# Patient Record
Sex: FEMALE | Race: WHITE | NOT HISPANIC OR LATINO | Employment: OTHER | URBAN - METROPOLITAN AREA
[De-identification: names, ages, dates, MRNs, and addresses within clinical notes are randomized per-mention and may not be internally consistent; named-entity substitution may affect disease eponyms.]

---

## 2018-12-12 PROBLEM — K92.2 ACUTE GASTROINTESTINAL HEMORRHAGE: Status: ACTIVE | Noted: 2018-12-12

## 2019-09-19 ENCOUNTER — HOSPITAL ENCOUNTER (INPATIENT)
Facility: HOSPITAL | Age: 64
LOS: 13 days | Discharge: ANOTHER HOSPITAL | DRG: 871 | End: 2019-10-02
Attending: INTERNAL MEDICINE | Admitting: INTERNAL MEDICINE
Payer: MEDICARE

## 2019-09-19 DIAGNOSIS — A41.01 STAPHYLOCOCCUS AUREUS BACTEREMIA WITH SEPSIS: ICD-10-CM

## 2019-09-19 DIAGNOSIS — I49.9 CARDIAC RHYTHM DISORDER OR DISTURBANCE OR CHANGE: ICD-10-CM

## 2019-09-19 DIAGNOSIS — J90 PLEURAL EFFUSION, BILATERAL: ICD-10-CM

## 2019-09-19 DIAGNOSIS — J18.9 COMMUNITY ACQUIRED PNEUMONIA, UNSPECIFIED LATERALITY: Primary | ICD-10-CM

## 2019-09-19 DIAGNOSIS — R65.21 SEPTIC SHOCK: ICD-10-CM

## 2019-09-19 DIAGNOSIS — I33.0 SBE (SUBACUTE BACTERIAL ENDOCARDITIS): ICD-10-CM

## 2019-09-19 DIAGNOSIS — I34.0 MITRAL REGURGITATION: ICD-10-CM

## 2019-09-19 DIAGNOSIS — I33.0 ACUTE BACTERIAL ENDOCARDITIS: ICD-10-CM

## 2019-09-19 DIAGNOSIS — I50.9 CHF (CONGESTIVE HEART FAILURE): ICD-10-CM

## 2019-09-19 DIAGNOSIS — A41.9 SEPTIC SHOCK: ICD-10-CM

## 2019-09-19 DIAGNOSIS — A41.9 SEPSIS: ICD-10-CM

## 2019-09-19 PROBLEM — E87.20 METABOLIC ACIDOSIS: Status: ACTIVE | Noted: 2019-09-19

## 2019-09-19 PROBLEM — E87.1 HYPONATREMIA: Status: ACTIVE | Noted: 2019-09-19

## 2019-09-19 PROBLEM — E86.0 DEHYDRATION: Status: ACTIVE | Noted: 2019-09-19

## 2019-09-19 PROBLEM — K51.00 PANCOLITIS: Status: ACTIVE | Noted: 2019-09-19

## 2019-09-19 PROBLEM — K92.2 ACUTE GASTROINTESTINAL HEMORRHAGE: Status: RESOLVED | Noted: 2018-12-12 | Resolved: 2019-09-19

## 2019-09-19 PROBLEM — N17.9 AKI (ACUTE KIDNEY INJURY): Status: ACTIVE | Noted: 2019-09-19

## 2019-09-19 PROBLEM — E86.1 INTRAVASCULAR VOLUME DEPLETION: Status: ACTIVE | Noted: 2019-09-19

## 2019-09-19 PROBLEM — G93.41 ENCEPHALOPATHY, METABOLIC: Status: ACTIVE | Noted: 2019-09-19

## 2019-09-19 PROBLEM — E87.6 HYPOKALEMIA: Status: ACTIVE | Noted: 2019-09-19

## 2019-09-19 LAB
ALBUMIN SERPL BCP-MCNC: 1.5 G/DL (ref 3.5–5.2)
ALLENS TEST: ABNORMAL
ANION GAP SERPL CALC-SCNC: 16 MMOL/L (ref 8–16)
BASOPHILS NFR BLD: 0 % (ref 0–1.9)
BUN SERPL-MCNC: 85 MG/DL (ref 8–23)
CALCIUM SERPL-MCNC: 7.3 MG/DL (ref 8.7–10.5)
CHLORIDE SERPL-SCNC: 88 MMOL/L (ref 95–110)
CO2 SERPL-SCNC: 15 MMOL/L (ref 23–29)
CREAT SERPL-MCNC: 2.2 MG/DL (ref 0.5–1.4)
DELSYS: ABNORMAL
DIFFERENTIAL METHOD: ABNORMAL
EOSINOPHIL NFR BLD: 0 % (ref 0–8)
ERYTHROCYTE [DISTWIDTH] IN BLOOD BY AUTOMATED COUNT: 13.9 % (ref 11.5–14.5)
ERYTHROCYTE [SEDIMENTATION RATE] IN BLOOD BY WESTERGREN METHOD: 30 MM/H
EST. GFR  (AFRICAN AMERICAN): 26 ML/MIN/1.73 M^2
EST. GFR  (NON AFRICAN AMERICAN): 23 ML/MIN/1.73 M^2
FLOW: 12
GLUCOSE SERPL-MCNC: 165 MG/DL (ref 70–110)
HCO3 UR-SCNC: 15 MMOL/L (ref 24–28)
HCT VFR BLD AUTO: 30.1 % (ref 37–48.5)
HGB BLD-MCNC: 11 G/DL (ref 12–16)
IMM GRANULOCYTES # BLD AUTO: ABNORMAL K/UL
LACTATE SERPL-SCNC: 1.7 MMOL/L (ref 0.5–2.2)
LYMPHOCYTES NFR BLD: 7 % (ref 18–48)
MAGNESIUM SERPL-MCNC: 2.1 MG/DL (ref 1.6–2.6)
MCH RBC QN AUTO: 31.3 PG (ref 27–31)
MCHC RBC AUTO-ENTMCNC: 36.5 G/DL (ref 32–36)
MCV RBC AUTO: 86 FL (ref 82–98)
MODE: ABNORMAL
MONOCYTES NFR BLD: 7 % (ref 4–15)
NEUTROPHILS NFR BLD: 86 % (ref 38–73)
NRBC BLD-RTO: 0 /100 WBC
PCO2 BLDA: 28.5 MMHG (ref 35–45)
PH SMN: 7.33 [PH] (ref 7.35–7.45)
PHOSPHATE SERPL-MCNC: 3.9 MG/DL (ref 2.7–4.5)
PLATELET # BLD AUTO: 127 K/UL (ref 150–350)
PLATELET BLD QL SMEAR: ABNORMAL
PMV BLD AUTO: 12.7 FL (ref 9.2–12.9)
PO2 BLDA: 89 MMHG (ref 80–100)
POC BE: -11 MMOL/L
POC SATURATED O2: 96 % (ref 95–100)
POC TCO2: 16 MMOL/L (ref 23–27)
POTASSIUM SERPL-SCNC: 2.8 MMOL/L (ref 3.5–5.1)
RBC # BLD AUTO: 3.52 M/UL (ref 4–5.4)
SAMPLE: ABNORMAL
SITE: ABNORMAL
SODIUM SERPL-SCNC: 119 MMOL/L (ref 136–145)
SP02: 99
TROPONIN I SERPL DL<=0.01 NG/ML-MCNC: 0.03 NG/ML (ref 0–0.03)
WBC # BLD AUTO: 19.83 K/UL (ref 3.9–12.7)

## 2019-09-19 PROCEDURE — 85007 BL SMEAR W/DIFF WBC COUNT: CPT | Mod: 91

## 2019-09-19 PROCEDURE — 80069 RENAL FUNCTION PANEL: CPT

## 2019-09-19 PROCEDURE — 20000000 HC ICU ROOM

## 2019-09-19 PROCEDURE — S0030 INJECTION, METRONIDAZOLE: HCPCS | Performed by: NURSE PRACTITIONER

## 2019-09-19 PROCEDURE — 83605 ASSAY OF LACTIC ACID: CPT | Mod: 91

## 2019-09-19 PROCEDURE — 36600 WITHDRAWAL OF ARTERIAL BLOOD: CPT

## 2019-09-19 PROCEDURE — 27000221 HC OXYGEN, UP TO 24 HOURS

## 2019-09-19 PROCEDURE — 83735 ASSAY OF MAGNESIUM: CPT

## 2019-09-19 PROCEDURE — 63600175 PHARM REV CODE 636 W HCPCS: Performed by: NURSE PRACTITIONER

## 2019-09-19 PROCEDURE — 94760 N-INVAS EAR/PLS OXIMETRY 1: CPT

## 2019-09-19 PROCEDURE — 25000003 PHARM REV CODE 250: Performed by: NURSE PRACTITIONER

## 2019-09-19 PROCEDURE — 84484 ASSAY OF TROPONIN QUANT: CPT | Mod: 91

## 2019-09-19 PROCEDURE — 36415 COLL VENOUS BLD VENIPUNCTURE: CPT

## 2019-09-19 PROCEDURE — 94640 AIRWAY INHALATION TREATMENT: CPT

## 2019-09-19 PROCEDURE — 85027 COMPLETE CBC AUTOMATED: CPT | Mod: 91

## 2019-09-19 PROCEDURE — 82803 BLOOD GASES ANY COMBINATION: CPT

## 2019-09-19 PROCEDURE — 99900035 HC TECH TIME PER 15 MIN (STAT)

## 2019-09-19 PROCEDURE — 25000003 PHARM REV CODE 250

## 2019-09-19 RX ORDER — POTASSIUM CHLORIDE 29.8 MG/ML
40 INJECTION INTRAVENOUS ONCE
Status: COMPLETED | OUTPATIENT
Start: 2019-09-20 | End: 2019-09-19

## 2019-09-19 RX ORDER — NOREPINEPHRINE BITARTRATE 0.03 MG/ML
INJECTION, SOLUTION INTRAVENOUS
Status: COMPLETED
Start: 2019-09-19 | End: 2019-09-19

## 2019-09-19 RX ORDER — PANTOPRAZOLE SODIUM 40 MG/10ML
40 INJECTION, POWDER, LYOPHILIZED, FOR SOLUTION INTRAVENOUS DAILY
Status: DISCONTINUED | OUTPATIENT
Start: 2019-09-20 | End: 2019-10-02 | Stop reason: HOSPADM

## 2019-09-19 RX ORDER — ONDANSETRON 2 MG/ML
4 INJECTION INTRAMUSCULAR; INTRAVENOUS EVERY 4 HOURS PRN
Status: DISCONTINUED | OUTPATIENT
Start: 2019-09-19 | End: 2019-10-02 | Stop reason: HOSPADM

## 2019-09-19 RX ORDER — IPRATROPIUM BROMIDE AND ALBUTEROL SULFATE 2.5; .5 MG/3ML; MG/3ML
3 SOLUTION RESPIRATORY (INHALATION) EVERY 6 HOURS
Status: DISCONTINUED | OUTPATIENT
Start: 2019-09-20 | End: 2019-09-19

## 2019-09-19 RX ORDER — IPRATROPIUM BROMIDE AND ALBUTEROL SULFATE 2.5; .5 MG/3ML; MG/3ML
3 SOLUTION RESPIRATORY (INHALATION) EVERY 6 HOURS
Status: DISCONTINUED | OUTPATIENT
Start: 2019-09-20 | End: 2019-09-27

## 2019-09-19 RX ORDER — SODIUM CHLORIDE 0.9 % (FLUSH) 0.9 %
10 SYRINGE (ML) INJECTION
Status: DISCONTINUED | OUTPATIENT
Start: 2019-09-19 | End: 2019-10-02 | Stop reason: HOSPADM

## 2019-09-19 RX ORDER — NOREPINEPHRINE BITARTRATE 0.03 MG/ML
0.02 INJECTION, SOLUTION INTRAVENOUS CONTINUOUS
Status: DISCONTINUED | OUTPATIENT
Start: 2019-09-19 | End: 2019-09-27

## 2019-09-19 RX ORDER — LEVOFLOXACIN 5 MG/ML
500 INJECTION, SOLUTION INTRAVENOUS
Status: DISCONTINUED | OUTPATIENT
Start: 2019-09-19 | End: 2019-09-21

## 2019-09-19 RX ORDER — SODIUM CHLORIDE 9 MG/ML
INJECTION, SOLUTION INTRAVENOUS CONTINUOUS
Status: DISCONTINUED | OUTPATIENT
Start: 2019-09-19 | End: 2019-09-20

## 2019-09-19 RX ORDER — METRONIDAZOLE 500 MG/100ML
500 INJECTION, SOLUTION INTRAVENOUS
Status: DISCONTINUED | OUTPATIENT
Start: 2019-09-19 | End: 2019-09-26

## 2019-09-19 RX ADMIN — Medication 8 MG: at 10:09

## 2019-09-19 RX ADMIN — METRONIDAZOLE 500 MG: 500 INJECTION, SOLUTION INTRAVENOUS at 11:09

## 2019-09-19 RX ADMIN — POTASSIUM CHLORIDE 40 MEQ: 29.8 INJECTION, SOLUTION INTRAVENOUS at 11:09

## 2019-09-19 RX ADMIN — LEVOFLOXACIN 500 MG: 500 INJECTION, SOLUTION INTRAVENOUS at 10:09

## 2019-09-19 RX ADMIN — IPRATROPIUM BROMIDE AND ALBUTEROL SULFATE 3 ML: .5; 3 SOLUTION RESPIRATORY (INHALATION) at 11:09

## 2019-09-19 RX ADMIN — Medication 125 MG: at 11:09

## 2019-09-19 RX ADMIN — SODIUM CHLORIDE: 0.9 INJECTION, SOLUTION INTRAVENOUS at 10:09

## 2019-09-19 NOTE — PLAN OF CARE
(Physician in Lead of Transfers)   Outside Transfer Acceptance Note / Regional Referral Center    Transferring Physician: Pratik Michele MD (ED)    Accepting Physician: Eddy Mayer MD    Date of Acceptance: 09/18/2019    Transferring Facility: [facility]    Destination Facility and Admitting Physician: [facility] with Dr. Covarrubias    Reason for Transfer: HLOC, ICU needs    Report from Transferring Physician/Hospital course:     Ms. Mckenzie is a 63-year-old woman with WESLEY, MDD, and prior GIB who presented to [facility] brought by her family for a several day history of confusion, and slowed/slurred speech. Her family described a history of weakness, diarrhea, fevers, chills, nausea, and vomiting non-bloody emesis. Upon arrival to the ED she was hypotensive with BP 80/30s. Workup significant for CXR with bilateral lower airspace disease, CT A/P with pancolitis, severe hyponatremia, hypochloremia, and ABIMBOLA on lab work (see below for details), along with numerous other abnormalities. She was resuscitated with 30 cc/kg, and an extra 1L of NS, then given vanc + ceftriaxone + levaquin. L. IJ CVL was placed. She was started on epi with improvement in BP, and they are currently weaning down the dose. Her SpO2 is in the high 80s and low 90s on 2L of O2 via NC.     Dr. Michele believes her condition is multifactorial with contribution from septic shock from acute pancolitis and pneumonia causing metabolic encephalopathy, complicated by a toxidrome (likely opiates and benzodiazepines) in the setting of acute kidney injury and severe volume depletion.    Her airspace disease is predominately lower, so perhaps the etiology is aspiration from the vomiting with the above encephalopathy.    I requested that the ED broad antibiotics further by the addition of anaerobic coverage (either by adding flagyl or consolidating to zosyn), trend BMP very frequently to assess for Na  "overcorrection, and after talking to Dr. Covarrubias we will request a head CT. [facility] is currently without ICU beds/staffing to take care of the patient, so are requesting transfer.    VS:     Temp: Afebrile  HR: 115  RR: 30  SpO2:89 on 2L  BP: 90/45    Labs:     WBC 13.1k  Hemoglobin 10.7 g/dL  Platelets 97k    Na 114  K 3.1  Cl 76  Bicarb 15 with AG 23    sCr 2.9  Calcium 7.2, albumin 2.2  Total bili 1.5  AST 84  ALT 24  INR 2.7          Trop 0.3  Lactate #1 1.0    UDS positive for benzodiazepines and opiates  Tylenol 22.2    Radiographs:     CXR  "Patchy airspace disease bilaterally, likely pneumonia."    CT A/P  "1. Acute pancolitis.  2. Airspace disease multiple areas in the lower lungs are seen and a right pleural effusion approximately 2 cm deep at the base."    To Do List: Admit to , ICU bed    Upon patient arrival to floor, please contact Hospital Medicine on call.     Eddy Mayer M.D.  Department of Hospital Medicine  [facility] jonathan  [phone] (pager)  "

## 2019-09-20 ENCOUNTER — OUTSIDE PLACE OF SERVICE (OUTPATIENT)
Dept: INFECTIOUS DISEASES | Facility: CLINIC | Age: 64
End: 2019-09-20
Payer: MEDICARE

## 2019-09-20 PROBLEM — J84.89 BOOP (BRONCHIOLITIS OBLITERANS WITH ORGANIZING PNEUMONIA): Status: ACTIVE | Noted: 2019-09-20

## 2019-09-20 PROBLEM — J84.9 ILD (INTERSTITIAL LUNG DISEASE): Status: ACTIVE | Noted: 2019-09-20

## 2019-09-20 PROBLEM — D69.6 THROMBOCYTOPENIA: Status: ACTIVE | Noted: 2019-09-20

## 2019-09-20 PROBLEM — J90 PLEURAL EFFUSION: Status: ACTIVE | Noted: 2019-09-20

## 2019-09-20 PROBLEM — D68.9 COAGULOPATHY: Status: ACTIVE | Noted: 2019-09-20

## 2019-09-20 PROBLEM — J44.0 COPD WITH ACUTE LOWER RESPIRATORY INFECTION: Status: ACTIVE | Noted: 2019-09-20

## 2019-09-20 PROBLEM — I70.0 CALCIFICATION OF AORTA: Status: ACTIVE | Noted: 2019-09-20

## 2019-09-20 PROBLEM — J96.01 ACUTE HYPOXEMIC RESPIRATORY FAILURE: Status: ACTIVE | Noted: 2019-09-20

## 2019-09-20 LAB
ALLENS TEST: ABNORMAL
ANION GAP SERPL CALC-SCNC: 10 MMOL/L (ref 8–16)
ANION GAP SERPL CALC-SCNC: 10 MMOL/L (ref 8–16)
ANION GAP SERPL CALC-SCNC: 12 MMOL/L (ref 8–16)
ANION GAP SERPL CALC-SCNC: 12 MMOL/L (ref 8–16)
ANION GAP SERPL CALC-SCNC: 13 MMOL/L (ref 8–16)
ANION GAP SERPL CALC-SCNC: 14 MMOL/L (ref 8–16)
BASOPHILS NFR BLD: 0 % (ref 0–1.9)
BUN SERPL-MCNC: 55 MG/DL (ref 8–23)
BUN SERPL-MCNC: 55 MG/DL (ref 8–23)
BUN SERPL-MCNC: 61 MG/DL (ref 8–23)
BUN SERPL-MCNC: 61 MG/DL (ref 8–23)
BUN SERPL-MCNC: 69 MG/DL (ref 8–23)
BUN SERPL-MCNC: 69 MG/DL (ref 8–23)
BUN SERPL-MCNC: 72 MG/DL (ref 8–23)
BUN SERPL-MCNC: 77 MG/DL (ref 8–23)
CALCIUM SERPL-MCNC: 7.4 MG/DL (ref 8.7–10.5)
CALCIUM SERPL-MCNC: 7.6 MG/DL (ref 8.7–10.5)
CALCIUM SERPL-MCNC: 7.6 MG/DL (ref 8.7–10.5)
CALCIUM SERPL-MCNC: 7.7 MG/DL (ref 8.7–10.5)
CALCIUM SERPL-MCNC: 7.8 MG/DL (ref 8.7–10.5)
CALCIUM SERPL-MCNC: 7.8 MG/DL (ref 8.7–10.5)
CHLORIDE SERPL-SCNC: 102 MMOL/L (ref 95–110)
CHLORIDE SERPL-SCNC: 102 MMOL/L (ref 95–110)
CHLORIDE SERPL-SCNC: 103 MMOL/L (ref 95–110)
CHLORIDE SERPL-SCNC: 103 MMOL/L (ref 95–110)
CHLORIDE SERPL-SCNC: 93 MMOL/L (ref 95–110)
CHLORIDE SERPL-SCNC: 96 MMOL/L (ref 95–110)
CHLORIDE SERPL-SCNC: 97 MMOL/L (ref 95–110)
CHLORIDE SERPL-SCNC: 97 MMOL/L (ref 95–110)
CO2 SERPL-SCNC: 14 MMOL/L (ref 23–29)
CO2 SERPL-SCNC: 14 MMOL/L (ref 23–29)
CO2 SERPL-SCNC: 15 MMOL/L (ref 23–29)
CO2 SERPL-SCNC: 15 MMOL/L (ref 23–29)
CO2 SERPL-SCNC: 16 MMOL/L (ref 23–29)
CREAT SERPL-MCNC: 1.3 MG/DL (ref 0.5–1.4)
CREAT SERPL-MCNC: 1.3 MG/DL (ref 0.5–1.4)
CREAT SERPL-MCNC: 1.4 MG/DL (ref 0.5–1.4)
CREAT SERPL-MCNC: 1.4 MG/DL (ref 0.5–1.4)
CREAT SERPL-MCNC: 1.6 MG/DL (ref 0.5–1.4)
CREAT SERPL-MCNC: 1.6 MG/DL (ref 0.5–1.4)
CREAT SERPL-MCNC: 1.7 MG/DL (ref 0.5–1.4)
CREAT SERPL-MCNC: 1.9 MG/DL (ref 0.5–1.4)
CRP SERPL-MCNC: 182.8 MG/L (ref 0–8.2)
D DIMER PPP IA.FEU-MCNC: 4.23 MG/L FEU
DELSYS: ABNORMAL
DIFFERENTIAL METHOD: ABNORMAL
EOSINOPHIL NFR BLD: 0 % (ref 0–8)
ERYTHROCYTE [DISTWIDTH] IN BLOOD BY AUTOMATED COUNT: 14.2 % (ref 11.5–14.5)
ERYTHROCYTE [SEDIMENTATION RATE] IN BLOOD BY WESTERGREN METHOD: 57 MM/HR (ref 0–20)
EST. GFR  (AFRICAN AMERICAN): 31 ML/MIN/1.73 M^2
EST. GFR  (AFRICAN AMERICAN): 36 ML/MIN/1.73 M^2
EST. GFR  (AFRICAN AMERICAN): 39 ML/MIN/1.73 M^2
EST. GFR  (AFRICAN AMERICAN): 39 ML/MIN/1.73 M^2
EST. GFR  (AFRICAN AMERICAN): 45 ML/MIN/1.73 M^2
EST. GFR  (AFRICAN AMERICAN): 45 ML/MIN/1.73 M^2
EST. GFR  (AFRICAN AMERICAN): 50 ML/MIN/1.73 M^2
EST. GFR  (AFRICAN AMERICAN): 50 ML/MIN/1.73 M^2
EST. GFR  (NON AFRICAN AMERICAN): 27 ML/MIN/1.73 M^2
EST. GFR  (NON AFRICAN AMERICAN): 31 ML/MIN/1.73 M^2
EST. GFR  (NON AFRICAN AMERICAN): 34 ML/MIN/1.73 M^2
EST. GFR  (NON AFRICAN AMERICAN): 34 ML/MIN/1.73 M^2
EST. GFR  (NON AFRICAN AMERICAN): 39 ML/MIN/1.73 M^2
EST. GFR  (NON AFRICAN AMERICAN): 39 ML/MIN/1.73 M^2
EST. GFR  (NON AFRICAN AMERICAN): 43 ML/MIN/1.73 M^2
EST. GFR  (NON AFRICAN AMERICAN): 43 ML/MIN/1.73 M^2
FIBRINOGEN PPP-MCNC: 549 MG/DL (ref 182–366)
FIO2: 94
FLOW: 2
GLUCOSE SERPL-MCNC: 160 MG/DL (ref 70–110)
GLUCOSE SERPL-MCNC: 160 MG/DL (ref 70–110)
GLUCOSE SERPL-MCNC: 207 MG/DL (ref 70–110)
GLUCOSE SERPL-MCNC: 207 MG/DL (ref 70–110)
GLUCOSE SERPL-MCNC: 84 MG/DL (ref 70–110)
GLUCOSE SERPL-MCNC: 87 MG/DL (ref 70–110)
GLUCOSE SERPL-MCNC: 97 MG/DL (ref 70–110)
GLUCOSE SERPL-MCNC: 97 MG/DL (ref 70–110)
HCO3 UR-SCNC: 14.4 MMOL/L (ref 24–28)
HCT VFR BLD AUTO: 27.4 % (ref 37–48.5)
HGB BLD-MCNC: 10.1 G/DL (ref 12–16)
IMM GRANULOCYTES # BLD AUTO: ABNORMAL K/UL (ref 0–0.04)
INR PPP: 3.3 (ref 0.8–1.2)
LACTATE SERPL-SCNC: 1.5 MMOL/L (ref 0.5–2.2)
LYMPHOCYTES NFR BLD: 3 % (ref 18–48)
MCH RBC QN AUTO: 31.4 PG (ref 27–31)
MCHC RBC AUTO-ENTMCNC: 36.9 G/DL (ref 32–36)
MCV RBC AUTO: 85 FL (ref 82–98)
MODE: ABNORMAL
MONOCYTES NFR BLD: 5 % (ref 4–15)
NEUTROPHILS NFR BLD: 79 % (ref 38–73)
NEUTS BAND NFR BLD MANUAL: 13 %
NRBC BLD-RTO: 0 /100 WBC
PCO2 BLDA: 23 MMHG (ref 35–45)
PH SMN: 7.41 [PH] (ref 7.35–7.45)
PLATELET # BLD AUTO: 94 K/UL (ref 150–350)
PLATELET BLD QL SMEAR: ABNORMAL
PMV BLD AUTO: 12.2 FL (ref 9.2–12.9)
PO2 BLDA: 60 MMHG (ref 80–100)
POC BE: -10 MMOL/L
POC SATURATED O2: 92 % (ref 95–100)
POC TCO2: 15 MMOL/L (ref 23–27)
POTASSIUM SERPL-SCNC: 3.2 MMOL/L (ref 3.5–5.1)
POTASSIUM SERPL-SCNC: 3.2 MMOL/L (ref 3.5–5.1)
POTASSIUM SERPL-SCNC: 3.3 MMOL/L (ref 3.5–5.1)
POTASSIUM SERPL-SCNC: 3.4 MMOL/L (ref 3.5–5.1)
POTASSIUM SERPL-SCNC: 3.8 MMOL/L (ref 3.5–5.1)
POTASSIUM SERPL-SCNC: 3.8 MMOL/L (ref 3.5–5.1)
POTASSIUM SERPL-SCNC: 3.9 MMOL/L (ref 3.5–5.1)
POTASSIUM SERPL-SCNC: 3.9 MMOL/L (ref 3.5–5.1)
PROTHROMBIN TIME: 33.7 SEC (ref 9–12.5)
RBC # BLD AUTO: 3.22 M/UL (ref 4–5.4)
SAMPLE: ABNORMAL
SITE: ABNORMAL
SODIUM SERPL-SCNC: 122 MMOL/L (ref 136–145)
SODIUM SERPL-SCNC: 124 MMOL/L (ref 136–145)
SODIUM SERPL-SCNC: 126 MMOL/L (ref 136–145)
SODIUM SERPL-SCNC: 126 MMOL/L (ref 136–145)
SODIUM SERPL-SCNC: 128 MMOL/L (ref 136–145)
SODIUM SERPL-SCNC: 128 MMOL/L (ref 136–145)
SODIUM SERPL-SCNC: 129 MMOL/L (ref 136–145)
SODIUM SERPL-SCNC: 129 MMOL/L (ref 136–145)
TROPONIN I SERPL DL<=0.01 NG/ML-MCNC: 0.04 NG/ML (ref 0–0.03)
VANCOMYCIN SERPL-MCNC: 4.7 UG/ML
WBC # BLD AUTO: 10.29 K/UL (ref 3.9–12.7)

## 2019-09-20 PROCEDURE — 83735 ASSAY OF MAGNESIUM: CPT

## 2019-09-20 PROCEDURE — 80048 BASIC METABOLIC PNL TOTAL CA: CPT | Mod: 91

## 2019-09-20 PROCEDURE — 63600175 PHARM REV CODE 636 W HCPCS: Performed by: INTERNAL MEDICINE

## 2019-09-20 PROCEDURE — 25000003 PHARM REV CODE 250: Performed by: INTERNAL MEDICINE

## 2019-09-20 PROCEDURE — 82803 BLOOD GASES ANY COMBINATION: CPT

## 2019-09-20 PROCEDURE — 36415 COLL VENOUS BLD VENIPUNCTURE: CPT

## 2019-09-20 PROCEDURE — 25000003 PHARM REV CODE 250: Performed by: NURSE PRACTITIONER

## 2019-09-20 PROCEDURE — 20000000 HC ICU ROOM

## 2019-09-20 PROCEDURE — 63600175 PHARM REV CODE 636 W HCPCS: Performed by: NURSE PRACTITIONER

## 2019-09-20 PROCEDURE — 87040 BLOOD CULTURE FOR BACTERIA: CPT

## 2019-09-20 PROCEDURE — 87340 HEPATITIS B SURFACE AG IA: CPT

## 2019-09-20 PROCEDURE — 99223 1ST HOSP IP/OBS HIGH 75: CPT | Mod: S$GLB,,, | Performed by: INTERNAL MEDICINE

## 2019-09-20 PROCEDURE — 85027 COMPLETE CBC AUTOMATED: CPT

## 2019-09-20 PROCEDURE — 86706 HEP B SURFACE ANTIBODY: CPT

## 2019-09-20 PROCEDURE — 99223 PR INITIAL HOSPITAL CARE,LEVL III: ICD-10-PCS | Mod: ,,, | Performed by: INTERNAL MEDICINE

## 2019-09-20 PROCEDURE — 85651 RBC SED RATE NONAUTOMATED: CPT

## 2019-09-20 PROCEDURE — 83605 ASSAY OF LACTIC ACID: CPT

## 2019-09-20 PROCEDURE — 27000221 HC OXYGEN, UP TO 24 HOURS

## 2019-09-20 PROCEDURE — 93005 ELECTROCARDIOGRAM TRACING: CPT

## 2019-09-20 PROCEDURE — C9113 INJ PANTOPRAZOLE SODIUM, VIA: HCPCS | Performed by: NURSE PRACTITIONER

## 2019-09-20 PROCEDURE — 99291 CRITICAL CARE FIRST HOUR: CPT | Mod: ,,, | Performed by: INTERNAL MEDICINE

## 2019-09-20 PROCEDURE — 85007 BL SMEAR W/DIFF WBC COUNT: CPT

## 2019-09-20 PROCEDURE — 63600175 PHARM REV CODE 636 W HCPCS

## 2019-09-20 PROCEDURE — 99291 PR CRITICAL CARE, E/M 30-74 MINUTES: ICD-10-PCS | Mod: ,,, | Performed by: INTERNAL MEDICINE

## 2019-09-20 PROCEDURE — 85610 PROTHROMBIN TIME: CPT

## 2019-09-20 PROCEDURE — S0030 INJECTION, METRONIDAZOLE: HCPCS | Performed by: NURSE PRACTITIONER

## 2019-09-20 PROCEDURE — 99223 1ST HOSP IP/OBS HIGH 75: CPT | Mod: ,,, | Performed by: INTERNAL MEDICINE

## 2019-09-20 PROCEDURE — 80202 ASSAY OF VANCOMYCIN: CPT

## 2019-09-20 PROCEDURE — 99900035 HC TECH TIME PER 15 MIN (STAT)

## 2019-09-20 PROCEDURE — 86803 HEPATITIS C AB TEST: CPT

## 2019-09-20 PROCEDURE — 86140 C-REACTIVE PROTEIN: CPT

## 2019-09-20 PROCEDURE — 85384 FIBRINOGEN ACTIVITY: CPT

## 2019-09-20 PROCEDURE — 84484 ASSAY OF TROPONIN QUANT: CPT

## 2019-09-20 PROCEDURE — 94761 N-INVAS EAR/PLS OXIMETRY MLT: CPT

## 2019-09-20 PROCEDURE — 25000242 PHARM REV CODE 250 ALT 637 W/ HCPCS: Performed by: INTERNAL MEDICINE

## 2019-09-20 PROCEDURE — 94640 AIRWAY INHALATION TREATMENT: CPT

## 2019-09-20 PROCEDURE — 85379 FIBRIN DEGRADATION QUANT: CPT

## 2019-09-20 PROCEDURE — 36600 WITHDRAWAL OF ARTERIAL BLOOD: CPT

## 2019-09-20 PROCEDURE — 80048 BASIC METABOLIC PNL TOTAL CA: CPT

## 2019-09-20 PROCEDURE — 99223 PR INITIAL HOSPITAL CARE,LEVL III: ICD-10-PCS | Mod: S$GLB,,, | Performed by: INTERNAL MEDICINE

## 2019-09-20 RX ORDER — MORPHINE SULFATE 4 MG/ML
4 INJECTION, SOLUTION INTRAMUSCULAR; INTRAVENOUS
Status: DISCONTINUED | OUTPATIENT
Start: 2019-09-20 | End: 2019-09-24

## 2019-09-20 RX ORDER — DESMOPRESSIN ACETATE 4 UG/ML
2 INJECTION, SOLUTION INTRAVENOUS; SUBCUTANEOUS ONCE
Status: COMPLETED | OUTPATIENT
Start: 2019-09-20 | End: 2019-09-20

## 2019-09-20 RX ORDER — POTASSIUM CHLORIDE 29.8 MG/ML
40 INJECTION INTRAVENOUS ONCE
Status: COMPLETED | OUTPATIENT
Start: 2019-09-20 | End: 2019-09-20

## 2019-09-20 RX ORDER — MORPHINE SULFATE 4 MG/ML
INJECTION, SOLUTION INTRAMUSCULAR; INTRAVENOUS
Status: COMPLETED
Start: 2019-09-20 | End: 2019-09-20

## 2019-09-20 RX ORDER — POTASSIUM CHLORIDE 29.8 MG/ML
INJECTION INTRAVENOUS
Status: DISPENSED
Start: 2019-09-20 | End: 2019-09-21

## 2019-09-20 RX ORDER — MORPHINE SULFATE 4 MG/ML
4 INJECTION, SOLUTION INTRAMUSCULAR; INTRAVENOUS EVERY 4 HOURS PRN
Status: DISCONTINUED | OUTPATIENT
Start: 2019-09-20 | End: 2019-09-20

## 2019-09-20 RX ORDER — DEXTROSE MONOHYDRATE, SODIUM CHLORIDE, AND POTASSIUM CHLORIDE 50; 1.49; 9 G/1000ML; G/1000ML; G/1000ML
INJECTION, SOLUTION INTRAVENOUS CONTINUOUS
Status: DISCONTINUED | OUTPATIENT
Start: 2019-09-20 | End: 2019-09-20

## 2019-09-20 RX ORDER — DEXTROSE MONOHYDRATE, SODIUM CHLORIDE, AND POTASSIUM CHLORIDE 50; 2.98; 4.5 G/1000ML; G/1000ML; G/1000ML
INJECTION, SOLUTION INTRAVENOUS CONTINUOUS
Status: DISCONTINUED | OUTPATIENT
Start: 2019-09-20 | End: 2019-09-21

## 2019-09-20 RX ADMIN — PANTOPRAZOLE SODIUM 40 MG: 40 INJECTION, POWDER, FOR SOLUTION INTRAVENOUS at 09:09

## 2019-09-20 RX ADMIN — Medication 125 MG: at 12:09

## 2019-09-20 RX ADMIN — MORPHINE SULFATE 4 MG: 4 INJECTION, SOLUTION INTRAMUSCULAR; INTRAVENOUS at 11:09

## 2019-09-20 RX ADMIN — Medication 0.12 MCG/KG/MIN: at 03:09

## 2019-09-20 RX ADMIN — IPRATROPIUM BROMIDE AND ALBUTEROL SULFATE 3 ML: .5; 3 SOLUTION RESPIRATORY (INHALATION) at 07:09

## 2019-09-20 RX ADMIN — MORPHINE SULFATE 4 MG: 4 INJECTION, SOLUTION INTRAMUSCULAR; INTRAVENOUS at 09:09

## 2019-09-20 RX ADMIN — ONDANSETRON 4 MG: 2 INJECTION INTRAMUSCULAR; INTRAVENOUS at 04:09

## 2019-09-20 RX ADMIN — METRONIDAZOLE 500 MG: 500 INJECTION, SOLUTION INTRAVENOUS at 03:09

## 2019-09-20 RX ADMIN — LEVOFLOXACIN 500 MG: 500 INJECTION, SOLUTION INTRAVENOUS at 10:09

## 2019-09-20 RX ADMIN — METRONIDAZOLE 500 MG: 500 INJECTION, SOLUTION INTRAVENOUS at 10:09

## 2019-09-20 RX ADMIN — Medication 125 MG: at 09:09

## 2019-09-20 RX ADMIN — MORPHINE SULFATE 4 MG: 4 INJECTION, SOLUTION INTRAMUSCULAR; INTRAVENOUS at 06:09

## 2019-09-20 RX ADMIN — POTASSIUM CHLORIDE 40 MEQ: 29.8 INJECTION, SOLUTION INTRAVENOUS at 02:09

## 2019-09-20 RX ADMIN — POTASSIUM CHLORIDE, DEXTROSE MONOHYDRATE AND SODIUM CHLORIDE: 150; 5; 900 INJECTION, SOLUTION INTRAVENOUS at 01:09

## 2019-09-20 RX ADMIN — SODIUM CHLORIDE, SODIUM LACTATE, POTASSIUM CHLORIDE, AND CALCIUM CHLORIDE 2000 ML: .6; .31; .03; .02 INJECTION, SOLUTION INTRAVENOUS at 12:09

## 2019-09-20 RX ADMIN — IPRATROPIUM BROMIDE AND ALBUTEROL SULFATE 3 ML: .5; 3 SOLUTION RESPIRATORY (INHALATION) at 01:09

## 2019-09-20 RX ADMIN — MORPHINE SULFATE 4 MG: 4 INJECTION, SOLUTION INTRAMUSCULAR; INTRAVENOUS at 08:09

## 2019-09-20 RX ADMIN — Medication 125 MG: at 05:09

## 2019-09-20 RX ADMIN — DEXTROSE MONOHYDRATE, SODIUM CHLORIDE, AND POTASSIUM CHLORIDE: 50; 4.5; 2.98 INJECTION, SOLUTION INTRAVENOUS at 04:09

## 2019-09-20 RX ADMIN — MORPHINE SULFATE 4 MG: 4 INJECTION, SOLUTION INTRAMUSCULAR; INTRAVENOUS at 01:09

## 2019-09-20 RX ADMIN — MORPHINE SULFATE 2 MG: 4 INJECTION, SOLUTION INTRAMUSCULAR; INTRAVENOUS at 12:09

## 2019-09-20 RX ADMIN — METRONIDAZOLE 500 MG: 500 INJECTION, SOLUTION INTRAVENOUS at 05:09

## 2019-09-20 RX ADMIN — VANCOMYCIN HYDROCHLORIDE 750 MG: 750 INJECTION, POWDER, LYOPHILIZED, FOR SOLUTION INTRAVENOUS at 05:09

## 2019-09-20 RX ADMIN — Medication 0.06 MCG/KG/MIN: at 07:09

## 2019-09-20 RX ADMIN — DESMOPRESSIN ACETATE 2 MCG: 4 SOLUTION INTRAVENOUS at 04:09

## 2019-09-20 RX ADMIN — DEXTROSE MONOHYDRATE, SODIUM CHLORIDE, AND POTASSIUM CHLORIDE: 50; 4.5; 2.98 INJECTION, SOLUTION INTRAVENOUS at 11:09

## 2019-09-20 NOTE — ASSESSMENT & PLAN NOTE
Acute, 2° pancolitis   Associated with renal failure tachycardia, tachypnea, hypotension, coagulopathy and metabolic acidosis  Monitor closely in the ICU  Cardiac monitoring   Blood cultures pending  Possibly septic emboli in head. CT in AM

## 2019-09-20 NOTE — CONSULTS
"  09/20/2019      Admit Date: 9/19/2019  Mesha Mckenzie  New Patient Consult    No chief complaint on file.    Cc boop,resp failure      History of Present Illness:  Pt smoker, disabled lpn post Marisa, lives alone/- daughter gives lot of history.  Pt functions, no resp limits, was to have colonoscopy for positive/abn stool screen- no chr diarrhea.  Pt had been on chr narctotics til July by PMR reports, also on chr valium/Adderall.    Pt 10 days ago developed nausea with diarrhea (1-2/d), poor intake, weakness, cough- daughter discussed by phone 6 days ago and pt sounded near nl but related symptoms.  Pt subsequently had poor recall- had vague fall with no specific injury.  Had vague emesis.  Cough but cannot relate mucous.  Fever is vague.  EMS brought in to [facility] with low bp- patchy infiltrates seen on ct chest as was emphysema.          Per NP Dolese HPI earlier this am:HPI: Pt is a 64 yo female who was transferred from [facility] for septic shock, pancolitis and bilateral pna. Pt reports that she fell out of bed on Sunday morning and has been feeling progressively worse throughout the week. C/o vomiting since Sunday, ~3 episodes per day. Also reports diarrhea, ~4 stools per day, watery in nature. Pt denies any hematemesis, melena or hematochezia. Pt did take zofran which helped "some:" c/o severe abdominal pain since Sunday which is constant. Pain is a 10 on a 0-10 pain scale.  Has not been able to eat due to the nausea and pain. Questioned pt in regards to use of pain medication, states "I didn't have any." pt was scheduled for colonoscopy tomorrow. Hx of GI bleed in December 2018. Pt states she had passed some blood in her stool. Reviewed colonoscopy report, no bleeding in the colon was found however the prep was poor. No recent travel, no sick contacts at home. Questioned pt in regards to cough: pt states she has felt SOB due to pain but reports only occasional coughing. Denies any sputum production.  " "Social: smokes 10 cigarettes per day, 20 pack year hx. No ETOH.     PFSH:  Past Medical History:   Diagnosis Date    Anxiety     Carotid bruit     Chronic pain     Cystitis     Cystocele, unspecified (CODE)     Depression     GI bleed     History of uterine fibroid     Shortness of breath on exertion     Spinal stenosis     Urinary retention      Past Surgical History:   Procedure Laterality Date    ANTERIOR VAGINAL REPAIR  10-    CARDIAC CATHETERIZATION  age 12    CHOLECYSTECTOMY  2015    COLONOSCOPY  12/12/2018    aborted due to poor colon prep    COLONOSCOPY N/A 12/12/2018    Performed by Renard Gonsalves MD at [facility]    HYST[facility]Y  1989    [facility]    tubiligation       Social History     Tobacco Use    Smoking status: Current Every Day Smoker     Packs/day: 0.50     Years: 40.00     Pack years: 20.00     Types: Cigarettes    Smokeless tobacco: Never Used   Substance Use Topics    Alcohol use: No    Drug use: No     Family History   Problem Relation Age of Onset    Alzheimer's disease Mother     Thyroid disease Mother     Osteoarthritis Mother     Hypertension Brother     Hypertension Sister     Breast cancer Neg Hx     Colon cancer Neg Hx     Ovarian cancer Neg Hx      Review of patient's allergies indicates:   Allergen Reactions    Pcn [penicillins]        Performance Status:Performance Status:The patient's activity level is housebound activities.    Review of Systems:  a review of eleven systems covering constitutional, Psych, Eye, HEENT, Respiratory, Cardiac, GI, , Musculoskeletal, Endocrine, Dermatologicwas negative except the above mentioned abnormalities and for any pertinent findings as listed below:- pt hard to interview but  pertinent positive as above, rest is good         Exam:Comprehensive exam done. BP (!) 99/58   Pulse 102   Temp 97.8 °F (36.6 °C) (Axillary)   Resp (!) 32   Ht 5' 2" (1.575 m)   Wt 45.1 kg (99 lb 6.8 oz)   SpO2 (!) 90% Comment: pt. had " 02 off  Breastfeeding? No   BMI 18.19 kg/m²   Exam included Vitals as listed, and patient's appearance and affect and alertness and mood, oral exam for yeast and hygiene and pharynx lesions and Mallapatti (M) score, neck with inspection for jvd and masses and thyroid abnormalities and lymph nodes (supraclavicular and infraclavicular nodes also examined and noted if abn), chest exam included symmetry and effort and fremitus and percussion and auscultation, cardiac exam included rhythm and gallops and murmur and rubs and jvd and edema, abdominal exam for mass and hepatosplenomegaly and tenderness and hernias and bowel sounds, Musculoskeletal exam with muscle tone and posture and mobility/gait and  strenght, and skin for rashes and cyanosis and pallor and turgor, extremity for clubbing.  Findings were normal except as listed below:  Very dry mm, M2, moans/toxic looking, weak, well nourished, sl rales, chest is symmetric, no distress, normal percussion, normal fremitus , distent cor, tender abd not distented , no edema, no clubbing,       Radiographs reviewed: view by direct vision  Ct with viry aorta ild (suggested on cxr 12/2018), bilat dense patchy infiltrates, emphysema seen      No results found for this or any previous visit.]    Labs     Recent Labs   Lab 09/20/19  1037   WBC 10.29   HGB 10.1*   HCT 27.4*   PLT 94*   BAND 13.0     Recent Labs   Lab 09/19/19  1306  09/19/19  2215 09/20/19  0249  09/20/19  1037   NA  --    < > 119* 122*   < > 126*  126*   K  --    < > 2.8* 3.4*   < > 3.2*  3.2*   CL  --    < > 88* 93*   < > 97  97   CO2  --    < > 15* 15*   < > 16*  16*   BUN  --    < > 85* 77*   < > 69*  69*   CREATININE  --    < > 2.2* 1.9*   < > 1.6*  1.6*   GLU  --    < > 165* 84   < > 97  97   CALCIUM  --    < > 7.3* 7.4*   < > 7.8*  7.8*   MG  --    < > 2.1  --   --   --    PHOS  --   --  3.9  --   --   --    ALBUMIN  --   --  1.5*  --   --   --    CRP  --   --   --  182.8*  --   --    SEDRATE   --   --   --  57*  --   --    INR  --    < >  --  3.3*  --   --    LACTATE  --   --  1.7  --   --   --    TROPONINI  --    < > 0.031* 0.044*  --   --    *  --   --   --   --   --    *  --   --   --   --   --     < > = values in this interval not displayed.     Recent Labs   Lab 09/19/19  2333   PH 7.328*   PCO2 28.5*   PO2 89   HCO3 15.0*     Microbiology Results (last 7 days)     Procedure Component Value Units Date/Time    Clostridium difficile EIA [[id number]]     Order Status:  No result Specimen:  Stool     Stool culture [[id number]]     Order Status:  No result Specimen:  Stool           Impression:  Active Hospital Problems    Diagnosis  POA    *Septic shock [A41.9, R65.21]  Yes    Coagulopathy [D68.9]  Yes    ILD (interstitial lung disease) [J84.9]  Yes    COPD with acute lower respiratory infection [J44.0]  Yes    Calcification of aorta [I70.0]  Unknown     Defer to primary control of cholesterol and bp.          Thrombocytopenia [D69.6]  Yes    Pleural effusion [J90]  Yes    Acute hypoxemic respiratory failure [J96.01]  Yes    ABIMBOLA (acute kidney injury) [N17.9]  Yes    Intravascular volume depletion [E86.1]  Yes    Pancolitis [K51.00]  Yes    Hyponatremia [E87.1]  Yes    Hypokalemia [E87.6]  Yes    Metabolic acidosis [E87.2]  Yes    CAP (community acquired pneumonia) [J18.9]  Yes      Resolved Hospital Problems   No resolved problems to display.               Plan:   Pneumonia can present with boop appearance and would be expected dx here.  Would not use high dose steroids at this time, copd with lower resp infection clear.     levaquin and vanc/flagyl ordered wbc 19.8 to 10.3 in a day- pcn allergy.      Will monitor response.  Further eval needed. Give fluids/pressors.     The following were evaluated and adjusted as needed: vasopressors, intravenous fluids and nutritional status, sedation and neurologic status, antibiotics, hemodynamics, support tubes and access lines and  invasive monitoring, acid base balance and oxygenation needs and input and output and renal status       Critical Care  - THE PATIENT HAS A HIGH PROBABILITY OF IMMINENT OR LIFE THREATENING DETERIORATION.  Over 50%time of encounter was in direct care at bedside.  Time was 30 to 74 minutes required for patient care.  Time needed for all of the above totaled 51 minutes.

## 2019-09-20 NOTE — EICU
EICU    Pt is a 64 y/o f with pmhx sig for MDD, WESLEY presented to [facility] due to confusion/slurred speech in addition to wekaness/f/c/watery diarrhea/n/v non bloody emesis  PT bp 80/30s  In the ER and she was tsed with 30 cc/kg crystalloid- with an extra liter NS- vanco/ceftriaxone/levaquin and had LIJ cvl placed and was started on epi gtt with improvement in bp.  He sao2 was in the 80s and improved to 90s on 2l.  PT says she fell from bed at home 4 days ago and is currently complaining of abd pain/lower chest discomfort with incr pain with deep breathing.  Per pt she takes valium/adderal/asa at home. No recent travel no ill contacts, no illicitis, no etoh    PE 92/56 afeb 92% fm 120s narrow complex regular on monitor  Pt in visible discomfort, alert and oriented CX 3 knows Anurag is president, tachypneic  No rash   Dry mm  Warm thighs/patella, no mottling    Wbc 19 P 86 L 7 M 7  hgb 11 mcv 86  plt 127  Na 119 K 2.8 HCO3 15 bun 85 creat 2.2 gluc 165 Ca 7.3 PO4 3.9 Mg 2.1 alb 1.5  ast 84 alt 24 alk phos 86 bili 1.5 tp 5.7 alb 2.2  Trop 0.031 ck 908 bnp 151  PT 28 INR 2.7  Lac 1.7  Acetaminophen 22    Abd CT  1. Acute pancolitis.  2. Airspace disease multiple areas in the lower lungs are seen and a right pleural effusion approximately 2 cm deep at the base  S/p cholycystectomy    Head CT  No appreciable changes compared to CT head 07/24/2015 including moderate to severe presumed microvascular ischemic changes cerebral white matter and old lacunar infarct adjacent to right head of caudate.    ekg  sr pvcs      A/P  Pancolitis/ septic emboli vs Pna/Hyponatremia  Unclear precipitant of all pt issues, but reports onset of voluminous watery diarrhea 4d prior w/o travel or ill contacts, fell from bed reaching for water as well.  Has had nausea and nonbloody emesis as well.  No recent abx/hospitalizations.  Pancolitis seems more likely infectious than vascular compromise/embolic  - needs more volume  based on dry mm, Na; LR and frequent Na to control rate of rise of osms  - hd borderline bps, likely combination vol depletion +/- sepsis; sepsis sources include ?septic emboli in chest (vs multifocal pna vs less likely malignancy) and gut translocation from inflamed colon  - check cdiff, tx oral vanco, flagyl/levaquin to cover abd; start IV vanco, lesions in chest concerning for septic emboli, rounded/peripheral and two at least with cavitation; needs TTE, may need evaluation of IJ vessels as well; no IVDA  - no clear hiv risks, need to consider checking; stool O and P, fecal leucocytes, cryptsporidia/microsporidia  - follow Na, K closely, replete K aggressively with wide QRS  - ms intact completely oriented  - creat up, better, likely due to low vol/bp +/- ATN  - chest CT when pain better and bp better  - dvt proph  - IS

## 2019-09-20 NOTE — PLAN OF CARE
Pt has not slept all night, pt consistently moaning and restless. Complains of abdominal and back pain, states it is constant. Henriquez draining clear yellow urine. Levophed weaned down to .06mcg/kg and  2 liter bolus of lactated ringers given. Antibiotics given and potassium replaced. Will monitor labs. CT of abdomen repeated. Contact isolation for C-diff initiated, no bowel movement to collect stool sample at this time. Safety maintained, lateral rotation in place. Positive blood cultures: gram positive cocci clusters resembling staph.

## 2019-09-20 NOTE — CONSULTS
Pharmacokinetic Initial Assessment: IV Vancomycin    Assessment/Plan:    Initiated intravenous vancomycin with dose of 500 mg once with subsequent doses when random concentrations are less than 20 mcg/mL  Desired empiric serum trough concentration is 15 to 20 mcg/mL  Draw vancomycin random level on 9/20 at 1530.  Pharmacy will continue to follow and monitor vancomycin.      Please contact pharmacy at extension [phone] with any questions regarding this assessment.     Thank you for the consult,   Liset Medina       Patient brief summary:  Mesha Mckenzie is a 63 y.o. female initiated on antimicrobial therapy with IV Vancomycin for treatment of suspected bacteremia    Drug Allergies:   Review of patient's allergies indicates:   Allergen Reactions    Pcn [penicillins]        Actual Body Weight:   45.1 kg    Renal Function:   Estimated Creatinine Clearance: 21 mL/min (A) (based on SCr of 1.9 mg/dL (H)).,         CBC (last 72 hours):  Recent Labs   Lab Result Units 09/19/19  1214 09/19/19  2215   WBC K/uL 13.10* 19.83*   Hemoglobin g/dL 10.7* 11.0*   Hematocrit % 31.8* 30.1*   Platelets K/uL 97* 127*   Gran% % 80.0* 86.0*   Lymph% % 11.0* 7.0*   Mono% % 5.0 7.0   Eosinophil% % 1.0 0.0   Basophil% % 0.0 0.0   Differential Method  Manual Manual       Metabolic Panel (last 72 hours):  Recent Labs   Lab Result Units 09/19/19  1214 09/19/19  1245 09/19/19  1807 09/19/19  1902 09/19/19  2215 09/20/19  0249   Sodium mmol/L 114*  --  117*  --  119* 122*   Potassium mmol/L 3.1*  --  2.3*  --  2.8* 3.4*   Chloride mmol/L 76*  --  85*  --  88* 93*   CO2 mmol/L 15*  --  12*  --  15* 15*   Glucose mg/dL 78  --  118  --  165* 84   Glucose, UA   --  Negative  --   --   --   --    BUN, Bld mg/dL 101*  --  91*  --  85* 77*   Creatinine mg/dL 2.9*  --  2.5*  --  2.2* 1.9*   Albumin g/dL 2.2*  --   --   --  1.5*  --    Total Bilirubin mg/dL 1.5*  --   --   --   --   --    Alkaline Phosphatase U/L 86  --   --   --   --   --    AST U/L  84*  --   --   --   --   --    ALT U/L 24  --   --   --   --   --    Magnesium mg/dL  --   --   --  1.7 2.1  --    Phosphorus mg/dL  --   --   --   --  3.9  --        Drug levels (last 3 results):  No results for input(s): VANCOMYCINRA, VANCOMYCINPE, VANCOMYCINTR in the last 72 hours.    Microbiologic Results:  Microbiology Results (last 7 days)       Procedure Component Value Units Date/Time    Clostridium difficile EIA [[id number]]     Order Status:  No result Specimen:  Stool     Stool culture [[id number]]     Order Status:  No result Specimen:  Stool

## 2019-09-20 NOTE — PLAN OF CARE
CM met with pt bedside to attempt to complete discharge assessment. Pt unable to participate due to confusion. CM called pt's spouse Ari. Per Ari, information on facesheet is correct. Denies POA/LW. PCP is Dr. Shea. Pharm is [facility] on [address] Denies any hh/hd/dme. Reports that pt was independent with ADLS and able to drive herself to appts prior to admission. For now DC plan is home. CM following to assist with any DC needs.        09/20/19 1250   Discharge Assessment   Assessment Type Discharge Planning Assessment   Confirmed/corrected address and phone number on facesheet? Yes   Assessment information obtained from? Other  (pt's spouse, Ari)   Communicated expected length of stay with patient/caregiver yes   Prior to hospitilization cognitive status: Alert/Oriented   Prior to hospitalization functional status: Independent   Current cognitive status: Unable to Assess;Inappropriate Behavior   Current Functional Status: Needs Assistance   Lives With spouse   Able to Return to Prior Arrangements   (TBD)   Is patient able to care for self after discharge? Unable to determine at this time (comments)   Who are your caregiver(s) and their phone number(s)? Ari Neena (spouse) [phone]   Patient's perception of discharge disposition home or selfcare   Readmission Within the Last 30 Days unable to assess   Patient currently being followed by outpatient case management? No   Patient currently receives any other outside agency services? No   Equipment Currently Used at Home none   Do you have any problems affording any of your prescribed medications? No   Is the patient taking medications as prescribed? yes   Does the patient have transportation home? Yes   Transportation Anticipated family or friend will provide   Does the patient receive services at the [facility]? No   Discharge Plan A Home   DME Needed Upon Discharge  none   Patient/Family in Agreement with Plan yes

## 2019-09-20 NOTE — SUBJECTIVE & OBJECTIVE
Past Medical History:   Diagnosis Date    Anxiety     Carotid bruit     Chronic pain     Cystitis     Cystocele, unspecified (CODE)     Depression     GI bleed     History of uterine fibroid     Shortness of breath on exertion     Spinal stenosis     Urinary retention        Past Surgical History:   Procedure Laterality Date    ANTERIOR VAGINAL REPAIR  8-    CARDIAC CATHETERIZATION  age 12    CHOLECYSTECTOMY  2015    COLONOSCOPY  12/12/2018    aborted due to poor colon prep    COLONOSCOPY N/A 12/12/2018    Performed by Renard Gonsalves MD at [facility] ENDO    [facility]OMY  1989    [facility]    tubiligation         Review of patient's allergies indicates:   Allergen Reactions    Pcn [penicillins]        Current Facility-Administered Medications on File Prior to Encounter   Medication    [COMPLETED] cefTRIAXone (ROCEPHIN) 1 g in dextrose 5 % 50 mL IVPB    [COMPLETED] dextrose 5 % and 0.9 % NaCl with KCl 20 mEq infusion    lactated ringers infusion    [COMPLETED] lidocaine (PF) 10 mg/ml (1%) injection    [COMPLETED] magnesium sulfate in dextrose IVPB (premix) 1 g    [COMPLETED] naloxone 0.4 mg/mL injection 0.2 mg    [COMPLETED] sodium chloride 0.9% bolus 1,000 mL    [COMPLETED] sodium chloride 0.9% bolus 1,701 mL    sodium chloride 0.9% flush 3 mL    [COMPLETED] vancomycin 500 mg in dextrose 5 % 100 mL IVPB (ready to mix system)    [DISCONTINUED] EPINEPHrine (ADRENALIN) 5 mg in sodium chloride 0.9% 250 mL infusion    [DISCONTINUED] levoFLOXacin 500 mg/100 mL IVPB 500 mg    [DISCONTINUED] metronidazole IVPB 500 mg    [DISCONTINUED] vancomycin (VANCOCIN) 567 mg in sodium chloride 0.45% IV syringe (Conc: 5 mg/ml)     Current Outpatient Medications on File Prior to Encounter   Medication Sig    diazepam (VALIUM) 5 MG tablet Take 10 mg by mouth 2 (two) times daily.      Family History     Problem Relation (Age of Onset)    Alzheimer's disease Mother    Hypertension Brother, Sister     Osteoarthritis Mother    Thyroid disease Mother        Tobacco Use    Smoking status: Current Every Day Smoker     Packs/day: 0.50     Years: 40.00     Pack years: 20.00     Types: Cigarettes    Smokeless tobacco: Never Used   Substance and Sexual Activity    Alcohol use: No    Drug use: No    Sexual activity: Yes     Partners: Male     Review of Systems   Constitutional: Positive for chills, fatigue and fever. Negative for activity change, appetite change and diaphoresis.   HENT: Negative for congestion and facial swelling.    Eyes: Negative for redness and itching.   Respiratory: Negative for cough, chest tightness, shortness of breath and wheezing.    Cardiovascular: Negative for chest pain, palpitations and leg swelling.   Gastrointestinal: Positive for abdominal pain, diarrhea, nausea and vomiting. Negative for constipation.   Endocrine: Negative for cold intolerance and heat intolerance.   Genitourinary: Negative for difficulty urinating, dysuria, flank pain, frequency, hematuria and urgency.   Musculoskeletal: Negative for arthralgias, back pain, joint swelling, myalgias and neck pain.   Neurological: Positive for weakness. Negative for dizziness, syncope and headaches.   Psychiatric/Behavioral: Negative for agitation and confusion. The patient is not nervous/anxious.      Objective:     Vital Signs (Most Recent):  Temp: 98.3 °F (36.8 °C) (09/20/19 0000)  Pulse: (!) 123 (09/20/19 0100)  Resp: (!) 33 (09/20/19 0100)  BP: (!) 94/50 (09/20/19 0100)  SpO2: (!) 92 % (09/20/19 0100) Vital Signs (24h Range):  Temp:  [94.1 °F (34.5 °C)-98.7 °F (37.1 °C)] 98.3 °F (36.8 °C)  Pulse:  [] 123  Resp:  [16-51] 33  SpO2:  [83 %-100 %] 92 %  BP: ()/(31-86) 94/50     Weight: 45.1 kg (99 lb 6.8 oz)  Body mass index is 18.19 kg/m².    Physical Exam   Constitutional: She is oriented to person, place, and time. She appears well-developed and well-nourished. No distress.   HENT:   Head: Normocephalic and  atraumatic.   Eyes: Conjunctivae are normal. Right eye exhibits no discharge. Left eye exhibits no discharge. No scleral icterus.   Neck: Normal range of motion. Neck supple. No JVD present. No thyromegaly present.   Cardiovascular: Normal rate, regular rhythm, normal heart sounds and intact distal pulses. Exam reveals no gallop and no friction rub.   No murmur heard.  Pulmonary/Chest: Effort normal and breath sounds normal. No respiratory distress. She has no wheezes. She has no rales. She exhibits no tenderness.   Abdominal: Soft. Bowel sounds are normal. She exhibits no distension and no mass. There is tenderness (generalized ). There is no rebound and no guarding.   Musculoskeletal: Normal range of motion. She exhibits no edema or tenderness.   Lymphadenopathy:     She has no cervical adenopathy.   Neurological: She is alert and oriented to person, place, and time. No cranial nerve deficit.   Skin: Skin is warm and dry. Capillary refill takes less than 2 seconds. She is not diaphoretic.   Psychiatric: She has a normal mood and affect. Her behavior is normal. Judgment and thought content normal.   Nursing note and vitals reviewed.          Significant Labs:   CBC:   Recent Labs   Lab 09/19/19  1214 09/19/19  1406 09/19/19  2215   WBC 13.10*  --  19.83*   HGB 10.7*  --  11.0*   HCT 31.8* 32* 30.1*   PLT 97*  --  127*     CMP:   Recent Labs   Lab 09/19/19  1214 09/19/19  1807 09/19/19  2215   * 117* 119*   K 3.1* 2.3* 2.8*   CL 76* 85* 88*   CO2 15* 12* 15*   GLU 78 118 165*   * 91* 85*   CREATININE 2.9* 2.5* 2.2*   CALCIUM 7.2* 6.9* 7.3*   PROT 5.7*  --   --    ALBUMIN 2.2*  --  1.5*   BILITOT 1.5*  --   --    ALKPHOS 86  --   --    AST 84*  --   --    ALT 24  --   --    ANIONGAP 23* 20* 16   EGFRNONAA 16.4* 19.6* 23*     Lactic Acid:   Recent Labs   Lab 09/19/19  1214 09/19/19  2215   LACTATE 1.0 1.7     Magnesium:   Recent Labs   Lab 09/19/19  1902 09/19/19  2215   MG 1.7 2.1     Troponin:   Recent  Labs   Lab 09/19/19  1214 09/19/19  2215   TROPONINI 0.30* 0.031*       Significant Imaging: I have reviewed and interpreted all pertinent imaging results/findings within the past 24 hours.

## 2019-09-20 NOTE — CONSULTS
Nephrology Consult Note        Patient Name: Mesha Mckenzie  MRN: [id number]    Patient Class: IP- Inpatient   Admission Date: 9/19/2019  Length of Stay: 1 days  Date of Service: 9/20/2019    Attending Physician: Lizzette Covarrubias MD  Primary Care Provider: Varun Shea MD PhD    Reason for Consult: abimbola/hyponatremia/hypokalemia/acidosis    SUBJECTIVE:     HPI: 63F, disabled, with h/o PMR, depression, anxiety, chronic pain was transferred from [facility] for septic shock, pancolitis (diarrhea, nausea, vomiting) and bilateral PNA. Renal consulted for ABIMBOLA, sever hyponatremia and hypokalemia. Story limited from patient due to distress. Daughter helping with info.    Past Medical History:   Diagnosis Date    Anxiety     Carotid bruit     Chronic pain     Cystitis     Cystocele, unspecified (CODE)     Depression     GI bleed     History of uterine fibroid     Shortness of breath on exertion     Spinal stenosis     Urinary retention      Past Surgical History:   Procedure Laterality Date    ANTERIOR VAGINAL REPAIR  10-    CARDIAC CATHETERIZATION  age 12    CHOLECYSTECTOMY  2015    COLONOSCOPY  12/12/2018    aborted due to poor colon prep    COLONOSCOPY N/A 12/12/2018    Performed by Renard Gonsalves MD at [facility] ENDO    [facility]Y  1989    [facility]    tubiligation       Family History   Problem Relation Age of Onset    Alzheimer's disease Mother     Thyroid disease Mother     Osteoarthritis Mother     Hypertension Brother     Hypertension Sister     Breast cancer Neg Hx     Colon cancer Neg Hx     Ovarian cancer Neg Hx      Social History     Tobacco Use    Smoking status: Current Every Day Smoker     Packs/day: 0.50     Years: 40.00     Pack years: 20.00     Types: Cigarettes    Smokeless tobacco: Never Used   Substance Use Topics    Alcohol use: No    Drug use: No       Review of patient's allergies indicates:   Allergen Reactions    Pcn [penicillins]        Outpatient meds:  Current  Facility-Administered Medications on File Prior to Encounter   Medication Dose Route Frequency Provider Last Rate Last Dose    [COMPLETED] dextrose 5 % and 0.9 % NaCl with KCl 20 mEq infusion  1,000 mL Intravenous ED 1 Time Pratik Michele  mL/hr at 09/19/19 1855 1,000 mL at 09/19/19 1855    lactated ringers infusion   Intravenous Continuous Renard Gonsalves MD 75 mL/hr at 12/12/18 0826      [COMPLETED] magnesium sulfate in dextrose IVPB (premix) 1 g  1 g Intravenous ED 1 Time Pratik Michele MD   Stopped at 09/19/19 2005    sodium chloride 0.9% flush 3 mL  3 mL Intravenous PRN Renard Gonsalves MD        [DISCONTINUED] EPINEPHrine (ADRENALIN) 5 mg in sodium chloride 0.9% 250 mL infusion  0.02 mcg/kg/min Intravenous Continuous Pratik Michele MD 17 mL/hr at 09/19/19 1809 0.1 mcg/kg/min at 09/19/19 1809    [DISCONTINUED] levoFLOXacin 500 mg/100 mL IVPB 500 mg  500 mg Intravenous Q24H Pratik Michele MD   Stopped at 09/19/19 1647    [DISCONTINUED] metronidazole IVPB 500 mg  500 mg Intravenous Q8H Pratik Michele MD   Stopped at 09/19/19 1859     Current Outpatient Medications on File Prior to Encounter   Medication Sig Dispense Refill    diazepam (VALIUM) 5 MG tablet Take 10 mg by mouth 2 (two) times daily.          Scheduled meds:   albuterol-ipratropium  3 mL Nebulization Q6H    levoFLOXacin  500 mg Intravenous Q24H    metronidazole  500 mg Intravenous Q8H    pantoprazole  40 mg Intravenous Daily    potassium chloride in water        vancomycin  125 mg Oral Q6H       Infusions:   dextrose 5 % and 0.9 % NaCl with KCl 20 mEq 125 mL/hr at 09/20/19 1351    norepinephrine bitartrate-D5W 0.06 mcg/kg/min (09/20/19 0712)       PRN meds:  influenza, morphine, ondansetron, pneumoc 13-susanna conj-dip cr(PF), sodium chloride 0.9%    Review of Systems:  Review of Systems   Unable to perform ROS: Medical condition       OBJECTIVE:     Vital Signs and IO (Last 24H):  Temp:  [94.1 °F  (34.5 °C)-98.7 °F (37.1 °C)]   Pulse:  []   Resp:  [19-51]   BP: ()/(34-86)   SpO2:  [83 %-100 %]   I/O last 3 completed shifts:  In: -   Out: 1025 [Urine:1025]    Wt Readings from Last 5 Encounters:   09/19/19 45.1 kg (99 lb 6.8 oz)   09/19/19 56.7 kg (125 lb)   12/10/18 51.3 kg (113 lb)   09/10/18 61.2 kg (135 lb)   07/09/13 61.2 kg (135 lb)         Physical Exam:  Physical Exam    Body mass index is 18.19 kg/m².    Laboratory:  Recent Labs   Lab 09/20/19 0249 09/20/19 0642 09/20/19  1037   * 124* 126*  126*   K 3.4* 3.3* 3.2*  3.2*   CL 93* 96 97  97   CO2 15* 15* 16*  16*   BUN 77* 72* 69*  69*   CREATININE 1.9* 1.7* 1.6*  1.6*   ESTGFRAFRICA 31* 36* 39*  39*   EGFRNONAA 27* 31* 34*  34*   GLU 84 87 97  97       Recent Labs   Lab 09/19/19  1214 09/19/19  1902 09/19/19 2215 09/20/19 0249 09/20/19 0642 09/20/19  1037   CALCIUM 7.2*   < >  --  7.3* 7.4* 7.7* 7.8*  7.8*   ALBUMIN 2.2*  --   --  1.5*  --   --   --    PHOS  --   --   --  3.9  --   --   --    MG  --   --  1.7 2.1  --   --   --     < > = values in this interval not displayed.             No results for input(s): POCTGLUCOSE in the last 168 hours.    Recent Labs   Lab 04/23/19  0848   Hemoglobin A1C 5.7 H       Recent Labs   Lab 09/19/19  1214 09/19/19  1406 09/19/19 2215 09/20/19  1037   WBC 13.10*  --  19.83* 10.29   HGB 10.7*  --  11.0* 10.1*   HCT 31.8* 32* 30.1* 27.4*   PLT 97*  --  127* 94*   MCV 94  --  86 85   MCHC 33.7  --  36.5* 36.9*   MONO 5.0  CANCELED  --  7.0 5.0       Recent Labs   Lab 09/19/19  1214 09/19/19  2215   BILITOT 1.5*  --    PROT 5.7*  --    ALBUMIN 2.2* 1.5*   ALKPHOS 86  --    ALT 24  --    AST 84*  --        Recent Labs   Lab 04/23/19  0848 09/19/19  1245   Color, UA Yellow Yellow   Appearance, UA Clear Clear   pH, UA 7.0 5.0   Specific Gravity, UA <=1.005 1.020   Protein, UA Trace A Trace A   Glucose, UA Negative Negative   Ketones, UA Negative Negative   Urobilinogen, UA Negative  Negative   Bilirubin (UA) Negative Negative   Occult Blood UA 1+ A 1+ A   Nitrite, UA Positive A Negative   RBC, UA 12 H 4   WBC, UA 68 H 1   Bacteria Many A Few A       Recent Labs   Lab 09/19/19  1406 09/19/19  2333   POC PH 7.293 L 7.328 L   POC PCO2 23.5 LL 28.5 LL   POC HCO3 11.4 L 15.0 L   POC PO2 82 89   POC SATURATED O2 95 96   POC BE -15 -11   Sample ARTERIAL ARTERIAL           ASSESSMENT/PLAN:     Active Hospital Problems    Diagnosis  POA    *Septic shock [A41.9, R65.21]  Yes    Coagulopathy [D68.9]  Yes    ILD (interstitial lung disease) [J84.9]  Yes    COPD with acute lower respiratory infection [J44.0]  Yes    Calcification of aorta [I70.0]  Yes     Defer to primary control of cholesterol and bp.          Thrombocytopenia [D69.6]  Yes    Pleural effusion [J90]  Yes    Acute hypoxemic respiratory failure [J96.01]  Yes    BOOP (bronchiolitis obliterans with organizing pneumonia) [J84.89]  Yes    ABIMBOLA (acute kidney injury) [N17.9]  Yes    Intravascular volume depletion [E86.1]  Yes    Pancolitis [K51.00]  Yes    Hyponatremia [E87.1]  Yes    Hypokalemia [E87.6]  Yes    Metabolic acidosis [E87.2]  Yes    CAP (community acquired pneumonia) [J18.9]  Yes      Resolved Hospital Problems   No resolved problems to display.     ABIMBOLA, non-oliguric 2/2 sepsis, volume depletion  Hyponatremia, suspect hypovolemia and SIADH from meds, pain, nausea, PNA.  Hypokalemia, due to colitis.  Acidosis, metabolic, 2/2 sepsis, colitis, ABIMBOLA.  CKD stage 2, sCr normal at baseline.  No NSAIDs, ACEI/ARB, IV contrast or other nephrotoxins.  Keep MAP > 60, SBP > 100.  Dose meds for GFR < 30 ml/min.  Agree with IVF, K repletion, labs q4h.  Will change IVF to KCL with D5 and give DDAVP to slow down sNa correction as it is correcting too fast.  No thiazides or SSRI.  Keep astorga.  Keep in ICU.  Treat infection and colitis, appreciate GI and Pulm input.    Anemia, non-renal  Hgb and HCT are acceptable. Monitor.  Will provide  CLEOPATRA and/or IV iron PRN.    Thank you for allowing us to participate in the care of your patient!   We will follow the patient and provide recommendations as needed.    Rigo Rich MD    [facility]  [address] [place] [address]  [place] [address]    [phone] - tel  [phone] - fax    9/20/2019 2:54 PM

## 2019-09-20 NOTE — ASSESSMENT & PLAN NOTE
Acute, 2° pancolitis   Requiring vasopressors  Associated with renal failure   Associated with tachycardia, tachypnea, hypotension, coagulopathy and metabolic acidosis  Monitor closely in the ICU  Cardiac monitoring

## 2019-09-20 NOTE — ASSESSMENT & PLAN NOTE
Acute, affecting the entire colon except the sigmoid   Empiric coverage with Levaquin and flagyl IV as well as po Vancomycin  Stool for C. Diff  Stool culture   Check CRP and ESR

## 2019-09-20 NOTE — ASSESSMENT & PLAN NOTE
Acute, with high AG,  2° diarrhea  Improving   ABG done on admit, PH now 7.32 with HCO# of 15.   Will continue to trend

## 2019-09-20 NOTE — HPI
"Pt is a 62 yo female who was transferred from [facility] for septic shock, pancolitis and bilateral pna. Pt reports that she fell out of bed on Sunday morning and has been feeling progressively worse throughout the week. C/o vomiting since Sunday, ~3 episodes per day. Also reports diarrhea, ~4 stools per day, watery in nature. Pt denies any hematemesis, melena or hematochezia. Pt did take zofran which helped "some:" c/o severe abdominal pain since Sunday which is constant. Pain is a 10 on a 0-10 pain scale.  Has not been able to eat due to the nausea and pain. Questioned pt in regards to use of pain medication, states "I didn't have any." pt was scheduled for colonoscopy tomorrow. Hx of GI bleed in December 2018. Pt states she had passed some blood in her stool. Reviewed colonoscopy report, no bleeding in the colon was found however the prep was poor. No recent travel, no sick contacts at home. Questioned pt in regards to cough: pt states she has felt SOB due to pain but reports only occasional coughing. Denies any sputum production.  Social: smokes 10 cigarettes per day, 20 pack year hx. No ETOH.   "

## 2019-09-20 NOTE — CONSULTS
Consult Note  Infectious Disease    Reason for Consult:  sepsis    HPI: Mesha Mckenzie is a critically ill 63 y.o. female , tranferred from [facility] where she presented with 4 days of nausea and vomiting, found to have ABIMBOLA, leukocytosis, hypotension, multiple pulmonary infiltrates, pancolitis on CT, metabolic acidosis and altered mental status. She was transferred for multispecialty care. Since admission, she has received vanc, levaquin and flagyl, still requires pressors, has 3/4 blood cultures with GPC in clusters, Ct chest with multiple foci of infiltrates including some that look like septic pulmonary emboli, elevated coags without bleeding and no further diarrheal stools. Her chemistries are responding to fluid and supplementation but her mental status remains very abnormal. She is in pain from her abdomen. It is unclear from notes and my conversation with daughter whether her respiratory symptoms came first or the GI symptoms came first. She has no history of STaph infection, no recent antibiotics, but does smoke and take pain meds, valium and adderall chronically.     Review of patient's allergies indicates:   Allergen Reactions    Pcn [penicillins]      Past Medical History:   Diagnosis Date    Anxiety     Carotid bruit     Chronic pain     Cystitis     Cystocele, unspecified (CODE)     Depression     GI bleed     History of uterine fibroid     Shortness of breath on exertion     Spinal stenosis     Urinary retention      Past Surgical History:   Procedure Laterality Date    ANTERIOR VAGINAL REPAIR  10-    CARDIAC CATHETERIZATION  age 12    CHOLECYSTECTOMY  2015    COLONOSCOPY  12/12/2018    aborted due to poor colon prep    COLONOSCOPY N/A 12/12/2018    Performed by Renard Gonsalves MD at [facility] ENDO    [facility]    tubiligation       Social History     Socioeconomic History    Marital status:      Spouse name: Not on file    Number of children: Not on file     Years of education: Not on file    Highest education level: Not on file   Occupational History    Not on file   Social Needs    Financial resource strain: Not on file    Food insecurity:     Worry: Not on file     Inability: Not on file    Transportation needs:     Medical: Not on file     Non-medical: Not on file   Tobacco Use    Smoking status: Current Every Day Smoker     Packs/day: 0.50     Years: 40.00     Pack years: 20.00     Types: Cigarettes    Smokeless tobacco: Never Used   Substance and Sexual Activity    Alcohol use: No    Drug use: No    Sexual activity: Yes     Partners: Male   Lifestyle    Physical activity:     Days per week: Not on file     Minutes per session: Not on file    Stress: Not on file   Relationships    Social connections:     Talks on phone: Not on file     Gets together: Not on file     Attends [employer] service: Not on file     Active member of club or organization: Not on file     Attends meetings of clubs or organizations: Not on file     Relationship status: Not on file   Other Topics Concern    Not on file   Social History Narrative    Not on file     Family History   Problem Relation Age of Onset    Alzheimer's disease Mother     Thyroid disease Mother     Osteoarthritis Mother     Hypertension Brother     Hypertension Sister     Breast cancer Neg Hx     Colon cancer Neg Hx     Ovarian cancer Neg Hx        Pertinent medications noted:     Review of Systems:  unobtainable    EXAM & DIAGNOSTICS REVIEWED:   Vitals:     Temp:  [94.1 °F (34.5 °C)-98.7 °F (37.1 °C)]   Temp: 98.7 °F (37.1 °C) (09/20/19 1321)  Pulse: (!) 117 (09/20/19 1500)  Resp: (!) 32 (09/20/19 1500)  BP: (!) 92/52 (09/20/19 1500)  SpO2: 95 % (09/20/19 1500)    Intake/Output Summary (Last 24 hours) at 9/20/2019 1711  Last data filed at 9/20/2019 0600  Gross per 24 hour   Intake --   Output 1025 ml   Net -1025 ml       General:  Miserable from abdominal discomfort, moaning constantly, briefly  attentive, delirious  Eyes:  Anicteric, PERRL, EOMI, no sleral or conjunctival petechiae  ENT:  No ulcers, exudates, thrush, nares patent, dentition is poor and MM are dry  Neck:  supple, no masses or adenopathy appreciated  Lungs: Patchy crackles, no wheezing, sat 93% on RA, wearing NC oxygen at times  Heart:  RRR, no gallop/murmur/rub noted but her moaning obscures exam  Abd:  Soft, tender, guards, moans louder, mild distention, severely hypoactive BS, no masses or organomegaly appreciated.  :   Henriquez, urine clear, no flank tenderness  Musc:  Joints without effusion, swelling, erythema, synovitis,    Skin:  No rashes. No palmar or plantar lesions. No subungual petechiae. No skin lesions of DIC  Wound:   Neuro:  Delirious, briefly attentive, face symmetric, moves all extremities, no focal weakness.    Psych:  Anxious, agitated  Lymphatic:     No cervical, supraclavicular, axillary, or inguinal nodes  Extrem: No edema, erythema, phlebitis, cellulitis, peripheral pulses are 0-1, clubbing present, no mottling  VAD:  Left IJ TLC 9/19  Isolation:  Contact/enteric    Lines/Tubes/Drains:    General Labs reviewed:  Recent Labs   Lab 09/19/19  1214 09/19/19  1406 09/19/19  2215 09/20/19  1037   WBC 13.10*  --  19.83* 10.29   HGB 10.7*  --  11.0* 10.1*   HCT 31.8* 32* 30.1* 27.4*   PLT 97*  --  127* 94*       Recent Labs   Lab 09/19/19  1214  09/20/19  0249 09/20/19  0642 09/20/19  1037   *   < > 122* 124* 126*  126*   K 3.1*   < > 3.4* 3.3* 3.2*  3.2*   CL 76*   < > 93* 96 97  97   CO2 15*   < > 15* 15* 16*  16*   *   < > 77* 72* 69*  69*   CREATININE 2.9*   < > 1.9* 1.7* 1.6*  1.6*   CALCIUM 7.2*   < > 7.4* 7.7* 7.8*  7.8*   PROT 5.7*  --   --   --   --    BILITOT 1.5*  --   --   --   --    ALKPHOS 86  --   --   --   --    ALT 24  --   --   --   --    AST 84*  --   --   --   --     < > = values in this interval not displayed.           Micro:  Microbiology Results (last 7 days)     Procedure Component  Value Units Date/Time    Clostridium difficile EIA [[id number]]     Order Status:  No result Specimen:  Stool     Stool culture [[id number]]     Order Status:  No result Specimen:  Stool         Imaging Reviewed:   CXRs   CT head, chest , abd/pelvis  Cardiology:    IMPRESSION & PLAN   1.  Multiple positive blood cultures with GPC, ID pending. If this is Staph aureus, pneumonia and possibly septic pulmonary emboli would explain her chest CT findings  2. Pancolitis. Unclear if her GI symptoms were the root cause of her illness or if she has ischemic colitis from sepsis and volume depletion   3. ABIMBOLA, volume depletion, metabolic acidosis  4. DIC? with elevated INR, D-dimer, low platelets, but with elevated fibrinogen  5. Opiate dependence, severe lumbar spine abnormalities      Recommendations:  Repeat blood cultures, continue vanc, check echocardiogram  Stool studies have been ordered but stool not forthcoming since admission  Hepatitis testing(already ordered)  Follow coags and consider FFP if any sign of bleeding, vs heme consult  Continue hydration and electrolyte management per nephrology  Agree that we should withhold steroids

## 2019-09-20 NOTE — PLAN OF CARE
09/19/19 8906   Patient Assessment/Suction   Level of Consciousness (AVPU) alert   Respiratory Effort Labored  (moaning)   CANDACE Breath Sounds clear;diminished   PRE-TX-O2   O2 Device (Oxygen Therapy) Non Rebreather Mask   $ Is the patient on Low Flow Oxygen? Yes   Flow (L/min) 15   SpO2 97 %   Pulse Oximetry Type Continuous   $ Pulse Oximetry - Single Charge Pulse Oximetry - Single   Pulse (!) 128   Resp (!) 29   Aerosol Therapy   $ Aerosol Therapy Charges Aerosol Treatment   Respiratory Treatment Status (SVN) given   Treatment Route (SVN) mask   Patient Position (SVN) HOB elevated   Post Treatment Assessment (SVN) breath sounds unchanged   Signs of Intolerance (SVN) none   Breath Sounds Post-Respiratory Treatment   Throughout All Fields Post-Treatment All Fields   Throughout All Fields Post-Treatment no change   Post-treatment Heart Rate (beats/min) 126   Post-treatment Resp Rate (breaths/min) 31

## 2019-09-20 NOTE — PROGRESS NOTES
Pharmacokinetic Assessment Follow Up: IV Vancomycin    Vancomycin serum concentration assessment(s):    The random level was not drawn correctly but still may be used to guide therapy at this time. The measurement is below the desired definitive target range of 15 to 20 mcg/mL.    Vancomycin Regimen Plan:    The random level was drawn one hour late and was subtherapeutic at 4.7 mcg/mL.  Patient will be given a dose of 750 mg X 1 and the next random will be drawn with AM labs on 9/21 (approx 12 hours later).  Re-dose if the random level is less than 20 mcg/mL.  Continue target goal of 15-20 mcg/mL.    Drug levels (last 3 results):  Recent Labs   Lab Result Units 09/20/19  1432   Vancomycin, Random ug/mL 4.7       Pharmacy will continue to follow and monitor vancomycin.    Please contact pharmacy at extension [phone] for questions regarding this assessment.    Thank you for the consult,   Apryl Buchanan, PharmD       Patient brief summary:  Mesha Mckenzie is a 63 y.o. female initiated on antimicrobial therapy with IV Vancomycin for treatment of lower respiratory infection.    The patient's current regimen is pulse dosing.    Drug Allergies:   Review of patient's allergies indicates:   Allergen Reactions    Pcn [penicillins]        Actual Body Weight:   45.1 kg    Renal Function:   Estimated Creatinine Clearance: 25 mL/min (A) (based on SCr of 1.6 mg/dL (H)).,     Dialysis Method (if applicable):  N/A    CBC (last 72 hours):  Recent Labs   Lab Result Units 09/19/19  1214 09/19/19  2215 09/20/19  1037   WBC K/uL 13.10* 19.83* 10.29   Hemoglobin g/dL 10.7* 11.0* 10.1*   Hematocrit % 31.8* 30.1* 27.4*   Platelets K/uL 97* 127* 94*   Gran% % 80.0* 86.0* 79.0*   Lymph% % 11.0* 7.0* 3.0*   Mono% % 5.0 7.0 5.0   Eosinophil% % 1.0 0.0 0.0   Basophil% % 0.0 0.0 0.0   Differential Method  Manual Manual Manual       Metabolic Panel (last 72 hours):  Recent Labs   Lab Result Units 09/19/19  1214 09/19/19  1245 09/19/19  1807  09/19/19  1902 09/19/19  2215 09/20/19  0249 09/20/19  0642 09/20/19  1037   Sodium mmol/L 114*  --  117*  --  119* 122* 124* 126*  126*   Potassium mmol/L 3.1*  --  2.3*  --  2.8* 3.4* 3.3* 3.2*  3.2*   Chloride mmol/L 76*  --  85*  --  88* 93* 96 97  97   CO2 mmol/L 15*  --  12*  --  15* 15* 15* 16*  16*   Glucose mg/dL 78  --  118  --  165* 84 87 97  97   Glucose, UA   --  Negative  --   --   --   --   --   --    BUN, Bld mg/dL 101*  --  91*  --  85* 77* 72* 69*  69*   Creatinine mg/dL 2.9*  --  2.5*  --  2.2* 1.9* 1.7* 1.6*  1.6*   Albumin g/dL 2.2*  --   --   --  1.5*  --   --   --    Total Bilirubin mg/dL 1.5*  --   --   --   --   --   --   --    Alkaline Phosphatase U/L 86  --   --   --   --   --   --   --    AST U/L 84*  --   --   --   --   --   --   --    ALT U/L 24  --   --   --   --   --   --   --    Magnesium mg/dL  --   --   --  1.7 2.1  --   --   --    Phosphorus mg/dL  --   --   --   --  3.9  --   --   --        Vancomycin Administrations:  vancomycin given in the last 96 hours                     vancomycin 250mg / 10ml oral suspension 125 mg (mg) 125 mg Given 09/20/19 1235     125 mg Given  0555    vancomycin 250mg / 10ml oral suspension 125 mg (mg) 125 mg Given 09/19/19 9789    vancomycin 500 mg in dextrose 5 % 100 mL IVPB (ready to mix system) (mg) 500 mg New Bag 09/19/19 1402                      Microbiologic Results:  Microbiology Results (last 7 days)       Procedure Component Value Units Date/Time    Clostridium difficile EIA [[id number]]     Order Status:  No result Specimen:  Stool     Stool culture [[id number]]     Order Status:  No result Specimen:  Stool

## 2019-09-20 NOTE — CONSULTS
[facility]     CC: Abdominal pain    HPI 63 y.o. female with history of chronic back pain on narcotics and urinary retention with intermittent self catheterizations presents with 3-4 days of acute, moderate to severe, constant, diffuse abdominal pain associated with non-bloody diarrhea and vomiting. Patient is lethargic thus most of exam obtained from patient's daughter who was present. Patient had become weak and altered and fell out of bed several days ago but refused to seek medical attention. She has a history of GI bleed in 2018 and underwent colonoscopy performed by Dr. Gonsalves in December, however prep was poor. Patient's daughter is unaware of sick contacts.     Past Medical History:   Diagnosis Date    Anxiety     Carotid bruit     Chronic pain     Cystitis     Cystocele, unspecified (CODE)     Depression     GI bleed     History of uterine fibroid     Shortness of breath on exertion     Spinal stenosis     Urinary retention        Past Surgical History:   Procedure Laterality Date    ANTERIOR VAGINAL REPAIR  10-    CARDIAC CATHETERIZATION  age 12    CHOLECYSTECTOMY  2015    COLONOSCOPY  12/12/2018    aborted due to poor colon prep    COLONOSCOPY N/A 12/12/2018    Performed by Renard Gonsalves MD at [facility]    HY[facility]Y  1989    [facility]    tubiligation         Social History     Tobacco Use    Smoking status: Current Every Day Smoker     Packs/day: 0.50     Years: 40.00     Pack years: 20.00     Types: Cigarettes    Smokeless tobacco: Never Used   Substance Use Topics    Alcohol use: No    Drug use: No       Family History   Problem Relation Age of Onset    Alzheimer's disease Mother     Thyroid disease Mother     Osteoarthritis Mother     Hypertension Brother     Hypertension Sister     Breast cancer Neg Hx     Colon cancer Neg Hx     Ovarian cancer Neg Hx        Review of Systems  Unable to obtain complete ROS    Physical Examination  BP (!) 92/52   Pulse  "(!) 117   Temp 98.7 °F (37.1 °C) (Axillary)   Resp (!) 32   Ht 5' 2" (1.575 m)   Wt 45.1 kg (99 lb 6.8 oz)   SpO2 95%   Breastfeeding? No   BMI 18.19 kg/m²   General appearance: lethargic, ill appearing  HENT: Normocephalic, atraumatic, neck symmetrical, no nasal discharge , dry mucous membranes  Eyes: conjunctivae/corneas clear,  EOM's intact, sclera anicteric  Lungs: coarse breath sounds bilaterally, no dullness to percussion bilaterally, symmetric expansion, breathing unlabored  Heart: regular rate and rhythm without rub; no displacement of the PMI   Abdomen:soft, diffuse ttp without guarding, BS active , no masses appreciated   Extremities: extremities symmetric; no clubbing, cyanosis, or edema  Integument: Skin color, texture, turgor normal; no rashes; hair distrubution normal, no jaundice, multiple ecchymosis   Neurologic: awake, oriented to placed, lethargic but answers questions appropriately, diffuse weakness   Psychiatric: lethargic, answers questions appropriately though somewhat delayed, no hallucination    Labs:  Lab Results   Component Value Date    WBC 10.29 09/20/2019    HGB 10.1 (L) 09/20/2019    HCT 27.4 (L) 09/20/2019    MCV 85 09/20/2019    PLT 94 (L) 09/20/2019     CMP  Sodium   Date Value Ref Range Status   09/20/2019 126 (L) 136 - 145 mmol/L Final   09/20/2019 126 (L) 136 - 145 mmol/L Final     Potassium   Date Value Ref Range Status   09/20/2019 3.2 (L) 3.5 - 5.1 mmol/L Final   09/20/2019 3.2 (L) 3.5 - 5.1 mmol/L Final     Chloride   Date Value Ref Range Status   09/20/2019 97 95 - 110 mmol/L Final   09/20/2019 97 95 - 110 mmol/L Final     CO2   Date Value Ref Range Status   09/20/2019 16 (L) 23 - 29 mmol/L Final   09/20/2019 16 (L) 23 - 29 mmol/L Final     Glucose   Date Value Ref Range Status   09/20/2019 97 70 - 110 mg/dL Final   09/20/2019 97 70 - 110 mg/dL Final     BUN, Bld   Date Value Ref Range Status   09/20/2019 69 (H) 8 - 23 mg/dL Final   09/20/2019 69 (H) 8 - 23 mg/dL Final "     Creatinine   Date Value Ref Range Status   09/20/2019 1.6 (H) 0.5 - 1.4 mg/dL Final   09/20/2019 1.6 (H) 0.5 - 1.4 mg/dL Final     Calcium   Date Value Ref Range Status   09/20/2019 7.8 (L) 8.7 - 10.5 mg/dL Final   09/20/2019 7.8 (L) 8.7 - 10.5 mg/dL Final     Total Protein   Date Value Ref Range Status   09/19/2019 5.7 (L) 6.0 - 8.4 g/dL Final     Albumin   Date Value Ref Range Status   09/19/2019 1.5 (L) 3.5 - 5.2 g/dL Final     Total Bilirubin   Date Value Ref Range Status   09/19/2019 1.5 (H) 0.3 - 1.2 mg/dL Final     Comment:     For infants and newborns, interpretation of results should be based  on gestational age, weight and in agreement with clinical  observations.  Premature Infant recommended reference ranges:  Up to 24 hours.............<8.0 mg/dL  Up to 48 hours............<12.0 mg/dL  3-5 days..................<15.0 mg/dL  6-29 days.................<15.0 mg/dL       Alkaline Phosphatase   Date Value Ref Range Status   09/19/2019 86 38 - 126 U/L Final     AST   Date Value Ref Range Status   09/19/2019 84 (H) 15 - 41 U/L Final     ALT   Date Value Ref Range Status   09/19/2019 24 14 - 54 U/L Final     Anion Gap   Date Value Ref Range Status   09/20/2019 13 8 - 16 mmol/L Final   09/20/2019 13 8 - 16 mmol/L Final     eGFR if    Date Value Ref Range Status   09/20/2019 39 (A) >60 mL/min/1.73 m^2 Final   09/20/2019 39 (A) >60 mL/min/1.73 m^2 Final     eGFR if non    Date Value Ref Range Status   09/20/2019 34 (A) >60 mL/min/1.73 m^2 Final     Comment:     Calculation used to obtain the estimated glomerular filtration  rate (eGFR) is the CKD-EPI equation.      09/20/2019 34 (A) >60 mL/min/1.73 m^2 Final     Comment:     Calculation used to obtain the estimated glomerular filtration  rate (eGFR) is the CKD-EPI equation.        -INR- 3.3  -Blood cultures- G+ cocci resembling staph  Imaging:  CT abdomen was independently visualized and reviewed by me and showed 1. Acute  pancolitis.  2. Airspace disease multiple areas in the lower lungs are seen and a right pleural effusion approximately 2 cm deep at the base.    CT chest- Multiple primarily peripheral consolidated infiltrates bilaterally    Assessment:   63 y.o. female who presents with several days of nausea, vomiting and diarrhea, now in septic shock with bacteremia (G+ cocci resembling staph), bilateral pneumonia, multiple electrolyte abnormalities, ABIMBOLA and pancolitis. She is on Levophed as well as broad spectrum antibiotics. She has not had a BM since admission     Plan:  1.Pancolitis, likely infectious  -Agree with stool studies for infection  -Agree with treatment for severe C.diff with oral Vancomycin as well as IV Flagyl, as well as Levaquin for potential gram negative infection (though blood growing G+ species resembling staph)  -No plan for endoscopy at this time  -Will repeat lactate     2.Transaminitis  -? Due to sepsis vs early ischemic signs  -Will check viral hepatitis serologies as well as other labs  -Repeat CMP in AM    3.Coagulopathy, low platelets  -Concern for DIC in setting of septic shock   -No signs of active bleeding and no prior indication of underlying liver disease  -Continue antibiotics  -May consider Vitamin K or hematology consult  -Repeat PT/INR in AM    4.Bacteremia, bilateral pneumonia  -On broad antibiotics  -Pulmonology and ID have been consulted    5.Hyponatremia, ABIMBOLA  -Renal following    Unfortunately patient is critically ill, had lengthy discussion with patient and daughter.     Edna Bowens MD  [facility]  [address], [address]  [place] [address]  Office: [phone]  Fax: [phone]

## 2019-09-20 NOTE — PLAN OF CARE
Received pt from EMS to room 509. Pt on .1mcg Epinephrine and 100ml/hr D5 NS with 20meq potassium. Pt has Left IJ, Right EJ and Two peripheral IVs. Henriquez intact and draining latrice urine 450cc. SCDs applied.

## 2019-09-20 NOTE — ASSESSMENT & PLAN NOTE
Presents with an INR of 2.7.   No evidence of hemorrhage  Will check d dimer and fibrinogen   Most likely 2° shock  Trend INR daily   Hold ppx heparin for now

## 2019-09-20 NOTE — ASSESSMENT & PLAN NOTE
Creatinine on presentation was 2.9, was 0.7 ~5 months ago  2° severe dehydration and shock, BUN/ creat ratio greater than 40/1  Received 3 L of NS at transferring facility  Will bolus 2 L of LR now  Avoid nephrotoxins  Renal dose medications where appropriate  Trend creatinine

## 2019-09-20 NOTE — PLAN OF CARE
Problem: Adult Inpatient Plan of Care  Goal: Plan of Care Review  Outcome: Ongoing (interventions implemented as appropriate)  Patient receiving aerosol tx via duoneb now and q6hr.  Hr 102 and 02 saturation 90% on ROOM AIR.  02 at 4lpm patient that patient had removed from nose.

## 2019-09-20 NOTE — ASSESSMENT & PLAN NOTE
Dif dx: aspiration pneumonia  septic emboli   Discussed case with Dr. Palacio, EICU physician who is not convinced via CXR that pt does in fact have pneumonia, suggested a CT chest when feasible for pt to make the trip  Empiric coverage with Vancomycin, Levaquin and flagyl.   [email or web address]

## 2019-09-20 NOTE — H&P
"[facility] Hospital Medicine  History & Physical    Patient Name: Mesha Mckenzie  MRN: [id number]  Admission Date: 9/19/2019  Attending Physician: Lizzette Covarrubias MD   Primary Care Provider: Varun Shea MD PhD         Patient information was obtained from patient, past medical records and ER records.     Subjective:     Principal Problem:Septic shock    Chief Complaint: No chief complaint on file.       HPI: Pt is a 64 yo female who was transferred from [facility] for septic shock, pancolitis and bilateral pna. Pt reports that she fell out of bed on Sunday morning and has been feeling progressively worse throughout the week. C/o vomiting since Sunday, ~3 episodes per day. Also reports diarrhea, ~4 stools per day, watery in nature. Pt denies any hematemesis, melena or hematochezia. Pt did take zofran which helped "some:" c/o severe abdominal pain since Sunday which is constant. Pain is a 10 on a 0-10 pain scale.  Has not been able to eat due to the nausea and pain. Questioned pt in regards to use of pain medication, states "I didn't have any." pt was scheduled for colonoscopy tomorrow. Hx of GI bleed in December 2018. Pt states she had passed some blood in her stool. Reviewed colonoscopy report, no bleeding in the colon was found however the prep was poor. No recent travel, no sick contacts at home. Questioned pt in regards to cough: pt states she has felt SOB due to pain but reports only occasional coughing. Denies any sputum production.  Social: smokes 10 cigarettes per day, 20 pack year hx. No ETOH.     Past Medical History:   Diagnosis Date    Anxiety     Carotid bruit     Chronic pain     Cystitis     Cystocele, unspecified (CODE)     Depression     GI bleed     History of uterine fibroid     Shortness of breath on exertion     Spinal stenosis     Urinary retention        Past Surgical History:   Procedure Laterality Date    ANTERIOR VAGINAL REPAIR  10-    CARDIAC " CATHETERIZATION  age 12    CHOLECYSTECTOMY  2015    COLONOSCOPY  12/12/2018    aborted due to poor colon prep    COLONOSCOPY N/A 12/12/2018    Performed by Renard Gonsalves MD at [facility]    HYSTERECTOMY  1989    [facility]    tubiligation         Review of patient's allergies indicates:   Allergen Reactions    Pcn [penicillins]        Current Facility-Administered Medications on File Prior to Encounter   Medication    [COMPLETED] cefTRIAXone (ROCEPHIN) 1 g in dextrose 5 % 50 mL IVPB    [COMPLETED] dextrose 5 % and 0.9 % NaCl with KCl 20 mEq infusion    lactated ringers infusion    [COMPLETED] lidocaine (PF) 10 mg/ml (1%) injection    [COMPLETED] magnesium sulfate in dextrose IVPB (premix) 1 g    [COMPLETED] naloxone 0.4 mg/mL injection 0.2 mg    [COMPLETED] sodium chloride 0.9% bolus 1,000 mL    [COMPLETED] sodium chloride 0.9% bolus 1,701 mL    sodium chloride 0.9% flush 3 mL    [COMPLETED] vancomycin 500 mg in dextrose 5 % 100 mL IVPB (ready to mix system)    [DISCONTINUED] EPINEPHrine (ADRENALIN) 5 mg in sodium chloride 0.9% 250 mL infusion    [DISCONTINUED] levoFLOXacin 500 mg/100 mL IVPB 500 mg    [DISCONTINUED] metronidazole IVPB 500 mg    [DISCONTINUED] vancomycin (VANCOCIN) 567 mg in sodium chloride 0.45% IV syringe (Conc: 5 mg/ml)     Current Outpatient Medications on File Prior to Encounter   Medication Sig    diazepam (VALIUM) 5 MG tablet Take 10 mg by mouth 2 (two) times daily.      Family History     Problem Relation (Age of Onset)    Alzheimer's disease Mother    Hypertension Brother, Sister    Osteoarthritis Mother    Thyroid disease Mother        Tobacco Use    Smoking status: Current Every Day Smoker     Packs/day: 0.50     Years: 40.00     Pack years: 20.00     Types: Cigarettes    Smokeless tobacco: Never Used   Substance and Sexual Activity    Alcohol use: No    Drug use: No    Sexual activity: Yes     Partners: Male     Review of Systems   Constitutional: Positive for chills,  fatigue and fever. Negative for activity change, appetite change and diaphoresis.   HENT: Negative for congestion and facial swelling.    Eyes: Negative for redness and itching.   Respiratory: Negative for cough, chest tightness, shortness of breath and wheezing.    Cardiovascular: Negative for chest pain, palpitations and leg swelling.   Gastrointestinal: Positive for abdominal pain, diarrhea, nausea and vomiting. Negative for constipation.   Endocrine: Negative for cold intolerance and heat intolerance.   Genitourinary: Negative for difficulty urinating, dysuria, flank pain, frequency, hematuria and urgency.   Musculoskeletal: Negative for arthralgias, back pain, joint swelling, myalgias and neck pain.   Neurological: Positive for weakness. Negative for dizziness, syncope and headaches.   Psychiatric/Behavioral: Negative for agitation and confusion. The patient is not nervous/anxious.      Objective:     Vital Signs (Most Recent):  Temp: 98.3 °F (36.8 °C) (09/20/19 0000)  Pulse: (!) 123 (09/20/19 0100)  Resp: (!) 33 (09/20/19 0100)  BP: (!) 94/50 (09/20/19 0100)  SpO2: (!) 92 % (09/20/19 0100) Vital Signs (24h Range):  Temp:  [94.1 °F (34.5 °C)-98.7 °F (37.1 °C)] 98.3 °F (36.8 °C)  Pulse:  [] 123  Resp:  [16-51] 33  SpO2:  [83 %-100 %] 92 %  BP: ()/(31-86) 94/50     Weight: 45.1 kg (99 lb 6.8 oz)  Body mass index is 18.19 kg/m².    Physical Exam   Constitutional: She is oriented to person, place, and time. She appears well-developed and well-nourished. No distress.   HENT:   Head: Normocephalic and atraumatic.   Eyes: Conjunctivae are normal. Right eye exhibits no discharge. Left eye exhibits no discharge. No scleral icterus.   Neck: Normal range of motion. Neck supple. No JVD present. No thyromegaly present.   Cardiovascular: Normal rate, regular rhythm, normal heart sounds and intact distal pulses. Exam reveals no gallop and no friction rub.   No murmur heard.  Pulmonary/Chest: Effort normal and  breath sounds normal. No respiratory distress. She has no wheezes. She has no rales. She exhibits no tenderness.   Abdominal: Soft. Bowel sounds are normal. She exhibits no distension and no mass. There is tenderness (generalized ). There is no rebound and no guarding.   Musculoskeletal: Normal range of motion. She exhibits no edema or tenderness.   Lymphadenopathy:     She has no cervical adenopathy.   Neurological: She is alert and oriented to person, place, and time. No cranial nerve deficit.   Skin: Skin is warm and dry. Capillary refill takes less than 2 seconds. She is not diaphoretic.   Psychiatric: She has a normal mood and affect. Her behavior is normal. Judgment and thought content normal.   Nursing note and vitals reviewed.          Significant Labs:   CBC:   Recent Labs   Lab 09/19/19  1214 09/19/19  1406 09/19/19 2215   WBC 13.10*  --  19.83*   HGB 10.7*  --  11.0*   HCT 31.8* 32* 30.1*   PLT 97*  --  127*     CMP:   Recent Labs   Lab 09/19/19  1214 09/19/19  1807 09/19/19  2215   * 117* 119*   K 3.1* 2.3* 2.8*   CL 76* 85* 88*   CO2 15* 12* 15*   GLU 78 118 165*   * 91* 85*   CREATININE 2.9* 2.5* 2.2*   CALCIUM 7.2* 6.9* 7.3*   PROT 5.7*  --   --    ALBUMIN 2.2*  --  1.5*   BILITOT 1.5*  --   --    ALKPHOS 86  --   --    AST 84*  --   --    ALT 24  --   --    ANIONGAP 23* 20* 16   EGFRNONAA 16.4* 19.6* 23*     Lactic Acid:   Recent Labs   Lab 09/19/19  1214 09/19/19  2215   LACTATE 1.0 1.7     Magnesium:   Recent Labs   Lab 09/19/19  1902 09/19/19  2215   MG 1.7 2.1     Troponin:   Recent Labs   Lab 09/19/19  1214 09/19/19  2215   TROPONINI 0.30* 0.031*       Significant Imaging: I have reviewed and interpreted all pertinent imaging results/findings within the past 24 hours.    Assessment/Plan:     * Septic shock  Acute, 2° pancolitis   Requiring vasopressors  Associated with renal failure   Associated with tachycardia, tachypnea, hypotension, coagulopathy and metabolic acidosis  Monitor  closely in the ICU  Cardiac monitoring     ABIMBOLA (acute kidney injury)  Creatinine on presentation was 2.9, was 0.7 ~5 months ago  2° severe dehydration and shock, BUN/ creat ratio greater than 40/1  Received 3 L of NS at transferring facility  Will bolus 2 L of LR now  Avoid nephrotoxins  Renal dose medications where appropriate  Trend creatinine       Coagulopathy  Presents with an INR of 2.7.   No evidence of hemorrhage  Will check d dimer and fibrinogen   Most likely 2° shock  Trend INR daily   Hold ppx heparin for now       Metabolic acidosis  Acute, with high AG,  2° diarrhea  Improving   ABG done on admit, PH now 7.32 with HCO# of 15.   Will continue to trend         Hypokalemia  Severe, 2° diarrhea  Replete with IV K riders  Check Q 4 hours       Hyponatremia  Acute, 2° severe dehydration  IV NS at 100 ml/hr  Avoid rapid correction  BMP every 4 hours         Pancolitis  Acute, affecting the entire colon except the sigmoid   Empiric coverage with Levaquin and flagyl IV as well as po Vancomycin  Stool for C. Diff  Stool culture   Check CRP and ESR        CAP (community acquired pneumonia)  Dif dx: aspiration pneumonia  septic emboli   Discussed case with Dr. Palacio, EICU physician who is not convinced via CXR that pt does in fact have pneumonia, suggested a CT chest when feasible for pt to make the trip  Empiric coverage with Vancomycin, Levaquin and flagyl.   duonebs        Intravascular volume depletion  Acute, severe, secondary to vomiting and diarrhea   Aggressive fluid resuscitation       VTE Risk Mitigation (From admission, onward)        Ordered     Place sequential compression device  Until discontinued      09/19/19 2234     IP VTE HIGH RISK PATIENT  Once      09/19/19 2217        Critical care time spent on the evaluation and treatment of severe organ dysfunction, review of pertinent labs and imaging studies, discussions with consulting providers and discussions with patient/family: 40 minutes.      Kori Baxter NP  Department of Hospital Medicine   [facility]    Addendum:  Patient seen and examined. I agree with the findings, assessment and plan as outlined in the note by NP.  Part of the history obtained from patient's daughter.  Patient is a 63-year-old  female with past medical history significant for anxiety/depression, prior history of GI bleeding and spinal stenosis with possible opiate dependence is admitted to Hospital Medicine in intensive care unit after getting transferred from [facility] Room where patient presented with history of few days of nausea vomiting and diarrhea.  Patient noted to be in septic shock requiring intravenous pressor agent.  CT scan of the abdomen and pelvis is consistent with pan colitis.  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  Patient has evidence of acute kidney injury and possible ischemic hepatitis as well.  Will place consultations for Infectious Disease, Pulmonary Medicine, Gastroenterology and Nephrology teams.  Continue IV fluid hydration, maintain mean arterial pressure above 65 mm of mercury.  Continue intravenous antibiotics including vancomycin, Levaquin.  Decision to initiate steroid therapy will depend upon Infectious Disease/Pulmonary Medicine.  Follow 2D echocardiogram results.  Replete potassium chloride and for O serum electrolytes closely.  Lactic acid has been within normal limits.    Patient's critical condition discussed with patient's daughter who is aware of multiorgan system dysfunction related to septic shock. THE PATIENT HAS A HIGH PROBABILITY OF IMMINENT OR LIFE THREATENING DETERIORATION.  Over 50%time of encounter was in direct care at bedside.  Time was 30 to 74 minutes required for patient care.  Time needed for all of the above totaled 48 minutes.

## 2019-09-21 LAB
ALBUMIN SERPL BCP-MCNC: 1.5 G/DL (ref 3.5–5.2)
ALLENS TEST: ABNORMAL
ALP SERPL-CCNC: 59 U/L (ref 55–135)
ALT SERPL W/O P-5'-P-CCNC: 14 U/L (ref 10–44)
ANION GAP SERPL CALC-SCNC: 10 MMOL/L (ref 8–16)
ANION GAP SERPL CALC-SCNC: 10 MMOL/L (ref 8–16)
ANION GAP SERPL CALC-SCNC: 7 MMOL/L (ref 8–16)
ANION GAP SERPL CALC-SCNC: 9 MMOL/L (ref 8–16)
AORTIC ROOT ANNULUS: 2.88 CM
AORTIC VALVE CUSP SEPERATION: 2.15 CM
AST SERPL-CCNC: 24 U/L (ref 10–40)
AV INDEX (PROSTH): 0.89
AV MEAN GRADIENT: 4 MMHG
AV PEAK GRADIENT: 6 MMHG
AV VALVE AREA: 3.04 CM2
AV VELOCITY RATIO: 0.85
BASOPHILS # BLD AUTO: 0.01 K/UL (ref 0–0.2)
BASOPHILS NFR BLD: 0.1 % (ref 0–1.9)
BILIRUB DIRECT SERPL-MCNC: 0.3 MG/DL (ref 0.1–0.3)
BILIRUB SERPL-MCNC: 0.4 MG/DL (ref 0.1–1)
BSA FOR ECHO PROCEDURE: 1.44 M2
BUN SERPL-MCNC: 38 MG/DL (ref 8–23)
BUN SERPL-MCNC: 38 MG/DL (ref 8–23)
BUN SERPL-MCNC: 42 MG/DL (ref 8–23)
BUN SERPL-MCNC: 42 MG/DL (ref 8–23)
BUN SERPL-MCNC: 44 MG/DL (ref 8–23)
BUN SERPL-MCNC: 44 MG/DL (ref 8–23)
BUN SERPL-MCNC: 48 MG/DL (ref 8–23)
BUN SERPL-MCNC: 48 MG/DL (ref 8–23)
BUN SERPL-MCNC: 50 MG/DL (ref 8–23)
BUN SERPL-MCNC: 50 MG/DL (ref 8–23)
CALCIUM SERPL-MCNC: 7.8 MG/DL (ref 8.7–10.5)
CALCIUM SERPL-MCNC: 7.8 MG/DL (ref 8.7–10.5)
CALCIUM SERPL-MCNC: 7.9 MG/DL (ref 8.7–10.5)
CALCIUM SERPL-MCNC: 8 MG/DL (ref 8.7–10.5)
CHLORIDE SERPL-SCNC: 105 MMOL/L (ref 95–110)
CHLORIDE SERPL-SCNC: 105 MMOL/L (ref 95–110)
CHLORIDE SERPL-SCNC: 106 MMOL/L (ref 95–110)
CHLORIDE SERPL-SCNC: 107 MMOL/L (ref 95–110)
CHLORIDE SERPL-SCNC: 107 MMOL/L (ref 95–110)
CO2 SERPL-SCNC: 14 MMOL/L (ref 23–29)
CO2 SERPL-SCNC: 14 MMOL/L (ref 23–29)
CO2 SERPL-SCNC: 16 MMOL/L (ref 23–29)
CO2 SERPL-SCNC: 16 MMOL/L (ref 23–29)
CO2 SERPL-SCNC: 17 MMOL/L (ref 23–29)
CO2 SERPL-SCNC: 18 MMOL/L (ref 23–29)
CO2 SERPL-SCNC: 18 MMOL/L (ref 23–29)
CREAT SERPL-MCNC: 0.9 MG/DL (ref 0.5–1.4)
CREAT SERPL-MCNC: 1 MG/DL (ref 0.5–1.4)
CREAT SERPL-MCNC: 1 MG/DL (ref 0.5–1.4)
CREAT SERPL-MCNC: 1.1 MG/DL (ref 0.5–1.4)
CREAT SERPL-MCNC: 1.1 MG/DL (ref 0.5–1.4)
CREAT SERPL-MCNC: 1.2 MG/DL (ref 0.5–1.4)
CREAT SERPL-MCNC: 1.2 MG/DL (ref 0.5–1.4)
CV ECHO LV RWT: 0.48 CM
DELSYS: ABNORMAL
DIFFERENTIAL METHOD: ABNORMAL
DOP CALC AO PEAK VEL: 1.25 M/S
DOP CALC AO VTI: 19.15 CM
DOP CALC LVOT AREA: 3.4 CM2
DOP CALC LVOT DIAMETER: 2.09 CM
DOP CALC LVOT PEAK VEL: 1.06 M/S
DOP CALC LVOT STROKE VOLUME: 58.19 CM3
DOP CALCLVOT PEAK VEL VTI: 16.97 CM
E WAVE DECELERATION TIME: 205.85 MSEC
E/A RATIO: 0.73
E/E' RATIO: 15.23 M/S
ECHO LV POSTERIOR WALL: 1.21 CM (ref 0.6–1.1)
EOSINOPHIL # BLD AUTO: 0 K/UL (ref 0–0.5)
EOSINOPHIL NFR BLD: 0.1 % (ref 0–8)
EP: 5
ERYTHROCYTE [DISTWIDTH] IN BLOOD BY AUTOMATED COUNT: 14.6 % (ref 11.5–14.5)
ERYTHROCYTE [SEDIMENTATION RATE] IN BLOOD BY WESTERGREN METHOD: 14 MM/H
EST. GFR  (AFRICAN AMERICAN): 55 ML/MIN/1.73 M^2
EST. GFR  (AFRICAN AMERICAN): 55 ML/MIN/1.73 M^2
EST. GFR  (AFRICAN AMERICAN): >60 ML/MIN/1.73 M^2
EST. GFR  (NON AFRICAN AMERICAN): 48 ML/MIN/1.73 M^2
EST. GFR  (NON AFRICAN AMERICAN): 48 ML/MIN/1.73 M^2
EST. GFR  (NON AFRICAN AMERICAN): 53 ML/MIN/1.73 M^2
EST. GFR  (NON AFRICAN AMERICAN): 53 ML/MIN/1.73 M^2
EST. GFR  (NON AFRICAN AMERICAN): 59 ML/MIN/1.73 M^2
EST. GFR  (NON AFRICAN AMERICAN): 59 ML/MIN/1.73 M^2
EST. GFR  (NON AFRICAN AMERICAN): >60 ML/MIN/1.73 M^2
FIO2: 40
FRACTIONAL SHORTENING: 29 % (ref 28–44)
GLUCOSE SERPL-MCNC: 109 MG/DL (ref 70–110)
GLUCOSE SERPL-MCNC: 109 MG/DL (ref 70–110)
GLUCOSE SERPL-MCNC: 127 MG/DL (ref 70–110)
GLUCOSE SERPL-MCNC: 127 MG/DL (ref 70–110)
GLUCOSE SERPL-MCNC: 173 MG/DL (ref 70–110)
GLUCOSE SERPL-MCNC: 173 MG/DL (ref 70–110)
GLUCOSE SERPL-MCNC: 200 MG/DL (ref 70–110)
GLUCOSE SERPL-MCNC: 200 MG/DL (ref 70–110)
GLUCOSE SERPL-MCNC: 212 MG/DL (ref 70–110)
GLUCOSE SERPL-MCNC: 212 MG/DL (ref 70–110)
HCO3 UR-SCNC: 15.9 MMOL/L (ref 24–28)
HCT VFR BLD AUTO: 24.1 % (ref 37–48.5)
HGB BLD-MCNC: 9 G/DL (ref 12–16)
IMM GRANULOCYTES # BLD AUTO: 0.19 K/UL (ref 0–0.04)
INR PPP: 2.6 (ref 0.8–1.2)
INTERVENTRICULAR SEPTUM: 1.04 CM (ref 0.6–1.1)
IP: 15
IVRT: 0.11 MSEC
LACTATE SERPL-SCNC: 2.5 MMOL/L (ref 0.5–2.2)
LEFT ATRIUM SIZE: 3.54 CM
LEFT INTERNAL DIMENSION IN SYSTOLE: 3.57 CM (ref 2.1–4)
LEFT VENTRICLE DIASTOLIC VOLUME INDEX: 82.92 ML/M2
LEFT VENTRICLE DIASTOLIC VOLUME: 120.04 ML
LEFT VENTRICLE MASS INDEX: 149 G/M2
LEFT VENTRICLE SYSTOLIC VOLUME INDEX: 37 ML/M2
LEFT VENTRICLE SYSTOLIC VOLUME: 53.52 ML
LEFT VENTRICULAR INTERNAL DIMENSION IN DIASTOLE: 5.03 CM (ref 3.5–6)
LEFT VENTRICULAR MASS: 215.73 G
LV LATERAL E/E' RATIO: 14.14 M/S
LV SEPTAL E/E' RATIO: 16.5 M/S
LYMPHOCYTES # BLD AUTO: 0.4 K/UL (ref 1–4.8)
LYMPHOCYTES NFR BLD: 4.5 % (ref 18–48)
MAGNESIUM SERPL-MCNC: 1.7 MG/DL (ref 1.6–2.6)
MCH RBC QN AUTO: 30.7 PG (ref 27–31)
MCHC RBC AUTO-ENTMCNC: 37.3 G/DL (ref 32–36)
MCV RBC AUTO: 82 FL (ref 82–98)
MIN VOL: 13.6
MODE: ABNORMAL
MONOCYTES # BLD AUTO: 0.5 K/UL (ref 0.3–1)
MONOCYTES NFR BLD: 5 % (ref 4–15)
MV A" WAVE DURATION": 106 MSEC
MV PEAK A VEL: 1.35 M/S
MV PEAK E VEL: 0.99 M/S
NEUTROPHILS # BLD AUTO: 8.1 K/UL (ref 1.8–7.7)
NEUTROPHILS NFR BLD: 88.2 % (ref 38–73)
NRBC BLD-RTO: 0 /100 WBC
PCO2 BLDA: 24.5 MMHG (ref 35–45)
PH SMN: 7.42 [PH] (ref 7.35–7.45)
PISA TR MAX VEL: 3.37 M/S
PLATELET # BLD AUTO: 95 K/UL (ref 150–350)
PMV BLD AUTO: 11.8 FL (ref 9.2–12.9)
PO2 BLDA: 80 MMHG (ref 80–100)
POC BE: -9 MMOL/L
POC SATURATED O2: 96 % (ref 95–100)
POC TCO2: 17 MMOL/L (ref 23–27)
POTASSIUM SERPL-SCNC: 4 MMOL/L (ref 3.5–5.1)
POTASSIUM SERPL-SCNC: 4.1 MMOL/L (ref 3.5–5.1)
POTASSIUM SERPL-SCNC: 4.1 MMOL/L (ref 3.5–5.1)
POTASSIUM SERPL-SCNC: 4.3 MMOL/L (ref 3.5–5.1)
POTASSIUM SERPL-SCNC: 4.3 MMOL/L (ref 3.5–5.1)
POTASSIUM SERPL-SCNC: 4.4 MMOL/L (ref 3.5–5.1)
POTASSIUM SERPL-SCNC: 4.4 MMOL/L (ref 3.5–5.1)
PROT SERPL-MCNC: 4.6 G/DL (ref 6–8.4)
PROTHROMBIN TIME: 26.8 SEC (ref 9–12.5)
PULM VEIN A" WAVE DURATION": 101 MSEC
PV PEAK VELOCITY: 1.2 CM/S
RA PRESSURE: 3 MMHG
RBC # BLD AUTO: 2.93 M/UL (ref 4–5.4)
RIGHT VENTRICULAR END-DIASTOLIC DIMENSION: 3.03 CM
SAMPLE: ABNORMAL
SITE: ABNORMAL
SODIUM SERPL-SCNC: 130 MMOL/L (ref 136–145)
SODIUM SERPL-SCNC: 131 MMOL/L (ref 136–145)
SODIUM SERPL-SCNC: 131 MMOL/L (ref 136–145)
SODIUM SERPL-SCNC: 133 MMOL/L (ref 136–145)
SODIUM SERPL-SCNC: 133 MMOL/L (ref 136–145)
SP02: 96
SPONT RATE: 20
TDI LATERAL: 0.07 M/S
TDI SEPTAL: 0.06 M/S
TDI: 0.07 M/S
TR MAX PG: 45 MMHG
TRICUSPID ANNULAR PLANE SYSTOLIC EXCURSION: 2.44 CM
TV REST PULMONARY ARTERY PRESSURE: 48 MMHG
VANCOMYCIN SERPL-MCNC: 12.1 UG/ML
VANCOMYCIN SERPL-MCNC: 18.1 UG/ML
WBC # BLD AUTO: 9.16 K/UL (ref 3.9–12.7)

## 2019-09-21 PROCEDURE — 27000190 HC CPAP FULL FACE MASK W/VALVE

## 2019-09-21 PROCEDURE — 63600175 PHARM REV CODE 636 W HCPCS: Performed by: INTERNAL MEDICINE

## 2019-09-21 PROCEDURE — 80048 BASIC METABOLIC PNL TOTAL CA: CPT | Mod: 91

## 2019-09-21 PROCEDURE — 80076 HEPATIC FUNCTION PANEL: CPT

## 2019-09-21 PROCEDURE — 36600 WITHDRAWAL OF ARTERIAL BLOOD: CPT

## 2019-09-21 PROCEDURE — 99232 SBSQ HOSP IP/OBS MODERATE 35: CPT | Mod: ,,, | Performed by: INTERNAL MEDICINE

## 2019-09-21 PROCEDURE — 99233 PR SUBSEQUENT HOSPITAL CARE,LEVL III: ICD-10-PCS | Mod: S$GLB,,, | Performed by: INTERNAL MEDICINE

## 2019-09-21 PROCEDURE — 99232 PR SUBSEQUENT HOSPITAL CARE,LEVL II: ICD-10-PCS | Mod: ,,, | Performed by: INTERNAL MEDICINE

## 2019-09-21 PROCEDURE — 94761 N-INVAS EAR/PLS OXIMETRY MLT: CPT

## 2019-09-21 PROCEDURE — 36415 COLL VENOUS BLD VENIPUNCTURE: CPT

## 2019-09-21 PROCEDURE — 27000221 HC OXYGEN, UP TO 24 HOURS

## 2019-09-21 PROCEDURE — 20000000 HC ICU ROOM

## 2019-09-21 PROCEDURE — 82803 BLOOD GASES ANY COMBINATION: CPT

## 2019-09-21 PROCEDURE — 80202 ASSAY OF VANCOMYCIN: CPT | Mod: 91

## 2019-09-21 PROCEDURE — 25000003 PHARM REV CODE 250: Performed by: NURSE PRACTITIONER

## 2019-09-21 PROCEDURE — 85025 COMPLETE CBC W/AUTO DIFF WBC: CPT

## 2019-09-21 PROCEDURE — 99233 SBSQ HOSP IP/OBS HIGH 50: CPT | Mod: S$GLB,,, | Performed by: INTERNAL MEDICINE

## 2019-09-21 PROCEDURE — 63600175 PHARM REV CODE 636 W HCPCS: Performed by: FAMILY MEDICINE

## 2019-09-21 PROCEDURE — 87040 BLOOD CULTURE FOR BACTERIA: CPT

## 2019-09-21 PROCEDURE — 94660 CPAP INITIATION&MGMT: CPT

## 2019-09-21 PROCEDURE — S0030 INJECTION, METRONIDAZOLE: HCPCS | Performed by: NURSE PRACTITIONER

## 2019-09-21 PROCEDURE — 25000003 PHARM REV CODE 250: Performed by: INTERNAL MEDICINE

## 2019-09-21 PROCEDURE — 85610 PROTHROMBIN TIME: CPT

## 2019-09-21 PROCEDURE — 63600175 PHARM REV CODE 636 W HCPCS: Performed by: NURSE PRACTITIONER

## 2019-09-21 PROCEDURE — 99900035 HC TECH TIME PER 15 MIN (STAT)

## 2019-09-21 PROCEDURE — 25000242 PHARM REV CODE 250 ALT 637 W/ HCPCS: Performed by: INTERNAL MEDICINE

## 2019-09-21 PROCEDURE — 94640 AIRWAY INHALATION TREATMENT: CPT

## 2019-09-21 PROCEDURE — C9113 INJ PANTOPRAZOLE SODIUM, VIA: HCPCS | Performed by: NURSE PRACTITIONER

## 2019-09-21 PROCEDURE — 83605 ASSAY OF LACTIC ACID: CPT

## 2019-09-21 RX ORDER — LEVOFLOXACIN 5 MG/ML
750 INJECTION, SOLUTION INTRAVENOUS
Status: DISCONTINUED | OUTPATIENT
Start: 2019-09-22 | End: 2019-09-22

## 2019-09-21 RX ORDER — LORAZEPAM 2 MG/ML
1 INJECTION INTRAMUSCULAR EVERY 4 HOURS PRN
Status: DISCONTINUED | OUTPATIENT
Start: 2019-09-21 | End: 2019-10-02 | Stop reason: HOSPADM

## 2019-09-21 RX ORDER — VANCOMYCIN HCL IN 5 % DEXTROSE 1G/250ML
1000 PLASTIC BAG, INJECTION (ML) INTRAVENOUS ONCE
Status: COMPLETED | OUTPATIENT
Start: 2019-09-21 | End: 2019-09-21

## 2019-09-21 RX ORDER — ACETAMINOPHEN 10 MG/ML
600 INJECTION, SOLUTION INTRAVENOUS ONCE
Status: COMPLETED | OUTPATIENT
Start: 2019-09-21 | End: 2019-09-21

## 2019-09-21 RX ADMIN — MORPHINE SULFATE 4 MG: 4 INJECTION, SOLUTION INTRAMUSCULAR; INTRAVENOUS at 05:09

## 2019-09-21 RX ADMIN — MORPHINE SULFATE 4 MG: 4 INJECTION, SOLUTION INTRAMUSCULAR; INTRAVENOUS at 08:09

## 2019-09-21 RX ADMIN — MORPHINE SULFATE 4 MG: 4 INJECTION, SOLUTION INTRAMUSCULAR; INTRAVENOUS at 10:09

## 2019-09-21 RX ADMIN — METRONIDAZOLE 500 MG: 500 INJECTION, SOLUTION INTRAVENOUS at 08:09

## 2019-09-21 RX ADMIN — MORPHINE SULFATE 4 MG: 4 INJECTION, SOLUTION INTRAMUSCULAR; INTRAVENOUS at 04:09

## 2019-09-21 RX ADMIN — IPRATROPIUM BROMIDE AND ALBUTEROL SULFATE 3 ML: .5; 3 SOLUTION RESPIRATORY (INHALATION) at 12:09

## 2019-09-21 RX ADMIN — SODIUM BICARBONATE: 84 INJECTION, SOLUTION INTRAVENOUS at 01:09

## 2019-09-21 RX ADMIN — Medication 125 MG: at 01:09

## 2019-09-21 RX ADMIN — PANTOPRAZOLE SODIUM 40 MG: 40 INJECTION, POWDER, FOR SOLUTION INTRAVENOUS at 08:09

## 2019-09-21 RX ADMIN — IPRATROPIUM BROMIDE AND ALBUTEROL SULFATE 3 ML: .5; 3 SOLUTION RESPIRATORY (INHALATION) at 07:09

## 2019-09-21 RX ADMIN — VANCOMYCIN HYDROCHLORIDE 750 MG: 750 INJECTION, POWDER, LYOPHILIZED, FOR SOLUTION INTRAVENOUS at 09:09

## 2019-09-21 RX ADMIN — METRONIDAZOLE 500 MG: 500 INJECTION, SOLUTION INTRAVENOUS at 01:09

## 2019-09-21 RX ADMIN — SODIUM BICARBONATE: 84 INJECTION, SOLUTION INTRAVENOUS at 09:09

## 2019-09-21 RX ADMIN — VANCOMYCIN HYDROCHLORIDE 1000 MG: 1 INJECTION, POWDER, LYOPHILIZED, FOR SOLUTION INTRAVENOUS at 08:09

## 2019-09-21 RX ADMIN — LORAZEPAM 1 MG: 2 INJECTION INTRAMUSCULAR; INTRAVENOUS at 11:09

## 2019-09-21 RX ADMIN — LORAZEPAM 1 MG: 2 INJECTION INTRAMUSCULAR; INTRAVENOUS at 09:09

## 2019-09-21 RX ADMIN — ACETAMINOPHEN 600 MG: 10 INJECTION, SOLUTION INTRAVENOUS at 11:09

## 2019-09-21 RX ADMIN — LORAZEPAM 1 MG: 2 INJECTION INTRAMUSCULAR; INTRAVENOUS at 06:09

## 2019-09-21 RX ADMIN — MORPHINE SULFATE 4 MG: 4 INJECTION, SOLUTION INTRAMUSCULAR; INTRAVENOUS at 02:09

## 2019-09-21 RX ADMIN — DEXTROSE MONOHYDRATE, SODIUM CHLORIDE, AND POTASSIUM CHLORIDE: 50; 4.5; 2.98 INJECTION, SOLUTION INTRAVENOUS at 08:09

## 2019-09-21 RX ADMIN — METRONIDAZOLE 500 MG: 500 INJECTION, SOLUTION INTRAVENOUS at 11:09

## 2019-09-21 NOTE — CARE UPDATE
09/21/19 0702   Patient Assessment/Suction   Level of Consciousness (AVPU) alert   Respiratory Effort Unlabored;Normal   Expansion/Accessory Muscles/Retractions diaphragmatic   All Lung Fields Breath Sounds diminished  (patient moaning)   Rhythm/Pattern, Respiratory tachypneic   PRE-TX-O2   O2 Device (Oxygen Therapy) nasal cannula   $ Is the patient on Low Flow Oxygen? Yes   Flow (L/min) 3   SpO2 (!) 94 %   Pulse Oximetry Type Continuous   $ Pulse Oximetry - Multiple Charge Pulse Oximetry - Multiple   Pulse (!) 113   Resp (!) 26   Aerosol Therapy   $ Aerosol Therapy Charges Aerosol Treatment   Respiratory Treatment Status (SVN) given   Treatment Route (SVN) mask   Patient Position (SVN) HOB elevated   Signs of Intolerance (SVN) none   Breath Sounds Post-Respiratory Treatment   Throughout All Fields Post-Treatment All Fields   Throughout All Fields Post-Treatment no change   Post-treatment Heart Rate (beats/min) 111   Post-treatment Resp Rate (breaths/min) 34

## 2019-09-21 NOTE — PROGRESS NOTES
[facility] Dr[facility] and notified of tachycardia and high resp rate.  EKG repeated and shown to MD.

## 2019-09-21 NOTE — PLAN OF CARE
09/20/19 1910   Patient Assessment/Suction   Level of Consciousness (AVPU) alert  (moaning hurting)   All Lung Fields Breath Sounds diminished   PRE-TX-O2   O2 Device (Oxygen Therapy) nasal cannula   $ Is the patient on Low Flow Oxygen? Yes   Flow (L/min) 4   SpO2 (!) 94 %   Pulse Oximetry Type Continuous   $ Pulse Oximetry - Multiple Charge Pulse Oximetry - Multiple   Pulse (!) 168   Resp (!) 31   Aerosol Therapy   $ Aerosol Therapy Charges Aerosol Treatment   Respiratory Treatment Status (SVN) given   Treatment Route (SVN) mask   Patient Position (SVN) HOB elevated   Post Treatment Assessment (SVN) breath sounds unchanged   Signs of Intolerance (SVN) none   Breath Sounds Post-Respiratory Treatment   Throughout All Fields Post-Treatment no change   Post-treatment Heart Rate (beats/min) 146   Post-treatment Resp Rate (breaths/min) 31

## 2019-09-21 NOTE — SUBJECTIVE & OBJECTIVE
Interval History: Patient had increased work of breathing. Tried to wake up but patient had just gotten ativan and not responsive. Pulm started bipap    Review of Systems   Unable to perform ROS: Other     Objective:     Vital Signs (Most Recent):  Temp: 99.3 °F (37.4 °C) (09/21/19 1600)  Pulse: 100 (09/21/19 1739)  Resp: (!) 26 (09/21/19 1739)  BP: 138/74 (09/21/19 1700)  SpO2: 98 % (09/21/19 1739) Vital Signs (24h Range):  Temp:  [98.2 °F (36.8 °C)-100.9 °F (38.3 °C)] 99.3 °F (37.4 °C)  Pulse:  [] 100  Resp:  [23-53] 26  SpO2:  [90 %-99 %] 98 %  BP: ()/(44-74) 138/74     Weight: 47.2 kg (104 lb)  Body mass index is 19.02 kg/m².    Intake/Output Summary (Last 24 hours) at 9/21/2019 1852  Last data filed at 9/21/2019 1545  Gross per 24 hour   Intake 3746.45 ml   Output 2175 ml   Net 1571.45 ml      Physical Exam   Constitutional: She appears well-developed and well-nourished.   HENT:   Head: Normocephalic and atraumatic.   Cardiovascular: Normal rate, regular rhythm and normal heart sounds.   Pulmonary/Chest:   Increased work of breathing   Abdominal: Soft. Bowel sounds are normal.   Neurological: She is alert.   Skin: Skin is warm.       Significant Labs:   Recent Lab Results       09/21/19  1601   09/21/19  1325   09/21/19  1146   09/21/19  0803   09/21/19  0356        Albumin         1.5     Alkaline Phosphatase         59     Allens Test               ALT         14     Anion Gap 7   7 10 9      7   7 10 9     Ao root annulus   2.88           Ao peak jeffery   1.25           Ao VTI   19.15           AST         24     AV valve area   3.04           AORTIC VALVE CUSP SEPERATION   2.15           AV mean gradient   4           AV index (prosthetic)   0.89           AV peak gradient   6           AV Velocity Ratio   0.85           Baso #         0.01     Basophil%         0.1     Bilirubin, Direct         0.3     BILIRUBIN TOTAL         0.4  Comment:  For infants and newborns, interpretation of results  should be based  on gestational age, weight and in agreement with clinical  observations.  Premature Infant recommended reference ranges:  Up to 24 hours.............<8.0 mg/dL  Up to 48 hours............<12.0 mg/dL  3-5 days..................<15.0 mg/dL  6-29 days.................<15.0 mg/dL       Site               BSA   1.44           BUN, Bld 42   44 48 50      42   44 48 50     Calcium 8.0   7.8 8.0 7.9      8.0   7.8 8.0 7.9     Chloride 106   106 106 105      106   106 106 105     CO2 18   17 14 16      18   17 14 16     Creatinine 0.9   1.0 1.1 1.2      0.9   1.0 1.1 1.2     Left Ventricle Relative Wall Thickness   0.48           DelSys               Differential Method         Automated     E/A ratio   0.73           E/E' ratio   15.23           eGFR if  >60   >60 >60 55      >60   >60 >60 55     eGFR if non  >60  Comment:  Calculation used to obtain the estimated glomerular filtration  rate (eGFR) is the CKD-EPI equation.      59  Comment:  Calculation used to obtain the estimated glomerular filtration  rate (eGFR) is the CKD-EPI equation.    53  Comment:  Calculation used to obtain the estimated glomerular filtration  rate (eGFR) is the CKD-EPI equation.    48  Comment:  Calculation used to obtain the estimated glomerular filtration  rate (eGFR) is the CKD-EPI equation.         >60  Comment:  Calculation used to obtain the estimated glomerular filtration  rate (eGFR) is the CKD-EPI equation.      59  Comment:  Calculation used to obtain the estimated glomerular filtration  rate (eGFR) is the CKD-EPI equation.    53  Comment:  Calculation used to obtain the estimated glomerular filtration  rate (eGFR) is the CKD-EPI equation.    48  Comment:  Calculation used to obtain the estimated glomerular filtration  rate (eGFR) is the CKD-EPI equation.        Eos #         0.0     Eosinophil%         0.1     E wave decelartion time   205.85           FiO2               Flow             "   FS   29           Glucose 127   212 173 200      127   212 173 200     Gran # (ANC)         8.1     Gran%         88.2     Hematocrit         24.1     Hemoglobin         9.0     Immature Grans (Abs)         0.19  Comment:  Mild elevation in immature granulocytes is non specific and   can be seen in a variety of conditions including stress response,   acute inflammation, trauma and pregnancy. Correlation with other   laboratory and clinical findings is essential.       Coumadin Monitoring INR         2.6  Comment:  Coumadin Therapy:  2.0 - 3.0 for INR for all indicators except mechanical heart valves  and antiphospholipid syndromes which should use 2.5 - 3.5.       IVRT   0.11           IVS   1.04           Lactate, Baldev         2.5  Comment:  Falsely low lactic acid results can be found in samples   containing >=13.0 mg/dL total bilirubin and/or >=3.5 mg/dL   direct bilirubin.       LA size   3.54           LVOT area   3.4           LV LATERAL E/E' RATIO   14.14           LV SEPTAL E/E' RATIO   16.50           LV Diastolic Volume   120.04           LV Diastolic Volume Index   82.92           LVIDD   5.03           LVIDS   3.57           LV mass   215.73           LV Mass Index   149           LVOT diameter   2.09           LVOT peak jose a   1.06           LVOT stroke volume   58.19           LVOT peak VTI   16.97           LV Systolic Volume   53.52           LV Systolic Volume Index   37.0           Lymph #         0.4     Lymph%         4.5     Magnesium               MCH         30.7     MCHC         37.3     MCV         82     Mean e'   0.07           Mode               Mono #         0.5     Mono%         5.0     MPV         11.8     MV "A" wave duration   106.00           MV Peak A Jose A   1.35           MV Peak E Jose A   0.99           nRBC         0     Platelets         95     POC BE               POC HCO3               POC PCO2               POC PH               POC PO2               POC SATURATED O2           " "    POC TCO2               Potassium 4.1   4.3 4.4 4.0      4.1   4.3 4.4 4.0     PROTEIN TOTAL         4.6     Protime         26.8     Pulm vein "A" wave   101.00           PV PEAK VELOCITY   1.20           PW   1.21           Right Atrial Pressure (from IVC)   3           RBC         2.93     RDW         14.6     RVDD   3.03           Sample               Sodium 131   130 130 130      131   130 130 130     TAPSE   2.44           TDI SEPTAL   0.06           TDI LATERAL   0.07           Triscuspid Valve Regurgitation Peak Gradient   45           TR Max Jose A   3.37           TV rest pulmonary artery pressure   48           Vancomycin, Random         12.1     WBC         9.16                      09/20/19  2318   09/20/19  2127        Albumin         Alkaline Phosphatase         Allens Test   Pass     ALT         Anion Gap 10        10       Ao root annulus         Ao peak jose a         Ao VTI         AST         AV valve area         AORTIC VALVE CUSP SEPERATION         AV mean gradient         AV index (prosthetic)         AV peak gradient         AV Velocity Ratio         Baso #         Basophil%         Bilirubin, Direct         BILIRUBIN TOTAL         Site   RR     BSA         BUN, Bld 55        55       Calcium 7.7        7.7       Chloride 103        103       CO2 16        16       Creatinine 1.3        1.3       Left Ventricle Relative Wall Thickness         DelSys   Nasal Can     Differential Method         E/A ratio         E/E' ratio         eGFR if  50        50       eGFR if non  43  Comment:  Calculation used to obtain the estimated glomerular filtration  rate (eGFR) is the CKD-EPI equation.           43  Comment:  Calculation used to obtain the estimated glomerular filtration  rate (eGFR) is the CKD-EPI equation.          Eos #         Eosinophil%         E wave decelartion time         FiO2   94     Flow   2     FS         Glucose 207        207       Gran # (ANC)         " "Gran%         Hematocrit         Hemoglobin         Immature Grans (Abs)         Coumadin Monitoring INR         IVRT         IVS         Lactate, Baldev         LA size         LVOT area         LV LATERAL E/E' RATIO         LV SEPTAL E/E' RATIO         LV Diastolic Volume         LV Diastolic Volume Index         LVIDD         LVIDS         LV mass         LV Mass Index         LVOT diameter         LVOT peak jose a         LVOT stroke volume         LVOT peak VTI         LV Systolic Volume         LV Systolic Volume Index         Lymph #         Lymph%         Magnesium 1.7       MCH         MCHC         MCV         Mean e'         Mode   SPONT     Mono #         Mono%         MPV         MV "A" wave duration         MV Peak A Jose A         MV Peak E Jose A         nRBC         Platelets         POC BE   -10     POC HCO3   14.4     POC PCO2   23.0     POC PH   7.405     POC PO2   60     POC SATURATED O2   92     POC TCO2   15     Potassium 3.8        3.8       PROTEIN TOTAL         Protime         Pulm vein "A" wave         PV PEAK VELOCITY         PW         Right Atrial Pressure (from IVC)         RBC         RDW         RVDD         Sample   ARTERIAL     Sodium 129        129       TAPSE         TDI SEPTAL         TDI LATERAL         Triscuspid Valve Regurgitation Peak Gradient         TR Max Jose A         TV rest pulmonary artery pressure         Vancomycin, Random         WBC               Significant Imaging: I have reviewed all pertinent imaging results/findings within the past 24 hours.  "

## 2019-09-21 NOTE — PROGRESS NOTES
Progress Note  PULMONARY    Admit Date: 9/19/2019 09/21/2019      SUBJECTIVE:     Sept 21, diffuse pains, off levo, abg better.        PFSH and Allergies reviewed.    OBJECTIVE:     Vitals (Most recent):  Vitals:    09/21/19 0702   BP:    Pulse: (!) 113   Resp: (!) 26   Temp:        Vitals (24 hour range):  Temp:  [97.9 °F (36.6 °C)-98.8 °F (37.1 °C)]   Pulse:  [106-168]   Resp:  [25-53]   BP: ()/(48-67)   SpO2:  [90 %-96 %]       Intake/Output Summary (Last 24 hours) at 9/21/2019 0726  Last data filed at 9/21/2019 0421  Gross per 24 hour   Intake 2787.16 ml   Output 825 ml   Net 1962.16 ml          Physical Exam:  The patient's neuro status (alertness,orientation,cognitive function,motor skills,), pharyngeal exam (oral lesions, hygiene, abn dentition,), Neck (jvd,mass,thyroid,nodes in neck and above/below clavicle),RESPIRATORY(symmetry,effort,fremitus,percussion,auscultation),  Cor(rhythm,heart tones including gallops,perfusion,edema)ABD(distention,hepatic&splenomegaly,tenderness,masses), Skin(rash,cyanosis),Psyc(affect,judgement,).  Exam negative except for these pertinent findings:    Looks good save c/o diffuse pain, chest is symmetric, no distress, normal percussion, normal fremitus and good normal breath sounds, no edeam     Radiographs reviewed: view by direct vision - no new on 21st, diffuse infiltrates,      No results found for this or any previous visit.]    Labs     Recent Labs   Lab 09/20/19  1037 09/21/19  0356   WBC 10.29 9.16   HGB 10.1* 9.0*   HCT 27.4* 24.1*   PLT 94* 95*   BAND 13.0  --      Recent Labs   Lab 09/20/19  2318 09/21/19  0356   *  129* 130*  130*   K 3.8  3.8 4.0  4.0     103 105  105   CO2 16*  16* 16*  16*   BUN 55*  55* 50*  50*   CREATININE 1.3  1.3 1.2  1.2   *  207* 200*  200*   CALCIUM 7.7*  7.7* 7.9*  7.9*   MG 1.7  --    AST  --  24   ALT  --  14   ALKPHOS  --  59   BILITOT  --  0.4   BILIDIR  --  0.3   PROT  --  4.6*   ALBUMIN  --   1.5*   INR  --  2.6*   LACTATE  --  2.5*     Recent Labs   Lab 09/20/19 2127   PH 7.405   PCO2 23.0*   PO2 60*   HCO3 14.4*     Microbiology Results (last 7 days)     Procedure Component Value Units Date/Time    Blood culture [[id number]] Collected:  09/21/19 0534    Order Status:  Sent Specimen:  Blood Updated:  09/21/19 0534    Blood culture [[id number]] Collected:  09/20/19 4142    Order Status:  Completed Specimen:  Blood from Line, Central Updated:  09/21/19 0515     Blood Culture, Routine No Growth to date    Clostridium difficile EIA [[id number]]     Order Status:  Canceled Specimen:  Stool     Stool culture [[id number]]     Order Status:  No result Specimen:  Stool           Impression:  Active Hospital Problems    Diagnosis  POA    *Septic shock [A41.9, R65.21]  Yes    Coagulopathy [D68.9]  Yes    ILD (interstitial lung disease) [J84.9]  Yes    COPD with acute lower respiratory infection [J44.0]  Yes    Calcification of aorta [I70.0]  Yes     Defer to primary control of cholesterol and bp.          Thrombocytopenia [D69.6]  Yes    Pleural effusion [J90]  Yes    Acute hypoxemic respiratory failure [J96.01]  Yes    BOOP (bronchiolitis obliterans with organizing pneumonia) [J84.89]  Yes    ABIMBOLA (acute kidney injury) [N17.9]  Yes    Intravascular volume depletion [E86.1]  Yes    Pancolitis [K51.00]  Yes    Hyponatremia [E87.1]  Yes    Hypokalemia [E87.6]  Yes    Metabolic acidosis [E87.2]  Yes    CAP (community acquired pneumonia) [J18.9]  Yes      Resolved Hospital Problems   No resolved problems to display.               Plan:   Sept 21, wbc now 9k, creat down to 1.2, pt uses narcotics chr - be - 10 last pm from -15 on 19th,    F/u cxr- dose ativan.  Hard to evaluate- seems much better save pain c/o. F/u cxr am                                  .

## 2019-09-21 NOTE — PROGRESS NOTES
EKG done as pt's hr 150-160 and appears irregular,.  Pt is in pain, and is hyperventilating.  Afebrile.  EKG is showing sinus tach with PVC's.  Notified NP, who came to see pt directly.  Recommends to speak with EICU doctor for now.

## 2019-09-21 NOTE — PROGRESS NOTES
Pharmacokinetic Assessment Follow Up: IV Vancomycin    Vancomycin serum concentration assessment(s):    The random level was drawn ~1 hour early and can be used to guide therapy at this time. The measurement is below the desired definitive target range of 15 to 20 mcg/mL.    Vancomycin Regimen Plan:    Pt's renal function is not stable.  Pharmacy will dose by level. Target goal is 15-20 mg/dL.   Vanc level 9/21/2019 at 0356 was 12.1 mg/dL.  Pharmacy will dose vancomycin 1000 mg x1.  A vancomycin level will be ordered on 9/21 at 2000.       Drug levels (last 3 results):  Recent Labs   Lab Result Units 09/20/19  1432 09/21/19  0356   Vancomycin, Random ug/mL 4.7 12.1       Pharmacy will continue to follow and monitor vancomycin.    Please contact pharmacy at extension [phone] for questions regarding this assessment.    Thank you for the consult,   Martin Daniels       Patient brief summary:  Mesha Mckenzie is a 63 y.o. female initiated on antimicrobial therapy with IV Vancomycin for treatment of bacteremia      Drug Allergies:   Review of patient's allergies indicates:   Allergen Reactions    Pcn [penicillins]        Actual Body Weight:   47.4kg    Renal Function:   Estimated Creatinine Clearance: 35 mL/min (based on SCr of 1.2 mg/dL).,       CBC (last 72 hours):  Recent Labs   Lab Result Units 09/19/19  1214 09/19/19  2215 09/20/19  1037 09/21/19  0356   WBC K/uL 13.10* 19.83* 10.29 9.16   Hemoglobin g/dL 10.7* 11.0* 10.1* 9.0*   Hematocrit % 31.8* 30.1* 27.4* 24.1*   Platelets K/uL 97* 127* 94* 95*   Gran% % 80.0* 86.0* 79.0* 88.2*   Lymph% % 11.0* 7.0* 3.0* 4.5*   Mono% % 5.0 7.0 5.0 5.0   Eosinophil% % 1.0 0.0 0.0 0.1   Basophil% % 0.0 0.0 0.0 0.1   Differential Method  Manual Manual Manual Automated       Metabolic Panel (last 72 hours):  Recent Labs   Lab Result Units 09/19/19  1214 09/19/19  1245 09/19/19  1807 09/19/19  1902 09/19/19  2215 09/20/19  0249 09/20/19  0642 09/20/19  1037 09/20/19  1739  09/20/19  2318 09/21/19  0356   Sodium mmol/L 114*  --  117*  --  119* 122* 124* 126*  126* 128*  128* 129*  129* 130*  130*   Potassium mmol/L 3.1*  --  2.3*  --  2.8* 3.4* 3.3* 3.2*  3.2* 3.9  3.9 3.8  3.8 4.0  4.0   Chloride mmol/L 76*  --  85*  --  88* 93* 96 97  97 102  102 103  103 105  105   CO2 mmol/L 15*  --  12*  --  15* 15* 15* 16*  16* 14*  14* 16*  16* 16*  16*   Glucose mg/dL 78  --  118  --  165* 84 87 97  97 160*  160* 207*  207* 200*  200*   Glucose, UA   --  Negative  --   --   --   --   --   --   --   --   --    BUN, Bld mg/dL 101*  --  91*  --  85* 77* 72* 69*  69* 61*  61* 55*  55* 50*  50*   Creatinine mg/dL 2.9*  --  2.5*  --  2.2* 1.9* 1.7* 1.6*  1.6* 1.4  1.4 1.3  1.3 1.2  1.2   Albumin g/dL 2.2*  --   --   --  1.5*  --   --   --   --   --  1.5*   Total Bilirubin mg/dL 1.5*  --   --   --   --   --   --   --   --   --  0.4   Alkaline Phosphatase U/L 86  --   --   --   --   --   --   --   --   --  59   AST U/L 84*  --   --   --   --   --   --   --   --   --  24   ALT U/L 24  --   --   --   --   --   --   --   --   --  14   Magnesium mg/dL  --   --   --  1.7 2.1  --   --   --   --  1.7  --    Phosphorus mg/dL  --   --   --   --  3.9  --   --   --   --   --   --        Vancomycin Administrations:  vancomycin given in the last 96 hours                     vancomycin 250mg / 10ml oral suspension 125 mg (mg) 125 mg Given 09/21/19 0130     125 mg Given 09/20/19 2111     125 mg Given  1235     125 mg Given  0555    vancomycin 750 mg in dextrose 5 % 250 mL IVPB (mg) 750 mg New Bag 09/20/19 1737    vancomycin 250mg / 10ml oral suspension 125 mg (mg) 125 mg Given 09/19/19 2339    vancomycin 500 mg in dextrose 5 % 100 mL IVPB (ready to mix system) (mg) 500 mg New Bag 09/19/19 1402                      Microbiologic Results:  Microbiology Results (last 7 days)       Procedure Component Value Units Date/Time    Blood culture [[id number]] Collected:  09/21/19 0534    Order Status:   Sent Specimen:  Blood Updated:  09/21/19 0534    Blood culture [[id number]] Collected:  09/20/19 0746    Order Status:  Completed Specimen:  Blood from Line, Central Updated:  09/21/19 0515     Blood Culture, Routine No Growth to date    Clostridium difficile EIA [[id number]]     Order Status:  Canceled Specimen:  Stool     Stool culture [[id number]]     Order Status:  No result Specimen:  Stool

## 2019-09-21 NOTE — PROGRESS NOTES
"INPATIENT NEPHROLOGY PROGRESS NOTE  [facility] NEPHROLOGY    Mesha Mckenzie  09/21/2019    Reason for consultation:        Alireza, hyponatremia    Chief Complaint: No chief complaint on file.       History of Present Illness:      Pt is a 64 yo female who was transferred from [facility] for septic shock, pancolitis and bilateral pna. Pt reports that she fell out of bed on Sunday morning and has been feeling progressively worse throughout the week. C/o vomiting since Sunday, ~3 episodes per day. Also reports diarrhea, ~4 stools per day, watery in nature. Pt denies any hematemesis, melena or hematochezia. Pt did take zofran which helped "some:" c/o severe abdominal pain since Sunday which is constant. Pain is a 10 on a 0-10 pain scale.  Has not been able to eat due to the nausea and pain. Questioned pt in regards to use of pain medication, states "I didn't have any." pt was scheduled for colonoscopy tomorrow. Hx of GI bleed in December 2018. Pt states she had passed some blood in her stool. Reviewed colonoscopy report, no bleeding in the colon was found however the prep was poor. No recent travel, no sick contacts at home. Questioned pt in regards to cough: pt states she has felt SOB due to pain but reports only occasional coughing. Denies any sputum production.  Social: smokes 10 cigarettes per day, 20 pack year hx. No ETOH.     9/21  Sedate after ativan.        Plan of Care:     Assessment:     Symptomatic hyponatremia--improved  --iv to 1/2 ns  --q 6 hour bmp  --no thiazide diuretics or ssri antidepressants  --check bnp      alireza--nonoliguic  --keep map above 55  --no nonessential nephrotoxic agent    Lactic acidosis  --continue volume resuscitation  --add an amp of bicarb to 1/2 ns due to significant acidosis and tachypnea    Sepsis--off vasopressors  --avoid vancomycin toxicity  --abx per primary              Thank you for allowing us to participate in this patient's care. We will continue to " follow.    Medications:  Current Facility-Administered Medications on File Prior to Encounter   Medication Dose Route Frequency Provider Last Rate Last Dose    lactated ringers infusion   Intravenous Continuous Renard Gonsalves MD 75 mL/hr at 12/12/18 0826      sodium chloride 0.9% flush 3 mL  3 mL Intravenous PRN Renard Gonsalves MD         Current Outpatient Medications on File Prior to Encounter   Medication Sig Dispense Refill    diazepam (VALIUM) 5 MG tablet Take 10 mg by mouth 2 (two) times daily.        Scheduled Meds:   acetaminophen  600 mg Intravenous Once    albuterol-ipratropium  3 mL Nebulization Q6H    [START ON 9/22/2019] levoFLOXacin  750 mg Intravenous Q48H    metronidazole  500 mg Intravenous Q8H    pantoprazole  40 mg Intravenous Daily     Continuous Infusions:   norepinephrine bitartrate-D5W Stopped (09/20/19 3246)    custom IV infusion builder       PRN Meds:.influenza, lorazepam, morphine, ondansetron, pneumoc 13-susanna conj-dip cr(PF), sodium chloride 0.9%    Allergies:  Pcn [penicillins]    Vital Signs:  Temp Readings from Last 3 Encounters:   09/21/19 (!) 100.9 °F (38.3 °C) (Axillary)   09/19/19 98.7 °F (37.1 °C) (Oral)   12/12/18 98 °F (36.7 °C) (Oral)       Pulse Readings from Last 3 Encounters:   09/21/19 109   09/19/19 108   12/12/18 76       BP Readings from Last 3 Encounters:   09/21/19 (!) 102/54   09/19/19 (!) 113/44   12/12/18 127/68       Weight:  Wt Readings from Last 3 Encounters:   09/21/19 47.4 kg (104 lb 8 oz)   09/19/19 56.7 kg (125 lb)   12/10/18 51.3 kg (113 lb)       Review of Systems:  Review of Systems - All 14 systems reviewed and negative, except as noted in HPI    Physical Exam:    Constitutional: ill appearing  Neuro: No asterixis, sedated  Psych: Calm  EENT: NCAT, anicteric sclera   Neck:  supple  Cards: No rub  Lungs: clear to ausculation  GI: diminished bs  MSK:  WNWD  Skin: no purpura, rashes       Results:  Lab Results   Component Value Date     (L)  09/21/2019     (L) 09/21/2019    K 4.4 09/21/2019    K 4.4 09/21/2019     09/21/2019     09/21/2019    CO2 14 (L) 09/21/2019    CO2 14 (L) 09/21/2019    BUN 48 (H) 09/21/2019    BUN 48 (H) 09/21/2019    CREATININE 1.1 09/21/2019    CREATININE 1.1 09/21/2019    CALCIUM 8.0 (L) 09/21/2019    CALCIUM 8.0 (L) 09/21/2019    ANIONGAP 10 09/21/2019    ANIONGAP 10 09/21/2019    ESTGFRAFRICA >60 09/21/2019    ESTGFRAFRICA >60 09/21/2019    EGFRNONAA 53 (A) 09/21/2019    EGFRNONAA 53 (A) 09/21/2019       Lab Results   Component Value Date    CALCIUM 8.0 (L) 09/21/2019    CALCIUM 8.0 (L) 09/21/2019    PHOS 3.9 09/19/2019       Recent Labs   Lab 09/21/19  0356   WBC 9.16   RBC 2.93*   HGB 9.0*   HCT 24.1*   PLT 95*   MCV 82   MCH 30.7   MCHC 37.3*       I have personally reviewed pertinent radiological imaging and reports.

## 2019-09-21 NOTE — PROGRESS NOTES
Pt remains tachycardic in 120's.  RR 36-40.  Continually moaning, even after giving morphine.  Called EICU and spoke with Dr. Rehman. No new orders at this time.  Will continue to monitor.

## 2019-09-21 NOTE — PROGRESS NOTES
Consult Note  Infectious Disease    Reason for Consult:  sepsis    HPI: Mesha Mckenzie is a critically ill 63 y.o. female , tranferred from [facility] where she presented with 4 days of nausea and vomiting, found to have ABIMBOLA, leukocytosis, hypotension, multiple pulmonary infiltrates, pancolitis on CT, metabolic acidosis and altered mental status. She was transferred for multispecialty care. Since admission, she has received vanc, levaquin and flagyl, still requires pressors, has 3/4 blood cultures with GPC in clusters, Ct chest with multiple foci of infiltrates including some that look like septic pulmonary emboli, elevated coags without bleeding and no further diarrheal stools. Her chemistries are responding to fluid and supplementation but her mental status remains very abnormal. She is in pain from her abdomen. It is unclear from notes and my conversation with daughter whether her respiratory symptoms came first or the GI symptoms came first. She has no history of STaph infection, no recent antibiotics, but does smoke and take pain meds, valium and adderall chronically.      9/21: blood cultures with STaph aureus, sensitivities pending. Blood cultures from yesterday are negative so far. No change in oxygen needs. No longer on pressors. Less responsive. TTE unofficially abnormal. Staph in urine is likely from the bacteremia. No family at bedside.   Review of Systems:  unobtainable    EXAM & DIAGNOSTICS REVIEWED:   Vitals:     Temp:  [98.2 °F (36.8 °C)-100.9 °F (38.3 °C)]   Temp: 99.4 °F (37.4 °C) (09/21/19 1200)  Pulse: 92 (09/21/19 1238)  Resp: (!) 23 (09/21/19 1238)  BP: (!) 89/52 (09/21/19 1238)  SpO2: 98 % (09/21/19 1238)    Intake/Output Summary (Last 24 hours) at 9/21/2019 1426  Last data filed at 9/21/2019 1200  Gross per 24 hour   Intake 4093.41 ml   Output 1775 ml   Net 2318.41 ml       General:  Sedated and less responsive  Eyes:  Anicteric, PERRL,  no sleral or conjunctival petechiae  ENT:  No ulcers,  exudates, thrush, nares patent, dentition is poor and MM are less dry  Neck:  supple, no masses or adenopathy appreciated  Lungs: Patchy crackles, no wheezing,   Heart:  RRR, no gallop/murmur/rub noted but her moaning obscures exam  Abd:  Soft, tender, guards, moans louder, mild distention,   hypoactive BS, no masses or organomegaly appreciated.  :   Henriquez, urine clear, no flank tenderness  Musc:  Joints without effusion, swelling, erythema, synovitis,    Skin:  No rashes. No palmar or plantar lesions. No subungual petechiae. No skin lesions of DIC  Wound:   Neuro:  Delirious, not attentive, face symmetric, moves all extremities, no focal weakness.    Psych:  sedated  Lymphatic:        Extrem: No edema, erythema, phlebitis, cellulitis, peripheral pulses are 0-1, clubbing present, no mottling  VAD:  Left IJ TLC 9/19  Isolation:  Contact     Lines/Tubes/Drains:    General Labs reviewed:  Recent Labs   Lab 09/19/19  2215 09/20/19  1037 09/21/19  0356   WBC 19.83* 10.29 9.16   HGB 11.0* 10.1* 9.0*   HCT 30.1* 27.4* 24.1*   * 94* 95*       Recent Labs   Lab 09/19/19  1214  09/21/19  0356 09/21/19  0803 09/21/19  1146   *   < > 130*  130* 130*  130* 130*  130*   K 3.1*   < > 4.0  4.0 4.4  4.4 4.3  4.3   CL 76*   < > 105  105 106  106 106  106   CO2 15*   < > 16*  16* 14*  14* 17*  17*   *   < > 50*  50* 48*  48* 44*  44*   CREATININE 2.9*   < > 1.2  1.2 1.1  1.1 1.0  1.0   CALCIUM 7.2*   < > 7.9*  7.9* 8.0*  8.0* 7.8*  7.8*   PROT 5.7*  --  4.6*  --   --    BILITOT 1.5*  --  0.4  --   --    ALKPHOS 86  --  59  --   --    ALT 24  --  14  --   --    AST 84*  --  24  --   --     < > = values in this interval not displayed.           Micro:  Microbiology Results (last 7 days)     Procedure Component Value Units Date/Time    Blood culture [[id number]] Collected:  09/21/19 0534    Order Status:  Sent Specimen:  Blood Updated:  09/21/19 0534    Blood culture [[id number]] Collected:   09/20/19 1739    Order Status:  Completed Specimen:  Blood from Line, Central Updated:  09/21/19 0515     Blood Culture, Routine No Growth to date    Clostridium difficile EIA [[id number]]     Order Status:  Canceled Specimen:  Stool     Stool culture [[id number]]     Order Status:  No result Specimen:  Stool         Imaging Reviewed:   CXRs   CT head, chest , abd/pelvis  Cardiology:    IMPRESSION & PLAN   1.  Multiple positive blood cultures with STaph aureus.   pneumonia and possibly septic pulmonary emboli would explain her chest CT findings. Staph in urine is from the bacteremia and not the cause of it.  2. Pancolitis. Unclear if her GI symptoms were the root cause of her illness or if she has ischemic colitis from sepsis and volume depletion . Cdiff neg  3. ABIMBOLA, volume depletion, metabolic acidosis, improved  4. DIC? with elevated INR, D-dimer, low platelets, but with elevated fibrinogen, dropping hgb  5. Opiate dependence, severe lumbar spine abnormalities      Recommendations:  Repeat blood cultures, continue vanc, await  Echocardiogram report  Would probably repeat CT head in am to look for sequelae of septic emboli   Will leave flagyl and levaquin on board today but may change tomorrow  Stool studies have been ordered but stool not forthcoming since admission.   Hepatitis testing(already ordered)  Follow coags and consider FFP if any sign of bleeding, vs heme consult  Continue hydration and electrolyte management per nephrology  Agree that we should withhold steroids

## 2019-09-21 NOTE — PLAN OF CARE
Problem: Adult Inpatient Plan of Care  Goal: Plan of Care Review  Outcome: Ongoing (interventions implemented as appropriate)  Pt with constant moaning and inability to fully express needs.  She is somewhat confused.  Afebrile  Heart rate 150-160 and irregular at start of shift.  EKG showing sinus tach with multiple PVC's  RR upper 30's-40's  Spoke with Dr. Sherie GREGORY.  Pain meds increased to MSO4 4 mg every 2 hours, which has not seemed to improve her discomfort.  ABG's done, which shows improvement from last one.  Levo weaned off.  MAP maintained above 65.  Continue to monitor electrolytes.  No BM since admission.  Contact isolation orders Dc'd per order.  Pt coughing with po intake.  Swallow study ordered.  Echo still pending.

## 2019-09-21 NOTE — PROGRESS NOTES
[facility]     CC: Abdominal pain    HPI 63 y.o. female with history of chronic back pain on narcotics and urinary retention with intermittent self catheterizations presents with 3-4 days of acute, moderate to severe, constant, diffuse abdominal pain associated with non-bloody diarrhea and vomiting. Patient is lethargic thus most of exam obtained from patient's daughter who was present. Patient had become weak and altered and fell out of bed several days ago but refused to seek medical attention. She has a history of GI bleed in 2018 and underwent colonoscopy performed by Dr. Gonsalves in December, however prep was poor. Patient's daughter is unaware of sick contacts.     Interval history:  Patient's blood and urine cultures growing staph aureus with pending sensitivities. She is now off Levophed however is more altered. No BM since admission    Past Medical History:   Diagnosis Date    Anxiety     Carotid bruit     Chronic pain     Cystitis     Cystocele, unspecified (CODE)     Depression     GI bleed     History of uterine fibroid     Shortness of breath on exertion     Spinal stenosis     Urinary retention        Past Surgical History:   Procedure Laterality Date    ANTERIOR VAGINAL REPAIR  10-    CARDIAC CATHETERIZATION  age 12    CHOLECYSTECTOMY  2015    COLONOSCOPY  12/12/2018    aborted due to poor colon prep    COLONOSCOPY N/A 12/12/2018    Performed by [facility]ard [facility]alves [facility] at [facility] ENDO    [facility]Y  1989    [facility]    tubiligation         Social History     Tobacco Use    Smoking status: Current Every Day Smoker     Packs/day: 0.50     Years: 40.00     Pack years: 20.00     Types: Cigarettes    Smokeless tobacco: Never Used   Substance Use Topics    Alcohol use: No    Drug use: No       Family History   Problem Relation Age of Onset    Alzheimer's disease Mother     Thyroid disease Mother     Osteoarthritis Mother     Hypertension Brother     Hypertension Sister      "Breast cancer Neg Hx     Colon cancer Neg Hx     Ovarian cancer Neg Hx        Review of Systems  Unable to obtain complete ROS    Physical Examination  BP (!) 102/54 (BP Location: Right arm, Patient Position: Lying)   Pulse 109   Temp (!) 100.9 °F (38.3 °C) (Axillary)   Resp (!) 29   Ht 5' 2" (1.575 m)   Wt 47.4 kg (104 lb 8 oz)   SpO2 96%   Breastfeeding? No   BMI 19.11 kg/m²   General appearance: altered , ill appearing  HENT: Normocephalic, atraumatic, neck symmetrical, no nasal discharge , dry mucous membranes  Lungs: coarse breath sounds bilaterally, no dullness to percussion bilaterally, symmetric expansion, breathing unlabored  Heart: regular rate and rhythm without rub; no displacement of the PMI   Abdomen:soft, diffuse ttp without guarding, BS active , no masses appreciated   Extremities: extremities symmetric; no clubbing, cyanosis, or edema  Neurologic: altered, responds to some questions, no tremor, diffuse weakness    Labs:  Lab Results   Component Value Date    WBC 9.16 09/21/2019    HGB 9.0 (L) 09/21/2019    HCT 24.1 (L) 09/21/2019    MCV 82 09/21/2019    PLT 95 (L) 09/21/2019     CMP  Sodium   Date Value Ref Range Status   09/21/2019 130 (L) 136 - 145 mmol/L Final   09/21/2019 130 (L) 136 - 145 mmol/L Final     Potassium   Date Value Ref Range Status   09/21/2019 4.4 3.5 - 5.1 mmol/L Final   09/21/2019 4.4 3.5 - 5.1 mmol/L Final     Chloride   Date Value Ref Range Status   09/21/2019 106 95 - 110 mmol/L Final   09/21/2019 106 95 - 110 mmol/L Final     CO2   Date Value Ref Range Status   09/21/2019 14 (L) 23 - 29 mmol/L Final   09/21/2019 14 (L) 23 - 29 mmol/L Final     Glucose   Date Value Ref Range Status   09/21/2019 173 (H) 70 - 110 mg/dL Final   09/21/2019 173 (H) 70 - 110 mg/dL Final     BUN, Bld   Date Value Ref Range Status   09/21/2019 48 (H) 8 - 23 mg/dL Final   09/21/2019 48 (H) 8 - 23 mg/dL Final     Creatinine   Date Value Ref Range Status   09/21/2019 1.1 0.5 - 1.4 mg/dL Final "   09/21/2019 1.1 0.5 - 1.4 mg/dL Final     Calcium   Date Value Ref Range Status   09/21/2019 8.0 (L) 8.7 - 10.5 mg/dL Final   09/21/2019 8.0 (L) 8.7 - 10.5 mg/dL Final     Total Protein   Date Value Ref Range Status   09/21/2019 4.6 (L) 6.0 - 8.4 g/dL Final     Albumin   Date Value Ref Range Status   09/21/2019 1.5 (L) 3.5 - 5.2 g/dL Final     Total Bilirubin   Date Value Ref Range Status   09/21/2019 0.4 0.1 - 1.0 mg/dL Final     Comment:     For infants and newborns, interpretation of results should be based  on gestational age, weight and in agreement with clinical  observations.  Premature Infant recommended reference ranges:  Up to 24 hours.............<8.0 mg/dL  Up to 48 hours............<12.0 mg/dL  3-5 days..................<15.0 mg/dL  6-29 days.................<15.0 mg/dL       Alkaline Phosphatase   Date Value Ref Range Status   09/21/2019 59 55 - 135 U/L Final     AST   Date Value Ref Range Status   09/21/2019 24 10 - 40 U/L Final     ALT   Date Value Ref Range Status   09/21/2019 14 10 - 44 U/L Final     Anion Gap   Date Value Ref Range Status   09/21/2019 10 8 - 16 mmol/L Final   09/21/2019 10 8 - 16 mmol/L Final     eGFR if    Date Value Ref Range Status   09/21/2019 >60 >60 mL/min/1.73 m^2 Final   09/21/2019 >60 >60 mL/min/1.73 m^2 Final     eGFR if non    Date Value Ref Range Status   09/21/2019 53 (A) >60 mL/min/1.73 m^2 Final     Comment:     Calculation used to obtain the estimated glomerular filtration  rate (eGFR) is the CKD-EPI equation.      09/21/2019 53 (A) >60 mL/min/1.73 m^2 Final     Comment:     Calculation used to obtain the estimated glomerular filtration  rate (eGFR) is the CKD-EPI equation.        -INR- 3.3  -Blood cultures-Staph aureus  -Urine culture- staph aureus  Imaging:  CT abdomen was independently visualized and reviewed by me and showed 1. Acute pancolitis.  2. Airspace disease multiple areas in the lower lungs are seen and a right pleural  effusion approximately 2 cm deep at the base.    CT chest- Multiple primarily peripheral consolidated infiltrates bilaterally    Assessment:   63 y.o. female who presents with several days of nausea, vomiting and diarrhea, now in septic shock with bacteremia (G+ cocci resembling staph), bilateral pneumonia, multiple electrolyte abnormalities, ABIMBOLA and pancolitis. Blood and urine cultures growing staph aureus (sensitivity pending), concern for septic pulmonary emboli, awaiting ECHO. She is off Levophed though remains altered. No BM since admission    Plan:  -Appreciate ID recommendations, continue antibiotics  -No plans for endoscopy    Edna Bowens MD  [facility]  [address], [address]  [place] [address]  Office: [phone]  Fax: [phone]

## 2019-09-21 NOTE — PLAN OF CARE
Problem: Adult Inpatient Plan of Care  Goal: Plan of Care Review  Outcome: Ongoing (interventions implemented as appropriate)  See previous notes for plan of care overview.

## 2019-09-21 NOTE — EICU
Ms. Mckenzie has staph bacteremia, evidence of septic emboli on chest CT and pancolitis on abdominal imaging. She is on broad spectrum abx for staph aureus and pancolitis.  She is a low dose pressor requirement at this time, and is tachypnic and c/o abdominal pain.  Will monitor for lactic acidosis, treat pain and observe.    Ok Rehman MD

## 2019-09-21 NOTE — CARE UPDATE
09/21/19 1555   Preset CPAP/BiPAP Settings   Mode Of Delivery BiPAP   $ CPAP/BiPAP Daily Charge BiPAP/CPAP Daily   $ Initial CPAP/BiPAP Setup? Yes   $ Is patient using? Yes   Size of Mask Small/Medium   Sized Appropriately? Yes   Equipment Type V60   Airway Device Type small full face mask   Humidifier not applicable   Ipap 15   EPAP (cm H2O) 5   Pressure Support (cm H2O) 10   ITime (sec) 1   Rise Time (sec) 0.2   bipap set up per Dr. saavedra

## 2019-09-21 NOTE — NURSING
Patient having difficulty breathing after being turned,  at bedside. Patient to be placed on BiPap for the night. Will continue to monitor patient closely. Daughter at patient bedside updated on plan of care.

## 2019-09-22 PROBLEM — A41.01 STAPHYLOCOCCUS AUREUS BACTEREMIA WITH SEPSIS: Status: ACTIVE | Noted: 2019-09-22

## 2019-09-22 LAB
ALBUMIN SERPL BCP-MCNC: 1.5 G/DL (ref 3.5–5.2)
ALLENS TEST: ABNORMAL
ALP SERPL-CCNC: 56 U/L (ref 55–135)
ALT SERPL W/O P-5'-P-CCNC: 12 U/L (ref 10–44)
ANION GAP SERPL CALC-SCNC: 10 MMOL/L (ref 8–16)
ANION GAP SERPL CALC-SCNC: 9 MMOL/L (ref 8–16)
AST SERPL-CCNC: 17 U/L (ref 10–40)
BASOPHILS # BLD AUTO: 0 K/UL (ref 0–0.2)
BASOPHILS NFR BLD: 0 % (ref 0–1.9)
BILIRUB DIRECT SERPL-MCNC: 0.4 MG/DL (ref 0.1–0.3)
BILIRUB SERPL-MCNC: 0.7 MG/DL (ref 0.1–1)
BNP SERPL-MCNC: 396 PG/ML (ref 0–99)
BUN SERPL-MCNC: 28 MG/DL (ref 8–23)
BUN SERPL-MCNC: 29 MG/DL (ref 8–23)
BUN SERPL-MCNC: 29 MG/DL (ref 8–23)
BUN SERPL-MCNC: 30 MG/DL (ref 8–23)
BUN SERPL-MCNC: 30 MG/DL (ref 8–23)
BUN SERPL-MCNC: 34 MG/DL (ref 8–23)
BUN SERPL-MCNC: 34 MG/DL (ref 8–23)
BUN SERPL-MCNC: 35 MG/DL (ref 8–23)
BUN SERPL-MCNC: 35 MG/DL (ref 8–23)
CALCIUM SERPL-MCNC: 7.6 MG/DL (ref 8.7–10.5)
CALCIUM SERPL-MCNC: 7.7 MG/DL (ref 8.7–10.5)
CALCIUM SERPL-MCNC: 7.7 MG/DL (ref 8.7–10.5)
CALCIUM SERPL-MCNC: 7.8 MG/DL (ref 8.7–10.5)
CALCIUM SERPL-MCNC: 7.8 MG/DL (ref 8.7–10.5)
CALCIUM SERPL-MCNC: 8 MG/DL (ref 8.7–10.5)
CHLORIDE SERPL-SCNC: 106 MMOL/L (ref 95–110)
CHLORIDE SERPL-SCNC: 107 MMOL/L (ref 95–110)
CO2 SERPL-SCNC: 18 MMOL/L (ref 23–29)
CO2 SERPL-SCNC: 18 MMOL/L (ref 23–29)
CO2 SERPL-SCNC: 19 MMOL/L (ref 23–29)
CO2 SERPL-SCNC: 19 MMOL/L (ref 23–29)
CO2 SERPL-SCNC: 20 MMOL/L (ref 23–29)
CO2 SERPL-SCNC: 21 MMOL/L (ref 23–29)
CREAT SERPL-MCNC: 0.7 MG/DL (ref 0.5–1.4)
CREAT SERPL-MCNC: 0.8 MG/DL (ref 0.5–1.4)
DELSYS: ABNORMAL
DIFFERENTIAL METHOD: ABNORMAL
EOSINOPHIL # BLD AUTO: 0 K/UL (ref 0–0.5)
EOSINOPHIL NFR BLD: 0.3 % (ref 0–8)
EP: 5
ERYTHROCYTE [DISTWIDTH] IN BLOOD BY AUTOMATED COUNT: 14.6 % (ref 11.5–14.5)
EST. GFR  (AFRICAN AMERICAN): >60 ML/MIN/1.73 M^2
EST. GFR  (NON AFRICAN AMERICAN): >60 ML/MIN/1.73 M^2
FIO2: 40
GLUCOSE SERPL-MCNC: 106 MG/DL (ref 70–110)
GLUCOSE SERPL-MCNC: 106 MG/DL (ref 70–110)
GLUCOSE SERPL-MCNC: 108 MG/DL (ref 70–110)
GLUCOSE SERPL-MCNC: 108 MG/DL (ref 70–110)
GLUCOSE SERPL-MCNC: 115 MG/DL (ref 70–110)
GLUCOSE SERPL-MCNC: 115 MG/DL (ref 70–110)
GLUCOSE SERPL-MCNC: 117 MG/DL (ref 70–110)
GLUCOSE SERPL-MCNC: 117 MG/DL (ref 70–110)
GLUCOSE SERPL-MCNC: 94 MG/DL (ref 70–110)
GLUCOSE SERPL-MCNC: 94 MG/DL (ref 70–110)
GLUCOSE SERPL-MCNC: 96 MG/DL (ref 70–110)
GLUCOSE SERPL-MCNC: 96 MG/DL (ref 70–110)
HCO3 UR-SCNC: 20.5 MMOL/L (ref 24–28)
HCT VFR BLD AUTO: 24.1 % (ref 37–48.5)
HGB BLD-MCNC: 8.8 G/DL (ref 12–16)
IMM GRANULOCYTES # BLD AUTO: 0.21 K/UL (ref 0–0.04)
INR PPP: 2.4 (ref 0.8–1.2)
IP: 15
LACTATE SERPL-SCNC: 1.5 MMOL/L (ref 0.5–2.2)
LYMPHOCYTES # BLD AUTO: 0.7 K/UL (ref 1–4.8)
LYMPHOCYTES NFR BLD: 6.5 % (ref 18–48)
MCH RBC QN AUTO: 30.8 PG (ref 27–31)
MCHC RBC AUTO-ENTMCNC: 36.5 G/DL (ref 32–36)
MCV RBC AUTO: 84 FL (ref 82–98)
MIN VOL: 28
MODE: ABNORMAL
MONOCYTES # BLD AUTO: 0.3 K/UL (ref 0.3–1)
MONOCYTES NFR BLD: 2.7 % (ref 4–15)
NEUTROPHILS # BLD AUTO: 9.4 K/UL (ref 1.8–7.7)
NEUTROPHILS NFR BLD: 88.5 % (ref 38–73)
NRBC BLD-RTO: 0 /100 WBC
PCO2 BLDA: 27 MMHG (ref 35–45)
PH SMN: 7.49 [PH] (ref 7.35–7.45)
PLATELET # BLD AUTO: 90 K/UL (ref 150–350)
PMV BLD AUTO: 11.1 FL (ref 9.2–12.9)
PO2 BLDA: 69 MMHG (ref 80–100)
POC BE: -3 MMOL/L
POC SATURATED O2: 95 % (ref 95–100)
POC TCO2: 21 MMOL/L (ref 23–27)
POTASSIUM SERPL-SCNC: 3.5 MMOL/L (ref 3.5–5.1)
POTASSIUM SERPL-SCNC: 3.5 MMOL/L (ref 3.5–5.1)
POTASSIUM SERPL-SCNC: 3.8 MMOL/L (ref 3.5–5.1)
POTASSIUM SERPL-SCNC: 3.8 MMOL/L (ref 3.5–5.1)
POTASSIUM SERPL-SCNC: 3.9 MMOL/L (ref 3.5–5.1)
POTASSIUM SERPL-SCNC: 4 MMOL/L (ref 3.5–5.1)
PROT SERPL-MCNC: 4.8 G/DL (ref 6–8.4)
PROTHROMBIN TIME: 24.1 SEC (ref 9–12.5)
RBC # BLD AUTO: 2.86 M/UL (ref 4–5.4)
SAMPLE: ABNORMAL
SITE: ABNORMAL
SODIUM SERPL-SCNC: 133 MMOL/L (ref 136–145)
SODIUM SERPL-SCNC: 133 MMOL/L (ref 136–145)
SODIUM SERPL-SCNC: 135 MMOL/L (ref 136–145)
SODIUM SERPL-SCNC: 135 MMOL/L (ref 136–145)
SODIUM SERPL-SCNC: 136 MMOL/L (ref 136–145)
SODIUM SERPL-SCNC: 137 MMOL/L (ref 136–145)
SODIUM SERPL-SCNC: 137 MMOL/L (ref 136–145)
SODIUM SERPL-SCNC: 138 MMOL/L (ref 136–145)
SODIUM SERPL-SCNC: 138 MMOL/L (ref 136–145)
SP02: 97
SPONT RATE: 45
VANCOMYCIN TROUGH SERPL-MCNC: 18.4 UG/ML (ref 10–22)
WBC # BLD AUTO: 10.57 K/UL (ref 3.9–12.7)

## 2019-09-22 PROCEDURE — C9113 INJ PANTOPRAZOLE SODIUM, VIA: HCPCS | Performed by: NURSE PRACTITIONER

## 2019-09-22 PROCEDURE — 36600 WITHDRAWAL OF ARTERIAL BLOOD: CPT

## 2019-09-22 PROCEDURE — 87186 SC STD MICRODIL/AGAR DIL: CPT

## 2019-09-22 PROCEDURE — 85025 COMPLETE CBC W/AUTO DIFF WBC: CPT

## 2019-09-22 PROCEDURE — 94660 CPAP INITIATION&MGMT: CPT

## 2019-09-22 PROCEDURE — 99233 PR SUBSEQUENT HOSPITAL CARE,LEVL III: ICD-10-PCS | Mod: S$GLB,,, | Performed by: INTERNAL MEDICINE

## 2019-09-22 PROCEDURE — 80048 BASIC METABOLIC PNL TOTAL CA: CPT | Mod: 91

## 2019-09-22 PROCEDURE — 99233 SBSQ HOSP IP/OBS HIGH 50: CPT | Mod: ,,, | Performed by: INTERNAL MEDICINE

## 2019-09-22 PROCEDURE — 80076 HEPATIC FUNCTION PANEL: CPT

## 2019-09-22 PROCEDURE — 27000221 HC OXYGEN, UP TO 24 HOURS

## 2019-09-22 PROCEDURE — 63600175 PHARM REV CODE 636 W HCPCS: Performed by: INTERNAL MEDICINE

## 2019-09-22 PROCEDURE — 99233 SBSQ HOSP IP/OBS HIGH 50: CPT | Mod: S$GLB,,, | Performed by: INTERNAL MEDICINE

## 2019-09-22 PROCEDURE — 99232 SBSQ HOSP IP/OBS MODERATE 35: CPT | Mod: ,,, | Performed by: INTERNAL MEDICINE

## 2019-09-22 PROCEDURE — 87077 CULTURE AEROBIC IDENTIFY: CPT

## 2019-09-22 PROCEDURE — 25000003 PHARM REV CODE 250: Performed by: INTERNAL MEDICINE

## 2019-09-22 PROCEDURE — 82803 BLOOD GASES ANY COMBINATION: CPT

## 2019-09-22 PROCEDURE — 31720 CLEARANCE OF AIRWAYS: CPT

## 2019-09-22 PROCEDURE — 25000003 PHARM REV CODE 250: Performed by: NURSE PRACTITIONER

## 2019-09-22 PROCEDURE — 87040 BLOOD CULTURE FOR BACTERIA: CPT

## 2019-09-22 PROCEDURE — 99232 PR SUBSEQUENT HOSPITAL CARE,LEVL II: ICD-10-PCS | Mod: ,,, | Performed by: INTERNAL MEDICINE

## 2019-09-22 PROCEDURE — 63600175 PHARM REV CODE 636 W HCPCS: Performed by: NURSE PRACTITIONER

## 2019-09-22 PROCEDURE — 25000242 PHARM REV CODE 250 ALT 637 W/ HCPCS: Performed by: INTERNAL MEDICINE

## 2019-09-22 PROCEDURE — 99900035 HC TECH TIME PER 15 MIN (STAT)

## 2019-09-22 PROCEDURE — 20000000 HC ICU ROOM

## 2019-09-22 PROCEDURE — 94761 N-INVAS EAR/PLS OXIMETRY MLT: CPT

## 2019-09-22 PROCEDURE — 63600175 PHARM REV CODE 636 W HCPCS: Performed by: FAMILY MEDICINE

## 2019-09-22 PROCEDURE — 85610 PROTHROMBIN TIME: CPT

## 2019-09-22 PROCEDURE — 36415 COLL VENOUS BLD VENIPUNCTURE: CPT

## 2019-09-22 PROCEDURE — 94640 AIRWAY INHALATION TREATMENT: CPT

## 2019-09-22 PROCEDURE — S0030 INJECTION, METRONIDAZOLE: HCPCS | Performed by: NURSE PRACTITIONER

## 2019-09-22 PROCEDURE — 83605 ASSAY OF LACTIC ACID: CPT

## 2019-09-22 PROCEDURE — 99233 PR SUBSEQUENT HOSPITAL CARE,LEVL III: ICD-10-PCS | Mod: ,,, | Performed by: INTERNAL MEDICINE

## 2019-09-22 PROCEDURE — 83880 ASSAY OF NATRIURETIC PEPTIDE: CPT

## 2019-09-22 PROCEDURE — 80202 ASSAY OF VANCOMYCIN: CPT

## 2019-09-22 RX ORDER — HYDROMORPHONE HYDROCHLORIDE 2 MG/ML
1 INJECTION, SOLUTION INTRAMUSCULAR; INTRAVENOUS; SUBCUTANEOUS ONCE
Status: COMPLETED | OUTPATIENT
Start: 2019-09-22 | End: 2019-09-22

## 2019-09-22 RX ORDER — CEFAZOLIN SODIUM 2 G/50ML
2 SOLUTION INTRAVENOUS
Status: DISCONTINUED | OUTPATIENT
Start: 2019-09-22 | End: 2019-10-02 | Stop reason: HOSPADM

## 2019-09-22 RX ORDER — POTASSIUM CHLORIDE 29.8 MG/ML
40 INJECTION INTRAVENOUS ONCE
Status: COMPLETED | OUTPATIENT
Start: 2019-09-22 | End: 2019-09-22

## 2019-09-22 RX ADMIN — IPRATROPIUM BROMIDE AND ALBUTEROL SULFATE 3 ML: .5; 3 SOLUTION RESPIRATORY (INHALATION) at 06:09

## 2019-09-22 RX ADMIN — METRONIDAZOLE 500 MG: 500 INJECTION, SOLUTION INTRAVENOUS at 10:09

## 2019-09-22 RX ADMIN — MORPHINE SULFATE 4 MG: 4 INJECTION, SOLUTION INTRAMUSCULAR; INTRAVENOUS at 10:09

## 2019-09-22 RX ADMIN — HYDROMORPHONE HYDROCHLORIDE 1 MG: 2 INJECTION, SOLUTION INTRAMUSCULAR; INTRAVENOUS; SUBCUTANEOUS at 04:09

## 2019-09-22 RX ADMIN — MORPHINE SULFATE 4 MG: 4 INJECTION, SOLUTION INTRAMUSCULAR; INTRAVENOUS at 04:09

## 2019-09-22 RX ADMIN — LORAZEPAM 1 MG: 2 INJECTION INTRAMUSCULAR; INTRAVENOUS at 03:09

## 2019-09-22 RX ADMIN — VANCOMYCIN HYDROCHLORIDE 750 MG: 750 INJECTION, POWDER, LYOPHILIZED, FOR SOLUTION INTRAVENOUS at 11:09

## 2019-09-22 RX ADMIN — LORAZEPAM 1 MG: 2 INJECTION INTRAMUSCULAR; INTRAVENOUS at 04:09

## 2019-09-22 RX ADMIN — IPRATROPIUM BROMIDE AND ALBUTEROL SULFATE 3 ML: .5; 3 SOLUTION RESPIRATORY (INHALATION) at 12:09

## 2019-09-22 RX ADMIN — PANTOPRAZOLE SODIUM 40 MG: 40 INJECTION, POWDER, FOR SOLUTION INTRAVENOUS at 09:09

## 2019-09-22 RX ADMIN — LORAZEPAM 1 MG: 2 INJECTION INTRAMUSCULAR; INTRAVENOUS at 11:09

## 2019-09-22 RX ADMIN — HYDROMORPHONE HYDROCHLORIDE 1 MG: 2 INJECTION, SOLUTION INTRAMUSCULAR; INTRAVENOUS; SUBCUTANEOUS at 11:09

## 2019-09-22 RX ADMIN — POTASSIUM CHLORIDE 40 MEQ: 29.8 INJECTION, SOLUTION INTRAVENOUS at 03:09

## 2019-09-22 RX ADMIN — METRONIDAZOLE 500 MG: 500 INJECTION, SOLUTION INTRAVENOUS at 06:09

## 2019-09-22 RX ADMIN — MORPHINE SULFATE 4 MG: 4 INJECTION, SOLUTION INTRAMUSCULAR; INTRAVENOUS at 02:09

## 2019-09-22 RX ADMIN — CEFAZOLIN SODIUM 2 G: 2 SOLUTION INTRAVENOUS at 01:09

## 2019-09-22 RX ADMIN — MORPHINE SULFATE 4 MG: 4 INJECTION, SOLUTION INTRAMUSCULAR; INTRAVENOUS at 05:09

## 2019-09-22 RX ADMIN — MORPHINE SULFATE 4 MG: 4 INJECTION, SOLUTION INTRAMUSCULAR; INTRAVENOUS at 11:09

## 2019-09-22 RX ADMIN — METRONIDAZOLE 500 MG: 500 INJECTION, SOLUTION INTRAVENOUS at 01:09

## 2019-09-22 RX ADMIN — SODIUM BICARBONATE: 84 INJECTION, SOLUTION INTRAVENOUS at 10:09

## 2019-09-22 RX ADMIN — CEFAZOLIN SODIUM 2 G: 2 SOLUTION INTRAVENOUS at 08:09

## 2019-09-22 RX ADMIN — SODIUM BICARBONATE: 84 INJECTION, SOLUTION INTRAVENOUS at 01:09

## 2019-09-22 RX ADMIN — MORPHINE SULFATE 4 MG: 4 INJECTION, SOLUTION INTRAMUSCULAR; INTRAVENOUS at 01:09

## 2019-09-22 RX ADMIN — SODIUM BICARBONATE: 84 INJECTION, SOLUTION INTRAVENOUS at 05:09

## 2019-09-22 NOTE — PROGRESS NOTES
Consult Note  Infectious Disease    Reason for Consult:  sepsis    HPI: Mesha Mckenzie is a critically ill 63 y.o. female , tranferred from [facility] where she presented with 4 days of nausea and vomiting, found to have ABIMBOLA, leukocytosis, hypotension, multiple pulmonary infiltrates, pancolitis on CT, metabolic acidosis and altered mental status. She was transferred for multispecialty care. Since admission, she has received vanc, levaquin and flagyl, still requires pressors, has 3/4 blood cultures with GPC in clusters, Ct chest with multiple foci of infiltrates including some that look like septic pulmonary emboli, elevated coags without bleeding and no further diarrheal stools. Her chemistries are responding to fluid and supplementation but her mental status remains very abnormal. She is in pain from her abdomen. It is unclear from notes and my conversation with daughter whether her respiratory symptoms came first or the GI symptoms came first. She has no history of STaph infection, no recent antibiotics, but does smoke and take pain meds, valium and adderall chronically.      9/21: blood cultures with STaph aureus, sensitivities pending. Blood cultures from yesterday are negative so far. No change in oxygen needs. No longer on pressors. Less responsive. TTE unofficially abnormal. Staph in urine is likely from the bacteremia. No family at bedside.   9/22: CT head with no significant changes. Mental status no better. Still no BM since admit. CXR slightly better. Blood cultures from yesterday negative and original is MSSA. coags still elevated.   Cr now normal.   Review of Systems:  unobtainable    EXAM & DIAGNOSTICS REVIEWED:   Vitals:     Temp:  [97.6 °F (36.4 °C)-99.3 °F (37.4 °C)]   Temp: 98.3 °F (36.8 °C) (09/22/19 1228)  Pulse: (!) 118 (09/22/19 1228)  Resp: (!) 38 (09/22/19 1228)  BP: (!) 169/77 (09/22/19 1115)  SpO2: 96 % (09/22/19 1228)    Intake/Output Summary (Last 24 hours) at 9/22/2019 1258  Last data  filed at 9/22/2019 0625  Gross per 24 hour   Intake 2644.16 ml   Output 1405 ml   Net 1239.16 ml       General:  Sedated but still constant grunting, moaning   Eyes:  Anicteric, PERRL,  no sleral or conjunctival petechiae  ENT:  No ulcers, exudates, thrush, nares patent, dentition is poor and MM are  dry  Neck:  Supple,    Lungs: Patchy crackles, no wheezing,   Heart:  RRR, no gallop/murmur/rub noted but her moaning obscures exam  Abd:  Soft, tender, guards, moans louder, mild distention,   hypoactive BS, no masses or organomegaly appreciated.  :   Henriquez, urine clear, no flank tenderness  Musc:  Joints without effusion, swelling, erythema, synovitis,    Skin:  No rashes. No palmar or plantar lesions. No subungual petechiae. No skin lesions of DIC  Wound: Right hip abrasion, not infected  Neuro:  Delirious, not attentive, face symmetric, moves all extremities, no focal weakness.    Psych:  Sedated, agitated  Lymphatic:        Extrem: Mild edema, no erythema, phlebitis, cellulitis, peripheral pulses are 0-1, clubbing present, no mottling  VAD:  Left IJ TLC 9/19  Isolation:  Contact     Lines/Tubes/Drains:    General Labs reviewed:  Recent Labs   Lab 09/20/19  1037 09/21/19  0356 09/22/19  0332   WBC 10.29 9.16 10.57   HGB 10.1* 9.0* 8.8*   HCT 27.4* 24.1* 24.1*   PLT 94* 95* 90*       Recent Labs   Lab 09/19/19  1214  09/21/19  0356  09/21/19  2348 09/22/19  0332 09/22/19  0906   *   < > 130*  130*   < > 133*  133* 135*  135* 136  136   K 3.1*   < > 4.0  4.0   < > 3.9  3.9 3.9  3.9 3.8  3.8   CL 76*   < > 105  105   < > 106  106 107  107 107  107   CO2 15*   < > 16*  16*   < > 18*  18* 19*  19* 20*  20*   *   < > 50*  50*   < > 35*  35* 34*  34* 30*  30*   CREATININE 2.9*   < > 1.2  1.2   < > 0.8  0.8 0.8  0.8 0.7  0.7   CALCIUM 7.2*   < > 7.9*  7.9*   < > 8.0*  8.0* 8.0*  8.0* 7.6*  7.6*   PROT 5.7*  --  4.6*  --   --  4.8*  --    BILITOT 1.5*  --  0.4  --   --  0.7  --     ALKPHOS 86  --  59  --   --  56  --    ALT 24  --  14  --   --  12  --    AST 84*  --  24  --   --  17  --     < > = values in this interval not displayed.           Micro:  Microbiology Results (last 7 days)     Procedure Component Value Units Date/Time    Blood culture [[id number]]  (Abnormal) Collected:  09/20/19 1739    Order Status:  Completed Specimen:  Blood from Line, Central Updated:  09/22/19 1116     Blood Culture, Routine Gram stain aer bottle: Gram positive cocci in clusters resembling Staph       Results called to and read back by:Rhea Lai RN 09/21/2019  16:02      STAPHYLOCOCCUS AUREUS  ID consult required at [facility] and [facility].  For susceptibility see order #[id number]      Blood culture [[id number]] Collected:  09/22/19 0338    Order Status:  Sent Specimen:  Blood from Antecubital, Left  Arm Updated:  09/22/19 1053    Blood culture [[id number]] Collected:  09/21/19 0534    Order Status:  Completed Specimen:  Blood Updated:  09/22/19 0215     Blood Culture, Routine No Growth to date    Clostridium difficile EIA [[id number]]     Order Status:  Canceled Specimen:  Stool     Stool culture [[id number]]     Order Status:  No result Specimen:  Stool         Imaging Reviewed:   CXRs   CT heads, chest , abd/pelvis  Cardiology:  TTE 9/20  Left Ventricle Low normal ejection fraction at 50%. Mild concentric observed. Grade I (mild) left ventricular diastolic dysfunction consistent with impaired relaxation. Elevated left atrial pressure.   Right Ventricle Normal cavity size, wall thickness and systolic function. Wall motion normal.   Left Atrium The left atrium is normal.   Right Atrium There is normal right atrial size.   Aortic Valve The valve is trileaflet. There is normal leaflet mobility.   Mitral Valve Normal valve structure. Mild regurgitation. Normal leaflet mobility.   Tricuspid Valve The tricuspid valve is normal. Trace regurgitation. Pulmonary hypertension present.  The estimated PA systolic pressure is 48 mmHg.   Pulmonic Valve Normal valve structure. Trace regurgitation.   IVC/SVC Normal central venous pressure (3 mm Hg).   Ascending Aorta The aortic root and ascending aorta are normal in size.   Pericardium No pericardial effusion. Respiratory variation of mitral valve inflow is less than 30%.         IMPRESSION & PLAN   1.  Multiple positive blood cultures with MSSA with  pneumonia and possibly septic pulmonary emboli   Staph in urine is from the bacteremia and not the cause of it.  2. Pancolitis. Unclear if her GI symptoms were the root cause of her illness or if she has ischemic colitis from sepsis and volume depletion . No diarrhea since admit  3. ABIMBOLA, volume depletion, metabolic acidosis, resolved  4. DIC? with elevated INR, D-dimer, low platelets, but with elevated fibrinogen, dropping hgb  5. Opiate dependence, severe lumbar spine abnormalities  6. Altered mental status. From sepsis? +/- from unrecognized stroke or embolus      Recommendations:   change vanc and levaquin to ancef. Continuing flagyl for now   she should really have an MRI brain, but her ability to tolerate lying flat or still is still impaired  She will need DHARMESH before long    Stool studies have been ordered but stool not forthcoming since admission.   Hepatitis testing(already ordered)  Follow coags and consider FFP if any sign of bleeding, vs heme consult  Continue hydration and electrolyte management       D/w nursing and daughter

## 2019-09-22 NOTE — PROGRESS NOTES
[facility]     CC: Abdominal pain    HPI 63 y.o. female with history of chronic back pain on narcotics and urinary retention with intermittent self catheterizations presents with 3-4 days of acute, moderate to severe, constant, diffuse abdominal pain associated with non-bloody diarrhea and vomiting. Patient is lethargic thus most of exam obtained from patient's daughter who was present. Patient had become weak and altered and fell out of bed several days ago but refused to seek medical attention. She has a history of GI bleed in 2018 and underwent colonoscopy performed by Dr. Gonsalves in December, however prep was poor. Patient's daughter is unaware of sick contacts.     Interval history:  Patient remains altered, still no BM. Her blood pressure is much improved however she is now on BiPAP for increased respiratory rate. She went for CT of head today to evaluate for septic emboli    Past Medical History:   Diagnosis Date    Anxiety     Carotid bruit     Chronic pain     Cystitis     Cystocele, unspecified (CODE)     Depression     GI bleed     History of uterine fibroid     Shortness of breath on exertion     Spinal stenosis     Urinary retention        Past Surgical History:   Procedure Laterality Date    ANTERIOR VAGINAL REPAIR  10-    CARDIAC CATHETERIZATION  age 12    CHOLECYSTECTOMY  2015    COLONOSCOPY  12/12/2018    aborted due to poor colon prep    COLONOSCOPY N/A 12/12/2018    [facility]    [facility]  1989    [facility]    tubiligation         Social History     Tobacco Use    Smoking status: Current Every Day Smoker     Packs/day: 0.50     Years: 40.00     Pack years: 20.00     Types: Cigarettes    Smokeless tobacco: Never Used   Substance Use Topics    Alcohol use: No    Drug use: No       Family History   Problem Relation Age of Onset    Alzheimer's disease Mother     Thyroid disease Mother     Osteoarthritis Mother      "Hypertension Brother     Hypertension Sister     Breast cancer Neg Hx     Colon cancer Neg Hx     Ovarian cancer Neg Hx        Review of Systems  Unable to obtain complete ROS    Physical Examination  BP (!) 169/77   Pulse (!) 121   Temp 98.3 °F (36.8 °C) (Axillary)   Resp (!) 46   Ht 5' 2" (1.575 m)   Wt 47.4 kg (104 lb 8 oz)   SpO2 99%   Breastfeeding? No   BMI 19.11 kg/m²   General appearance: altered , ill appearing  HENT: Normocephalic, atraumatic, neck symmetrical, no nasal discharge , dry mucous membranes  Lungs: coarse breath sounds bilaterally, no dullness to percussion bilaterally, symmetric expansion, increased respiratory rate on BiPAp   Heart: regular rate and rhythm without rub; no displacement of the PMI   Abdomen:soft, diffuse ttp without guarding, BS active , no masses appreciated   Extremities: extremities symmetric; no clubbing, cyanosis, or edema  Neurologic: altered, responds to some questions, no tremor, diffuse weakness    Labs:  Lab Results   Component Value Date    WBC 10.57 09/22/2019    HGB 8.8 (L) 09/22/2019    HCT 24.1 (L) 09/22/2019    MCV 84 09/22/2019    PLT 90 (L) 09/22/2019     CMP  Sodium   Date Value Ref Range Status   09/22/2019 136 136 - 145 mmol/L Final   09/22/2019 136 136 - 145 mmol/L Final     Potassium   Date Value Ref Range Status   09/22/2019 3.8 3.5 - 5.1 mmol/L Final   09/22/2019 3.8 3.5 - 5.1 mmol/L Final     Chloride   Date Value Ref Range Status   09/22/2019 107 95 - 110 mmol/L Final   09/22/2019 107 95 - 110 mmol/L Final     CO2   Date Value Ref Range Status   09/22/2019 20 (L) 23 - 29 mmol/L Final   09/22/2019 20 (L) 23 - 29 mmol/L Final     Glucose   Date Value Ref Range Status   09/22/2019 106 70 - 110 mg/dL Final   09/22/2019 106 70 - 110 mg/dL Final     BUN, Bld   Date Value Ref Range Status   09/22/2019 30 (H) 8 - 23 mg/dL Final   09/22/2019 30 (H) 8 - 23 mg/dL Final     Creatinine   Date Value Ref Range Status   09/22/2019 0.7 0.5 - 1.4 mg/dL " Final   09/22/2019 0.7 0.5 - 1.4 mg/dL Final     Calcium   Date Value Ref Range Status   09/22/2019 7.6 (L) 8.7 - 10.5 mg/dL Final   09/22/2019 7.6 (L) 8.7 - 10.5 mg/dL Final     Total Protein   Date Value Ref Range Status   09/22/2019 4.8 (L) 6.0 - 8.4 g/dL Final     Albumin   Date Value Ref Range Status   09/22/2019 1.5 (L) 3.5 - 5.2 g/dL Final     Total Bilirubin   Date Value Ref Range Status   09/22/2019 0.7 0.1 - 1.0 mg/dL Final     Comment:     For infants and newborns, interpretation of results should be based  on gestational age, weight and in agreement with clinical  observations.  Premature Infant recommended reference ranges:  Up to 24 hours.............<8.0 mg/dL  Up to 48 hours............<12.0 mg/dL  3-5 days..................<15.0 mg/dL  6-29 days.................<15.0 mg/dL       Alkaline Phosphatase   Date Value Ref Range Status   09/22/2019 56 55 - 135 U/L Final     AST   Date Value Ref Range Status   09/22/2019 17 10 - 40 U/L Final     ALT   Date Value Ref Range Status   09/22/2019 12 10 - 44 U/L Final     Anion Gap   Date Value Ref Range Status   09/22/2019 9 8 - 16 mmol/L Final   09/22/2019 9 8 - 16 mmol/L Final     eGFR if    Date Value Ref Range Status   09/22/2019 >60 >60 mL/min/1.73 m^2 Final   09/22/2019 >60 >60 mL/min/1.73 m^2 Final     eGFR if non    Date Value Ref Range Status   09/22/2019 >60 >60 mL/min/1.73 m^2 Final     Comment:     Calculation used to obtain the estimated glomerular filtration  rate (eGFR) is the CKD-EPI equation.      09/22/2019 >60 >60 mL/min/1.73 m^2 Final     Comment:     Calculation used to obtain the estimated glomerular filtration  rate (eGFR) is the CKD-EPI equation.        -INR- 2.4  -Blood cultures-Staph aureus  -Urine culture- staph aureus  Imaging:  CT abdomen was independently visualized and reviewed by me and showed 1. Acute pancolitis.  2. Airspace disease multiple areas in the lower lungs are seen and a right pleural  effusion approximately 2 cm deep at the base.    CT chest- Multiple primarily peripheral consolidated infiltrates bilaterally      Assessment:   63 y.o. female who presents with several days of nausea, vomiting and diarrhea, now in septic shock with bacteremia and UTI (Staph aureus sensitivities pending), bilateral pneumonia, multiple electrolyte abnormalities, ABIMBOLA and pancolitis. She is off Levophed and labs are improving however she remains altered and is now requiring BiPap. Unlikely pancolitis is primary source of infection.     Plan:  -Appreciate ID recommendations, continue antibiotics  -No plans for endoscopy    Edna Bowens MD  [facility]  [address], [address]  [place] [address]  Office: [phone]  Fax: [phone]

## 2019-09-22 NOTE — PROGRESS NOTES
Mesha Mckenzie [id number] is a 63 y.o. female who has been consulted for vancomycin dosing.    Pharmacy consult for vancomycin dosing in no longer required.  Vancomycin was discontinued.    Thank you for allowing us to participate in this patient's care.     Martin Daniels, PharmD

## 2019-09-22 NOTE — CARE UPDATE
09/22/19 0653   Patient Assessment/Suction   Level of Consciousness (AVPU) responds to voice   Respiratory Effort Moderate   Expansion/Accessory Muscles/Retractions abdominal muscle use   All Lung Fields Breath Sounds coarse   Rhythm/Pattern, Respiratory grunting;tachypneic   PRE-TX-O2   O2 Device (Oxygen Therapy) BiPAP   $ Is the patient on Low Flow Oxygen? Yes   SpO2 100 %   Pulse Oximetry Type Continuous   $ Pulse Oximetry - Multiple Charge Pulse Oximetry - Multiple   Pulse 106   Resp (!) 30   Aerosol Therapy   $ Aerosol Therapy Charges Aerosol Treatment   Respiratory Treatment Status (SVN) given   Treatment Route (SVN) in-line   Patient Position (SVN) HOB elevated   Signs of Intolerance (SVN) none   Breath Sounds Post-Respiratory Treatment   Throughout All Fields Post-Treatment All Fields   Throughout All Fields Post-Treatment no change   Post-treatment Heart Rate (beats/min) 109   Post-treatment Resp Rate (breaths/min) 33   Preset CPAP/BiPAP Settings   Mode Of Delivery BiPAP   $ CPAP/BiPAP Daily Charge BiPAP/CPAP Daily   $ Initial CPAP/BiPAP Setup? No   $ Is patient using? Yes   Size of Mask Small   Sized Appropriately? Yes   Equipment Type V60   Airway Device Type small full face mask   Humidifier other (see comments)   Ipap 15   EPAP (cm H2O) 5   Pressure Support (cm H2O) 10   ITime (sec) 1   Rise Time (sec) 0.2   Patient CPAP/BiPAP Settings   Timed Inspiration (Sec) 1   IPAP Rise Time (sec) 0.2   RR Total (Breaths/Min) 33   Tidal Volume (mL) 484   VE Minute Ventilation (L/min) 16.5 L/min   Peak Inspiratory Pressure (cm H2O) 16   TiTOT (%) 37   Total Leak (L/Min) 11   Patient Trigger - ST Mode Only (%) 98   CPAP/BiPAP Backup Settings   Backup Rate 14 breaths per minute (bpm)   CPAP/BiPAP Alarms   High Pressure (cm H2O) 25   Low Pressure (cm H2O) 10   Low Pressure Delay (Sec) 20   Minute Ventilation (L/Min) 3   High RR (breaths/min) 50   Low RR (breaths/min) 8

## 2019-09-22 NOTE — PROGRESS NOTES
"[facility] Medicine  Progress Note    Patient Name: Mesha Mckenzie  MRN: [id number]  Patient Class: IP- Inpatient   Admission Date: 9/19/2019  Length of Stay: 3 days  Attending Physician: Tabitha Stratton MD  Primary Care Provider: Varun Shea MD PhD        Subjective:     Principal Problem:Staphylococcus aureus bacteremia with sepsis        HPI:  Pt is a 64 yo female who was transferred from [facility] for septic shock, pancolitis and bilateral pna. Pt reports that she fell out of bed on Sunday morning and has been feeling progressively worse throughout the week. C/o vomiting since Sunday, ~3 episodes per day. Also reports diarrhea, ~4 stools per day, watery in nature. Pt denies any hematemesis, melena or hematochezia. Pt did take zofran which helped "some:" c/o severe abdominal pain since Sunday which is constant. Pain is a 10 on a 0-10 pain scale.  Has not been able to eat due to the nausea and pain. Questioned pt in regards to use of pain medication, states "I didn't have any." pt was scheduled for colonoscopy tomorrow. Hx of GI bleed in December 2018. Pt states she had passed some blood in her stool. Reviewed colonoscopy report, no bleeding in the colon was found however the prep was poor. No recent travel, no sick contacts at home. Questioned pt in regards to cough: pt states she has felt SOB due to pain but reports only occasional coughing. Denies any sputum production.  Social: smokes 10 cigarettes per day, 20 pack year hx. No ETOH.     Overview/Hospital Course:  No notes on file    Interval History: Patient currently on bipap looks uncomfortable.    Review of Systems   Unable to perform ROS: Patient nonverbal     Objective:     Vital Signs (Most Recent):  Temp: 98.6 °F (37 °C) (09/22/19 1515)  Pulse: (!) 134 (09/22/19 1715)  Resp: (!) 35 (09/22/19 1715)  BP: 130/60 (09/22/19 1715)  SpO2: 99 % (09/22/19 1715) Vital Signs (24h Range):  Temp:  [97.6 °F (36.4 °C)-98.6 °F (37 °C)] 98.6 " °F (37 °C)  Pulse:  [] 134  Resp:  [22-57] 35  SpO2:  [86 %-100 %] 99 %  BP: (103-169)/(51-77) 130/60     Weight: 47.4 kg (104 lb 8 oz)  Body mass index is 19.11 kg/m².    Intake/Output Summary (Last 24 hours) at 9/22/2019 1756  Last data filed at 9/22/2019 1703  Gross per 24 hour   Intake 4112.49 ml   Output 2040 ml   Net 2072.49 ml      Physical Exam   Constitutional: She appears well-developed and well-nourished.   HENT:   Head: Normocephalic and atraumatic.   Eyes: Conjunctivae are normal.   Cardiovascular: Normal heart sounds. Tachycardia present.   Pulmonary/Chest: Breath sounds normal.   Increased work of breathing   Abdominal: Soft. Bowel sounds are normal.   Neurological: She is alert.   Skin: Skin is warm.   Psychiatric: She has a normal mood and affect. Her behavior is normal.       Significant Labs:   Recent Lab Results       09/22/19  1619   09/22/19  1323   09/22/19  0906   09/22/19  0338   09/22/19  0332        Albumin         1.5     Alkaline Phosphatase         56     Allens Test Pass             ALT         12     Anion Gap   10 9   9        10 9   9     AST         17     Baso #         0.00     Basophil%         0.0     Bilirubin, Direct         0.4     BILIRUBIN TOTAL         0.7  Comment:  For infants and newborns, interpretation of results should be based  on gestational age, weight and in agreement with clinical  observations.  Premature Infant recommended reference ranges:  Up to 24 hours.............<8.0 mg/dL  Up to 48 hours............<12.0 mg/dL  3-5 days..................<15.0 mg/dL  6-29 days.................<15.0 mg/dL       Blood Culture, Routine       No Growth to date[P]       BNP         396  Comment:  Values of less than 100 pg/ml are consistent with non-CHF populations.     Site RR             BUN, Bld   29 30   34        29 30   34     Calcium   7.8 7.6   8.0        7.8 7.6   8.0     Chloride   106 107   107        106 107   107     CO2   20 20   19        20 20   19      Creatinine   0.7 0.7   0.8        0.7 0.7   0.8     DelSys CPAP/BiPAP             Differential Method         Automated     eGFR if    >60 >60   >60        >60 >60   >60     eGFR if non    >60  Comment:  Calculation used to obtain the estimated glomerular filtration  rate (eGFR) is the CKD-EPI equation.    >60  Comment:  Calculation used to obtain the estimated glomerular filtration  rate (eGFR) is the CKD-EPI equation.      >60  Comment:  Calculation used to obtain the estimated glomerular filtration  rate (eGFR) is the CKD-EPI equation.           >60  Comment:  Calculation used to obtain the estimated glomerular filtration  rate (eGFR) is the CKD-EPI equation.    >60  Comment:  Calculation used to obtain the estimated glomerular filtration  rate (eGFR) is the CKD-EPI equation.      >60  Comment:  Calculation used to obtain the estimated glomerular filtration  rate (eGFR) is the CKD-EPI equation.        Eos #         0.0     Eosinophil%         0.3     EP 5             FiO2 40             Glucose   117 106   108        117 106   108     Gran # (ANC)         9.4     Gran%         88.5     Hematocrit         24.1     Hemoglobin         8.8     Immature Grans (Abs)         0.21  Comment:  Mild elevation in immature granulocytes is non specific and   can be seen in a variety of conditions including stress response,   acute inflammation, trauma and pregnancy. Correlation with other   laboratory and clinical findings is essential.       Coumadin Monitoring INR         2.4  Comment:  Coumadin Therapy:  2.0 - 3.0 for INR for all indicators except mechanical heart valves  and antiphospholipid syndromes which should use 2.5 - 3.5.       IP 15             Lymph #         0.7     Lymph%         6.5     MCH         30.8     MCHC         36.5     MCV         84     Min Vol 28.0             Mode BiPAP             Mono #         0.3     Mono%         2.7     MPV         11.1     nRBC         0      Platelets         90     POC BE -3             POC HCO3 20.5             POC PCO2 27.0             POC PH 7.488             POC PO2 69             POC SATURATED O2 95             POC TCO2 21             Potassium   3.5 3.8   3.9        3.5 3.8   3.9     PROTEIN TOTAL         4.8     Protime         24.1     Rate               RBC         2.86     RDW         14.6     Sample ARTERIAL             Sodium   136 136   135        136 136   135     Sp02 97             Spont Rate 45             Vancomycin, Random               Vancomycin-Trough     18.4         WBC         10.57                      09/21/19  2348   09/21/19  1934   09/21/19  1933   09/21/19  1914        Albumin             Alkaline Phosphatase             Allens Test       Pass     ALT             Anion Gap 9 9          9 9         AST             Baso #             Basophil%             Bilirubin, Direct             BILIRUBIN TOTAL             Blood Culture, Routine             BNP             Site       RR     BUN, Bld 35 38          35 38         Calcium 8.0 7.9          8.0 7.9         Chloride 106 107          106 107         CO2 18 17          18 17         Creatinine 0.8 0.9          0.8 0.9         DelSys       CPAP/BiPAP     Differential Method             eGFR if  >60 >60          >60 >60         eGFR if non  >60  Comment:  Calculation used to obtain the estimated glomerular filtration  rate (eGFR) is the CKD-EPI equation.    >60  Comment:  Calculation used to obtain the estimated glomerular filtration  rate (eGFR) is the CKD-EPI equation.             >60  Comment:  Calculation used to obtain the estimated glomerular filtration  rate (eGFR) is the CKD-EPI equation.    >60  Comment:  Calculation used to obtain the estimated glomerular filtration  rate (eGFR) is the CKD-EPI equation.            Eos #             Eosinophil%             EP       5     FiO2       40     Glucose 115 109          115 109         Gran #  "(ANC)             Gran%             Hematocrit             Hemoglobin             Immature Grans (Abs)             Coumadin Monitoring INR             IP       15     Lymph #             Lymph%             MCH             MCHC             MCV             Min Vol       13.6     Mode       BiPAP     Mono #             Mono%             MPV             nRBC             Platelets             POC BE       -9     POC HCO3       15.9     POC PCO2       24.5     POC PH       7.419     POC PO2       80     POC SATURATED O2       96     POC TCO2       17     Potassium 3.9 4.0          3.9 4.0         PROTEIN TOTAL             Protime             Rate       14     RBC             RDW             Sample       ARTERIAL     Sodium 133 133          133 133         Sp02       96     Spont Rate       20     Vancomycin, Random     18.1       Vancomycin-Trough             WBC                   Significant Imaging    CT Head:   1. Extensive chronic microvascular ischemic changes within the periventricular white matter of the supratentorial brain which are chance for the patient's chronologic age.  Demyelinating disease could produce a similar appearance.  There are more focal areas of hypoattenuation present within the periventricular white matter posterior right centrum semiovale and right frontal cortex, possibly focal areas of microvascular ischemic change or age-indeterminate infarction which are unchanged when compared with the cranial CT examination of 09/19/2019.  Additional clarification could be achieved with MRI of the brain as deemed clinically appropriate.  Given the provided history of "exclude septic emboli", consideration should be given to performing the examination with intravenous contrast.  2. Foci of remote lacunar infarction within the right caudate head and right corona radiata adjacent to the right frontal horn.      Assessment/Plan:      * Staphylococcus aureus bacteremia with sepsis  Monitor closely in the " ICU  Cardiac monitoring   Blood cultures pending      BOOP (bronchiolitis obliterans with organizing pneumonia)        Acute hypoxemic respiratory failure  On Bipap  Monitor abg    Pleural effusion        Thrombocytopenia  H/H steadily decreasing  Heme consulted. Is this DIC?    Calcification of aorta        COPD with acute lower respiratory infection        ILD (interstitial lung disease)        Coagulopathy  Trend INR daily   Hold ppx heparin for now   Monitor liver enzymes       Metabolic acidosis  Improved. Continue to monitor        Hypokalemia  Severe, 2° diarrhea  Improving  Replete with IV K riders as needed      Hyponatremia  Acute, 2° severe dehydration          Pancolitis  Acute, affecting the entire colon except the sigmoid   GI following.   Flagyl and ancef          CAP (community acquired pneumonia)  Dif dx: aspiration pneumonia  Concern for septic emboli   ID following.   On flagyl ancef per ID  Consider DHARMESH and Head MRI as per ID.        Intravascular volume depletion  Acute, severe, secondary to vomiting and diarrhea   Aggressive fluid resuscitation       ABIMBOLA (acute kidney injury)  Continue IVF 1/2 NS with bicarb  Trend creatinine   Avoid thiazide diuretics and SSRI as per nephrology      VTE Risk Mitigation (From admission, onward)         Ordered     Place sequential compression device  Until discontinued      09/19/19 2234     IP VTE HIGH RISK PATIENT  Once      09/19/19 2217                Critical care time spent on the evaluation and treatment of severe organ dysfunction, review of pertinent labs and imaging studies, discussions with consulting providers and discussions with patient/family: 30 minutes.      Tabitha Stratton MD  Department of Hospital Medicine   [facility]

## 2019-09-22 NOTE — SIGNIFICANT EVENT
09/22/19 0439   Patient Assessment/Suction   Level of Consciousness (AVPU) responds to voice   Respiratory Effort Severe;Labored   Expansion/Accessory Muscles/Retractions abdominal muscle use   All Lung Fields Breath Sounds coarse   CANDACE Breath Sounds wheezes, expiratory   PRE-TX-O2   O2 Device (Oxygen Therapy) BiPAP   $ Is the patient on Low Flow Oxygen? Yes   SpO2 100 %   Pulse 110   Resp (!) 35   Preset CPAP/BiPAP Settings   Mode Of Delivery BiPAP   $ Initial CPAP/BiPAP Setup? No   $ Is patient using? Yes   Size of Mask Small   Sized Appropriately? Yes   Equipment Type V60   Ipap 15   EPAP (cm H2O) 5   Pressure Support (cm H2O) 10   Set Rate (Breaths/Min) 14   ITime (sec) 1   Rise Time (sec) 0.2   Patient CPAP/BiPAP Settings   Timed Inspiration (Sec) 1   IPAP Rise Time (sec) 0.2   RR Total (Breaths/Min) 39   Tidal Volume (mL) 519   VE Minute Ventilation (L/min) 22.1 L/min   Peak Inspiratory Pressure (cm H2O) 19   TiTOT (%) 33   Total Leak (L/Min) 0   Patient Trigger - ST Mode Only (%) 100     Pt has remained on Bipap throughout the night except for bathing. Pt has continued to have increased WOB while on Bipap. RR >35 all night minute ventilation has been >20 throughout the night. Pt has moaned/groaned for the 3rd night in a row. Pt does answer when spoken to, ABG was done at start of shift which appeared ok.

## 2019-09-22 NOTE — PLAN OF CARE
Continuous moaning and grunting while on BiPAP,  as well as while on NC, increased respiratory rate 30-50's, pox 93-99 %,ABG's repeated and EKG obtained ST with PVC's 130's-140's, Dr Stratton aware, orders received and noted,CT of head complete, continues with IVPB antibiotics. Henriquez with good urine output, bicarb gtt continues,family at bedside discussed POC with daughter,no BM this shift,safety maintained

## 2019-09-22 NOTE — ASSESSMENT & PLAN NOTE
Acute, 2° pancolitis   Associated with renal failure tachycardia, tachypnea, hypotension, coagulopathy and metabolic acidosis

## 2019-09-22 NOTE — ASSESSMENT & PLAN NOTE
Dif dx: aspiration pneumonia  Concern for septic emboli   ID following.   On flagyl ancef per ID  Consider DHARMESH and Head MRI as per ID.

## 2019-09-22 NOTE — CARE UPDATE
09/22/19 1546   Preset CPAP/BiPAP Settings   Mode Of Delivery AVAPS   EPAP (cm H2O) 8   AVAPS Min P (cm H2O) 9   AVAPS Max P (cm H2O) 22   Set Tidal Volume (mL) 550 mL   Set Rate (Breaths/Min) 14   ITime (sec) 1.2   NT SX with large amount thick sec. Placed on V60 with AVAPS 550, Max P 22 and Min P 9 cmH2O, patient tolerating better than Bipap 15/5 RR 40 and

## 2019-09-22 NOTE — PLAN OF CARE
09/21/19 1913   Patient Assessment/Suction   Level of Consciousness (AVPU) alert   Respiratory Effort Labored   Expansion/Accessory Muscles/Retractions accessory muscle use;abdominal muscle use   CANDACE Breath Sounds wheezes, expiratory;diminished   LLL Breath Sounds diminished   RUL Breath Sounds wheezes, expiratory   PRE-TX-O2   O2 Device (Oxygen Therapy) BiPAP   $ Is the patient on Low Flow Oxygen? Yes   Oxygen Concentration (%) 40   SpO2 98 %   Pulse Oximetry Type Continuous   $ Pulse Oximetry - Multiple Charge Pulse Oximetry - Multiple   Pulse 102   Resp (!) 32   BP (!) 115/58   Aerosol Therapy   $ Aerosol Therapy Charges Aerosol Treatment   Respiratory Treatment Status (SVN) given   Treatment Route (SVN) in-line   Patient Position (SVN) HOB elevated   Post Treatment Assessment (SVN) breath sounds unchanged   Signs of Intolerance (SVN) none   Breath Sounds Post-Respiratory Treatment   Throughout All Fields Post-Treatment no change   Post-treatment Heart Rate (beats/min) 101   Post-treatment Resp Rate (breaths/min) 36   Ready to Wean/Extubation Screen   FIO2<=50 (chart decimal) 0.4   Preset CPAP/BiPAP Settings   Mode Of Delivery BiPAP   $ Initial CPAP/BiPAP Setup? No   $ Is patient using? Yes   Size of Mask Small   Sized Appropriately? Yes   Equipment Type V60   Ipap 15   EPAP (cm H2O) 5   Pressure Support (cm H2O) 10   Set Rate (Breaths/Min) 14   ITime (sec) 1   Rise Time (sec) 0.2   Patient CPAP/BiPAP Settings   Timed Inspiration (Sec) 1   IPAP Rise Time (sec) 0.2   RR Total (Breaths/Min) 33   Tidal Volume (mL) 530   VE Minute Ventilation (L/min) 17.1 L/min   Peak Inspiratory Pressure (cm H2O) 16   TiTOT (%) 23   Total Leak (L/Min) 13   Patient Trigger - ST Mode Only (%) 99   CPAP/BiPAP Alarms   High Pressure (cm H2O) 25   Low Pressure (cm H2O) 10   Minute Ventilation (L/Min) 3   High RR (breaths/min) 50   Low RR (breaths/min) 8   Labs   $ Was an ABG obtained? Arterial Puncture;ISTAT - Blood gas   $ Labs Tech  Time 15 min   Critical Value Communication   Name of Notified Physician/Designee Lauren ACRROLL    Date Result Received 09/21/19   Time Result Received 1915   Resulting Department of Critical Value resp   Who communicated critical value from resulting department? linda mendez rrt   Critical Test #1 pco2    Critical Test #1 Result 24.5   Date Notified 09/21/19   Time Notified 1915   Read Back Verification Yes

## 2019-09-22 NOTE — PROGRESS NOTES
Hyponatremia resolved    Keep euvolemic  Limit hypotonic iv piggy backs  Avoid thiazide diuretics and SSRI antidepressants  Maximize nutrition    Call if further assistance needed    Thank you for the referral

## 2019-09-22 NOTE — PROGRESS NOTES
"[facility] Medicine  Progress Note    Patient Name: Mesha Mckenzie  MRN: [id number]  Patient Class: IP- Inpatient   Admission Date: 9/19/2019  Length of Stay: 2 days  Attending Physician: Tabitha Stratton MD  Primary Care Provider: Varun Shea MD PhD        Subjective:     Principal Problem:Septic shock        HPI:  Pt is a 64 yo female who was transferred from [facility] for septic shock, pancolitis and bilateral pna. Pt reports that she fell out of bed on Sunday morning and has been feeling progressively worse throughout the week. C/o vomiting since Sunday, ~3 episodes per day. Also reports diarrhea, ~4 stools per day, watery in nature. Pt denies any hematemesis, melena or hematochezia. Pt did take zofran which helped "some:" c/o severe abdominal pain since Sunday which is constant. Pain is a 10 on a 0-10 pain scale.  Has not been able to eat due to the nausea and pain. Questioned pt in regards to use of pain medication, states "I didn't have any." pt was scheduled for colonoscopy tomorrow. Hx of GI bleed in December 2018. Pt states she had passed some blood in her stool. Reviewed colonoscopy report, no bleeding in the colon was found however the prep was poor. No recent travel, no sick contacts at home. Questioned pt in regards to cough: pt states she has felt SOB due to pain but reports only occasional coughing. Denies any sputum production.  Social: smokes 10 cigarettes per day, 20 pack year hx. No ETOH.     Overview/Hospital Course:  No notes on file    Interval History: Patient had increased work of breathing. Tried to wake up but patient had just gotten ativan and not responsive. Pulm started bipap    Review of Systems   Unable to perform ROS: Other     Objective:     Vital Signs (Most Recent):  Temp: 99.3 °F (37.4 °C) (09/21/19 1600)  Pulse: 100 (09/21/19 1739)  Resp: (!) 26 (09/21/19 1739)  BP: 138/74 (09/21/19 1700)  SpO2: 98 % (09/21/19 1739) Vital Signs (24h Range):  Temp:  " [98.2 °F (36.8 °C)-100.9 °F (38.3 °C)] 99.3 °F (37.4 °C)  Pulse:  [] 100  Resp:  [23-53] 26  SpO2:  [90 %-99 %] 98 %  BP: ()/(44-74) 138/74     Weight: 47.2 kg (104 lb)  Body mass index is 19.02 kg/m².    Intake/Output Summary (Last 24 hours) at 9/21/2019 1852  Last data filed at 9/21/2019 1545  Gross per 24 hour   Intake 3746.45 ml   Output 2175 ml   Net 1571.45 ml      Physical Exam   Constitutional: She appears well-developed and well-nourished.   HENT:   Head: Normocephalic and atraumatic.   Cardiovascular: Normal rate, regular rhythm and normal heart sounds.   Pulmonary/Chest:   Increased work of breathing   Abdominal: Soft. Bowel sounds are normal.   Neurological: She is alert.   Skin: Skin is warm.       Significant Labs:   Recent Lab Results       09/21/19  1601   09/21/19  1325   09/21/19  1146   09/21/19  0803   09/21/19  0356        Albumin         1.5     Alkaline Phosphatase         59     Allens Test               ALT         14     Anion Gap 7   7 10 9      7   7 10 9     Ao root annulus   2.88           Ao peak jeffery   1.25           Ao VTI   19.15           AST         24     AV valve area   3.04           AORTIC VALVE CUSP SEPERATION   2.15           AV mean gradient   4           AV index (prosthetic)   0.89           AV peak gradient   6           AV Velocity Ratio   0.85           Baso #         0.01     Basophil%         0.1     Bilirubin, Direct         0.3     BILIRUBIN TOTAL         0.4  Comment:  For infants and newborns, interpretation of results should be based  on gestational age, weight and in agreement with clinical  observations.  Premature Infant recommended reference ranges:  Up to 24 hours.............<8.0 mg/dL  Up to 48 hours............<12.0 mg/dL  3-5 days..................<15.0 mg/dL  6-29 days.................<15.0 mg/dL       Site               BSA   1.44           BUN, Bld 42   44 48 50      42   44 48 50     Calcium 8.0   7.8 8.0 7.9      8.0   7.8 8.0 7.9      Chloride 106   106 106 105      106   106 106 105     CO2 18   17 14 16      18   17 14 16     Creatinine 0.9   1.0 1.1 1.2      0.9   1.0 1.1 1.2     Left Ventricle Relative Wall Thickness   0.48           DelSys               Differential Method         Automated     E/A ratio   0.73           E/E' ratio   15.23           eGFR if  >60   >60 >60 55      >60   >60 >60 55     eGFR if non  >60  Comment:  Calculation used to obtain the estimated glomerular filtration  rate (eGFR) is the CKD-EPI equation.      59  Comment:  Calculation used to obtain the estimated glomerular filtration  rate (eGFR) is the CKD-EPI equation.    53  Comment:  Calculation used to obtain the estimated glomerular filtration  rate (eGFR) is the CKD-EPI equation.    48  Comment:  Calculation used to obtain the estimated glomerular filtration  rate (eGFR) is the CKD-EPI equation.         >60  Comment:  Calculation used to obtain the estimated glomerular filtration  rate (eGFR) is the CKD-EPI equation.      59  Comment:  Calculation used to obtain the estimated glomerular filtration  rate (eGFR) is the CKD-EPI equation.    53  Comment:  Calculation used to obtain the estimated glomerular filtration  rate (eGFR) is the CKD-EPI equation.    48  Comment:  Calculation used to obtain the estimated glomerular filtration  rate (eGFR) is the CKD-EPI equation.        Eos #         0.0     Eosinophil%         0.1     E wave decelartion time   205.85           FiO2               Flow               FS   29           Glucose 127   212 173 200      127   212 173 200     Gran # (ANC)         8.1     Gran%         88.2     Hematocrit         24.1     Hemoglobin         9.0     Immature Grans (Abs)         0.19  Comment:  Mild elevation in immature granulocytes is non specific and   can be seen in a variety of conditions including stress response,   acute inflammation, trauma and pregnancy. Correlation with other   laboratory and  "clinical findings is essential.       Coumadin Monitoring INR         2.6  Comment:  Coumadin Therapy:  2.0 - 3.0 for INR for all indicators except mechanical heart valves  and antiphospholipid syndromes which should use 2.5 - 3.5.       IVRT   0.11           IVS   1.04           Lactate, Baldev         2.5  Comment:  Falsely low lactic acid results can be found in samples   containing >=13.0 mg/dL total bilirubin and/or >=3.5 mg/dL   direct bilirubin.       LA size   3.54           LVOT area   3.4           LV LATERAL E/E' RATIO   14.14           LV SEPTAL E/E' RATIO   16.50           LV Diastolic Volume   120.04           LV Diastolic Volume Index   82.92           LVIDD   5.03           LVIDS   3.57           LV mass   215.73           LV Mass Index   149           LVOT diameter   2.09           LVOT peak jose a   1.06           LVOT stroke volume   58.19           LVOT peak VTI   16.97           LV Systolic Volume   53.52           LV Systolic Volume Index   37.0           Lymph #         0.4     Lymph%         4.5     Magnesium               MCH         30.7     MCHC         37.3     MCV         82     Mean e'   0.07           Mode               Mono #         0.5     Mono%         5.0     MPV         11.8     MV "A" wave duration   106.00           MV Peak A Jose A   1.35           MV Peak E Jose A   0.99           nRBC         0     Platelets         95     POC BE               POC HCO3               POC PCO2               POC PH               POC PO2               POC SATURATED O2               POC TCO2               Potassium 4.1   4.3 4.4 4.0      4.1   4.3 4.4 4.0     PROTEIN TOTAL         4.6     Protime         26.8     Pulm vein "A" wave   101.00           PV PEAK VELOCITY   1.20           PW   1.21           Right Atrial Pressure (from IVC)   3           RBC         2.93     RDW         14.6     RVDD   3.03           Sample               Sodium 131   130 130 130      131   130 130 130     TAPSE   2.44           " TDI SEPTAL   0.06           TDI LATERAL   0.07           Triscuspid Valve Regurgitation Peak Gradient   45           TR Max Jose A   3.37           TV rest pulmonary artery pressure   48           Vancomycin, Random         12.1     WBC         9.16                      09/20/19  2318   09/20/19  2127        Albumin         Alkaline Phosphatase         Allens Test   Pass     ALT         Anion Gap 10        10       Ao root annulus         Ao peak jose a         Ao VTI         AST         AV valve area         AORTIC VALVE CUSP SEPERATION         AV mean gradient         AV index (prosthetic)         AV peak gradient         AV Velocity Ratio         Baso #         Basophil%         Bilirubin, Direct         BILIRUBIN TOTAL         Site   RR     BSA         BUN, Bld 55        55       Calcium 7.7        7.7       Chloride 103        103       CO2 16        16       Creatinine 1.3        1.3       Left Ventricle Relative Wall Thickness         DelSys   Nasal Can     Differential Method         E/A ratio         E/E' ratio         eGFR if  50        50       eGFR if non  43  Comment:  Calculation used to obtain the estimated glomerular filtration  rate (eGFR) is the CKD-EPI equation.           43  Comment:  Calculation used to obtain the estimated glomerular filtration  rate (eGFR) is the CKD-EPI equation.          Eos #         Eosinophil%         E wave decelartion time         FiO2   94     Flow   2     FS         Glucose 207        207       Gran # (ANC)         Gran%         Hematocrit         Hemoglobin         Immature Grans (Abs)         Coumadin Monitoring INR         IVRT         IVS         Lactate, Baldev         LA size         LVOT area         LV LATERAL E/E' RATIO         LV SEPTAL E/E' RATIO         LV Diastolic Volume         LV Diastolic Volume Index         LVIDD         LVIDS         LV mass         LV Mass Index         LVOT diameter         LVOT peak jose a         LVOT stroke  "volume         LVOT peak VTI         LV Systolic Volume         LV Systolic Volume Index         Lymph #         Lymph%         Magnesium 1.7       MCH         MCHC         MCV         Mean e'         Mode   SPONT     Mono #         Mono%         MPV         MV "A" wave duration         MV Peak A Jose A         MV Peak E Jose A         nRBC         Platelets         POC BE   -10     POC HCO3   14.4     POC PCO2   23.0     POC PH   7.405     POC PO2   60     POC SATURATED O2   92     POC TCO2   15     Potassium 3.8        3.8       PROTEIN TOTAL         Protime         Pulm vein "A" wave         PV PEAK VELOCITY         PW         Right Atrial Pressure (from IVC)         RBC         RDW         RVDD         Sample   ARTERIAL     Sodium 129        129       TAPSE         TDI SEPTAL         TDI LATERAL         Triscuspid Valve Regurgitation Peak Gradient         TR Max Jose A         TV rest pulmonary artery pressure         Vancomycin, Random         WBC               Significant Imaging: I have reviewed all pertinent imaging results/findings within the past 24 hours.      Assessment/Plan:      * Septic shock  Acute, 2° pancolitis   Associated with renal failure tachycardia, tachypnea, hypotension, coagulopathy and metabolic acidosis  Monitor closely in the ICU  Cardiac monitoring   Blood cultures pending  Possibly septic emboli in head. CT in AM    BOOP (bronchiolitis obliterans with organizing pneumonia)        Acute hypoxemic respiratory failure  On Bipap  Monitor abg    Pleural effusion        Thrombocytopenia        Calcification of aorta        COPD with acute lower respiratory infection        ILD (interstitial lung disease)        Coagulopathy  Trend INR daily   Hold ppx heparin for now       Metabolic acidosis  Acute, with high AG,  2° diarrhea  Improving   Will continue to trend         Hypokalemia  Severe, 2° diarrhea  Improving  Replete with IV K riders as needed      Hyponatremia  Acute, 2° severe " dehydration          Pancolitis  Acute, affecting the entire colon except the sigmoid   Empiric coverage with Levaquin and flagyl IV           CAP (community acquired pneumonia)  Dif dx: aspiration pneumonia  septic emboli   ID following.   On levaquin and flagyl        Intravascular volume depletion  Acute, severe, secondary to vomiting and diarrhea   Aggressive fluid resuscitation       ABIMBOLA (acute kidney injury)  Continue IVF 1/2 NS  Trend creatinine         VTE Risk Mitigation (From admission, onward)        Ordered     Place sequential compression device  Until discontinued      09/19/19 2234     IP VTE HIGH RISK PATIENT  Once      09/19/19 2217          Critical care time spent on the evaluation and treatment of severe organ dysfunction, review of pertinent labs and imaging studies, discussions with consulting providers and discussions with patient/family: 30 minutes.      Tabitha Stratton MD  Department of Hospital Medicine   [facility]

## 2019-09-22 NOTE — PLAN OF CARE
Pt with almost continuous grunting or moaning with exhalation.  Continuous work of breathing. ABG's repeated, which are a little better than previous.  Pt maintaining o2 sats at % on bipap.  Continued tachypnea with RR in 30's to 40 tonight  Bipap in use all night. removed only for bath, and o2 sats were maintained in 90's with 2l NC.  Tachycardia with HR in low 100's.  Much less frequent PVC's than previous night.  Electrolytes have been WNL.  Will continue to monitor every four hours as ordered.  Pt has been afebrile.  Concerning that fingertips appear a little dusky; however, Capillary refills <3 sec and radial pulses are 2+.  Morphine and ativan given prn to help decrease anxiety and work of breathing.  Urine output good.  Continues on bicarb gtt.

## 2019-09-22 NOTE — ASSESSMENT & PLAN NOTE
Continue IVF 1/2 NS with bicarb  Trend creatinine   Avoid thiazide diuretics and SSRI as per nephrology

## 2019-09-22 NOTE — PROGRESS NOTES
Pharmacokinetic Assessment Follow Up: IV Vancomycin    Vancomycin serum concentration assessment(s):    The random level was drawn correctly and can be used to guide therapy at this time. The measurement is within the desired definitive target range of 15 to 20 mcg/mL.    Vancomycin Regimen Plan: pulse dosing (ABIMBOLA)    Give 750mg x 1 and  Re-dose when the random level is less than 20 mcg/mL, next level to be drawn at 0900  on 9/22     Drug levels (last 3 results):  Recent Labs   Lab Result Units 09/20/19  1432 09/21/19  0356 09/21/19  1933   Vancomycin, Random ug/mL 4.7 12.1 18.1       Pharmacy will continue to follow and monitor vancomycin.    Please contact pharmacy at extension [phone] for questions regarding this assessment.    Thank you for the consult,   Liset Medina       Patient brief summary:  Mesha Mckenzie is a 63 y.o. female initiated on antimicrobial therapy with IV Vancomycin for treatment of bacteremia    The patient's current regimen is pulse dosing    Drug Allergies:   Review of patient's allergies indicates:   Allergen Reactions    Pcn [penicillins]        Actual Body Weight:   47.2 kg    Renal Function:   Estimated Creatinine Clearance: 46.4 mL/min (based on SCr of 0.9 mg/dL).,         CBC (last 72 hours):  Recent Labs   Lab Result Units 09/19/19  1214 09/19/19  2215 09/20/19  1037 09/21/19  0356   WBC K/uL 13.10* 19.83* 10.29 9.16   Hemoglobin g/dL 10.7* 11.0* 10.1* 9.0*   Hematocrit % 31.8* 30.1* 27.4* 24.1*   Platelets K/uL 97* 127* 94* 95*   Gran% % 80.0* 86.0* 79.0* 88.2*   Lymph% % 11.0* 7.0* 3.0* 4.5*   Mono% % 5.0 7.0 5.0 5.0   Eosinophil% % 1.0 0.0 0.0 0.1   Basophil% % 0.0 0.0 0.0 0.1   Differential Method  Manual Manual Manual Automated       Metabolic Panel (last 72 hours):  Recent Labs   Lab Result Units 09/19/19  1214 09/19/19  1245 09/19/19  1807 09/19/19  1902 09/19/19  2215 09/20/19  0249 09/20/19  0642 09/20/19  1037 09/20/19  1739 09/20/19  2318 09/21/19  0356 09/21/19  0803  09/21/19  1146 09/21/19  1601 09/21/19  1934   Sodium mmol/L 114*  --  117*  --  119* 122* 124* 126*  126* 128*  128* 129*  129* 130*  130* 130*  130* 130*  130* 131*  131* 133*  133*   Potassium mmol/L 3.1*  --  2.3*  --  2.8* 3.4* 3.3* 3.2*  3.2* 3.9  3.9 3.8  3.8 4.0  4.0 4.4  4.4 4.3  4.3 4.1  4.1 4.0  4.0   Chloride mmol/L 76*  --  85*  --  88* 93* 96 97  97 102  102 103  103 105  105 106  106 106  106 106  106 107  107   CO2 mmol/L 15*  --  12*  --  15* 15* 15* 16*  16* 14*  14* 16*  16* 16*  16* 14*  14* 17*  17* 18*  18* 17*  17*   Glucose mg/dL 78  --  118  --  165* 84 87 97  97 160*  160* 207*  207* 200*  200* 173*  173* 212*  212* 127*  127* 109  109   Glucose, UA   --  Negative  --   --   --   --   --   --   --   --   --   --   --   --   --    BUN, Bld mg/dL 101*  --  91*  --  85* 77* 72* 69*  69* 61*  61* 55*  55* 50*  50* 48*  48* 44*  44* 42*  42* 38*  38*   Creatinine mg/dL 2.9*  --  2.5*  --  2.2* 1.9* 1.7* 1.6*  1.6* 1.4  1.4 1.3  1.3 1.2  1.2 1.1  1.1 1.0  1.0 0.9  0.9 0.9  0.9   Albumin g/dL 2.2*  --   --   --  1.5*  --   --   --   --   --  1.5*  --   --   --   --    Total Bilirubin mg/dL 1.5*  --   --   --   --   --   --   --   --   --  0.4  --   --   --   --    Alkaline Phosphatase U/L 86  --   --   --   --   --   --   --   --   --  59  --   --   --   --    AST U/L 84*  --   --   --   --   --   --   --   --   --  24  --   --   --   --    ALT U/L 24  --   --   --   --   --   --   --   --   --  14  --   --   --   --    Magnesium mg/dL  --   --   --  1.7 2.1  --   --   --   --  1.7  --   --   --   --   --    Phosphorus mg/dL  --   --   --   --  3.9  --   --   --   --   --   --   --   --   --   --        Vancomycin Administrations:  vancomycin given in the last 96 hours                     vancomycin (VANCOCIN) 750 mg in dextrose 5 % 250 mL IVPB (mg) 750 mg New Bag 09/21/19 2122    vancomycin in dextrose 5 % 1 gram/250 mL IVPB 1,000 mg (mg)  1,000 mg New Bag 09/21/19 0826    vancomycin 250mg / 10ml oral suspension 125 mg (mg) 125 mg Given 09/21/19 0130     125 mg Given 09/20/19 2111     125 mg Given  1235     125 mg Given  0555    vancomycin 750 mg in dextrose 5 % 250 mL IVPB (mg) 750 mg New Bag 09/20/19 1737    vancomycin 250mg / 10ml oral suspension 125 mg (mg) 125 mg Given 09/19/19 4459                      Microbiologic Results:  Microbiology Results (last 7 days)       Procedure Component Value Units Date/Time    Blood culture [[id number]]     Order Status:  Sent Specimen:  Blood     Blood culture [[id number]] Collected:  09/21/19 0534    Order Status:  Sent Specimen:  Blood Updated:  09/21/19 1628    Blood culture [[id number]] Collected:  09/20/19 1739    Order Status:  Completed Specimen:  Blood from Line, Central Updated:  09/21/19 1602     Blood Culture, Routine Gram stain aer bottle: Gram positive cocci in clusters resembling Staph       Results called to and read back by:Rhea Lai RN 09/21/2019  16:02    Clostridium difficile EIA [[id number]]     Order Status:  Canceled Specimen:  Stool     Stool culture [[id number]]     Order Status:  No result Specimen:  Stool

## 2019-09-22 NOTE — PROGRESS NOTES
Progress Note  PULMONARY    Admit Date: 9/19/2019 09/22/2019      SUBJECTIVE:     Sept 21, diffuse pains, off levo, abg better.    Sept 22, on niv, arouses sl, no c/o    PFSH and Allergies reviewed.    OBJECTIVE:     Vitals (Most recent):  Vitals:    09/22/19 0930   BP: (!) 145/68   Pulse: (!) 119   Resp: (!) 40   Temp:        Vitals (24 hour range):  Temp:  [97.6 °F (36.4 °C)-100.9 °F (38.3 °C)]   Pulse:  []   Resp:  [22-57]   BP: ()/(44-74)   SpO2:  [86 %-100 %]       Intake/Output Summary (Last 24 hours) at 9/22/2019 1009  Last data filed at 9/22/2019 0625  Gross per 24 hour   Intake 3950.41 ml   Output 1905 ml   Net 2045.41 ml          Physical Exam:  The patient's neuro status (alertness,orientation,cognitive function,motor skills,), pharyngeal exam (oral lesions, hygiene, abn dentition,), Neck (jvd,mass,thyroid,nodes in neck and above/below clavicle),RESPIRATORY(symmetry,effort,fremitus,percussion,auscultation),  Cor(rhythm,heart tones including gallops,perfusion,edema)ABD(distention,hepatic&splenomegaly,tenderness,masses), Skin(rash,cyanosis),Psyc(affect,judgement,).  Exam negative except for these pertinent findings:    Sl encephalopathy, arouses, chest is symmetric, no distress, normal percussion, normal fremitus and good normal breath sounds, no edema     Radiographs reviewed: view by direct vision - no new on 21st, diffuse infiltrates,  22nd with right effusion suggested, left sl clearer      No results found for this or any previous visit.]    Labs     Recent Labs   Lab 09/22/19  0332   WBC 10.57   HGB 8.8*   HCT 24.1*   PLT 90*     Recent Labs   Lab 09/22/19  0332 09/22/19  0906   *  135* 136  136   K 3.9  3.9 3.8  3.8     107 107  107   CO2 19*  19* 20*  20*   BUN 34*  34* 30*  30*   CREATININE 0.8  0.8 0.7  0.7     108 106  106   CALCIUM 8.0*  8.0* 7.6*  7.6*   AST 17  --    ALT 12  --    ALKPHOS 56  --    BILITOT 0.7  --    BILIDIR 0.4*  --    PROT  4.8*  --    ALBUMIN 1.5*  --    INR 2.4*  --    *  --      Recent Labs   Lab 09/21/19  1914   PH 7.419   PCO2 24.5*   PO2 80   HCO3 15.9*     Microbiology Results (last 7 days)     Procedure Component Value Units Date/Time    Blood culture [[id number]] Collected:  09/22/19 0338    Order Status:  Sent Specimen:  Blood Updated:  09/22/19 0339    Blood culture [[id number]] Collected:  09/21/19 0534    Order Status:  Completed Specimen:  Blood Updated:  09/22/19 0215     Blood Culture, Routine No Growth to date    Blood culture [[id number]] Collected:  09/20/19 1739    Order Status:  Completed Specimen:  Blood from Line, Central Updated:  09/21/19 1602     Blood Culture, Routine Gram stain aer bottle: Gram positive cocci in clusters resembling Staph       Results called to and read back by:Rhea Lai RN 09/21/2019  16:02    Clostridium difficile EIA [[id number]]     Order Status:  Canceled Specimen:  Stool     Stool culture [[id number]]     Order Status:  No result Specimen:  Stool           Impression:  Active Hospital Problems    Diagnosis  POA    *Septic shock [A41.9, R65.21]  Yes    Staphylococcus aureus bacteremia with sepsis [A41.01]  Yes    Coagulopathy [D68.9]  Yes    ILD (interstitial lung disease) [J84.9]  Yes    COPD with acute lower respiratory infection [J44.0]  Yes    Calcification of aorta [I70.0]  Yes     Defer to primary control of cholesterol and bp.          Thrombocytopenia [D69.6]  Yes    Pleural effusion [J90]  Yes    Acute hypoxemic respiratory failure [J96.01]  Yes    BOOP (bronchiolitis obliterans with organizing pneumonia) [J84.89]  Yes    ABIMBOLA (acute kidney injury) [N17.9]  Yes    Intravascular volume depletion [E86.1]  Yes    Pancolitis [K51.00]  Yes    Hyponatremia [E87.1]  Yes    Hypokalemia [E87.6]  Yes    Metabolic acidosis [E87.2]  Yes    CAP (community acquired pneumonia) [J18.9]  Yes      Resolved Hospital Problems   No resolved problems to display.                Plan:   Sept 21, wbc now 9k, creat down to 1.2, pt uses narcotics chr - be - 10 last pm from -15 on 19th,  F/u cxr- dose ativan.  Hard to evaluate- seems much better save pain c/o. F/u cxr am  Sept 22, seems better but hard to eval- certainly improved from admit.  Monitor, support/niv, abx, re eval.  No change.                               .

## 2019-09-22 NOTE — ASSESSMENT & PLAN NOTE
Acute, affecting the entire colon except the sigmoid   Empiric coverage with Levaquin and flagyl IV

## 2019-09-22 NOTE — PROGRESS NOTES
Pharmacokinetic Assessment Follow Up: IV Vancomycin    Vancomycin serum concentration assessment(s):    The trough level was drawn correctly and can be used to guide therapy at this time. The measurement is within the desired definitive target range of 15 to 20 mcg/mL.    Vancomycin Regimen Plan:    Change regimen to Vancomycin 750 mg IV every 12 hours with next serum trough concentration measured at 1000 prior to 3rd dose on 9/23.      Drug levels (last 3 results):  Recent Labs   Lab Result Units 09/20/19  1432 09/21/19  0356 09/21/19  1933 09/22/19  0906   Vancomycin, Random ug/mL 4.7 12.1 18.1  --    Vancomycin-Trough ug/mL  --   --   --  18.4       Pharmacy will continue to follow and monitor vancomycin.    Please contact pharmacy at extension [phone] for questions regarding this assessment.    Thank you for the consult,   Martin Daniels       Patient brief summary:  Mesha Mckenzie is a 63 y.o. female initiated on antimicrobial therapy with IV Vancomycin for treatment of bacteremia      Drug Allergies:   Review of patient's allergies indicates:   Allergen Reactions    Pcn [penicillins]        Actual Body Weight:   47.4kg    Renal Function:   Estimated Creatinine Clearance: 60 mL/min (based on SCr of 0.7 mg/dL).,     CBC (last 72 hours):  Recent Labs   Lab Result Units 09/19/19  1214 09/19/19  2215 09/20/19  1037 09/21/19  0356 09/22/19  0332   WBC K/uL 13.10* 19.83* 10.29 9.16 10.57   Hemoglobin g/dL 10.7* 11.0* 10.1* 9.0* 8.8*   Hematocrit % 31.8* 30.1* 27.4* 24.1* 24.1*   Platelets K/uL 97* 127* 94* 95* 90*   Gran% % 80.0* 86.0* 79.0* 88.2* 88.5*   Lymph% % 11.0* 7.0* 3.0* 4.5* 6.5*   Mono% % 5.0 7.0 5.0 5.0 2.7*   Eosinophil% % 1.0 0.0 0.0 0.1 0.3   Basophil% % 0.0 0.0 0.0 0.1 0.0   Differential Method  Manual Manual Manual Automated Automated       Metabolic Panel (last 72 hours):  Recent Labs   Lab Result Units 09/19/19  1214 09/19/19  1245 09/19/19  1807 09/19/19  1902 09/19/19  2212 09/20/19  3884  09/20/19  0642 09/20/19  1037 09/20/19  1739 09/20/19  2318 09/21/19  0356 09/21/19  0803 09/21/19  1146 09/21/19  1601 09/21/19  1934 09/21/19  2348 09/22/19  0332 09/22/19  0906   Sodium mmol/L 114*  --  117*  --  119* 122* 124* 126*  126* 128*  128* 129*  129* 130*  130* 130*  130* 130*  130* 131*  131* 133*  133* 133*  133* 135*  135* 136  136   Potassium mmol/L 3.1*  --  2.3*  --  2.8* 3.4* 3.3* 3.2*  3.2* 3.9  3.9 3.8  3.8 4.0  4.0 4.4  4.4 4.3  4.3 4.1  4.1 4.0  4.0 3.9  3.9 3.9  3.9 3.8  3.8   Chloride mmol/L 76*  --  85*  --  88* 93* 96 97  97 102  102 103  103 105  105 106  106 106  106 106  106 107  107 106  106 107  107 107  107   CO2 mmol/L 15*  --  12*  --  15* 15* 15* 16*  16* 14*  14* 16*  16* 16*  16* 14*  14* 17*  17* 18*  18* 17*  17* 18*  18* 19*  19* 20*  20*   Glucose mg/dL 78  --  118  --  165* 84 87 97  97 160*  160* 207*  207* 200*  200* 173*  173* 212*  212* 127*  127* 109  109 115*  115* 108  108 106  106   Glucose, UA   --  Negative  --   --   --   --   --   --   --   --   --   --   --   --   --   --   --   --    BUN, Bld mg/dL 101*  --  91*  --  85* 77* 72* 69*  69* 61*  61* 55*  55* 50*  50* 48*  48* 44*  44* 42*  42* 38*  38* 35*  35* 34*  34* 30*  30*   Creatinine mg/dL 2.9*  --  2.5*  --  2.2* 1.9* 1.7* 1.6*  1.6* 1.4  1.4 1.3  1.3 1.2  1.2 1.1  1.1 1.0  1.0 0.9  0.9 0.9  0.9 0.8  0.8 0.8  0.8 0.7  0.7   Albumin g/dL 2.2*  --   --   --  1.5*  --   --   --   --   --  1.5*  --   --   --   --   --  1.5*  --    Total Bilirubin mg/dL 1.5*  --   --   --   --   --   --   --   --   --  0.4  --   --   --   --   --  0.7  --    Alkaline Phosphatase U/L 86  --   --   --   --   --   --   --   --   --  59  --   --   --   --   --  56  --    AST U/L 84*  --   --   --   --   --   --   --   --   --  24  --   --   --   --   --  17  --    ALT U/L 24  --   --   --   --   --   --   --   --   --  14  --   --   --   --   --  12  --     Magnesium mg/dL  --   --   --  1.7 2.1  --   --   --   --  1.7  --   --   --   --   --   --   --   --    Phosphorus mg/dL  --   --   --   --  3.9  --   --   --   --   --   --   --   --   --   --   --   --   --        Vancomycin Administrations:  vancomycin given in the last 96 hours                     vancomycin (VANCOCIN) 750 mg in dextrose 5 % 250 mL IVPB (mg) 750 mg New Bag 09/21/19 2122    vancomycin in dextrose 5 % 1 gram/250 mL IVPB 1,000 mg (mg) 1,000 mg New Bag 09/21/19 0826    vancomycin 250mg / 10ml oral suspension 125 mg (mg) 125 mg Given 09/21/19 0130     125 mg Given 09/20/19 2111     125 mg Given  1235     125 mg Given  0555    vancomycin 750 mg in dextrose 5 % 250 mL IVPB (mg) 750 mg New Bag 09/20/19 1737                      Microbiologic Results:  Microbiology Results (last 7 days)       Procedure Component Value Units Date/Time    Blood culture [[id number]] Collected:  09/22/19 0338    Order Status:  Sent Specimen:  Blood Updated:  09/22/19 0339    Blood culture [[id number]] Collected:  09/21/19 0534    Order Status:  Completed Specimen:  Blood Updated:  09/22/19 0215     Blood Culture, Routine No Growth to date    Blood culture [[id number]] Collected:  09/20/19 1739    Order Status:  Completed Specimen:  Blood from Line, Central Updated:  09/21/19 1602     Blood Culture, Routine Gram stain aer bottle: Gram positive cocci in clusters resembling Staph       Results called to and read back by:Rhea Lai RN 09/21/2019  16:02    Clostridium difficile EIA [[id number]]     Order Status:  Canceled Specimen:  Stool     Stool culture [[id number]]     Order Status:  No result Specimen:  Stool

## 2019-09-22 NOTE — SUBJECTIVE & OBJECTIVE
Interval History: Patient currently on bipap looks uncomfortable.    Review of Systems   Unable to perform ROS: Patient nonverbal     Objective:     Vital Signs (Most Recent):  Temp: 98.6 °F (37 °C) (09/22/19 1515)  Pulse: (!) 134 (09/22/19 1715)  Resp: (!) 35 (09/22/19 1715)  BP: 130/60 (09/22/19 1715)  SpO2: 99 % (09/22/19 1715) Vital Signs (24h Range):  Temp:  [97.6 °F (36.4 °C)-98.6 °F (37 °C)] 98.6 °F (37 °C)  Pulse:  [] 134  Resp:  [22-57] 35  SpO2:  [86 %-100 %] 99 %  BP: (103-169)/(51-77) 130/60     Weight: 47.4 kg (104 lb 8 oz)  Body mass index is 19.11 kg/m².    Intake/Output Summary (Last 24 hours) at 9/22/2019 1756  Last data filed at 9/22/2019 1703  Gross per 24 hour   Intake 4112.49 ml   Output 2040 ml   Net 2072.49 ml      Physical Exam   Constitutional: She appears well-developed and well-nourished.   HENT:   Head: Normocephalic and atraumatic.   Eyes: Conjunctivae are normal.   Cardiovascular: Normal heart sounds. Tachycardia present.   Pulmonary/Chest: Breath sounds normal.   Increased work of breathing   Abdominal: Soft. Bowel sounds are normal.   Neurological: She is alert.   Skin: Skin is warm.   Psychiatric: She has a normal mood and affect. Her behavior is normal.       Significant Labs:   Recent Lab Results       09/22/19  1619   09/22/19  1323   09/22/19  0906   09/22/19  0338   09/22/19  0332        Albumin         1.5     Alkaline Phosphatase         56     Allens Test Pass             ALT         12     Anion Gap   10 9   9        10 9   9     AST         17     Baso #         0.00     Basophil%         0.0     Bilirubin, Direct         0.4     BILIRUBIN TOTAL         0.7  Comment:  For infants and newborns, interpretation of results should be based  on gestational age, weight and in agreement with clinical  observations.  Premature Infant recommended reference ranges:  Up to 24 hours.............<8.0 mg/dL  Up to 48 hours............<12.0 mg/dL  3-5 days..................<15.0  mg/dL  6-29 days.................<15.0 mg/dL       Blood Culture, Routine       No Growth to date[P]       BNP         396  Comment:  Values of less than 100 pg/ml are consistent with non-CHF populations.     Site RR             BUN, Bld   29 30   34        29 30   34     Calcium   7.8 7.6   8.0        7.8 7.6   8.0     Chloride   106 107   107        106 107   107     CO2   20 20   19        20 20   19     Creatinine   0.7 0.7   0.8        0.7 0.7   0.8     DelSys CPAP/BiPAP             Differential Method         Automated     eGFR if    >60 >60   >60        >60 >60   >60     eGFR if non    >60  Comment:  Calculation used to obtain the estimated glomerular filtration  rate (eGFR) is the CKD-EPI equation.    >60  Comment:  Calculation used to obtain the estimated glomerular filtration  rate (eGFR) is the CKD-EPI equation.      >60  Comment:  Calculation used to obtain the estimated glomerular filtration  rate (eGFR) is the CKD-EPI equation.           >60  Comment:  Calculation used to obtain the estimated glomerular filtration  rate (eGFR) is the CKD-EPI equation.    >60  Comment:  Calculation used to obtain the estimated glomerular filtration  rate (eGFR) is the CKD-EPI equation.      >60  Comment:  Calculation used to obtain the estimated glomerular filtration  rate (eGFR) is the CKD-EPI equation.        Eos #         0.0     Eosinophil%         0.3     EP 5             FiO2 40             Glucose   117 106   108        117 106   108     Gran # (ANC)         9.4     Gran%         88.5     Hematocrit         24.1     Hemoglobin         8.8     Immature Grans (Abs)         0.21  Comment:  Mild elevation in immature granulocytes is non specific and   can be seen in a variety of conditions including stress response,   acute inflammation, trauma and pregnancy. Correlation with other   laboratory and clinical findings is essential.       Coumadin Monitoring INR          2.4  Comment:  Coumadin Therapy:  2.0 - 3.0 for INR for all indicators except mechanical heart valves  and antiphospholipid syndromes which should use 2.5 - 3.5.       IP 15             Lymph #         0.7     Lymph%         6.5     MCH         30.8     MCHC         36.5     MCV         84     Min Vol 28.0             Mode BiPAP             Mono #         0.3     Mono%         2.7     MPV         11.1     nRBC         0     Platelets         90     POC BE -3             POC HCO3 20.5             POC PCO2 27.0             POC PH 7.488             POC PO2 69             POC SATURATED O2 95             POC TCO2 21             Potassium   3.5 3.8   3.9        3.5 3.8   3.9     PROTEIN TOTAL         4.8     Protime         24.1     Rate               RBC         2.86     RDW         14.6     Sample ARTERIAL             Sodium   136 136   135        136 136   135     Sp02 97             Spont Rate 45             Vancomycin, Random               Vancomycin-Trough     18.4         WBC         10.57                      09/21/19  2348   09/21/19  1934   09/21/19  1933   09/21/19  1914        Albumin             Alkaline Phosphatase             Allens Test       Pass     ALT             Anion Gap 9 9          9 9         AST             Baso #             Basophil%             Bilirubin, Direct             BILIRUBIN TOTAL             Blood Culture, Routine             BNP             Site       RR     BUN, Bld 35 38          35 38         Calcium 8.0 7.9          8.0 7.9         Chloride 106 107          106 107         CO2 18 17          18 17         Creatinine 0.8 0.9          0.8 0.9         DelSys       CPAP/BiPAP     Differential Method             eGFR if  >60 >60          >60 >60         eGFR if non  >60  Comment:  Calculation used to obtain the estimated glomerular filtration  rate (eGFR) is the CKD-EPI equation.    >60  Comment:  Calculation used to obtain the estimated glomerular  filtration  rate (eGFR) is the CKD-EPI equation.             >60  Comment:  Calculation used to obtain the estimated glomerular filtration  rate (eGFR) is the CKD-EPI equation.    >60  Comment:  Calculation used to obtain the estimated glomerular filtration  rate (eGFR) is the CKD-EPI equation.            Eos #             Eosinophil%             EP       5     FiO2       40     Glucose 115 109          115 109         Gran # (ANC)             Gran%             Hematocrit             Hemoglobin             Immature Grans (Abs)             Coumadin Monitoring INR             IP       15     Lymph #             Lymph%             MCH             MCHC             MCV             Min Vol       13.6     Mode       BiPAP     Mono #             Mono%             MPV             nRBC             Platelets             POC BE       -9     POC HCO3       15.9     POC PCO2       24.5     POC PH       7.419     POC PO2       80     POC SATURATED O2       96     POC TCO2       17     Potassium 3.9 4.0          3.9 4.0         PROTEIN TOTAL             Protime             Rate       14     RBC             RDW             Sample       ARTERIAL     Sodium 133 133          133 133         Sp02       96     Spont Rate       20     Vancomycin, Random     18.1       Vancomycin-Trough             WBC                   Significant Imaging    CT Head:   1. Extensive chronic microvascular ischemic changes within the periventricular white matter of the supratentorial brain which are chance for the patient's chronologic age.  Demyelinating disease could produce a similar appearance.  There are more focal areas of hypoattenuation present within the periventricular white matter posterior right centrum semiovale and right frontal cortex, possibly focal areas of microvascular ischemic change or age-indeterminate infarction which are unchanged when compared with the cranial CT examination of 09/19/2019.  Additional clarification could be achieved  "with MRI of the brain as deemed clinically appropriate.  Given the provided history of "exclude septic emboli", consideration should be given to performing the examination with intravenous contrast.  2. Foci of remote lacunar infarction within the right caudate head and right corona radiata adjacent to the right frontal horn.  "

## 2019-09-22 NOTE — PROGRESS NOTES
Spoke with Dr Stratton in reference to increased work of breathing increased tachycardia, pain and anxiety medication given, orders for ABG, notified RT

## 2019-09-23 PROBLEM — J44.0 COPD WITH ACUTE LOWER RESPIRATORY INFECTION: Status: RESOLVED | Noted: 2019-09-20 | Resolved: 2019-09-23

## 2019-09-23 PROBLEM — D65 DIC (DISSEMINATED INTRAVASCULAR COAGULATION): Status: ACTIVE | Noted: 2019-09-23

## 2019-09-23 PROBLEM — E43 SEVERE MALNUTRITION: Status: ACTIVE | Noted: 2019-09-23

## 2019-09-23 PROBLEM — D65 DIC (DISSEMINATED INTRAVASCULAR COAGULATION): Status: RESOLVED | Noted: 2019-09-23 | Resolved: 2019-09-23

## 2019-09-23 PROBLEM — J84.89 BOOP (BRONCHIOLITIS OBLITERANS WITH ORGANIZING PNEUMONIA): Status: RESOLVED | Noted: 2019-09-20 | Resolved: 2019-09-23

## 2019-09-23 PROBLEM — D64.9 NORMOCYTIC ANEMIA: Status: ACTIVE | Noted: 2019-09-23

## 2019-09-23 PROBLEM — J18.9 CAP (COMMUNITY ACQUIRED PNEUMONIA): Status: RESOLVED | Noted: 2019-09-19 | Resolved: 2019-09-23

## 2019-09-23 PROBLEM — E86.1 INTRAVASCULAR VOLUME DEPLETION: Status: RESOLVED | Noted: 2019-09-19 | Resolved: 2019-09-23

## 2019-09-23 PROBLEM — E87.6 HYPOKALEMIA: Status: RESOLVED | Noted: 2019-09-19 | Resolved: 2019-09-23

## 2019-09-23 PROBLEM — J84.9 ILD (INTERSTITIAL LUNG DISEASE): Status: RESOLVED | Noted: 2019-09-20 | Resolved: 2019-09-23

## 2019-09-23 PROBLEM — G93.41 ENCEPHALOPATHY, METABOLIC: Status: ACTIVE | Noted: 2019-09-23

## 2019-09-23 PROBLEM — I27.20 PULMONARY HYPERTENSION: Status: RESOLVED | Noted: 2019-09-23 | Resolved: 2019-09-23

## 2019-09-23 PROBLEM — I50.33 ACUTE ON CHRONIC DIASTOLIC CONGESTIVE HEART FAILURE: Status: ACTIVE | Noted: 2019-09-23

## 2019-09-23 PROBLEM — I70.0 CALCIFICATION OF AORTA: Status: RESOLVED | Noted: 2019-09-20 | Resolved: 2019-09-23

## 2019-09-23 PROBLEM — I27.20 PULMONARY HYPERTENSION: Status: ACTIVE | Noted: 2019-09-23

## 2019-09-23 PROBLEM — E87.1 HYPONATREMIA: Status: RESOLVED | Noted: 2019-09-19 | Resolved: 2019-09-23

## 2019-09-23 PROBLEM — I50.33 ACUTE ON CHRONIC DIASTOLIC CONGESTIVE HEART FAILURE: Status: RESOLVED | Noted: 2019-09-23 | Resolved: 2019-09-23

## 2019-09-23 LAB
ALBUMIN SERPL BCP-MCNC: 1.4 G/DL (ref 3.5–5.2)
ALLENS TEST: ABNORMAL
ALP SERPL-CCNC: 52 U/L (ref 55–135)
ALT SERPL W/O P-5'-P-CCNC: 9 U/L (ref 10–44)
ANION GAP SERPL CALC-SCNC: 10 MMOL/L (ref 8–16)
ANION GAP SERPL CALC-SCNC: 11 MMOL/L (ref 8–16)
ANION GAP SERPL CALC-SCNC: 11 MMOL/L (ref 8–16)
ANISOCYTOSIS BLD QL SMEAR: SLIGHT
APTT BLDCRRT: 43.7 SEC (ref 21–32)
AST SERPL-CCNC: 15 U/L (ref 10–40)
BACTERIA BLD CULT: ABNORMAL
BASOPHILS NFR BLD: 0 % (ref 0–1.9)
BILIRUB DIRECT SERPL-MCNC: 0.3 MG/DL (ref 0.1–0.3)
BILIRUB SERPL-MCNC: 0.5 MG/DL (ref 0.1–1)
BUN SERPL-MCNC: 26 MG/DL (ref 8–23)
BUN SERPL-MCNC: 27 MG/DL (ref 8–23)
CALCIUM SERPL-MCNC: 7.2 MG/DL (ref 8.7–10.5)
CALCIUM SERPL-MCNC: 7.2 MG/DL (ref 8.7–10.5)
CALCIUM SERPL-MCNC: 7.3 MG/DL (ref 8.7–10.5)
CALCIUM SERPL-MCNC: 7.3 MG/DL (ref 8.7–10.5)
CALCIUM SERPL-MCNC: 7.4 MG/DL (ref 8.7–10.5)
CHLORIDE SERPL-SCNC: 106 MMOL/L (ref 95–110)
CHLORIDE SERPL-SCNC: 107 MMOL/L (ref 95–110)
CHLORIDE SERPL-SCNC: 107 MMOL/L (ref 95–110)
CO2 SERPL-SCNC: 21 MMOL/L (ref 23–29)
CO2 SERPL-SCNC: 21 MMOL/L (ref 23–29)
CO2 SERPL-SCNC: 22 MMOL/L (ref 23–29)
CREAT SERPL-MCNC: 0.8 MG/DL (ref 0.5–1.4)
DELSYS: ABNORMAL
DIFFERENTIAL METHOD: ABNORMAL
EOSINOPHIL NFR BLD: 1 % (ref 0–8)
ERYTHROCYTE [DISTWIDTH] IN BLOOD BY AUTOMATED COUNT: 15 % (ref 11.5–14.5)
EST. GFR  (AFRICAN AMERICAN): >60 ML/MIN/1.73 M^2
EST. GFR  (NON AFRICAN AMERICAN): >60 ML/MIN/1.73 M^2
FIBRINOGEN PPP-MCNC: 506 MG/DL (ref 182–366)
GLUCOSE SERPL-MCNC: 103 MG/DL (ref 70–110)
GLUCOSE SERPL-MCNC: 103 MG/DL (ref 70–110)
GLUCOSE SERPL-MCNC: 105 MG/DL (ref 70–110)
GLUCOSE SERPL-MCNC: 109 MG/DL (ref 70–110)
GLUCOSE SERPL-MCNC: 109 MG/DL (ref 70–110)
HAPTOGLOB SERPL-MCNC: 129 MG/DL (ref 30–250)
HBV SURFACE AB SER-ACNC: NEGATIVE M[IU]/ML
HBV SURFACE AG SERPL QL IA: NEGATIVE
HCO3 UR-SCNC: 23 MMOL/L (ref 24–28)
HCT VFR BLD AUTO: 21.4 % (ref 37–48.5)
HCV AB SERPL QL IA: NEGATIVE
HGB BLD-MCNC: 7.6 G/DL (ref 12–16)
HYPOCHROMIA BLD QL SMEAR: ABNORMAL
IMM GRANULOCYTES # BLD AUTO: ABNORMAL K/UL
INR PPP: 2.8 (ref 0.8–1.2)
LDH SERPL L TO P-CCNC: 230 U/L (ref 110–260)
LYMPHOCYTES NFR BLD: 12 % (ref 18–48)
MAGNESIUM SERPL-MCNC: 1.1 MG/DL (ref 1.6–2.6)
MCH RBC QN AUTO: 30.6 PG (ref 27–31)
MCHC RBC AUTO-ENTMCNC: 35.5 G/DL (ref 32–36)
MCV RBC AUTO: 86 FL (ref 82–98)
MODE: ABNORMAL
MONOCYTES NFR BLD: 2 % (ref 4–15)
NEUTROPHILS NFR BLD: 85 % (ref 38–73)
NRBC BLD-RTO: 0 /100 WBC
PATH REV BLD -IMP: NORMAL
PATH REV BLD -IMP: NORMAL
PCO2 BLDA: 26.9 MMHG (ref 35–45)
PH SMN: 7.54 [PH] (ref 7.35–7.45)
PLATELET # BLD AUTO: 63 K/UL (ref 150–350)
PLATELET BLD QL SMEAR: ABNORMAL
PMV BLD AUTO: 11.5 FL (ref 9.2–12.9)
PO2 BLDA: 75 MMHG (ref 80–100)
POC BE: 0 MMOL/L
POC SATURATED O2: 97 % (ref 95–100)
POC TCO2: 24 MMOL/L (ref 23–27)
POTASSIUM SERPL-SCNC: 3 MMOL/L (ref 3.5–5.1)
POTASSIUM SERPL-SCNC: 3 MMOL/L (ref 3.5–5.1)
POTASSIUM SERPL-SCNC: 3.5 MMOL/L (ref 3.5–5.1)
POTASSIUM SERPL-SCNC: 3.6 MMOL/L (ref 3.5–5.1)
POTASSIUM SERPL-SCNC: 3.6 MMOL/L (ref 3.5–5.1)
PROT SERPL-MCNC: 4.1 G/DL (ref 6–8.4)
PROTHROMBIN TIME: 28.5 SEC (ref 9–12.5)
RBC # BLD AUTO: 2.48 M/UL (ref 4–5.4)
SAMPLE: ABNORMAL
SITE: ABNORMAL
SODIUM SERPL-SCNC: 138 MMOL/L (ref 136–145)
SODIUM SERPL-SCNC: 139 MMOL/L (ref 136–145)
SODIUM SERPL-SCNC: 139 MMOL/L (ref 136–145)
TARGETS BLD QL SMEAR: ABNORMAL
WBC # BLD AUTO: 7.72 K/UL (ref 3.9–12.7)

## 2019-09-23 PROCEDURE — 82803 BLOOD GASES ANY COMBINATION: CPT

## 2019-09-23 PROCEDURE — 36600 WITHDRAWAL OF ARTERIAL BLOOD: CPT

## 2019-09-23 PROCEDURE — 99223 1ST HOSP IP/OBS HIGH 75: CPT | Mod: ,,, | Performed by: INTERNAL MEDICINE

## 2019-09-23 PROCEDURE — 25000003 PHARM REV CODE 250: Performed by: NURSE PRACTITIONER

## 2019-09-23 PROCEDURE — 99233 PR SUBSEQUENT HOSPITAL CARE,LEVL III: ICD-10-PCS | Mod: S$GLB,,, | Performed by: INTERNAL MEDICINE

## 2019-09-23 PROCEDURE — 27000221 HC OXYGEN, UP TO 24 HOURS

## 2019-09-23 PROCEDURE — S0030 INJECTION, METRONIDAZOLE: HCPCS | Performed by: NURSE PRACTITIONER

## 2019-09-23 PROCEDURE — 63600175 PHARM REV CODE 636 W HCPCS: Performed by: NURSE PRACTITIONER

## 2019-09-23 PROCEDURE — 99233 SBSQ HOSP IP/OBS HIGH 50: CPT | Mod: S$GLB,,, | Performed by: INTERNAL MEDICINE

## 2019-09-23 PROCEDURE — 85060 PATHOLOGIST REVIEW: ICD-10-PCS | Mod: ,,, | Performed by: PATHOLOGY

## 2019-09-23 PROCEDURE — 25000003 PHARM REV CODE 250: Performed by: INTERNAL MEDICINE

## 2019-09-23 PROCEDURE — 63600175 PHARM REV CODE 636 W HCPCS: Performed by: INTERNAL MEDICINE

## 2019-09-23 PROCEDURE — 85610 PROTHROMBIN TIME: CPT

## 2019-09-23 PROCEDURE — 85060 BLOOD SMEAR INTERPRETATION: CPT | Mod: ,,, | Performed by: PATHOLOGY

## 2019-09-23 PROCEDURE — 83615 LACTATE (LD) (LDH) ENZYME: CPT

## 2019-09-23 PROCEDURE — C9113 INJ PANTOPRAZOLE SODIUM, VIA: HCPCS | Performed by: NURSE PRACTITIONER

## 2019-09-23 PROCEDURE — 94640 AIRWAY INHALATION TREATMENT: CPT

## 2019-09-23 PROCEDURE — 99223 PR INITIAL HOSPITAL CARE,LEVL III: ICD-10-PCS | Mod: ,,, | Performed by: INTERNAL MEDICINE

## 2019-09-23 PROCEDURE — 85730 THROMBOPLASTIN TIME PARTIAL: CPT

## 2019-09-23 PROCEDURE — 85384 FIBRINOGEN ACTIVITY: CPT

## 2019-09-23 PROCEDURE — 83735 ASSAY OF MAGNESIUM: CPT

## 2019-09-23 PROCEDURE — 20000000 HC ICU ROOM

## 2019-09-23 PROCEDURE — 85007 BL SMEAR W/DIFF WBC COUNT: CPT

## 2019-09-23 PROCEDURE — 80048 BASIC METABOLIC PNL TOTAL CA: CPT | Mod: 91

## 2019-09-23 PROCEDURE — 85027 COMPLETE CBC AUTOMATED: CPT

## 2019-09-23 PROCEDURE — 99232 SBSQ HOSP IP/OBS MODERATE 35: CPT | Mod: ,,, | Performed by: INTERNAL MEDICINE

## 2019-09-23 PROCEDURE — 99900035 HC TECH TIME PER 15 MIN (STAT)

## 2019-09-23 PROCEDURE — 36415 COLL VENOUS BLD VENIPUNCTURE: CPT

## 2019-09-23 PROCEDURE — 80076 HEPATIC FUNCTION PANEL: CPT

## 2019-09-23 PROCEDURE — 94761 N-INVAS EAR/PLS OXIMETRY MLT: CPT

## 2019-09-23 PROCEDURE — 25000242 PHARM REV CODE 250 ALT 637 W/ HCPCS: Performed by: INTERNAL MEDICINE

## 2019-09-23 PROCEDURE — 83010 ASSAY OF HAPTOGLOBIN QUANT: CPT

## 2019-09-23 PROCEDURE — 94660 CPAP INITIATION&MGMT: CPT

## 2019-09-23 PROCEDURE — 80048 BASIC METABOLIC PNL TOTAL CA: CPT

## 2019-09-23 PROCEDURE — 99232 PR SUBSEQUENT HOSPITAL CARE,LEVL II: ICD-10-PCS | Mod: ,,, | Performed by: INTERNAL MEDICINE

## 2019-09-23 PROCEDURE — 97802 MEDICAL NUTRITION INDIV IN: CPT

## 2019-09-23 RX ORDER — POTASSIUM CHLORIDE 14.9 MG/ML
20 INJECTION INTRAVENOUS EVERY 6 HOURS PRN
Status: DISCONTINUED | OUTPATIENT
Start: 2019-09-23 | End: 2019-09-26

## 2019-09-23 RX ORDER — HYDROMORPHONE HYDROCHLORIDE 2 MG/ML
1 INJECTION, SOLUTION INTRAMUSCULAR; INTRAVENOUS; SUBCUTANEOUS ONCE
Status: COMPLETED | OUTPATIENT
Start: 2019-09-23 | End: 2019-09-23

## 2019-09-23 RX ORDER — VANCOMYCIN HCL IN 5 % DEXTROSE 1G/250ML
1000 PLASTIC BAG, INJECTION (ML) INTRAVENOUS
Status: DISCONTINUED | OUTPATIENT
Start: 2019-09-23 | End: 2019-09-24

## 2019-09-23 RX ORDER — BISACODYL 10 MG
10 SUPPOSITORY, RECTAL RECTAL DAILY PRN
Status: DISCONTINUED | OUTPATIENT
Start: 2019-09-23 | End: 2019-10-02 | Stop reason: HOSPADM

## 2019-09-23 RX ADMIN — IPRATROPIUM BROMIDE AND ALBUTEROL SULFATE 3 ML: .5; 3 SOLUTION RESPIRATORY (INHALATION) at 12:09

## 2019-09-23 RX ADMIN — CEFAZOLIN SODIUM 2 G: 2 SOLUTION INTRAVENOUS at 07:09

## 2019-09-23 RX ADMIN — PANTOPRAZOLE SODIUM 40 MG: 40 INJECTION, POWDER, FOR SOLUTION INTRAVENOUS at 08:09

## 2019-09-23 RX ADMIN — BISACODYL 10 MG: 10 SUPPOSITORY RECTAL at 10:09

## 2019-09-23 RX ADMIN — POTASSIUM CHLORIDE 20 MEQ: 14.9 INJECTION, SOLUTION INTRAVENOUS at 10:09

## 2019-09-23 RX ADMIN — METRONIDAZOLE 500 MG: 500 INJECTION, SOLUTION INTRAVENOUS at 02:09

## 2019-09-23 RX ADMIN — IPRATROPIUM BROMIDE AND ALBUTEROL SULFATE 3 ML: .5; 3 SOLUTION RESPIRATORY (INHALATION) at 07:09

## 2019-09-23 RX ADMIN — HYDROMORPHONE HYDROCHLORIDE 1 MG: 2 INJECTION, SOLUTION INTRAMUSCULAR; INTRAVENOUS; SUBCUTANEOUS at 12:09

## 2019-09-23 RX ADMIN — VANCOMYCIN HYDROCHLORIDE 1000 MG: 1 INJECTION, POWDER, LYOPHILIZED, FOR SOLUTION INTRAVENOUS at 08:09

## 2019-09-23 RX ADMIN — MORPHINE SULFATE 4 MG: 4 INJECTION, SOLUTION INTRAMUSCULAR; INTRAVENOUS at 05:09

## 2019-09-23 RX ADMIN — LORAZEPAM 1 MG: 2 INJECTION INTRAMUSCULAR; INTRAVENOUS at 10:09

## 2019-09-23 RX ADMIN — CEFAZOLIN SODIUM 2 G: 2 SOLUTION INTRAVENOUS at 12:09

## 2019-09-23 RX ADMIN — LORAZEPAM 1 MG: 2 INJECTION INTRAMUSCULAR; INTRAVENOUS at 08:09

## 2019-09-23 RX ADMIN — CEFAZOLIN SODIUM 2 G: 2 SOLUTION INTRAVENOUS at 02:09

## 2019-09-23 RX ADMIN — METRONIDAZOLE 500 MG: 500 INJECTION, SOLUTION INTRAVENOUS at 10:09

## 2019-09-23 RX ADMIN — SODIUM BICARBONATE: 84 INJECTION, SOLUTION INTRAVENOUS at 02:09

## 2019-09-23 RX ADMIN — IPRATROPIUM BROMIDE AND ALBUTEROL SULFATE 3 ML: .5; 3 SOLUTION RESPIRATORY (INHALATION) at 01:09

## 2019-09-23 RX ADMIN — METRONIDAZOLE 500 MG: 500 INJECTION, SOLUTION INTRAVENOUS at 05:09

## 2019-09-23 RX ADMIN — HYDROMORPHONE HYDROCHLORIDE 1 MG: 2 INJECTION, SOLUTION INTRAMUSCULAR; INTRAVENOUS; SUBCUTANEOUS at 09:09

## 2019-09-23 RX ADMIN — MORPHINE SULFATE 4 MG: 4 INJECTION, SOLUTION INTRAMUSCULAR; INTRAVENOUS at 10:09

## 2019-09-23 RX ADMIN — HYDROMORPHONE HYDROCHLORIDE 1 MG: 2 INJECTION INTRAMUSCULAR; INTRAVENOUS; SUBCUTANEOUS at 03:09

## 2019-09-23 RX ADMIN — SODIUM BICARBONATE: 84 INJECTION, SOLUTION INTRAVENOUS at 10:09

## 2019-09-23 RX ADMIN — SODIUM BICARBONATE: 84 INJECTION, SOLUTION INTRAVENOUS at 05:09

## 2019-09-23 RX ADMIN — IPRATROPIUM BROMIDE AND ALBUTEROL SULFATE 3 ML: .5; 3 SOLUTION RESPIRATORY (INHALATION) at 06:09

## 2019-09-23 NOTE — ASSESSMENT & PLAN NOTE
Parapneumonic effusion?  Patient will not undergo thoracentesis at this point secondary to elevated PT, PTT and decreased platelet count.

## 2019-09-23 NOTE — PROGRESS NOTES
Called Dr. Anna about pt's breathing.  Informed her that she was placed back on BiPAP and despite PRN ativan and morphine, she is tachypneic (RR40) and tachy (120s).  She is labored as well.  TORB for 1mg dilaudid IV push once now and ABG

## 2019-09-23 NOTE — CONSULTS
[facility]  Hematology/Oncology  Consult Note    Patient Name: Mesha Mckenzie  MRN: [id number]  Admission Date: 9/19/2019  Hospital Length of Stay: 4 days  Code Status: Full Code   Attending Provider: Eli Anna MD  Consulting Provider: Amanda Cruz MD  Primary Care Physician: Varun Shea MD PhD  Principal Problem:Staphylococcus aureus bacteremia with sepsis    Consults for anemia and low plts  Subjective:     HPI:admitted with staph aureus sepsis in blood and urine.  63 y.o. female with history of chronic back pain on narcotics and urinary retention with intermittent self catheterizations presents with 3-4 days of acute, moderate to severe, constant, diffuse abdominal pain associated with non-bloody diarrhea and vomiting. . She was transferred from outside facility in [place], leucocytosis, multiple electrolyte abnormality, altered mental status, pulmonary infilterates. Patient had become weak and altered and fell out of bed several days prior to admission but refused to seek medical attention. She has a history of GI bleed in 2018 and underwent colonoscopy  In the hospital she has been on vanc/ flagyl and levaquin although her electrolytes and renal fn improved . Her mental status has not. She was on pressors but this has improved and she no longer needs them. Head CT neg. Pt had TTE done as well   ID folowing      Medications:  Continuous Infusions:   norepinephrine bitartrate-D5W Stopped (09/20/19 2448)    custom IV infusion builder 125 mL/hr at 09/23/19 0554     Scheduled Meds:   albuterol-ipratropium  3 mL Nebulization Q6H    ceFAZolin (ANCEF) IVPB  2 g Intravenous Q6H    metronidazole  500 mg Intravenous Q8H    pantoprazole  40 mg Intravenous Daily     PRN Meds:influenza, lorazepam, morphine, ondansetron, pneumoc 13-susanna conj-dip cr(PF), potassium chloride in water, sodium chloride 0.9%     Review of patient's allergies indicates:   Allergen Reactions    Pcn [penicillins]          Past Medical History:   Diagnosis Date    Anxiety     Carotid bruit     Chronic pain     Cystitis     Cystocele, unspecified (CODE)     Depression     GI bleed     History of uterine fibroid     Shortness of breath on exertion     Spinal stenosis     Urinary retention      Past Surgical History:   Procedure Laterality Date    ANTERIOR VAGINAL REPAIR  10-    CARDIAC CATHETERIZATION  age 12    CHOLECYSTECTOMY  2015    COLONOSCOPY  12/12/2018    aborted due to poor colon prep    COLONOSCOPY N/A 12/12/2018    Performed by Renard Gonsalves MD at [facility]    HYST[facility]CTOMY  1989    [facility]    tubiligation       Family History     Problem Relation (Age of Onset)    Alzheimer's disease Mother    Hypertension Brother, Sister    Osteoarthritis Mother    Thyroid disease Mother        Tobacco Use    Smoking status: Current Every Day Smoker     Packs/day: 0.50     Years: 40.00     Pack years: 20.00     Types: Cigarettes    Smokeless tobacco: Never Used   Substance and Sexual Activity    Alcohol use: No    Drug use: No    Sexual activity: Yes     Partners: Male       Objective:     Vital Signs (Most Recent):  Temp: 97.8 °F (36.6 °C) (09/23/19 0745)  Pulse: (!) 127 (09/23/19 1015)  Resp: (!) 37 (09/23/19 1015)  BP: 125/61 (09/23/19 1015)  SpO2: (!) 94 % (09/23/19 1015) Vital Signs (24h Range):  Temp:  [97.8 °F (36.6 °C)-99.9 °F (37.7 °C)] 97.8 °F (36.6 °C)  Pulse:  [104-138] 127  Resp:  [24-47] 37  SpO2:  [93 %-100 %] 94 %  BP: (107-169)/(53-77) 125/61     Weight: 47.4 kg (104 lb 8 oz)  Body mass index is 19.11 kg/m².  Body surface area is 1.44 meters squared.      Intake/Output Summary (Last 24 hours) at 9/23/2019 1108  Last data filed at 9/23/2019 0945  Gross per 24 hour   Intake 3797.91 ml   Output 2265 ml   Net 1532.91 ml       Physical Exam  HEENT: no exudates/ ulcers   neck supple   pt is moaning, seems agitated, still encephalopathic   RS : bilat CTA  occ. Wheezes+ no rales   CVS : s1s2  wnl    no murmurs no gallops   Abd: soft nontender   no masses   neuro: as above no focal deficits identified  Significant Labs:   .    Diagnostic Results:    7:48     Sodium 136 - 145 mmol/L 139    Potassium 3.5 - 5.1 mmol/L 3.0Low     Chloride 95 - 110 mmol/L 106    CO2 23 - 29 mmol/L 22Low     Glucose 70 - 110 mg/dL 109    BUN, Bld 8 - 23 mg/dL 27High     Creatinine 0.5 - 1.4 mg/dL 0.8    Calcium 8.7 - 10.5 mg/dL 7.2Low     Anion Gap 8 - 16 mmol/L 11      :16  (9/23/19) 1d ago  (9/22/19) 2d ago  (9/21/19) 3d ago  (9/20/19) 4d ago  (9/19/19) 4d ago  (9/19/19)     WBC 3.90 - 12.70 K/uL 7.72  10.57  9.16  10.29  19.83High   13.10High     RBC 4.00 - 5.40 M/uL 2.48Low   2.86Low   2.93Low   3.22Low   3.52Low   3.39Low     Hemoglobin 12.0 - 16.0 g/dL 7.6Low   8.8Low   9.0Low   10.1Low   11.0Low   10.7Low     Hematocrit 37.0 - 48.5 % 21.4Low   24.1Low   24.1Low   27.4Low   30.1Low   31.8Low     Mean Corpuscular Volume 82 - 98 fL 86  84  82  85  86  94    Mean Corpuscular Hemoglobin 27.0 - 31.0 pg 30.6  30.8  30.7  31.4High   31.3High   31.5High     Mean Corpuscular Hemoglobin Conc 32.0 - 36.0 g/dL 35.5  36.5High   37.3High   36.9High   36.5High   33.7    RDW 11.5 - 14.5 % 15.0High   14.6High   14.6High   14.2  13.9  15.5High     Platelets 150 - 350 K/uL 63Low   90Low   95Low   94Low   127Low   97Low     MPV 9.2 - 12.9 fL 11.5  11.1  11.8  12.2  12.7  11.4    Immature Grans (Abs) K/uL CANCELED  0.21High  R, CM 0.19High  R, CM CANCELED R, CM CANCELED CM    Comment: Mild elevation in immature granulocytes is non specific and        Fibrinogen 506,    PT/PTT 28.5/ 43.7  Assessment/Plan:     Active Diagnoses:    Diagnosis Date Noted POA    PRINCIPAL PROBLEM:  Staphylococcus aureus bacteremia with sepsis [A41.01] 09/22/2019 Yes    Pulmonary hypertension [I27.20] 09/23/2019 Yes    Acute on chronic diastolic congestive heart failure [I50.33] 09/23/2019 Yes    DIC (disseminated intravascular coagulation) [D65] 09/23/2019 Yes     Coagulopathy [D68.9] 09/20/2019 Yes    ILD (interstitial lung disease) [J84.9] 09/20/2019 Yes    COPD with acute lower respiratory infection [J44.0] 09/20/2019 Yes    Calcification of aorta [I70.0] 09/20/2019 Yes    Thrombocytopenia [D69.6] 09/20/2019 Yes    Pleural effusion [J90] 09/20/2019 Yes    Acute hypoxemic respiratory failure [J96.01] 09/20/2019 Yes    BOOP (bronchiolitis obliterans with organizing pneumonia) [J84.89] 09/20/2019 Yes    ABIMBOLA (acute kidney injury) [N17.9] 09/19/2019 Yes    Intravascular volume depletion [E86.1] 09/19/2019 Yes    Pancolitis [K51.00] 09/19/2019 Yes    Hyponatremia [E87.1] 09/19/2019 Yes    Hypokalemia [E87.6] 09/19/2019 Yes    Metabolic acidosis [E87.2] 09/19/2019 Yes    CAP (community acquired pneumonia) [J18.9] 09/19/2019 Yes      Problems Resolved During this Admission:     Pt with MSSA  And UTI encephalopathic, with anemia nd thrombocytopenia more than likely a stress response and not from true pathology. Will get path review and iron studies doen . She had these studies about 3 months ago   her plts are stable and mildly low. We will plan further testing as needed if there is an acute change   cont mx with abx per ID,   pulmonary also following pt    Amanda Cruz MD  Hematology/Oncology  [facility]

## 2019-09-23 NOTE — ASSESSMENT & PLAN NOTE
Contributing Nutrition Diagnosis  Severe acute illness related malnutrition    Related to (etiology):   N/V/D, decreased appetite and increased energy needs d/t illness    Signs and Symptoms (as evidenced by):   1) PO intakes < 50% EEN x 8 days  2) Mild-moderate muscle/fat wasting as charted below    Interventions/Recommendations (treatment strategy):  Above    Nutrition Diagnosis Status:   New

## 2019-09-23 NOTE — ASSESSMENT & PLAN NOTE
As per findings on CT scan.  The patient is on IV Flagyl for this.  She has not had diarrhea since admission.

## 2019-09-23 NOTE — ASSESSMENT & PLAN NOTE
The patient's mental status is improved today according to the family and the nursing staff.  She is still far off her baseline

## 2019-09-23 NOTE — PT/OT/SLP PROGRESS
Speech Language Pathology      Mesha Mckenzie  MRN: [id number]    Orders received for clinical swallow evaluation. Patient not seen today secondary to (On BiPAP. Not appropriate this date 2' respiratory status. ). Will follow-up 9/24/19.    Alondra Mckay, TIANA-SLP

## 2019-09-23 NOTE — PROGRESS NOTES
Consult Note  Infectious Disease    Reason for Consult:  sepsis    HPI: Mesha Mckenzie is a critically ill 63 y.o. female , tranferred from [facility] where she presented with 4 days of nausea and vomiting, found to have ABIMBOLA, leukocytosis, hypotension, multiple pulmonary infiltrates, pancolitis on CT, metabolic acidosis and altered mental status. She was transferred for multispecialty care. Since admission, she has received vanc, levaquin and flagyl, still requires pressors, has 3/4 blood cultures with GPC in clusters, Ct chest with multiple foci of infiltrates including some that look like septic pulmonary emboli, elevated coags without bleeding and no further diarrheal stools. Her chemistries are responding to fluid and supplementation but her mental status remains very abnormal. She is in pain from her abdomen. It is unclear from notes and my conversation with daughter whether her respiratory symptoms came first or the GI symptoms came first. She has no history of STaph infection, no recent antibiotics, but does smoke and take pain meds, valium and adderall chronically.      9/21: blood cultures with STaph aureus, sensitivities pending. Blood cultures from yesterday are negative so far. No change in oxygen needs. No longer on pressors. Less responsive. TTE unofficially abnormal. Staph in urine is likely from the bacteremia. No family at bedside.   9/22: CT head with no significant changes. Mental status no better. Still no BM since admit. CXR slightly better. Blood cultures from yesterday negative and original is MSSA. coags still elevated.   Cr now normal.   9/23: afebrile. Blood cultures are persistently positive. Mental status is much improved and she can answer questions. She continues to moan and groan. She indicated to me that her anus is uncomfortable and needs to have a BM. She denies abd pain but grimaces with palpation. She denies new pain in her spine. She does indicate pain in the right foot. When I  asked what the lumps on her shoulders are from she indicates they are from falls. I told her that she was being treated for a Staph infection and I believe she understood.        EXAM & DIAGNOSTICS REVIEWED:   Vitals:     Temp:  [97.6 °F (36.4 °C)-99.9 °F (37.7 °C)]   Temp: 97.6 °F (36.4 °C) (09/23/19 1545)  Pulse: (!) 121 (09/23/19 1730)  Resp: (!) 37 (09/23/19 1730)  BP: (!) 146/73 (09/23/19 1730)  SpO2: (!) 94 % (09/23/19 1730)    Intake/Output Summary (Last 24 hours) at 9/23/2019 1820  Last data filed at 9/23/2019 1746  Gross per 24 hour   Intake 3689.58 ml   Output 2665 ml   Net 1024.58 ml       General:  Awake, attends, cooperates, answers questions   Eyes:  Anicteric, PERRL,  no sleral or conjunctival petechiae  ENT:  No ulcers, exudates, thrush, nares patent, dentition is poor and MM are  dry  Neck:  Supple,    Lungs: Patchy crackles, no wheezing,   Heart:  RRR, no gallop/murmur/rub noted but her moaning obscures exam  Abd:  Soft, tender, guards less, mild distention,   hypoactive BS, no masses or organomegaly appreciated.  :   Henriquez, urine clear, no flank tenderness  Musc:  Joints without effusion, swelling, erythema, synovitis,including right ankle/foot    Skin:  No rashes. No palmar or plantar lesions. No subungual petechiae. No skin lesions of DIC  Wound: Right hip abrasion, not infected  Neuro:  lethargic but arousable, attentive, face symmetric, speech is only limited by dry MM, moves all extremities, no focal weakness.    Psych:  Attends, uncomfortable, mildly agitated  Lymphatic:        Extrem: Mild edema, no erythema, phlebitis, cellulitis, peripheral pulses are 0-1, clubbing present, no mottling  VAD:  Left IJ TLC 9/19  Isolation:  none    Lines/Tubes/Drains:    General Labs reviewed:  Recent Labs   Lab 09/21/19  0356 09/22/19  0332 09/23/19  0316   WBC 9.16 10.57 7.72   HGB 9.0* 8.8* 7.6*   HCT 24.1* 24.1* 21.4*   PLT 95* 90* 63*       Recent Labs   Lab 09/21/19  0356  09/22/19  0332   09/23/19  0006 09/23/19  0316 09/23/19  0748   *  130*   < > 135*  135*   < > 138  138 138  138  138 139  139   K 4.0  4.0   < > 3.9  3.9   < > 3.6  3.6 3.5  3.5  3.5 3.0*  3.0*     105   < > 107  107   < > 107  107 106  106  106 106  106   CO2 16*  16*   < > 19*  19*   < > 21*  21* 22*  22*  22* 22*  22*   BUN 50*  50*   < > 34*  34*   < > 27*  27* 26*  26*  26* 27*  27*   CREATININE 1.2  1.2   < > 0.8  0.8   < > 0.8  0.8 0.8  0.8  0.8 0.8  0.8   CALCIUM 7.9*  7.9*   < > 8.0*  8.0*   < > 7.3*  7.3* 7.4*  7.4*  7.4* 7.2*  7.2*   PROT 4.6*  --  4.8*  --   --  4.1*  --    BILITOT 0.4  --  0.7  --   --  0.5  --    ALKPHOS 59  --  56  --   --  52*  --    ALT 14  --  12  --   --  9*  --    AST 24  --  17  --   --  15  --     < > = values in this interval not displayed.           Micro:  Microbiology Results (last 7 days)     Procedure Component Value Units Date/Time    Blood culture [[id number]]  (Abnormal) Collected:  09/20/19 1739    Order Status:  Completed Specimen:  Blood from Line, Central Updated:  09/23/19 0939     Blood Culture, Routine Gram stain aer bottle: Gram positive cocci in clusters resembling Staph       Results called to and read back by:Rhea Lai RN 09/21/2019  16:02      STAPHYLOCOCCUS AUREUS  ID consult required at [facility],[place],[facility] and [facility].  For susceptibility see order #[id number]      Blood culture [[id number]]  (Abnormal) Collected:  09/21/19 0534    Order Status:  Completed Specimen:  Blood Updated:  09/23/19 0850     Blood Culture, Routine Gram stain peds bottle: Gram positive cocci in clusters resembling Staph       Results called to and read back by:Javi Sunshine RN 09/22/2019  14:22      STAPHYLOCOCCUS AUREUS  ID consult required at [facility] [place] and [facility].  For susceptibility see order #[id number]      Blood culture [[id number]] Collected:  09/22/19 0338    Order Status:  Completed  Specimen:  Blood from Antecubital, Left  Arm Updated:  09/23/19 0446     Blood Culture, Routine Gram stain peds bottle: Gram positive cocci in clusters resembling Staph       Results called to and read back by:Yolis Wan RN 09/23/2019  04:46    Clostridium difficile EIA [[id number]]     Order Status:  Canceled Specimen:  Stool     Stool culture [[id number]]     Order Status:  No result Specimen:  Stool         Imaging Reviewed:   CXRs   CT heads, chest , abd/pelvis  Cardiology:  TTE 9/20  Left Ventricle Low normal ejection fraction at 50%. Mild concentric observed. Grade I (mild) left ventricular diastolic dysfunction consistent with impaired relaxation. Elevated left atrial pressure.   Right Ventricle Normal cavity size, wall thickness and systolic function. Wall motion normal.   Left Atrium The left atrium is normal.   Right Atrium There is normal right atrial size.   Aortic Valve The valve is trileaflet. There is normal leaflet mobility.   Mitral Valve Normal valve structure. Mild regurgitation. Normal leaflet mobility.   Tricuspid Valve The tricuspid valve is normal. Trace regurgitation. Pulmonary hypertension present. The estimated PA systolic pressure is 48 mmHg.   Pulmonic Valve Normal valve structure. Trace regurgitation.   IVC/SVC Normal central venous pressure (3 mm Hg).   Ascending Aorta The aortic root and ascending aorta are normal in size.   Pericardium No pericardial effusion. Respiratory variation of mitral valve inflow is less than 30%.         IMPRESSION & PLAN   1.  Multiple positive blood cultures with MSSA with  pneumonia and possibly septic pulmonary emboli   Staph in urine is from the bacteremia and not the cause of it.  2. Pancolitis. Unclear if her GI symptoms were the root cause of her illness or if she has ischemic colitis from sepsis and volume depletion . No diarrhea since admit  3. ABIMBOLA, volume depletion, metabolic acidosis, resolved  4. DIC? with elevated INR, D-dimer, low platelets,  but with elevated fibrinogen, dropping hgb  5. Opiate dependence, severe lumbar spine abnormalities  6. Altered mental status. From sepsis? +/- from unrecognized stroke or embolus, improved      Recommendations:  Resume vanc, continue ancef. Continuing flagyl for now  Serial blood cultures  rec MRI brain,   She will need DHARMESH this week   Dulcolax suppos     Follow coags and consider FFP if any sign of bleeding   Continue hydration and electrolyte management       D/w nursing

## 2019-09-23 NOTE — PLAN OF CARE
VSS. On 3L NC.  Pt is non able to verbalize a little; still hard to understand at times.  Does not speak in full sentences.  She is able to move her upper extremities and able to wiggle toes.  Given pain medication as needed; expressed to MD that dilaudid has worked significantly better for breathing and pain control.  Speech therapy was not able to assess given that pt had to go back on BiPAP for a bit this afternoon.  This evening, gave pt ice and tolerated well.  She also ate a couple of bites of jello without issue.  Dietary put in recommendations for nutrition; awaiting order for pt.  Restraints still in place.  POC discussed with pt and family.  Safety maintained entire shift.

## 2019-09-23 NOTE — PLAN OF CARE
Patient remains on Bipap with Ipap 15, Epap 8 and Fi02 .4.  Hr 115 and 02 saturation 100%.  Patient receiving aerosol tx with duoneb now and q6hr.  Patient placed on nc at 3lpm after tx.

## 2019-09-23 NOTE — PHYSICIAN QUERY
"PT Name: Mesha Mckenzie  MR #: [id number]  Physician Query Form - Renal Condition Clarification     CDS: Felicita Guerrier RN, CCDS         Contact information :[phone]  [email or web address]       This form is a permanent document in the medical record.     QueryDate: September 23, 2019    By submitting this query, we are merely seeking further clarification of documentation. Please utilize your independent clinical judgment when addressing the question(s) below.    The Medical record contains the following:   Indicator Supporting Clinical Findings Location in Medical Record    Kidney (Renal) Insufficiency     x Kidney (Renal) Failure / Injury "ABIMBOLA (acute kidney injury)"  "2° severe dehydration and shock," H&P 9/20/19    Nephrotoxic Agents     x BUN/Creatinine GFR , creatinine 2.9, GFR 16.4  BUN  77,  creatinine 1.9, GFR 27  BUN  50,  creatinine 1.2, GFR 48  BUN  29,  creatinine 0.7, GFR >60 Lab 9/19/19  Lab 9/20/19  Lab 9/21/19  Lab 9/22/19      Urine: Casts         Eosinophils      Dehydration      Nausea/Vomiting      Dialysis/CRRT     x Treatment: "Received 3 L of NS at transferring facility  Will bolus 2 L of LR now  Avoid nephrotoxins  Renal dose medications where appropriate  Trend creatinine" H&P 9/20/19   x Other:  "Septic shock  "creat up, better, likely due to low vol/bp +/- ATN" H&P 9/20/19  eICU Note 9/19/19   Acute Kidney Injury / Acute Renal Failure has different defining criteria. A generally accepted guideline  is:   A greater than 100% (2X) rise in serum creatinine from baseline* occurring during the course of a single hospital stay.   *Baseline as determined by the providers judgment and consideration of previous lab values and other documentation, if available.  A diagnosis of Acute Kidney Injury/ Acute Renal Failure should incorporate abnormal labs and clinical findings that are clinically significant    References: 1. Skinny et al. Acute renal failure-definition, outcome " measures, animal models, fluid therapy and information technology needs: the Second International Consensus Conference of the Acute Dialysis Quality Initiative (ADQI) Group. Crit Care 2004; 8:B204; 2. Monica et al. Acute Kidney Injury Network: report of an initiative to improve outcomes in acute kidney injury. Crit Care 2007; 11:R31; 3. Kidney Disease: Improving Global Outcomes (KDIGO). Acute Kidney Injury Work Group. KDIGO clinical practice guidelines for acute kidney injury. Kidney Int Suppl 2012; 2:1.    The clinical guidelines noted below is only a system guideline, it does not replace the providers clinical judgment.    Provider, please specify the diagnosis or diagnoses associated with above clinical findings.    [   ] Acute Kidney Failure/Injury with Tubular Necrosis  Damage to the tubule cells of the kidney. Common triggers: shock, hypotension, IV contrast, rhabdomyolysis, medications   [   ] Other Acute Kidney Failure/Injury (please specify): ____________     [  x ] Unspecified Acute Kidney Failure/Injury      [   ] Other (please specify): _________________________________   [   ]  Clinically Undetermined       Please document in your progress notes daily for the duration of treatment until resolved and include in your discharge summary.

## 2019-09-23 NOTE — ASSESSMENT & PLAN NOTE
The patient is on Ancef for MSSA.  Blood cultures from the 22nd are not showing any growth today.  Transthoracic echocardiogram did not show evidence of vegetations.  She will have a transesophageal echocardiogram done when she is able to cooperate.

## 2019-09-23 NOTE — PROGRESS NOTES
"[facility]     CC: Abdominal pain    HPI 63 y.o. female with history of chronic back pain on narcotics and urinary retention with intermittent self catheterizations presents with 3-4 days of acute, moderate to severe, constant, diffuse abdominal pain associated with non-bloody diarrhea and vomiting. Patient is admitted with sepsis from MSSA bacteremia. CT with incidental findings of wall thickening and pericolonic fat infiltration of entire colon except distal sigmoid. No signs of GI blood loss or diarrhea during hospitalization.    Interval history:  Patient remains altered and minimally responsive. Nursing states patient has not had a bowel movement since 9/19.     Past Medical History:   Diagnosis Date    Anxiety     Carotid bruit     Chronic pain     Cystitis     Cystocele, unspecified (CODE)     Depression     GI bleed     History of uterine fibroid     Shortness of breath on exertion     Spinal stenosis     Urinary retention      Review of Systems  Unable to obtain complete ROS    Physical Examination  /60   Pulse (!) 121   Temp 97.8 °F (36.6 °C) (Oral)   Resp (!) 38   Ht 5' 2" (1.575 m)   Wt 47.4 kg (104 lb 8 oz)   SpO2 (!) 93%   Breastfeeding? No   BMI 19.11 kg/m²   General appearance: altered , ill appearing  HENT: Normocephalic, atraumatic, neck symmetrical, no nasal discharge , dry mucous membranes  Lungs: coarse breath sounds bilaterally, no dullness to percussion bilaterally, symmetric expansion, increased respiratory rate   Heart: regular rate and rhythm without rub; no displacement of the PMI   Abdomen:soft, diffuse ttp without guarding, mild distention noted, BS faint, no masses appreciated   Extremities: extremities symmetric; no clubbing, cyanosis, or edema  Neurologic: altered,  no tremor, diffuse weakness, not responding to questions    Labs:  H/H 7.6/21.4  Plt 63    -Blood cultures-Staph aureus  -Urine culture- staph aureus    Imaging:  CT abdomen   -Acute " pancolitis.  -Airspace disease multiple areas in the lower lungs are seen and a right pleural effusion approximately 2 cm deep at the base    CT chest- Multiple primarily peripheral consolidated infiltrates bilaterally    Independently reviewed    Assessment:   63 y.o. female who presents with several days of nausea, vomiting and diarrhea, now in septic shock with bacteremia and UTI (Staph aureus sensitivities pending), bilateral pneumonia, multiple electrolyte abnormalities, ABIMBOLA and pancolitis. She is off Levophed and labs are improving however she remains altered and is now requiring BiPap. Unlikely pancolitis is primary source of infection.     Plan:  -No plans for endoscopy as inpatient, would plan as outpatient  -Bowel regimen while on narcotics with senna, colace, dulcolax suppository as needed    Tip Flannery MD  [facility]

## 2019-09-23 NOTE — CARE UPDATE
09/23/19 1844   Patient Assessment/Suction   Level of Consciousness (AVPU) alert   Respiratory Effort Mild;Labored   Expansion/Accessory Muscles/Retractions abdominal muscle use   All Lung Fields Breath Sounds diminished   PRE-TX-O2   O2 Device (Oxygen Therapy) nasal cannula w/ humidification   $ Is the patient on Low Flow Oxygen? Yes   Flow (L/min) 4   SpO2 95 %   Pulse Oximetry Type Continuous   $ Pulse Oximetry - Multiple Charge Pulse Oximetry - Multiple   Pulse (!) 120   Resp (!) 33   Aerosol Therapy   $ Aerosol Therapy Charges Aerosol Treatment   Respiratory Treatment Status (SVN) given   Treatment Route (SVN) mask   Patient Position (SVN) HOB elevated   Post Treatment Assessment (SVN) breath sounds unchanged   Signs of Intolerance (SVN) none   Breath Sounds Post-Respiratory Treatment   Throughout All Fields Post-Treatment no change   Post-treatment Heart Rate (beats/min) 121   Post-treatment Resp Rate (breaths/min) 33   Preset CPAP/BiPAP Settings   Mode Of Delivery Standby   $ Initial CPAP/BiPAP Setup? No   $ Is patient using? Other (see comments)  (pt on NC abraham well at this time.)

## 2019-09-23 NOTE — PLAN OF CARE
09/22/19 4294   Patient Assessment/Suction   Level of Consciousness (AVPU) responds to voice   Respiratory Effort Moderate   Expansion/Accessory Muscles/Retractions accessory muscle use   All Lung Fields Breath Sounds diminished;wheezes, expiratory   Rhythm/Pattern, Respiratory grunting;tachypneic  (moaning )   PRE-TX-O2   O2 Device (Oxygen Therapy) BiPAP   Oxygen Concentration (%) 40   SpO2 100 %   Pulse Oximetry Type Continuous   $ Pulse Oximetry - Multiple Charge Pulse Oximetry - Multiple   Pulse (!) 132   Resp (!) 34   Aerosol Therapy   $ Aerosol Therapy Charges Aerosol Treatment   Respiratory Treatment Status (SVN) given   Treatment Route (SVN) in-line  (BIPAP)   Patient Position (SVN) HOB elevated   Post Treatment Assessment (SVN) breath sounds unchanged   Signs of Intolerance (SVN) none   Breath Sounds Post-Respiratory Treatment   Throughout All Fields Post-Treatment All Fields   Throughout All Fields Post-Treatment no change   Post-treatment Heart Rate (beats/min) 120   Post-treatment Resp Rate (breaths/min) 33   Wound Care   $ Wound Care Tech Time 15 min   Area of Concern Cheek;Bridge of nose;Chin   Skin Color/Characteristics pale   Skin Temperature warm   Ready to Wean/Extubation Screen   FIO2<=50 (chart decimal) 0.4   Preset CPAP/BiPAP Settings   Mode Of Delivery BiPAP   $ CPAP/BiPAP Daily Charge BiPAP/CPAP Daily   $ Initial CPAP/BiPAP Setup? No   $ Is patient using? Yes   Size of Mask Small   Sized Appropriately? Yes   Equipment Type V60   Airway Device Type small full face mask   Humidifier not applicable   Ipap 15   EPAP (cm H2O) 5   Pressure Support (cm H2O) 10   ITime (sec) 1   Rise Time (sec) 2   Patient CPAP/BiPAP Settings   Timed Inspiration (Sec) 1   IPAP Rise Time (sec) 2   RR Total (Breaths/Min) 34   Tidal Volume (mL) 734   VE Minute Ventilation (L/min) 25.3 L/min   Peak Inspiratory Pressure (cm H2O) 17   TiTOT (%) 34   Total Leak (L/Min) 5   Patient Trigger - ST Mode Only (%) 100    CPAP/BiPAP Alarms   High Pressure (cm H2O) 27   Low Pressure (cm H2O) 6   Low Pressure Delay (Sec) 20   Minute Ventilation (L/Min) 3   High RR (breaths/min) 55   Low RR (breaths/min) 8

## 2019-09-23 NOTE — PLAN OF CARE
Plan of care reviewed.  Pt on continuous bipap.  Bicarb infusing @ 125cc/hr. Prn morphine adm x2, ativan x1, and called for dilaudid x 2, relief noted with dilaudid, but only temporary.Positive blood cultures from 9/22, pediatric tube resulted with gram positive cocci in clusters, resembling staph.  Updated NP with lab results. Henriquez draining dark yellow urine, 1060cc this shift.  Safety maintained with frequent checks, no falls or injuries this shift.  Will continue to monitor.

## 2019-09-23 NOTE — CONSULTS
"  [facility]  Adult Nutrition  Consult Note    SUMMARY    Intervention: to be determined     Recommendation:  1) Advance PO diet as medically able to goal of regular in < 48 hours     2) If unable to advance PO diet in < 48 hours consider NGT placement for TF:   Nutren 1.5 @ 20 ml/hr advancing slowly ( Pt at risk for refeeding syndrome) to goal of 35 ml/hr + 150-175 ml flush q 4 hr   ( provides 1260 kcal ( 99% EEN), 57 g protein ( > 100% EPN), and 638 ml free water)      -OR PPN D 5 AA 2.75 @ 65 ml/hr + standard lipids until able to use gut/ advance PO     3) Weigh pt weekly   4) Follow lytes closely and replete if needed, replete folate/iroin if needed     Goals: 1) po diet advanced or nutrition support initiated in < 48 hours  Nutrition Goal Status: new  Communication of RD Recs: (POC, sticky note, reviewed with RN/MD)    Reason for Assessment    Reason For Assessment: consult  Diagnosis: (staph arueus bacteremia/sepsis)  Relevant Medical History: None applicable  Interdisciplinary Rounds: attended    General Information Comments: 64 y/o female with N/V/D x 5-6 days PTA here with aspiration pneumonia, ABIMBOLA, dehydration, metabolic encephalopathy. On bipap since admission NPO x 3 days. SLP unable to evaluate while pt on bipap. NFPE done 9/23/19, mild-mopderate wasting seen. Slow insignificant wt loss per chart review.     Nutrition Discharge Planning: To be determined- regular diet    Nutrition Risk Screen    Nutrition Risk Screen: no indicators present    Nutrition/Diet History    Spiritual, Cultural Beliefs, Alevism Practices, Values that Affect Care: no  Food Allergies: NKFA  Factors Affecting Nutritional Intake: other (see comments), NPO(bipap)    Anthropometrics    Temp: 97.9 °F (36.6 °C)  Height Method: Measured  Height: 5' 2"  Height (inches): 62 in  Weight Method: Bed Scale  Weight: 47.4 kg (104 lb 8 oz)  Weight (lb): 104.5 lb  Ideal Body Weight (IBW), Female: 110 lb  % Ideal Body " Weight, Female (lb): 95 lb  BMI (Calculated): 19.2  BMI Grade: 18.5-24.9 - normal  Usual Body Weight (UBW), k.2 kg(13)  Weight Change Amount: (51.3 kg 18)  % Usual Body Weight: 77.61  % Weight Change From Usual Weight: -22.55 %       Lab/Procedures/Meds    Pertinent Labs Reviewed: reviewed  BMP  Lab Results   Component Value Date     2019     2019    K 3.0 (L) 2019    K 3.0 (L) 2019     2019     2019    CO2 22 (L) 2019    CO2 22 (L) 2019    BUN 27 (H) 2019    BUN 27 (H) 2019    CREATININE 0.8 2019    CREATININE 0.8 2019    CALCIUM 7.2 (L) 2019    CALCIUM 7.2 (L) 2019    ANIONGAP 11 2019    ANIONGAP 11 2019    ESTGFRAFRICA >60 2019    ESTGFRAFRICA >60 2019    EGFRNONAA >60 2019    EGFRNONAA >60 2019     Lab Results   Component Value Date    ALBUMIN 1.4 (L) 2019     Lab Results   Component Value Date    IRON 48 2019    TIBC 460 (H) 2019    FERRITIN 89 2019     Lab Results   Component Value Date    FOLATE 5.35 (L) 2019     Mg 1.5 mg/dl 19    Pertinent Medications Reviewed: reviewed  Pertinent Medications Comments: KCl, zofran    Estimated/Assessed Needs    Weight Used For Calorie Calculations: 47.4 kg (104 lb 8 oz)  Energy Calorie Requirements (kcal): [employer] ( x 1.3-1.4 wt maintenance) = 5890-2645 kcal  Energy Need Method: [employer]  Protein Requirements: 0.8-0.9 g protein/kg ( ABIMBOLA vs. mild-moderate muscel loss) = 38-42 g protein)  Weight Used For Protein Calculations: 47.4 kg (104 lb 8 oz)  Fluid Requirements (mL): Urine output + 500 ml  Estimated Fluid Requirement Method: RDA Method  CHO Requirement: N/A      Nutrition Prescription Ordered    Current Diet Order: NPO  Nutrition Order Comments: x 3 days ( + poor PO x 5-6 days PTA)    Evaluation of Received Nutrient/Fluid Intake    Energy Calories Required: not  meeting needs  Protein Required: not meeting needs  Fluid Required: not meeting needs  Tolerance: other (see comments)(NPO)  % Intake of Estimated Energy Needs: 0%  % Meal Intake: 0%    Nutrition Risk    Level of Risk/Frequency of Follow-up: moderate - high 2 x weekly    Assessment and Plan    Severe malnutrition  Contributing Nutrition Diagnosis  Severe acute illness related malnutrition    Related to (etiology):   N/V/D, decreased appetite and increased energy needs d/t illness    Signs and Symptoms (as evidenced by):   1) PO intakes < 50% EEN x 8 days  2) Mild-moderate muscle/fat wasting as charted below    Interventions/Recommendations (treatment strategy):  Above    Nutrition Diagnosis Status:   New           Monitor and Evaluation    Food and Nutrient Intake: energy intake  Food and Nutrient Adminstration: diet order  Anthropometric Measurements: weight  Biochemical Data, Medical Tests and Procedures: electrolyte and renal panel, gastrointestinal profile  Nutrition-Focused Physical Findings: overall appearance     Malnutrition Assessment  Malnutrition Type: acute illness or injury  Energy Intake: severe energy intake  Skin (Micronutrient): (Alcides = 15, No PI noted)  Teeth (Micronutrient): (missing some)   Micronutrient Evaluation: suspected deficiency  Micronutrient Evaluation Comments: check iron. replete Mg   Weight Loss (Malnutrition): (22% x 6 years)  Energy Intake (Malnutrition): less than or equal to 50% for greater than or equal to 5 days   Orbital Region (Subcutaneous Fat Loss): mild depletion  Upper Arm Region (Subcutaneous Fat Loss): mild depletion  Thoracic and Lumbar Region: mild depletion   [place] Region (Muscle Loss): moderate depletion  Clavicle Bone Region (Muscle Loss): moderate depletion  Clavicle and Acromion Bone Region (Muscle Loss): moderate depletion  Scapular Bone Region (Muscle Loss): mild depletion  Dorsal Hand (Muscle Loss): mild depletion  Patellar Region (Muscle Loss): mild  depletion  Anterior Thigh Region (Muscle Loss): mild depletion  Posterior Calf Region (Muscle Loss): mild depletion   Edema (Fluid Accumulation): 0-->no edema present             Nutrition Follow-Up    RD Follow-up?: Yes

## 2019-09-23 NOTE — PLAN OF CARE
Intervention: to be determined     Recommendation:  1) Advance PO diet as medically able to goal of regular in < 48 hours     2) If unable to advance PO diet in < 48 hours consider NGT placement for TF:   Nutren 1.5 @ 20 ml/hr advancing slowly ( Pt at risk for refeeding syndrome) to goal of 35 ml/hr + 150-175 ml flush q 4 hr   ( provides 1260 kcal ( 99% EEN), 57 g protein ( > 100% EPN), and 638 ml free water)      -OR PPN D 5 AA 2.75 @ 65 ml/hr + standard lipids until able to use gut/ advance PO     3) Weigh pt weekly   4) Follow lytes closely and replete if needed, replete folate/iroin if needed     Goals: 1) po diet advanced or nutrition support initiated in < 48 hours  Nutrition Goal Status: new  Communication of CM Recs: (POC, sticky note, reviewed with RN/MD)

## 2019-09-23 NOTE — ASSESSMENT & PLAN NOTE
This is secondary to pneumonia which is probably secondary to septic emboli.  She is currently on nasal cannula.

## 2019-09-23 NOTE — PROGRESS NOTES
Progress Note  PULMONARY    Admit Date: 9/19/2019 09/23/2019      SUBJECTIVE:     Sept 21, diffuse pains, off levo, abg better.    Sept 22, on niv, arouses sl, no c/o  Sept 23, moans and groans, on and off bipap, agitated, restless  PFSH and Allergies reviewed.    OBJECTIVE:     Vitals (Most recent):  Vitals:    09/23/19 0707   BP:    Pulse: (!) 115   Resp: (!) 31   Temp:        Vitals (24 hour range):  Temp:  [98.3 °F (36.8 °C)-99.9 °F (37.7 °C)]   Pulse:  [104-138]   Resp:  [24-47]   BP: (107-169)/(53-77)   SpO2:  [93 %-100 %]       Intake/Output Summary (Last 24 hours) at 9/23/2019 0723  Last data filed at 9/23/2019 0600  Gross per 24 hour   Intake 3747.91 ml   Output 1995 ml   Net 1752.91 ml          Physical Exam:  The patient's neuro status (alertness,orientation,cognitive function,motor skills,), pharyngeal exam (oral lesions, hygiene, abn dentition,), Neck (jvd,mass,thyroid,nodes in neck and above/below clavicle),RESPIRATORY(symmetry,effort,fremitus,percussion,auscultation),  Cor(rhythm,heart tones including gallops,perfusion,edema)ABD(distention,hepatic&splenomegaly,tenderness,masses), Skin(rash,cyanosis),Psyc(affect,judgement,).  Exam negative except for these pertinent findings:    Sl encephalopathy, arouses- agitated, chest is symmetric, no distress, normal percussion, normal fremitus and good normal breath sounds, no edema     Radiographs reviewed: view by direct vision 23rd cxr  Stable effuion on right, patchy infiltrates similar to ct on 20th, effusion seen on ct 20th        Labs     Recent Labs   Lab 09/23/19  0316   WBC 7.72   HGB 7.6*   HCT 21.4*   PLT 63*     Recent Labs   Lab 09/22/19  1730  09/23/19  0316     137   < > 138  138  138   K 4.0  4.0   < > 3.5  3.5  3.5     106   < > 106  106  106   CO2 21*  21*   < > 22*  22*  22*   BUN 28*  28*   < > 26*  26*  26*   CREATININE 0.8  0.8   < > 0.8  0.8  0.8   GLU 96  96   < > 105  105  105   CALCIUM 7.7*  7.7*   <  > 7.4*  7.4*  7.4*   AST  --   --  15   ALT  --   --  9*   ALKPHOS  --   --  52*   BILITOT  --   --  0.5   BILIDIR  --   --  0.3   PROT  --   --  4.1*   ALBUMIN  --   --  1.4*   INR  --   --  2.8*   LACTATE 1.5  --   --     < > = values in this interval not displayed.     Recent Labs   Lab 09/22/19  1619   PH 7.488*   PCO2 27.0*   PO2 69*   HCO3 20.5*     Microbiology Results (last 7 days)     Procedure Component Value Units Date/Time    Blood culture [[id number]] Collected:  09/22/19 0338    Order Status:  Completed Specimen:  Blood from Antecubital, Left  Arm Updated:  09/23/19 0446     Blood Culture, Routine Gram stain peds bottle: Gram positive cocci in clusters resembling Staph       Results called to and read back by:Yolis Wan RN 09/23/2019  04:46    Blood culture [[id number]] Collected:  09/21/19 0534    Order Status:  Completed Specimen:  Blood Updated:  09/22/19 1422     Blood Culture, Routine Gram stain peds bottle: Gram positive cocci in clusters resembling Staph       Results called to and read back by:Javi Sunshine RN 09/22/2019  14:22    Blood culture [[id number]]  (Abnormal) Collected:  09/20/19 1739    Order Status:  Completed Specimen:  Blood from Line, Central Updated:  09/22/19 1116     Blood Culture, Routine Gram stain aer bottle: Gram positive cocci in clusters resembling Staph       Results called to and read back by:Rhea Lai RN 09/21/2019  16:02      STAPHYLOCOCCUS AUREUS  ID consult required at [facility] and [facility].  For susceptibility see order #[id number]      Clostridium difficile EIA [[id number]]     Order Status:  Canceled Specimen:  Stool     Stool culture [[id number]]     Order Status:  No result Specimen:  Stool       Results for ROM YOUNG (MRN [id number]) as of 9/23/2019 07:24   Ref. Range 9/22/2019 16:19   POC PH Latest Ref Range: 7.35 - 7.45  7.488 (H)   POC PCO2 Latest Ref Range: 35 - 45 mmHg 27.0 (LL)   POC PO2 Latest Ref Range: 80 - 100  mmHg 69 (L)   POC BE Latest Ref Range: -2 to 2 mmol/L -3   POC HCO3 Latest Ref Range: 24 - 28 mmol/L 20.5 (L)   POC SATURATED O2 Latest Ref Range: 95 - 100 % 95       Impression:  Active Hospital Problems    Diagnosis  POA    *Staphylococcus aureus bacteremia with sepsis [A41.01]  Yes    Coagulopathy [D68.9]  Yes    ILD (interstitial lung disease) [J84.9]  Yes    COPD with acute lower respiratory infection [J44.0]  Yes    Calcification of aorta [I70.0]  Yes     Defer to primary control of cholesterol and bp.          Thrombocytopenia [D69.6]  Yes    Pleural effusion [J90]  Yes    Acute hypoxemic respiratory failure [J96.01]  Yes    BOOP (bronchiolitis obliterans with organizing pneumonia) [J84.89]  Yes    ABIMBOLA (acute kidney injury) [N17.9]  Yes    Intravascular volume depletion [E86.1]  Yes    Pancolitis [K51.00]  Yes    Hyponatremia [E87.1]  Yes    Hypokalemia [E87.6]  Yes    Metabolic acidosis [E87.2]  Yes    CAP (community acquired pneumonia) [J18.9]  Yes      Resolved Hospital Problems   No resolved problems to display.               Plan:   Sept 21, wbc now 9k, creat down to 1.2, pt uses narcotics chr - be - 10 last pm from -15 on 19th,  F/u cxr- dose ativan.  Hard to evaluate- seems much better save pain c/o. F/u cxr am  Sept 22, seems better but hard to eval- certainly improved from admit.  Monitor, support/niv, abx, re eval.  No change.   Sept 23 - MSSA, right effusion stable with inr over 2 and plt 60's- no plan to tap at this time. Infiltrates better, encephalopathy still impressive= niv/bipap for part resp and part agitation.    Pt doesn't have cryptogenic organizing pneumonia/idiopathetic BOOP.  Pt had diastolic chf acute and chr.  Also pulm hypertension.  Has dic also- looked good on ct abd.                        .

## 2019-09-23 NOTE — PHYSICIAN QUERY
"PT Name: Mesha Mckenzie  MR #: [id number]    Physician Query Form - Consultant Diagnosis Clarification     CDS: Felicita Guerrier RN, CCDS         Contact information :[phone]  [email or web address]     This form is a permanent document in the medical record.     Query Date: September 23, 2019      By submitting this query, we are merely seeking further clarification of documentation.  Please utilize your independent clinical judgment when addressing the question(s) below.      The Medical record contains the following:   Diagnosis Supporting Clinical Information Location in Medical Record     "Severe malnutrition"         "BMI (Calculated): 19.2  "1) PO intakes < 50% EEN x 8 days  2) Mild-moderate muscle/fat wasting as charted below"  "Weight Loss (Malnutrition): (22% x 6 years)  Energy Intake (Malnutrition): less than or equal to 50% for greater than or equal to 5 days "  "Current Diet Order: NPO  Nutrition Order Comments: x 3 days ( + poor PO x 5-6 days PTA)"   RD consult 9/23/19      RD consult 9/23/19         Do you agree with the Consultants diagnosis of __Severe malnutrition__?    [  x ] Yes   [   ] No   [   ] Other/Clarification of findings:   [  ] Clinically undetermined                      "

## 2019-09-23 NOTE — SUBJECTIVE & OBJECTIVE
Interval History:  The patient will open her eyes and follows simple commands.  She appears very uncomfortable but cannot tell me where she is having pain    Review of Systems   Unable to perform ROS: Mental status change     Objective:     Vital Signs (Most Recent):  Temp: 97.9 °F (36.6 °C) (09/23/19 1145)  Pulse: (!) 123 (09/23/19 1231)  Resp: (!) 36 (09/23/19 1231)  BP: (!) 143/68 (09/23/19 1145)  SpO2: 100 % (09/23/19 1231) Vital Signs (24h Range):  Temp:  [97.8 °F (36.6 °C)-99.9 °F (37.7 °C)] 97.9 °F (36.6 °C)  Pulse:  [104-138] 123  Resp:  [24-47] 36  SpO2:  [91 %-100 %] 100 %  BP: (107-163)/(53-89) 143/68     Weight: 47.4 kg (104 lb 8 oz)  Body mass index is 19.11 kg/m².    Intake/Output Summary (Last 24 hours) at 9/23/2019 1312  Last data filed at 9/23/2019 1145  Gross per 24 hour   Intake 3647.91 ml   Output 2570 ml   Net 1077.91 ml      Physical Exam   Cardiovascular: Regular rhythm, normal heart sounds and intact distal pulses.   Tachycardic   Pulmonary/Chest: No respiratory distress. She has no wheezes. She has rales.   Tachypneic   Abdominal: Soft. Bowel sounds are normal. She exhibits no distension. There is no tenderness.   Musculoskeletal: She exhibits tenderness. She exhibits no edema.   Both knees and ankles are tender to palpation   Neurological: She is alert. No cranial nerve deficit. Coordination normal.   The patient is awake and alert but extremely anxious and in pain   Skin: Skin is warm and dry. Capillary refill takes less than 2 seconds. No rash noted.   Nursing note and vitals reviewed.      Significant Labs:   CBC:   Recent Labs   Lab 09/22/19  0332 09/23/19  0316   WBC 10.57 7.72   HGB 8.8* 7.6*   HCT 24.1* 21.4*   PLT 90* 63*     CMP:   Recent Labs   Lab 09/22/19  0332  09/23/19  0006 09/23/19  0316 09/23/19  0748   *  135*   < > 138  138 138  138  138 139  139   K 3.9  3.9   < > 3.6  3.6 3.5  3.5  3.5 3.0*  3.0*     107   < > 107  107 106  106  106 106  106    CO2 19*  19*   < > 21*  21* 22*  22*  22* 22*  22*     108   < > 103  103 105  105  105 109  109   BUN 34*  34*   < > 27*  27* 26*  26*  26* 27*  27*   CREATININE 0.8  0.8   < > 0.8  0.8 0.8  0.8  0.8 0.8  0.8   CALCIUM 8.0*  8.0*   < > 7.3*  7.3* 7.4*  7.4*  7.4* 7.2*  7.2*   PROT 4.8*  --   --  4.1*  --    ALBUMIN 1.5*  --   --  1.4*  --    BILITOT 0.7  --   --  0.5  --    ALKPHOS 56  --   --  52*  --    AST 17  --   --  15  --    ALT 12  --   --  9*  --    ANIONGAP 9  9   < > 10  10 10  10  10 11  11   EGFRNONAA >60  >60   < > >60  >60 >60  >60  >60 >60  >60    < > = values in this interval not displayed.       Significant Imaging: I have reviewed all pertinent imaging results/findings within the past 24 hours.

## 2019-09-24 LAB
ABO + RH BLD: NORMAL
ALBUMIN SERPL BCP-MCNC: 1.4 G/DL (ref 3.5–5.2)
ALP SERPL-CCNC: 57 U/L (ref 55–135)
ALT SERPL W/O P-5'-P-CCNC: <5 U/L (ref 10–44)
ANION GAP SERPL CALC-SCNC: 11 MMOL/L (ref 8–16)
AST SERPL-CCNC: 15 U/L (ref 10–40)
BACTERIA BLD CULT: ABNORMAL
BASOPHILS # BLD AUTO: 0 K/UL (ref 0–0.2)
BASOPHILS NFR BLD: 0 % (ref 0–1.9)
BILIRUB DIRECT SERPL-MCNC: 0.3 MG/DL (ref 0.1–0.3)
BILIRUB SERPL-MCNC: 0.5 MG/DL (ref 0.1–1)
BLD GP AB SCN CELLS X3 SERPL QL: NORMAL
BUN SERPL-MCNC: 24 MG/DL (ref 8–23)
CALCIUM SERPL-MCNC: 6.8 MG/DL (ref 8.7–10.5)
CHLORIDE SERPL-SCNC: 103 MMOL/L (ref 95–110)
CO2 SERPL-SCNC: 27 MMOL/L (ref 23–29)
CREAT SERPL-MCNC: 0.8 MG/DL (ref 0.5–1.4)
DIFFERENTIAL METHOD: ABNORMAL
EOSINOPHIL # BLD AUTO: 0 K/UL (ref 0–0.5)
EOSINOPHIL NFR BLD: 0.4 % (ref 0–8)
ERYTHROCYTE [DISTWIDTH] IN BLOOD BY AUTOMATED COUNT: 15 % (ref 11.5–14.5)
EST. GFR  (AFRICAN AMERICAN): >60 ML/MIN/1.73 M^2
EST. GFR  (NON AFRICAN AMERICAN): >60 ML/MIN/1.73 M^2
FOLATE SERPL-MCNC: 2.5 NG/ML (ref 4–24)
GLUCOSE SERPL-MCNC: 119 MG/DL (ref 70–110)
HCT VFR BLD AUTO: 20 % (ref 37–48.5)
HGB BLD-MCNC: 7.4 G/DL (ref 12–16)
IMM GRANULOCYTES # BLD AUTO: 0.21 K/UL (ref 0–0.04)
INR PPP: 5.3 (ref 0.8–1.2)
LYMPHOCYTES # BLD AUTO: 0.9 K/UL (ref 1–4.8)
LYMPHOCYTES NFR BLD: 11.2 % (ref 18–48)
MAGNESIUM SERPL-MCNC: 1.1 MG/DL (ref 1.6–2.6)
MCH RBC QN AUTO: 30.8 PG (ref 27–31)
MCHC RBC AUTO-ENTMCNC: 37 G/DL (ref 32–36)
MCV RBC AUTO: 83 FL (ref 82–98)
MONOCYTES # BLD AUTO: 0.4 K/UL (ref 0.3–1)
MONOCYTES NFR BLD: 5.1 % (ref 4–15)
NEUTROPHILS # BLD AUTO: 6.7 K/UL (ref 1.8–7.7)
NEUTROPHILS NFR BLD: 80.8 % (ref 38–73)
NRBC BLD-RTO: 0 /100 WBC
PLATELET # BLD AUTO: 54 K/UL (ref 150–350)
PMV BLD AUTO: 11.1 FL (ref 9.2–12.9)
POTASSIUM SERPL-SCNC: 2.6 MMOL/L (ref 3.5–5.1)
PROT SERPL-MCNC: 4.8 G/DL (ref 6–8.4)
PROTHROMBIN TIME: 53.9 SEC (ref 9–12.5)
RBC # BLD AUTO: 2.4 M/UL (ref 4–5.4)
SODIUM SERPL-SCNC: 141 MMOL/L (ref 136–145)
VANCOMYCIN TROUGH SERPL-MCNC: 18 UG/ML (ref 10–22)
WBC # BLD AUTO: 8.29 K/UL (ref 3.9–12.7)

## 2019-09-24 PROCEDURE — 63600175 PHARM REV CODE 636 W HCPCS: Performed by: NURSE PRACTITIONER

## 2019-09-24 PROCEDURE — 63600175 PHARM REV CODE 636 W HCPCS: Performed by: INTERNAL MEDICINE

## 2019-09-24 PROCEDURE — 86901 BLOOD TYPING SEROLOGIC RH(D): CPT

## 2019-09-24 PROCEDURE — 94660 CPAP INITIATION&MGMT: CPT

## 2019-09-24 PROCEDURE — 94761 N-INVAS EAR/PLS OXIMETRY MLT: CPT

## 2019-09-24 PROCEDURE — 99233 SBSQ HOSP IP/OBS HIGH 50: CPT | Mod: ,,, | Performed by: INTERNAL MEDICINE

## 2019-09-24 PROCEDURE — 99233 SBSQ HOSP IP/OBS HIGH 50: CPT | Mod: S$GLB,,, | Performed by: INTERNAL MEDICINE

## 2019-09-24 PROCEDURE — 99233 PR SUBSEQUENT HOSPITAL CARE,LEVL III: ICD-10-PCS | Mod: S$GLB,,, | Performed by: INTERNAL MEDICINE

## 2019-09-24 PROCEDURE — S0030 INJECTION, METRONIDAZOLE: HCPCS | Performed by: NURSE PRACTITIONER

## 2019-09-24 PROCEDURE — 83921 ORGANIC ACID SINGLE QUANT: CPT

## 2019-09-24 PROCEDURE — 87077 CULTURE AEROBIC IDENTIFY: CPT

## 2019-09-24 PROCEDURE — 25000003 PHARM REV CODE 250: Performed by: NURSE PRACTITIONER

## 2019-09-24 PROCEDURE — 85025 COMPLETE CBC W/AUTO DIFF WBC: CPT

## 2019-09-24 PROCEDURE — 82746 ASSAY OF FOLIC ACID SERUM: CPT

## 2019-09-24 PROCEDURE — 83735 ASSAY OF MAGNESIUM: CPT

## 2019-09-24 PROCEDURE — 99233 PR SUBSEQUENT HOSPITAL CARE,LEVL III: ICD-10-PCS | Mod: ,,, | Performed by: INTERNAL MEDICINE

## 2019-09-24 PROCEDURE — B4185 PARENTERAL SOL 10 GM LIPIDS: HCPCS | Performed by: INTERNAL MEDICINE

## 2019-09-24 PROCEDURE — 84425 ASSAY OF VITAMIN B-1: CPT

## 2019-09-24 PROCEDURE — 87040 BLOOD CULTURE FOR BACTERIA: CPT

## 2019-09-24 PROCEDURE — 92610 EVALUATE SWALLOWING FUNCTION: CPT

## 2019-09-24 PROCEDURE — 99232 SBSQ HOSP IP/OBS MODERATE 35: CPT | Mod: ,,, | Performed by: INTERNAL MEDICINE

## 2019-09-24 PROCEDURE — 94640 AIRWAY INHALATION TREATMENT: CPT

## 2019-09-24 PROCEDURE — 99232 PR SUBSEQUENT HOSPITAL CARE,LEVL II: ICD-10-PCS | Mod: ,,, | Performed by: INTERNAL MEDICINE

## 2019-09-24 PROCEDURE — 86920 COMPATIBILITY TEST SPIN: CPT

## 2019-09-24 PROCEDURE — 87186 SC STD MICRODIL/AGAR DIL: CPT

## 2019-09-24 PROCEDURE — 27000221 HC OXYGEN, UP TO 24 HOURS

## 2019-09-24 PROCEDURE — 25000003 PHARM REV CODE 250: Performed by: INTERNAL MEDICINE

## 2019-09-24 PROCEDURE — 99900035 HC TECH TIME PER 15 MIN (STAT)

## 2019-09-24 PROCEDURE — 80202 ASSAY OF VANCOMYCIN: CPT

## 2019-09-24 PROCEDURE — 25000242 PHARM REV CODE 250 ALT 637 W/ HCPCS: Performed by: INTERNAL MEDICINE

## 2019-09-24 PROCEDURE — 80076 HEPATIC FUNCTION PANEL: CPT

## 2019-09-24 PROCEDURE — 84207 ASSAY OF VITAMIN B-6: CPT

## 2019-09-24 PROCEDURE — 80048 BASIC METABOLIC PNL TOTAL CA: CPT

## 2019-09-24 PROCEDURE — C9113 INJ PANTOPRAZOLE SODIUM, VIA: HCPCS | Performed by: NURSE PRACTITIONER

## 2019-09-24 PROCEDURE — 82787 IGG 1 2 3 OR 4 EACH: CPT

## 2019-09-24 PROCEDURE — 36415 COLL VENOUS BLD VENIPUNCTURE: CPT

## 2019-09-24 PROCEDURE — 85610 PROTHROMBIN TIME: CPT

## 2019-09-24 PROCEDURE — 20000000 HC ICU ROOM

## 2019-09-24 RX ORDER — FUROSEMIDE 10 MG/ML
20 INJECTION INTRAMUSCULAR; INTRAVENOUS ONCE
Status: COMPLETED | OUTPATIENT
Start: 2019-09-24 | End: 2019-09-24

## 2019-09-24 RX ORDER — HYDROMORPHONE HYDROCHLORIDE 2 MG/1
2 TABLET ORAL
Status: DISCONTINUED | OUTPATIENT
Start: 2019-09-24 | End: 2019-09-24

## 2019-09-24 RX ORDER — SODIUM CHLORIDE 9 MG/ML
INJECTION, SOLUTION INTRAVENOUS CONTINUOUS
Status: DISCONTINUED | OUTPATIENT
Start: 2019-09-24 | End: 2019-09-24

## 2019-09-24 RX ORDER — HYDROMORPHONE HYDROCHLORIDE 2 MG/ML
2 INJECTION, SOLUTION INTRAMUSCULAR; INTRAVENOUS; SUBCUTANEOUS ONCE
Status: DISCONTINUED | OUTPATIENT
Start: 2019-09-24 | End: 2019-09-24 | Stop reason: CLARIF

## 2019-09-24 RX ORDER — MAGNESIUM SULFATE HEPTAHYDRATE 40 MG/ML
2 INJECTION, SOLUTION INTRAVENOUS ONCE
Status: COMPLETED | OUTPATIENT
Start: 2019-09-24 | End: 2019-09-24

## 2019-09-24 RX ORDER — HYDROMORPHONE HYDROCHLORIDE 2 MG/ML
2 INJECTION, SOLUTION INTRAMUSCULAR; INTRAVENOUS; SUBCUTANEOUS
Status: DISCONTINUED | OUTPATIENT
Start: 2019-09-24 | End: 2019-09-25

## 2019-09-24 RX ORDER — POTASSIUM CHLORIDE 29.8 MG/ML
40 INJECTION INTRAVENOUS EVERY 4 HOURS
Status: COMPLETED | OUTPATIENT
Start: 2019-09-24 | End: 2019-09-24

## 2019-09-24 RX ADMIN — VANCOMYCIN HYDROCHLORIDE 750 MG: 750 INJECTION, POWDER, LYOPHILIZED, FOR SOLUTION INTRAVENOUS at 09:09

## 2019-09-24 RX ADMIN — HYDROMORPHONE HYDROCHLORIDE 2 MG: 2 INJECTION, SOLUTION INTRAMUSCULAR; INTRAVENOUS; SUBCUTANEOUS at 04:09

## 2019-09-24 RX ADMIN — CEFAZOLIN SODIUM 2 G: 2 SOLUTION INTRAVENOUS at 07:09

## 2019-09-24 RX ADMIN — HYDROMORPHONE HYDROCHLORIDE 2 MG: 2 INJECTION, SOLUTION INTRAMUSCULAR; INTRAVENOUS; SUBCUTANEOUS at 10:09

## 2019-09-24 RX ADMIN — CEFAZOLIN SODIUM 2 G: 2 SOLUTION INTRAVENOUS at 12:09

## 2019-09-24 RX ADMIN — METRONIDAZOLE 500 MG: 500 INJECTION, SOLUTION INTRAVENOUS at 02:09

## 2019-09-24 RX ADMIN — METRONIDAZOLE 500 MG: 500 INJECTION, SOLUTION INTRAVENOUS at 10:09

## 2019-09-24 RX ADMIN — MORPHINE SULFATE 4 MG: 4 INJECTION, SOLUTION INTRAMUSCULAR; INTRAVENOUS at 12:09

## 2019-09-24 RX ADMIN — FUROSEMIDE 20 MG: 10 INJECTION, SOLUTION INTRAVENOUS at 01:09

## 2019-09-24 RX ADMIN — SODIUM CHLORIDE: 0.9 INJECTION, SOLUTION INTRAVENOUS at 05:09

## 2019-09-24 RX ADMIN — ASCORBIC ACID, VITAMIN A PALMITATE, CHOLECALCIFEROL, THIAMINE HYDROCHLORIDE, RIBOFLAVIN-5 PHOSPHATE SODIUM, PYRIDOXINE HYDROCHLORIDE, NIACINAMIDE, DEXPANTHENOL, ALPHA-TOCOPHEROL ACETATE, VITAMIN K1, FOLIC ACID, BIOTIN, CYANOCOBALAMIN: 200; 3300; 200; 6; 3.6; 6; 40; 15; 10; 150; 600; 60; 5 INJECTION, SOLUTION INTRAVENOUS at 02:09

## 2019-09-24 RX ADMIN — HYDROMORPHONE HYDROCHLORIDE 2 MG: 2 INJECTION, SOLUTION INTRAMUSCULAR; INTRAVENOUS; SUBCUTANEOUS at 08:09

## 2019-09-24 RX ADMIN — POTASSIUM CHLORIDE 40 MEQ: 29.8 INJECTION, SOLUTION INTRAVENOUS at 09:09

## 2019-09-24 RX ADMIN — MORPHINE SULFATE 4 MG: 4 INJECTION, SOLUTION INTRAMUSCULAR; INTRAVENOUS at 05:09

## 2019-09-24 RX ADMIN — IPRATROPIUM BROMIDE AND ALBUTEROL SULFATE 3 ML: .5; 3 SOLUTION RESPIRATORY (INHALATION) at 07:09

## 2019-09-24 RX ADMIN — IPRATROPIUM BROMIDE AND ALBUTEROL SULFATE 3 ML: .5; 3 SOLUTION RESPIRATORY (INHALATION) at 12:09

## 2019-09-24 RX ADMIN — SOYBEAN OIL 250 ML: 20 INJECTION, SOLUTION INTRAVENOUS at 10:09

## 2019-09-24 RX ADMIN — POTASSIUM CHLORIDE 40 MEQ: 29.8 INJECTION, SOLUTION INTRAVENOUS at 05:09

## 2019-09-24 RX ADMIN — CEFAZOLIN SODIUM 2 G: 2 SOLUTION INTRAVENOUS at 01:09

## 2019-09-24 RX ADMIN — IRON SUCROSE 200 MG: 20 INJECTION, SOLUTION INTRAVENOUS at 08:09

## 2019-09-24 RX ADMIN — IPRATROPIUM BROMIDE AND ALBUTEROL SULFATE 3 ML: .5; 3 SOLUTION RESPIRATORY (INHALATION) at 06:09

## 2019-09-24 RX ADMIN — PANTOPRAZOLE SODIUM 40 MG: 40 INJECTION, POWDER, FOR SOLUTION INTRAVENOUS at 08:09

## 2019-09-24 RX ADMIN — HYDROMORPHONE HYDROCHLORIDE 2 MG: 2 INJECTION, SOLUTION INTRAMUSCULAR; INTRAVENOUS; SUBCUTANEOUS at 01:09

## 2019-09-24 RX ADMIN — MAGNESIUM SULFATE 2 G: 2 INJECTION INTRAVENOUS at 05:09

## 2019-09-24 RX ADMIN — METRONIDAZOLE 500 MG: 500 INJECTION, SOLUTION INTRAVENOUS at 05:09

## 2019-09-24 RX ADMIN — LORAZEPAM 1 MG: 2 INJECTION INTRAMUSCULAR; INTRAVENOUS at 02:09

## 2019-09-24 RX ADMIN — VANCOMYCIN HYDROCHLORIDE 750 MG: 750 INJECTION, POWDER, LYOPHILIZED, FOR SOLUTION INTRAVENOUS at 08:09

## 2019-09-24 RX ADMIN — IPRATROPIUM BROMIDE AND ALBUTEROL SULFATE 3 ML: .5; 3 SOLUTION RESPIRATORY (INHALATION) at 01:09

## 2019-09-24 NOTE — ASSESSMENT & PLAN NOTE
The patient is on Ancef and vancomycin for MSSA.  Blood cultures from the 22nd are not showing any growth.  Transthoracic echocardiogram did not show evidence of vegetations.  She will have a transesophageal echocardiogram done when she is able to cooperate.

## 2019-09-24 NOTE — ASSESSMENT & PLAN NOTE
Secondary to DIC?  Hematology evaluated the patient.  They do not recommend transfusions at this time since there is no active bleeding

## 2019-09-24 NOTE — PLAN OF CARE
VSS.  On 4LNC.  Pt's breathing much improved with dilaudid; changed PRN morphine to dilaudid today.  She has not gone back on BiPAP.  Good UO.  Swallow study conducted; mentation not great enough to begin eating meals.  Will try again tomorrow.  Pt able to have a couple of bites of ice, applesauce, jello without issue.  TPN started today.  Electrolytes replaced.  Pt able to verbalize better; oriented to self and time.  She is moving extremities; able to hold a spoon.  POC discussed with pt, spouse, and daughter.  Safety maintained entire shift.

## 2019-09-24 NOTE — PROGRESS NOTES
Progress Note  PULMONARY    Admit Date: 9/19/2019 09/24/2019      SUBJECTIVE:     Sept 21, diffuse pains, off levo, abg better.    Sept 22, on niv, arouses sl, no c/o  Sept 23, moans and groans, on and off bipap, agitated, restless  Sept 24, moans, agitation varies-patient says she is better today, seems brighter      PFSH and Allergies reviewed.    OBJECTIVE:     Vitals (Most recent):  Vitals:    09/24/19 0645   BP: 135/65   Pulse: (!) 111   Resp: (!) 32   Temp:        Vitals (24 hour range):  Temp:  [97.6 °F (36.4 °C)-99.3 °F (37.4 °C)]   Pulse:  [104-131]   Resp:  [17-69]   BP: (120-170)/(55-89)   SpO2:  [90 %-100 %]       Intake/Output Summary (Last 24 hours) at 9/24/2019 0741  Last data filed at 9/24/2019 0640  Gross per 24 hour   Intake 3730 ml   Output 3155 ml   Net 575 ml          Physical Exam:  The patient's neuro status (alertness,orientation,cognitive function,motor skills,), pharyngeal exam (oral lesions, hygiene, abn dentition,), Neck (jvd,mass,thyroid,nodes in neck and above/below clavicle),RESPIRATORY(symmetry,effort,fremitus,percussion,auscultation),  Cor(rhythm,heart tones including gallops,perfusion,edema)ABD(distention,hepatic&splenomegaly,tenderness,masses), Skin(rash,cyanosis),Psyc(affect,judgement,).  Exam negative except for these pertinent findings:    Less encephalopathy, minimal agitated, chest is symmetric, no distress, normal percussion, normal fremitus and good normal breath sounds, no edema     Radiographs reviewed: view by direct vision 24 infiltrates better, right effusion still.       Labs     Recent Labs   Lab 09/24/19  0315   WBC 8.29   HGB 7.4*   HCT 20.0*   PLT 54*     Recent Labs   Lab 09/24/19  0315      K 2.6*      CO2 27   BUN 24*   CREATININE 0.8   *   CALCIUM 6.8*   MG 1.1*   AST 15   ALT <5*   ALKPHOS 57   BILITOT 0.5   BILIDIR 0.3   PROT 4.8*   ALBUMIN 1.4*   INR 5.3*     Recent Labs   Lab 09/23/19  1256   PH 7.540*   PCO2 26.9*   PO2 75*   HCO3  23.0*     Microbiology Results (last 7 days)     Procedure Component Value Units Date/Time    Blood culture [[id number]] Collected:  09/24/19 0517    Order Status:  Sent Specimen:  Blood Updated:  09/24/19 0517    Blood culture [[id number]]  (Abnormal) Collected:  09/20/19 1739    Order Status:  Completed Specimen:  Blood from Line, Central Updated:  09/23/19 0939     Blood Culture, Routine Gram stain aer bottle: Gram positive cocci in clusters resembling Staph       Results called to and read back by:Rhea Lai RN 09/21/2019  16:02      STAPHYLOCOCCUS AUREUS  ID consult required at [facility].  For susceptibility see order #[id number]      Blood culture [[id number]]  (Abnormal) Collected:  09/21/19 0534    Order Status:  Completed Specimen:  Blood Updated:  09/23/19 0850     Blood Culture, Routine Gram stain peds bottle: Gram positive cocci in clusters resembling Staph       Results called to and read back by:Javi Sunshine RN 09/22/2019  14:22      STAPHYLOCOCCUS AUREUS  ID consult required at [facility].  For susceptibility see order #[id number]      Blood culture [[id number]] Collected:  09/22/19 0338    Order Status:  Completed Specimen:  Blood from Antecubital, Left  Arm Updated:  09/23/19 0446     Blood Culture, Routine Gram stain peds bottle: Gram positive cocci in clusters resembling Staph       Results called to and read back by:Yolis Wan RN 09/23/2019  04:46    Clostridium difficile EIA [[id number]]     Order Status:  Canceled Specimen:  Stool     Stool culture [[id number]]     Order Status:  No result Specimen:  Stool       Results for HA YOUNGA S (MRN [id number]) as of 9/23/2019 07:24   Ref. Range 9/22/2019 16:19   POC PH Latest Ref Range: 7.35 - 7.45  7.488 (H)   POC PCO2 Latest Ref Range: 35 - 45 mmHg 27.0 (LL)   POC PO2 Latest Ref Range: 80 - 100 mmHg 69 (L)   POC BE Latest Ref Range: -2 to 2 mmol/L -3   POC HCO3  Latest Ref Range: 24 - 28 mmol/L 20.5 (L)   POC SATURATED O2 Latest Ref Range: 95 - 100 % 95       Impression:  Active Hospital Problems    Diagnosis  POA    *Staphylococcus aureus bacteremia with sepsis [A41.01]  Yes    Encephalopathy, metabolic [G93.41]  No    Normocytic anemia [D64.9]  Yes    Severe malnutrition [E43]  Unknown    Coagulopathy [D68.9]  Yes    Thrombocytopenia [D69.6]  Yes    Pleural effusion [J90]  Yes    Acute hypoxemic respiratory failure [J96.01]  Yes    ABIMBOLA (acute kidney injury) [N17.9]  Yes    Pancolitis [K51.00]  Yes    Metabolic acidosis [E87.2]  Yes      Resolved Hospital Problems    Diagnosis Date Resolved POA    Pulmonary hypertension [I27.20] 09/23/2019 Yes    Acute on chronic diastolic congestive heart failure [I50.33] 09/23/2019 Yes    DIC (disseminated intravascular coagulation) [D65] 09/23/2019 Yes    ILD (interstitial lung disease) [J84.9] 09/23/2019 Yes    COPD with acute lower respiratory infection [J44.0] 09/23/2019 Yes    Calcification of aorta [I70.0] 09/23/2019 Yes     Defer to primary control of cholesterol and bp.          BOOP (bronchiolitis obliterans with organizing pneumonia) [J84.89] 09/23/2019 Yes    Intravascular volume depletion [E86.1] 09/23/2019 Yes    Hyponatremia [E87.1] 09/23/2019 Yes    Hypokalemia [E87.6] 09/23/2019 Yes    CAP (community acquired pneumonia) [J18.9] 09/23/2019 Yes               Plan:   Sept 21, wbc now 9k, creat down to 1.2, pt uses narcotics chr - be - 10 last pm from -15 on 19th,  F/u cxr- dose ativan.  Hard to evaluate- seems much better save pain c/o. F/u cxr am  Sept 22, seems better but hard to eval- certainly improved from admit.  Monitor, support/niv, abx, re eval.  No change.   Sept 23 - MSSA, right effusion stable with inr over 2 and plt 60's- no plan to tap at this time. Infiltrates better, encephalopathy still impressive= niv/bipap for part resp and part agitation.    Pt doesn't have cryptogenic organizing  pneumonia/idiopathetic BOOP.  Pt had diastolic chf acute and chr.  Also pulm hypertension.  Has dic also- looked good on ct abd.    Sept 24- inr now over 5 with plt 50's,cxr better.    At the bedside patient is much better.  Coagulopathies impressive.  Suspect liver failure (?).  Patient apparently does better with Dilaudid over morphine.  Will change  To Dilaudid                    .

## 2019-09-24 NOTE — PLAN OF CARE
09/24/19 0030   Patient Assessment/Suction   Level of Consciousness (AVPU) alert   Respiratory Effort Labored   Expansion/Accessory Muscles/Retractions abdominal muscle use;accessory muscle use   All Lung Fields Breath Sounds diminished   CANDACE Breath Sounds wheezes, expiratory   PRE-TX-O2   O2 Device (Oxygen Therapy) BiPAP   $ Is the patient on Low Flow Oxygen? Yes   Oxygen Concentration (%) 40   SpO2 96 %   Pulse Oximetry Type Continuous   $ Pulse Oximetry - Multiple Charge Pulse Oximetry - Multiple   Pulse (!) 118   Resp (!) 33   Aerosol Therapy   $ Aerosol Therapy Charges Aerosol Treatment   Respiratory Treatment Status (SVN) given   Treatment Route (SVN) in-line   Patient Position (SVN) HOB elevated   Post Treatment Assessment (SVN) breath sounds unchanged   Signs of Intolerance (SVN) none   Breath Sounds Post-Respiratory Treatment   Throughout All Fields Post-Treatment no change   Post-treatment Heart Rate (beats/min) 118   Post-treatment Resp Rate (breaths/min) 33   Wound Care   $ Wound Care Tech Time 15 min   Area of Concern Cheek;Bridge of nose;Forehead   Skin Color/Characteristics shiny  (red)   Skin Temperature warm   Was wound care notified? No   Barrier Changed? Yes  (inital wound care with barriere and cream to nose placed)   Ready to Wean/Extubation Screen   FIO2<=50 (chart decimal) 0.4   Preset CPAP/BiPAP Settings   Mode Of Delivery BiPAP   $ CPAP/BiPAP Daily Charge BiPAP/CPAP Daily   $ Initial CPAP/BiPAP Setup? No   $ Is patient using? Yes   Size of Mask Small   Sized Appropriately? Yes   Equipment Type V60   Ipap 15   EPAP (cm H2O) 5   Pressure Support (cm H2O) 10   Patient CPAP/BiPAP Settings   Timed Inspiration (Sec) 1   IPAP Rise Time (sec) 0.2   RR Total (Breaths/Min) 34   Tidal Volume (mL) 568   VE Minute Ventilation (L/min) 20.3 L/min   Peak Inspiratory Pressure (cm H2O) 18   TiTOT (%) 28   Total Leak (L/Min) 0   Patient Trigger - ST Mode Only (%) 100   CPAP/BiPAP Alarms   High Pressure (cm  H2O) 20   Low Pressure (cm H2O) 10   Minute Ventilation (L/Min) 3   High RR (breaths/min) 50   Low RR (breaths/min) 8   mepilex and barrier cream placed on nose at this time. With setting up bipap

## 2019-09-24 NOTE — PROGRESS NOTES
TPN recommendations sent to MD as pt with central line and SLP continues to recommend NPO, small trials of solids only.    TPN D 15 AA 5 @ 50 ml/hr + standard lipids  ( Provides 1355 kcal ( 100% EEN), and 60 g protein ( > 100% EPN))

## 2019-09-24 NOTE — PROGRESS NOTES
Consult Note  Infectious Disease    Reason for Consult:  sepsis    HPI: Mesha Mckenzie is a critically ill 63 y.o. female , tranferred from [facility] where she presented with 4 days of nausea and vomiting, found to have ABIMBOLA, leukocytosis, hypotension, multiple pulmonary infiltrates, pancolitis on CT, metabolic acidosis and altered mental status. She was transferred for multispecialty care. Since admission, she has received vanc, levaquin and flagyl, still requires pressors, has 3/4 blood cultures with GPC in clusters, Ct chest with multiple foci of infiltrates including some that look like septic pulmonary emboli, elevated coags without bleeding and no further diarrheal stools. Her chemistries are responding to fluid and supplementation but her mental status remains very abnormal. She is in pain from her abdomen. It is unclear from notes and my conversation with daughter whether her respiratory symptoms came first or the GI symptoms came first. She has no history of STaph infection, no recent antibiotics, but does smoke and take pain meds, valium and adderall chronically.      9/21: blood cultures with STaph aureus, sensitivities pending. Blood cultures from yesterday are negative so far. No change in oxygen needs. No longer on pressors. Less responsive. TTE unofficially abnormal. Staph in urine is likely from the bacteremia. No family at bedside.   9/22: CT head with no significant changes. Mental status no better. Still no BM since admit. CXR slightly better. Blood cultures from yesterday negative and original is MSSA. coags still elevated.   Cr now normal.   9/23: afebrile. Blood cultures are persistently positive. Mental status is much improved and she can answer questions. She continues to moan and groan. She indicated to me that her anus is uncomfortable and needs to have a BM. She denies abd pain but grimaces with palpation. She denies new pain in her spine. She does indicate pain in the right foot. When I  asked what the lumps on her shoulders are from she indicates they are from falls. I told her that she was being treated for a Staph infection and I believe she understood.   9/24: afebrile. Blood cultures not drawn from 9/22, 23.  Much more alert and interactive. ADMITTED TO ME THAT SHE HAS BEEN INJECTING HEROIN, LAST DONE 2 WEEKS AGO. SHE DOES NOT WANT HER DAUGHTER TO KNOW AND TELLS ME THAT HER  KNOWS.  This certainly explains the Staph bacteremia and septic pulmonary emboli. CXR improving. Pain in back is generalized. She reports that she did have a BM but it is not recorded. She did take a few bites of pudding.        EXAM & DIAGNOSTICS REVIEWED:   Vitals:     Temp:  [97.7 °F (36.5 °C)-99.3 °F (37.4 °C)]   Temp: 99.2 °F (37.3 °C) (09/24/19 1545)  Pulse: 104 (09/24/19 1745)  Resp: (!) 21 (09/24/19 1745)  BP: 121/63 (09/24/19 1745)  SpO2: 96 % (09/24/19 1745)    Intake/Output Summary (Last 24 hours) at 9/24/2019 1818  Last data filed at 9/24/2019 1751  Gross per 24 hour   Intake 2770.84 ml   Output 3595 ml   Net -824.16 ml       General:  Awake, attends, cooperates, answers questions and speech is clearer as are her thought processes  Eyes:  Anicteric, PERRL,  no sleral or conjunctival petechiae  ENT:  No ulcers, exudates, thrush, nares patent, dentition is poor and MM are  dry  Neck:  Supple,    Lungs: Patchy crackles, mild exp wheezing,   Heart:  RRR, but today I hear a decrescendo murmur, systolic.   Abd:  Soft, tender, guards less, mild distention,   hypoactive BS, no masses or organomegaly appreciated.  :   Henriquez, urine clear, no flank tenderness  Musc:  Joints without effusion, swelling, erythema, synovitis,including right ankle/foot    Skin:  No rashes. No palmar or plantar lesions. No subungual petechiae. No skin lesions of DIC  Wound: Right hip abrasion, not infected  Neuro: More alert, attentive, face symmetric, speech is only limited by dry MM, moves all extremities, no focal weakness but  generally weak  Psych:  Attends,and confided in me that she has been injecting heroin  Lymphatic:        Extrem: Moderate generalized edema, no erythema, phlebitis, cellulitis, peripheral pulses are 0-1, clubbing present, no mottling  VAD:  Left IJ TLC 9/19  Isolation:  none    Lines/Tubes/Drains:    General Labs reviewed:  Recent Labs   Lab 09/22/19 0332 09/23/19 0316 09/24/19 0315   WBC 10.57 7.72 8.29   HGB 8.8* 7.6* 7.4*   HCT 24.1* 21.4* 20.0*   PLT 90* 63* 54*       Recent Labs   Lab 09/22/19  0332 09/23/19 0316 09/23/19  0748 09/24/19 0315   *  135*   < > 138  138  138 139  139 141   K 3.9  3.9   < > 3.5  3.5  3.5 3.0*  3.0* 2.6*     107   < > 106  106  106 106  106 103   CO2 19*  19*   < > 22*  22*  22* 22*  22* 27   BUN 34*  34*   < > 26*  26*  26* 27*  27* 24*   CREATININE 0.8  0.8   < > 0.8  0.8  0.8 0.8  0.8 0.8   CALCIUM 8.0*  8.0*   < > 7.4*  7.4*  7.4* 7.2*  7.2* 6.8*   PROT 4.8*  --  4.1*  --  4.8*   BILITOT 0.7  --  0.5  --  0.5   ALKPHOS 56  --  52*  --  57   ALT 12  --  9*  --  <5*   AST 17  --  15  --  15    < > = values in this interval not displayed.           Micro:  Microbiology Results (last 7 days)     Procedure Component Value Units Date/Time    Blood culture [[id number]] Collected:  09/24/19 0517    Order Status:  Completed Specimen:  Blood Updated:  09/24/19 1715     Blood Culture, Routine No Growth to date    Blood culture [[id number]]  (Abnormal) Collected:  09/21/19 0534    Order Status:  Completed Specimen:  Blood Updated:  09/24/19 0954     Blood Culture, Routine Gram stain peds bottle: Gram positive cocci in clusters resembling Staph       Results called to and read back by:Javi Sunshine RN 09/22/2019  14:22      STAPHYLOCOCCUS AUREUS  ID consult required at [facility]   [place].  For susceptibility see order #[id number]      Blood culture [[id number]]  (Abnormal) Collected:  09/22/19 0338    Order Status:   Completed Specimen:  Blood from Antecubital, Left  Arm Updated:  09/24/19 0843     Blood Culture, Routine Gram stain peds bottle: Gram positive cocci in clusters resembling Staph       Results called to and read back by:Yolis Wan RN 09/23/2019  04:46      STAPHYLOCOCCUS AUREUS  Susceptibility pending  ID consult required at [facility].      Blood culture [[id number]]  (Abnormal) Collected:  09/20/19 1319    Order Status:  Completed Specimen:  Blood from Line, Central Updated:  09/23/19 0939     Blood Culture, Routine Gram stain aer bottle: Gram positive cocci in clusters resembling Staph       Results called to and read back by:Rhea Lai RN 09/21/2019  16:02      STAPHYLOCOCCUS AUREUS  ID consult required at [facility].  For susceptibility see order #[id number]      Clostridium difficile EIA [[id number]]     Order Status:  Canceled Specimen:  Stool     Stool culture [[id number]]     Order Status:  No result Specimen:  Stool         Imaging Reviewed:   CXRs   CT heads, chest , abd/pelvis  Cardiology:  TTE 9/20  Left Ventricle Low normal ejection fraction at 50%. Mild concentric observed. Grade I (mild) left ventricular diastolic dysfunction consistent with impaired relaxation. Elevated left atrial pressure.   Right Ventricle Normal cavity size, wall thickness and systolic function. Wall motion normal.   Left Atrium The left atrium is normal.   Right Atrium There is normal right atrial size.   Aortic Valve The valve is trileaflet. There is normal leaflet mobility.   Mitral Valve Normal valve structure. Mild regurgitation. Normal leaflet mobility.   Tricuspid Valve The tricuspid valve is normal. Trace regurgitation. Pulmonary hypertension present. The estimated PA systolic pressure is 48 mmHg.   Pulmonic Valve Normal valve structure. Trace regurgitation.   IVC/SVC Normal central venous pressure (3 mm Hg).   Ascending Aorta The  aortic root and ascending aorta are normal in size.   Pericardium No pericardial effusion. Respiratory variation of mitral valve inflow is less than 30%.         IMPRESSION & PLAN   1.    MSSA sepsis with  pneumonia and   septic pulmonary emboli   Staph in urine is from the bacteremia and not the cause of it. IVDA/heroin  2. Pancolitis.   ? if she has ischemic colitis from sepsis and volume depletion . No diarrhea since admit  3. ABIMBOLA, volume depletion, metabolic acidosis, resolved  4. DIC? with elevated INR, D-dimer, low platelets, but with elevated fibrinogen, dropping hgb  5. Opiate dependence, severe lumbar spine abnormalities  6. Altered mental status. From sepsis? +/- from unrecognized stroke or embolus, improved      Recommendations:  Resume vanc, continue ancef. Continuing flagyl for now  Serial blood cultures  rec MRI brain,   She will need DHARMESH , tomorrow?  Dulcolax suppos     Follow coags and consider FFP if any sign of bleeding   Continue hydration and electrolyte management       D/w nursing

## 2019-09-24 NOTE — PROGRESS NOTES
"[facility] Medicine  Progress Note    Patient Name: Mesha Mckenzie  MRN: [id number]  Patient Class: IP- Inpatient   Admission Date: 9/19/2019  Length of Stay: 5 days  Attending Physician: Eli Anna MD  Primary Care Provider: Varun Shea MD PhD        Subjective:     Principal Problem:Staphylococcus aureus bacteremia with sepsis        HPI:  Pt is a 64 yo female who was transferred from [facility] for septic shock, pancolitis and bilateral pna. Pt reports that she fell out of bed on Sunday morning and has been feeling progressively worse throughout the week. C/o vomiting since Sunday, ~3 episodes per day. Also reports diarrhea, ~4 stools per day, watery in nature. Pt denies any hematemesis, melena or hematochezia. Pt did take zofran which helped "some:" c/o severe abdominal pain since Sunday which is constant. Pain is a 10 on a 0-10 pain scale.  Has not been able to eat due to the nausea and pain. Questioned pt in regards to use of pain medication, states "I didn't have any." pt was scheduled for colonoscopy tomorrow. Hx of GI bleed in December 2018. Pt states she had passed some blood in her stool. Reviewed colonoscopy report, no bleeding in the colon was found however the prep was poor. No recent travel, no sick contacts at home. Questioned pt in regards to cough: pt states she has felt SOB due to pain but reports only occasional coughing. Denies any sputum production.  Social: smokes 10 cigarettes per day, 20 pack year hx. No ETOH.     Overview/Hospital Course:  No notes on file    Interval History:  The patient complains of abdominal discomfort.  She is very thirsty.    Review of Systems   Respiratory: Negative for cough, chest tightness, shortness of breath and wheezing.    Cardiovascular: Negative for chest pain, palpitations and leg swelling.   Gastrointestinal: Positive for abdominal pain. Negative for abdominal distention, constipation, diarrhea, nausea and vomiting. "   Genitourinary: Negative for difficulty urinating, dysuria and flank pain.   Musculoskeletal: Negative for gait problem, joint swelling and neck pain.   Skin: Negative for rash and wound.   Neurological: Positive for weakness. Negative for dizziness, syncope, light-headedness and headaches.   Psychiatric/Behavioral: Positive for confusion. Negative for agitation and behavioral problems. The patient is nervous/anxious.      Objective:     Vital Signs (Most Recent):  Temp: 98.4 °F (36.9 °C) (09/24/19 1145)  Pulse: (!) 114 (09/24/19 1200)  Resp: (!) 34 (09/24/19 1200)  BP: 131/61 (09/24/19 1145)  SpO2: 95 % (09/24/19 1200) Vital Signs (24h Range):  Temp:  [97.6 °F (36.4 °C)-99.3 °F (37.4 °C)] 98.4 °F (36.9 °C)  Pulse:  [104-127] 114  Resp:  [17-69] 34  SpO2:  [90 %-100 %] 95 %  BP: (121-167)/(55-88) 131/61     Weight: 54.2 kg (119 lb 7.8 oz)  Body mass index is 21.85 kg/m².    Intake/Output Summary (Last 24 hours) at 9/24/2019 1257  Last data filed at 9/24/2019 1145  Gross per 24 hour   Intake 4030 ml   Output 2840 ml   Net 1190 ml      Physical Exam   Constitutional: She appears well-developed and well-nourished.   Cardiovascular: Regular rhythm, normal heart sounds and intact distal pulses.   Tachycardic   Pulmonary/Chest: No respiratory distress. She has no wheezes. She has no rales.   Tachypneic   Abdominal: Soft. Bowel sounds are normal. She exhibits no distension. There is no tenderness.   Musculoskeletal: She exhibits no edema or tenderness.   Both knees and ankles are tender to palpation   Neurological: She is alert. No cranial nerve deficit. Coordination normal.   The patient is awake and alert.  She does not appear to be confused this morning and her speech is intelligible   Skin: Skin is warm and dry. Capillary refill takes less than 2 seconds. No rash noted.   Nursing note and vitals reviewed.      Significant Labs:   CBC:   Recent Labs   Lab 09/23/19  0316 09/24/19  0315   WBC 7.72 8.29   HGB 7.6* 7.4*    HCT 21.4* 20.0*   PLT 63* 54*     CMP:   Recent Labs   Lab 09/23/19  0316 09/23/19  0748 09/24/19  0315     138  138 139  139 141   K 3.5  3.5  3.5 3.0*  3.0* 2.6*     106  106 106  106 103   CO2 22*  22*  22* 22*  22* 27     105  105 109  109 119*   BUN 26*  26*  26* 27*  27* 24*   CREATININE 0.8  0.8  0.8 0.8  0.8 0.8   CALCIUM 7.4*  7.4*  7.4* 7.2*  7.2* 6.8*   PROT 4.1*  --  4.8*   ALBUMIN 1.4*  --  1.4*   BILITOT 0.5  --  0.5   ALKPHOS 52*  --  57   AST 15  --  15   ALT 9*  --  <5*   ANIONGAP 10  10  10 11  11 11   EGFRNONAA >60  >60  >60 >60  >60 >60       Significant Imaging: I have reviewed all pertinent imaging results/findings within the past 24 hours.      Assessment/Plan:      * Staphylococcus aureus bacteremia with sepsis  The patient is on Ancef and vancomycin for MSSA.  Blood cultures from the 22nd are not showing any growth.  Transthoracic echocardiogram did not show evidence of vegetations.  She will have a transesophageal echocardiogram done when she is able to cooperate.      Acute hypoxemic respiratory failure  This is secondary to pneumonia which is probably secondary to septic emboli.  She is currently on nasal cannula.    Encephalopathy, metabolic  The patient's mental status continues to improve.  We will try to obtain an MRI of brain.      Pancolitis  As per findings on CT scan.  The patient is on IV Flagyl for this.  She has not had diarrhea since admission.        Pleural effusion  Parapneumonic effusion?  Patient will not undergo thoracentesis at this point secondary to elevated PT, PTT and decreased platelet count.      ABIMBOLA (acute kidney injury)  Renal function is stable.  Will replace her potassium IV today.      Metabolic acidosis  Improved. Continue to monitor        Coagulopathy  Secondary to DIC?  Hematology evaluated the patient.  They do not recommend transfusions at this time since there is no active  bleeding    Thrombocytopenia  Platelet count is stable    Severe malnutrition  The patient was evaluated by speech therapy and they kept her NPO.  Start TPN.      Normocytic anemia  Secondary to this acute illness?  There is no evidence of blood loss or hemolysis.        VTE Risk Mitigation (From admission, onward)         Ordered     Place sequential compression device  Until discontinued      09/19/19 2234     IP VTE HIGH RISK PATIENT  Once      09/19/19 2217                Critical care time spent on the evaluation and treatment of severe organ dysfunction, review of pertinent labs and imaging studies, discussions with consulting providers and discussions with patient/family: 40 minutes.      Eli Anna MD  Department of Hospital Medicine   [facility]

## 2019-09-24 NOTE — SUBJECTIVE & OBJECTIVE
Interval History:  The patient complains of abdominal discomfort.  She is very thirsty.    Review of Systems   Respiratory: Negative for cough, chest tightness, shortness of breath and wheezing.    Cardiovascular: Negative for chest pain, palpitations and leg swelling.   Gastrointestinal: Positive for abdominal pain. Negative for abdominal distention, constipation, diarrhea, nausea and vomiting.   Genitourinary: Negative for difficulty urinating, dysuria and flank pain.   Musculoskeletal: Negative for gait problem, joint swelling and neck pain.   Skin: Negative for rash and wound.   Neurological: Positive for weakness. Negative for dizziness, syncope, light-headedness and headaches.   Psychiatric/Behavioral: Positive for confusion. Negative for agitation and behavioral problems. The patient is nervous/anxious.      Objective:     Vital Signs (Most Recent):  Temp: 98.4 °F (36.9 °C) (09/24/19 1145)  Pulse: (!) 114 (09/24/19 1200)  Resp: (!) 34 (09/24/19 1200)  BP: 131/61 (09/24/19 1145)  SpO2: 95 % (09/24/19 1200) Vital Signs (24h Range):  Temp:  [97.6 °F (36.4 °C)-99.3 °F (37.4 °C)] 98.4 °F (36.9 °C)  Pulse:  [104-127] 114  Resp:  [17-69] 34  SpO2:  [90 %-100 %] 95 %  BP: (121-167)/(55-88) 131/61     Weight: 54.2 kg (119 lb 7.8 oz)  Body mass index is 21.85 kg/m².    Intake/Output Summary (Last 24 hours) at 9/24/2019 1257  Last data filed at 9/24/2019 1145  Gross per 24 hour   Intake 4030 ml   Output 2840 ml   Net 1190 ml      Physical Exam   Constitutional: She appears well-developed and well-nourished.   Cardiovascular: Regular rhythm, normal heart sounds and intact distal pulses.   Tachycardic   Pulmonary/Chest: No respiratory distress. She has no wheezes. She has no rales.   Tachypneic   Abdominal: Soft. Bowel sounds are normal. She exhibits no distension. There is no tenderness.   Musculoskeletal: She exhibits no edema or tenderness.   Both knees and ankles are tender to palpation   Neurological: She is alert.  No cranial nerve deficit. Coordination normal.   The patient is awake and alert.  She does not appear to be confused this morning and her speech is intelligible   Skin: Skin is warm and dry. Capillary refill takes less than 2 seconds. No rash noted.   Nursing note and vitals reviewed.      Significant Labs:   CBC:   Recent Labs   Lab 09/23/19 0316 09/24/19 0315   WBC 7.72 8.29   HGB 7.6* 7.4*   HCT 21.4* 20.0*   PLT 63* 54*     CMP:   Recent Labs   Lab 09/23/19 0316 09/23/19 0748 09/24/19 0315     138  138 139  139 141   K 3.5  3.5  3.5 3.0*  3.0* 2.6*     106  106 106  106 103   CO2 22*  22*  22* 22*  22* 27     105  105 109  109 119*   BUN 26*  26*  26* 27*  27* 24*   CREATININE 0.8  0.8  0.8 0.8  0.8 0.8   CALCIUM 7.4*  7.4*  7.4* 7.2*  7.2* 6.8*   PROT 4.1*  --  4.8*   ALBUMIN 1.4*  --  1.4*   BILITOT 0.5  --  0.5   ALKPHOS 52*  --  57   AST 15  --  15   ALT 9*  --  <5*   ANIONGAP 10  10  10 11  11 11   EGFRNONAA >60  >60  >60 >60  >60 >60       Significant Imaging: I have reviewed all pertinent imaging results/findings within the past 24 hours.

## 2019-09-24 NOTE — CONSULTS
Vancomycin consult has been resumed one day after discontinue.    Pharmacokinetic Initial Assessment: IV Vancomycin    Assessment/Plan:    Initiate intravenous vancomycin with dose of 1000 mg once followed by a maintenance dose of vancomycin 750 mg IV every 12 hours, as was last regimen 9/22.  Desired empiric serum trough concentration is 15 to 20 mcg/mL  Draw vancomycin trough level 30 min prior to third dose on 9/24 at approximately 2000   Pharmacy will continue to follow and monitor vancomycin.      Please contact pharmacy at extension [phone] with any questions regarding this assessment.     Thank you for the consult,   Liset Medina       Patient brief summary:  Mesha Mckenzie is a 63 y.o. female initiated on antimicrobial therapy with IV Vancomycin for treatment of suspected sepsis    Drug Allergies:   Review of patient's allergies indicates:   Allergen Reactions    Pcn [penicillins]        Actual Body Weight:   47.4    Renal Function:   Estimated Creatinine Clearance: 52.5 mL/min (based on SCr of 0.8 mg/dL).,       CBC (last 72 hours):  Recent Labs   Lab Result Units 09/21/19  0356 09/22/19  0332 09/23/19  0316   WBC K/uL 9.16 10.57 7.72   Hemoglobin g/dL 9.0* 8.8* 7.6*   Hematocrit % 24.1* 24.1* 21.4*   Platelets K/uL 95* 90* 63*   Gran% % 88.2* 88.5* 85.0*   Lymph% % 4.5* 6.5* 12.0*   Mono% % 5.0 2.7* 2.0*   Eosinophil% % 0.1 0.3 1.0   Basophil% % 0.1 0.0 0.0   Differential Method  Automated Automated Manual       Metabolic Panel (last 72 hours):  Recent Labs   Lab Result Units 09/21/19  0356 09/21/19  0803 09/21/19  1146 09/21/19  1601 09/21/19  1934 09/21/19  2348 09/22/19  0332 09/22/19  0906 09/22/19  1323 09/22/19  1730 09/22/19 2001 09/23/19  0006 09/23/19  0316 09/23/19  0748   Sodium mmol/L 130*  130* 130*  130* 130*  130* 131*  131* 133*  133* 133*  133* 135*  135* 136  136 136  136 137  137 138  138 138  138 138  138  138 139  139   Potassium mmol/L 4.0  4.0 4.4  4.4 4.3  4.3  4.1  4.1 4.0  4.0 3.9  3.9 3.9  3.9 3.8  3.8 3.5  3.5 4.0  4.0 4.0  4.0 3.6  3.6 3.5  3.5  3.5 3.0*  3.0*   Chloride mmol/L 105  105 106  106 106  106 106  106 107  107 106  106 107  107 107  107 106  106 106  106 107  107 107  107 106  106  106 106  106   CO2 mmol/L 16*  16* 14*  14* 17*  17* 18*  18* 17*  17* 18*  18* 19*  19* 20*  20* 20*  20* 21*  21* 21*  21* 21*  21* 22*  22*  22* 22*  22*   Glucose mg/dL 200*  200* 173*  173* 212*  212* 127*  127* 109  109 115*  115* 108  108 106  106 117*  117* 96  96 94  94 103  103 105  105  105 109  109   BUN, Bld mg/dL 50*  50* 48*  48* 44*  44* 42*  42* 38*  38* 35*  35* 34*  34* 30*  30* 29*  29* 28*  28* 28*  28* 27*  27* 26*  26*  26* 27*  27*   Creatinine mg/dL 1.2  1.2 1.1  1.1 1.0  1.0 0.9  0.9 0.9  0.9 0.8  0.8 0.8  0.8 0.7  0.7 0.7  0.7 0.8  0.8 0.8  0.8 0.8  0.8 0.8  0.8  0.8 0.8  0.8   Albumin g/dL 1.5*  --   --   --   --   --  1.5*  --   --   --   --   --  1.4*  --    Total Bilirubin mg/dL 0.4  --   --   --   --   --  0.7  --   --   --   --   --  0.5  --    Alkaline Phosphatase U/L 59  --   --   --   --   --  56  --   --   --   --   --  52*  --    AST U/L 24  --   --   --   --   --  17  --   --   --   --   --  15  --    ALT U/L 14  --   --   --   --   --  12  --   --   --   --   --  9*  --    Magnesium mg/dL  --   --   --   --   --   --   --   --   --   --   --   --   --  1.1*       Drug levels (last 3 results):  Recent Labs   Lab Result Units 09/21/19  0356 09/21/19  1933 09/22/19  0906   Vancomycin, Random ug/mL 12.1 18.1  --    Vancomycin-Trough ug/mL  --   --  18.4       Microbiologic Results:  Microbiology Results (last 7 days)       Procedure Component Value Units Date/Time    Blood culture [[id number]]  (Abnormal) Collected:  09/20/19 1739    Order Status:  Completed Specimen:  Blood from Line, Central Updated:  09/23/19 0939     Blood Culture, Routine Gram stain aer  bottle: Gram positive cocci in clusters resembling Staph       Results called to and read back by:Rhea Lai RN 09/21/2019  16:02      STAPHYLOCOCCUS AUREUS  ID consult required at [facility].  For susceptibility see order #[id number]      Blood culture [[id number]]  (Abnormal) Collected:  09/21/19 0534    Order Status:  Completed Specimen:  Blood Updated:  09/23/19 0850     Blood Culture, Routine Gram stain peds bottle: Gram positive cocci in clusters resembling Staph       Results called to and read back by:Javi Sunshine RN 09/22/2019  14:22      STAPHYLOCOCCUS AUREUS  ID consult required at [facility].  For susceptibility see order #[id number]      Blood culture [[id number]] Collected:  09/22/19 0338    Order Status:  Completed Specimen:  Blood from Antecubital, Left  Arm Updated:  09/23/19 0446     Blood Culture, Routine Gram stain peds bottle: Gram positive cocci in clusters resembling Staph       Results called to and read back by:Yolis Wan RN 09/23/2019  04:46    Clostridium difficile EIA [[id number]]     Order Status:  Canceled Specimen:  Stool     Stool culture [[id number]]     Order Status:  No result Specimen:  Stool

## 2019-09-24 NOTE — PT/OT/SLP EVAL
Speech Language Pathology Evaluation  Bedside Swallow    Patient Name:  Mesha Mckenzie   MRN:  [id number]  Admitting Diagnosis: Staphylococcus aureus bacteremia with sepsis    Recommendations:                 General Recommendations:  Ongoing swallow assessment  Diet recommendations:  NPO, NPO(x ice chips)   Aspiration Precautions: Allow ice chips and small sips of water following oral care and Frequent oral care   General Precautions: Standard, aspiration, fall, respiratory  Communication strategies:  provide increased time to answer    History:     Past Medical History:   Diagnosis Date    Anxiety     Carotid bruit     Chronic pain     Cystitis     Cystocele, unspecified (CODE)     Depression     GI bleed     History of uterine fibroid     Shortness of breath on exertion     Spinal stenosis     Urinary retention        Past Surgical History:   Procedure Laterality Date    ANTERIOR VAGINAL REPAIR  10-    CARDIAC CATHETERIZATION  age 12    CHOLECYSTECTOMY  2015    COLONOSCOPY  12/12/2018    aborted due to poor colon prep    COLONOSCOPY N/A 12/12/2018    Procedure: COLONOSCOPY;  Surgeon: Renard Gonsalves MD;  Location: [facility];  Service: Endoscopy;  Laterality: N/A;    [facility]Y  1989    [facility]    tubiligation         Social History: Patient lives with .    Prior Intubation HX:  None this admit    Modified Barium Swallow: none    Imaging:  X-Ray Chest 1 View   Final Result      Slight decrease of the bilateral infiltrates compared to the prior exam         Electronically signed by: Vicky Saxena MD   Date:    09/24/2019   Time:    08:14      X-Ray Chest 1 View   Final Result      Stable examination demonstrating multifocal infiltrates and nodular opacities and small right pleural effusion.         Electronically signed by: Erick Sparks MD   Date:    09/23/2019   Time:    08:11      CT Head Without Contrast   Final Result      1. Extensive chronic microvascular ischemic changes  "within the periventricular white matter of the supratentorial brain which are chance for the patient's chronologic age.  Demyelinating disease could produce a similar appearance.  There are more focal areas of hypoattenuation present within the periventricular white matter posterior right centrum semiovale and right frontal cortex, possibly focal areas of microvascular ischemic change or age-indeterminate infarction which are unchanged when compared with the cranial CT examination of 09/19/2019.  Additional clarification could be achieved with MRI of the brain as deemed clinically appropriate.  Given the provided history of "exclude septic emboli", consideration should be given to performing the examination with intravenous contrast.   2. Foci of remote lacunar infarction within the right caudate head and right corona radiata adjacent to the right frontal horn.         Electronically signed by: Juancho Ballard MD   Date:    09/22/2019   Time:    11:50      X-Ray Chest 1 View   Final Result      As above         Electronically signed by: Juancho Ballard MD   Date:    09/22/2019   Time:    10:42      CT Chest Without Contrast   Final Result      Multiple primarily peripheral consolidated infiltrates bilaterally consistent with BOOP, bronchiolitis obliterans organizing pneumonia.  Follow-up to assure clearance is recommended.  Moderate atelectasis of the right lower lobe and moderate size right pleural effusion.  Trace left pleural effusion.  Mild mediastinal adenopathy.  Atherosclerotic calcification including coronary artery calcification.         Electronically signed by: Renard Palmer MD   Date:    09/20/2019   Time:    10:31      RADIOLOGY REPORT   Final Result           Prior diet: Regular.    Occupation/hobbies/homemaking: PLOF: independent with ADLs, medication management, finances, +driving.    Subjective     "I need to sh*t."    Objective:   Pt seen in ICU for clinical swallow evaluation. Daughter at bedside. " She is oriented to self. Able to state current President. Tells me she has three children and states their names. She appears agitated, restless and uncomfortable. She required frequent verbal cues to slow down breathing and maintain alertness. Keeps eyes closed for large portion of evaluation. Respiratory rate 30-40    Oral Musculature Evaluation  · Oral Musculature: general weakness, unable to assess due to poor participation/comprehension  · Dentition: scattered dentition, present and adequate  · Secretion Management: adequate  · Mucosal Quality: dry, sticky  · Mandibular Strength and Mobility: WFL  · Oral Labial Strength and Mobility: impaired pursing, impaired retraction  · Voice Prior to PO Intake: clear in quality    Bedside Swallow Eval:   Consistencies Assessed:  · Thin liquids --ice chips, water via tsp and straw  · Puree --applesauce     Oral Phase:   · Biting on straw 2' AMS, required verbal cues for closure around utensil. Mild scattered oral residue following bites of applesauce    Pharyngeal Phase:   · no overt clinical signs/symptoms of aspiration    Treatment: Pt and daughter educated re: results/recs of evaluation, SLP role and POC with goal for PO diet. Daughter receptive to information provided and agrees with plan.    Assessment:     Mesha Mckenzie is a 63 y.o. female with an SLP diagnosis of  risk for aspriation 2' poor level of alertness, respiratory status and AMS.. Today, she consumed applesauce and water trials with no overt s/s penetration/aspiration, however, she required frequent verbal cues to maintain alertness. REC she remain NPO for now except ice chips and small sips of water. Will follow daily with goal for PO diet.     Goals:   Multidisciplinary Problems     SLP Goals        Problem: SLP Goal    Goal Priority Disciplines Outcome   SLP Goal     SLP Ongoing, Progressing   Description:    1) Pt will consume regular textures and thin liquids with no overt s/s swallow dysfunction                       Plan:     · Patient to be seen:  6 x/week   · Plan of Care expires:  10/15/19  · Plan of Care reviewed with:      · SLP Follow-Up:  Yes      Time Tracking:     SLP Treatment Date:   09/24/19  Speech Start Time:  0848  Speech Stop Time:  0910     Speech Total Time (min):  22 min    Billable Minutes: Eval Swallow and Oral Function 22 and Total Time 22    Alondra Mckay CCC-SLP  09/24/2019

## 2019-09-24 NOTE — PROGRESS NOTES
[facility]  Hematology/Oncology  Progress Note    Patient Name: Mesha Mckenzie  Admission Date: 9/19/2019  Hospital Length of Stay: 5 days  Code Status: Full Code   September 24   Subjective:     Interval History:   Sepsis with septic emboli. Tolerating antimicrobials, no complications ON       Medications:  Continuous Infusions:   sodium chloride 0.9% 100 mL/hr at 09/24/19 0512    norepinephrine bitartrate-D5W Stopped (09/20/19 2686)     Scheduled Meds:   albuterol-ipratropium  3 mL Nebulization Q6H    ceFAZolin (ANCEF) IVPB  2 g Intravenous Q6H    magnesium sulfate IVPB  2 g Intravenous Once    metronidazole  500 mg Intravenous Q8H    pantoprazole  40 mg Intravenous Daily    potassium chloride in water  40 mEq Intravenous Q4H    vancomycin (VANCOCIN) IVPB  15 mg/kg Intravenous Q12H     PRN Meds:bisacodyl, influenza, lorazepam, morphine, ondansetron, pneumoc 13-susanna conj-dip cr(PF), potassium chloride in water, sodium chloride 0.9%     Review of Systems   Constitutional: Positive for activity change. Negative for fatigue, fever and unexpected weight change.   HENT: Negative for rhinorrhea and sore throat.    Eyes: Negative for photophobia.   Respiratory: Positive for shortness of breath. Negative for cough.    Cardiovascular: Positive for leg swelling. Negative for chest pain.   Gastrointestinal: Positive for abdominal distention and abdominal pain. Negative for constipation, nausea and vomiting.   Endocrine: Negative for cold intolerance and heat intolerance.   Genitourinary: Negative for dysuria, frequency, hematuria and urgency.   Musculoskeletal: Negative for arthralgias, back pain and neck pain.   Skin: Negative for pallor and rash.   Neurological: Positive for weakness. Negative for numbness and headaches.   Hematological: Negative for adenopathy. Does not bruise/bleed easily.   Psychiatric/Behavioral: Negative for agitation.   All other systems reviewed and are  negative.    Objective:     Vital Signs (Most Recent):  Temp: 99.3 °F (37.4 °C) (09/24/19 0318)  Pulse: (!) 111 (09/24/19 0645)  Resp: (!) 32 (09/24/19 0645)  BP: 135/65 (09/24/19 0645)  SpO2: 98 % (09/24/19 0645) Vital Signs (24h Range):  Temp:  [97.6 °F (36.4 °C)-99.3 °F (37.4 °C)] 99.3 °F (37.4 °C)  Pulse:  [104-131] 111  Resp:  [17-69] 32  SpO2:  [90 %-100 %] 98 %  BP: (120-170)/(55-89) 135/65     Weight: 54.2 kg (119 lb 7.8 oz)  Body mass index is 21.85 kg/m².  Body surface area is 1.54 meters squared.      Intake/Output Summary (Last 24 hours) at 9/24/2019 0732  Last data filed at 9/24/2019 0640  Gross per 24 hour   Intake 3730 ml   Output 3155 ml   Net 575 ml       Physical Exam  Height / weight / VS reviewed  GENERAL.: Well-developed, well-nourished   PSYCH: pleasant affect, no anxiety, no depression  HEENT: Normocephalic, lids intact, conjunctiva pink,  ,OP with dry mucosa  NECK: Supple,trachea midline, no palpable abnormalities  LYMPHATICS: No cervical or axillary adenopathy  RESPIRATORY: no wheezes, positive crackles rubs or rhonchi  Good respiratory effort without any retractions or diaphragmatic movement  CARDIOVASCULAR positive distension, normal bowel sounds, no palpable HSM or mass  EXTREMITIES: No cyanosis, no clubbing,+  edema, no joint effusion  NEUROLOGICAL:lethargic cranial nerves grossly intact  SKIN: Warm, dry, no rash or lesions noted, no tenting, no petechiae or ecchymosis  Significant Labs:     Lab Results   Component Value Date    WBC 8.29 09/24/2019    RBC 2.40 (L) 09/24/2019    HGB 7.4 (L) 09/24/2019    HCT 20.0 (L) 09/24/2019    MCV 83 09/24/2019    MCH 30.8 09/24/2019    MCHC 37.0 (H) 09/24/2019    RDW 15.0 (H) 09/24/2019    PLT 54 (L) 09/24/2019    MPV 11.1 09/24/2019    GRAN 6.7 09/24/2019    GRAN 80.8 (H) 09/24/2019    LYMPH 0.9 (L) 09/24/2019    LYMPH 11.2 (L) 09/24/2019    MONO 0.4 09/24/2019    MONO 5.1 09/24/2019    EOS 0.0 09/24/2019    BASO 0.00 09/24/2019    EOSINOPHIL 0.4  09/24/2019    BASOPHIL 0.0 09/24/2019           Assessment/Plan:     Active Diagnoses:    Diagnosis Date Noted POA    PRINCIPAL PROBLEM:  Staphylococcus aureus bacteremia with sepsis [A41.01] 09/22/2019 Yes    Encephalopathy, metabolic [G93.41] 09/23/2019 No    Normocytic anemia [D64.9] 09/23/2019 Yes    Severe malnutrition [E43] 09/23/2019 Unknown    Coagulopathy [D68.9] 09/20/2019 Yes    Thrombocytopenia [D69.6] 09/20/2019 Yes    Pleural effusion [J90] 09/20/2019 Yes    Acute hypoxemic respiratory failure [J96.01] 09/20/2019 Yes    ABIMBOLA (acute kidney injury) [N17.9] 09/19/2019 Yes    Pancolitis [K51.00] 09/19/2019 Yes    Metabolic acidosis [E87.2] 09/19/2019 Yes      Problems Resolved During this Admission:    Diagnosis Date Noted Date Resolved POA    Pulmonary hypertension [I27.20] 09/23/2019 09/23/2019 Yes    Acute on chronic diastolic congestive heart failure [I50.33] 09/23/2019 09/23/2019 Yes    DIC (disseminated intravascular coagulation) [D65] 09/23/2019 09/23/2019 Yes    ILD (interstitial lung disease) [J84.9] 09/20/2019 09/23/2019 Yes    COPD with acute lower respiratory infection [J44.0] 09/20/2019 09/23/2019 Yes    Calcification of aorta [I70.0] 09/20/2019 09/23/2019 Yes    BOOP (bronchiolitis obliterans with organizing pneumonia) [J84.89] 09/20/2019 09/23/2019 Yes    Intravascular volume depletion [E86.1] 09/19/2019 09/23/2019 Yes    Hyponatremia [E87.1] 09/19/2019 09/23/2019 Yes    Hypokalemia [E87.6] 09/19/2019 09/23/2019 Yes    CAP (community acquired pneumonia) [J18.9] 09/19/2019 09/23/2019 Yes   sepsis with Positive blood cultures  Pancolitis with likely malabsorption of nutrients leading to cytopenias  Severe hypoalbuminemia :poor prognosis  Will check nutritional factors and immunoglobulin levels  Multifactorial anemia and thrombocytopenia  No need for transfusions at this time   No evidence of bleeding      Daneila Sanchez MD  Hematology/Oncology  [facility]

## 2019-09-24 NOTE — NURSING
Communicated with CONI Sahu in regards to patient pain management. Noted patient complaints of pain in her abdomen and back, increased respiratory rate of >30, increased HR currently 122 with a BP WNL. Noted patient receiving morphine 4mg Q2 hr PRN but note that patient medication to be ineffective noted by continuous moaning and facial grimace with inability to console along with increased respiration rate. Note patient received dilaudid 1mg X1 dose twice on 9/22 and two more times on 9/23. Reported from previous shift nurse that dose was effective for patient pain management. Orders received for one time dose of Dilaudid 1mg IV.

## 2019-09-24 NOTE — NURSING
Informed LAURA Novoa NP of critical INR. Informed to contact hematology/oncology in am (after 6 am) as they are following case. Type and screen ordered per NP. Order received to replace potassium and magnesium following critical potassium level. Fluids changed to  ml/hr per LAURA Novoa NP

## 2019-09-24 NOTE — PLAN OF CARE
Problem: SLP Goal  Goal: SLP Goal  Description    1) Pt will consume regular textures and thin liquids with no overt s/s swallow dysfunction     Outcome: Ongoing, Progressing    Clinical swallow evaluation completed. REC pt remain NPO x ice chips until mental status improves. Will follow daily with goal for PO diet.

## 2019-09-25 ENCOUNTER — ANESTHESIA EVENT (OUTPATIENT)
Dept: CARDIOLOGY | Facility: HOSPITAL | Age: 64
DRG: 871 | End: 2019-09-25
Payer: MEDICARE

## 2019-09-25 ENCOUNTER — ANESTHESIA (OUTPATIENT)
Dept: CARDIOLOGY | Facility: HOSPITAL | Age: 64
DRG: 871 | End: 2019-09-25
Payer: MEDICARE

## 2019-09-25 PROBLEM — F19.90 IV DRUG USER: Status: ACTIVE | Noted: 2019-09-25

## 2019-09-25 LAB
ALBUMIN SERPL BCP-MCNC: 1.5 G/DL (ref 3.5–5.2)
ALP SERPL-CCNC: 47 U/L (ref 55–135)
ALT SERPL W/O P-5'-P-CCNC: <5 U/L (ref 10–44)
ANION GAP SERPL CALC-SCNC: 10 MMOL/L (ref 8–16)
ANION GAP SERPL CALC-SCNC: 10 MMOL/L (ref 8–16)
AST SERPL-CCNC: 11 U/L (ref 10–40)
BACTERIA BLD CULT: ABNORMAL
BASOPHILS # BLD AUTO: 0.01 K/UL (ref 0–0.2)
BASOPHILS NFR BLD: 0.1 % (ref 0–1.9)
BILIRUB DIRECT SERPL-MCNC: 0.2 MG/DL (ref 0.1–0.3)
BILIRUB SERPL-MCNC: 0.4 MG/DL (ref 0.1–1)
BLD PROD TYP BPU: NORMAL
BLD PROD TYP BPU: NORMAL
BLOOD UNIT EXPIRATION DATE: NORMAL
BLOOD UNIT EXPIRATION DATE: NORMAL
BLOOD UNIT TYPE CODE: 6200
BLOOD UNIT TYPE CODE: 6200
BLOOD UNIT TYPE: NORMAL
BLOOD UNIT TYPE: NORMAL
BNP SERPL-MCNC: 1456 PG/ML (ref 0–99)
BSA FOR ECHO PROCEDURE: 1.48 M2
BUN SERPL-MCNC: 25 MG/DL (ref 8–23)
BUN SERPL-MCNC: 26 MG/DL (ref 8–23)
CALCIUM SERPL-MCNC: 7.3 MG/DL (ref 8.7–10.5)
CALCIUM SERPL-MCNC: 7.3 MG/DL (ref 8.7–10.5)
CHLORIDE SERPL-SCNC: 103 MMOL/L (ref 95–110)
CHLORIDE SERPL-SCNC: 103 MMOL/L (ref 95–110)
CO2 SERPL-SCNC: 29 MMOL/L (ref 23–29)
CO2 SERPL-SCNC: 29 MMOL/L (ref 23–29)
CODING SYSTEM: NORMAL
CODING SYSTEM: NORMAL
CREAT SERPL-MCNC: 0.7 MG/DL (ref 0.5–1.4)
CREAT SERPL-MCNC: 0.8 MG/DL (ref 0.5–1.4)
DIFFERENTIAL METHOD: ABNORMAL
DISPENSE STATUS: NORMAL
DISPENSE STATUS: NORMAL
EOSINOPHIL # BLD AUTO: 0.1 K/UL (ref 0–0.5)
EOSINOPHIL NFR BLD: 0.8 % (ref 0–8)
ERYTHROCYTE [DISTWIDTH] IN BLOOD BY AUTOMATED COUNT: 15.2 % (ref 11.5–14.5)
EST. GFR  (AFRICAN AMERICAN): >60 ML/MIN/1.73 M^2
EST. GFR  (AFRICAN AMERICAN): >60 ML/MIN/1.73 M^2
EST. GFR  (NON AFRICAN AMERICAN): >60 ML/MIN/1.73 M^2
EST. GFR  (NON AFRICAN AMERICAN): >60 ML/MIN/1.73 M^2
GLUCOSE SERPL-MCNC: 144 MG/DL (ref 70–110)
GLUCOSE SERPL-MCNC: 195 MG/DL (ref 70–110)
HCT VFR BLD AUTO: 19.5 % (ref 37–48.5)
HGB BLD-MCNC: 7 G/DL (ref 12–16)
IMM GRANULOCYTES # BLD AUTO: 0.15 K/UL (ref 0–0.04)
INR PPP: 1.3 (ref 0.8–1.2)
LYMPHOCYTES # BLD AUTO: 1.4 K/UL (ref 1–4.8)
LYMPHOCYTES NFR BLD: 15.8 % (ref 18–48)
MAGNESIUM SERPL-MCNC: 1.3 MG/DL (ref 1.6–2.6)
MAGNESIUM SERPL-MCNC: 1.4 MG/DL (ref 1.6–2.6)
MCH RBC QN AUTO: 31.3 PG (ref 27–31)
MCHC RBC AUTO-ENTMCNC: 35.9 G/DL (ref 32–36)
MCV RBC AUTO: 87 FL (ref 82–98)
MONOCYTES # BLD AUTO: 0.3 K/UL (ref 0.3–1)
MONOCYTES NFR BLD: 3.9 % (ref 4–15)
NEUTROPHILS # BLD AUTO: 6.9 K/UL (ref 1.8–7.7)
NEUTROPHILS NFR BLD: 77.7 % (ref 38–73)
NRBC BLD-RTO: 0 /100 WBC
NUM UNITS TRANS PACKED RBC: NORMAL
NUM UNITS TRANS PACKED RBC: NORMAL
PLATELET # BLD AUTO: 53 K/UL (ref 150–350)
PMV BLD AUTO: 12.1 FL (ref 9.2–12.9)
POTASSIUM SERPL-SCNC: 2.6 MMOL/L (ref 3.5–5.1)
POTASSIUM SERPL-SCNC: 3.7 MMOL/L (ref 3.5–5.1)
PROT SERPL-MCNC: 5.3 G/DL (ref 6–8.4)
PROTHROMBIN TIME: 13.2 SEC (ref 9–12.5)
RBC # BLD AUTO: 2.24 M/UL (ref 4–5.4)
SODIUM SERPL-SCNC: 142 MMOL/L (ref 136–145)
SODIUM SERPL-SCNC: 142 MMOL/L (ref 136–145)
VANCOMYCIN TROUGH SERPL-MCNC: 19.4 UG/ML (ref 10–22)
WBC # BLD AUTO: 8.81 K/UL (ref 3.9–12.7)

## 2019-09-25 PROCEDURE — 37000009 HC ANESTHESIA EA ADD 15 MINS

## 2019-09-25 PROCEDURE — 63600175 PHARM REV CODE 636 W HCPCS: Performed by: NURSE PRACTITIONER

## 2019-09-25 PROCEDURE — 25000242 PHARM REV CODE 250 ALT 637 W/ HCPCS: Performed by: INTERNAL MEDICINE

## 2019-09-25 PROCEDURE — 25000003 PHARM REV CODE 250: Performed by: INTERNAL MEDICINE

## 2019-09-25 PROCEDURE — 85025 COMPLETE CBC W/AUTO DIFF WBC: CPT

## 2019-09-25 PROCEDURE — 27000221 HC OXYGEN, UP TO 24 HOURS

## 2019-09-25 PROCEDURE — 63600175 PHARM REV CODE 636 W HCPCS: Performed by: INTERNAL MEDICINE

## 2019-09-25 PROCEDURE — 25000003 PHARM REV CODE 250: Performed by: NURSE ANESTHETIST, CERTIFIED REGISTERED

## 2019-09-25 PROCEDURE — 83735 ASSAY OF MAGNESIUM: CPT

## 2019-09-25 PROCEDURE — C9113 INJ PANTOPRAZOLE SODIUM, VIA: HCPCS | Performed by: NURSE PRACTITIONER

## 2019-09-25 PROCEDURE — 80076 HEPATIC FUNCTION PANEL: CPT

## 2019-09-25 PROCEDURE — S0030 INJECTION, METRONIDAZOLE: HCPCS | Performed by: NURSE PRACTITIONER

## 2019-09-25 PROCEDURE — D9220A PRA ANESTHESIA: Mod: CRNA,,, | Performed by: NURSE ANESTHETIST, CERTIFIED REGISTERED

## 2019-09-25 PROCEDURE — 99233 SBSQ HOSP IP/OBS HIGH 50: CPT | Mod: ,,, | Performed by: INTERNAL MEDICINE

## 2019-09-25 PROCEDURE — 99233 SBSQ HOSP IP/OBS HIGH 50: CPT | Mod: S$GLB,,, | Performed by: INTERNAL MEDICINE

## 2019-09-25 PROCEDURE — 83735 ASSAY OF MAGNESIUM: CPT | Mod: 91

## 2019-09-25 PROCEDURE — 99232 PR SUBSEQUENT HOSPITAL CARE,LEVL II: ICD-10-PCS | Mod: ,,, | Performed by: INTERNAL MEDICINE

## 2019-09-25 PROCEDURE — D9220A PRA ANESTHESIA: Mod: ANES,,, | Performed by: ANESTHESIOLOGY

## 2019-09-25 PROCEDURE — 99900035 HC TECH TIME PER 15 MIN (STAT)

## 2019-09-25 PROCEDURE — 87040 BLOOD CULTURE FOR BACTERIA: CPT

## 2019-09-25 PROCEDURE — 63600175 PHARM REV CODE 636 W HCPCS: Performed by: NURSE ANESTHETIST, CERTIFIED REGISTERED

## 2019-09-25 PROCEDURE — 20000000 HC ICU ROOM

## 2019-09-25 PROCEDURE — 97803 MED NUTRITION INDIV SUBSEQ: CPT

## 2019-09-25 PROCEDURE — 94640 AIRWAY INHALATION TREATMENT: CPT

## 2019-09-25 PROCEDURE — 99232 SBSQ HOSP IP/OBS MODERATE 35: CPT | Mod: ,,, | Performed by: INTERNAL MEDICINE

## 2019-09-25 PROCEDURE — D9220A PRA ANESTHESIA: ICD-10-PCS | Mod: CRNA,,, | Performed by: NURSE ANESTHETIST, CERTIFIED REGISTERED

## 2019-09-25 PROCEDURE — 99233 PR SUBSEQUENT HOSPITAL CARE,LEVL III: ICD-10-PCS | Mod: ,,, | Performed by: INTERNAL MEDICINE

## 2019-09-25 PROCEDURE — 94761 N-INVAS EAR/PLS OXIMETRY MLT: CPT

## 2019-09-25 PROCEDURE — 85610 PROTHROMBIN TIME: CPT

## 2019-09-25 PROCEDURE — 94660 CPAP INITIATION&MGMT: CPT

## 2019-09-25 PROCEDURE — 80048 BASIC METABOLIC PNL TOTAL CA: CPT

## 2019-09-25 PROCEDURE — P9016 RBC LEUKOCYTES REDUCED: HCPCS

## 2019-09-25 PROCEDURE — 36415 COLL VENOUS BLD VENIPUNCTURE: CPT

## 2019-09-25 PROCEDURE — 25000003 PHARM REV CODE 250: Performed by: NURSE PRACTITIONER

## 2019-09-25 PROCEDURE — 80202 ASSAY OF VANCOMYCIN: CPT

## 2019-09-25 PROCEDURE — 80048 BASIC METABOLIC PNL TOTAL CA: CPT | Mod: 91

## 2019-09-25 PROCEDURE — 99233 PR SUBSEQUENT HOSPITAL CARE,LEVL III: ICD-10-PCS | Mod: S$GLB,,, | Performed by: INTERNAL MEDICINE

## 2019-09-25 PROCEDURE — D9220A PRA ANESTHESIA: ICD-10-PCS | Mod: ANES,,, | Performed by: ANESTHESIOLOGY

## 2019-09-25 PROCEDURE — 83880 ASSAY OF NATRIURETIC PEPTIDE: CPT

## 2019-09-25 PROCEDURE — 37000008 HC ANESTHESIA 1ST 15 MINUTES

## 2019-09-25 RX ORDER — LIDOCAINE HCL/PF 100 MG/5ML
SYRINGE (ML) INTRAVENOUS
Status: DISCONTINUED | OUTPATIENT
Start: 2019-09-25 | End: 2019-09-25

## 2019-09-25 RX ORDER — HYDROCODONE BITARTRATE AND ACETAMINOPHEN 500; 5 MG/1; MG/1
TABLET ORAL
Status: DISCONTINUED | OUTPATIENT
Start: 2019-09-25 | End: 2019-10-02 | Stop reason: HOSPADM

## 2019-09-25 RX ORDER — PHENYLEPHRINE HYDROCHLORIDE 10 MG/ML
INJECTION INTRAVENOUS
Status: DISCONTINUED | OUTPATIENT
Start: 2019-09-25 | End: 2019-09-25

## 2019-09-25 RX ORDER — ACETAMINOPHEN 10 MG/ML
650 INJECTION, SOLUTION INTRAVENOUS ONCE
Status: COMPLETED | OUTPATIENT
Start: 2019-09-25 | End: 2019-09-25

## 2019-09-25 RX ORDER — FUROSEMIDE 10 MG/ML
20 INJECTION INTRAMUSCULAR; INTRAVENOUS ONCE
Status: COMPLETED | OUTPATIENT
Start: 2019-09-25 | End: 2019-09-25

## 2019-09-25 RX ORDER — GLYCOPYRROLATE 0.2 MG/ML
INJECTION INTRAMUSCULAR; INTRAVENOUS
Status: DISCONTINUED | OUTPATIENT
Start: 2019-09-25 | End: 2019-09-25

## 2019-09-25 RX ORDER — POTASSIUM CHLORIDE 29.8 MG/ML
40 INJECTION INTRAVENOUS EVERY 4 HOURS
Status: COMPLETED | OUTPATIENT
Start: 2019-09-25 | End: 2019-09-25

## 2019-09-25 RX ORDER — HYDROMORPHONE HYDROCHLORIDE 2 MG/ML
1 INJECTION, SOLUTION INTRAMUSCULAR; INTRAVENOUS; SUBCUTANEOUS
Status: DISCONTINUED | OUTPATIENT
Start: 2019-09-25 | End: 2019-09-26

## 2019-09-25 RX ORDER — PROPOFOL 10 MG/ML
VIAL (ML) INTRAVENOUS
Status: DISCONTINUED | OUTPATIENT
Start: 2019-09-25 | End: 2019-09-25

## 2019-09-25 RX ORDER — POTASSIUM CHLORIDE 14.9 MG/ML
40 INJECTION INTRAVENOUS EVERY 6 HOURS
Status: DISCONTINUED | OUTPATIENT
Start: 2019-09-25 | End: 2019-09-25

## 2019-09-25 RX ORDER — DIPHENHYDRAMINE HYDROCHLORIDE 50 MG/ML
25 INJECTION INTRAMUSCULAR; INTRAVENOUS ONCE
Status: COMPLETED | OUTPATIENT
Start: 2019-09-25 | End: 2019-09-25

## 2019-09-25 RX ORDER — KETOROLAC TROMETHAMINE 30 MG/ML
15 INJECTION, SOLUTION INTRAMUSCULAR; INTRAVENOUS ONCE
Status: COMPLETED | OUTPATIENT
Start: 2019-09-25 | End: 2019-09-25

## 2019-09-25 RX ORDER — POTASSIUM CHLORIDE 14.9 MG/ML
40 INJECTION INTRAVENOUS EVERY 6 HOURS
Status: COMPLETED | OUTPATIENT
Start: 2019-09-25 | End: 2019-09-26

## 2019-09-25 RX ADMIN — LIDOCAINE HYDROCHLORIDE 100 MG: 20 INJECTION, SOLUTION INTRAVENOUS at 10:09

## 2019-09-25 RX ADMIN — PROPOFOL 50 MG: 10 INJECTION, EMULSION INTRAVENOUS at 10:09

## 2019-09-25 RX ADMIN — POTASSIUM CHLORIDE 40 MEQ: 29.8 INJECTION, SOLUTION INTRAVENOUS at 05:09

## 2019-09-25 RX ADMIN — METRONIDAZOLE 500 MG: 500 INJECTION, SOLUTION INTRAVENOUS at 05:09

## 2019-09-25 RX ADMIN — IPRATROPIUM BROMIDE AND ALBUTEROL SULFATE 3 ML: .5; 3 SOLUTION RESPIRATORY (INHALATION) at 06:09

## 2019-09-25 RX ADMIN — CEFAZOLIN SODIUM 2 G: 2 SOLUTION INTRAVENOUS at 12:09

## 2019-09-25 RX ADMIN — POTASSIUM CHLORIDE 40 MEQ: 14.9 INJECTION, SOLUTION INTRAVENOUS at 08:09

## 2019-09-25 RX ADMIN — FUROSEMIDE 20 MG: 10 INJECTION, SOLUTION INTRAVENOUS at 09:09

## 2019-09-25 RX ADMIN — LORAZEPAM 1 MG: 2 INJECTION INTRAMUSCULAR; INTRAVENOUS at 04:09

## 2019-09-25 RX ADMIN — FUROSEMIDE 20 MG: 10 INJECTION, SOLUTION INTRAVENOUS at 10:09

## 2019-09-25 RX ADMIN — IRON SUCROSE 200 MG: 20 INJECTION, SOLUTION INTRAVENOUS at 10:09

## 2019-09-25 RX ADMIN — HYDROMORPHONE HYDROCHLORIDE 2 MG: 2 INJECTION, SOLUTION INTRAMUSCULAR; INTRAVENOUS; SUBCUTANEOUS at 12:09

## 2019-09-25 RX ADMIN — CEFAZOLIN SODIUM 2 G: 2 SOLUTION INTRAVENOUS at 01:09

## 2019-09-25 RX ADMIN — IPRATROPIUM BROMIDE AND ALBUTEROL SULFATE 3 ML: .5; 3 SOLUTION RESPIRATORY (INHALATION) at 01:09

## 2019-09-25 RX ADMIN — PANTOPRAZOLE SODIUM 40 MG: 40 INJECTION, POWDER, FOR SOLUTION INTRAVENOUS at 09:09

## 2019-09-25 RX ADMIN — METRONIDAZOLE 500 MG: 500 INJECTION, SOLUTION INTRAVENOUS at 02:09

## 2019-09-25 RX ADMIN — ASCORBIC ACID, VITAMIN A PALMITATE, CHOLECALCIFEROL, THIAMINE HYDROCHLORIDE, RIBOFLAVIN-5 PHOSPHATE SODIUM, PYRIDOXINE HYDROCHLORIDE, NIACINAMIDE, DEXPANTHENOL, ALPHA-TOCOPHEROL ACETATE, VITAMIN K1, FOLIC ACID, BIOTIN, CYANOCOBALAMIN: 200; 3300; 200; 6; 3.6; 6; 40; 15; 10; 150; 600; 60; 5 INJECTION, SOLUTION INTRAVENOUS at 03:09

## 2019-09-25 RX ADMIN — POTASSIUM CHLORIDE 40 MEQ: 14.9 INJECTION, SOLUTION INTRAVENOUS at 04:09

## 2019-09-25 RX ADMIN — HYDROMORPHONE HYDROCHLORIDE 2 MG: 2 INJECTION, SOLUTION INTRAMUSCULAR; INTRAVENOUS; SUBCUTANEOUS at 03:09

## 2019-09-25 RX ADMIN — PHENYLEPHRINE HYDROCHLORIDE 100 MCG: 10 INJECTION INTRAVENOUS at 10:09

## 2019-09-25 RX ADMIN — DIPHENHYDRAMINE HYDROCHLORIDE 25 MG: 50 INJECTION INTRAMUSCULAR; INTRAVENOUS at 08:09

## 2019-09-25 RX ADMIN — METRONIDAZOLE 500 MG: 500 INJECTION, SOLUTION INTRAVENOUS at 10:09

## 2019-09-25 RX ADMIN — POTASSIUM CHLORIDE 40 MEQ: 29.8 INJECTION, SOLUTION INTRAVENOUS at 10:09

## 2019-09-25 RX ADMIN — VANCOMYCIN HYDROCHLORIDE 750 MG: 750 INJECTION, POWDER, LYOPHILIZED, FOR SOLUTION INTRAVENOUS at 09:09

## 2019-09-25 RX ADMIN — IPRATROPIUM BROMIDE AND ALBUTEROL SULFATE 3 ML: .5; 3 SOLUTION RESPIRATORY (INHALATION) at 07:09

## 2019-09-25 RX ADMIN — GLYCOPYRROLATE 0.2 MG: 0.2 INJECTION, SOLUTION INTRAMUSCULAR; INTRAVENOUS at 10:09

## 2019-09-25 RX ADMIN — HYDROMORPHONE HYDROCHLORIDE: 2 INJECTION INTRAMUSCULAR; INTRAVENOUS; SUBCUTANEOUS at 04:09

## 2019-09-25 RX ADMIN — CEFAZOLIN SODIUM 2 G: 2 SOLUTION INTRAVENOUS at 07:09

## 2019-09-25 RX ADMIN — BISACODYL 10 MG: 10 SUPPOSITORY RECTAL at 12:09

## 2019-09-25 RX ADMIN — CEFAZOLIN SODIUM 2 G: 2 SOLUTION INTRAVENOUS at 08:09

## 2019-09-25 RX ADMIN — ACETAMINOPHEN 650 MG: 10 INJECTION, SOLUTION INTRAVENOUS at 08:09

## 2019-09-25 RX ADMIN — HYDROMORPHONE HYDROCHLORIDE 1 MG: 2 INJECTION INTRAMUSCULAR; INTRAVENOUS; SUBCUTANEOUS at 01:09

## 2019-09-25 RX ADMIN — KETOROLAC TROMETHAMINE 15 MG: 30 INJECTION, SOLUTION INTRAMUSCULAR at 09:09

## 2019-09-25 RX ADMIN — PROPOFOL 20 MG: 10 INJECTION, EMULSION INTRAVENOUS at 10:09

## 2019-09-25 NOTE — PLAN OF CARE
Put patient on BIPAP due to mouth breathing with NC, sats 85-88% after BIPAP sats went up to %      09/24/19 2001   Patient Assessment/Suction   Level of Consciousness (AVPU) alert   Respiratory Effort Mild   Rhythm/Pattern, Respiratory tachypneic;shallow   PRE-TX-O2   O2 Device (Oxygen Therapy) BiPAP   SpO2 (!) 85 %   Pulse Oximetry Type Continuous   $ Pulse Oximetry - Multiple Charge Pulse Oximetry - Multiple   Pulse (!) 118   Resp (!) 45   Preset CPAP/BiPAP Settings   Mode Of Delivery BiPAP   $ CPAP/BiPAP Daily Charge BiPAP/CPAP Daily   $ Initial CPAP/BiPAP Setup? No   $ Is patient using? Yes   Size of Mask Small   Sized Appropriately? Yes   Equipment Type V60   Airway Device Type small full face mask   Humidifier not applicable   Ipap 15   EPAP (cm H2O) 5   Pressure Support (cm H2O) 10   ITime (sec) 1   Rise Time (sec) 2   Patient CPAP/BiPAP Settings   Timed Inspiration (Sec) 1   IPAP Rise Time (sec) 2   RR Total (Breaths/Min) 21   Tidal Volume (mL) 632   VE Minute Ventilation (L/min) 11.7 L/min   Peak Inspiratory Pressure (cm H2O) 17   TiTOT (%) 22   Total Leak (L/Min) 11   Patient Trigger - ST Mode Only (%) 95   CPAP/BiPAP Backup Settings   Backup Rate 14 breaths per minute (bpm)   CPAP/BiPAP Alarms   High Pressure (cm H2O) 20   Low Pressure (cm H2O) 10   Low Pressure Delay (Sec) 20   Minute Ventilation (L/Min) 3   High RR (breaths/min) 50   Low RR (breaths/min) 8   Apnea (Sec) 20

## 2019-09-25 NOTE — PROGRESS NOTES
"[facility] Medicine  Progress Note    Patient Name: Mesha Mckenzie  MRN: [id number]  Patient Class: IP- Inpatient   Admission Date: 9/19/2019  Length of Stay: 6 days  Attending Physician: Eli Anna MD  Primary Care Provider: Varun Shea MD PhD        Subjective:     Principal Problem:Staphylococcus aureus bacteremia with sepsis        HPI:  Pt is a 64 yo female who was transferred from [facility] for septic shock, pancolitis and bilateral pna. Pt reports that she fell out of bed on Sunday morning and has been feeling progressively worse throughout the week. C/o vomiting since Sunday, ~3 episodes per day. Also reports diarrhea, ~4 stools per day, watery in nature. Pt denies any hematemesis, melena or hematochezia. Pt did take zofran which helped "some:" c/o severe abdominal pain since Sunday which is constant. Pain is a 10 on a 0-10 pain scale.  Has not been able to eat due to the nausea and pain. Questioned pt in regards to use of pain medication, states "I didn't have any." pt was scheduled for colonoscopy tomorrow. Hx of GI bleed in December 2018. Pt states she had passed some blood in her stool. Reviewed colonoscopy report, no bleeding in the colon was found however the prep was poor. No recent travel, no sick contacts at home. Questioned pt in regards to cough: pt states she has felt SOB due to pain but reports only occasional coughing. Denies any sputum production.  Social: smokes 10 cigarettes per day, 20 pack year hx. No ETOH.     Overview/Hospital Course:  No notes on file    Interval History:  The patient is doing okay.  States that pain is better.  No other complaints    Review of Systems   Unable to perform ROS: Mental status change     Objective:     Vital Signs (Most Recent):  Temp: 97.9 °F (36.6 °C) (09/25/19 1139)  Pulse: (!) 114 (09/25/19 1139)  Resp: 19 (09/25/19 1139)  BP: (!) 141/66 (09/25/19 1139)  SpO2: 96 % (09/25/19 1139) Vital Signs (24h Range):  Temp:  " [97.9 °F (36.6 °C)-99.2 °F (37.3 °C)] 97.9 °F (36.6 °C)  Pulse:  [] 114  Resp:  [14-59] 19  SpO2:  [85 %-100 %] 96 %  BP: ()/(51-98) 141/66     Weight: 50.3 kg (111 lb)  Body mass index is 20.3 kg/m².    Intake/Output Summary (Last 24 hours) at 9/25/2019 1214  Last data filed at 9/25/2019 1130  Gross per 24 hour   Intake 1345.82 ml   Output 3695 ml   Net -2349.18 ml      Physical Exam   Constitutional: She appears well-developed and well-nourished.   Cardiovascular: Regular rhythm, normal heart sounds and intact distal pulses.   Tachycardic   Pulmonary/Chest: Effort normal and breath sounds normal. No respiratory distress. She has no wheezes. She has no rales.   Tachypneic   Abdominal: Soft. Bowel sounds are normal. She exhibits no distension. There is no tenderness.   Musculoskeletal: She exhibits no edema or tenderness.   Both knees and ankles are tender to palpation   Neurological: She is alert. No cranial nerve deficit. Coordination normal.   The patient is a little bit more lethargic today but still opens her eyes and answers questions appropriately   Skin: Skin is warm and dry. Capillary refill takes less than 2 seconds. No rash noted.   Nursing note and vitals reviewed.      Significant Labs:   BMP:   Recent Labs   Lab 09/25/19  0350   *      K 2.6*      CO2 29   BUN 26*   CREATININE 0.8   CALCIUM 7.3*   MG 1.4*     CBC:   Recent Labs   Lab 09/24/19  0315 09/25/19  0350   WBC 8.29 8.81   HGB 7.4* 7.0*   HCT 20.0* 19.5*   PLT 54* 53*       Significant Imaging: I have reviewed all pertinent imaging results/findings within the past 24 hours.      Assessment/Plan:      * Staphylococcus aureus bacteremia with sepsis  The patient is on Ancef and vancomycin for MSSA.  Blood cultures from the 22nd are not showing any growth.  Transthoracic echocardiogram did not show evidence of vegetations.  DHARMESH shows  mitral valve endocarditis.      Acute hypoxemic respiratory failure  This is  secondary to pneumonia which is probably secondary to septic emboli.  She is currently on nasal cannula.    Encephalopathy, metabolic  The patient's mental status continues to improve.  We will try to obtain an MRI of brain when she is able to cooperate.      Pancolitis  As per findings on CT scan.  The patient is on IV Flagyl for this.  She has not had diarrhea since admission.        Pleural effusion  Parapneumonic effusion?  Patient will not undergo thoracentesis at this point secondary to elevated PT, PTT and decreased platelet count.      ABIMBOLA (acute kidney injury)  Renal function is stable.  Will replace her magnesium and potassium IV today.      Metabolic acidosis  Resolved        Coagulopathy  PT is almost normal today    Thrombocytopenia  Platelet count is stable    IV drug user  The patient finally admitted that she was using IV drugs to Dr. Bolden last night.      Severe malnutrition  The patient was evaluated by speech therapy and they kept her NPO.  Started TPN September 24th..      Normocytic anemia  Secondary to this acute illness?  There is no evidence of blood loss or hemolysis.  The patient will receive 2 units of packed red blood cells today      VTE Risk Mitigation (From admission, onward)         Ordered     Place sequential compression device  Until discontinued      09/19/19 2234     IP VTE HIGH RISK PATIENT  Once      09/19/19 2217                Critical care time spent on the evaluation and treatment of severe organ dysfunction, review of pertinent labs and imaging studies, discussions with consulting providers and discussions with patient/family: 35 minutes.      Eli Anna MD  Department of Hospital Medicine   [facility]

## 2019-09-25 NOTE — SUBJECTIVE & OBJECTIVE
Interval History:  The patient is doing okay.  States that pain is better.  No other complaints    Review of Systems   Unable to perform ROS: Mental status change     Objective:     Vital Signs (Most Recent):  Temp: 97.9 °F (36.6 °C) (09/25/19 1139)  Pulse: (!) 114 (09/25/19 1139)  Resp: 19 (09/25/19 1139)  BP: (!) 141/66 (09/25/19 1139)  SpO2: 96 % (09/25/19 1139) Vital Signs (24h Range):  Temp:  [97.9 °F (36.6 °C)-99.2 °F (37.3 °C)] 97.9 °F (36.6 °C)  Pulse:  [] 114  Resp:  [14-59] 19  SpO2:  [85 %-100 %] 96 %  BP: ()/(51-98) 141/66     Weight: 50.3 kg (111 lb)  Body mass index is 20.3 kg/m².    Intake/Output Summary (Last 24 hours) at 9/25/2019 1214  Last data filed at 9/25/2019 1130  Gross per 24 hour   Intake 1345.82 ml   Output 3695 ml   Net -2349.18 ml      Physical Exam   Constitutional: She appears well-developed and well-nourished.   Cardiovascular: Regular rhythm, normal heart sounds and intact distal pulses.   Tachycardic   Pulmonary/Chest: Effort normal and breath sounds normal. No respiratory distress. She has no wheezes. She has no rales.   Tachypneic   Abdominal: Soft. Bowel sounds are normal. She exhibits no distension. There is no tenderness.   Musculoskeletal: She exhibits no edema or tenderness.   Both knees and ankles are tender to palpation   Neurological: She is alert. No cranial nerve deficit. Coordination normal.   The patient is a little bit more lethargic today but still opens her eyes and answers questions appropriately   Skin: Skin is warm and dry. Capillary refill takes less than 2 seconds. No rash noted.   Nursing note and vitals reviewed.      Significant Labs:   BMP:   Recent Labs   Lab 09/25/19  0350   *      K 2.6*      CO2 29   BUN 26*   CREATININE 0.8   CALCIUM 7.3*   MG 1.4*     CBC:   Recent Labs   Lab 09/24/19  0315 09/25/19  0350   WBC 8.29 8.81   HGB 7.4* 7.0*   HCT 20.0* 19.5*   PLT 54* 53*       Significant Imaging: I have reviewed all  pertinent imaging results/findings within the past 24 hours.

## 2019-09-25 NOTE — ANESTHESIA POSTPROCEDURE EVALUATION
Anesthesia Post Evaluation    Patient: Mesha Mckenzie    Procedure(s) Performed: Procedure(s) (LRB):  ECHOCARDIOGRAM,TRANSESOPHAGEAL (N/A)    Final Anesthesia Type: general  Patient location during evaluation: PACU  Patient participation: Yes- Able to Participate  Level of consciousness: awake and alert  Post-procedure vital signs: reviewed and stable  Pain management: adequate  Airway patency: patent  PONV status at discharge: No PONV  Anesthetic complications: no      Cardiovascular status: blood pressure returned to baseline and hemodynamically stable  Respiratory status: unassisted  Hydration status: euvolemic  Follow-up not needed.          Vitals Value Taken Time   BP 77/48 9/25/2019 10:31 AM   Temp 36.8 °C (98.2 °F) 9/25/2019  8:57 AM   Pulse 110 9/25/2019 10:31 AM   Resp 10 9/25/2019 10:31 AM   SpO2 100 % 9/25/2019 10:31 AM   Vitals shown include unvalidated device data.      No case tracking events are documented in the log.      Pain/Ephraim Score: Pain Rating Prior to Med Admin: 0 (9/25/2019  8:22 AM)  Pain Rating Post Med Admin: 2 (9/24/2019  9:20 PM)

## 2019-09-25 NOTE — TRANSFER OF CARE
"Anesthesia Transfer of Care Note    Patient: Mesha Mckenzie    Procedure(s) Performed: Procedure(s) (LRB):  ECHOCARDIOGRAM,TRANSESOPHAGEAL (N/A)    Patient location: ICU    Anesthesia Type: general    Transport from OR: Upon arrival to PACU/ICU, patient attached to 100% O2 by T-piece with adequate spontaneous ventilation    Post pain: adequate analgesia    Post assessment: no apparent anesthetic complications and tolerated procedure well    Post vital signs: stable    Level of consciousness: sedated    Nausea/Vomiting: no nausea/vomiting    Complications: none    Transfer of care protocol was followed      Last vitals:   Visit Vitals  BP (!) 147/72   Pulse 109   Temp 36.8 °C (98.2 °F) (Oral)   Resp (!) 28   Ht 5' 2" (1.575 m)   Wt 50.5 kg (111 lb 5.3 oz)   SpO2 97%   Breastfeeding? No   BMI 20.36 kg/m²     "

## 2019-09-25 NOTE — PROGRESS NOTES
Consult Note  Infectious Disease    Reason for Consult:  sepsis    HPI: Mesha Mckenzie is a critically ill 63 y.o. female , tranferred from [facility] where she presented with 4 days of nausea and vomiting, found to have ABIMBOLA, leukocytosis, hypotension, multiple pulmonary infiltrates, pancolitis on CT, metabolic acidosis and altered mental status. She was transferred for multispecialty care. Since admission, she has received vanc, levaquin and flagyl, still requires pressors, has 3/4 blood cultures with GPC in clusters, Ct chest with multiple foci of infiltrates including some that look like septic pulmonary emboli, elevated coags without bleeding and no further diarrheal stools. Her chemistries are responding to fluid and supplementation but her mental status remains very abnormal. She is in pain from her abdomen. It is unclear from notes and my conversation with daughter whether her respiratory symptoms came first or the GI symptoms came first. She has no history of STaph infection, no recent antibiotics, but does smoke and take pain meds, valium and adderall chronically.      9/21: blood cultures with STaph aureus, sensitivities pending. Blood cultures from yesterday are negative so far. No change in oxygen needs. No longer on pressors. Less responsive. TTE unofficially abnormal. Staph in urine is likely from the bacteremia. No family at bedside.   9/22: CT head with no significant changes. Mental status no better. Still no BM since admit. CXR slightly better. Blood cultures from yesterday negative and original is MSSA. coags still elevated.   Cr now normal.   9/23: afebrile. Blood cultures are persistently positive. Mental status is much improved and she can answer questions. She continues to moan and groan. She indicated to me that her anus is uncomfortable and needs to have a BM. She denies abd pain but grimaces with palpation. She denies new pain in her spine. She does indicate pain in the right foot. When I  asked what the lumps on her shoulders are from she indicates they are from falls. I told her that she was being treated for a Staph infection and I believe she understood.   9/24: afebrile. Blood cultures not drawn from 9/22, 23.  Much more alert and interactive. ADMITTED TO ME THAT SHE HAS BEEN INJECTING HEROIN, LAST DONE 2 WEEKS AGO. SHE DOES NOT WANT HER DAUGHTER TO KNOW AND TELLS ME THAT HER  KNOWS.  This certainly explains the Staph bacteremia and septic pulmonary emboli. CXR improving. Pain in back is generalized. She reports that she did have a BM but it is not recorded. She did take a few bites of pudding.   9/25:  Status post DHRAMESH today with findings of bicuspid valve and mitral valve vegetations the.  Discussed with Cardiology.  Per nurse and he was less alert today and perhaps more confused.  Pain medication was decreased early urine she is complaining abdominal pain and back pain.  She is requiring BiPAP this evening he did receive 2 units of blood with 20 mg of Lasix between the units and did have good urine output but her potassium was low this morning requiring aggressive supplementation.  Blood cultures are now negative       EXAM & DIAGNOSTICS REVIEWED:   Vitals:     Temp:  [97.9 °F (36.6 °C)-99.2 °F (37.3 °C)]   Temp: 98 °F (36.7 °C) (09/25/19 1330)  Pulse: (!) 117 (09/25/19 1715)  Resp: (!) 29 (09/25/19 1715)  BP: (!) 143/68 (09/25/19 1715)  SpO2: 100 % (09/25/19 1715)    Intake/Output Summary (Last 24 hours) at 9/25/2019 1807  Last data filed at 9/25/2019 1608  Gross per 24 hour   Intake 2246.65 ml   Output 3965 ml   Net -1718.35 ml       General:  Arousable attends, cooperates, answers questions and he seems  more sedated.  She is restless and uncomfortable  Eyes:  Anicteric, PERRL,  no sleral or conjunctival petechiae  ENT:  No ulcers, exudates, thrush, nares patent, dentition is poor and MM are  dry  Neck:  Supple,    Lungs: Patchy crackles,    Heart:  RRR,    Abd:  Soft, tender, guards  , mild distention,   hypoactive BS, no masses or organomegaly appreciated.  :   Henriquez, urine clear, no flank tenderness  Musc:  Joints without effusion, swelling, erythema, synovitis    Skin:  No rashes. No palmar or plantar lesions. No subungual petechiae. No skin lesions of DIC  Wound: Right hip abrasion, not infected  Neuro: More sedated attentive, face symmetric, tongue protrudes in the midline, speech is limited by dry MM, moves all extremities, no focal weakness but generally weak  Psych:  Attends, but restless and irritable  And Lymphatic:        Extrem: Mild generalized edema, no erythema, phlebitis, cellulitis, peripheral pulses are 0-1, clubbing present, no mottling  VAD:  Left IJ TLC 9/19  Isolation:  none    Lines/Tubes/Drains:    General Labs reviewed:  Recent Labs   Lab 09/23/19 0316 09/24/19 0315 09/25/19  0350   WBC 7.72 8.29 8.81   HGB 7.6* 7.4* 7.0*   HCT 21.4* 20.0* 19.5*   PLT 63* 54* 53*       Recent Labs   Lab 09/23/19 0316 09/23/19  0748 09/24/19 0315 09/25/19  0350     138  138 139  139 141 142   K 3.5  3.5  3.5 3.0*  3.0* 2.6* 2.6*     106  106 106  106 103 103   CO2 22*  22*  22* 22*  22* 27 29   BUN 26*  26*  26* 27*  27* 24* 26*   CREATININE 0.8  0.8  0.8 0.8  0.8 0.8 0.8   CALCIUM 7.4*  7.4*  7.4* 7.2*  7.2* 6.8* 7.3*   PROT 4.1*  --  4.8* 5.3*   BILITOT 0.5  --  0.5 0.4   ALKPHOS 52*  --  57 47*   ALT 9*  --  <5* <5*   AST 15  --  15 11           Micro:  Microbiology Results (last 7 days)     Procedure Component Value Units Date/Time    Blood culture [[id number]] Collected:  09/25/19 7690    Order Status:  Completed Specimen:  Blood Updated:  09/25/19 1715     Blood Culture, Routine No Growth to date    Blood culture [[id number]]  (Abnormal)  (Susceptibility) Collected:  09/22/19 0338    Order Status:  Completed Specimen:  Blood from Antecubital, Left  Arm Updated:  09/25/19 1028     Blood Culture, Routine Gram stain peds bottle: Gram positive cocci  in clusters resembling Staph       Results called to and read back by:Yolis Wan RN 09/23/2019  04:46      STAPHYLOCOCCUS AUREUS  ID consult required at [facility].      Blood culture [[id number]] Collected:  09/24/19 0517    Order Status:  Completed Specimen:  Blood Updated:  09/25/19 1012     Blood Culture, Routine No Growth to date      No Growth to date    Blood culture [[id number]]  (Abnormal) Collected:  09/21/19 0534    Order Status:  Completed Specimen:  Blood Updated:  09/24/19 0954     Blood Culture, Routine Gram stain peds bottle: Gram positive cocci in clusters resembling Staph       Results called to and read back by:Javi Sunshine RN 09/22/2019  14:22      STAPHYLOCOCCUS AUREUS  ID consult required at [facility].  For susceptibility see order #[id number]      Blood culture [[id number]]  (Abnormal) Collected:  09/20/19 1739    Order Status:  Completed Specimen:  Blood from Line, Central Updated:  09/23/19 0939     Blood Culture, Routine Gram stain aer bottle: Gram positive cocci in clusters resembling Staph       Results called to and read back by:Rhea Lai RN 09/21/2019  16:02      STAPHYLOCOCCUS AUREUS  ID consult required at [facility].  For susceptibility see order #[id number]      Clostridium difficile EIA [[id number]]     Order Status:  Canceled Specimen:  Stool     Stool culture [[id number]]     Order Status:  No result Specimen:  Stool         Imaging Reviewed:   CXRs   CT heads, chest , abd/pelvis  Cardiology:  TTE 9/20  Left Ventricle Low normal ejection fraction at 50%. Mild concentric observed. Grade I (mild) left ventricular diastolic dysfunction consistent with impaired relaxation. Elevated left atrial pressure.   Right Ventricle Normal cavity size, wall thickness and systolic function. Wall motion normal.   Left Atrium The left atrium is normal.   Right Atrium  There is normal right atrial size.   Aortic Valve The valve is trileaflet. There is normal leaflet mobility.   Mitral Valve Normal valve structure. Mild regurgitation. Normal leaflet mobility.   Tricuspid Valve The tricuspid valve is normal. Trace regurgitation. Pulmonary hypertension present. The estimated PA systolic pressure is 48 mmHg.   Pulmonic Valve Normal valve structure. Trace regurgitation.   IVC/SVC Normal central venous pressure (3 mm Hg).   Ascending Aorta The aortic root and ascending aorta are normal in size.   Pericardium No pericardial effusion. Respiratory variation of mitral valve inflow is less than 30%.     DHARMESH 9/25  · Mild left ventricular enlargement.  · Low normal left ventricular systolic function. The estimated ejection fraction is 50%  · Normal LV diastolic function.  · Mild left atrial enlargement.  · No interatrial septal defect present.  · Normal appearing left atrial appendage. No thrombus is present in the appendage. Normal appendage velocities.  · Moderate-to-severe mitral regurgitation.  · Moderate tricuspid regurgitation.  · 1x.5 cmmobile vegetation on tricuspid valve septal leaflet atrial side  · 5mmx6 mm mobile vegetation on posterior leaflet P3 scallop on atrial side  · Aortic valve wnl      IMPRESSION & PLAN   1.    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    IVDA/heroin   2. Pancolitis.   ? if she has ischemic colitis from sepsis and volume depletion . No diarrhea since admit. ? Increased abdominal pain or just increased agitation  3. Shortness of breath, right pleural effusion x-ray  4. DIC? with elevated INR, improved the low platelets, but with elevated fibrinogen, dropping hgb  5. Opiate dependence, severe lumbar spine abnormalities  6. Altered mental status. From sepsis? +/- from unrecognized stroke or embolus, improved 9/24, less so 9/25 but  non focal      Recommendations:   vanc, continue ancef. Continuing flagyl for now  Serial blood cultures  rec MRI brain with contrast, 9/26, if she can tolerate laying flat  Ct abd/pelvis in am. If no worse or better, can stop flagyl     BMP BNP MG   this evening with another dose of Lasix the her potassium is okay     D/w nursing

## 2019-09-25 NOTE — PROGRESS NOTES
[facility]  Adult Nutrition  Progress Note    SUMMARY    Intervention: Parenteral Nutrition Therapy     Current Intake: TPN D 15 AA 5 @ 50 ml/hr + standard lipids  ( Provides 1355 kcal ( 100% EEN), and 60 g protein ( > 100% EPN))    Recommendation:  1) Advance PO diet as medically able to goal of regular      2) Continue to TPN as ordered. Transition to TF via PEG is unable to advance PO diet in the next 3-4 days.  -Nutren 1.5 @ 20 ml/hr advancing slowly ( Pt at risk for refeeding syndrome) to goal of 35 ml/hr + 150-175 ml flush q 4 hr   ( provides 1260 kcal ( 99% EEN), 57 g protein ( > 100% EPN), and 638 ml free water)      3) Weigh pt weekly   4) Follow lytes closely and replete if needed, replete folate/iroin if needed      Goals: 1) po diet advanced or nutrition support initiated in < 48 hours 2) Nutrition support meeting > 75% EEN or PO diet advanced by f/u  Nutrition Goal Status: Met/ new  Communication of RD Recs: (POC, sticky note, reviewed with RN/MD)     Reason for Assessment     Reason For Assessment: Follow-Up  Diagnosis: (staph arueus bacteremia/sepsis)  Relevant Medical History: None applicable  Interdisciplinary Rounds: attended     General Information Comments: 62 y/o female with N/V/D x 5-6 days PTA here with aspiration pneumonia, ABIMBOLA, dehydration, metabolic encephalopathy. On bipap since admission NPO x 3 days. SLP unable to evaluate while pt on bipap. NFPE done 9/23/19, mild-mopderate wasting seen. Slow insignificant wt loss per chart review.   9/25/19 SLP rec. Continued NPO with trials of solids daily. Will reassess s/p DHARMESH. Per RN pt at high risk of bleeding if NGT placed so TPN requested by team as pt also with PICC line. Tolerating at goal, replacing K.      Nutrition Discharge Planning: To be determined- regular diet ? Need for longterm nutrition support     Nutrition Risk Screen     Nutrition Risk Screen: no indicators present     Nutrition/Diet History     Spiritual,  "Cultural Beliefs, Jew Practices, Values that Affect Care: no  Food Allergies: NKFA  Factors Affecting Nutritional Intake: NPO, trouble swallowing     Anthropometrics    Height Method: Measured  Height: 5' 2"  Height (inches): 62 in  Weight Method: Bed Scale  Weight: 50.3 kg 19  Weight (lb): 104.5 lb  Ideal Body Weight (IBW), Female: 110 lb  % Ideal Body Weight, Female (lb): 95 lb  BMI (Calculated): 19.2 Admission  BMI Grade: 18.5-24.9 - normal  Usual Body Weight (UBW), k.2 kg(13)  Weight Change Amount: (51.3 kg 18), 47.4 kg (19)     Lab/Procedures/Meds     Pertinent Labs Reviewed: reviewed  BMP  Lab Results   Component Value Date     2019    K 2.6 (LL) 2019     2019    CO2 29 2019    BUN 26 (H) 2019    CREATININE 0.8 2019    CALCIUM 7.3 (L) 2019    ANIONGAP 10 2019    ESTGFRAFRICA >60 2019    EGFRNONAA >60 2019     Lab Results   Component Value Date    ALBUMIN 1.5 (L) 2019     No results for input(s): POCTGLUCOSE in the last 24 hours.  Lab Results   Component Value Date    CALCIUM 7.3 (L) 2019    PHOS 3.9 2019      Pertinent Medications Reviewed: reviewed  Pertinent Medications Comments: KCl, lasix, iron, TPN + lipids     Estimated/Assessed Needs   Admission  Weight Used For Calorie Calculations: 47.4 kg (104 lb 8 oz)  Energy Calorie Requirements (kcal): [place] Jeor ( x 1.3-1.4 wt maintenance) = 9390-2863 kcal  Energy Need Method: [place]-St Jeor  Protein Requirements: 0.8-0.9 g protein/kg ( ABIMBOLA vs. mild-moderate muscel loss) = 38-42 g protein)  Weight Used For Protein Calculations: 47.4 kg (104 lb 8 oz)  Fluid Requirements (mL): Urine output + 500 ml  Estimated Fluid Requirement Method: RDA Method  CHO Requirement: N/A        Nutrition Prescription Ordered     Current Diet Order: NPO + TPN  Nutrition Order Comments: x 5 days ( + poor PO x 5-6 days PTA)     Evaluation of Received " Nutrient/Fluid Intake     Energy Calories Required: meeting needs  Protein Required: meeting needs  Fluid Required: meeting needs  Tolerance: other (see comments)(NPO)  % Intake of Estimated Energy Needs: 100%  % Meal Intake: 0% + TPN     Nutrition Risk     Level of Risk/Frequency of Follow-up: moderate - 2 x weekly     Assessment and Plan   Severe malnutrition  Contributing Nutrition Diagnosis  Severe acute illness related malnutrition    Related to (etiology):   N/V/D, decreased appetite and increased energy needs d/t illness    Signs and Symptoms (as evidenced by):   1) PO intakes < 50% EEN x 8 days  2) Mild-moderate muscle/fat wasting as charted below    Interventions/Recommendations (treatment strategy):  Above    Nutrition Diagnosis Status:   Improving   Monitor and Evaluation     Food and Nutrient Intake: energy intake  Food and Nutrient Adminstration: diet order  Anthropometric Measurements: weight  Biochemical Data, Medical Tests and Procedures: electrolyte and renal panel, gastrointestinal profile  Nutrition-Focused Physical Findings: overall appearance      Malnutrition Assessment  Malnutrition Type: acute illness or injury  Energy Intake: severe energy intake  Skin (Micronutrient): (Alcides = 15, No PI noted, nose wound)  Teeth (Micronutrient): (missing some)   Micronutrient Evaluation: suspected deficiency  Micronutrient Evaluation Comments: check iron. replete Mg   Weight Loss (Malnutrition): (22% x 6 years)  Energy Intake (Malnutrition): less than or equal to 50% for greater than or equal to 5 days   Orbital Region (Subcutaneous Fat Loss): mild depletion  Upper Arm Region (Subcutaneous Fat Loss): mild depletion  Thoracic and Lumbar Region: mild depletion   Liguori Region (Muscle Loss): moderate depletion  Clavicle Bone Region (Muscle Loss): moderate depletion  Clavicle and Acromion Bone Region (Muscle Loss): moderate depletion  Scapular Bone Region (Muscle Loss): mild depletion  Dorsal Hand (Muscle  Loss): mild depletion  Patellar Region (Muscle Loss): mild depletion  Anterior Thigh Region (Muscle Loss): mild depletion  Posterior Calf Region (Muscle Loss): mild depletion   Edema (Fluid Accumulation): 0-->no edema present           Nutrition Follow-Up     RD Follow-up?: Yes

## 2019-09-25 NOTE — PROCEDURES
"Mesha Mckenzie is a 63 y.o. female patient.    Temp: 99 °F (37.2 °C) (09/25/19 1041)  Pulse: (!) 111 (09/25/19 1041)  Resp: 15 (09/25/19 1041)  BP: (!) 86/53 (09/25/19 1041)  SpO2: 100 % (09/25/19 1041)  Weight: 50.3 kg (111 lb) (09/25/19 1030)  Height: 5' 2" (157.5 cm) (09/25/19 1030)       Procedures  M'allampati score 2 Benzocaine) and then patient received conscious sedation with  propofol  DHARMESH probe then passed  via  transesophageal route and  imaging performed in the  standard 3 views ie  Upper esophageal, ,mid esophageal, and transgastric views . Saline bubbles were injected IV for PFO imaging of   Intraatrial septum.   At end of procedure, DHARMESH probe removed. The patient tolerated procedure well .        Benigno Dobson  9/25/2019  "

## 2019-09-25 NOTE — PLAN OF CARE
Patient starts shift on 4L NC. BIPAP placed on patient related to oxygen saturation 85-88% on 4L NC. Note patient breathing through mouth, instructions for deep breaths through patient nose unsuccessful. ST noted to nose. Barrier cream applied and site covered with foam boarder. Tolerated well. Patient orient to self and able to state current president only. Moves extremities X4. Last BM noted to be 9/18 before patient admit. PRN medication provided per order. Medication unsuccessful at this time. Henriquez draining dark yellow/clear urine. Restraints noted to BUE. Patient repositioned throughout shift. No signs or symptoms of bleeding noted at this time. Patient SR/ST throughout shift. NPO. Mouth care provided. Patient medicated for pain as needed per PRN order. No return call from Dr. Corrigan for consult at this time. Will attempt re-contact this AM. Safety intact.

## 2019-09-25 NOTE — PLAN OF CARE
Intervention: Parenteral Nutrition Therapy      Current Intake: TPN D 15 AA 5 @ 50 ml/hr + standard lipids  ( Provides 1355 kcal ( 100% EEN), and 60 g protein ( > 100% EPN))     Recommendation:  1) Advance PO diet as medically able to goal of regular      2) Continue to TPN as ordered. Transition to TF via PEG is unable to advance PO diet in the next 3-4 days.  -Nutren 1.5 @ 20 ml/hr advancing slowly ( Pt at risk for refeeding syndrome) to goal of 35 ml/hr + 150-175 ml flush q 4 hr   ( provides 1260 kcal ( 99% EEN), 57 g protein ( > 100% EPN), and 638 ml free water)       3) Weigh pt weekly   4) Follow lytes closely and replete if needed, replete folate/iroin if needed      Goals: 1) po diet advanced or nutrition support initiated in < 48 hours 2) Nutrition support meeting > 75% EEN or PO diet advanced by f/u  Nutrition Goal Status: Met/ new  Communication of RD Recs: (POC, sticky note, reviewed with RN/MD)

## 2019-09-25 NOTE — ANESTHESIA PREPROCEDURE EVALUATION
09/25/2019  Mesha Mckenzie is a 63 y.o., female.    Anesthesia Evaluation    I have reviewed the Patient Summary Reports.    I have reviewed the Nursing Notes.      Review of Systems  Anesthesia Hx:  No problems with previous Anesthesia    Pulmonary:   Shortness of breath Respiratory distress - in ICU on and off bipap   Hepatic/GI:   PUD,        Physical Exam  General:  Well nourished    Airway/Jaw/Neck:  Airway Findings: Mallampati: II                Anesthesia Plan  Type of Anesthesia, risks & benefits discussed:  Anesthesia Type:  MAC  Patient's Preference:   Intra-op Monitoring Plan:   Intra-op Monitoring Plan Comments:   Post Op Pain Control Plan:   Post Op Pain Control Plan Comments:   Induction:   IV  Beta Blocker:  Patient is not currently on a Beta-Blocker (No further documentation required).       Informed Consent: Patient representative understands risks and agrees with Anesthesia plan.  Questions answered. Anesthesia consent signed with patient representative.  ASA Score: 3     Day of Surgery Review of History & Physical:    H&P update referred to the surgeon.         Ready For Surgery From Anesthesia Perspective.

## 2019-09-25 NOTE — PLAN OF CARE
09/24/19 1909   Patient Assessment/Suction   Level of Consciousness (AVPU) alert   Respiratory Effort Mild   Expansion/Accessory Muscles/Retractions abdominal muscle use   All Lung Fields Breath Sounds coarse;diminished   Rhythm/Pattern, Respiratory shallow   PRE-TX-O2   O2 Device (Oxygen Therapy) nasal cannula   $ Is the patient on Low Flow Oxygen? Yes   Flow (L/min) 4   Oxygen Concentration (%) 36   SpO2 (!) 91 %   Pulse Oximetry Type Continuous   $ Pulse Oximetry - Multiple Charge Pulse Oximetry - Multiple   Pulse (!) 113   Resp 20   Aerosol Therapy   $ Aerosol Therapy Charges Aerosol Treatment   Respiratory Treatment Status (SVN) given   Treatment Route (SVN) mask;oxygen   Patient Position (SVN) HOB elevated   Post Treatment Assessment (SVN) breath sounds unchanged   Signs of Intolerance (SVN) none   Breath Sounds Post-Respiratory Treatment   Throughout All Fields Post-Treatment All Fields   Throughout All Fields Post-Treatment no change   Post-treatment Heart Rate (beats/min) 113   Post-treatment Resp Rate (breaths/min) 25   Ready to Wean/Extubation Screen   FIO2<=50 (chart decimal) 0.36

## 2019-09-25 NOTE — ASSESSMENT & PLAN NOTE
The patient is on Ancef and vancomycin for MSSA.  Blood cultures from the 22nd are not showing any growth.  Transthoracic echocardiogram did not show evidence of vegetations.  DHARMESH shows  mitral valve endocarditis.

## 2019-09-25 NOTE — NURSING
Spoke with Dr. Arce via phone. Updated on patient current plan of care and informed of current patient status. Notified of patient current critical Hct of 19.5 per CARLY Sahu NP instruction. Hgb of 7.0 and Cr. 0.8. Instructed to infuse 2 units PRBC. Premedicate with IV tylenol 650 mg and 25 mg of benadryl with lasix 20 mg IV in between infusions. MD aware of current critical potassium level of 2.6. Orders read back for clarification

## 2019-09-25 NOTE — PT/OT/SLP PROGRESS
Speech Language Pathology      Mesha Mckenzie  MRN: [id number]    Attempted to see patient x2 for ongoing swallow assessment. 8:49 Pt NPO for DHARMESH. 14:30 Pt s/p DHARMESH, on BiPAP. Will follow up in AM.    Alondra Mckay, TIANA-SLP

## 2019-09-25 NOTE — ASSESSMENT & PLAN NOTE
Contributing Nutrition Diagnosis  Severe acute illness related malnutrition    Related to (etiology):   N/V/D, decreased appetite and increased energy needs d/t illness    Signs and Symptoms (as evidenced by):   1) PO intakes < 50% EEN x 8 days  2) Mild-moderate muscle/fat wasting as charted below    Interventions/Recommendations (treatment strategy):  Above    Nutrition Diagnosis Status:   Improving

## 2019-09-25 NOTE — PLAN OF CARE
Patient placed back on Bipap with previous settings.but with a small nasal mask.  Patient receiving aerosol tx via duoneb now and q6hr.  Hr 123 and 02 saturation 97%.

## 2019-09-25 NOTE — PROGRESS NOTES
[facility]  Hematology/Oncology  Progress Note    Patient Name: Mesha Mckenzie  Admission Date: 9/19/2019  Hospital Length of Stay: 6 days  Code Status: Full Code     Subjective:     Interval History: pt is lethargic although records indicate that her  MS is improving   daughter and  at bedside   PRBC being transfused   went for DHARMESH  today  Oncology Treatment Plan:   [No treatment plan]    Medications:  Continuous Infusions:   Amino acid 5% - dextrose 15% (CLINIMIX-E) solution with additives (1L  provides 510 kcal/L dextrose, with 50 gm AA, 150 gm CHO, Na 35, K 30, Mg 5, Ca 4.5, Acetate 80, Cl 39, Phos 15) 50 mL/hr at 09/24/19 1751    Amino acid 5% - dextrose 15% (CLINIMIX-E) solution with additives (1L  provides 510 kcal/L dextrose, with 50 gm AA, 150 gm CHO, Na 35, K 30, Mg 5, Ca 4.5, Acetate 80, Cl 39, Phos 15)      norepinephrine bitartrate-D5W Stopped (09/20/19 7996)     Scheduled Meds:   albuterol-ipratropium  3 mL Nebulization Q6H    ceFAZolin (ANCEF) IVPB  2 g Intravenous Q6H    metronidazole  500 mg Intravenous Q8H    pantoprazole  40 mg Intravenous Daily    potassium chloride  40 mEq Intravenous Q6H    vancomycin (VANCOCIN) IVPB  15 mg/kg Intravenous Q12H     PRN Meds:sodium chloride, bisacodyl, HYDROmorphone, influenza, lorazepam, ondansetron, pneumoc 13-susanna conj-dip cr(PF), potassium chloride in water, sodium chloride 0.9%     Review of Systems    has been wearing nasal cannula o2, pt seems to have been using IVdrugs recreationally   seems still sob, and somnalent, daughter reports fredrick harris had a conversation since the procedure as she has been sleeping   no wintessed seizures, some coughing, some sob, no fever    Objective:     Vital Signs (Most Recent):  Temp: 97.9 °F (36.6 °C) (09/25/19 1139)  Pulse: 109 (09/25/19 1313)  Resp: (!) 36 (09/25/19 1313)  BP: (!) 169/82 (09/25/19 1300)  SpO2: (!) 94 % (09/25/19 1313) Vital Signs (24h Range):  Temp:  [97.9 °F (36.6  °C)-99.2 °F (37.3 °C)] 97.9 °F (36.6 °C)  Pulse:  [] 109  Resp:  [14-59] 36  SpO2:  [85 %-100 %] 94 %  BP: ()/(51-98) 169/82     Weight: 50.3 kg (111 lb)  Body mass index is 20.3 kg/m².  Body surface area is 1.48 meters squared.      Intake/Output Summary (Last 24 hours) at 9/25/2019 1321  Last data filed at 9/25/2019 1300  Gross per 24 hour   Intake 1643.32 ml   Output 4520 ml   Net -2876.68 ml       Physical Exam  PHYSICAL EXAM:     Vitals:    09/25/19 1313   BP:    Pulse: 109   Resp: (!) 36   Temp:        GENERAL: Comfortable looking patient. Patient is in no distress.  Awake, alert and oriented to time, person and place.  No anxiety, or agitation.      HEENT: Normal conjunctivae and eyelids. WNL.  PERRLA 3 to 4 mm. No icterus, no pallor, no congestion, and no discharge noted.     NECK:  Supple. Trachea is central.  No crepitus.  No JVD or masses.    RESPIRATORY:  No intercostal retractions.  No dullness to percussion.  Chest is clear to auscultation.  No rales, rhonchi or wheezes.  No crepitus.  Good air entry bilaterally.    CARDIOVASCULAR:  S1 and S2 are normally heard without murmurs or gallops.  All peripheral pulses are present.    ABDOMEN:  Normal abdomen.  No hepatosplenomegaly.  No free fluid.  Bowel sounds are present.  No hernia noted. No masses.  No rebound or tenderness.  No guarding or rigidity.  Umbilicus is midline.    LYMPHATICS:  No axillary, cervical, supraclavicular, submental, or inguinal lymphadenopathy.    SKIN/MUSCULOSKELETAL:  There is no evidence of excoriation marks or ecchmosis.  No rashes.  No cyanosis.  No clubbing.  No joint or skeletal deformities noted.  Normal range of motion.    NEUROLOGIC:  Per chart more alert, she is sleeping now but arousable, attempted to eat a few bites   no focal deficits    GENITAL/RECTAL:  Exams are deferred.  Significant Labs:   Lab Results   Component Value Date    WBC 8.81 09/25/2019    HGB 7.0 (L) 09/25/2019    HCT 19.5 (LL) 09/25/2019     MCV 87 09/25/2019    PLT 53 (L) 09/25/2019     BMP  Lab Results   Component Value Date     09/25/2019    K 2.6 (LL) 09/25/2019     09/25/2019    CO2 29 09/25/2019    BUN 26 (H) 09/25/2019    CREATININE 0.8 09/25/2019    CALCIUM 7.3 (L) 09/25/2019    ANIONGAP 10 09/25/2019    ESTGFRAFRICA >60 09/25/2019    EGFRNONAA >60 09/25/2019   cxr 9/24  Impression       Slight decrease of the bilateral infiltrates compared to the prior exam           Assessment/Plan:     Active Diagnoses:    Diagnosis Date Noted POA    PRINCIPAL PROBLEM:  Staphylococcus aureus bacteremia with sepsis [A41.01] 09/22/2019 Yes    IV drug user [F19.90] 09/25/2019 Yes    Encephalopathy, metabolic [G93.41] 09/23/2019 No    Normocytic anemia [D64.9] 09/23/2019 Yes    Severe malnutrition [E43] 09/23/2019 Yes    Coagulopathy [D68.9] 09/20/2019 Yes    Thrombocytopenia [D69.6] 09/20/2019 Yes    Pleural effusion [J90] 09/20/2019 Yes    Acute hypoxemic respiratory failure [J96.01] 09/20/2019 Yes    ABIMBOLA (acute kidney injury) [N17.9] 09/19/2019 Yes    Pancolitis [K51.00] 09/19/2019 Yes    Metabolic acidosis [E87.2] 09/19/2019 Yes      Problems Resolved During this Admission:    Diagnosis Date Noted Date Resolved POA    Pulmonary hypertension [I27.20] 09/23/2019 09/23/2019 Yes    Acute on chronic diastolic congestive heart failure [I50.33] 09/23/2019 09/23/2019 Yes    DIC (disseminated intravascular coagulation) [D65] 09/23/2019 09/23/2019 Yes    ILD (interstitial lung disease) [J84.9] 09/20/2019 09/23/2019 Yes    COPD with acute lower respiratory infection [J44.0] 09/20/2019 09/23/2019 Yes    Calcification of aorta [I70.0] 09/20/2019 09/23/2019 Yes    BOOP (bronchiolitis obliterans with organizing pneumonia) [J84.89] 09/20/2019 09/23/2019 Yes    Intravascular volume depletion [E86.1] 09/19/2019 09/23/2019 Yes    Hyponatremia [E87.1] 09/19/2019 09/23/2019 Yes    Hypokalemia [E87.6] 09/19/2019 09/23/2019 Yes    CAP  (community acquired pneumonia) [J18.9] 09/19/2019 09/23/2019 Yes          Impression       Slight decrease of the bilateral infiltrates compared to the prior exam     Thrombocytopenia and anemia related to multiple issues of sepsis  ( staph bacteremia explained by IVDA), antibiotics, ( vanc/ ancef and flagyl)   contto supportively transfuse as needed   she has no oozing/ bleeding noted   cont pulmonary support of her pneumonia and septic pulmonary emboli   bilateral pneumonia with sepsis and pulm recs/ ID following   nutritional support . She is not eating much   doubt true DIC as fibrinogen is still normal   cont to trend PT/PTT    Amanda Cruz MD  Hematology/Oncology  [facility]

## 2019-09-25 NOTE — ASSESSMENT & PLAN NOTE
Secondary to this acute illness?  There is no evidence of blood loss or hemolysis.  The patient will receive 2 units of packed red blood cells today

## 2019-09-25 NOTE — PROGRESS NOTES
Pharmacokinetic Assessment Follow Up: IV Vancomycin    Vancomycin serum concentration assessment(s):    The trough level was drawn correctly and can be used to guide therapy at this time. The measurement is within the desired definitive target range of 15 to 20 mcg/mL.    Vancomycin Regimen Plan:    Continue regimen to Vancomycin 750 mg IV every 12 hours with next serum trough concentration measured at 2030 prior to 3rd dose on 9/25.  Monitor closely for renal function and accumulation.     Drug levels (last 3 results):  Recent Labs   Lab Result Units 09/22/19  0906 09/24/19 2012   Vancomycin-Trough ug/mL 18.4 18.0       Pharmacy will continue to follow and monitor vancomycin.    Please contact pharmacy at extension [phone] for questions regarding this assessment.    Thank you for the consult,   Liset Medina       Patient brief summary:  Mesha Mckenzie is a 63 y.o. female initiated on antimicrobial therapy with IV Vancomycin for treatment of sepsis    The patient's current regimen is 750 mg q 12 hours.    Drug Allergies:   Review of patient's allergies indicates:   Allergen Reactions    Pcn [penicillins]        Actual Body Weight:   54.2 kg    Renal Function:   Estimated Creatinine Clearance: 55.4 mL/min (based on SCr of 0.8 mg/dL).,         CBC (last 72 hours):  Recent Labs   Lab Result Units 09/22/19  0332 09/23/19  0316 09/24/19  0315   WBC K/uL 10.57 7.72 8.29   Hemoglobin g/dL 8.8* 7.6* 7.4*   Hematocrit % 24.1* 21.4* 20.0*   Platelets K/uL 90* 63* 54*   Gran% % 88.5* 85.0* 80.8*   Lymph% % 6.5* 12.0* 11.2*   Mono% % 2.7* 2.0* 5.1   Eosinophil% % 0.3 1.0 0.4   Basophil% % 0.0 0.0 0.0   Differential Method  Automated Manual Automated       Metabolic Panel (last 72 hours):  Recent Labs   Lab Result Units 09/21/19  2348 09/22/19  0332 09/22/19  0906 09/22/19  1323 09/22/19  1730 09/22/19 2001 09/23/19  0006 09/23/19  0316 09/23/19  0748 09/24/19  0315   Sodium mmol/L 133*  133* 135*  135* 136  136 136   136 137  137 138  138 138  138 138  138  138 139  139 141   Potassium mmol/L 3.9  3.9 3.9  3.9 3.8  3.8 3.5  3.5 4.0  4.0 4.0  4.0 3.6  3.6 3.5  3.5  3.5 3.0*  3.0* 2.6*   Chloride mmol/L 106  106 107  107 107  107 106  106 106  106 107  107 107  107 106  106  106 106  106 103   CO2 mmol/L 18*  18* 19*  19* 20*  20* 20*  20* 21*  21* 21*  21* 21*  21* 22*  22*  22* 22*  22* 27   Glucose mg/dL 115*  115* 108  108 106  106 117*  117* 96  96 94  94 103  103 105  105  105 109  109 119*   BUN, Bld mg/dL 35*  35* 34*  34* 30*  30* 29*  29* 28*  28* 28*  28* 27*  27* 26*  26*  26* 27*  27* 24*   Creatinine mg/dL 0.8  0.8 0.8  0.8 0.7  0.7 0.7  0.7 0.8  0.8 0.8  0.8 0.8  0.8 0.8  0.8  0.8 0.8  0.8 0.8   Albumin g/dL  --  1.5*  --   --   --   --   --  1.4*  --  1.4*   Total Bilirubin mg/dL  --  0.7  --   --   --   --   --  0.5  --  0.5   Alkaline Phosphatase U/L  --  56  --   --   --   --   --  52*  --  57   AST U/L  --  17  --   --   --   --   --  15  --  15   ALT U/L  --  12  --   --   --   --   --  9*  --  <5*   Magnesium mg/dL  --   --   --   --   --   --   --   --  1.1* 1.1*       Vancomycin Administrations:  vancomycin given in the last 96 hours                     vancomycin 750 mg in dextrose 5 % 250 mL IVPB (ready to mix system) (mg) 750 mg New Bag 09/24/19 2106     750 mg New Bag  0814    vancomycin in dextrose 5 % 1 gram/250 mL IVPB 1,000 mg (mg) 1,000 mg New Bag 09/23/19 2033                      Microbiologic Results:  Microbiology Results (last 7 days)       Procedure Component Value Units Date/Time    Blood culture [[id number]] Collected:  09/24/19 0517    Order Status:  Completed Specimen:  Blood Updated:  09/24/19 1715     Blood Culture, Routine No Growth to date    Blood culture [[id number]]  (Abnormal) Collected:  09/21/19 0534    Order Status:  Completed Specimen:  Blood Updated:  09/24/19 0954     Blood Culture, Routine Gram stain peds  bottle: Gram positive cocci in clusters resembling Staph       Results called to and read back by:Javi Sunshine RN 09/22/2019  14:22      STAPHYLOCOCCUS AUREUS  ID consult required at [facility].  For susceptibility see order #[id number]      Blood culture [[id number]]  (Abnormal) Collected:  09/22/19 0338    Order Status:  Completed Specimen:  Blood from Antecubital, Left  Arm Updated:  09/24/19 0843     Blood Culture, Routine Gram stain peds bottle: Gram positive cocci in clusters resembling Staph       Results called to and read back by:Yolis Wan RN 09/23/2019  04:46      STAPHYLOCOCCUS AUREUS  Susceptibility pending  ID consult required at [facility].      Blood culture [[id number]]  (Abnormal) Collected:  09/20/19 1739    Order Status:  Completed Specimen:  Blood from Line, Central Updated:  09/23/19 0939     Blood Culture, Routine Gram stain aer bottle: Gram positive cocci in clusters resembling Staph       Results called to and read back by:Rhea Lai RN 09/21/2019  16:02      STAPHYLOCOCCUS AUREUS  ID consult required at [facility].  For susceptibility see order #[id number]      Clostridium difficile EIA [[id number]]     Order Status:  Canceled Specimen:  Stool     Stool culture [[id number]]     Order Status:  No result Specimen:  Stool

## 2019-09-25 NOTE — PROGRESS NOTES
Progress Note  PULMONARY    Admit Date: 9/19/2019 09/25/2019      SUBJECTIVE:     Sept 21, diffuse pains, off levo, abg better.    Sept 22, on niv, arouses sl, no c/o  Sept 23, moans and groans, on and off bipap, agitated, restless  Sept 24, moans, agitation varies-patient says she is better today, seems brighter  Sept 25, calm/sedate on niv, looks much more comfortable, arouses      PFSH and Allergies reviewed.    OBJECTIVE:     Vitals (Most recent):  Vitals:    09/25/19 0730   BP:    Pulse: (!) 114   Resp: (!) 39   Temp: 99 °F (37.2 °C)       Vitals (24 hour range):  Temp:  [98.2 °F (36.8 °C)-99.2 °F (37.3 °C)]   Pulse:  []   Resp:  [17-60]   BP: (108-160)/(53-82)   SpO2:  [85 %-100 %]       Intake/Output Summary (Last 24 hours) at 9/25/2019 0815  Last data filed at 9/25/2019 0549  Gross per 24 hour   Intake 1545.82 ml   Output 3230 ml   Net -1684.18 ml          Physical Exam:  The patient's neuro status (alertness,orientation,cognitive function,motor skills,), pharyngeal exam (oral lesions, hygiene, abn dentition,), Neck (jvd,mass,thyroid,nodes in neck and above/below clavicle),RESPIRATORY(symmetry,effort,fremitus,percussion,auscultation),  Cor(rhythm,heart tones including gallops,perfusion,edema)ABD(distention,hepatic&splenomegaly,tenderness,masses), Skin(rash,cyanosis),Psyc(affect,judgement,).  Exam negative except for these pertinent findings:    Calm and sedate but arouses on niv, chest is symmetric, no distress, normal percussion, normal fremitus and good normal breath sounds, no edema     Radiographs reviewed: view by direct vision 24 infiltrates better, right effusion still.       Labs     Recent Labs   Lab 09/25/19  0350   WBC 8.81   HGB 7.0*   HCT 19.5*   PLT 53*     Recent Labs   Lab 09/25/19  0350      K 2.6*      CO2 29   BUN 26*   CREATININE 0.8   *   CALCIUM 7.3*   AST 11   ALT <5*   ALKPHOS 47*   BILITOT 0.4   BILIDIR 0.2   PROT 5.3*   ALBUMIN 1.5*   INR 1.3*     No  results for input(s): PH, PCO2, PO2, HCO3 in the last 24 hours.  Microbiology Results (last 7 days)     Procedure Component Value Units Date/Time    Blood culture [[id number]] Collected:  09/25/19 0413    Order Status:  Sent Specimen:  Blood Updated:  09/25/19 0413    Blood culture [[id number]] Collected:  09/24/19 0517    Order Status:  Completed Specimen:  Blood Updated:  09/24/19 1715     Blood Culture, Routine No Growth to date    Blood culture [[id number]]  (Abnormal) Collected:  09/21/19 0534    Order Status:  Completed Specimen:  Blood Updated:  09/24/19 0954     Blood Culture, Routine Gram stain peds bottle: Gram positive cocci in clusters resembling Staph       Results called to and read back by:Javi Sunshine RN 09/22/2019  14:22      STAPHYLOCOCCUS AUREUS  ID consult required at [facility].  For susceptibility see order #[id number]      Blood culture [[id number]]  (Abnormal) Collected:  09/22/19 0338    Order Status:  Completed Specimen:  Blood from Antecubital, Left  Arm Updated:  09/24/19 0843     Blood Culture, Routine Gram stain peds bottle: Gram positive cocci in clusters resembling Staph       Results called to and read back by:Yolis Wan RN 09/23/2019  04:46      STAPHYLOCOCCUS AUREUS  Susceptibility pending  ID consult required at [facility].      Blood culture [[id number]]  (Abnormal) Collected:  09/20/19 1739    Order Status:  Completed Specimen:  Blood from Line, Central Updated:  09/23/19 0939     Blood Culture, Routine Gram stain aer bottle: Gram positive cocci in clusters resembling Staph       Results called to and read back by:Rhea Lai RN 09/21/2019  16:02      STAPHYLOCOCCUS AUREUS  ID consult required at [facility].  For susceptibility see order #[id number]      Clostridium difficile EIA [[id number]]     Order Status:  Canceled Specimen:  Stool     Stool culture  [[id number]]     Order Status:  No result Specimen:  Stool       Results for ROM YOUNG (MRN [id number]) as of 9/23/2019 07:24   Ref. Range 9/22/2019 16:19   POC PH Latest Ref Range: 7.35 - 7.45  7.488 (H)   POC PCO2 Latest Ref Range: 35 - 45 mmHg 27.0 (LL)   POC PO2 Latest Ref Range: 80 - 100 mmHg 69 (L)   POC BE Latest Ref Range: -2 to 2 mmol/L -3   POC HCO3 Latest Ref Range: 24 - 28 mmol/L 20.5 (L)   POC SATURATED O2 Latest Ref Range: 95 - 100 % 95       Impression:  Active Hospital Problems    Diagnosis  POA    *Staphylococcus aureus bacteremia with sepsis [A41.01]  Yes    Encephalopathy, metabolic [G93.41]  No    Normocytic anemia [D64.9]  Yes    Severe malnutrition [E43]  Yes    Coagulopathy [D68.9]  Yes    Thrombocytopenia [D69.6]  Yes    Pleural effusion [J90]  Yes    Acute hypoxemic respiratory failure [J96.01]  Yes    ABIMBOLA (acute kidney injury) [N17.9]  Yes    Pancolitis [K51.00]  Yes    Metabolic acidosis [E87.2]  Yes      Resolved Hospital Problems    Diagnosis Date Resolved POA    Pulmonary hypertension [I27.20] 09/23/2019 Yes    Acute on chronic diastolic congestive heart failure [I50.33] 09/23/2019 Yes    DIC (disseminated intravascular coagulation) [D65] 09/23/2019 Yes    ILD (interstitial lung disease) [J84.9] 09/23/2019 Yes    COPD with acute lower respiratory infection [J44.0] 09/23/2019 Yes    Calcification of aorta [I70.0] 09/23/2019 Yes     Defer to primary control of cholesterol and bp.          BOOP (bronchiolitis obliterans with organizing pneumonia) [J84.89] 09/23/2019 Yes    Intravascular volume depletion [E86.1] 09/23/2019 Yes    Hyponatremia [E87.1] 09/23/2019 Yes    Hypokalemia [E87.6] 09/23/2019 Yes    CAP (community acquired pneumonia) [J18.9] 09/23/2019 Yes               Plan:   Sept 21, wbc now 9k, creat down to 1.2, pt uses narcotics chr - be - 10 last pm from -15 on 19th,  F/u cxr- dose ativan.  Hard to evaluate- seems much better save pain c/o. F/u cxr  am  Sept 22, seems better but hard to eval- certainly improved from admit.  Monitor, support/niv, abx, re eval.  No change.   Sept 23 - MSSA, right effusion stable with inr over 2 and plt 60's- no plan to tap at this time. Infiltrates better, encephalopathy still impressive= niv/bipap for part resp and part agitation.    Pt doesn't have cryptogenic organizing pneumonia/idiopathetic BOOP.  Pt had diastolic chf acute and chr.  Also pulm hypertension.  Has dic also- looked good on ct abd.    Sept 24- inr now over 5 with plt 50's,cxr better.    At the bedside patient is much better.  Coagulopathies impressive.  Suspect liver failure (?).  Patient apparently does better with Dilaudid over morphine.  Will change  To Dilaudid    Sept 25- ID notes noted, inr good today, check cxr am, seems to be improved.  Would decrease dilaudid from 2 to 1 q3prn.                .

## 2019-09-25 NOTE — ASSESSMENT & PLAN NOTE
The patient's mental status continues to improve.  We will try to obtain an MRI of brain when she is able to cooperate.

## 2019-09-26 LAB
ALBUMIN SERPL BCP-MCNC: 1.5 G/DL (ref 3.5–5.2)
ALLENS TEST: ABNORMAL
ALP SERPL-CCNC: 48 U/L (ref 55–135)
ALT SERPL W/O P-5'-P-CCNC: <5 U/L (ref 10–44)
ANION GAP SERPL CALC-SCNC: 10 MMOL/L (ref 8–16)
AST SERPL-CCNC: 11 U/L (ref 10–40)
BASOPHILS # BLD AUTO: 0.01 K/UL (ref 0–0.2)
BASOPHILS NFR BLD: 0.1 % (ref 0–1.9)
BILIRUB DIRECT SERPL-MCNC: 0.4 MG/DL (ref 0.1–0.3)
BILIRUB SERPL-MCNC: 0.7 MG/DL (ref 0.1–1)
BUN SERPL-MCNC: 27 MG/DL (ref 8–23)
CALCIUM SERPL-MCNC: 7.2 MG/DL (ref 8.7–10.5)
CHLORIDE SERPL-SCNC: 102 MMOL/L (ref 95–110)
CO2 SERPL-SCNC: 29 MMOL/L (ref 23–29)
CREAT SERPL-MCNC: 0.7 MG/DL (ref 0.5–1.4)
DELSYS: ABNORMAL
DIFFERENTIAL METHOD: ABNORMAL
EOSINOPHIL # BLD AUTO: 0 K/UL (ref 0–0.5)
EOSINOPHIL NFR BLD: 0.3 % (ref 0–8)
ERYTHROCYTE [DISTWIDTH] IN BLOOD BY AUTOMATED COUNT: 15.8 % (ref 11.5–14.5)
EST. GFR  (AFRICAN AMERICAN): >60 ML/MIN/1.73 M^2
EST. GFR  (NON AFRICAN AMERICAN): >60 ML/MIN/1.73 M^2
FIO2: 40
GLUCOSE SERPL-MCNC: 162 MG/DL (ref 70–110)
HCO3 UR-SCNC: 26.5 MMOL/L (ref 24–28)
HCT VFR BLD AUTO: 26.4 % (ref 37–48.5)
HGB BLD-MCNC: 9.2 G/DL (ref 12–16)
IGG1 SER-MCNC: 763 MG/DL (ref 382–929)
IGG2 SER-MCNC: 329 MG/DL (ref 242–700)
IGG3 SER-MCNC: 64 MG/DL (ref 22–176)
IGG4 SER-MCNC: 210 MG/DL (ref 4–86)
IMM GRANULOCYTES # BLD AUTO: 0.21 K/UL (ref 0–0.04)
INR PPP: 1.1 (ref 0.8–1.2)
LYMPHOCYTES # BLD AUTO: 1.1 K/UL (ref 1–4.8)
LYMPHOCYTES NFR BLD: 12.3 % (ref 18–48)
MAGNESIUM SERPL-MCNC: 1.1 MG/DL (ref 1.6–2.6)
MCH RBC QN AUTO: 31 PG (ref 27–31)
MCHC RBC AUTO-ENTMCNC: 34.8 G/DL (ref 32–36)
MCV RBC AUTO: 89 FL (ref 82–98)
MODE: ABNORMAL
MONOCYTES # BLD AUTO: 0.2 K/UL (ref 0.3–1)
MONOCYTES NFR BLD: 2.3 % (ref 4–15)
NEUTROPHILS # BLD AUTO: 7.6 K/UL (ref 1.8–7.7)
NEUTROPHILS NFR BLD: 82.7 % (ref 38–73)
NRBC BLD-RTO: 0 /100 WBC
PCO2 BLDA: 28 MMHG (ref 35–45)
PH SMN: 7.58 [PH] (ref 7.35–7.45)
PLATELET # BLD AUTO: 44 K/UL (ref 150–350)
PMV BLD AUTO: 11.6 FL (ref 9.2–12.9)
PO2 BLDA: 71 MMHG (ref 80–100)
POC BE: 5 MMOL/L
POC SATURATED O2: 97 % (ref 95–100)
POC TCO2: 27 MMOL/L (ref 23–27)
POTASSIUM SERPL-SCNC: 3.2 MMOL/L (ref 3.5–5.1)
POTASSIUM SERPL-SCNC: 4 MMOL/L (ref 3.5–5.1)
PROT SERPL-MCNC: 5.1 G/DL (ref 6–8.4)
PROTHROMBIN TIME: 11.4 SEC (ref 9–12.5)
RBC # BLD AUTO: 2.97 M/UL (ref 4–5.4)
SAMPLE: ABNORMAL
SITE: ABNORMAL
SODIUM SERPL-SCNC: 141 MMOL/L (ref 136–145)
SP02: 97
WBC # BLD AUTO: 9.14 K/UL (ref 3.9–12.7)
WBC #/AREA STL HPF: NORMAL /[HPF]

## 2019-09-26 PROCEDURE — 36600 WITHDRAWAL OF ARTERIAL BLOOD: CPT

## 2019-09-26 PROCEDURE — 99900035 HC TECH TIME PER 15 MIN (STAT)

## 2019-09-26 PROCEDURE — 87046 STOOL CULTR AEROBIC BACT EA: CPT

## 2019-09-26 PROCEDURE — 25000003 PHARM REV CODE 250: Performed by: NURSE PRACTITIONER

## 2019-09-26 PROCEDURE — 85025 COMPLETE CBC W/AUTO DIFF WBC: CPT

## 2019-09-26 PROCEDURE — 99233 PR SUBSEQUENT HOSPITAL CARE,LEVL III: ICD-10-PCS | Mod: S$GLB,,, | Performed by: INTERNAL MEDICINE

## 2019-09-26 PROCEDURE — 80076 HEPATIC FUNCTION PANEL: CPT

## 2019-09-26 PROCEDURE — 83735 ASSAY OF MAGNESIUM: CPT

## 2019-09-26 PROCEDURE — 25000003 PHARM REV CODE 250: Performed by: SPECIALIST

## 2019-09-26 PROCEDURE — 84132 ASSAY OF SERUM POTASSIUM: CPT

## 2019-09-26 PROCEDURE — S0171 BUMETANIDE 0.5 MG: HCPCS | Performed by: SPECIALIST

## 2019-09-26 PROCEDURE — 94761 N-INVAS EAR/PLS OXIMETRY MLT: CPT

## 2019-09-26 PROCEDURE — 63600175 PHARM REV CODE 636 W HCPCS: Performed by: NURSE PRACTITIONER

## 2019-09-26 PROCEDURE — 63600175 PHARM REV CODE 636 W HCPCS: Performed by: INTERNAL MEDICINE

## 2019-09-26 PROCEDURE — 94640 AIRWAY INHALATION TREATMENT: CPT

## 2019-09-26 PROCEDURE — 25500020 PHARM REV CODE 255: Performed by: INTERNAL MEDICINE

## 2019-09-26 PROCEDURE — 80048 BASIC METABOLIC PNL TOTAL CA: CPT

## 2019-09-26 PROCEDURE — 27000221 HC OXYGEN, UP TO 24 HOURS

## 2019-09-26 PROCEDURE — 63600175 PHARM REV CODE 636 W HCPCS: Performed by: SPECIALIST

## 2019-09-26 PROCEDURE — 99233 SBSQ HOSP IP/OBS HIGH 50: CPT | Mod: S$GLB,,, | Performed by: INTERNAL MEDICINE

## 2019-09-26 PROCEDURE — 87427 SHIGA-LIKE TOXIN AG IA: CPT | Mod: 59

## 2019-09-26 PROCEDURE — 85610 PROTHROMBIN TIME: CPT

## 2019-09-26 PROCEDURE — 99232 SBSQ HOSP IP/OBS MODERATE 35: CPT | Mod: ,,, | Performed by: INTERNAL MEDICINE

## 2019-09-26 PROCEDURE — 36415 COLL VENOUS BLD VENIPUNCTURE: CPT

## 2019-09-26 PROCEDURE — 87045 FECES CULTURE AEROBIC BACT: CPT

## 2019-09-26 PROCEDURE — 87040 BLOOD CULTURE FOR BACTERIA: CPT

## 2019-09-26 PROCEDURE — 94660 CPAP INITIATION&MGMT: CPT

## 2019-09-26 PROCEDURE — 20000000 HC ICU ROOM

## 2019-09-26 PROCEDURE — 25000003 PHARM REV CODE 250: Performed by: INTERNAL MEDICINE

## 2019-09-26 PROCEDURE — S0030 INJECTION, METRONIDAZOLE: HCPCS | Performed by: NURSE PRACTITIONER

## 2019-09-26 PROCEDURE — 25000242 PHARM REV CODE 250 ALT 637 W/ HCPCS: Performed by: INTERNAL MEDICINE

## 2019-09-26 PROCEDURE — C9113 INJ PANTOPRAZOLE SODIUM, VIA: HCPCS | Performed by: NURSE PRACTITIONER

## 2019-09-26 PROCEDURE — 82803 BLOOD GASES ANY COMBINATION: CPT

## 2019-09-26 PROCEDURE — 89055 LEUKOCYTE ASSESSMENT FECAL: CPT

## 2019-09-26 PROCEDURE — 99232 PR SUBSEQUENT HOSPITAL CARE,LEVL II: ICD-10-PCS | Mod: ,,, | Performed by: INTERNAL MEDICINE

## 2019-09-26 RX ORDER — HYDROMORPHONE HYDROCHLORIDE 2 MG/ML
2 INJECTION, SOLUTION INTRAMUSCULAR; INTRAVENOUS; SUBCUTANEOUS
Status: DISCONTINUED | OUTPATIENT
Start: 2019-09-26 | End: 2019-09-28

## 2019-09-26 RX ORDER — POTASSIUM CHLORIDE 14.9 MG/ML
40 INJECTION INTRAVENOUS EVERY 6 HOURS PRN
Status: DISCONTINUED | OUTPATIENT
Start: 2019-09-26 | End: 2019-09-27

## 2019-09-26 RX ORDER — POTASSIUM CHLORIDE 14.9 MG/ML
30 INJECTION INTRAVENOUS EVERY 6 HOURS PRN
Status: DISCONTINUED | OUTPATIENT
Start: 2019-09-26 | End: 2019-09-26

## 2019-09-26 RX ORDER — MAGNESIUM SULFATE HEPTAHYDRATE 40 MG/ML
2 INJECTION, SOLUTION INTRAVENOUS
Status: DISCONTINUED | OUTPATIENT
Start: 2019-09-26 | End: 2019-10-02 | Stop reason: HOSPADM

## 2019-09-26 RX ORDER — MILRINONE LACTATE 0.2 MG/ML
0.25 INJECTION, SOLUTION INTRAVENOUS CONTINUOUS
Status: DISCONTINUED | OUTPATIENT
Start: 2019-09-26 | End: 2019-10-02

## 2019-09-26 RX ORDER — MAGNESIUM SULFATE HEPTAHYDRATE 40 MG/ML
4 INJECTION, SOLUTION INTRAVENOUS
Status: DISCONTINUED | OUTPATIENT
Start: 2019-09-26 | End: 2019-10-02 | Stop reason: HOSPADM

## 2019-09-26 RX ORDER — POTASSIUM CHLORIDE 14.9 MG/ML
20 INJECTION INTRAVENOUS ONCE
Status: DISCONTINUED | OUTPATIENT
Start: 2019-09-26 | End: 2019-09-26

## 2019-09-26 RX ORDER — MILRINONE LACTATE 1 MG/ML
50 INJECTION INTRAVENOUS ONCE
Status: COMPLETED | OUTPATIENT
Start: 2019-09-26 | End: 2019-09-26

## 2019-09-26 RX ADMIN — HYDROMORPHONE HYDROCHLORIDE 2 MG: 2 INJECTION INTRAMUSCULAR; INTRAVENOUS; SUBCUTANEOUS at 04:09

## 2019-09-26 RX ADMIN — VANCOMYCIN HYDROCHLORIDE 750 MG: 750 INJECTION, POWDER, LYOPHILIZED, FOR SOLUTION INTRAVENOUS at 09:09

## 2019-09-26 RX ADMIN — LORAZEPAM 1 MG: 2 INJECTION INTRAMUSCULAR; INTRAVENOUS at 01:09

## 2019-09-26 RX ADMIN — CEFAZOLIN SODIUM 2 G: 2 SOLUTION INTRAVENOUS at 01:09

## 2019-09-26 RX ADMIN — IPRATROPIUM BROMIDE AND ALBUTEROL SULFATE 3 ML: .5; 3 SOLUTION RESPIRATORY (INHALATION) at 12:09

## 2019-09-26 RX ADMIN — CEFAZOLIN SODIUM 2 G: 2 SOLUTION INTRAVENOUS at 08:09

## 2019-09-26 RX ADMIN — CEFAZOLIN SODIUM 2 G: 2 SOLUTION INTRAVENOUS at 07:09

## 2019-09-26 RX ADMIN — LORAZEPAM 1 MG: 2 INJECTION INTRAMUSCULAR; INTRAVENOUS at 06:09

## 2019-09-26 RX ADMIN — PANTOPRAZOLE SODIUM 40 MG: 40 INJECTION, POWDER, FOR SOLUTION INTRAVENOUS at 08:09

## 2019-09-26 RX ADMIN — HYDROMORPHONE HYDROCHLORIDE 1 MG: 2 INJECTION INTRAMUSCULAR; INTRAVENOUS; SUBCUTANEOUS at 04:09

## 2019-09-26 RX ADMIN — VANCOMYCIN HYDROCHLORIDE 750 MG: 750 INJECTION, POWDER, LYOPHILIZED, FOR SOLUTION INTRAVENOUS at 08:09

## 2019-09-26 RX ADMIN — IPRATROPIUM BROMIDE AND ALBUTEROL SULFATE 3 ML: .5; 3 SOLUTION RESPIRATORY (INHALATION) at 06:09

## 2019-09-26 RX ADMIN — POTASSIUM CHLORIDE 40 MEQ: 14.9 INJECTION, SOLUTION INTRAVENOUS at 08:09

## 2019-09-26 RX ADMIN — IOHEXOL 75 ML: 350 INJECTION, SOLUTION INTRAVENOUS at 02:09

## 2019-09-26 RX ADMIN — MAGNESIUM SULFATE 4 G: 2 INJECTION INTRAVENOUS at 06:09

## 2019-09-26 RX ADMIN — LORAZEPAM 1 MG: 2 INJECTION INTRAMUSCULAR; INTRAVENOUS at 09:09

## 2019-09-26 RX ADMIN — BUMETANIDE 0.5 MG/HR: 0.25 INJECTION INTRAMUSCULAR; INTRAVENOUS at 11:09

## 2019-09-26 RX ADMIN — METRONIDAZOLE 500 MG: 500 INJECTION, SOLUTION INTRAVENOUS at 05:09

## 2019-09-26 RX ADMIN — POTASSIUM CHLORIDE 40 MEQ: 14.9 INJECTION, SOLUTION INTRAVENOUS at 03:09

## 2019-09-26 RX ADMIN — BUMETANIDE 0.5 MG/HR: 0.25 INJECTION, SOLUTION INTRAMUSCULAR; INTRAVENOUS at 09:09

## 2019-09-26 RX ADMIN — ASCORBIC ACID, VITAMIN A PALMITATE, CHOLECALCIFEROL, THIAMINE HYDROCHLORIDE, RIBOFLAVIN-5 PHOSPHATE SODIUM, PYRIDOXINE HYDROCHLORIDE, NIACINAMIDE, DEXPANTHENOL, ALPHA-TOCOPHEROL ACETATE, VITAMIN K1, FOLIC ACID, BIOTIN, CYANOCOBALAMIN: 200; 3300; 200; 6; 3.6; 6; 40; 15; 10; 150; 600; 60; 5 INJECTION, SOLUTION INTRAVENOUS at 03:09

## 2019-09-26 RX ADMIN — HYDROMORPHONE HYDROCHLORIDE 1 MG: 2 INJECTION INTRAMUSCULAR; INTRAVENOUS; SUBCUTANEOUS at 10:09

## 2019-09-26 RX ADMIN — MILRINONE LACTATE 0.25 MCG/KG/MIN: 200 INJECTION, SOLUTION INTRAVENOUS at 11:09

## 2019-09-26 RX ADMIN — MILRINONE LACTATE 2500 MCG: 1 INJECTION, SOLUTION INTRAVENOUS at 11:09

## 2019-09-26 NOTE — PT/OT/SLP PROGRESS
Speech Language Pathology      Mesha Mckenzie  MRN: [id number]    Attempted to see pt x2 for swallow evaluation. (8:50)Patient not seen today secondary to Nursing hold (Comment)(Back on BiPAP). Nsg and respiratory therapist at bedside. (13:35) Pt remains on BiPAP. Will follow-up in AM.    Alondra Mckay, CCC-SLP

## 2019-09-26 NOTE — PROGRESS NOTES
"[facility] Medicine  Progress Note    Patient Name: Mesha Mckenzie  MRN: [id number]  Patient Class: IP- Inpatient   Admission Date: 9/19/2019  Length of Stay: 7 days  Attending Physician: Eli Anna MD  Primary Care Provider: Varun Shea MD PhD        Subjective:     Principal Problem:Staphylococcus aureus bacteremia with sepsis        HPI:  Pt is a 62 yo female who was transferred from [facility] for septic shock, pancolitis and bilateral pna. Pt reports that she fell out of bed on Sunday morning and has been feeling progressively worse throughout the week. C/o vomiting since Sunday, ~3 episodes per day. Also reports diarrhea, ~4 stools per day, watery in nature. Pt denies any hematemesis, melena or hematochezia. Pt did take zofran which helped "some:" c/o severe abdominal pain since Sunday which is constant. Pain is a 10 on a 0-10 pain scale.  Has not been able to eat due to the nausea and pain. Questioned pt in regards to use of pain medication, states "I didn't have any." pt was scheduled for colonoscopy tomorrow. Hx of GI bleed in December 2018. Pt states she had passed some blood in her stool. Reviewed colonoscopy report, no bleeding in the colon was found however the prep was poor. No recent travel, no sick contacts at home. Questioned pt in regards to cough: pt states she has felt SOB due to pain but reports only occasional coughing. Denies any sputum production.  Social: smokes 10 cigarettes per day, 20 pack year hx. No ETOH.     Overview/Hospital Course:  The patient is a 63-year-old woman admitted 09/19 with sepsis secondary to pneumonia and colitis.  She was admitted to the intensive care unit and was treated with broad-spectrum antibiotics.  Her blood cultures grew methicillin sensitive Staph aureus and she was evaluated by ID.  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    The patient has had severe metabolic " encephalopathy but is starting to improve.    She has been receiving TPN since she is NPO due to mental status changes and an ileus.    She has bilateral pulmonary infiltrates which we believe are secondary to septic emboli.    The patient has had severe anemia, thrombocytopenia and coagulopathy.  She received a couple of blood transfusions but has not require platelets or FFP.  Hematology has been following.    Interval History:  The patient is doing okay this morning.  She is more alert and complaining of lower abdominal discomfort.  No diarrhea.  She is very thirsty and asking for ice chips.    Review of Systems   Respiratory: Negative for cough, chest tightness, shortness of breath and wheezing.    Cardiovascular: Negative for chest pain, palpitations and leg swelling.   Gastrointestinal: Positive for abdominal pain. Negative for abdominal distention, constipation, diarrhea, nausea and vomiting.   Genitourinary: Negative for difficulty urinating, dysuria and flank pain.   Musculoskeletal: Negative for gait problem, joint swelling and neck pain.   Skin: Negative for rash and wound.   Neurological: Positive for weakness. Negative for dizziness, syncope, light-headedness and headaches.   Psychiatric/Behavioral: Negative for agitation, behavioral problems and confusion.     Objective:     Vital Signs (Most Recent):  Temp: 100 °F (37.8 °C) (09/26/19 0800)  Pulse: (!) 117 (09/26/19 1000)  Resp: (!) 41 (09/26/19 1000)  BP: (!) 153/95 (09/26/19 1000)  SpO2: (!) 93 % (09/26/19 1000) Vital Signs (24h Range):  Temp:  [97.7 °F (36.5 °C)-100 °F (37.8 °C)] 100 °F (37.8 °C)  Pulse:  [] 117  Resp:  [22-65] 41  SpO2:  [80 %-100 %] 93 %  BP: (109-191)/() 153/95     Weight: 49.7 kg (109 lb 9.1 oz)  Body mass index is 20.04 kg/m².    Intake/Output Summary (Last 24 hours) at 9/26/2019 1156  Last data filed at 9/26/2019 1003  Gross per 24 hour   Intake 2347.5 ml   Output 4205 ml   Net -1857.5 ml      Physical Exam    Constitutional: She is oriented to person, place, and time. She appears well-developed and well-nourished.   HENT:   Head: Normocephalic and atraumatic.   Eyes: Pupils are equal, round, and reactive to light. EOM are normal.   Cardiovascular: Regular rhythm, normal heart sounds and intact distal pulses.   Tachycardic   Pulmonary/Chest: Effort normal and breath sounds normal. No respiratory distress. She has no wheezes. She has no rales.   Tachypneic   Abdominal: Soft. Bowel sounds are normal. She exhibits no distension. There is no tenderness.   Musculoskeletal: She exhibits no edema or tenderness.   Neurological: She is alert and oriented to person, place, and time. No cranial nerve deficit. Coordination normal.   Skin: Skin is warm and dry. Capillary refill takes less than 2 seconds. No rash noted.   Nursing note and vitals reviewed.      Significant Labs:   CBC:   Recent Labs   Lab 09/25/19 0350 09/26/19  0342   WBC 8.81 9.14   HGB 7.0* 9.2*   HCT 19.5* 26.4*   PLT 53* 44*     CMP:   Recent Labs   Lab 09/25/19  0350 09/25/19  1944 09/26/19  0342    142 141   K 2.6* 3.7 3.2*    103 102   CO2 29 29 29   * 144* 162*   BUN 26* 25* 27*   CREATININE 0.8 0.7 0.7   CALCIUM 7.3* 7.3* 7.2*   PROT 5.3*  --  5.1*   ALBUMIN 1.5*  --  1.5*   BILITOT 0.4  --  0.7   ALKPHOS 47*  --  48*   AST 11  --  11   ALT <5*  --  <5*   ANIONGAP 10 10 10   EGFRNONAA >60 >60 >60     Coagulation:   Recent Labs   Lab 09/26/19  0342   INR 1.1       Significant Imaging: I have reviewed all pertinent imaging results/findings within the past 24 hours.      Assessment/Plan:      * Staphylococcus aureus bacteremia with sepsis  The patient is on Ancef and vancomycin for MSSA.  Blood cultures from the 22nd are not showing any growth.  DHARMESH showed tricuspid valve vegetation.      Acute hypoxemic respiratory failure  This is secondary to pneumonia which is probably secondary to septic emboli.  She is using BiPAP  intermittently.    Encephalopathy, metabolic  The patient's mental status continues to improve.  We will try to obtain an MRI of brain when she is able to cooperate.      Pancolitis  Repeat CT scan obtained yesterday afternoon shows improvement in the colitis.  The patient does have an ileus.     IV Flagyl will be discontinued.        Pleural effusion  Secondary to infection?  CHF?  We have not done a thoracentesis because of her coagulopathy and thrombocytopenia      ABIMBOLA (acute kidney injury)  Renal function is stable.  Will replace her magnesium and potassium IV today.      Metabolic acidosis  Resolved        Coagulopathy  Resolved    Thrombocytopenia  Platelet count is trending down    IV drug user  The patient finally admitted that she was using IV drugs to Dr. Bolden last night.      Severe malnutrition  The patient was evaluated by speech therapy and they kept her NPO.  Started TPN September 24th.      Normocytic anemia  Secondary to this acute illness?  There is no evidence of blood loss or hemolysis.  The patient received 2 units of packed red blood cells 9-24      VTE Risk Mitigation (From admission, onward)         Ordered     Place sequential compression device  Until discontinued      09/19/19 2234     IP VTE HIGH RISK PATIENT  Once      09/19/19 2217                Critical care time spent on the evaluation and treatment of severe organ dysfunction, review of pertinent labs and imaging studies, discussions with consulting providers and discussions with patient/family: 35 minutes.      Eli Anna MD  Department of Hospital Medicine   [facility]

## 2019-09-26 NOTE — ASSESSMENT & PLAN NOTE
Secondary to this acute illness?  There is no evidence of blood loss or hemolysis.  The patient received 2 units of packed red blood cells 9-24

## 2019-09-26 NOTE — NURSING
1300:  Pt appears to be resting better than this morning.  Milrinone and Butmex started as ordered.  Still requiring the BiPAP.  Ativan and dilaudid admin earlier with adequate relief.      1443:  Report to Rhea Lai.  Care relinquished.

## 2019-09-26 NOTE — NURSING
0900:  Pt sats still in low 90s and tachypneic/tachycardic.  Spoke with respiratory. 40% venti mask placed.

## 2019-09-26 NOTE — ASSESSMENT & PLAN NOTE
The patient is on Ancef and vancomycin for MSSA.  Blood cultures from the 22nd are not showing any growth.  DHARMESH showed tricuspid valve vegetation.

## 2019-09-26 NOTE — ASSESSMENT & PLAN NOTE
Secondary to infection?  CHF?  We have not done a thoracentesis because of her coagulopathy and thrombocytopenia

## 2019-09-26 NOTE — ASSESSMENT & PLAN NOTE
This is secondary to pneumonia which is probably secondary to septic emboli.  She is using BiPAP intermittently.

## 2019-09-26 NOTE — CARE UPDATE
09/26/19 0652   Patient Assessment/Suction   Level of Consciousness (AVPU) responds to voice   All Lung Fields Breath Sounds coarse   PRE-TX-O2   O2 Device (Oxygen Therapy) BiPAP   $ Is the patient on Low Flow Oxygen? Yes   Oxygen Concentration (%) 40   SpO2 99 %   Pulse Oximetry Type Continuous   $ Pulse Oximetry - Multiple Charge Pulse Oximetry - Multiple   Pulse (!) 117   Resp (!) 24   Aerosol Therapy   $ Aerosol Therapy Charges Aerosol Treatment   Respiratory Treatment Status (SVN) given   Treatment Route (SVN) in-line   Patient Position (SVN) HOB elevated   Post Treatment Assessment (SVN) breath sounds unchanged   Signs of Intolerance (SVN) none   Breath Sounds Post-Respiratory Treatment   Throughout All Fields Post-Treatment All Fields   Throughout All Fields Post-Treatment no change   Post-treatment Heart Rate (beats/min) 119   Post-treatment Resp Rate (breaths/min) 21   Preset CPAP/BiPAP Settings   Mode Of Delivery BiPAP S/T   $ CPAP/BiPAP Daily Charge BiPAP/CPAP Daily   $ Initial CPAP/BiPAP Setup? No   $ Is patient using? Yes   Size of Mask Small   Sized Appropriately? Yes   Equipment Type V60   Airway Device Type small nasal mask   Humidifier not applicable   Ipap 15   EPAP (cm H2O) 5   Pressure Support (cm H2O) 10   ITime (sec) 1   Rise Time (sec) 2   Patient CPAP/BiPAP Settings   Timed Inspiration (Sec) 1   IPAP Rise Time (sec) 2   RR Total (Breaths/Min) 24   Tidal Volume (mL) 525   VE Minute Ventilation (L/min) 14.6 L/min   Peak Inspiratory Pressure (cm H2O) 16   TiTOT (%) 31   Total Leak (L/Min) 7   Patient Trigger - ST Mode Only (%) 99   CPAP/BiPAP Alarms   High Pressure (cm H2O) 20   Low Pressure (cm H2O) 10   Low Pressure Delay (Sec) 20   Minute Ventilation (L/Min) 3   High RR (breaths/min) 50   Low RR (breaths/min) 8   Apnea (Sec) 20   Duo Q6, Bipap, tolerated tx well, vitals as charted.

## 2019-09-26 NOTE — CARE UPDATE
09/26/19 1003   Patient Assessment/Suction   Expansion/Accessory Muscles/Retractions abdominal muscle use   Rhythm/Pattern, Respiratory tachypneic;shallow;shortness of breath   PRE-TX-O2   O2 Device (Oxygen Therapy) BiPAP   Labs   $ Was an ABG obtained? Arterial Puncture;ISTAT - Blood gas   $ Labs Tech Time 15 min   Critical Value Communication   Name of Notified Physician/Designee TOM Mohr   Date Result Received 09/26/19   Time Result Received 1003   Resulting Department of Critical Value respiratory   Who communicated critical value from resulting department? EKO Chong   Critical Test #1 pH   Critical Test #1 Result 7.584   Critical Test #2 CO2   Critical Test #2 Result 28   Date Notified 09/26/19   Time Notified 1010   Read Back Verification Yes

## 2019-09-26 NOTE — PHYSICIAN QUERY
"PT Name: Mesha Mckenzie  MR #: [id number]    Physician Query Form - Heart  Condition Clarification     CDS: Felicita Guerrier RN, CCDS         Contact information :ext [phone])  [email or web address]     This form is a permanent document in the medical record.     Query Date: September 26, 2019    By submitting this query, we are merely seeking further clarification of documentation. Please utilize your independent clinical judgment when addressing the question(s) below.    The medical record contains the following   Indicators     Supporting Clinical Findings Location in Medical Record   x BNP  Lab 9/19/19    EF "Low normal left ventricular systolic function. The estimated ejection fraction is 50%      x Radiology findings "There are focal areas of masslike/nodular consolidation present within the left upper lobe, right upper lobe, right middle lobe, and left lower lobe, similar to the prior exam.  There is a possible small volume of right basilar pleural fluid resulting in hazy opacity at the right lung base." CXR 9/20/19   x Echo Results "Grade I (mild) left ventricular diastolic dysfunction consistent with impaired relaxation" Echo 9/21/19    "Ascites" documented     x "SOB" or "AVILA" documented "pt states she has felt SOB due to pain but reports only occasional coughing"  "sl rales" H&P 9/20/19      Pulmonology consult 9/20/19    "Hypoxia" documented     x Heart Failure documented "Acute on chronic diastolic congestive heart failure"  "Pt had diastolic chf acute and chr"   Hem/Onc consult 9/23    Pulmonology PN 9/23/19    "Edema" documented     x Diuretics/Meds furosemide injection 20 mg    MAR 9/25/19   x Treatment: Intravascular volume depletion  Acute, severe, secondary to vomiting and diarrhea   Aggressive fluid resuscitation" H&P 9/20/19    Other:      Heart failure (HF) can be acute, chronic or both. It is generally further specificed as systolic, diastolic, or combined. Lastly, it is " important to identify an underlying etiology if known or suspected.   Common clues to acute exacerbation:  Rapidly progressive symptoms (w/in 2 weeks of presentation), using IV diuretics to treat, using supplemental O2, pulmonary edema on Xray, MI w/in 4 weeks, and/or BNP >500  Systolic Heart Failure: is defined as chart documentation of a left ventricular ejection fraction (LVEF) less than 40%   Diastolic Heart Failure: is defined as a left ventricular ejection fraction (LVEF) greater than 40%   +      Evidence of diastolic dysfunction on echocardiography OR    Right heart catheterization wedge pressure above 12 mm Hg OR    Left heart catheterization left ventricular end diastolic pressure 18 mm Hg or above.  References: *American Heart Association    The clinical guidelines noted below are only system guidelines, and do not replace the providers clinical judgment.       Provider, please specify the diagnosis associated with above clinical findings  Please clarify CHF diagnosis.  [   ] Acute on Chronic Diastolic Heart Failure -    Pre-existing diastoic HF diagnosis.  EF > 40%  and acute HF symptoms documented       [   ] Chronic Diastolic Heart Failure - Pre-existing diastolic HF diagnosis.  EF > 40%  without  acute HF symptoms documented   [  x ] Other Type of Heart Failure (please specify type): secondary to MR   [   ] Heart Failure Ruled Out   [   ] Other (please specify):   [  ] Clinically Undetermined                           Please document in your progress notes daily for the duration of treatment until resolved and include in your discharge summary.

## 2019-09-26 NOTE — PROGRESS NOTES
Report received from Kayla. Patient in bed appears to be sleeping with bipap in place. VSS. NAD noted. Spouse at bedside. Bed alarm on. bilateral soft wrist restraints in place appropriately, no injury noted. Will continue to monitor.

## 2019-09-26 NOTE — PROGRESS NOTES
Consult Note  Infectious Disease    Reason for Consult:  sepsis    HPI: Mesha Mckenzie is a critically ill 63 y.o. female , tranferred from [facility] where she presented with 4 days of nausea and vomiting, found to have ABIMBOLA, leukocytosis, hypotension, multiple pulmonary infiltrates, pancolitis on CT, metabolic acidosis and altered mental status. She was transferred for multispecialty care. Since admission, she has received vanc, levaquin and flagyl, still requires pressors, has 3/4 blood cultures with GPC in clusters, Ct chest with multiple foci of infiltrates including some that look like septic pulmonary emboli, elevated coags without bleeding and no further diarrheal stools. Her chemistries are responding to fluid and supplementation but her mental status remains very abnormal. She is in pain from her abdomen. It is unclear from notes and my conversation with daughter whether her respiratory symptoms came first or the GI symptoms came first. She has no history of STaph infection, no recent antibiotics, but does smoke and take pain meds, valium and adderall chronically.      9/21: blood cultures with STaph aureus, sensitivities pending. Blood cultures from yesterday are negative so far. No change in oxygen needs. No longer on pressors. Less responsive. TTE unofficially abnormal. Staph in urine is likely from the bacteremia. No family at bedside.   9/22: CT head with no significant changes. Mental status no better. Still no BM since admit. CXR slightly better. Blood cultures from yesterday negative and original is MSSA. coags still elevated.   Cr now normal.   9/23: afebrile. Blood cultures are persistently positive. Mental status is much improved and she can answer questions. She continues to moan and groan. She indicated to me that her anus is uncomfortable and needs to have a BM. She denies abd pain but grimaces with palpation. She denies new pain in her spine. She does indicate pain in the right foot. When I  asked what the lumps on her shoulders are from she indicates they are from falls. I told her that she was being treated for a Staph infection and I believe she understood.   9/24: afebrile. Blood cultures not drawn from 9/22, 23.  Much more alert and interactive. ADMITTED TO ME THAT SHE HAS BEEN INJECTING HEROIN, LAST DONE 2 WEEKS AGO. SHE DOES NOT WANT HER DAUGHTER TO KNOW AND TELLS ME THAT HER  KNOWS.  This certainly explains the Staph bacteremia and septic pulmonary emboli. CXR improving. Pain in back is generalized. She reports that she did have a BM but it is not recorded. She did take a few bites of pudding.   9/25:  Status post DHARMESH today with findings of bicuspid valve and mitral valve vegetations the.  Discussed with Cardiology.  Per nurse and he was less alert today and perhaps more confused.  Pain medication was decreased early urine she is complaining abdominal pain and back pain.  She is requiring BiPAP this evening he did receive 2 units of blood with 20 mg of Lasix between the units and did have good urine output but her potassium was low this morning requiring aggressive supplementation.  Blood cultures are now negative  9/26: BNP was 1400 last night. Received more lasix. Today increased shortness of breath and bradycardia. Seen by cardiology. Milrinone and bumex drips started to help her mobilize fluid. Extreme agitation prompted resumption of higher dose of opiates, possible withdrawal?. No new positive blood cultures. Creatinine stable and platelets 44k.        EXAM & DIAGNOSTICS REVIEWED:   Vitals:     Temp:  [97.7 °F (36.5 °C)-100.6 °F (38.1 °C)]   Temp: 99.8 °F (37.7 °C) (09/26/19 1530)  Pulse: (!) 115 (09/26/19 1745)  Resp: 18 (09/26/19 1745)  BP: (!) 116/58 (09/26/19 1745)  SpO2: 99 % (09/26/19 1745)    Intake/Output Summary (Last 24 hours) at 9/26/2019 1818  Last data filed at 9/26/2019 1735  Gross per 24 hour   Intake 2686.37 ml   Output 4140 ml   Net -1453.63 ml       General:   Arousable, attends, cooperates,      Eyes:  Anicteric, PERRL,  no sleral or conjunctival petechiae  ENT:  No ulcers, exudates, thrush, nares patent, dentition is poor and MM are  Dry, obscured by bipap  Neck:  Supple,    Lungs: Patchy crackles,  With decreased BS RLL  Heart:  RRR,  Tachy 123  Abd:  Soft, tender, guards less , minimal distention,   hypoactive BS, no masses or organomegaly appreciated.  :   Henriquez, urine clear, no flank tenderness  Musc:  Joints without effusion, swelling, erythema, synovitis    Skin:  No rashes. No palmar or plantar lesions. No subungual petechiae. No skin lesions of DIC  Wound: Right hip abrasion, not infected  Neuro: arousable, face symmetric, tongue protrudes in the midline,  moves all extremities, no focal weakness but generally weak  Psych:  Attends, less restless  Lymphatic:        Extrem: Mild generalized edema, no erythema, phlebitis, cellulitis, peripheral pulses are 0-1, clubbing present, no mottling  VAD:  Left IJ TLC 9/19  Isolation:  none    Lines/Tubes/Drains:    General Labs reviewed:  Recent Labs   Lab 09/24/19  0315 09/25/19  0350 09/26/19  0342   WBC 8.29 8.81 9.14   HGB 7.4* 7.0* 9.2*   HCT 20.0* 19.5* 26.4*   PLT 54* 53* 44*       Recent Labs   Lab 09/24/19  0315 09/25/19  0350 09/25/19  1944 09/26/19  0342 09/26/19  1311    142 142 141  --    K 2.6* 2.6* 3.7 3.2* 4.0    103 103 102  --    CO2 27 29 29 29  --    BUN 24* 26* 25* 27*  --    CREATININE 0.8 0.8 0.7 0.7  --    CALCIUM 6.8* 7.3* 7.3* 7.2*  --    PROT 4.8* 5.3*  --  5.1*  --    BILITOT 0.5 0.4  --  0.7  --    ALKPHOS 57 47*  --  48*  --    ALT <5* <5*  --  <5*  --    AST 15 11  --  11  --            Micro:  Microbiology Results (last 7 days)     Procedure Component Value Units Date/Time    Blood culture [[id number]] Collected:  09/26/19 8223    Order Status:  Completed Specimen:  Blood Updated:  09/26/19 1715     Blood Culture, Routine No Growth to date    E. coli 0157 antigen [[id number]]  Collected:  09/26/19 1329    Order Status:  No result Specimen:  Stool Updated:  09/26/19 1330    Stool culture [[id number]] Collected:  09/26/19 1329    Order Status:  Sent Specimen:  Stool Updated:  09/26/19 1329    Blood culture [[id number]] Collected:  09/25/19 0413    Order Status:  Completed Specimen:  Blood Updated:  09/26/19 1212     Blood Culture, Routine No Growth to date      No Growth to date    Blood culture [[id number]] Collected:  09/24/19 0517    Order Status:  Completed Specimen:  Blood Updated:  09/26/19 1012     Blood Culture, Routine No Growth to date      No Growth to date      No Growth to date    Blood culture [[id number]]  (Abnormal)  (Susceptibility) Collected:  09/22/19 0338    Order Status:  Completed Specimen:  Blood from Antecubital, Left  Arm Updated:  09/25/19 1028     Blood Culture, Routine Gram stain peds bottle: Gram positive cocci in clusters resembling Staph       Results called to and read back by:Yolis Wan RN 09/23/2019  04:46      STAPHYLOCOCCUS AUREUS  ID consult required at [facility] and [facility].      Blood culture [[id number]]  (Abnormal) Collected:  09/21/19 0534    Order Status:  Completed Specimen:  Blood Updated:  09/24/19 0954     Blood Culture, Routine Gram stain peds bottle: Gram positive cocci in clusters resembling Staph       Results called to and read back by:Javi Sunshine RN 09/22/2019  14:22      STAPHYLOCOCCUS AUREUS  ID consult required at [facility].  For susceptibility see order #[id number]      Blood culture [[id number]]  (Abnormal) Collected:  09/20/19 1739    Order Status:  Completed Specimen:  Blood from Line, Central Updated:  09/23/19 0939     Blood Culture, Routine Gram stain aer bottle: Gram positive cocci in clusters resembling Staph       Results called to and read back by:Rhea Lai RN 09/21/2019  16:02      STAPHYLOCOCCUS AUREUS  ID consult required at [facility]  [place],[place],[facility] and [facility]   locations.  For susceptibility see order #[id number]      Clostridium difficile EIA [[id number]]     Order Status:  Canceled Specimen:  Stool         Imaging Reviewed:   CXRs   CT heads, chest , abd/pelvis  Cardiology:  TTE 9/20  Left Ventricle Low normal ejection fraction at 50%. Mild concentric observed. Grade I (mild) left ventricular diastolic dysfunction consistent with impaired relaxation. Elevated left atrial pressure.   Right Ventricle Normal cavity size, wall thickness and systolic function. Wall motion normal.   Left Atrium The left atrium is normal.   Right Atrium There is normal right atrial size.   Aortic Valve The valve is trileaflet. There is normal leaflet mobility.   Mitral Valve Normal valve structure. Mild regurgitation. Normal leaflet mobility.   Tricuspid Valve The tricuspid valve is normal. Trace regurgitation. Pulmonary hypertension present. The estimated PA systolic pressure is 48 mmHg.   Pulmonic Valve Normal valve structure. Trace regurgitation.   IVC/SVC Normal central venous pressure (3 mm Hg).   Ascending Aorta The aortic root and ascending aorta are normal in size.   Pericardium No pericardial effusion. Respiratory variation of mitral valve inflow is less than 30%.     DHARMESH 9/25  · Mild left ventricular enlargement.  · Low normal left ventricular systolic function. The estimated ejection fraction is 50%  · Normal LV diastolic function.  · Mild left atrial enlargement.  · No interatrial septal defect present.  · Normal appearing left atrial appendage. No thrombus is present in the appendage. Normal appendage velocities.  · Moderate-to-severe mitral regurgitation.  · Moderate tricuspid regurgitation.  · 1x.5 cmmobile vegetation on tricuspid valve septal leaflet atrial side  · 5mmx6 mm mobile vegetation on posterior leaflet P3 scallop on atrial side  · Aortic valve wnl      IMPRESSION & PLAN   1.    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    IVDA/heroin   2. Pancolitis.   ? if she has ischemic colitis from sepsis and volume depletion . No diarrhea since admit. ? Increased abdominal pain or just increased agitation. CT abd improved 9/26  3. Shortness of breath, right pleural effusion x-ray  4. DIC? with elevated INR, improved the low platelets, but with elevated fibrinogen, dropping hgb  5. Opiate dependence, severe lumbar spine abnormalities  6. Altered mental status. From sepsis? +/- from unrecognized stroke or embolus, improved 9/24, less so 9/25 but non focal      Recommendations:   vanc, continue ancef. Continuing flagyl for now  Serial blood cultures  rec MRI  brain with contrast,when she can tolerate laying flat    can stop flagyl    D/w nursing

## 2019-09-26 NOTE — PROGRESS NOTES
Progress Note  PULMONARY    Admit Date: 9/19/2019 09/26/2019      SUBJECTIVE:     Sept 21, diffuse pains, off levo, abg better.    Sept 22, on niv, arouses sl, no c/o  Sept 23, moans and groans, on and off bipap, agitated, restless  Sept 24, moans, agitation varies-patient says she is better today, seems brighter  Sept 25, calm/sedate on niv, looks much more comfortable, arouses  Sept 26, still uses bipap periodic- had spell distress respiratory, then guadalupe to 40's.  ,     PFSH and Allergies reviewed.    OBJECTIVE:     Vitals (Most recent):  Vitals:    09/26/19 0652   BP:    Pulse: (!) 117   Resp: (!) 24   Temp:        Vitals (24 hour range):  Temp:  [97.7 °F (36.5 °C)-99.2 °F (37.3 °C)]   Pulse:  []   Resp:  [14-65]   BP: ()/(51-98)   SpO2:  [80 %-100 %]       Intake/Output Summary (Last 24 hours) at 9/26/2019 0740  Last data filed at 9/26/2019 0548  Gross per 24 hour   Intake 3095 ml   Output 4645 ml   Net -1550 ml          Physical Exam:  The patient's neuro status (alertness,orientation,cognitive function,motor skills,), pharyngeal exam (oral lesions, hygiene, abn dentition,), Neck (jvd,mass,thyroid,nodes in neck and above/below clavicle),RESPIRATORY(symmetry,effort,fremitus,percussion,auscultation),  Cor(rhythm,heart tones including gallops,perfusion,edema)ABD(distention,hepatic&splenomegaly,tenderness,masses), Skin(rash,cyanosis),Psyc(affect,judgement,).  Exam negative except for these pertinent findings:    Calm and sedate now but arouses on niv, chest is symmetric, no distress, normal percussion, normal fremitus and good normal breath sounds, no edema     Radiographs reviewed: view by direct vision 26 infiltrates, right effusion still.     Ct chest on 26th with variable infiltrates - some worse but only mildly, right effusion is larger, somewhat loculated,  Labs     Recent Labs   Lab 09/26/19  0342   WBC 9.14   HGB 9.2*   HCT 26.4*   PLT 44*     Recent Labs   Lab 09/25/19 1944 09/26/19  034     141   K 3.7 3.2*    102   CO2 29 29   BUN 25* 27*   CREATININE 0.7 0.7   * 162*   CALCIUM 7.3* 7.2*   MG 1.3* 1.1*   AST  --  11   ALT  --  <5*   ALKPHOS  --  48*   BILITOT  --  0.7   BILIDIR  --  0.4*   PROT  --  5.1*   ALBUMIN  --  1.5*   INR  --  1.1   BNP 1,456*  --      No results for input(s): PH, PCO2, PO2, HCO3 in the last 24 hours.  Microbiology Results (last 7 days)     Procedure Component Value Units Date/Time    Blood culture [[id number]] Collected:  09/26/19 0443    Order Status:  Sent Specimen:  Blood Updated:  09/26/19 0443    Blood culture [[id number]] Collected:  09/25/19 0413    Order Status:  Completed Specimen:  Blood Updated:  09/25/19 1715     Blood Culture, Routine No Growth to date    Blood culture [[id number]]  (Abnormal)  (Susceptibility) Collected:  09/22/19 0338    Order Status:  Completed Specimen:  Blood from Antecubital, Left  Arm Updated:  09/25/19 1028     Blood Culture, Routine Gram stain peds bottle: Gram positive cocci in clusters resembling Staph       Results called to and read back by:Yolis Wan RN 09/23/2019  04:46      STAPHYLOCOCCUS AUREUS  ID consult required at [facility] and [facility]   locations.      Blood culture [[id number]] Collected:  09/24/19 0517    Order Status:  Completed Specimen:  Blood Updated:  09/25/19 1012     Blood Culture, Routine No Growth to date      No Growth to date    Blood culture [[id number]]  (Abnormal) Collected:  09/21/19 0534    Order Status:  Completed Specimen:  Blood Updated:  09/24/19 0954     Blood Culture, Routine Gram stain peds bottle: Gram positive cocci in clusters resembling Staph       Results called to and read back by:Javi Sunshine RN 09/22/2019  14:22      STAPHYLOCOCCUS AUREUS  ID consult required at [facility].  For susceptibility see order #[id number]      Blood culture [[id number]]  (Abnormal) Collected:  09/20/19 1739    Order Status:  Completed  Specimen:  Blood from Line, Central Updated:  09/23/19 0939     Blood Culture, Routine Gram stain aer bottle: Gram positive cocci in clusters resembling Staph       Results called to and read back by:Rhea Lai RN 09/21/2019  16:02      STAPHYLOCOCCUS AUREUS  ID consult required at [facility],[place],[facility] and [facility]   locations.  For susceptibility see order #[id number]      Clostridium difficile EIA [[id number]]     Order Status:  Canceled Specimen:  Stool     Stool culture [[id number]]     Order Status:  No result Specimen:  Stool       Results for ROM YOUNG (MRN [id number]) as of 9/23/2019 07:24   Ref. Range 9/22/2019 16:19   POC PH Latest Ref Range: 7.35 - 7.45  7.488 (H)   POC PCO2 Latest Ref Range: 35 - 45 mmHg 27.0 (LL)   POC PO2 Latest Ref Range: 80 - 100 mmHg 69 (L)   POC BE Latest Ref Range: -2 to 2 mmol/L -3   POC HCO3 Latest Ref Range: 24 - 28 mmol/L 20.5 (L)   POC SATURATED O2 Latest Ref Range: 95 - 100 % 95       Impression:  Active Hospital Problems    Diagnosis  POA    *Staphylococcus aureus bacteremia with sepsis [A41.01]  Yes    IV drug user [F19.90]  Yes    Encephalopathy, metabolic [G93.41]  No    Normocytic anemia [D64.9]  Yes    Severe malnutrition [E43]  Yes    Coagulopathy [D68.9]  Yes    Thrombocytopenia [D69.6]  Yes    Pleural effusion [J90]  Yes    Acute hypoxemic respiratory failure [J96.01]  Yes    ABIMBOLA (acute kidney injury) [N17.9]  Yes    Pancolitis [K51.00]  Yes    Metabolic acidosis [E87.2]  Yes      Resolved Hospital Problems    Diagnosis Date Resolved POA    Pulmonary hypertension [I27.20] 09/23/2019 Yes    Acute on chronic diastolic congestive heart failure [I50.33] 09/23/2019 Yes    DIC (disseminated intravascular coagulation) [D65] 09/23/2019 Yes    ILD (interstitial lung disease) [J84.9] 09/23/2019 Yes    COPD with acute lower respiratory infection [J44.0] 09/23/2019 Yes    Calcification of aorta [I70.0] 09/23/2019 Yes     Defer to primary control  of cholesterol and bp.          BOOP (bronchiolitis obliterans with organizing pneumonia) [J84.89] 09/23/2019 Yes    Intravascular volume depletion [E86.1] 09/23/2019 Yes    Hyponatremia [E87.1] 09/23/2019 Yes    Hypokalemia [E87.6] 09/23/2019 Yes    CAP (community acquired pneumonia) [J18.9] 09/23/2019 Yes               Plan:   Sept 21, wbc now 9k, creat down to 1.2, pt uses narcotics chr - be - 10 last pm from -15 on 19th,  F/u cxr- dose ativan.  Hard to evaluate- seems much better save pain c/o. F/u cxr am  Sept 22, seems better but hard to eval- certainly improved from admit.  Monitor, support/niv, abx, re eval.  No change.   Sept 23 - MSSA, right effusion stable with inr over 2 and plt 60's- no plan to tap at this time. Infiltrates better, encephalopathy still impressive= niv/bipap for part resp and part agitation.    Pt doesn't have cryptogenic organizing pneumonia/idiopathetic BOOP.  Pt had diastolic chf acute and chr.  Also pulm hypertension.  Has dic also- looked good on ct abd.    Sept 24- inr now over 5 with plt 50's,cxr better.    At the bedside patient is much better.  Coagulopathies impressive.  Suspect liver failure (?).  Patient apparently does better with Dilaudid over morphine.  Will change  To Dilaudid    Sept 25- ID notes noted, inr good today, check cxr am, seems to be improved.  Would decrease dilaudid from 2 to 1 q3prn.    Sept 26,   messey picture. Infection seems controlled.  Would be good to tap effusion but plt out- will try to have pt sit upright- pt developed extremis needing iv dilaudid.      Discussed with Dr Anna- seems to need high dose narcotics to keep comfortable- very compensated looking with higher dose.  Abstinence part of problem?    Hold off tap til more stable to sit up,do not think chest tube will help.        .

## 2019-09-26 NOTE — PLAN OF CARE
09/25/19 1931   Patient Assessment/Suction   Level of Consciousness (AVPU) responds to voice   Respiratory Effort Moderate   Expansion/Accessory Muscles/Retractions abdominal muscle use   All Lung Fields Breath Sounds crackles, coarse;diminished   Rhythm/Pattern, Respiratory tachypneic   Cough Frequency no cough   PRE-TX-O2   O2 Device (Oxygen Therapy) BiPAP   $ Is the patient on Low Flow Oxygen? Yes   Oxygen Concentration (%) 40   SpO2 95 %   Pulse Oximetry Type Continuous   $ Pulse Oximetry - Multiple Charge Pulse Oximetry - Multiple   Pulse (!) 120   Resp (!) 30   Aerosol Therapy   $ Aerosol Therapy Charges Aerosol Treatment   Respiratory Treatment Status (SVN) given   Treatment Route (SVN) in-line  (BIPAP)   Patient Position (SVN) semi-Barrera's   Post Treatment Assessment (SVN) breath sounds unchanged   Signs of Intolerance (SVN) none   Breath Sounds Post-Respiratory Treatment   Throughout All Fields Post-Treatment All Fields   Throughout All Fields Post-Treatment no change   Post-treatment Heart Rate (beats/min) 124   Post-treatment Resp Rate (breaths/min) 30   Wound Care   $ Wound Care Tech Time 15 min   Area of Concern Cheek;Bridge of nose;Chin;Forehead   Skin Color/Characteristics pale   Skin Temperature warm   Barrier used? Mepilex   Ready to Wean/Extubation Screen   FIO2<=50 (chart decimal) 0.4   Preset CPAP/BiPAP Settings   Mode Of Delivery BiPAP   $ CPAP/BiPAP Daily Charge BiPAP/CPAP Daily   $ Initial CPAP/BiPAP Setup? No   $ Is patient using? Yes   Size of Mask Small   Sized Appropriately? Yes   Equipment Type V60   Airway Device Type small nasal mask   Humidifier not applicable   Ipap 15   EPAP (cm H2O) 5   Pressure Support (cm H2O) 10   ITime (sec) 1   Rise Time (sec) 2   Patient CPAP/BiPAP Settings   Timed Inspiration (Sec) 1   IPAP Rise Time (sec) 2   RR Total (Breaths/Min) 30   Tidal Volume (mL) 736   VE Minute Ventilation (L/min) 16.4 L/min   Peak Inspiratory Pressure (cm H2O) 17   TiTOT (%) 33    Total Leak (L/Min) 17   Patient Trigger - ST Mode Only (%) 99   CPAP/BiPAP Alarms   High Pressure (cm H2O) 20   Low Pressure (cm H2O) 10   Low Pressure Delay (Sec) 20   Minute Ventilation (L/Min) 3   High RR (breaths/min) 50   Low RR (breaths/min) 8   Apnea (Sec) 20

## 2019-09-26 NOTE — PLAN OF CARE
Problem: Adult Inpatient Plan of Care  Goal: Plan of Care Review  Description  Drowsy tonight.  Will open eye to speech.  Speaks short words briefly; difficulty to understand with bipap.  bipap has been continually in use.  o2 sats % on bipap.  Sinus tacycardia  Restraints continued as pt wakes randomly and pulls at lines and bipap tubing.   Afebrile.  CT scan done.  TPN in progress  Monitor labs.  Brief periods where pt will hyperventilate, waking in a panic. Her sats will drop into the 70's and HR will drop into 30's.  Once she is calmed and breathing nomalized, these values return to normal.   9/26/2019 0456 by Lauren Palmer RN  Outcome: Ongoing, Progressing  Flowsheets (Taken 9/26/2019 0454)  Plan of Care Reviewed With: patient

## 2019-09-26 NOTE — NURSING
Pt wakes when asked.  Daughter at bedside.  Dr Wilfrido roman.  Wants to keep off BiPAP as much as possible if can tolerate.  Removed BiPAP and placed Nasal cannula 3L.  Tolerated at first.  Attempted to do mouth care, tongue extremely dry.  Pt then got agitated and would not allow stating she needed to breath.  Anxious.  HR increased 120s-130s resp increased.  sats dropping to 85-88 briefly.  Calmed pt and was able to stay on nasal cannula.  HR in 117-120.  resp down to 28-31.  O2 sat 93-94 and pt calmer.    Will continue to monitor.

## 2019-09-26 NOTE — SUBJECTIVE & OBJECTIVE
Interval History:  The patient is doing okay this morning.  She is more alert and complaining of lower abdominal discomfort.  No diarrhea.  She is very thirsty and asking for ice chips.    Review of Systems   Respiratory: Negative for cough, chest tightness, shortness of breath and wheezing.    Cardiovascular: Negative for chest pain, palpitations and leg swelling.   Gastrointestinal: Positive for abdominal pain. Negative for abdominal distention, constipation, diarrhea, nausea and vomiting.   Genitourinary: Negative for difficulty urinating, dysuria and flank pain.   Musculoskeletal: Negative for gait problem, joint swelling and neck pain.   Skin: Negative for rash and wound.   Neurological: Positive for weakness. Negative for dizziness, syncope, light-headedness and headaches.   Psychiatric/Behavioral: Negative for agitation, behavioral problems and confusion.     Objective:     Vital Signs (Most Recent):  Temp: 100 °F (37.8 °C) (09/26/19 0800)  Pulse: (!) 117 (09/26/19 1000)  Resp: (!) 41 (09/26/19 1000)  BP: (!) 153/95 (09/26/19 1000)  SpO2: (!) 93 % (09/26/19 1000) Vital Signs (24h Range):  Temp:  [97.7 °F (36.5 °C)-100 °F (37.8 °C)] 100 °F (37.8 °C)  Pulse:  [] 117  Resp:  [22-65] 41  SpO2:  [80 %-100 %] 93 %  BP: (109-191)/() 153/95     Weight: 49.7 kg (109 lb 9.1 oz)  Body mass index is 20.04 kg/m².    Intake/Output Summary (Last 24 hours) at 9/26/2019 1156  Last data filed at 9/26/2019 1003  Gross per 24 hour   Intake 2347.5 ml   Output 4205 ml   Net -1857.5 ml      Physical Exam   Constitutional: She is oriented to person, place, and time. She appears well-developed and well-nourished.   HENT:   Head: Normocephalic and atraumatic.   Eyes: Pupils are equal, round, and reactive to light. EOM are normal.   Cardiovascular: Regular rhythm, normal heart sounds and intact distal pulses.   Tachycardic   Pulmonary/Chest: Effort normal and breath sounds normal. No respiratory distress. She has no  wheezes. She has no rales.   Tachypneic   Abdominal: Soft. Bowel sounds are normal. She exhibits no distension. There is no tenderness.   Musculoskeletal: She exhibits no edema or tenderness.   Neurological: She is alert and oriented to person, place, and time. No cranial nerve deficit. Coordination normal.   Skin: Skin is warm and dry. Capillary refill takes less than 2 seconds. No rash noted.   Nursing note and vitals reviewed.      Significant Labs:   CBC:   Recent Labs   Lab 09/25/19 0350 09/26/19 0342   WBC 8.81 9.14   HGB 7.0* 9.2*   HCT 19.5* 26.4*   PLT 53* 44*     CMP:   Recent Labs   Lab 09/25/19 0350 09/25/19  1944 09/26/19 0342    142 141   K 2.6* 3.7 3.2*    103 102   CO2 29 29 29   * 144* 162*   BUN 26* 25* 27*   CREATININE 0.8 0.7 0.7   CALCIUM 7.3* 7.3* 7.2*   PROT 5.3*  --  5.1*   ALBUMIN 1.5*  --  1.5*   BILITOT 0.4  --  0.7   ALKPHOS 47*  --  48*   AST 11  --  11   ALT <5*  --  <5*   ANIONGAP 10 10 10   EGFRNONAA >60 >60 >60     Coagulation:   Recent Labs   Lab 09/26/19 0342   INR 1.1       Significant Imaging: I have reviewed all pertinent imaging results/findings within the past 24 hours.

## 2019-09-26 NOTE — HOSPITAL COURSE
The patient is a 63-year-old woman admitted 09/19 with sepsis secondary to pneumonia and colitis.  She was admitted to the intensive care unit and was treated with broad-spectrum antibiotics.  Her blood cultures grew methicillin sensitive Staph aureus and she was evaluated by ID.  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  She is currently on Ancef and vancomycin.  Her blood cultures are still positive therefore CV surgery has been consulted for possible cardiac surgery.  She has also been followed closely by Dr. Dboson for her heart failure.    The patient  had severe metabolic encephalopathy which has resolved for the most part.    She has been receiving TPN since she has had an ileus.    She has bilateral pulmonary infiltrates which we believe are secondary to septic emboli.    The patient has had severe anemia, thrombocytopenia and coagulopathy.  She received a couple of blood transfusions but has not require platelets or FFP.  Hematology has been following.    The patient was very uncomfortable initially and was treated with IV Dilaudid which I think also helped her encephalopathy from drug withdrawal.  We have been decreasing the Dilaudid dose the last 24 hrs.

## 2019-09-26 NOTE — CONSULTS
"[facility]  Cardiology  Consult Note    Patient Name: Mesha Mckenzie  MRN: [id number]  Admission Date: 9/19/2019  Hospital Length of Stay: 7 days  Code Status: Full Code   Attending Provider: Eli Anna MD   Consulting Provider: Benigno Dobson MD  Primary Care Physician: Varun Shea MD PhD  Principal Problem:Staphylococcus aureus bacteremia with sepsis    Patient information was obtained from spouse/SO and ER records.     Consults  Subjective:     Chief Complaint:   Weakness shock Acute bacterial endocarditis     HPI:    I talked to - no prior hx of Heart diease except murmur as child, no dm no hi bp + drug use  Pt denies chest pain       Per NP Dolese HPI earlier this am:HPI: Pt is a 62 yo female who was transferred from [facility] for septic shock, pancolitis and bilateral pna. Pt reports that she fell out of bed on Sunday morning and has been feeling progressively worse throughout the week. C/o vomiting since Sunday, ~3 episodes per day. Also reports diarrhea, ~4 stools per day, watery in nature. Pt denies any hematemesis, melena or hematochezia. Pt did take zofran which helped "some:" c/o severe abdominal pain since Sunday which is constant. Pain is a 10 on a 0-10 pain scale.  Has not been able to eat due to the nausea and pain. Questioned pt in regards to use of pain medication, states "I didn't have any." pt was scheduled for colonoscopy tomorrow. Hx of GI bleed in December 2018. Pt states she had passed some blood in her stool. Reviewed colonoscopy report, no bleeding in the colon was found however the prep was poor. No recent travel, no sick contacts at home. Questioned pt in regards to cough: pt states she has felt SOB due to pain but reports only occasional coughing. Denies any sputum production.  Social: smokes 10 cigarettes per day, 20 pack year hx. No ETOH.        Past Medical History:   Diagnosis Date    Anxiety     Carotid bruit     Chronic pain     Cystitis     " Cystocele, unspecified (CODE)     Depression     GI bleed     History of uterine fibroid     Shortness of breath on exertion     Spinal stenosis     Urinary retention        Past Surgical History:   Procedure Laterality Date    ANTERIOR VAGINAL REPAIR  10-    CARDIAC CATHETERIZATION  age 12    CHOLECYSTECTOMY  2015    COLONOSCOPY  12/12/2018    aborted due to poor colon prep    COLONOSCOPY N/A 12/12/2018    Procedure: COLONOSCOPY;  Surgeon: Renard Gonsalves MD;  Location[facility];  Service: Endoscopy;  Laterality: N/A;    HYSTERECTOMY  1989    AMANDA    tubiligation         Review of patient's allergies indicates:   Allergen Reactions    Pcn [penicillins]        Current Facility-Administered Medications on File Prior to Encounter   Medication    lactated ringers infusion    sodium chloride 0.9% flush 3 mL     Current Outpatient Medications on File Prior to Encounter   Medication Sig    diazepam (VALIUM) 5 MG tablet Take 10 mg by mouth 2 (two) times daily.      Family History     Problem Relation (Age of Onset)    Alzheimer's disease Mother    Hypertension Brother, Sister    Osteoarthritis Mother    Thyroid disease Mother        Tobacco Use    Smoking status: Current Every Day Smoker     Packs/day: 0.50     Years: 40.00     Pack years: 20.00     Types: Cigarettes    Smokeless tobacco: Never Used   Substance and Sexual Activity    Alcohol use: No    Drug use: No    Sexual activity: Yes     Partners: Male     ROS  Objective:     Vital Signs (Most Recent):  Temp: 100 °F (37.8 °C) (09/26/19 0800)  Pulse: (!) 117 (09/26/19 1000)  Resp: (!) 41 (09/26/19 1000)  BP: (!) 153/95 (09/26/19 1000)  SpO2: (!) 93 % (09/26/19 1000) Vital Signs (24h Range):  Temp:  [97.7 °F (36.5 °C)-100 °F (37.8 °C)] 100 °F (37.8 °C)  Pulse:  [] 117  Resp:  [19-65] 41  SpO2:  [80 %-100 %] 93 %  BP: (109-191)/() 153/95     Weight: 49.7 kg (109 lb 9.1 oz)  Body mass index is 20.04 kg/m².    SpO2: (!) 93 %  O2  Device (Oxygen Therapy): BiPAP      Intake/Output Summary (Last 24 hours) at 9/26/2019 1116  Last data filed at 9/26/2019 1003  Gross per 24 hour   Intake 2347.5 ml   Output 4755 ml   Net -2407.5 ml       Lines/Drains/Airways     Central Venous Catheter Line                 Percutaneous Central Line Insertion/Assessment - triple lumen  09/19/19 1344 left internal jugular 6 days          Drain                 Urethral Catheter 09/19/19 1240 Double-lumen 16 Fr. 6 days          Peripheral Intravenous Line                 Peripheral IV - Single Lumen 09/19/19 1225 18 G Right Other 6 days                Physical Exam hr 117  145/85   Lungs bilat rales  2/6 sys m apex abd  Distended     legs ok      Significant Labs:   CMP   Recent Labs   Lab 09/25/19  0350 09/25/19  1944 09/26/19  0342    142 141   K 2.6* 3.7 3.2*    103 102   CO2 29 29 29   * 144* 162*   BUN 26* 25* 27*   CREATININE 0.8 0.7 0.7   CALCIUM 7.3* 7.3* 7.2*   PROT 5.3*  --  5.1*   ALBUMIN 1.5*  --  1.5*   BILITOT 0.4  --  0.7   ALKPHOS 47*  --  48*   AST 11  --  11   ALT <5*  --  <5*   ANIONGAP 10 10 10   ESTGFRAFRICA >60 >60 >60   EGFRNONAA >60 >60 >60   , CBC   Recent Labs   Lab 09/25/19  0350 09/26/19  0342   WBC 8.81 9.14   HGB 7.0* 9.2*   HCT 19.5* 26.4*   PLT 53* 44*    and Troponin No results for input(s): TROPONINI in the last 48 hours.    · Significant Imaging:Mild left ventricular enlargement.  · Low normal left ventricular systolic function. The estimated ejection fraction is 50%  · Normal LV diastolic function.  · Mild left atrial enlargement.  · No interatrial septal defect present.  · Normal appearing left atrial appendage. No thrombus is present in the appendage. Normal appendage velocities.  · Moderate-to-severe mitral regurgitation.  · Moderate tricuspid regurgitation.  · 1x.5 cmmobile vegetation on tricuspid valve septal leaflet atrial side  · 5mmx6 mm mobile vegetation on posterior leaflet P3 scallop on atrial  side  Aortic valve wnl   Assessment and Plan:   1) acute bacterial endocarditis , depressed lv  Function , moderate severe lvh   2)  Pt not cardiac surg pt  As vegetations not big enough  And chf should resolve with rx   Repeat echo today shows further decreased Ef  And continued Mr and pulmonary hi bp   Will start iv bumex + milrinone  rx + k , mg replacement   Active Diagnoses:    Diagnosis Date Noted POA    PRINCIPAL PROBLEM:  Staphylococcus aureus bacteremia with sepsis [A41.01] 09/22/2019 Yes    IV drug user [F19.90] 09/25/2019 Yes    Encephalopathy, metabolic [G93.41] 09/23/2019 No    Normocytic anemia [D64.9] 09/23/2019 Yes    Severe malnutrition [E43] 09/23/2019 Yes    Coagulopathy [D68.9] 09/20/2019 Yes    Thrombocytopenia [D69.6] 09/20/2019 Yes    Pleural effusion [J90] 09/20/2019 Yes    Acute hypoxemic respiratory failure [J96.01] 09/20/2019 Yes    ABIMBOLA (acute kidney injury) [N17.9] 09/19/2019 Yes    Pancolitis [K51.00] 09/19/2019 Yes    Metabolic acidosis [E87.2] 09/19/2019 Yes      Problems Resolved During this Admission:    Diagnosis Date Noted Date Resolved POA    Pulmonary hypertension [I27.20] 09/23/2019 09/23/2019 Yes    Acute on chronic diastolic congestive heart failure [I50.33] 09/23/2019 09/23/2019 Yes    DIC (disseminated intravascular coagulation) [D65] 09/23/2019 09/23/2019 Yes    ILD (interstitial lung disease) [J84.9] 09/20/2019 09/23/2019 Yes    COPD with acute lower respiratory infection [J44.0] 09/20/2019 09/23/2019 Yes    Calcification of aorta [I70.0] 09/20/2019 09/23/2019 Yes    BOOP (bronchiolitis obliterans with organizing pneumonia) [J84.89] 09/20/2019 09/23/2019 Yes    Intravascular volume depletion [E86.1] 09/19/2019 09/23/2019 Yes    Hyponatremia [E87.1] 09/19/2019 09/23/2019 Yes    Hypokalemia [E87.6] 09/19/2019 09/23/2019 Yes    CAP (community acquired pneumonia) [J18.9] 09/19/2019 09/23/2019 Yes       VTE Risk Mitigation (From admission, onward)          Ordered     Place sequential compression device  Until discontinued      09/19/19 2234     IP VTE HIGH RISK PATIENT  Once      09/19/19 2217              I personally reviewed chart and imaging studies ,examined patient, and discussed with the patient her illness and treatment including diet and follow-up care. This consult was prolonged and required approximately 50% time for review and 50% time examining patient and writing notes and orders     Thank you for your consult.     Benigno Dobson MD  Cardiology   [facility]

## 2019-09-26 NOTE — NURSING
Pt with small BM.  attmepted to clean, rolled pt to left side.  Pt unable to tolerate, unable to breath and HR decreased to 40s.  Repositioned and placed back on BiPAP.  HR increased back to 120s-140s and o2 sats slowly increasing.    Still breathing in 40s at this time.    Dr Mohr aware and orders received.  PRN ABGs and admin dose of dilaudid

## 2019-09-26 NOTE — CONSULTS
Pharmacokinetic Assessment Follow Up: IV Vancomycin    Vancomycin serum concentration assessment(s):    The trough level was drawn correctly and can be used to guide therapy at this time. The measurement is within the desired definitive target range of 15 to 20 mcg/mL.    Vancomycin Regimen Plan:    Continue regimen to Vancomycin 750 mg IV every 12 hours with next serum trough concentration measured at 0830 prior to 4th dose on 09/27     Drug levels (last 3 results):  Recent Labs   Lab Result Units 09/24/19 2012 09/25/19 1944   Vancomycin-Trough ug/mL 18.0 19.4       Pharmacy will continue to follow and monitor vancomycin.    Please contact pharmacy at extension [phone] for questions regarding this assessment.    Thank you for the consult,   Sierra Duncan       Patient brief summary:  Mesha Mckenzie is a 63 y.o. female initiated on antimicrobial therapy with IV Vancomycin for treatment of bacteremia    The patient's current regimen is 750 mg Q12h    Drug Allergies:   Review of patient's allergies indicates:   Allergen Reactions    Pcn [penicillins]        Actual Body Weight:   50.5    Renal Function:   Estimated Creatinine Clearance: 62.9 mL/min (based on SCr of 0.7 mg/dL).,       CBC (last 72 hours):  Recent Labs   Lab Result Units 09/24/19 0315 09/25/19 0350 09/26/19  0342   WBC K/uL 8.29 8.81 9.14   Hemoglobin g/dL 7.4* 7.0* 9.2*   Hematocrit % 20.0* 19.5* 26.4*   Platelets K/uL 54* 53* 44*   Gran% % 80.8* 77.7* 82.7*   Lymph% % 11.2* 15.8* 12.3*   Mono% % 5.1 3.9* 2.3*   Eosinophil% % 0.4 0.8 0.3   Basophil% % 0.0 0.1 0.1   Differential Method  Automated Automated Automated       Metabolic Panel (last 72 hours):  Recent Labs   Lab Result Units 09/23/19  0748 09/24/19 0315 09/25/19 0350 09/25/19 1944   Sodium mmol/L 139  139 141 142 142   Potassium mmol/L 3.0*  3.0* 2.6* 2.6* 3.7   Chloride mmol/L 106  106 103 103 103   CO2 mmol/L 22*  22* 27 29 29   Glucose mg/dL 109  109 119* 195* 144*   BUN, Bld  mg/dL 27*  27* 24* 26* 25*   Creatinine mg/dL 0.8  0.8 0.8 0.8 0.7   Albumin g/dL  --  1.4* 1.5*  --    Total Bilirubin mg/dL  --  0.5 0.4  --    Alkaline Phosphatase U/L  --  57 47*  --    AST U/L  --  15 11  --    ALT U/L  --  <5* <5*  --    Magnesium mg/dL 1.1* 1.1* 1.4* 1.3*       Vancomycin Administrations:  vancomycin given in the last 96 hours                     vancomycin 750 mg in dextrose 5 % 250 mL IVPB (ready to mix system) (mg) 750 mg New Bag 09/25/19 2116     750 mg New Bag  0905     750 mg New Bag 09/24/19 2106     750 mg New Bag  0814                      Microbiologic Results:  Microbiology Results (last 7 days)       Procedure Component Value Units Date/Time    Blood culture [[id number]]     Order Status:  Sent Specimen:  Blood     Blood culture [[id number]] Collected:  09/25/19 0413    Order Status:  Completed Specimen:  Blood Updated:  09/25/19 1715     Blood Culture, Routine No Growth to date    Blood culture [[id number]]  (Abnormal)  (Susceptibility) Collected:  09/22/19 0338    Order Status:  Completed Specimen:  Blood from Antecubital, Left  Arm Updated:  09/25/19 1028     Blood Culture, Routine Gram stain peds bottle: Gram positive cocci in clusters resembling Staph       Results called to and read back by:Yolis Wan RN 09/23/2019  04:46      STAPHYLOCOCCUS AUREUS  ID consult required at [facility] and [facility].      Blood culture [[id number]] Collected:  09/24/19 0517    Order Status:  Completed Specimen:  Blood Updated:  09/25/19 1012     Blood Culture, Routine No Growth to date      No Growth to date    Blood culture [[id number]]  (Abnormal) Collected:  09/21/19 0534    Order Status:  Completed Specimen:  Blood Updated:  09/24/19 0954     Blood Culture, Routine Gram stain peds bottle: Gram positive cocci in clusters resembling Staph       Results called to and read back by:Javi Sunshine RN 09/22/2019  14:22      STAPHYLOCOCCUS AUREUS  ID consult required at [facility].  For susceptibility see order #[id number]      Blood culture [[id number]]  (Abnormal) Collected:  09/20/19 1739    Order Status:  Completed Specimen:  Blood from Line, Central Updated:  09/23/19 0939     Blood Culture, Routine Gram stain aer bottle: Gram positive cocci in clusters resembling Staph       Results called to and read back by:Rhea Lai RN 09/21/2019  16:02      STAPHYLOCOCCUS AUREUS  [facility].  For susceptibility see order #[id number]      Clostridium difficile EIA [[id number]]     Order Status:  Canceled Specimen:  Stool     Stool culture [[id number]]     Order Status:  No result Specimen:  Stool

## 2019-09-26 NOTE — ASSESSMENT & PLAN NOTE
Repeat CT scan obtained yesterday afternoon shows improvement in the colitis.  The patient does have an ileus.     IV Flagyl will be discontinued.

## 2019-09-26 NOTE — NURSING
[id number] Remains tachypneic and tachycardic. Bipap in use. Follows commands but unable to understand speech at present. Moaning. Medicated with Ativan 1 mg ivp  Continues to moan and tachypneic. Dilaudid 1 mg given . Continue to monitor.

## 2019-09-27 ENCOUNTER — OUTSIDE PLACE OF SERVICE (OUTPATIENT)
Dept: PULMONOLOGY | Facility: CLINIC | Age: 64
End: 2019-09-27
Payer: MEDICARE

## 2019-09-27 ENCOUNTER — OUTSIDE PLACE OF SERVICE (OUTPATIENT)
Dept: INFECTIOUS DISEASES | Facility: CLINIC | Age: 64
End: 2019-09-27
Payer: MEDICARE

## 2019-09-27 PROBLEM — I33.0 ACUTE BACTERIAL ENDOCARDITIS: Status: ACTIVE | Noted: 2019-09-27

## 2019-09-27 PROBLEM — I33.0 TRICUSPID VALVE VEGETATION: Status: ACTIVE | Noted: 2019-09-27

## 2019-09-27 PROBLEM — R73.9 HYPERGLYCEMIA: Status: ACTIVE | Noted: 2019-09-27

## 2019-09-27 PROBLEM — E83.42 HYPOMAGNESEMIA: Status: ACTIVE | Noted: 2019-09-27

## 2019-09-27 PROBLEM — I26.90 ACUTE SEPTIC PULMONARY EMBOLISM: Status: ACTIVE | Noted: 2019-09-27

## 2019-09-27 PROBLEM — E87.20 METABOLIC ACIDOSIS: Status: RESOLVED | Noted: 2019-09-19 | Resolved: 2019-09-27

## 2019-09-27 PROBLEM — D68.9 COAGULOPATHY: Status: RESOLVED | Noted: 2019-09-20 | Resolved: 2019-09-27

## 2019-09-27 PROBLEM — D69.6 THROMBOCYTOPENIA, ACQUIRED: Status: ACTIVE | Noted: 2019-09-27

## 2019-09-27 PROBLEM — D50.0 BLOOD LOSS ANEMIA: Status: ACTIVE | Noted: 2019-09-23

## 2019-09-27 PROBLEM — I33.0 MITRAL VALVE VEGETATION: Status: ACTIVE | Noted: 2019-09-27

## 2019-09-27 LAB
ALBUMIN SERPL BCP-MCNC: 1.9 G/DL (ref 3.5–5.2)
ALLENS TEST: ABNORMAL
ALP SERPL-CCNC: 66 U/L (ref 55–135)
ALT SERPL W/O P-5'-P-CCNC: <5 U/L (ref 10–44)
ANION GAP SERPL CALC-SCNC: 12 MMOL/L (ref 8–16)
AORTIC ROOT ANNULUS: 2.15 CM
AORTIC VALVE CUSP SEPERATION: 2.01 CM
AST SERPL-CCNC: 15 U/L (ref 10–40)
AV INDEX (PROSTH): 0.8
AV MEAN GRADIENT: 5 MMHG
AV PEAK GRADIENT: 8 MMHG
AV VALVE AREA: 2.55 CM2
AV VELOCITY RATIO: 0.65
BASOPHILS # BLD AUTO: 0.01 K/UL (ref 0–0.2)
BASOPHILS NFR BLD: 0.1 % (ref 0–1.9)
BILIRUB DIRECT SERPL-MCNC: 0.4 MG/DL (ref 0.1–0.3)
BILIRUB SERPL-MCNC: 0.8 MG/DL (ref 0.1–1)
BSA FOR ECHO PROCEDURE: 1.47 M2
BUN SERPL-MCNC: 36 MG/DL (ref 8–23)
C DIFF GDH STL QL: NEGATIVE
C DIFF TOX A+B STL QL IA: NEGATIVE
CALCIUM SERPL-MCNC: 7.8 MG/DL (ref 8.7–10.5)
CHLORIDE SERPL-SCNC: 90 MMOL/L (ref 95–110)
CO2 SERPL-SCNC: 39 MMOL/L (ref 23–29)
CREAT SERPL-MCNC: 0.8 MG/DL (ref 0.5–1.4)
CV ECHO LV RWT: 0.49 CM
DELSYS: ABNORMAL
DIFFERENTIAL METHOD: ABNORMAL
DOP CALC AO PEAK VEL: 1.42 M/S
DOP CALC AO VTI: 21.44 CM
DOP CALC LVOT AREA: 3.2 CM2
DOP CALC LVOT DIAMETER: 2.01 CM
DOP CALC LVOT PEAK VEL: 0.92 M/S
DOP CALC LVOT STROKE VOLUME: 54.68 CM3
DOP CALCLVOT PEAK VEL VTI: 17.24 CM
E WAVE DECELERATION TIME: 107.34 MSEC
ECHO LV POSTERIOR WALL: 1.12 CM (ref 0.6–1.1)
EOSINOPHIL # BLD AUTO: 0.1 K/UL (ref 0–0.5)
EOSINOPHIL NFR BLD: 0.6 % (ref 0–8)
EP: 5
ERYTHROCYTE [DISTWIDTH] IN BLOOD BY AUTOMATED COUNT: 15.8 % (ref 11.5–14.5)
ERYTHROCYTE [SEDIMENTATION RATE] IN BLOOD BY WESTERGREN METHOD: 14 MM/H
EST. GFR  (AFRICAN AMERICAN): >60 ML/MIN/1.73 M^2
EST. GFR  (NON AFRICAN AMERICAN): >60 ML/MIN/1.73 M^2
FIO2: 40
FRACTIONAL SHORTENING: 15 % (ref 28–44)
GLUCOSE SERPL-MCNC: 179 MG/DL (ref 70–110)
HCO3 UR-SCNC: 40.5 MMOL/L (ref 24–28)
HCT VFR BLD AUTO: 29.8 % (ref 37–48.5)
HGB BLD-MCNC: 10.3 G/DL (ref 12–16)
IMM GRANULOCYTES # BLD AUTO: 0.18 K/UL (ref 0–0.04)
INR PPP: 1.1 (ref 0.8–1.2)
INTERVENTRICULAR SEPTUM: 0.99 CM (ref 0.6–1.1)
IP: 10
IVRT: 0.13 MSEC
LEFT ATRIUM SIZE: 3.33 CM
LEFT INTERNAL DIMENSION IN SYSTOLE: 3.84 CM (ref 2.1–4)
LEFT VENTRICLE DIASTOLIC VOLUME INDEX: 63.93 ML/M2
LEFT VENTRICLE DIASTOLIC VOLUME: 94.42 ML
LEFT VENTRICLE MASS INDEX: 113 G/M2
LEFT VENTRICLE SYSTOLIC VOLUME INDEX: 42.9 ML/M2
LEFT VENTRICLE SYSTOLIC VOLUME: 63.35 ML
LEFT VENTRICULAR INTERNAL DIMENSION IN DIASTOLE: 4.54 CM (ref 3.5–6)
LEFT VENTRICULAR MASS: 167.42 G
LYMPHOCYTES # BLD AUTO: 1.5 K/UL (ref 1–4.8)
LYMPHOCYTES NFR BLD: 11.2 % (ref 18–48)
MAGNESIUM SERPL-MCNC: 1.3 MG/DL (ref 1.6–2.6)
MAGNESIUM SERPL-MCNC: 2.5 MG/DL (ref 1.6–2.6)
MCH RBC QN AUTO: 31.1 PG (ref 27–31)
MCHC RBC AUTO-ENTMCNC: 34.6 G/DL (ref 32–36)
MCV RBC AUTO: 90 FL (ref 82–98)
METHYLMALONATE SERPL-SCNC: 0.36 UMOL/L
MODE: ABNORMAL
MONOCYTES # BLD AUTO: 0.5 K/UL (ref 0.3–1)
MONOCYTES NFR BLD: 3.4 % (ref 4–15)
NEUTROPHILS # BLD AUTO: 11.2 K/UL (ref 1.8–7.7)
NEUTROPHILS NFR BLD: 83.4 % (ref 38–73)
NRBC BLD-RTO: 0 /100 WBC
OB PNL STL: POSITIVE
PCO2 BLDA: 40.9 MMHG (ref 35–45)
PH SMN: 7.6 [PH] (ref 7.35–7.45)
PISA TR MAX VEL: 3.42 M/S
PLATELET # BLD AUTO: 88 K/UL (ref 150–350)
PLATELET BLD QL SMEAR: ABNORMAL
PMV BLD AUTO: 12.4 FL (ref 9.2–12.9)
PO2 BLDA: 74 MMHG (ref 80–100)
POC BE: 19 MMOL/L
POC SATURATED O2: 97 % (ref 95–100)
POC TCO2: 42 MMOL/L (ref 23–27)
POTASSIUM SERPL-SCNC: 2.4 MMOL/L (ref 3.5–5.1)
POTASSIUM SERPL-SCNC: 3.2 MMOL/L (ref 3.5–5.1)
PROT SERPL-MCNC: 6.7 G/DL (ref 6–8.4)
PROTHROMBIN TIME: 11.6 SEC (ref 9–12.5)
PV PEAK VELOCITY: 1.55 CM/S
RA PRESSURE: 3 MMHG
RBC # BLD AUTO: 3.31 M/UL (ref 4–5.4)
RIGHT VENTRICULAR END-DIASTOLIC DIMENSION: 2.65 CM
SAMPLE: ABNORMAL
SITE: ABNORMAL
SODIUM SERPL-SCNC: 141 MMOL/L (ref 136–145)
SPONT RATE: 23
TDI LATERAL: 0.06 M/S
TDI SEPTAL: 0.03 M/S
TDI: 0.05 M/S
TR MAX PG: 47 MMHG
TRICUSPID ANNULAR PLANE SYSTOLIC EXCURSION: 2.97 CM
TV REST PULMONARY ARTERY PRESSURE: 50 MMHG
VANCOMYCIN SERPL-MCNC: 17.4 UG/ML
VANCOMYCIN TROUGH SERPL-MCNC: 26.4 UG/ML (ref 10–22)
VIT B1 BLD-MCNC: 17 UG/L (ref 38–122)
WBC # BLD AUTO: 13.43 K/UL (ref 3.9–12.7)

## 2019-09-27 PROCEDURE — 63600175 PHARM REV CODE 636 W HCPCS: Performed by: NURSE PRACTITIONER

## 2019-09-27 PROCEDURE — 20000000 HC ICU ROOM

## 2019-09-27 PROCEDURE — 99900035 HC TECH TIME PER 15 MIN (STAT)

## 2019-09-27 PROCEDURE — C9113 INJ PANTOPRAZOLE SODIUM, VIA: HCPCS | Performed by: NURSE PRACTITIONER

## 2019-09-27 PROCEDURE — 99231 SBSQ HOSP IP/OBS SF/LOW 25: CPT | Mod: S$GLB,,, | Performed by: INTERNAL MEDICINE

## 2019-09-27 PROCEDURE — 63600175 PHARM REV CODE 636 W HCPCS: Performed by: INTERNAL MEDICINE

## 2019-09-27 PROCEDURE — 83735 ASSAY OF MAGNESIUM: CPT | Mod: 91

## 2019-09-27 PROCEDURE — 80076 HEPATIC FUNCTION PANEL: CPT

## 2019-09-27 PROCEDURE — 25000242 PHARM REV CODE 250 ALT 637 W/ HCPCS: Performed by: SPECIALIST

## 2019-09-27 PROCEDURE — 82272 OCCULT BLD FECES 1-3 TESTS: CPT

## 2019-09-27 PROCEDURE — 80202 ASSAY OF VANCOMYCIN: CPT

## 2019-09-27 PROCEDURE — 94660 CPAP INITIATION&MGMT: CPT

## 2019-09-27 PROCEDURE — 92526 ORAL FUNCTION THERAPY: CPT

## 2019-09-27 PROCEDURE — 63600175 PHARM REV CODE 636 W HCPCS: Performed by: SPECIALIST

## 2019-09-27 PROCEDURE — 99291 CRITICAL CARE FIRST HOUR: CPT | Mod: S$GLB,,, | Performed by: INTERNAL MEDICINE

## 2019-09-27 PROCEDURE — 97803 MED NUTRITION INDIV SUBSEQ: CPT

## 2019-09-27 PROCEDURE — 36600 WITHDRAWAL OF ARTERIAL BLOOD: CPT

## 2019-09-27 PROCEDURE — 82803 BLOOD GASES ANY COMBINATION: CPT

## 2019-09-27 PROCEDURE — B4185 PARENTERAL SOL 10 GM LIPIDS: HCPCS | Performed by: INTERNAL MEDICINE

## 2019-09-27 PROCEDURE — 84132 ASSAY OF SERUM POTASSIUM: CPT

## 2019-09-27 PROCEDURE — 36415 COLL VENOUS BLD VENIPUNCTURE: CPT

## 2019-09-27 PROCEDURE — 85610 PROTHROMBIN TIME: CPT

## 2019-09-27 PROCEDURE — 25000003 PHARM REV CODE 250: Performed by: INTERNAL MEDICINE

## 2019-09-27 PROCEDURE — 83735 ASSAY OF MAGNESIUM: CPT

## 2019-09-27 PROCEDURE — 80048 BASIC METABOLIC PNL TOTAL CA: CPT

## 2019-09-27 PROCEDURE — 87449 NOS EACH ORGANISM AG IA: CPT

## 2019-09-27 PROCEDURE — 80202 ASSAY OF VANCOMYCIN: CPT | Mod: 91

## 2019-09-27 PROCEDURE — 99231 PR SUBSEQUENT HOSPITAL CARE,LEVL I: ICD-10-PCS | Mod: S$GLB,,, | Performed by: INTERNAL MEDICINE

## 2019-09-27 PROCEDURE — 25000242 PHARM REV CODE 250 ALT 637 W/ HCPCS: Performed by: INTERNAL MEDICINE

## 2019-09-27 PROCEDURE — 94640 AIRWAY INHALATION TREATMENT: CPT

## 2019-09-27 PROCEDURE — 85025 COMPLETE CBC W/AUTO DIFF WBC: CPT

## 2019-09-27 PROCEDURE — 99291 PR CRITICAL CARE, E/M 30-74 MINUTES: ICD-10-PCS | Mod: S$GLB,,, | Performed by: INTERNAL MEDICINE

## 2019-09-27 RX ORDER — DIGOXIN 0.25 MG/ML
375 INJECTION INTRAMUSCULAR; INTRAVENOUS ONCE
Status: COMPLETED | OUTPATIENT
Start: 2019-09-27 | End: 2019-09-27

## 2019-09-27 RX ORDER — LEVALBUTEROL INHALATION SOLUTION 0.63 MG/3ML
0.63 SOLUTION RESPIRATORY (INHALATION) EVERY 8 HOURS
Status: DISCONTINUED | OUTPATIENT
Start: 2019-09-27 | End: 2019-09-27

## 2019-09-27 RX ORDER — POTASSIUM CHLORIDE 14.9 MG/ML
40 INJECTION INTRAVENOUS EVERY 4 HOURS PRN
Status: DISCONTINUED | OUTPATIENT
Start: 2019-09-27 | End: 2019-09-30 | Stop reason: SDUPTHER

## 2019-09-27 RX ORDER — POTASSIUM CHLORIDE 14.9 MG/ML
40 INJECTION INTRAVENOUS EVERY 6 HOURS PRN
Status: DISCONTINUED | OUTPATIENT
Start: 2019-09-27 | End: 2019-09-27

## 2019-09-27 RX ADMIN — POTASSIUM CHLORIDE 40 MEQ: 14.9 INJECTION, SOLUTION INTRAVENOUS at 10:09

## 2019-09-27 RX ADMIN — MAGNESIUM SULFATE 4 G: 2 INJECTION INTRAVENOUS at 12:09

## 2019-09-27 RX ADMIN — DIGOXIN 375 MCG: 0.25 INJECTION INTRAMUSCULAR; INTRAVENOUS at 11:09

## 2019-09-27 RX ADMIN — IPRATROPIUM BROMIDE AND ALBUTEROL SULFATE 3 ML: .5; 3 SOLUTION RESPIRATORY (INHALATION) at 12:09

## 2019-09-27 RX ADMIN — SOYBEAN OIL 250 ML: 20 INJECTION, SOLUTION INTRAVENOUS at 09:09

## 2019-09-27 RX ADMIN — CEFAZOLIN SODIUM 2 G: 2 SOLUTION INTRAVENOUS at 08:09

## 2019-09-27 RX ADMIN — LORAZEPAM 1 MG: 2 INJECTION INTRAMUSCULAR; INTRAVENOUS at 02:09

## 2019-09-27 RX ADMIN — IPRATROPIUM BROMIDE AND ALBUTEROL SULFATE 3 ML: .5; 3 SOLUTION RESPIRATORY (INHALATION) at 07:09

## 2019-09-27 RX ADMIN — HYDROMORPHONE HYDROCHLORIDE 2 MG: 2 INJECTION INTRAMUSCULAR; INTRAVENOUS; SUBCUTANEOUS at 01:09

## 2019-09-27 RX ADMIN — RETINOL, ERGOCALCIFEROL, .ALPHA.-TOCOPHEROL ACETATE, DL-, PHYTONADIONE, ASCORBIC ACID, NIACINAMIDE, RIBOFLAVIN 5-PHOSPHATE SODIUM, THIAMINE HYDROCHLORIDE, PYRIDOXINE HYDROCHLORIDE, DEXPANTHENOL, BIOTIN, FOLIC ACID, AND CYANOCOBALAMIN: KIT at 07:09

## 2019-09-27 RX ADMIN — LORAZEPAM 1 MG: 2 INJECTION INTRAMUSCULAR; INTRAVENOUS at 09:09

## 2019-09-27 RX ADMIN — MILRINONE LACTATE 0.25 MCG/KG/MIN: 200 INJECTION, SOLUTION INTRAVENOUS at 10:09

## 2019-09-27 RX ADMIN — CEFAZOLIN SODIUM 2 G: 2 SOLUTION INTRAVENOUS at 02:09

## 2019-09-27 RX ADMIN — PANTOPRAZOLE SODIUM 40 MG: 40 INJECTION, POWDER, FOR SOLUTION INTRAVENOUS at 10:09

## 2019-09-27 RX ADMIN — CEFAZOLIN SODIUM 2 G: 2 SOLUTION INTRAVENOUS at 12:09

## 2019-09-27 RX ADMIN — POTASSIUM CHLORIDE 40 MEQ: 14.9 INJECTION, SOLUTION INTRAVENOUS at 12:09

## 2019-09-27 RX ADMIN — LEVALBUTEROL HYDROCHLORIDE 0.63 MG: 0.63 SOLUTION RESPIRATORY (INHALATION) at 04:09

## 2019-09-27 RX ADMIN — CEFAZOLIN SODIUM 2 G: 2 SOLUTION INTRAVENOUS at 07:09

## 2019-09-27 NOTE — ASSESSMENT & PLAN NOTE
The patient will receive 40 mEq of IV potassium every 6 hr and we will recheck the level after the 4th treatment.  Her magnesium will also be replaced.

## 2019-09-27 NOTE — PROGRESS NOTES
"[facility] Medicine  Progress Note    Patient Name: Mesha Mckenzie  MRN: [id number]  Patient Class: IP- Inpatient   Admission Date: 9/19/2019  Length of Stay: 8 days  Attending Physician: Eli Anna MD  Primary Care Provider: Varun Shea MD PhD        Subjective:     Principal Problem:Staphylococcus aureus bacteremia with sepsis        HPI:  Pt is a 64 yo female who was transferred from [facility] for septic shock, pancolitis and bilateral pna. Pt reports that she fell out of bed on Sunday morning and has been feeling progressively worse throughout the week. C/o vomiting since Sunday, ~3 episodes per day. Also reports diarrhea, ~4 stools per day, watery in nature. Pt denies any hematemesis, melena or hematochezia. Pt did take zofran which helped "some:" c/o severe abdominal pain since Sunday which is constant. Pain is a 10 on a 0-10 pain scale.  Has not been able to eat due to the nausea and pain. Questioned pt in regards to use of pain medication, states "I didn't have any." pt was scheduled for colonoscopy tomorrow. Hx of GI bleed in December 2018. Pt states she had passed some blood in her stool. Reviewed colonoscopy report, no bleeding in the colon was found however the prep was poor. No recent travel, no sick contacts at home. Questioned pt in regards to cough: pt states she has felt SOB due to pain but reports only occasional coughing. Denies any sputum production.  Social: smokes 10 cigarettes per day, 20 pack year hx. No ETOH.     Overview/Hospital Course:  The patient is a 63-year-old woman admitted 09/19 with sepsis secondary to pneumonia and colitis.  She was admitted to the intensive care unit and was treated with broad-spectrum antibiotics.  Her blood cultures grew methicillin sensitive Staph aureus and she was evaluated by ID.  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    The patient has had severe metabolic " encephalopathy but is starting to improve.    She has been receiving TPN since she is NPO due to mental status changes and an ileus.    She has bilateral pulmonary infiltrates which we believe are secondary to septic emboli.    The patient has had severe anemia, thrombocytopenia and coagulopathy.  She received a couple of blood transfusions but has not require platelets or FFP.  Hematology has been following.    Interval History:  The patient is doing okay.  She had several bowel movements yesterday but is still complaining of abdominal discomfort.  She also still has shortness of breath and is requiring BiPAP intermittently    Review of Systems   Respiratory: Negative for cough, chest tightness, shortness of breath and wheezing.    Cardiovascular: Negative for chest pain, palpitations and leg swelling.   Gastrointestinal: Positive for abdominal pain. Negative for abdominal distention, constipation, diarrhea, nausea and vomiting.   Genitourinary: Negative for difficulty urinating, dysuria and flank pain.   Musculoskeletal: Negative for gait problem, joint swelling and neck pain.   Skin: Negative for rash and wound.   Neurological: Positive for weakness. Negative for dizziness, syncope, light-headedness and headaches.   Psychiatric/Behavioral: Negative for agitation, behavioral problems and confusion.     Objective:     Vital Signs (Most Recent):  Temp: 99.3 °F (37.4 °C) (09/27/19 0800)  Pulse: (!) 122 (09/27/19 1045)  Resp: (!) 30 (09/27/19 1045)  BP: (!) 114/59 (09/27/19 1045)  SpO2: 97 % (09/27/19 1045) Vital Signs (24h Range):  Temp:  [98.7 °F (37.1 °C)-99.8 °F (37.7 °C)] 99.3 °F (37.4 °C)  Pulse:  [] 122  Resp:  [16-66] 30  SpO2:  [86 %-100 %] 97 %  BP: ()/(55-90) 114/59     Weight: 45.5 kg (100 lb 5 oz)  Body mass index is 18.35 kg/m².    Intake/Output Summary (Last 24 hours) at 9/27/2019 1223  Last data filed at 9/27/2019 1046  Gross per 24 hour   Intake 1488.64 ml   Output 7725 ml   Net -6236.36  ml      Physical Exam   Constitutional: She is oriented to person, place, and time. She appears well-developed and well-nourished.   HENT:   Head: Normocephalic and atraumatic.   Eyes: Pupils are equal, round, and reactive to light. EOM are normal.   Cardiovascular: Regular rhythm, normal heart sounds and intact distal pulses.   Tachycardic   Pulmonary/Chest: Effort normal and breath sounds normal. No respiratory distress. She has no wheezes. She has no rales.   Tachypneic   Abdominal: Soft. Bowel sounds are normal. She exhibits no distension. There is no tenderness.   Musculoskeletal: She exhibits no edema or tenderness.   Neurological: She is alert and oriented to person, place, and time. No cranial nerve deficit. Coordination normal.   Skin: Skin is warm and dry. Capillary refill takes less than 2 seconds. No rash noted.   Psychiatric: Thought content normal.   Nursing note and vitals reviewed.      Significant Labs:   BMP:   Recent Labs   Lab 09/27/19  0609   *      K 2.4*   CL 90*   CO2 39*   BUN 36*   CREATININE 0.8   CALCIUM 7.8*   MG 1.3*     CBC:   Recent Labs   Lab 09/26/19  0342 09/27/19  0609   WBC 9.14 13.43*   HGB 9.2* 10.3*   HCT 26.4* 29.8*   PLT 44* 88*       Significant Imaging: I have reviewed all pertinent imaging results/findings within the past 24 hours.      Assessment/Plan:      * Staphylococcus aureus bacteremia with sepsis  The patient is on Ancef  for MSSA.  Blood cultures from the 22nd are not showing any growth.  DHARMESH showed tricuspid and mitral valve vegetations.    Congestive heart failure  -secondary to mitral valve regurgitation.  The patient was placed on Milrinone and IV diuretics by Dr. Dobson    Acute hypoxemic respiratory failure  This is secondary to pneumonia which is probably secondary to septic emboli.  She is using BiPAP intermittently.  Chest x-ray continues to improve    Encephalopathy, metabolic  The patient's mental status continues to improve.  We will try to  obtain an MRI of brain when she is able to cooperate.      Pancolitis  Repeat CT scan shows improvement in the colitis.          Pleural effusion  Secondary to infection?  CHF?  We have not done a thoracentesis because of her coagulopathy and thrombocytopenia      ABIMBOLA (acute kidney injury)  Renal function is stable.     Thrombocytopenia  Platelet count is improving    Hyperglycemia  Secondary to TPN?.  The patient is on an insulin sliding scale      IV drug user  The patient finally admitted that she was using IV drugs to Dr. Bolden last night.      Severe malnutrition  The patient was evaluated by speech therapy and they kept her NPO.  They will re-evaluate today.  Started TPN September 24th.      Normocytic anemia  Secondary to this acute illness?  There is no evidence of blood loss or hemolysis.  The patient received 2 units of packed red blood cells 9-24      Hypokalemia  The patient will receive 40 mEq of IV potassium every 6 hr and we will recheck the level after the 4th treatment.  Her magnesium will also be replaced.        VTE Risk Mitigation (From admission, onward)         Ordered     Place sequential compression device  Until discontinued      09/19/19 2234     IP VTE HIGH RISK PATIENT  Once      09/19/19 2217                Critical care time spent on the evaluation and treatment of severe organ dysfunction, review of pertinent labs and imaging studies, discussions with consulting providers and discussions with patient/family: 30 minutes.      Eli Anna MD  Department of Hospital Medicine   [facility]

## 2019-09-27 NOTE — PROGRESS NOTES
[facility]  Adult Nutrition  Progress Note    SUMMARY    Intervention: Parenteral Nutrition Therapy      Current Intake: TPN D 15 AA 5 @ 50 ml/hr   ( Provides 855 kcal ( 67% EEN), and 60 g protein ( > 100% EPN))     Recommendation:  1) Advance PO diet as medically able to goal of regular      2) Continue to TPN with lipids.  TPN D 15 AA 5 @ 50 ml/hr + standard lipids  ( Provides 1355 kcal ( 100% EEN), and 60 g protein ( > 100% EPN))    **Transition to TF via PEG is unable to advance PO diet in the next 48 hours.  -Nutren 1.5 @ 20 ml/hr advancing slowly ( Pt at risk for refeeding syndrome) to goal of 35 ml/hr + 150-175 ml flush q 4 hr   ( provides 1260 kcal ( 99% EEN), 57 g protein ( > 100% EPN), and 638 ml free water)       3) Weigh pt weekly   4) Follow lytes closely and replete if needed, replete folate/iroin if needed      Goals: 1) po diet advanced or nutrition support initiated in < 48 hours 2) Nutrition support meeting > 75% EEN or PO diet advanced by f/u  Nutrition Goal Status: Met/ Was meeting yesterday-continues  Communication of RD Recs: (POC, sticky note, second sign)     Reason for Assessment     Reason For Assessment: Follow-Up  Diagnosis: (staph arueus bacteremia/sepsis)  Relevant Medical History: None applicable  Interdisciplinary Rounds: attended     General Information Comments: 64 y/o female with N/V/D x 5-6 days PTA here with aspiration pneumonia, ABIMBOLA, dehydration, metabolic encephalopathy. On bipap since admission NPO x 3 days. SLP unable to evaluate while pt on bipap. NFPE done 9/23/19, mild-moderate wasting seen. Slow insignificant wt loss per chart review.   9/25/19 SLP rec. Continued NPO with trials of solids daily. Will reassess s/p DHARMESH. Per RN pt at high risk of bleeding if NGT placed so TPN requested by team as pt also with PICC line. Tolerating at goal, replacing K.   9/27/19 SLP to re-evaluate today. Continues NPO + TPN, no lipids today but ordered yesterday. ? Ileus  "per chart. On bipap.     Nutrition Discharge Planning: To be determined- regular diet ? Need for longterm nutrition support     Nutrition Risk Screen     Nutrition Risk Screen: no indicators present     Nutrition/Diet History     Spiritual, Cultural Beliefs, Yazdanism Practices, Values that Affect Care: no  Food Allergies: NKFA  Factors Affecting Nutritional Intake: NPO, trouble swallowing, ileus?     Anthropometrics     Height Method: Measured  Height: 5' 2"  Height (inches): 62 in  Weight Method: Bed Scale  Weight: 45.5 kg 19 ( of note pt had been receiving lasix)  Weight (lb): 100 lb  Ideal Body Weight (IBW), Female: 110 lb  % Ideal Body Weight, Female (lb): 95 lb  BMI (Calculated): 19.2 Admission  BMI Grade: 18.5-24.9 - normal  Usual Body Weight (UBW), k.2 kg(13)  Weight Change Amount: (51.3 kg 18), 47.4 kg (19), 50.3 kg 19     Lab/Procedures/Meds     Pertinent Labs Reviewed: reviewed  BMP  Lab Results   Component Value Date     2019    K 2.4 (LL) 2019    CL 90 (L) 2019    CO2 39 (H) 2019    BUN 36 (H) 2019    CREATININE 0.8 2019    CALCIUM 7.8 (L) 2019    ANIONGAP 12 2019    ESTGFRAFRICA >60 2019    EGFRNONAA >60 2019     Lab Results   Component Value Date    CALCIUM 7.8 (L) 2019    PHOS 3.9 2019     Lab Results   Component Value Date    ALBUMIN 1.9 (L) 2019       Pertinent Medications Reviewed: reviewed  Pertinent Medications Comments: KCl, Mg sulfate, TPN, zofran     Estimated/Assessed Needs   Admission  Weight Used For Calorie Calculations: 47.4 kg (104 lb 8 oz)  Energy Calorie Requirements (kcal): [place] St Jeor ( x 1.3-1.4 wt maintenance) = 2218-6482 kcal  Energy Need Method: [place]-St Jeor  Protein Requirements: 0.8-0.9 g protein/kg ( ABIMBOLA vs. mild-moderate muscel loss) = 38-42 g protein)  Weight Used For Protein Calculations: 47.4 kg (104 lb 8 oz)  Fluid Requirements (mL): Urine output + 500 " ml  Estimated Fluid Requirement Method: RDA Method  CHO Requirement: N/A        Nutrition Prescription Ordered     Current Diet Order: NPO + TPN  Nutrition Order Comments: x 7 days ( + poor PO x 5-6 days PTA)     Evaluation of Received Nutrient/Fluid Intake     Energy Calories Required: meeting needs  Protein Required: meeting needs  Fluid Required: meeting needs  Tolerance: other (see comments)(NPO)  % Intake of Estimated Energy Needs: 67%  % Meal Intake: 0% + TPN     Nutrition Risk     Level of Risk/Frequency of Follow-up: moderate - 2 x weekly     Assessment and Plan   Severe malnutrition  Contributing Nutrition Diagnosis  Severe acute illness related malnutrition    Related to (etiology):   N/V/D, decreased appetite and increased energy needs d/t illness    Signs and Symptoms (as evidenced by):   1) PO intakes < 50% EEN x 8 days  2) Mild-moderate muscle/fat wasting as charted below    Interventions/Recommendations (treatment strategy):  Above    Nutrition Diagnosis Status:   Improving    Monitor and Evaluation     Food and Nutrient Intake: energy intake  Food and Nutrient Adminstration: diet order  Anthropometric Measurements: weight  Biochemical Data, Medical Tests and Procedures: electrolyte and renal panel, gastrointestinal profile  Nutrition-Focused Physical Findings: overall appearance      Malnutrition Assessment  Malnutrition Type: acute illness or injury  Energy Intake: severe energy intake  Skin (Micronutrient): (Alcides = 14, No PI noted, nose wound)  Teeth (Micronutrient): (missing some)   Micronutrient Evaluation: suspected deficiency  Micronutrient Evaluation Comments: check iron. replete Mg   Weight Loss (Malnutrition): (22% x 6 years)  Energy Intake (Malnutrition): less than or equal to 50% for greater than or equal to 5 days   Orbital Region (Subcutaneous Fat Loss): mild depletion  Upper Arm Region (Subcutaneous Fat Loss): mild depletion  Thoracic and Lumbar Region: mild depletion   Freeman Region  (Muscle Loss): moderate depletion  Clavicle Bone Region (Muscle Loss): moderate depletion  Clavicle and Acromion Bone Region (Muscle Loss): moderate depletion  Scapular Bone Region (Muscle Loss): mild depletion  Dorsal Hand (Muscle Loss): mild depletion  Patellar Region (Muscle Loss): mild depletion  Anterior Thigh Region (Muscle Loss): mild depletion  Posterior Calf Region (Muscle Loss): mild depletion   Edema (Fluid Accumulation): 0-->no edema present         Nutrition Follow-Up     RD Follow-up?: Yes

## 2019-09-27 NOTE — PROGRESS NOTES
[facility]  Hematology/Oncology  Progress Note    Patient Name: Mesha Mckenzie  Admission Date: 9/19/2019  Hospital Length of Stay: 8 days  Code Status: Full Code     Subjective:     Interval History: still lethargic and gets agitated , requiring bipap, having some fever   has cardiomyopathy with EF 33% diuresisng well  Oncology Treatment Plan:   [No treatment plan]    Medications:  Continuous Infusions:   Amino acid 5% - dextrose 15% (CLINIMIX-E) solution with additives (1L  provides 510 kcal/L dextrose, with 50 gm AA, 150 gm CHO, Na 35, K 30, Mg 5, Ca 4.5, Acetate 80, Cl 39, Phos 15) 50 mL/hr at 09/26/19 1515    bumetanide (BUMEX)0.25 mg/mL infusion 0.5 mg/hr (09/26/19 2100)    milrinone 20mg/100ml D5W (200mcg/ml) 0.25 mcg/kg/min (09/27/19 1037)    norepinephrine bitartrate-D5W Stopped (09/20/19 2356)     Scheduled Meds:   ceFAZolin (ANCEF) IVPB  2 g Intravenous Q6H    digoxin  375 mcg Intravenous Once    levalbuterol  0.63 mg Nebulization Q8H    pantoprazole  40 mg Intravenous Daily     PRN Meds:sodium chloride, bisacodyl, HYDROmorphone, influenza, lorazepam, magnesium sulfate IVPB, magnesium sulfate IVPB, ondansetron, pneumoc 13-susanna conj-dip cr(PF), potassium chloride in water, sodium chloride 0.9%       Objective:     Vital Signs (Most Recent):  Temp: 99.3 °F (37.4 °C) (09/27/19 0800)  Pulse: (!) 122 (09/27/19 1045)  Resp: (!) 30 (09/27/19 1045)  BP: (!) 114/59 (09/27/19 1045)  SpO2: 97 % (09/27/19 1045) Vital Signs (24h Range):  Temp:  [98.7 °F (37.1 °C)-100.6 °F (38.1 °C)] 99.3 °F (37.4 °C)  Pulse:  [] 122  Resp:  [16-66] 30  SpO2:  [86 %-100 %] 97 %  BP: ()/(55-90) 114/59     Weight: 45.5 kg (100 lb 5 oz)  Body mass index is 18.35 kg/m².  Body surface area is 1.41 meters squared.      Intake/Output Summary (Last 24 hours) at 9/27/2019 1105  Last data filed at 9/27/2019 1046  Gross per 24 hour   Intake 1838.64 ml   Output 7940 ml   Net -6101.36 ml       Physical  Exam  Pt is arousable, OLIVER+ ENT shows no exudates   [id number] + tachycardic   rs bilat clear with good air entry, wearint bipap at HS   generalized edema   no bleeding from IV sites   or rectal/ oral  Significant Labs:   Lab Results   Component Value Date    WBC 13.43 (H) 09/27/2019    HGB 10.3 (L) 09/27/2019    HCT 29.8 (L) 09/27/2019    MCV 90 09/27/2019    PLT 88 (L) 09/27/2019     BMP  Lab Results   Component Value Date     09/27/2019    K 2.4 (LL) 09/27/2019    CL 90 (L) 09/27/2019    CO2 39 (H) 09/27/2019    BUN 36 (H) 09/27/2019    CREATININE 0.8 09/27/2019    CALCIUM 7.8 (L) 09/27/2019    ANIONGAP 12 09/27/2019    ESTGFRAFRICA >60 09/27/2019    EGFRNONAA >60 09/27/2019         Assessment/Plan:     Active Diagnoses:    Diagnosis Date Noted POA    PRINCIPAL PROBLEM:  Staphylococcus aureus bacteremia with sepsis [A41.01] 09/22/2019 Yes    IV drug user [F19.90] 09/25/2019 Yes    Encephalopathy, metabolic [G93.41] 09/23/2019 No    Normocytic anemia [D64.9] 09/23/2019 Yes    Severe malnutrition [E43] 09/23/2019 Yes    Coagulopathy [D68.9] 09/20/2019 Yes    Thrombocytopenia [D69.6] 09/20/2019 Yes    Pleural effusion [J90] 09/20/2019 Yes    Acute hypoxemic respiratory failure [J96.01] 09/20/2019 Yes    ABIMBOLA (acute kidney injury) [N17.9] 09/19/2019 Yes    Pancolitis [K51.00] 09/19/2019 Yes    Metabolic acidosis [E87.2] 09/19/2019 Yes      Problems Resolved During this Admission:    Diagnosis Date Noted Date Resolved POA    Pulmonary hypertension [I27.20] 09/23/2019 09/23/2019 Yes    Acute on chronic diastolic congestive heart failure [I50.33] 09/23/2019 09/23/2019 Yes    DIC (disseminated intravascular coagulation) [D65] 09/23/2019 09/23/2019 Yes    ILD (interstitial lung disease) [J84.9] 09/20/2019 09/23/2019 Yes    COPD with acute lower respiratory infection [J44.0] 09/20/2019 09/23/2019 Yes    Calcification of aorta [I70.0] 09/20/2019 09/23/2019 Yes    BOOP (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   doubt there is true DIC , could be a function of liver congestion  Amanda Cruz MD  Hematology/Oncology  [facility]

## 2019-09-27 NOTE — PLAN OF CARE
Intervention: Parenteral Nutrition Therapy      Current Intake: TPN D 15 AA 5 @ 50 ml/hr   ( Provides 855 kcal ( 67% EEN), and 60 g protein ( > 100% EPN))     Recommendation:  1) Advance PO diet as medically able to goal of regular      2) Continue to TPN with lipids.  TPN D 15 AA 5 @ 50 ml/hr + standard lipids  ( Provides 1355 kcal ( 100% EEN), and 60 g protein ( > 100% EPN))     **Transition to TF via PEG is unable to advance PO diet in the next 48 hours.  -Nutren 1.5 @ 20 ml/hr advancing slowly ( Pt at risk for refeeding syndrome) to goal of 35 ml/hr + 150-175 ml flush q 4 hr   ( provides 1260 kcal ( 99% EEN), 57 g protein ( > 100% EPN), and 638 ml free water)       3) Weigh pt weekly   4) Follow lytes closely and replete if needed, replete folate/iroin if needed      Goals: 1) po diet advanced or nutrition support initiated in < 48 hours 2) Nutrition support meeting > 75% EEN or PO diet advanced by f/u  Nutrition Goal Status: Met/ Was meeting yesterday-continues  Communication of RD Recs: (POC, sticky note, second sign)

## 2019-09-27 NOTE — PLAN OF CARE
09/26/19 6349   Patient Assessment/Suction   Level of Consciousness (AVPU) alert   Respiratory Effort Normal;Unlabored   Expansion/Accessory Muscles/Retractions expansion symmetric   LLL Breath Sounds diminished   RLL Breath Sounds diminished   Rhythm/Pattern, Respiratory pattern regular   Cough Frequency no cough   PRE-TX-O2   O2 Device (Oxygen Therapy) BiPAP   Oxygen Concentration (%) 40   SpO2 99 %   Pulse Oximetry Type Continuous   Pulse (!) 125   Resp (!) 27   Aerosol Therapy   $ Aerosol Therapy Charges Aerosol Treatment   Respiratory Treatment Status (SVN) given   Treatment Route (SVN) in-line   Patient Position (SVN) HOB elevated   Post Treatment Assessment (SVN) breath sounds unchanged   Signs of Intolerance (SVN) none   Breath Sounds Post-Respiratory Treatment   Post-treatment Heart Rate (beats/min) 129   Post-treatment Resp Rate (breaths/min) 22   Ready to Wean/Extubation Screen   FIO2<=50 (chart decimal) 0.4   Preset CPAP/BiPAP Settings   Mode Of Delivery BiPAP S/T   $ Initial CPAP/BiPAP Setup? No   $ Is patient using? Yes   Size of Mask Small   Sized Appropriately? Yes   Equipment Type V60   Airway Device Type small nasal mask   Humidifier not applicable   Ipap 15   EPAP (cm H2O) 5   Pressure Support (cm H2O) 10   Set Rate (Breaths/Min) 14   ITime (sec) 1   Rise Time (sec) 2   Patient CPAP/BiPAP Settings   Timed Inspiration (Sec) 1   IPAP Rise Time (sec) 2   RR Total (Breaths/Min) 30   Tidal Volume (mL) 415   VE Minute Ventilation (L/min) 9 L/min   Peak Inspiratory Pressure (cm H2O) 16   TiTOT (%) 28   Total Leak (L/Min) 19   Patient Trigger - ST Mode Only (%) 95   CPAP/BiPAP Alarms   High Pressure (cm H2O) 20   Low Pressure (cm H2O) 10   Low Pressure Delay (Sec) 20   Minute Ventilation (L/Min) 3   High RR (breaths/min) 50   Low RR (breaths/min) 8

## 2019-09-27 NOTE — CARE UPDATE
09/27/19 1600   Patient Assessment/Suction   Level of Consciousness (AVPU) alert   All Lung Fields Breath Sounds coarse   PRE-TX-O2   O2 Device (Oxygen Therapy) BiPAP   Oxygen Concentration (%) 40   SpO2 96 %   Pulse Oximetry Type Continuous   Pulse (!) 120   Resp (!) 22   /73   Aerosol Therapy   $ Aerosol Therapy Charges Aerosol Treatment   Respiratory Treatment Status (SVN) given   Treatment Route (SVN) in-line   Patient Position (SVN) semi-Barrera's   Post Treatment Assessment (SVN) breath sounds unchanged   Signs of Intolerance (SVN) none   Breath Sounds Post-Respiratory Treatment   Throughout All Fields Post-Treatment All Fields   Throughout All Fields Post-Treatment no change   Post-treatment Heart Rate (beats/min) 114   Post-treatment Resp Rate (breaths/min) 21   Preset CPAP/BiPAP Settings   Mode Of Delivery BiPAP S/T   $ Initial CPAP/BiPAP Setup? No   $ Is patient using? Yes   Size of Mask Small   Sized Appropriately? Yes   Equipment Type V60   Airway Device Type small nasal mask   Ipap 15   EPAP (cm H2O) 10   AVAPS Min P (cm H2O) 5   Pressure Support (cm H2O) 5   Patient CPAP/BiPAP Settings   Timed Inspiration (Sec) 1   IPAP Rise Time (sec) 2   RR Total (Breaths/Min) 18   Tidal Volume (mL) 403   VE Minute Ventilation (L/min) 8.5 L/min   Peak Inspiratory Pressure (cm H2O) 10   TiTOT (%) 30   Total Leak (L/Min) 26   Patient Trigger - ST Mode Only (%) 97

## 2019-09-27 NOTE — PT/OT/SLP PROGRESS
Speech Language Pathology Treatment    Patient Name:  Mesha Mckenzie   MRN:  [id number]  Admitting Diagnosis: Staphylococcus aureus bacteremia with sepsis    Recommendations:                 General Recommendations:  Ongoing swallow evaluation  Diet recommendations:  NPO, Liquid Diet Level: NPO   Aspiration Precautions: Frequent oral care and Ice chips sparingly   General Precautions: Standard, aspiration, fall, respiratory  Communication strategies:  provide increased time to answer    Subjective     States she needs to have a BM    Objective:   Pt seen in ICU for ongoing swallow evaluation. Attempted this AM, pt on BiPAP. Nsg stated she would be removing BiPAP around 11:00. Returned at 11:11, pt on Ventimask with eyes closed and restless. Mouth breathing.  at bedside. Pt opens eyes briefly to repeated verbal/tactile situation. Oriented to self, place, year and month. Reduced speech intelligibility (reduced vocal intensity, dysphonia, imprecise articulation likely due to lethargy and poor level of alertness). With repeated stimulation she appears to become somewhat agitated. She consumed ice chips x2 with anterior loss x1. Reactive cough noted on 1 of 2 tsp trials of water, delayed cough on 1 of 2 applesauce trials, immediate cough following nectar thick liquids via straw, throat clearing and we vocal quality following nectar thick via tsp across 1 of 2 trials. Pt required verbal cues for closure around utensil and for increased mouth opening during presentations of spoon.     Educated  re: results/recs of evaluation, SLP role and POC. Receptive to information provided.     Has the patient been evaluated by SLP for swallowing?   Yes  Keep patient NPO? Yes(x ice chips for oral comfort and hydration follwoing oral care)   Current Respiratory Status: Venti mask      Assessment:   Pt continues with AMS and poor level of alertness. Agitation with repeated stimulation. S/S possible penetration/aspiration  across all consistencies. Remains at high risk for aspiration 2' decreased level of alertness and AMS. Will continue to follow daily with goal for PO when deemed appropriate.     Goals:   Multidisciplinary Problems     SLP Goals        Problem: SLP Goal    Goal Priority Disciplines Outcome   SLP Goal     SLP Ongoing, Progressing   Description:    1) Pt will consume regular textures and thin liquids with no overt s/s swallow dysfunction                      Plan:     · Patient to be seen:  6 x/week   · Plan of Care expires:  10/15/19  · Plan of Care reviewed with:  patient, spouse   · SLP Follow-Up:  Yes      Time Tracking:     SLP Treatment Date:   09/27/19  Speech Start Time:  1111  Speech Stop Time:  1135     Speech Total Time (min):  24 min    Billable Minutes: Treatment Swallowing Dysfunction 24 and Total Time 24    Alondra Mckay CCC-SLP  09/27/2019

## 2019-09-27 NOTE — CONSULTS
[facility]  Cardiology  Progress Note    Patient Name: Mesha Mckenzie  MRN: [id number]  Admission Date: 9/19/2019  Hospital Length of Stay: 8 days  Code Status: Full Code   Attending Physician: Eli Anna MD   Primary Care Physician: Varun Shea MD PhD  Expected Discharge Date:   Principal Problem:Staphylococcus aureus bacteremia with sepsis    Subjective:     Hospital Course:   diuresing well  But still tachycardia     Interval History: lo grade fevver  + tachycardia   Echo 9/26 shows severe mitral regurgitation, EF  33%  Pulmonary hypertension   K low today     ROS  Objective:     Vital Signs (Most Recent):  Temp: 99.2 °F (37.3 °C) (09/27/19 0300)  Pulse: (!) 123 (09/27/19 0749)  Resp: (!) 28 (09/27/19 0749)  BP: 109/60 (09/27/19 0630)  SpO2: 99 % (09/27/19 0749) Vital Signs (24h Range):  Temp:  [98.7 °F (37.1 °C)-100.6 °F (38.1 °C)] 99.2 °F (37.3 °C)  Pulse:  [] 123  Resp:  [16-64] 28  SpO2:  [86 %-100 %] 99 %  BP: ()/(55-96) 109/60     Weight: 45.5 kg (100 lb 5 oz)  Body mass index is 18.35 kg/m².    SpO2: 99 %  O2 Device (Oxygen Therapy): BiPAP      Intake/Output Summary (Last 24 hours) at 9/27/2019 1031  Last data filed at 9/27/2019 0600  Gross per 24 hour   Intake 1838.64 ml   Output 6740 ml   Net -4901.36 ml       Lines/Drains/Airways     Central Venous Catheter Line                 Percutaneous Central Line Insertion/Assessment - triple lumen  09/19/19 1344 left internal jugular 7 days          Drain                 Urethral Catheter 09/19/19 1240 Double-lumen 16 Fr. 7 days          Peripheral Intravenous Line                 Peripheral IV - Single Lumen 09/19/19 1225 18 G Right Other 7 days                Physical Exam hr 117  110/80   Pt confortable off of bipap and on mask   Lungs breatgh sounds heard posterior chest with crackles   Cor s4 1/6 says m apex   abd ok     Significant Labs:   CMP   Recent Labs   Lab 09/25/19  1944 09/26/19  0342 09/26/19  1315  09/27/19  0609    141  --  141   K 3.7 3.2* 4.0 2.4*    102  --  90*   CO2 29 29  --  39*   * 162*  --  179*   BUN 25* 27*  --  36*   CREATININE 0.7 0.7  --  0.8   CALCIUM 7.3* 7.2*  --  7.8*   PROT  --  5.1*  --  6.7   ALBUMIN  --  1.5*  --  1.9*   BILITOT  --  0.7  --  0.8   ALKPHOS  --  48*  --  66   AST  --  11  --  15   ALT  --  <5*  --  <5*   ANIONGAP 10 10  --  12   ESTGFRAFRICA >60 >60  --  >60   EGFRNONAA >60 >60  --  >60    and CBC   Recent Labs   Lab 09/26/19  0342 09/27/19  0609   WBC 9.14 13.43*   HGB 9.2* 10.3*   HCT 26.4* 29.8*   PLT 44* 88*       Significant Imaging:  echo     9/26 pm   1) further decreased EF of lv ( 33%) and sinus tachycardia   2severe mitral regurgitation   3) pulmonary hypertension   4 ) TV + MV vegetations as described      Assessment and Plan:   1)  Acute bacterial endocarditis , staph with multiple emboli   2) ? New vegetation in r ight atrium  Vs eustachian valve   3)tachycardia, decreased EF, moderately severe to  Severe mitral reguritation although valve anatomically intact ( no rupture) and  Post leaflet vegetation hasn't grown   4) pulmonary hypertension   5) on milrinone + iv bumex - stop bumex today   6) lo k- replace   7) add lanoxin for rate control and switch to xopenox   Brief HPI:    Active Diagnoses:    Diagnosis Date Noted POA    PRINCIPAL PROBLEM:  Staphylococcus aureus bacteremia with sepsis [A41.01] 09/22/2019 Yes    IV drug user [F19.90] 09/25/2019 Yes    Encephalopathy, metabolic [G93.41] 09/23/2019 No    Normocytic anemia [D64.9] 09/23/2019 Yes    Severe malnutrition [E43] 09/23/2019 Yes    Coagulopathy [D68.9] 09/20/2019 Yes    Thrombocytopenia [D69.6] 09/20/2019 Yes    Pleural effusion [J90] 09/20/2019 Yes    Acute hypoxemic respiratory failure [J96.01] 09/20/2019 Yes    ABIMBOLA (acute kidney injury) [N17.9] 09/19/2019 Yes    Pancolitis [K51.00] 09/19/2019 Yes    Metabolic acidosis [E87.2] 09/19/2019 Yes      Problems Resolved  During this Admission:    Diagnosis Date Noted Date Resolved POA    Pulmonary hypertension [I27.20] 09/23/2019 09/23/2019 Yes    Acute on chronic diastolic congestive heart failure [I50.33] 09/23/2019 09/23/2019 Yes    DIC (disseminated intravascular coagulation) [D65] 09/23/2019 09/23/2019 Yes    ILD (interstitial lung disease) [J84.9] 09/20/2019 09/23/2019 Yes    COPD with acute lower respiratory infection [J44.0] 09/20/2019 09/23/2019 Yes    Calcification of aorta [I70.0] 09/20/2019 09/23/2019 Yes    BOOP (bronchiolitis obliterans with organizing pneumonia) [J84.89] 09/20/2019 09/23/2019 Yes    Intravascular volume depletion [E86.1] 09/19/2019 09/23/2019 Yes    Hyponatremia [E87.1] 09/19/2019 09/23/2019 Yes    Hypokalemia [E87.6] 09/19/2019 09/23/2019 Yes    CAP (community acquired pneumonia) [J18.9] 09/19/2019 09/23/2019 Yes       VTE Risk Mitigation (From admission, onward)         Ordered     Place sequential compression device  Until discontinued      09/19/19 2234     IP VTE HIGH RISK PATIENT  Once      09/19/19 2217                Benigno Dobson MD  Cardiology  [facility]

## 2019-09-27 NOTE — PLAN OF CARE
Problem: SLP Goal  Goal: SLP Goal  Description    1) Pt will consume regular textures and thin liquids with no overt s/s swallow dysfunction     Outcome: Ongoing, Progressing    Pt continues with AMS and poor level of alertness. Agitation with repeated stimulation. S/S possible penetration/aspiration across all consistencies. Remains at high risk for aspiration 2' decreased level of alertness and AMS. Will continue to follow daily with goal for PO when deemed appropriate.

## 2019-09-27 NOTE — NURSING
Large liquid tarry dark brown BM.  Collected sample for occult blood. Cleaned and changed linens. Patient tolerated well. Remains off Bipap.

## 2019-09-27 NOTE — SUBJECTIVE & OBJECTIVE
Interval History:  The patient is doing okay.  She had several bowel movements yesterday but is still complaining of abdominal discomfort.  She also still has shortness of breath and is requiring BiPAP intermittently    Review of Systems   Respiratory: Negative for cough, chest tightness, shortness of breath and wheezing.    Cardiovascular: Negative for chest pain, palpitations and leg swelling.   Gastrointestinal: Positive for abdominal pain. Negative for abdominal distention, constipation, diarrhea, nausea and vomiting.   Genitourinary: Negative for difficulty urinating, dysuria and flank pain.   Musculoskeletal: Negative for gait problem, joint swelling and neck pain.   Skin: Negative for rash and wound.   Neurological: Positive for weakness. Negative for dizziness, syncope, light-headedness and headaches.   Psychiatric/Behavioral: Negative for agitation, behavioral problems and confusion.     Objective:     Vital Signs (Most Recent):  Temp: 99.3 °F (37.4 °C) (09/27/19 0800)  Pulse: (!) 122 (09/27/19 1045)  Resp: (!) 30 (09/27/19 1045)  BP: (!) 114/59 (09/27/19 1045)  SpO2: 97 % (09/27/19 1045) Vital Signs (24h Range):  Temp:  [98.7 °F (37.1 °C)-99.8 °F (37.7 °C)] 99.3 °F (37.4 °C)  Pulse:  [] 122  Resp:  [16-66] 30  SpO2:  [86 %-100 %] 97 %  BP: ()/(55-90) 114/59     Weight: 45.5 kg (100 lb 5 oz)  Body mass index is 18.35 kg/m².    Intake/Output Summary (Last 24 hours) at 9/27/2019 1223  Last data filed at 9/27/2019 1046  Gross per 24 hour   Intake 1488.64 ml   Output 7725 ml   Net -6236.36 ml      Physical Exam   Constitutional: She is oriented to person, place, and time. She appears well-developed and well-nourished.   HENT:   Head: Normocephalic and atraumatic.   Eyes: Pupils are equal, round, and reactive to light. EOM are normal.   Cardiovascular: Regular rhythm, normal heart sounds and intact distal pulses.   Tachycardic   Pulmonary/Chest: Effort normal and breath sounds normal. No respiratory  distress. She has no wheezes. She has no rales.   Tachypneic   Abdominal: Soft. Bowel sounds are normal. She exhibits no distension. There is no tenderness.   Musculoskeletal: She exhibits no edema or tenderness.   Neurological: She is alert and oriented to person, place, and time. No cranial nerve deficit. Coordination normal.   Skin: Skin is warm and dry. Capillary refill takes less than 2 seconds. No rash noted.   Psychiatric: Thought content normal.   Nursing note and vitals reviewed.      Significant Labs:   BMP:   Recent Labs   Lab 09/27/19  0609   *      K 2.4*   CL 90*   CO2 39*   BUN 36*   CREATININE 0.8   CALCIUM 7.8*   MG 1.3*     CBC:   Recent Labs   Lab 09/26/19  0342 09/27/19  0609   WBC 9.14 13.43*   HGB 9.2* 10.3*   HCT 26.4* 29.8*   PLT 44* 88*       Significant Imaging: I have reviewed all pertinent imaging results/findings within the past 24 hours.

## 2019-09-27 NOTE — NURSING
Patient agitated/ restless, after swallow eval.  Patient still on bipap, O2 sats % on 40% FIO2.  Noticed EKG changes, bigeminy, then back to ST with frequent PAC's, and back to bigeminy.  EKG done. Placed patient back on bipap.  Patient restless and complains of pain to lower abdomen, dilaudid 2mg given IVP.     1300-  notifed. No new orders.

## 2019-09-27 NOTE — NURSING
at bedside, removed bipap and placed on VM at 40% FIO2, tolerating well. Heart rate remains elevated. New orders noted.

## 2019-09-27 NOTE — ASSESSMENT & PLAN NOTE
The patient is on Ancef  for MSSA.  Blood cultures from the 22nd are not showing any growth.  DHARMESH showed tricuspid valve vegetation.

## 2019-09-27 NOTE — CARE UPDATE
09/27/19 0749   PRE-TX-O2   SpO2 99 %   Pulse (!) 123   Resp (!) 28   Aerosol Therapy   $ Aerosol Therapy Charges Aerosol Treatment   Respiratory Treatment Status (SVN) given   Treatment Route (SVN) in-line   Patient Position (SVN) semi-Barrera's   Post Treatment Assessment (SVN) breath sounds unchanged   Signs of Intolerance (SVN) none   Breath Sounds Post-Respiratory Treatment   Throughout All Fields Post-Treatment All Fields   Throughout All Fields Post-Treatment no change   Post-treatment Heart Rate (beats/min) 119   Post-treatment Resp Rate (breaths/min) 24   Wound Care   $ Wound Care Tech Time 15 min   Area of Concern Left;Right;Cheek;Bridge of nose   Skin Color/Characteristics pale  (slightly darker on nose area)   Skin Temperature warm   Barrier used? Tegaderm  (in place)   Preset CPAP/BiPAP Settings   Mode Of Delivery BiPAP S/T   $ CPAP/BiPAP Daily Charge BiPAP/CPAP Daily   $ Initial CPAP/BiPAP Setup? No   $ Is patient using? Yes   Size of Mask Small   Sized Appropriately? Yes   Equipment Type V60   Airway Device Type small nasal mask   Humidifier not applicable   Ipap 15   EPAP (cm H2O) 5   Pressure Support (cm H2O) 10   Set Rate (Breaths/Min) 14   ITime (sec) 1   Rise Time (sec) 2   Patient CPAP/BiPAP Settings   Timed Inspiration (Sec) 1   IPAP Rise Time (sec) 2   RR Total (Breaths/Min) 27   Tidal Volume (mL) 341   VE Minute Ventilation (L/min) 9.2 L/min   Peak Inspiratory Pressure (cm H2O) 15   TiTOT (%) 36   Total Leak (L/Min) 36   Patient Trigger - ST Mode Only (%) 100   Duo Q6, Bipap, wearing continuous, tolerated tx well, vitals as charted.

## 2019-09-27 NOTE — PROGRESS NOTES
Consult Note  Infectious Disease    Reason for Consult:  sepsis    HPI: Mesha Mckenzie is a critically ill 63 y.o. female , tranferred from [facility] where she presented with 4 days of nausea and vomiting, found to have ABIMBOLA, leukocytosis, hypotension, multiple pulmonary infiltrates, pancolitis on CT, metabolic acidosis and altered mental status. She was transferred for multispecialty care. Since admission, she has received vanc, levaquin and flagyl, still requires pressors, has 3/4 blood cultures with GPC in clusters, Ct chest with multiple foci of infiltrates including some that look like septic pulmonary emboli, elevated coags without bleeding and no further diarrheal stools. Her chemistries are responding to fluid and supplementation but her mental status remains very abnormal. She is in pain from her abdomen. It is unclear from notes and my conversation with daughter whether her respiratory symptoms came first or the GI symptoms came first. She has no history of STaph infection, no recent antibiotics, but does smoke and take pain meds, valium and adderall chronically.      9/21: blood cultures with STaph aureus, sensitivities pending. Blood cultures from yesterday are negative so far. No change in oxygen needs. No longer on pressors. Less responsive. TTE unofficially abnormal. Staph in urine is likely from the bacteremia. No family at bedside.   9/22: CT head with no significant changes. Mental status no better. Still no BM since admit. CXR slightly better. Blood cultures from yesterday negative and original is MSSA. coags still elevated.   Cr now normal.   9/23: afebrile. Blood cultures are persistently positive. Mental status is much improved and she can answer questions. She continues to moan and groan. She indicated to me that her anus is uncomfortable and needs to have a BM. She denies abd pain but grimaces with palpation. She denies new pain in her spine. She does indicate pain in the right foot. When I  asked what the lumps on her shoulders are from she indicates they are from falls. I told her that she was being treated for a Staph infection and I believe she understood.   9/24: afebrile. Blood cultures not drawn from 9/22, 23.  Much more alert and interactive. ADMITTED TO ME THAT SHE HAS BEEN INJECTING HEROIN, LAST DONE 2 WEEKS AGO. SHE DOES NOT WANT HER DAUGHTER TO KNOW AND TELLS ME THAT HER  KNOWS.  This certainly explains the Staph bacteremia and septic pulmonary emboli. CXR improving. Pain in back is generalized. She reports that she did have a BM but it is not recorded. She did take a few bites of pudding.   9/25:  Status post DHARMESH today with findings of bicuspid valve and mitral valve vegetations the.  Discussed with Cardiology.  Per nurse and he was less alert today and perhaps more confused.  Pain medication was decreased early urine she is complaining abdominal pain and back pain.  She is requiring BiPAP this evening he did receive 2 units of blood with 20 mg of Lasix between the units and did have good urine output but her potassium was low this morning requiring aggressive supplementation.  Blood cultures are now negative  9/26: BNP was 1400 last night. Received more lasix. Today increased shortness of breath and bradycardia. Seen by cardiology. Milrinone and bumex drips started to help her mobilize fluid. Extreme agitation prompted resumption of higher dose of opiates, possible withdrawal?. No new positive blood cultures. Creatinine stable and platelets 44k.   09/27 Same complaint of ll abd pain afeb, tmax 99.4 still tachycardic;  cardiologist tried dig. Diuresed well with bumex.  Nurse is working hard on replacing electrolytes. Patient had loose stools and needed flexiseal today       Antibiotics (From admission, onward)    Start     Stop Route Frequency Ordered    09/22/19 1345  cefazolin (ANCEF) 2 gram in dextrose 5% 50 mL IVPB (premix)      -- IV Every 6 hours (non-standard times) 09/22/19  1244      VANC as per pharmacy       Antifungals (From admission, onward)    None        Antivirals (From admission, onward)    None          EXAM & DIAGNOSTICS REVIEWED:   Vitals:     Temp:  [97.6 °F (36.4 °C)-99.4 °F (37.4 °C)]   Temp: 98.9 °F (37.2 °C) (09/27/19 1600)  Pulse: (!) 123 (09/27/19 1757)  Resp: (!) 26 (09/27/19 1757)  BP: (!) 115/55 (09/27/19 1745)  SpO2: 99 % (09/27/19 1757)    Intake/Output Summary (Last 24 hours) at 9/27/2019 1811  Last data filed at 9/27/2019 1749  Gross per 24 hour   Intake 2094.79 ml   Output 7250 ml   Net -5155.21 ml       General:  Arousable, attends, tries to cooperate     Eyes:  Anicteric, PERRL,  no sleral or conjunctival petechiae  ENT:  No ulcers, exudates, thrush, nares patent, dentition is poor and MM are  Dry, obscured by bipap  Neck:  Supple,    Lungs: Patchy crackles,  With decreased BS RLL  Heart:  RRR,  Tachy 120  Abd:  Soft, tender, guards less, minimal distention,   + BS, no masses or organomegaly appreciated.  :   Henriquez, urine clear, no flank tenderness  Musc:  Joints without effusion, swelling, erythema, synovitis    Skin:  No rashes. No palmar or plantar lesions. No subungual petechiae. No skin lesions of DIC  Wound: Right hip abrasion, not infected  Neuro: arousable, face symmetric, tongue protrudes in the midline,  moves all extremities, no focal weakness but generally weak  Psych:  Attends, less restless  Lymphatic:        Extrem: generalized edema seems resolved? , no erythema, phlebitis, cellulitis, peripheral pulses are 0-1, clubbing present, no mottling  VAD:  Left IJ TLC 9/19  Isolation:  none    Lines/Tubes/Drains:    General Labs reviewed:  Recent Labs   Lab 09/25/19  0350 09/26/19  0342 09/27/19  0609   WBC 8.81 9.14 13.43*   HGB 7.0* 9.2* 10.3*   HCT 19.5* 26.4* 29.8*   PLT 53* 44* 88*       Recent Labs   Lab 09/25/19  0350 09/25/19  1944 09/26/19  0342 09/26/19  1311 09/27/19  0609    142 141  --  141   K 2.6* 3.7 3.2* 4.0 2.4*     103 102  --  90*   CO2 29 29 29  --  39*   BUN 26* 25* 27*  --  36*   CREATININE 0.8 0.7 0.7  --  0.8   CALCIUM 7.3* 7.3* 7.2*  --  7.8*   PROT 5.3*  --  5.1*  --  6.7   BILITOT 0.4  --  0.7  --  0.8   ALKPHOS 47*  --  48*  --  66   ALT <5*  --  <5*  --  <5*   AST 11  --  11  --  15           Micro:  Microbiology Results (last 7 days)     Procedure Component Value Units Date/Time    Blood culture [[id number]] Collected:  09/25/19 0413    Order Status:  Completed Specimen:  Blood Updated:  09/27/19 1212     Blood Culture, Routine No Growth to date      No Growth to date      No Growth to date    Blood culture [[id number]] Collected:  09/26/19 0443    Order Status:  Completed Specimen:  Blood Updated:  09/27/19 1012     Blood Culture, Routine No Growth to date      No Growth to date    Blood culture [[id number]] Collected:  09/24/19 0517    Order Status:  Completed Specimen:  Blood Updated:  09/27/19 1012     Blood Culture, Routine No Growth to date      No Growth to date      No Growth to date      No Growth to date    Stool culture [[id number]] Collected:  09/26/19 1329    Order Status:  Sent Specimen:  Stool Updated:  09/26/19 2301    E. coli 0157 antigen [[id number]] Collected:  09/26/19 1329    Order Status:  No result Specimen:  Stool Updated:  09/26/19 2301    Blood culture [[id number]]  (Abnormal)  (Susceptibility) Collected:  09/22/19 0338    Order Status:  Completed Specimen:  Blood from Antecubital, Left  Arm Updated:  09/25/19 1028     Blood Culture, Routine Gram stain peds bottle: Gram positive cocci in clusters resembling Staph       Results called to and read back by:Yolis Wan RN 09/23/2019  04:46      STAPHYLOCOCCUS AUREUS  ID consult required at [facility],[place],[facility] and [facility]   locations.      Blood culture [[id number]]  (Abnormal) Collected:  09/21/19 0534    Order Status:  Completed Specimen:  Blood Updated:  09/24/19 0954     Blood Culture, Routine Gram stain peds bottle: Gram positive  cocci in clusters resembling Staph       Results called to and read back by:Javi Sunshine RN 09/22/2019  14:22      STAPHYLOCOCCUS AUREUS  ID consult required at [facility].  For susceptibility see order #[id number]      Blood culture [[id number]]  (Abnormal) Collected:  09/20/19 1739    Order Status:  Completed Specimen:  Blood from Line, Central Updated:  09/23/19 0939     Blood Culture, Routine Gram stain aer bottle: Gram positive cocci in clusters resembling Staph       Results called to and read back by:Rhea Lai RN 09/21/2019  16:02      STAPHYLOCOCCUS AUREUS  ID consult required at [facility].  For susceptibility see order #[id number]          Culture, Routine Abnormal    STAPHYLOCOCCUS AUREUS   ID consult required at [facility] and [facility].     Resulting Agency [facility]   Susceptibility      Staphylococcus aureus     CULTURE, BLOOD     Clindamycin <=0.5 mcg/mL Sensitive     Erythromycin <=0.5 mcg/mL Sensitive     Oxacillin <=0.25 mcg/mL Sensitive     Penicillin <=0.03 mcg/mL Sensitive     Tetracycline <=4 mcg/mL Sensitive     Trimeth/Sulfa <=0.5/9.5 m... Sensitive           Imaging Reviewed:   CXR  Improvement in the appearance of the chest compared to the prior exam with decrease of the bilateral nodular opacifications.     CT ABD and Pelvis :   Chest; persistent shattered bilateral consolidative processes which have undergone cavitation/cystic spaces within them  Small left and moderate size right pleural effusion increased in size  Pericardial effusion appearing small but mildly increased in size  Abdomen and pelvis; mild distention of colon with prominent amount of fluid suggesting ileus.  Very mild colitis cannot be excluded but with no appreciable bowel wall thickening or pericolonic inflammatory change.  Additional findings as detailed above      Cardiology:  TTE 9/20  Left Ventricle Low  normal ejection fraction at 50%. Mild concentric observed. Grade I (mild) left ventricular diastolic dysfunction consistent with impaired relaxation. Elevated left atrial pressure.   Right Ventricle Normal cavity size, wall thickness and systolic function. Wall motion normal.   Left Atrium The left atrium is normal.   Right Atrium There is normal right atrial size.   Aortic Valve The valve is trileaflet. There is normal leaflet mobility.   Mitral Valve Normal valve structure. Mild regurgitation. Normal leaflet mobility.   Tricuspid Valve The tricuspid valve is normal. Trace regurgitation. Pulmonary hypertension present. The estimated PA systolic pressure is 48 mmHg.   Pulmonic Valve Normal valve structure. Trace regurgitation.   IVC/SVC Normal central venous pressure (3 mm Hg).   Ascending Aorta The aortic root and ascending aorta are normal in size.   Pericardium No pericardial effusion. Respiratory variation of mitral valve inflow is less than 30%.     DHARMESH 9/25  · Mild left ventricular enlargement.  · Low normal left ventricular systolic function. The estimated ejection fraction is 50%  · Normal LV diastolic function.  · Mild left atrial enlargement.  · No interatrial septal defect present.  · Normal appearing left atrial appendage. No thrombus is present in the appendage. Normal appendage velocities.  · Moderate-to-severe mitral regurgitation.  · Moderate tricuspid regurgitation.  · 1x.5 cmmobile vegetation on tricuspid valve septal leaflet atrial side  · 5mmx6 mm mobile vegetation on posterior leaflet P3 scallop on atrial side  · Aortic valve wnl    ECHO 09/26   · Mild concentric left ventricular hypertrophy.  · Mild left ventricular enlargement.  · Moderately decreased left ventricular systolic function. The estimated ejection fraction is 33%  · Grade III (severe) left ventricular diastolic dysfunction consistent with restrictive physiology.  · Mild global hypokinetic wall motion.  · Normal right ventricular  systolic function.  · Mild left atrial enlargement.  · Mild right atrial enlargement.  · There is a right atrial mass present. Echo lucent mobile small vegetation ssen at R atrial - TV septal leaflet junction In hi R atrium At septal surface there is more echo dense structure ( ~ 1.5x .8 cm) mobile which could be vegetation vs normal prominent Eustachian valve  · Moderate-to-severe mitral regurgitation.  · Normal central venous pressure (3 mm Hg).  · The estimated PA systolic pressure is 50 mm Hg  · Moderate to severe pulmonary hypertension present.    IMPRESSION & PLAN     1.  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    IVDA/heroin   2. Pancolitis. ? if she has ischemic colitis from sepsis and volume depletion. diarrhea today on 27 . CT abd improved 9/26  3. Shortness of breath, right pleural effusion x-ray, improved on 27th  4. DIC? with elevated INR, improved the low platelets, but with elevated fibrinogen, dropping hgb  5. Opiate dependence, severe lumbar spine abnormalities  6. Altered mental status. From sepsis? +/- from unrecognized stroke or embolus, improved 9/24, less so 9/25 but non focal   High risk for deterioration    Recommendations:  Vanc, continue ancef  Serial blood cultures are neg  23, 24, 25th   Send stools for cdiff   Procal in am  rec MRI  brain with contrast,   when she can tolerate laying flat

## 2019-09-27 NOTE — PROGRESS NOTES
Pharmacokinetic Assessment Follow Up: IV Vancomycin    Vancomycin serum concentration assessment(s):    The trough level was drawn correctly at 0852 on 9/27 and can be used to guide therapy at this time. The measurement is 26.4 mcg/mL and is above the desired definitive target range of 15 to 20 mcg/mL.    Vancomycin Regimen Plan:    Discontinue the scheduled vancomycin regimen. The last dose received was 750 mg IV on 9/26 at 2132.  Next level to be drawn at 2100 on 9/27, approximately 24 hours after last dose.  Will resume vancomycin regimen at appropriate dose when level is < 20 mcg/mL.    Drug levels (last 3 results):  Recent Labs   Lab Result Units 09/24/19 2012 09/25/19  1944 09/27/19  0852   Vancomycin-Trough ug/mL 18.0 19.4 26.4*       Pharmacy will continue to follow and monitor vancomycin.    Please contact pharmacy at extension [phone] for questions regarding this assessment.    Thank you for the consult,   Apryl Buchanan, PharmD       Patient brief summary:  Mesha Mckenzie is a 63 y.o. female initiated on antimicrobial therapy with IV Vancomycin for treatment of bacteremia.    The patient's current regimen is vancomycin 750 mg IV every 12 hours.    Drug Allergies:   Review of patient's allergies indicates:   Allergen Reactions    Pcn [penicillins]        Actual Body Weight:   45.5 kg    Renal Function:   Estimated Creatinine Clearance: 50.4 mL/min (based on SCr of 0.8 mg/dL).,     Dialysis Method (if applicable):  N/A    CBC (last 72 hours):  Recent Labs   Lab Result Units 09/25/19  0350 09/26/19  0342 09/27/19  0609   WBC K/uL 8.81 9.14 13.43*   Hemoglobin g/dL 7.0* 9.2* 10.3*   Hematocrit % 19.5* 26.4* 29.8*   Platelets K/uL 53* 44* 88*   Gran% % 77.7* 82.7* 83.4*   Lymph% % 15.8* 12.3* 11.2*   Mono% % 3.9* 2.3* 3.4*   Eosinophil% % 0.8 0.3 0.6   Basophil% % 0.1 0.1 0.1   Differential Method  Automated Automated Automated       Metabolic Panel (last 72 hours):  Recent Labs   Lab Result Units  09/25/19  0350 09/25/19  1944 09/26/19  0342 09/26/19  1311 09/27/19  0609   Sodium mmol/L 142 142 141  --  141   Potassium mmol/L 2.6* 3.7 3.2* 4.0 2.4*   Chloride mmol/L 103 103 102  --  90*   CO2 mmol/L 29 29 29  --  39*   Glucose mg/dL 195* 144* 162*  --  179*   BUN, Bld mg/dL 26* 25* 27*  --  36*   Creatinine mg/dL 0.8 0.7 0.7  --  0.8   Albumin g/dL 1.5*  --  1.5*  --  1.9*   Total Bilirubin mg/dL 0.4  --  0.7  --  0.8   Alkaline Phosphatase U/L 47*  --  48*  --  66   AST U/L 11  --  11  --  15   ALT U/L <5*  --  <5*  --  <5*   Magnesium mg/dL 1.4* 1.3* 1.1*  --  1.3*       Vancomycin Administrations:  vancomycin given in the last 96 hours                     vancomycin 750 mg in dextrose 5 % 250 mL IVPB (ready to mix system) (mg) 750 mg New Bag 09/26/19 2132     750 mg New Bag  0816     750 mg New Bag 09/25/19 2116     750 mg New Bag  0905     750 mg New Bag 09/24/19 2106     750 mg New Bag  0814                      Microbiologic Results:  Microbiology Results (last 7 days)       Procedure Component Value Units Date/Time    Stool culture [[id number]] Collected:  09/26/19 1329    Order Status:  Sent Specimen:  Stool Updated:  09/26/19 2301    E. coli 0157 antigen [[id number]] Collected:  09/26/19 1329    Order Status:  No result Specimen:  Stool Updated:  09/26/19 2301    Blood culture [[id number]] Collected:  09/26/19 0443    Order Status:  Completed Specimen:  Blood Updated:  09/26/19 1715     Blood Culture, Routine No Growth to date    Blood culture [[id number]] Collected:  09/25/19 3993    Order Status:  Completed Specimen:  Blood Updated:  09/26/19 1212     Blood Culture, Routine No Growth to date      No Growth to date    Blood culture [[id number]] Collected:  09/24/19 0517    Order Status:  Completed Specimen:  Blood Updated:  09/26/19 1012     Blood Culture, Routine No Growth to date      No Growth to date      No Growth to date    Blood culture [[id number]]  (Abnormal)  (Susceptibility) Collected:   09/22/19 0338    Order Status:  Completed Specimen:  Blood from Antecubital, Left  Arm Updated:  09/25/19 1028     Blood Culture, Routine Gram stain peds bottle: Gram positive cocci in clusters resembling Staph       Results called to and read back by:Yolis Wan RN 09/23/2019  04:46      STAPHYLOCOCCUS AUREUS  ID consult required at [facility].      Blood culture [[id number]]  (Abnormal) Collected:  09/21/19 0534    Order Status:  Completed Specimen:  Blood Updated:  09/24/19 0954     Blood Culture, Routine Gram stain peds bottle: Gram positive cocci in clusters resembling Staph       Results called to and read back by:Javi Sunshine RN 09/22/2019  14:22      STAPHYLOCOCCUS AUREUS  ID consult required at [facility] and [facility].  For susceptibility see order #[id number]      Blood culture [[id number]]  (Abnormal) Collected:  09/20/19 1739    Order Status:  Completed Specimen:  Blood from Line, Central Updated:  09/23/19 0939     Blood Culture, Routine Gram stain aer bottle: Gram positive cocci in clusters resembling Staph       Results called to and read back by:Rhea Lai RN 09/21/2019  16:02      STAPHYLOCOCCUS AUREUS  ID consult required at [facility].  For susceptibility see order #[id number]

## 2019-09-28 PROBLEM — I50.33 ACUTE ON CHRONIC DIASTOLIC CONGESTIVE HEART FAILURE: Status: RESOLVED | Noted: 2019-09-23 | Resolved: 2019-09-28

## 2019-09-28 LAB
ALBUMIN SERPL BCP-MCNC: 1.8 G/DL (ref 3.5–5.2)
ALP SERPL-CCNC: 74 U/L (ref 55–135)
ALT SERPL W/O P-5'-P-CCNC: <5 U/L (ref 10–44)
ANION GAP SERPL CALC-SCNC: 12 MMOL/L (ref 8–16)
AST SERPL-CCNC: 15 U/L (ref 10–40)
BASOPHILS # BLD AUTO: 0.03 K/UL (ref 0–0.2)
BASOPHILS NFR BLD: 0.2 % (ref 0–1.9)
BILIRUB DIRECT SERPL-MCNC: 0.3 MG/DL (ref 0.1–0.3)
BILIRUB SERPL-MCNC: 0.5 MG/DL (ref 0.1–1)
BUN SERPL-MCNC: 49 MG/DL (ref 8–23)
CALCIUM SERPL-MCNC: 7.8 MG/DL (ref 8.7–10.5)
CHLORIDE SERPL-SCNC: 93 MMOL/L (ref 95–110)
CO2 SERPL-SCNC: 37 MMOL/L (ref 23–29)
CREAT SERPL-MCNC: 1 MG/DL (ref 0.5–1.4)
CRP SERPL-MCNC: 90.8 MG/L (ref 0–8.2)
DIFFERENTIAL METHOD: ABNORMAL
EOSINOPHIL # BLD AUTO: 0.1 K/UL (ref 0–0.5)
EOSINOPHIL NFR BLD: 0.7 % (ref 0–8)
ERYTHROCYTE [DISTWIDTH] IN BLOOD BY AUTOMATED COUNT: 16.3 % (ref 11.5–14.5)
ERYTHROCYTE [SEDIMENTATION RATE] IN BLOOD BY WESTERGREN METHOD: 93 MM/HR (ref 0–20)
EST. GFR  (AFRICAN AMERICAN): >60 ML/MIN/1.73 M^2
EST. GFR  (NON AFRICAN AMERICAN): 59 ML/MIN/1.73 M^2
GLUCOSE SERPL-MCNC: 161 MG/DL (ref 70–110)
HCT VFR BLD AUTO: 29.8 % (ref 37–48.5)
HGB BLD-MCNC: 9.8 G/DL (ref 12–16)
IMM GRANULOCYTES # BLD AUTO: 0.16 K/UL (ref 0–0.04)
LYMPHOCYTES # BLD AUTO: 2 K/UL (ref 1–4.8)
LYMPHOCYTES NFR BLD: 14.4 % (ref 18–48)
MCH RBC QN AUTO: 31.3 PG (ref 27–31)
MCHC RBC AUTO-ENTMCNC: 32.9 G/DL (ref 32–36)
MCV RBC AUTO: 95 FL (ref 82–98)
MONOCYTES # BLD AUTO: 0.4 K/UL (ref 0.3–1)
MONOCYTES NFR BLD: 3 % (ref 4–15)
NEUTROPHILS # BLD AUTO: 11.4 K/UL (ref 1.8–7.7)
NEUTROPHILS NFR BLD: 80.6 % (ref 38–73)
NRBC BLD-RTO: 0 /100 WBC
PLATELET # BLD AUTO: 105 K/UL (ref 150–350)
PMV BLD AUTO: 12.9 FL (ref 9.2–12.9)
POTASSIUM SERPL-SCNC: 3.3 MMOL/L (ref 3.5–5.1)
POTASSIUM SERPL-SCNC: 3.4 MMOL/L (ref 3.5–5.1)
PROCALCITONIN SERPL IA-MCNC: 3 NG/ML
PROT SERPL-MCNC: 6.4 G/DL (ref 6–8.4)
RBC # BLD AUTO: 3.13 M/UL (ref 4–5.4)
SODIUM SERPL-SCNC: 142 MMOL/L (ref 136–145)
WBC # BLD AUTO: 14.18 K/UL (ref 3.9–12.7)

## 2019-09-28 PROCEDURE — 94660 CPAP INITIATION&MGMT: CPT

## 2019-09-28 PROCEDURE — 84145 PROCALCITONIN (PCT): CPT

## 2019-09-28 PROCEDURE — 80076 HEPATIC FUNCTION PANEL: CPT

## 2019-09-28 PROCEDURE — 86140 C-REACTIVE PROTEIN: CPT

## 2019-09-28 PROCEDURE — 85025 COMPLETE CBC W/AUTO DIFF WBC: CPT

## 2019-09-28 PROCEDURE — 99291 PR CRITICAL CARE, E/M 30-74 MINUTES: ICD-10-PCS | Mod: S$GLB,,, | Performed by: INTERNAL MEDICINE

## 2019-09-28 PROCEDURE — 63600175 PHARM REV CODE 636 W HCPCS: Performed by: INTERNAL MEDICINE

## 2019-09-28 PROCEDURE — 63600175 PHARM REV CODE 636 W HCPCS: Performed by: NURSE PRACTITIONER

## 2019-09-28 PROCEDURE — 63600175 PHARM REV CODE 636 W HCPCS: Performed by: SPECIALIST

## 2019-09-28 PROCEDURE — 20000000 HC ICU ROOM

## 2019-09-28 PROCEDURE — 99233 SBSQ HOSP IP/OBS HIGH 50: CPT | Mod: S$GLB,,, | Performed by: INTERNAL MEDICINE

## 2019-09-28 PROCEDURE — 99291 CRITICAL CARE FIRST HOUR: CPT | Mod: S$GLB,,, | Performed by: INTERNAL MEDICINE

## 2019-09-28 PROCEDURE — 94761 N-INVAS EAR/PLS OXIMETRY MLT: CPT

## 2019-09-28 PROCEDURE — 92526 ORAL FUNCTION THERAPY: CPT

## 2019-09-28 PROCEDURE — 84132 ASSAY OF SERUM POTASSIUM: CPT

## 2019-09-28 PROCEDURE — 25000003 PHARM REV CODE 250: Performed by: INTERNAL MEDICINE

## 2019-09-28 PROCEDURE — 36415 COLL VENOUS BLD VENIPUNCTURE: CPT

## 2019-09-28 PROCEDURE — 25000003 PHARM REV CODE 250: Performed by: SPECIALIST

## 2019-09-28 PROCEDURE — 92523 SPEECH SOUND LANG COMPREHEN: CPT

## 2019-09-28 PROCEDURE — 85651 RBC SED RATE NONAUTOMATED: CPT

## 2019-09-28 PROCEDURE — C9113 INJ PANTOPRAZOLE SODIUM, VIA: HCPCS | Performed by: NURSE PRACTITIONER

## 2019-09-28 PROCEDURE — 99233 PR SUBSEQUENT HOSPITAL CARE,LEVL III: ICD-10-PCS | Mod: S$GLB,,, | Performed by: INTERNAL MEDICINE

## 2019-09-28 PROCEDURE — B4185 PARENTERAL SOL 10 GM LIPIDS: HCPCS | Performed by: INTERNAL MEDICINE

## 2019-09-28 PROCEDURE — 27000221 HC OXYGEN, UP TO 24 HOURS

## 2019-09-28 PROCEDURE — 80048 BASIC METABOLIC PNL TOTAL CA: CPT

## 2019-09-28 PROCEDURE — 99900035 HC TECH TIME PER 15 MIN (STAT)

## 2019-09-28 RX ORDER — METOPROLOL TARTRATE 1 MG/ML
5 INJECTION, SOLUTION INTRAVENOUS ONCE
Status: COMPLETED | OUTPATIENT
Start: 2019-09-28 | End: 2019-09-28

## 2019-09-28 RX ORDER — ENALAPRILAT 1.25 MG/ML
1.25 INJECTION INTRAVENOUS 2 TIMES DAILY
Status: DISCONTINUED | OUTPATIENT
Start: 2019-09-28 | End: 2019-09-30

## 2019-09-28 RX ORDER — METOPROLOL TARTRATE 1 MG/ML
7.5 INJECTION, SOLUTION INTRAVENOUS EVERY 6 HOURS
Status: DISCONTINUED | OUTPATIENT
Start: 2019-09-28 | End: 2019-09-30

## 2019-09-28 RX ORDER — FUROSEMIDE 10 MG/ML
20 INJECTION INTRAMUSCULAR; INTRAVENOUS DAILY
Status: DISCONTINUED | OUTPATIENT
Start: 2019-09-28 | End: 2019-10-02 | Stop reason: HOSPADM

## 2019-09-28 RX ORDER — METOPROLOL TARTRATE 1 MG/ML
INJECTION, SOLUTION INTRAVENOUS
Status: DISPENSED
Start: 2019-09-28 | End: 2019-09-28

## 2019-09-28 RX ORDER — HYDROMORPHONE HYDROCHLORIDE 2 MG/ML
1 INJECTION, SOLUTION INTRAMUSCULAR; INTRAVENOUS; SUBCUTANEOUS
Status: DISCONTINUED | OUTPATIENT
Start: 2019-09-28 | End: 2019-10-02

## 2019-09-28 RX ADMIN — FUROSEMIDE 20 MG: 10 INJECTION, SOLUTION INTRAVENOUS at 06:09

## 2019-09-28 RX ADMIN — ASCORBIC ACID, VITAMIN A PALMITATE, CHOLECALCIFEROL, THIAMINE HYDROCHLORIDE, RIBOFLAVIN-5 PHOSPHATE SODIUM, PYRIDOXINE HYDROCHLORIDE, NIACINAMIDE, DEXPANTHENOL, ALPHA-TOCOPHEROL ACETATE, VITAMIN K1, FOLIC ACID, BIOTIN, CYANOCOBALAMIN: 200; 3300; 200; 6; 3.6; 6; 40; 15; 10; 150; 600; 60; 5 INJECTION, SOLUTION INTRAVENOUS at 07:09

## 2019-09-28 RX ADMIN — HYDROMORPHONE HYDROCHLORIDE 1 MG: 2 INJECTION, SOLUTION INTRAMUSCULAR; INTRAVENOUS; SUBCUTANEOUS at 03:09

## 2019-09-28 RX ADMIN — CEFAZOLIN SODIUM 2 G: 2 SOLUTION INTRAVENOUS at 01:09

## 2019-09-28 RX ADMIN — LORAZEPAM 1 MG: 2 INJECTION INTRAMUSCULAR; INTRAVENOUS at 06:09

## 2019-09-28 RX ADMIN — HYDROMORPHONE HYDROCHLORIDE 2 MG: 2 INJECTION INTRAMUSCULAR; INTRAVENOUS; SUBCUTANEOUS at 08:09

## 2019-09-28 RX ADMIN — CEFAZOLIN SODIUM 2 G: 2 SOLUTION INTRAVENOUS at 06:09

## 2019-09-28 RX ADMIN — MILRINONE LACTATE 0.25 MCG/KG/MIN: 200 INJECTION, SOLUTION INTRAVENOUS at 05:09

## 2019-09-28 RX ADMIN — METOPROLOL TARTRATE 7.5 MG: 5 INJECTION, SOLUTION INTRAVENOUS at 06:09

## 2019-09-28 RX ADMIN — POTASSIUM CHLORIDE 40 MEQ: 14.9 INJECTION, SOLUTION INTRAVENOUS at 08:09

## 2019-09-28 RX ADMIN — VANCOMYCIN HYDROCHLORIDE 750 MG: 750 INJECTION, POWDER, LYOPHILIZED, FOR SOLUTION INTRAVENOUS at 01:09

## 2019-09-28 RX ADMIN — POTASSIUM CHLORIDE 40 MEQ: 14.9 INJECTION, SOLUTION INTRAVENOUS at 09:09

## 2019-09-28 RX ADMIN — METOPROLOL TARTRATE 5 MG: 1 INJECTION, SOLUTION INTRAVENOUS at 11:09

## 2019-09-28 RX ADMIN — CEFAZOLIN SODIUM 2 G: 2 SOLUTION INTRAVENOUS at 08:09

## 2019-09-28 RX ADMIN — HYDROMORPHONE HYDROCHLORIDE 1 MG: 2 INJECTION, SOLUTION INTRAMUSCULAR; INTRAVENOUS; SUBCUTANEOUS at 11:09

## 2019-09-28 RX ADMIN — CEFAZOLIN SODIUM 2 G: 2 SOLUTION INTRAVENOUS at 02:09

## 2019-09-28 RX ADMIN — SOYBEAN OIL 250 ML: 20 INJECTION, SOLUTION INTRAVENOUS at 09:09

## 2019-09-28 RX ADMIN — ENALAPRILAT 1.25 MG: 2.5 INJECTION INTRAVENOUS at 07:09

## 2019-09-28 RX ADMIN — PANTOPRAZOLE SODIUM 40 MG: 40 INJECTION, POWDER, FOR SOLUTION INTRAVENOUS at 08:09

## 2019-09-28 RX ADMIN — HYDROMORPHONE HYDROCHLORIDE 2 MG: 2 INJECTION INTRAMUSCULAR; INTRAVENOUS; SUBCUTANEOUS at 01:09

## 2019-09-28 RX ADMIN — HYDROMORPHONE HYDROCHLORIDE 1 MG: 2 INJECTION, SOLUTION INTRAMUSCULAR; INTRAVENOUS; SUBCUTANEOUS at 07:09

## 2019-09-28 NOTE — SUBJECTIVE & OBJECTIVE
[facility]  Pulmonary / Critical Care Medicine  Progress Note    S: No major issues overnight.  Has had some dark stool over the past 24 hr, which was Hemoccult positive.  Hemodynamically stable and not requiring vasopressors.  Remains on noninvasive ventilation, and reportedly becomes tachycardic and tachypneic when BiPAP mask is been removed.  The patient is subjectively unchanged over the past 24 hr, but has no complaints this afternoon.      Review of Systems   Constitutional: Negative for fever.   Respiratory: Positive for shortness of breath. Negative for cough, hemoptysis, sputum production, wheezing and pleurisy.    Cardiovascular: Positive for palpitations. Negative for chest pain and leg swelling.   Genitourinary: Negative for hematuria.   Skin: Negative for rash.   Gastrointestinal: Positive for abdominal pain. Negative for nausea, vomiting and abdominal distention.   Psychiatric/Behavioral: Positive for confusion.      Reviewed:  PMH, ROS, Family History, Social History, Surgical History, Meds, Allergies, Vitals and Relevant Results.  Nursing/provider documentation from the past 24 hours.       O: VS: Temp:  [97.6 °F (36.4 °C)-99.3 °F (37.4 °C)]   Pulse:  [111-164]   Resp:  [19-66]   BP: ()/(50-97)   SpO2:  [87 %-100 %]   Ideal body weight: 50.1 kg (110 lb 7.2 oz)    I/O:   Gross per 24 hour   Intake 2094.79 ml   Output 7250 ml   Net -5155.21 ml         Vent: SpO2 98% on 40% FiO2  BiPAP:  IPAP 10 / EPAP 5      PE Physical Exam   Constitutional: She appears cachectic. She is cooperative. She appears toxic. She appears ill. No distress. She is restrained. Face mask in place.   HENT:   Head: Normocephalic.   Right Ear: External ear normal.   Left Ear: External ear normal.   Mouth/Throat: Mallampati Score: unable to assess.   Bandage on the bridge of her nose   Neck: Normal range of motion. Neck supple. No JVD present. No tracheal deviation present. No thyromegaly present.    Cardiovascular: Intact distal pulses and normal pulses.  Occasional extrasystoles are present. Tachycardia present.   Murmur heard.  Pulmonary/Chest: Normal expansion, symmetric chest wall expansion and effort normal. No stridor. No respiratory distress. She has decreased breath sounds (at both bases). She has no wheezes. She has no rhonchi. She has no rales. She exhibits no tenderness.   Abdominal: Soft. Bowel sounds are normal. She exhibits no distension. There is tenderness. There is no rebound.   Musculoskeletal: Normal range of motion. She exhibits no edema, tenderness or deformity.   Neurological: She is alert. She has normal strength. She is disoriented. She displays atrophy. She displays no tremor. No cranial nerve deficit or sensory deficit. She exhibits normal muscle tone. GCS eye subscore is 4. GCS verbal subscore is 5. GCS motor subscore is 6.   Skin: Skin is warm and dry. No rash noted. No cyanosis. Nails show no clubbing.   Psychiatric: She has a normal mood and affect.   Nursing note and vitals reviewed.         Lines Henriquez, TLC, rectal tube    Labs All pertinent labs within the past 24 hours have been reviewed.  Recent Labs   Lab 09/27/19  0609 09/27/19  1643   WBC 13.43*  --    RBC 3.31*  --    HGB 10.3*  --    HCT 29.8*  --    PLT 88*  --    MCV 90  --    MCH 31.1*  --    MCHC 34.6  --      --    K 2.4*  --    CL 90*  --    CO2 39*  --    BUN 36*  --    CREATININE 0.8  --    MG 1.3*  --    ALT <5*  --    AST 15  --    ALKPHOS 66  --    BILITOT 0.8  --    PROT 6.7  --    ALBUMIN 1.9*  --    PH  --  7.603*   PCO2  --  40.9   PO2  --  74*   HCO3  --  40.5*   POCSATURATED  --  97   BE  --  19   INR 1.1  --        Imaging I have reviewed and interpreted all pertinent imaging results/findings within the past 24 hours.  CT Chest, abdomen, pelvis (9/26/2019):    · Chest; persistent shattered bilateral consolidative processes which have undergone cavitation/cystic spaces within them.  · Small left  and moderate size right pleural effusion increased in size  · Pericardial effusion appearing small but mildly increased in size  · Abdomen and pelvis; mild distention of colon with prominent amount of fluid suggesting ileus.  Very mild colitis cannot be excluded but with no appreciable bowel wall thickening or pericolonic inflammatory change.  CXR  (9/27/2019) Improvement in the appearance of the chest compared to the prior exam with decrease of the bilateral nodular opacifications  I independently viewed the above imaging/lab studies in addition to reviewing the report       Micro Blood Culture   Lab Results   Component Value Date    LABBLOO No Growth to date 09/26/2019    LABBLOO No Growth to date 09/26/2019   , Sputum Culture No results found for: GSRESP, RESPIRATORYC, Urine Culture    Lab Results   Component Value Date    LABURIN STAPHYLOCOCCUS AUREUS  >100,000cfu/ml   (A) 09/19/2019        Medications Scheduled    ceFAZolin (ANCEF) IVPB  2 g Intravenous Q6H    fat emulsion 20%  250 mL Intravenous Daily    pantoprazole  40 mg Intravenous Daily      Continuous Infusions:    Amino acid 5% - dextrose 15% (CLINIMIX-E) solution with additives (1L  provides 510 kcal/L dextrose, with 50 gm AA, 150 gm CHO, Na 35, K 30, Mg 5, Ca 4.5, Acetate 80, Cl 39, Phos 15)      bumetanide (BUMEX)0.25 mg/mL infusion 0.25 mg/hr (09/27/19 1029)    milrinone 20mg/100ml D5W (200mcg/ml) 0.25 mcg/kg/min (09/27/19 1037)      PRN   sodium chloride, bisacodyl, HYDROmorphone, influenza, lorazepam, magnesium sulfate IVPB, magnesium sulfate IVPB, ondansetron, pneumoc 13-susanna conj-dip cr(PF), potassium chloride in water, sodium chloride 0.9%

## 2019-09-28 NOTE — ASSESSMENT & PLAN NOTE
This is secondary to pneumonia which is probably secondary to septic emboli.  She is using BiPAP intermittently.  Chest x-ray is improving.

## 2019-09-28 NOTE — ASSESSMENT & PLAN NOTE
· No indication for blood products at the moment.  · Continue to monitor H/H and anemia.  · Hematology following for thrombocytopenia.

## 2019-09-28 NOTE — ASSESSMENT & PLAN NOTE
Patient's FSGs are controlled on current hypoglycemics.   Last A1c reviewed-   Lab Results   Component Value Date    HGBA1C 5.7 (H) 04/23/2019     Most recent fingerstick glucose reviewed- No results for input(s): POCTGLUCOSE in the last 24 hours.  Current correctional scale  Low  Maintain anti-hyperglycemic dose as follows-   Antihyperglycemics (From admission, onward)    None

## 2019-09-28 NOTE — PLAN OF CARE
Pt remains in ICU on milrinone gtt. Tolerating 5L O2 nasal cannula. Evaluated by speech today, remains NPO, with ice chips sparingly administered by nurse. TPN running as ordered. Henriquez and flexiseal in place. Multiple doses of IV lopressor today for HR - see MAR. Restraints removed early this morning, and pt has been cooperative. C/o pain to buttocks, relieved by prn dilaudid. Pt turned off of backside for relief. Daughter at bedside most of day. All in understanding of plan of care. Safety maintained.

## 2019-09-28 NOTE — PROGRESS NOTES
Pulmonary / Critical Care Medicine  Progress Note    S: No major issues overnight.  Has had some dark stool over the past 24 hr, which was Hemoccult positive.  Hemodynamically stable and not requiring vasopressors.  Remains on noninvasive ventilation, and reportedly becomes tachycardic and tachypneic when BiPAP mask is been removed.  The patient is subjectively unchanged over the past 24 hr, but has no complaints this afternoon.      Review of Systems   Constitutional: Negative for fever.   Respiratory: Positive for shortness of breath. Negative for cough, hemoptysis, sputum production, wheezing and pleurisy.    Cardiovascular: Positive for palpitations. Negative for chest pain and leg swelling.   Genitourinary: Negative for hematuria.   Skin: Negative for rash.   Gastrointestinal: Positive for abdominal pain. Negative for nausea, vomiting and abdominal distention.   Psychiatric/Behavioral: Positive for confusion.      Reviewed:  PMH, ROS, Family History, Social History, Surgical History, Meds, Allergies, Vitals and Relevant Results.  Nursing/provider documentation from the past 24 hours.       O: VS: Temp:  [97.6 °F (36.4 °C)-99.3 °F (37.4 °C)]   Pulse:  [111-164]   Resp:  [19-66]   BP: ()/(50-97)   SpO2:  [87 %-100 %]   Ideal body weight: 50.1 kg (110 lb 7.2 oz)    I/O:   Gross per 24 hour   Intake 2094.79 ml   Output 7250 ml   Net -5155.21 ml         Vent: SpO2 98% on 40% FiO2  BiPAP:  IPAP 10 / EPAP 5      PE Physical Exam   Constitutional: She appears cachectic. She is cooperative. She appears toxic. She appears ill. No distress. She is restrained. Face mask in place.   HENT:   Head: Normocephalic.   Right Ear: External ear normal.   Left Ear: External ear normal.   Mouth/Throat: Mallampati Score: unable to assess.   Bandage on the bridge of her nose   Neck: Normal range of motion. Neck supple. No JVD present. No tracheal deviation present. No thyromegaly present.   Cardiovascular: Intact distal pulses  and normal pulses.  Occasional extrasystoles are present. Tachycardia present.   Murmur heard.  Pulmonary/Chest: Normal expansion, symmetric chest wall expansion and effort normal. No stridor. No respiratory distress. She has decreased breath sounds (at both bases). She has no wheezes. She has no rhonchi. She has no rales. She exhibits no tenderness.   Abdominal: Soft. Bowel sounds are normal. She exhibits no distension. There is tenderness. There is no rebound.   Musculoskeletal: Normal range of motion. She exhibits no edema, tenderness or deformity.   Neurological: She is alert. She has normal strength. She is disoriented. She displays atrophy. She displays no tremor. No cranial nerve deficit or sensory deficit. She exhibits normal muscle tone. GCS eye subscore is 4. GCS verbal subscore is 5. GCS motor subscore is 6.   Skin: Skin is warm and dry. No rash noted. No cyanosis. Nails show no clubbing.   Psychiatric: She has a normal mood and affect.   Nursing note and vitals reviewed.         Lines Henriquez, TLC, rectal tube    Labs All pertinent labs within the past 24 hours have been reviewed.     WBC 13.43*  --    RBC 3.31*  --    HGB 10.3*  --    HCT 29.8*  --    PLT 88*  --    MCV 90  --    MCH 31.1*  --    MCHC 34.6  --      --    K 2.4*  --    CL 90*  --    CO2 39*  --    BUN 36*  --    CREATININE 0.8  --    MG 1.3*  --    ALT <5*  --    AST 15  --    ALKPHOS 66  --    BILITOT 0.8  --    PROT 6.7  --    ALBUMIN 1.9*  --    PH  --  7.603*   PCO2  --  40.9   PO2  --  74*   HCO3  --  40.5*   POCSATURATED  --  97   BE  --  19   INR 1.1  --        Imaging I have reviewed and interpreted all pertinent imaging results/findings within the past 24 hours.  CT Chest, abdomen, pelvis (9/26/2019):    · Chest; persistent shattered bilateral consolidative processes which have undergone cavitation/cystic spaces within them.  · Small left and moderate size right pleural effusion increased in size  · Pericardial effusion  appearing small but mildly increased in size  · Abdomen and pelvis; mild distention of colon with prominent amount of fluid suggesting ileus.  Very mild colitis cannot be excluded but with no appreciable bowel wall thickening or pericolonic inflammatory change.  CXR  (9/27/2019) Improvement in the appearance of the chest compared to the prior exam with decrease of the bilateral nodular opacifications  I independently viewed the above imaging/lab studies in addition to reviewing the report       Micro Blood Culture   Lab Results   Component Value Date    LABBLOO No Growth to date 09/26/2019    LABBLOO No Growth to date 09/26/2019   , Sputum Culture No results found for: GSRESP, RESPIRATORYC, Urine Culture    Lab Results   Component Value Date    LABURIN STAPHYLOCOCCUS AUREUS  >100,000cfu/ml   (A) 09/19/2019        Medications Scheduled    ceFAZolin (ANCEF) IVPB  2 g Intravenous Q6H    fat emulsion 20%  250 mL Intravenous Daily    pantoprazole  40 mg Intravenous Daily      Continuous Infusions:    Amino acid 5% - dextrose 15% (CLINIMIX-E) solution with additives (1L  provides 510 kcal/L dextrose, with 50 gm AA, 150 gm CHO, Na 35, K 30, Mg 5, Ca 4.5, Acetate 80, Cl 39, Phos 15)      bumetanide (BUMEX)0.25 mg/mL infusion 0.25 mg/hr (09/27/19 1029)    milrinone 20mg/100ml D5W (200mcg/ml) 0.25 mcg/kg/min (09/27/19 1037)      PRN   sodium chloride, bisacodyl, HYDROmorphone, influenza, lorazepam, magnesium sulfate IVPB, magnesium sulfate IVPB, ondansetron, pneumoc 13-susanna conj-dip cr(PF), potassium chloride in water, sodium chloride 0.9%        Assessment/Plan:     1. Acute Bacterial Endocarditis   2. Acute Septic Pulmonary Embolism   3. Staphylococcus Aureus Bacteremia With Sepsis   4. Acute Hypoxemic Respiratory Failure   5. Mitral Valve Vegetation   6. Acute On Chronic Diastolic Congestive Heart Failure   7. Pleural Effusion, Bilateral   8. Tricuspid Valve Vegetation   9. Alireza (Acute Kidney Injury)  "  10. Encephalopathy, Metabolic   11. Hypomagnesemia   12. IV Drug User   13. Hypokalemia   14. Thrombocytopenia, Acquired   15. Blood Loss Anemia   16. Severe Malnutrition   17. Hyperglycemia   18. Dic (Disseminated Intravascular Coagulation) (Resolved)     Neuro  · Avoidance of deleriogenic medications.  · Low threshold for repeating CT head to evaluate for embolic disease.  · Continue to address underlying acute condition.  · Supportive care.    Pulmonary  · Continue NIV for now.  · Observation with continued diuresis.  · Ideally, pleural disease would be addressed, but clinical/radiographic improvement with diuresis and absent evidence worsening infection suggest against a pleural space infection.  · Additionally, the technical difficulty and increased risk of bleeding in this situation make the risks outweigh the benefits of drainage of pleural effusions at the moment.    Cardiac/Vascular  · Hemodynamically stable.  · Continued diuresis.  · Cardiology following.  · Continue antibiotics at the direction of ID.  Blood Cx sterile for several days.    Renal/  · Adequate urine output and relatively stable renal function.  · Monitor and replace electrolytes with continued robust diuresis.    ID  · Continue antibiotics.  · ID managing antibiotic selection.    Hematology  · No indication for blood products at the moment.  · Continue to monitor H/H and anemia.  · Hematology following for thrombocytopenia.    GI  · Abdominal tenderness, "colitis", and occult blood raise concern for mesenteric emboli and bowl infarction.  · If present, would be a relative indication for operative management of her valvular disease.  · Observation for now    Critical Care Time: >35 minutes  Critical secondary to Patient has a condition that poses threat to life and bodily function: MSSA endocarditis, septic pulmonary emboli, acute on chronic heart failure, acute hypoxemic respiratory failure     Critical care was time spent personally by " me on the following activities: development of treatment plan with patient or surrogate and bedside caregivers, discussions with consultants, evaluation of patient's response to treatment, examination of patient, ordering and performing treatments and interventions, ordering and review of laboratory studies, ordering and review of radiographic studies, pulse oximetry, re-evaluation of patient's condition. This critical care time did not overlap with that of any other provider or involve time for any procedures.     Dyllan Rivera MD  Pulmonary / Critical Care Medicine  [facility]  09/27/2019  7:46 PM

## 2019-09-28 NOTE — ASSESSMENT & PLAN NOTE
The patient finally admitted that she was using IV drugs to Dr. Bolden.  She does not want her family to know

## 2019-09-28 NOTE — PT/OT/SLP EVAL
Speech Language Pathology Evaluation  Cognitive Communication    Patient Name:  Mesha Mckenzie   MRN:  [id number]  Admitting Diagnosis: Acute bacterial endocarditis    Recommendations:     Recommendations:                General Recommendations:  Speech/language therapy and Cognitive-linguistic therapy  Diet recommendations:  NPO, NPO(allow ice chips per RN after mouth care)   Aspiration Precautions: Strict aspiration precautions   General Precautions: Standard, aspiration, fall, respiratory  Communication strategies:  provide increased time to answer and go to room if call light pushed    History:     Past Medical History:   Diagnosis Date    Anxiety     Carotid bruit     Chronic pain     Cystitis     Cystocele, unspecified (CODE)     Depression     GI bleed     History of uterine fibroid     Shortness of breath on exertion     Spinal stenosis     Urinary retention        Past Surgical History:   Procedure Laterality Date    ANTERIOR VAGINAL REPAIR  10-    CARDIAC CATHETERIZATION  age 12    CHOLECYSTECTOMY  2015    COLONOSCOPY  12/12/2018    aborted due to poor colon prep    COLONOSCOPY N/A 12/12/2018    Procedure: COLONOSCOPY;  Surgeon: Renard Gonsalves MD;  Location: [facility];  Service: Endoscopy;  Laterality: N/A;    [facility]Y  1989    [facility]    tubiligation         Social History: Patient lives in the community.    Prior Intubation HX:  None this admission    Modified Barium Swallow: none this admission    Chest X-Rays: 9/28 - Stable appearance of the chest demonstrating multifocal infiltrates and nodular opacities and small pleural effusions    Prior diet: regular textures & liquids.    Subjective     Oh, yeah.  Ice.  Patient goals: more ice       Objective:   Cognitive Status:  Pt marginally alert, requiring frequent cues to keep eyes open (post demerol).  Attended to single tasks, mostly completed.  Oriented to self.  Attempted functional verbal several tasks & correctly completed  some.       Receptive Language/Comprehension: intact for simple statements, followed 2 step commands, answered single step Y/N ??s, pointed to 6/6 pictures    Pragmatics:  Decreased initiation,     Expressive Language:   Intact automatics, simple sentence repetition, fluent      Motor Speech: intermittent rapid rate, irregular DDKs, but overall mild dysarthria    Voice: Muffled, low volume, hypernasal?    Visual-Spatial:  no apparent field cuts or negect    Reading: not tested     Written Expression: Not tested    Treatment: Education to pt & daughter re impressions & recommendations.  Pt given ice chips with noted struggle & throat clearing after first trial, but good oral prep & transit & no s/s pharyngeal dysphagia after the first one.  Trial of 1/4 tsp applesauce with oral delay.    Assessment:   Mesha Mckenzie is a 63 y.o. female with an SLP diagnosis of Dysphagia and Cognitive-Linguistic Impairment.  She will benefit from tx.    Goals:   Multidisciplinary Problems     SLP Goals        Problem: SLP Goal    Goal Priority Disciplines Outcome   SLP Goal     SLP Ongoing, Progressing   Description:    1) Pt will consume regular textures and thin liquids with no overt s/s swallow dysfunction  2) Follow 3 step commands, 100% acc indep.  3) Verbally solve functional problems (math, time, daily activities) 90% acc indep  4) Ongoing eval of speech-language cognition.                       Plan:   · Patient to be seen:  6 x/week   · Plan of Care expires:  10/15/19  · Plan of Care reviewed with:  patient, daughter   · SLP Follow-Up:  Yes       Discharge recommendations:      Barriers to Discharge:  None    Time Tracking:   SLP Treatment Date:   09/28/19  Speech Start Time:  1151  Speech Stop Time:  1221     Speech Total Time (min):  30 min    Billable Minutes: Eval 21  and Treatment Swallowing Dysfunction 9    Latisha Vidales CCC-SLP/A  09/28/2019

## 2019-09-28 NOTE — PROGRESS NOTES
Progress  Note  Infectious Disease    Reason for Consult:  sepsis    HPI: Mesha Mckenzie is a critically ill 63 y.o. female , tranferred from [facility] where she presented with 4 days of nausea and vomiting, found to have ABIMBOLA, leukocytosis, hypotension, multiple pulmonary infiltrates, pancolitis on CT, metabolic acidosis and altered mental status. She was transferred for multispecialty care. Since admission, she has received vanc, levaquin and flagyl, still requires pressors, has 3/4 blood cultures with GPC in clusters, Ct chest with multiple foci of infiltrates including some that look like septic pulmonary emboli, elevated coags without bleeding and no further diarrheal stools. Her chemistries are responding to fluid and supplementation but her mental status remains very abnormal. She is in pain from her abdomen. It is unclear from notes and my conversation with daughter whether her respiratory symptoms came first or the GI symptoms came first. She has no history of STaph infection, no recent antibiotics, but does smoke and take pain meds, valium and adderall chronically.      9/21: blood cultures with STaph aureus, sensitivities pending. Blood cultures from yesterday are negative so far. No change in oxygen needs. No longer on pressors. Less responsive. TTE unofficially abnormal. Staph in urine is likely from the bacteremia. No family at bedside.   9/22: CT head with no significant changes. Mental status no better. Still no BM since admit. CXR slightly better. Blood cultures from yesterday negative and original is MSSA. coags still elevated.   Cr now normal.   9/23: afebrile. Blood cultures are persistently positive. Mental status is much improved and she can answer questions. She continues to moan and groan. She indicated to me that her anus is uncomfortable and needs to have a BM. She denies abd pain but grimaces with palpation. She denies new pain in her spine. She does indicate pain in the right foot. When I  asked what the lumps on her shoulders are from she indicates they are from falls. I told her that she was being treated for a Staph infection and I believe she understood.   9/24: afebrile. Blood cultures not drawn from 9/22, 23.  Much more alert and interactive. ADMITTED TO ME THAT SHE HAS BEEN INJECTING HEROIN, LAST DONE 2 WEEKS AGO. SHE DOES NOT WANT HER DAUGHTER TO KNOW AND TELLS ME THAT HER  KNOWS.  This certainly explains the Staph bacteremia and septic pulmonary emboli. CXR improving. Pain in back is generalized. She reports that she did have a BM but it is not recorded. She did take a few bites of pudding.   9/25:  Status post DHARMESH today with findings of bicuspid valve and mitral valve vegetations the.  Discussed with Cardiology.  Per nurse and he was less alert today and perhaps more confused.  Pain medication was decreased early urine she is complaining abdominal pain and back pain.  She is requiring BiPAP this evening he did receive 2 units of blood with 20 mg of Lasix between the units and did have good urine output but her potassium was low this morning requiring aggressive supplementation.  Blood cultures are now negative  9/26: BNP was 1400 last night. Received more lasix. Today increased shortness of breath and bradycardia. Seen by cardiology. Milrinone and bumex drips started to help her mobilize fluid. Extreme agitation prompted resumption of higher dose of opiates, possible withdrawal?. No new positive blood cultures. Creatinine stable and platelets 44k.   09/27 Same complaint of ll abd pain afeb, tmax 99.4 still tachycardic;  cardiologist tried dig. Diuresed well with bumex.  Nurse is working hard on replacing electrolytes. Patient had loose stools and needed flexiseal today   09/28  Afebrile, tmax 99.3, receiving TPN. Tachy improved.  Diarrhea yesterday, not as bad today. FOBT was +, Cdiff NEG.   blood cultures from 25th == BECAME positive   CXR improved with diuresis, off NIV today. On 5  L      Antibiotics (From admission, onward)    Start     Stop Route Frequency Ordered    09/28/19 0130  vancomycin 750 mg in dextrose 5 % 250 mL IVPB (ready to mix system)      -- IV Every 24 hours (non-standard times) 09/28/19 0032    09/22/19 1345  cefazolin (ANCEF) 2 gram in dextrose 5% 50 mL IVPB (premix)      -- IV Every 6 hours (non-standard times) 09/22/19 1244      VANC as per pharmacy       Antifungals (From admission, onward)    None        Antivirals (From admission, onward)    None          EXAM & DIAGNOSTICS REVIEWED:   Vitals:     Temp:  [97.6 °F (36.4 °C)-99.3 °F (37.4 °C)]   Temp: 98.7 °F (37.1 °C) (09/28/19 0330)  Pulse: (!) 118 (09/28/19 0645)  Resp: (!) 29 (09/28/19 0645)  BP: 108/62 (09/28/19 0645)  SpO2: 100 % (09/28/19 0645)    Intake/Output Summary (Last 24 hours) at 9/28/2019 0724  Last data filed at 9/28/2019 0600  Gross per 24 hour   Intake 2308.34 ml   Output 3850 ml   Net -1541.66 ml       General:  Arousable, attends, tries to cooperate     Eyes:  Anicteric, PERRL,  no sleral or conjunctival petechiae  ENT:  No ulcers, exudates, thrush, nares patent, dentition is poor and MM are  Dry, obscured by bipap  Neck:  Supple,    Lungs: Patchy crackles,  With decreased BS RLL  Heart:  RRR,  Tachy 120  Abd:  Soft, tender, guards less, minimal distention,   + BS, no masses or organomegaly appreciated.flexiseal  :   Henriquez, urine clear, no flank tenderness  Musc:  Joints without effusion, swelling, erythema, synovitis    Skin:  No rashes. No palmar or plantar lesions. No subungual petechiae. No skin lesions of DIC  Wound: Right hip abrasion, not infected  Neuro: arousable, face symmetric, tongue protrudes in the midline,  moves all extremities, no focal weakness but generally weak  Psych:  Attends, less restless  Lymphatic:        Extrem: generalized edema seems resolved? , no erythema, phlebitis, cellulitis, peripheral pulses are 0-1, clubbing present, no mottling  VAD:  Left IJ TLC  9/19  Isolation:  none    Lines/Tubes/Drains:    General Labs reviewed:  Recent Labs   Lab 09/26/19  0342 09/27/19  0609 09/28/19  0403   WBC 9.14 13.43* 14.18*   HGB 9.2* 10.3* 9.8*   HCT 26.4* 29.8* 29.8*   PLT 44* 88* 105*       Recent Labs   Lab 09/26/19  0342  09/27/19  0609 09/27/19  1932 09/28/19  0403     --  141  --  142   K 3.2*   < > 2.4* 3.2* 3.4*     --  90*  --  93*   CO2 29  --  39*  --  37*   BUN 27*  --  36*  --  49*   CREATININE 0.7  --  0.8  --  1.0   CALCIUM 7.2*  --  7.8*  --  7.8*   PROT 5.1*  --  6.7  --  6.4   BILITOT 0.7  --  0.8  --  0.5   ALKPHOS 48*  --  66  --  74   ALT <5*  --  <5*  --  <5*   AST 11  --  15  --  15    < > = values in this interval not displayed.           Micro:  Microbiology Results (last 7 days)     Procedure Component Value Units Date/Time    Blood culture [[id number]] Collected:  09/25/19 0413    Order Status:  Completed Specimen:  Blood Updated:  09/28/19 0030     Blood Culture, Routine Gram stain peds bottle: Gram positive cocci in clusters resembling Staph       Results called to and read back by:Dunia Keating RN 09/28/2019  00:29    Clostridium difficile EIA [[id number]] Collected:  09/27/19 2013    Order Status:  Completed Specimen:  Stool Updated:  09/27/19 2228     C. diff Antigen Negative     C difficile Toxins A+B, EIA Negative     Comment: Testing not recommended for children <24 months old.       Blood culture [[id number]] Collected:  09/26/19 0443    Order Status:  Completed Specimen:  Blood Updated:  09/27/19 1012     Blood Culture, Routine No Growth to date      No Growth to date    Blood culture [[id number]] Collected:  09/24/19 0517    Order Status:  Completed Specimen:  Blood Updated:  09/27/19 1012     Blood Culture, Routine No Growth to date      No Growth to date      No Growth to date      No Growth to date    Stool culture [[id number]] Collected:  09/26/19 1329    Order Status:  Sent Specimen:  Stool Updated:  09/26/19 2304    E. coli  0157 antigen [[id number]] Collected:  09/26/19 1329    Order Status:  No result Specimen:  Stool Updated:  09/26/19 2301    Blood culture [[id number]]  (Abnormal)  (Susceptibility) Collected:  09/22/19 0338    Order Status:  Completed Specimen:  Blood from Antecubital, Left  Arm Updated:  09/25/19 1028     Blood Culture, Routine Gram stain peds bottle: Gram positive cocci in clusters resembling Staph       Results called to and read back by:Yolis Wan RN 09/23/2019  04:46      STAPHYLOCOCCUS AUREUS  ID consult required at [facility].      Blood culture [[id number]]  (Abnormal) Collected:  09/21/19 0534    Order Status:  Completed Specimen:  Blood Updated:  09/24/19 0954     Blood Culture, Routine Gram stain peds bottle: Gram positive cocci in clusters resembling Staph       Results called to and read back by:Javi Sunshine RN 09/22/2019  14:22      STAPHYLOCOCCUS AUREUS  ID consult required at [facility].  For susceptibility see order #[id number]      Blood culture [[id number]]  (Abnormal) Collected:  09/20/19 1739    Order Status:  Completed Specimen:  Blood from Line, Central Updated:  09/23/19 0939     Blood Culture, Routine Gram stain aer bottle: Gram positive cocci in clusters resembling Staph       Results called to and read back by:Rhea Lai RN 09/21/2019  16:02      STAPHYLOCOCCUS AUREUS  ID consult required at [facility].  For susceptibility see order #[id number]          Culture, Routine Abnormal    STAPHYLOCOCCUS AUREUS   ID consult required at [facility].     Resulting Agency [facility]   Susceptibility      Staphylococcus aureus     CULTURE, BLOOD     Clindamycin <=0.5 mcg/mL Sensitive     Erythromycin <=0.5 mcg/mL Sensitive     Oxacillin <=0.25 mcg/mL Sensitive     Penicillin <=0.03 mcg/mL Sensitive     Tetracycline <=4 mcg/mL Sensitive      Trimeth/Sulfa <=0.5/9.5 m... Sensitive           Imaging Reviewed:   CXR  Improvement in the appearance of the chest compared to the prior exam with decrease of the bilateral nodular opacifications.     CT ABD and Pelvis :   Chest; persistent shattered bilateral consolidative processes which have undergone cavitation/cystic spaces within them  Small left and moderate size right pleural effusion increased in size  Pericardial effusion appearing small but mildly increased in size  Abdomen and pelvis; mild distention of colon with prominent amount of fluid suggesting ileus.  Very mild colitis cannot be excluded but with no appreciable bowel wall thickening or pericolonic inflammatory change.  Additional findings as detailed above      Cardiology:  TTE 9/20  Left Ventricle Low normal ejection fraction at 50%. Mild concentric observed. Grade I (mild) left ventricular diastolic dysfunction consistent with impaired relaxation. Elevated left atrial pressure.   Right Ventricle Normal cavity size, wall thickness and systolic function. Wall motion normal.   Left Atrium The left atrium is normal.   Right Atrium There is normal right atrial size.   Aortic Valve The valve is trileaflet. There is normal leaflet mobility.   Mitral Valve Normal valve structure. Mild regurgitation. Normal leaflet mobility.   Tricuspid Valve The tricuspid valve is normal. Trace regurgitation. Pulmonary hypertension present. The estimated PA systolic pressure is 48 mmHg.   Pulmonic Valve Normal valve structure. Trace regurgitation.   IVC/SVC Normal central venous pressure (3 mm Hg).   Ascending Aorta The aortic root and ascending aorta are normal in size.   Pericardium No pericardial effusion. Respiratory variation of mitral valve inflow is less than 30%.     DHARMESH 9/25  · Mild left ventricular enlargement.  · Low normal left ventricular systolic function. The estimated ejection fraction is 50%  · Normal LV diastolic function.  · Mild left atrial  enlargement.  · No interatrial septal defect present.  · Normal appearing left atrial appendage. No thrombus is present in the appendage. Normal appendage velocities.  · Moderate-to-severe mitral regurgitation.  · Moderate tricuspid regurgitation.  · 1x.5 cmmobile vegetation on tricuspid valve septal leaflet atrial side  · 5mmx6 mm mobile vegetation on posterior leaflet P3 scallop on atrial side  · Aortic valve wnl    ECHO 09/26   · Mild concentric left ventricular hypertrophy.  · Mild left ventricular enlargement.  · Moderately decreased left ventricular systolic function. The estimated ejection fraction is 33%  · Grade III (severe) left ventricular diastolic dysfunction consistent with restrictive physiology.  · Mild global hypokinetic wall motion.  · Normal right ventricular systolic function.  · Mild left atrial enlargement.  · Mild right atrial enlargement.  · There is a right atrial mass present. Echo lucent mobile small vegetation ssen at R atrial - TV septal leaflet junction In hi R atrium At septal surface there is more echo dense structure ( ~ 1.5x .8 cm) mobile which could be vegetation vs normal prominent Eustachian valve  · Moderate-to-severe mitral regurgitation.  · Normal central venous pressure (3 mm Hg).  · The estimated PA systolic pressure is 50 mm Hg  · Moderate to severe pulmonary hypertension present.    IMPRESSION & PLAN     1.  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   Leukocytosis   Procal 3     IVDA/heroin   2. Pancolitis. ? if she has ischemic colitis from sepsis and volume depletion. diarrhea today on 27 . CT abd improved 9/26  3. Shortness of breath, right pleural effusion x-ray, improved on 27th, NIV then NC5-6 L  4. opiate dependence, severe lumbar spine abnormalities  6. Altered mental status. From sepsis? +/- from  unrecognized stroke or embolus, non focal   High risk for deterioration      Recommendations:  -  Cardiologist,  Dr Carter has spoken with vascular surgeon re poss surgery. I will officially consult [facility]. Iindication for surgery:  CHF, persistent bacteremia as it is day 6 today, despite antibiotic treatment;   size more than 1 cm  -  Cont Vanc + cefazolin  -  WBC slightly  worse---follow in am   -   Procal in am  -   MRI  brain with contrast,   when she can tolerate laying flat

## 2019-09-28 NOTE — ASSESSMENT & PLAN NOTE
· Adequate urine output and relatively stable renal function.  · Monitor and replace electrolytes with continued robust diuresis.

## 2019-09-28 NOTE — NURSING
Patient placed in isolation pending lab results for C-Diff. Dr Mahajan at bedside. Assessed patient and new orders received.

## 2019-09-28 NOTE — ASSESSMENT & PLAN NOTE
· Avoidance of deleriogenic medications.  · Low threshold for repeating CT head to evaluate for embolic disease.  · Continue to address underlying acute condition.  · Supportive care.

## 2019-09-28 NOTE — ASSESSMENT & PLAN NOTE
Repeat CT scan shows improvement in the colitis.  It showed an ileus and this also seems to be improving as the patient is having bowel movements.

## 2019-09-28 NOTE — ASSESSMENT & PLAN NOTE
· Continue NIV for now.  · Observation with continued diuresis.  · Ideally, pleural disease would be addressed, but clinical/radiographic improvement with diuresis and absent evidence worsening infection suggest against a pleural space infection.  · Additionally, the technical difficulty and increased risk of bleeding in this situation make the risks outweigh the benefits of drainage of pleural effusions at the moment.

## 2019-09-28 NOTE — ASSESSMENT & PLAN NOTE
Renal function has declined slightly most likely because of the diuresis.  Lasix has been stopped.

## 2019-09-28 NOTE — ASSESSMENT & PLAN NOTE
The patient's mental status has been improving.  She was almost back to baseline yesterday according to her daughter.  We will try to obtain an MRI of brain when she is able to cooperate.

## 2019-09-28 NOTE — SUBJECTIVE & OBJECTIVE
Interval History:  The patient is awake and answers questions.  She is still having abdominal discomfort.    Review of Systems   Constitutional: Positive for activity change and appetite change.   Respiratory: Negative for cough, chest tightness, shortness of breath and wheezing.    Cardiovascular: Negative for chest pain, palpitations and leg swelling.   Gastrointestinal: Positive for abdominal pain. Negative for abdominal distention, constipation, diarrhea, nausea and vomiting.   Genitourinary: Negative for difficulty urinating, dysuria and flank pain.   Musculoskeletal: Negative for gait problem, joint swelling and neck pain.   Skin: Negative for rash and wound.   Neurological: Positive for weakness. Negative for dizziness, syncope, light-headedness and headaches.   Psychiatric/Behavioral: Positive for confusion. Negative for agitation and behavioral problems.     Objective:     Vital Signs (Most Recent):  Temp: 99.1 °F (37.3 °C) (09/28/19 0815)  Pulse: 110 (09/28/19 0915)  Resp: (!) 22 (09/28/19 0915)  BP: (!) 88/53 (09/28/19 0915)  SpO2: 100 % (09/28/19 0915) Vital Signs (24h Range):  Temp:  [97.6 °F (36.4 °C)-99.1 °F (37.3 °C)] 99.1 °F (37.3 °C)  Pulse:  [] 110  Resp:  [19-40] 22  SpO2:  [87 %-100 %] 100 %  BP: ()/(44-97) 88/53     Weight: 43.1 kg (95 lb 0.3 oz)  Body mass index is 17.38 kg/m².    Intake/Output Summary (Last 24 hours) at 9/28/2019 1131  Last data filed at 9/28/2019 1013  Gross per 24 hour   Intake 2308.34 ml   Output 3020 ml   Net -711.66 ml      Physical Exam   Constitutional: She is oriented to person, place, and time. She appears well-developed and well-nourished.   HENT:   Head: Normocephalic and atraumatic.   Eyes: Pupils are equal, round, and reactive to light. EOM are normal.   Cardiovascular: Regular rhythm, normal heart sounds and intact distal pulses.   Tachycardic   Pulmonary/Chest: Effort normal and breath sounds normal. No respiratory distress. She has no wheezes. She  has no rales.   Tachypneic   Abdominal: Soft. Bowel sounds are normal. She exhibits no distension. There is tenderness.   Musculoskeletal: She exhibits no edema or tenderness.   Neurological: She is alert and oriented to person, place, and time. No cranial nerve deficit. Coordination normal.   Has generalized weakness   Skin: Skin is warm and dry. Capillary refill takes less than 2 seconds. No rash noted.   Psychiatric: Thought content normal.   Nursing note and vitals reviewed.      Significant Labs:   BMP:   Recent Labs   Lab 09/27/19  1932 09/28/19  0403   GLU  --  161*   NA  --  142   K 3.2* 3.4*   CL  --  93*   CO2  --  37*   BUN  --  49*   CREATININE  --  1.0   CALCIUM  --  7.8*   MG 2.5  --      CBC:   Recent Labs   Lab 09/27/19  0609 09/28/19  0403   WBC 13.43* 14.18*   HGB 10.3* 9.8*   HCT 29.8* 29.8*   PLT 88* 105*       Significant Imaging: I have reviewed all pertinent imaging results/findings within the past 24 hours.

## 2019-09-28 NOTE — PROGRESS NOTES
"[facility] Medicine  Progress Note    Patient Name: Mesha Mckenzie  MRN: [id number]  Patient Class: IP- Inpatient   Admission Date: 9/19/2019  Length of Stay: 9 days  Attending Physician: Eli Anna MD  Primary Care Provider: Varun Shea MD PhD        Subjective:     Principal Problem:Acute bacterial endocarditis        HPI:  Pt is a 62 yo female who was transferred from [facility] for septic shock, pancolitis and bilateral pna. Pt reports that she fell out of bed on Sunday morning and has been feeling progressively worse throughout the week. C/o vomiting since Sunday, ~3 episodes per day. Also reports diarrhea, ~4 stools per day, watery in nature. Pt denies any hematemesis, melena or hematochezia. Pt did take zofran which helped "some:" c/o severe abdominal pain since Sunday which is constant. Pain is a 10 on a 0-10 pain scale.  Has not been able to eat due to the nausea and pain. Questioned pt in regards to use of pain medication, states "I didn't have any." pt was scheduled for colonoscopy tomorrow. Hx of GI bleed in December 2018. Pt states she had passed some blood in her stool. Reviewed colonoscopy report, no bleeding in the colon was found however the prep was poor. No recent travel, no sick contacts at home. Questioned pt in regards to cough: pt states she has felt SOB due to pain but reports only occasional coughing. Denies any sputum production.  Social: smokes 10 cigarettes per day, 20 pack year hx. No ETOH.     Overview/Hospital Course:  The patient is a 63-year-old woman admitted 09/19 with sepsis secondary to pneumonia and colitis.  She was admitted to the intensive care unit and was treated with broad-spectrum antibiotics.  Her blood cultures grew methicillin sensitive Staph aureus and she was evaluated by ID.  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    The patient has had severe metabolic encephalopathy but is " starting to improve.    She has been receiving TPN since she is NPO due to mental status changes and an ileus.    She has bilateral pulmonary infiltrates which we believe are secondary to septic emboli.    The patient has had severe anemia, thrombocytopenia and coagulopathy.  She received a couple of blood transfusions but has not require platelets or FFP.  Hematology has been following.    Interval History:  The patient is awake and answers questions.  She is still having abdominal discomfort.    Review of Systems   Constitutional: Positive for activity change and appetite change.   Respiratory: Negative for cough, chest tightness, shortness of breath and wheezing.    Cardiovascular: Negative for chest pain, palpitations and leg swelling.   Gastrointestinal: Positive for abdominal pain. Negative for abdominal distention, constipation, diarrhea, nausea and vomiting.   Genitourinary: Negative for difficulty urinating, dysuria and flank pain.   Musculoskeletal: Negative for gait problem, joint swelling and neck pain.   Skin: Negative for rash and wound.   Neurological: Positive for weakness. Negative for dizziness, syncope, light-headedness and headaches.   Psychiatric/Behavioral: Positive for confusion. Negative for agitation and behavioral problems.     Objective:     Vital Signs (Most Recent):  Temp: 99.1 °F (37.3 °C) (09/28/19 0815)  Pulse: 110 (09/28/19 0915)  Resp: (!) 22 (09/28/19 0915)  BP: (!) 88/53 (09/28/19 0915)  SpO2: 100 % (09/28/19 0915) Vital Signs (24h Range):  Temp:  [97.6 °F (36.4 °C)-99.1 °F (37.3 °C)] 99.1 °F (37.3 °C)  Pulse:  [] 110  Resp:  [19-40] 22  SpO2:  [87 %-100 %] 100 %  BP: ()/(44-97) 88/53     Weight: 43.1 kg (95 lb 0.3 oz)  Body mass index is 17.38 kg/m².    Intake/Output Summary (Last 24 hours) at 9/28/2019 1131  Last data filed at 9/28/2019 1013  Gross per 24 hour   Intake 2308.34 ml   Output 3020 ml   Net -711.66 ml      Physical Exam   Constitutional: She is oriented  to person, place, and time. She appears well-developed and well-nourished.   HENT:   Head: Normocephalic and atraumatic.   Eyes: Pupils are equal, round, and reactive to light. EOM are normal.   Cardiovascular: Regular rhythm, normal heart sounds and intact distal pulses.   Tachycardic   Pulmonary/Chest: Effort normal and breath sounds normal. No respiratory distress. She has no wheezes. She has no rales.   Tachypneic   Abdominal: Soft. Bowel sounds are normal. She exhibits no distension. There is tenderness.   Musculoskeletal: She exhibits no edema or tenderness.   Neurological: She is alert and oriented to person, place, and time. No cranial nerve deficit. Coordination normal.   Has generalized weakness   Skin: Skin is warm and dry. Capillary refill takes less than 2 seconds. No rash noted.   Psychiatric: Thought content normal.   Nursing note and vitals reviewed.      Significant Labs:   BMP:   Recent Labs   Lab 09/27/19  1932 09/28/19  0403   GLU  --  161*   NA  --  142   K 3.2* 3.4*   CL  --  93*   CO2  --  37*   BUN  --  49*   CREATININE  --  1.0   CALCIUM  --  7.8*   MG 2.5  --      CBC:   Recent Labs   Lab 09/27/19  0609 09/28/19  0403   WBC 13.43* 14.18*   HGB 10.3* 9.8*   HCT 29.8* 29.8*   PLT 88* 105*       Significant Imaging: I have reviewed all pertinent imaging results/findings within the past 24 hours.      Assessment/Plan:      Staphylococcus aureus bacteremia with sepsis  The patient is on Ancef and vancomycin for MSSA.  Blood cultures from the 22nd are not showing any growth.  DHARMESH showed tricuspid and mitral valve vegetations.      Acute hypoxemic respiratory failure  This is secondary to pneumonia which is probably secondary to septic emboli.  She is using BiPAP intermittently.  Chest x-ray is improving.    Encephalopathy, metabolic  The patient's mental status has been improving.  She was almost back to baseline yesterday according to her daughter.  We will try to obtain an MRI of brain when she  is able to cooperate.      Pancolitis  Repeat CT scan shows improvement in the colitis.  It showed an ileus and this also seems to be improving as the patient is having bowel movements.            Pleural effusion, bilateral  This improved with diuresis      ABIMBOLA (acute kidney injury)  Renal function has declined slightly most likely because of the diuresis.  Lasix has been stopped.    Hyperglycemia  Patient's FSGs are controlled on current hypoglycemics.   Last A1c reviewed-   Lab Results   Component Value Date    HGBA1C 5.7 (H) 04/23/2019     Most recent fingerstick glucose reviewed- No results for input(s): POCTGLUCOSE in the last 24 hours.  Current correctional scale  Low  Maintain anti-hyperglycemic dose as follows-   Antihyperglycemics (From admission, onward)    None              IV drug user  The patient finally admitted that she was using IV drugs to Dr. Bolden.  She does not want her family to know      Severe malnutrition  The patient was evaluated by speech therapy and they kept her NPO.  Started TPN September 24th.      Normocytic anemia  Secondary to this acute illness?  There is no evidence of blood loss or hemolysis.  The patient received 2 units of packed red blood cells 9-24      Hypokalemia  The patient will receive 40 mEq of IV potassium every 6 hr and we will recheck the level after the 4th treatment.  Her magnesium will also be replaced.        VTE Risk Mitigation (From admission, onward)         Ordered     Place sequential compression device  Until discontinued      09/19/19 2234     IP VTE HIGH RISK PATIENT  Once      09/19/19 2217                Critical care time spent on the evaluation and treatment of severe organ dysfunction, review of pertinent labs and imaging studies, discussions with consulting providers and discussions with patient/family: 35 minutes.      Eli Anna MD  Department of Hospital Medicine   [facility]

## 2019-09-28 NOTE — PLAN OF CARE
Patient free of falls and injuries this shift. Opens eyes to verbal stimuli. Appears more drowsy tonight. Follows some simple commands. Remains on milrinone gtt infusing along with TPN.  Prn electrolyte sliding scale coverage for 3.2K+. Henriquez patent to gravity with good output noted. Flexiseal patent with minimal output. Negative C-diff noted. C/O pain to lower abdomen. Relieved with prn pain medication. Sinus tach on monitor. On bipap most of tonight with sats %. Off bipap to 40%matthieu mask for bipap breaks x2 each for about one hour. Tolerated well.

## 2019-09-28 NOTE — CONSULTS
[facility]  Cardiology  Progress Note    Patient Name: Mesha Mckenzie  MRN: [id number]  Admission Date: 9/19/2019  Hospital Length of Stay: 9 days  Code Status: Full Code   Attending Physician: Eli Anna MD   Primary Care Physician: Varun Shea MD PhD  Expected Discharge Date:   Principal Problem:Acute bacterial endocarditis    Subjective:     Hospital Course: little more alert    prerenal worsening   k low   Heart rate still high     Interval History:deteriorating  Ef  ( 33) yesterday)     ROS  Objective:     Vital Signs (Most Recent):  Temp: 99.1 °F (37.3 °C) (09/28/19 0815)  Pulse: 110 (09/28/19 0915)  Resp: (!) 22 (09/28/19 0915)  BP: (!) 88/53 (09/28/19 0915)  SpO2: 100 % (09/28/19 0915) Vital Signs (24h Range):  Temp:  [98.2 °F (36.8 °C)-99.1 °F (37.3 °C)] 99.1 °F (37.3 °C)  Pulse:  [] 110  Resp:  [19-40] 22  SpO2:  [87 %-100 %] 100 %  BP: ()/(44-97) 88/53     Weight: 43.1 kg (95 lb 0.3 oz)  Body mass index is 17.38 kg/m².    SpO2: 100 %  O2 Device (Oxygen Therapy): nasal cannula      Intake/Output Summary (Last 24 hours) at 9/28/2019 1328  Last data filed at 9/28/2019 1249  Gross per 24 hour   Intake 2308.34 ml   Output 3195 ml   Net -886.66 ml       Lines/Drains/Airways     Central Venous Catheter Line                 Percutaneous Central Line Insertion/Assessment - triple lumen  09/19/19 1344 left internal jugular 8 days          Drain                 Urethral Catheter 09/19/19 1240 Double-lumen 16 Fr. 9 days         Rectal Tube 09/27/19 1000 rectal tube w/ balloon (indicate number of mLs) 1 day                Physical Exam   110/80 hr 115   Lungs crackles    cor 1/6 sys m lsb   abd mild tender     Significant Labs:   CMP   Recent Labs   Lab 09/27/19  0609 09/27/19  1932 09/28/19  0403     --  142   K 2.4* 3.2* 3.4*   CL 90*  --  93*   CO2 39*  --  37*   *  --  161*   BUN 36*  --  49*   CREATININE 0.8  --  1.0   CALCIUM 7.8*  --  7.8*   PROT 6.7  --   6.4   ALBUMIN 1.9*  --  1.8*   BILITOT 0.8  --  0.5   ALKPHOS 66  --  74   AST 15  --  15   ALT <5*  --  <5*   ANIONGAP 12  --  12   ESTGFRAFRICA >60  --  >60   EGFRNONAA >60  --  59*    and CBC   Recent Labs   Lab 09/27/19  0609 09/28/19  0403   WBC 13.43* 14.18*   HGB 10.3* 9.8*   HCT 29.8* 29.8*   PLT 88* 105*     Blood cultures -  09/25 blood cultures +       09/26 cultures- no growth to date   Significant Imaging:   Assessment and Plan:   1) chf Acute bacterial endocarditis   2) I talked to Dr Batres at [facility] ( CV surgery) - if chf and EF dont improve, may need  New mitral valve next week   3) start iv BB + ace     Brief HPI:     Active Diagnoses:    Diagnosis Date Noted POA    PRINCIPAL PROBLEM:  Acute bacterial endocarditis [I33.0] 09/27/2019 Yes    Hyperglycemia [R73.9] 09/27/2019 No    Acute septic pulmonary embolism [I26.90] 09/27/2019 Yes    Tricuspid valve vegetation [I33.0] 09/27/2019 Yes    Mitral valve vegetation [I33.0] 09/27/2019 Yes    Hypomagnesemia [E83.42] 09/27/2019 No    Thrombocytopenia, acquired [D69.6] 09/27/2019 Yes    IV drug user [F19.90] 09/25/2019 Yes    Encephalopathy, metabolic [G93.41] 09/23/2019 No    Normocytic anemia [D64.9] 09/23/2019 Yes    Severe malnutrition [E43] 09/23/2019 Yes    Staphylococcus aureus bacteremia with sepsis [A41.01] 09/22/2019 Yes    Pleural effusion, bilateral [J90] 09/20/2019 Yes    Acute hypoxemic respiratory failure [J96.01] 09/20/2019 Yes    ABIMBOLA (acute kidney injury) [N17.9] 09/19/2019 Yes    Pancolitis [K51.00] 09/19/2019 Yes    Hypokalemia [E87.6] 09/19/2019 No      Problems Resolved During this Admission:    Diagnosis Date Noted Date Resolved POA    Pulmonary hypertension [I27.20] 09/23/2019 09/23/2019 Yes    Acute on chronic diastolic congestive heart failure [I50.33] 09/23/2019 09/28/2019 Yes    DIC (disseminated intravascular coagulation) [D65] 09/23/2019 09/23/2019 Yes    Coagulopathy [D68.9] 09/20/2019 09/27/2019 Yes    ILD  (interstitial lung disease) [J84.9] 09/20/2019 09/23/2019 Yes    COPD with acute lower respiratory infection [J44.0] 09/20/2019 09/23/2019 Yes    Calcification of aorta [I70.0] 09/20/2019 09/23/2019 Yes    BOOP (bronchiolitis obliterans with organizing pneumonia) [J84.89] 09/20/2019 09/23/2019 Yes    Intravascular volume depletion [E86.1] 09/19/2019 09/23/2019 Yes    Hyponatremia [E87.1] 09/19/2019 09/23/2019 Yes    Metabolic acidosis [E87.2] 09/19/2019 09/27/2019 Yes    CAP (community acquired pneumonia) [J18.9] 09/19/2019 09/23/2019 Yes       VTE Risk Mitigation (From admission, onward)         Ordered     Place sequential compression device  Until discontinued      09/19/19 2234     IP VTE HIGH RISK PATIENT  Once      09/19/19 2217                Benigno Dobson MD  Cardiology  [facility]

## 2019-09-28 NOTE — ASSESSMENT & PLAN NOTE
· Hemodynamically stable.  · Continued diuresis.  · Cardiology following.  · Continue antibiotics at the direction of ID.  Blood Cx sterile for several days.

## 2019-09-28 NOTE — CONSULTS
Pharmacokinetic Assessment Follow Up: IV Vancomycin    Vancomycin serum concentration assessment(s):    The random level was drawn correctly and can be used to guide therapy at this time. The measurement is within the desired definitive target range of 15 to 20 mcg/mL.    Vancomycin Regimen Plan:    Change regimen to Vancomycin 750 mg IV every 24 hours with next serum trough concentration measured at 0100 prior to 3rd dose on 09/30     Drug levels (last 3 results):  Recent Labs   Lab Result Units 09/25/19 1944 09/27/19  0852 09/27/19  2223   Vancomycin, Random ug/mL  --   --  17.4   Vancomycin-Trough ug/mL 19.4 26.4*  --        Pharmacy will continue to follow and monitor vancomycin.    Please contact pharmacy at extension [phone] for questions regarding this assessment.    Thank you for the consult,   Sierra Duncan       Patient brief summary:  Mesha Mckenzie is a 63 y.o. female initiated on antimicrobial therapy with IV Vancomycin for treatment of sepsis    Drug Allergies:   Review of patient's allergies indicates:   Allergen Reactions    Pcn [penicillins]        Actual Body Weight:   45.5 kg    Renal Function:   Estimated Creatinine Clearance: 50.4 mL/min (based on SCr of 0.8 mg/dL).,       CBC (last 72 hours):  Recent Labs   Lab Result Units 09/26/19 0342 09/27/19  0609   WBC K/uL 9.14 13.43*   Hemoglobin g/dL 9.2* 10.3*   Hematocrit % 26.4* 29.8*   Platelets K/uL 44* 88*   Gran% % 82.7* 83.4*   Lymph% % 12.3* 11.2*   Mono% % 2.3* 3.4*   Eosinophil% % 0.3 0.6   Basophil% % 0.1 0.1   Differential Method  Automated Automated       Metabolic Panel (last 72 hours):  Recent Labs   Lab Result Units 09/25/19  1944 09/26/19  0342 09/26/19  1311 09/27/19  0609 09/27/19  1932   Sodium mmol/L 142 141  --  141  --    Potassium mmol/L 3.7 3.2* 4.0 2.4* 3.2*   Chloride mmol/L 103 102  --  90*  --    CO2 mmol/L 29 29  --  39*  --    Glucose mg/dL 144* 162*  --  179*  --    BUN, Bld mg/dL 25* 27*  --  36*  --    Creatinine  mg/dL 0.7 0.7  --  0.8  --    Albumin g/dL  --  1.5*  --  1.9*  --    Total Bilirubin mg/dL  --  0.7  --  0.8  --    Alkaline Phosphatase U/L  --  48*  --  66  --    AST U/L  --  11  --  15  --    ALT U/L  --  <5*  --  <5*  --    Magnesium mg/dL 1.3* 1.1*  --  1.3* 2.5       Vancomycin Administrations:  vancomycin given in the last 96 hours                     vancomycin 750 mg in dextrose 5 % 250 mL IVPB (ready to mix system) (mg) 750 mg New Bag 09/28/19 0147    vancomycin 750 mg in dextrose 5 % 250 mL IVPB (ready to mix system) (mg) 750 mg New Bag 09/26/19 2132     750 mg New Bag  0816     750 mg New Bag 09/25/19 2116     750 mg New Bag  0905     750 mg New Bag 09/24/19 2106     750 mg New Bag  0814                      Microbiologic Results:  Microbiology Results (last 7 days)       Procedure Component Value Units Date/Time    Blood culture [[id number]] Collected:  09/25/19 0413    Order Status:  Completed Specimen:  Blood Updated:  09/28/19 0030     Blood Culture, Routine Gram stain peds bottle: Gram positive cocci in clusters resembling Staph       Results called to and read back by:Dunia Keating RN 09/28/2019  00:29    Clostridium difficile EIA [[id number]] Collected:  09/27/19 2013    Order Status:  Completed Specimen:  Stool Updated:  09/27/19 2228     C. diff Antigen Negative     C difficile Toxins A+B, EIA Negative     Comment: Testing not recommended for children <24 months old.       Blood culture [[id number]] Collected:  09/26/19 0443    Order Status:  Completed Specimen:  Blood Updated:  09/27/19 1012     Blood Culture, Routine No Growth to date      No Growth to date    Blood culture [[id number]] Collected:  09/24/19 0517    Order Status:  Completed Specimen:  Blood Updated:  09/27/19 1012     Blood Culture, Routine No Growth to date      No Growth to date      No Growth to date      No Growth to date    Stool culture [[id number]] Collected:  09/26/19 1329    Order Status:  Sent Specimen:  Stool Updated:   09/26/19 2301    E. coli 0157 antigen [[id number]] Collected:  09/26/19 1329    Order Status:  No result Specimen:  Stool Updated:  09/26/19 2301    Blood culture [[id number]]  (Abnormal)  (Susceptibility) Collected:  09/22/19 0338    Order Status:  Completed Specimen:  Blood from Antecubital, Left  Arm Updated:  09/25/19 1028     Blood Culture, Routine Gram stain peds bottle: Gram positive cocci in clusters resembling Staph       Results called to and read back by:Yolis Wan RN 09/23/2019  04:46      STAPHYLOCOCCUS AUREUS  ID consult required at [facility].      Blood culture [[id number]]  (Abnormal) Collected:  09/21/19 0534    Order Status:  Completed Specimen:  Blood Updated:  09/24/19 0954     Blood Culture, Routine Gram stain peds bottle: Gram positive cocci in clusters resembling Staph       Results called to and read back by:Javi Sunshine RN 09/22/2019  14:22      STAPHYLOCOCCUS AUREUS  ID consult required at [facility] and [facility].  For susceptibility see order #[id number]      Blood culture [[id number]]  (Abnormal) Collected:  09/20/19 1739    Order Status:  Completed Specimen:  Blood from Line, Central Updated:  09/23/19 0939     Blood Culture, Routine Gram stain aer bottle: Gram positive cocci in clusters resembling Staph       Results called to and read back by:Rhea Lai RN 09/21/2019  16:02      STAPHYLOCOCCUS AUREUS  ID consult required at [facility].  For susceptibility see order #[id number]

## 2019-09-28 NOTE — PLAN OF CARE
09/27/19 1945   Patient Assessment/Suction   Level of Consciousness (AVPU) alert   Respiratory Effort Normal;Unlabored   Expansion/Accessory Muscles/Retractions expansion symmetric   All Lung Fields Breath Sounds coarse   Rhythm/Pattern, Respiratory pattern regular   Cough Frequency no cough   PRE-TX-O2   O2 Device (Oxygen Therapy) BiPAP   Oxygen Concentration (%) 40   SpO2 100 %   Pulse Oximetry Type Continuous   Pulse (!) 118   Resp (!) 27   BP (!) 119/58   Wound Care   Area of Concern Bridge of nose   Skin Color/Characteristics redness blanchable   Skin Temperature warm   Barrier used? Other (see comments)  (duoderm)   Ready to Wean/Extubation Screen   FIO2<=50 (chart decimal) 0.4   Preset CPAP/BiPAP Settings   Mode Of Delivery BiPAP   $ Initial CPAP/BiPAP Setup? No   $ Is patient using? Yes   Sized Appropriately? Yes   Equipment Type V60   Airway Device Type total face mask   Humidifier not applicable   Ipap 10   EPAP (cm H2O) 5   Pressure Support (cm H2O) 5   Set Rate (Breaths/Min) 14   ITime (sec) 1   Rise Time (sec) 2   Patient CPAP/BiPAP Settings   Timed Inspiration (Sec) 1   IPAP Rise Time (sec) 2   RR Total (Breaths/Min) 26   Tidal Volume (mL) 427   VE Minute Ventilation (L/min) 11.2 L/min   Peak Inspiratory Pressure (cm H2O) 11   TiTOT (%) 30   Total Leak (L/Min) 19   Patient Trigger - ST Mode Only (%) 98

## 2019-09-28 NOTE — ASSESSMENT & PLAN NOTE
The patient is on Ancef and vancomycin for MSSA.  Blood cultures from the 22nd are not showing any growth.  DHARMESH showed tricuspid and mitral valve vegetations.

## 2019-09-29 PROBLEM — J96.01 ACUTE HYPOXEMIC RESPIRATORY FAILURE: Status: RESOLVED | Noted: 2019-09-20 | Resolved: 2019-09-29

## 2019-09-29 LAB
ANION GAP SERPL CALC-SCNC: 9 MMOL/L (ref 8–16)
BACTERIA #/AREA URNS HPF: ABNORMAL /HPF
BILIRUB UR QL STRIP: NEGATIVE
BUN SERPL-MCNC: 55 MG/DL (ref 8–23)
CALCIUM SERPL-MCNC: 7.9 MG/DL (ref 8.7–10.5)
CHLORIDE SERPL-SCNC: 98 MMOL/L (ref 95–110)
CLARITY UR: CLEAR
CO2 SERPL-SCNC: 36 MMOL/L (ref 23–29)
COLOR UR: YELLOW
CREAT SERPL-MCNC: 1 MG/DL (ref 0.5–1.4)
ERYTHROCYTE [DISTWIDTH] IN BLOOD BY AUTOMATED COUNT: 16.6 % (ref 11.5–14.5)
EST. GFR  (AFRICAN AMERICAN): >60 ML/MIN/1.73 M^2
EST. GFR  (NON AFRICAN AMERICAN): 59 ML/MIN/1.73 M^2
GLUCOSE SERPL-MCNC: 147 MG/DL (ref 70–110)
GLUCOSE UR QL STRIP: NEGATIVE
GRAN CASTS #/AREA URNS LPF: 3 /LPF
HCT VFR BLD AUTO: 25.6 % (ref 37–48.5)
HGB BLD-MCNC: 8.1 G/DL (ref 12–16)
HGB UR QL STRIP: ABNORMAL
HYALINE CASTS #/AREA URNS LPF: 0 /LPF
KETONES UR QL STRIP: NEGATIVE
LEUKOCYTE ESTERASE UR QL STRIP: ABNORMAL
MCH RBC QN AUTO: 30.8 PG (ref 27–31)
MCHC RBC AUTO-ENTMCNC: 31.6 G/DL (ref 32–36)
MCV RBC AUTO: 97 FL (ref 82–98)
MICROSCOPIC COMMENT: ABNORMAL
NITRITE UR QL STRIP: NEGATIVE
NON-SQ EPI CELLS #/AREA URNS HPF: 1 /HPF
PH UR STRIP: 7 [PH] (ref 5–8)
PLATELET # BLD AUTO: 113 K/UL (ref 150–350)
PMV BLD AUTO: 12.1 FL (ref 9.2–12.9)
POTASSIUM SERPL-SCNC: 3.5 MMOL/L (ref 3.5–5.1)
PROT UR QL STRIP: ABNORMAL
RBC # BLD AUTO: 2.63 M/UL (ref 4–5.4)
RBC #/AREA URNS HPF: 1 /HPF (ref 0–4)
SODIUM SERPL-SCNC: 143 MMOL/L (ref 136–145)
SP GR UR STRIP: 1.01 (ref 1–1.03)
SQUAMOUS #/AREA URNS HPF: 0 /HPF
URN SPEC COLLECT METH UR: ABNORMAL
UROBILINOGEN UR STRIP-ACNC: NEGATIVE EU/DL
WBC # BLD AUTO: 10.81 K/UL (ref 3.9–12.7)
WBC #/AREA URNS HPF: 3 /HPF (ref 0–5)
YEAST URNS QL MICRO: ABNORMAL

## 2019-09-29 PROCEDURE — 94761 N-INVAS EAR/PLS OXIMETRY MLT: CPT

## 2019-09-29 PROCEDURE — 85027 COMPLETE CBC AUTOMATED: CPT

## 2019-09-29 PROCEDURE — 63600175 PHARM REV CODE 636 W HCPCS: Performed by: INTERNAL MEDICINE

## 2019-09-29 PROCEDURE — 25000003 PHARM REV CODE 250: Performed by: SPECIALIST

## 2019-09-29 PROCEDURE — 63600175 PHARM REV CODE 636 W HCPCS: Performed by: SPECIALIST

## 2019-09-29 PROCEDURE — 36415 COLL VENOUS BLD VENIPUNCTURE: CPT

## 2019-09-29 PROCEDURE — 99233 PR SUBSEQUENT HOSPITAL CARE,LEVL III: ICD-10-PCS | Mod: S$GLB,,, | Performed by: INTERNAL MEDICINE

## 2019-09-29 PROCEDURE — 99291 CRITICAL CARE FIRST HOUR: CPT | Mod: S$GLB,,, | Performed by: INTERNAL MEDICINE

## 2019-09-29 PROCEDURE — 63600175 PHARM REV CODE 636 W HCPCS: Performed by: NURSE PRACTITIONER

## 2019-09-29 PROCEDURE — 99233 SBSQ HOSP IP/OBS HIGH 50: CPT | Mod: S$GLB,,, | Performed by: INTERNAL MEDICINE

## 2019-09-29 PROCEDURE — 81000 URINALYSIS NONAUTO W/SCOPE: CPT

## 2019-09-29 PROCEDURE — 25000003 PHARM REV CODE 250: Performed by: INTERNAL MEDICINE

## 2019-09-29 PROCEDURE — 87040 BLOOD CULTURE FOR BACTERIA: CPT

## 2019-09-29 PROCEDURE — C9113 INJ PANTOPRAZOLE SODIUM, VIA: HCPCS | Performed by: NURSE PRACTITIONER

## 2019-09-29 PROCEDURE — 80048 BASIC METABOLIC PNL TOTAL CA: CPT

## 2019-09-29 PROCEDURE — 99291 PR CRITICAL CARE, E/M 30-74 MINUTES: ICD-10-PCS | Mod: S$GLB,,, | Performed by: INTERNAL MEDICINE

## 2019-09-29 PROCEDURE — 94660 CPAP INITIATION&MGMT: CPT

## 2019-09-29 PROCEDURE — 20000000 HC ICU ROOM

## 2019-09-29 PROCEDURE — 99900035 HC TECH TIME PER 15 MIN (STAT)

## 2019-09-29 PROCEDURE — 27000221 HC OXYGEN, UP TO 24 HOURS

## 2019-09-29 PROCEDURE — C1751 CATH, INF, PER/CENT/MIDLINE: HCPCS

## 2019-09-29 RX ORDER — ACETAMINOPHEN 10 MG/ML
1000 INJECTION, SOLUTION INTRAVENOUS ONCE
Status: COMPLETED | OUTPATIENT
Start: 2019-09-29 | End: 2019-09-29

## 2019-09-29 RX ADMIN — ASCORBIC ACID, VITAMIN A PALMITATE, CHOLECALCIFEROL, THIAMINE HYDROCHLORIDE, RIBOFLAVIN-5 PHOSPHATE SODIUM, PYRIDOXINE HYDROCHLORIDE, NIACINAMIDE, DEXPANTHENOL, ALPHA-TOCOPHEROL ACETATE, VITAMIN K1, FOLIC ACID, BIOTIN, CYANOCOBALAMIN: 200; 3300; 200; 6; 3.6; 6; 40; 15; 10; 150; 600; 60; 5 INJECTION, SOLUTION INTRAVENOUS at 07:09

## 2019-09-29 RX ADMIN — VANCOMYCIN HYDROCHLORIDE 750 MG: 750 INJECTION, POWDER, LYOPHILIZED, FOR SOLUTION INTRAVENOUS at 01:09

## 2019-09-29 RX ADMIN — HYDROMORPHONE HYDROCHLORIDE 1 MG: 2 INJECTION, SOLUTION INTRAMUSCULAR; INTRAVENOUS; SUBCUTANEOUS at 05:09

## 2019-09-29 RX ADMIN — PANTOPRAZOLE SODIUM 40 MG: 40 INJECTION, POWDER, FOR SOLUTION INTRAVENOUS at 08:09

## 2019-09-29 RX ADMIN — HYDROMORPHONE HYDROCHLORIDE 1 MG: 2 INJECTION, SOLUTION INTRAMUSCULAR; INTRAVENOUS; SUBCUTANEOUS at 11:09

## 2019-09-29 RX ADMIN — ENALAPRILAT 1.25 MG: 2.5 INJECTION INTRAVENOUS at 05:09

## 2019-09-29 RX ADMIN — ENALAPRILAT 1.25 MG: 2.5 INJECTION INTRAVENOUS at 07:09

## 2019-09-29 RX ADMIN — METOPROLOL TARTRATE 7.5 MG: 5 INJECTION, SOLUTION INTRAVENOUS at 11:09

## 2019-09-29 RX ADMIN — ACETAMINOPHEN 1000 MG: 10 INJECTION, SOLUTION INTRAVENOUS at 04:09

## 2019-09-29 RX ADMIN — HYDROMORPHONE HYDROCHLORIDE 1 MG: 2 INJECTION, SOLUTION INTRAMUSCULAR; INTRAVENOUS; SUBCUTANEOUS at 01:09

## 2019-09-29 RX ADMIN — LORAZEPAM 1 MG: 2 INJECTION INTRAMUSCULAR; INTRAVENOUS at 12:09

## 2019-09-29 RX ADMIN — CEFAZOLIN SODIUM 2 G: 2 SOLUTION INTRAVENOUS at 01:09

## 2019-09-29 RX ADMIN — FUROSEMIDE 20 MG: 10 INJECTION, SOLUTION INTRAVENOUS at 08:09

## 2019-09-29 RX ADMIN — CEFAZOLIN SODIUM 2 G: 2 SOLUTION INTRAVENOUS at 07:09

## 2019-09-29 RX ADMIN — METOPROLOL TARTRATE 7.5 MG: 5 INJECTION, SOLUTION INTRAVENOUS at 12:09

## 2019-09-29 RX ADMIN — DAPTOMYCIN 340 MG: 350 INJECTION, POWDER, LYOPHILIZED, FOR SOLUTION INTRAVENOUS at 07:09

## 2019-09-29 RX ADMIN — METOPROLOL TARTRATE 7.5 MG: 5 INJECTION, SOLUTION INTRAVENOUS at 05:09

## 2019-09-29 RX ADMIN — LORAZEPAM 1 MG: 2 INJECTION INTRAMUSCULAR; INTRAVENOUS at 01:09

## 2019-09-29 NOTE — SUBJECTIVE & OBJECTIVE
[facility]  Pulmonary / Critical Care Medicine  Progress Note    S: No major issues overnight.  Subjectively slightly improved over the past 24 hr.  Dyspnea still present but improved.  No new complaints.      Review of Systems   Constitutional: Negative for fever.   Respiratory: Positive for shortness of breath. Negative for cough, hemoptysis, sputum production, wheezing and pleurisy.    Cardiovascular: Negative for chest pain, palpitations and leg swelling.   Genitourinary: Negative for hematuria.   Skin: Negative for rash.   Gastrointestinal: Positive for abdominal pain. Negative for nausea, vomiting and abdominal distention.   Psychiatric/Behavioral: Negative for confusion.      Reviewed:  PMH, ROS, Family History, Social History, Surgical History, Meds, Allergies, Vitals and Relevant Results.  Nursing/provider documentation from the past 24 hours.       O: VS: Temp:  [98.6 °F (37 °C)-99.1 °F (37.3 °C)]   Pulse:  []   Resp:  [21-44]   BP: ()/(44-94)   SpO2:  [89 %-100 %]   Female patients must weigh at least 45.5 kg to calculate ideal body weight    I/O:   Gross per 24 hour   Intake 1268.32 ml   Output 2645 ml   Net -1376.68 ml         Vent: SpO2 97% on 4L NC  Oxygen Concentration (%):  [40] 40     PE Physical Exam   Constitutional: She is oriented to person, place, and time. She appears cachectic. She is cooperative.  Non-toxic appearance. She appears ill. No distress. She is restrained. Nasal cannula in place.   HENT:   Head: Normocephalic.   Right Ear: External ear normal.   Left Ear: External ear normal.   Mouth/Throat: Mallampati Score: II.   Neck: Normal range of motion. Neck supple. No JVD present. No tracheal deviation present. No thyromegaly present.   Cardiovascular: Normal rate, intact distal pulses and normal pulses.   Murmur heard.  Pulmonary/Chest: Normal expansion, symmetric chest wall expansion and effort normal. No stridor. No respiratory distress. She has decreased  breath sounds (at both bases). She has no wheezes. She has no rhonchi. She has no rales. She exhibits no tenderness.   Abdominal: Soft. Bowel sounds are normal. She exhibits no distension. There is tenderness. There is no rebound.   Musculoskeletal: Normal range of motion. She exhibits no edema, tenderness or deformity.   Neurological: She is alert and oriented to person, place, and time. She has normal strength. She is not disoriented. She displays atrophy. She displays no tremor. No cranial nerve deficit or sensory deficit. She exhibits normal muscle tone. GCS eye subscore is 4. GCS verbal subscore is 5. GCS motor subscore is 6.   Skin: Skin is warm and dry. No rash noted. No cyanosis. Nails show no clubbing.   Psychiatric: She has a normal mood and affect.   Nursing note and vitals reviewed.       Lines Henriquez, TLC    Labs All pertinent labs within the past 24 hours have been reviewed.  Recent Labs   Lab 09/28/19  0403   WBC 14.18*   RBC 3.13*   HGB 9.8*   HCT 29.8*   *   MCV 95   MCH 31.3*   MCHC 32.9      K 3.4*   CL 93*   CO2 37*   BUN 49*   CREATININE 1.0   ALT <5*   AST 15   ALKPHOS 74   BILITOT 0.5   PROT 6.4   ALBUMIN 1.8*       Imaging I have reviewed and interpreted all pertinent imaging results/findings within the past 24 hours.  CXR  (09/29/2019) Stable appearance of the chest demonstrating multifocal infiltrates and nodular opacities and small pleural effusions.  I independently viewed the above imaging/lab studies in addition to reviewing the report       Micro Blood:  (September 26, 2019) no growth today  Blood:  (September 25, 2019) Gram-positive cocci in clusters resembling staph  Blood:  (September 24, 2019) Gram-positive cocci in clusters resembling staph    Medications Scheduled    ceFAZolin (ANCEF) IVPB  2 g Intravenous Q6H    enalaprilat  1.25 mg Intravenous BID    fat emulsion 20%  250 mL Intravenous Daily    furosemide  20 mg Intravenous Daily    metoprolol         metoprolol  7.5 mg Intravenous Q6H    pantoprazole  40 mg Intravenous Daily    vancomycin (VANCOCIN) IVPB  750 mg Intravenous Q24H      Continuous Infusions:    Amino acid 5% - dextrose 15% (CLINIMIX-E) solution with additives (1L  provides 510 kcal/L dextrose, with 50 gm AA, 150 gm CHO, Na 35, K 30, Mg 5, Ca 4.5, Acetate 80, Cl 39, Phos 15) 50 mL/hr at 09/1953    milrinone 20mg/100ml D5W (200mcg/ml) 0.25 mcg/kg/min (09/28/19 1727)      PRN   sodium chloride, bisacodyl, HYDROmorphone, influenza, lorazepam, magnesium sulfate IVPB, magnesium sulfate IVPB, ondansetron, pneumoc 13-susanna conj-dip cr(PF), potassium chloride in water, sodium chloride 0.9%

## 2019-09-29 NOTE — PLAN OF CARE
Patient oriented to person only. Speech remains dysarthric. Moving all extremities but weak. Vital signs stable, remains tachycardic. Pain and anxiety medications given as requested. Patient turned every 2 hours. Urine output adequate. Temperature max 100.2 axillary. Acetaminophen given. Plan encourage increased activity. Increased coughing and deep breathing.

## 2019-09-29 NOTE — SUBJECTIVE & OBJECTIVE
Interval History:  The patient is doing okay.  She is still having abdominal discomfort but it seems to be improving.    She told me that she is being using IV heroin for just a few weeks.    Review of Systems   Respiratory: Negative for cough, chest tightness, shortness of breath and wheezing.    Cardiovascular: Negative for chest pain, palpitations and leg swelling.   Gastrointestinal: Positive for abdominal pain. Negative for abdominal distention, constipation, diarrhea, nausea and vomiting.   Genitourinary: Negative for difficulty urinating, dysuria and flank pain.   Musculoskeletal: Negative for gait problem, joint swelling and neck pain.   Skin: Negative for rash and wound.   Neurological: Negative for dizziness, syncope, light-headedness and headaches.   Psychiatric/Behavioral: Negative for agitation, behavioral problems and confusion.     Objective:     Vital Signs (Most Recent):  Temp: 98.3 °F (36.8 °C) (09/29/19 0717)  Pulse: (!) 117 (09/29/19 1100)  Resp: (!) 46 (09/29/19 1100)  BP: (!) 180/73 (09/29/19 1100)  SpO2: (!) 91 % (09/29/19 1100) Vital Signs (24h Range):  Temp:  [98.3 °F (36.8 °C)-100.2 °F (37.9 °C)] 98.3 °F (36.8 °C)  Pulse:  [] 117  Resp:  [19-60] 46  SpO2:  [91 %-100 %] 91 %  BP: ()/() 180/73     Weight: 42.6 kg (93 lb 14.7 oz)  Body mass index is 17.18 kg/m².    Intake/Output Summary (Last 24 hours) at 9/29/2019 1127  Last data filed at 9/29/2019 0800  Gross per 24 hour   Intake 1864.75 ml   Output 2460 ml   Net -595.25 ml      Physical Exam   Constitutional: She is oriented to person, place, and time. She appears well-developed and well-nourished.   HENT:   Head: Normocephalic and atraumatic.   Eyes: Pupils are equal, round, and reactive to light. EOM are normal.   Cardiovascular: Regular rhythm, normal heart sounds and intact distal pulses.   Tachycardic   Pulmonary/Chest: Effort normal and breath sounds normal. No respiratory distress. She has no wheezes. She has no  rales.   Abdominal: Soft. Bowel sounds are normal. She exhibits no distension. There is tenderness.   Musculoskeletal: She exhibits no edema or tenderness.   Neurological: She is alert and oriented to person, place, and time. No cranial nerve deficit. Coordination normal.   Has generalized weakness   Skin: Skin is warm and dry. Capillary refill takes less than 2 seconds. No rash noted.   Psychiatric: She has a normal mood and affect. Her behavior is normal. Thought content normal.   Nursing note and vitals reviewed.      Significant Labs:   CBC:   Recent Labs   Lab 09/28/19 0403 09/29/19  0543   WBC 14.18* 10.81   HGB 9.8* 8.1*   HCT 29.8* 25.6*   * 113*     CMP:   Recent Labs   Lab 09/28/19 0403 09/28/19 1943 09/29/19  0543     --  143   K 3.4* 3.3* 3.5   CL 93*  --  98   CO2 37*  --  36*   *  --  147*   BUN 49*  --  55*   CREATININE 1.0  --  1.0   CALCIUM 7.8*  --  7.9*   PROT 6.4  --   --    ALBUMIN 1.8*  --   --    BILITOT 0.5  --   --    ALKPHOS 74  --   --    AST 15  --   --    ALT <5*  --   --    ANIONGAP 12  --  9   EGFRNONAA 59*  --  59*       Significant Imaging: I have reviewed all pertinent imaging results/findings within the past 24 hours.

## 2019-09-29 NOTE — ASSESSMENT & PLAN NOTE
The patient was evaluated by speech therapy and they've kept her NPO.  Started TPN September 24th.

## 2019-09-29 NOTE — PROGRESS NOTES
Notified Dr Anna of increased temp 101 AX, new orders received and noted, will continue to monitor

## 2019-09-29 NOTE — ASSESSMENT & PLAN NOTE
"· Abdominal tenderness, "colitis", and occult blood raise concern for mesenteric emboli and bowl infarction.  · If present, would be a relative indication for operative management of her valvular disease.  · Observation for now  "

## 2019-09-29 NOTE — ASSESSMENT & PLAN NOTE
· Remains on TPN for malnutrition.  · Ideally would be on enteral feeds, but ongoing colitis is precluding tube feeds at the moment.

## 2019-09-29 NOTE — PROGRESS NOTES
Progress  Note  Infectious Disease    Reason for Consult:  sepsis    HPI: Mesha Mkcenzie is a critically ill 63 y.o. female , tranferred from [facility] where she presented with 4 days of nausea and vomiting, found to have ABIMBOLA, leukocytosis, hypotension, multiple pulmonary infiltrates, pancolitis on CT, metabolic acidosis and altered mental status. She was transferred for multispecialty care. Since admission, she has received vanc, levaquin and flagyl, still requires pressors, has 3/4 blood cultures with GPC in clusters, Ct chest with multiple foci of infiltrates including some that look like septic pulmonary emboli, elevated coags without bleeding and no further diarrheal stools. Her chemistries are responding to fluid and supplementation but her mental status remains very abnormal. She is in pain from her abdomen. It is unclear from notes and my conversation with daughter whether her respiratory symptoms came first or the GI symptoms came first. She has no history of STaph infection, no recent antibiotics, but does smoke and take pain meds, valium and adderall chronically.      9/21: blood cultures with STaph aureus, sensitivities pending. Blood cultures from yesterday are negative so far. No change in oxygen needs. No longer on pressors. Less responsive. TTE unofficially abnormal. Staph in urine is likely from the bacteremia. No family at bedside.   9/22: CT head with no significant changes. Mental status no better. Still no BM since admit. CXR slightly better. Blood cultures from yesterday negative and original is MSSA. coags still elevated.   Cr now normal.   9/23: afebrile. Blood cultures are persistently positive. Mental status is much improved and she can answer questions. She continues to moan and groan. She indicated to me that her anus is uncomfortable and needs to have a BM. She denies abd pain but grimaces with palpation. She denies new pain in her spine. She does indicate pain in the right foot. When I  asked what the lumps on her shoulders are from she indicates they are from falls. I told her that she was being treated for a Staph infection and I believe she understood.   9/24: afebrile. Blood cultures not drawn from 9/22, 23.  Much more alert and interactive. ADMITTED TO ME THAT SHE HAS BEEN INJECTING HEROIN, LAST DONE 2 WEEKS AGO. SHE DOES NOT WANT HER DAUGHTER TO KNOW AND TELLS ME THAT HER  KNOWS.  This certainly explains the Staph bacteremia and septic pulmonary emboli. CXR improving. Pain in back is generalized. She reports that she did have a BM but it is not recorded. She did take a few bites of pudding.   9/25:  Status post DHARMESH today with findings of bicuspid valve and mitral valve vegetations the.  Discussed with Cardiology.  Per nurse and he was less alert today and perhaps more confused.  Pain medication was decreased early urine she is complaining abdominal pain and back pain.  She is requiring BiPAP this evening he did receive 2 units of blood with 20 mg of Lasix between the units and did have good urine output but her potassium was low this morning requiring aggressive supplementation.  Blood cultures are now negative  9/26: BNP was 1400 last night. Received more lasix. Today increased shortness of breath and bradycardia. Seen by cardiology. Milrinone and bumex drips started to help her mobilize fluid. Extreme agitation prompted resumption of higher dose of opiates, possible withdrawal?. No new positive blood cultures. Creatinine stable and platelets 44k.   09/27 Same complaint of ll abd pain afeb, tmax 99.4 still tachycardic;  cardiologist tried dig. Diuresed well with bumex.  Nurse is working hard on replacing electrolytes. Patient had loose stools and needed flexiseal today   09/28  Afebrile, tmax 99.3, receiving TPN. Tachy improved.  Diarrhea yesterday, not as bad today. FOBT was +, Cdiff NEG.   blood cultures from 25th == BECAME positive   CXR improved with diuresis, off NIV today. On 5  L  9/29: tmax 101+. She is breathing more comfortably. Blood culture from 9/25 turned pos on 9/28. She is still sedated/encephalopathic. No focal deficits but unsure if her vision is intact. With talk of surgery, it is important that we establish whether she has had any CNS emboli. D/w her daughter and nurse at bedside.       Antibiotics (From admission, onward)    Start     Stop Route Frequency Ordered    09/28/19 0130  vancomycin 750 mg in dextrose 5 % 250 mL IVPB (ready to mix system)      -- IV Every 24 hours (non-standard times) 09/28/19 0032    09/22/19 1345  cefazolin (ANCEF) 2 gram in dextrose 5% 50 mL IVPB (premix)      -- IV Every 6 hours (non-standard times) 09/22/19 1244      VANC as per pharmacy       Antifungals (From admission, onward)    None        Antivirals (From admission, onward)    None          EXAM & DIAGNOSTICS REVIEWED:   Vitals:     Temp:  [98.3 °F (36.8 °C)-101 °F (38.3 °C)]   Temp: (!) 101 °F (38.3 °C) (09/29/19 1605)  Pulse: (!) 119 (09/29/19 1530)  Resp: (!) 37 (09/29/19 1530)  BP: (!) 130/58 (09/29/19 1530)  SpO2: 95 % (09/29/19 1530)    Intake/Output Summary (Last 24 hours) at 9/29/2019 1613  Last data filed at 9/29/2019 1400  Gross per 24 hour   Intake 1894.75 ml   Output 2910 ml   Net -1015.25 ml       General:  Arousable, attends, tries to cooperate     Eyes:  Anicteric, PERRL,  no sleral or conjunctival petechiae  ENT:  No ulcers, exudates, thrush, nares patent, dentition is poor and MM are  Dry,   Neck:  Supple,    Lungs: Patchy crackles,  With decreased BS RLL. Chest wall is tender(not new)  Heart:  RRR,  Tachy 120  Abd:  Soft, tender, guards less, minimal distention,   + BS, no masses or organomegaly appreciated.flexiseal  :   Henriquez, urine clear, no flank tenderness  Musc:  Joints without effusion, swelling, erythema, synovitis    Skin:  No rashes. No palmar or plantar lesions. No subungual petechiae. No skin lesions of DIC  Wound: Right hip abrasion, not infected,  healed  Neuro: arousable, face symmetric, tongue protrudes in the midline,  moves all extremities, no focal weakness but generally weak. Unsure if her vision is intact  Psych:  Attends, less restless, can't focus  Lymphatic:        Extrem: generalized edema seems resolved? , no erythema, phlebitis, cellulitis, peripheral pulses are 0-1, clubbing present, no mottling  VAD:  Left IJ TLC 9/19  Isolation:  none    Lines/Tubes/Drains:    General Labs reviewed:  Recent Labs   Lab 09/27/19  0609 09/28/19  0403 09/29/19  0543   WBC 13.43* 14.18* 10.81   HGB 10.3* 9.8* 8.1*   HCT 29.8* 29.8* 25.6*   PLT 88* 105* 113*       Recent Labs   Lab 09/26/19  0342  09/27/19 0609 09/28/19 0403 09/28/19 1943 09/29/19  0543     --  141  --  142  --  143   K 3.2*   < > 2.4*   < > 3.4* 3.3* 3.5     --  90*  --  93*  --  98   CO2 29  --  39*  --  37*  --  36*   BUN 27*  --  36*  --  49*  --  55*   CREATININE 0.7  --  0.8  --  1.0  --  1.0   CALCIUM 7.2*  --  7.8*  --  7.8*  --  7.9*   PROT 5.1*  --  6.7  --  6.4  --   --    BILITOT 0.7  --  0.8  --  0.5  --   --    ALKPHOS 48*  --  66  --  74  --   --    ALT <5*  --  <5*  --  <5*  --   --    AST 11  --  15  --  15  --   --     < > = values in this interval not displayed.           Micro:  Microbiology Results (last 7 days)     Procedure Component Value Units Date/Time    Blood culture [[id number]]     Order Status:  Sent Specimen:  Blood     Blood culture [[id number]]  (Abnormal) Collected:  09/25/19 0413    Order Status:  Completed Specimen:  Blood Updated:  09/29/19 0614     Blood Culture, Routine Gram stain peds bottle: Gram positive cocci in clusters resembling Staph       Results called to and read back by:Dunia Keating RN 09/28/2019  00:29      STAPHYLOCOCCUS AUREUS  ID consult required at [facility] and [facility].  For susceptibility see order #[id number]      Blood culture [[id number]]  (Abnormal) Collected:  09/24/19 0517    Order  Status:  Completed Specimen:  Blood Updated:  09/29/19 0613     Blood Culture, Routine Gram stain peds bottle: Gram positive cocci in clusters resembling Staph       Results called to and read back by: Liset Arroyo RN  09/28/2019  12:30      STAPHYLOCOCCUS AUREUS  Susceptibility pending  ID consult required at [facility].      Stool culture [[id number]] Collected:  09/26/19 1329    Order Status:  Completed Specimen:  Stool Updated:  09/28/19 1349     Stool Culture Nothing significant to date    Blood culture [[id number]] Collected:  09/26/19 0443    Order Status:  Completed Specimen:  Blood Updated:  09/28/19 1012     Blood Culture, Routine No Growth to date      No Growth to date      No Growth to date    Clostridium difficile EIA [[id number]] Collected:  09/27/19 2013    Order Status:  Completed Specimen:  Stool Updated:  09/27/19 2228     C. diff Antigen Negative     C difficile Toxins A+B, EIA Negative     Comment: Testing not recommended for children <24 months old.       E. coli 0157 antigen [[id number]] Collected:  09/26/19 1329    Order Status:  No result Specimen:  Stool Updated:  09/26/19 2301    Blood culture [[id number]]  (Abnormal)  (Susceptibility) Collected:  09/22/19 0338    Order Status:  Completed Specimen:  Blood from Antecubital, Left  Arm Updated:  09/25/19 1028     Blood Culture, Routine Gram stain peds bottle: Gram positive cocci in clusters resembling Staph       Results called to and read back by:Yolis Wan RN 09/23/2019  04:46      STAPHYLOCOCCUS AUREUS  ID consult required at [facility] and [facility].      Blood culture [[id number]]  (Abnormal) Collected:  09/21/19 0534    Order Status:  Completed Specimen:  Blood Updated:  09/24/19 0954     Blood Culture, Routine Gram stain peds bottle: Gram positive cocci in clusters resembling Staph       Results called to and read back by:Javi Sunshine RN 09/22/2019  14:22       STAPHYLOCOCCUS AUREUS  ID consult required at [facility].  For susceptibility see order #[id number]      Blood culture [[id number]]  (Abnormal) Collected:  09/20/19 1739    Order Status:  Completed Specimen:  Blood from Line, Central Updated:  09/23/19 0939     Blood Culture, Routine Gram stain aer bottle: Gram positive cocci in clusters resembling Staph       Results called to and read back by:Rhea Lai RN 09/21/2019  16:02      STAPHYLOCOCCUS AUREUS  ID consult required at [facility].  For susceptibility see order #[id number]          Culture, Routine Abnormal    STAPHYLOCOCCUS AUREUS   ID consult required at [facility].     Resulting Agency [facility]   Susceptibility      Staphylococcus aureus     CULTURE, BLOOD     Clindamycin <=0.5 mcg/mL Sensitive     Erythromycin <=0.5 mcg/mL Sensitive     Oxacillin <=0.25 mcg/mL Sensitive     Penicillin <=0.03 mcg/mL Sensitive     Tetracycline <=4 mcg/mL Sensitive     Trimeth/Sulfa <=0.5/9.5 m... Sensitive           Imaging Reviewed:   CXR  Improvement in the appearance of the chest compared to the prior exam with decrease of the bilateral nodular opacifications.     CT ABD and Pelvis :   Chest; persistent shattered bilateral consolidative processes which have undergone cavitation/cystic spaces within them  Small left and moderate size right pleural effusion increased in size  Pericardial effusion appearing small but mildly increased in size  Abdomen and pelvis; mild distention of colon with prominent amount of fluid suggesting ileus.  Very mild colitis cannot be excluded but with no appreciable bowel wall thickening or pericolonic inflammatory change.  Additional findings as detailed above      Cardiology:  TTE 9/20  Left Ventricle Low normal ejection fraction at 50%. Mild concentric observed. Grade I (mild) left ventricular diastolic dysfunction  consistent with impaired relaxation. Elevated left atrial pressure.   Right Ventricle Normal cavity size, wall thickness and systolic function. Wall motion normal.   Left Atrium The left atrium is normal.   Right Atrium There is normal right atrial size.   Aortic Valve The valve is trileaflet. There is normal leaflet mobility.   Mitral Valve Normal valve structure. Mild regurgitation. Normal leaflet mobility.   Tricuspid Valve The tricuspid valve is normal. Trace regurgitation. Pulmonary hypertension present. The estimated PA systolic pressure is 48 mmHg.   Pulmonic Valve Normal valve structure. Trace regurgitation.   IVC/SVC Normal central venous pressure (3 mm Hg).   Ascending Aorta The aortic root and ascending aorta are normal in size.   Pericardium No pericardial effusion. Respiratory variation of mitral valve inflow is less than 30%.     DHARMESH 9/25  · Mild left ventricular enlargement.  · Low normal left ventricular systolic function. The estimated ejection fraction is 50%  · Normal LV diastolic function.  · Mild left atrial enlargement.  · No interatrial septal defect present.  · Normal appearing left atrial appendage. No thrombus is present in the appendage. Normal appendage velocities.  · Moderate-to-severe mitral regurgitation.  · Moderate tricuspid regurgitation.  · 1x.5 cmmobile vegetation on tricuspid valve septal leaflet atrial side  · 5mmx6 mm mobile vegetation on posterior leaflet P3 scallop on atrial side  · Aortic valve wnl    ECHO 09/26   · Mild concentric left ventricular hypertrophy.  · Mild left ventricular enlargement.  · Moderately decreased left ventricular systolic function. The estimated ejection fraction is 33%  · Grade III (severe) left ventricular diastolic dysfunction consistent with restrictive physiology.  · Mild global hypokinetic wall motion.  · Normal right ventricular systolic function.  · Mild left atrial enlargement.  · Mild right atrial enlargement.  · There is a right atrial  mass present. Echo lucent mobile small vegetation ssen at R atrial - TV septal leaflet junction In hi R atrium At septal surface there is more echo dense structure ( ~ 1.5x .8 cm) mobile which could be vegetation vs normal prominent Eustachian valve  · Moderate-to-severe mitral regurgitation.  · Normal central venous pressure (3 mm Hg).  · The estimated PA systolic pressure is 50 mm Hg  · Moderate to severe pulmonary hypertension present.    IMPRESSION & PLAN     1.  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   Leukocytosis   Procal 3     IVDA/heroin    Pleural effusions with decreased EF, on milrinone    2. Pancolitis. ? if she has ischemic colitis from sepsis and volume depletion. diarrhea today on 27 . CT abd improved 9/26  3. Shortness of breath, right pleural effusion x-ray, improved on 27th, NIV then NC5-6 L  4. opiate dependence, severe lumbar spine abnormalities  6. Altered mental status. From sepsis? +/- from unrecognized stroke or embolus, non focal   High risk for deterioration      Recommendations:  -  Cardiologist,  Dr Dobson has spoken with vascular surgeon re poss surgery.   -  Cont Vanc + cefazolin and will add daptomycin  -check blood cultures, urine culture and get TLC changed to PICC  -serial blood cultures   -   MRI  brain with contrast,  tomorrow

## 2019-09-29 NOTE — PROGRESS NOTES
[facility]  Cardiology  Progress Note    Patient Name: Mesha Mckenzie  MRN: [id number]  Admission Date: 9/19/2019  Hospital Length of Stay: 10 days  Code Status: Full Code   Attending Physician: Eli Anna MD   Primary Care Physician: Varun Shea MD PhD  Expected Discharge Date:   Principal Problem:Acute bacterial endocarditis    Subjective:     Hospital Course: 62 yo female who was transferred from [facility] for septic shock, pancolitis and bilateral pna. Found TV/MV IE-MSSA (likely due to IV drug abuse) with septic emboli to b/l lung, metabolic encephalopathy, severe anemia, thrombocytopenia and coagulopathy.    Interval History: No event since yesterday. Pt looks lethargic, complains of back pain-which is chronic. Bharat sob or cp. Low grade fever 100.2 in the early morning.  Blood culture negative since 9/26.    Review of Systems  Objective:     Vital Signs (Most Recent):  Temp: 98.3 °F (36.8 °C) (09/29/19 0717)  Pulse: 90 (09/29/19 0717)  Resp: (!) 23 (09/29/19 0717)  BP: (!) 114/56 (09/29/19 0717)  SpO2: 97 % (09/29/19 0717) Vital Signs (24h Range):  Temp:  [98.3 °F (36.8 °C)-100.2 °F (37.9 °C)] 98.3 °F (36.8 °C)  Pulse:  [] 90  Resp:  [19-44] 23  SpO2:  [90 %-100 %] 97 %  BP: ()/() 114/56   Weight: 42.6 kg (93 lb 14.7 oz)  Body mass index is 17.18 kg/m².  SpO2: 97 %  O2 Device (Oxygen Therapy): nasal cannula w/ humidification    Intake/Output Summary (Last 24 hours) at 9/29/2019 0946  Last data filed at 9/29/2019 0800  Gross per 24 hour   Intake 1864.75 ml   Output 2575 ml   Net -710.25 ml     Lines/Drains/Airways     Central Venous Catheter Line                 Percutaneous Central Line Insertion/Assessment - triple lumen  09/19/19 1344 left internal jugular 9 days          Drain                 Urethral Catheter 09/19/19 1240 Double-lumen 16 Fr. 9 days         Rectal Tube 09/27/19 1000 rectal tube w/ balloon (indicate number of mLs) 1 day               Physical  Exam  Gen: Lethargic, lying on bed  Skin: normal temperature/tugor, no erythema  Lymph: no lymphadenopathy detected  HEENT: NC/AT  Neck: supple, no JVD/bruit  Chest: no deformity, equal movement b/l  Lung: scattered rales b/l  Heart: RRR, S1/S2 +, 1/6 SM on apex, no G/R  Abd: BS+, S, NT/ND  Ext: no deformity, no pretibial edema  Pulse: b/l radial 2+  Neuro: Awake    Cardiographics  ECG 9/20/19: Sinus tachycardia with frequent Premature ventricular complexes.  Echocardiogram 9/26/19:  · Mild concentric left ventricular hypertrophy.  · Mild left ventricular enlargement.  · Moderately decreased left ventricular systolic function. The estimated ejection fraction is 33%  · Grade III (severe) left ventricular diastolic dysfunction consistent with restrictive physiology.  · Mild global hypokinetic wall motion.  · Normal right ventricular systolic function.  · Mild left atrial enlargement.  · Mild right atrial enlargement.  · There is a right atrial mass present. Echo lucent mobile small vegetation ssen at R atrial - TV septal leaflet junction In hi R atrium At septal surface there is more echo dense structure ( ~ 1.5x .8 cm) mobile which could be vegetation vs normal prominent Eustachian valve  · Moderate-to-severe mitral regurgitation.  · Normal central venous pressure (3 mm Hg).  · The estimated PA systolic pressure is 50 mm Hg  · Moderate to severe pulmonary hypertension present.     1) further decreased EF of lv ( 33%) and sinus tachycardia   2severe mitral regurgitation   3) pulmonary hypertension   4 ) TV + MV vegetations as described     Imaging  Chest x-ray 9/28/19: Stable appearance of the chest demonstrating multifocal infiltrates and nodular opacities and small pleural effusions.  Lab Review   Lab Results   Component Value Date    WBC 10.81 09/29/2019    HGB 8.1 (L) 09/29/2019    HCT 25.6 (L) 09/29/2019    MCV 97 09/29/2019     (L) 09/29/2019     BMP  Lab Results   Component Value Date      09/29/2019    K 3.5 09/29/2019    CL 98 09/29/2019    CO2 36 (H) 09/29/2019    BUN 55 (H) 09/29/2019    CREATININE 1.0 09/29/2019    CALCIUM 7.9 (L) 09/29/2019    ANIONGAP 9 09/29/2019    ESTGFRAFRICA >60 09/29/2019    EGFRNONAA 59 (A) 09/29/2019     No results for input(s): TROPONINI in the last 168 hours.  BNP  Recent Labs   Lab 09/25/19 1944   BNP 1,456*         Assessment:   IE-MSSA, TV/MV   HFrEF  Pneumonia with septic emboli  Encephalopathy  Pancolitis  ABIMBOLA  Anemia  Thrombocytopenia  IV drug abuse     Plan:   Continue antibiotics.  Continue Lasix 20mg iv daily.  Continue Milrione iv drip at 0.25mcg/kg/min.  Critical care.  Dr Dobson will see pt tomorrow and decide surgery vs medical management.    Hillary Corrigan MD  Cardiology  [facility]

## 2019-09-29 NOTE — ASSESSMENT & PLAN NOTE
· Hemodynamically stable.  · Continued diuresis.  · Cardiology following.  Remains on Milrinone.  · Continue antibiotics at the direction of ID.  Blood Cx sterile for several days.

## 2019-09-29 NOTE — ASSESSMENT & PLAN NOTE
This is secondary  to septic emboli.  She is using BiPAP intermittently.  Chest x-ray is improving.

## 2019-09-29 NOTE — PROGRESS NOTES
"[facility]  Progress Note    Patient Name: Mesha Mckenzie  MRN: [id number]  Patient Class: IP- Inpatient   Admission Date: 9/19/2019  Length of Stay: 10 days  Attending Physician: Eli Anna MD  Primary Care Provider: Varun Shea MD PhD        Subjective:     Principal Problem:Acute bacterial endocarditis        HPI:  Pt is a 62 yo female who was transferred from [facility] for septic shock, pancolitis and bilateral pna. Pt reports that she fell out of bed on Sunday morning and has been feeling progressively worse throughout the week. C/o vomiting since Sunday, ~3 episodes per day. Also reports diarrhea, ~4 stools per day, watery in nature. Pt denies any hematemesis, melena or hematochezia. Pt did take zofran which helped "some:" c/o severe abdominal pain since Sunday which is constant. Pain is a 10 on a 0-10 pain scale.  Has not been able to eat due to the nausea and pain. Questioned pt in regards to use of pain medication, states "I didn't have any." pt was scheduled for colonoscopy tomorrow. Hx of GI bleed in December 2018. Pt states she had passed some blood in her stool. Reviewed colonoscopy report, no bleeding in the colon was found however the prep was poor. No recent travel, no sick contacts at home. Questioned pt in regards to cough: pt states she has felt SOB due to pain but reports only occasional coughing. Denies any sputum production.  Social: smokes 10 cigarettes per day, 20 pack year hx. No ETOH.     Overview/Hospital Course:  The patient is a 63-year-old woman admitted 09/19 with sepsis secondary to pneumonia and colitis.  She was admitted to the intensive care unit and was treated with broad-spectrum antibiotics.  Her blood cultures grew methicillin sensitive Staph aureus and she was evaluated by ID.  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  She is currently on Ancef and vancomycin.  Her blood " cultures are still positive therefore CV surgery has been consulted for possible cardiac surgery.  She has also been followed closely by Dr. Dobson for her heart failure.    The patient  had severe metabolic encephalopathy which has resolved for the most part.    She has been receiving TPN since she has had an ileus.    She has bilateral pulmonary infiltrates which we believe are secondary to septic emboli.    The patient has had severe anemia, thrombocytopenia and coagulopathy.  She received a couple of blood transfusions but has not require platelets or FFP.  Hematology has been following.    Interval History:  The patient is doing okay.  She is still having abdominal discomfort but it seems to be improving.    She told me that she is being using IV heroin for just a few weeks.    Review of Systems   Respiratory: Negative for cough, chest tightness, shortness of breath and wheezing.    Cardiovascular: Negative for chest pain, palpitations and leg swelling.   Gastrointestinal: Positive for abdominal pain. Negative for abdominal distention, constipation, diarrhea, nausea and vomiting.   Genitourinary: Negative for difficulty urinating, dysuria and flank pain.   Musculoskeletal: Negative for gait problem, joint swelling and neck pain.   Skin: Negative for rash and wound.   Neurological: Negative for dizziness, syncope, light-headedness and headaches.   Psychiatric/Behavioral: Negative for agitation, behavioral problems and confusion.     Objective:     Vital Signs (Most Recent):  Temp: 98.3 °F (36.8 °C) (09/29/19 0717)  Pulse: (!) 117 (09/29/19 1100)  Resp: (!) 46 (09/29/19 1100)  BP: (!) 180/73 (09/29/19 1100)  SpO2: (!) 91 % (09/29/19 1100) Vital Signs (24h Range):  Temp:  [98.3 °F (36.8 °C)-100.2 °F (37.9 °C)] 98.3 °F (36.8 °C)  Pulse:  [] 117  Resp:  [19-60] 46  SpO2:  [91 %-100 %] 91 %  BP: ()/() 180/73     Weight: 42.6 kg (93 lb 14.7 oz)  Body mass index is 17.18 kg/m².    Intake/Output Summary  (Last 24 hours) at 9/29/2019 1127  Last data filed at 9/29/2019 0800  Gross per 24 hour   Intake 1864.75 ml   Output 2460 ml   Net -595.25 ml      Physical Exam   Constitutional: She is oriented to person, place, and time. She appears well-developed and well-nourished.   HENT:   Head: Normocephalic and atraumatic.   Eyes: Pupils are equal, round, and reactive to light. EOM are normal.   Cardiovascular: Regular rhythm, normal heart sounds and intact distal pulses.   Tachycardic   Pulmonary/Chest: Effort normal and breath sounds normal. No respiratory distress. She has no wheezes. She has no rales.   Abdominal: Soft. Bowel sounds are normal. She exhibits no distension. There is tenderness.   Musculoskeletal: She exhibits no edema or tenderness.   Neurological: She is alert and oriented to person, place, and time. No cranial nerve deficit. Coordination normal.   Has generalized weakness   Skin: Skin is warm and dry. Capillary refill takes less than 2 seconds. No rash noted.   Psychiatric: She has a normal mood and affect. Her behavior is normal. Thought content normal.   Nursing note and vitals reviewed.      Significant Labs:   CBC:   Recent Labs   Lab 09/28/19  0403 09/29/19  0543   WBC 14.18* 10.81   HGB 9.8* 8.1*   HCT 29.8* 25.6*   * 113*     CMP:   Recent Labs   Lab 09/28/19  0403 09/28/19  1943 09/29/19  0543     --  143   K 3.4* 3.3* 3.5   CL 93*  --  98   CO2 37*  --  36*   *  --  147*   BUN 49*  --  55*   CREATININE 1.0  --  1.0   CALCIUM 7.8*  --  7.9*   PROT 6.4  --   --    ALBUMIN 1.8*  --   --    BILITOT 0.5  --   --    ALKPHOS 74  --   --    AST 15  --   --    ALT <5*  --   --    ANIONGAP 12  --  9   EGFRNONAA 59*  --  59*       Significant Imaging: I have reviewed all pertinent imaging results/findings within the past 24 hours.      Assessment/Plan:      Staphylococcus aureus bacteremia with sepsis  The patient is on Ancef and vancomycin for MSSA. DHARMESH showed tricuspid and mitral  valve vegetations.    Blood cultures from the 25th are now positive.  ID consulted CV surgery because she is still bacteremic despite IV antibiotics.      Acute hypoxemic respiratory failure  This is secondary  to septic emboli.  She is using BiPAP intermittently.  Chest x-ray is improving.    Encephalopathy, metabolic  The patient appears to be back to baseline.      Pancolitis  Repeat CT scan shows improvement in the colitis.  It showed an ileus and this also seems to be improving as the patient is having bowel movements.            Pleural effusion, bilateral  This improved with diuresis      ABIMBOLA (acute kidney injury)  Renal function has declined slightly most likely because of the diuresis.     Hyperglycemia  Patient's FSGs are controlled on current hypoglycemics.   Last A1c reviewed-   Lab Results   Component Value Date    HGBA1C 5.7 (H) 04/23/2019     Most recent fingerstick glucose reviewed- No results for input(s): POCTGLUCOSE in the last 24 hours.  Current correctional scale  Low  Maintain anti-hyperglycemic dose as follows-   Antihyperglycemics (From admission, onward)    None              IV drug user  The patient finally admitted that she was using IV drugs to Dr. Bolden.  She does not want her family to know.  She states that she has only been using IV heroin for a few weeks      Severe malnutrition  The patient was evaluated by speech therapy and they've kept her NPO.  Started TPN September 24th.      Normocytic anemia  Secondary to this acute illness?  There is no evidence of blood loss or hemolysis.  The patient received 2 units of packed red blood cells 9-24      Hypokalemia  This is being replaced with IV potassium      VTE Risk Mitigation (From admission, onward)         Ordered     Place sequential compression device  Until discontinued      09/19/19 2234     IP VTE HIGH RISK PATIENT  Once      09/19/19 2217                Critical care time spent on the evaluation and treatment of severe organ  dysfunction, review of pertinent labs and imaging studies, discussions with consulting providers and discussions with patient/family: 30 minutes.      Eli Anna MD  Department of Hospital Medicine   [facility]

## 2019-09-29 NOTE — PROGRESS NOTES
[facility]  Pulmonary / Critical Care Medicine  Progress Note    S: No major issues overnight.  Subjectively slightly improved over the past 24 hr.  Dyspnea still present but improved.  No new complaints.      Review of Systems   Constitutional: Negative for fever.   Respiratory: Positive for shortness of breath. Negative for cough, hemoptysis, sputum production, wheezing and pleurisy.    Cardiovascular: Negative for chest pain, palpitations and leg swelling.   Genitourinary: Negative for hematuria.   Skin: Negative for rash.   Gastrointestinal: Positive for abdominal pain. Negative for nausea, vomiting and abdominal distention.   Psychiatric/Behavioral: Negative for confusion.      Reviewed:  PMH, ROS, Family History, Social History, Surgical History, Meds, Allergies, Vitals and Relevant Results.  Nursing/provider documentation from the past 24 hours.       O: VS: Temp:  [98.6 °F (37 °C)-99.1 °F (37.3 °C)]   Pulse:  []   Resp:  [21-44]   BP: ()/(44-94)   SpO2:  [89 %-100 %]   Female patients must weigh at least 45.5 kg to calculate ideal body weight    I/O:   Gross per 24 hour   Intake 1268.32 ml   Output 2645 ml   Net -1376.68 ml         Vent: SpO2 97% on 4L NC  Oxygen Concentration (%):  [40] 40     PE Physical Exam   Constitutional: She is oriented to person, place, and time. She appears cachectic. She is cooperative.  Non-toxic appearance. She appears ill. No distress. She is restrained. Nasal cannula in place.   HENT:   Head: Normocephalic.   Right Ear: External ear normal.   Left Ear: External ear normal.   Mouth/Throat: Mallampati Score: II.   Neck: Normal range of motion. Neck supple. No JVD present. No tracheal deviation present. No thyromegaly present.   Cardiovascular: Normal rate, intact distal pulses and normal pulses.   Murmur heard.  Pulmonary/Chest: Normal expansion, symmetric chest wall expansion and effort normal. No stridor. No respiratory distress. She has decreased  breath sounds (at both bases). She has no wheezes. She has no rhonchi. She has no rales. She exhibits no tenderness.   Abdominal: Soft. Bowel sounds are normal. She exhibits no distension. There is tenderness. There is no rebound.   Musculoskeletal: Normal range of motion. She exhibits no edema, tenderness or deformity.   Neurological: She is alert and oriented to person, place, and time. She has normal strength. She is not disoriented. She displays atrophy. She displays no tremor. No cranial nerve deficit or sensory deficit. She exhibits normal muscle tone. GCS eye subscore is 4. GCS verbal subscore is 5. GCS motor subscore is 6.   Skin: Skin is warm and dry. No rash noted. No cyanosis. Nails show no clubbing.   Psychiatric: She has a normal mood and affect.   Nursing note and vitals reviewed.       Lines Henriquez, TLC    Labs All pertinent labs within the past 24 hours have been reviewed.  Recent Labs   Lab 09/28/19  0403   WBC 14.18*   RBC 3.13*   HGB 9.8*   HCT 29.8*   *   MCV 95   MCH 31.3*   MCHC 32.9      K 3.4*   CL 93*   CO2 37*   BUN 49*   CREATININE 1.0   ALT <5*   AST 15   ALKPHOS 74   BILITOT 0.5   PROT 6.4   ALBUMIN 1.8*       Imaging I have reviewed and interpreted all pertinent imaging results/findings within the past 24 hours.  CXR  (09/29/2019) Stable appearance of the chest demonstrating multifocal infiltrates and nodular opacities and small pleural effusions.  I independently viewed the above imaging/lab studies in addition to reviewing the report       Micro Blood:  (September 26, 2019) no growth today  Blood:  (September 25, 2019) Gram-positive cocci in clusters resembling staph  Blood:  (September 24, 2019) Gram-positive cocci in clusters resembling staph    Medications Scheduled    ceFAZolin (ANCEF) IVPB  2 g Intravenous Q6H    enalaprilat  1.25 mg Intravenous BID    fat emulsion 20%  250 mL Intravenous Daily    furosemide  20 mg Intravenous Daily    metoprolol         metoprolol  7.5 mg Intravenous Q6H    pantoprazole  40 mg Intravenous Daily    vancomycin (VANCOCIN) IVPB  750 mg Intravenous Q24H      Continuous Infusions:    Amino acid 5% - dextrose 15% (CLINIMIX-E) solution with additives (1L  provides 510 kcal/L dextrose, with 50 gm AA, 150 gm CHO, Na 35, K 30, Mg 5, Ca 4.5, Acetate 80, Cl 39, Phos 15) 50 mL/hr at 09/1953    milrinone 20mg/100ml D5W (200mcg/ml) 0.25 mcg/kg/min (09/28/19 1727)      PRN   sodium chloride, bisacodyl, HYDROmorphone, influenza, lorazepam, magnesium sulfate IVPB, magnesium sulfate IVPB, ondansetron, pneumoc 13-susanna conj-dip cr(PF), potassium chloride in water, sodium chloride 0.9%        Assessment/Plan:     Problem   1. Acute Bacterial Endocarditis   2. Acute Septic Pulmonary Embolism   3. Staphylococcus Aureus Bacteremia With Sepsis   4. Acute Hypoxemic Respiratory Failure   5. Mitral Valve Vegetation   6. Pleural Effusion, Bilateral   7. Tricuspid Valve Vegetation   8. Encephalopathy, Metabolic   9. Hypomagnesemia   10. IV Drug User   11. Hypokalemia   12. Thrombocytopenia, Acquired   13. Normocytic Anemia   14. Severe Malnutrition     Steadily making slow gains, but remains frail and ultimate outcome far from certain.  Continue antibiotics for MSSA endocarditis, and best supportive care.    Neuro  · Avoidance of deleriogenic medications.  · Low threshold for repeating CT head to evaluate for embolic disease.  · Continue to address underlying acute condition.  · Supportive care.    Pulmonary  · Transition to nasal cannula this morning and doing relatively well  · Wean supplemental oxygen as tolerated.  Titrate nasal cannula to maintain SpO2 greater than 92%.  · Observation with continued diuresis.  · Ideally, pleural disease would be addressed, but clinical/radiographic improvement with diuresis and absent evidence worsening infection suggest against a pleural space infection.  · Additionally, the technical difficulty and increased risk  "of bleeding in this situation make the risks outweigh the benefits of drainage of pleural effusions at the moment.    Cardiac/Vascular  · Hemodynamically stable.  · Continued diuresis.  · Cardiology following.  Remains on Milrinone.  · Continue antibiotics at the direction of ID.  Blood Cx sterile for several days.    Renal/  · Adequate urine output and relatively stable renal function.  · Monitor and replace electrolytes with continued robust diuresis.    ID  · Continue antibiotics.  · ID managing antibiotic selection.    Hematology  · No indication for blood products at the moment.  · Continue to monitor H/H and anemia.  · Hematology following for thrombocytopenia.    Endocrine  · Remains on TPN for malnutrition.  · Ideally would be on enteral feeds, but ongoing colitis is precluding tube feeds at the moment.    GI  · Abdominal tenderness, "colitis", and occult blood raise concern for mesenteric emboli and bowl infarction.  · If present, would be a relative indication for operative management of her valvular disease.  · Observation for now    Critical Care Time: >35 minutes  Critical secondary to Patient has a condition that poses threat to life and bodily function:  Bacterial endocarditis, septic pulmonary emboli, acute hypoxemic respiratory failure.     Critical care was time spent personally by me on the following activities: development of treatment plan with patient or surrogate and bedside caregivers, discussions with consultants, evaluation of patient's response to treatment, examination of patient, ordering and performing treatments and interventions, ordering and review of laboratory studies, ordering and review of radiographic studies, pulse oximetry, re-evaluation of patient's condition. This critical care time did not overlap with that of any other provider or involve time for any procedures.     Dyllan Rivera MD  Pulmonary / Critical Care Medicine  [facility]  09/28/2019  8:25 " PM

## 2019-09-29 NOTE — ASSESSMENT & PLAN NOTE
The patient finally admitted that she was using IV drugs to Dr. Bolden.  She does not want her family to know.  She states that she has only been using IV heroin for a few weeks

## 2019-09-29 NOTE — ASSESSMENT & PLAN NOTE
The patient is on Ancef and vancomycin for MSSA. DHARMESH showed tricuspid and mitral valve vegetations.    Blood cultures from the 25th are now positive.  ID consulted CV surgery because she is still bacteremic despite IV antibiotics.

## 2019-09-30 ENCOUNTER — OUTSIDE PLACE OF SERVICE (OUTPATIENT)
Dept: INFECTIOUS DISEASES | Facility: CLINIC | Age: 64
End: 2019-09-30
Payer: MEDICARE

## 2019-09-30 LAB
ANION GAP SERPL CALC-SCNC: 11 MMOL/L (ref 8–16)
BACTERIA BLD CULT: ABNORMAL
BACTERIA STL CULT: NORMAL
BSA FOR ECHO PROCEDURE: 1.37 M2
BUN SERPL-MCNC: 49 MG/DL (ref 8–23)
CALCIUM SERPL-MCNC: 8 MG/DL (ref 8.7–10.5)
CHLORIDE SERPL-SCNC: 100 MMOL/L (ref 95–110)
CO2 SERPL-SCNC: 34 MMOL/L (ref 23–29)
CREAT SERPL-MCNC: 1 MG/DL (ref 0.5–1.4)
CV ECHO LV RWT: 0.5 CM
E WAVE DECELERATION TIME: 205.49 MSEC
E/A RATIO: 0.62
ECHO LV POSTERIOR WALL: 0.97 CM (ref 0.6–1.1)
ERYTHROCYTE [DISTWIDTH] IN BLOOD BY AUTOMATED COUNT: 16.7 % (ref 11.5–14.5)
EST. GFR  (AFRICAN AMERICAN): >60 ML/MIN/1.73 M^2
EST. GFR  (NON AFRICAN AMERICAN): 59 ML/MIN/1.73 M^2
FRACTIONAL SHORTENING: 16 % (ref 28–44)
GLUCOSE SERPL-MCNC: 150 MG/DL (ref 70–110)
HCT VFR BLD AUTO: 26.6 % (ref 37–48.5)
HGB BLD-MCNC: 8.5 G/DL (ref 12–16)
INTERVENTRICULAR SEPTUM: 1.01 CM (ref 0.6–1.1)
IRON SERPL-MCNC: 26 UG/DL (ref 30–160)
LEFT INTERNAL DIMENSION IN SYSTOLE: 3.27 CM (ref 2.1–4)
LEFT VENTRICLE DIASTOLIC VOLUME INDEX: 47.44 ML/M2
LEFT VENTRICLE DIASTOLIC VOLUME: 65.77 ML
LEFT VENTRICLE MASS INDEX: 87 G/M2
LEFT VENTRICLE SYSTOLIC VOLUME INDEX: 31.1 ML/M2
LEFT VENTRICLE SYSTOLIC VOLUME: 43.07 ML
LEFT VENTRICULAR INTERNAL DIMENSION IN DIASTOLE: 3.9 CM (ref 3.5–6)
LEFT VENTRICULAR MASS: 120.39 G
MCH RBC QN AUTO: 31 PG (ref 27–31)
MCHC RBC AUTO-ENTMCNC: 32 G/DL (ref 32–36)
MCV RBC AUTO: 97 FL (ref 82–98)
MV PEAK A VEL: 0.99 M/S
MV PEAK E VEL: 0.61 M/S
PISA TR MAX VEL: 2.44 M/S
PLATELET # BLD AUTO: 148 K/UL (ref 150–350)
PMV BLD AUTO: 12 FL (ref 9.2–12.9)
POTASSIUM SERPL-SCNC: 2.9 MMOL/L (ref 3.5–5.1)
POTASSIUM SERPL-SCNC: 3 MMOL/L (ref 3.5–5.1)
POTASSIUM SERPL-SCNC: 3 MMOL/L (ref 3.5–5.1)
PULM VEIN S/D RATIO: 1.37
PV PEAK D VEL: 0.49 M/S
PV PEAK S VEL: 0.67 M/S
PYRIDOXAL SERPL-MCNC: <2 UG/L (ref 5–50)
RA PRESSURE: 3 MMHG
RBC # BLD AUTO: 2.74 M/UL (ref 4–5.4)
SATURATED IRON: 16 % (ref 20–50)
SODIUM SERPL-SCNC: 145 MMOL/L (ref 136–145)
TOTAL IRON BINDING CAPACITY: 160 UG/DL (ref 250–450)
TR MAX PG: 24 MMHG
TRANSFERRIN SERPL-MCNC: 108 MG/DL (ref 200–375)
TV REST PULMONARY ARTERY PRESSURE: 27 MMHG
VANCOMYCIN TROUGH SERPL-MCNC: 13.2 UG/ML (ref 10–22)
VIT B12 SERPL-MCNC: 1489 PG/ML (ref 210–950)
WBC # BLD AUTO: 10.49 K/UL (ref 3.9–12.7)

## 2019-09-30 PROCEDURE — 99233 SBSQ HOSP IP/OBS HIGH 50: CPT | Mod: S$GLB,,, | Performed by: INTERNAL MEDICINE

## 2019-09-30 PROCEDURE — 63600175 PHARM REV CODE 636 W HCPCS: Performed by: SPECIALIST

## 2019-09-30 PROCEDURE — A9585 GADOBUTROL INJECTION: HCPCS | Performed by: INTERNAL MEDICINE

## 2019-09-30 PROCEDURE — 87070 CULTURE OTHR SPECIMN AEROBIC: CPT

## 2019-09-30 PROCEDURE — 36415 COLL VENOUS BLD VENIPUNCTURE: CPT

## 2019-09-30 PROCEDURE — A4216 STERILE WATER/SALINE, 10 ML: HCPCS | Performed by: INTERNAL MEDICINE

## 2019-09-30 PROCEDURE — 80048 BASIC METABOLIC PNL TOTAL CA: CPT

## 2019-09-30 PROCEDURE — 87040 BLOOD CULTURE FOR BACTERIA: CPT

## 2019-09-30 PROCEDURE — 63600175 PHARM REV CODE 636 W HCPCS: Performed by: INTERNAL MEDICINE

## 2019-09-30 PROCEDURE — 76937 US GUIDE VASCULAR ACCESS: CPT

## 2019-09-30 PROCEDURE — 27000221 HC OXYGEN, UP TO 24 HOURS

## 2019-09-30 PROCEDURE — 94761 N-INVAS EAR/PLS OXIMETRY MLT: CPT

## 2019-09-30 PROCEDURE — 99233 PR SUBSEQUENT HOSPITAL CARE,LEVL III: ICD-10-PCS | Mod: S$GLB,,, | Performed by: INTERNAL MEDICINE

## 2019-09-30 PROCEDURE — B4185 PARENTERAL SOL 10 GM LIPIDS: HCPCS | Performed by: INTERNAL MEDICINE

## 2019-09-30 PROCEDURE — 84132 ASSAY OF SERUM POTASSIUM: CPT

## 2019-09-30 PROCEDURE — 83540 ASSAY OF IRON: CPT

## 2019-09-30 PROCEDURE — 36573 INSJ PICC RS&I 5 YR+: CPT

## 2019-09-30 PROCEDURE — 63600175 PHARM REV CODE 636 W HCPCS: Performed by: NURSE PRACTITIONER

## 2019-09-30 PROCEDURE — 20000000 HC ICU ROOM

## 2019-09-30 PROCEDURE — 25000003 PHARM REV CODE 250: Performed by: INTERNAL MEDICINE

## 2019-09-30 PROCEDURE — 25500020 PHARM REV CODE 255: Performed by: INTERNAL MEDICINE

## 2019-09-30 PROCEDURE — 92526 ORAL FUNCTION THERAPY: CPT

## 2019-09-30 PROCEDURE — 85027 COMPLETE CBC AUTOMATED: CPT

## 2019-09-30 PROCEDURE — 99232 SBSQ HOSP IP/OBS MODERATE 35: CPT | Mod: ,,, | Performed by: INTERNAL MEDICINE

## 2019-09-30 PROCEDURE — 80202 ASSAY OF VANCOMYCIN: CPT

## 2019-09-30 PROCEDURE — C9113 INJ PANTOPRAZOLE SODIUM, VIA: HCPCS | Performed by: NURSE PRACTITIONER

## 2019-09-30 PROCEDURE — 25000003 PHARM REV CODE 250: Performed by: SPECIALIST

## 2019-09-30 PROCEDURE — 99900035 HC TECH TIME PER 15 MIN (STAT)

## 2019-09-30 PROCEDURE — 84132 ASSAY OF SERUM POTASSIUM: CPT | Mod: 91

## 2019-09-30 PROCEDURE — 99232 PR SUBSEQUENT HOSPITAL CARE,LEVL II: ICD-10-PCS | Mod: ,,, | Performed by: INTERNAL MEDICINE

## 2019-09-30 PROCEDURE — 82607 VITAMIN B-12: CPT

## 2019-09-30 RX ORDER — FOLIC ACID 1 MG/1
2 TABLET ORAL DAILY
Status: DISCONTINUED | OUTPATIENT
Start: 2019-09-30 | End: 2019-10-02 | Stop reason: HOSPADM

## 2019-09-30 RX ORDER — POTASSIUM CHLORIDE 14.9 MG/ML
60 INJECTION INTRAVENOUS
Status: DISCONTINUED | OUTPATIENT
Start: 2019-09-30 | End: 2019-10-02 | Stop reason: HOSPADM

## 2019-09-30 RX ORDER — VANCOMYCIN HCL IN 5 % DEXTROSE 1G/250ML
1000 PLASTIC BAG, INJECTION (ML) INTRAVENOUS
Status: DISCONTINUED | OUTPATIENT
Start: 2019-09-30 | End: 2019-10-02

## 2019-09-30 RX ORDER — METOPROLOL TARTRATE 1 MG/ML
10 INJECTION, SOLUTION INTRAVENOUS EVERY 6 HOURS
Status: DISCONTINUED | OUTPATIENT
Start: 2019-10-01 | End: 2019-10-02 | Stop reason: HOSPADM

## 2019-09-30 RX ORDER — POTASSIUM CHLORIDE 29.8 MG/ML
80 INJECTION INTRAVENOUS
Status: DISCONTINUED | OUTPATIENT
Start: 2019-09-30 | End: 2019-10-02 | Stop reason: HOSPADM

## 2019-09-30 RX ORDER — ENALAPRILAT 1.25 MG/ML
2.5 INJECTION INTRAVENOUS 2 TIMES DAILY
Status: DISCONTINUED | OUTPATIENT
Start: 2019-10-01 | End: 2019-10-02 | Stop reason: HOSPADM

## 2019-09-30 RX ORDER — SODIUM CHLORIDE 0.9 % (FLUSH) 0.9 %
10 SYRINGE (ML) INJECTION EVERY 6 HOURS
Status: DISCONTINUED | OUTPATIENT
Start: 2019-09-30 | End: 2019-10-02 | Stop reason: HOSPADM

## 2019-09-30 RX ORDER — GADOBUTROL 604.72 MG/ML
4 INJECTION INTRAVENOUS
Status: COMPLETED | OUTPATIENT
Start: 2019-09-30 | End: 2019-09-30

## 2019-09-30 RX ORDER — POTASSIUM CHLORIDE 29.8 MG/ML
40 INJECTION INTRAVENOUS
Status: DISCONTINUED | OUTPATIENT
Start: 2019-09-30 | End: 2019-10-02 | Stop reason: HOSPADM

## 2019-09-30 RX ORDER — SODIUM CHLORIDE 0.9 % (FLUSH) 0.9 %
10 SYRINGE (ML) INJECTION
Status: DISCONTINUED | OUTPATIENT
Start: 2019-09-30 | End: 2019-10-02 | Stop reason: HOSPADM

## 2019-09-30 RX ADMIN — LORAZEPAM 1 MG: 2 INJECTION INTRAMUSCULAR; INTRAVENOUS at 05:09

## 2019-09-30 RX ADMIN — PANTOPRAZOLE SODIUM 40 MG: 40 INJECTION, POWDER, FOR SOLUTION INTRAVENOUS at 08:09

## 2019-09-30 RX ADMIN — MILRINONE LACTATE 0.25 MCG/KG/MIN: 200 INJECTION, SOLUTION INTRAVENOUS at 12:09

## 2019-09-30 RX ADMIN — FUROSEMIDE 20 MG: 10 INJECTION, SOLUTION INTRAVENOUS at 08:09

## 2019-09-30 RX ADMIN — HYDROMORPHONE HYDROCHLORIDE 1 MG: 2 INJECTION, SOLUTION INTRAMUSCULAR; INTRAVENOUS; SUBCUTANEOUS at 02:09

## 2019-09-30 RX ADMIN — METOPROLOL TARTRATE 7.5 MG: 5 INJECTION, SOLUTION INTRAVENOUS at 12:09

## 2019-09-30 RX ADMIN — SOYBEAN OIL 250 ML: 20 INJECTION, SOLUTION INTRAVENOUS at 09:09

## 2019-09-30 RX ADMIN — GADOBUTROL 4 ML: 604.72 INJECTION INTRAVENOUS at 02:09

## 2019-09-30 RX ADMIN — ENALAPRILAT 1.25 MG: 2.5 INJECTION INTRAVENOUS at 05:09

## 2019-09-30 RX ADMIN — POTASSIUM CHLORIDE 40 MEQ: 14.9 INJECTION, SOLUTION INTRAVENOUS at 04:09

## 2019-09-30 RX ADMIN — CEFAZOLIN SODIUM 2 G: 2 SOLUTION INTRAVENOUS at 07:09

## 2019-09-30 RX ADMIN — CEFAZOLIN SODIUM 2 G: 2 SOLUTION INTRAVENOUS at 02:09

## 2019-09-30 RX ADMIN — RETINOL, ERGOCALCIFEROL, .ALPHA.-TOCOPHEROL ACETATE, DL-, PHYTONADIONE, ASCORBIC ACID, NIACINAMIDE, RIBOFLAVIN 5-PHOSPHATE SODIUM, THIAMINE HYDROCHLORIDE, PYRIDOXINE HYDROCHLORIDE, DEXPANTHENOL, BIOTIN, FOLIC ACID, AND CYANOCOBALAMIN: KIT at 07:09

## 2019-09-30 RX ADMIN — VANCOMYCIN HYDROCHLORIDE 1000 MG: 1 INJECTION, POWDER, LYOPHILIZED, FOR SOLUTION INTRAVENOUS at 04:09

## 2019-09-30 RX ADMIN — ENALAPRILAT 1.25 MG: 2.5 INJECTION INTRAVENOUS at 07:09

## 2019-09-30 RX ADMIN — METOPROLOL TARTRATE 7.5 MG: 5 INJECTION, SOLUTION INTRAVENOUS at 05:09

## 2019-09-30 RX ADMIN — HYDROMORPHONE HYDROCHLORIDE 1 MG: 2 INJECTION, SOLUTION INTRAMUSCULAR; INTRAVENOUS; SUBCUTANEOUS at 08:09

## 2019-09-30 RX ADMIN — POTASSIUM CHLORIDE 80 MEQ: 29.8 INJECTION, SOLUTION INTRAVENOUS at 09:09

## 2019-09-30 RX ADMIN — CEFAZOLIN SODIUM 2 G: 2 SOLUTION INTRAVENOUS at 01:09

## 2019-09-30 RX ADMIN — Medication 10 ML: at 05:09

## 2019-09-30 RX ADMIN — DAPTOMYCIN 340 MG: 350 INJECTION, POWDER, LYOPHILIZED, FOR SOLUTION INTRAVENOUS at 05:09

## 2019-09-30 NOTE — ASSESSMENT & PLAN NOTE
Continue antibiotics as per Infectious Disease recommendations including Cubicin/vancomycin/Ancef.  Pharmacist is dozing vancomycin.

## 2019-09-30 NOTE — PROGRESS NOTES
Progress  Note  Infectious Disease    Reason for Consult:  sepsis    HPI: Mesha Mckenzie is a critically ill 63 y.o. female , [facility] with 4 days of nausea and vomiting, found to have ABIMBOLA, leukocytosis, hypotension, multiple pulmonary infiltrates, pancolitis on CT, metabolic acidosis and altered mental status. She was transferred for multispecialty care. Since admission, she has received vanc, levaquin and flagyl, still requires pressors, has 3/4 blood cultures with GPC in clusters, Ct chest with multiple foci of infiltrates including some that look like septic pulmonary emboli, elevated coags without bleeding and no further diarrheal stools. Her chemistries are responding to fluid and supplementation but her mental status remains very abnormal. She is in pain from her abdomen. It is unclear from notes and my conversation with daughter whether her respiratory symptoms came first or the GI symptoms came first. She has no history of STaph infection, no recent antibiotics, but does smoke and take pain meds, valium and adderall chronically.      9/21: blood cultures with STaph aureus, sensitivities pending. Blood cultures from yesterday are negative so far. No change in oxygen needs. No longer on pressors. Less responsive. TTE unofficially abnormal. Staph in urine is likely from the bacteremia. No family at bedside.   9/22: CT head with no significant changes. Mental status no better. Still no BM since admit. CXR slightly better. Blood cultures from yesterday negative and original is MSSA. coags still elevated.   Cr now normal.   9/23: afebrile. Blood cultures are persistently positive. Mental status is much improved and she can answer questions. She continues to moan and groan. She indicated to me that her anus is uncomfortable and needs to have a BM. She denies abd pain but grimaces with palpation. She denies new pain in her spine. She does indicate pain in the right foot. When I  asked what the lumps on her shoulders are from she indicates they are from falls. I told her that she was being treated for a Staph infection and I believe she understood.   9/24: afebrile. Blood cultures not drawn from 9/22, 23.  Much more alert and interactive. ADMITTED TO ME THAT SHE HAS BEEN INJECTING HEROIN, LAST DONE 2 WEEKS AGO. SHE DOES NOT WANT HER DAUGHTER TO KNOW AND TELLS ME THAT HER  KNOWS.  This certainly explains the Staph bacteremia and septic pulmonary emboli. CXR improving. Pain in back is generalized. She reports that she did have a BM but it is not recorded. She did take a few bites of pudding.   9/25:  Status post DHARMESH today with findings of bicuspid valve and mitral valve vegetations the.  Discussed with Cardiology.  Per nurse and he was less alert today and perhaps more confused.  Pain medication was decreased early urine she is complaining abdominal pain and back pain.  She is requiring BiPAP this evening he did receive 2 units of blood with 20 mg of Lasix between the units and did have good urine output but her potassium was low this morning requiring aggressive supplementation.  Blood cultures are now negative  9/26: BNP was 1400 last night. Received more lasix. Today increased shortness of breath and bradycardia. Seen by cardiology. Milrinone and bumex drips started to help her mobilize fluid. Extreme agitation prompted resumption of higher dose of opiates, possible withdrawal?. No new positive blood cultures. Creatinine stable and platelets 44k.   09/27 Same complaint of ll abd pain afeb, tmax 99.4 still tachycardic;  cardiologist tried dig. Diuresed well with bumex.  Nurse is working hard on replacing electrolytes. Patient had loose stools and needed flexiseal today   09/28  Afebrile, tmax 99.3, receiving TPN. Tachy improved.  Diarrhea yesterday, not as bad today. FOBT was +, Cdiff NEG.   blood cultures from 25th == BECAME positive   CXR improved with diuresis, off NIV today. On 5  L  9/29: tmax 101+. She is breathing more comfortably. Blood culture from 9/25 turned pos on 9/28. She is still sedated/encephalopathic. No focal deficits but unsure if her vision is intact. With talk of surgery, it is important that we establish whether she has had any CNS emboli. D/w her daughter and nurse at bedside.   9/30: fortunately MRA/MRI brain is negative for emboli or strokes. D/w Dr. Dobson and surgery on hold if no worsening of valvular function and clearing blood cultures.       Antibiotics (From admission, onward)    Start     Stop Route Frequency Ordered    09/30/19 0300  vancomycin in dextrose 5 % 1 gram/250 mL IVPB 1,000 mg      -- IV Every 24 hours (non-standard times) 09/30/19 0245    09/29/19 1730  DAPTOmycin (CUBICIN) 340 mg in sodium chloride 0.9% IVPB      -- IV Every 24 hours (non-standard times) 09/29/19 1628    09/22/19 1345  cefazolin (ANCEF) 2 gram in dextrose 5% 50 mL IVPB (premix)      -- IV Every 6 hours (non-standard times) 09/22/19 1244      VANC as per pharmacy       Antifungals (From admission, onward)    None        Antivirals (From admission, onward)    None          EXAM & DIAGNOSTICS REVIEWED:   Vitals:     Temp:  [97.6 °F (36.4 °C)-100.7 °F (38.2 °C)]   Temp: 99.6 °F (37.6 °C) (09/30/19 1541)  Pulse: 108 (09/30/19 1545)  Resp: (!) 54 (09/30/19 1545)  BP: (!) 148/67 (09/30/19 1530)  SpO2: (!) 94 % (09/30/19 1545)    Intake/Output Summary (Last 24 hours) at 9/30/2019 1623  Last data filed at 9/30/2019 1541  Gross per 24 hour   Intake 2363.07 ml   Output 2065 ml   Net 298.07 ml       General:  Arousable, attends, tries to cooperate     Eyes:  Anicteric, PERRL,  no sleral or conjunctival petechiae  ENT:  No ulcers, exudates, thrush, nares patent, dentition is poor and MM are  Dry,   Neck:  Supple,    Lungs: Patchy crackles,  With decreased BS RLL. Chest wall is tender(not new, ?bruised ribs from fall))  Heart:  RRR,  Tachy 120  Abd:  Soft, tender, guards less, minimal distention,    + BS, no masses or organomegaly appreciated.flexiseal  :   Henriquez, urine clear, no flank tenderness  Musc:  Joints without effusion, swelling, erythema, synovitis    Skin:  No rashes. No palmar or plantar lesions. No subungual petechiae. No skin lesions of DIC  Wound: Right hip abrasion, not infected, healed  Neuro: arousable, face symmetric, tongue protrudes in the midline,  moves all extremities, no focal weakness but generally weak.  vision is intact. sedated  Psych:  Still encephalopathic, can answer some questions. sedated  Lymphatic:        Extrem: generalized edema seems resolved , no erythema, phlebitis, cellulitis, peripheral pulses are 0-1, clubbing present,    VAD:  Left IJ TLC 9/19-9/30, picc line 9/30 RUE  Isolation:  none    Lines/Tubes/Drains:    General Labs reviewed:  Recent Labs   Lab 09/28/19  0403 09/29/19  0543 09/30/19  0121   WBC 14.18* 10.81 10.49   HGB 9.8* 8.1* 8.5*   HCT 29.8* 25.6* 26.6*   * 113* 148*       Recent Labs   Lab 09/26/19  0342  09/27/19  0609  09/28/19  0403 09/28/19  1943 09/29/19  0543 09/30/19  0121     --  141  --  142  --  143 145   K 3.2*   < > 2.4*   < > 3.4* 3.3* 3.5 3.0*     --  90*  --  93*  --  98 100   CO2 29  --  39*  --  37*  --  36* 34*   BUN 27*  --  36*  --  49*  --  55* 49*   CREATININE 0.7  --  0.8  --  1.0  --  1.0 1.0   CALCIUM 7.2*  --  7.8*  --  7.8*  --  7.9* 8.0*   PROT 5.1*  --  6.7  --  6.4  --   --   --    BILITOT 0.7  --  0.8  --  0.5  --   --   --    ALKPHOS 48*  --  66  --  74  --   --   --    ALT <5*  --  <5*  --  <5*  --   --   --    AST 11  --  15  --  15  --   --   --     < > = values in this interval not displayed.           Micro:  Microbiology Results (last 7 days)     Procedure Component Value Units Date/Time    IV catheter culture [[id number]] Collected:  09/30/19 1554    Order Status:  Sent Specimen:  Catheter Tip, Intrajugular Updated:  09/30/19 1556    Stool culture [[id number]] Collected:  09/26/19 1329    Order  Status:  Completed Specimen:  Stool Updated:  09/30/19 1355     Stool Culture No Salmonella,Shigella,Vibrio,Campylobacter,Yersinia isolated.    Blood culture [[id number]] Collected:  09/29/19 1800    Order Status:  Completed Specimen:  Blood from Line, Central Updated:  09/30/19 1115     Blood Culture, Routine No Growth to date    Narrative:       From TLC    Blood culture [[id number]] Collected:  09/30/19 0128    Order Status:  Sent Specimen:  Blood Updated:  09/30/19 1012    Blood culture [[id number]]  (Abnormal) Collected:  09/25/19 0413    Order Status:  Completed Specimen:  Blood Updated:  09/30/19 1007     Blood Culture, Routine Gram stain peds bottle: Gram positive cocci in clusters resembling Staph       Results called to and read back by:Dunia Keating RN 09/28/2019  00:29      STAPHYLOCOCCUS AUREUS  ID consult required at [facility] and [facility].  For susceptibility see order #[id number]      Blood culture [[id number]]  (Abnormal)  (Susceptibility) Collected:  09/24/19 0517    Order Status:  Completed Specimen:  Blood Updated:  09/30/19 1003     Blood Culture, Routine Gram stain peds bottle: Gram positive cocci in clusters resembling Staph       Results called to and read back by: Liset Arroyo RN  09/28/2019  12:30      STAPHYLOCOCCUS AUREUS  ID consult required at [facility] and [facility].      Blood culture [[id number]] Collected:  09/26/19 0443    Order Status:  Completed Specimen:  Blood Updated:  09/30/19 0304     Blood Culture, Routine Gram stain peds bottle: Gram positive cocci in clusters resembling Staph       Results called to and read back by:Patria Daugherty RN 09/30/2019  03:04    Blood culture [[id number]]     Order Status:  Canceled Specimen:  Blood     Clostridium difficile EIA [[id number]] Collected:  09/27/19 2013    Order Status:  Completed Specimen:  Stool Updated:  09/27/19 2228     C. diff Antigen Negative     C difficile Toxins A+B, EIA  Negative     Comment: Testing not recommended for children <24 months old.       E. coli 0157 antigen [[id number]] Collected:  09/26/19 1329    Order Status:  Canceled Specimen:  Stool     Blood culture [[id number]]  (Abnormal)  (Susceptibility) Collected:  09/22/19 0338    Order Status:  Completed Specimen:  Blood from Antecubital, Left  Arm Updated:  09/25/19 1028     Blood Culture, Routine Gram stain peds bottle: Gram positive cocci in clusters resembling Staph       Results called to and read back by:Yolis Wan RN 09/23/2019  04:46      STAPHYLOCOCCUS AUREUS  ID consult required at [facility].      Blood culture [[id number]]  (Abnormal) Collected:  09/21/19 0534    Order Status:  Completed Specimen:  Blood Updated:  09/24/19 0954     Blood Culture, Routine Gram stain peds bottle: Gram positive cocci in clusters resembling Staph       Results called to and read back by:Javi Sunshine RN 09/22/2019  14:22      STAPHYLOCOCCUS AUREUS  ID consult required at [facility].  For susceptibility see order #[id number]          Culture, Routine Abnormal    STAPHYLOCOCCUS AUREUS   ID consult required at [facility].     Resulting Agency [facility]   Susceptibility      Staphylococcus aureus     CULTURE, BLOOD     Clindamycin <=0.5 mcg/mL Sensitive     Erythromycin <=0.5 mcg/mL Sensitive     Oxacillin <=0.25 mcg/mL Sensitive     Penicillin <=0.03 mcg/mL Sensitive     Tetracycline <=4 mcg/mL Sensitive     Trimeth/Sulfa <=0.5/9.5 m... Sensitive           Imaging Reviewed:   CXR  Improvement in the appearance of the chest compared to the prior exam with decrease of the bilateral nodular opacifications.     CT ABD and Pelvis :   Chest; persistent shattered bilateral consolidative processes which have undergone cavitation/cystic spaces within them  Small left and moderate size right pleural effusion increased in  size  Pericardial effusion appearing small but mildly increased in size  Abdomen and pelvis; mild distention of colon with prominent amount of fluid suggesting ileus.  Very mild colitis cannot be excluded but with no appreciable bowel wall thickening or pericolonic inflammatory change.  Additional findings as detailed above      Cardiology:  TTE 9/20  Left Ventricle Low normal ejection fraction at 50%. Mild concentric observed. Grade I (mild) left ventricular diastolic dysfunction consistent with impaired relaxation. Elevated left atrial pressure.   Right Ventricle Normal cavity size, wall thickness and systolic function. Wall motion normal.   Left Atrium The left atrium is normal.   Right Atrium There is normal right atrial size.   Aortic Valve The valve is trileaflet. There is normal leaflet mobility.   Mitral Valve Normal valve structure. Mild regurgitation. Normal leaflet mobility.   Tricuspid Valve The tricuspid valve is normal. Trace regurgitation. Pulmonary hypertension present. The estimated PA systolic pressure is 48 mmHg.   Pulmonic Valve Normal valve structure. Trace regurgitation.   IVC/SVC Normal central venous pressure (3 mm Hg).   Ascending Aorta The aortic root and ascending aorta are normal in size.   Pericardium No pericardial effusion. Respiratory variation of mitral valve inflow is less than 30%.     DHARMESH 9/25  · Mild left ventricular enlargement.  · Low normal left ventricular systolic function. The estimated ejection fraction is 50%  · Normal LV diastolic function.  · Mild left atrial enlargement.  · No interatrial septal defect present.  · Normal appearing left atrial appendage. No thrombus is present in the appendage. Normal appendage velocities.  · Moderate-to-severe mitral regurgitation.  · Moderate tricuspid regurgitation.  · 1x.5 cmmobile vegetation on tricuspid valve septal leaflet atrial side  · 5mmx6 mm mobile vegetation on posterior leaflet P3 scallop on atrial side  · Aortic valve  wnl    ECHO 09/26   · Mild concentric left ventricular hypertrophy.  · Mild left ventricular enlargement.  · Moderately decreased left ventricular systolic function. The estimated ejection fraction is 33%  · Grade III (severe) left ventricular diastolic dysfunction consistent with restrictive physiology.  · Mild global hypokinetic wall motion.  · Normal right ventricular systolic function.  · Mild left atrial enlargement.  · Mild right atrial enlargement.  · There is a right atrial mass present. Echo lucent mobile small vegetation ssen at R atrial - TV septal leaflet junction In hi R atrium At septal surface there is more echo dense structure ( ~ 1.5x .8 cm) mobile which could be vegetation vs normal prominent Eustachian valve  · Moderate-to-severe mitral regurgitation.  · Normal central venous pressure (3 mm Hg).  · The estimated PA systolic pressure is 50 mm Hg  · Moderate to severe pulmonary hypertension present.    IMPRESSION & PLAN     1.  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   Leukocytosis, resolved   Procal 3     IVDA/heroin    Pleural effusions with decreased EF, on milrinone, improved     2. Pancolitis. ? if she has ischemic colitis from sepsis and volume depletion. diarrhea today on 27 . CT abd improved 9/26  3. Shortness of breath, right pleural effusion x-ray, improved on 27th, NIV then NC5-6 L  4. opiate dependence, severe lumbar spine abnormalities  6. Altered mental status. From sepsis? +/- from unrecognized stroke or embolus, non focal and MRI brain negative 9/30   High risk for deterioration      Recommendations:  -  Cardiologist,  Dr Dobson has spoken with vascular surgeon re poss surgery and for now this is on hold.   -  Cont Vanc + cefazolin and daptomycin  -check blood cultures,  urine culture and get TLC changed to PICC  -serial blood cultures \  -we need to reduce pain meds so that she can wake up and mobilize  I would like to image lumbar spine as well, now that I know she can lay flat  If fever persists despite getting the TLC out, would obtain MRI L spine and repeat Ct abd to look at bowels

## 2019-09-30 NOTE — ASSESSMENT & PLAN NOTE
· Transition to nasal cannula this morning and doing relatively well  · Wean supplemental oxygen as tolerated.  Titrate nasal cannula to maintain SpO2 greater than 92%.  · Observation with continued diuresis.  · Ideally, pleural disease would be addressed, but clinical/radiographic improvement with diuresis and absent evidence worsening infection suggest against a pleural space infection.  · Additionally, the technical difficulty and increased risk of bleeding in this situation make the risks outweigh the benefits of drainage of pleural effusions at the moment.

## 2019-09-30 NOTE — PLAN OF CARE
Problem: SLP Goal  Goal: SLP Goal  Description    1) Pt will consume regular textures and thin liquids with no overt s/s swallow dysfunction  2) Follow 3 step commands, 100% acc indep.  3) Verbally solve functional problems (math, time, daily activities) 90% acc indep  4) Ongoing eval of speech-language cognition.      Outcome: Ongoing, Progressing    Pt continues with AMS and poor level of alertness. Opens eyes briefly to repeated stimulation. Improved tolerance of ice chips, water via tsp and applesauce but with limited acceptance. Remains at risk for aspiration 2' decreased level of alertness and AMS. Will continue to follow daily with goal for PO when deemed appropriate.

## 2019-09-30 NOTE — CARE UPDATE
09/30/19 0744   Patient Assessment/Suction   Level of Consciousness (AVPU) responds to voice   PRE-TX-O2   O2 Device (Oxygen Therapy) nasal cannula   $ Is the patient on Low Flow Oxygen? Yes   Flow (L/min) 4   SpO2 99 %   Pulse Oximetry Type Continuous   $ Pulse Oximetry - Multiple Charge Pulse Oximetry - Multiple   Pulse (!) 111   Resp (!) 37   Wound Care   $ Wound Care Tech Time 15 min   Area of Concern Cheek;Bridge of nose;Chin   Skin Color/Characteristics without discoloration   Skin Temperature warm   Preset CPAP/BiPAP Settings   Mode Of Delivery Standby

## 2019-09-30 NOTE — PROGRESS NOTES
Progress Note  PULMONARY    Admit Date: 9/19/2019 09/30/2019      SUBJECTIVE:     Sept 21, diffuse pains, off levo, abg better.    Sept 22, on niv, arouses sl, no c/o  Sept 23, moans and groans, on and off bipap, agitated, restless  Sept 24, moans, agitation varies-patient says she is better today, seems brighter  Sept 25, calm/sedate on niv, looks much more comfortable, arouses  Sept 26, still uses bipap periodic- had spell distress respiratory, then guadalupe to 40's.  ,   Sept 30 , no new c/o, off niv.        PFSH and Allergies reviewed.    OBJECTIVE:     Vitals (Most recent):  Vitals:    09/30/19 1200   BP: (!) 149/67   Pulse: (!) 116   Resp: (!) 32   Temp: (!) 100.7 °F (38.2 °C)       Vitals (24 hour range):  Temp:  [97.6 °F (36.4 °C)-101 °F (38.3 °C)]   Pulse:  []   Resp:  [23-58]   BP: (124-187)/(55-94)   SpO2:  [91 %-100 %]       Intake/Output Summary (Last 24 hours) at 9/30/2019 1312  Last data filed at 9/30/2019 1200  Gross per 24 hour   Intake 2315.82 ml   Output 2210 ml   Net 105.82 ml          Physical Exam:  The patient's neuro status (alertness,orientation,cognitive function,motor skills,), pharyngeal exam (oral lesions, hygiene, abn dentition,), Neck (jvd,mass,thyroid,nodes in neck and above/below clavicle),RESPIRATORY(symmetry,effort,fremitus,percussion,auscultation),  Cor(rhythm,heart tones including gallops,perfusion,edema)ABD(distention,hepatic&splenomegaly,tenderness,masses), Skin(rash,cyanosis),Psyc(affect,judgement,).  Exam negative except for these pertinent findings:    Calm and sedate now but arouses on niv, chest is symmetric, no distress, normal percussion, normal fremitus and good normal breath sounds, no edema     Radiographs reviewed: view by direct vision 26 infiltrates, right effusion still.     Ct chest on 26th with variable infiltrates - some worse but only mildly, right effusion is larger, somewhat loculated,  Labs     Recent Labs   Lab 09/30/19  0121   WBC 10.49   HGB 8.5*    HCT 26.6*   *     Recent Labs   Lab 09/30/19  0121      K 3.0*      CO2 34*   BUN 49*   CREATININE 1.0   *   CALCIUM 8.0*     No results for input(s): PH, PCO2, PO2, HCO3 in the last 24 hours.  Microbiology Results (last 7 days)     Procedure Component Value Units Date/Time    Stool culture [[id number]] Collected:  09/26/19 1329    Order Status:  Completed Specimen:  Stool Updated:  09/30/19 1248     Stool Culture Nothing significant to date    Blood culture [[id number]] Collected:  09/29/19 1800    Order Status:  Completed Specimen:  Blood from Line, Central Updated:  09/30/19 1115     Blood Culture, Routine No Growth to date    Narrative:       From TLC    Blood culture [[id number]] Collected:  09/30/19 0128    Order Status:  Sent Specimen:  Blood Updated:  09/30/19 1012    Blood culture [[id number]]  (Abnormal) Collected:  09/25/19 0413    Order Status:  Completed Specimen:  Blood Updated:  09/30/19 1007     Blood Culture, Routine Gram stain peds bottle: Gram positive cocci in clusters resembling Staph       Results called to and read back by:Dunia Keating RN 09/28/2019  00:29      STAPHYLOCOCCUS AUREUS  ID consult required at [facility].  For susceptibility see order #[id number]      Blood culture [[id number]]  (Abnormal)  (Susceptibility) Collected:  09/24/19 0517    Order Status:  Completed Specimen:  Blood Updated:  09/30/19 1003     Blood Culture, Routine Gram stain peds bottle: Gram positive cocci in clusters resembling Staph       Results called to and read back by: Liset Arroyo RN  09/28/2019  12:30      STAPHYLOCOCCUS AUREUS  ID consult required at [facility].      Blood culture [[id number]] Collected:  09/26/19 0443    Order Status:  Completed Specimen:  Blood Updated:  09/30/19 0304     Blood Culture, Routine Gram stain peds bottle: Gram positive cocci in clusters resembling Staph       Results  called to and read back by:Patria Daugherty RN 09/30/2019  03:04    IV catheter culture [[id number]]     Order Status:  No result Specimen:  Catheter Tip, Intrajugular     Blood culture [[id number]]     Order Status:  Canceled Specimen:  Blood     Clostridium difficile EIA [[id number]] Collected:  09/27/19 2013    Order Status:  Completed Specimen:  Stool Updated:  09/27/19 2228     C. diff Antigen Negative     C difficile Toxins A+B, EIA Negative     Comment: Testing not recommended for children <24 months old.       E. coli 0157 antigen [[id number]] Collected:  09/26/19 1329    Order Status:  Canceled Specimen:  Stool     Blood culture [[id number]]  (Abnormal)  (Susceptibility) Collected:  09/22/19 0338    Order Status:  Completed Specimen:  Blood from Antecubital, Left  Arm Updated:  09/25/19 1028     Blood Culture, Routine Gram stain peds bottle: Gram positive cocci in clusters resembling Staph       Results called to and read back by:Yolis Wan RN 09/23/2019  04:46      STAPHYLOCOCCUS AUREUS  ID consult required at [facility].      Blood culture [[id number]]  (Abnormal) Collected:  09/21/19 0534    Order Status:  Completed Specimen:  Blood Updated:  09/24/19 0954     Blood Culture, Routine Gram stain peds bottle: Gram positive cocci in clusters resembling Staph       Results called to and read back by:Javi Sunshine RN 09/22/2019  14:22      STAPHYLOCOCCUS AUREUS  ID consult required at [facility].  For susceptibility see order #[id number]        Results for ROM YOUNG (MRN [id number]) as of 9/23/2019 07:24   Ref. Range 9/22/2019 16:19   POC PH Latest Ref Range: 7.35 - 7.45  7.488 (H)   POC PCO2 Latest Ref Range: 35 - 45 mmHg 27.0 (LL)   POC PO2 Latest Ref Range: 80 - 100 mmHg 69 (L)   POC BE Latest Ref Range: -2 to 2 mmol/L -3   POC HCO3 Latest Ref Range: 24 - 28 mmol/L 20.5 (L)   POC SATURATED O2 Latest Ref Range: 95 - 100 % 95        Impression:  Active Hospital Problems    Diagnosis  POA    *Acute bacterial endocarditis [I33.0]  Yes    Hyperglycemia [R73.9]  No    Acute septic pulmonary embolism [I26.90]  Yes    Tricuspid valve vegetation [I33.0]  Yes    Mitral valve vegetation [I33.0]  Yes    Hypomagnesemia [E83.42]  No    Thrombocytopenia, acquired [D69.6]  Yes    IV drug user [F19.90]  Yes    Acute on chronic diastolic congestive heart failure [I50.33]  Yes    Encephalopathy, metabolic [G93.41]  No    Normocytic anemia [D64.9]  Yes    Severe malnutrition [E43]  Yes    Staphylococcus aureus bacteremia with sepsis [A41.01]  Yes    Pleural effusion, bilateral [J90]  Yes    Acute hypoxemic respiratory failure [J96.01]  Yes    ABIMBOLA (acute kidney injury) [N17.9]  Yes    Pancolitis [K51.00]  Yes    Hypokalemia [E87.6]  No      Resolved Hospital Problems    Diagnosis Date Resolved POA    Pulmonary hypertension [I27.20] 09/23/2019 Yes    DIC (disseminated intravascular coagulation) [D65] 09/23/2019 Yes    Coagulopathy [D68.9] 09/27/2019 Yes    ILD (interstitial lung disease) [J84.9] 09/23/2019 Yes    COPD with acute lower respiratory infection [J44.0] 09/23/2019 Yes    Calcification of aorta [I70.0] 09/23/2019 Yes     Defer to primary control of cholesterol and bp.          BOOP (bronchiolitis obliterans with organizing pneumonia) [J84.89] 09/23/2019 Yes    Intravascular volume depletion [E86.1] 09/23/2019 Yes    Hyponatremia [E87.1] 09/23/2019 Yes    Metabolic acidosis [E87.2] 09/27/2019 Yes    CAP (community acquired pneumonia) [J18.9] 09/23/2019 Yes               Plan:   Sept 21, wbc now 9k, creat down to 1.2, pt uses narcotics chr - be - 10 last pm from -15 on 19th,  F/u cxr- dose ativan.  Hard to evaluate- seems much better save pain c/o. F/u cxr am  Sept 22, seems better but hard to eval- certainly improved from admit.  Monitor, support/niv, abx, re eval.  No change.   Sept 23 - MSSA, right effusion stable with  inr over 2 and plt 60's- no plan to tap at this time. Infiltrates better, encephalopathy still impressive= niv/bipap for part resp and part agitation.    Pt doesn't have cryptogenic organizing pneumonia/idiopathetic BOOP.  Pt had diastolic chf acute and chr.  Also pulm hypertension.  Has dic also- looked good on ct abd.    Sept 24- inr now over 5 with plt 50's,cxr better.    At the bedside patient is much better.  Coagulopathies impressive.  Suspect liver failure (?).  Patient apparently does better with Dilaudid over morphine.  Will change  To Dilaudid    Sept 25- ID notes noted, inr good today, check cxr am, seems to be improved.  Would decrease dilaudid from 2 to 1 q3prn.    Sept 26,   messey picture. Infection seems controlled.  Would be good to tap effusion but plt out- will try to have pt sit upright- pt developed extremis needing iv dilaudid.      Discussed with Dr Anna- seems to need high dose narcotics to keep comfortable- very compensated looking with higher dose.  Abstinence part of problem?    Hold off tap til more stable to sit up,do not think chest tube will help.      Sept 30- plts over 140, needs f/u cxr for effusion.  Temp 101 yesterday.    .

## 2019-09-30 NOTE — PLAN OF CARE
C/O pain throughout the day, medicated once. Per MDs reduce pain meds as much as possible. MRI/MRA done today. Neuro consulted and agree with MRI/MRA. VERENA PICC placed and LIJ TLC removed and tip sent for culture as per orders. Dr. Dobson called and reports that he will be in to assess at 1730, but has not rounded as of this time. Skin bruised but intact. Safety maintained.

## 2019-09-30 NOTE — PT/OT/SLP PROGRESS
"Speech Language Pathology Treatment    Patient Name:  Mesha Mckenzie   MRN:  [id number]  Admitting Diagnosis: Acute bacterial endocarditis    Recommendations:                 General Recommendations:  Dysphagia therapy and Cognitive-linguistic therapy  Diet recommendations:  NPO, Liquid Diet Level: NPO(allow ice chips per RN after mouth care)   Aspiration Precautions: Frequent oral care and Ice chips sparingly   General Precautions: Standard, fall, respiratory, aspiration  Communication strategies:  provide increased time to answer    Subjective     "I gotta put the fan on."    Pain/Comfort:  · Pain Rating 1: 10/10  · Location 1: ("All over")  · Pain Addressed 1: Distraction(Nursing present and aware)    Objective:   Pt seen for ongoing swallow assessment. Remains in ICU and currently of O2 via nasal cannula. Pt asleep upon entrance into room. Opens eyes briefly to verbal and tactile stimulation. Required frequent verbal and tactile stimulation to open yes eyes t/o session. She is unable to maintain alertness more than a few seconds without stimulation. Pt noted to have dried mucous to velum and uvula. Oral care with suctioning provided; pt tolerated fairly. Pt consumed ice chips, water via tsp and a single 1/2 sp of applesauce without overt s/s penetration/aspiration. She was unable to draw liquid from straw due to AMS and biting down on straw. She required cues for lip closure around utensil. Limited acceptance of PO trials today; pt refusal.     Has the patient been evaluated by SLP for swallowing?   Yes  Keep patient NPO? Yes(x ic chips)   Current Respiratory Status: nasal cannula      Assessment:     Mesha Mckenzie is a 63 y.o. female with an SLP diagnosis of Dysphagia and Cognitive-Linguistic Impairment. Pt continues with AMS and poor level of alertness. Opens eyes briefly to repeated stimulation; unable to maintain alertness. Improved tolerance of ice chips, water via tsp and applesauce but with limited " acceptance. Remains at risk for aspiration 2' decreased level of alertness and AMS. Will continue to follow daily with goal for PO when deemed appropriate. continued AMS with poor level of alertness. Consider alternative means of nutrition/hydration.     Goals:   Multidisciplinary Problems     SLP Goals        Problem: SLP Goal    Goal Priority Disciplines Outcome   SLP Goal     SLP Ongoing, Progressing   Description:    1) Pt will consume regular textures and thin liquids with no overt s/s swallow dysfunction  2) Follow 3 step commands, 100% acc indep.  3) Verbally solve functional problems (math, time, daily activities) 90% acc indep  4) Ongoing eval of speech-language cognition.                       Plan:     · Patient to be seen:  6 x/week   · Plan of Care expires:  10/15/19  · Plan of Care reviewed with:  patient, daughter   · SLP Follow-Up:  Yes       Discharge recommendations:      Barriers to Discharge:  None    Time Tracking:     SLP Treatment Date:   09/30/19  Speech Start Time:  0925  Speech Stop Time:  0956     Speech Total Time (min):  31 min    Billable Minutes: Treatment Swallowing Dysfunction 31 and Total Time 31    Alondra Mckay CCC-SLP  09/30/2019

## 2019-09-30 NOTE — PROGRESS NOTES
Pulmonary / Critical Care Medicine  Progress Note    S: No major issues overnight.  Subjectively unchanged over the past 24 hr.  Abdominal pain persists unchanged.  Remains on Milrinone infusion.  Only intermittently requiring noninvasive ventilation over the past 24 hr..   Review of Systems   Constitutional: Negative for fever.   Respiratory: Positive for shortness of breath. Negative for cough, hemoptysis, sputum production, wheezing and pleurisy.    Cardiovascular: Negative for chest pain, palpitations and leg swelling.   Genitourinary: Negative for hematuria.   Skin: Negative for rash.   Gastrointestinal: Positive for abdominal pain. Negative for nausea, vomiting and abdominal distention.   Psychiatric/Behavioral: Negative for confusion.      Reviewed:  PMH, ROS, Family History, Social History, Surgical History, Meds, Allergies, Vitals and Relevant Results.  Nursing/provider documentation from the past 24 hours.       O: VS: Temp:  [98.3 °F (36.8 °C)-101 °F (38.3 °C)]   Pulse:  []   Resp:  [19-60]   BP: ()/()   SpO2:  [90 %-100 %]   Female patients must weigh at least 45.5 kg to calculate ideal body weight    I/O:   Intake/Output Summary (Last 24 hours) at 9/29/2019 2005  Last data filed at 9/29/2019 1800  Gross per 24 hour   Intake 2612.3 ml   Output 2785 ml   Net -172.7 ml         Vent: SpO2 99% on 2 L NC  Oxygen Concentration (%):  [40] 40     PE Physical Exam   Constitutional: She is oriented to person, place, and time. She appears cachectic. She is cooperative.  Non-toxic appearance. She appears ill. No distress. She is restrained. Nasal cannula in place.   HENT:   Head: Normocephalic.   Right Ear: External ear normal.   Left Ear: External ear normal.   Mouth/Throat: Mallampati Score: II.   Neck: Normal range of motion. Neck supple. No JVD present. No tracheal deviation present. No thyromegaly present.   Cardiovascular: Normal rate, intact distal pulses and normal pulses.   Murmur  heard.  Pulmonary/Chest: Normal expansion, symmetric chest wall expansion and effort normal. No stridor. No respiratory distress. She has decreased breath sounds (at both bases). She has no wheezes. She has no rhonchi. She has no rales. She exhibits no tenderness.   Abdominal: Soft. Bowel sounds are normal. She exhibits no distension. There is tenderness. There is no rebound.   Musculoskeletal: Normal range of motion. She exhibits no edema, tenderness or deformity.   Neurological: She is alert and oriented to person, place, and time. She has normal strength. She is not disoriented. She displays atrophy. She displays no tremor. No cranial nerve deficit or sensory deficit. She exhibits normal muscle tone. GCS eye subscore is 4. GCS verbal subscore is 5. GCS motor subscore is 6.   Skin: Skin is warm and dry. No rash noted. No cyanosis. Nails show no clubbing.   Psychiatric: She has a normal mood and affect.   Nursing note and vitals reviewed.       Lines Henriquez, TLC    Labs All pertinent labs within the past 24 hours have been reviewed.  Recent Labs   Lab 09/29/19  0543   WBC 10.81   RBC 2.63*   HGB 8.1*   HCT 25.6*   *   MCV 97   MCH 30.8   MCHC 31.6*      K 3.5   CL 98   CO2 36*   BUN 55*   CREATININE 1.0       Imaging I have reviewed and interpreted all pertinent imaging results/findings within the past 24 hours.  No new images this morning.  CXR  (09/28/19 ) Stable appearance of the chest demonstrating multifocal infiltrates and nodular opacities and small pleural effusions.  I independently viewed the above imaging/lab studies in addition to reviewing the report       Micro Blood culture from September 26, 2019 no growth to date.    Medications Scheduled    ceFAZolin (ANCEF) IVPB  2 g Intravenous Q6H    DAPTOmycin (CUBICIN)  IV  8 mg/kg Intravenous Q24H    enalaprilat  1.25 mg Intravenous BID    furosemide  20 mg Intravenous Daily    metoprolol  7.5 mg Intravenous Q6H    pantoprazole  40 mg  Intravenous Daily    vancomycin (VANCOCIN) IVPB  750 mg Intravenous Q24H      Continuous Infusions:    Amino acid 5% - dextrose 15% (CLINIMIX-E) solution with additives (1L  provides 510 kcal/L dextrose, with 50 gm AA, 150 gm CHO, Na 35, K 30, Mg 5, Ca 4.5, Acetate 80, Cl 39, Phos 15)      milrinone 20mg/100ml D5W (200mcg/ml) 0.25 mcg/kg/min (09/28/19 4707)      PRN   sodium chloride, bisacodyl, HYDROmorphone, influenza, lorazepam, magnesium sulfate IVPB, magnesium sulfate IVPB, ondansetron, pneumoc 13-susanna conj-dip cr(PF), potassium chloride in water, sodium chloride 0.9%        Assessment/Plan:     Problem   Acute Bacterial Endocarditis   Acute Septic Pulmonary Embolism   Staphylococcus Aureus Bacteremia With Sepsis   Acute Hypoxemic Respiratory Failure   Mitral Valve Vegetation   Acute On Chronic Diastolic Congestive Heart Failure   Pleural Effusion, Bilateral   Tricuspid Valve Vegetation   Alireza (Acute Kidney Injury)   Encephalopathy, Metabolic   IV Drug User   Thrombocytopenia, Acquired   Normocytic Anemia   Severe Malnutrition     Neuro  · Avoidance of deleriogenic medications.  · Low threshold for repeating CT head to evaluate for embolic disease.  · Continue to address underlying acute condition.  · Supportive care.    Pulmonary  · Transition to nasal cannula this morning and doing relatively well  · Wean supplemental oxygen as tolerated.  Titrate nasal cannula to maintain SpO2 greater than 92%.  · Observation with continued diuresis.  · Ideally, pleural disease would be addressed, but clinical/radiographic improvement with diuresis and absent evidence worsening infection suggest against a pleural space infection.  · Additionally, the technical difficulty and increased risk of bleeding in this situation make the risks outweigh the benefits of drainage of pleural effusions at the moment.    Cardiac/Vascular  · Hemodynamically stable.  · Continued diuresis.  · Cardiology following.  Remains on  Milrinone.  · Continue antibiotics at the direction of ID.  Blood Cx sterile for several days.    Renal/  · Adequate urine output and relatively stable renal function.  · Monitor and replace electrolytes with continued robust diuresis.    ID  · Continue antibiotics.  · ID managing antibiotic selection.    Hematology  · No indication for blood products at the moment.  · Continue to monitor H/H and anemia.  · Hematology following for thrombocytopenia.    Endocrine  · Remains on TPN for malnutrition.  · Ideally would be on enteral feeds, but ongoing colitis is precluding tube feeds at the moment.    Critical Care Time: >35 minutes  Critical secondary to Patient has a condition that poses threat to life and bodily function:  Bacterial endocarditis, MSSA bacteremia, septic pulmonary emboli, acute hypoxemic respiratory failure.     Critical care was time spent personally by me on the following activities: development of treatment plan with patient or surrogate and bedside caregivers, discussions with consultants, evaluation of patient's response to treatment, examination of patient, ordering and performing treatments and interventions, ordering and review of laboratory studies, ordering and review of radiographic studies, pulse oximetry, re-evaluation of patient's condition. This critical care time did not overlap with that of any other provider or involve time for any procedures.     Dyllan Rivera MD  Pulmonary / Critical Care Medicine  Formerly [facility], [facility]

## 2019-09-30 NOTE — PROGRESS NOTES
Spoke with Dr. Covarrubias concerning right arm PICC placed today, order received and placed on chart, okay to use PICC.

## 2019-09-30 NOTE — SUBJECTIVE & OBJECTIVE
Pulmonary / Critical Care Medicine  Progress Note    S: No major issues overnight.  Subjectively unchanged over the past 24 hr.  Abdominal pain persists unchanged.  Remains on Milrinone infusion.  Only intermittently requiring noninvasive ventilation over the past 24 hr..   Review of Systems   Constitutional: Negative for fever.   Respiratory: Positive for shortness of breath. Negative for cough, hemoptysis, sputum production, wheezing and pleurisy.    Cardiovascular: Negative for chest pain, palpitations and leg swelling.   Genitourinary: Negative for hematuria.   Skin: Negative for rash.   Gastrointestinal: Positive for abdominal pain. Negative for nausea, vomiting and abdominal distention.   Psychiatric/Behavioral: Negative for confusion.      Reviewed:  PMH, ROS, Family History, Social History, Surgical History, Meds, Allergies, Vitals and Relevant Results.  Nursing/provider documentation from the past 24 hours.       O: VS: Temp:  [98.3 °F (36.8 °C)-101 °F (38.3 °C)]   Pulse:  []   Resp:  [19-60]   BP: ()/()   SpO2:  [90 %-100 %]   Female patients must weigh at least 45.5 kg to calculate ideal body weight    I/O:   Intake/Output Summary (Last 24 hours) at 9/29/2019 2005  Last data filed at 9/29/2019 1800  Gross per 24 hour   Intake 2612.3 ml   Output 2785 ml   Net -172.7 ml         Vent: SpO2 99% on 2 L NC  Oxygen Concentration (%):  [40] 40     PE Physical Exam   Constitutional: She is oriented to person, place, and time. She appears cachectic. She is cooperative.  Non-toxic appearance. She appears ill. No distress. She is restrained. Nasal cannula in place.   HENT:   Head: Normocephalic.   Right Ear: External ear normal.   Left Ear: External ear normal.   Mouth/Throat: Mallampati Score: II.   Neck: Normal range of motion. Neck supple. No JVD present. No tracheal deviation present. No thyromegaly present.   Cardiovascular: Normal rate, intact distal pulses and normal pulses.   Murmur  heard.  Pulmonary/Chest: Normal expansion, symmetric chest wall expansion and effort normal. No stridor. No respiratory distress. She has decreased breath sounds (at both bases). She has no wheezes. She has no rhonchi. She has no rales. She exhibits no tenderness.   Abdominal: Soft. Bowel sounds are normal. She exhibits no distension. There is tenderness. There is no rebound.   Musculoskeletal: Normal range of motion. She exhibits no edema, tenderness or deformity.   Neurological: She is alert and oriented to person, place, and time. She has normal strength. She is not disoriented. She displays atrophy. She displays no tremor. No cranial nerve deficit or sensory deficit. She exhibits normal muscle tone. GCS eye subscore is 4. GCS verbal subscore is 5. GCS motor subscore is 6.   Skin: Skin is warm and dry. No rash noted. No cyanosis. Nails show no clubbing.   Psychiatric: She has a normal mood and affect.   Nursing note and vitals reviewed.       Lines Henriquez, TLC    Labs All pertinent labs within the past 24 hours have been reviewed.  Recent Labs   Lab 09/29/19  0543   WBC 10.81   RBC 2.63*   HGB 8.1*   HCT 25.6*   *   MCV 97   MCH 30.8   MCHC 31.6*      K 3.5   CL 98   CO2 36*   BUN 55*   CREATININE 1.0       Imaging I have reviewed and interpreted all pertinent imaging results/findings within the past 24 hours.  No new images this morning.  CXR  (09/28/19 ) Stable appearance of the chest demonstrating multifocal infiltrates and nodular opacities and small pleural effusions.  I independently viewed the above imaging/lab studies in addition to reviewing the report       Micro Blood culture from September 26, 2019 no growth to date.    Medications Scheduled    ceFAZolin (ANCEF) IVPB  2 g Intravenous Q6H    DAPTOmycin (CUBICIN)  IV  8 mg/kg Intravenous Q24H    enalaprilat  1.25 mg Intravenous BID    furosemide  20 mg Intravenous Daily    metoprolol  7.5 mg Intravenous Q6H    pantoprazole  40 mg  Intravenous Daily    vancomycin (VANCOCIN) IVPB  750 mg Intravenous Q24H      Continuous Infusions:    Amino acid 5% - dextrose 15% (CLINIMIX-E) solution with additives (1L  provides 510 kcal/L dextrose, with 50 gm AA, 150 gm CHO, Na 35, K 30, Mg 5, Ca 4.5, Acetate 80, Cl 39, Phos 15)      milrinone 20mg/100ml D5W (200mcg/ml) 0.25 mcg/kg/min (09/28/19 2038)      PRN   sodium chloride, bisacodyl, HYDROmorphone, influenza, lorazepam, magnesium sulfate IVPB, magnesium sulfate IVPB, ondansetron, pneumoc 13-susanna conj-dip cr(PF), potassium chloride in water, sodium chloride 0.9%

## 2019-09-30 NOTE — PROGRESS NOTES
To MRI via bed with portable tele monitoring and portable O2 in progress and escorted by myself and radiology staff x1.

## 2019-09-30 NOTE — CONSULTS
"[facility]  Neurology  Consult Note    Patient Name: Mesha Mckenzie  MRN: [id number]  Admission Date: 9/19/2019  Hospital Length of Stay: 11 days  Code Status: Full Code   Attending Provider: Lizzette Covarrubias MD   Consulting Provider: Sarah Smith DNP  Primary Care Physician: Varun Shea MD PhD  Principal Problem:Acute bacterial endocarditis    Inpatient consult to Neurology  Consult performed by: Sarah Smith DNP  Consult ordered by: Lizzette Covarrubias MD        Subjective:     Chief Complaint:  Septic Embolus     HPI: Pt is a 62 yo female [facility], pancolitis and bilateral pna. Pt reports that she fell out of bed on Sunday morning and has been feeling progressively worse throughout the week. C/o vomiting since Sunday, ~3 episodes per day. Also reports diarrhea, ~4 stools per day, watery in nature. Pt denies any hematemesis, melena or hematochezia. Pt did take zofran which helped "some:" c/o severe abdominal pain since Sunday which is constant. Pain is a 10 on a 0-10 pain scale.  Has not been able to eat due to the nausea and pain. Questioned pt in regards to use of pain medication, states "I didn't have any." pt was scheduled for colonoscopy tomorrow. Hx of GI bleed in December 2018. Pt states she had passed some blood in her stool. Reviewed colonoscopy report, no bleeding in the colon was found however the prep was poor. No recent travel, no sick contacts at home. Questioned pt in regards to cough: pt states she has felt SOB due to pain but reports only occasional coughing. Denies any sputum production.  Social: smokes 10 cigarettes per day, 20 pack year hx. No ETOH.     INTERVAL HISTORY: Examined patient in ICU. Her  is at bedside. Patient appears very weak, is lethargic, responds to tactile stimuli, but able to answer some questions with one word responses. She is able to state her first and last time, the year and the month. Explained to patient " that we would like to get a better image of her heart and obtain a MRI of the brain without contrast.     Patient seen and examined with Dr. Mcgarry.     Past Medical History:   Diagnosis Date    Anxiety     Carotid bruit     Chronic pain     Cystitis     Cystocele, unspecified (CODE)     Depression     GI bleed     History of uterine fibroid     Shortness of breath on exertion     Spinal stenosis     Urinary retention        Past Surgical History:   Procedure Laterality Date    ANTERIOR VAGINAL REPAIR  10-    CARDIAC CATHETERIZATION  age 12    CHOLECYSTECTOMY  2015    COLONOSCOPY  12/12/2018    aborted due to poor colon prep    COLONOSCOPY N/A 12/12/2018    Procedure: COLONOSCOPY;  Surgeon: Renard Gonsalves MD;  Location: [facility];  Service: Endoscopy;  Laterality: N/A;    [facility]    tubiligation         Review of patient's allergies indicates:   Allergen Reactions    Pcn [penicillins]        Current Medications:     Current Facility-Administered Medications on File Prior to Encounter   Medication    lactated ringers infusion    sodium chloride 0.9% flush 3 mL     Current Outpatient Medications on File Prior to Encounter   Medication Sig    diazepam (VALIUM) 5 MG tablet Take 10 mg by mouth 2 (two) times daily.       Family History     Problem Relation (Age of Onset)    Alzheimer's disease Mother    Hypertension Brother, Sister    Osteoarthritis Mother    Thyroid disease Mother        Tobacco Use    Smoking status: Current Every Day Smoker     Packs/day: 0.50     Years: 40.00     Pack years: 20.00     Types: Cigarettes    Smokeless tobacco: Never Used   Substance and Sexual Activity    Alcohol use: No    Drug use: No    Sexual activity: Yes     Partners: Male     Review of Systems   Respiratory: Negative for cough, chest tightness, shortness of breath and wheezing.    Cardiovascular: Negative for chest pain, palpitations and leg swelling.   Gastrointestinal: Positive  for abdominal pain. Negative for abdominal distention, constipation, diarrhea, nausea and vomiting.   Genitourinary: Negative for difficulty urinating, dysuria and flank pain.   Musculoskeletal: Negative for gait problem, joint swelling and neck pain.   Skin: Negative for rash and wound.   Neurological: Negative for dizziness, syncope, light-headedness and headaches.   Psychiatric/Behavioral: Negative for agitation, behavioral problems and confusion.      Objective:     Vital Signs (Most Recent):  Temp: 100.2 °F (37.9 °C) (09/30/19 0745)  Pulse: 108 (09/30/19 0945)  Resp: (!) 31 (09/30/19 0945)  BP: 138/62 (09/30/19 0930)  SpO2: (!) 91 % (09/30/19 0945) Vital Signs (24h Range):  Temp:  [97.6 °F (36.4 °C)-101 °F (38.3 °C)] 100.2 °F (37.9 °C)  Pulse:  [] 108  Resp:  [23-58] 31  SpO2:  [90 %-100 %] 91 %  BP: (124-187)/(55-94) 138/62     Weight: 42.6 kg (93 lb 14.7 oz)  Body mass index is 17.18 kg/m².    Physical Exam   Neurological: She has an abnormal Finger-Nose-Finger Test (due to weakness ).     Physical Exam   Constitutional: She is oriented to person, place, and time. She appears well-developed and well-nourished.   HENT:   Head: Normocephalic and atraumatic.   Eyes: Pupils are equal, round, and reactive to light. EOM are normal.   Cardiovascular: Normal heart sounds and intact distal pulses.   Tachycardic   Pulmonary/Chest: Effort normal and breath sounds normal. No respiratory distress. She has no wheezes. She has no rales.   Abdominal: Soft. Bowel sounds are normal. She exhibits no distension. There is tenderness.   Musculoskeletal: She exhibits no edema or tenderness.   Neurological: She is alert and oriented to person, place, and time. No cranial nerve deficit. Coordination normal.   Has generalized weakness   Skin: Skin is warm and dry. Capillary refill takes less than 2 seconds. No rash noted.   Psychiatric: She has a normal mood and affect. Her behavior is normal. Thought content normal.    NEUROLOGICAL EXAMINATION:     MENTAL STATUS   Level of consciousness: arousable by tactile stimuli    CRANIAL NERVES     CN V   Right facial sensation deficit: none  Left facial sensation deficit: none    CN XI   Right sternocleidomastoid strength: weak  Left sternocleidomastoid strength: weak    MOTOR EXAM   Overall muscle tone: decreased  Right arm tone: decreased  Left arm tone: decreased  Right leg tone: decreased  Left leg tone: decreased       MS: 3/5 for all ext, BLEs weaker than the upper, unable to lift BLEs off bed, when BLEs held up both fall down to the bed     SENSORY EXAM   Light touch normal.     GAIT AND COORDINATION      Coordination   Finger to nose coordination: abnormal (due to weakness )    Tremor   Resting tremor: absent      Significant Labs:   Hemoglobin A1c: No results for input(s): HGBA1C in the last 720 hours.  CBC:   Recent Labs   Lab 09/29/19 0543 09/30/19 0121   WBC 10.81 10.49   HGB 8.1* 8.5*   HCT 25.6* 26.6*   * 148*     CMP:   Recent Labs   Lab 09/29/19 0543 09/30/19 0121 09/30/19  1248   * 150*  --     145  --    K 3.5 3.0* 3.0*   CL 98 100  --    CO2 36* 34*  --    BUN 55* 49*  --    CREATININE 1.0 1.0  --    CALCIUM 7.9* 8.0*  --    ANIONGAP 9 11  --    EGFRNONAA 59* 59*  --      All pertinent lab results from the past 24 hours have been reviewed.  CT head without contrast   Impression       1. Extensive chronic microvascular ischemic changes within the periventricular white matter of the supratentorial brain which are chance for the patient's chronologic age.  Demyelinating disease could produce a similar appearance.  There are more focal areas of hypoattenuation present within the periventricular white matter posterior right centrum semiovale and right frontal cortex, possibly focal areas of microvascular ischemic change or age-indeterminate infarction which are unchanged when compared with the cranial CT examination of 09/19/2019.  Additional  "clarification could be achieved with MRI of the brain as deemed clinically appropriate.  Given the provided history of "exclude septic emboli", consideration should be given to performing the examination with intravenous contrast.  2. Foci of remote lacunar infarction within the right caudate head and right corona radiata adjacent to the right frontal horn.       TTE with bubble study 9/26/19  Significant Imaging:  · Mild concentric left ventricular hypertrophy.  · Mild left ventricular enlargement.  · Moderately decreased left ventricular systolic function. The estimated ejection fraction is 33%  · Grade III (severe) left ventricular diastolic dysfunction consistent with restrictive physiology.  · Mild global hypokinetic wall motion.  · Normal right ventricular systolic function.  · Mild left atrial enlargement.  · Mild right atrial enlargement.  · There is a right atrial mass present. Echo lucent mobile small vegetation ssen at R atrial - TV septal leaflet junction In hi R atrium At septal surface there is more echo dense structure ( ~ 1.5x .8 cm) mobile which could be vegetation vs normal prominent Eustachian valve  · Moderate-to-severe mitral regurgitation.  · Normal central venous pressure (3 mm Hg).  · The estimated PA systolic pressure is 50 mm Hg  · Moderate to severe pulmonary hypertension present.     1) further decreased EF of lv ( 33%) and sinus tachycardia   2severe mitral regurgitation   3) pulmonary hypertension   4 ) TV + MV vegetations as described     MRI of the brain with and without contrast   Impression       No acute intracranial abnormality and no abnormal enhancement.  Mild involutional change and white matter disease.     MRA brain without contrast   Impression       No aneurysm or hemodynamically significant stenosis of the intracranial arterial vasculature.        I have reviewed all pertinent imaging results/findings within the past 24 hours.       Assessment and Plan:  -septic emboli "   -acute bacterial endocarditis   -mitral valve vegetation  -tricuspid valve vegetation       PLAN   -MRI brain brain with and without contrast   -MRA without contrast        Active Diagnoses:    Diagnosis Date Noted POA    PRINCIPAL PROBLEM:  Acute bacterial endocarditis [I33.0] 09/27/2019 Yes    Hyperglycemia [R73.9] 09/27/2019 No    Acute septic pulmonary embolism [I26.90] 09/27/2019 Yes    Tricuspid valve vegetation [I33.0] 09/27/2019 Yes    Mitral valve vegetation [I33.0] 09/27/2019 Yes    Hypomagnesemia [E83.42] 09/27/2019 No    Thrombocytopenia, acquired [D69.6] 09/27/2019 Yes    IV drug user [F19.90] 09/25/2019 Yes    Acute on chronic diastolic congestive heart failure [I50.33] 09/23/2019 Yes    Encephalopathy, metabolic [G93.41] 09/23/2019 No    Normocytic anemia [D64.9] 09/23/2019 Yes    Severe malnutrition [E43] 09/23/2019 Yes    Staphylococcus aureus bacteremia with sepsis [A41.01] 09/22/2019 Yes    Pleural effusion, bilateral [J90] 09/20/2019 Yes    Acute hypoxemic respiratory failure [J96.01] 09/20/2019 Yes    ABIMBOLA (acute kidney injury) [N17.9] 09/19/2019 Yes    Pancolitis [K51.00] 09/19/2019 Yes    Hypokalemia [E87.6] 09/19/2019 No      Problems Resolved During this Admission:    Diagnosis Date Noted Date Resolved POA    Pulmonary hypertension [I27.20] 09/23/2019 09/23/2019 Yes    DIC (disseminated intravascular coagulation) [D65] 09/23/2019 09/23/2019 Yes    Coagulopathy [D68.9] 09/20/2019 09/27/2019 Yes    ILD (interstitial lung disease) [J84.9] 09/20/2019 09/23/2019 Yes    COPD with acute lower respiratory infection [J44.0] 09/20/2019 09/23/2019 Yes    Calcification of aorta [I70.0] 09/20/2019 09/23/2019 Yes    BOOP (bronchiolitis obliterans with organizing pneumonia) [J84.89] 09/20/2019 09/23/2019 Yes    Intravascular volume depletion [E86.1] 09/19/2019 09/23/2019 Yes    Hyponatremia [E87.1] 09/19/2019 09/23/2019 Yes    Metabolic acidosis [E87.2] 09/19/2019 09/27/2019  Yes    CAP (community acquired pneumonia) [J18.9] 09/19/2019 09/23/2019 Yes       VTE Risk Mitigation (From admission, onward)         Ordered     Place sequential compression device  Until discontinued      09/19/19 2234     IP VTE HIGH RISK PATIENT  Once      09/19/19 2217                Thank you for your consult. I will follow-up with patient. Please contact us if you have any additional questions.    Sarah Smith, TOMMY  Neurology  [facility]

## 2019-09-30 NOTE — PLAN OF CARE
Pt oriented to self only, speech remains grabbled, pain and anxiety meds given as ordered, turned q2, Tmax 101 Ax, urine,blood cultures obtained, new order for PICC line and MRA tomorrow, family at bedside most of day, updated on POC,safety maintained

## 2019-09-30 NOTE — PROCEDURES
"Mesha Mckenzie is a 63 y.o. female patient.    Temp: (!) 100.7 °F (38.2 °C) (09/30/19 1200)  Pulse: (!) 116 (09/30/19 1200)  Resp: (!) 32 (09/30/19 1200)  BP: (!) 149/67 (09/30/19 1200)  SpO2: 95 % (09/30/19 1200)  Weight: 42.6 kg (93 lb 14.7 oz) (09/29/19 0600)  Height: 5' 2" (157.5 cm) (09/28/19 0915)    PICC  Date/Time: 9/30/2019 12:50 PM  Location procedure was performed: [facility] ICU  Performed by: Dinorah John RN  Consent Done: Yes  Time out: Immediately prior to procedure a time out was called to verify the correct patient, procedure, equipment, support staff and site/side marked as required  Indications: med administration and vascular access  Anesthesia: local infiltration  Local anesthetic: lidocaine 1% without epinephrine  Anesthetic Total (mL): 3  Preparation: skin prepped with ChloraPrep  Skin prep agent dried: skin prep agent completely dried prior to procedure  Sterile barriers: all five maximum sterile barriers used - cap, mask, sterile gown, sterile gloves, and large sterile sheet  Hand hygiene: hand hygiene performed prior to central venous catheter insertion  Location details: right brachial  Catheter type: double lumen  Catheter size: 5 Fr  Catheter Length: 35cm    Ultrasound guidance: yes  Vessel Caliber: large and patent, compressibility normal  Needle advanced into vessel with real time Ultrasound guidance.  Guidewire confirmed in vessel.  Image recorded and saved.  Sterile sheath used.  Number of attempts: 1  Post-procedure: blood return through all ports, chlorhexidine patch and sterile dressing applied  Technical procedures used: MST, 3CG SHERLOCK    Assessment: placement verified by x-ray, no pneumothorax on x-ray, tip termination and successful placement  Tip Termination Explanation: SVC  Complications: none          Dinorah John  9/30/2019  "

## 2019-09-30 NOTE — PROGRESS NOTES
"[facility] Medicine  Progress Note    Patient Name: Mesha Mckenzie  MRN: [id number]  Patient Class: IP- Inpatient   Admission Date: 9/19/2019  Length of Stay: 11 days  Attending Physician: Lizzette Covarrubias MD  Primary Care Provider: Varun Shea MD PhD        Subjective:     Principal Problem:Acute bacterial endocarditis        HPI:  Pt is a 62 yo female who was transferred from [facility] for septic shock, pancolitis and bilateral pna. Pt reports that she fell out of bed on Sunday morning and has been feeling progressively worse throughout the week. C/o vomiting since Sunday, ~3 episodes per day. Also reports diarrhea, ~4 stools per day, watery in nature. Pt denies any hematemesis, melena or hematochezia. Pt did take zofran which helped "some:" c/o severe abdominal pain since Sunday which is constant. Pain is a 10 on a 0-10 pain scale.  Has not been able to eat due to the nausea and pain. Questioned pt in regards to use of pain medication, states "I didn't have any." pt was scheduled for colonoscopy tomorrow. Hx of GI bleed in December 2018. Pt states she had passed some blood in her stool. Reviewed colonoscopy report, no bleeding in the colon was found however the prep was poor. No recent travel, no sick contacts at home. Questioned pt in regards to cough: pt states she has felt SOB due to pain but reports only occasional coughing. Denies any sputum production.  Social: smokes 10 cigarettes per day, 20 pack year hx. No ETOH.     Overview/Hospital Course:  The patient is a 63-year-old woman admitted 09/19 with sepsis secondary to pneumonia and colitis.  She was admitted to the intensive care unit and was treated with broad-spectrum antibiotics.  Her blood cultures grew methicillin sensitive Staph aureus and she was evaluated by ID.  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  She is currently on Ancef and vancomycin.  Her blood " cultures are still positive therefore CV surgery has been consulted for possible cardiac surgery.  She has also been followed closely by Dr. Dobson for her heart failure.    The patient  had severe metabolic encephalopathy which has resolved for the most part.    She has been receiving TPN since she has had an ileus.    She has bilateral pulmonary infiltrates which we believe are secondary to septic emboli.    The patient has had severe anemia, thrombocytopenia and coagulopathy.  She received a couple of blood transfusions but has not require platelets or FFP.  Hematology has been following.    The patient was very uncomfortable initially and was treated with IV Dilaudid which I think also helped her encephalopathy from drug withdrawal.  We have been decreasing the Dilaudid dose the last 24 hrs.    Interval History:  In intensive care unit, in critical condition.  T-max 101 degree F. patient is lethargic, appear extremely weak, unable to hold conversation, still NPO due to swallowing dysfunction.  On intravenous Milrinone infusion.  Awaiting decision recommendations as per Dr. Dobson regarding transfer to Cardiovascular surgery team for valve replacement.  TLC to be removed and new PICC line to be placed today.    Review of Systems   Respiratory: Negative for cough, chest tightness, shortness of breath and wheezing.    Cardiovascular: Negative for chest pain, palpitations and leg swelling.   Gastrointestinal: Positive for abdominal pain. Negative for abdominal distention, constipation, diarrhea, nausea and vomiting.   Genitourinary: Negative for difficulty urinating, dysuria and flank pain.   Musculoskeletal: Negative for gait problem, joint swelling and neck pain.   Skin: Negative for rash and wound.   Neurological: Negative for dizziness, syncope, light-headedness and headaches.   Psychiatric/Behavioral: Negative for agitation, behavioral problems and confusion.     Objective:     Vital Signs (Most Recent):  Temp:  100.2 °F (37.9 °C) (09/30/19 0745)  Pulse: 107 (09/30/19 0815)  Resp: (!) 39 (09/30/19 0815)  BP: (!) 187/79 (09/30/19 0745)  SpO2: (!) 91 % (09/30/19 0815) Vital Signs (24h Range):  Temp:  [97.6 °F (36.4 °C)-101 °F (38.3 °C)] 100.2 °F (37.9 °C)  Pulse:  [] 107  Resp:  [23-60] 39  SpO2:  [90 %-100 %] 91 %  BP: (124-187)/(55-94) 187/79     Weight: 42.6 kg (93 lb 14.7 oz)  Body mass index is 17.18 kg/m².    Intake/Output Summary (Last 24 hours) at 9/30/2019 0828  Last data filed at 9/30/2019 0745  Gross per 24 hour   Intake 767.55 ml   Output 2500 ml   Net -1732.45 ml      Physical Exam   Constitutional: She is oriented to person, place, and time. She appears well-developed and well-nourished.   HENT:   Head: Normocephalic and atraumatic.   Eyes: Pupils are equal, round, and reactive to light. EOM are normal.   Cardiovascular: Regular rhythm, normal heart sounds and intact distal pulses.   Tachycardic   Pulmonary/Chest: Effort normal and breath sounds normal. No respiratory distress. She has no wheezes. She has no rales.   Abdominal: Soft. Bowel sounds are normal. She exhibits no distension. There is tenderness.   Musculoskeletal: She exhibits no edema or tenderness.   Neurological: She is alert and oriented to person, place, and time. No cranial nerve deficit. Coordination normal.   Has generalized weakness   Skin: Skin is warm and dry. Capillary refill takes less than 2 seconds. No rash noted.   Psychiatric: She has a normal mood and affect. Her behavior is normal. Thought content normal.   Nursing note and vitals reviewed.      Significant Labs:   CBC:   Recent Labs   Lab 09/29/19  0543 09/30/19  0121   WBC 10.81 10.49   HGB 8.1* 8.5*   HCT 25.6* 26.6*   * 148*     CMP:   Recent Labs   Lab 09/28/19  1943 09/29/19  0543 09/30/19  0121   NA  --  143 145   K 3.3* 3.5 3.0*   CL  --  98 100   CO2  --  36* 34*   GLU  --  147* 150*   BUN  --  55* 49*   CREATININE  --  1.0 1.0   CALCIUM  --  7.9* 8.0*  "  ANIONGAP  --  9 11   EGFRNONAA  --  59* 59*     Significant Imaging:   CXR; Patchy airspace disease bilaterally, likely pneumonia.    CXR;   1. Central venous catheter placement without pneumothorax.    CT abdomen and pelvis without contrast:  1. Acute pancolitis.  2. Airspace disease multiple areas in the lower lungs are seen and a right pleural effusion approximately 2 cm deep at the base.    CT head without contrast:  No appreciable changes compared to CT head 07/24/2015 including moderate to severe presumed microvascular ischemic changes cerebral white matter and old lacunar infarct adjacent to right head of caudate.    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  Follow-up to assure clearance is recommended.  Moderate atelectasis of the right lower lobe and moderate size right pleural effusion.  Trace left pleural effusion.  Mild mediastinal adenopathy.  Atherosclerotic calcification including coronary artery calcification.    CT head without contrast:  1. Extensive chronic microvascular ischemic changes within the periventricular white matter of the supratentorial brain which are chance for the patient's chronologic age.  Demyelinating disease could produce a similar appearance.  There are more focal areas of hypoattenuation present within the periventricular white matter posterior right centrum semiovale and right frontal cortex, possibly focal areas of microvascular ischemic change or age-indeterminate infarction which are unchanged when compared with the cranial CT examination of 09/19/2019.  Additional clarification could be achieved with MRI of the brain as deemed clinically appropriate.  Given the provided history of "exclude septic emboli", consideration should be given to performing the examination with intravenous contrast.  2. Foci of remote lacunar infarction within the right " caudate head and right corona radiata adjacent to the right frontal horn.    CXR:  Bilateral infiltrates with slight further decrease of the infiltrate right perihilar/basilar compared to the prior exam.    CT chest abdomen and pelvis with and without contrast:  Chest; persistent shattered bilateral consolidative processes which have undergone cavitation/cystic spaces within them  Small left and moderate size right pleural effusion increased in size  Pericardial effusion appearing small but mildly increased in size  Abdomen and pelvis; mild distention of colon with prominent amount of fluid suggesting ileus.  Very mild colitis cannot be excluded but with no appreciable bowel wall thickening or pericolonic inflammatory change.    CXR:  Stable appearance of the chest demonstrating multifocal infiltrates and nodular opacities and small pleural effusions.    ECHO:  · Mild concentric left ventricular hypertrophy.  · Low normal left ventricular systolic function. The estimated ejection fraction is 50%  · Grade I (mild) left ventricular diastolic dysfunction consistent with impaired relaxation.  · Normal right ventricular systolic function.  · Mild mitral regurgitation.  · Normal central venous pressure (3 mm Hg).  · The estimated PA systolic pressure is 48 mm Hg  · Pulmonary hypertension present.    DHARMESH:  · Mild left ventricular enlargement.  · Low normal left ventricular systolic function. The estimated ejection fraction is 50%  · Normal LV diastolic function.  · Mild left atrial enlargement.  · No interatrial septal defect present.  · Normal appearing left atrial appendage. No thrombus is present in the appendage. Normal appendage velocities.  · Moderate-to-severe mitral regurgitation.  · Moderate tricuspid regurgitation.  · 1x.5 cmmobile vegetation on tricuspid valve septal leaflet atrial side  · 5mmx6 mm mobile vegetation on posterior leaflet P3 scallop on atrial side  · Aortic valve wnl    MRA brain with contrast:  Pending.  MRI brain with and without contrast:  Pending.          Assessment/Plan:      * Acute bacterial endocarditis        Thrombocytopenia, acquired  Monitor daily platelet counts closely.      Hypomagnesemia        Mitral valve vegetation  Follow Infectious Disease and Cardiology recommendations.      Tricuspid valve vegetation  Continue antibiotics as per Infectious Disease recommendations including Cubicin/vancomycin/Ancef.  Pharmacist is dozing vancomycin.      Acute septic pulmonary embolism        Hyperglycemia  Patient's FSGs are controlled on current hypoglycemics.   Last A1c reviewed-   Lab Results   Component Value Date    HGBA1C 5.7 (H) 04/23/2019     Most recent fingerstick glucose reviewed- No results for input(s): POCTGLUCOSE in the last 24 hours.  Current correctional scale  Low  Maintain anti-hyperglycemic dose as follows-   Antihyperglycemics (From admission, onward)    None              IV drug user  The patient finally admitted that she was using IV drugs to Dr. Bolden.  She does not want her family to know.  She states that she has only been using IV heroin for a few weeks      Severe malnutrition  Speech therapist to re-evaluate swallow evaluation.  Continue TPN September 24th.      Normocytic anemia  Secondary to this acute illness?  There is no evidence of blood loss or hemolysis.  The patient received 2 units of packed red blood cells 9-24      Encephalopathy, metabolic  The patient appears to be back to baseline.      Acute on chronic diastolic congestive heart failure        Staphylococcus aureus bacteremia with sepsis  The patient is on Ancef, Cubicin and vancomycin for MSSA. DHARMESH showed tricuspid and mitral valve vegetations.    Follow latest blood culture results and infectious disease recommendations.  Follow MRI brain and MRA brain with and without contrast results.      Acute hypoxemic respiratory failure  This is secondary  to septic emboli.  She is using BiPAP intermittently.   Chest x-ray is improving.    Pleural effusion, bilateral  This improved with diuresis      Hypokalemia  This is being replaced with IV potassium      Pancolitis  Repeat CT scan shows improvement in the colitis.  It showed an ileus and this also seems to be improving as the patient is having bowel movements.            ABIMBOLA (acute kidney injury)  Follow renal panel closely.    Discussed with patient's registered nurse.  Will reduced intravenous Dilaudid use.  Patient is to participate with physical therapy and occupational therapy.  Will consult Dr. Garcia from Neurology for further evaluation and follow brain imaging.    VTE Risk Mitigation (From admission, onward)         Ordered     Place sequential compression device  Until discontinued      09/19/19 2234     IP VTE HIGH RISK PATIENT  Once      09/19/19 2217                Critical care time spent on the evaluation and treatment of severe organ dysfunction, review of pertinent labs and imaging studies, discussions with consulting providers and discussions with patient/family: 37 minutes.      Lizzette Covarrubias MD  Department of Hospital Medicine   [facility]

## 2019-09-30 NOTE — PROGRESS NOTES
Returned to ICU via bed at 1455, ICU monitoring resumed, all infusions restarted. Bedding adjusted for comfort. Bed locked in low position. Call bell in reach. Will continue to monitor.

## 2019-09-30 NOTE — CONSULTS
Pharmacokinetic Assessment Follow Up: IV Vancomycin    Vancomycin serum concentration assessment(s):    The trough level was drawn correctly and can be used to guide therapy at this time. The measurement is below the desired definitive target range of 15 to 20 mcg/mL.    Vancomycin Regimen Plan:    Change regimen to Vancomycin 1000 mg IV every 24 hours with next serum trough concentration measured at 0330 prior to 4th dose on 10/02     Drug levels (last 3 results):  Recent Labs   Lab Result Units 09/27/19  0852 09/27/19  2223 09/30/19  0128   Vancomycin, Random ug/mL  --  17.4  --    Vancomycin-Trough ug/mL 26.4*  --  13.2       Pharmacy will continue to follow and monitor vancomycin.    Please contact pharmacy at extension [phone] for questions regarding this assessment.    Thank you for the consult,   Sierra Duncan       Patient brief summary:  Mesha Mckenzie is a 63 y.o. female initiated on antimicrobial therapy with IV Vancomycin for treatment of bacteremia      Drug Allergies:   Review of patient's allergies indicates:   Allergen Reactions    Pcn [penicillins]        Actual Body Weight:   42.6 kg    Renal Function:   Estimated Creatinine Clearance: 37.7 mL/min (based on SCr of 1 mg/dL).,       CBC (last 72 hours):  Recent Labs   Lab Result Units 09/27/19  0609 09/28/19  0403 09/29/19  0543 09/30/19  0121   WBC K/uL 13.43* 14.18* 10.81 10.49   Hemoglobin g/dL 10.3* 9.8* 8.1* 8.5*   Hematocrit % 29.8* 29.8* 25.6* 26.6*   Platelets K/uL 88* 105* 113* 148*   Gran% % 83.4* 80.6*  --   --    Lymph% % 11.2* 14.4*  --   --    Mono% % 3.4* 3.0*  --   --    Eosinophil% % 0.6 0.7  --   --    Basophil% % 0.1 0.2  --   --    Differential Method  Automated Automated  --   --        Metabolic Panel (last 72 hours):  Recent Labs   Lab Result Units 09/27/19  0609 09/27/19  1932 09/28/19  0403 09/28/19  1943 09/29/19  0543 09/29/19  1615 09/30/19  0121   Sodium mmol/L 141  --  142  --  143  --  145   Potassium mmol/L 2.4* 3.2*  3.4* 3.3* 3.5  --  3.0*   Chloride mmol/L 90*  --  93*  --  98  --  100   CO2 mmol/L 39*  --  37*  --  36*  --  34*   Glucose mg/dL 179*  --  161*  --  147*  --  150*   Glucose, UA   --   --   --   --   --  Negative  --    BUN, Bld mg/dL 36*  --  49*  --  55*  --  49*   Creatinine mg/dL 0.8  --  1.0  --  1.0  --  1.0   Albumin g/dL 1.9*  --  1.8*  --   --   --   --    Total Bilirubin mg/dL 0.8  --  0.5  --   --   --   --    Alkaline Phosphatase U/L 66  --  74  --   --   --   --    AST U/L 15  --  15  --   --   --   --    ALT U/L <5*  --  <5*  --   --   --   --    Magnesium mg/dL 1.3* 2.5  --   --   --   --   --        Vancomycin Administrations:  vancomycin given in the last 96 hours                     vancomycin in dextrose 5 % 1 gram/250 mL IVPB 1,000 mg (mg) 1,000 mg New Bag 09/30/19 0407    vancomycin 750 mg in dextrose 5 % 250 mL IVPB (ready to mix system) (mg) 750 mg New Bag 09/29/19 0120     750 mg New Bag 09/28/19 0147                      Microbiologic Results:  Microbiology Results (last 7 days)       Procedure Component Value Units Date/Time    Blood culture [[id number]] Collected:  09/30/19 0128    Order Status:  Sent Specimen:  Blood Updated:  09/30/19 0436    Blood culture [[id number]] Collected:  09/26/19 0443    Order Status:  Completed Specimen:  Blood Updated:  09/30/19 0304     Blood Culture, Routine Gram stain peds bottle: Gram positive cocci in clusters resembling Staph       Results called to and read back by:Patria Daugherty RN 09/30/2019  03:04    Blood culture [[id number]] Collected:  09/29/19 1800    Order Status:  Sent Specimen:  Blood from Line, Central Updated:  09/30/19 0045    IV catheter culture [[id number]]     Order Status:  No result Specimen:  Catheter Tip, Intrajugular     Blood culture [[id number]]     Order Status:  Canceled Specimen:  Blood     Blood culture [[id number]]  (Abnormal) Collected:  09/25/19 0413    Order Status:  Completed Specimen:  Blood Updated:  09/29/19 0614     Blood  Culture, Routine Gram stain peds bottle: Gram positive cocci in clusters resembling Staph       Results called to and read back by:Dunia Keating RN 09/28/2019  00:29      STAPHYLOCOCCUS AUREUS  ID consult required at [place] [facility].  For susceptibility see order #[id number]      Blood culture [[id number]]  (Abnormal) Collected:  09/24/19 0517    Order Status:  Completed Specimen:  Blood Updated:  09/29/19 0613     Blood Culture, Routine Gram stain peds bottle: Gram positive cocci in clusters resembling Staph       Results called to and read back by: Liset Arroyo RN  09/28/2019  12:30      STAPHYLOCOCCUS AUREUS  Susceptibility pending  ID consult required at [place] [facility].      Stool culture [[id number]] Collected:  09/26/19 1329    Order Status:  Completed Specimen:  Stool Updated:  09/28/19 1349     Stool Culture Nothing significant to date    Clostridium difficile EIA [[id number]] Collected:  09/27/19 2013    Order Status:  Completed Specimen:  Stool Updated:  09/27/19 2228     C. diff Antigen Negative     C difficile Toxins A+B, EIA Negative     Comment: Testing not recommended for children <24 months old.       E. coli 0157 antigen [[id number]] Collected:  09/26/19 1329    Order Status:  No result Specimen:  Stool Updated:  09/26/19 2301    Blood culture [[id number]]  (Abnormal)  (Susceptibility) Collected:  09/22/19 0338    Order Status:  Completed Specimen:  Blood from Antecubital, Left  Arm Updated:  09/25/19 1028     Blood Culture, Routine Gram stain peds bottle: Gram positive cocci in clusters resembling Staph       Results called to and read back by:Yolis Wan RN 09/23/2019  04:46      STAPHYLOCOCCUS AUREUS  ID consult required at [place] [facility].      Blood culture [[id number]]  (Abnormal) Collected:  09/21/19 0534    Order Status:  Completed Specimen:  Blood Updated:  09/24/19 0954     Blood  Culture, Routine Gram stain peds bottle: Gram positive cocci in clusters resembling Staph       Results called to and read back by:Javi Sunshine RN 09/22/2019  14:22      STAPHYLOCOCCUS AUREUS  ID consult [facility] at [facility]  For susceptibility see order #[id number]      Blood culture [[id number]]  (Abnormal) Collected:  09/20/19 1739    Order Status:  Completed Specimen:  Blood from Line, Central Updated:  09/23/19 0939     Blood Culture, Routine Gram stain aer bottle: Gram positive cocci in clusters resembling Staph       Results called to and read back by:Rhea Lai RN 09/21/2019  16:02      STAPHYLOCOCCUS AUREUS  [facility].  For susceptibility see order #[id number]

## 2019-09-30 NOTE — ASSESSMENT & PLAN NOTE
The patient is on Ancef, Cubicin and vancomycin for MSSA. DHARMESH showed tricuspid and mitral valve vegetations.    Follow latest blood culture results and infectious disease recommendations.  Follow MRI brain and MRA brain with and without contrast results.

## 2019-09-30 NOTE — PLAN OF CARE
POC reviewed with pt, pt unable to verbalize understanding. Pt only oriented to self. Fever max 100.0 axillary. Safety maintained, flexi seal in place, astorga intact. Pt NPO, unable to pass swallow study. Blood cultures drawn this am. Potassium replaced. Dr. Dobson to determine if pt needs surgery based off repeat echo done. PICC to be inserted today and Left IJ to be removed and the catheter tip to be cultured. 5 liters Nasal cannula with O2 sats 95%. MRA to be completed today, pt able to tolerate being laid flat for multiple hours tonight.

## 2019-09-30 NOTE — SUBJECTIVE & OBJECTIVE
Interval History:  In intensive care unit, in critical condition.  T-max 101 degree F. patient is lethargic, appear extremely weak, unable to hold conversation, still NPO due to swallowing dysfunction.  On intravenous Milrinone infusion.  Awaiting decision recommendations as per Dr. Dobson regarding transfer to Cardiovascular surgery team for valve replacement.  TLC to be removed and new PICC line to be placed today.    Review of Systems   Respiratory: Negative for cough, chest tightness, shortness of breath and wheezing.    Cardiovascular: Negative for chest pain, palpitations and leg swelling.   Gastrointestinal: Positive for abdominal pain. Negative for abdominal distention, constipation, diarrhea, nausea and vomiting.   Genitourinary: Negative for difficulty urinating, dysuria and flank pain.   Musculoskeletal: Negative for gait problem, joint swelling and neck pain.   Skin: Negative for rash and wound.   Neurological: Negative for dizziness, syncope, light-headedness and headaches.   Psychiatric/Behavioral: Negative for agitation, behavioral problems and confusion.     Objective:     Vital Signs (Most Recent):  Temp: 100.2 °F (37.9 °C) (09/30/19 0745)  Pulse: 107 (09/30/19 0815)  Resp: (!) 39 (09/30/19 0815)  BP: (!) 187/79 (09/30/19 0745)  SpO2: (!) 91 % (09/30/19 0815) Vital Signs (24h Range):  Temp:  [97.6 °F (36.4 °C)-101 °F (38.3 °C)] 100.2 °F (37.9 °C)  Pulse:  [] 107  Resp:  [23-60] 39  SpO2:  [90 %-100 %] 91 %  BP: (124-187)/(55-94) 187/79     Weight: 42.6 kg (93 lb 14.7 oz)  Body mass index is 17.18 kg/m².    Intake/Output Summary (Last 24 hours) at 9/30/2019 0828  Last data filed at 9/30/2019 0745  Gross per 24 hour   Intake 767.55 ml   Output 2500 ml   Net -1732.45 ml      Physical Exam   Constitutional: She is oriented to person, place, and time. She appears well-developed and well-nourished.   HENT:   Head: Normocephalic and atraumatic.   Eyes: Pupils are equal, round, and reactive to light.  EOM are normal.   Cardiovascular: Regular rhythm, normal heart sounds and intact distal pulses.   Tachycardic   Pulmonary/Chest: Effort normal and breath sounds normal. No respiratory distress. She has no wheezes. She has no rales.   Abdominal: Soft. Bowel sounds are normal. She exhibits no distension. There is tenderness.   Musculoskeletal: She exhibits no edema or tenderness.   Neurological: She is alert and oriented to person, place, and time. No cranial nerve deficit. Coordination normal.   Has generalized weakness   Skin: Skin is warm and dry. Capillary refill takes less than 2 seconds. No rash noted.   Psychiatric: She has a normal mood and affect. Her behavior is normal. Thought content normal.   Nursing note and vitals reviewed.      Significant Labs:   CBC:   Recent Labs   Lab 09/29/19  0543 09/30/19  0121   WBC 10.81 10.49   HGB 8.1* 8.5*   HCT 25.6* 26.6*   * 148*     CMP:   Recent Labs   Lab 09/28/19  1943 09/29/19  0543 09/30/19  0121   NA  --  143 145   K 3.3* 3.5 3.0*   CL  --  98 100   CO2  --  36* 34*   GLU  --  147* 150*   BUN  --  55* 49*   CREATININE  --  1.0 1.0   CALCIUM  --  7.9* 8.0*   ANIONGAP  --  9 11   EGFRNONAA  --  59* 59*     Significant Imaging:   CXR; Patchy airspace disease bilaterally, likely pneumonia.    CXR;   1. Central venous catheter placement without pneumothorax.    CT abdomen and pelvis without contrast:  1. Acute pancolitis.  2. Airspace disease multiple areas in the lower lungs are seen and a right pleural effusion approximately 2 cm deep at the base.    CT head without contrast:  No appreciable changes compared to CT head 07/24/2015 including moderate to severe presumed microvascular ischemic changes cerebral white matter and old lacunar infarct adjacent to right head of caudate.    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  Follow-up to  "assure clearance is recommended.  Moderate atelectasis of the right lower lobe and moderate size right pleural effusion.  Trace left pleural effusion.  Mild mediastinal adenopathy.  Atherosclerotic calcification including coronary artery calcification.    CT head without contrast:  1. Extensive chronic microvascular ischemic changes within the periventricular white matter of the supratentorial brain which are chance for the patient's chronologic age.  Demyelinating disease could produce a similar appearance.  There are more focal areas of hypoattenuation present within the periventricular white matter posterior right centrum semiovale and right frontal cortex, possibly focal areas of microvascular ischemic change or age-indeterminate infarction which are unchanged when compared with the cranial CT examination of 09/19/2019.  Additional clarification could be achieved with MRI of the brain as deemed clinically appropriate.  Given the provided history of "exclude septic emboli", consideration should be given to performing the examination with intravenous contrast.  2. Foci of remote lacunar infarction within the right caudate head and right corona radiata adjacent to the right frontal horn.    CXR:  Bilateral infiltrates with slight further decrease of the infiltrate right perihilar/basilar compared to the prior exam.    CT chest abdomen and pelvis with and without contrast:  Chest; persistent shattered bilateral consolidative processes which have undergone cavitation/cystic spaces within them  Small left and moderate size right pleural effusion increased in size  Pericardial effusion appearing small but mildly increased in size  Abdomen and pelvis; mild distention of colon with prominent amount of fluid suggesting ileus.  Very mild colitis cannot be excluded but with no appreciable bowel wall thickening or pericolonic inflammatory change.    CXR:  Stable appearance of the chest demonstrating multifocal infiltrates " and nodular opacities and small pleural effusions.    ECHO:  · Mild concentric left ventricular hypertrophy.  · Low normal left ventricular systolic function. The estimated ejection fraction is 50%  · Grade I (mild) left ventricular diastolic dysfunction consistent with impaired relaxation.  · Normal right ventricular systolic function.  · Mild mitral regurgitation.  · Normal central venous pressure (3 mm Hg).  · The estimated PA systolic pressure is 48 mm Hg  · Pulmonary hypertension present.    DHARMESH:  · Mild left ventricular enlargement.  · Low normal left ventricular systolic function. The estimated ejection fraction is 50%  · Normal LV diastolic function.  · Mild left atrial enlargement.  · No interatrial septal defect present.  · Normal appearing left atrial appendage. No thrombus is present in the appendage. Normal appendage velocities.  · Moderate-to-severe mitral regurgitation.  · Moderate tricuspid regurgitation.  · 1x.5 cmmobile vegetation on tricuspid valve septal leaflet atrial side  · 5mmx6 mm mobile vegetation on posterior leaflet P3 scallop on atrial side  · Aortic valve wnl    MRA brain with contrast: Pending.  MRI brain with and without contrast:  Pending.

## 2019-10-01 LAB
ABO + RH BLD: NORMAL
ANION GAP SERPL CALC-SCNC: 10 MMOL/L (ref 8–16)
APPEARANCE FLD: NORMAL
BLD GP AB SCN CELLS X3 SERPL QL: NORMAL
BLD PROD TYP BPU: NORMAL
BLD PROD TYP BPU: NORMAL
BLOOD UNIT EXPIRATION DATE: NORMAL
BLOOD UNIT EXPIRATION DATE: NORMAL
BLOOD UNIT TYPE CODE: 6200
BLOOD UNIT TYPE CODE: 6200
BLOOD UNIT TYPE: NORMAL
BLOOD UNIT TYPE: NORMAL
BODY FLD TYPE: NORMAL
BUN SERPL-MCNC: 45 MG/DL (ref 8–23)
CALCIUM SERPL-MCNC: 8.1 MG/DL (ref 8.7–10.5)
CHLORIDE SERPL-SCNC: 108 MMOL/L (ref 95–110)
CO2 SERPL-SCNC: 30 MMOL/L (ref 23–29)
CODING SYSTEM: NORMAL
CODING SYSTEM: NORMAL
COLOR FLD: YELLOW
CREAT SERPL-MCNC: 1 MG/DL (ref 0.5–1.4)
DISPENSE STATUS: NORMAL
DISPENSE STATUS: NORMAL
ERYTHROCYTE [DISTWIDTH] IN BLOOD BY AUTOMATED COUNT: 16.6 % (ref 11.5–14.5)
EST. GFR  (AFRICAN AMERICAN): >60 ML/MIN/1.73 M^2
EST. GFR  (NON AFRICAN AMERICAN): 59 ML/MIN/1.73 M^2
GLUCOSE FLD-MCNC: 31 MG/DL
GLUCOSE SERPL-MCNC: 151 MG/DL (ref 70–110)
HCT VFR BLD AUTO: 25.4 % (ref 37–48.5)
HGB BLD-MCNC: 7.9 G/DL (ref 12–16)
LYMPHOCYTES NFR FLD MANUAL: 2 %
MCH RBC QN AUTO: 31.1 PG (ref 27–31)
MCHC RBC AUTO-ENTMCNC: 31.1 G/DL (ref 32–36)
MCV RBC AUTO: 100 FL (ref 82–98)
MONOS+MACROS NFR FLD MANUAL: 3 %
NEUTROPHILS NFR FLD MANUAL: 95 %
NUM UNITS TRANS PACKED RBC: NORMAL
NUM UNITS TRANS PACKED RBC: NORMAL
PLATELET # BLD AUTO: 182 K/UL (ref 150–350)
PMV BLD AUTO: 11.5 FL (ref 9.2–12.9)
POTASSIUM SERPL-SCNC: 3.5 MMOL/L (ref 3.5–5.1)
POTASSIUM SERPL-SCNC: 3.8 MMOL/L (ref 3.5–5.1)
POTASSIUM SERPL-SCNC: 4 MMOL/L (ref 3.5–5.1)
PROT FLD-MCNC: 3.4 G/DL
RBC # BLD AUTO: 2.54 M/UL (ref 4–5.4)
SODIUM SERPL-SCNC: 148 MMOL/L (ref 136–145)
SPECIMEN SOURCE: NORMAL
SPECIMEN SOURCE: NORMAL
WBC # BLD AUTO: 9.07 K/UL (ref 3.9–12.7)
WBC # FLD: 2893 /CU MM

## 2019-10-01 PROCEDURE — 27000221 HC OXYGEN, UP TO 24 HOURS

## 2019-10-01 PROCEDURE — 84132 ASSAY OF SERUM POTASSIUM: CPT | Mod: 91

## 2019-10-01 PROCEDURE — 63600175 PHARM REV CODE 636 W HCPCS: Performed by: INTERNAL MEDICINE

## 2019-10-01 PROCEDURE — 84157 ASSAY OF PROTEIN OTHER: CPT

## 2019-10-01 PROCEDURE — 86920 COMPATIBILITY TEST SPIN: CPT

## 2019-10-01 PROCEDURE — 82945 GLUCOSE OTHER FLUID: CPT

## 2019-10-01 PROCEDURE — 80048 BASIC METABOLIC PNL TOTAL CA: CPT

## 2019-10-01 PROCEDURE — 36415 COLL VENOUS BLD VENIPUNCTURE: CPT

## 2019-10-01 PROCEDURE — 89051 BODY FLUID CELL COUNT: CPT

## 2019-10-01 PROCEDURE — A4216 STERILE WATER/SALINE, 10 ML: HCPCS | Performed by: INTERNAL MEDICINE

## 2019-10-01 PROCEDURE — 87070 CULTURE OTHR SPECIMN AEROBIC: CPT

## 2019-10-01 PROCEDURE — 92507 TX SP LANG VOICE COMM INDIV: CPT

## 2019-10-01 PROCEDURE — 94761 N-INVAS EAR/PLS OXIMETRY MLT: CPT

## 2019-10-01 PROCEDURE — 25000003 PHARM REV CODE 250: Performed by: INTERNAL MEDICINE

## 2019-10-01 PROCEDURE — 63600175 PHARM REV CODE 636 W HCPCS: Performed by: SPECIALIST

## 2019-10-01 PROCEDURE — P9016 RBC LEUKOCYTES REDUCED: HCPCS

## 2019-10-01 PROCEDURE — 99233 SBSQ HOSP IP/OBS HIGH 50: CPT | Mod: S$GLB,,, | Performed by: INTERNAL MEDICINE

## 2019-10-01 PROCEDURE — 25500020 PHARM REV CODE 255: Performed by: INTERNAL MEDICINE

## 2019-10-01 PROCEDURE — 85027 COMPLETE CBC AUTOMATED: CPT

## 2019-10-01 PROCEDURE — 63600175 PHARM REV CODE 636 W HCPCS: Performed by: NURSE PRACTITIONER

## 2019-10-01 PROCEDURE — 97803 MED NUTRITION INDIV SUBSEQ: CPT

## 2019-10-01 PROCEDURE — A9585 GADOBUTROL INJECTION: HCPCS | Performed by: INTERNAL MEDICINE

## 2019-10-01 PROCEDURE — C9113 INJ PANTOPRAZOLE SODIUM, VIA: HCPCS | Performed by: NURSE PRACTITIONER

## 2019-10-01 PROCEDURE — 87040 BLOOD CULTURE FOR BACTERIA: CPT

## 2019-10-01 PROCEDURE — 99900035 HC TECH TIME PER 15 MIN (STAT)

## 2019-10-01 PROCEDURE — 25000003 PHARM REV CODE 250: Performed by: SPECIALIST

## 2019-10-01 PROCEDURE — 87205 SMEAR GRAM STAIN: CPT

## 2019-10-01 PROCEDURE — 99233 PR SUBSEQUENT HOSPITAL CARE,LEVL III: ICD-10-PCS | Mod: S$GLB,,, | Performed by: INTERNAL MEDICINE

## 2019-10-01 PROCEDURE — 20000000 HC ICU ROOM

## 2019-10-01 PROCEDURE — 99232 PR SUBSEQUENT HOSPITAL CARE,LEVL II: ICD-10-PCS | Mod: ,,, | Performed by: INTERNAL MEDICINE

## 2019-10-01 PROCEDURE — 36430 TRANSFUSION BLD/BLD COMPNT: CPT

## 2019-10-01 PROCEDURE — 99232 SBSQ HOSP IP/OBS MODERATE 35: CPT | Mod: ,,, | Performed by: INTERNAL MEDICINE

## 2019-10-01 PROCEDURE — 86901 BLOOD TYPING SEROLOGIC RH(D): CPT

## 2019-10-01 RX ORDER — GADOBUTROL 604.72 MG/ML
4 INJECTION INTRAVENOUS
Status: COMPLETED | OUTPATIENT
Start: 2019-10-01 | End: 2019-10-01

## 2019-10-01 RX ORDER — FUROSEMIDE 10 MG/ML
20 INJECTION INTRAMUSCULAR; INTRAVENOUS
Status: DISCONTINUED | OUTPATIENT
Start: 2019-10-01 | End: 2019-10-02 | Stop reason: HOSPADM

## 2019-10-01 RX ORDER — HYDROCODONE BITARTRATE AND ACETAMINOPHEN 500; 5 MG/1; MG/1
TABLET ORAL
Status: DISCONTINUED | OUTPATIENT
Start: 2019-10-01 | End: 2019-10-02 | Stop reason: HOSPADM

## 2019-10-01 RX ADMIN — CEFAZOLIN SODIUM 2 G: 2 SOLUTION INTRAVENOUS at 08:10

## 2019-10-01 RX ADMIN — VANCOMYCIN HYDROCHLORIDE 1000 MG: 1 INJECTION, POWDER, LYOPHILIZED, FOR SOLUTION INTRAVENOUS at 04:10

## 2019-10-01 RX ADMIN — METOPROLOL TARTRATE 10 MG: 5 INJECTION, SOLUTION INTRAVENOUS at 12:10

## 2019-10-01 RX ADMIN — PANTOPRAZOLE SODIUM 40 MG: 40 INJECTION, POWDER, FOR SOLUTION INTRAVENOUS at 08:10

## 2019-10-01 RX ADMIN — Medication 10 ML: at 12:10

## 2019-10-01 RX ADMIN — CEFAZOLIN SODIUM 2 G: 2 SOLUTION INTRAVENOUS at 01:10

## 2019-10-01 RX ADMIN — GADOBUTROL 4 ML: 604.72 INJECTION INTRAVENOUS at 06:10

## 2019-10-01 RX ADMIN — METOPROLOL TARTRATE 10 MG: 5 INJECTION, SOLUTION INTRAVENOUS at 06:10

## 2019-10-01 RX ADMIN — MILRINONE LACTATE 0.25 MCG/KG/MIN: 200 INJECTION, SOLUTION INTRAVENOUS at 08:10

## 2019-10-01 RX ADMIN — Medication 10 ML: at 06:10

## 2019-10-01 RX ADMIN — Medication 10 ML: at 01:10

## 2019-10-01 RX ADMIN — FUROSEMIDE 20 MG: 10 INJECTION, SOLUTION INTRAVENOUS at 10:10

## 2019-10-01 RX ADMIN — FUROSEMIDE 20 MG: 10 INJECTION, SOLUTION INTRAVENOUS at 08:10

## 2019-10-01 RX ADMIN — CEFAZOLIN SODIUM 2 G: 2 SOLUTION INTRAVENOUS at 02:10

## 2019-10-01 RX ADMIN — DAPTOMYCIN 340 MG: 350 INJECTION, POWDER, LYOPHILIZED, FOR SOLUTION INTRAVENOUS at 06:10

## 2019-10-01 RX ADMIN — ENALAPRILAT 2.5 MG: 2.5 INJECTION INTRAVENOUS at 06:10

## 2019-10-01 NOTE — PROGRESS NOTES
Progress  Note  Infectious Disease    Reason for Consult:  sepsis    HPI: Mesha Mckenzie is a critically ill 63 y.o. female , tranferred from [facility] where she presented with 4 days of nausea and vomiting, found to have ABIMBOLA, leukocytosis, hypotension, multiple pulmonary infiltrates, pancolitis on CT, metabolic acidosis and altered mental status. She was transferred for multispecialty care. Since admission, she has received vanc, levaquin and flagyl, still requires pressors, has 3/4 blood cultures with GPC in clusters, Ct chest with multiple foci of infiltrates including some that look like septic pulmonary emboli, elevated coags without bleeding and no further diarrheal stools. Her chemistries are responding to fluid and supplementation but her mental status remains very abnormal. She is in pain from her abdomen. It is unclear from notes and my conversation with daughter whether her respiratory symptoms came first or the GI symptoms came first. She has no history of STaph infection, no recent antibiotics, but does smoke and take pain meds, valium and adderall chronically.      9/21: blood cultures with STaph aureus, sensitivities pending. Blood cultures from yesterday are negative so far. No change in oxygen needs. No longer on pressors. Less responsive. TTE unofficially abnormal. Staph in urine is likely from the bacteremia. No family at bedside.   9/22: CT head with no significant changes. Mental status no better. Still no BM since admit. CXR slightly better. Blood cultures from yesterday negative and original is MSSA. coags still elevated.   Cr now normal.   9/23: afebrile. Blood cultures are persistently positive. Mental status is much improved and she can answer questions. She continues to moan and groan. She indicated to me that her anus is uncomfortable and needs to have a BM. She denies abd pain but grimaces with palpation. She denies new pain in her spine. She does indicate pain in the right foot. When I  asked what the lumps on her shoulders are from she indicates they are from falls. I told her that she was being treated for a Staph infection and I believe she understood.   9/24: afebrile. Blood cultures not drawn from 9/22, 23.  Much more alert and interactive. ADMITTED TO ME THAT SHE HAS BEEN INJECTING HEROIN, LAST DONE 2 WEEKS AGO. SHE DOES NOT WANT HER DAUGHTER TO KNOW AND TELLS ME THAT HER  KNOWS.  This certainly explains the Staph bacteremia and septic pulmonary emboli. CXR improving. Pain in back is generalized. She reports that she did have a BM but it is not recorded. She did take a few bites of pudding.   9/25:  Status post DHARMESH today with findings of bicuspid valve and mitral valve vegetations the.  Discussed with Cardiology.  Per nurse and he was less alert today and perhaps more confused.  Pain medication was decreased early urine she is complaining abdominal pain and back pain.  She is requiring BiPAP this evening he did receive 2 units of blood with 20 mg of Lasix between the units and did have good urine output but her potassium was low this morning requiring aggressive supplementation.  Blood cultures are now negative  9/26: BNP was 1400 last night. Received more lasix. Today increased shortness of breath and bradycardia. Seen by cardiology. Milrinone and bumex drips started to help her mobilize fluid. Extreme agitation prompted resumption of higher dose of opiates, possible withdrawal?. No new positive blood cultures. Creatinine stable and platelets 44k.   09/27 Same complaint of ll abd pain afeb, tmax 99.4 still tachycardic;  cardiologist tried dig. Diuresed well with bumex.  Nurse is working hard on replacing electrolytes. Patient had loose stools and needed flexiseal today   09/28  Afebrile, tmax 99.3, receiving TPN. Tachy improved.  Diarrhea yesterday, not as bad today. FOBT was +, Cdiff NEG.   blood cultures from 25th == BECAME positive   CXR improved with diuresis, off NIV today. On 5  L  9/29: tmax 101+. She is breathing more comfortably. Blood culture from 9/25 turned pos on 9/28. She is still sedated/encephalopathic. No focal deficits but unsure if her vision is intact. With talk of surgery, it is important that we establish whether she has had any CNS emboli. D/w her daughter and nurse at bedside.   9/30: fortunately MRA/MRI brain is negative for emboli or strokes. D/w Dr. Dobson and surgery on hold if no worsening of valvular function and clearing blood cultures.   10/1: temps are a little lower. She has not received any sedating meds today. Has periods of increased alertness but mostly sleepy/lethargic. Still too sleepy to cooperate with speech therapy. Receiving TPN. Still has liquid stools. Still complains of rectal pain.  at bedside. 9/26  Blood culture became positive 0300 9/30      Antibiotics (From admission, onward)    Start     Stop Route Frequency Ordered    09/30/19 0300  vancomycin in dextrose 5 % 1 gram/250 mL IVPB 1,000 mg      -- IV Every 24 hours (non-standard times) 09/30/19 0245    09/29/19 1730  DAPTOmycin (CUBICIN) 340 mg in sodium chloride 0.9% IVPB      -- IV Every 24 hours (non-standard times) 09/29/19 1628    09/22/19 1345  cefazolin (ANCEF) 2 gram in dextrose 5% 50 mL IVPB (premix)      -- IV Every 6 hours (non-standard times) 09/22/19 1244      VANC as per pharmacy       Antifungals (From admission, onward)    None        Antivirals (From admission, onward)    None          EXAM & DIAGNOSTICS REVIEWED:   Vitals:     Temp:  [97.6 °F (36.4 °C)-100.2 °F (37.9 °C)]   Temp: 98.8 °F (37.1 °C) (10/01/19 1200)  Pulse: (!) 117 (10/01/19 1500)  Resp: (!) 41 (10/01/19 1500)  BP: (!) 152/65 (10/01/19 1445)  SpO2: (!) 94 % (10/01/19 1500)    Intake/Output Summary (Last 24 hours) at 10/1/2019 1552  Last data filed at 10/1/2019 1342  Gross per 24 hour   Intake 1483.88 ml   Output 2045 ml   Net -561.12 ml       General:  Arousable, attends, tries to cooperate      Eyes:  Anicteric, PERRL,  no sleral or conjunctival petechiae  ENT:  No ulcers, exudates, thrush, nares patent, dentition is poor and MM are  Dry,   Neck:  Supple,    Lungs: Patchy crackles,  With decreased BS RLL. Chest wall is tender(not new, ?bruised ribs from fall))  Heart:  RRR,  Tachy 120  Abd:  Soft, tender, guards less, minimal distention,   + BS, no masses or organomegaly appreciated.flexiseal  :   Henriquez, urine clear, no flank tenderness  Musc:  Joints without effusion, swelling, erythema, synovitis    Skin:  No rashes. No palmar or plantar lesions. No subungual petechiae. No skin lesions of DIC  Wound: Right hip abrasion, not infected, healed  Neuro: arousable, face symmetric, tongue protrudes in the midline,  moves all extremities, no focal weakness but generally weak.  vision is intact.    Psych:  Still encephalopathic, can answer some questions. sedated  Lymphatic:        Extrem: generalized edema seems resolved , no erythema, phlebitis, cellulitis, peripheral pulses are 0-1, clubbing present,    VAD:  Left IJ TLC 9/19-9/30, picc line 9/30 RUE  Isolation:  none    Lines/Tubes/Drains:    General Labs reviewed:  Recent Labs   Lab 09/29/19  0543 09/30/19  0121 10/01/19  0352   WBC 10.81 10.49 9.07   HGB 8.1* 8.5* 7.9*   HCT 25.6* 26.6* 25.4*   * 148* 182       Recent Labs   Lab 09/26/19  0342  09/27/19  0609  09/28/19  0403  09/29/19  0543 09/30/19  0121  09/30/19  1943 10/01/19  0352 10/01/19  0800     --  141  --  142  --  143 145  --   --  148*  --    K 3.2*   < > 2.4*   < > 3.4*   < > 3.5 3.0*   < > 2.9* 4.0 3.8     --  90*  --  93*  --  98 100  --   --  108  --    CO2 29  --  39*  --  37*  --  36* 34*  --   --  30*  --    BUN 27*  --  36*  --  49*  --  55* 49*  --   --  45*  --    CREATININE 0.7  --  0.8  --  1.0  --  1.0 1.0  --   --  1.0  --    CALCIUM 7.2*  --  7.8*  --  7.8*  --  7.9* 8.0*  --   --  8.1*  --    PROT 5.1*  --  6.7  --  6.4  --   --   --   --   --   --   --     BILITOT 0.7  --  0.8  --  0.5  --   --   --   --   --   --   --    ALKPHOS 48*  --  66  --  74  --   --   --   --   --   --   --    ALT <5*  --  <5*  --  <5*  --   --   --   --   --   --   --    AST 11  --  15  --  15  --   --   --   --   --   --   --     < > = values in this interval not displayed.     Recent Labs   Lab 09/28/19  0402   CRP 90.8*         Micro:  Microbiology Results (last 7 days)     Procedure Component Value Units Date/Time    Culture, Body Fluid (Aerobic) w/ GS [[id number]] Collected:  10/01/19 1127    Order Status:  Sent Specimen:  Body Fluid from Thoracentesis Fluid Updated:  10/01/19 1128    Blood culture [[id number]] Collected:  10/01/19 0534    Order Status:  Sent Specimen:  Blood Updated:  10/01/19 0939    Blood culture [[id number]]  (Abnormal) Collected:  09/26/19 0443    Order Status:  Completed Specimen:  Blood Updated:  10/01/19 0854     Blood Culture, Routine Gram stain peds bottle: Gram positive cocci in clusters resembling Staph       Results called to and read back by:Patria Daugherty RN 09/30/2019  03:04      STAPHYLOCOCCUS AUREUS  ID consult required at [facility] and [facility]   locations.  For susceptibility see order #[id number]      Blood culture [[id number]] Collected:  09/30/19 0128    Order Status:  Completed Specimen:  Blood Updated:  10/01/19 0715     Blood Culture, Routine No Growth to date    Narrative:       From [facility]    Blood culture [[id number]] Collected:  09/29/19 1800    Order Status:  Completed Specimen:  Blood from Line, Central Updated:  10/01/19 0612     Blood Culture, Routine No Growth to date      No Growth to date    Narrative:       From [facility]    IV catheter culture [[id number]] Collected:  09/30/19 1554    Order Status:  Sent Specimen:  Catheter Tip, Intrajugular Updated:  09/30/19 2324    Stool culture [[id number]] Collected:  09/26/19 1329    Order Status:  Completed Specimen:  Stool Updated:  09/30/19 1355     Stool Culture No  Salmonella,Shigella,Vibrio,Campylobacter,Yersinia isolated.    Blood culture [[id number]]  (Abnormal) Collected:  09/25/19 0413    Order Status:  Completed Specimen:  Blood Updated:  09/30/19 1007     Blood Culture, Routine Gram stain peds bottle: Gram positive cocci in clusters resembling Staph       Results called to and read back by:Dunia Keating RN 09/28/2019  00:29      STAPHYLOCOCCUS AUREUS  ID consult required at [facility].  For susceptibility see order #[id number]      Blood culture [[id number]]  (Abnormal)  (Susceptibility) Collected:  09/24/19 0517    Order Status:  Completed Specimen:  Blood Updated:  09/30/19 1003     Blood Culture, Routine Gram stain peds bottle: Gram positive cocci in clusters resembling Staph       Results called to and read back by: Liset Arroyo RN  09/28/2019  12:30      STAPHYLOCOCCUS AUREUS  ID consult required at [facility].      Blood culture [[id number]]     Order Status:  Canceled Specimen:  Blood     Clostridium difficile EIA [[id number]] Collected:  09/27/19 2013    Order Status:  Completed Specimen:  Stool Updated:  09/27/19 2228     C. diff Antigen Negative     C difficile Toxins A+B, EIA Negative     Comment: Testing not recommended for children <24 months old.       E. coli 0157 antigen [[id number]] Collected:  09/26/19 1329    Order Status:  Canceled Specimen:  Stool     Blood culture [[id number]]  (Abnormal)  (Susceptibility) Collected:  09/22/19 0338    Order Status:  Completed Specimen:  Blood from Antecubital, Left  Arm Updated:  09/25/19 1028     Blood Culture, Routine Gram stain peds bottle: Gram positive cocci in clusters resembling Staph       Results called to and read back by:Yolis Wan RN 09/23/2019  04:46      STAPHYLOCOCCUS AUREUS  ID consult required at [facility].             Results for ROM YOUNG S (MRN [id number]) as of  10/1/2019 15:37   Ref. Range 10/1/2019 11:27   Fluid Color Unknown Yellow   Fluid Appearance Unknown Cloudy   Body Fluid Type Unknown Pleural Fluid, Right   WBC, Body Fluid Latest Units: /cu mm 2893   Segs, Fluid Latest Units: % 95   Lymphs, Fluid Latest Units: % 2   Monocytes/Macrophages, Fluid Latest Units: % 3     Imaging Reviewed:   CXR  Improvement in the appearance of the chest compared to the prior exam with decrease of the bilateral nodular opacifications.     CT ABD and Pelvis :   Chest; persistent shattered bilateral consolidative processes which have undergone cavitation/cystic spaces within them  Small left and moderate size right pleural effusion increased in size  Pericardial effusion appearing small but mildly increased in size  Abdomen and pelvis; mild distention of colon with prominent amount of fluid suggesting ileus.  Very mild colitis cannot be excluded but with no appreciable bowel wall thickening or pericolonic inflammatory change.  Additional findings as detailed above      Cardiology:  TTE 9/20  Left Ventricle Low normal ejection fraction at 50%. Mild concentric observed. Grade I (mild) left ventricular diastolic dysfunction consistent with impaired relaxation. Elevated left atrial pressure.   Right Ventricle Normal cavity size, wall thickness and systolic function. Wall motion normal.   Left Atrium The left atrium is normal.   Right Atrium There is normal right atrial size.   Aortic Valve The valve is trileaflet. There is normal leaflet mobility.   Mitral Valve Normal valve structure. Mild regurgitation. Normal leaflet mobility.   Tricuspid Valve The tricuspid valve is normal. Trace regurgitation. Pulmonary hypertension present. The estimated PA systolic pressure is 48 mmHg.   Pulmonic Valve Normal valve structure. Trace regurgitation.   IVC/SVC Normal central venous pressure (3 mm Hg).   Ascending Aorta The aortic root and ascending aorta are normal in size.   Pericardium No pericardial  effusion. Respiratory variation of mitral valve inflow is less than 30%.     DHARMESH 9/25  · Mild left ventricular enlargement.  · Low normal left ventricular systolic function. The estimated ejection fraction is 50%  · Normal LV diastolic function.  · Mild left atrial enlargement.  · No interatrial septal defect present.  · Normal appearing left atrial appendage. No thrombus is present in the appendage. Normal appendage velocities.  · Moderate-to-severe mitral regurgitation.  · Moderate tricuspid regurgitation.  · 1x.5 cmmobile vegetation on tricuspid valve septal leaflet atrial side  · 5mmx6 mm mobile vegetation on posterior leaflet P3 scallop on atrial side  · Aortic valve wnl    ECHO 09/26   · Mild concentric left ventricular hypertrophy.  · Mild left ventricular enlargement.  · Moderately decreased left ventricular systolic function. The estimated ejection fraction is 33%  · Grade III (severe) left ventricular diastolic dysfunction consistent with restrictive physiology.  · Mild global hypokinetic wall motion.  · Normal right ventricular systolic function.  · Mild left atrial enlargement.  · Mild right atrial enlargement.  · There is a right atrial mass present. Echo lucent mobile small vegetation ssen at R atrial - TV septal leaflet junction In hi R atrium At septal surface there is more echo dense structure ( ~ 1.5x .8 cm) mobile which could be vegetation vs normal prominent Eustachian valve  · Moderate-to-severe mitral regurgitation.  · Normal central venous pressure (3 mm Hg).  · The estimated PA systolic pressure is 50 mm Hg  · Moderate to severe pulmonary hypertension present.    IMPRESSION & PLAN     1.  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   Leukocytosis, resolved   Procal 3      IVDA/heroin    Pleural effusions with decreased EF, on milrinone, improved on imaging but consistent with empyema on thoracentesis 10/1.      2. Pancolitis. ? if she has ischemic colitis from sepsis and volume depletion. diarrhea today on 27 . CT abd improved 9/26  3. Shortness of breath, right pleural effusion x-ray, improved on 27th, NIV then NC5-6 L  4. opiate dependence, severe lumbar spine abnormalities  6. Altered mental status. From sepsis? +/- from unrecognized stroke or embolus, non focal and MRI brain negative 9/30   High risk for deterioration      Recommendations:  -  Cardiologist,  Dr Dobson has spoken with vascular surgeon re poss surgery and for now this is on hold.   -  Cont Vanc + cefazolin and daptomycin(9/29)  - chest tube?  -serial blood cultures   -we need to reduce pain meds so that she can wake up and mobilize  I would like to image lumbar spine as well, will get MRI lumbar spine

## 2019-10-01 NOTE — PT/OT/SLP PROGRESS
"Speech Language Pathology Treatment    Patient Name:  Mesha Mckenzie   MRN:  [id number]  Admitting Diagnosis: Acute bacterial endocarditis    Recommendations:                 General Recommendations:  Dysphagia therapy and Cognitive-linguistic therapy  Diet recommendations:  NPO, Liquid Diet Level: NPO(allow ice chips with nursing following oral care)   Aspiration Precautions: Frequent oral care and Ice chips sparingly   General Precautions: Standard, aspiration, fall, respiratory  Communication strategies:  provide increased time to answer    Subjective   "I want to go home."    Objective:   Pt seen in ICU. Remains lethargic with poor level of alertness. Daughter at bedside. Oriented to self and year only. Required constant verbal/tactile stimulation to maintain alertness. She consumed ice chips, water via tsp and applesauce with no overt s/s penetration/aspiration. Continues with open mouth posture and poor closure around utensil; therapist scraping bolus on upper teeth.     Has the patient been evaluated by SLP for swallowing?   Yes  Keep patient NPO? Yes(allow ice chips with Nsg)   Current Respiratory Status: nasal cannula      Assessment:     Mesha Mckenzie is a 63 y.o. female with an SLP diagnosis of Dysphagia and Cognitive-Linguistic Impairment.  She presents poor level of alertness requiring constant verbal/tactile stimulation to keep eyes open. Not appropriate for PO 2' mental status. Very little improvement in mental status since initial evaluation 9/24/19. Consider alternative means of nutrition/hydration. Continue POC.     Goals:   Multidisciplinary Problems     SLP Goals        Problem: SLP Goal    Goal Priority Disciplines Outcome   SLP Goal     SLP Ongoing, Not Progressing   Description:    1) Pt will consume regular textures and thin liquids with no overt s/s swallow dysfunction  2) Follow 3 step commands, 100% acc indep.  3) Verbally solve functional problems (math, time, daily activities) 90% " acc indep  4) Ongoing eval of speech-language cognition.                       Plan:     · Patient to be seen:  6 x/week   · Plan of Care expires:  10/15/19  · Plan of Care reviewed with:  patient, daughter   · SLP Follow-Up:  Yes     Time Tracking:     SLP Treatment Date:   10/01/19  Speech Start Time:  0932  Speech Stop Time:  0946     Speech Total Time (min):  14 min    Billable Minutes: Treatment Swallowing Dysfunction 14 and Total Time 14    Alondra Mckay CCC-SLP  10/01/2019

## 2019-10-01 NOTE — PLAN OF CARE
Problem: SLP Goal  Goal: SLP Goal  Description    1) Pt will consume regular textures and thin liquids with no overt s/s swallow dysfunction  2) Follow 3 step commands, 100% acc indep.  3) Verbally solve functional problems (math, time, daily activities) 90% acc indep  4) Ongoing eval of speech-language cognition.      Outcome: Ongoing, Not Progressing    Pt continues with poor level of alertness. Requires constant verbal/tactile stimulation to maintain alertness. REC pt remain NPO. Consider nonoral means of nutrition; little improvement in mental status. Continue POC.

## 2019-10-01 NOTE — CONSULTS
[facility]  Cardiology  Progress Note    Patient Name: Mesha Mckenzie  MRN: [id number]  Admission Date: 9/19/2019  Hospital Length of Stay: 11 days  Code Status: Full Code   Attending Physician: Lizzette Covarrubias MD   Primary Care Physician: Varun Shea MD PhD  Expected Discharge Date:   Principal Problem:Acute bacterial endocarditis    Subjective:     Hospital Course: still not tolerating po   Interval History: echo 9/28 shows same size vegetations, slight improved ef ( 35%), no pulmonary hi bp, and less mitral regurgitation   Renal status same , bp ok and hr high        ROS  Objective:     Vital Signs (Most Recent):  Temp: 97.6 °F (36.4 °C) (09/30/19 2000)  Pulse: (!) 113 (09/30/19 2300)  Resp: (!) 33 (09/30/19 2300)  BP: (!) 154/70 (09/30/19 2300)  SpO2: 97 % (09/30/19 2300) Vital Signs (24h Range):  Temp:  [97.6 °F (36.4 °C)-100.7 °F (38.2 °C)] 97.6 °F (36.4 °C)  Pulse:  [] 113  Resp:  [23-58] 33  SpO2:  [81 %-100 %] 97 %  BP: (132-187)/(60-79) 154/70     Weight: 42.6 kg (93 lb 14.7 oz)  Body mass index is 17.18 kg/m².    SpO2: 97 %  O2 Device (Oxygen Therapy): nasal cannula      Intake/Output Summary (Last 24 hours) at 9/30/2019 2347  Last data filed at 9/30/2019 2000  Gross per 24 hour   Intake 1887.7 ml   Output 1815 ml   Net 72.7 ml       Lines/Drains/Airways     Peripherally Inserted Central Catheter Line                 PICC Double Lumen 09/30/19 1230 right brachial less than 1 day          Drain                 Urethral Catheter 09/19/19 1240 Double-lumen 16 Fr. 11 days         Rectal Tube 09/27/19 1000 rectal tube w/ balloon (indicate number of mLs) 3 days                Physical Exam   130/85 hr 115 ]  Lungs clear anteriorly,   Cor 1/6 sys m apex no s3  No rub   abd mild tenderness   Extremities ok  No osler nodes     Significant Labs:   CMP   Recent Labs   Lab 09/29/19  0543 09/30/19  0121 09/30/19  1248 09/30/19  1943    145  --   --    K 3.5 3.0* 3.0* 2.9*   CL 98 100   --   --    CO2 36* 34*  --   --    * 150*  --   --    BUN 55* 49*  --   --    CREATININE 1.0 1.0  --   --    CALCIUM 7.9* 8.0*  --   --    ANIONGAP 9 11  --   --    ESTGFRAFRICA >60 >60  --   --    EGFRNONAA 59* 59*  --   --     and CBC   Recent Labs   Lab 09/29/19  0543 09/30/19  0121   WBC 10.81 10.49   HGB 8.1* 8.5*   HCT 25.6* 26.6*   * 148*     9/28 echo   · Significant Imaging:Concentric left ventricular remodeling.  · Moderately decreased left ventricular systolic function. The estimated ejection fraction is 35%  · No wall motion abnormalities.  · Atrial fibrillation not observed.  · Indeterminate left ventricular diastolic function.  · Normal right ventricular systolic function.  · Mild-to-moderate mitral regurgitation.  · Mild to moderate tricuspid regurgitation.  · No pulmonary hypertension present.  · Normal central venous pressure (3 mm Hg).  · The estimated PA systolic pressure is 27 mm Hg  · Small circumferential pericardial effusion. Effusion is fluid.     1) Ef of lv slightly improved   2) small circumferential pericardial effusion   3) pulmonary hypertension no longer documented   4) vegetations same size   5) mitral leaflets coapt normally - moderate but not severe  Mitral regurg seen       Assessment and Plan:   1) not surgical candidate at this time   2) rx medically- still on milrinone  Increase ace + BB   3) replace k   4) Dr Corrigan will follow     Brief HPI:     Active Diagnoses:    Diagnosis Date Noted POA    PRINCIPAL PROBLEM:  Acute bacterial endocarditis [I33.0] 09/27/2019 Yes    Hyperglycemia [R73.9] 09/27/2019 No    Acute septic pulmonary embolism [I26.90] 09/27/2019 Yes    Tricuspid valve vegetation [I33.0] 09/27/2019 Yes    Mitral valve vegetation [I33.0] 09/27/2019 Yes    Hypomagnesemia [E83.42] 09/27/2019 No    Thrombocytopenia, acquired [D69.6] 09/27/2019 Yes    IV drug user [F19.90] 09/25/2019 Yes    Acute on chronic diastolic congestive heart failure [I50.33]  09/23/2019 Yes    Encephalopathy, metabolic [G93.41] 09/23/2019 No    Normocytic anemia [D64.9] 09/23/2019 Yes    Severe malnutrition [E43] 09/23/2019 Yes    Staphylococcus aureus bacteremia with sepsis [A41.01] 09/22/2019 Yes    Pleural effusion, bilateral [J90] 09/20/2019 Yes    Acute hypoxemic respiratory failure [J96.01] 09/20/2019 Yes    ABIMBOLA (acute kidney injury) [N17.9] 09/19/2019 Yes    Pancolitis [K51.00] 09/19/2019 Yes    Hypokalemia [E87.6] 09/19/2019 No      Problems Resolved During this Admission:    Diagnosis Date Noted Date Resolved POA    Pulmonary hypertension [I27.20] 09/23/2019 09/23/2019 Yes    DIC (disseminated intravascular coagulation) [D65] 09/23/2019 09/23/2019 Yes    Coagulopathy [D68.9] 09/20/2019 09/27/2019 Yes    ILD (interstitial lung disease) [J84.9] 09/20/2019 09/23/2019 Yes    COPD with acute lower respiratory infection [J44.0] 09/20/2019 09/23/2019 Yes    Calcification of aorta [I70.0] 09/20/2019 09/23/2019 Yes    BOOP (bronchiolitis obliterans with organizing pneumonia) [J84.89] 09/20/2019 09/23/2019 Yes    Intravascular volume depletion [E86.1] 09/19/2019 09/23/2019 Yes    Hyponatremia [E87.1] 09/19/2019 09/23/2019 Yes    Metabolic acidosis [E87.2] 09/19/2019 09/27/2019 Yes    CAP (community acquired pneumonia) [J18.9] 09/19/2019 09/23/2019 Yes       VTE Risk Mitigation (From admission, onward)         Ordered     Place sequential compression device  Until discontinued      09/19/19 2234     IP VTE HIGH RISK PATIENT  Once      09/19/19 2217                Benigno Dobson MD  Cardiology  [facility]

## 2019-10-01 NOTE — OP NOTE
Radiology Post-Procedure Note    Pre Op Diagnosis: Pleural Effusion  Post Op Diagnosis: Same    Procedure: U/S guided thoracentesis    Procedure performed by: MD Neri    Written Informed Consent Obtained: Yes, from daughter via phone consent  Specimen Removed: YES 30 cc cloudy serosanguinous fluid  Estimated Blood Loss: Minimal    Findings:   Successful diagnostic thoracentesis.  Pleural fluid likely loculated, preventing large volume aspiration.    Patient tolerated procedure well.  Continue postprocedure monitoring with f/u CXRs.    Ben He MD  Department of Radiology

## 2019-10-01 NOTE — SUBJECTIVE & OBJECTIVE
Interval History:  In intensive care unit, in critical condition.  T-max 100.7 degree F. patient is lethargic, appear extremely weak, unable to hold conversation, still NPO due to swallowing dysfunction.  Drop in hemoglobin down to 7.9 grams/deciliter noted.  On intravenous Milrinone infusion.  Awaiting decision recommendations as per Dr. Dobson regarding transfer to Cardiovascular surgery team for valve replacement.  PICC line placed yesterday.    Review of Systems   Respiratory: Negative for cough, chest tightness, shortness of breath and wheezing.    Cardiovascular: Negative for chest pain, palpitations and leg swelling.   Gastrointestinal: Positive for abdominal pain. Negative for abdominal distention, constipation, diarrhea, nausea and vomiting.   Genitourinary: Negative for difficulty urinating, dysuria and flank pain.   Musculoskeletal: Negative for gait problem, joint swelling and neck pain.   Skin: Negative for rash and wound.   Neurological: Negative for dizziness, syncope, light-headedness and headaches.   Psychiatric/Behavioral: Negative for agitation, behavioral problems and confusion.     Objective:     Vital Signs (Most Recent):  Temp: 99.2 °F (37.3 °C) (10/01/19 0400)  Pulse: 103 (10/01/19 0752)  Resp: (!) 32 (10/01/19 0752)  BP: (!) 167/72 (10/01/19 0600)  SpO2: 97 % (10/01/19 0752) Vital Signs (24h Range):  Temp:  [97.6 °F (36.4 °C)-100.7 °F (38.2 °C)] 99.2 °F (37.3 °C)  Pulse:  [] 103  Resp:  [23-54] 32  SpO2:  [81 %-100 %] 97 %  BP: (132-202)/(60-77) 167/72     Weight: 42.6 kg (93 lb 14.7 oz)  Body mass index is 17.18 kg/m².    Intake/Output Summary (Last 24 hours) at 10/1/2019 0854  Last data filed at 10/1/2019 0600  Gross per 24 hour   Intake 1909.35 ml   Output 1965 ml   Net -55.65 ml      Physical Exam   Constitutional: She is oriented to person, place, and time. She appears well-developed and well-nourished.   HENT:   Head: Normocephalic and atraumatic.   Eyes: Pupils are equal, round,  and reactive to light. EOM are normal.   Cardiovascular: Regular rhythm, normal heart sounds and intact distal pulses.   Tachycardic   Pulmonary/Chest: Effort normal and breath sounds normal. No respiratory distress. She has no wheezes. She has no rales.   Abdominal: Soft. Bowel sounds are normal. She exhibits no distension. There is tenderness.   Musculoskeletal: She exhibits no edema or tenderness.   Neurological: She is alert and oriented to person, place, and time. No cranial nerve deficit. Coordination normal.   Has generalized weakness   Skin: Skin is warm and dry. Capillary refill takes less than 2 seconds. No rash noted.   Psychiatric: She has a normal mood and affect. Her behavior is normal. Thought content normal.   Nursing note and vitals reviewed.      Significant Labs:   CBC:   Recent Labs   Lab 09/30/19  0121 10/01/19  0352   WBC 10.49 9.07   HGB 8.5* 7.9*   HCT 26.6* 25.4*   * 182     CMP:   Recent Labs   Lab 09/30/19 0121 09/30/19  1943 10/01/19  0352 10/01/19  0800     --   --  148*  --    K 3.0*   < > 2.9* 4.0 3.8     --   --  108  --    CO2 34*  --   --  30*  --    *  --   --  151*  --    BUN 49*  --   --  45*  --    CREATININE 1.0  --   --  1.0  --    CALCIUM 8.0*  --   --  8.1*  --    ANIONGAP 11  --   --  10  --    EGFRNONAA 59*  --   --  59*  --     < > = values in this interval not displayed.     Significant Imaging:   CXR; Patchy airspace disease bilaterally, likely pneumonia.    CXR;   1. Central venous catheter placement without pneumothorax.    CT abdomen and pelvis without contrast:  1. Acute pancolitis.  2. Airspace disease multiple areas in the lower lungs are seen and a right pleural effusion approximately 2 cm deep at the base.    CT head without contrast:  No appreciable changes compared to CT head 07/24/2015 including moderate to severe presumed microvascular ischemic changes cerebral white matter and old lacunar infarct adjacent to right head of  "caudate.    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  Follow-up to assure clearance is recommended.  Moderate atelectasis of the right lower lobe and moderate size right pleural effusion.  Trace left pleural effusion.  Mild mediastinal adenopathy.  Atherosclerotic calcification including coronary artery calcification.    CT head without contrast:  1. Extensive chronic microvascular ischemic changes within the periventricular white matter of the supratentorial brain which are chance for the patient's chronologic age.  Demyelinating disease could produce a similar appearance.  There are more focal areas of hypoattenuation present within the periventricular white matter posterior right centrum semiovale and right frontal cortex, possibly focal areas of microvascular ischemic change or age-indeterminate infarction which are unchanged when compared with the cranial CT examination of 09/19/2019.  Additional clarification could be achieved with MRI of the brain as deemed clinically appropriate.  Given the provided history of "exclude septic emboli", consideration should be given to performing the examination with intravenous contrast.  2. Foci of remote lacunar infarction within the right caudate head and right corona radiata adjacent to the right frontal horn.    CXR:  Bilateral infiltrates with slight further decrease of the infiltrate right perihilar/basilar compared to the prior exam.    CT chest abdomen and pelvis with and without contrast:  Chest; persistent shattered bilateral consolidative processes which have undergone cavitation/cystic spaces within them  Small left and moderate size right pleural effusion increased in size  Pericardial effusion appearing small but mildly increased in size  Abdomen and pelvis; mild distention of colon with prominent amount of fluid suggesting ileus.  Very " mild colitis cannot be excluded but with no appreciable bowel wall thickening or pericolonic inflammatory change.    CXR:  Stable appearance of the chest demonstrating multifocal infiltrates and nodular opacities and small pleural effusions.    ECHO:  · Mild concentric left ventricular hypertrophy.  · Low normal left ventricular systolic function. The estimated ejection fraction is 50%  · Grade I (mild) left ventricular diastolic dysfunction consistent with impaired relaxation.  · Normal right ventricular systolic function.  · Mild mitral regurgitation.  · Normal central venous pressure (3 mm Hg).  · The estimated PA systolic pressure is 48 mm Hg  · Pulmonary hypertension present.    DHARMESH:  · Mild left ventricular enlargement.  · Low normal left ventricular systolic function. The estimated ejection fraction is 50%  · Normal LV diastolic function.  · Mild left atrial enlargement.  · No interatrial septal defect present.  · Normal appearing left atrial appendage. No thrombus is present in the appendage. Normal appendage velocities.  · Moderate-to-severe mitral regurgitation.  · Moderate tricuspid regurgitation.  · 1x.5 cmmobile vegetation on tricuspid valve septal leaflet atrial side  · 5mmx6 mm mobile vegetation on posterior leaflet P3 scallop on atrial side  · Aortic valve wnl    MRA brain with contrast:   No aneurysm or hemodynamically significant stenosis of the intracranial arterial vasculature.    MRI brain with and without contrast:  No acute intracranial abnormality and no abnormal enhancement.  Mild involutional change and white matter disease.    CXR: No significant change relative to September 30, 2019

## 2019-10-01 NOTE — PROGRESS NOTES
Progress Note  PULMONARY    Admit Date: 9/19/2019   10/01/2019      SUBJECTIVE:     Sept 21, diffuse pains, off levo, abg better.    Sept 22, on niv, arouses sl, no c/o  Sept 23, moans and groans, on and off bipap, agitated, restless  Sept 24, moans, agitation varies-patient says she is better today, seems brighter  Sept 25, calm/sedate on niv, looks much more comfortable, arouses  Sept 26, still uses bipap periodic- had spell distress respiratory, then guadalupe to 40's.  ,   Sept 30 , no new c/o, off niv.  Oct 1,apathetic, min sob, off niv.      PFSH and Allergies reviewed.    OBJECTIVE:     Vitals (Most recent):  Vitals:    10/01/19 0752   BP:    Pulse: 103   Resp: (!) 32   Temp:        Vitals (24 hour range):  Temp:  [97.6 °F (36.4 °C)-100.7 °F (38.2 °C)]   Pulse:  []   Resp:  [23-54]   BP: (132-202)/(60-77)   SpO2:  [81 %-100 %]       Intake/Output Summary (Last 24 hours) at 10/1/2019 0822  Last data filed at 10/1/2019 0600  Gross per 24 hour   Intake 1909.35 ml   Output 1965 ml   Net -55.65 ml          Physical Exam:  The patient's neuro status (alertness,orientation,cognitive function,motor skills,), pharyngeal exam (oral lesions, hygiene, abn dentition,), Neck (jvd,mass,thyroid,nodes in neck and above/below clavicle),RESPIRATORY(symmetry,effort,fremitus,percussion,auscultation),  Cor(rhythm,heart tones including gallops,perfusion,edema)ABD(distention,hepatic&splenomegaly,tenderness,masses), Skin(rash,cyanosis),Psyc(affect,judgement,).  Exam negative except for these pertinent findings:    Calm and sedate now but arouses on niv, chest is symmetric, no distress, normal percussion, normal fremitus and good normal breath sounds, no edema   .     Radiographs reviewed: view by direct vision oct 1- right effusion still   Ct chest on 26th with variable infiltrates - some worse but only mildly, right effusion is larger, somewhat loculated,  Labs     Recent Labs   Lab 10/01/19  0352   WBC 9.07   HGB 7.9*   HCT 25.4*         Recent Labs   Lab 10/01/19  0352   *   K 4.0      CO2 30*   BUN 45*   CREATININE 1.0   *   CALCIUM 8.1*     No results for input(s): PH, PCO2, PO2, HCO3 in the last 24 hours.  Microbiology Results (last 7 days)     Procedure Component Value Units Date/Time    Blood culture [[id number]] Collected:  09/30/19 0128    Order Status:  Completed Specimen:  Blood Updated:  10/01/19 0715     Blood Culture, Routine No Growth to date    Narrative:       From [facility]    Blood culture [[id number]] Collected:  09/29/19 1800    Order Status:  Completed Specimen:  Blood from Line, Central Updated:  10/01/19 0612     Blood Culture, Routine No Growth to date      No Growth to date    Narrative:       From [facility]    Blood culture [[id number]] Collected:  10/01/19 0534    Order Status:  Sent Specimen:  Blood Updated:  10/01/19 0534    IV catheter culture [[id number]] Collected:  09/30/19 1554    Order Status:  Sent Specimen:  Catheter Tip, Intrajugular Updated:  09/30/19 2324    Stool culture [[id number]] Collected:  09/26/19 1329    Order Status:  Completed Specimen:  Stool Updated:  09/30/19 1355     Stool Culture No Salmonella,Shigella,Vibrio,Campylobacter,Yersinia isolated.    Blood culture [[id number]]  (Abnormal) Collected:  09/25/19 0413    Order Status:  Completed Specimen:  Blood Updated:  09/30/19 1007     Blood Culture, Routine Gram stain peds bottle: Gram positive cocci in clusters resembling Staph       Results called to and read back by:Dunia Keating RN 09/28/2019  00:29      STAPHYLOCOCCUS AUREUS  ID consult required at [facility] and [facility].  For susceptibility see order #[id number]      Blood culture [[id number]]  (Abnormal)  (Susceptibility) Collected:  09/24/19 0517    Order Status:  Completed Specimen:  Blood Updated:  09/30/19 1003     Blood Culture, Routine Gram stain peds bottle: Gram positive cocci in clusters resembling Staph       Results called to and read  back by: Liset Arroyo RN  09/28/2019  12:30      STAPHYLOCOCCUS AUREUS  ID consult required at [facility].      Blood culture [[id number]] Collected:  09/26/19 0443    Order Status:  Completed Specimen:  Blood Updated:  09/30/19 0304     Blood Culture, Routine Gram stain peds bottle: Gram positive cocci in clusters resembling Staph       Results called to and read back by:Patria Daugherty RN 09/30/2019  03:04    Blood culture [[id number]]     Order Status:  Canceled Specimen:  Blood     Clostridium difficile EIA [[id number]] Collected:  09/27/19 2013    Order Status:  Completed Specimen:  Stool Updated:  09/27/19 2228     C. diff Antigen Negative     C difficile Toxins A+B, EIA Negative     Comment: Testing not recommended for children <24 months old.       E. coli 0157 antigen [[id number]] Collected:  09/26/19 1329    Order Status:  Canceled Specimen:  Stool     Blood culture [[id number]]  (Abnormal)  (Susceptibility) Collected:  09/22/19 0338    Order Status:  Completed Specimen:  Blood from Antecubital, Left  Arm Updated:  09/25/19 1028     Blood Culture, Routine Gram stain peds bottle: Gram positive cocci in clusters resembling Staph       Results called to and read back by:Yolis Wan RN 09/23/2019  04:46      STAPHYLOCOCCUS AUREUS  ID consult required at [facility] and [facility].      Blood culture [[id number]]  (Abnormal) Collected:  09/21/19 0534    Order Status:  Completed Specimen:  Blood Updated:  09/24/19 0954     Blood Culture, Routine Gram stain peds bottle: Gram positive cocci in clusters resembling Staph       Results called to and read back by:Javi Sunshine RN 09/22/2019  14:22      STAPHYLOCOCCUS AUREUS  ID consult required at [facility].  For susceptibility see order #[id number]        Results for ROM YOUNG (MRN [id number]) as of 9/23/2019 07:24   Ref. Range 9/22/2019 16:19   POC PH Latest  Ref Range: 7.35 - 7.45  7.488 (H)   POC PCO2 Latest Ref Range: 35 - 45 mmHg 27.0 (LL)   POC PO2 Latest Ref Range: 80 - 100 mmHg 69 (L)   POC BE Latest Ref Range: -2 to 2 mmol/L -3   POC HCO3 Latest Ref Range: 24 - 28 mmol/L 20.5 (L)   POC SATURATED O2 Latest Ref Range: 95 - 100 % 95       Impression:  Active Hospital Problems    Diagnosis  POA    *Acute bacterial endocarditis [I33.0]  Yes    Hyperglycemia [R73.9]  No    Acute septic pulmonary embolism [I26.90]  Yes    Tricuspid valve vegetation [I33.0]  Yes    Mitral valve vegetation [I33.0]  Yes    Hypomagnesemia [E83.42]  No    Thrombocytopenia, acquired [D69.6]  Yes    IV drug user [F19.90]  Yes    Acute on chronic diastolic congestive heart failure [I50.33]  Yes    Encephalopathy, metabolic [G93.41]  No    Normocytic anemia [D64.9]  Yes    Severe malnutrition [E43]  Yes    Staphylococcus aureus bacteremia with sepsis [A41.01]  Yes    Pleural effusion, bilateral [J90]  Yes    Acute hypoxemic respiratory failure [J96.01]  Yes    ABIMBOLA (acute kidney injury) [N17.9]  Yes    Pancolitis [K51.00]  Yes    Hypokalemia [E87.6]  No      Resolved Hospital Problems    Diagnosis Date Resolved POA    Pulmonary hypertension [I27.20] 09/23/2019 Yes    DIC (disseminated intravascular coagulation) [D65] 09/23/2019 Yes    Coagulopathy [D68.9] 09/27/2019 Yes    ILD (interstitial lung disease) [J84.9] 09/23/2019 Yes    COPD with acute lower respiratory infection [J44.0] 09/23/2019 Yes    Calcification of aorta [I70.0] 09/23/2019 Yes     Defer to primary control of cholesterol and bp.          BOOP (bronchiolitis obliterans with organizing pneumonia) [J84.89] 09/23/2019 Yes    Intravascular volume depletion [E86.1] 09/23/2019 Yes    Hyponatremia [E87.1] 09/23/2019 Yes    Metabolic acidosis [E87.2] 09/27/2019 Yes    CAP (community acquired pneumonia) [J18.9] 09/23/2019 Yes               Plan:   Sept 21, wbc now 9k, creat down to 1.2, pt uses narcotics chr -  be - 10 last pm from -15 on 19th,  F/u cxr- dose ativan.  Hard to evaluate- seems much better save pain c/o. F/u cxr am  Sept 22, seems better but hard to eval- certainly improved from admit.  Monitor, support/niv, abx, re eval.  No change.   Sept 23 - MSSA, right effusion stable with inr over 2 and plt 60's- no plan to tap at this time. Infiltrates better, encephalopathy still impressive= niv/bipap for part resp and part agitation.    Pt doesn't have cryptogenic organizing pneumonia/idiopathetic BOOP.  Pt had diastolic chf acute and chr.  Also pulm hypertension.  Has dic also- looked good on ct abd.    Sept 24- inr now over 5 with plt 50's,cxr better.    At the bedside patient is much better.  Coagulopathies impressive.  Suspect liver failure (?).  Patient apparently does better with Dilaudid over morphine.  Will change  To Dilaudid    Sept 25- ID notes noted, inr good today, check cxr am, seems to be improved.  Would decrease dilaudid from 2 to 1 q3prn.    Sept 26,   messey picture. Infection seems controlled.  Would be good to tap effusion but plt out- will try to have pt sit upright- pt developed extremis needing iv dilaudid.      Discussed with Dr Anna- seems to need high dose narcotics to keep comfortable- very compensated looking with higher dose.  Abstinence part of problem?    Hold off tap til more stable to sit up,do not think chest tube will help.      Sept 30- plts over 140, needs f/u cxr for effusion.  Temp 101 yesterday.    .10/1- no appetite, npo as aspiration risk.  Try to tap. cxr still lg effusion

## 2019-10-01 NOTE — CONSULTS
Consult Note  Cardiothoracic Surgery    Consults  SUBJECTIVE:     History of Present Illness:  Patient is a 63 y.o. female with history IVDA admitted with sepsis. Dx'd with pneumonia and endocarditis. Had thoracentesis for left effusion and referred by Dr Mohr to evaluate and possibly intervene for likely empyema.     Scheduled Meds:   ceFAZolin (ANCEF) IVPB  2 g Intravenous Q6H    DAPTOmycin (CUBICIN)  IV  8 mg/kg Intravenous Q24H    enalaprilat  2.5 mg Intravenous BID    folic acid  2 mg Oral Daily    furosemide  20 mg Intravenous Daily    metoprolol  10 mg Intravenous Q6H    pantoprazole  40 mg Intravenous Daily    sodium chloride 0.9%  10 mL Intravenous Q6H    vancomycin (VANCOCIN) IVPB  1,000 mg Intravenous Q24H     Infusions/Drips:   Amino acid 5% - dextrose 15% (CLINIMIX-E) solution with additives (1L  provides 510 kcal/L dextrose, with 50 gm AA, 150 gm CHO, Na 35, K 30, Mg 5, Ca 4.5, Acetate 80, Cl 39, Phos 15) 50 mL/hr at 09/30/19 1956    milrinone 20mg/100ml D5W (200mcg/ml) 0.25 mcg/kg/min (10/01/19 0841)     PRN Meds:sodium chloride, sodium chloride, bisacodyl, furosemide, HYDROmorphone, influenza, lorazepam, magnesium sulfate IVPB, magnesium sulfate IVPB, ondansetron, pneumoc 13-susanna conj-dip cr(PF), potassium chloride in water **AND** potassium chloride in water **AND** potassium chloride in water, sodium chloride 0.9%, Flushing PICC Protocol **AND** sodium chloride 0.9% **AND** sodium chloride 0.9%    Review of patient's allergies indicates:   Allergen Reactions    Pcn [penicillins]        Past Medical History:   Diagnosis Date    Anxiety     Carotid bruit     Chronic pain     Cystitis     Cystocele, unspecified (CODE)     Depression     GI bleed     History of uterine fibroid     Shortness of breath on exertion     Spinal stenosis     Urinary retention      Past Surgical History:   Procedure Laterality Date    ANTERIOR VAGINAL REPAIR  10-    CARDIAC CATHETERIZATION  age  12    CHOLECYSTECTOMY  2015    COLONOSCOPY  12/12/2018    aborted due to poor colon prep    COLONOSCOPY N/A 12/12/2018    Procedure: COLONOSCOPY;  Surgeon: Renard Gonsalves MD;  Location: [facility];  Service: Endoscopy;  Laterality: N/A;    HYSTERECTOMY  1989    AMANDA    tubiligation       Family History   Problem Relation Age of Onset    Alzheimer's disease Mother     Thyroid disease Mother     Osteoarthritis Mother     Hypertension Brother     Hypertension Sister     Breast cancer Neg Hx     Colon cancer Neg Hx     Ovarian cancer Neg Hx      Social History     Tobacco Use    Smoking status: Current Every Day Smoker     Packs/day: 0.50     Years: 40.00     Pack years: 20.00     Types: Cigarettes    Smokeless tobacco: Never Used   Substance Use Topics    Alcohol use: No    Drug use: No        Review of Systems:    OBJECTIVE:     Vital Signs (Most Recent)  Temp: 98.8 °F (37.1 °C) (10/01/19 1200)  Pulse: 108 (10/01/19 1600)  Resp: (!) 39 (10/01/19 1600)  BP: (!) 171/72 (10/01/19 1600)  SpO2: (!) 94 % (10/01/19 1600)    Admission Weight: 45.1 kg (99 lb 6.8 oz) (09/19/19 2215)   Most Recent Weight: 42.6 kg (93 lb 14.7 oz) (09/29/19 0600)    Vital Signs Range (Last 24H):  Temp:  [97.6 °F (36.4 °C)-100.2 °F (37.9 °C)]   Pulse:  []   Resp:  [30-77]   BP: (147-202)/()   SpO2:  [79 %-100 %]     Physical Exam:  NAD  Resp : no distress  CV RRR  Ext + edema      Laboratory:  CBC:   Recent Labs   Lab 10/01/19  0352   WBC 9.07   RBC 2.54*   HGB 7.9*   HCT 25.4*      *   MCH 31.1*   MCHC 31.1*     BMP:   Recent Labs   Lab 09/27/19  1932  10/01/19  0352 10/01/19  0800   GLU  --    < > 151*  --    NA  --    < > 148*  --    K 3.2*   < > 4.0 3.8   CL  --    < > 108  --    CO2  --    < > 30*  --    BUN  --    < > 45*  --    CREATININE  --    < > 1.0  --    CALCIUM  --    < > 8.1*  --    MG 2.5  --   --   --     < > = values in this interval not displayed.     Coagulation:   Recent Labs   Lab  09/27/19  0609   LABPROT 11.6   INR 1.1     Microbiology Results (last 7 days)     Procedure Component Value Units Date/Time    Culture, Body Fluid (Aerobic) w/ GS [[id number]] Collected:  10/01/19 1127    Order Status:  Sent Specimen:  Body Fluid from Thoracentesis Fluid Updated:  10/01/19 1751    Blood culture [[id number]] Collected:  10/01/19 0534    Order Status:  Completed Specimen:  Blood Updated:  10/01/19 1715     Blood Culture, Routine No Growth to date    Narrative:       From [facility]    Blood culture [[id number]]  (Abnormal) Collected:  09/26/19 0443    Order Status:  Completed Specimen:  Blood Updated:  10/01/19 0854     Blood Culture, Routine Gram stain peds bottle: Gram positive cocci in clusters resembling Staph       Results called to and read back by:Patria Daugherty RN 09/30/2019  03:04      STAPHYLOCOCCUS AUREUS  ID consult required at [facility] and [facility].  For susceptibility see order #[id number]      Blood culture [[id number]] Collected:  09/30/19 0128    Order Status:  Completed Specimen:  Blood Updated:  10/01/19 0715     Blood Culture, Routine No Growth to date    Narrative:       From [facility]    Blood culture [[id number]] Collected:  09/29/19 1800    Order Status:  Completed Specimen:  Blood from Line, Central Updated:  10/01/19 0612     Blood Culture, Routine No Growth to date      No Growth to date    Narrative:       From [facility]    IV catheter culture [[id number]] Collected:  09/30/19 1554    Order Status:  Sent Specimen:  Catheter Tip, Intrajugular Updated:  09/30/19 2324    Stool culture [[id number]] Collected:  09/26/19 1329    Order Status:  Completed Specimen:  Stool Updated:  09/30/19 1355     Stool Culture No Salmonella,Shigella,Vibrio,Campylobacter,Yersinia isolated.    Blood culture [[id number]]  (Abnormal) Collected:  09/25/19 0413    Order Status:  Completed Specimen:  Blood Updated:  09/30/19 1007     Blood Culture, Routine Gram stain peds bottle: Gram positive  cocci in clusters resembling Staph       Results called to and read back by:Dunia Keating RN 09/28/2019  00:29      STAPHYLOCOCCUS AUREUS  ID consult required at [facility].  For susceptibility see order #[id number]      Blood culture [[id number]]  (Abnormal)  (Susceptibility) Collected:  09/24/19 0517    Order Status:  Completed Specimen:  Blood Updated:  09/30/19 1003     Blood Culture, Routine Gram stain peds bottle: Gram positive cocci in clusters resembling Staph       Results called to and read back by: Liset Arroyo RN  09/28/2019  12:30      STAPHYLOCOCCUS AUREUS  ID consult required at [facility].      Blood culture [[id number]]     Order Status:  Canceled Specimen:  Blood     Clostridium difficile EIA [[id number]] Collected:  09/27/19 2013    Order Status:  Completed Specimen:  Stool Updated:  09/27/19 2228     C. diff Antigen Negative     C difficile Toxins A+B, EIA Negative     Comment: Testing not recommended for children <24 months old.       E. coli 0157 antigen [[id number]] Collected:  09/26/19 1329    Order Status:  Canceled Specimen:  Stool     Blood culture [[id number]]  (Abnormal)  (Susceptibility) Collected:  09/22/19 0338    Order Status:  Completed Specimen:  Blood from Antecubital, Left  Arm Updated:  09/25/19 1028     Blood Culture, Routine Gram stain peds bottle: Gram positive cocci in clusters resembling Staph       Results called to and read back by:Yolis Wan RN 09/23/2019  04:46      STAPHYLOCOCCUS AUREUS  ID consult required at [facility].            Diagnostic Results:  CXR reviewed  CT reviewed  Echo reviewed    ASSESSMENT/PLAN:     Impression : 62 yo with multiple medical problems                        Bacteremia/sepsis                        Pleural effusion ? Empyema                        ? Endocarditis                        AMS                         Opiate dependence                        Colitis                        UTI    Plan : Discussed with Dr Mohr.             Consider VATS vs placement chest tube. Concerned that patient may not tolerate one lung anesthesia            Will discuss further with Dr Covarrubias and Dr Bolden             Will tentatively schedule thoracoscopy but not definite. Ask anesthesia to evaluate       Thank you for the consult

## 2019-10-01 NOTE — PLAN OF CARE
POC reviewed with pt, pt unable to verbalize understanding. Pt still only oriented to self, very slow to respond. Flexi seal irrigated. Henriquez intact draining dark yellow urine. TPN at 50cc/hr, milrinone at .25 and lipids at 20.8. Potassium was 2.9 and 80meq replacement given. Pt still NPO as she continue to fail swallow study. MRA done yesterday shows no acute changes, Repeated DHARMESH shows EF now 35% and moderate mitral regurgitation. Blood cultures repeated again this AM. Safety maintained, max temp 99.4 axillary. 4 liters nasal cannula.

## 2019-10-01 NOTE — CARE UPDATE
10/01/19 0752   PRE-TX-O2   O2 Device (Oxygen Therapy) nasal cannula   $ Is the patient on Low Flow Oxygen? Yes   Flow (L/min) 4   SpO2 97 %   Pulse Oximetry Type Continuous   $ Pulse Oximetry - Multiple Charge Pulse Oximetry - Multiple   Pulse 103   Resp (!) 32   Wound Care   $ Wound Care Tech Time 15 min   Area of Concern Cheek;Bridge of nose;Chin   Skin Color/Characteristics without discoloration   Skin Temperature warm   Preset CPAP/BiPAP Settings   Mode Of Delivery Standby

## 2019-10-01 NOTE — PLAN OF CARE
Intervention: Parenteral Nutrition Therapy      Current Intake: TPN D 15 AA 5 @ 50 ml/hr + standard lipids   ( Provides 1355 kcal ( 100% EEN), and 60 g protein ( > 100% EPN))     Recommendation:  1) Advance PO diet as medically able to goal of regular, texture per SLP     2) Continue TPN as ordered with lipids- If needed r/t dehydration increase rate to 75 ml/hr per MD discretion    **As soon as medically able, Transition to TF via PEG if unable to advance PO diet   -Nutren 1.5 @ 20 ml/hr advancing slowly ( Pt at risk for refeeding syndrome) to goal of 35 ml/hr + 150-175 ml flush q 4 hr   ( provides 1260 kcal ( 99% EEN),  g protein ( > 100% EPN), and 638 ml free water)       3) Weigh pt weekly   4) Follow lytes closely and replete if needed, replete folate/iroin if needed      Goals: 1) po diet advanced or nutrition support initiated in < 48 hours 2) Nutrition support meeting > 75% EEN or PO diet advanced by f/u  Nutrition Goal Status: Met/ Meeting  Communication of RD Recs: (POC, sticky note, reviewed with MD)

## 2019-10-01 NOTE — PLAN OF CARE
POC reviewed with pt and family. Pt oriented x4 with delayed responses and slurred speech. Pt remains in sinus tachycardia and had a small run of vtach this morning, MD notified/aware. Pt remains on RA with appropriate sats; pt still experiencing tachypnea. Bedside thoracentesis completed for right sided pleural effusion, diagnostic study for the fluid resulted (+) for WBC (2900). CT surgery consulted for possible chest tube but after discussion Mds agreed to hold off on chest tube placement and she is to have VATS procedure done which is currently scheduled for Thursday. Went down for lumbar MRI with and without contrast; pt tolerated well. Pt complained of rectal pain that was somewhat resolved with position changes; no medications needed throughout shift for pain control; cream applied to skin breakdown on bottom x2 throughout shift. Comfort promoted, safety maintained.

## 2019-10-01 NOTE — NURSING
Arrived to ICU- patient supine in bed on 4L O2 NC- Dr He consented patient for ultrasound guided thoracentesis, time out performed. Right sided thoracentesis performed by Dr He- 30 ml fluid drained- pt remained stable for duration of procedure. Specimens collected and sent off for testing. Post op chest xray performed. Primary RN at bedside throughout procedure.

## 2019-10-01 NOTE — PROGRESS NOTES
[facility]  Adult Nutrition  Progress Note    SUMMARY    Intervention: Parenteral Nutrition Therapy      Current Intake: TPN D 15 AA 5 @ 50 ml/hr + standard lipids   ( Provides 1355 kcal ( 100% EEN), and 60 g protein ( > 100% EPN))     Recommendation:  1) Advance PO diet as medically able to goal of regular, texture per SLP     2) Continue TPN as ordered with lipids- If needed r/t dehydration increase rate to 75 ml/hr per MD discretion    **As soon as medically able, Transition to TF via PEG if unable to advance PO diet   -Nutren 1.5 @ 20 ml/hr advancing slowly ( Pt at risk for refeeding syndrome) to goal of 35 ml/hr + 150-175 ml flush q 4 hr   ( provides 1260 kcal ( 99% EEN),  g protein ( > 100% EPN), and 638 ml free water)       3) Weigh pt weekly   4) Follow lytes closely and replete if needed, replete folate/iroin if needed      Goals: 1) po diet advanced or nutrition support initiated in < 48 hours 2) Nutrition support meeting > 75% EEN or PO diet advanced by f/u  Nutrition Goal Status: Met/ Meeting  Communication of RD Recs: (POC, sticky note, reviewed with MD)     Reason for Assessment     Reason For Assessment: Follow-Up  Diagnosis: (staph arueus bacteremia/sepsis)  Relevant Medical History: None applicable  Interdisciplinary Rounds: attended     General Information Comments: 64 y/o female with N/V/D x 5-6 days PTA here with aspiration pneumonia, ABIMBOLA, dehydration, metabolic encephalopathy. On bipap since admission NPO x 3 days. SLP unable to evaluate while pt on bipap. NFPE done 9/23/19, mild-moderate wasting seen. Slow insignificant wt loss per chart review.   9/25/19 SLP rec. Continued NPO with trials of solids daily. Will reassess s/p DHARMESH. Per RN pt at high risk of bleeding if NGT placed so TPN requested by team as pt also with PICC line. Tolerating at goal, replacing K.   9/27/19 SLP to re-evaluate today. Continues NPO + TPN, no lipids today but ordered yesterday. ? Ileus per chart.  "On bipap.  10/1/19 SLP continues to recommend NPO, no improvement in functional status. No mention of continued ileus. TPN infusing, per discussion with MD no plan for PEG at this time as hope to advance PO diet in the near future continues. Also MD concerned for elevated Na, plan for custom TPN no lytes vs. increase in rate to provide more fluid. Discussed need for enteral nutrition as soon as possible.      Nutrition Discharge Planning: To be determined- regular diet ? Need for longterm nutrition support     Nutrition Risk Screen     Nutrition Risk Screen: no indicators present     Nutrition/Diet History     Spiritual, Cultural Beliefs, Druze Practices, Values that Affect Care: no  Food Allergies: NKFA  Factors Affecting Nutritional Intake: NPO, trouble swallowing, ileus?     Anthropometrics     Height Method: Measured  Height: 5' 2"  Height (inches): 62 in  Weight Method: Bed Scale  Weight: 42.6 kg 19 ( of note pt had been receiving lasix)  Weight (lb): 93 lb  Ideal Body Weight (IBW), Female: 110 lb  % Ideal Body Weight, Female (lb): 95 lb  BMI (Calculated): 19.2 Admission  BMI Grade: 18.5-24.9 - normal  Usual Body Weight (UBW), k.2 kg(13)  Weight Change Amount: (51.3 kg 18), 47.4 kg (19), 50.3 kg 19     Lab/Procedures/Meds     Pertinent Labs Reviewed: reviewed  BMP  Lab Results   Component Value Date     (H) 10/01/2019    K 3.8 10/01/2019     10/01/2019    CO2 30 (H) 10/01/2019    BUN 45 (H) 10/01/2019    CREATININE 1.0 10/01/2019    CALCIUM 8.1 (L) 10/01/2019    ANIONGAP 10 10/01/2019    ESTGFRAFRICA >60 10/01/2019    EGFRNONAA 59 (A) 10/01/2019     Lab Results   Component Value Date    ALBUMIN 1.8 (L) 2019     Lab Results   Component Value Date    CALCIUM 8.1 (L) 10/01/2019    PHOS 3.9 2019     No results for input(s): POCTGLUCOSE in the last 24 hours.  Triglycerides- none recorded    Pertinent Medications Reviewed: reviewed  Pertinent Medications " Comments: Folic Acid lasix, KCl, mg sulfate, zofran     Estimated/Assessed Needs   Admission  Weight Used For Calorie Calculations: 47.4 kg (104 lb 8 oz)  Energy Calorie Requirements (kcal): [place] St Jeor ( x 1.3-1.4 wt maintenance) = 9695-4900 kcal  Energy Need Method: [place]-St Jeor  Protein Requirements: 0.8-0.9 g protein/kg ( ABIMBOLA vs. mild-moderate muscel loss) = 38-42 g protein)  Weight Used For Protein Calculations: 47.4 kg (104 lb 8 oz)  Fluid Requirements (mL): Urine output + 500 ml  Estimated Fluid Requirement Method: RDA Method  CHO Requirement: N/A        Nutrition Prescription Ordered     Current Diet Order: NPO + TPN  Nutrition Order Comments: x 11 days ( + poor PO x 5-6 days PTA)     Evaluation of Received Nutrient/Fluid Intake     Energy Calories Required: meeting needs  Protein Required: meeting needs  Fluid Required: meeting needs  Tolerance: other (see comments)(NPO)  % Intake of Estimated Energy Needs: 100%  % Meal Intake: 0% + TPN     Nutrition Risk     Level of Risk/Frequency of Follow-up: moderate - 2 x weekly     Assessment and Plan  Severe malnutrition  Contributing Nutrition Diagnosis  Severe acute illness related malnutrition    Related to (etiology):   N/V/D, decreased appetite and increased energy needs d/t illness    Signs and Symptoms (as evidenced by):   1) PO intakes < 50% EEN x 8 days  2) Mild-moderate muscle/fat wasting as charted below    Interventions/Recommendations (treatment strategy):  Above    Nutrition Diagnosis Status:   Improving     Monitor and Evaluation     Food and Nutrient Intake: energy intake  Food and Nutrient Adminstration: diet order  Anthropometric Measurements: weight  Biochemical Data, Medical Tests and Procedures: electrolyte and renal panel, gastrointestinal profile  Nutrition-Focused Physical Findings: overall appearance      Malnutrition Assessment  Malnutrition Type: acute illness or injury  Energy Intake: severe energy intake  Skin (Micronutrient):  (Alcides = 13, No PI noted, nose wound)  Teeth (Micronutrient): (missing some)   Micronutrient Evaluation: suspected deficiency  Micronutrient Evaluation Comments: check iron. replete Mg   Weight Loss (Malnutrition): (22% x 6 years)  Energy Intake (Malnutrition): less than or equal to 50% for greater than or equal to 5 days   Orbital Region (Subcutaneous Fat Loss): mild depletion  Upper Arm Region (Subcutaneous Fat Loss): mild depletion  Thoracic and Lumbar Region: mild depletion   Sabianist Region (Muscle Loss): moderate depletion  Clavicle Bone Region (Muscle Loss): moderate depletion  Clavicle and Acromion Bone Region (Muscle Loss): moderate depletion  Scapular Bone Region (Muscle Loss): mild depletion  Dorsal Hand (Muscle Loss): mild depletion  Patellar Region (Muscle Loss): mild depletion  Anterior Thigh Region (Muscle Loss): mild depletion  Posterior Calf Region (Muscle Loss): mild depletion   Edema (Fluid Accumulation): 2+        Nutrition Follow-Up     RD Follow-up?: Yes

## 2019-10-01 NOTE — PROGRESS NOTES
"[facility] Medicine  Progress Note    Patient Name: Mesha Mckenzie  MRN: [id number]  Patient Class: IP- Inpatient   Admission Date: 9/19/2019  Length of Stay: 12 days  Attending Physician: Lizzette Covarrubias MD  Primary Care Provider: Varun Shea MD PhD        Subjective:     Principal Problem:Acute bacterial endocarditis        HPI:  Pt is a 64 yo female who was transferred from [facility] for septic shock, pancolitis and bilateral pna. Pt reports that she fell out of bed on Sunday morning and has been feeling progressively worse throughout the week. C/o vomiting since Sunday, ~3 episodes per day. Also reports diarrhea, ~4 stools per day, watery in nature. Pt denies any hematemesis, melena or hematochezia. Pt did take zofran which helped "some:" c/o severe abdominal pain since Sunday which is constant. Pain is a 10 on a 0-10 pain scale.  Has not been able to eat due to the nausea and pain. Questioned pt in regards to use of pain medication, states "I didn't have any." pt was scheduled for colonoscopy tomorrow. Hx of GI bleed in December 2018. Pt states she had passed some blood in her stool. Reviewed colonoscopy report, no bleeding in the colon was found however the prep was poor. No recent travel, no sick contacts at home. Questioned pt in regards to cough: pt states she has felt SOB due to pain but reports only occasional coughing. Denies any sputum production.  Social: smokes 10 cigarettes per day, 20 pack year hx. No ETOH.     Overview/Hospital Course:  The patient is a 63-year-old woman admitted 09/19 with sepsis secondary to pneumonia and colitis.  She was admitted to the intensive care unit and was treated with broad-spectrum antibiotics.  Her blood cultures grew methicillin sensitive Staph aureus and she was evaluated by ID.  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  She is currently on Ancef and vancomycin.  Her blood " cultures are still positive therefore CV surgery has been consulted for possible cardiac surgery.  She has also been followed closely by Dr. Dobson for her heart failure.    The patient  had severe metabolic encephalopathy which has resolved for the most part.    She has been receiving TPN since she has had an ileus.    She has bilateral pulmonary infiltrates which we believe are secondary to septic emboli.    The patient has had severe anemia, thrombocytopenia and coagulopathy.  She received a couple of blood transfusions but has not require platelets or FFP.  Hematology has been following.    The patient was very uncomfortable initially and was treated with IV Dilaudid which I think also helped her encephalopathy from drug withdrawal.  We have been decreasing the Dilaudid dose the last 24 hrs.    Interval History:  In intensive care unit, in critical condition.  T-max 100.7 degree F. patient is lethargic, appear extremely weak, unable to hold conversation, still NPO due to swallowing dysfunction.  Drop in hemoglobin down to 7.9 grams/deciliter noted.  On intravenous Milrinone infusion.  Awaiting decision recommendations as per Dr. Dobson regarding transfer to Cardiovascular surgery team for valve replacement.  PICC line placed yesterday. Patient's daughter is at bedside.     Review of Systems   Respiratory: Negative for cough, chest tightness, shortness of breath and wheezing.    Cardiovascular: Negative for chest pain, palpitations and leg swelling.   Gastrointestinal: Positive for abdominal pain. Negative for abdominal distention, constipation, diarrhea, nausea and vomiting.   Genitourinary: Negative for difficulty urinating, dysuria and flank pain.   Musculoskeletal: Negative for gait problem, joint swelling and neck pain.   Skin: Negative for rash and wound.   Neurological: Negative for dizziness, syncope, light-headedness and headaches.   Psychiatric/Behavioral: Negative for agitation, behavioral problems and  confusion.     Objective:     Vital Signs (Most Recent):  Temp: 99.2 °F (37.3 °C) (10/01/19 0400)  Pulse: 103 (10/01/19 0752)  Resp: (!) 32 (10/01/19 0752)  BP: (!) 167/72 (10/01/19 0600)  SpO2: 97 % (10/01/19 0752) Vital Signs (24h Range):  Temp:  [97.6 °F (36.4 °C)-100.7 °F (38.2 °C)] 99.2 °F (37.3 °C)  Pulse:  [] 103  Resp:  [23-54] 32  SpO2:  [81 %-100 %] 97 %  BP: (132-202)/(60-77) 167/72     Weight: 42.6 kg (93 lb 14.7 oz)  Body mass index is 17.18 kg/m².    Intake/Output Summary (Last 24 hours) at 10/1/2019 0854  Last data filed at 10/1/2019 0600  Gross per 24 hour   Intake 1909.35 ml   Output 1965 ml   Net -55.65 ml      Physical Exam   Constitutional: She is oriented to person, place, and time. She appears well-developed and well-nourished.   HENT:   Head: Normocephalic and atraumatic.   Eyes: Pupils are equal, round, and reactive to light. EOM are normal.   Cardiovascular: Regular rhythm, normal heart sounds and intact distal pulses.   Tachycardic   Pulmonary/Chest: Effort normal and breath sounds normal. No respiratory distress. She has no wheezes. She has no rales.   Abdominal: Soft. Bowel sounds are normal. She exhibits no distension. There is tenderness.   Musculoskeletal: She exhibits no edema or tenderness.   Neurological: She is alert and oriented to person, place, and time. No cranial nerve deficit. Coordination normal.   Has generalized weakness   Skin: Skin is warm and dry. Capillary refill takes less than 2 seconds. No rash noted.   Psychiatric: She has a normal mood and affect. Her behavior is normal. Thought content normal.   Nursing note and vitals reviewed.      Significant Labs:   CBC:   Recent Labs   Lab 09/30/19  0121 10/01/19  0352   WBC 10.49 9.07   HGB 8.5* 7.9*   HCT 26.6* 25.4*   * 182     CMP:   Recent Labs   Lab 09/30/19  0121  09/30/19  1943 10/01/19  0352 10/01/19  0800     --   --  148*  --    K 3.0*   < > 2.9* 4.0 3.8     --   --  108  --    CO2 34*  --    --  30*  --    *  --   --  151*  --    BUN 49*  --   --  45*  --    CREATININE 1.0  --   --  1.0  --    CALCIUM 8.0*  --   --  8.1*  --    ANIONGAP 11  --   --  10  --    EGFRNONAA 59*  --   --  59*  --     < > = values in this interval not displayed.     Significant Imaging:   CXR; Patchy airspace disease bilaterally, likely pneumonia.    CXR;   1. Central venous catheter placement without pneumothorax.    CT abdomen and pelvis without contrast:  1. Acute pancolitis.  2. Airspace disease multiple areas in the lower lungs are seen and a right pleural effusion approximately 2 cm deep at the base.    CT head without contrast:  No appreciable changes compared to CT head 07/24/2015 including moderate to severe presumed microvascular ischemic changes cerebral white matter and old lacunar infarct adjacent to right head of caudate.    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  Follow-up to assure clearance is recommended.  Moderate atelectasis of the right lower lobe and moderate size right pleural effusion.  Trace left pleural effusion.  Mild mediastinal adenopathy.  Atherosclerotic calcification including coronary artery calcification.    CT head without contrast:  1. Extensive chronic microvascular ischemic changes within the periventricular white matter of the supratentorial brain which are chance for the patient's chronologic age.  Demyelinating disease could produce a similar appearance.  There are more focal areas of hypoattenuation present within the periventricular white matter posterior right centrum semiovale and right frontal cortex, possibly focal areas of microvascular ischemic change or age-indeterminate infarction which are unchanged when compared with the cranial CT examination of 09/19/2019.  Additional clarification could be achieved with MRI of the brain as deemed clinically  "appropriate.  Given the provided history of "exclude septic emboli", consideration should be given to performing the examination with intravenous contrast.  2. Foci of remote lacunar infarction within the right caudate head and right corona radiata adjacent to the right frontal horn.    CXR:  Bilateral infiltrates with slight further decrease of the infiltrate right perihilar/basilar compared to the prior exam.    CT chest abdomen and pelvis with and without contrast:  Chest; persistent shattered bilateral consolidative processes which have undergone cavitation/cystic spaces within them  Small left and moderate size right pleural effusion increased in size  Pericardial effusion appearing small but mildly increased in size  Abdomen and pelvis; mild distention of colon with prominent amount of fluid suggesting ileus.  Very mild colitis cannot be excluded but with no appreciable bowel wall thickening or pericolonic inflammatory change.    CXR:  Stable appearance of the chest demonstrating multifocal infiltrates and nodular opacities and small pleural effusions.    ECHO:  · Mild concentric left ventricular hypertrophy.  · Low normal left ventricular systolic function. The estimated ejection fraction is 50%  · Grade I (mild) left ventricular diastolic dysfunction consistent with impaired relaxation.  · Normal right ventricular systolic function.  · Mild mitral regurgitation.  · Normal central venous pressure (3 mm Hg).  · The estimated PA systolic pressure is 48 mm Hg  · Pulmonary hypertension present.    DHARMESH:  · Mild left ventricular enlargement.  · Low normal left ventricular systolic function. The estimated ejection fraction is 50%  · Normal LV diastolic function.  · Mild left atrial enlargement.  · No interatrial septal defect present.  · Normal appearing left atrial appendage. No thrombus is present in the appendage. Normal appendage velocities.  · Moderate-to-severe mitral regurgitation.  · Moderate tricuspid " regurgitation.  · 1x.5 cmmobile vegetation on tricuspid valve septal leaflet atrial side  · 5mmx6 mm mobile vegetation on posterior leaflet P3 scallop on atrial side  · Aortic valve wnl    MRA brain with contrast:   No aneurysm or hemodynamically significant stenosis of the intracranial arterial vasculature.    MRI brain with and without contrast:  No acute intracranial abnormality and no abnormal enhancement.  Mild involutional change and white matter disease.    CXR: No significant change relative to September 30, 2019          Assessment/Plan:      * Acute bacterial endocarditis  Staphylococcus aureus bacteremia with sepsis  The patient is on Ancef, Cubicin and vancomycin for MSSA. DHARMESH showed tricuspid and mitral valve vegetations.    Follow latest blood culture results and infectious disease recommendations.  Follow MRI brain and MRA brain results reviewed.  Thrombocytopenia, acquired  Monitor daily platelet counts closely.    Mitral valve vegetation  Follow Infectious Disease and Cardiology recommendations.    Tricuspid valve vegetation  Continue antibiotics as per Infectious Disease recommendations including Cubicin/vancomycin/Ancef.  Pharmacist is dozing vancomycin.    Acute septic pulmonary embolism    Hyperglycemia  Patient's FSGs are controlled on current hypoglycemics.   Last A1c reviewed-   Lab Results   Component Value Date    HGBA1C 5.7 (H) 04/23/2019     Most recent fingerstick glucose reviewed- No results for input(s): POCTGLUCOSE in the last 24 hours.  Current correctional scale  Low  Maintain anti-hyperglycemic dose as follows-   Antihyperglycemics (From admission, onward)    None        IV drug user  The patient finally admitted that she was using IV drugs to Dr. Bolden.  She does not want her family to know.  She states that she has only been using IV heroin for a few weeks    Severe malnutrition  Speech therapist to re-evaluate swallow evaluation.  Continue TPN September 24th.    Normocytic  anemia  Secondary to this acute illness?  Transfuse 2 units of packed red blood cells for symptomatic anemia.    Encephalopathy, metabolic  The patient appears to be back to baseline.    Acute on chronic diastolic congestive heart failure  Compensated.    Acute hypoxemic respiratory failure  This is secondary  to septic emboli.  She is using BiPAP intermittently.  Chest x-ray is improving.    Pleural effusion, bilateral  This improved with diuresis    Hypokalemia  This is being replaced with IV potassium    Pancolitis  Repeat CT scan shows improvement in the colitis.  It showed an ileus and this also seems to be improving as the patient is having bowel movements.    ABIMBOLA (acute kidney injury)  Follow renal panel closely.    VTE Risk Mitigation (From admission, onward)         Ordered     Place sequential compression device  Until discontinued      09/19/19 2234     IP VTE HIGH RISK PATIENT  Once      09/19/19 2217             Discussed with patient's daughter, answered all questions.    Critical care time spent on the evaluation and treatment of severe organ dysfunction, review of pertinent labs and imaging studies, discussions with consulting providers and discussions with patient/family: 36 minutes.      Lizzette Covarrubias MD  Department of Hospital Medicine   [facility]

## 2019-10-01 NOTE — PROGRESS NOTES
[facility]  Cardiology  Progress Note    Patient Name: Mesha Mckenzie  MRN: [id number]  Admission Date: 9/19/2019  Hospital Length of Stay: 12 days  Code Status: Full Code   Attending Physician: Lizzette Covarrubias MD   Primary Care Physician: Varun Shea MD PhD  Expected Discharge Date:   Principal Problem:Acute bacterial endocarditis    Subjective:     Hospital Course: 62 yo female who was transferred from [facility] for septic shock, pancolitis and bilateral pna. Found TV IE-MSSA (likely due to IV drug abuse) with septic emboli to b/l lung, metabolic encephalopathy, severe anemia, thrombocytopenia and coagulopathy.    Interval History: Had 7 beats run of NSVT this morning. Still lethargic, not be able to take oral meds. UOP 2.1L in 24h.  Blood culture negative since 9/26.    Review of Systems  Objective:     Vital Signs (Most Recent):  Temp: 99.2 °F (37.3 °C) (10/01/19 0400)  Pulse: 103 (10/01/19 0752)  Resp: (!) 32 (10/01/19 0752)  BP: (!) 167/72 (10/01/19 0600)  SpO2: 97 % (10/01/19 0752) Vital Signs (24h Range):  Temp:  [97.6 °F (36.4 °C)-100.7 °F (38.2 °C)] 99.2 °F (37.3 °C)  Pulse:  [] 103  Resp:  [29-54] 32  SpO2:  [81 %-100 %] 97 %  BP: (136-202)/(60-77) 167/72   Weight: 42.6 kg (93 lb 14.7 oz)  Body mass index is 17.18 kg/m².  SpO2: 97 %  O2 Device (Oxygen Therapy): nasal cannula    Intake/Output Summary (Last 24 hours) at 10/1/2019 1029  Last data filed at 10/1/2019 0740  Gross per 24 hour   Intake 1801.95 ml   Output 1715 ml   Net 86.95 ml     Lines/Drains/Airways     Peripherally Inserted Central Catheter Line                 PICC Double Lumen 09/30/19 1230 right brachial less than 1 day          Drain                 Urethral Catheter 09/19/19 1240 Double-lumen 16 Fr. 11 days         Rectal Tube 09/27/19 1000 rectal tube w/ balloon (indicate number of mLs) 4 days               Physical Exam  Gen: Sleeping, lying on bed  Skin: normal temperature/tugor, no erythema  Lymph: no  lymphadenopathy detected  HEENT: NC/AT  Neck: supple, no JVD/bruit  Chest: no deformity, equal movement b/l  Lung: scattered rales b/l  Heart: RRR, S1/S2 +, 1/6 SM on apex, no G/R  Abd: BS+, S   Ext: no deformity, no pretibial edema  Pulse: b/l radial 2+  Neuro: n/a    Cardiographics  ECG 9/20/19: Sinus tachycardia with frequent Premature ventricular complexes.  Echo 9/28/19:  · Concentric left ventricular remodeling.  · Moderately decreased left ventricular systolic function. The estimated ejection fraction is 35%  · No wall motion abnormalities.  · Atrial fibrillation not observed.  · Indeterminate left ventricular diastolic function.  · Normal right ventricular systolic function.  · Mild-to-moderate mitral regurgitation.  · Mild to moderate tricuspid regurgitation.  · No pulmonary hypertension present.  · The estimated PA systolic pressure is 27 mm Hg  · Small circumferential pericardial effusion. Effusion is fluid.   1) Ef of lv slightly improved   2) small circumferential pericardial effusion   3) pulmonary hypertension no longer documented   4) vegetations same size   5) mitral leaflets coapt normally - moderate but not severe  Mitral regurg seen   Echocardiogram 9/26/19:  · Mild concentric left ventricular hypertrophy.  · Mild left ventricular enlargement.  · Moderately decreased left ventricular systolic function. The estimated ejection fraction is 33%  · Grade III (severe) left ventricular diastolic dysfunction consistent with restrictive physiology.  · Mild global hypokinetic wall motion.  · Normal right ventricular systolic function.  · Mild left atrial enlargement.  · Mild right atrial enlargement.  · There is a right atrial mass present. Echo lucent mobile small vegetation ssen at R atrial - TV septal leaflet junction In hi R atrium At septal surface there is more echo dense structure ( ~ 1.5x .8 cm) mobile which could be vegetation vs normal prominent Eustachian valve  · Moderate-to-severe mitral  regurgitation.  · Normal central venous pressure (3 mm Hg).  · The estimated PA systolic pressure is 50 mm Hg  · Moderate to severe pulmonary hypertension present.     1) further decreased EF of lv ( 33%) and sinus tachycardia   2severe mitral regurgitation   3) pulmonary hypertension   4 ) TV + MV vegetations as described     Imaging  Chest x-ray 9/28/19: Stable appearance of the chest demonstrating multifocal infiltrates and nodular opacities and small pleural effusions.  Lab Review   Lab Results   Component Value Date    WBC 9.07 10/01/2019    HGB 7.9 (L) 10/01/2019    HCT 25.4 (L) 10/01/2019     (H) 10/01/2019     10/01/2019     BMP  Lab Results   Component Value Date     (H) 10/01/2019    K 3.8 10/01/2019     10/01/2019    CO2 30 (H) 10/01/2019    BUN 45 (H) 10/01/2019    CREATININE 1.0 10/01/2019    CALCIUM 8.1 (L) 10/01/2019    ANIONGAP 10 10/01/2019    ESTGFRAFRICA >60 10/01/2019    EGFRNONAA 59 (A) 10/01/2019     No results for input(s): TROPONINI in the last 168 hours.  BNP  Recent Labs   Lab 09/25/19 1944   BNP 1,456*         Assessment:   IE-MSSA, TV   HFrEF 35%  Sepsis  Pneumonia with septic emboli  Encephalopathy  Pancolitis  ABIMBOLA  Anemia  Thrombocytopenia  IV drug abuse     Plan:   Continue antibiotics.  Continue Lasix 20mg iv daily; iv enalaprilat and metoprolol.  Continue Milrione iv drip at 0.25mcg/kg/min, consider to wean off tomorrow.  Continue medical management.    Hillary Corrigan MD  Cardiology  [facility]

## 2019-10-02 ENCOUNTER — HOSPITAL ENCOUNTER (INPATIENT)
Facility: HOSPITAL | Age: 64
LOS: 23 days | Discharge: NURSING, REHAB OR LONG-TERM CARE | DRG: 028 | End: 2019-10-25
Attending: INTERNAL MEDICINE | Admitting: INTERNAL MEDICINE
Payer: MEDICARE

## 2019-10-02 VITALS
BODY MASS INDEX: 16.02 KG/M2 | DIASTOLIC BLOOD PRESSURE: 67 MMHG | TEMPERATURE: 100 F | SYSTOLIC BLOOD PRESSURE: 154 MMHG | RESPIRATION RATE: 39 BRPM | OXYGEN SATURATION: 89 % | HEIGHT: 62 IN | HEART RATE: 111 BPM | WEIGHT: 87.06 LBS

## 2019-10-02 DIAGNOSIS — R00.1 BRADYCARDIA: ICD-10-CM

## 2019-10-02 DIAGNOSIS — R09.02 HYPOXEMIA: ICD-10-CM

## 2019-10-02 DIAGNOSIS — R00.0 TACHYCARDIA: ICD-10-CM

## 2019-10-02 DIAGNOSIS — I38 ENDOCARDITIS, UNSPECIFIED CHRONICITY, UNSPECIFIED ENDOCARDITIS TYPE: ICD-10-CM

## 2019-10-02 DIAGNOSIS — I49.9 ARRHYTHMIA: ICD-10-CM

## 2019-10-02 DIAGNOSIS — J96.01 ACUTE HYPOXEMIC RESPIRATORY FAILURE: ICD-10-CM

## 2019-10-02 DIAGNOSIS — R65.21: ICD-10-CM

## 2019-10-02 DIAGNOSIS — R53.81 DEBILITY: ICD-10-CM

## 2019-10-02 DIAGNOSIS — R57.9 SHOCK: ICD-10-CM

## 2019-10-02 DIAGNOSIS — I33.0 ACUTE BACTERIAL ENDOCARDITIS: Primary | ICD-10-CM

## 2019-10-02 DIAGNOSIS — N17.9 AKI (ACUTE KIDNEY INJURY): ICD-10-CM

## 2019-10-02 DIAGNOSIS — G06.2 EPIDURAL ABSCESS: ICD-10-CM

## 2019-10-02 DIAGNOSIS — G93.41 ENCEPHALOPATHY, METABOLIC: ICD-10-CM

## 2019-10-02 DIAGNOSIS — E87.6 HYPOKALEMIA: ICD-10-CM

## 2019-10-02 DIAGNOSIS — A41.01 STAPHYLOCOCCUS AUREUS BACTEREMIA WITH SEPSIS: ICD-10-CM

## 2019-10-02 DIAGNOSIS — L02.419: ICD-10-CM

## 2019-10-02 DIAGNOSIS — K51.00 PANCOLITIS: ICD-10-CM

## 2019-10-02 DIAGNOSIS — A41.01: ICD-10-CM

## 2019-10-02 DIAGNOSIS — J90 PLEURAL EFFUSION: ICD-10-CM

## 2019-10-02 DIAGNOSIS — I38 ENDOCARDITIS: ICD-10-CM

## 2019-10-02 PROBLEM — G06.1 SPINAL EPIDURAL ABSCESS: Status: ACTIVE | Noted: 2019-10-02

## 2019-10-02 PROBLEM — E87.0 HYPERNATREMIA: Status: ACTIVE | Noted: 2019-10-02

## 2019-10-02 LAB
ALBUMIN SERPL BCP-MCNC: 1.7 G/DL (ref 3.5–5.2)
ALLENS TEST: ABNORMAL
ALP SERPL-CCNC: 179 U/L (ref 55–135)
ALT SERPL W/O P-5'-P-CCNC: 25 U/L (ref 10–44)
AMORPH CRY UR QL COMP ASSIST: ABNORMAL
ANION GAP SERPL CALC-SCNC: 11 MMOL/L (ref 8–16)
ANION GAP SERPL CALC-SCNC: 12 MMOL/L (ref 8–16)
ANION GAP SERPL CALC-SCNC: 8 MMOL/L (ref 8–16)
ANISOCYTOSIS BLD QL SMEAR: SLIGHT
AST SERPL-CCNC: 80 U/L (ref 10–40)
BACTERIA #/AREA URNS AUTO: ABNORMAL /HPF
BACTERIA BLD CULT: ABNORMAL
BASOPHILS # BLD AUTO: 0.05 K/UL (ref 0–0.2)
BASOPHILS NFR BLD: 0.7 % (ref 0–1.9)
BILIRUB SERPL-MCNC: 0.8 MG/DL (ref 0.1–1)
BILIRUB UR QL STRIP: NEGATIVE
BILIRUB UR QL STRIP: NEGATIVE
BUN SERPL-MCNC: 46 MG/DL (ref 8–23)
BUN SERPL-MCNC: 52 MG/DL (ref 8–23)
BUN SERPL-MCNC: 53 MG/DL (ref 8–23)
CALCIUM SERPL-MCNC: 8 MG/DL (ref 8.7–10.5)
CALCIUM SERPL-MCNC: 8.2 MG/DL (ref 8.7–10.5)
CALCIUM SERPL-MCNC: 8.2 MG/DL (ref 8.7–10.5)
CHLORIDE SERPL-SCNC: 109 MMOL/L (ref 95–110)
CHLORIDE SERPL-SCNC: 110 MMOL/L (ref 95–110)
CHLORIDE SERPL-SCNC: 110 MMOL/L (ref 95–110)
CLARITY UR REFRACT.AUTO: ABNORMAL
CLARITY UR REFRACT.AUTO: ABNORMAL
CO2 SERPL-SCNC: 28 MMOL/L (ref 23–29)
CO2 SERPL-SCNC: 28 MMOL/L (ref 23–29)
CO2 SERPL-SCNC: 30 MMOL/L (ref 23–29)
COLOR UR AUTO: YELLOW
COLOR UR AUTO: YELLOW
CREAT SERPL-MCNC: 1.1 MG/DL (ref 0.5–1.4)
CREAT SERPL-MCNC: 1.2 MG/DL (ref 0.5–1.4)
CREAT SERPL-MCNC: 1.2 MG/DL (ref 0.5–1.4)
CRP SERPL-MCNC: 94.2 MG/L (ref 0–8.2)
DELSYS: ABNORMAL
DIFFERENTIAL METHOD: ABNORMAL
EOSINOPHIL # BLD AUTO: 0.2 K/UL (ref 0–0.5)
EOSINOPHIL NFR BLD: 2.2 % (ref 0–8)
ERYTHROCYTE [DISTWIDTH] IN BLOOD BY AUTOMATED COUNT: 19.6 % (ref 11.5–14.5)
ERYTHROCYTE [DISTWIDTH] IN BLOOD BY AUTOMATED COUNT: 19.7 % (ref 11.5–14.5)
ERYTHROCYTE [SEDIMENTATION RATE] IN BLOOD BY WESTERGREN METHOD: 9 MM/H
EST. GFR  (AFRICAN AMERICAN): 55 ML/MIN/1.73 M^2
EST. GFR  (AFRICAN AMERICAN): 55 ML/MIN/1.73 M^2
EST. GFR  (AFRICAN AMERICAN): >60 ML/MIN/1.73 M^2
EST. GFR  (NON AFRICAN AMERICAN): 48 ML/MIN/1.73 M^2
EST. GFR  (NON AFRICAN AMERICAN): 48 ML/MIN/1.73 M^2
EST. GFR  (NON AFRICAN AMERICAN): 52.8 ML/MIN/1.73 M^2
FIO2: 100
GLUCOSE SERPL-MCNC: 116 MG/DL (ref 70–110)
GLUCOSE SERPL-MCNC: 161 MG/DL (ref 70–110)
GLUCOSE SERPL-MCNC: 189 MG/DL (ref 70–110)
GLUCOSE UR QL STRIP: ABNORMAL
GLUCOSE UR QL STRIP: ABNORMAL
HCO3 UR-SCNC: 28.8 MMOL/L (ref 24–28)
HCT VFR BLD AUTO: 33.9 % (ref 37–48.5)
HCT VFR BLD AUTO: 34.6 % (ref 37–48.5)
HGB BLD-MCNC: 11 G/DL (ref 12–16)
HGB BLD-MCNC: 11.5 G/DL (ref 12–16)
HGB UR QL STRIP: ABNORMAL
HGB UR QL STRIP: ABNORMAL
HYALINE CASTS UR QL AUTO: 0 /LPF
HYPOCHROMIA BLD QL SMEAR: ABNORMAL
IMM GRANULOCYTES # BLD AUTO: 0.07 K/UL (ref 0–0.04)
IMM GRANULOCYTES NFR BLD AUTO: 0.9 % (ref 0–0.5)
INR PPP: 1.1 (ref 0.8–1.2)
KETONES UR QL STRIP: NEGATIVE
KETONES UR QL STRIP: NEGATIVE
LACTATE SERPL-SCNC: 1.3 MMOL/L (ref 0.5–2.2)
LEUKOCYTE ESTERASE UR QL STRIP: ABNORMAL
LEUKOCYTE ESTERASE UR QL STRIP: ABNORMAL
LYMPHOCYTES # BLD AUTO: 1.7 K/UL (ref 1–4.8)
LYMPHOCYTES NFR BLD: 22.4 % (ref 18–48)
MAGNESIUM SERPL-MCNC: 1.7 MG/DL (ref 1.6–2.6)
MCH RBC QN AUTO: 29.9 PG (ref 27–31)
MCH RBC QN AUTO: 30.1 PG (ref 27–31)
MCHC RBC AUTO-ENTMCNC: 32.4 G/DL (ref 32–36)
MCHC RBC AUTO-ENTMCNC: 33.2 G/DL (ref 32–36)
MCV RBC AUTO: 90 FL (ref 82–98)
MCV RBC AUTO: 93 FL (ref 82–98)
MICROSCOPIC COMMENT: ABNORMAL
MIN VOL: 7
MODE: ABNORMAL
MONOCYTES # BLD AUTO: 0.5 K/UL (ref 0.3–1)
MONOCYTES NFR BLD: 6.3 % (ref 4–15)
NEUTROPHILS # BLD AUTO: 5 K/UL (ref 1.8–7.7)
NEUTROPHILS NFR BLD: 67.5 % (ref 38–73)
NITRITE UR QL STRIP: NEGATIVE
NITRITE UR QL STRIP: NEGATIVE
NRBC BLD-RTO: 0 /100 WBC
PCO2 BLDA: 71.9 MMHG (ref 35–45)
PEEP: 5
PH SMN: 7.21 [PH] (ref 7.35–7.45)
PH UR STRIP: 7 [PH] (ref 5–8)
PH UR STRIP: 7 [PH] (ref 5–8)
PHOSPHATE SERPL-MCNC: 3.6 MG/DL (ref 2.7–4.5)
PIP: 44
PLATELET # BLD AUTO: 191 K/UL (ref 150–350)
PLATELET # BLD AUTO: 194 K/UL (ref 150–350)
PLATELET BLD QL SMEAR: ABNORMAL
PMV BLD AUTO: 11.4 FL (ref 9.2–12.9)
PMV BLD AUTO: 11.7 FL (ref 9.2–12.9)
PO2 BLDA: 77 MMHG (ref 80–100)
POC BE: 1 MMOL/L
POC SATURATED O2: 91 % (ref 95–100)
POC TCO2: 31 MMOL/L (ref 23–27)
POTASSIUM SERPL-SCNC: 3.3 MMOL/L (ref 3.5–5.1)
POTASSIUM SERPL-SCNC: 3.4 MMOL/L (ref 3.5–5.1)
POTASSIUM SERPL-SCNC: 4.1 MMOL/L (ref 3.5–5.1)
PROT SERPL-MCNC: 7 G/DL (ref 6–8.4)
PROT UR QL STRIP: ABNORMAL
PROT UR QL STRIP: ABNORMAL
PROTHROMBIN TIME: 11.3 SEC (ref 9–12.5)
RBC # BLD AUTO: 3.66 M/UL (ref 4–5.4)
RBC # BLD AUTO: 3.85 M/UL (ref 4–5.4)
RBC #/AREA URNS AUTO: 0 /HPF (ref 0–4)
SAMPLE: ABNORMAL
SITE: ABNORMAL
SODIUM SERPL-SCNC: 145 MMOL/L (ref 136–145)
SODIUM SERPL-SCNC: 149 MMOL/L (ref 136–145)
SODIUM SERPL-SCNC: 152 MMOL/L (ref 136–145)
SP GR UR STRIP: 1.01 (ref 1–1.03)
SP GR UR STRIP: 1.01 (ref 1–1.03)
SP02: 88
SQUAMOUS #/AREA URNS AUTO: 1 /HPF
TROPONIN I SERPL DL<=0.01 NG/ML-MCNC: 0.04 NG/ML (ref 0–0.03)
URN SPEC COLLECT METH UR: ABNORMAL
URN SPEC COLLECT METH UR: ABNORMAL
VANCOMYCIN SERPL-MCNC: 24.4 UG/ML
VANCOMYCIN TROUGH SERPL-MCNC: 20.7 UG/ML (ref 10–22)
VT: 350
WBC # BLD AUTO: 7.42 K/UL (ref 3.9–12.7)
WBC # BLD AUTO: 7.92 K/UL (ref 3.9–12.7)
WBC #/AREA URNS AUTO: 10 /HPF (ref 0–5)

## 2019-10-02 PROCEDURE — 84100 ASSAY OF PHOSPHORUS: CPT

## 2019-10-02 PROCEDURE — 83605 ASSAY OF LACTIC ACID: CPT

## 2019-10-02 PROCEDURE — 80202 ASSAY OF VANCOMYCIN: CPT | Mod: 91

## 2019-10-02 PROCEDURE — 99232 PR SUBSEQUENT HOSPITAL CARE,LEVL II: ICD-10-PCS | Mod: 25,,, | Performed by: INTERNAL MEDICINE

## 2019-10-02 PROCEDURE — 25000003 PHARM REV CODE 250

## 2019-10-02 PROCEDURE — 86140 C-REACTIVE PROTEIN: CPT

## 2019-10-02 PROCEDURE — 63600175 PHARM REV CODE 636 W HCPCS

## 2019-10-02 PROCEDURE — 25000003 PHARM REV CODE 250: Performed by: NURSE PRACTITIONER

## 2019-10-02 PROCEDURE — 80048 BASIC METABOLIC PNL TOTAL CA: CPT | Mod: 91

## 2019-10-02 PROCEDURE — 63600175 PHARM REV CODE 636 W HCPCS: Performed by: NURSE PRACTITIONER

## 2019-10-02 PROCEDURE — 27000221 HC OXYGEN, UP TO 24 HOURS

## 2019-10-02 PROCEDURE — 82803 BLOOD GASES ANY COMBINATION: CPT

## 2019-10-02 PROCEDURE — 99900025 HC BRONCHOSCOPY-ASST (STAT)

## 2019-10-02 PROCEDURE — 84484 ASSAY OF TROPONIN QUANT: CPT

## 2019-10-02 PROCEDURE — 87040 BLOOD CULTURE FOR BACTERIA: CPT

## 2019-10-02 PROCEDURE — 85027 COMPLETE CBC AUTOMATED: CPT

## 2019-10-02 PROCEDURE — 99291 CRITICAL CARE FIRST HOUR: CPT | Mod: ,,, | Performed by: INTERNAL MEDICINE

## 2019-10-02 PROCEDURE — 63600175 PHARM REV CODE 636 W HCPCS: Performed by: INTERNAL MEDICINE

## 2019-10-02 PROCEDURE — 31500 INTUBATION: ICD-10-PCS | Mod: ,,, | Performed by: INTERNAL MEDICINE

## 2019-10-02 PROCEDURE — 94761 N-INVAS EAR/PLS OXIMETRY MLT: CPT

## 2019-10-02 PROCEDURE — 99900026 HC AIRWAY MAINTENANCE (STAT)

## 2019-10-02 PROCEDURE — 93010 EKG 12-LEAD: ICD-10-PCS | Mod: ,,, | Performed by: INTERNAL MEDICINE

## 2019-10-02 PROCEDURE — 27100171 HC OXYGEN HIGH FLOW UP TO 24 HOURS

## 2019-10-02 PROCEDURE — 83880 ASSAY OF NATRIURETIC PEPTIDE: CPT

## 2019-10-02 PROCEDURE — 83735 ASSAY OF MAGNESIUM: CPT

## 2019-10-02 PROCEDURE — 31500 INSERT EMERGENCY AIRWAY: CPT | Mod: ,,, | Performed by: INTERNAL MEDICINE

## 2019-10-02 PROCEDURE — 25000003 PHARM REV CODE 250: Performed by: INTERNAL MEDICINE

## 2019-10-02 PROCEDURE — A4216 STERILE WATER/SALINE, 10 ML: HCPCS | Performed by: INTERNAL MEDICINE

## 2019-10-02 PROCEDURE — 92526 ORAL FUNCTION THERAPY: CPT

## 2019-10-02 PROCEDURE — 36600 WITHDRAWAL OF ARTERIAL BLOOD: CPT

## 2019-10-02 PROCEDURE — 94002 VENT MGMT INPAT INIT DAY: CPT

## 2019-10-02 PROCEDURE — 25000003 PHARM REV CODE 250: Performed by: SPECIALIST

## 2019-10-02 PROCEDURE — 99900035 HC TECH TIME PER 15 MIN (STAT)

## 2019-10-02 PROCEDURE — C9113 INJ PANTOPRAZOLE SODIUM, VIA: HCPCS | Performed by: NURSE PRACTITIONER

## 2019-10-02 PROCEDURE — 85025 COMPLETE CBC W/AUTO DIFF WBC: CPT

## 2019-10-02 PROCEDURE — 31622 DX BRONCHOSCOPE/WASH: CPT

## 2019-10-02 PROCEDURE — 87040 BLOOD CULTURE FOR BACTERIA: CPT | Mod: 59

## 2019-10-02 PROCEDURE — 63600175 PHARM REV CODE 636 W HCPCS: Performed by: SPECIALIST

## 2019-10-02 PROCEDURE — 99291 PR CRITICAL CARE, E/M 30-74 MINUTES: ICD-10-PCS | Mod: ,,, | Performed by: INTERNAL MEDICINE

## 2019-10-02 PROCEDURE — 80053 COMPREHEN METABOLIC PANEL: CPT

## 2019-10-02 PROCEDURE — 80048 BASIC METABOLIC PNL TOTAL CA: CPT

## 2019-10-02 PROCEDURE — 93010 ELECTROCARDIOGRAM REPORT: CPT | Mod: ,,, | Performed by: INTERNAL MEDICINE

## 2019-10-02 PROCEDURE — 20000000 HC ICU ROOM

## 2019-10-02 PROCEDURE — 36415 COLL VENOUS BLD VENIPUNCTURE: CPT

## 2019-10-02 PROCEDURE — 80202 ASSAY OF VANCOMYCIN: CPT

## 2019-10-02 PROCEDURE — 99232 SBSQ HOSP IP/OBS MODERATE 35: CPT | Mod: 25,,, | Performed by: INTERNAL MEDICINE

## 2019-10-02 PROCEDURE — 85610 PROTHROMBIN TIME: CPT

## 2019-10-02 PROCEDURE — 81001 URINALYSIS AUTO W/SCOPE: CPT

## 2019-10-02 PROCEDURE — 93005 ELECTROCARDIOGRAM TRACING: CPT

## 2019-10-02 PROCEDURE — 94640 AIRWAY INHALATION TREATMENT: CPT

## 2019-10-02 RX ORDER — SODIUM CHLORIDE, SODIUM LACTATE, POTASSIUM CHLORIDE, CALCIUM CHLORIDE 600; 310; 30; 20 MG/100ML; MG/100ML; MG/100ML; MG/100ML
500 INJECTION, SOLUTION INTRAVENOUS ONCE
Status: CANCELLED | OUTPATIENT
Start: 2019-10-02 | End: 2019-10-02

## 2019-10-02 RX ORDER — FENTANYL CITRATE-0.9 % NACL/PF 10 MCG/ML
25 PLASTIC BAG, INJECTION (ML) INTRAVENOUS CONTINUOUS
Status: DISCONTINUED | OUTPATIENT
Start: 2019-10-03 | End: 2019-10-05

## 2019-10-02 RX ORDER — METHYLPREDNISOLONE SOD SUCC 125 MG
VIAL (EA) INJECTION
Status: COMPLETED
Start: 2019-10-02 | End: 2019-10-02

## 2019-10-02 RX ORDER — PHENYLEPHRINE HCL IN 0.9% NACL 1 MG/10 ML
SYRINGE (ML) INTRAVENOUS
Status: COMPLETED
Start: 2019-10-02 | End: 2019-10-02

## 2019-10-02 RX ORDER — METOPROLOL TARTRATE 1 MG/ML
10 INJECTION, SOLUTION INTRAVENOUS EVERY 6 HOURS
Status: DISCONTINUED | OUTPATIENT
Start: 2019-10-02 | End: 2019-10-02

## 2019-10-02 RX ORDER — SODIUM CHLORIDE 0.9 % (FLUSH) 0.9 %
10 SYRINGE (ML) INJECTION
Status: DISCONTINUED | OUTPATIENT
Start: 2019-10-02 | End: 2019-10-25 | Stop reason: HOSPADM

## 2019-10-02 RX ORDER — ENALAPRILAT 1.25 MG/ML
2.5 INJECTION INTRAVENOUS 2 TIMES DAILY
Status: CANCELLED | OUTPATIENT
Start: 2019-10-02

## 2019-10-02 RX ORDER — HYDROCODONE BITARTRATE AND ACETAMINOPHEN 500; 5 MG/1; MG/1
TABLET ORAL
Status: CANCELLED | OUTPATIENT
Start: 2019-10-02

## 2019-10-02 RX ORDER — CHLORHEXIDINE GLUCONATE ORAL RINSE 1.2 MG/ML
15 SOLUTION DENTAL 2 TIMES DAILY
Status: DISCONTINUED | OUTPATIENT
Start: 2019-10-02 | End: 2019-10-07

## 2019-10-02 RX ORDER — CEFAZOLIN SODIUM 2 G/50ML
2 SOLUTION INTRAVENOUS
Status: DISCONTINUED | OUTPATIENT
Start: 2019-10-02 | End: 2019-10-02

## 2019-10-02 RX ORDER — SODIUM CHLORIDE 0.9 % (FLUSH) 0.9 %
3 SYRINGE (ML) INJECTION EVERY 8 HOURS
Status: CANCELLED | OUTPATIENT
Start: 2019-10-02

## 2019-10-02 RX ORDER — ETOMIDATE 2 MG/ML
INJECTION INTRAVENOUS
Status: DISPENSED
Start: 2019-10-02 | End: 2019-10-03

## 2019-10-02 RX ORDER — SODIUM CHLORIDE, SODIUM LACTATE, POTASSIUM CHLORIDE, CALCIUM CHLORIDE 600; 310; 30; 20 MG/100ML; MG/100ML; MG/100ML; MG/100ML
10 INJECTION, SOLUTION INTRAVENOUS CONTINUOUS
Status: CANCELLED | OUTPATIENT
Start: 2019-10-02

## 2019-10-02 RX ORDER — POTASSIUM CHLORIDE 14.9 MG/ML
60 INJECTION INTRAVENOUS
Status: CANCELLED | OUTPATIENT
Start: 2019-10-02

## 2019-10-02 RX ORDER — METOPROLOL TARTRATE 1 MG/ML
10 INJECTION, SOLUTION INTRAVENOUS EVERY 6 HOURS
Status: CANCELLED | OUTPATIENT
Start: 2019-10-02

## 2019-10-02 RX ORDER — PROPOFOL 10 MG/ML
50 VIAL (ML) INTRAVENOUS ONCE
Status: COMPLETED | OUTPATIENT
Start: 2019-10-02 | End: 2019-10-02

## 2019-10-02 RX ORDER — SUCCINYLCHOLINE CHLORIDE 20 MG/ML
INJECTION INTRAMUSCULAR; INTRAVENOUS
Status: DISCONTINUED
Start: 2019-10-02 | End: 2019-10-02 | Stop reason: WASHOUT

## 2019-10-02 RX ORDER — FUROSEMIDE 10 MG/ML
20 INJECTION INTRAMUSCULAR; INTRAVENOUS
Status: CANCELLED | OUTPATIENT
Start: 2019-10-02

## 2019-10-02 RX ORDER — MAGNESIUM SULFATE HEPTAHYDRATE 40 MG/ML
2 INJECTION, SOLUTION INTRAVENOUS
Status: CANCELLED | OUTPATIENT
Start: 2019-10-02

## 2019-10-02 RX ORDER — POTASSIUM CHLORIDE 29.8 MG/ML
80 INJECTION INTRAVENOUS
Status: CANCELLED | OUTPATIENT
Start: 2019-10-02

## 2019-10-02 RX ORDER — LORAZEPAM 2 MG/ML
1 INJECTION INTRAMUSCULAR EVERY 4 HOURS PRN
Status: CANCELLED | OUTPATIENT
Start: 2019-10-02

## 2019-10-02 RX ORDER — CEFAZOLIN SODIUM 2 G/50ML
2 SOLUTION INTRAVENOUS
Status: DISCONTINUED | OUTPATIENT
Start: 2019-10-02 | End: 2019-10-03

## 2019-10-02 RX ORDER — CEFAZOLIN SODIUM 2 G/50ML
2 SOLUTION INTRAVENOUS
Status: CANCELLED | OUTPATIENT
Start: 2019-10-02

## 2019-10-02 RX ORDER — FOLIC ACID 1 MG/1
2 TABLET ORAL DAILY
Status: CANCELLED | OUTPATIENT
Start: 2019-10-03

## 2019-10-02 RX ORDER — ONDANSETRON 2 MG/ML
4 INJECTION INTRAMUSCULAR; INTRAVENOUS EVERY 4 HOURS PRN
Status: CANCELLED | OUTPATIENT
Start: 2019-10-02

## 2019-10-02 RX ORDER — PANTOPRAZOLE SODIUM 40 MG/10ML
40 INJECTION, POWDER, LYOPHILIZED, FOR SOLUTION INTRAVENOUS DAILY
Status: CANCELLED | OUTPATIENT
Start: 2019-10-03

## 2019-10-02 RX ORDER — POTASSIUM CHLORIDE 29.8 MG/ML
40 INJECTION INTRAVENOUS
Status: CANCELLED | OUTPATIENT
Start: 2019-10-02

## 2019-10-02 RX ORDER — IPRATROPIUM BROMIDE AND ALBUTEROL SULFATE 2.5; .5 MG/3ML; MG/3ML
3 SOLUTION RESPIRATORY (INHALATION) EVERY 4 HOURS PRN
Status: DISCONTINUED | OUTPATIENT
Start: 2019-10-02 | End: 2019-10-02

## 2019-10-02 RX ORDER — PROPOFOL 10 MG/ML
INJECTION, EMULSION INTRAVENOUS
Status: COMPLETED
Start: 2019-10-02 | End: 2019-10-02

## 2019-10-02 RX ORDER — POTASSIUM CHLORIDE 20 MEQ/15ML
40 SOLUTION ORAL EVERY 4 HOURS
Status: DISCONTINUED | OUTPATIENT
Start: 2019-10-02 | End: 2019-10-03

## 2019-10-02 RX ORDER — PROPOFOL 10 MG/ML
5 INJECTION, EMULSION INTRAVENOUS CONTINUOUS
Status: DISCONTINUED | OUTPATIENT
Start: 2019-10-02 | End: 2019-10-05

## 2019-10-02 RX ORDER — FENTANYL 12.5 UG/1
1 PATCH TRANSDERMAL
Status: DISCONTINUED | OUTPATIENT
Start: 2019-10-02 | End: 2019-10-02 | Stop reason: HOSPADM

## 2019-10-02 RX ORDER — FENTANYL 12.5 UG/1
1 PATCH TRANSDERMAL
Status: CANCELLED | OUTPATIENT
Start: 2019-10-05

## 2019-10-02 RX ORDER — METHYLPREDNISOLONE SOD SUCC 125 MG
125 VIAL (EA) INJECTION ONCE
Status: COMPLETED | OUTPATIENT
Start: 2019-10-02 | End: 2019-10-02

## 2019-10-02 RX ORDER — SODIUM CHLORIDE 0.9 % (FLUSH) 0.9 %
10 SYRINGE (ML) INJECTION
Status: CANCELLED | OUTPATIENT
Start: 2019-10-02

## 2019-10-02 RX ORDER — FUROSEMIDE 10 MG/ML
20 INJECTION INTRAMUSCULAR; INTRAVENOUS DAILY
Status: CANCELLED | OUTPATIENT
Start: 2019-10-03

## 2019-10-02 RX ORDER — ETOMIDATE 2 MG/ML
20 INJECTION INTRAVENOUS ONCE
Status: DISCONTINUED | OUTPATIENT
Start: 2019-10-02 | End: 2019-10-03

## 2019-10-02 RX ORDER — MAGNESIUM SULFATE HEPTAHYDRATE 40 MG/ML
4 INJECTION, SOLUTION INTRAVENOUS
Status: CANCELLED | OUTPATIENT
Start: 2019-10-02

## 2019-10-02 RX ORDER — IPRATROPIUM BROMIDE AND ALBUTEROL SULFATE 2.5; .5 MG/3ML; MG/3ML
3 SOLUTION RESPIRATORY (INHALATION) EVERY 4 HOURS
Status: DISCONTINUED | OUTPATIENT
Start: 2019-10-03 | End: 2019-10-12

## 2019-10-02 RX ORDER — CISATRACURIUM BESYLATE 2 MG/ML
INJECTION, SOLUTION INTRAVENOUS
Status: COMPLETED
Start: 2019-10-02 | End: 2019-10-02

## 2019-10-02 RX ORDER — SODIUM CHLORIDE 0.9 % (FLUSH) 0.9 %
10 SYRINGE (ML) INJECTION EVERY 6 HOURS
Status: CANCELLED | OUTPATIENT
Start: 2019-10-02

## 2019-10-02 RX ORDER — LIDOCAINE HYDROCHLORIDE 10 MG/ML
1 INJECTION, SOLUTION EPIDURAL; INFILTRATION; INTRACAUDAL; PERINEURAL ONCE
Status: CANCELLED | OUTPATIENT
Start: 2019-10-02 | End: 2019-10-02

## 2019-10-02 RX ORDER — NOREPINEPHRINE BITARTRATE/D5W 4MG/250ML
0.02 PLASTIC BAG, INJECTION (ML) INTRAVENOUS CONTINUOUS
Status: DISCONTINUED | OUTPATIENT
Start: 2019-10-03 | End: 2019-10-04

## 2019-10-02 RX ORDER — ONDANSETRON 2 MG/ML
4 INJECTION INTRAMUSCULAR; INTRAVENOUS EVERY 8 HOURS PRN
Status: DISCONTINUED | OUTPATIENT
Start: 2019-10-02 | End: 2019-10-25 | Stop reason: HOSPADM

## 2019-10-02 RX ORDER — CARBOXYMETHYLCELLULOSE SODIUM 10 MG/ML
1 GEL OPHTHALMIC EVERY 8 HOURS
Status: DISCONTINUED | OUTPATIENT
Start: 2019-10-03 | End: 2019-10-03

## 2019-10-02 RX ORDER — ROCURONIUM BROMIDE 10 MG/ML
INJECTION, SOLUTION INTRAVENOUS
Status: COMPLETED
Start: 2019-10-02 | End: 2019-10-02

## 2019-10-02 RX ORDER — LABETALOL HCL 20 MG/4 ML
10 SYRINGE (ML) INTRAVENOUS ONCE
Status: DISCONTINUED | OUTPATIENT
Start: 2019-10-02 | End: 2019-10-02

## 2019-10-02 RX ORDER — ROCURONIUM BROMIDE 10 MG/ML
50 INJECTION, SOLUTION INTRAVENOUS ONCE
Status: COMPLETED | OUTPATIENT
Start: 2019-10-02 | End: 2019-10-02

## 2019-10-02 RX ORDER — DEXTROSE MONOHYDRATE 100 MG/ML
INJECTION, SOLUTION INTRAVENOUS CONTINUOUS
Status: CANCELLED | OUTPATIENT
Start: 2019-10-02

## 2019-10-02 RX ORDER — BISACODYL 10 MG
10 SUPPOSITORY, RECTAL RECTAL DAILY PRN
Status: CANCELLED | OUTPATIENT
Start: 2019-10-02

## 2019-10-02 RX ORDER — MAGNESIUM SULFATE HEPTAHYDRATE 40 MG/ML
2 INJECTION, SOLUTION INTRAVENOUS ONCE
Status: COMPLETED | OUTPATIENT
Start: 2019-10-02 | End: 2019-10-03

## 2019-10-02 RX ORDER — FAMOTIDINE 10 MG/ML
20 INJECTION INTRAVENOUS DAILY
Status: DISCONTINUED | OUTPATIENT
Start: 2019-10-03 | End: 2019-10-07

## 2019-10-02 RX ORDER — DEXTROSE MONOHYDRATE 100 MG/ML
INJECTION, SOLUTION INTRAVENOUS CONTINUOUS
Status: DISCONTINUED | OUTPATIENT
Start: 2019-10-02 | End: 2019-10-02 | Stop reason: HOSPADM

## 2019-10-02 RX ORDER — NOREPINEPHRINE BITARTRATE/D5W 4MG/250ML
PLASTIC BAG, INJECTION (ML) INTRAVENOUS
Status: COMPLETED
Start: 2019-10-02 | End: 2019-10-03

## 2019-10-02 RX ORDER — HYDRALAZINE HYDROCHLORIDE 20 MG/ML
10 INJECTION INTRAMUSCULAR; INTRAVENOUS EVERY 6 HOURS PRN
Status: DISCONTINUED | OUTPATIENT
Start: 2019-10-02 | End: 2019-10-02

## 2019-10-02 RX ADMIN — FUROSEMIDE 20 MG: 10 INJECTION, SOLUTION INTRAVENOUS at 08:10

## 2019-10-02 RX ADMIN — Medication 10 ML: at 12:10

## 2019-10-02 RX ADMIN — Medication 80 MG: at 09:10

## 2019-10-02 RX ADMIN — ENALAPRILAT 2.5 MG: 2.5 INJECTION INTRAVENOUS at 06:10

## 2019-10-02 RX ADMIN — POTASSIUM CHLORIDE 40 MEQ: 20 SOLUTION ORAL at 10:10

## 2019-10-02 RX ADMIN — CEFAZOLIN SODIUM 2 G: 2 SOLUTION INTRAVENOUS at 02:10

## 2019-10-02 RX ADMIN — PROPOFOL 5 MCG/KG/MIN: 10 INJECTION, EMULSION INTRAVENOUS at 10:10

## 2019-10-02 RX ADMIN — FENTANYL 1 PATCH: 12 PATCH TRANSDERMAL at 12:10

## 2019-10-02 RX ADMIN — HYDROMORPHONE HYDROCHLORIDE 1 MG: 2 INJECTION, SOLUTION INTRAMUSCULAR; INTRAVENOUS; SUBCUTANEOUS at 05:10

## 2019-10-02 RX ADMIN — SODIUM CHLORIDE 1000 ML: 0.9 INJECTION, SOLUTION INTRAVENOUS at 10:10

## 2019-10-02 RX ADMIN — POTASSIUM CHLORIDE 60 MEQ: 14.9 INJECTION, SOLUTION INTRAVENOUS at 06:10

## 2019-10-02 RX ADMIN — METOPROLOL TARTRATE 10 MG: 5 INJECTION, SOLUTION INTRAVENOUS at 12:10

## 2019-10-02 RX ADMIN — MAGNESIUM SULFATE IN WATER 2 G: 40 INJECTION, SOLUTION INTRAVENOUS at 10:10

## 2019-10-02 RX ADMIN — METHYLPREDNISOLONE SODIUM SUCCINATE 125 MG: 125 INJECTION, POWDER, FOR SOLUTION INTRAMUSCULAR; INTRAVENOUS at 10:10

## 2019-10-02 RX ADMIN — CISATRACURIUM BESYLATE: 2 INJECTION INTRAVENOUS at 11:10

## 2019-10-02 RX ADMIN — DEXTROSE: 10 SOLUTION INTRAVENOUS at 10:10

## 2019-10-02 RX ADMIN — IPRATROPIUM BROMIDE AND ALBUTEROL SULFATE 3 ML: .5; 3 SOLUTION RESPIRATORY (INHALATION) at 11:10

## 2019-10-02 RX ADMIN — CEFAZOLIN SODIUM 2 G: 2 SOLUTION INTRAVENOUS at 01:10

## 2019-10-02 RX ADMIN — Medication 10 ML: at 05:10

## 2019-10-02 RX ADMIN — ROCURONIUM BROMIDE 50 MG: 10 INJECTION, SOLUTION INTRAVENOUS at 09:10

## 2019-10-02 RX ADMIN — CEFAZOLIN SODIUM 2 G: 2 SOLUTION INTRAVENOUS at 08:10

## 2019-10-02 RX ADMIN — Medication 25 MCG/HR: at 11:10

## 2019-10-02 RX ADMIN — HYDROMORPHONE HYDROCHLORIDE 1 MG: 2 INJECTION, SOLUTION INTRAMUSCULAR; INTRAVENOUS; SUBCUTANEOUS at 01:10

## 2019-10-02 RX ADMIN — PROPOFOL 80 MG: 10 INJECTION, EMULSION INTRAVENOUS at 09:10

## 2019-10-02 RX ADMIN — CEFAZOLIN SODIUM 2 G: 2 SOLUTION INTRAVENOUS at 11:10

## 2019-10-02 RX ADMIN — METOPROLOL TARTRATE 10 MG: 5 INJECTION, SOLUTION INTRAVENOUS at 05:10

## 2019-10-02 RX ADMIN — VANCOMYCIN HYDROCHLORIDE 750 MG: 750 INJECTION, POWDER, LYOPHILIZED, FOR SOLUTION INTRAVENOUS at 04:10

## 2019-10-02 RX ADMIN — ASCORBIC ACID, VITAMIN A PALMITATE, CHOLECALCIFEROL, THIAMINE HYDROCHLORIDE, RIBOFLAVIN-5 PHOSPHATE SODIUM, PYRIDOXINE HYDROCHLORIDE, NIACINAMIDE, DEXPANTHENOL, ALPHA-TOCOPHEROL ACETATE, VITAMIN K1, FOLIC ACID, BIOTIN, CYANOCOBALAMIN: 200; 3300; 200; 6; 3.6; 6; 40; 15; 10; 150; 600; 60; 5 INJECTION, SOLUTION INTRAVENOUS at 03:10

## 2019-10-02 RX ADMIN — PANTOPRAZOLE SODIUM 40 MG: 40 INJECTION, POWDER, FOR SOLUTION INTRAVENOUS at 08:10

## 2019-10-02 NOTE — DISCHARGE SUMMARY
"[facility] Medicine  Discharge Summary      Patient Name: Mesha Mckenzie  MRN: [id number]  Admission Date: 9/19/2019  Hospital Length of Stay: 13 days  Discharge Date and Time:  10/02/2019 1:57 PM  Attending Physician: Lizzette Covarrubias MD   Discharging Provider: Lizzette Covarrubias MD  Primary Care Provider: Varun Shea MD PhD      HPI:   Pt is a 62 yo female who was transferred from [facility] for septic shock, pancolitis and bilateral pna. Pt reports that she fell out of bed on Sunday morning and has been feeling progressively worse throughout the week. C/o vomiting since Sunday, ~3 episodes per day. Also reports diarrhea, ~4 stools per day, watery in nature. Pt denies any hematemesis, melena or hematochezia. Pt did take zofran which helped "some:" c/o severe abdominal pain since Sunday which is constant. Pain is a 10 on a 0-10 pain scale.  Has not been able to eat due to the nausea and pain. Questioned pt in regards to use of pain medication, states "I didn't have any." pt was scheduled for colonoscopy tomorrow. Hx of GI bleed in December 2018. Pt states she had passed some blood in her stool. Reviewed colonoscopy report, no bleeding in the colon was found however the prep was poor. No recent travel, no sick contacts at home. Questioned pt in regards to cough: pt states she has felt SOB due to pain but reports only occasional coughing. Denies any sputum production.  Social: smokes 10 cigarettes per day, 20 pack year hx. No ETOH.     Procedure(s) (LRB):  ECHOCARDIOGRAM,TRANSESOPHAGEAL (N/A)      Hospital Course:   The patient is a 63-year-old woman admitted 09/19 with sepsis secondary to pneumonia and colitis.  She was admitted to the intensive care unit and was treated with broad-spectrum antibiotics.  Her blood cultures grew methicillin sensitive Staph aureus and she was evaluated by ID.  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  She " is currently on Ancef, Cubicin and vancomycin.  Patient has been closely followed up by Cardiology team who considered valve replacement surgery but due to patient's other medical issues, Cardiovascular surgery plans are on hold at present.  Slowly but surely and progressively patient's metabolic encephalopathy is improving.  Patient is continuing to have swallowing dysfunction requiring use of intravenous total parental nutrition. She has bilateral pulmonary infiltrates which we believe are secondary to septic emboli. The patient has had severe anemia, thrombocytopenia and coagulopathy.  She received a couple of blood transfusions but has not require platelets or FFP.  Hematology has been following.  Patient last evening underwent thoracentesis suggesting empyema presents for which Dr. Rick from cardiothoracic surgery has been consulted.  He was considering VATS procedure tomorrow.  MRI of lumbar spine is suggesting developing epidural lumbar abscess.  Neurosurgery services are not available at our facility.  Patient has been accepted at [facility] intensive care unit under care of Dr. Patino for further management and care.  Patient's daughter has been notified with plan of care.    Consults:   Consults (From admission, onward)        Status Ordering Provider     Inpatient consult to Cardiology  Once     Provider:  Benigno Dobson MD    Acknowledged JAEL BOLDEN     Inpatient consult to Cardiothoracic Surgery  Once     Provider:  Pratik Chavez MD    Acknowledged DAMION GRUBER     Inpatient consult to Cardiothoracic Surgery  Once     Provider:  Pratik Rick MD    Acknowledged CHAPO WAGNER     Inpatient consult to Gastroenterology  Once     Provider:  Edna Bowens MD    Completed MAHESH MACEDO     Inpatient consult to Hematology  Once     Provider:  Antwan Hewitt MD    Completed MARIAH HAMMER     Inpatient consult to Infectious Diseases  Once     Provider:  Jael Bolden MD     Completed SULTAN, AQIB     Inpatient consult to Nephrology  Once     Provider:  Rigo Rich MD    Completed THERESA GILLIS     Inpatient consult to Neurology  Once     Provider:  Richardson Mora MD    Completed SULTAN, AQIB     [facility]  Once     Provider:  (Not yet assigned)    Acknowledged JAEL HO     Inpatient consult to Pulmonology  Once     Provider:  Oziel Mohr MD    Completed SULTAN, AQIB     Inpatient consult to Registered Dietitian/Nutritionist  Once     Provider:  (Not yet assigned)    Completed MARIAH HAMMER     Pharmacy to dose Vancomycin consult  Once     Provider:  (Not yet assigned)    Acknowledged JAEL HO        Final Active Diagnoses:    Diagnosis Date Noted POA    PRINCIPAL PROBLEM:  Acute bacterial endocarditis [I33.0] 09/27/2019 Yes    Hypernatremia [E87.0] 10/02/2019 No    Hyperglycemia [R73.9] 09/27/2019 No    Acute septic pulmonary embolism [I26.90] 09/27/2019 Yes    Tricuspid valve vegetation [I33.0] 09/27/2019 Yes    Mitral valve vegetation [I33.0] 09/27/2019 Yes    Hypomagnesemia [E83.42] 09/27/2019 No    Thrombocytopenia, acquired [D69.6] 09/27/2019 Yes    IV drug user [F19.90] 09/25/2019 Yes    Acute on chronic diastolic congestive heart failure [I50.33] 09/23/2019 Yes    Encephalopathy, metabolic [G93.41] 09/23/2019 No    Normocytic anemia [D64.9] 09/23/2019 Yes    Severe malnutrition [E43] 09/23/2019 Yes    Staphylococcus aureus bacteremia with sepsis [A41.01] 09/22/2019 Yes    Pleural effusion, bilateral [J90] 09/20/2019 Yes    Acute hypoxemic respiratory failure [J96.01] 09/20/2019 Yes    ABIMBOLA (acute kidney injury) [N17.9] 09/19/2019 Yes    Pancolitis [K51.00] 09/19/2019 Yes    Hypokalemia [E87.6] 09/19/2019 No      Problems Resolved During this Admission:    Diagnosis Date Noted Date Resolved POA    Pulmonary hypertension [I27.20] 09/23/2019 09/23/2019 Yes    DIC (disseminated intravascular  coagulation) [D65] 09/23/2019 09/23/2019 Yes    Coagulopathy [D68.9] 09/20/2019 09/27/2019 Yes    ILD (interstitial lung disease) [J84.9] 09/20/2019 09/23/2019 Yes    COPD with acute lower respiratory infection [J44.0] 09/20/2019 09/23/2019 Yes    Calcification of aorta [I70.0] 09/20/2019 09/23/2019 Yes    BOOP (bronchiolitis obliterans with organizing pneumonia) [J84.89] 09/20/2019 09/23/2019 Yes    Intravascular volume depletion [E86.1] 09/19/2019 09/23/2019 Yes    Hyponatremia [E87.1] 09/19/2019 09/23/2019 Yes    Metabolic acidosis [E87.2] 09/19/2019 09/27/2019 Yes    CAP (community acquired pneumonia) [J18.9] 09/19/2019 09/23/2019 Yes       Discharged Condition: good    Disposition: Another Health Care [facility]*    Follow Up: With your doctors upon discharge    Significant Diagnostic Studies: Labs:   BMP:   Recent Labs   Lab 10/01/19  0352  10/01/19  1958 10/02/19  0332 10/02/19  1153   *  --   --  116* 189*   *  --   --  152* 149*   K 4.0   < > 3.5 3.3* 4.1     --   --  110 110   CO2 30*  --   --  30* 28   BUN 45*  --   --  46* 52*   CREATININE 1.0  --   --  1.2 1.2   CALCIUM 8.1*  --   --  8.2* 8.2*    < > = values in this interval not displayed.    and CBC   Recent Labs   Lab 10/01/19  0352 10/02/19  0332   WBC 9.07 7.92   HGB 7.9* 11.0*   HCT 25.4* 33.9*    194       CXR; Patchy airspace disease bilaterally, likely pneumonia.     CXR;   1. Central venous catheter placement without pneumothorax.     CT abdomen and pelvis without contrast:  1. Acute pancolitis.  2. Airspace disease multiple areas in the lower lungs are seen and a right pleural effusion approximately 2 cm deep at the base.     CT head without contrast:  No appreciable changes compared to CT head 07/24/2015 including moderate to severe presumed microvascular ischemic changes cerebral white matter and old lacunar infarct adjacent to right head of caudate.     CT chest without contrast:  Multiple primarily peripheral  "consolidated infiltrates bilaterally consistent with BOOP, bronchiolitis obliterans organizing pneumonia.  Follow-up to assure clearance is recommended.  Moderate atelectasis of the right lower lobe and moderate size right pleural effusion.  Trace left pleural effusion.  Mild mediastinal adenopathy.  Atherosclerotic calcification including coronary artery calcification.     CT head without contrast:  1. Extensive chronic microvascular ischemic changes within the periventricular white matter of the supratentorial brain which are chance for the patient's chronologic age.  Demyelinating disease could produce a similar appearance.  There are more focal areas of hypoattenuation present within the periventricular white matter posterior right centrum semiovale and right frontal cortex, possibly focal areas of microvascular ischemic change or age-indeterminate infarction which are unchanged when compared with the cranial CT examination of 09/19/2019.  Additional clarification could be achieved with MRI of the brain as deemed clinically appropriate.  Given the provided history of "exclude septic emboli", consideration should be given to performing the examination with intravenous contrast.  2. Foci of remote lacunar infarction within the right caudate head and right corona radiata adjacent to the right frontal horn.     CXR:  Bilateral infiltrates with slight further decrease of the infiltrate right perihilar/basilar compared to the prior exam.     CT chest abdomen and pelvis with and without contrast:  Chest; persistent shattered bilateral consolidative processes which have undergone cavitation/cystic spaces within them  Small left and moderate size right pleural effusion increased in size  Pericardial effusion appearing small but mildly increased in size  Abdomen and pelvis; mild distention of colon with prominent amount of fluid suggesting ileus.  Very mild colitis cannot be excluded but with no appreciable bowel wall " thickening or pericolonic inflammatory change.     CXR:  Stable appearance of the chest demonstrating multifocal infiltrates and nodular opacities and small pleural effusions.     ECHO:  · Mild concentric left ventricular hypertrophy.  · Low normal left ventricular systolic function. The estimated ejection fraction is 50%  · Grade I (mild) left ventricular diastolic dysfunction consistent with impaired relaxation.  · Normal right ventricular systolic function.  · Mild mitral regurgitation.  · Normal central venous pressure (3 mm Hg).  · The estimated PA systolic pressure is 48 mm Hg  · Pulmonary hypertension present.     DHARMESH:  · Mild left ventricular enlargement.  · Low normal left ventricular systolic function. The estimated ejection fraction is 50%  · Normal LV diastolic function.  · Mild left atrial enlargement.  · No interatrial septal defect present.  · Normal appearing left atrial appendage. No thrombus is present in the appendage. Normal appendage velocities.  · Moderate-to-severe mitral regurgitation.  · Moderate tricuspid regurgitation.  · 1x.5 cmmobile vegetation on tricuspid valve septal leaflet atrial side  · 5mmx6 mm mobile vegetation on posterior leaflet P3 scallop on atrial side  · Aortic valve wnl     MRA brain with contrast:   No aneurysm or hemodynamically significant stenosis of the intracranial arterial vasculature.     MRI brain with and without contrast:  No acute intracranial abnormality and no abnormal enhancement.  Mild involutional change and white matter disease.     CXR: No significant change relative to September 30, 2019.     CXR: No pneumothorax post right-sided diagnostic thoracentesis.  Appropriately positioned PICC line.  Unchanged bilateral airspace disease.     MRI L-spine:   1. Rim-enhancing collections within the left dorsal epidural space at the L2-3 and L3-4 levels with mass effect and narrowing of the thecal sac.  These collections are nonspecific and could represent epidural  abscesses, given the patient's history of bacteremia.  2. Rim-enhancing, multilocular collection posterior to the right L4-5 facet joint, which may represent a prominent synovial cyst, possibly superinfected, versus a separate fluid collection.  3. Trace fluid signal within the L3-4 and L4-5 disc spaces with mild adjacent marrow edema, enhancement, and endplate changes, which all may be degenerative; although, early discitis/osteomyelitis cannot be entirely excluded.  Close clinical surveillance and short-term follow-up imaging recommended.  4. Diffuse homogeneous T2 hypointense hepatic parenchymal signal, which is nonspecific and may reflect iron overload/deposition disease, which can be seen with multiple transfusions, hemochromatosis and thalassemia.  Clinical correlation and further evaluation, as warranted.    Medications:  Reconciled Home Medications:      Medication List      ASK your doctor about these medications    aspirin 81 MG Chew  Take 81 mg by mouth once daily.     diazePAM 5 MG tablet  Commonly known as:  VALIUM  Take 10 mg by mouth 2 (two) times daily.     gabapentin 800 MG tablet  Commonly known as:  NEURONTIN  Take 800 mg by mouth 4 (four) times daily.     ondansetron 8 MG Tbdl  Commonly known as:  ZOFRAN-ODT     oxyCODONE-acetaminophen  mg per tablet  Commonly known as:  PERCOCET  Take 1 tablet by mouth 3 (three) times daily.            Indwelling Lines/Drains at time of discharge:   Lines/Drains/Airways     Peripherally Inserted Central Catheter Line                 PICC Double Lumen 09/30/19 1230 right brachial 2 days          Drain                 Urethral Catheter 09/19/19 1240 Double-lumen 16 Fr. 13 days         Rectal Tube 09/27/19 1000 rectal tube w/ balloon (indicate number of mLs) 5 days                Time spent on the discharge of patient: 34 minutes  Patient was seen and examined on the date of discharge and determined to be suitable for discharge.    Critical care time spent on  the evaluation and treatment of severe organ dysfunction, review of pertinent labs and imaging studies, discussions with consulting providers and discussions with patient/family: 35 minutes.     Lizzette Covarrubias MD  Department of Hospital Medicine  [facility]

## 2019-10-02 NOTE — ASSESSMENT & PLAN NOTE
Add intravenous dextrose 10 50 cc IV fluid hydration.  Follow BMP Q 6 hr.  Discussed with pharmacist to change TPN to custom TPN with reduced sodium supplementation.

## 2019-10-02 NOTE — PROGRESS NOTES
"[facility] Medicine  Progress Note    Patient Name: Mesha Mckenzie  MRN: [id number]  Patient Class: IP- Inpatient   Admission Date: 9/19/2019  Length of Stay: 13 days  Attending Physician: Lizzette Covarrubias MD  Primary Care Provider: Varun Shea MD PhD        Subjective:     Principal Problem:Acute bacterial endocarditis    HPI:  Pt is a 62 yo female who was transferred from [facility] for septic shock, pancolitis and bilateral pna. Pt reports that she fell out of bed on Sunday morning and has been feeling progressively worse throughout the week. C/o vomiting since Sunday, ~3 episodes per day. Also reports diarrhea, ~4 stools per day, watery in nature. Pt denies any hematemesis, melena or hematochezia. Pt did take zofran which helped "some:" c/o severe abdominal pain since Sunday which is constant. Pain is a 10 on a 0-10 pain scale.  Has not been able to eat due to the nausea and pain. Questioned pt in regards to use of pain medication, states "I didn't have any." pt was scheduled for colonoscopy tomorrow. Hx of GI bleed in December 2018. Pt states she had passed some blood in her stool. Reviewed colonoscopy report, no bleeding in the colon was found however the prep was poor. No recent travel, no sick contacts at home. Questioned pt in regards to cough: pt states she has felt SOB due to pain but reports only occasional coughing. Denies any sputum production.  Social: smokes 10 cigarettes per day, 20 pack year hx. No ETOH.     Overview/Hospital Course:  The patient is a 63-year-old woman admitted 09/19 with sepsis secondary to pneumonia and colitis.  She was admitted to the intensive care unit and was treated with broad-spectrum antibiotics.  Her blood cultures grew methicillin sensitive Staph aureus and she was evaluated by ID.  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  She is currently on Ancef and vancomycin.  Her blood cultures " are still positive therefore CV surgery has been consulted for possible cardiac surgery.  She has also been followed closely by Dr. Dobson for her heart failure.    The patient  had severe metabolic encephalopathy which has resolved for the most part.    She has been receiving TPN since she has had an ileus.    She has bilateral pulmonary infiltrates which we believe are secondary to septic emboli.    The patient has had severe anemia, thrombocytopenia and coagulopathy.  She received a couple of blood transfusions but has not require platelets or FFP.  Hematology has been following.    The patient was very uncomfortable initially and was treated with IV Dilaudid which I think also helped her encephalopathy from drug withdrawal.  We have been decreasing the Dilaudid dose the last 24 hrs.    Interval History:  In intensive care unit, in critical condition.  T-max 99.1 degree F. Patient received 2 units of packed red blood cells overnight.  Patient is lethargic, appear extremely weak, unable to hold conversation, still NPO due to swallowing dysfunction.  On intravenous Milrinone infusion.  Thoracentesis from yesterday confirms empyema.  Dr. Rick is being consulted for need for possible chest tube placement versus VATS procedure.    Review of Systems   Respiratory: Negative for cough, chest tightness, shortness of breath and wheezing.    Cardiovascular: Negative for chest pain, palpitations and leg swelling.   Gastrointestinal: Positive for abdominal pain. Negative for abdominal distention, constipation, diarrhea, nausea and vomiting.   Genitourinary: Negative for difficulty urinating, dysuria and flank pain.   Musculoskeletal: Negative for gait problem, joint swelling and neck pain.   Skin: Negative for rash and wound.   Neurological: Negative for dizziness, syncope, light-headedness and headaches.   Psychiatric/Behavioral: Negative for agitation, behavioral problems and confusion.     Objective:     Vital Signs (Most  Recent):  Temp: 98.8 °F (37.1 °C) (10/02/19 0315)  Pulse: 104 (10/02/19 0805)  Resp: (!) 26 (10/02/19 0805)  BP: (!) 136/59 (10/02/19 0805)  SpO2: 100 % (10/02/19 0805) Vital Signs (24h Range):  Temp:  [98.3 °F (36.8 °C)-99.1 °F (37.3 °C)] 98.8 °F (37.1 °C)  Pulse:  [] 104  Resp:  [16-77] 26  SpO2:  [79 %-100 %] 100 %  BP: (122-196)/() 136/59     Weight: 39.5 kg (87 lb 1.3 oz)  Body mass index is 15.93 kg/m².    Intake/Output Summary (Last 24 hours) at 10/2/2019 0854  Last data filed at 10/2/2019 0605  Gross per 24 hour   Intake 2354.94 ml   Output 2550 ml   Net -195.06 ml      Physical Exam   Constitutional: She is oriented to person, place, and time. She appears well-developed and well-nourished.   HENT:   Head: Normocephalic and atraumatic.   Eyes: Pupils are equal, round, and reactive to light. EOM are normal.   Cardiovascular: Regular rhythm, normal heart sounds and intact distal pulses.   Tachycardic   Pulmonary/Chest: Effort normal and breath sounds normal. No respiratory distress. She has no wheezes. She has no rales.   Abdominal: Soft. Bowel sounds are normal. She exhibits no distension. There is tenderness.   Musculoskeletal: She exhibits no edema or tenderness.   Neurological: She is alert and oriented to person, place, and time. No cranial nerve deficit. Coordination normal.   Has generalized weakness   Skin: Skin is warm and dry. Capillary refill takes less than 2 seconds. No rash noted.   Psychiatric: She has a normal mood and affect. Her behavior is normal. Thought content normal.   Nursing note and vitals reviewed.      Significant Labs:   CBC:   Recent Labs   Lab 10/01/19  0352 10/02/19  0332   WBC 9.07 7.92   HGB 7.9* 11.0*   HCT 25.4* 33.9*    194     CMP:   Recent Labs   Lab 10/01/19  0352 10/01/19  0800 10/01/19  1958 10/02/19  0332   *  --   --  152*   K 4.0 3.8 3.5 3.3*     --   --  110   CO2 30*  --   --  30*   *  --   --  116*   BUN 45*  --   --  46*  "  CREATININE 1.0  --   --  1.2   CALCIUM 8.1*  --   --  8.2*   ANIONGAP 10  --   --  12   EGFRNONAA 59*  --   --  48*     Significant Imaging:   CXR; Patchy airspace disease bilaterally, likely pneumonia.    CXR;   1. Central venous catheter placement without pneumothorax.    CT abdomen and pelvis without contrast:  1. Acute pancolitis.  2. Airspace disease multiple areas in the lower lungs are seen and a right pleural effusion approximately 2 cm deep at the base.    CT head without contrast:  No appreciable changes compared to CT head 07/24/2015 including moderate to severe presumed microvascular ischemic changes cerebral white matter and old lacunar infarct adjacent to right head of caudate.    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  Follow-up to assure clearance is recommended.  Moderate atelectasis of the right lower lobe and moderate size right pleural effusion.  Trace left pleural effusion.  Mild mediastinal adenopathy.  Atherosclerotic calcification including coronary artery calcification.    CT head without contrast:  1. Extensive chronic microvascular ischemic changes within the periventricular white matter of the supratentorial brain which are chance for the patient's chronologic age.  Demyelinating disease could produce a similar appearance.  There are more focal areas of hypoattenuation present within the periventricular white matter posterior right centrum semiovale and right frontal cortex, possibly focal areas of microvascular ischemic change or age-indeterminate infarction which are unchanged when compared with the cranial CT examination of 09/19/2019.  Additional clarification could be achieved with MRI of the brain as deemed clinically appropriate.  Given the provided history of "exclude septic emboli", consideration should be given to performing the examination with " intravenous contrast.  2. Foci of remote lacunar infarction within the right caudate head and right corona radiata adjacent to the right frontal horn.    CXR:  Bilateral infiltrates with slight further decrease of the infiltrate right perihilar/basilar compared to the prior exam.    CT chest abdomen and pelvis with and without contrast:  Chest; persistent shattered bilateral consolidative processes which have undergone cavitation/cystic spaces within them  Small left and moderate size right pleural effusion increased in size  Pericardial effusion appearing small but mildly increased in size  Abdomen and pelvis; mild distention of colon with prominent amount of fluid suggesting ileus.  Very mild colitis cannot be excluded but with no appreciable bowel wall thickening or pericolonic inflammatory change.    CXR:  Stable appearance of the chest demonstrating multifocal infiltrates and nodular opacities and small pleural effusions.    ECHO:  · Mild concentric left ventricular hypertrophy.  · Low normal left ventricular systolic function. The estimated ejection fraction is 50%  · Grade I (mild) left ventricular diastolic dysfunction consistent with impaired relaxation.  · Normal right ventricular systolic function.  · Mild mitral regurgitation.  · Normal central venous pressure (3 mm Hg).  · The estimated PA systolic pressure is 48 mm Hg  · Pulmonary hypertension present.    DHARMESH:  · Mild left ventricular enlargement.  · Low normal left ventricular systolic function. The estimated ejection fraction is 50%  · Normal LV diastolic function.  · Mild left atrial enlargement.  · No interatrial septal defect present.  · Normal appearing left atrial appendage. No thrombus is present in the appendage. Normal appendage velocities.  · Moderate-to-severe mitral regurgitation.  · Moderate tricuspid regurgitation.  · 1x.5 cmmobile vegetation on tricuspid valve septal leaflet atrial side  · 5mmx6 mm mobile vegetation on posterior leaflet  P3 scallop on atrial side  · Aortic valve wnl    MRA brain with contrast:   No aneurysm or hemodynamically significant stenosis of the intracranial arterial vasculature.    MRI brain with and without contrast:  No acute intracranial abnormality and no abnormal enhancement.  Mild involutional change and white matter disease.    CXR: No significant change relative to September 30, 2019.    CXR: No pneumothorax post right-sided diagnostic thoracentesis.  Appropriately positioned PICC line.  Unchanged bilateral airspace disease.    MRI L-spine:   1. Rim-enhancing collections within the left dorsal epidural space at the L2-3 and L3-4 levels with mass effect and narrowing of the thecal sac.  These collections are nonspecific and could represent epidural abscesses, given the patient's history of bacteremia.  2. Rim-enhancing, multilocular collection posterior to the right L4-5 facet joint, which may represent a prominent synovial cyst, possibly superinfected, versus a separate fluid collection.  3. Trace fluid signal within the L3-4 and L4-5 disc spaces with mild adjacent marrow edema, enhancement, and endplate changes, which all may be degenerative; although, early discitis/osteomyelitis cannot be entirely excluded.  Close clinical surveillance and short-term follow-up imaging recommended.  4. Diffuse homogeneous T2 hypointense hepatic parenchymal signal, which is nonspecific and may reflect iron overload/deposition disease, which can be seen with multiple transfusions, hemochromatosis and thalassemia.  Clinical correlation and further evaluation, as warranted.    Assessment/Plan:      * Acute bacterial endocarditis  See below.    Thrombocytopenia, acquired  Monitor daily platelet counts closely.    Hypomagnesemia    Mitral valve vegetation  Follow Infectious Disease and Cardiology recommendations.    Tricuspid valve vegetation  Continue antibiotics as per Infectious Disease recommendations including  Cubicin/vancomycin/Ancef.  Pharmacist is dozing vancomycin.    Acute septic pulmonary embolism    Hyperglycemia  Patient's FSGs are controlled on current hypoglycemics.   Last A1c reviewed-   Lab Results   Component Value Date    HGBA1C 5.7 (H) 04/23/2019     Most recent fingerstick glucose reviewed- No results for input(s): POCTGLUCOSE in the last 24 hours.  Current correctional scale  Low  Maintain anti-hyperglycemic dose as follows-   Antihyperglycemics (From admission, onward)    None        IV drug user  The patient finally admitted that she was using IV drugs to Dr. Bolden.  She does not want her family to know.  She states that she has only been using IV heroin for a few weeks    Severe malnutrition  Speech therapist to re-evaluate swallow evaluation.  Continue TPN September 24th.    Normocytic anemia  Secondary to this acute illness?  There is no evidence of blood loss or hemolysis.   Status post 2 units of packed red blood cells on September 24, 2019.  Status post another 2 units of packed red blood cells on October 1, 2019.    Hypernatremia  Add intravenous dextrose 10 50 cc IV fluid hydration.  Follow BMP Q 6 hr.  Discussed with pharmacist to change TPN to custom TPN with reduced sodium supplementation.    Encephalopathy, metabolic  The patient appears to be back to baseline.    Acute on chronic diastolic congestive heart failure  Compensated.    Staphylococcus aureus bacteremia with sepsis  Empyema  Lumbar epidural abscess  The patient is on Ancef, Cubicin and vancomycin for MSSA. DHARMESH showed tricuspid and mitral valve vegetations.  Follow latest blood culture results and infectious disease recommendations.  Dr. Rick to perform chest tube placement versus VATS procedure.  Discussed with Dr. Bolden and transfer center.  Patient needs to be transferred to [facility] for Neurosurgery evaluation and management.  Services not available at at our facility.  Awaiting call back.    Acute hypoxemic  respiratory failure  This is secondary  to septic emboli.  She is using BiPAP intermittently.  Chest x-ray is improving.    Hypokalemia  This is being replaced with IV potassium    Pancolitis  Repeat CT scan shows improvement in the colitis.  It showed an ileus and this also seems to be improving as the patient is having bowel movements.    ABIMBOLA (acute kidney injury)  Follow renal panel closely.    VTE Risk Mitigation (From admission, onward)         Ordered     Place sequential compression device  Until discontinued      09/19/19 2234     IP VTE HIGH RISK PATIENT  Once      09/19/19 2217                Critical care time spent on the evaluation and treatment of severe organ dysfunction, review of pertinent labs and imaging studies, discussions with consulting providers and discussions with patient/family: 40 minutes.      Lizzette Covarrubias MD  Department of Hospital Medicine   [facility]

## 2019-10-02 NOTE — PROGRESS NOTES
[facility]  Cardiology  Progress Note    Patient Name: Mesha Mckenzie  MRN: [id number]  Admission Date: 9/19/2019  Hospital Length of Stay: 13 days  Code Status: Full Code   Attending Physician: Lizzette Covarrubias MD   Primary Care Physician: Varun Shea MD PhD  Expected Discharge Date:   Principal Problem:Acute bacterial endocarditis    Subjective:     Hospital Course: 64 yo female who was transferred from [facility] for septic shock, pancolitis and bilateral pna. Found TV IE-MSSA (possible due to IV drug abuse) with septic emboli to b/l lung, metabolic encephalopathy, severe anemia, thrombocytopenia and coagulopathy.    Interval History: Still lethargic, mental status slightly better; not be able to take oral meds. Complains of back pain; denies cp or sob. UOP 2.7L in 24h.  Blood culture negative since 9/26.    Review of Systems  Objective:     Vital Signs (Most Recent):  Temp: 98.8 °F (37.1 °C) (10/02/19 0315)  Pulse: 104 (10/02/19 0805)  Resp: (!) 26 (10/02/19 0805)  BP: (!) 136/59 (10/02/19 0805)  SpO2: 100 % (10/02/19 0805) Vital Signs (24h Range):  Temp:  [98.3 °F (36.8 °C)-99.1 °F (37.3 °C)] 98.8 °F (37.1 °C)  Pulse:  [] 104  Resp:  [16-77] 26  SpO2:  [79 %-100 %] 100 %  BP: (122-196)/() 136/59   Weight: 39.5 kg (87 lb 1.3 oz)  Body mass index is 15.93 kg/m².  SpO2: 100 %  O2 Device (Oxygen Therapy): nasal cannula w/ humidification    Intake/Output Summary (Last 24 hours) at 10/2/2019 0925  Last data filed at 10/2/2019 0900  Gross per 24 hour   Intake 2354.94 ml   Output 2795 ml   Net -440.06 ml     Lines/Drains/Airways     Peripherally Inserted Central Catheter Line                 PICC Double Lumen 09/30/19 1230 right brachial 1 day          Drain                 Urethral Catheter 09/19/19 1240 Double-lumen 16 Fr. 12 days         Rectal Tube 09/27/19 1000 rectal tube w/ balloon (indicate number of mLs) 4 days               Physical Exam  Gen: awake, lying on bed, calm  Skin: normal  temperature/tugor, no erythema  Lymph: no lymphadenopathy detected  HEENT: NC/AT  Neck: supple, no JVD/bruit  Chest: no deformity, equal movement b/l  Lung: scattered rales b/l  Heart: RRR, S1/S2 +, 1/6 SM on apex, no G/R  Abd: BS+, S   Ext: no deformity, no pretibial edema  Pulse: b/l radial 2+  Neuro: n/a    Cardiographics  ECG 9/20/19: Sinus tachycardia with frequent Premature ventricular complexes.  Echo 9/28/19:  · Concentric left ventricular remodeling.  · Moderately decreased left ventricular systolic function. The estimated ejection fraction is 35%  · No wall motion abnormalities.  · Atrial fibrillation not observed.  · Indeterminate left ventricular diastolic function.  · Normal right ventricular systolic function.  · Mild-to-moderate mitral regurgitation.  · Mild to moderate tricuspid regurgitation.  · No pulmonary hypertension present.  · The estimated PA systolic pressure is 27 mm Hg  · Small circumferential pericardial effusion. Effusion is fluid.   1) Ef of lv slightly improved   2) small circumferential pericardial effusion   3) pulmonary hypertension no longer documented   4) vegetations same size   5) mitral leaflets coapt normally - moderate but not severe  Mitral regurg seen   Echocardiogram 9/26/19:  · Mild concentric left ventricular hypertrophy.  · Mild left ventricular enlargement.  · Moderately decreased left ventricular systolic function. The estimated ejection fraction is 33%  · Grade III (severe) left ventricular diastolic dysfunction consistent with restrictive physiology.  · Mild global hypokinetic wall motion.  · Normal right ventricular systolic function.  · Mild left atrial enlargement.  · Mild right atrial enlargement.  · There is a right atrial mass present. Echo lucent mobile small vegetation ssen at R atrial - TV septal leaflet junction In hi R atrium At septal surface there is more echo dense structure ( ~ 1.5x .8 cm) mobile which could be vegetation vs normal prominent  Eustachian valve  · Moderate-to-severe mitral regurgitation.  · Normal central venous pressure (3 mm Hg).  · The estimated PA systolic pressure is 50 mm Hg  · Moderate to severe pulmonary hypertension present.     1) further decreased EF of lv ( 33%) and sinus tachycardia   2severe mitral regurgitation   3) pulmonary hypertension   4 ) TV + MV vegetations as described     Imaging  Chest x-ray 9/28/19: Stable appearance of the chest demonstrating multifocal infiltrates and nodular opacities and small pleural effusions.  Lab Review   Lab Results   Component Value Date    WBC 7.92 10/02/2019    HGB 11.0 (L) 10/02/2019    HCT 33.9 (L) 10/02/2019    MCV 93 10/02/2019     10/02/2019     BMP  Lab Results   Component Value Date     (H) 10/02/2019    K 3.3 (L) 10/02/2019     10/02/2019    CO2 30 (H) 10/02/2019    BUN 46 (H) 10/02/2019    CREATININE 1.2 10/02/2019    CALCIUM 8.2 (L) 10/02/2019    ANIONGAP 12 10/02/2019    ESTGFRAFRICA 55 (A) 10/02/2019    EGFRNONAA 48 (A) 10/02/2019     No results for input(s): TROPONINI in the last 168 hours.  BNP  Recent Labs   Lab 09/25/19 1944   BNP 1,456*         Assessment:   IE-MSSA, TV   HFrEF 35%  Sepsis  Pneumonia with septic emboli  Encephalopathy  Pancolitis  ABIMBOLA  Anemia  Thrombocytopenia  IV drug abuse     Plan:   Continue antibiotics.  Continue Lasix 20mg iv daily; iv enalaprilat and metoprolol.  dc Milrione iv drip 0.25mcg/kg/min  Continue medical management.  Balance electrolytes.    Hillary Corrigan MD  Cardiology  [facility]

## 2019-10-02 NOTE — PLAN OF CARE
10/02/19 1534   Final Note   Assessment Type Final Discharge Note   Anticipated Discharge Disposition Short Term

## 2019-10-02 NOTE — PLAN OF CARE
"  Problem: SLP Goal  Goal: SLP Goal  Description    1) Pt will consume regular textures and thin liquids with no overt s/s swallow dysfunction  2) Follow 3 step commands, 100% acc indep.  3) Verbally solve functional problems (math, time, daily activities) 90% acc indep  4) Ongoing eval of speech-language cognition.      Outcome: Ongoing, Not Progressing    Appears more anxious today. Slightly more responsive but continues with poor TIARA. Limited acceptance of ice chips trials, stating "I'll choke." Spat out several trials. Continue POC.      "

## 2019-10-02 NOTE — PROGRESS NOTES
Remotely reviewed MRI report and images  MRI lumbar spine shows fluid collections consistent with epidural abscesses, none of which impact her spinal cord. She has remained able to move legs and feet on every exam since admission.  Discussed with Dr. Covarrubias who will pursue neurosurgery consult.   Blood cultures from 9/29, 9/30 and 10/1 are negative so far.   ?image thoracic and cervical cord too?

## 2019-10-02 NOTE — PROGRESS NOTES
Call placed to Dr Lutz for stat consult from Dr Covarrubias re: epidural abscesses.  Discovered that Dr Sweet is covering for Dr Lutz. Spoke with Sharon FAROOQ with neurosurgery who will speak with Dr Sweet re: pt's surgical needs to be handled at [facility] vs [facility]. Sharon stated she will call this nurse back re: Dr Sweet' recommendation for transfer placement after speaking with Dr Sweet. Dr Covarrubias updated on conversations with Dr Lutz's office and Sharon FAROOQ.

## 2019-10-02 NOTE — CARE UPDATE
10/02/19 0805   Patient Assessment/Suction   Level of Consciousness (AVPU) responds to voice   All Lung Fields Breath Sounds coarse;diminished   PRE-TX-O2   O2 Device (Oxygen Therapy) nasal cannula w/ humidification   $ Is the patient on Low Flow Oxygen? Yes   Flow (L/min) 4   Oxygen Concentration (%) 36   SpO2 100 %   Pulse Oximetry Type Continuous   $ Pulse Oximetry - Multiple Charge Pulse Oximetry - Multiple   Pulse 104   Resp (!) 26   BP (!) 136/59   Wound Care   $ Wound Care Tech Time 15 min   Area of Concern Left;Right;Cheek;Bridge of nose;Chin   Skin Color/Characteristics without discoloration   Preset CPAP/BiPAP Settings   Mode Of Delivery Standby

## 2019-10-02 NOTE — PROGRESS NOTES
Progress Note  PULMONARY    Admit Date: 9/19/2019   10/02/2019      SUBJECTIVE:     Sept 21, diffuse pains, off levo, abg better.    Sept 22, on niv, arouses sl, no c/o  Sept 23, moans and groans, on and off bipap, agitated, restless  Sept 24, moans, agitation varies-patient says she is better today, seems brighter  Sept 25, calm/sedate on niv, looks much more comfortable, arouses  Sept 26, still uses bipap periodic- had spell distress respiratory, then guadalupe to 40's.  ,   Sept 30 , no new c/o, off niv.  Oct 1,apathetic, min sob, off niv.  Oct 2, more animated.  Feels better    PFSH and Allergies reviewed.    OBJECTIVE:     Vitals (Most recent):  Vitals:    10/02/19 0600   BP: (!) 122/58   Pulse: 94   Resp: 16   Temp:        Vitals (24 hour range):  Temp:  [98.3 °F (36.8 °C)-99.1 °F (37.3 °C)]   Pulse:  []   Resp:  [16-77]   BP: (122-196)/()   SpO2:  [79 %-100 %]       Intake/Output Summary (Last 24 hours) at 10/2/2019 0803  Last data filed at 10/2/2019 0605  Gross per 24 hour   Intake 2354.94 ml   Output 2550 ml   Net -195.06 ml          Physical Exam:  The patient's neuro status (alertness,orientation,cognitive function,motor skills,), pharyngeal exam (oral lesions, hygiene, abn dentition,), Neck (jvd,mass,thyroid,nodes in neck and above/below clavicle),RESPIRATORY(symmetry,effort,fremitus,percussion,auscultation),  Cor(rhythm,heart tones including gallops,perfusion,edema)ABD(distention,hepatic&splenomegaly,tenderness,masses), Skin(rash,cyanosis),Psyc(affect,judgement,).  Exam negative except for these pertinent findings:    Calm and sedate now but arouses on niv, chest is symmetric, no distress, normal percussion, normal fremitus and good normal breath sounds, no edema   .     Radiographs reviewed: view by direct vision oct 1- right effusion still   Ct chest on 26th with variable infiltrates - some worse but only mildly, right effusion is larger, somewhat loculated,  Labs     Recent Labs   Lab  10/02/19  0332   WBC 7.92   HGB 11.0*   HCT 33.9*        Recent Labs   Lab 10/02/19  0332   *   K 3.3*      CO2 30*   BUN 46*   CREATININE 1.2   *   CALCIUM 8.2*   CRP 94.2*     No results for input(s): PH, PCO2, PO2, HCO3 in the last 24 hours.  Microbiology Results (last 7 days)     Procedure Component Value Units Date/Time    Culture, Body Fluid (Aerobic) w/ GS [[id number]] Collected:  10/01/19 1127    Order Status:  Completed Specimen:  Body Fluid from Thoracentesis Fluid Updated:  10/02/19 0748     AEROBIC CULTURE - FLUID No growth     Gram Stain Result Rare WBC's      No organisms seen    IV catheter culture [[id number]] Collected:  09/30/19 1554    Order Status:  Completed Specimen:  Catheter Tip, Intrajugular Updated:  10/02/19 0748     Aerobic Culture - Cath tip No growth    Narrative:       After picc line confirmed    Blood culture [[id number]] Collected:  09/30/19 0128    Order Status:  Completed Specimen:  Blood Updated:  10/02/19 0613     Blood Culture, Routine No Growth to date      No Growth to date    Narrative:       From [facility]    Blood culture [[id number]] Collected:  09/29/19 1800    Order Status:  Completed Specimen:  Blood from Line, Central Updated:  10/02/19 0612     Blood Culture, Routine No Growth to date      No Growth to date      No Growth to date    Narrative:       From [facility]    Blood culture [[id number]] Collected:  10/02/19 0445    Order Status:  Sent Specimen:  Blood Updated:  10/02/19 0445    Blood culture [[id number]] Collected:  10/01/19 0534    Order Status:  Completed Specimen:  Blood Updated:  10/01/19 1715     Blood Culture, Routine No Growth to date    Narrative:       From [facility]    Blood culture [[id number]]  (Abnormal) Collected:  09/26/19 0443    Order Status:  Completed Specimen:  Blood Updated:  10/01/19 0854     Blood Culture, Routine Gram stain peds bottle: Gram positive cocci in clusters resembling Staph       Results called to and read back by:Patria  Dat FISHER 09/30/2019  03:04      STAPHYLOCOCCUS AUREUS  ID consult required at [facility].  For susceptibility see order #[id number]      Stool culture [[id number]] Collected:  09/26/19 1329    Order Status:  Completed Specimen:  Stool Updated:  09/30/19 1355     Stool Culture No Salmonella,Shigella,Vibrio,Campylobacter,Yersinia isolated.    Blood culture [[id number]]  (Abnormal) Collected:  09/25/19 0413    Order Status:  Completed Specimen:  Blood Updated:  09/30/19 1007     Blood Culture, Routine Gram stain peds bottle: Gram positive cocci in clusters resembling Staph       Results called to and read back by:Dunia Keating RN 09/28/2019  00:29      STAPHYLOCOCCUS AUREUS  ID consult required at [facility].  For susceptibility see order #[id number]      Blood culture [[id number]]  (Abnormal)  (Susceptibility) Collected:  09/24/19 0517    Order Status:  Completed Specimen:  Blood Updated:  09/30/19 1003     Blood Culture, Routine Gram stain peds bottle: Gram positive cocci in clusters resembling Staph       Results called to and read back by: Liset Arroyo RN  09/28/2019  12:30      STAPHYLOCOCCUS AUREUS  ID consult required at [facility].      Blood culture [[id number]]     Order Status:  Canceled Specimen:  Blood     Clostridium difficile EIA [[id number]] Collected:  09/27/19 2013    Order Status:  Completed Specimen:  Stool Updated:  09/27/19 2228     C. diff Antigen Negative     C difficile Toxins A+B, EIA Negative     Comment: Testing not recommended for children <24 months old.       E. coli 0157 antigen [[id number]] Collected:  09/26/19 1329    Order Status:  Canceled Specimen:  Stool     Blood culture [[id number]]  (Abnormal)  (Susceptibility) Collected:  09/22/19 0338    Order Status:  Completed Specimen:  Blood from Antecubital, Left  Arm Updated:  09/25/19 1028     Blood Culture,  Routine Gram stain peds bottle: Gram positive cocci in clusters resembling Staph       Results called to and read back by:Yolis Wan RN 09/23/2019  04:46      STAPHYLOCOCCUS AUREUS  ID consult required at [facility] and [facility].        Results for ROM YOUNG (MRN [id number]) as of 9/23/2019 07:24   Ref. Range 9/22/2019 16:19   POC PH Latest Ref Range: 7.35 - 7.45  7.488 (H)   POC PCO2 Latest Ref Range: 35 - 45 mmHg 27.0 (LL)   POC PO2 Latest Ref Range: 80 - 100 mmHg 69 (L)   POC BE Latest Ref Range: -2 to 2 mmol/L -3   POC HCO3 Latest Ref Range: 24 - 28 mmol/L 20.5 (L)   POC SATURATED O2 Latest Ref Range: 95 - 100 % 95       Impression:  Active Hospital Problems    Diagnosis  POA    *Acute bacterial endocarditis [I33.0]  Yes    Hyperglycemia [R73.9]  No    Acute septic pulmonary embolism [I26.90]  Yes    Tricuspid valve vegetation [I33.0]  Yes    Mitral valve vegetation [I33.0]  Yes    Hypomagnesemia [E83.42]  No    Thrombocytopenia, acquired [D69.6]  Yes    IV drug user [F19.90]  Yes    Acute on chronic diastolic congestive heart failure [I50.33]  Yes    Encephalopathy, metabolic [G93.41]  No    Normocytic anemia [D64.9]  Yes    Severe malnutrition [E43]  Yes    Staphylococcus aureus bacteremia with sepsis [A41.01]  Yes    Pleural effusion, bilateral [J90]  Yes    Acute hypoxemic respiratory failure [J96.01]  Yes    ABIMBOLA (acute kidney injury) [N17.9]  Yes    Pancolitis [K51.00]  Yes    Hypokalemia [E87.6]  No      Resolved Hospital Problems    Diagnosis Date Resolved POA    Pulmonary hypertension [I27.20] 09/23/2019 Yes    DIC (disseminated intravascular coagulation) [D65] 09/23/2019 Yes    Coagulopathy [D68.9] 09/27/2019 Yes    ILD (interstitial lung disease) [J84.9] 09/23/2019 Yes    COPD with acute lower respiratory infection [J44.0] 09/23/2019 Yes    Calcification of aorta [I70.0] 09/23/2019 Yes     Defer to primary control of cholesterol and bp.           BOOP (bronchiolitis obliterans with organizing pneumonia) [J84.89] 09/23/2019 Yes    Intravascular volume depletion [E86.1] 09/23/2019 Yes    Hyponatremia [E87.1] 09/23/2019 Yes    Metabolic acidosis [E87.2] 09/27/2019 Yes    CAP (community acquired pneumonia) [J18.9] 09/23/2019 Yes               Plan:   Sept 21, wbc now 9k, creat down to 1.2, pt uses narcotics chr - be - 10 last pm from -15 on 19th,  F/u cxr- dose ativan.  Hard to evaluate- seems much better save pain c/o. F/u cxr am  Sept 22, seems better but hard to eval- certainly improved from admit.  Monitor, support/niv, abx, re eval.  No change.   Sept 23 - MSSA, right effusion stable with inr over 2 and plt 60's- no plan to tap at this time. Infiltrates better, encephalopathy still impressive= niv/bipap for part resp and part agitation.    Pt doesn't have cryptogenic organizing pneumonia/idiopathetic BOOP.  Pt had diastolic chf acute and chr.  Also pulm hypertension.  Has dic also- looked good on ct abd.    Sept 24- inr now over 5 with plt 50's,cxr better.    At the bedside patient is much better.  Coagulopathies impressive.  Suspect liver failure (?).  Patient apparently does better with Dilaudid over morphine.  Will change  To Dilaudid    Sept 25- ID notes noted, inr good today, check cxr am, seems to be improved.  Would decrease dilaudid from 2 to 1 q3prn.    Sept 26,   messey picture. Infection seems controlled.  Would be good to tap effusion but plt out- will try to have pt sit upright- pt developed extremis needing iv dilaudid.      Discussed with Dr Anna- seems to need high dose narcotics to keep comfortable- very compensated looking with higher dose.  Abstinence part of problem?    Hold off tap til more stable to sit up,do not think chest tube will help.      Sept 30- plts over 140, needs f/u cxr for effusion.  Temp 101 yesterday.    .10/1- no appetite, npo as aspiration risk.  Try to tap. cxr still lg effusion  Oct 2, glucose  pleural fluid is 31, empyema would be dx- discussed with Dr Olea- for intervention.  Seems stable.  Na 152.

## 2019-10-02 NOTE — PLAN OF CARE
Patient free of falls and injures this shift. Follows simple commands. 2 units PRBC given with lasix 20mg IV given between units. TPN rate increased to 75ml/hr. Henriquez patent to gravity with good out put. Flexiseal patent to gravity with minimal loose, dark brown stool noted, turning to lighter brownish green. Some excoriation noted to buttocks cheek, skin barrier applied. Skin integrity maintained.  Sinus to sinus tach on monitor. PRN pain medication given x2 this shift with pain control maintained.

## 2019-10-02 NOTE — PROGRESS NOTES
Pharmacokinetic Assessment Follow Up: IV Vancomycin    Vancomycin serum concentration assessment(s):    The trough level was drawn correctly and can be used to guide therapy at this time. The measurement is above the desired definitive target range of 15 to 20 mcg/mL.    Vancomycin Regimen Plan:    Change regimen to Vancomycin 750 mg IV every 24 hours with next serum trough concentration measured at 0430 prior to 3rd dose on 10/04     Drug levels (last 3 results):  Recent Labs   Lab Result Units 09/30/19  0128 10/02/19  0332   Vancomycin-Trough ug/mL 13.2 20.7       Pharmacy will continue to follow and monitor vancomycin.    Please contact pharmacy at extension [phone] for questions regarding this assessment.    Thank you for the consult,   Martin Daniels       Patient brief summary:  Mesha Mckenzie is a 63 y.o. female initiated on antimicrobial therapy with IV Vancomycin for treatment of endocarditis    Drug Allergies:   Review of patient's allergies indicates:   Allergen Reactions    Pcn [penicillins]        Actual Body Weight:   42.6kg    Renal Function:   Estimated Creatinine Clearance: 31.4 mL/min (based on SCr of 1.2 mg/dL).,       CBC (last 72 hours):  Recent Labs   Lab Result Units 09/29/19  0543 09/30/19  0121 10/01/19  0352 10/02/19  0332   WBC K/uL 10.81 10.49 9.07 7.92   Hemoglobin g/dL 8.1* 8.5* 7.9* 11.0*   Hematocrit % 25.6* 26.6* 25.4* 33.9*   Platelets K/uL 113* 148* 182 194       Metabolic Panel (last 72 hours):  Recent Labs   Lab Result Units 09/29/19  0543 09/29/19  1615 09/30/19  0121 09/30/19  1248 09/30/19  1943 10/01/19  0352 10/01/19  0800 10/01/19  1127 10/01/19  1958 10/02/19  0332   Sodium mmol/L 143  --  145  --   --  148*  --   --   --  152*   Potassium mmol/L 3.5  --  3.0* 3.0* 2.9* 4.0 3.8  --  3.5 3.3*   Chloride mmol/L 98  --  100  --   --  108  --   --   --  110   CO2 mmol/L 36*  --  34*  --   --  30*  --   --   --  30*   Glucose mg/dL 147*  --  150*  --   --  151*  --   --   --   116*   Glucose, Fluid mg/dL  --   --   --   --   --   --   --  31  --   --    Glucose, UA   --  Negative  --   --   --   --   --   --   --   --    BUN, Bld mg/dL 55*  --  49*  --   --  45*  --   --   --  46*   Creatinine mg/dL 1.0  --  1.0  --   --  1.0  --   --   --  1.2       Vancomycin Administrations:  vancomycin given in the last 96 hours                     vancomycin in dextrose 5 % 1 gram/250 mL IVPB 1,000 mg (mg) 1,000 mg New Bag 10/01/19 0449     1,000 mg New Bag 09/30/19 0407                      Microbiologic Results:  Microbiology Results (last 7 days)       Procedure Component Value Units Date/Time    Blood culture [[id number]]     Order Status:  Sent Specimen:  Blood     Culture, Body Fluid (Aerobic) w/ GS [[id number]] Collected:  10/01/19 1127    Order Status:  Completed Specimen:  Body Fluid from Thoracentesis Fluid Updated:  10/01/19 1838     Gram Stain Result Rare WBC's      No organisms seen    Blood culture [[id number]] Collected:  10/01/19 0534    Order Status:  Completed Specimen:  Blood Updated:  10/01/19 1715     Blood Culture, Routine No Growth to date    Narrative:       From [facility]    Blood culture [[id number]]  (Abnormal) Collected:  09/26/19 0443    Order Status:  Completed Specimen:  Blood Updated:  10/01/19 0854     Blood Culture, Routine Gram stain peds bottle: Gram positive cocci in clusters resembling Staph       Results called to and read back by:Patria Daugherty RN 09/30/2019  03:04      STAPHYLOCOCCUS AUREUS  ID consult required at [facility] and [facility].  For susceptibility see order #[id number]      Blood culture [[id number]] Collected:  09/30/19 0128    Order Status:  Completed Specimen:  Blood Updated:  10/01/19 0715     Blood Culture, Routine No Growth to date    Narrative:       From [facility]    Blood culture [[id number]] Collected:  09/29/19 1800    Order Status:  Completed Specimen:  Blood from Line, Central Updated:  10/01/19 0612     Blood Culture, Routine  No Growth to date      No Growth to date    Narrative:       From [facility]    IV catheter culture [[id number]] Collected:  09/30/19 1554    Order Status:  Sent Specimen:  Catheter Tip, Intrajugular Updated:  09/30/19 2324    Stool culture [[id number]] Collected:  09/26/19 1329    Order Status:  Completed Specimen:  Stool Updated:  09/30/19 1355     Stool Culture No Salmonella,Shigella,Vibrio,Campylobacter,Yersinia isolated.    Blood culture [[id number]]  (Abnormal) Collected:  09/25/19 0413    Order Status:  Completed Specimen:  Blood Updated:  09/30/19 1007     Blood Culture, Routine Gram stain peds bottle: Gram positive cocci in clusters resembling Staph       Results called to and read back by:Dunia Keating RN 09/28/2019  00:29      STAPHYLOCOCCUS AUREUS  ID consult required at [facility].  For susceptibility see order #[id number]      Blood culture [[id number]]  (Abnormal)  (Susceptibility) Collected:  09/24/19 0517    Order Status:  Completed Specimen:  Blood Updated:  09/30/19 1003     Blood Culture, Routine Gram stain peds bottle: Gram positive cocci in clusters resembling Staph       Results called to and read back by: Liset Arroyo RN  09/28/2019  12:30      STAPHYLOCOCCUS AUREUS  ID consult required at [facility].      Blood culture [[id number]]     Order Status:  Canceled Specimen:  Blood     Clostridium difficile EIA [[id number]] Collected:  09/27/19 2013    Order Status:  Completed Specimen:  Stool Updated:  09/27/19 2228     C. diff Antigen Negative     C difficile Toxins A+B, EIA Negative     Comment: Testing not recommended for children <24 months old.       E. coli 0157 antigen [[id number]] Collected:  09/26/19 1329    Order Status:  Canceled Specimen:  Stool     Blood culture [[id number]]  (Abnormal)  (Susceptibility) Collected:  09/22/19 0338    Order Status:  Completed Specimen:  Blood from Antecubital, Left  Arm Updated:   09/25/19 1028     Blood Culture, Routine Gram stain peds bottle: Gram positive cocci in clusters resembling Staph       Results called to and read back by:Yolis Wan RN 09/23/2019  04:46      STAPHYLOCOCCUS AUREUS  ID consult required at [facility].

## 2019-10-02 NOTE — SUBJECTIVE & OBJECTIVE
Interval History:  In intensive care unit, in critical condition.  T-max 99.1 degree F. patient received 2 units of packed red blood cells overnight.  Patient is lethargic, appear extremely weak, unable to hold conversation, still NPO due to swallowing dysfunction.  On intravenous Milrinone infusion.     Review of Systems   Respiratory: Negative for cough, chest tightness, shortness of breath and wheezing.    Cardiovascular: Negative for chest pain, palpitations and leg swelling.   Gastrointestinal: Positive for abdominal pain. Negative for abdominal distention, constipation, diarrhea, nausea and vomiting.   Genitourinary: Negative for difficulty urinating, dysuria and flank pain.   Musculoskeletal: Negative for gait problem, joint swelling and neck pain.   Skin: Negative for rash and wound.   Neurological: Negative for dizziness, syncope, light-headedness and headaches.   Psychiatric/Behavioral: Negative for agitation, behavioral problems and confusion.     Objective:     Vital Signs (Most Recent):  Temp: 98.8 °F (37.1 °C) (10/02/19 0315)  Pulse: 104 (10/02/19 0805)  Resp: (!) 26 (10/02/19 0805)  BP: (!) 136/59 (10/02/19 0805)  SpO2: 100 % (10/02/19 0805) Vital Signs (24h Range):  Temp:  [98.3 °F (36.8 °C)-99.1 °F (37.3 °C)] 98.8 °F (37.1 °C)  Pulse:  [] 104  Resp:  [16-77] 26  SpO2:  [79 %-100 %] 100 %  BP: (122-196)/() 136/59     Weight: 39.5 kg (87 lb 1.3 oz)  Body mass index is 15.93 kg/m².    Intake/Output Summary (Last 24 hours) at 10/2/2019 0854  Last data filed at 10/2/2019 0605  Gross per 24 hour   Intake 2354.94 ml   Output 2550 ml   Net -195.06 ml      Physical Exam   Constitutional: She is oriented to person, place, and time. She appears well-developed and well-nourished.   HENT:   Head: Normocephalic and atraumatic.   Eyes: Pupils are equal, round, and reactive to light. EOM are normal.   Cardiovascular: Regular rhythm, normal heart sounds and intact distal pulses.   Tachycardic    Pulmonary/Chest: Effort normal and breath sounds normal. No respiratory distress. She has no wheezes. She has no rales.   Abdominal: Soft. Bowel sounds are normal. She exhibits no distension. There is tenderness.   Musculoskeletal: She exhibits no edema or tenderness.   Neurological: She is alert and oriented to person, place, and time. No cranial nerve deficit. Coordination normal.   Has generalized weakness   Skin: Skin is warm and dry. Capillary refill takes less than 2 seconds. No rash noted.   Psychiatric: She has a normal mood and affect. Her behavior is normal. Thought content normal.   Nursing note and vitals reviewed.      Significant Labs:   CBC:   Recent Labs   Lab 10/01/19  0352 10/02/19  0332   WBC 9.07 7.92   HGB 7.9* 11.0*   HCT 25.4* 33.9*    194     CMP:   Recent Labs   Lab 10/01/19  0352 10/01/19  0800 10/01/19  1958 10/02/19  0332   *  --   --  152*   K 4.0 3.8 3.5 3.3*     --   --  110   CO2 30*  --   --  30*   *  --   --  116*   BUN 45*  --   --  46*   CREATININE 1.0  --   --  1.2   CALCIUM 8.1*  --   --  8.2*   ANIONGAP 10  --   --  12   EGFRNONAA 59*  --   --  48*     Significant Imaging:   CXR; Patchy airspace disease bilaterally, likely pneumonia.    CXR;   1. Central venous catheter placement without pneumothorax.    CT abdomen and pelvis without contrast:  1. Acute pancolitis.  2. Airspace disease multiple areas in the lower lungs are seen and a right pleural effusion approximately 2 cm deep at the base.    CT head without contrast:  No appreciable changes compared to CT head 07/24/2015 including moderate to severe presumed microvascular ischemic changes cerebral white matter and old lacunar infarct adjacent to right head of caudate.    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  Follow-up to assure clearance is recommended.  Moderate  "atelectasis of the right lower lobe and moderate size right pleural effusion.  Trace left pleural effusion.  Mild mediastinal adenopathy.  Atherosclerotic calcification including coronary artery calcification.    CT head without contrast:  1. Extensive chronic microvascular ischemic changes within the periventricular white matter of the supratentorial brain which are chance for the patient's chronologic age.  Demyelinating disease could produce a similar appearance.  There are more focal areas of hypoattenuation present within the periventricular white matter posterior right centrum semiovale and right frontal cortex, possibly focal areas of microvascular ischemic change or age-indeterminate infarction which are unchanged when compared with the cranial CT examination of 09/19/2019.  Additional clarification could be achieved with MRI of the brain as deemed clinically appropriate.  Given the provided history of "exclude septic emboli", consideration should be given to performing the examination with intravenous contrast.  2. Foci of remote lacunar infarction within the right caudate head and right corona radiata adjacent to the right frontal horn.    CXR:  Bilateral infiltrates with slight further decrease of the infiltrate right perihilar/basilar compared to the prior exam.    CT chest abdomen and pelvis with and without contrast:  Chest; persistent shattered bilateral consolidative processes which have undergone cavitation/cystic spaces within them  Small left and moderate size right pleural effusion increased in size  Pericardial effusion appearing small but mildly increased in size  Abdomen and pelvis; mild distention of colon with prominent amount of fluid suggesting ileus.  Very mild colitis cannot be excluded but with no appreciable bowel wall thickening or pericolonic inflammatory change.    CXR:  Stable appearance of the chest demonstrating multifocal infiltrates and nodular opacities and small pleural " effusions.    ECHO:  · Mild concentric left ventricular hypertrophy.  · Low normal left ventricular systolic function. The estimated ejection fraction is 50%  · Grade I (mild) left ventricular diastolic dysfunction consistent with impaired relaxation.  · Normal right ventricular systolic function.  · Mild mitral regurgitation.  · Normal central venous pressure (3 mm Hg).  · The estimated PA systolic pressure is 48 mm Hg  · Pulmonary hypertension present.    DHARMESH:  · Mild left ventricular enlargement.  · Low normal left ventricular systolic function. The estimated ejection fraction is 50%  · Normal LV diastolic function.  · Mild left atrial enlargement.  · No interatrial septal defect present.  · Normal appearing left atrial appendage. No thrombus is present in the appendage. Normal appendage velocities.  · Moderate-to-severe mitral regurgitation.  · Moderate tricuspid regurgitation.  · 1x.5 cmmobile vegetation on tricuspid valve septal leaflet atrial side  · 5mmx6 mm mobile vegetation on posterior leaflet P3 scallop on atrial side  · Aortic valve wnl    MRA brain with contrast:   No aneurysm or hemodynamically significant stenosis of the intracranial arterial vasculature.    MRI brain with and without contrast:  No acute intracranial abnormality and no abnormal enhancement.  Mild involutional change and white matter disease.    CXR: No significant change relative to September 30, 2019.    CXR: No pneumothorax post right-sided diagnostic thoracentesis.  Appropriately positioned PICC line.  Unchanged bilateral airspace disease.    MRI L-spine: Pending.

## 2019-10-02 NOTE — PROGRESS NOTES
Dr Covarrubias notified of Dr Sweet' recommendation to transfer to [facility] due to pt's complicated medical conditions. Informed by Dr Covarrubias that he will contact transfer center to begin transfer to [facility].

## 2019-10-02 NOTE — PT/OT/SLP PROGRESS
"Speech Language Pathology Treatment    Patient Name:  Mesha Mckenzie   MRN:  [id number]  Admitting Diagnosis: Acute bacterial endocarditis    Recommendations:     Diet recommendations:  NPO, Liquid Diet Level: NPO(allow ice chips per Nsg following oral care)   Aspiration Precautions: Frequent oral care and Ice chips sparingly   General Precautions: Standard, aspiration, fall, respiratory  Communication strategies:  provide increased time to answer    Subjective     "I'll choke."    Objective:   Pt remains in ICU. Nsg reports pt with improvement level of alertness and requesting PO. Upon entrance into room, pt asleep. Opens eyes briefly to voice. HOB elevated to upright position. Frequent throat clearing noted prior to PO trials. Pt restless and anxious. Required repeated verbal stimulation to open eyes. Ice chip trials presented via spoon. She required verbal cues for increased mouth opening, lip closure and mastication. Anterior loss x1. Pt spat out trial x1 stating she was going to choke. With encouragement, she accepted 2 more trials of ice chips with no overt s/s penetration/aspiration. She refused further trials.     Has the patient been evaluated by SLP for swallowing?   Yes  Keep patient NPO? Yes(allow ice chips with frequent oral care )   Current Respiratory Status: nasal cannula      Assessment:   Appears more anxious today. Slightly more responsive but continues with poor TIARA. Limited acceptance of PO. Consider non oral means of nutrition; little improvement in mental status. Continue POC.     Goals:   Multidisciplinary Problems     SLP Goals        Problem: SLP Goal    Goal Priority Disciplines Outcome   SLP Goal     SLP Ongoing, Not Progressing   Description:    1) Pt will consume regular textures and thin liquids with no overt s/s swallow dysfunction  2) Follow 3 step commands, 100% acc indep.  3) Verbally solve functional problems (math, time, daily activities) 90% acc indep  4) Ongoing eval of " speech-language cognition.                       Plan:     · Patient to be seen:  6 x/week   · Plan of Care expires:  10/15/19  · Plan of Care reviewed with:  patient(MD Keri)   · SLP Follow-Up:  Yes      Time Tracking:     SLP Treatment Date:   10/02/19  Speech Start Time:  1000  Speech Stop Time:  1015     Speech Total Time (min):  15 min    Billable Minutes: Treatment Swallowing Dysfunction 15 and Total Time 15    Alondra Mckay CCC-SLP  10/02/2019

## 2019-10-03 PROBLEM — E87.6 HYPOKALEMIA: Status: RESOLVED | Noted: 2019-09-19 | Resolved: 2019-10-03

## 2019-10-03 PROBLEM — E83.42 HYPOMAGNESEMIA: Status: RESOLVED | Noted: 2019-09-27 | Resolved: 2019-10-03

## 2019-10-03 PROBLEM — I33.0 ENDOCARDITIS DUE TO METHICILLIN SUSCEPTIBLE STAPHYLOCOCCUS AUREUS (MSSA): Status: ACTIVE | Noted: 2019-10-03

## 2019-10-03 PROBLEM — E87.0 HYPERNATREMIA: Status: RESOLVED | Noted: 2019-10-02 | Resolved: 2019-10-03

## 2019-10-03 PROBLEM — B95.61 ENDOCARDITIS DUE TO METHICILLIN SUSCEPTIBLE STAPHYLOCOCCUS AUREUS (MSSA): Status: ACTIVE | Noted: 2019-10-03

## 2019-10-03 PROBLEM — R65.21: Status: ACTIVE | Noted: 2019-10-03

## 2019-10-03 PROBLEM — A41.01: Status: ACTIVE | Noted: 2019-10-03

## 2019-10-03 LAB
ALBUMIN SERPL BCP-MCNC: 1.7 G/DL (ref 3.5–5.2)
ALLENS TEST: ABNORMAL
ALP SERPL-CCNC: 176 U/L (ref 55–135)
ALT SERPL W/O P-5'-P-CCNC: 27 U/L (ref 10–44)
ANION GAP SERPL CALC-SCNC: 10 MMOL/L (ref 8–16)
ANION GAP SERPL CALC-SCNC: 9 MMOL/L (ref 8–16)
ANION GAP SERPL CALC-SCNC: 9 MMOL/L (ref 8–16)
APPEARANCE FLD: NORMAL
AST SERPL-CCNC: 79 U/L (ref 10–40)
AV INDEX (PROSTH): 0.61
AV MEAN GRADIENT: 4 MMHG
AV PEAK GRADIENT: 6 MMHG
AV VALVE AREA: 1.85 CM2
AV VELOCITY RATIO: 0.67
BACTERIA CATH TIP CULT: NO GROWTH
BASOPHILS # BLD AUTO: 0.02 K/UL (ref 0–0.2)
BASOPHILS # BLD AUTO: 0.09 K/UL (ref 0–0.2)
BASOPHILS # BLD AUTO: 0.09 K/UL (ref 0–0.2)
BASOPHILS NFR BLD: 0.2 % (ref 0–1.9)
BASOPHILS NFR BLD: 0.6 % (ref 0–1.9)
BASOPHILS NFR BLD: 0.6 % (ref 0–1.9)
BILIRUB SERPL-MCNC: 0.6 MG/DL (ref 0.1–1)
BNP SERPL-MCNC: 198 PG/ML (ref 0–99)
BODY FLD TYPE: NORMAL
BODY FLUID COMMENTS: NORMAL
BODY FLUID SOURCE, LDH: NORMAL
BSA FOR ECHO PROCEDURE: 1.55 M2
BUN SERPL-MCNC: 55 MG/DL (ref 8–23)
BUN SERPL-MCNC: 56 MG/DL (ref 8–23)
BUN SERPL-MCNC: 56 MG/DL (ref 8–23)
BUN SERPL-MCNC: 58 MG/DL (ref 8–23)
CALCIUM SERPL-MCNC: 7 MG/DL (ref 8.7–10.5)
CALCIUM SERPL-MCNC: 7.3 MG/DL (ref 8.7–10.5)
CALCIUM SERPL-MCNC: 7.5 MG/DL (ref 8.7–10.5)
CALCIUM SERPL-MCNC: 7.8 MG/DL (ref 8.7–10.5)
CHLORIDE SERPL-SCNC: 113 MMOL/L (ref 95–110)
CHLORIDE SERPL-SCNC: 114 MMOL/L (ref 95–110)
CHLORIDE SERPL-SCNC: 115 MMOL/L (ref 95–110)
CHLORIDE UR-SCNC: 20 MMOL/L (ref 25–200)
CO2 SERPL-SCNC: 18 MMOL/L (ref 23–29)
CO2 SERPL-SCNC: 18 MMOL/L (ref 23–29)
CO2 SERPL-SCNC: 19 MMOL/L (ref 23–29)
CO2 SERPL-SCNC: 22 MMOL/L (ref 23–29)
COLOR FLD: NORMAL
CREAT SERPL-MCNC: 1.3 MG/DL (ref 0.5–1.4)
CREAT UR-MCNC: 34 MG/DL (ref 15–325)
CV ECHO LV RWT: 0.4 CM
DELSYS: ABNORMAL
DIFFERENTIAL METHOD: ABNORMAL
DOP CALC AO PEAK VEL: 1.21 M/S
DOP CALC AO VTI: 22.28 CM
DOP CALC LVOT AREA: 3 CM2
DOP CALC LVOT DIAMETER: 1.97 CM
DOP CALC LVOT PEAK VEL: 0.81 M/S
DOP CALC LVOT STROKE VOLUME: 41.31 CM3
DOP CALCLVOT PEAK VEL VTI: 13.56 CM
E WAVE DECELERATION TIME: 204.81 MSEC
E/A RATIO: 0.59
E/E' RATIO: 11.33 M/S
ECHO LV POSTERIOR WALL: 0.89 CM (ref 0.6–1.1)
EOSINOPHIL # BLD AUTO: 0 K/UL (ref 0–0.5)
EOSINOPHIL # BLD AUTO: 0.2 K/UL (ref 0–0.5)
EOSINOPHIL # BLD AUTO: 0.2 K/UL (ref 0–0.5)
EOSINOPHIL NFR BLD: 0 % (ref 0–8)
EOSINOPHIL NFR BLD: 1.1 % (ref 0–8)
EOSINOPHIL NFR BLD: 1.1 % (ref 0–8)
ERYTHROCYTE [DISTWIDTH] IN BLOOD BY AUTOMATED COUNT: 19.7 % (ref 11.5–14.5)
ERYTHROCYTE [DISTWIDTH] IN BLOOD BY AUTOMATED COUNT: 19.8 % (ref 11.5–14.5)
ERYTHROCYTE [DISTWIDTH] IN BLOOD BY AUTOMATED COUNT: 19.8 % (ref 11.5–14.5)
ERYTHROCYTE [SEDIMENTATION RATE] IN BLOOD BY WESTERGREN METHOD: 18 MM/H
ERYTHROCYTE [SEDIMENTATION RATE] IN BLOOD BY WESTERGREN METHOD: 20 MM/H
ERYTHROCYTE [SEDIMENTATION RATE] IN BLOOD BY WESTERGREN METHOD: 28 MM/H
EST. GFR  (AFRICAN AMERICAN): 49.7 ML/MIN/1.73 M^2
EST. GFR  (NON AFRICAN AMERICAN): 43.2 ML/MIN/1.73 M^2
FIO2: 100
FIO2: 40
FIO2: 40
FRACTIONAL SHORTENING: 31 % (ref 28–44)
GLUCOSE FLD-MCNC: 22 MG/DL
GLUCOSE SERPL-MCNC: 133 MG/DL (ref 70–110)
GLUCOSE SERPL-MCNC: 150 MG/DL (ref 70–110)
GLUCOSE SERPL-MCNC: 204 MG/DL (ref 70–110)
GLUCOSE SERPL-MCNC: 227 MG/DL (ref 70–110)
GRAM STN SPEC: NORMAL
GRAM STN SPEC: NORMAL
HCO3 UR-SCNC: 17 MMOL/L (ref 24–28)
HCO3 UR-SCNC: 18.3 MMOL/L (ref 24–28)
HCO3 UR-SCNC: 24.8 MMOL/L (ref 24–28)
HCT VFR BLD AUTO: 35.1 % (ref 37–48.5)
HCT VFR BLD AUTO: 37.5 % (ref 37–48.5)
HCT VFR BLD AUTO: 37.5 % (ref 37–48.5)
HGB BLD-MCNC: 10.7 G/DL (ref 12–16)
HGB BLD-MCNC: 11.9 G/DL (ref 12–16)
HGB BLD-MCNC: 11.9 G/DL (ref 12–16)
IMM GRANULOCYTES # BLD AUTO: 0.08 K/UL (ref 0–0.04)
IMM GRANULOCYTES # BLD AUTO: 0.19 K/UL (ref 0–0.04)
IMM GRANULOCYTES # BLD AUTO: 0.19 K/UL (ref 0–0.04)
IMM GRANULOCYTES NFR BLD AUTO: 1 % (ref 0–0.5)
IMM GRANULOCYTES NFR BLD AUTO: 1.4 % (ref 0–0.5)
IMM GRANULOCYTES NFR BLD AUTO: 1.4 % (ref 0–0.5)
INTERVENTRICULAR SEPTUM: 0.84 CM (ref 0.6–1.1)
LA MAJOR: 4.45 CM
LA MINOR: 4.11 CM
LA WIDTH: 3.18 CM
LACTATE SERPL-SCNC: 1.8 MMOL/L (ref 0.5–2.2)
LACTATE SERPL-SCNC: 2 MMOL/L (ref 0.5–2.2)
LDH FLD L TO P-CCNC: NORMAL U/L
LDH SERPL L TO P-CCNC: 339 U/L (ref 110–260)
LEFT ATRIUM SIZE: 2.54 CM
LEFT ATRIUM VOLUME INDEX: 18.8 ML/M2
LEFT ATRIUM VOLUME: 29.34 CM3
LEFT INTERNAL DIMENSION IN SYSTOLE: 3.09 CM (ref 2.1–4)
LEFT VENTRICLE DIASTOLIC VOLUME INDEX: 58.26 ML/M2
LEFT VENTRICLE DIASTOLIC VOLUME: 91.07 ML
LEFT VENTRICLE MASS INDEX: 80 G/M2
LEFT VENTRICLE SYSTOLIC VOLUME INDEX: 24 ML/M2
LEFT VENTRICLE SYSTOLIC VOLUME: 37.51 ML
LEFT VENTRICULAR INTERNAL DIMENSION IN DIASTOLE: 4.47 CM (ref 3.5–6)
LEFT VENTRICULAR MASS: 124.58 G
LV LATERAL E/E' RATIO: 10.2 M/S
LV SEPTAL E/E' RATIO: 12.75 M/S
LYMPHOCYTES # BLD AUTO: 0.9 K/UL (ref 1–4.8)
LYMPHOCYTES # BLD AUTO: 1.1 K/UL (ref 1–4.8)
LYMPHOCYTES # BLD AUTO: 1.1 K/UL (ref 1–4.8)
LYMPHOCYTES NFR BLD: 11 % (ref 18–48)
LYMPHOCYTES NFR BLD: 7.7 % (ref 18–48)
LYMPHOCYTES NFR BLD: 7.7 % (ref 18–48)
LYMPHOCYTES NFR FLD MANUAL: 1 %
MAGNESIUM SERPL-MCNC: 2.9 MG/DL (ref 1.6–2.6)
MCH RBC QN AUTO: 30.1 PG (ref 27–31)
MCH RBC QN AUTO: 30.4 PG (ref 27–31)
MCH RBC QN AUTO: 30.4 PG (ref 27–31)
MCHC RBC AUTO-ENTMCNC: 30.5 G/DL (ref 32–36)
MCHC RBC AUTO-ENTMCNC: 31.7 G/DL (ref 32–36)
MCHC RBC AUTO-ENTMCNC: 31.7 G/DL (ref 32–36)
MCV RBC AUTO: 96 FL (ref 82–98)
MCV RBC AUTO: 96 FL (ref 82–98)
MCV RBC AUTO: 99 FL (ref 82–98)
MIN VOL: 10.3
MIN VOL: 7
MIN VOL: 7
MODE: ABNORMAL
MONOCYTES # BLD AUTO: 0.2 K/UL (ref 0.3–1)
MONOCYTES # BLD AUTO: 0.4 K/UL (ref 0.3–1)
MONOCYTES # BLD AUTO: 0.4 K/UL (ref 0.3–1)
MONOCYTES NFR BLD: 2.5 % (ref 4–15)
MONOCYTES NFR BLD: 2.6 % (ref 4–15)
MONOCYTES NFR BLD: 2.6 % (ref 4–15)
MONOS+MACROS NFR FLD MANUAL: 4 %
MV PEAK A VEL: 0.86 M/S
MV PEAK E VEL: 0.51 M/S
NEUTROPHILS # BLD AUTO: 12.2 K/UL (ref 1.8–7.7)
NEUTROPHILS # BLD AUTO: 12.2 K/UL (ref 1.8–7.7)
NEUTROPHILS # BLD AUTO: 7.1 K/UL (ref 1.8–7.7)
NEUTROPHILS NFR BLD: 85.3 % (ref 38–73)
NEUTROPHILS NFR BLD: 86.6 % (ref 38–73)
NEUTROPHILS NFR BLD: 86.6 % (ref 38–73)
NEUTROPHILS NFR FLD MANUAL: 95 %
NRBC BLD-RTO: 0 /100 WBC
PCO2 BLDA: 34 MMHG (ref 35–45)
PCO2 BLDA: 47 MMHG (ref 35–45)
PCO2 BLDA: 50.1 MMHG (ref 35–45)
PEEP: 5
PH SMN: 7.2 [PH] (ref 7.35–7.45)
PH SMN: 7.3 [PH] (ref 7.35–7.45)
PH SMN: 7.31 [PH] (ref 7.35–7.45)
PHOSPHATE SERPL-MCNC: 6 MG/DL (ref 2.7–4.5)
PIP: 24
PIP: 40
PLATELET # BLD AUTO: 194 K/UL (ref 150–350)
PLATELET # BLD AUTO: 291 K/UL (ref 150–350)
PLATELET # BLD AUTO: 291 K/UL (ref 150–350)
PMV BLD AUTO: 11.3 FL (ref 9.2–12.9)
PMV BLD AUTO: 11.8 FL (ref 9.2–12.9)
PMV BLD AUTO: 11.8 FL (ref 9.2–12.9)
PO2 BLDA: 106 MMHG (ref 80–100)
PO2 BLDA: 52 MMHG (ref 80–100)
PO2 BLDA: 80 MMHG (ref 80–100)
POC BE: -10 MMOL/L
POC BE: -2 MMOL/L
POC BE: -9 MMOL/L
POC SATURATED O2: 82 % (ref 95–100)
POC SATURATED O2: 93 % (ref 95–100)
POC SATURATED O2: 98 % (ref 95–100)
POC TCO2: 18 MMOL/L (ref 23–27)
POC TCO2: 20 MMOL/L (ref 23–27)
POC TCO2: 26 MMOL/L (ref 23–27)
POCT GLUCOSE: 159 MG/DL (ref 70–110)
POCT GLUCOSE: 196 MG/DL (ref 70–110)
POTASSIUM SERPL-SCNC: 4.4 MMOL/L (ref 3.5–5.1)
POTASSIUM SERPL-SCNC: 4.8 MMOL/L (ref 3.5–5.1)
POTASSIUM SERPL-SCNC: 5 MMOL/L (ref 3.5–5.1)
POTASSIUM SERPL-SCNC: 5.1 MMOL/L (ref 3.5–5.1)
POTASSIUM UR-SCNC: 56 MMOL/L (ref 15–95)
PROT FLD-MCNC: 3.7 G/DL
PROT SERPL-MCNC: 6.9 G/DL (ref 6–8.4)
PULM VEIN S/D RATIO: 1.27
PV PEAK D VEL: 0.33 M/S
PV PEAK S VEL: 0.42 M/S
RA MAJOR: 3.64 CM
RA WIDTH: 2.33 CM
RBC # BLD AUTO: 3.55 M/UL (ref 4–5.4)
RBC # BLD AUTO: 3.91 M/UL (ref 4–5.4)
RBC # BLD AUTO: 3.91 M/UL (ref 4–5.4)
RIGHT VENTRICULAR END-DIASTOLIC DIMENSION: 2.54 CM
SAMPLE: ABNORMAL
SINUS: 2.95 CM
SITE: ABNORMAL
SODIUM SERPL-SCNC: 141 MMOL/L (ref 136–145)
SODIUM SERPL-SCNC: 141 MMOL/L (ref 136–145)
SODIUM SERPL-SCNC: 143 MMOL/L (ref 136–145)
SODIUM SERPL-SCNC: 145 MMOL/L (ref 136–145)
SODIUM UR-SCNC: <20 MMOL/L (ref 20–250)
SP02: 100
SP02: 85
SP02: 97
SPECIMEN SOURCE: NORMAL
SPECIMEN SOURCE: NORMAL
TDI LATERAL: 0.05 M/S
TDI SEPTAL: 0.04 M/S
TDI: 0.05 M/S
TRICUSPID ANNULAR PLANE SYSTOLIC EXCURSION: 1.99 CM
TROPONIN I SERPL DL<=0.01 NG/ML-MCNC: 0.03 NG/ML (ref 0–0.03)
TROPONIN I SERPL DL<=0.01 NG/ML-MCNC: 0.04 NG/ML (ref 0–0.03)
TROPONIN I SERPL DL<=0.01 NG/ML-MCNC: 0.05 NG/ML (ref 0–0.03)
TROPONIN I SERPL DL<=0.01 NG/ML-MCNC: 0.06 NG/ML (ref 0–0.03)
UUN UR-MCNC: 438 MG/DL (ref 140–1050)
VT: 350
VT: 350
VT: 365
WBC # BLD AUTO: 14.05 K/UL (ref 3.9–12.7)
WBC # BLD AUTO: 14.05 K/UL (ref 3.9–12.7)
WBC # BLD AUTO: 8.29 K/UL (ref 3.9–12.7)
WBC # FLD: NORMAL /CU MM

## 2019-10-03 PROCEDURE — 82570 ASSAY OF URINE CREATININE: CPT

## 2019-10-03 PROCEDURE — 99292 CRITICAL CARE ADDL 30 MIN: CPT | Mod: 25,,, | Performed by: NURSE PRACTITIONER

## 2019-10-03 PROCEDURE — 25000003 PHARM REV CODE 250: Performed by: NURSE PRACTITIONER

## 2019-10-03 PROCEDURE — 84484 ASSAY OF TROPONIN QUANT: CPT | Mod: 91

## 2019-10-03 PROCEDURE — 87075 CULTR BACTERIA EXCEPT BLOOD: CPT

## 2019-10-03 PROCEDURE — 83605 ASSAY OF LACTIC ACID: CPT | Mod: 91

## 2019-10-03 PROCEDURE — 84300 ASSAY OF URINE SODIUM: CPT

## 2019-10-03 PROCEDURE — 27200966 HC CLOSED SUCTION SYSTEM

## 2019-10-03 PROCEDURE — 25000003 PHARM REV CODE 250: Performed by: INTERNAL MEDICINE

## 2019-10-03 PROCEDURE — 84133 ASSAY OF URINE POTASSIUM: CPT

## 2019-10-03 PROCEDURE — 25000242 PHARM REV CODE 250 ALT 637 W/ HCPCS: Performed by: NURSE PRACTITIONER

## 2019-10-03 PROCEDURE — 84540 ASSAY OF URINE/UREA-N: CPT

## 2019-10-03 PROCEDURE — 36556 INSERT NON-TUNNEL CV CATH: CPT | Mod: 59,,, | Performed by: INTERNAL MEDICINE

## 2019-10-03 PROCEDURE — S0028 INJECTION, FAMOTIDINE, 20 MG: HCPCS | Performed by: NURSE PRACTITIONER

## 2019-10-03 PROCEDURE — 94761 N-INVAS EAR/PLS OXIMETRY MLT: CPT

## 2019-10-03 PROCEDURE — 82436 ASSAY OF URINE CHLORIDE: CPT

## 2019-10-03 PROCEDURE — 25500020 PHARM REV CODE 255: Performed by: EMERGENCY MEDICINE

## 2019-10-03 PROCEDURE — 32551 INSERTION OF CHEST TUBE: CPT | Mod: 59,RT,, | Performed by: INTERNAL MEDICINE

## 2019-10-03 PROCEDURE — 93010 ELECTROCARDIOGRAM REPORT: CPT | Mod: ,,, | Performed by: INTERNAL MEDICINE

## 2019-10-03 PROCEDURE — 87070 CULTURE OTHR SPECIMN AEROBIC: CPT

## 2019-10-03 PROCEDURE — 99223 PR INITIAL HOSPITAL CARE,LEVL III: ICD-10-PCS | Mod: ,,, | Performed by: NEUROLOGICAL SURGERY

## 2019-10-03 PROCEDURE — 99900035 HC TECH TIME PER 15 MIN (STAT)

## 2019-10-03 PROCEDURE — 84100 ASSAY OF PHOSPHORUS: CPT

## 2019-10-03 PROCEDURE — 31645 PR BRONCHOSCOPY,RX ASPIR PULM TREE: ICD-10-PCS | Mod: ,,, | Performed by: INTERNAL MEDICINE

## 2019-10-03 PROCEDURE — 84484 ASSAY OF TROPONIN QUANT: CPT

## 2019-10-03 PROCEDURE — 25000003 PHARM REV CODE 250: Performed by: EMERGENCY MEDICINE

## 2019-10-03 PROCEDURE — 84157 ASSAY OF PROTEIN OTHER: CPT

## 2019-10-03 PROCEDURE — 36620 PR INSERT CATH,ART,PERCUT,SHORTTERM: ICD-10-PCS | Mod: 59,,, | Performed by: NURSE PRACTITIONER

## 2019-10-03 PROCEDURE — 94003 VENT MGMT INPAT SUBQ DAY: CPT

## 2019-10-03 PROCEDURE — 99291 CRITICAL CARE FIRST HOUR: CPT | Mod: 25,,, | Performed by: INTERNAL MEDICINE

## 2019-10-03 PROCEDURE — 63600175 PHARM REV CODE 636 W HCPCS: Performed by: INTERNAL MEDICINE

## 2019-10-03 PROCEDURE — 63600175 PHARM REV CODE 636 W HCPCS: Performed by: STUDENT IN AN ORGANIZED HEALTH CARE EDUCATION/TRAINING PROGRAM

## 2019-10-03 PROCEDURE — 99292 PR CRITICAL CARE, ADDL 30 MIN: ICD-10-PCS | Mod: 25,,, | Performed by: NURSE PRACTITIONER

## 2019-10-03 PROCEDURE — 85025 COMPLETE CBC W/AUTO DIFF WBC: CPT

## 2019-10-03 PROCEDURE — 25000003 PHARM REV CODE 250

## 2019-10-03 PROCEDURE — 31645 BRNCHSC W/THER ASPIR 1ST: CPT | Mod: ,,, | Performed by: INTERNAL MEDICINE

## 2019-10-03 PROCEDURE — 83615 LACTATE (LD) (LDH) ENZYME: CPT

## 2019-10-03 PROCEDURE — 99223 1ST HOSP IP/OBS HIGH 75: CPT | Mod: ,,, | Performed by: INTERNAL MEDICINE

## 2019-10-03 PROCEDURE — 97110 THERAPEUTIC EXERCISES: CPT

## 2019-10-03 PROCEDURE — 20000000 HC ICU ROOM

## 2019-10-03 PROCEDURE — 83605 ASSAY OF LACTIC ACID: CPT

## 2019-10-03 PROCEDURE — 99292 PR CRITICAL CARE, ADDL 30 MIN: ICD-10-PCS | Mod: 25,,, | Performed by: EMERGENCY MEDICINE

## 2019-10-03 PROCEDURE — 80048 BASIC METABOLIC PNL TOTAL CA: CPT | Mod: 91

## 2019-10-03 PROCEDURE — 63600175 PHARM REV CODE 636 W HCPCS: Performed by: NURSE PRACTITIONER

## 2019-10-03 PROCEDURE — 83735 ASSAY OF MAGNESIUM: CPT

## 2019-10-03 PROCEDURE — 80053 COMPREHEN METABOLIC PANEL: CPT

## 2019-10-03 PROCEDURE — 27000221 HC OXYGEN, UP TO 24 HOURS

## 2019-10-03 PROCEDURE — 82803 BLOOD GASES ANY COMBINATION: CPT

## 2019-10-03 PROCEDURE — 99900026 HC AIRWAY MAINTENANCE (STAT)

## 2019-10-03 PROCEDURE — 36620 INSERTION CATHETER ARTERY: CPT | Mod: 59,,, | Performed by: NURSE PRACTITIONER

## 2019-10-03 PROCEDURE — 99223 PR INITIAL HOSPITAL CARE,LEVL III: ICD-10-PCS | Mod: ,,, | Performed by: INTERNAL MEDICINE

## 2019-10-03 PROCEDURE — 36415 COLL VENOUS BLD VENIPUNCTURE: CPT

## 2019-10-03 PROCEDURE — 63600175 PHARM REV CODE 636 W HCPCS: Performed by: EMERGENCY MEDICINE

## 2019-10-03 PROCEDURE — 99292 CRITICAL CARE ADDL 30 MIN: CPT | Mod: 25,,, | Performed by: EMERGENCY MEDICINE

## 2019-10-03 PROCEDURE — 87070 CULTURE OTHR SPECIMN AEROBIC: CPT | Mod: 59

## 2019-10-03 PROCEDURE — 83615 LACTATE (LD) (LDH) ENZYME: CPT | Mod: 91

## 2019-10-03 PROCEDURE — 97802 MEDICAL NUTRITION INDIV IN: CPT

## 2019-10-03 PROCEDURE — 32551 PR TUBE THORACOSTOMY INCLUDES WATER SEAL: ICD-10-PCS | Mod: 59,RT,, | Performed by: INTERNAL MEDICINE

## 2019-10-03 PROCEDURE — 80048 BASIC METABOLIC PNL TOTAL CA: CPT

## 2019-10-03 PROCEDURE — 63600175 PHARM REV CODE 636 W HCPCS: Mod: JG | Performed by: NURSE PRACTITIONER

## 2019-10-03 PROCEDURE — 99223 1ST HOSP IP/OBS HIGH 75: CPT | Mod: ,,, | Performed by: NEUROLOGICAL SURGERY

## 2019-10-03 PROCEDURE — 89051 BODY FLUID CELL COUNT: CPT

## 2019-10-03 PROCEDURE — S0030 INJECTION, METRONIDAZOLE: HCPCS | Performed by: INTERNAL MEDICINE

## 2019-10-03 PROCEDURE — 36556 PR INSERT NON-TUNNEL CV CATH 5+ YRS OLD: ICD-10-PCS | Mod: 59,,, | Performed by: INTERNAL MEDICINE

## 2019-10-03 PROCEDURE — 94640 AIRWAY INHALATION TREATMENT: CPT

## 2019-10-03 PROCEDURE — 93005 ELECTROCARDIOGRAM TRACING: CPT

## 2019-10-03 PROCEDURE — 87205 SMEAR GRAM STAIN: CPT

## 2019-10-03 PROCEDURE — 93010 EKG 12-LEAD: ICD-10-PCS | Mod: ,,, | Performed by: INTERNAL MEDICINE

## 2019-10-03 PROCEDURE — 99291 PR CRITICAL CARE, E/M 30-74 MINUTES: ICD-10-PCS | Mod: 25,,, | Performed by: INTERNAL MEDICINE

## 2019-10-03 PROCEDURE — 82945 GLUCOSE OTHER FLUID: CPT

## 2019-10-03 PROCEDURE — 97162 PT EVAL MOD COMPLEX 30 MIN: CPT

## 2019-10-03 PROCEDURE — 37799 UNLISTED PX VASCULAR SURGERY: CPT

## 2019-10-03 RX ORDER — CEFEPIME HYDROCHLORIDE 2 G/1
2 INJECTION, POWDER, FOR SOLUTION INTRAVENOUS
Status: DISCONTINUED | OUTPATIENT
Start: 2019-10-03 | End: 2019-10-03

## 2019-10-03 RX ORDER — METRONIDAZOLE 500 MG/100ML
500 INJECTION, SOLUTION INTRAVENOUS
Status: COMPLETED | OUTPATIENT
Start: 2019-10-03 | End: 2019-10-09

## 2019-10-03 RX ORDER — CEFEPIME HYDROCHLORIDE 2 G/1
2 INJECTION, POWDER, FOR SOLUTION INTRAVENOUS
Status: DISCONTINUED | OUTPATIENT
Start: 2019-10-03 | End: 2019-10-04

## 2019-10-03 RX ADMIN — Medication 0.04 MCG/KG/MIN: at 11:10

## 2019-10-03 RX ADMIN — CISATRACURIUM BESYLATE 3 MCG/KG/MIN: 10 INJECTION INTRAVENOUS at 12:10

## 2019-10-03 RX ADMIN — IPRATROPIUM BROMIDE AND ALBUTEROL SULFATE 3 ML: .5; 3 SOLUTION RESPIRATORY (INHALATION) at 07:10

## 2019-10-03 RX ADMIN — CHLORHEXIDINE GLUCONATE 0.12% ORAL RINSE 15 ML: 1.2 LIQUID ORAL at 08:10

## 2019-10-03 RX ADMIN — METRONIDAZOLE 500 MG: 500 INJECTION, SOLUTION INTRAVENOUS at 11:10

## 2019-10-03 RX ADMIN — PROPOFOL 30 MCG/KG/MIN: 10 INJECTION, EMULSION INTRAVENOUS at 08:10

## 2019-10-03 RX ADMIN — SODIUM CHLORIDE 500 ML: 0.9 INJECTION, SOLUTION INTRAVENOUS at 03:10

## 2019-10-03 RX ADMIN — IOHEXOL 75 ML: 350 INJECTION, SOLUTION INTRAVENOUS at 01:10

## 2019-10-03 RX ADMIN — Medication 0.18 MCG/KG/MIN: at 12:10

## 2019-10-03 RX ADMIN — DAPTOMYCIN 325 MG: 350 INJECTION, POWDER, LYOPHILIZED, FOR SOLUTION INTRAVENOUS at 12:10

## 2019-10-03 RX ADMIN — VANCOMYCIN HYDROCHLORIDE 750 MG: 750 INJECTION, POWDER, LYOPHILIZED, FOR SOLUTION INTRAVENOUS at 06:10

## 2019-10-03 RX ADMIN — CARBOXYMETHYLCELLULOSE SODIUM 1 DROP: 10 GEL OPHTHALMIC at 05:10

## 2019-10-03 RX ADMIN — IPRATROPIUM BROMIDE AND ALBUTEROL SULFATE 3 ML: .5; 3 SOLUTION RESPIRATORY (INHALATION) at 11:10

## 2019-10-03 RX ADMIN — IPRATROPIUM BROMIDE AND ALBUTEROL SULFATE 3 ML: .5; 3 SOLUTION RESPIRATORY (INHALATION) at 03:10

## 2019-10-03 RX ADMIN — CEFEPIME 2 G: 2 INJECTION, POWDER, FOR SOLUTION INTRAVENOUS at 06:10

## 2019-10-03 RX ADMIN — SODIUM CHLORIDE, SODIUM LACTATE, POTASSIUM CHLORIDE, AND CALCIUM CHLORIDE 500 ML: .6; .31; .03; .02 INJECTION, SOLUTION INTRAVENOUS at 05:10

## 2019-10-03 RX ADMIN — SODIUM CHLORIDE 500 ML: 0.9 INJECTION, SOLUTION INTRAVENOUS at 02:10

## 2019-10-03 RX ADMIN — CEFEPIME 2 G: 2 INJECTION, POWDER, FOR SOLUTION INTRAVENOUS at 03:10

## 2019-10-03 RX ADMIN — CHLORHEXIDINE GLUCONATE 0.12% ORAL RINSE 15 ML: 1.2 LIQUID ORAL at 09:10

## 2019-10-03 RX ADMIN — SODIUM CHLORIDE, SODIUM LACTATE, POTASSIUM CHLORIDE, AND CALCIUM CHLORIDE 500 ML: .6; .31; .03; .02 INJECTION, SOLUTION INTRAVENOUS at 01:10

## 2019-10-03 RX ADMIN — METRONIDAZOLE 500 MG: 500 INJECTION, SOLUTION INTRAVENOUS at 03:10

## 2019-10-03 RX ADMIN — PROPOFOL 35 MCG/KG/MIN: 10 INJECTION, EMULSION INTRAVENOUS at 04:10

## 2019-10-03 RX ADMIN — SODIUM BICARBONATE: 84 INJECTION, SOLUTION INTRAVENOUS at 03:10

## 2019-10-03 RX ADMIN — METRONIDAZOLE 500 MG: 500 INJECTION, SOLUTION INTRAVENOUS at 08:10

## 2019-10-03 RX ADMIN — FAMOTIDINE 20 MG: 10 INJECTION, SOLUTION INTRAVENOUS at 09:10

## 2019-10-03 NOTE — ASSESSMENT & PLAN NOTE
--secondary to MV/TV endocarditis, epidural abscess, likely pna  --see plan for each  --vasopressors as needed to keep map > 65

## 2019-10-03 NOTE — ASSESSMENT & PLAN NOTE
--mitral and tricuspid valve vegetations seen on DHARMESH 9/25; unchanged on TTE 9/28    --Blood cultures (9/19 - 9/26) have grown methicillin sensitive Staph aureus. Blood cultures as of 9/29 have been NGTD.  --OSH abx regimen vanc, ancef and daptomycin. Continue dapto, vanc, flagyl, & cefepime  --f/u Bcx here  --OSH PICC line d/c'd  --formal 2D echo results show EF 53%; normal L ventricular function; mild mitral sclerosis; small circumferential pericardial effusion  --neurosurgery consult in am for epidural abscess  --cardiology consulted and following  --ID consulted and following

## 2019-10-03 NOTE — PROGRESS NOTES
Patient emergently intubated overnight due to respiratory distress. A-line and central line placed, please see time-out flowsheets.  Bronchoscopy performed at bedside, chest tube insertion performed at bedside, see time-out flowsheets.  Gtts infusing currently Fentanyl, Levo, and Propofol.  Vent settings AC 40% PEEP 5.0.  Plan of care reviewed with patients  and daughter, all questions answered.  Please see flowsheets for detailed assessments.

## 2019-10-03 NOTE — PROCEDURES
"Mesha Mckenzie is a 63 y.o. female patient.    Temp: 98 °F (36.7 °C) (10/02/19 1915)  Pulse: (!) 53 (10/02/19 2330)  Resp: 18 (10/02/19 2330)  BP: (!) 96/46 (10/02/19 2330)  SpO2: (!) 80 % (10/02/19 2330)  Weight: 53.8 kg (118 lb 9.7 oz) (10/02/19 2113)  Height: 5' 4" (162.6 cm) (10/02/19 2113)       Arterial Line  Date/Time: 10/3/2019 12:34 AM  Location procedure was performed: [facility] SURGICAL ICU (SICU)  Performed by: Winter Albert NP  Authorized by: Winter Albert NP   Pre-op Diagnosis: septic shock  Post-operative diagnosis: septic shock  Consent Done: Yes  Consent: Written consent obtained.  Consent given by: spouse  Patient understanding: patient states understanding of the procedure being performed  Patient consent: the patient's understanding of the procedure matches consent given  Procedure consent: procedure consent matches procedure scheduled  Relevant documents: relevant documents present and verified  Test results: test results available and properly labeled  Site marked: the operative site was marked  Imaging studies: imaging studies available  Required items: required blood products, implants, devices, and special equipment available  Patient identity confirmed: , MRN, name and provided demographic data  Time out: Immediately prior to procedure a "time out" was called to verify the correct patient, procedure, equipment, support staff and site/side marked as required.  Preparation: Patient was prepped and draped in the usual sterile fashion.  Indications: respiratory failure and hemodynamic monitoring  Location: right radial    Anesthesia:  Local anesthesia used: no  Patient sedated: yes  Sedatives: propofol and fentanyl  Liam's test normal: yes  Needle gauge: 20  Seldinger technique: Seldinger technique used  Number of attempts: 2  Complications: No  Estimated blood loss (mL): 1  Specimens: No  Implants: No  Post-procedure: line sutured and dressing applied  Post-procedure CMS: " unchanged  Patient tolerance: Patient tolerated the procedure well with no immediate complications          Winter Albert  10/3/2019

## 2019-10-03 NOTE — ASSESSMENT & PLAN NOTE
--multifactorial secondary to septic emboli, pleural effusions, possible pna  --intubated shortly after arrival for hypoxemia  --s/p bronch x2 with significant clearance of secretions from left mainstem   --f/u sputum cx  --start cefepime & flagyl to cover for pna  --s/p thora 10/1; transudative based on lights criteria, though concern for empyema based on OSH pulm note   --f/u pleural fluid cx; plan for chest tube placement later this evening with repeat cx at that time  --CTA PE done, read pending though no large central PE noticeable   --will attempt to wean vent now that left mainstem cleared after bronch (post-xray pending)

## 2019-10-03 NOTE — PT/OT/SLP EVAL
Physical Therapy Evaluation    Patient Name:  Mesha Mkcenzie   MRN:  [id number]  Admit Date: 10/2/2019  Admitting Diagnosis:  Acute hypoxemic respiratory failure   Length of Stay: 1 days  Recent Surgery: * No surgery found *      Recommendations:     Discharge Recommendations:  rehabilitation facility   Discharge Equipment Recommendations: none   Barriers to discharge: Decreased caregiver support    Assessment:     Mesha Mckenzie is a 63 y.o. female admitted with a medical diagnosis of Acute hypoxemic respiratory failure.  She presents with the following impairments/functional limitations of weakness, gait instability, impaired cardiopulmonary response to exercise, decreased balance, decreased muscle length, and impaired functional mobility.  Mrs. Mckenzie exhibited no distress today via facial expression or vital sign changes during physical therapy session.  PROM was performed per MD orders in B UE/LE with no joint limitations noted.  Mrs. Mckenzie will continue to benefit from acute inpatient physical therapy services to address the above functional limitations and return her to the highest level of function.    Problem List: weakness, gait instability, impaired cardiopulmonary response to activity, impaired endurance, impaired balance, impaired muscle length, impaired functional mobilty, decreased coordination  Rehab Prognosis: Good; patient would benefit from acute skilled PT services to address these deficits and reach maximum level of function.      Plan:     During this hospitalization, patient to be seen 4 x/week to address the identified rehab impairments via gait training, therapeutic activities, therapeutic exercises, neuromuscular re-education and progress towards the established goals.    · Plan of Care Expires:  10/31/19    Subjective   Communicated with RN prior to session.  Patient found supine upon PT entry to room, agreeable to evaluation. Mesha Mckenzie's daughter present during  session.    Chief Complaint: Unable to assess patient complaint due intubated and sedated upon arriving in room today.  Patient/Family Comments/goals: NT  Pain/Comfort:  Pain Rating 1: (Unable to assess due to patient being intubated/sedated)    Living Environment:  Patient lives with  in a single family home with 1 AILYN.   Prior Level of Function: Patient reports being independent with mobility & with ADLs. Patient uses DME as follows: none. DME owned (not currently used): none.  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 Drive: yes. Managing Medicines/Managing Home: yes. Hobbies: Watching TV and taking care of her dogs.    Patient reports they will have assistance from  upon discharge.    Objective:   Patient found with: arterial line, blood pressure cuff, central line, peripheral IV, chest tube, astorga catheter, NG tube, ventilator, telemetry     General Precautions: Standard, Cardiac fall   Orthopedic Precautions:N/A   Braces: N/A   Body mass index is 20.25 kg/m².  Oxygen Device: Endotracheal Tube 21cm at the lip and Vent Settings: Vent Mode: A/C  Oxygen Concentration (%):  [] 60  Resp Rate Total:  [8.9 br/min-34 br/min] 28 br/min  Vt Set:  [350 mL] 350 mL  PEEP/CPAP:  [3 cmH20-5 cmH20] 5 cmH20  Mean Airway Pressure:  [7.1 lxX68-67 cmH20] 10 cmH20  Vitals:   Blood Pressure (mmHg) (via arterial line) 95/47   MAP(via arterial line) 65   Heart Rate (bpm) 86   SaO2: 100     Exams:  · Mental Status: Unable to assess patient's mental status due to intubation and sedation.  All evaluation questions were answered by daughter.  · Skin Integrity: Visible skin intact  · Edema: None noted  · Sensation: NT  · Hearing: NT  · Vision:  NT  · Postural Assessment: patient remained supine for entire evaluation.  · Range of Motion:  · RUE: WFL  · LUE: WFL  · RLE: WFL  · LLE: WFL  · Strength Exam: deferred due to intubation and sedation.        Outcome Measures:  AM-PAC 6 CLICK  MOBILITY  Turning over in bed (including adjusting bedclothes, sheets and blankets)?: 1  Sitting down on and standing up from a chair with arms (e.g., wheelchair, bedside commode, etc.): 1  Moving from lying on back to sitting on the side of the bed?: 1  Moving to and from a bed to a chair (including a wheelchair)?: 1  Need to walk in hospital room?: 1  Climbing 3-5 steps with a railing?: 1  Basic Mobility Total Score: 6     Functional Mobility:  Additional staff present: RN and Student PT  Functional Mobility not tested due to patient currently being sedated and intubated.  MD orders currently are to focus on PROM in B UE/LE.    Therapeutic Activities & Exercises:   1. Supine: B UE/LE PROM x 10 reps in all planes through available ROM. Exercises performed to develop and maintain pt's  ROM and flexibility.     Education:  Mrs. Mckenzie's daughter was educated on the importance of SPT coming in today to move Mrs. Shaw B UE/LE through ROM to maintain the integrity of joints and muscles.    Patient left supine, with head in midline, neutral pelvis & heels floated for skin protection with all lines intact, call button in reach and RN notified.    GOALS:   Multidisciplinary Problems     Physical Therapy Goals        Problem: Physical Therapy Goal    Goal Priority Disciplines Outcome Goal Variances Interventions   Physical Therapy Goal     PT, PT/OT      Description:  Goals to be met by: 10/17/2019    Patient will increase functional independence with mobility by performin. Supine <> sit with Moderate Assistance.  2. Sit <> stand transfer with Moderate Assistance using Rolling Walker.  3. Bed <> chair transfer via Step Transfer with Moderate Assistance using Rolling Walker.  4. Dynamic sitting at edge of bed x 10 minutes with Minimal Assistance to prepare for functional tasks in sitting.  5. Able to tolerate exercise for 15-20 reps with assistance as needed.    *Liberalize goals once extubated                        History:     Past Medical History:   Diagnosis Date    Anxiety     Carotid bruit     Chronic pain     Cystitis     Cystocele, unspecified (CODE)     Depression     GI bleed     History of uterine fibroid     Shortness of breath on exertion     Spinal stenosis     Urinary retention        Past Surgical History:   Procedure Laterality Date    ANTERIOR VAGINAL REPAIR  10-    CARDIAC CATHETERIZATION  age 12    CHOLECYSTECTOMY  2015    COLONOSCOPY  12/12/2018    aborted due to poor colon prep    COLONOSCOPY N/A 12/12/2018    Procedure: COLONOSCOPY;  Surgeon: Renard Gonsalves MD;  Location: [facility];  Service: Endoscopy;  Laterality: N/A;    HYSTERECTOMY  1989    AMANDA    tubiligation         Time Tracking:     PT Received On: 10/03/19  PT Start Time: 0930     PT Stop Time: 0955  PT Total Time (min): 25 min     Billable Minutes: Evaluation 10 minutes (1 unit) and Therapeutic Exercise 15 minutes (1 unit)    DARREL Henson  10/3/2019

## 2019-10-03 NOTE — ASSESSMENT & PLAN NOTE
--last echo with EF 35%, LV concentric remodeling, indeterminate diastolic function  --previously on vasotec, metoprolol and milrinone at OSH  --bedside echo with depressed EF, RV not significantly dilated  --formal 2D echo ordered

## 2019-10-03 NOTE — PROGRESS NOTES
[facility]  Critical Care Medicine  Progress Note    Patient Name: Mesha Mckenzie  MRN: [id number]  Admission Date: 10/2/2019  Hospital Length of Stay: 1 days  Code Status: Full Code  Attending Provider: Malik Jerry MD  Primary Care Provider: Varun Shea MD PhD   Principal Problem: Acute hypoxemic respiratory failure    Subjective:     HPI:  Patient is a 63 y.o. female with significant past medical history of depression, anxiety and IVDA presented to [facility] ED on 9/19 with c/o N/V/D and weakness x ~5 days. The patient was found to be in septic shock with pneumonia and colitis and was transferred to [facility] ICU for further management. Pt was admitted to the intensive care unit and was treated with broad-spectrum antibiotics. 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 CT was repeated on 9/25 and showed bilateral cavitary lesions likely secondary to septic emboli, small pericardial effusion and ileus. DHARMESH was performed on 9/25 which revealed  mitral and tricuspid valve endocarditis. Blood cultures (9/19 - 9/26) have grown methicillin sensitive Staph aureus. Blood cultures as of 9/29 have been NGTD. ID was following the patient for antibiotic management with most recent regimen being cefazolin, vancomycin and daptomycin. Repeat Echo on 9/28 showed LVEF 35% and persistent vegatations. On 9/30 the patient had an MRI brain that showed remote lacunar infarcts without acute intracranial abnormality. On 10/1 MRI revealed spinal epidural abscess at L2-4 level. Thoracentesis was performed on 10/1. The patient was followed by Cardiology team who considered valve replacement surgery but due to patient's other medical issues, Cardiovascular surgery intervention was deferred. The patient was transferred to [facility] for neurosurgery evaluation of epidural abscess.         Hospital/ICU Course:  Upon arrival to [facility], the patient was  encephalopathic, but able to follow commands. She was hemodynamically stable and satting well on 2L NC. Shortly after arrival, the patient became acutely hypertensive and hypoxemic necessitating intubation and mechanical ventilation. She was extremely difficult to oxygenate (P:F ratio 52). Bronch was performed which was unremarkable. The patient became hypotensive and was started on vasopressors. She was paralyzed with nimbex due to tachypnea and vent dyssynchrony. Bedside echo was performed which showed reduced LVEF, hyperdynamic LV; no significant mitral regurg was noted. The patient went for CTA without evidence of pulmonary embolism. Imaging revealed atelectatic left lung and repeat bronch was performed with sputum cx sent. Right chest tube placed & pleural fluid sent.     Interval History/Significant Events: Patient was intubated overnight for hypoxemia. Bronch x 2 done; second broch done after chest CT revealing complete collapse of left lung. Chest tube inserted for pleural effusion to RLL.Patient remains intubated and sedated in the ICU for further workup.    Review of Systems   Unable to perform ROS: Intubated     Objective:     Vital Signs (Most Recent):  Temp: 97.2 °F (36.2 °C) (10/03/19 1100)  Pulse: 92 (10/03/19 1400)  Resp: (!) 28 (10/03/19 1127)  BP: (!) 125/58 (10/03/19 1400)  SpO2: 100 % (10/03/19 1400) Vital Signs (24h Range):  Temp:  [94 °F (34.4 °C)-98 °F (36.7 °C)] 97.2 °F (36.2 °C)  Pulse:  [] 92  Resp:  [9-57] 28  SpO2:  [68 %-100 %] 100 %  BP: ()/(37-92) 125/58  Arterial Line BP: ()/(29-57) 111/55   Weight: 53.5 kg (118 lb)  Body mass index is 20.25 kg/m².      Intake/Output Summary (Last 24 hours) at 10/3/2019 1424  Last data filed at 10/3/2019 1400  Gross per 24 hour   Intake 1941 ml   Output 1155 ml   Net 786 ml       Physical Exam   Constitutional: She appears ill. She is sedated and intubated.   Eyes:   Patient's right pupil was +2 and left pupil +1.   Cardiovascular:  Normal rate and regular rhythm.   Pulses:       Radial pulses are 2+ on the right side, and 2+ on the left side.        Dorsalis pedis pulses are 1+ on the right side, and 1+ on the left side.   Pulmonary/Chest: She is intubated. She has decreased breath sounds in the right upper field, the right middle field, the right lower field, the left upper field, the left middle field and the left lower field.   Abdominal: Soft. Normal appearance. Bowel sounds are decreased.   Neurological: She is unresponsive. GCS eye subscore is 1. GCS verbal subscore is 1. GCS motor subscore is 1.   Currently on sedation.   Skin: Skin is warm, dry and intact.       Vents:  Vent Mode: A/C (10/03/19 1257)  Ventilator Initiated: Yes (10/02/19 2200)  Set Rate: 28 bmp (10/03/19 1257)  Vt Set: 350 mL (10/03/19 1257)  PEEP/CPAP: 5 cmH20 (10/03/19 1257)  Oxygen Concentration (%): 60 (10/03/19 0505)  Peak Airway Pressure: 21 cmH2O (10/03/19 1257)  Plateau Pressure: 14 cmH20 (10/03/19 1257)  Total Ve: 10.7 mL (10/03/19 1257)  F/VT Ratio<105 (RSBI): (!) 75.27 (10/03/19 1127)  Lines/Drains/Airways     Central Venous Catheter Line                 Tunneled Central Line Insertion/Assessment - Double Lumen  10/03/19 0114 right internal jugular less than 1 day          Drain                 Urethral Catheter 09/19/19 1240 Double-lumen 16 Fr. 14 days         Rectal Tube 09/27/19 1000 rectal tube w/ balloon (indicate number of mLs) 6 days         Chest Tube 10/03/19 0500 Right Midaxillary less than 1 day         NG/OG Tube 10/02/19 2200 Center mouth less than 1 day          Airway                 Airway - Non-Surgical 10/02/19 2155 Endotracheal Tube less than 1 day          Arterial Line                 Arterial Line 10/02/19 2330 Right Radial less than 1 day          Peripheral Intravenous Line                 Peripheral IV - Single Lumen 10/02/19 0825 20 G Anterior;Right Hand 1 day         Peripheral IV - Single Lumen 10/02/19 2037 20 G Left Wrist less  than 1 day              Significant Labs:    CBC/Anemia Profile:  Recent Labs   Lab 10/02/19  2000 10/03/19  0136 10/03/19  0614   WBC 7.42 14.05*  14.05* 8.29   HGB 11.5* 11.9*  11.9* 10.7*   HCT 34.6* 37.5  37.5 35.1*    291  291 194   MCV 90 96  96 99*   RDW 19.6* 19.8*  19.8* 19.7*        Chemistries:  Recent Labs   Lab 10/02/19  2000 10/03/19  0136 10/03/19  0614 10/03/19  1125    145  145  145 141 141   K 3.4* 5.0  5.0  5.0 4.8 5.1    113*  113*  113* 113* 114*   CO2 28 22*  22*  22* 18* 18*   BUN 53* 55*  55*  55* 56* 58*   CREATININE 1.1 1.3  1.3  1.3 1.3 1.3   CALCIUM 8.0* 7.8*  7.8*  7.8* 7.5* 7.3*   ALBUMIN 1.7* 1.7*  --   --    PROT 7.0 6.9  --   --    BILITOT 0.8 0.6  --   --    ALKPHOS 179* 176*  --   --    ALT 25 27  --   --    AST 80* 79*  --   --    MG 1.7 2.9*  --   --    PHOS 3.6 6.0*  --   --        All pertinent labs within the past 24 hours have been reviewed.    Significant Imaging:  I have reviewed all pertinent imaging results/findings within the past 24 hours.      ABG  Recent Labs   Lab 10/03/19  1156   PH 7.308*   PO2 106*   PCO2 34.0*   HCO3 17.0*   BE -9     Assessment/Plan:     Neuro  Encephalopathy, metabolic  --secondary to sepsis  --Brain MRI 9/30 with lacunar infarcts, neg for acute intracranial abnormality  --currently sedated with propofol & fentanyl  --head CT to be repeated due to unequal pupillary changes      Pulmonary  * Acute hypoxemic respiratory failure  --multifactorial secondary to complete left lung collapse, septic emboli, pleural effusions, suspected pna  --intubated shortly after arrival for hypoxemia  --s/p bronch x2 with significant clearance of secretions from left mainstem & improved aeration on f/u xray  --f/u sputum cx  --continue cefepime & flagyl   --s/p thora 10/1; transudative based on lights criteria, though concern for empyema based on OSH pulm note   --f/u pleural fluid cx OSH  --right chest tube placed this am,  345 mL output after TPA administration; f/u pleural fluid studies/cx  --CTA neg for PE  --wean ventilator as tolerated        Pleural effusion, bilateral  --R chest tube inserted; -20 mmHg suction  --monitor output    Cardiac/Vascular  Acute bacterial endocarditis  --mitral and tricuspid valve vegetations seen on DHARMESH 9/25; unchanged on TTE 9/28    --Blood cultures (9/19 - 9/26) have grown methicillin sensitive Staph aureus. Blood cultures as of 9/29 have been NGTD.  --OSH abx regimen vanc, ancef and daptomycin. Continue dapto, vanc, flagyl, & cefepime  --f/u Bcx here  --OSH PICC line d/c'd  --formal 2D echo results show EF 53%; normal L ventricular function; mild mitral sclerosis; small circumferential pericardial effusion  --neurosurgery consult in am for epidural abscess  --cardiology consulted and following  --ID consulted and following    Acute on chronic diastolic congestive heart failure  --last echo (OSH) with EF 35%, LV concentric remodeling, indeterminate diastolic function  --previously on vasotec, metoprolol and milrinone at OSH  --bedside echo with depressed EF, RV not significantly dilated  --see acute bacterial endocarditis for echo results    Renal/  ABIMBOLA (acute kidney injury)  --baseline creatinine ~ 0.7; was 2.9 on initial presentation to OSH, improved with hydration over hospital course; now 1.3; BUN 56  --FeNa/FeUrea consistent with pre-renal etiology; possible component of ATN given hypotension yesterday evening  --patient found to have non anion gap metabolic acidosis likely 2/2 RTA; sodium bicarb drip initiated    ID  Septic shock with acute organ dysfunction due to methicillin susceptible Staphylococcus aureus (MSSA)  --secondary to MV/TV endocarditis, epidural abscess, likely pna  --Lactate: 2 (increased from 1.3)  --currently treating with dapto and vanc  --vasopressors as needed to keep map > 65; currently requiring Levophed    Epidural abscess  --MRI concerning for epidural abscess at  L2-4 region  --no neuro deficits on physical exam  --neurosurgery consulted and following; will f/up on note    Staphylococcus aureus bacteremia with sepsis  --see endocarditis    Hematology  Acute septic pulmonary embolism  --secondary to TV endocarditis  --repeating CT chest negative    GI  Pancolitis  --seen on CT 9/19 with improvement noted on f/u CT 9/25  --concern for ileus on 9/25 CT; rectal tube in place with liquid stool output  --stool 9/27 neg for c diff; stool cx neg  --KUB with distended loops of bowel; maintain NGT to [facility]       Critical Care Daily Checklist:    A: Awake: RASS Goal/Actual Goal:    Actual: Joyner Agitation Sedation Scale (RASS): Deep sedation   B: Spontaneous Breathing Trial Performed?     C: SAT & SBT Coordinated?  done                      D: Delirium: CAM-ICU     E: Early Mobility Performed? No   F: Feeding Goal: Goals: Patient to receive nutrition by RD follow-up  Status: Nutrition Goal Status: new   Current Diet Order   Procedures    Diet NPO      AS: Analgesia/Sedation Fentanyl and propofol   T: Thromboembolic Prophylaxis scds   H: HOB > 300 Yes   U: Stress Ulcer Prophylaxis (if needed) famotidine   G: Glucose Control n/a   B: Bowel Function     I: Indwelling Catheter (Lines & Henriquez) Necessity Henriquez, CVC necessary   D: De-escalation of Antimicrobials/Pharmacotherapies done    Plan for the day/ETD Cont course; plan for possible OR    Code Status:  Family/Goals of Care: Full Code  Cont course; plan for possible OR     Critical Care Time: 70 minutes  Critical secondary to Patient has a condition that poses threat to life and bodily function. Patient intubated and sedated requiring vasopressors.     Critical care was time spent personally by me on the following activities: development of treatment plan with patient or surrogate and bedside caregivers, discussions with consultants, evaluation of patient's response to treatment, examination of patient, ordering and performing  treatments and interventions, ordering and review of laboratory studies, ordering and review of radiographic studies, pulse oximetry, re-evaluation of patient's condition. This critical care time did not overlap with that of any other provider or involve time for any procedures.     Ramirez Padilla NP  Critical Care Medicine  [facility]

## 2019-10-03 NOTE — PROCEDURES
"Mesha Mckenzie is a 63 y.o. female patient.    Temp: 97.7 °F (36.5 °C) (10/03/19 0300)  Pulse: (!) 55 (10/03/19 0515)  Resp: 20 (10/03/19 0310)  BP: (!) 168/63 (10/03/19 0500)  SpO2: 97 % (10/03/19 0515)  Weight: 53.8 kg (118 lb 9.7 oz) (10/02/19 2113)  Height: 5' 4" (162.6 cm) (10/02/19 2113)       14F Pigtail Chest Tube Insertion  Date/Time: 10/3/2019 5:52 AM  Performed by: Joon Gutierrez MD  Authorized by: Joon Gutierrez MD   Consent Done: Yes  Consent: Verbal consent obtained. Written consent obtained.  Risks and benefits: risks, benefits and alternatives were discussed  Consent given by: spouse  Site marked: the operative site was marked  Imaging studies: imaging studies available  Patient identity confirmed: , MRN and name  Procedure purpose: therapeutic  Indications: pleural effusion  Preparation: Patient was prepped and draped in the usual sterile fashion.    Anesthesia:  Local Anesthetic: lidocaine 1% with epinephrine  Patient sedated: yes  Sedation type: deep sedation  (See MAR for exact dosages of medications).  Sedatives: fentanyl  Preparation: skin prepped with ChloraPrep  Patient position: left lateral decubitus  Ultrasound guidance: yes  Location: right posterior  Intercostal space: 7th  Puncture method: needle only  Needle size: 18  Catheter size: 14 gauge  Number of attempts: 2  Drainage amount: 100 ml  Drainage characteristics: serosanguinous  Patient tolerance: Patient tolerated the procedure well with no immediate complications  Chest x-ray performed: yes  Chest x-ray interpreted by me.  Chest x-ray findings: pleural effusion  Complications: No          Joon Gutierrez  10/3/2019       I independently evaluated the patient. I was present for the procedure & directly supervised the resident performing the procedure. I have reviewed the resident's procedure note & agree with the findings.    Silvio Suárez MD  Critical Care Attending    "

## 2019-10-03 NOTE — ASSESSMENT & PLAN NOTE
64yo female with IVDU presents as transfer from [facility] for epidural abscess, likely secondary to MSSA bacteremia and endocarditis with vegetations of the MV and TV on DHARMESH 9/26 c/b bilateral multifocal pneumonia with cavitary lesions, right pleural effusion s/p thoracentesis on 10/1 w/ concern for empyema, and pan-colitis. Followed by ID at [facility], most recently on daptomycin, cefazolin, and vancomycin. Intubated shortly after arrival to [facility] for hypoxia.    Recommendations:  -would discontinue daptomycin and vancomycin  -ultimately, would favor treatment with oxacillin; however, patient has reported penicillin allergy in past per chart review, family unaware of type of reaction. Therefore, would consult Allergy for graded challenge to oxacillin  -can continue cefepime and flagyl for now  -continue to follow blood cultures

## 2019-10-03 NOTE — CARE UPDATE
Pt intubated with 7.5 ETT tube 22 cm at the lip.  Pt placed on a ventilator with the following settings

## 2019-10-03 NOTE — SUBJECTIVE & OBJECTIVE
Interval History/Significant Events: Patient was intubated overnight for hypoxemia. Bronch x 2 done; second broch done after chest CT revealing complete collapse of left lung. Chest tube inserted for pleural effusion to RLL.Patient remains intubated and sedated in the ICU for further workup.    Review of Systems   Unable to perform ROS: Intubated     Objective:     Vital Signs (Most Recent):  Temp: 97.2 °F (36.2 °C) (10/03/19 1100)  Pulse: 92 (10/03/19 1400)  Resp: (!) 28 (10/03/19 1127)  BP: (!) 125/58 (10/03/19 1400)  SpO2: 100 % (10/03/19 1400) Vital Signs (24h Range):  Temp:  [94 °F (34.4 °C)-98 °F (36.7 °C)] 97.2 °F (36.2 °C)  Pulse:  [] 92  Resp:  [9-57] 28  SpO2:  [68 %-100 %] 100 %  BP: ()/(37-92) 125/58  Arterial Line BP: ()/(29-57) 111/55   Weight: 53.5 kg (118 lb)  Body mass index is 20.25 kg/m².      Intake/Output Summary (Last 24 hours) at 10/3/2019 1424  Last data filed at 10/3/2019 1400  Gross per 24 hour   Intake 1941 ml   Output 1155 ml   Net 786 ml       Physical Exam   Constitutional: She appears ill. She is sedated and intubated.   Eyes:   Patient's right pupil was +2 and left pupil +1.   Cardiovascular: Normal rate and regular rhythm.   Pulses:       Radial pulses are 2+ on the right side, and 2+ on the left side.        Dorsalis pedis pulses are 1+ on the right side, and 1+ on the left side.   Pulmonary/Chest: She is intubated. She has decreased breath sounds in the right upper field, the right middle field, the right lower field, the left upper field, the left middle field and the left lower field.   Abdominal: Soft. Normal appearance. Bowel sounds are decreased.   Neurological: She is unresponsive. GCS eye subscore is 1. GCS verbal subscore is 1. GCS motor subscore is 1.   Currently on sedation.   Skin: Skin is warm, dry and intact.       Vents:  Vent Mode: A/C (10/03/19 1257)  Ventilator Initiated: Yes (10/02/19 2200)  Set Rate: 28 bmp (10/03/19 1257)  Vt Set: 350 mL  (10/03/19 1257)  PEEP/CPAP: 5 cmH20 (10/03/19 1257)  Oxygen Concentration (%): 60 (10/03/19 0505)  Peak Airway Pressure: 21 cmH2O (10/03/19 1257)  Plateau Pressure: 14 cmH20 (10/03/19 1257)  Total Ve: 10.7 mL (10/03/19 1257)  F/VT Ratio<105 (RSBI): (!) 75.27 (10/03/19 1127)  Lines/Drains/Airways     Central Venous Catheter Line                 Tunneled Central Line Insertion/Assessment - Double Lumen  10/03/19 0114 right internal jugular less than 1 day          Drain                 Urethral Catheter 09/19/19 1240 Double-lumen 16 Fr. 14 days         Rectal Tube 09/27/19 1000 rectal tube w/ balloon (indicate number of mLs) 6 days         Chest Tube 10/03/19 0500 Right Midaxillary less than 1 day         NG/OG Tube 10/02/19 2200 Center mouth less than 1 day          Airway                 Airway - Non-Surgical 10/02/19 2155 Endotracheal Tube less than 1 day          Arterial Line                 Arterial Line 10/02/19 2330 Right Radial less than 1 day          Peripheral Intravenous Line                 Peripheral IV - Single Lumen 10/02/19 0825 20 G Anterior;Right Hand 1 day         Peripheral IV - Single Lumen 10/02/19 2037 20 G Left Wrist less than 1 day              Significant Labs:    CBC/Anemia Profile:  Recent Labs   Lab 10/02/19  2000 10/03/19  0136 10/03/19  0614   WBC 7.42 14.05*  14.05* 8.29   HGB 11.5* 11.9*  11.9* 10.7*   HCT 34.6* 37.5  37.5 35.1*    291  291 194   MCV 90 96  96 99*   RDW 19.6* 19.8*  19.8* 19.7*        Chemistries:  Recent Labs   Lab 10/02/19  2000 10/03/19  0136 10/03/19  0614 10/03/19  1125    145  145  145 141 141   K 3.4* 5.0  5.0  5.0 4.8 5.1    113*  113*  113* 113* 114*   CO2 28 22*  22*  22* 18* 18*   BUN 53* 55*  55*  55* 56* 58*   CREATININE 1.1 1.3  1.3  1.3 1.3 1.3   CALCIUM 8.0* 7.8*  7.8*  7.8* 7.5* 7.3*   ALBUMIN 1.7* 1.7*  --   --    PROT 7.0 6.9  --   --    BILITOT 0.8 0.6  --   --    ALKPHOS 179* 176*  --   --    ALT 25 27  --    --    AST 80* 79*  --   --    MG 1.7 2.9*  --   --    PHOS 3.6 6.0*  --   --        All pertinent labs within the past 24 hours have been reviewed.    Significant Imaging:  I have reviewed all pertinent imaging results/findings within the past 24 hours.

## 2019-10-03 NOTE — HPI
Patient is a 64 yo F with PMH of IVDA transferred here for higher levels of care. She presented to [facility] ED, was found to be in septic shock, was transferred to [facility] ICU. There she was discovered to have mitral valve and tricuspid valve vegetations with septic emboli in bilateral lungs. Her LVSF on repeat echo decreased and she now has worsened mitral regurgitation. Her blood cultures were positive for MSSA from 9/19-9/26, however she has cleared them since 9/29. She required multiple pressors and was on antibiotics while in [facility] ICU. She was later discovered to have a L2-L4 epidural abscess. Cardiology was following over there and recommended against valve replacement at that time. Overnight last night, she became hypotensive and was in respiratory distress, requiring emergent intubation, and a right-sided chest tube placement for pleural effusion. She has also been bronched x2 for left main-stem obstruction leading to complete left lung collapse. Cardiology was consulted for evaluation of MV and TV vegetations and management of her heart failure 2/2 MV regurgitation.

## 2019-10-03 NOTE — SUBJECTIVE & OBJECTIVE
Past Medical History:   Diagnosis Date    Anxiety     Carotid bruit     Chronic pain     Cystitis     Cystocele, unspecified (CODE)     Depression     GI bleed     History of uterine fibroid     Shortness of breath on exertion     Spinal stenosis     Urinary retention        Past Surgical History:   Procedure Laterality Date    ANTERIOR VAGINAL REPAIR  10-    CARDIAC CATHETERIZATION  age 12    CHOLECYSTECTOMY  2015    COLONOSCOPY  12/12/2018    aborted due to poor colon prep    COLONOSCOPY N/A 12/12/2018    Procedure: COLONOSCOPY;  Surgeon: Renard Gonsalves MD;  Location: [facility];  Service: Endoscopy;  Laterality: N/A;    HYSTERECTOMY  1989    AMANDA    tubiligation         Review of patient's allergies indicates:   Allergen Reactions    Pcn [penicillins]        Medications:  Medications Prior to Admission   Medication Sig    diazepam (VALIUM) 5 MG tablet Take 10 mg by mouth 2 (two) times daily.      Antibiotics (From admission, onward)    Start     Stop Route Frequency Ordered    10/03/19 0345  ceFEPIme injection 2 g      -- IV Every 24 hours (non-standard times) 10/03/19 0242    10/03/19 0345  metronidazole IVPB 500 mg      -- IV Every 8 hours (non-standard times) 10/03/19 0242    10/02/19 2300  DAPTOmycin (CUBICIN) 325 mg in sodium chloride 0.9% IVPB      -- IV Every 24 hours (non-standard times) 10/02/19 1951        Antifungals (From admission, onward)    None        Antivirals (From admission, onward)    None           There is no immunization history for the selected administration types on file for this patient.    Family History     Problem Relation (Age of Onset)    Alzheimer's disease Mother    Hypertension Brother, Sister    Osteoarthritis Mother    Thyroid disease Mother        Social History     Socioeconomic History    Marital status:      Spouse name: Not on file    Number of children: Not on file    Years of education: Not on file    Highest education level: Not on  file   Occupational History    Not on file   Social Needs    Financial resource strain: Not on file    Food insecurity:     Worry: Not on file     Inability: Not on file    Transportation needs:     Medical: Not on file     Non-medical: Not on file   Tobacco Use    Smoking status: Current Every Day Smoker     Packs/day: 0.50     Years: 40.00     Pack years: 20.00     Types: Cigarettes    Smokeless tobacco: Never Used   Substance and Sexual Activity    Alcohol use: No    Drug use: No    Sexual activity: Yes     Partners: Male   Lifestyle    Physical activity:     Days per week: Not on file     Minutes per session: Not on file    Stress: Not on file   Relationships    Social connections:     Talks on phone: Not on file     Gets together: Not on file     Attends Jew service: Not on file     Active member of club or organization: Not on file     Attends meetings of clubs or organizations: Not on file     Relationship status: Not on file   Other Topics Concern    Not on file   Social History Narrative    Not on file     Review of Systems   Unable to perform ROS: Intubated     Objective:     Vital Signs (Most Recent):  Temp: 97.2 °F (36.2 °C) (10/03/19 1100)  Pulse: 99 (10/03/19 1508)  Resp: (!) 28 (10/03/19 1127)  BP: (!) 125/58 (10/03/19 1400)  SpO2: 100 % (10/03/19 1508) Vital Signs (24h Range):  Temp:  [94 °F (34.4 °C)-98 °F (36.7 °C)] 97.2 °F (36.2 °C)  Pulse:  [] 99  Resp:  [9-57] 28  SpO2:  [68 %-100 %] 100 %  BP: ()/(37-92) 125/58  Arterial Line BP: ()/(29-57) 111/55     Weight: 53.5 kg (118 lb)  Body mass index is 20.25 kg/m².    Estimated Creatinine Clearance: 36.4 mL/min (based on SCr of 1.3 mg/dL).    Physical Exam   Constitutional: She appears well-developed and well-nourished. No distress.   HENT:   Head: Normocephalic and atraumatic.   Eyes: Conjunctivae are normal. Right eye exhibits no discharge. Left eye exhibits no discharge.   Neck: Neck supple.   Cardiovascular:  Normal rate and normal heart sounds. Exam reveals no gallop and no friction rub.   No murmur heard.  Pulmonary/Chest: She has no wheezes. She has no rales.   Intubated   Abdominal: Soft. She exhibits no mass. There is no tenderness. There is no guarding.   Musculoskeletal: She exhibits no edema.   Neurological:   Sedated   Skin: Skin is warm and dry. She is not diaphoretic.       Significant Labs:   CBC:   Recent Labs   Lab 10/02/19  2000 10/03/19  0136 10/03/19  0614   WBC 7.42 14.05*  14.05* 8.29   HGB 11.5* 11.9*  11.9* 10.7*   HCT 34.6* 37.5  37.5 35.1*    291  291 194     CMP:   Recent Labs   Lab 10/02/19  2000 10/03/19  0136 10/03/19  0614 10/03/19  1125    145  145  145 141 141   K 3.4* 5.0  5.0  5.0 4.8 5.1    113*  113*  113* 113* 114*   CO2 28 22*  22*  22* 18* 18*   * 227*  227*  227* 204* 133*   BUN 53* 55*  55*  55* 56* 58*   CREATININE 1.1 1.3  1.3  1.3 1.3 1.3   CALCIUM 8.0* 7.8*  7.8*  7.8* 7.5* 7.3*   PROT 7.0 6.9  --   --    ALBUMIN 1.7* 1.7*  --   --    BILITOT 0.8 0.6  --   --    ALKPHOS 179* 176*  --   --    AST 80* 79*  --   --    ALT 25 27  --   --    ANIONGAP 8 10  10  10 10 9   EGFRNONAA 52.8* 43.2*  43.2*  43.2* 43.2* 43.2*

## 2019-10-03 NOTE — PLAN OF CARE
Plan of Care:  Discharge Recommendation: TBD  Please continue Progressive Mobility Protocol as appropriate.  Appropriate transfer level with nursing staff: Bed <> Chair:  daily in the bedchair position as pt tolerates.    Sandi Argueta PT, DPT  10/3/2019  Pager: [phone]      Problem: Physical Therapy Goal  Goal: Physical Therapy Goal  Description  Goals to be met by: 10/17/2019    Patient will increase functional independence with mobility by performin. Supine <> sit with Moderate Assistance.  2. Sit <> stand transfer with Moderate Assistance using Rolling Walker.  3. Bed <> chair transfer via Step Transfer with Moderate Assistance using Rolling Walker.  4. Dynamic sitting at edge of bed x 10 minutes with Minimal Assistance to prepare for functional tasks in sitting.  5. Able to tolerate exercise for 15-20 reps with assistance as needed.    *Liberalize goals once extubated      Outcome: Ongoing, Progressing

## 2019-10-03 NOTE — PROCEDURES
CENTRAL VENOUS LINE INSERTION:    Indications: hemodynamic monitoring and vascular access    Antisepsis: sterile gloves, mask, gown, and drape.  Skin prepped with chlorhexidine and alcohol and chlorhexidine.  Catheter: 7 Fr x 3 lumen x 16cm via right internal jugular.  Insertion directed by ultrasound.  Heme positive aspiration all ports.  Secured with sutures at 14 cm at skin.  Dressing: dry sterile gauze and bio occlusive dressing.  Complications: None.     Placement verified with ultrasound.  Awaiting CXR for additional confirmation.

## 2019-10-03 NOTE — ASSESSMENT & PLAN NOTE
--MRI concerning for epidural abscess at L2-4 region  --no neuro deficits on physical exam  --neurosurgery consulted and following; will f/up on note

## 2019-10-03 NOTE — CARE UPDATE
WYATT and Dr. Stevens aware of nodules and discoloration to shoulders.  Refer to flow sheet for vital signs, ggts, and assessments.  Will continue to monitor.

## 2019-10-03 NOTE — CONSULTS
Therapy with Vancomycin complete and/or consult discontinued by provider.  Pharmacy will sign off, please re-consult as needed.    Mira Cardoso, PharmD  PGY-2 Pharmacy Resident- Internal Medicine  [phone] [phone]

## 2019-10-03 NOTE — PROCEDURES
"Mesha Mckenzie is a 63 y.o. female patient.    Temp: 98 °F (36.7 °C) (10/02/19 1915)  Pulse: 95 (10/02/19 2215)  Resp: (!) 9 (10/02/19 2215)  BP: (!) 160/71 (10/02/19 2215)  SpO2: (!) 89 % (10/02/19 2215)  Weight: 53.8 kg (118 lb 9.7 oz) (10/02/19 2113)  Height: 5' 4" (162.6 cm) (10/02/19 2113)       Intubation  Date/Time: 10/2/2019 10:16 PM  Performed by: Jono Gutierrez MD  Authorized by: Joon Gutierrez MD   Consent Done: Emergent Situation  Indications: respiratory distress,  respiratory failure and  hypoxemia  Intubation method: video-assisted  Patient status: paralyzed (RSI)  Preoxygenation: BVM  Sedatives: propofol  Paralytic: rocuronium  Laryngoscope size: Sunshine 3  Tube size: 7.5 mm  Tube type: cuffed  Number of attempts: 1  Cricoid pressure: yes  Cords visualized: yes  Post-procedure assessment: chest rise,  ETCO2 monitor,  CO2 detector and esophageal detector  Breath sounds: reduced on left  ETT to lip: 23 cm  ETT to teeth: 22 cm  Tube secured with: adhesive tape and ETT esposito  Chest x-ray interpreted by me.  Chest x-ray findings: endotracheal tube in appropriate position          Joon Gutierrez  10/2/2019  "

## 2019-10-03 NOTE — SUBJECTIVE & OBJECTIVE
Interval History/Significant Events: acute decompensation overnight requiring bronch x2, initiation of vasopressors, CTA r/o PE, chest tube. Able to wean vent this am.     Review of Systems   Unable to perform ROS: Intubated     Objective:     Vital Signs (Most Recent):  Temp: 97.7 °F (36.5 °C) (10/03/19 0300)  Pulse: 66 (10/03/19 0455)  Resp: 20 (10/03/19 0310)  BP: (!) 151/66 (10/03/19 0440)  SpO2: 97 % (10/03/19 0455) Vital Signs (24h Range):  Temp:  [97.7 °F (36.5 °C)-99.8 °F (37.7 °C)] 97.7 °F (36.5 °C)  Pulse:  [] 66  Resp:  [9-57] 20  SpO2:  [68 %-100 %] 97 %  BP: ()/() 151/66  Arterial Line BP: (100-128)/(29-54) 104/50   Weight: 53.8 kg (118 lb 9.7 oz)  Body mass index is 20.36 kg/m².      Intake/Output Summary (Last 24 hours) at 10/3/2019 0457  Last data filed at 10/3/2019 0400  Gross per 24 hour   Intake 1020 ml   Output 275 ml   Net 745 ml       Physical Exam   Constitutional: She appears well-developed and well-nourished. No distress. She is intubated.   HENT:   Head: Normocephalic and atraumatic.   Right Ear: External ear normal.   Left Ear: External ear normal.   Nose: Nose normal.   Mouth/Throat: Oropharynx is clear and moist.   Eyes: Conjunctivae are normal.   3/brisk bilaterally   Neck: Normal range of motion. Neck supple.   Cardiovascular: Normal rate and intact distal pulses.   Intermittent bigeminy   Pulmonary/Chest: She is intubated. She has decreased breath sounds in the right middle field and the right lower field. She has no wheezes.   Abdominal: Soft. Normal appearance. Bowel sounds are decreased.   Genitourinary:   Genitourinary Comments: Henriquez present   Neurological: GCS eye subscore is 1. GCS verbal subscore is 1. GCS motor subscore is 1.   Sedated and paralyzed   Skin: She is not diaphoretic.   Nursing note and vitals reviewed.      Vents:  Vent Mode: A/C (10/03/19 0325)  Ventilator Initiated: Yes (10/02/19 2200)  Set Rate: 20 bmp (10/03/19 0325)  Vt Set: 350 mL  (10/03/19 0325)  PEEP/CPAP: 3 cmH20 (10/03/19 0325)  Oxygen Concentration (%): 100 (10/03/19 0324)  Peak Airway Pressure: 24 cmH2O (10/03/19 0325)  Plateau Pressure: 32 cmH20 (10/03/19 0325)  Total Ve: 7.75 mL (10/03/19 0325)  F/VT Ratio<105 (RSBI): (!) 84.66 (10/03/19 0121)  Lines/Drains/Airways     Central Venous Catheter Line                 Tunneled Central Line Insertion/Assessment - Double Lumen  10/03/19 0114 right internal jugular less than 1 day          Drain                 Urethral Catheter 09/19/19 1240 Double-lumen 16 Fr. 13 days         Rectal Tube 09/27/19 1000 rectal tube w/ balloon (indicate number of mLs) 5 days         NG/OG Tube 10/02/19 2200 Center mouth less than 1 day          Airway                 Airway - Non-Surgical 10/02/19 2155 Endotracheal Tube less than 1 day          Arterial Line                 Arterial Line 10/02/19 2330 Right Radial less than 1 day          Peripheral Intravenous Line                 Peripheral IV - Single Lumen 10/02/19 0825 20 G Anterior;Right Hand less than 1 day         Peripheral IV - Single Lumen 10/02/19 2037 20 G Left Wrist less than 1 day              Significant Labs:    CBC/Anemia Profile:  Recent Labs   Lab 10/02/19  0332 10/02/19  2000 10/03/19  0136   WBC 7.92 7.42 14.05*  14.05*   HGB 11.0* 11.5* 11.9*  11.9*   HCT 33.9* 34.6* 37.5  37.5    191 291  291   MCV 93 90 96  96   RDW 19.7* 19.6* 19.8*  19.8*        Chemistries:  Recent Labs   Lab 10/02/19  1153 10/02/19  2000 10/03/19  0136   * 145 145  145  145   K 4.1 3.4* 5.0  5.0  5.0    109 113*  113*  113*   CO2 28 28 22*  22*  22*   BUN 52* 53* 55*  55*  55*   CREATININE 1.2 1.1 1.3  1.3  1.3   CALCIUM 8.2* 8.0* 7.8*  7.8*  7.8*   ALBUMIN  --  1.7* 1.7*   PROT  --  7.0 6.9   BILITOT  --  0.8 0.6   ALKPHOS  --  179* 176*   ALT  --  25 27   AST  --  80* 79*   MG  --  1.7 2.9*   PHOS  --  3.6 6.0*       All pertinent labs within the past 24 hours have been  reviewed.    Significant Imaging:  I have reviewed and interpreted all pertinent imaging results/findings within the past 24 hours.

## 2019-10-03 NOTE — PLAN OF CARE
No acute changes throughout shift. Patient opened eyes and followed commands when off sedation. Withdraws from pain while sedated. On and off Levo to maintain MAP >65. Levo @ 0.04 mcg/kg/min, Fentanyl @ 100 mcg/hr and Propofol @ 30 mcg/kg/min. O2 sats % on 40% FiO2, rate 28, and PEEP 5. 500 ml LR bolus given. UO 20-35 ml/hr. Head CT obtained. Buttocks is macerated and right heel has blanchable redness. Foam pads and heel boots applied. Daughter updated on plan of care. Continue to monitor and wean BP meds. Possible surgery for epidural abscess  on L3-L4.

## 2019-10-03 NOTE — SUBJECTIVE & OBJECTIVE
Medications Prior to Admission   Medication Sig Dispense Refill Last Dose    diazepam (VALIUM) 5 MG tablet Take 10 mg by mouth 2 (two) times daily.    12/12/2018 at Unknown time       Review of patient's allergies indicates:   Allergen Reactions    Pcn [penicillins]        Past Medical History:   Diagnosis Date    Anxiety     Carotid bruit     Chronic pain     Cystitis     Cystocele, unspecified (CODE)     Depression     GI bleed     History of uterine fibroid     Shortness of breath on exertion     Spinal stenosis     Urinary retention      Past Surgical History:   Procedure Laterality Date    ANTERIOR VAGINAL REPAIR  10-    CARDIAC CATHETERIZATION  age 12    CHOLECYSTECTOMY  2015    COLONOSCOPY  12/12/2018    aborted due to poor colon prep    COLONOSCOPY N/A 12/12/2018    Procedure: COLONOSCOPY;  Surgeon: Renard Gonsalves MD;  Location[facility];  Service: Endoscopy;  Laterality: N/A;    HYSTERECTOMY  1989    AMANDA    tubiligation       Family History     Problem Relation (Age of Onset)    Alzheimer's disease Mother    Hypertension Brother, Sister    Osteoarthritis Mother    Thyroid disease Mother        Tobacco Use    Smoking status: Current Every Day Smoker     Packs/day: 0.50     Years: 40.00     Pack years: 20.00     Types: Cigarettes    Smokeless tobacco: Never Used   Substance and Sexual Activity    Alcohol use: No    Drug use: No    Sexual activity: Yes     Partners: Male     Review of Systems   Unable to perform ROS: Intubated     Objective:     Weight: 53.8 kg (118 lb 9.7 oz)  Body mass index is 20.36 kg/m².  Vital Signs (Most Recent):  Temp: 97.7 °F (36.5 °C) (10/03/19 0300)  Pulse: 82 (10/03/19 0712)  Resp: (!) 24 (10/03/19 0709)  BP: (!) 111/56 (10/03/19 0600)  SpO2: 100 % (10/03/19 0712) Vital Signs (24h Range):  Temp:  [97.7 °F (36.5 °C)-99.8 °F (37.7 °C)] 97.7 °F (36.5 °C)  Pulse:  [] 82  Resp:  [9-57] 24  SpO2:  [68 %-100 %] 100 %  BP: ()/()  111/56  Arterial Line BP: ()/(29-54) 90/48     Date 10/03/19 0700 - 10/04/19 0659   Shift 1503-3476 2696-2985 4557-4113 24 Hour Total   INTAKE   Shift Total(mL/kg)       OUTPUT   Urine(mL/kg/hr) 35   35   Chest Tube 5   5   Shift Total(mL/kg) 40(0.7)   40(0.7)   Weight (kg) 53.8 53.8 53.8 53.8              Vent Mode: A/C  Oxygen Concentration (%):  [] 60  Resp Rate Total:  [8.9 br/min-34 br/min] 24 br/min  Vt Set:  [350 mL] 350 mL  PEEP/CPAP:  [3 cmH20-5 cmH20] 5 cmH20  Mean Airway Pressure:  [7.1 cmH20-9.6 cmH20] 8.9 cmH20         Chest Tube 10/03/19 0500 Right Midaxillary (Active)   Output (mL) 5 mL 10/3/2019  7:00 AM            NG/OG Tube 10/02/19 2200 Center mouth (Active)   Placement Check placement verified by x-ray 10/3/2019  3:00 AM   Tolerance no signs/symptoms of discomfort 10/2/2019 10:00 PM   Securement secured to commercial device 10/2/2019 10:00 PM   Suction Setting/Drainage Method suction at;low;intermittent setting 10/2/2019 10:00 PM   Insertion Site Appearance no redness, warmth, tenderness, skin breakdown, drainage 10/2/2019 10:00 PM            Rectal Tube 09/27/19 1000 rectal tube w/ balloon (indicate number of mLs) (Active)   Balloon Inflation Volume (mL) 45 9/27/2019  4:00 PM   Reposition drainage bags for BMS & Henriquez on opposite sides of bed 10/3/2019  3:00 AM   Outcome comfort enhanced 10/2/2019  4:00 PM   Stool Color Brown 10/3/2019  3:00 AM   Insertion Site Appearance red 10/3/2019  3:00 AM   Flush/Irrigation flushed w/;water 10/3/2019  3:00 AM   Intake (mL) 20 mL 10/2/2019  7:00 PM   Rectal Tube Output 100 mL 10/3/2019  6:00 AM            Urethral Catheter 09/19/19 1240 Double-lumen 16 Fr. (Active)   Site Assessment Clean;Intact 10/3/2019  3:00 AM   Collection Container Urimeter 10/3/2019  3:00 AM   Securement Method secured to top of thigh w/ adhesive device 10/3/2019  3:00 AM   Catheter Care Performed yes 10/3/2019  3:00 AM   Reason for Continuing Urinary Catheterization  "Critically ill in ICU requiring intensive monitoring 10/3/2019  3:00 AM   CAUTI Prevention Bundle StatLock in place w 1" slack;Intact seal between catheter & drainage tubing;Green sheeting clip in use;Drainage bag/urimeter off the floor;No dependent loops or kinks;Drainage bag/urimeter not overfilled (<2/3 full);Drainage bag/urimeter below bladder 10/3/2019  3:00 AM   Output (mL) 35 mL 10/3/2019  7:00 AM       Physical Exam:    Constitutional:   Intubated, sedated, paralyzed. Chest tube in place. Unable to assess pt due to declining status       Significant Labs:  Recent Labs   Lab 10/02/19  2000 10/03/19  0136 10/03/19  0614   * 227*  227*  227* 204*    145  145  145 141   K 3.4* 5.0  5.0  5.0 4.8    113*  113*  113* 113*   CO2 28 22*  22*  22* 18*   BUN 53* 55*  55*  55* 56*   CREATININE 1.1 1.3  1.3  1.3 1.3   CALCIUM 8.0* 7.8*  7.8*  7.8* 7.5*   MG 1.7 2.9*  --      Recent Labs   Lab 10/02/19  2000 10/03/19  0136 10/03/19  0614   WBC 7.42 14.05*  14.05* 8.29   HGB 11.5* 11.9*  11.9* 10.7*   HCT 34.6* 37.5  37.5 35.1*    291  291 194     Recent Labs   Lab 10/02/19  2000   INR 1.1     Microbiology Results (last 7 days)     Procedure Component Value Units Date/Time    Aerobic culture [[id number]] Collected:  10/03/19 0549    Order Status:  Sent Specimen:  Pleural Fluid Updated:  10/03/19 0656    Culture, Anaerobic [[id number]] Collected:  10/03/19 0549    Order Status:  Sent Specimen:  Pleural Fluid Updated:  10/03/19 0656    Gram stain [[id number]] Collected:  10/03/19 0549    Order Status:  Sent Specimen:  Pleural Fluid Updated:  10/03/19 0656    Blood culture [[id number]] Collected:  10/02/19 0830    Order Status:  Completed Specimen:  Blood from Peripheral, Hand, Right Updated:  10/03/19 0345     Blood Culture, Routine No Growth to date    Blood culture [[id number]] Collected:  10/02/19 2025    Order Status:  Completed Specimen:  Blood from Peripheral, Wrist, Right " Updated:  10/03/19 0345     Blood Culture, Routine No Growth to date        All pertinent labs from the last 24 hours have been reviewed.    Significant Diagnostics:  MRI Lumbar Spine 10/01:  1. Rim-enhancing collections within the left dorsal epidural space at the L2-3 and L3-4 levels with mass effect and narrowing of the thecal sac.  These collections are nonspecific and could represent epidural abscesses, given the patient's history of bacteremia.  2. Rim-enhancing, multilocular collection posterior to the right L4-5 facet joint, which may represent a prominent synovial cyst, possibly superinfected, versus a separate fluid collection.  3. Trace fluid signal within the L3-4 and L4-5 disc spaces with mild adjacent marrow edema, enhancement, and endplate changes, which all may be degenerative; although, early discitis/osteomyelitis cannot be entirely excluded.  Close clinical surveillance and short-term follow-up imaging recommended.  4. Diffuse homogeneous T2 hypointense hepatic parenchymal signal, which is nonspecific and may reflect iron overload/deposition disease, which can be seen with multiple transfusions, hemochromatosis and thalassemia.  Clinical correlation and further evaluation, as warranted.

## 2019-10-03 NOTE — ASSESSMENT & PLAN NOTE
--baseline creatinine ~ 0.7; was 2.9 on initial presentation to OSH, improved with hydration over hospital course; now 1.3  --checking FeNa/FeUrea (on lasix at OSH) labs  --consider renal US

## 2019-10-03 NOTE — PROGRESS NOTES
[facility]  Critical Care Medicine  Progress Note    Patient Name: Mesha Mckenzie  MRN: [id number]  Admission Date: 10/2/2019  Hospital Length of Stay: 1 days  Code Status: Full Code  Attending Provider: Malik Jerry MD  Primary Care Provider: Varun Shea MD PhD   Principal Problem: Acute hypoxemic respiratory failure    Subjective:     HPI:  Patient is a 63 y.o. female with significant past medical history of depression, anxiety and IVDA presented to [facility] ED on 9/19 with c/o N/V/D and weakness x ~5 days. The patient was found to be in septic shock with pneumonia and colitis and was transferred to [facility] ICU for further management. Pt was admitted to the intensive care unit and was treated with broad-spectrum antibiotics. 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 CT was repeated on 9/25 and showed bilateral cavitary lesions likely secondary to septic emboli, small pericardial effusion and ileus. DHARMESH was performed on 9/25 which revealed  mitral and tricuspid valve endocarditis. Blood cultures (9/19 - 9/26) have grown methicillin sensitive Staph aureus. Blood cultures as of 9/29 have been NGTD. ID was following the patient for antibiotic management with most recent regimen being cefazolin, vancomycin and daptomycin. Repeat Echo on 9/28 showed LVEF 35% and persistent vegatations. On 9/30 the patient had an MRI brain that showed remote lacunar infarcts without acute intracranial abnormality. On 10/1 MRI revealed spinal epidural abscess at L2-4 level. Thoracentesis was performed on 10/1. The patient was followed by Cardiology team who considered valve replacement surgery but due to patient's other medical issues, Cardiovascular surgery intervention was deferred. The patient was transferred to [facility] for neurosurgery evaluation of epidural abscess.         Hospital/ICU Course:  [place] [facility] the patient was  encephalopathic, but able to follow commands. She was hemodynamically stable and satting well on 2L NC. Shortly after arrival, the patient became acutely hypertensive and hypoxemic necessitating intubation and mechanical ventilation. She was extremely difficult to oxygenate (P:F ratio 52). Bronch was performed which was unremarkable. The patient became hypotensive and was started on vasopressors. She was paralyzed with nimbex due to tachypnea and vent dyssynchrony. Bedside echo was performed which showed reduced LVEF, hyperdynamic LV; no significant mitral regurg was noted. The patient went for CTA without evidence of pulmonary embolism. Imaging revealed atelectatic left lung and repeat bronch was performed with sputum cx sent. Right chest tube placed & pleural fluid sent.     Interval History/Significant Events: acute decompensation overnight requiring bronch x2, initiation of vasopressors, CTA r/o PE, chest tube. Able to wean vent this am.     Review of Systems   Unable to perform ROS: Intubated     Objective:     Vital Signs (Most Recent):  Temp: 97.7 °F (36.5 °C) (10/03/19 0300)  Pulse: 66 (10/03/19 0455)  Resp: 20 (10/03/19 0310)  BP: (!) 151/66 (10/03/19 0440)  SpO2: 97 % (10/03/19 0455) Vital Signs (24h Range):  Temp:  [97.7 °F (36.5 °C)-99.8 °F (37.7 °C)] 97.7 °F (36.5 °C)  Pulse:  [] 66  Resp:  [9-57] 20  SpO2:  [68 %-100 %] 97 %  BP: ()/() 151/66  Arterial Line BP: (100-128)/(29-54) 104/50   Weight: 53.8 kg (118 lb 9.7 oz)  Body mass index is 20.36 kg/m².      Intake/Output Summary (Last 24 hours) at 10/3/2019 0457  Last data filed at 10/3/2019 0400  Gross per 24 hour   Intake 1020 ml   Output 275 ml   Net 745 ml       Physical Exam   Constitutional: She appears well-developed and well-nourished. No distress. She is intubated.   HENT:   Head: Normocephalic and atraumatic.   Right Ear: External ear normal.   Left Ear: External ear normal.   Nose: Nose normal.   Mouth/Throat: Oropharynx is  clear and moist.   Eyes: Conjunctivae are normal.   3/brisk bilaterally   Neck: Normal range of motion. Neck supple.   Cardiovascular: Normal rate and intact distal pulses.   Intermittent bigeminy   Pulmonary/Chest: She is intubated. She has decreased breath sounds in the right middle field and the right lower field. She has no wheezes.   Abdominal: Soft. Normal appearance. Bowel sounds are decreased.   Genitourinary:   Genitourinary Comments: Henriquez present   Neurological: GCS eye subscore is 1. GCS verbal subscore is 1. GCS motor subscore is 1.   Sedated and paralyzed   Skin: She is not diaphoretic.   Nursing note and vitals reviewed.      Vents:  Vent Mode: A/C (10/03/19 0325)  Ventilator Initiated: Yes (10/02/19 2200)  Set Rate: 20 bmp (10/03/19 0325)  Vt Set: 350 mL (10/03/19 0325)  PEEP/CPAP: 3 cmH20 (10/03/19 0325)  Oxygen Concentration (%): 100 (10/03/19 0324)  Peak Airway Pressure: 24 cmH2O (10/03/19 0325)  Plateau Pressure: 32 cmH20 (10/03/19 0325)  Total Ve: 7.75 mL (10/03/19 0325)  F/VT Ratio<105 (RSBI): (!) 84.66 (10/03/19 0121)  Lines/Drains/Airways     Central Venous Catheter Line                 Tunneled Central Line Insertion/Assessment - Double Lumen  10/03/19 0114 right internal jugular less than 1 day          Drain                 Urethral Catheter 09/19/19 1240 Double-lumen 16 Fr. 13 days         Rectal Tube 09/27/19 1000 rectal tube w/ balloon (indicate number of mLs) 5 days         NG/OG Tube 10/02/19 2200 Center mouth less than 1 day          Airway                 Airway - Non-Surgical 10/02/19 2155 Endotracheal Tube less than 1 day          Arterial Line                 Arterial Line 10/02/19 2330 Right Radial less than 1 day          Peripheral Intravenous Line                 Peripheral IV - Single Lumen 10/02/19 0825 20 G Anterior;Right Hand less than 1 day         Peripheral IV - Single Lumen 10/02/19 2037 20 G Left Wrist less than 1 day              Significant Labs:    CBC/Anemia  Profile:  Recent Labs   Lab 10/02/19  0332 10/02/19  2000 10/03/19  0136   WBC 7.92 7.42 14.05*  14.05*   HGB 11.0* 11.5* 11.9*  11.9*   HCT 33.9* 34.6* 37.5  37.5    191 291  291   MCV 93 90 96  96   RDW 19.7* 19.6* 19.8*  19.8*        Chemistries:  Recent Labs   Lab 10/02/19  1153 10/02/19  2000 10/03/19  0136   * 145 145  145  145   K 4.1 3.4* 5.0  5.0  5.0    109 113*  113*  113*   CO2 28 28 22*  22*  22*   BUN 52* 53* 55*  55*  55*   CREATININE 1.2 1.1 1.3  1.3  1.3   CALCIUM 8.2* 8.0* 7.8*  7.8*  7.8*   ALBUMIN  --  1.7* 1.7*   PROT  --  7.0 6.9   BILITOT  --  0.8 0.6   ALKPHOS  --  179* 176*   ALT  --  25 27   AST  --  80* 79*   MG  --  1.7 2.9*   PHOS  --  3.6 6.0*       All pertinent labs within the past 24 hours have been reviewed.    Significant Imaging:  I have reviewed and interpreted all pertinent imaging results/findings within the past 24 hours.      ABG  Recent Labs   Lab 10/03/19  0549   PH 7.197*   PO2 80   PCO2 47.0*   HCO3 18.3*   BE -10     Assessment/Plan:     Neuro  Encephalopathy, metabolic  --secondary to sepsis  --Brain MRI 9/30 with lacunar infarcts, neg for acute intracranial abnormality  --currently sedated with propofol & fentanyl while paralyzed  --sedation vacation once oxygenation improved/nimbex off    Pulmonary  * Acute hypoxemic respiratory failure  --multifactorial secondary to septic emboli, pleural effusions, suspected pna  --intubated shortly after arrival for hypoxemia  --s/p bronch x2 with significant clearance of secretions from left mainstem & improved aeration on f/u xray  --f/u sputum cx  --continue cefepime & flagyl   --s/p thora 10/1; transudative based on lights criteria, though concern for empyema based on OSH pulm note   --f/u pleural fluid cx OSH  --right chest tube placed this am, will start TPA/dornase BID; f/u pleural fluid studies/cx  --CTA neg for PE   --wean ventilator as tolerated        Cardiac/Vascular  Acute  bacterial endocarditis  --mitral and tricuspid valve vegetations seen on DHARMESH 9/25; unchanged on TTE 9/28    --Blood cultures (9/19 - 9/26) have grown methicillin sensitive Staph aureus. Blood cultures as of 9/29 have been NGTD.  --OSH abx regimen vanc, ancef and daptomycin. Continue dapto, vanc & cefepime  --f/u Bcx here  --OSH PICC line d/c'd  --formal 2D echo pending  --neurosurgery consult in am for epidural abscess  --cardiology consulted  --ID consult in am    Acute on chronic diastolic congestive heart failure  --last echo with EF 35%, LV concentric remodeling, indeterminate diastolic function  --previously on vasotec, metoprolol and milrinone at OSH  --bedside echo with depressed EF, RV not significantly dilated  --formal 2D echo ordered     Renal/  ABIMBOLA (acute kidney injury)  --baseline creatinine ~ 0.7; was 2.9 on initial presentation to OSH, improved with hydration over hospital course; now 1.3  --FeNa/FeUrea consistent with pre-renal etiology; possible component of ATN given hypotension yesterday evening  --consider renal US    ID  Septic shock with acute organ dysfunction due to methicillin susceptible Staphylococcus aureus (MSSA)  --secondary to MV/TV endocarditis, epidural abscess, likely pna  --see plan for each  --vasopressors as needed to keep map > 65    Epidural abscess  --MRI concerning for epidural abscess at L2-4 region  --no neuro deficits on physical exam  --neurosurgery consult in am    Staphylococcus aureus bacteremia with sepsis  --see endocarditis    Hematology  Acute septic pulmonary embolism  --secondary to TV endocarditis  --repeating CT chest to r/o large central PE as etiology of hypoxia and hemodynamic instability    GI  Pancolitis  --seen on CT 9/19 with improvement noted on f/u CT 9/25  --concern for ileus on 9/25 CT; rectal tube in place with liquid stool output  --stool 9/27 neg for c diff; stool cx neg  --KUB with distended loops of bowel; maintain NGT to LIWS     Critical  Care Daily Checklist:    A: Awake: RASS Goal/Actual Goal:    Actual:     B: Spontaneous Breathing Trial Performed?  not appropriate at this time   C: SAT & SBT Coordinated?  As above                      D: Delirium: CAM-ICU     E: Early Mobility Performed? Yes   F: Feeding Goal:    Status:     Current Diet Order   Procedures    Diet NPO      AS: Analgesia/Sedation Fentanyl, propofol   T: Thromboembolic Prophylaxis scds   H: HOB > 300 Yes   U: Stress Ulcer Prophylaxis (if needed) pepcid   G: Glucose Control    B: Bowel Function     I: Indwelling Catheter (Lines & Astorga) Necessity PIV x3, RIJ TLC, ETT, OGT, astorga, R chest tube   D: De-escalation of Antimicrobials/Pharmacotherapies Vanc, dapto, cefepime, flagyl    Plan for the day/ETD Wean vent, Nsx eval    Code Status:  Family/Goals of Care: Full Code  Discussed with family at bedside      Patient to be evaluated by and further recommendations per Dr. Jerry.     Critical Care Time: 70 minutes  Critical secondary to Patient has a condition that poses threat to life and bodily function: respiratory failure requiring mechanical ventilation and septic shock      Critical care was time spent personally by me on the following activities: development of treatment plan with patient or surrogate and bedside caregivers, discussions with consultants, evaluation of patient's response to treatment, examination of patient, ordering and performing treatments and interventions, ordering and review of laboratory studies, ordering and review of radiographic studies, pulse oximetry, re-evaluation of patient's condition. This critical care time did not overlap with that of any other provider or involve time for any procedures.     Winter Albert NP  Critical Care Medicine  [facility]

## 2019-10-03 NOTE — H&P
[facility]  Critical Care Medicine  History & Physical    Patient Name: Mesha Mckenzie  MRN: [id number]  Admission Date: 10/2/2019  Hospital Length of Stay: 1 days  Code Status: Full Code  Attending Physician: Malik Jerry MD   Primary Care Provider: Varun Shea MD PhD   Principal Problem: Acute hypoxemic respiratory failure    Subjective:     HPI:  Patient is a 63 y.o. female with significant past medical history of depression, anxiety and IVDA presented to [facility] ED on 9/19 with c/o N/V/D and weakness x ~5 days. The patient was found to be in septic shock with pneumonia and colitis and was transferred to [facility] ICU for further management. Pt was admitted to the intensive care unit and was treated with broad-spectrum antibiotics. 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 CT was repeated on 9/25 and showed bilateral cavitary lesions likely secondary to septic emboli, small pericardial effusion and ileus. DHARMESH was performed on 9/25 which revealed  mitral and tricuspid valve endocarditis. Blood cultures (9/19 - 9/26) have grown methicillin sensitive Staph aureus. Blood cultures as of 9/29 have been NGTD. ID was following the patient for antibiotic management with most recent regimen being cefazolin, vancomycin and daptomycin. Repeat Echo on 9/28 showed LVEF 35% and persistent vegatations. On 9/30 the patient had an MRI brain that showed remote lacunar infarcts without acute intracranial abnormality. On 10/1 MRI revealed spinal epidural abscess at L2-4 level. Thoracentesis was performed on 10/1. The patient was followed by Cardiology team who considered valve replacement surgery but due to patient's other medical issues, Cardiovascular surgery intervention was deferred. The patient was transferred to [facility] for neurosurgery evaluation of epidural abscess.         Hospital/ICU Course:  Upon arrival to [facility], the patient  was encephalopathic, but able to follow commands. She was hemodynamically stable and satting well on 2L NC. Shortly after arrival, the patient became acutely hypertensive and hypoxemic necessitating intubation and mechanical ventilation. She was extremely difficult to oxygenate (P:F ratio 52). Bronch was performed which was unremarkable. The patient became hypotensive and was started on vasopressors. She was paralyzed with nimbex due to tachypnea and vent dyssynchrony. Bedside echo was performed which showed reduced LVEF, hyperdynamic LV; no significant mitral regurg was noted. The patient went for CTA to r/o PE. Imaging revealed atelectatic left lung and repeat bronch was performed.      Past Medical History:   Diagnosis Date    Anxiety     Carotid bruit     Chronic pain     Cystitis     Cystocele, unspecified (CODE)     Depression     GI bleed     History of uterine fibroid     Shortness of breath on exertion     Spinal stenosis     Urinary retention        Past Surgical History:   Procedure Laterality Date    ANTERIOR VAGINAL REPAIR  10-    CARDIAC CATHETERIZATION  age 12    CHOLECYSTECTOMY  2015    COLONOSCOPY  12/12/2018    aborted due to poor colon prep    COLONOSCOPY N/A 12/12/2018    Procedure: COLONOSCOPY;  Surgeon: Renard Gonsalves MD;  Location: [facility];  Service: Endoscopy;  Laterality: N/A;    HYST[facility]Y  1989    [facility]    tubiligation         Review of patient's allergies indicates:   Allergen Reactions    Pcn [penicillins]        Family History     Problem Relation (Age of Onset)    Alzheimer's disease Mother    Hypertension Brother, Sister    Osteoarthritis Mother    Thyroid disease Mother        Tobacco Use    Smoking status: Current Every Day Smoker     Packs/day: 0.50     Years: 40.00     Pack years: 20.00     Types: Cigarettes    Smokeless tobacco: Never Used   Substance and Sexual Activity    Alcohol use: No    Drug use: No    Sexual activity: Yes     Partners:  Male      Review of Systems   Unable to perform ROS: Mental status change     Objective:     Vital Signs (Most Recent):  Temp: 98 °F (36.7 °C) (10/02/19 1915)  Pulse: 63 (10/03/19 0121)  Resp: (!) 32 (10/03/19 0121)  BP: (!) 95/52 (10/03/19 0030)  SpO2: (!) 82 % (10/03/19 0121) Vital Signs (24h Range):  Temp:  [98 °F (36.7 °C)-99.8 °F (37.7 °C)] 98 °F (36.7 °C)  Pulse:  [] 63  Resp:  [9-57] 32  SpO2:  [68 %-100 %] 82 %  BP: ()/() 95/52   Weight: 53.8 kg (118 lb 9.7 oz)  Body mass index is 20.36 kg/m².      Intake/Output Summary (Last 24 hours) at 10/3/2019 0216  Last data filed at 10/2/2019 2200  Gross per 24 hour   Intake 1020 ml   Output 205 ml   Net 815 ml       Physical Exam   Constitutional: She appears well-developed and well-nourished. She appears listless. No distress. Nasal cannula in place.   HENT:   Head: Normocephalic and atraumatic.   Right Ear: External ear normal.   Left Ear: External ear normal.   Nose: Nose normal.   Mouth/Throat: Oropharynx is clear and moist.   Eyes: Pupils are equal, round, and reactive to light. Conjunctivae and EOM are normal.   3/sluggish bilaterally   Neck: Normal range of motion. Neck supple.   Cardiovascular: Normal rate, regular rhythm, normal heart sounds and intact distal pulses.   Pulmonary/Chest: Effort normal and breath sounds normal. No respiratory distress. She has no wheezes. She has no rales.   Diminished right lower lateral    Abdominal: Soft. She exhibits no distension. Bowel sounds are decreased. There is no tenderness.   Genitourinary:   Genitourinary Comments: Henriquez changed on admit   Musculoskeletal: She exhibits no edema, tenderness or deformity.   Neurological: She has normal strength. She appears listless. She is disoriented. GCS eye subscore is 4. GCS verbal subscore is 4. GCS motor subscore is 6.   4/5 strength x4 extremities   Skin: Skin is warm and dry. Capillary refill takes 2 to 3 seconds. She is not diaphoretic.   Nursing note and  vitals reviewed.      Vents:  Vent Mode: A/C (10/03/19 0121)  Ventilator Initiated: Yes (10/02/19 2200)  Set Rate: 20 bmp (10/03/19 0121)  Vt Set: 350 mL (10/03/19 0121)  PEEP/CPAP: 3 cmH20 (10/03/19 0121)  Oxygen Concentration (%): 93 (10/03/19 0000)  Peak Airway Pressure: 39 cmH2O (10/03/19 0121)  Plateau Pressure: 32 cmH20 (10/03/19 0121)  Total Ve: 7.54 mL (10/03/19 0121)  F/VT Ratio<105 (RSBI): (!) 84.66 (10/03/19 0121)  Lines/Drains/Airways     Central Venous Catheter Line                 Tunneled Central Line Insertion/Assessment - Double Lumen  10/03/19 0114 right internal jugular less than 1 day          Drain                 Urethral Catheter 09/19/19 1240 Double-lumen 16 Fr. 13 days         Rectal Tube 09/27/19 1000 rectal tube w/ balloon (indicate number of mLs) 5 days         NG/OG Tube 10/02/19 2200 Center mouth less than 1 day          Airway                 Airway - Non-Surgical 10/02/19 2155 Endotracheal Tube less than 1 day          Arterial Line                 Arterial Line 10/02/19 2330 Right Radial less than 1 day          Peripheral Intravenous Line                 Peripheral IV - Single Lumen 10/02/19 0825 20 G Anterior;Right Hand less than 1 day         Peripheral IV - Single Lumen 10/02/19 2037 20 G Left Wrist less than 1 day              Significant Labs:    CBC/Anemia Profile:  Recent Labs   Lab 10/01/19  0352 10/02/19  0332 10/02/19  2000   WBC 9.07 7.92 7.42   HGB 7.9* 11.0* 11.5*   HCT 25.4* 33.9* 34.6*    194 191   * 93 90   RDW 16.6* 19.7* 19.6*        Chemistries:  Recent Labs   Lab 10/02/19  1153 10/02/19  2000 10/03/19  0136   * 145 145  145  145   K 4.1 3.4* 5.0  5.0  5.0    109 113*  113*  113*   CO2 28 28 22*  22*  22*   BUN 52* 53* 55*  55*  55*   CREATININE 1.2 1.1 1.3  1.3  1.3   CALCIUM 8.2* 8.0* 7.8*  7.8*  7.8*   ALBUMIN  --  1.7* 1.7*   PROT  --  7.0 6.9   BILITOT  --  0.8 0.6   ALKPHOS  --  179* 176*   ALT  --  25 27   AST  --   80* 79*   MG  --  1.7 2.9*   PHOS  --  3.6 6.0*       All pertinent labs within the past 24 hours have been reviewed.    Significant Imaging: I have reviewed and interpreted all pertinent imaging results/findings within the past 24 hours.    Assessment/Plan:     Neuro  Encephalopathy, metabolic  --secondary to sepsis  --Brain MRI 9/30 with lacunar infarcts, neg for acute intracranial abnormality  --currently sedated with propofol & fentanyl while paralyzed  --sedation vacation once oxygenation improved/nimbex off    Pulmonary  * Acute hypoxemic respiratory failure  --multifactorial secondary to septic emboli, pleural effusions, possible pna  --intubated shortly after arrival for hypoxemia  --s/p bronch x2 with significant clearance of secretions from left mainstem   --f/u sputum cx  --start cefepime & flagyl to cover for pna  --s/p thora 10/1; transudative based on lights criteria, though concern for empyema based on OSH pulm note   --f/u pleural fluid cx; plan for chest tube placement later this evening with repeat cx at that time  --CTA PE done, read pending though no large central PE noticeable   --will attempt to wean vent now that left mainstem cleared after bronch (post-xray pending)        Cardiac/Vascular  Acute bacterial endocarditis  --mitral and tricuspid valve vegetations seen on DHARMESH 9/25; unchanged on TTE 9/28    --Blood cultures (9/19 - 9/26) have grown methicillin sensitive Staph aureus. Blood cultures as of 9/29 have been NGTD.  --OSH abx regimen vanc, ancef and daptomycin. Will continue dapto & vanc; d/c ancef due to starting cefepime for suspected pna  --repeat Bcx now  --d/c PICC line  --repeat formal 2D echo  --neurosurgery consult in am for epidural abscess  --cardiology consulted  --ID consult in am    Acute on chronic diastolic congestive heart failure  --last echo with EF 35%, LV concentric remodeling, indeterminate diastolic function  --previously on vasotec, metoprolol and milrinone at  OSH  --bedside echo with depressed EF, RV not significantly dilated  --formal 2D echo ordered     Renal/  ABIMBOLA (acute kidney injury)  --baseline creatinine ~ 0.7; was 2.9 on initial presentation to OSH, improved with hydration over hospital course; now 1.3  --checking FeNa/FeUrea (on lasix at OSH) labs  --consider renal US    ID  Septic shock with acute organ dysfunction due to methicillin susceptible Staphylococcus aureus (MSSA)  --secondary to MV/TV endocarditis, epidural abscess, likely pna  --see plan for each  --vasopressors as needed to keep map > 65    Epidural abscess  --MRI concerning for epidural abscess at L2-4 region  --no neuro deficits on physical exam  --neurosurgery consult in am    Staphylococcus aureus bacteremia with sepsis  --see endocarditis    Hematology  Acute septic pulmonary embolism  --secondary to TV endocarditis  --repeating CT chest to r/o large central PE as etiology of hypoxia and hemodynamic instability    GI  Pancolitis  --seen on CT 9/19 with improvement noted on f/u CT 9/25  --concern for ileus on 9/25 CT; rectal tube in place with liquid stool output  --stool 9/27 neg for c diff; stool cx neg  --checking KUB to eval ileus; NGT to [facility]      Critical Care Daily Checklist:    A: Awake: RASS Goal/Actual Goal:    Actual:     B: Spontaneous Breathing Trial Performed?  not appropriate at this time   C: SAT & SBT Coordinated?  As above                      D: Delirium: CAM-ICU     E: Early Mobility Performed? Yes   F: Feeding Goal:    Status:     Current Diet Order   Procedures    Diet NPO      AS: Analgesia/Sedation Fentanyl, precedex   T: Thromboembolic Prophylaxis scds   H: HOB > 300 Yes   U: Stress Ulcer Prophylaxis (if needed) pepcid   G: Glucose Control    B: Bowel Function     I: Indwelling Catheter (Lines & Henriquez) Necessity Henriquez, PIV x3, RIJ, art line, ETT, OGT   D: De-escalation of Antimicrobials/Pharmacotherapies Vanc, dapto, cefepime, flagyl    Plan for the day/ETD W/u resp  failure, endocarditis    Code Status:  Family/Goals of Care: Full Code  Discussed with family at bedside     Case discussed with Dr. Silvio Suárez.     Critical Care Time: 70 minutes  Critical secondary to Patient has a condition that poses threat to life and bodily function: Severe Respiratory Distress and septic shock     Critical care was time spent personally by me on the following activities: development of treatment plan with patient or surrogate and bedside caregivers, discussions with consultants, evaluation of patient's response to treatment, examination of patient, ordering and performing treatments and interventions, ordering and review of laboratory studies, ordering and review of radiographic studies, pulse oximetry, re-evaluation of patient's condition. This critical care time did not overlap with that of any other provider or involve time for any procedures.     Winter Albert NP  Critical Care Medicine  [facility]

## 2019-10-03 NOTE — PROCEDURES
"Mesha Mckenzie is a 63 y.o. female patient.    Temp: 97.7 °F (36.5 °C) (10/03/19 0300)  Pulse: (!) 55 (10/03/19 0515)  Resp: 20 (10/03/19 0310)  BP: (!) 168/63 (10/03/19 0500)  SpO2: 97 % (10/03/19 0515)  Weight: 53.8 kg (118 lb 9.7 oz) (10/02/19 2113)  Height: 5' 4" (162.6 cm) (10/02/19 2113)       Procedures  Procedures   Diagnostic and therapeutic bronchoscopy  Left mainstem obstruction on imaging     Consent obtained from patient's daughter. Risks/benefits explained and he expressed understanding.     Patient intubated earlier for hypoxemic respiratory failure with an 7.5 ETT. Patient on 100% FiO2 and O2 saturation is 88%. Plan for bronchoscopy with airway inspection and mucous aspiration     Patient on propofol.     Large, ambu scope inserted through the ETT which was 1-2cm above the ranjan. Airway inspection reveal a large, dense, white mucous plug in the distal left mainstem. 0.9% sodium chloride instilled and mucous plug aspirated. Examination of the airways after suctioning revealed erythematous mucosa in the left lower lobe bronchi.     Oxygen saturation post procedure increased to 94.    Jaziel Suárez  10/3/2019  "

## 2019-10-03 NOTE — ASSESSMENT & PLAN NOTE
Nutrition Problem  Malnutrition in the context of Acute Illness/Injury    Related to (etiology):  Inadequate energy intake and increased nutrient needs    Signs and Symptoms (as evidenced by):  Energy Intake: <50% of estimated energy requirement for 8 days  Body Fat Depletion: mild depletion of orbitals, triceps and thoracic and lumbar region   Muscle Mass Depletion: mild and moderate depletion of temples, clavicle region, scapular region, interosseous muscle and lower extremities   Weight Loss: 22.6% x 1 year   Fluid Accumulation: mild    Interventions (treatment strategy):  Collaboration of nutrition care with other providers    Nutrition Diagnosis Status:  Continues

## 2019-10-03 NOTE — PLAN OF CARE
POC reviewed with pt and family. Pt to be transferred to [facility] in [place] for neurosurgery consult and care. Family aware. Pt continues to complain of abd and back pain; MD aware. He does not want anything by IV and pt NPO; fentanyl patch applied to right arm with

## 2019-10-03 NOTE — ASSESSMENT & PLAN NOTE
--mitral and tricuspid valve vegetations seen on DHARMESH 9/25; unchanged on TTE 9/28    --Blood cultures (9/19 - 9/26) have grown methicillin sensitive Staph aureus. Blood cultures as of 9/29 have been NGTD.  --OSH abx regimen vanc, ancef and daptomycin. Continue dapto, vanc & cefepime  --f/u Bcx here  --OSH PICC line d/c'd  --formal 2D echo pending  --neurosurgery consult in am for epidural abscess  --cardiology consulted  --ID consult in am

## 2019-10-03 NOTE — SUBJECTIVE & OBJECTIVE
Past Medical History:   Diagnosis Date    Anxiety     Carotid bruit     Chronic pain     Cystitis     Cystocele, unspecified (CODE)     Depression     GI bleed     History of uterine fibroid     Shortness of breath on exertion     Spinal stenosis     Urinary retention        Past Surgical History:   Procedure Laterality Date    ANTERIOR VAGINAL REPAIR  10-    CARDIAC CATHETERIZATION  age 12    CHOLECYSTECTOMY  2015    COLONOSCOPY  12/12/2018    aborted due to poor colon prep    COLONOSCOPY N/A 12/12/2018    Procedure: COLONOSCOPY;  Surgeon: Renard Gonsalves MD;  Location: [facility];  Service: Endoscopy;  Laterality: N/A;    HYSTERECTOMY  1989    AMANDA    tubiligation         Review of patient's allergies indicates:   Allergen Reactions    Pcn [penicillins]        Current Facility-Administered Medications on File Prior to Encounter   Medication    lactated ringers infusion    sodium chloride 0.9% flush 3 mL    [DISCONTINUED] 0.9%  NaCl infusion (for blood administration)    [DISCONTINUED] 0.9%  NaCl infusion (for blood administration)    [DISCONTINUED] Amino acid 5% - dextrose 15% (CLINIMIX-E) solution with additives (1L  provides 510 kcal/L dextrose, with 50 gm AA, 150 gm CHO, Na 35, K 30, Mg 5, Ca 4.5, Acetate 80, Cl 39, Phos 15)    [DISCONTINUED] bisacodyl suppository 10 mg    [DISCONTINUED] cefazolin (ANCEF) 2 gram in dextrose 5% 50 mL IVPB (premix)    [DISCONTINUED] DAPTOmycin (CUBICIN) 340 mg in sodium chloride 0.9% IVPB    [DISCONTINUED] dextrose 10 % infusion    [DISCONTINUED] enalaprilat injection 2.5 mg    [DISCONTINUED] fat emulsion 20% infusion 250 mL    [DISCONTINUED] fentaNYL 12 mcg/hr 1 patch    [DISCONTINUED] folic acid tablet 2 mg    [DISCONTINUED] furosemide injection 20 mg    [DISCONTINUED] furosemide injection 20 mg    [DISCONTINUED] hydromorphone (PF) injection 1 mg    [DISCONTINUED] influenza (HIGH-DOSE PF) vaccine 0.5 mL    [DISCONTINUED] lorazepam  injection 1 mg    [DISCONTINUED] magnesium sulfate 2g in water 50mL IVPB (premix)    [DISCONTINUED] magnesium sulfate 2g in water 50mL IVPB (premix)    [DISCONTINUED] metoprolol injection 10 mg    [DISCONTINUED] milrinone 20mg in D5W 100 mL infusion    [DISCONTINUED] ondansetron injection 4 mg    [DISCONTINUED] pantoprazole injection 40 mg    [DISCONTINUED] pneumoc 13-susanna conj-dip cr(PF) (PREVNAR 13 (PF)) 0.5 mL    [DISCONTINUED] potassium chloride 20 mEq in 100 mL IVPB (FOR CENTRAL LINE ADMINISTRATION ONLY)    [DISCONTINUED] potassium chloride 40 mEq in 100 mL IVPB (FOR CENTRAL LINE ADMINISTRATION ONLY)    [DISCONTINUED] potassium chloride 40 mEq in 100 mL IVPB (FOR CENTRAL LINE ADMINISTRATION ONLY)    [DISCONTINUED] sodium chloride 0.9% flush 10 mL    [DISCONTINUED] sodium chloride 0.9% flush 10 mL    [DISCONTINUED] sodium chloride 0.9% flush 10 mL    [DISCONTINUED] TPN ADULT CENTRAL LINE CUSTOM    [DISCONTINUED] vancomycin 750 mg in dextrose 5 % 250 mL IVPB (ready to mix system)     Current Outpatient Medications on File Prior to Encounter   Medication Sig    diazepam (VALIUM) 5 MG tablet Take 10 mg by mouth 2 (two) times daily.      Family History     Problem Relation (Age of Onset)    Alzheimer's disease Mother    Hypertension Brother, Sister    Osteoarthritis Mother    Thyroid disease Mother        Tobacco Use    Smoking status: Current Every Day Smoker     Packs/day: 0.50     Years: 40.00     Pack years: 20.00     Types: Cigarettes    Smokeless tobacco: Never Used   Substance and Sexual Activity    Alcohol use: No    Drug use: No    Sexual activity: Yes     Partners: Male     Review of Systems   Unable to perform ROS: intubated     Objective:     Vital Signs (Most Recent):  Temp: (!) 94 °F (34.4 °C) (10/03/19 0700)  Pulse: 81 (10/03/19 0800)  Resp: (!) 24 (10/03/19 0709)  BP: (!) 115/56 (10/03/19 0800)  SpO2: 99 % (10/03/19 0800) Vital Signs (24h Range):  Temp:  [94 °F (34.4 °C)-99.8 °F  (37.7 °C)] 94 °F (34.4 °C)  Pulse:  [] 81  Resp:  [9-57] 24  SpO2:  [68 %-100 %] 99 %  BP: ()/() 115/56  Arterial Line BP: ()/(29-56) 112/53     Weight: 53.8 kg (118 lb 9.7 oz)  Body mass index is 20.36 kg/m².    SpO2: 99 %  O2 Device (Oxygen Therapy): ventilator      Intake/Output Summary (Last 24 hours) at 10/3/2019 0853  Last data filed at 10/3/2019 0800  Gross per 24 hour   Intake 1821 ml   Output 560 ml   Net 1261 ml       Lines/Drains/Airways     Central Venous Catheter Line                 Tunneled Central Line Insertion/Assessment - Double Lumen  10/03/19 0114 right internal jugular less than 1 day          Drain                 Urethral Catheter 09/19/19 1240 Double-lumen 16 Fr. 13 days         Rectal Tube 09/27/19 1000 rectal tube w/ balloon (indicate number of mLs) 5 days         Chest Tube 10/03/19 0500 Right Midaxillary less than 1 day         NG/OG Tube 10/02/19 2200 Center mouth less than 1 day          Airway                 Airway - Non-Surgical 10/02/19 2155 Endotracheal Tube less than 1 day          Arterial Line                 Arterial Line 10/02/19 2330 Right Radial less than 1 day          Peripheral Intravenous Line                 Peripheral IV - Single Lumen 10/02/19 0825 20 G Anterior;Right Hand 1 day         Peripheral IV - Single Lumen 10/02/19 2037 20 G Left Wrist less than 1 day                Physical Exam   Constitutional: She appears well-developed.   HENT:   Head: Normocephalic and atraumatic.   Intubated   Eyes: Pupils are equal, round, and reactive to light. Conjunctivae are normal.   Neck: No JVD present.   Cardiovascular: Normal rate, regular rhythm, normal heart sounds and intact distal pulses.   No murmur heard.  Pulmonary/Chest: She has no wheezes.   Intubated on ventilator   Abdominal: There is no rebound and no guarding.   Musculoskeletal: She exhibits no edema.   Neurological:   Sedated and intubated. Unable to obtain Neurological exam   Skin: Skin  is warm and dry. No rash noted.   Psychiatric:   Sedated and intubated       Significant Labs:   CMP   Recent Labs   Lab 10/02/19  2000 10/03/19  0136 10/03/19  0614    145  145  145 141   K 3.4* 5.0  5.0  5.0 4.8    113*  113*  113* 113*   CO2 28 22*  22*  22* 18*   * 227*  227*  227* 204*   BUN 53* 55*  55*  55* 56*   CREATININE 1.1 1.3  1.3  1.3 1.3   CALCIUM 8.0* 7.8*  7.8*  7.8* 7.5*   PROT 7.0 6.9  --    ALBUMIN 1.7* 1.7*  --    BILITOT 0.8 0.6  --    ALKPHOS 179* 176*  --    AST 80* 79*  --    ALT 25 27  --    ANIONGAP 8 10  10  10 10   ESTGFRAFRICA >60.0 49.7*  49.7*  49.7* 49.7*   EGFRNONAA 52.8* 43.2*  43.2*  43.2* 43.2*    and CBC   Recent Labs   Lab 10/02/19  2000 10/03/19  0136 10/03/19  0614   WBC 7.42 14.05*  14.05* 8.29   HGB 11.5* 11.9*  11.9* 10.7*   HCT 34.6* 37.5  37.5 35.1*    291  291 194       Significant Imaging: Echocardiogram:   Transthoracic echo (TTE) complete (Cupid Only):   Results for orders placed or performed during the hospital encounter of 09/19/19   Echo Color Flow Doppler? Yes   Result Value Ref Range    BSA 1.37 m2    LVIDD 3.90 3.5 - 6.0 cm    IVS 1.01 0.6 - 1.1 cm    PW 0.97 0.6 - 1.1 cm    LVIDS 3.27 2.1 - 4.0 cm    FS 16 28 - 44 %    LV mass 120.39 g    Left Ventricle Relative Wall Thickness 0.50 cm    E/A ratio 0.62     E wave decelartion time 205.49 msec    Pulm vein S/D ratio 1.37     MV Peak E Jose A 0.61 m/s    TR Max Jose A 2.44 m/s    MV Peak A Jose A 0.99 m/s    PV Peak S Jose A 0.67 m/s    PV Peak D Jose A 0.49 m/s    LV Systolic Volume 43.07 mL    LV Systolic Volume Index 31.1 mL/m2    LV Diastolic Volume 65.77 mL    LV Diastolic Volume Index 47.44 mL/m2    LV Mass Index 87 g/m2    Triscuspid Valve Regurgitation Peak Gradient 24 mmHg    Right Atrial Pressure (from IVC) 3 mmHg    TV rest pulmonary artery pressure 27 mmHg    Narrative    · Concentric left ventricular remodeling.  · Moderately decreased left ventricular systolic  function. The estimated   ejection fraction is 35%  · No wall motion abnormalities.  · Atrial fibrillation not observed.  · Indeterminate left ventricular diastolic function.  · Normal right ventricular systolic function.  · Mild-to-moderate mitral regurgitation.  · Mild to moderate tricuspid regurgitation.  · No pulmonary hypertension present.  · Normal central venous pressure (3 mm Hg).  · The estimated PA systolic pressure is 27 mm Hg  · Small circumferential pericardial effusion. Effusion is fluid.     1) Ef of lv slightly improved   2) small circumferential pericardial effusion   3) pulmonary hypertension no longer documented   4) vegetations same size   5) mitral leaflets coapt normally - moderate but not severe  Mitral regurg   seen

## 2019-10-03 NOTE — SUBJECTIVE & OBJECTIVE
Past Medical History:   Diagnosis Date    Anxiety     Carotid bruit     Chronic pain     Cystitis     Cystocele, unspecified (CODE)     Depression     GI bleed     History of uterine fibroid     Shortness of breath on exertion     Spinal stenosis     Urinary retention        Past Surgical History:   Procedure Laterality Date    ANTERIOR VAGINAL REPAIR  10-    CARDIAC CATHETERIZATION  age 12    CHOLECYSTECTOMY  2015    COLONOSCOPY  12/12/2018    aborted due to poor colon prep    COLONOSCOPY N/A 12/12/2018    Procedure: COLONOSCOPY;  Surgeon: Renard Gonsalves MD;  [facility];  Service: Endoscopy;  Laterality: N/A;    HYSTERECTOMY  1989    AMANDA    tubiligation         Review of patient's allergies indicates:   Allergen Reactions    Pcn [penicillins]        Family History     Problem Relation (Age of Onset)    Alzheimer's disease Mother    Hypertension Brother, Sister    Osteoarthritis Mother    Thyroid disease Mother        Tobacco Use    Smoking status: Current Every Day Smoker     Packs/day: 0.50     Years: 40.00     Pack years: 20.00     Types: Cigarettes    Smokeless tobacco: Never Used   Substance and Sexual Activity    Alcohol use: No    Drug use: No    Sexual activity: Yes     Partners: Male      Review of Systems   Unable to perform ROS: Mental status change     Objective:     Vital Signs (Most Recent):  Temp: 98 °F (36.7 °C) (10/02/19 1915)  Pulse: 63 (10/03/19 0121)  Resp: (!) 32 (10/03/19 0121)  BP: (!) 95/52 (10/03/19 0030)  SpO2: (!) 82 % (10/03/19 0121) Vital Signs (24h Range):  Temp:  [98 °F (36.7 °C)-99.8 °F (37.7 °C)] 98 °F (36.7 °C)  Pulse:  [] 63  Resp:  [9-57] 32  SpO2:  [68 %-100 %] 82 %  BP: ()/() 95/52   Weight: 53.8 kg (118 lb 9.7 oz)  Body mass index is 20.36 kg/m².      Intake/Output Summary (Last 24 hours) at 10/3/2019 0216  Last data filed at 10/2/2019 2200  Gross per 24 hour   Intake 1020 ml   Output 205 ml   Net 815 ml       Physical  Exam   Constitutional: She appears well-developed and well-nourished. She appears listless. No distress. Nasal cannula in place.   HENT:   Head: Normocephalic and atraumatic.   Right Ear: External ear normal.   Left Ear: External ear normal.   Nose: Nose normal.   Mouth/Throat: Oropharynx is clear and moist.   Eyes: Pupils are equal, round, and reactive to light. Conjunctivae and EOM are normal.   3/sluggish bilaterally   Neck: Normal range of motion. Neck supple.   Cardiovascular: Normal rate, regular rhythm, normal heart sounds and intact distal pulses.   Pulmonary/Chest: Effort normal and breath sounds normal. No respiratory distress. She has no wheezes. She has no rales.   Diminished right lower lateral    Abdominal: Soft. She exhibits no distension. Bowel sounds are decreased. There is no tenderness.   Genitourinary:   Genitourinary Comments: Henriquez changed on admit   Musculoskeletal: She exhibits no edema, tenderness or deformity.   Neurological: She has normal strength. She appears listless. She is disoriented. GCS eye subscore is 4. GCS verbal subscore is 4. GCS motor subscore is 6.   4/5 strength x4 extremities   Skin: Skin is warm and dry. Capillary refill takes 2 to 3 seconds. She is not diaphoretic.   Nursing note and vitals reviewed.      Vents:  Vent Mode: A/C (10/03/19 0121)  Ventilator Initiated: Yes (10/02/19 2200)  Set Rate: 20 bmp (10/03/19 0121)  Vt Set: 350 mL (10/03/19 0121)  PEEP/CPAP: 3 cmH20 (10/03/19 0121)  Oxygen Concentration (%): 93 (10/03/19 0000)  Peak Airway Pressure: 39 cmH2O (10/03/19 0121)  Plateau Pressure: 32 cmH20 (10/03/19 0121)  Total Ve: 7.54 mL (10/03/19 0121)  F/VT Ratio<105 (RSBI): (!) 84.66 (10/03/19 0121)  Lines/Drains/Airways     Central Venous Catheter Line                 Tunneled Central Line Insertion/Assessment - Double Lumen  10/03/19 0114 right internal jugular less than 1 day          Drain                 Urethral Catheter 09/19/19 1240 Double-lumen 16 Fr. 13  days         Rectal Tube 09/27/19 1000 rectal tube w/ balloon (indicate number of mLs) 5 days         NG/OG Tube 10/02/19 2200 Center mouth less than 1 day          Airway                 Airway - Non-Surgical 10/02/19 2155 Endotracheal Tube less than 1 day          Arterial Line                 Arterial Line 10/02/19 2330 Right Radial less than 1 day          Peripheral Intravenous Line                 Peripheral IV - Single Lumen 10/02/19 0825 20 G Anterior;Right Hand less than 1 day         Peripheral IV - Single Lumen 10/02/19 2037 20 G Left Wrist less than 1 day              Significant Labs:    CBC/Anemia Profile:  Recent Labs   Lab 10/01/19  0352 10/02/19  0332 10/02/19  2000   WBC 9.07 7.92 7.42   HGB 7.9* 11.0* 11.5*   HCT 25.4* 33.9* 34.6*    194 191   * 93 90   RDW 16.6* 19.7* 19.6*        Chemistries:  Recent Labs   Lab 10/02/19  1153 10/02/19  2000 10/03/19  0136   * 145 145  145  145   K 4.1 3.4* 5.0  5.0  5.0    109 113*  113*  113*   CO2 28 28 22*  22*  22*   BUN 52* 53* 55*  55*  55*   CREATININE 1.2 1.1 1.3  1.3  1.3   CALCIUM 8.2* 8.0* 7.8*  7.8*  7.8*   ALBUMIN  --  1.7* 1.7*   PROT  --  7.0 6.9   BILITOT  --  0.8 0.6   ALKPHOS  --  179* 176*   ALT  --  25 27   AST  --  80* 79*   MG  --  1.7 2.9*   PHOS  --  3.6 6.0*       All pertinent labs within the past 24 hours have been reviewed.    Significant Imaging: I have reviewed and interpreted all pertinent imaging results/findings within the past 24 hours.

## 2019-10-03 NOTE — ASSESSMENT & PLAN NOTE
--multifactorial secondary to complete left lung collapse, septic emboli, pleural effusions, suspected pna  --intubated shortly after arrival for hypoxemia  --s/p bronch x2 with significant clearance of secretions from left mainstem & improved aeration on f/u xray  --f/u sputum cx  --continue cefepime & flagyl   --s/p thora 10/1; transudative based on lights criteria, though concern for empyema based on OSH pulm note   --f/u pleural fluid cx OSH  --right chest tube placed this am, 345 mL output after TPA administration; f/u pleural fluid studies/cx  --CTA neg for PE  --wean ventilator as tolerated

## 2019-10-03 NOTE — PLAN OF CARE
Varun Shea MD PhD  [address] / [place] [address]    [facility] [place] - [address]  [address] [place] [address]  Phone: [phone] Fax: [phone]    Payor: [employer] MEDICARE / Plan: [employer] 65 / Product Type: Medicare Advantage /     Extended Emergency Contact Information  Primary Emergency Contact: Ari Chaudhary  Address: [address] [place] [address]           [place] [address] [facility]  Home Phone: [phone]  Mobile Phone: [phone]  Relation: Spouse    No future appointments.       10/03/19 1512   Discharge Assessment   Assessment Type Discharge Planning Assessment   Confirmed/corrected address and phone number on facesheet? Yes   Assessment information obtained from? Caregiver   Communicated expected length of stay with patient/caregiver no   Prior to hospitilization cognitive status: Alert/Oriented   Prior to hospitalization functional status: Independent   Current cognitive status: Coma/Sedated/Intubated   Current Functional Status: Completely Dependent   Facility Arrived From: Transfer: [facility] With spouse   Able to Return to Prior Arrangements other (see comments)  (TBD)   Is patient able to care for self after discharge? Unable to determine at this time (comments)   Who are your caregiver(s) and their phone number(s)? Ari Chaudhary (spouse) [phone], [phone]   Patient's perception of discharge disposition other (comments)  (unable to assess, patient intubated/sedated )   Readmission Within the Last 30 Days no previous admission in last 30 days   Patient currently being followed by outpatient case management? No   Patient currently receives any other outside agency services? No   Equipment Currently Used at Home none   Do you have any problems affording any of your prescribed medications? No   Is the patient taking medications as prescribed? yes   Does  the patient have transportation home? Yes   Transportation Anticipated family or friend will provide   Does the patient receive services at the [facility]? No   Discharge Plan A Home with family   Discharge Plan B Home Health;Home with family   DME Needed Upon Discharge  other (see comments)  (TBD)   Patient/Family in Agreement with Plan yes       Kita Kelly RN, NC  Case Management-Critical Care     [phone]

## 2019-10-03 NOTE — HPI
63yof IVDU with persistent bacteremia found to have endocarditis bactreremia MSSA. MRI L spine showed L2-L4 epidural abscess and neurosurgery was consulted for evaluation. Of note, pt was intubated, bronched, chest tube plced and sedation and paralytics administered due to decompensation with desaturation and hypotension.

## 2019-10-03 NOTE — HOSPITAL COURSE
[place] [facility] the patient was encephalopathic, but able to follow commands. She was hemodynamically stable and satting well on 2L NC. Shortly after arrival, the patient became acutely hypertensive and hypoxemic necessitating intubation and mechanical ventilation. She was extremely difficult to oxygenate (P:F ratio 52). Bronch was performed which was unremarkable. The patient became hypotensive and was started on vasopressors. She was paralyzed with nimbex due to tachypnea and vent dyssynchrony. Bedside echo was performed which showed reduced LVEF, hyperdynamic LV; no significant mitral regurg was noted. The patient went for CTA without evidence of pulmonary embolism. Imaging revealed left lung collaspse and repeat bronch was performed with sputum cx sent. Oxygenation improved quickly and paralytic was discontinued. Right chest tube placed & pleural fluid sent; gram stain with GPCs. Neurosurgery consulted for epidural abscess, plans for OR potentially Monday. Cardiology consult for endocarditis; currently not a surgical candidate. Repeat TTE performed with improved EF and without mention of previously noted vegetations. ID and allergy consulted; oxacillin densensitization in process given pcn allergy.         Consulted general surgery for bilateral shoulder swelling; ultrasound concerning for phlegmon vs developing abscess without organized fluid collection. S/p I&D of shoulder abscesses 10/5/19. Extubated on 10/5/19 and doing well on RA. Ongoing neurosurgery evaluation for drainage of epidural abscess; neurosurgery team verbalized possible OR procedure on Wednesday (10/9) with deferred consult to IR to possibly drain the abscesses on 10/8.    Repeat CXR done for tachypnea/increased WOB. CXR unremarkable. Head CT was repeated and showed new R basal ganglia infarcts. MRI of brain showed evidence of R thalamic infarct and ventriculitis. CTA of head completed 10/8 showing atherosclerotic plaquing of the distal  vertebral arteries and concern for noncalcified plaquing and stenosis; no high-grade stenosis or proximal occlusion. Patient underwent laminectomy on 10/9 with epidural cultures sent to lab. Continuing antibiotic regimen. Plan for step-down with plan for swallow evaluation and appropriate diet to follow.

## 2019-10-03 NOTE — HPI
Mesha Mckenzie is a 62 yo female with IVDU (heroin), prior GIB in 2018, and spinal stenosis who presents from [facility] for neurosurgery eval for epidural abscess in setting of MSSA endocarditis. She had initially presented to [facility] on 9/19 for evaluation of nausea, vomiting, and diarrhea for the past 5 days. 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 She was initially started on vancomycin, flagyl, and levoquin. Blood cultures from 9/19 positive for MSSA and were thought to be likely source. Urine culture from 9/19 also positive for MSSA. CT head on 9/19 and 9/22 negative for septic emboli. TTE on 9/21 showed possible vegetation on mitral valve (EF 55%).  DHARMESH was performed on 9/25, showing mitral valve vegetation (5x6mm) on the posterior leaflet as well as tricuspid valve vegetation (10x5mm) on septal leaflet. CT chest was repeated on 9/26 and was showed for previously seen bilateral consolidations that were now noted to be cavitary/cystic in nature. Repeat TTE on 9/26 reported reduced EF from previous 55% to 33%. Cardiothoracic evaluated patient for valve replacement but surgery was deferred due to patient's multiple medical issues. Last positive blood culture was 9/26. Her antibiotics were later changed to daptomycin, cefazolin, and vancomycin. Thoracentesis on 10/1 showed evidence of empyema. MRI spine on 10/1 showed lumbar paraspinal muscle infection and small dorsal epidural abscess from L2-4. She was [place] [facility] [place].     On arrival to [facility], she was noted to be encephalopathic. Shortly after arriving, she became hypoxic and hypertensive and required intubation. She also required neuromuscular blockade for tachypnea and vent dyssynchrony. Vasopressors were also started due to  hypotension.

## 2019-10-03 NOTE — ASSESSMENT & PLAN NOTE
--baseline creatinine ~ 0.7; was 2.9 on initial presentation to OSH, improved with hydration over hospital course; now 1.3; BUN 56  --FeNa/FeUrea consistent with pre-renal etiology; possible component of ATN given hypotension yesterday evening  --patient found to have non anion gap metabolic acidosis likely 2/2 RTA; sodium bicarb drip initiated

## 2019-10-03 NOTE — CONSULTS
"  [facility]  Adult Nutrition  Consult Note    SUMMARY     Recommendations  Recommendation/Intervention:   1. As medically able, recommend initiating TF of Impact Peptide 1.5 at a goal rate of 35 mL/hr - to provide 1260 kcal/day (1558 kcal w/propofol), 79g protein/day, and 647mL free fluid/day.   2. If unable to initiate TF, recommend initiating Custom TPN 70g AA and 190g Dextrose - to provide 926 kcal/day (1224 w/propofol) and 70g protein/day.    -If no longer on propofol, recommend adding IV lipids daily.   RD to monitor.    Goals: Patient to receive nutrition by RD follow-up  Nutrition Goal Status: new  Communication of RD Recs: (POC)    Reason for Assessment  Reason For Assessment: consult  Diagnosis: (respiratory failure)  Relevant Medical History: IVDU, endocarditis  General Information Comments: Transferred from [facility] yesterday, required intubation. Intubated, sedated. Paralytic weaned. Was on TPN prior to transfer due to increased risk for bleeding if NG placed. NFPE completed 9/23, patient continues with severe malnutrition.  Nutrition Discharge Planning: Unable to determine at this time.    Nutrition/Diet History  Factors Affecting Nutritional Intake: NPO, on mechanical ventilation    Anthropometrics  Temp: (!) 94 °F (34.4 °C)  Height Method: Stated  Height: 5' 4" (162.6 cm)  Height (inches): 64 in  Weight Method: Bed Scale  Weight: 53.8 kg (118 lb 9.7 oz)  Weight (lb): 118.61 lb  Ideal Body Weight (IBW), Female: 120 lb  % Ideal Body Weight, Female (lb): 98.84 lb  BMI (Calculated): 20.4  BMI Grade: 18.5-24.9 - normal    Lab/Procedures/Meds  Pertinent Labs Reviewed: reviewed  Pertinent Labs Comments: Cl 113, BUN 55, Glu 227, POCT Glu 196, Ca 7.8  Pertinent Medications Reviewed: reviewed  Pertinent Medications Comments: famotidine, methylprednisolone, fentanyl, levophed, propofol    Estimated/Assessed Needs  Weight Used For Calorie Calculations: 47.4 kg (104 lb 8 oz)(admit " weight)  Energy Calorie Requirements (kcal): 1422 kcal/day  Energy Need Method: Kcal/kg(30)  Protein Requirements: 57-71 g/day(1.2-1.5 g/kg)  Weight Used For Protein Calculations: 47.4 kg (104 lb 8 oz)(admit weight)  Fluid Requirements (mL): 1 mL/kcal or per MD  Estimated Fluid Requirement Method: RDA Method  RDA Method (mL): 1422    Nutrition Prescription Ordered  Current Diet Order: NPO    Evaluation of Received Nutrient/Fluid Intake  Other Calories (kcal): 298(propofol)  I/O: Noted  Comments: LBM 10/2  % Intake of Estimated Energy Needs: 0 - 25 %  % Meal Intake: NPO    Nutrition Risk  Level of Risk/Frequency of Follow-up: high(2x/week)     Assessment and Plan  Severe malnutrition  Nutrition Problem  Malnutrition in the context of Acute Illness/Injury    Related to (etiology):  Inadequate energy intake and increased nutrient needs    Signs and Symptoms (as evidenced by):  Energy Intake: <50% of estimated energy requirement for 8 days  Body Fat Depletion: mild depletion of orbitals, triceps and thoracic and lumbar region   Muscle Mass Depletion: mild and moderate depletion of temples, clavicle region, scapular region, interosseous muscle and lower extremities   Weight Loss: 22.6% x 1 year   Fluid Accumulation: mild    Interventions (treatment strategy):  Collaboration of nutrition care with other providers    Nutrition Diagnosis Status:  Continues    Monitor and Evaluation  Food and Nutrient Intake: energy intake  Food and Nutrient Adminstration: enteral and parenteral nutrition administration  Anthropometric Measurements: weight, weight change  Biochemical Data, Medical Tests and Procedures: electrolyte and renal panel, gastrointestinal profile, inflammatory profile  Nutrition-Focused Physical Findings: overall appearance     Malnutrition Assessment  Malnutrition Type: acute illness or injury  Orbital Region (Subcutaneous Fat Loss): mild depletion  Upper Arm Region (Subcutaneous Fat Loss): mild depletion  Thoracic  and Lumbar Region: mild depletion   Shinto Region (Muscle Loss): moderate depletion  Clavicle Bone Region (Muscle Loss): moderate depletion  Clavicle and Acromion Bone Region (Muscle Loss): moderate depletion  Scapular Bone Region (Muscle Loss): mild depletion  Dorsal Hand (Muscle Loss): mild depletion  Patellar Region (Muscle Loss): mild depletion  Anterior Thigh Region (Muscle Loss): mild depletion  Posterior Calf Region (Muscle Loss): mild depletion   Edema (Fluid Accumulation): 0-->no edema present     Nutrition Follow-Up  RD Follow-up?: Yes

## 2019-10-03 NOTE — PROGRESS NOTES
Pharmacokinetic Initial Assessment: IV Vancomycin    Assessment/Plan:    Patient received intravenous vancomycin 1000 mg every 24 hours x 2 doses and 750 mg every 24 hours x 1 dose at [facility]. Random level is 24.4 mcg/mL. Will schedule vancomycin 750 mg every 24 hours to start at 0500 per previous schedule.  Desired empiric serum trough concentration is 15 to 20 mcg/mL  Draw vancomycin trough level 30 min prior to following dose on 10/04/2019 at approximately 0430  Pharmacy will continue to follow and monitor vancomycin.      Please contact pharmacy at extension [phone] with any questions regarding this assessment.     Thank you for the consult,   Gage Wright       Patient brief summary:  Mesha Mckenzie is a 63 y.o. female initiated on antimicrobial therapy with IV Vancomycin for treatment of suspected bacteremia    Drug Allergies:   Review of patient's allergies indicates:   Allergen Reactions    Pcn [penicillins]        Actual Body Weight:   53.8 kg    Renal Function:   Estimated Creatinine Clearance: 43.3 mL/min (based on SCr of 1.1 mg/dL).,     Dialysis Method (if applicable):  N/A    CBC (last 72 hours):  Recent Labs   Lab Result Units 09/30/19  0121 10/01/19  0352 10/02/19  0332 10/02/19  2000   WBC K/uL 10.49 9.07 7.92 7.42   Hemoglobin g/dL 8.5* 7.9* 11.0* 11.5*   Hematocrit % 26.6* 25.4* 33.9* 34.6*   Platelets K/uL 148* 182 194 191   Gran% %  --   --   --  67.5   Lymph% %  --   --   --  22.4   Mono% %  --   --   --  6.3   Eosinophil% %  --   --   --  2.2   Basophil% %  --   --   --  0.7   Differential Method   --   --   --  Automated       Metabolic Panel (last 72 hours):  Recent Labs   Lab Result Units 09/30/19  0121 09/30/19  1248 09/30/19  1943 10/01/19  0352 10/01/19  0800 10/01/19  1127 10/01/19  1958 10/02/19  0332 10/02/19  1153 10/02/19  2000   Sodium mmol/L 145  --   --  148*  --   --   --  152* 149* 145   Potassium mmol/L 3.0* 3.0* 2.9* 4.0 3.8  --  3.5 3.3* 4.1 3.4*   Chloride mmol/L 100   --   --  108  --   --   --  110 110 109   CO2 mmol/L 34*  --   --  30*  --   --   --  30* 28 28   Glucose mg/dL 150*  --   --  151*  --   --   --  116* 189* 161*   Glucose, Fluid mg/dL  --   --   --   --   --  31  --   --   --   --    BUN, Bld mg/dL 49*  --   --  45*  --   --   --  46* 52* 53*   Creatinine mg/dL 1.0  --   --  1.0  --   --   --  1.2 1.2 1.1   Albumin g/dL  --   --   --   --   --   --   --   --   --  1.7*   Total Bilirubin mg/dL  --   --   --   --   --   --   --   --   --  0.8   Alkaline Phosphatase U/L  --   --   --   --   --   --   --   --   --  179*   AST U/L  --   --   --   --   --   --   --   --   --  80*   ALT U/L  --   --   --   --   --   --   --   --   --  25   Magnesium mg/dL  --   --   --   --   --   --   --   --   --  1.7   Phosphorus mg/dL  --   --   --   --   --   --   --   --   --  3.6       Drug levels (last 3 results):  Recent Labs   Lab Result Units 09/30/19  0128 10/02/19  0332 10/02/19  2000   Vancomycin, Random ug/mL  --   --  24.4   Vancomycin-Trough ug/mL 13.2 20.7  --        Microbiologic Results:  Microbiology Results (last 7 days)     Procedure Component Value Units Date/Time    Blood culture [[id number]] Collected:  10/02/19 2025    Order Status:  Sent Specimen:  Blood from Peripheral, Wrist, Right Updated:  10/02/19 2109    Blood culture [[id number]] Collected:  10/02/19 0830    Order Status:  Sent Specimen:  Blood from Peripheral, Hand, Right Updated:  10/02/19 2108

## 2019-10-03 NOTE — ASSESSMENT & PLAN NOTE
--secondary to sepsis  --Brain MRI 9/30 with lacunar infarcts, neg for acute intracranial abnormality  --currently sedated with propofol & fentanyl while paralyzed  --sedation vacation once oxygenation improved/nimbex off

## 2019-10-03 NOTE — ASSESSMENT & PLAN NOTE
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    Plan:  - When deemed medically stable, will be willing to take patient to OR for management of abscess.  - Will follow up exam when patient is off paralytics  - Please contact with any questions or updates.

## 2019-10-03 NOTE — ASSESSMENT & PLAN NOTE
--seen on CT 9/19 with improvement noted on f/u CT 9/25  --concern for ileus on 9/25 CT; rectal tube in place with liquid stool output  --stool 9/27 neg for c diff; stool cx neg  --KUB with distended loops of bowel; maintain NGT to LIWS

## 2019-10-03 NOTE — ASSESSMENT & PLAN NOTE
Patient is a 62 yo F with PMH of IVDA who presents with Mitral Valve and Tricuspid Valve vegetations, leading to a complicated hospital course involving severe mitral valve regurgitation causing heart failure, bilateral septic emboli, right sided pleural effusion requiring chest tube placement, left sided main-stem obstruction leading to complete lung collapse requiring bronchoscopy x2, and a L2-L4 epidural spinal abscess    Plan:  -Agree with ID consult. Continue antibiotics per ID  -Possible valve replacement following 6 weeks antibiotics course  -Medical management for now  -Aggressive electrolyte management

## 2019-10-03 NOTE — HPI
Patient is a 63 y.o. female with significant past medical history of depression, anxiety and IVDA presented to [facility] ED on 9/19 with c/o N/V/D and weakness x ~5 days. The patient was found to be in septic shock with pneumonia and colitis and was transferred to [facility] ICU for further management. Pt was admitted to the intensive care unit and was treated with broad-spectrum antibiotics. 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 CT was repeated on 9/25 and showed bilateral cavitary lesions likely secondary to septic emboli, small pericardial effusion and ileus. DHARMESH was performed on 9/25 which revealed  mitral and tricuspid valve endocarditis. Blood cultures (9/19 - 9/26) have grown methicillin sensitive Staph aureus. Blood cultures as of 9/29 have been NGTD. ID was following the patient for antibiotic management with most recent regimen being cefazolin, vancomycin and daptomycin. Repeat Echo on 9/28 showed LVEF 35% and persistent vegatations. On 9/30 the patient had an MRI brain that showed remote lacunar infarcts without acute intracranial abnormality. On 10/1 MRI revealed spinal epidural abscess at L2-4 level. Thoracentesis was performed on 10/1. The patient was followed by Cardiology team who considered valve replacement surgery but due to patient's other medical issues, Cardiovascular surgery intervention was deferred. The patient was transferred to [facility] for neurosurgery evaluation of epidural abscess.

## 2019-10-03 NOTE — ASSESSMENT & PLAN NOTE
--last echo (OSH) with EF 35%, LV concentric remodeling, indeterminate diastolic function  --previously on vasotec, metoprolol and milrinone at OSH  --bedside echo with depressed EF, RV not significantly dilated  --see acute bacterial endocarditis for echo results

## 2019-10-03 NOTE — CONSULTS
[facility]  Neurosurgery  Consult Note    Consults  Subjective:     Chief Complaint/Reason for Admission: bacteremia, endocarditis, epidural abscess    History of Present Illness: 63yof IVDU with persistent bacteremia found to have endocarditis bactreremia MSSA. MRI L spine showed L2-L4 epidural abscess and neurosurgery was consulted for evaluation. Of note, pt was intubated, bronched, chest tube plced and sedation and paralytics administered due to decompensation with desaturation and hypotension.    Medications Prior to Admission   Medication Sig Dispense Refill Last Dose    diazepam (VALIUM) 5 MG tablet Take 10 mg by mouth 2 (two) times daily.    12/12/2018 at Unknown time       Review of patient's allergies indicates:   Allergen Reactions    Pcn [penicillins]        Past Medical History:   Diagnosis Date    Anxiety     Carotid bruit     Chronic pain     Cystitis     Cystocele, unspecified (CODE)     Depression     GI bleed     History of uterine fibroid     Shortness of breath on exertion     Spinal stenosis     Urinary retention      Past Surgical History:   Procedure Laterality Date    ANTERIOR VAGINAL REPAIR  10-    CARDIAC CATHETERIZATION  age 12    CHOLECYSTECTOMY  2015    COLONOSCOPY  12/12/2018    aborted due to poor colon prep    COLONOSCOPY N/A 12/12/2018    Procedure: COLONOSCOPY;  Surgeon: Renard Gonsalves MD;  Location: [facility];  Service: Endoscopy;  Laterality: N/A;    [facility]Y  1989    [facility]    tubiligation       Family History     Problem Relation (Age of Onset)    Alzheimer's disease Mother    Hypertension Brother, Sister    Osteoarthritis Mother    Thyroid disease Mother        Tobacco Use    Smoking status: Current Every Day Smoker     Packs/day: 0.50     Years: 40.00     Pack years: 20.00     Types: Cigarettes    Smokeless tobacco: Never Used   Substance and Sexual Activity    Alcohol use: No    Drug use: No    Sexual activity: Yes      Partners: Male     Review of Systems   Unable to perform ROS: Intubated     Objective:     Weight: 53.8 kg (118 lb 9.7 oz)  Body mass index is 20.36 kg/m².  Vital Signs (Most Recent):  Temp: 97.7 °F (36.5 °C) (10/03/19 0300)  Pulse: 82 (10/03/19 0712)  Resp: (!) 24 (10/03/19 0709)  BP: (!) 111/56 (10/03/19 0600)  SpO2: 100 % (10/03/19 0712) Vital Signs (24h Range):  Temp:  [97.7 °F (36.5 °C)-99.8 °F (37.7 °C)] 97.7 °F (36.5 °C)  Pulse:  [] 82  Resp:  [9-57] 24  SpO2:  [68 %-100 %] 100 %  BP: ()/() 111/56  Arterial Line BP: ()/(29-54) 90/48     Date 10/03/19 0700 - 10/04/19 0659   Shift 8397-6175 4346-5206 7507-2707 24 Hour Total   INTAKE   Shift Total(mL/kg)       OUTPUT   Urine(mL/kg/hr) 35   35   Chest Tube 5   5   Shift Total(mL/kg) 40(0.7)   40(0.7)   Weight (kg) 53.8 53.8 53.8 53.8              Vent Mode: A/C  Oxygen Concentration (%):  [] 60  Resp Rate Total:  [8.9 br/min-34 br/min] 24 br/min  Vt Set:  [350 mL] 350 mL  PEEP/CPAP:  [3 cmH20-5 cmH20] 5 cmH20  Mean Airway Pressure:  [7.1 cmH20-9.6 cmH20] 8.9 cmH20         Chest Tube 10/03/19 0500 Right Midaxillary (Active)   Output (mL) 5 mL 10/3/2019  7:00 AM            NG/OG Tube 10/02/19 2200 Center mouth (Active)   Placement Check placement verified by x-ray 10/3/2019  3:00 AM   Tolerance no signs/symptoms of discomfort 10/2/2019 10:00 PM   Securement secured to commercial device 10/2/2019 10:00 PM   Suction Setting/Drainage Method suction at;low;intermittent setting 10/2/2019 10:00 PM   Insertion Site Appearance no redness, warmth, tenderness, skin breakdown, drainage 10/2/2019 10:00 PM            Rectal Tube 09/27/19 1000 rectal tube w/ balloon (indicate number of mLs) (Active)   Balloon Inflation Volume (mL) 45 9/27/2019  4:00 PM   Reposition drainage bags for BMS & Henriquez on opposite sides of bed 10/3/2019  3:00 AM   Outcome comfort enhanced 10/2/2019  4:00 PM   Stool Color Brown 10/3/2019  3:00 AM   Insertion Site Appearance  "red 10/3/2019  3:00 AM   Flush/Irrigation flushed w/;water 10/3/2019  3:00 AM   Intake (mL) 20 mL 10/2/2019  7:00 PM   Rectal Tube Output 100 mL 10/3/2019  6:00 AM            Urethral Catheter 09/19/19 1240 Double-lumen 16 Fr. (Active)   Site Assessment Clean;Intact 10/3/2019  3:00 AM   Collection Container Urimeter 10/3/2019  3:00 AM   Securement Method secured to top of thigh w/ adhesive device 10/3/2019  3:00 AM   Catheter Care Performed yes 10/3/2019  3:00 AM   Reason for Continuing Urinary Catheterization Critically ill in ICU requiring intensive monitoring 10/3/2019  3:00 AM   CAUTI Prevention Bundle StatLock in place w 1" slack;Intact seal between catheter & drainage tubing;Green sheeting clip in use;Drainage bag/urimeter off the floor;No dependent loops or kinks;Drainage bag/urimeter not overfilled (<2/3 full);Drainage bag/urimeter below bladder 10/3/2019  3:00 AM   Output (mL) 35 mL 10/3/2019  7:00 AM       Physical Exam:    Constitutional:   Intubated, sedated, paralyzed. Chest tube in place. Unable to assess pt due to declining status       Significant Labs:  Recent Labs   Lab 10/02/19  2000 10/03/19  0136 10/03/19  0614   * 227*  227*  227* 204*    145  145  145 141   K 3.4* 5.0  5.0  5.0 4.8    113*  113*  113* 113*   CO2 28 22*  22*  22* 18*   BUN 53* 55*  55*  55* 56*   CREATININE 1.1 1.3  1.3  1.3 1.3   CALCIUM 8.0* 7.8*  7.8*  7.8* 7.5*   MG 1.7 2.9*  --      Recent Labs   Lab 10/02/19  2000 10/03/19  0136 10/03/19  0614   WBC 7.42 14.05*  14.05* 8.29   HGB 11.5* 11.9*  11.9* 10.7*   HCT 34.6* 37.5  37.5 35.1*    291  291 194     Recent Labs   Lab 10/02/19  2000   INR 1.1     Microbiology Results (last 7 days)     Procedure Component Value Units Date/Time    Aerobic culture [[id number]] Collected:  10/03/19 0549    Order Status:  Sent Specimen:  Pleural Fluid Updated:  10/03/19 0656    Culture, Anaerobic [[id number]] Collected:  10/03/19 0549    Order " Status:  Sent Specimen:  Pleural Fluid Updated:  10/03/19 0656    Gram stain [[id number]] Collected:  10/03/19 0549    Order Status:  Sent Specimen:  Pleural Fluid Updated:  10/03/19 0656    Blood culture [[id number]] Collected:  10/02/19 0830    Order Status:  Completed Specimen:  Blood from Peripheral, Hand, Right Updated:  10/03/19 0345     Blood Culture, Routine No Growth to date    Blood culture [[id number]] Collected:  10/02/19 2025    Order Status:  Completed Specimen:  Blood from Peripheral, Wrist, Right Updated:  10/03/19 0345     Blood Culture, Routine No Growth to date        All pertinent labs from the last 24 hours have been reviewed.    Significant Diagnostics:  MRI Lumbar Spine 10/01:  1. Rim-enhancing collections within the left dorsal epidural space at the L2-3 and L3-4 levels with mass effect and narrowing of the thecal sac.  These collections are nonspecific and could represent epidural abscesses, given the patient's history of bacteremia.  2. Rim-enhancing, multilocular collection posterior to the right L4-5 facet joint, which may represent a prominent synovial cyst, possibly superinfected, versus a separate fluid collection.  3. Trace fluid signal within the L3-4 and L4-5 disc spaces with mild adjacent marrow edema, enhancement, and endplate changes, which all may be degenerative; although, early discitis/osteomyelitis cannot be entirely excluded.  Close clinical surveillance and short-term follow-up imaging recommended.  4. Diffuse homogeneous T2 hypointense hepatic parenchymal signal, which is nonspecific and may reflect iron overload/deposition disease, which can be seen with multiple transfusions, hemochromatosis and thalassemia.  Clinical correlation and further evaluation, as warranted.    Assessment/Plan:     Epidural abscess  63yof pmh IVDU with endocarditis MSSA bacteremia acutely decompensated overnight on paralytics, sedation and pressors found to have lumbar epidural  abscess.    Plan:  - When deemed medically stable, will be willing to take patient to OR for management of abscess.  - Will follow up exam when patient is off paralytics  - Please contact with any questions or updates.        Thank you for your consult. I will follow-up with patient. Please contact us if you have any additional questions.    Juma Varghese MD  Neurosurgery  [facility]

## 2019-10-03 NOTE — CONSULTS
Consult received. Full note to Follow. Please call with any questions or concerns.    Jacky Stevens  Infectious Disease Fellow  [facility] [id number]  Pager [phone]

## 2019-10-03 NOTE — ASSESSMENT & PLAN NOTE
--secondary to TV endocarditis  --repeating CT chest to r/o large central PE as etiology of hypoxia and hemodynamic instability

## 2019-10-03 NOTE — PLAN OF CARE
Recommendations  Recommendation/Intervention:   1. As medically able, recommend initiating TF of Impact Peptide 1.5 at a goal rate of 35 mL/hr - to provide 1260 kcal/day (1558 kcal w/propofol), 79g protein/day, and 647mL free fluid/day.   2. If unable to initiate TF, recommend initiating Custom TPN 70g AA and 190g Dextrose - to provide 926 kcal/day (1224 w/propofol) and 70g protein/day.    -If no longer on propofol, recommend adding IV lipids daily.   RD to monitor.    Goals: Patient to receive nutrition by RD follow-up  Nutrition Goal Status: new    Full assessment completed, see RD Note 10/3/2019.

## 2019-10-03 NOTE — ASSESSMENT & PLAN NOTE
--MRI concerning for epidural abscess at L2-4 region  --no neuro deficits on physical exam  --neurosurgery consult in am

## 2019-10-03 NOTE — ASSESSMENT & PLAN NOTE
--multifactorial secondary to septic emboli, pleural effusions, suspected pna  --intubated shortly after arrival for hypoxemia  --s/p bronch x2 with significant clearance of secretions from left mainstem & improved aeration on f/u xray  --f/u sputum cx  --continue cefepime & flagyl   --s/p thora 10/1; transudative based on lights criteria, though concern for empyema based on OSH pulm note   --f/u pleural fluid cx OSH  --right chest tube placed this am, will start TPA/dornase BID; f/u pleural fluid studies/cx  --CTA neg for PE   --wean ventilator as tolerated

## 2019-10-03 NOTE — ASSESSMENT & PLAN NOTE
--secondary to sepsis  --Brain MRI 9/30 with lacunar infarcts, neg for acute intracranial abnormality  --currently sedated with propofol & fentanyl  --head CT to be repeated due to unequal pupillary changes

## 2019-10-03 NOTE — NURSING
Critical Care Team at bedside and updated on patient's Lactate of 2, BE -10, CVP 5, and decreasing UO of 30ml/hr. No new orders at this time.

## 2019-10-03 NOTE — CONSULTS
[facility]  Infectious Disease  Consult Note    Patient Name: Mesha Mckenzie  MRN: [id number]  Admission Date: 10/2/2019  Hospital Length of Stay: 1 days  Attending Physician: Malik Jerry MD  Primary Care Provider: Varun Shea MD PhD     Isolation Status: No active isolations    Patient information was obtained from patient, past medical records and ER records.      Consults  Assessment/Plan:     Endocarditis  62yo female with IVDU presents as transfer from [facility] for epidural abscess, likely secondary to MSSA bacteremia and endocarditis with vegetations of the MV and TV on DHARMESH 9/26 c/b bilateral multifocal pneumonia with cavitary lesions, right pleural effusion s/p thoracentesis on 10/1 w/ concern for empyema, and pan-colitis. Followed by ID at [facility], most recently on daptomycin, cefazolin, and vancomycin. Intubated shortly after arrival to [facility] for hypoxia.    Recommendations:  -would discontinue daptomycin and vancomycin  -ultimately, would favor treatment with oxacillin; however, patient has reported penicillin allergy in past per chart review, family unaware of type of reaction. Therefore, would consult Allergy for graded challenge to oxacillin  -can continue cefepime and flagyl for now, plan to transition to oxacillin once patient evaluated by allergy  -continue to follow blood cultures        Thank you for your consult. I will follow-up with patient. Please contact us if you have any additional questions.    Alma Camilo MD  Infectious Disease  [facility]    Subjective:     Principal Problem: Acute hypoxemic respiratory failure    HPI: Mesha Mckenzie is a 64 yo female with IVDU (heroin), prior GIB in 2018, and spinal stenosis who presents from [facility] for neurosurgery eval for epidural abscess in setting of MSSA endocarditis. She had initially presented to [place] on 9/19 for evaluation of nausea, vomiting, and  diarrhea for the past 5 days. 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 She was initially started on vancomycin, flagyl, and levoquin. Blood cultures from 9/19 positive for MSSA and were thought to be likely source. Urine culture from 9/19 also positive for MSSA. CT head on 9/19 and 9/22 negative for septic emboli. TTE on 9/21 showed possible vegetation on mitral valve (EF 55%).  DHARMESH was performed on 9/25, showing mitral valve vegetation (5x6mm) on the posterior leaflet as well as tricuspid valve vegetation (10x5mm) on septal leaflet. CT chest was repeated on 9/26 and was showed for previously seen bilateral consolidations that were now noted to be cavitary/cystic in nature. Repeat TTE on 9/26 reported reduced EF from previous 55% to 33%. Cardiothoracic evaluated patient for valve replacement but surgery was deferred due to patient's multiple medical issues. Last positive blood culture was 9/26. Her antibiotics were later changed to daptomycin, cefazolin, and vancomycin. Thoracentesis on 10/1 showed evidence of empyema. MRI spine on 10/1 showed lumbar paraspinal muscle infection and small dorsal epidural abscess from L2-4. She was transferred to [facility] for neurosurgery eval.     On arrival to [facility], she was noted to be encephalopathic. Shortly after arriving, she became hypoxic and hypertensive and required intubation. She also required neuromuscular blockade for tachypnea and vent dyssynchrony. Vasopressors were also started due to hypotension.        Past Medical History:   Diagnosis Date    Anxiety     Carotid bruit     Chronic pain     Cystitis     Cystocele, unspecified (CODE)     Depression     GI bleed     History of uterine fibroid     Shortness of breath on exertion     Spinal stenosis     Urinary retention         Past Surgical History:   Procedure Laterality Date    ANTERIOR VAGINAL REPAIR  10-    CARDIAC CATHETERIZATION  age 12    CHOLECYSTECTOMY  2015    COLONOSCOPY  12/12/2018    aborted due to poor colon prep    COLONOSCOPY N/A 12/12/2018    Procedure: COLONOSCOPY;  Surgeon: Renard Gonsalves MD;  Location: [facility];  Service: Endoscopy;  Laterality: N/A;    HYSTERECTOMY  1989    [place]    tubiligation         Review of patient's allergies indicates:   Allergen Reactions    Pcn [penicillins]        Medications:  Medications Prior to Admission   Medication Sig    diazepam (VALIUM) 5 MG tablet Take 10 mg by mouth 2 (two) times daily.      Antibiotics (From admission, onward)    Start     Stop Route Frequency Ordered    10/03/19 0345  ceFEPIme injection 2 g      -- IV Every 24 hours (non-standard times) 10/03/19 0242    10/03/19 0345  metronidazole IVPB 500 mg      -- IV Every 8 hours (non-standard times) 10/03/19 0242    10/02/19 2300  DAPTOmycin (CUBICIN) 325 mg in sodium chloride 0.9% IVPB      -- IV Every 24 hours (non-standard times) 10/02/19 1951        Antifungals (From admission, onward)    None        Antivirals (From admission, onward)    None           There is no immunization history for the selected administration types on file for this patient.    Family History     Problem Relation (Age of Onset)    Alzheimer's disease Mother    Hypertension Brother, Sister    Osteoarthritis Mother    Thyroid disease Mother        Social History     Socioeconomic History    Marital status:      Spouse name: Not on file    Number of children: Not on file    Years of education: Not on file    Highest education level: Not on file   Occupational History    Not on file   Social Needs    Financial resource strain: Not on file    Food insecurity:     Worry: Not on file     Inability: Not on file    Transportation needs:     Medical: Not on file     Non-medical: Not on file   Tobacco Use    Smoking  status: Current Every Day Smoker     Packs/day: 0.50     Years: 40.00     Pack years: 20.00     Types: Cigarettes    Smokeless tobacco: Never Used   Substance and Sexual Activity    Alcohol use: No    Drug use: No    Sexual activity: Yes     Partners: Male   Lifestyle    Physical activity:     Days per week: Not on file     Minutes per session: Not on file    Stress: Not on file   Relationships    Social connections:     Talks on phone: Not on file     Gets together: Not on file     Attends [employer] service: Not on file     Active member of club or organization: Not on file     Attends meetings of clubs or organizations: Not on file     Relationship status: Not on file   Other Topics Concern    Not on file   Social History Narrative    Not on file     Review of Systems   Unable to perform ROS: Intubated     Objective:     Vital Signs (Most Recent):  Temp: 97.2 °F (36.2 °C) (10/03/19 1100)  Pulse: 99 (10/03/19 1508)  Resp: (!) 28 (10/03/19 1127)  BP: (!) 125/58 (10/03/19 1400)  SpO2: 100 % (10/03/19 1508) Vital Signs (24h Range):  Temp:  [94 °F (34.4 °C)-98 °F (36.7 °C)] 97.2 °F (36.2 °C)  Pulse:  [] 99  Resp:  [9-57] 28  SpO2:  [68 %-100 %] 100 %  BP: ()/(37-92) 125/58  Arterial Line BP: ()/(29-57) 111/55     Weight: 53.5 kg (118 lb)  Body mass index is 20.25 kg/m².    Estimated Creatinine Clearance: 36.4 mL/min (based on SCr of 1.3 mg/dL).    Physical Exam   Constitutional: She appears well-developed and well-nourished. No distress.   HENT:   Head: Normocephalic and atraumatic.   Eyes: Conjunctivae are normal. Right eye exhibits no discharge. Left eye exhibits no discharge.   Neck: Neck supple.   Cardiovascular: Normal rate and normal heart sounds. Exam reveals no gallop and no friction rub.   No murmur heard.  Pulmonary/Chest: She has expiratory wheezes. She has no rales.   On mechanical ventilation  Abdominal: Soft. She exhibits no mass. There is no tenderness. There is no guarding.    Musculoskeletal: She exhibits no edema.   Neurological:   Sedated   Skin: Skin is warm and dry. She is not diaphoretic.       Significant Labs:   CBC:   Recent Labs   Lab 10/02/19  2000 10/03/19  0136 10/03/19  0614   WBC 7.42 14.05*  14.05* 8.29   HGB 11.5* 11.9*  11.9* 10.7*   HCT 34.6* 37.5  37.5 35.1*    291  291 194     CMP:   Recent Labs   Lab 10/02/19  2000 10/03/19  0136 10/03/19  0614 10/03/19  1125    145  145  145 141 141   K 3.4* 5.0  5.0  5.0 4.8 5.1    113*  113*  113* 113* 114*   CO2 28 22*  22*  22* 18* 18*   * 227*  227*  227* 204* 133*   BUN 53* 55*  55*  55* 56* 58*   CREATININE 1.1 1.3  1.3  1.3 1.3 1.3   CALCIUM 8.0* 7.8*  7.8*  7.8* 7.5* 7.3*   PROT 7.0 6.9  --   --    ALBUMIN 1.7* 1.7*  --   --    BILITOT 0.8 0.6  --   --    ALKPHOS 179* 176*  --   --    AST 80* 79*  --   --    ALT 25 27  --   --    ANIONGAP 8 10  10  10 10 9   EGFRNONAA 52.8* 43.2*  43.2*  43.2* 43.2* 43.2*

## 2019-10-03 NOTE — ASSESSMENT & PLAN NOTE
--baseline creatinine ~ 0.7; was 2.9 on initial presentation to OSH, improved with hydration over hospital course; now 1.3  --FeNa/FeUrea consistent with pre-renal etiology; possible component of ATN given hypotension yesterday evening  --consider renal US

## 2019-10-03 NOTE — NURSING
10:19 Notified Critical Care service of 345 CT output.    1035: Notified Critical Care service about starting patient back on Levo due to MAP 60-63. Currently at 0.04 mcg/kg/min. Also notified about urine output 25 for this hour. No new orders.

## 2019-10-03 NOTE — ASSESSMENT & PLAN NOTE
--mitral and tricuspid valve vegetations seen on DHARMESH 9/25; unchanged on TTE 9/28    --Blood cultures (9/19 - 9/26) have grown methicillin sensitive Staph aureus. Blood cultures as of 9/29 have been NGTD.  --OSH abx regimen vanc, ancef and daptomycin. Will continue dapto & vanc; d/c ancef due to starting cefepime for suspected pna  --repeat Bcx now  --d/c PICC line  --repeat formal 2D echo  --neurosurgery consult in am for epidural abscess  --cardiology consulted  --ID consult in am

## 2019-10-03 NOTE — PROCEDURES
"Mesha Mckenzie is a 63 y.o. female patient.    Temp: 98 °F (36.7 °C) (10/02/19 1915)  Pulse: (!) 53 (10/02/19 2330)  Resp: 18 (10/02/19 2330)  BP: (!) 96/46 (10/02/19 2330)  SpO2: (!) 80 % (10/02/19 2330)  Weight: 53.8 kg (118 lb 9.7 oz) (10/02/19 2113)  Height: 5' 4" (162.6 cm) (10/02/19 2113)       Procedures   Diagnostic bronchoscopy    Consent obtained from patient's . Risks/benefits explained and he expressed understanding.    Patient intubated earlier for hypoxemic respiratory failure with an 8.0 ETT. Patient on 100% FiO2 and O2 saturation is 90%. Plan for bronchoscopy with airway inspection given her asymmetrical chest wall expansion and no clear etiology on CXR.    Patient on propofol.    Large, ambu scope inserted through the ETT which was 1-2cm above the ranjan. Inspection of the proximal airways showed scant, thick secretions but no signficant airway narrowing. ETT withdrawn 1cm and confirmed on bronchoscopy to be in the correct position. O2 saturations were unchanged throughout the procedure.    Jaziel Suárez  10/3/2019  "

## 2019-10-03 NOTE — ASSESSMENT & PLAN NOTE
--secondary to MV/TV endocarditis, epidural abscess, likely pna  --Lactate: 2 (increased from 1.3)  --currently treating with dapto and vanc  --vasopressors as needed to keep map > 65; currently requiring Levophed

## 2019-10-03 NOTE — ASSESSMENT & PLAN NOTE
--seen on CT 9/19 with improvement noted on f/u CT 9/25  --concern for ileus on 9/25 CT; rectal tube in place with liquid stool output  --stool 9/27 neg for c diff; stool cx neg  --checking KUB to eval ileus; NGT to LIWS

## 2019-10-03 NOTE — CARE UPDATE
CCS notified of decreased urine output.  Refer to flow sheet for vital signs, gtts, and assessments.  Will continue to monitor.

## 2019-10-03 NOTE — NURSING
Notified critical care service about increasing Levo from 0.04-0.08 within the past 30min due to BP 80s/40s MAP 61-64. BP 90s/40-50 MAP 65-70 on Levo 0.08. Instructed to call back if Levo gets to 0.2. Resident will talk to attending about starting IV fluids.

## 2019-10-03 NOTE — CHAPLAIN
Spiritual Care Encounter  Please contact the Department of Spiritual Care, your Unit  or the On-call  at any time if any needs are brought to your attention. i.e. Anytime you note spiritual, existential or emotional distress or there is a new diagnosis and/or prognosis.      Purpose:  To provide emotional, spiritual and/or existential support to the patient and/or their loved one's as needed and/or identified by staff, loved one's and/or the patient.   Visit Type: Initial Visit  Visit Category: Critical Care, Spiritual Assessment  Visited With: Patient not available, Health care provider, Family(Patient in procedure, family guarded)  Number of Family Visited: 5(Spouse, dgt and other's unknown relationship to patient)  Length of Visit: 21 Minutes  Continue Visiting: Yes  Referral From: Physician, Nurse  Referral To:      Assessment/Intervention:  Due to the rapid decline of the patient and her over all poor condition the patients health care providers asked that I meet with family to see how they were doing. As the patient was in the midst of a procedure, I met her family in the waiting room. I introduced my self and role to the patients family, but all seemed quite dazed and minimally engaged. I offered continued support and encouraged them to request a  should they need anything which they agreed to. I returned to the patients care team to update them regarding my interaction with the family.           Patient Spiritual Encounters  Care Provided: Other care (specify), Compassionate presence(Patient in procedure as well as intubated/sedate)  Patient Coping: Other (Comment)(YESENIA Patient in procedure as well as intubated/sedate)  Comments - Patient: Patient in procedure as well as intubated/sedate   Family Spiritual Encounters  Care Provided: Compassionate presence, Other care (specify)(Introduction of pastoral care)  Family Coping: (Shock, 'shut down')  Comments - Family: Patient's  family feels distanced and/or withdrawn from situation for emotoinal protection         OF NOTE:                     Plan of Care/Goals:  Follow up will be completed, as needed, for the spiritual and emotional support of the patient and/or their family/support(s). The patient and/or their family/support(s) were informed of how to contact the Spiritual Care Department as well as our availability and functions.  There will be continued prayer over the patient and their loved ones. Please contact the Department of Spiritual Care, your Unit  or the On-call  at any time when you have concerns brought to your attention prior to our follow-up. i.e. Anytime you note spiritual, existential or emotional distress or there is a new diagnosis and/or prognosis.     I very much appreciated the assistance and interdisciplinary teamwork during my time with the patient and/or family/support(s). This helped to provide the  best patient and family centered care we could offer.       Seble Lyons    Office: [phone]  On-Call 24/7: [phone]  Department of Spiritual Care - [facility]

## 2019-10-03 NOTE — CONSULTS
[facility]  Cardiology  Consult Note    Patient Name: Mesha Mckenzie  MRN: [id number]  Admission Date: 10/2/2019  Hospital Length of Stay: 1 days  Code Status: Full Code   Attending Provider: Malik Jerry MD   Consulting Provider: Benigno Arenas MD  Primary Care Physician: Varun Shea MD PhD  Principal Problem:Acute hypoxemic respiratory failure    Patient information was obtained from relative(s) and ER records.     Inpatient consult to Cardiology  Consult performed by: Benigno Arenas MD  Consult ordered by: Winter Albert NP  Reason for consult: MV and TV vegeations        Subjective:        HPI:   Patient is a 62 yo F with PMH of IVDA transferred here for higher levels of care. She presented to [facility] ED, was found to be in septic shock, was transferred to [facility] ICU. There she was discovered to have mitral valve and tricuspid valve vegetations with septic emboli in bilateral lungs. Her LVSF on repeat echo decreased and she now has worsened mitral regurgitation. Her blood cultures were positive for MSSA from 9/19-9/26, however she has cleared them since 9/29. She required multiple pressors and was on antibiotics while in [facility] ICU. She was later discovered to have a L2-L4 epidural abscess. Cardiology was following over there and recommended against valve replacement at that time. Overnight last night, she became hypotensive and was in respiratory distress, requiring emergent intubation, and a right-sided chest tube placement for pleural effusion. She has also been bronched x2 for left main-stem obstruction leading to complete left lung collapse. Cardiology was consulted for evaluation of MV and TV vegetations and management of her heart failure 2/2 MV regurgitation.    Past Medical History:   Diagnosis Date    Anxiety     Carotid bruit     Chronic pain     Cystitis     Cystocele, unspecified (CODE)     Depression     GI bleed      History of uterine fibroid     Shortness of breath on exertion     Spinal stenosis     Urinary retention        Past Surgical History:   Procedure Laterality Date    ANTERIOR VAGINAL REPAIR  10-    CARDIAC CATHETERIZATION  age 12    CHOLECYSTECTOMY  2015    COLONOSCOPY  12/12/2018    aborted due to poor colon prep    COLONOSCOPY N/A 12/12/2018    Procedure: COLONOSCOPY;  Surgeon: Renard Gonsalves MD;  Location: [facility];  Service: Endoscopy;  Laterality: N/A;    HYSTERECTOMY  1989    AMANDA    tubiligation         Review of patient's allergies indicates:   Allergen Reactions    Pcn [penicillins]        Current Facility-Administered Medications on File Prior to Encounter   Medication    lactated ringers infusion    sodium chloride 0.9% flush 3 mL    [DISCONTINUED] 0.9%  NaCl infusion (for blood administration)    [DISCONTINUED] 0.9%  NaCl infusion (for blood administration)    [DISCONTINUED] Amino acid 5% - dextrose 15% (CLINIMIX-E) solution with additives (1L  provides 510 kcal/L dextrose, with 50 gm AA, 150 gm CHO, Na 35, K 30, Mg 5, Ca 4.5, Acetate 80, Cl 39, Phos 15)    [DISCONTINUED] bisacodyl suppository 10 mg    [DISCONTINUED] cefazolin (ANCEF) 2 gram in dextrose 5% 50 mL IVPB (premix)    [DISCONTINUED] DAPTOmycin (CUBICIN) 340 mg in sodium chloride 0.9% IVPB    [DISCONTINUED] dextrose 10 % infusion    [DISCONTINUED] enalaprilat injection 2.5 mg    [DISCONTINUED] fat emulsion 20% infusion 250 mL    [DISCONTINUED] fentaNYL 12 mcg/hr 1 patch    [DISCONTINUED] folic acid tablet 2 mg    [DISCONTINUED] furosemide injection 20 mg    [DISCONTINUED] furosemide injection 20 mg    [DISCONTINUED] hydromorphone (PF) injection 1 mg    [DISCONTINUED] influenza (HIGH-DOSE PF) vaccine 0.5 mL    [DISCONTINUED] lorazepam injection 1 mg    [DISCONTINUED] magnesium sulfate 2g in water 50mL IVPB (premix)    [DISCONTINUED] magnesium sulfate 2g in water 50mL IVPB (premix)    [DISCONTINUED]  metoprolol injection 10 mg    [DISCONTINUED] milrinone 20mg in D5W 100 mL infusion    [DISCONTINUED] ondansetron injection 4 mg    [DISCONTINUED] pantoprazole injection 40 mg    [DISCONTINUED] pneumoc 13-susanna conj-dip cr(PF) (PREVNAR 13 (PF)) 0.5 mL    [DISCONTINUED] potassium chloride 20 mEq in 100 mL IVPB (FOR CENTRAL LINE ADMINISTRATION ONLY)    [DISCONTINUED] potassium chloride 40 mEq in 100 mL IVPB (FOR CENTRAL LINE ADMINISTRATION ONLY)    [DISCONTINUED] potassium chloride 40 mEq in 100 mL IVPB (FOR CENTRAL LINE ADMINISTRATION ONLY)    [DISCONTINUED] sodium chloride 0.9% flush 10 mL    [DISCONTINUED] sodium chloride 0.9% flush 10 mL    [DISCONTINUED] sodium chloride 0.9% flush 10 mL    [DISCONTINUED] TPN ADULT CENTRAL LINE CUSTOM    [DISCONTINUED] vancomycin 750 mg in dextrose 5 % 250 mL IVPB (ready to mix system)     Current Outpatient Medications on File Prior to Encounter   Medication Sig    diazepam (VALIUM) 5 MG tablet Take 10 mg by mouth 2 (two) times daily.      Family History     Problem Relation (Age of Onset)    Alzheimer's disease Mother    Hypertension Brother, Sister    Osteoarthritis Mother    Thyroid disease Mother        Tobacco Use    Smoking status: Current Every Day Smoker     Packs/day: 0.50     Years: 40.00     Pack years: 20.00     Types: Cigarettes    Smokeless tobacco: Never Used   Substance and Sexual Activity    Alcohol use: No    Drug use: No    Sexual activity: Yes     Partners: Male     Review of Systems   Unable to perform ROS: intubated     Objective:     Vital Signs (Most Recent):  Temp: (!) 94 °F (34.4 °C) (10/03/19 0700)  Pulse: 81 (10/03/19 0800)  Resp: (!) 24 (10/03/19 0709)  BP: (!) 115/56 (10/03/19 0800)  SpO2: 99 % (10/03/19 0800) Vital Signs (24h Range):  Temp:  [94 °F (34.4 °C)-99.8 °F (37.7 °C)] 94 °F (34.4 °C)  Pulse:  [] 81  Resp:  [9-57] 24  SpO2:  [68 %-100 %] 99 %  BP: ()/() 115/56  Arterial Line BP: ()/(29-56) 112/53      Weight: 53.8 kg (118 lb 9.7 oz)  Body mass index is 20.36 kg/m².    SpO2: 99 %  O2 Device (Oxygen Therapy): ventilator      Intake/Output Summary (Last 24 hours) at 10/3/2019 0853  Last data filed at 10/3/2019 0800  Gross per 24 hour   Intake 1821 ml   Output 560 ml   Net 1261 ml       Lines/Drains/Airways     Central Venous Catheter Line                 Tunneled Central Line Insertion/Assessment - Double Lumen  10/03/19 0114 right internal jugular less than 1 day          Drain                 Urethral Catheter 09/19/19 1240 Double-lumen 16 Fr. 13 days         Rectal Tube 09/27/19 1000 rectal tube w/ balloon (indicate number of mLs) 5 days         Chest Tube 10/03/19 0500 Right Midaxillary less than 1 day         NG/OG Tube 10/02/19 2200 Center mouth less than 1 day          Airway                 Airway - Non-Surgical 10/02/19 2155 Endotracheal Tube less than 1 day          Arterial Line                 Arterial Line 10/02/19 2330 Right Radial less than 1 day          Peripheral Intravenous Line                 Peripheral IV - Single Lumen 10/02/19 0825 20 G Anterior;Right Hand 1 day         Peripheral IV - Single Lumen 10/02/19 2037 20 G Left Wrist less than 1 day                Physical Exam   Constitutional: She appears well-developed.   HENT:   Head: Normocephalic and atraumatic.   Intubated   Eyes: Pupils are equal, round, and reactive to light. Conjunctivae are normal.   Neck: No JVD present.   Cardiovascular: Normal rate, regular rhythm, normal heart sounds and intact distal pulses.   No murmur heard.  Pulmonary/Chest: She has no wheezes.   Intubated on ventilator   Abdominal: There is no rebound and no guarding.   Musculoskeletal: She exhibits no edema.   Neurological:   Sedated and intubated. Unable to obtain Neurological exam   Skin: Skin is warm and dry. No rash noted.   Psychiatric:   Sedated and intubated       Significant Labs:   CMP   Recent Labs   Lab 10/02/19  2000 10/03/19  0136  10/03/19  0614    145  145  145 141   K 3.4* 5.0  5.0  5.0 4.8    113*  113*  113* 113*   CO2 28 22*  22*  22* 18*   * 227*  227*  227* 204*   BUN 53* 55*  55*  55* 56*   CREATININE 1.1 1.3  1.3  1.3 1.3   CALCIUM 8.0* 7.8*  7.8*  7.8* 7.5*   PROT 7.0 6.9  --    ALBUMIN 1.7* 1.7*  --    BILITOT 0.8 0.6  --    ALKPHOS 179* 176*  --    AST 80* 79*  --    ALT 25 27  --    ANIONGAP 8 10  10  10 10   ESTGFRAFRICA >60.0 49.7*  49.7*  49.7* 49.7*   EGFRNONAA 52.8* 43.2*  43.2*  43.2* 43.2*    and CBC   Recent Labs   Lab 10/02/19  2000 10/03/19  0136 10/03/19  0614   WBC 7.42 14.05*  14.05* 8.29   HGB 11.5* 11.9*  11.9* 10.7*   HCT 34.6* 37.5  37.5 35.1*    291  291 194       Significant Imaging: Echocardiogram:   Transthoracic echo (TTE) complete (Cupid Only):   Results for orders placed or performed during the hospital encounter of 09/19/19   Echo Color Flow Doppler? Yes   Result Value Ref Range    BSA 1.37 m2    LVIDD 3.90 3.5 - 6.0 cm    IVS 1.01 0.6 - 1.1 cm    PW 0.97 0.6 - 1.1 cm    LVIDS 3.27 2.1 - 4.0 cm    FS 16 28 - 44 %    LV mass 120.39 g    Left Ventricle Relative Wall Thickness 0.50 cm    E/A ratio 0.62     E wave decelartion time 205.49 msec    Pulm vein S/D ratio 1.37     MV Peak E Jose A 0.61 m/s    TR Max Jose A 2.44 m/s    MV Peak A Jose A 0.99 m/s    PV Peak S Jose A 0.67 m/s    PV Peak D Jose A 0.49 m/s    LV Systolic Volume 43.07 mL    LV Systolic Volume Index 31.1 mL/m2    LV Diastolic Volume 65.77 mL    LV Diastolic Volume Index 47.44 mL/m2    LV Mass Index 87 g/m2    Triscuspid Valve Regurgitation Peak Gradient 24 mmHg    Right Atrial Pressure (from IVC) 3 mmHg    TV rest pulmonary artery pressure 27 mmHg    Narrative    · Concentric left ventricular remodeling.  · Moderately decreased left ventricular systolic function. The estimated   ejection fraction is 35%  · No wall motion abnormalities.  · Atrial fibrillation not observed.  · Indeterminate left  ventricular diastolic function.  · Normal right ventricular systolic function.  · Mild-to-moderate mitral regurgitation.  · Mild to moderate tricuspid regurgitation.  · No pulmonary hypertension present.  · Normal central venous pressure (3 mm Hg).  · The estimated PA systolic pressure is 27 mm Hg  · Small circumferential pericardial effusion. Effusion is fluid.     1) Ef of lv slightly improved   2) small circumferential pericardial effusion   3) pulmonary hypertension no longer documented   4) vegetations same size   5) mitral leaflets coapt normally - moderate but not severe  Mitral regurg   seen        Assessment and Plan:     Acute bacterial endocarditis  Patient is a 64 yo F with PMH of IVDA who presents with Mitral Valve and Tricuspid Valve vegetations, leading to a complicated hospital course involving severe mitral valve regurgitation causing heart failure, bilateral septic emboli, right sided pleural effusion requiring chest tube placement, left sided main-stem obstruction leading to complete lung collapse requiring bronchoscopy x2, and a L2-L4 epidural spinal abscess    Plan:  -Agree with ID consult. Continue antibiotics per ID  -Possible valve replacement following 6 weeks antibiotics course  -Medical management for now  -Aggressive electrolyte management        VTE Risk Mitigation (From admission, onward)         Ordered     IP VTE HIGH RISK PATIENT  Once      10/02/19 2153     Place sequential compression device  Until discontinued      10/02/19 1946                Benigno Arenas MD  Cardiology   [facility]

## 2019-10-04 PROBLEM — K56.7 ILEUS: Status: ACTIVE | Noted: 2019-10-04

## 2019-10-04 LAB
ALBUMIN SERPL BCP-MCNC: 1.4 G/DL (ref 3.5–5.2)
ALLENS TEST: ABNORMAL
ALP SERPL-CCNC: 107 U/L (ref 55–135)
ALT SERPL W/O P-5'-P-CCNC: 9 U/L (ref 10–44)
ANION GAP SERPL CALC-SCNC: 10 MMOL/L (ref 8–16)
ANION GAP SERPL CALC-SCNC: 11 MMOL/L (ref 8–16)
ANION GAP SERPL CALC-SCNC: 7 MMOL/L (ref 8–16)
ANION GAP SERPL CALC-SCNC: 8 MMOL/L (ref 8–16)
AST SERPL-CCNC: 26 U/L (ref 10–40)
BASOPHILS # BLD AUTO: 0.02 K/UL (ref 0–0.2)
BASOPHILS NFR BLD: 0.5 % (ref 0–1.9)
BILIRUB SERPL-MCNC: 0.2 MG/DL (ref 0.1–1)
BUN SERPL-MCNC: 53 MG/DL (ref 8–23)
BUN SERPL-MCNC: 58 MG/DL (ref 8–23)
BUN SERPL-MCNC: 59 MG/DL (ref 8–23)
BUN SERPL-MCNC: 59 MG/DL (ref 8–23)
CALCIUM SERPL-MCNC: 7 MG/DL (ref 8.7–10.5)
CALCIUM SERPL-MCNC: 7.2 MG/DL (ref 8.7–10.5)
CALCIUM SERPL-MCNC: 7.3 MG/DL (ref 8.7–10.5)
CALCIUM SERPL-MCNC: 7.4 MG/DL (ref 8.7–10.5)
CHLORIDE SERPL-SCNC: 113 MMOL/L (ref 95–110)
CHLORIDE SERPL-SCNC: 115 MMOL/L (ref 95–110)
CO2 SERPL-SCNC: 22 MMOL/L (ref 23–29)
CO2 SERPL-SCNC: 23 MMOL/L (ref 23–29)
CREAT SERPL-MCNC: 0.9 MG/DL (ref 0.5–1.4)
CREAT SERPL-MCNC: 1.3 MG/DL (ref 0.5–1.4)
CREAT SERPL-MCNC: 1.3 MG/DL (ref 0.5–1.4)
CREAT SERPL-MCNC: 1.4 MG/DL (ref 0.5–1.4)
DELSYS: ABNORMAL
DIFFERENTIAL METHOD: ABNORMAL
EOSINOPHIL # BLD AUTO: 0.2 K/UL (ref 0–0.5)
EOSINOPHIL NFR BLD: 4.4 % (ref 0–8)
ERYTHROCYTE [DISTWIDTH] IN BLOOD BY AUTOMATED COUNT: 18.6 % (ref 11.5–14.5)
ERYTHROCYTE [SEDIMENTATION RATE] IN BLOOD BY WESTERGREN METHOD: 20 MM/H
ERYTHROCYTE [SEDIMENTATION RATE] IN BLOOD BY WESTERGREN METHOD: 24 MM/H
ERYTHROCYTE [SEDIMENTATION RATE] IN BLOOD BY WESTERGREN METHOD: 28 MM/H
EST. GFR  (AFRICAN AMERICAN): 45.5 ML/MIN/1.73 M^2
EST. GFR  (AFRICAN AMERICAN): 49.7 ML/MIN/1.73 M^2
EST. GFR  (AFRICAN AMERICAN): 49.7 ML/MIN/1.73 M^2
EST. GFR  (AFRICAN AMERICAN): >60 ML/MIN/1.73 M^2
EST. GFR  (NON AFRICAN AMERICAN): 39.5 ML/MIN/1.73 M^2
EST. GFR  (NON AFRICAN AMERICAN): 43.2 ML/MIN/1.73 M^2
EST. GFR  (NON AFRICAN AMERICAN): 43.2 ML/MIN/1.73 M^2
EST. GFR  (NON AFRICAN AMERICAN): >60 ML/MIN/1.73 M^2
FIO2: 21
FIO2: 30
FIO2: 99
GLUCOSE SERPL-MCNC: 111 MG/DL (ref 70–110)
GLUCOSE SERPL-MCNC: 143 MG/DL (ref 70–110)
GLUCOSE SERPL-MCNC: 78 MG/DL (ref 70–110)
GLUCOSE SERPL-MCNC: 95 MG/DL (ref 70–110)
HCO3 UR-SCNC: 20.5 MMOL/L (ref 24–28)
HCO3 UR-SCNC: 24.2 MMOL/L (ref 24–28)
HCO3 UR-SCNC: 24.5 MMOL/L (ref 24–28)
HCT VFR BLD AUTO: 35.8 % (ref 37–48.5)
HGB BLD-MCNC: 11.6 G/DL (ref 12–16)
HIV1+2 IGG SERPL QL IA.RAPID: NEGATIVE
IMM GRANULOCYTES # BLD AUTO: 0.03 K/UL (ref 0–0.04)
IMM GRANULOCYTES NFR BLD AUTO: 0.8 % (ref 0–0.5)
LYMPHOCYTES # BLD AUTO: 0.9 K/UL (ref 1–4.8)
LYMPHOCYTES NFR BLD: 25.6 % (ref 18–48)
MAGNESIUM SERPL-MCNC: 1.9 MG/DL (ref 1.6–2.6)
MCH RBC QN AUTO: 30.4 PG (ref 27–31)
MCHC RBC AUTO-ENTMCNC: 32.4 G/DL (ref 32–36)
MCV RBC AUTO: 94 FL (ref 82–98)
MIN VOL: 10.7
MIN VOL: 11.2
MIN VOL: 8.56
MODE: ABNORMAL
MONOCYTES # BLD AUTO: 0.2 K/UL (ref 0.3–1)
MONOCYTES NFR BLD: 6.5 % (ref 4–15)
NEUTROPHILS # BLD AUTO: 2.3 K/UL (ref 1.8–7.7)
NEUTROPHILS NFR BLD: 62.2 % (ref 38–73)
NRBC BLD-RTO: 0 /100 WBC
PCO2 BLDA: 28.5 MMHG (ref 35–45)
PCO2 BLDA: 31.3 MMHG (ref 35–45)
PCO2 BLDA: 34.5 MMHG (ref 35–45)
PEEP: 5
PH SMN: 7.46 [PH] (ref 7.35–7.45)
PH SMN: 7.47 [PH] (ref 7.35–7.45)
PH SMN: 7.5 [PH] (ref 7.35–7.45)
PHOSPHATE SERPL-MCNC: 5 MG/DL (ref 2.7–4.5)
PIP: 17
PIP: 20
PIP: 20
PLATELET # BLD AUTO: 115 K/UL (ref 150–350)
PMV BLD AUTO: 11.7 FL (ref 9.2–12.9)
PO2 BLDA: 67 MMHG (ref 80–100)
PO2 BLDA: 85 MMHG (ref 80–100)
PO2 BLDA: 91 MMHG (ref 80–100)
POC BE: -3 MMOL/L
POC BE: 1 MMOL/L
POC BE: 1 MMOL/L
POC SATURATED O2: 94 % (ref 95–100)
POC SATURATED O2: 97 % (ref 95–100)
POC SATURATED O2: 98 % (ref 95–100)
POC TCO2: 21 MMOL/L (ref 23–27)
POC TCO2: 25 MMOL/L (ref 23–27)
POC TCO2: 25 MMOL/L (ref 23–27)
POTASSIUM SERPL-SCNC: 3.5 MMOL/L (ref 3.5–5.1)
POTASSIUM SERPL-SCNC: 3.6 MMOL/L (ref 3.5–5.1)
POTASSIUM SERPL-SCNC: 3.8 MMOL/L (ref 3.5–5.1)
POTASSIUM SERPL-SCNC: 3.9 MMOL/L (ref 3.5–5.1)
PROT SERPL-MCNC: 5.4 G/DL (ref 6–8.4)
RBC # BLD AUTO: 3.82 M/UL (ref 4–5.4)
SAMPLE: ABNORMAL
SITE: ABNORMAL
SODIUM SERPL-SCNC: 143 MMOL/L (ref 136–145)
SODIUM SERPL-SCNC: 146 MMOL/L (ref 136–145)
SP02: 100
SP02: 30
SP02: 97
TROPONIN I SERPL DL<=0.01 NG/ML-MCNC: 0.02 NG/ML (ref 0–0.03)
TROPONIN I SERPL DL<=0.01 NG/ML-MCNC: 0.03 NG/ML (ref 0–0.03)
VT: 350
WBC # BLD AUTO: 3.67 K/UL (ref 3.9–12.7)

## 2019-10-04 PROCEDURE — 25000003 PHARM REV CODE 250: Performed by: NURSE PRACTITIONER

## 2019-10-04 PROCEDURE — 25000242 PHARM REV CODE 250 ALT 637 W/ HCPCS: Performed by: EMERGENCY MEDICINE

## 2019-10-04 PROCEDURE — 80053 COMPREHEN METABOLIC PANEL: CPT

## 2019-10-04 PROCEDURE — 99291 CRITICAL CARE FIRST HOUR: CPT | Mod: ,,, | Performed by: NURSE PRACTITIONER

## 2019-10-04 PROCEDURE — 27000221 HC OXYGEN, UP TO 24 HOURS

## 2019-10-04 PROCEDURE — 99900026 HC AIRWAY MAINTENANCE (STAT)

## 2019-10-04 PROCEDURE — 93010 EKG 12-LEAD: ICD-10-PCS | Mod: ,,, | Performed by: INTERNAL MEDICINE

## 2019-10-04 PROCEDURE — 84484 ASSAY OF TROPONIN QUANT: CPT

## 2019-10-04 PROCEDURE — 93041 RHYTHM ECG TRACING: CPT

## 2019-10-04 PROCEDURE — 94003 VENT MGMT INPAT SUBQ DAY: CPT

## 2019-10-04 PROCEDURE — 99292 CRITICAL CARE ADDL 30 MIN: CPT | Mod: ,,, | Performed by: EMERGENCY MEDICINE

## 2019-10-04 PROCEDURE — 99232 PR SUBSEQUENT HOSPITAL CARE,LEVL II: ICD-10-PCS | Mod: ,,, | Performed by: NEUROLOGICAL SURGERY

## 2019-10-04 PROCEDURE — 63600175 PHARM REV CODE 636 W HCPCS: Performed by: EMERGENCY MEDICINE

## 2019-10-04 PROCEDURE — 99292 CRITICAL CARE ADDL 30 MIN: CPT | Mod: ,,, | Performed by: NURSE PRACTITIONER

## 2019-10-04 PROCEDURE — 94761 N-INVAS EAR/PLS OXIMETRY MLT: CPT

## 2019-10-04 PROCEDURE — 99232 SBSQ HOSP IP/OBS MODERATE 35: CPT | Mod: GC,,, | Performed by: INTERNAL MEDICINE

## 2019-10-04 PROCEDURE — 99232 SBSQ HOSP IP/OBS MODERATE 35: CPT | Mod: ,,, | Performed by: NEUROLOGICAL SURGERY

## 2019-10-04 PROCEDURE — 99292 PR CRITICAL CARE, ADDL 30 MIN: ICD-10-PCS | Mod: ,,, | Performed by: NURSE PRACTITIONER

## 2019-10-04 PROCEDURE — 82803 BLOOD GASES ANY COMBINATION: CPT

## 2019-10-04 PROCEDURE — 93005 ELECTROCARDIOGRAM TRACING: CPT

## 2019-10-04 PROCEDURE — 99292 PR CRITICAL CARE, ADDL 30 MIN: ICD-10-PCS | Mod: ,,, | Performed by: EMERGENCY MEDICINE

## 2019-10-04 PROCEDURE — 85025 COMPLETE CBC W/AUTO DIFF WBC: CPT

## 2019-10-04 PROCEDURE — 37799 UNLISTED PX VASCULAR SURGERY: CPT

## 2019-10-04 PROCEDURE — 63600175 PHARM REV CODE 636 W HCPCS: Performed by: STUDENT IN AN ORGANIZED HEALTH CARE EDUCATION/TRAINING PROGRAM

## 2019-10-04 PROCEDURE — 99223 1ST HOSP IP/OBS HIGH 75: CPT | Mod: ,,, | Performed by: ALLERGY & IMMUNOLOGY

## 2019-10-04 PROCEDURE — 99900035 HC TECH TIME PER 15 MIN (STAT)

## 2019-10-04 PROCEDURE — 63600175 PHARM REV CODE 636 W HCPCS: Performed by: NURSE PRACTITIONER

## 2019-10-04 PROCEDURE — 25000003 PHARM REV CODE 250: Performed by: INTERNAL MEDICINE

## 2019-10-04 PROCEDURE — 86703 HIV-1/HIV-2 1 RESULT ANTBDY: CPT

## 2019-10-04 PROCEDURE — 99223 PR INITIAL HOSPITAL CARE,LEVL III: ICD-10-PCS | Mod: ,,, | Performed by: ALLERGY & IMMUNOLOGY

## 2019-10-04 PROCEDURE — S0028 INJECTION, FAMOTIDINE, 20 MG: HCPCS | Performed by: NURSE PRACTITIONER

## 2019-10-04 PROCEDURE — 99291 PR CRITICAL CARE, E/M 30-74 MINUTES: ICD-10-PCS | Mod: ,,, | Performed by: NURSE PRACTITIONER

## 2019-10-04 PROCEDURE — 83735 ASSAY OF MAGNESIUM: CPT

## 2019-10-04 PROCEDURE — 27200966 HC CLOSED SUCTION SYSTEM

## 2019-10-04 PROCEDURE — 99232 PR SUBSEQUENT HOSPITAL CARE,LEVL II: ICD-10-PCS | Mod: GC,,, | Performed by: INTERNAL MEDICINE

## 2019-10-04 PROCEDURE — S0030 INJECTION, METRONIDAZOLE: HCPCS | Performed by: INTERNAL MEDICINE

## 2019-10-04 PROCEDURE — 93010 ELECTROCARDIOGRAM REPORT: CPT | Mod: ,,, | Performed by: INTERNAL MEDICINE

## 2019-10-04 PROCEDURE — 94640 AIRWAY INHALATION TREATMENT: CPT

## 2019-10-04 PROCEDURE — 20000000 HC ICU ROOM

## 2019-10-04 PROCEDURE — 80048 BASIC METABOLIC PNL TOTAL CA: CPT | Mod: 91

## 2019-10-04 PROCEDURE — 84100 ASSAY OF PHOSPHORUS: CPT

## 2019-10-04 PROCEDURE — 25000242 PHARM REV CODE 250 ALT 637 W/ HCPCS: Performed by: NURSE PRACTITIONER

## 2019-10-04 RX ORDER — HEPARIN SODIUM 5000 [USP'U]/ML
5000 INJECTION, SOLUTION INTRAVENOUS; SUBCUTANEOUS EVERY 8 HOURS
Status: DISCONTINUED | OUTPATIENT
Start: 2019-10-04 | End: 2019-10-05

## 2019-10-04 RX ORDER — POTASSIUM CHLORIDE 20 MEQ/15ML
20 SOLUTION ORAL ONCE
Status: COMPLETED | OUTPATIENT
Start: 2019-10-04 | End: 2019-10-04

## 2019-10-04 RX ORDER — EPINEPHRINE 1 MG/ML
0.3 INJECTION, SOLUTION INTRACARDIAC; INTRAMUSCULAR; INTRAVENOUS; SUBCUTANEOUS
Status: DISCONTINUED | OUTPATIENT
Start: 2019-10-04 | End: 2019-10-07

## 2019-10-04 RX ORDER — DIPHENHYDRAMINE HYDROCHLORIDE 50 MG/ML
50 INJECTION INTRAMUSCULAR; INTRAVENOUS
Status: DISCONTINUED | OUTPATIENT
Start: 2019-10-04 | End: 2019-10-07

## 2019-10-04 RX ORDER — POTASSIUM CHLORIDE 29.8 MG/ML
40 INJECTION INTRAVENOUS ONCE
Status: COMPLETED | OUTPATIENT
Start: 2019-10-04 | End: 2019-10-04

## 2019-10-04 RX ADMIN — POTASSIUM CHLORIDE 40 MEQ: 400 INJECTION, SOLUTION INTRAVENOUS at 09:10

## 2019-10-04 RX ADMIN — OXACILLIN SODIUM 10 MG: 1 INJECTION, POWDER, FOR SOLUTION INTRAMUSCULAR; INTRAVENOUS at 08:10

## 2019-10-04 RX ADMIN — PROPOFOL 50 MCG/KG/MIN: 10 INJECTION, EMULSION INTRAVENOUS at 08:10

## 2019-10-04 RX ADMIN — OXACILLIN SODIUM 800 MG: 1 INJECTION, POWDER, FOR SOLUTION INTRAMUSCULAR; INTRAVENOUS at 11:10

## 2019-10-04 RX ADMIN — OXACILLIN SODIUM 400 MG: 1 INJECTION, POWDER, FOR SOLUTION INTRAMUSCULAR; INTRAVENOUS at 10:10

## 2019-10-04 RX ADMIN — OXACILLIN SODIUM 4 MG: 1 INJECTION, POWDER, FOR SOLUTION INTRAMUSCULAR; INTRAVENOUS at 08:10

## 2019-10-04 RX ADMIN — CHLORHEXIDINE GLUCONATE 0.12% ORAL RINSE 15 ML: 1.2 LIQUID ORAL at 09:10

## 2019-10-04 RX ADMIN — OXACILLIN SODIUM 4 MCG: 1 INJECTION, POWDER, FOR SOLUTION INTRAMUSCULAR; INTRAVENOUS at 06:10

## 2019-10-04 RX ADMIN — OXACILLIN SODIUM 1 MG: 1 INJECTION, POWDER, FOR SOLUTION INTRAMUSCULAR; INTRAVENOUS at 08:10

## 2019-10-04 RX ADMIN — OXACILLIN SODIUM 10 MCG: 1 INJECTION, POWDER, FOR SOLUTION INTRAMUSCULAR; INTRAVENOUS at 06:10

## 2019-10-04 RX ADMIN — POTASSIUM CHLORIDE 20 MEQ: 20 SOLUTION ORAL at 06:10

## 2019-10-04 RX ADMIN — OXACILLIN SODIUM 200 MG: 1 INJECTION, POWDER, FOR SOLUTION INTRAMUSCULAR; INTRAVENOUS at 10:10

## 2019-10-04 RX ADMIN — IPRATROPIUM BROMIDE AND ALBUTEROL SULFATE 3 ML: .5; 3 SOLUTION RESPIRATORY (INHALATION) at 07:10

## 2019-10-04 RX ADMIN — ALTEPLASE 5 MG: KIT at 01:10

## 2019-10-04 RX ADMIN — OXACILLIN SODIUM 1 MCG: 1 INJECTION, POWDER, FOR SOLUTION INTRAMUSCULAR; INTRAVENOUS at 05:10

## 2019-10-04 RX ADMIN — OXACILLIN SODIUM 40 MCG: 1 INJECTION, POWDER, FOR SOLUTION INTRAMUSCULAR; INTRAVENOUS at 06:10

## 2019-10-04 RX ADMIN — CHLORHEXIDINE GLUCONATE 0.12% ORAL RINSE 15 ML: 1.2 LIQUID ORAL at 08:10

## 2019-10-04 RX ADMIN — CEFEPIME 2 G: 2 INJECTION, POWDER, FOR SOLUTION INTRAVENOUS at 04:10

## 2019-10-04 RX ADMIN — OXACILLIN SODIUM 100 MG: 1 INJECTION, POWDER, FOR SOLUTION INTRAMUSCULAR; INTRAVENOUS at 09:10

## 2019-10-04 RX ADMIN — IPRATROPIUM BROMIDE AND ALBUTEROL SULFATE 3 ML: .5; 3 SOLUTION RESPIRATORY (INHALATION) at 03:10

## 2019-10-04 RX ADMIN — IPRATROPIUM BROMIDE AND ALBUTEROL SULFATE 3 ML: .5; 3 SOLUTION RESPIRATORY (INHALATION) at 11:10

## 2019-10-04 RX ADMIN — OXACILLIN SODIUM 20 MCG: 1 INJECTION, POWDER, FOR SOLUTION INTRAMUSCULAR; INTRAVENOUS at 06:10

## 2019-10-04 RX ADMIN — METRONIDAZOLE 500 MG: 500 INJECTION, SOLUTION INTRAVENOUS at 03:10

## 2019-10-04 RX ADMIN — METRONIDAZOLE 500 MG: 500 INJECTION, SOLUTION INTRAVENOUS at 08:10

## 2019-10-04 RX ADMIN — OXACILLIN SODIUM 100 MCG: 1 INJECTION, POWDER, FOR SOLUTION INTRAMUSCULAR; INTRAVENOUS at 07:10

## 2019-10-04 RX ADMIN — OXACILLIN SODIUM 2 MG: 1 INJECTION, POWDER, FOR SOLUTION INTRAMUSCULAR; INTRAVENOUS at 08:10

## 2019-10-04 RX ADMIN — OXACILLIN SODIUM 400 MCG: 1 INJECTION, POWDER, FOR SOLUTION INTRAMUSCULAR; INTRAVENOUS at 07:10

## 2019-10-04 RX ADMIN — Medication 100 MCG/HR: at 01:10

## 2019-10-04 RX ADMIN — HEPARIN SODIUM 5000 UNITS: 5000 INJECTION, SOLUTION INTRAVENOUS; SUBCUTANEOUS at 09:10

## 2019-10-04 RX ADMIN — OXACILLIN SODIUM 40 MG: 1 INJECTION, POWDER, FOR SOLUTION INTRAMUSCULAR; INTRAVENOUS at 09:10

## 2019-10-04 RX ADMIN — DORNASE ALFA 5 MG: 1 SOLUTION RESPIRATORY (INHALATION) at 01:10

## 2019-10-04 RX ADMIN — OXACILLIN SODIUM 200 MCG: 1 INJECTION, POWDER, FOR SOLUTION INTRAMUSCULAR; INTRAVENOUS at 07:10

## 2019-10-04 RX ADMIN — FAMOTIDINE 20 MG: 10 INJECTION, SOLUTION INTRAVENOUS at 08:10

## 2019-10-04 RX ADMIN — OXACILLIN SODIUM 20 MG: 1 INJECTION, POWDER, FOR SOLUTION INTRAMUSCULAR; INTRAVENOUS at 09:10

## 2019-10-04 RX ADMIN — METRONIDAZOLE 500 MG: 500 INJECTION, SOLUTION INTRAVENOUS at 10:10

## 2019-10-04 RX ADMIN — OXACILLIN SODIUM 600 MG: 1 INJECTION, POWDER, FOR SOLUTION INTRAMUSCULAR; INTRAVENOUS at 10:10

## 2019-10-04 RX ADMIN — HEPARIN SODIUM 5000 UNITS: 5000 INJECTION, SOLUTION INTRAVENOUS; SUBCUTANEOUS at 01:10

## 2019-10-04 RX ADMIN — OXACILLIN SODIUM 2 MCG: 1 INJECTION, POWDER, FOR SOLUTION INTRAMUSCULAR; INTRAVENOUS at 05:10

## 2019-10-04 RX ADMIN — Medication 200 MCG/HR: at 10:10

## 2019-10-04 NOTE — ASSESSMENT & PLAN NOTE
--seen on CT 9/19 with improvement noted on f/u CT 9/25  --concern for ileus on 9/25 CT; rectal tube in place with liquid stool output  --stool 9/27 neg for c diff; stool cx neg  --KUB with distended loops of bowel; maintain NGT to LIWS  --continue flagyl

## 2019-10-04 NOTE — PROGRESS NOTES
[facility]  Infectious Disease  Progress Note    Patient Name: Mesha Mckenzie  MRN: [id number]  Admission Date: 10/2/2019  Length of Stay: 2 days  Attending Physician: Malik Jerry MD  Primary Care Provider: Varun Shea MD PhD    Isolation Status: No active isolations  Assessment/Plan:      Endocarditis  62yo female with IVDU presents as transfer from [facility] for epidural abscess, likely secondary to MSSA bacteremia and endocarditis with vegetations of the MV and TV on DHARMESH 9/26 c/b bilateral multifocal pneumonia with cavitary lesions, right pleural effusion s/p thoracentesis on 10/1 w/ concern for empyema, and pan-colitis. Followed by ID at [facility], most recently on daptomycin, cefazolin, and vancomycin. Intubated shortly after arrival to [facility] for hypoxia. Noted to have penicillin allergy.    Recommendations:  -currently on cefepime and flagyl, scheduled for oxacillin graded challenge today  -would discontinue cefepime after challenge and continue oxacillin  -continue to follow blood cultures        Thank you for your consult. I will follow-up with patient. Please contact us if you have any additional questions.    Alma Camilo MD  Infectious Disease  [facility]    Subjective:     Principal Problem:Acute hypoxemic respiratory failure    HPI: Mesha Mckenzie is a 62 yo female with IVDU (heroin), prior GIB in 2018, and spinal stenosis who presents from [facility] for neurosurgery eval for epidural abscess in setting of MSSA endocarditis. She had initially presented to [facility] on 9/19 for evaluation of nausea, vomiting, and diarrhea for the past 5 days. 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 She was initially started on vancomycin, flagyl,  and levoquin. Blood cultures from 9/19 positive for MSSA and were thought to be likely source. Urine culture from 9/19 also positive for MSSA. CT head on 9/19 and 9/22 negative for septic emboli. TTE on 9/21 showed possible vegetation on mitral valve (EF 55%).  DHARMESH was performed on 9/25, showing mitral valve vegetation (5x6mm) on the posterior leaflet as well as tricuspid valve vegetation (10x5mm) on septal leaflet. CT chest was repeated on 9/26 and was showed for previously seen bilateral consolidations that were now noted to be cavitary/cystic in nature. Repeat TTE on 9/26 reported reduced EF from previous 55% to 33%. Cardiothoracic evaluated patient for valve replacement but surgery was deferred due to patient's multiple medical issues. Last positive blood culture was 9/26. Her antibiotics were later changed to daptomycin, cefazolin, and vancomycin. Thoracentesis on 10/1 showed evidence of empyema. MRI spine on 10/1 showed lumbar paraspinal muscle infection and small dorsal epidural abscess from L2-4. [place] [facility] [place].     On [place] [facility] she was noted to be encephalopathic. Shortly after arriving, she became hypoxic and hypertensive and required intubation. She also required neuromuscular blockade for tachypnea and vent dyssynchrony. Vasopressors were also started due to hypotension.      Interval History: evaluated by allergy today. Scheduled for oxaciliin graded challenge today. Blood cultures are still negative. US of nodules on shoulders reveal phlegmon versus developing abscess though no fluid pocket (?).    Review of Systems   Unable to perform ROS: Intubated     Objective:     Vital Signs (Most Recent):  Temp: 98.2 °F (36.8 °C) (10/04/19 1502)  Pulse: 104 (10/04/19 1502)  Resp: (!) 21 (10/04/19 1502)  BP: 123/60 (10/04/19 1502)  SpO2: 95 % (10/04/19 1502) Vital Signs (24h Range):  Temp:  [96.1 °F (35.6 °C)-98.3 °F (36.8 °C)] 98.2 °F (36.8 °C)  Pulse:  [] 104  Resp:   [21-28] 21  SpO2:  [95 %-100 %] 95 %  BP: ()/(50-75) 123/60  Arterial Line BP: ()/(48-77) 85/59     Weight: 53.5 kg (118 lb)  Body mass index is 20.25 kg/m².    Estimated Creatinine Clearance: 33.8 mL/min (based on SCr of 1.4 mg/dL).    Physical Exam   Constitutional: She appears well-developed and well-nourished. No distress.   HENT:   Head: Normocephalic and atraumatic.   Eyes: Conjunctivae are normal. Right eye exhibits no discharge. Left eye exhibits no discharge.   Neck: Neck supple.   Cardiovascular: Normal rate and normal heart sounds. Exam reveals no gallop and no friction rub.   No murmur heard.  Pulmonary/Chest: She has no wheezes. She has no rales.   Intubated   Abdominal: Soft. She exhibits no mass. There is no tenderness. There is no guarding.   Musculoskeletal: She exhibits no edema.   Area with firm nodularity on superior portion of shoulders bilaterally  Neurological:   Sedated   Skin: Skin is warm and dry. She is not diaphoretic.       Significant Labs:   CBC:   Recent Labs   Lab 10/03/19  0136 10/03/19  0614 10/04/19  0405   WBC 14.05*  14.05* 8.29 3.67*   HGB 11.9*  11.9* 10.7* 11.6*   HCT 37.5  37.5 35.1* 35.8*     291 194 115*     CMP:   Recent Labs   Lab 10/02/19  2000 10/03/19  0136  10/03/19  2356 10/04/19  0404 10/04/19  1000    145  145  145   < > 146* 146* 143   K 3.4* 5.0  5.0  5.0   < > 3.8 3.6 3.9    113*  113*  113*   < > 113* 113* 113*   CO2 28 22*  22*  22*   < > 22* 23 23   * 227*  227*  227*   < > 143* 111* 95   BUN 53* 55*  55*  55*   < > 58* 59* 59*   CREATININE 1.1 1.3  1.3  1.3   < > 1.3 1.3 1.4   CALCIUM 8.0* 7.8*  7.8*  7.8*   < > 7.2* 7.4* 7.3*   PROT 7.0 6.9  --   --  5.4*  --    ALBUMIN 1.7* 1.7*  --   --  1.4*  --    BILITOT 0.8 0.6  --   --  0.2  --    ALKPHOS 179* 176*  --   --  107  --    AST 80* 79*  --   --  26  --    ALT 25 27  --   --  9*  --    ANIONGAP 8 10  10  10   < > 11 10 7*   EGFRNONAA 52.8* 43.2*   43.2*  43.2*   < > 43.2* 43.2* 39.5*    < > = values in this interval not displayed.

## 2019-10-04 NOTE — ASSESSMENT & PLAN NOTE
--multifactorial secondary to complete left lung collapse, septic emboli, pleural effusions, suspected pna  --intubated shortly after arrival for hypoxemia  --s/p bronch x2 with significant clearance of secretions from left mainstem & improved aeration on f/u xray  --f/u sputum cx  --continue cefepime & flagyl   --s/p thora 10/1; transudative based on lights criteria, though concern for empyema based on OSH pulm note   ---right chest tube placed this am, 345 mL output after TPA administration; pleural fluid with GPC on gram stain   --CTA neg for PE  --wean ventilator as tolerated

## 2019-10-04 NOTE — ASSESSMENT & PLAN NOTE
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    Plan:  - When deemed medically stable, will consider OR for evacuation of abscess.  - q 1 hr neuro checks  - continue abx per primary team, will fu cultures  - Following

## 2019-10-04 NOTE — ASSESSMENT & PLAN NOTE
--likely secondary to sepsis  --Brain MRI 9/30 with lacunar infarcts, neg for acute intracranial abnormality  --currently sedated with propofol & fentanyl  --repeat head CT d/t unequal pupils, no acute abnormality

## 2019-10-04 NOTE — PLAN OF CARE
"Dx: Acute hypoxemic respiratory failure    Shift Events: Bicarb drip d/c. BP stable, no pressors needed. AB administered.     Neuro: Sedated, Arouses to Voice, Follows Commands and Moves All Extremities    Vital Signs: /60 (BP Location: Left arm, Patient Position: Lying)   Pulse (!) 117   Temp 97.5 °F (36.4 °C) (Oral)   Resp (!) 24   Ht 5' 4" (1.626 m)   Wt 53.5 kg (118 lb)   SpO2 99%   BMI 20.25 kg/m²   CVP 9-10  Vent: AC VC 30%, 5 PEEP, rate 24    Diet: NPO    Gtts: Propfol, Fentanyl and MIVF    Urine Output: Urinary Catheter 25-45 cc/hour    Drains: Chest Tube, total output 10 cc /  shift and NG/OG Tube 0 cc /  shift    Restraints : Bilateral wrist restraints placed at 5AM. ROM performed. No injuries noted. Will remove when appropriate.     Labs/Accuchecks: Q6H BMP and troponin.    Skin: Blanchable redness noted to right heel. Redness noted surrounding BMS. Scab noted to nose, see flowsheet. Scab to right thigh. Irregular tissue and discoloration to bilateral shoulders- MD aware and awaiting US. Repositioned Q2H. Sacral and heel foams in place. Heel boots in place.       "

## 2019-10-04 NOTE — ASSESSMENT & PLAN NOTE
64yo female with IVDU presents as transfer from [facility] for epidural abscess, likely secondary to MSSA bacteremia and endocarditis with vegetations of the MV and TV on DHARMESH 9/26 c/b bilateral multifocal pneumonia with cavitary lesions, right pleural effusion s/p thoracentesis on 10/1 w/ concern for empyema, and pan-colitis. Followed by ID at [facility], most recently on daptomycin, cefazolin, and vancomycin. Intubated shortly after arrival [place] [facility] [place]. Noted to have penicillin allergy.    Recommendations:  -currently on cefepime and flagyl, scheduled for oxacillin graded challenge today  -would discontinue cefepime after challenge and continue oxacillin  -continue to follow blood cultures

## 2019-10-04 NOTE — ASSESSMENT & PLAN NOTE
--last echo (OSH) with EF 35%, LV concentric remodeling, indeterminate diastolic function  --bedside echo with depressed EF, RV not significantly dilated  --repeat TTE with improved EF and without mention of previously noted vegetations

## 2019-10-04 NOTE — SUBJECTIVE & OBJECTIVE
Interval History: evaluated by allergy today. Scheduled for oxaciliin graded challenge today. Blood cultures are still negative.    Review of Systems   Unable to perform ROS: Intubated     Objective:     Vital Signs (Most Recent):  Temp: 98.2 °F (36.8 °C) (10/04/19 1502)  Pulse: 104 (10/04/19 1502)  Resp: (!) 21 (10/04/19 1502)  BP: 123/60 (10/04/19 1502)  SpO2: 95 % (10/04/19 1502) Vital Signs (24h Range):  Temp:  [96.1 °F (35.6 °C)-98.3 °F (36.8 °C)] 98.2 °F (36.8 °C)  Pulse:  [] 104  Resp:  [21-28] 21  SpO2:  [95 %-100 %] 95 %  BP: ()/(50-75) 123/60  Arterial Line BP: ()/(48-77) 85/59     Weight: 53.5 kg (118 lb)  Body mass index is 20.25 kg/m².    Estimated Creatinine Clearance: 33.8 mL/min (based on SCr of 1.4 mg/dL).    Physical Exam   Constitutional: She appears well-developed and well-nourished. No distress.   HENT:   Head: Normocephalic and atraumatic.   Eyes: Conjunctivae are normal. Right eye exhibits no discharge. Left eye exhibits no discharge.   Neck: Neck supple.   Cardiovascular: Normal rate and normal heart sounds. Exam reveals no gallop and no friction rub.   No murmur heard.  Pulmonary/Chest: She has no wheezes. She has no rales.   Intubated   Abdominal: Soft. She exhibits no mass. There is no tenderness. There is no guarding.   Musculoskeletal: She exhibits no edema.   Neurological:   Sedated   Skin: Skin is warm and dry. She is not diaphoretic.       Significant Labs:   CBC:   Recent Labs   Lab 10/03/19  0136 10/03/19  0614 10/04/19  0405   WBC 14.05*  14.05* 8.29 3.67*   HGB 11.9*  11.9* 10.7* 11.6*   HCT 37.5  37.5 35.1* 35.8*     291 194 115*     CMP:   Recent Labs   Lab 10/02/19  2000 10/03/19  0136  10/03/19  2356 10/04/19  0404 10/04/19  1000    145  145  145   < > 146* 146* 143   K 3.4* 5.0  5.0  5.0   < > 3.8 3.6 3.9    113*  113*  113*   < > 113* 113* 113*   CO2 28 22*  22*  22*   < > 22* 23 23   * 227*  227*  227*   < > 143*  111* 95   BUN 53* 55*  55*  55*   < > 58* 59* 59*   CREATININE 1.1 1.3  1.3  1.3   < > 1.3 1.3 1.4   CALCIUM 8.0* 7.8*  7.8*  7.8*   < > 7.2* 7.4* 7.3*   PROT 7.0 6.9  --   --  5.4*  --    ALBUMIN 1.7* 1.7*  --   --  1.4*  --    BILITOT 0.8 0.6  --   --  0.2  --    ALKPHOS 179* 176*  --   --  107  --    AST 80* 79*  --   --  26  --    ALT 25 27  --   --  9*  --    ANIONGAP 8 10  10  10   < > 11 10 7*   EGFRNONAA 52.8* 43.2*  43.2*  43.2*   < > 43.2* 43.2* 39.5*    < > = values in this interval not displayed.

## 2019-10-04 NOTE — PROGRESS NOTES
[facility]  Cardiology  Progress Note    Patient Name: Mesha Mckenzie  MRN: [id number]  Admission Date: 10/2/2019  Hospital Length of Stay: 2 days  Code Status: Full Code   Attending Physician: Malik Jerry MD   Primary Care Physician: Varun Shea MD PhD  Expected Discharge Date: 10/10/2019  Principal Problem:Acute hypoxemic respiratory failure    Subjective:     Hospital Course:   No notes on file    Interval History: NAEO. TTE performed yesterday showing improvement in LVSF as well as no longer visualized vegetations. Discussed with daughter at bedside.       Objective:     Vital Signs (Most Recent):  Temp: 98.3 °F (36.8 °C) (10/04/19 1102)  Pulse: (!) 114 (10/04/19 1215)  Resp: (!) 24 (10/04/19 1111)  BP: (!) 115/53 (10/04/19 1200)  SpO2: 96 % (10/04/19 1215) Vital Signs (24h Range):  Temp:  [96.1 °F (35.6 °C)-98.3 °F (36.8 °C)] 98.3 °F (36.8 °C)  Pulse:  [] 114  Resp:  [23-28] 24  SpO2:  [96 %-100 %] 96 %  BP: ()/(50-75) 115/53  Arterial Line BP: ()/(48-77) 90/56     Weight: 53.5 kg (118 lb)  Body mass index is 20.25 kg/m².     SpO2: 96 %  O2 Device (Oxygen Therapy): ventilator      Intake/Output Summary (Last 24 hours) at 10/4/2019 1237  Last data filed at 10/4/2019 1200  Gross per 24 hour   Intake 3549.31 ml   Output 1210 ml   Net 2339.31 ml       Lines/Drains/Airways     Central Venous Catheter Line                 Percutaneous Central Line Insertion/Assessment - triple lumen  10/03/19 0114 right internal jugular 1 day          Drain                 Urethral Catheter 09/19/19 1240 Double-lumen 16 Fr. 14 days         Rectal Tube 09/27/19 1000 rectal tube w/ balloon (indicate number of mLs) 7 days         Chest Tube 10/03/19 0500 Right Midaxillary 1 day         NG/OG Tube 10/02/19 2200 Center mouth 1 day          Airway                 Airway - Non-Surgical 10/02/19 4556 Endotracheal Tube 1 day          Arterial Line                 Arterial Line 10/02/19 2344  Right Radial 1 day          Peripheral Intravenous Line                 Peripheral IV - Single Lumen 10/02/19 0825 20 G Anterior;Right Hand 2 days         Peripheral IV - Single Lumen 10/02/19 20 G Right Hand 2 days         Peripheral IV - Single Lumen 10/02/19 2037 20 G Left Wrist 1 day         Peripheral IV - Single Lumen 10/03/19 0300 20 G Left Antecubital 1 day                Physical Exam   Constitutional:   Intubated and sedated   Neck: No JVD present.   Cardiovascular: Normal rate, regular rhythm and normal heart sounds.   Pulmonary/Chest: Effort normal and breath sounds normal. No respiratory distress.   Musculoskeletal: She exhibits no edema.       Significant Labs:   CMP   Recent Labs   Lab 10/02/19  2000 10/03/19  0136  10/03/19  2356 10/04/19  0404 10/04/19  1000    145  145  145   < > 146* 146* 143   K 3.4* 5.0  5.0  5.0   < > 3.8 3.6 3.9    113*  113*  113*   < > 113* 113* 113*   CO2 28 22*  22*  22*   < > 22* 23 23   * 227*  227*  227*   < > 143* 111* 95   BUN 53* 55*  55*  55*   < > 58* 59* 59*   CREATININE 1.1 1.3  1.3  1.3   < > 1.3 1.3 1.4   CALCIUM 8.0* 7.8*  7.8*  7.8*   < > 7.2* 7.4* 7.3*   PROT 7.0 6.9  --   --  5.4*  --    ALBUMIN 1.7* 1.7*  --   --  1.4*  --    BILITOT 0.8 0.6  --   --  0.2  --    ALKPHOS 179* 176*  --   --  107  --    AST 80* 79*  --   --  26  --    ALT 25 27  --   --  9*  --    ANIONGAP 8 10  10  10   < > 11 10 7*   ESTGFRAFRICA >60.0 49.7*  49.7*  49.7*   < > 49.7* 49.7* 45.5*   EGFRNONAA 52.8* 43.2*  43.2*  43.2*   < > 43.2* 43.2* 39.5*    < > = values in this interval not displayed.   , CBC   Recent Labs   Lab 10/03/19  0136 10/03/19  0614 10/04/19  0405   WBC 14.05*  14.05* 8.29 3.67*   HGB 11.9*  11.9* 10.7* 11.6*   HCT 37.5  37.5 35.1* 35.8*     291 194 115*    and All pertinent lab results from the last 24 hours have been reviewed.    Significant Imaging: Echocardiogram:   Transthoracic echo (TTE) complete (Cupid  Only):   Results for orders placed or performed during the hospital encounter of 10/02/19   Echo Color Flow Doppler? Yes   Result Value Ref Range    AV mean gradient 4 mmHg    Ao peak jose a 1.21 m/s    Ao VTI 22.28 cm    IVS 0.84 0.6 - 1.1 cm    LA size 2.54 cm    Left Atrium Major Axis 4.45 cm    Left Atrium Minor Axis 4.11 cm    LVIDD 4.47 3.5 - 6.0 cm    LVIDS 3.09 2.1 - 4.0 cm    LVOT diameter 1.97 cm    LVOT peak VTI 13.56 cm    PW 0.89 0.6 - 1.1 cm    MV Peak A Jose A 0.86 m/s    E wave decelartion time 204.81 msec    MV Peak E Jose A 0.51 m/s    PV Peak D Jose A 0.33 m/s    PV Peak S Jose A 0.42 m/s    RA Major Axis 3.64 cm    RA Width 2.33 cm    RVDD 2.54 cm    Sinus 2.95 cm    TAPSE 1.99 cm    TDI LATERAL 0.05 m/s    TDI SEPTAL 0.04 m/s    LA WIDTH 3.18 cm    LV Diastolic Volume 91.07 mL    LV Systolic Volume 37.51 mL    LVOT peak jose a 0.81 m/s    LV LATERAL E/E' RATIO 10.20 m/s    LV SEPTAL E/E' RATIO 12.75 m/s    FS 31 %    LA volume 29.34 cm3    LV mass 124.58 g    Left Ventricle Relative Wall Thickness 0.40 cm    AV valve area 1.85 cm2    AV Velocity Ratio 0.67     AV index (prosthetic) 0.61     E/A ratio 0.59     Mean e' 0.05 m/s    Pulm vein S/D ratio 1.27     LVOT area 3.0 cm2    LVOT stroke volume 41.31 cm3    AV peak gradient 6 mmHg    E/E' ratio 11.33 m/s    LV Systolic Volume Index 24.0 mL/m2    LV Diastolic Volume Index 58.26 mL/m2    LA Volume Index 18.8 mL/m2    LV Mass Index 80 g/m2    BSA 1.55 m2    Narrative    · Low normal left ventricular systolic function. The estimated ejection   fraction is 53%  · Local segmental wall motion abnormalities.  · Normal LV diastolic function.  · Normal right ventricular systolic function.  · Mild mitral sclerosis.  · Mechanically ventilated; cannot use inferior caval vein diameter to   estimate central venous pressure.        Assessment and Plan:       Acute bacterial endocarditis  Patient is a 62 yo F with PMH of IVDA who presents with Mitral Valve and Tricuspid Valve  vegetations, leading to a complicated hospital course involving severe mitral valve regurgitation causing heart failure, bilateral septic emboli, right sided pleural effusion requiring chest tube placement, left sided main-stem obstruction leading to complete lung collapse requiring bronchoscopy x2, and a L2-L4 epidural spinal abscess    Plan:  -Agree with ID consult. Continue antibiotics per ID  -Possible valve replacement following 6 weeks antibiotics course  -Medical management for now  -Aggressive electrolyte management      Thank you for your consult. I will sign off at this time. Please contact us back with any questions or concerns.     VTE Risk Mitigation (From admission, onward)         Ordered     IP VTE HIGH RISK PATIENT  Once      10/02/19 2153     Place sequential compression device  Until discontinued      10/02/19 2826                Benigno Arenas MD  Cardiology  [facility]

## 2019-10-04 NOTE — ASSESSMENT & PLAN NOTE
Patient is a 64 yo F with PMH of IVDA who presents with Mitral Valve and Tricuspid Valve vegetations, leading to a complicated hospital course involving severe mitral valve regurgitation causing heart failure, bilateral septic emboli, right sided pleural effusion requiring chest tube placement, left sided main-stem obstruction leading to complete lung collapse requiring bronchoscopy x2, and a L2-L4 epidural spinal abscess    Plan:  -Agree with ID consult. Continue antibiotics per ID  -Possible valve replacement following 6 weeks antibiotics course  -Medical management for now  -Aggressive electrolyte management

## 2019-10-04 NOTE — ASSESSMENT & PLAN NOTE
--Possible ileus on CT abd 9/25  --Was receiving TPN at OSH  --KUB with dilated loops of bowel  --OG tube to LWS

## 2019-10-04 NOTE — PROGRESS NOTES
[facility]  Neurosurgery  Progress Note    Subjective:     History of Present Illness: 63yof IVDU with persistent bacteremia found to have endocarditis bactreremia MSSA. MRI L spine showed L2-L4 epidural abscess and neurosurgery was consulted for evaluation. Of note, pt was intubated, bronched, chest tube plced and sedation and paralytics administered due to decompensation with desaturation and hypotension.    Post-Op Info:  * No surgery found *         Interval History: naeon, off pressors this am.    Medications:  Continuous Infusions:   fentanyl 100 mcg/hr (10/04/19 1000)    norepinephrine bitartrate-D5W Stopped (10/03/19 1800)    propofol 30 mcg/kg/min (10/04/19 1000)     Scheduled Meds:   albuterol-ipratropium  3 mL Nebulization Q4H    alteplase (ACTIVASE) 5 mg in 0.9% NaCl 50 mL  5 mg Intra-Catheter Once    ceFEPime (MAXIPIME) IVPB  2 g Intravenous Q12H    chlorhexidine  15 mL Mouth/Throat BID    dornase xiomara (PULMOZYME) 5 mg in 0.9% NaCL 40 mL  5 mg Chest Tube Once    famotidine (PF)  20 mg Intravenous Daily    metronidazole  500 mg Intravenous Q8H     PRN Meds:ondansetron, sodium chloride 0.9%     Review of Systems  Objective:     Weight: 53.5 kg (118 lb)  Body mass index is 20.25 kg/m².  Vital Signs (Most Recent):  Temp: 97.8 °F (36.6 °C) (10/04/19 0710)  Pulse: (!) 116 (10/04/19 1015)  Resp: (!) 24 (10/04/19 0710)  BP: 133/60 (10/04/19 1000)  SpO2: 97 % (10/04/19 1015) Vital Signs (24h Range):  Temp:  [96.1 °F (35.6 °C)-97.8 °F (36.6 °C)] 97.8 °F (36.6 °C)  Pulse:  [] 116  Resp:  [24-28] 24  SpO2:  [96 %-100 %] 97 %  BP: ()/(50-75) 133/60  Arterial Line BP: ()/(47-77) 102/70     Date 10/04/19 0700 - 10/05/19 0659   Shift 4629-1149 6243-4989 9956-0294 24 Hour Total   INTAKE   NG/GT 25   25   Shift Total(mL/kg) 25(0.5)   25(0.5)   OUTPUT   Urine(mL/kg/hr) 145   145   Drains 0   0   Chest Tube 0   0   Shift Total(mL/kg) 145(2.7)   145(2.7)   Weight (kg) 53.5  53.5 53.5 53.5              Vent Mode: Spont  Oxygen Concentration (%):  [30-40] 30  Resp Rate Total:  [20 br/min-39 br/min] 21 br/min  Vt Set:  [350 mL] 350 mL  PEEP/CPAP:  [5 cmH20] 5 cmH20  Pressure Support:  [10 cmH20] 10 cmH20  Mean Airway Pressure:  [6.8 gcU16-89 cmH20] 8.1 cmH20         Chest Tube 10/03/19 0500 Right Midaxillary (Active)   Chest Tube WDL WDL 10/4/2019  7:10 AM   Function -20 cm H2O 10/4/2019  7:10 AM   Air Leak/Fluctuation air leak not present;fluctuation not present 10/4/2019  7:10 AM   Safety all tubing connections taped;2 rubber-tipped hemostats w/ patient;all connections secured;suction checked 10/4/2019  7:10 AM   Securement tubing secured to body distal to insertion site w/ tape 10/4/2019  7:10 AM   Patency Intervention Tip/tilt 10/4/2019  7:10 AM   Drainage Description Serosanguineous 10/4/2019  7:10 AM   Dressing Appearance occlusive gauze dressing intact;clean and dry 10/4/2019  7:10 AM   Dressing Care dressing reinforced 10/4/2019  7:10 AM   Left Subcutaneous Emphysema none present 10/4/2019  7:10 AM   Right Subcutaneous Emphysema none present 10/4/2019  7:10 AM   Site Assessment Clean;Dry;Intact;No redness;No swelling 10/4/2019  7:10 AM   Surrounding Skin Dry;Intact 10/4/2019  7:10 AM   Output (mL) 0 mL 10/4/2019  7:00 AM            NG/OG Tube 10/02/19 2200 Center mouth (Active)   Placement Check placement verified by x-ray 10/4/2019  7:10 AM   Tolerance no signs/symptoms of discomfort 10/4/2019  7:10 AM   Securement secured to commercial device 10/4/2019  7:10 AM   Clamp Status/Tolerance unclamped;no emesis;no nausea;no restlessness;no abdominal distention 10/4/2019  7:10 AM   Suction Setting/Drainage Method suction at;low;intermittent setting 10/4/2019  7:10 AM   Insertion Site Appearance no redness, warmth, tenderness, skin breakdown, drainage 10/4/2019  7:10 AM   Drainage Bile 10/4/2019  3:00 AM   Intake (mL) 50 mL 10/4/2019  6:00 AM   Tube Output(mL)(Include Discarded  "Residual) 0 mL 10/4/2019  7:00 AM            Rectal Tube 09/27/19 1000 rectal tube w/ balloon (indicate number of mLs) (Active)   Balloon Inflation Volume (mL) 45 10/4/2019  3:00 AM   Reposition drainage bags for BMS & Henriquez on opposite sides of bed 10/4/2019  7:10 AM   Outcome comfort enhanced 10/4/2019  7:10 AM   Stool Color Brown;Green 10/4/2019  7:10 AM   Insertion Site Appearance no redness, warmth, tenderness, skin breakdown, drainage 10/4/2019  7:10 AM   Flush/Irrigation flushed w/;water;no resistance met 10/4/2019  3:00 AM   Intake (mL) 25 mL 10/4/2019  7:00 AM   Rectal Tube Output 0 mL 10/4/2019  3:00 AM            Urethral Catheter 09/19/19 1240 Double-lumen 16 Fr. (Active)   Site Assessment Clean;Intact 10/4/2019  7:10 AM   Collection Container Urimeter 10/4/2019  7:10 AM   Securement Method secured to top of thigh w/ adhesive device 10/4/2019  7:10 AM   Catheter Care Performed yes 10/4/2019  7:10 AM   Reason for Continuing Urinary Catheterization Critically ill in ICU requiring intensive monitoring 10/4/2019  7:10 AM   CAUTI Prevention Bundle StatLock in place w 1" slack;Intact seal between catheter & drainage tubing;Drainage bag/urimeter off the floor;Green sheeting clip in use;No dependent loops or kinks;Drainage bag/urimeter not overfilled (<2/3 full);Drainage bag/urimeter below bladder 10/4/2019  7:10 AM   Output (mL) 45 mL 10/4/2019 10:00 AM       Neurosurgery Physical Exam   E2VTM5  PERRL  Localizing in BUE  WD in BLE    Significant Labs:  Recent Labs   Lab 10/02/19  2000 10/03/19  0136  10/03/19  1754 10/03/19  2356 10/04/19  0404   * 227*  227*  227*   < > 150* 143* 111*    145  145  145   < > 143 146* 146*   K 3.4* 5.0  5.0  5.0   < > 4.4 3.8 3.6    113*  113*  113*   < > 115* 113* 113*   CO2 28 22*  22*  22*   < > 19* 22* 23   BUN 53* 55*  55*  55*   < > 56* 58* 59*   CREATININE 1.1 1.3  1.3  1.3   < > 1.3 1.3 1.3   CALCIUM 8.0* 7.8*  7.8*  7.8*   < > 7.0* " 7.2* 7.4*   MG 1.7 2.9*  --   --   --  1.9    < > = values in this interval not displayed.     Recent Labs   Lab 10/03/19  0136 10/03/19  0614 10/04/19  0405   WBC 14.05*  14.05* 8.29 3.67*   HGB 11.9*  11.9* 10.7* 11.6*   HCT 37.5  37.5 35.1* 35.8*     291 194 115*     Recent Labs   Lab 10/02/19  2000   INR 1.1     Microbiology Results (last 7 days)     Procedure Component Value Units Date/Time    Culture, Body Fluid - Bactec [[id number]] Collected:  10/03/19 0549    Order Status:  Completed Specimen:  Pleural Fluid Updated:  10/04/19 1012     Body Fluid Culture, Sterile No Growth to date      No Growth to date    Narrative:       received 2 culture bottles with specimen for pleural fluid.    Aerobic culture [[id number]] Collected:  10/03/19 0549    Order Status:  Completed Specimen:  Pleural Fluid Updated:  10/04/19 0745     Aerobic Bacterial Culture No growth    Culture, Anaerobic [[id number]] Collected:  10/03/19 0549    Order Status:  Completed Specimen:  Pleural Fluid Updated:  10/04/19 0725     Anaerobic Culture Culture in progress    Blood culture [[id number]] Collected:  10/02/19 0830    Order Status:  Completed Specimen:  Blood from Peripheral, Hand, Right Updated:  10/03/19 2212     Blood Culture, Routine No Growth to date      No Growth to date    Blood culture [[id number]] Collected:  10/02/19 2025    Order Status:  Completed Specimen:  Blood from Peripheral, Wrist, Right Updated:  10/03/19 2212     Blood Culture, Routine No Growth to date      No Growth to date    Gram stain [[id number]] Collected:  10/03/19 0549    Order Status:  Completed Specimen:  Pleural Fluid Updated:  10/03/19 0955     Gram Stain Result Rare WBC's      Rare Gram positive cocci            Significant Diagnostics:  CT head: reviewed    Assessment/Plan:     Epidural abscess  63yof pmh IVDU with endocarditis MSSA bacteremia acutely decompensated overnight on paralytics, sedation and pressors found to have lumbar epidural  abscess.    Plan:  - When deemed medically stable, will consider OR for evacuation of abscess.  - q 1 hr neuro checks  - continue abx per primary team, will fu cultures  - Following        Steve Wei,   Neurosurgery  [facility]

## 2019-10-04 NOTE — ASSESSMENT & PLAN NOTE
--baseline creatinine ~ 0.7; was 2.9 on initial presentation to OSH, improved with hydration over hospital course  --FeNa/FeUrea consistent with pre-renal etiology; possible component of ATN given hypotension   --non anion gap metabolic acidosis on admit, briefly on sodium bicarb drip   --improving

## 2019-10-04 NOTE — PROGRESS NOTES
Pt HR sustaining 115-120s. Pt with increased tremors/ shivering and increased agitation. OSEAS Max notified. STAT EKG order placed and restraints placed. Will continue to monitor.

## 2019-10-04 NOTE — SUBJECTIVE & OBJECTIVE
Interval History: naeon, off pressors this am.    Medications:  Continuous Infusions:   fentanyl 100 mcg/hr (10/04/19 1000)    norepinephrine bitartrate-D5W Stopped (10/03/19 1800)    propofol 30 mcg/kg/min (10/04/19 1000)     Scheduled Meds:   albuterol-ipratropium  3 mL Nebulization Q4H    alteplase (ACTIVASE) 5 mg in 0.9% NaCl 50 mL  5 mg Intra-Catheter Once    ceFEPime (MAXIPIME) IVPB  2 g Intravenous Q12H    chlorhexidine  15 mL Mouth/Throat BID    dornase xiomara (PULMOZYME) 5 mg in 0.9% NaCL 40 mL  5 mg Chest Tube Once    famotidine (PF)  20 mg Intravenous Daily    metronidazole  500 mg Intravenous Q8H     PRN Meds:ondansetron, sodium chloride 0.9%     Review of Systems  Objective:     Weight: 53.5 kg (118 lb)  Body mass index is 20.25 kg/m².  Vital Signs (Most Recent):  Temp: 97.8 °F (36.6 °C) (10/04/19 0710)  Pulse: (!) 116 (10/04/19 1015)  Resp: (!) 24 (10/04/19 0710)  BP: 133/60 (10/04/19 1000)  SpO2: 97 % (10/04/19 1015) Vital Signs (24h Range):  Temp:  [96.1 °F (35.6 °C)-97.8 °F (36.6 °C)] 97.8 °F (36.6 °C)  Pulse:  [] 116  Resp:  [24-28] 24  SpO2:  [96 %-100 %] 97 %  BP: ()/(50-75) 133/60  Arterial Line BP: ()/(47-77) 102/70     Date 10/04/19 0700 - 10/05/19 0659   Shift 5160-4877 5788-1667 1762-6501 24 Hour Total   INTAKE   NG/GT 25   25   Shift Total(mL/kg) 25(0.5)   25(0.5)   OUTPUT   Urine(mL/kg/hr) 145   145   Drains 0   0   Chest Tube 0   0   Shift Total(mL/kg) 145(2.7)   145(2.7)   Weight (kg) 53.5 53.5 53.5 53.5              Vent Mode: Spont  Oxygen Concentration (%):  [30-40] 30  Resp Rate Total:  [20 br/min-39 br/min] 21 br/min  Vt Set:  [350 mL] 350 mL  PEEP/CPAP:  [5 cmH20] 5 cmH20  Pressure Support:  [10 cmH20] 10 cmH20  Mean Airway Pressure:  [6.8 wgP22-82 cmH20] 8.1 cmH20         Chest Tube 10/03/19 0500 Right Midaxillary (Active)   Chest Tube WDL WDL 10/4/2019  7:10 AM   Function -20 cm H2O 10/4/2019  7:10 AM   Air Leak/Fluctuation air leak not  present;fluctuation not present 10/4/2019  7:10 AM   Safety all tubing connections taped;2 rubber-tipped hemostats w/ patient;all connections secured;suction checked 10/4/2019  7:10 AM   Securement tubing secured to body distal to insertion site w/ tape 10/4/2019  7:10 AM   Patency Intervention Tip/tilt 10/4/2019  7:10 AM   Drainage Description Serosanguineous 10/4/2019  7:10 AM   Dressing Appearance occlusive gauze dressing intact;clean and dry 10/4/2019  7:10 AM   Dressing Care dressing reinforced 10/4/2019  7:10 AM   Left Subcutaneous Emphysema none present 10/4/2019  7:10 AM   Right Subcutaneous Emphysema none present 10/4/2019  7:10 AM   Site Assessment Clean;Dry;Intact;No redness;No swelling 10/4/2019  7:10 AM   Surrounding Skin Dry;Intact 10/4/2019  7:10 AM   Output (mL) 0 mL 10/4/2019  7:00 AM            NG/OG Tube 10/02/19 2200 Center mouth (Active)   Placement Check placement verified by x-ray 10/4/2019  7:10 AM   Tolerance no signs/symptoms of discomfort 10/4/2019  7:10 AM   Securement secured to commercial device 10/4/2019  7:10 AM   Clamp Status/Tolerance unclamped;no emesis;no nausea;no restlessness;no abdominal distention 10/4/2019  7:10 AM   Suction Setting/Drainage Method suction at;low;intermittent setting 10/4/2019  7:10 AM   Insertion Site Appearance no redness, warmth, tenderness, skin breakdown, drainage 10/4/2019  7:10 AM   Drainage Bile 10/4/2019  3:00 AM   Intake (mL) 50 mL 10/4/2019  6:00 AM   Tube Output(mL)(Include Discarded Residual) 0 mL 10/4/2019  7:00 AM            Rectal Tube 09/27/19 1000 rectal tube w/ balloon (indicate number of mLs) (Active)   Balloon Inflation Volume (mL) 45 10/4/2019  3:00 AM   Reposition drainage bags for BMS & Henriquez on opposite sides of bed 10/4/2019  7:10 AM   Outcome comfort enhanced 10/4/2019  7:10 AM   Stool Color Brown;Green 10/4/2019  7:10 AM   Insertion Site Appearance no redness, warmth, tenderness, skin breakdown, drainage 10/4/2019  7:10 AM  "  Flush/Irrigation flushed w/;water;no resistance met 10/4/2019  3:00 AM   Intake (mL) 25 mL 10/4/2019  7:00 AM   Rectal Tube Output 0 mL 10/4/2019  3:00 AM            Urethral Catheter 09/19/19 1240 Double-lumen 16 Fr. (Active)   Site Assessment Clean;Intact 10/4/2019  7:10 AM   Collection Container Urimeter 10/4/2019  7:10 AM   Securement Method secured to top of thigh w/ adhesive device 10/4/2019  7:10 AM   Catheter Care Performed yes 10/4/2019  7:10 AM   Reason for Continuing Urinary Catheterization Critically ill in ICU requiring intensive monitoring 10/4/2019  7:10 AM   CAUTI Prevention Bundle StatLock in place w 1" slack;Intact seal between catheter & drainage tubing;Drainage bag/urimeter off the floor;Green sheeting clip in use;No dependent loops or kinks;Drainage bag/urimeter not overfilled (<2/3 full);Drainage bag/urimeter below bladder 10/4/2019  7:10 AM   Output (mL) 45 mL 10/4/2019 10:00 AM       Neurosurgery Physical Exam   E2VTM5  PERRL  Localizing in BUE  WD in BLE    Significant Labs:  Recent Labs   Lab 10/02/19  2000 10/03/19  0136  10/03/19  1754 10/03/19  2356 10/04/19  0404   * 227*  227*  227*   < > 150* 143* 111*    145  145  145   < > 143 146* 146*   K 3.4* 5.0  5.0  5.0   < > 4.4 3.8 3.6    113*  113*  113*   < > 115* 113* 113*   CO2 28 22*  22*  22*   < > 19* 22* 23   BUN 53* 55*  55*  55*   < > 56* 58* 59*   CREATININE 1.1 1.3  1.3  1.3   < > 1.3 1.3 1.3   CALCIUM 8.0* 7.8*  7.8*  7.8*   < > 7.0* 7.2* 7.4*   MG 1.7 2.9*  --   --   --  1.9    < > = values in this interval not displayed.     Recent Labs   Lab 10/03/19  0136 10/03/19  0614 10/04/19  0405   WBC 14.05*  14.05* 8.29 3.67*   HGB 11.9*  11.9* 10.7* 11.6*   HCT 37.5  37.5 35.1* 35.8*     291 194 115*     Recent Labs   Lab 10/02/19  2000   INR 1.1     Microbiology Results (last 7 days)     Procedure Component Value Units Date/Time    Culture, Body Fluid - Bactec [[id number]] Collected:  " 10/03/19 0549    Order Status:  Completed Specimen:  Pleural Fluid Updated:  10/04/19 1012     Body Fluid Culture, Sterile No Growth to date      No Growth to date    Narrative:       received 2 culture bottles with specimen for pleural fluid.    Aerobic culture [[id number]] Collected:  10/03/19 0549    Order Status:  Completed Specimen:  Pleural Fluid Updated:  10/04/19 0745     Aerobic Bacterial Culture No growth    Culture, Anaerobic [[id number]] Collected:  10/03/19 0549    Order Status:  Completed Specimen:  Pleural Fluid Updated:  10/04/19 0725     Anaerobic Culture Culture in progress    Blood culture [[id number]] Collected:  10/02/19 0830    Order Status:  Completed Specimen:  Blood from Peripheral, Hand, Right Updated:  10/03/19 2212     Blood Culture, Routine No Growth to date      No Growth to date    Blood culture [[id number]] Collected:  10/02/19 2025    Order Status:  Completed Specimen:  Blood from Peripheral, Wrist, Right Updated:  10/03/19 2212     Blood Culture, Routine No Growth to date      No Growth to date    Gram stain [[id number]] Collected:  10/03/19 0549    Order Status:  Completed Specimen:  Pleural Fluid Updated:  10/03/19 0955     Gram Stain Result Rare WBC's      Rare Gram positive cocci            Significant Diagnostics:  CT head: reviewed

## 2019-10-04 NOTE — PLAN OF CARE
Patient free of falls/traumas/injuries. Plan of care discussed with patient and family.  Neuro: Pt on propofol and fentanyl gtts, follows commands.  Cardiac: ST on telemetry with HR in 100-110s, Maps >65.  Respiratory: A/C ventilation with FiO2 21% and PEEP of 5.0 with SpO2 94-96%  GI/: Henriquez in place with UOP 30-60ml/hr  SBT today, possible surgery later this weekend or early next week.

## 2019-10-04 NOTE — CONSULTS
ALLERGY & IMMUNOLOGY SERVICE - INITIAL CONSULT     HISTORY OF PRESENT ILLNESS     Patient ID: Mesha Mckenzie is a 63 y.o. female    Consulted for: History of a Penicillin Allergy    Consulted by: Randy Guzmán    HPI: 63 year old female with a past medical history of depression, anxiety and IV drug use initially presented on 9/19 and was found to be in septic shock with pneumonia and coliitis. 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 Team plans to transition the patient to oxacillin for better antimicrobial coverage but the patient has a listed penicillin allergy. The patient is unable to give any history due to her current state and the family is not aware of when the reaction was and what type of reaction it was.     From review of chart in July 2013 the patient had an Augmentin allergy removed from her chart. Reaction had been listed as diarrhea but was removed from her chart as it was said to be entered in error. As recently as December of last year she had no known allergies listed in her chart. There is no documentation of the a penicillin allergy in her chart until this hospital admission however no reaction type was documented.    Patient is currently intubated and sedated on propofol and fentanyl. Also currently on a norepinephrine drip.   MEDICAL HISTORY     Past Medical History:   Diagnosis Date    Anxiety     Carotid bruit     Chronic pain     Cystitis     Cystocele, unspecified (CODE)     Depression     GI bleed     History of uterine fibroid     Shortness of breath on exertion     Spinal  "stenosis     Urinary retention       PHYSICAL EXAM     BP (!) 120/58 (BP Location: Left arm, Patient Position: Lying)   Pulse (!) 115   Temp 97.8 °F (36.6 °C) (Oral)   Resp (!) 24   Ht 5' 4" (1.626 m)   Wt 53.5 kg (118 lb)   SpO2 100%   BMI 20.25 kg/m²    LABORATORY STUDIES     Component      Latest Ref Rng & Units 10/4/2019   WBC      3.90 - 12.70 K/uL 3.67 (L)   RBC      4.00 - 5.40 M/uL 3.82 (L)   Hemoglobin      12.0 - 16.0 g/dL 11.6 (L)   Hematocrit      37.0 - 48.5 % 35.8 (L)   MCV      82 - 98 fL 94   MCH      27.0 - 31.0 pg 30.4   MCHC      32.0 - 36.0 g/dL 32.4   RDW      11.5 - 14.5 % 18.6 (H)   Platelets      150 - 350 K/uL 115 (L)   MPV      9.2 - 12.9 fL 11.7   Immature Granulocytes      0.0 - 0.5 % 0.8 (H)   Gran # (ANC)      1.8 - 7.7 K/uL 2.3   Immature Grans (Abs)      0.00 - 0.04 K/uL 0.03   Lymph #      1.0 - 4.8 K/uL 0.9 (L)   Mono #      0.3 - 1.0 K/uL 0.2 (L)   Eos #      0.0 - 0.5 K/uL 0.2   Baso #      0.00 - 0.20 K/uL 0.02   nRBC      0 /100 WBC 0   Gran%      38.0 - 73.0 % 62.2   Lymph%      18.0 - 48.0 % 25.6   Mono%      4.0 - 15.0 % 6.5   Eosinophil%      0.0 - 8.0 % 4.4   Basophil%      0.0 - 1.9 % 0.5   Differential Method       Automated   Sodium      136 - 145 mmol/L 146 (H)   Potassium      3.5 - 5.1 mmol/L 3.6   Chloride      95 - 110 mmol/L 113 (H)   CO2      23 - 29 mmol/L 23   Glucose      70 - 110 mg/dL 111 (H)   BUN, Bld      8 - 23 mg/dL 59 (H)   Creatinine      0.5 - 1.4 mg/dL 1.3   Calcium      8.7 - 10.5 mg/dL 7.4 (L)   PROTEIN TOTAL      6.0 - 8.4 g/dL 5.4 (L)   Albumin      3.5 - 5.2 g/dL 1.4 (L)   BILIRUBIN TOTAL      0.1 - 1.0 mg/dL 0.2   Alkaline Phosphatase      55 - 135 U/L 107   AST      10 - 40 U/L 26   ALT      10 - 44 U/L 9 (L)   Anion Gap      8 - 16 mmol/L 10   eGFR if African American      >60 mL/min/1.73 m:2 49.7 (A)   eGFR if non African American      >60 mL/min/1.73 m:2 43.2 (A)   Magnesium      1.6 - 2.6 mg/dL 1.9   Phosphorus      2.7 - 4.5 mg/dL " 5.0 (H)      CHART REVIEW   Reviewed previous notes, labs, imaging   ASSESSMENT & PLAN     Mesha Mckenzie is a 63 y.o. female with a history of penicillin allergy now requiring treatment with Oxacillin. History of penicillin allergy is unknown and was first documented in the chart at this admission. When it was listed no reaction type was listed. As recently as December of last year chart documented no known drug allergies. Had a previous Augmentin allergy until 2013 that was removed. Due to the reaction being listed as diarrhea. I suspect that the patient would likely tolerate penicillin antibiotics but can not be sure without getting a history from the patient. Given that the patient is in such critical condition she would be at higher risk for death if she were to experience an anaphylactic reaction. In addition signs and symptoms would be harder to identify due to her being on vasopressor medications. Given this I would recommend the patient go through a desensitization protocol for oxacillin. Below is a protocol for densensitization with dilutions required and instructions for administration.     TEMPORARY INDUCTION OF TOLERANCE PROTOCOL FOR OXACILLIN  Step Concentrtion Amount (ml) Dose Cumulative dose   1 10 micrograms/ml 0.1 mL 1 microgram 1 microgram   2 10 micrograms/ml 0.2 mL 2 microgram 3 microgram   3 10 micrograms/ml 0.4 mL 4 microgram 7 microgram   4 100 micrograms/ml 0.1 mL 10 microgram 17 microgram   5 100 micrograms/ml 0.2 mL 20 microgramm 37 microgram   6 100 micrograms/ml 0.4 mL 40 microgram 77 microgram   7 1 milligram/ml 0.1 mL 100 microgram 177 microgram   8 1 milligram/ml 0.2 mL 200 microgram 377 microgram   9 1 milligram/ml 0.4 mL 400 microgram 777 microgram   10 10 milligrams/ml 0.1 mL 1 milligram 1.77 milligram   11 10 milligrams/ml 0.2 mL 2 milligram 3.77 milligram   12 10 milligrams/ml 0.4 mL 4 milligram 7.77 milligram   13 100 milligrams/ml 0.1 mL 10 milligram 17.77 milligram   14  100 milligrams/ml 0.2 mL 20 milligram 37.77 milligram   15 100 milligrams/ml 0.4 mL 40 milligram 77.77 milligram   16 500 milligrams/ml 0.2 mL 100 milligram 177.77 milligram   17 500 milligrams/ml 0.4 mL 200 milligram 377.77 milligram   18 500 milligrams/ml 0.8 mL 400 milligram 777.7 milligram   19 500 milligrams/ml 1.2 mL 600 milligram 1.38 grams   20 500 milligrams/ml 1.6 mL 800 milligram 2.18 grams     Dosing Interval should be 15 minutes.    6 different concentrations of the drug should be used. From each of those concentrations, increasing amounts (ml) will be infused every 15 minutes as tolerated until full dose has been administered. If Oxacillin therapy is interrupted for greater than 24 hours please call allergy before adminstering future doses.     Vital signs should be checked before each dose. Should the patient develop systemic symptoms the infusion should be discontinued immediately and supportive care provided. Epinephrine 1:1000 w/v 0.3 mL (or adult EpiPen) should be available at the bedside fro administration should the patient develop systemic potentially life threatening symptoms, such as bronchospasm, hypotension, airway angioedema. Call the Allergy Fellow on-call immidietely for further instructions if neccesay.    ORDERS FOR PHARMACY  Please prepare the following dilutions of oxacillin  1 mL of 10 micrograms/mL  1 mL of 100 micrograms/mL  1 mL of 1 milligram/mL  1 mL of 10 milligram/mL  1 mL of 100 milligram/mL  5 mL of 500 milligram/mL      I have discussed the patient's case with my attending physician.  Thank you for allowing us to help care for your patient.  Please contact us with any concerns.       Rigo Bedolla DO  Allergy Immunology Fellow  Pager [phone]

## 2019-10-04 NOTE — SUBJECTIVE & OBJECTIVE
Interval History/Significant Events: No acute events overnight. Remains off of vasopressors. Tachypnic on SBT. Oxacillin desensitization in process.     Review of Systems   Unable to perform ROS: Intubated     Objective:     Vital Signs (Most Recent):  Temp: 98.2 °F (36.8 °C) (10/04/19 1502)  Pulse: (!) 116 (10/04/19 1745)  Resp: (!) 24 (10/04/19 1543)  BP: (!) 131/58 (10/04/19 1700)  SpO2: (!) 93 % (10/04/19 1745) Vital Signs (24h Range):  Temp:  [97.5 °F (36.4 °C)-98.3 °F (36.8 °C)] 98.2 °F (36.8 °C)  Pulse:  [] 116  Resp:  [21-28] 24  SpO2:  [92 %-100 %] 93 %  BP: ()/(50-75) 131/58  Arterial Line BP: ()/(49-77) 94/75   Weight: 53.5 kg (118 lb)  Body mass index is 20.25 kg/m².      Intake/Output Summary (Last 24 hours) at 10/4/2019 1800  Last data filed at 10/4/2019 1700  Gross per 24 hour   Intake 2228.26 ml   Output 1240 ml   Net 988.26 ml       Physical Exam   Constitutional: She appears well-developed. She appears lethargic. She is cooperative. She has a sickly appearance. She appears ill. No distress. She is sedated, intubated and restrained.   HENT:   Head: Normocephalic.   Eyes: No scleral icterus. Pupils are unequal.   Patient's right pupil was +2 and left pupil +1.   Neck: No tracheal deviation present.   Cardiovascular: Regular rhythm. Tachycardia present.   Pulses:       Radial pulses are 2+ on the right side, and 2+ on the left side.        Dorsalis pedis pulses are 2+ on the right side, and 2+ on the left side.   Pulmonary/Chest: She is intubated. She has no decreased breath sounds. She has no wheezes. She has no rhonchi. She has no rales.   Abdominal: Soft. Normal appearance. Bowel sounds are decreased. There is no tenderness.   Genitourinary:   Genitourinary Comments: Henriquez in place with clear yellow output    Neurological: She appears lethargic. No cranial nerve deficit. GCS eye subscore is 3. GCS verbal subscore is 1. GCS motor subscore is 6.   Pupils unequal. + cough, gag, and  corneal reflexes. Moves all extremities equally, follows commands.    Skin: Skin is warm, dry and intact. She is not diaphoretic.       Vents:  Vent Mode: A/C (10/04/19 1658)  Ventilator Initiated: Yes (10/02/19 2200)  Set Rate: 20 bmp (10/04/19 1658)  Vt Set: 350 mL (10/04/19 1658)  Pressure Support: 10 cmH20 (10/04/19 0907)  PEEP/CPAP: 5 cmH20 (10/04/19 1658)  Oxygen Concentration (%): 21 (10/04/19 1658)  Peak Airway Pressure: 19 cmH2O (10/04/19 1658)  Plateau Pressure: 14 cmH20 (10/04/19 1658)  Total Ve: 8.58 mL (10/04/19 1658)  F/VT Ratio<105 (RSBI): (!) 67.61 (10/04/19 1543)  Lines/Drains/Airways     Central Venous Catheter Line                 Percutaneous Central Line Insertion/Assessment - triple lumen  10/03/19 0114 right internal jugular 1 day          Drain                 Urethral Catheter 09/19/19 1240 Double-lumen 16 Fr. 15 days         Rectal Tube 09/27/19 1000 rectal tube w/ balloon (indicate number of mLs) 7 days         Chest Tube 10/03/19 0500 Right Midaxillary 1 day         NG/OG Tube 10/02/19 2200 Center mouth 1 day          Airway                 Airway - Non-Surgical 10/02/19 2155 Endotracheal Tube 1 day          Arterial Line                 Arterial Line 10/02/19 2330 Right Radial 1 day          Peripheral Intravenous Line                 Peripheral IV - Single Lumen 10/02/19 0825 20 G Anterior;Right Hand 2 days         Peripheral IV - Single Lumen 10/02/19 20 G Right Hand 2 days         Peripheral IV - Single Lumen 10/02/19 2037 20 G Left Wrist 1 day         Peripheral IV - Single Lumen 10/03/19 0300 20 G Left Antecubital 1 day              Significant Labs:    CBC/Anemia Profile:  Recent Labs   Lab 10/03/19  0136 10/03/19  0614 10/04/19  0405   WBC 14.05*  14.05* 8.29 3.67*   HGB 11.9*  11.9* 10.7* 11.6*   HCT 37.5  37.5 35.1* 35.8*     291 194 115*   MCV 96  96 99* 94   RDW 19.8*  19.8* 19.7* 18.6*        Chemistries:  Recent Labs   Lab 10/02/19  2000 10/03/19  0136   10/04/19  0404 10/04/19  1000 10/04/19  1514    145  145  145   < > 146* 143 146*   K 3.4* 5.0  5.0  5.0   < > 3.6 3.9 3.5    113*  113*  113*   < > 113* 113* 115*   CO2 28 22*  22*  22*   < > 23 23 23   BUN 53* 55*  55*  55*   < > 59* 59* 53*   CREATININE 1.1 1.3  1.3  1.3   < > 1.3 1.4 0.9   CALCIUM 8.0* 7.8*  7.8*  7.8*   < > 7.4* 7.3* 7.0*   ALBUMIN 1.7* 1.7*  --  1.4*  --   --    PROT 7.0 6.9  --  5.4*  --   --    BILITOT 0.8 0.6  --  0.2  --   --    ALKPHOS 179* 176*  --  107  --   --    ALT 25 27  --  9*  --   --    AST 80* 79*  --  26  --   --    MG 1.7 2.9*  --  1.9  --   --    PHOS 3.6 6.0*  --  5.0*  --   --     < > = values in this interval not displayed.       All pertinent labs within the past 24 hours have been reviewed.    Significant Imaging:  I have reviewed all pertinent imaging results/findings within the past 24 hours.

## 2019-10-04 NOTE — ASSESSMENT & PLAN NOTE
--2/2 IVDA, patient reported injecting IV heroin to ID MD at [facility]   --Blood cultures (9/19 - 9/26) have grown methicillin sensitive Staph aureus. Blood cultures as of 9/29 have been NGTD.  --mitral and tricuspid valve vegetations seen on DHARMESH 9/25; unchanged on TTE 9/28   --OSH abx regimen vanc, ancef and daptomycin. ID consulted, oxacillin desensitization in progress  --f/u Bcx here  --OSH PICC line d/c'd  --formal 2D echo results show EF 53%; normal L ventricular function; mild mitral sclerosis; small circumferential pericardial effusion  --neurosurgery consult for epidural abscess, likely plan for OR Monday   --cardiology consulted; no surgical intervention at this point   --bilateral shoulder abscess; consider general sx consult for I&D

## 2019-10-04 NOTE — PT/OT/SLP PROGRESS
Physical Therapy/Change Plan of Care      Patient Name:  Mesha Mckenzie   MRN:  [id number]    Patient not seen today secondary to (pt not seen 2nd to having spontaneous breathing trial and possibly going to surgery.  pt POC will be decreased 2nd to decreased participation in therapy. ). Will follow-up at a later date. Pt will be seen 2x/wk and increase frequency with increased participation in therapy. .    Melany Abreu, PT   10/4/2019

## 2019-10-04 NOTE — SUBJECTIVE & OBJECTIVE
Interval History: NAEO. TTE performed yesterday showing improvement in LVSF as well as no longer visualized vegetations. Discussed with daughter at bedside.       Objective:     Vital Signs (Most Recent):  Temp: 98.3 °F (36.8 °C) (10/04/19 1102)  Pulse: (!) 114 (10/04/19 1215)  Resp: (!) 24 (10/04/19 1111)  BP: (!) 115/53 (10/04/19 1200)  SpO2: 96 % (10/04/19 1215) Vital Signs (24h Range):  Temp:  [96.1 °F (35.6 °C)-98.3 °F (36.8 °C)] 98.3 °F (36.8 °C)  Pulse:  [] 114  Resp:  [23-28] 24  SpO2:  [96 %-100 %] 96 %  BP: ()/(50-75) 115/53  Arterial Line BP: ()/(48-77) 90/56     Weight: 53.5 kg (118 lb)  Body mass index is 20.25 kg/m².     SpO2: 96 %  O2 Device (Oxygen Therapy): ventilator      Intake/Output Summary (Last 24 hours) at 10/4/2019 1237  Last data filed at 10/4/2019 1200  Gross per 24 hour   Intake 3549.31 ml   Output 1210 ml   Net 2339.31 ml       Lines/Drains/Airways     Central Venous Catheter Line                 Percutaneous Central Line Insertion/Assessment - triple lumen  10/03/19 0114 right internal jugular 1 day          Drain                 Urethral Catheter 09/19/19 1240 Double-lumen 16 Fr. 14 days         Rectal Tube 09/27/19 1000 rectal tube w/ balloon (indicate number of mLs) 7 days         Chest Tube 10/03/19 0500 Right Midaxillary 1 day         NG/OG Tube 10/02/19 2200 Center mouth 1 day          Airway                 Airway - Non-Surgical 10/02/19 2155 Endotracheal Tube 1 day          Arterial Line                 Arterial Line 10/02/19 2330 Right Radial 1 day          Peripheral Intravenous Line                 Peripheral IV - Single Lumen 10/02/19 0825 20 G Anterior;Right Hand 2 days         Peripheral IV - Single Lumen 10/02/19 20 G Right Hand 2 days         Peripheral IV - Single Lumen 10/02/19 2037 20 G Left Wrist 1 day         Peripheral IV - Single Lumen 10/03/19 0300 20 G Left Antecubital 1 day                Physical Exam   Constitutional:   Intubated and sedated    Neck: No JVD present.   Cardiovascular: Normal rate, regular rhythm and normal heart sounds.   Pulmonary/Chest: Effort normal and breath sounds normal. No respiratory distress.   Musculoskeletal: She exhibits no edema.       Significant Labs:   CMP   Recent Labs   Lab 10/02/19  2000 10/03/19  0136  10/03/19  2356 10/04/19  0404 10/04/19  1000    145  145  145   < > 146* 146* 143   K 3.4* 5.0  5.0  5.0   < > 3.8 3.6 3.9    113*  113*  113*   < > 113* 113* 113*   CO2 28 22*  22*  22*   < > 22* 23 23   * 227*  227*  227*   < > 143* 111* 95   BUN 53* 55*  55*  55*   < > 58* 59* 59*   CREATININE 1.1 1.3  1.3  1.3   < > 1.3 1.3 1.4   CALCIUM 8.0* 7.8*  7.8*  7.8*   < > 7.2* 7.4* 7.3*   PROT 7.0 6.9  --   --  5.4*  --    ALBUMIN 1.7* 1.7*  --   --  1.4*  --    BILITOT 0.8 0.6  --   --  0.2  --    ALKPHOS 179* 176*  --   --  107  --    AST 80* 79*  --   --  26  --    ALT 25 27  --   --  9*  --    ANIONGAP 8 10  10  10   < > 11 10 7*   ESTGFRAFRICA >60.0 49.7*  49.7*  49.7*   < > 49.7* 49.7* 45.5*   EGFRNONAA 52.8* 43.2*  43.2*  43.2*   < > 43.2* 43.2* 39.5*    < > = values in this interval not displayed.   , CBC   Recent Labs   Lab 10/03/19  0136 10/03/19  0614 10/04/19  0405   WBC 14.05*  14.05* 8.29 3.67*   HGB 11.9*  11.9* 10.7* 11.6*   HCT 37.5  37.5 35.1* 35.8*     291 194 115*    and All pertinent lab results from the last 24 hours have been reviewed.    Significant Imaging: Echocardiogram:   Transthoracic echo (TTE) complete (Cupid Only):   Results for orders placed or performed during the hospital encounter of 10/02/19   Echo Color Flow Doppler? Yes   Result Value Ref Range    AV mean gradient 4 mmHg    Ao peak jeffery 1.21 m/s    Ao VTI 22.28 cm    IVS 0.84 0.6 - 1.1 cm    LA size 2.54 cm    Left Atrium Major Axis 4.45 cm    Left Atrium Minor Axis 4.11 cm    LVIDD 4.47 3.5 - 6.0 cm    LVIDS 3.09 2.1 - 4.0 cm    LVOT diameter 1.97 cm    LVOT peak VTI 13.56 cm    PW  0.89 0.6 - 1.1 cm    MV Peak A Jose A 0.86 m/s    E wave decelartion time 204.81 msec    MV Peak E Jose A 0.51 m/s    PV Peak D Jose A 0.33 m/s    PV Peak S Jose A 0.42 m/s    RA Major Axis 3.64 cm    RA Width 2.33 cm    RVDD 2.54 cm    Sinus 2.95 cm    TAPSE 1.99 cm    TDI LATERAL 0.05 m/s    TDI SEPTAL 0.04 m/s    LA WIDTH 3.18 cm    LV Diastolic Volume 91.07 mL    LV Systolic Volume 37.51 mL    LVOT peak jose a 0.81 m/s    LV LATERAL E/E' RATIO 10.20 m/s    LV SEPTAL E/E' RATIO 12.75 m/s    FS 31 %    LA volume 29.34 cm3    LV mass 124.58 g    Left Ventricle Relative Wall Thickness 0.40 cm    AV valve area 1.85 cm2    AV Velocity Ratio 0.67     AV index (prosthetic) 0.61     E/A ratio 0.59     Mean e' 0.05 m/s    Pulm vein S/D ratio 1.27     LVOT area 3.0 cm2    LVOT stroke volume 41.31 cm3    AV peak gradient 6 mmHg    E/E' ratio 11.33 m/s    LV Systolic Volume Index 24.0 mL/m2    LV Diastolic Volume Index 58.26 mL/m2    LA Volume Index 18.8 mL/m2    LV Mass Index 80 g/m2    BSA 1.55 m2    Narrative    · Low normal left ventricular systolic function. The estimated ejection   fraction is 53%  · Local segmental wall motion abnormalities.  · Normal LV diastolic function.  · Normal right ventricular systolic function.  · Mild mitral sclerosis.  · Mechanically ventilated; cannot use inferior caval vein diameter to   estimate central venous pressure.

## 2019-10-05 PROBLEM — Z01.818 PREOPERATIVE CLEARANCE: Status: ACTIVE | Noted: 2019-10-05

## 2019-10-05 PROBLEM — L02.419 ABSCESS OF UPPER EXTREMITY: Status: ACTIVE | Noted: 2019-10-05

## 2019-10-05 LAB
ABO + RH BLD: NORMAL
ALBUMIN SERPL BCP-MCNC: 1.4 G/DL (ref 3.5–5.2)
ALLENS TEST: ABNORMAL
ALP SERPL-CCNC: 96 U/L (ref 55–135)
ALT SERPL W/O P-5'-P-CCNC: 5 U/L (ref 10–44)
ANION GAP SERPL CALC-SCNC: 10 MMOL/L (ref 8–16)
ANION GAP SERPL CALC-SCNC: 8 MMOL/L (ref 8–16)
ANION GAP SERPL CALC-SCNC: 9 MMOL/L (ref 8–16)
AST SERPL-CCNC: 17 U/L (ref 10–40)
BACTERIA BLD CULT: NORMAL
BACTERIA FLD AEROBE CULT: NO GROWTH
BASOPHILS # BLD AUTO: 0.02 K/UL (ref 0–0.2)
BASOPHILS # BLD AUTO: 0.04 K/UL (ref 0–0.2)
BASOPHILS NFR BLD: 0.4 % (ref 0–1.9)
BASOPHILS NFR BLD: 0.8 % (ref 0–1.9)
BILIRUB SERPL-MCNC: 0.3 MG/DL (ref 0.1–1)
BLD GP AB SCN CELLS X3 SERPL QL: NORMAL
BUN SERPL-MCNC: 45 MG/DL (ref 8–23)
BUN SERPL-MCNC: 48 MG/DL (ref 8–23)
BUN SERPL-MCNC: 52 MG/DL (ref 8–23)
BUN SERPL-MCNC: 54 MG/DL (ref 8–23)
CALCIUM SERPL-MCNC: 7.5 MG/DL (ref 8.7–10.5)
CALCIUM SERPL-MCNC: 7.6 MG/DL (ref 8.7–10.5)
CALCIUM SERPL-MCNC: 7.7 MG/DL (ref 8.7–10.5)
CALCIUM SERPL-MCNC: 7.7 MG/DL (ref 8.7–10.5)
CHLORIDE SERPL-SCNC: 116 MMOL/L (ref 95–110)
CHLORIDE SERPL-SCNC: 117 MMOL/L (ref 95–110)
CHLORIDE SERPL-SCNC: 118 MMOL/L (ref 95–110)
CO2 SERPL-SCNC: 21 MMOL/L (ref 23–29)
CO2 SERPL-SCNC: 21 MMOL/L (ref 23–29)
CO2 SERPL-SCNC: 22 MMOL/L (ref 23–29)
CO2 SERPL-SCNC: 23 MMOL/L (ref 23–29)
CREAT SERPL-MCNC: 1.5 MG/DL (ref 0.5–1.4)
DELSYS: ABNORMAL
DIFFERENTIAL METHOD: ABNORMAL
DIFFERENTIAL METHOD: ABNORMAL
EOSINOPHIL # BLD AUTO: 0.2 K/UL (ref 0–0.5)
EOSINOPHIL # BLD AUTO: 0.2 K/UL (ref 0–0.5)
EOSINOPHIL NFR BLD: 3.1 % (ref 0–8)
EOSINOPHIL NFR BLD: 4.3 % (ref 0–8)
ERYTHROCYTE [DISTWIDTH] IN BLOOD BY AUTOMATED COUNT: 17.3 % (ref 11.5–14.5)
ERYTHROCYTE [DISTWIDTH] IN BLOOD BY AUTOMATED COUNT: 18 % (ref 11.5–14.5)
ERYTHROCYTE [SEDIMENTATION RATE] IN BLOOD BY WESTERGREN METHOD: 20 MM/H
EST. GFR  (AFRICAN AMERICAN): 41.8 ML/MIN/1.73 M^2
EST. GFR  (NON AFRICAN AMERICAN): 36.3 ML/MIN/1.73 M^2
FIO2: 30
GLUCOSE SERPL-MCNC: 62 MG/DL (ref 70–110)
GLUCOSE SERPL-MCNC: 66 MG/DL (ref 70–110)
GLUCOSE SERPL-MCNC: 69 MG/DL (ref 70–110)
GLUCOSE SERPL-MCNC: 74 MG/DL (ref 70–110)
GRAM STN SPEC: NORMAL
GRAM STN SPEC: NORMAL
HCO3 UR-SCNC: 21.7 MMOL/L (ref 24–28)
HCT VFR BLD AUTO: 24.3 % (ref 37–48.5)
HCT VFR BLD AUTO: 25 % (ref 37–48.5)
HCT VFR BLD AUTO: 26.9 % (ref 37–48.5)
HGB BLD-MCNC: 7.4 G/DL (ref 12–16)
HGB BLD-MCNC: 7.9 G/DL (ref 12–16)
HGB BLD-MCNC: 8.2 G/DL (ref 12–16)
IMM GRANULOCYTES # BLD AUTO: 0.05 K/UL (ref 0–0.04)
IMM GRANULOCYTES # BLD AUTO: 0.09 K/UL (ref 0–0.04)
IMM GRANULOCYTES NFR BLD AUTO: 1.1 % (ref 0–0.5)
IMM GRANULOCYTES NFR BLD AUTO: 1.7 % (ref 0–0.5)
LYMPHOCYTES # BLD AUTO: 1 K/UL (ref 1–4.8)
LYMPHOCYTES # BLD AUTO: 1 K/UL (ref 1–4.8)
LYMPHOCYTES NFR BLD: 18.4 % (ref 18–48)
LYMPHOCYTES NFR BLD: 22.1 % (ref 18–48)
MAGNESIUM SERPL-MCNC: 1.9 MG/DL (ref 1.6–2.6)
MCH RBC QN AUTO: 29.6 PG (ref 27–31)
MCH RBC QN AUTO: 30.2 PG (ref 27–31)
MCHC RBC AUTO-ENTMCNC: 30.5 G/DL (ref 32–36)
MCHC RBC AUTO-ENTMCNC: 31.6 G/DL (ref 32–36)
MCV RBC AUTO: 95 FL (ref 82–98)
MCV RBC AUTO: 97 FL (ref 82–98)
MODE: ABNORMAL
MONOCYTES # BLD AUTO: 0.4 K/UL (ref 0.3–1)
MONOCYTES # BLD AUTO: 0.4 K/UL (ref 0.3–1)
MONOCYTES NFR BLD: 6.8 % (ref 4–15)
MONOCYTES NFR BLD: 8 % (ref 4–15)
NEUTROPHILS # BLD AUTO: 3 K/UL (ref 1.8–7.7)
NEUTROPHILS # BLD AUTO: 3.6 K/UL (ref 1.8–7.7)
NEUTROPHILS NFR BLD: 64.1 % (ref 38–73)
NEUTROPHILS NFR BLD: 69.2 % (ref 38–73)
NRBC BLD-RTO: 0 /100 WBC
NRBC BLD-RTO: 0 /100 WBC
PCO2 BLDA: 33.2 MMHG (ref 35–45)
PEEP: 5
PH SMN: 7.42 [PH] (ref 7.35–7.45)
PHOSPHATE SERPL-MCNC: 4.8 MG/DL (ref 2.7–4.5)
PLATELET # BLD AUTO: 146 K/UL (ref 150–350)
PLATELET # BLD AUTO: 179 K/UL (ref 150–350)
PMV BLD AUTO: 11.4 FL (ref 9.2–12.9)
PMV BLD AUTO: 11.7 FL (ref 9.2–12.9)
PO2 BLDA: 79 MMHG (ref 80–100)
POC BE: -3 MMOL/L
POC SATURATED O2: 96 % (ref 95–100)
POC TCO2: 23 MMOL/L (ref 23–27)
POCT GLUCOSE: 103 MG/DL (ref 70–110)
POCT GLUCOSE: 107 MG/DL (ref 70–110)
POCT GLUCOSE: 157 MG/DL (ref 70–110)
POCT GLUCOSE: 205 MG/DL (ref 70–110)
POCT GLUCOSE: 56 MG/DL (ref 70–110)
POCT GLUCOSE: 66 MG/DL (ref 70–110)
POCT GLUCOSE: 69 MG/DL (ref 70–110)
POCT GLUCOSE: 75 MG/DL (ref 70–110)
POCT GLUCOSE: 80 MG/DL (ref 70–110)
POTASSIUM SERPL-SCNC: 3 MMOL/L (ref 3.5–5.1)
POTASSIUM SERPL-SCNC: 3.8 MMOL/L (ref 3.5–5.1)
POTASSIUM SERPL-SCNC: 4 MMOL/L (ref 3.5–5.1)
POTASSIUM SERPL-SCNC: 4.2 MMOL/L (ref 3.5–5.1)
PROT SERPL-MCNC: 5.4 G/DL (ref 6–8.4)
RBC # BLD AUTO: 2.62 M/UL (ref 4–5.4)
RBC # BLD AUTO: 2.77 M/UL (ref 4–5.4)
SAMPLE: ABNORMAL
SITE: ABNORMAL
SODIUM SERPL-SCNC: 146 MMOL/L (ref 136–145)
SODIUM SERPL-SCNC: 148 MMOL/L (ref 136–145)
SP02: 99
VT: 350
WBC # BLD AUTO: 4.61 K/UL (ref 3.9–12.7)
WBC # BLD AUTO: 5.15 K/UL (ref 3.9–12.7)

## 2019-10-05 PROCEDURE — 63600175 PHARM REV CODE 636 W HCPCS: Performed by: NURSE PRACTITIONER

## 2019-10-05 PROCEDURE — S0030 INJECTION, METRONIDAZOLE: HCPCS | Performed by: INTERNAL MEDICINE

## 2019-10-05 PROCEDURE — 94010 BREATHING CAPACITY TEST: CPT

## 2019-10-05 PROCEDURE — 87186 SC STD MICRODIL/AGAR DIL: CPT

## 2019-10-05 PROCEDURE — 99291 CRITICAL CARE FIRST HOUR: CPT | Mod: GC,,, | Performed by: EMERGENCY MEDICINE

## 2019-10-05 PROCEDURE — 87075 CULTR BACTERIA EXCEPT BLOOD: CPT

## 2019-10-05 PROCEDURE — 86901 BLOOD TYPING SEROLOGIC RH(D): CPT

## 2019-10-05 PROCEDURE — 85025 COMPLETE CBC W/AUTO DIFF WBC: CPT | Mod: 91

## 2019-10-05 PROCEDURE — 85018 HEMOGLOBIN: CPT

## 2019-10-05 PROCEDURE — 80048 BASIC METABOLIC PNL TOTAL CA: CPT | Mod: 91

## 2019-10-05 PROCEDURE — 99900035 HC TECH TIME PER 15 MIN (STAT)

## 2019-10-05 PROCEDURE — 94150 VITAL CAPACITY TEST: CPT

## 2019-10-05 PROCEDURE — 87070 CULTURE OTHR SPECIMN AEROBIC: CPT | Mod: 59

## 2019-10-05 PROCEDURE — 85014 HEMATOCRIT: CPT

## 2019-10-05 PROCEDURE — 94770 HC EXHALED C02 TEST: CPT

## 2019-10-05 PROCEDURE — 94003 VENT MGMT INPAT SUBQ DAY: CPT

## 2019-10-05 PROCEDURE — 25000242 PHARM REV CODE 250 ALT 637 W/ HCPCS: Performed by: NURSE PRACTITIONER

## 2019-10-05 PROCEDURE — 63600175 PHARM REV CODE 636 W HCPCS: Performed by: STUDENT IN AN ORGANIZED HEALTH CARE EDUCATION/TRAINING PROGRAM

## 2019-10-05 PROCEDURE — 27000221 HC OXYGEN, UP TO 24 HOURS

## 2019-10-05 PROCEDURE — 25000003 PHARM REV CODE 250: Performed by: INTERNAL MEDICINE

## 2019-10-05 PROCEDURE — 25000003 PHARM REV CODE 250: Performed by: STUDENT IN AN ORGANIZED HEALTH CARE EDUCATION/TRAINING PROGRAM

## 2019-10-05 PROCEDURE — 20000000 HC ICU ROOM

## 2019-10-05 PROCEDURE — 36415 COLL VENOUS BLD VENIPUNCTURE: CPT

## 2019-10-05 PROCEDURE — 99222 PR INITIAL HOSPITAL CARE,LEVL II: ICD-10-PCS | Mod: ,,, | Performed by: SURGERY

## 2019-10-05 PROCEDURE — 87077 CULTURE AEROBIC IDENTIFY: CPT

## 2019-10-05 PROCEDURE — 99232 PR SUBSEQUENT HOSPITAL CARE,LEVL II: ICD-10-PCS | Mod: GC,,, | Performed by: INTERNAL MEDICINE

## 2019-10-05 PROCEDURE — 80053 COMPREHEN METABOLIC PANEL: CPT

## 2019-10-05 PROCEDURE — 99900026 HC AIRWAY MAINTENANCE (STAT)

## 2019-10-05 PROCEDURE — 82803 BLOOD GASES ANY COMBINATION: CPT

## 2019-10-05 PROCEDURE — 99232 SBSQ HOSP IP/OBS MODERATE 35: CPT | Mod: GC,,, | Performed by: INTERNAL MEDICINE

## 2019-10-05 PROCEDURE — S0028 INJECTION, FAMOTIDINE, 20 MG: HCPCS | Performed by: NURSE PRACTITIONER

## 2019-10-05 PROCEDURE — 83735 ASSAY OF MAGNESIUM: CPT

## 2019-10-05 PROCEDURE — 25000003 PHARM REV CODE 250: Performed by: NURSE PRACTITIONER

## 2019-10-05 PROCEDURE — 99232 SBSQ HOSP IP/OBS MODERATE 35: CPT | Mod: ,,, | Performed by: NEUROLOGICAL SURGERY

## 2019-10-05 PROCEDURE — 84100 ASSAY OF PHOSPHORUS: CPT

## 2019-10-05 PROCEDURE — 94761 N-INVAS EAR/PLS OXIMETRY MLT: CPT

## 2019-10-05 PROCEDURE — 99900017 HC EXTUBATION W/PARAMETERS (STAT)

## 2019-10-05 PROCEDURE — 99232 PR SUBSEQUENT HOSPITAL CARE,LEVL II: ICD-10-PCS | Mod: ,,, | Performed by: NEUROLOGICAL SURGERY

## 2019-10-05 PROCEDURE — 25000003 PHARM REV CODE 250: Performed by: EMERGENCY MEDICINE

## 2019-10-05 PROCEDURE — 99222 1ST HOSP IP/OBS MODERATE 55: CPT | Mod: ,,, | Performed by: SURGERY

## 2019-10-05 PROCEDURE — 94640 AIRWAY INHALATION TREATMENT: CPT

## 2019-10-05 PROCEDURE — 99291 PR CRITICAL CARE, E/M 30-74 MINUTES: ICD-10-PCS | Mod: GC,,, | Performed by: EMERGENCY MEDICINE

## 2019-10-05 PROCEDURE — 36600 WITHDRAWAL OF ARTERIAL BLOOD: CPT

## 2019-10-05 RX ORDER — HYDROMORPHONE HYDROCHLORIDE 1 MG/ML
0.5 INJECTION, SOLUTION INTRAMUSCULAR; INTRAVENOUS; SUBCUTANEOUS EVERY 6 HOURS PRN
Status: DISCONTINUED | OUTPATIENT
Start: 2019-10-05 | End: 2019-10-05

## 2019-10-05 RX ORDER — MORPHINE SULFATE 2 MG/ML
2 INJECTION, SOLUTION INTRAMUSCULAR; INTRAVENOUS EVERY 4 HOURS PRN
Status: DISCONTINUED | OUTPATIENT
Start: 2019-10-05 | End: 2019-10-05

## 2019-10-05 RX ORDER — LIDOCAINE HYDROCHLORIDE 10 MG/ML
10 INJECTION, SOLUTION EPIDURAL; INFILTRATION; INTRACAUDAL; PERINEURAL ONCE
Status: COMPLETED | OUTPATIENT
Start: 2019-10-05 | End: 2019-10-05

## 2019-10-05 RX ORDER — NALOXONE HCL 0.4 MG/ML
VIAL (ML) INJECTION
Status: DISCONTINUED
Start: 2019-10-05 | End: 2019-10-05 | Stop reason: WASHOUT

## 2019-10-05 RX ORDER — POTASSIUM CHLORIDE 20 MEQ/15ML
30 SOLUTION ORAL
Status: DISCONTINUED | OUTPATIENT
Start: 2019-10-06 | End: 2019-10-06

## 2019-10-05 RX ORDER — FENTANYL CITRATE 50 UG/ML
25 INJECTION, SOLUTION INTRAMUSCULAR; INTRAVENOUS EVERY 4 HOURS PRN
Status: DISCONTINUED | OUTPATIENT
Start: 2019-10-05 | End: 2019-10-11

## 2019-10-05 RX ORDER — GLUCAGON 1 MG
1 KIT INJECTION
Status: DISCONTINUED | OUTPATIENT
Start: 2019-10-05 | End: 2019-10-08

## 2019-10-05 RX ADMIN — FAMOTIDINE 20 MG: 10 INJECTION, SOLUTION INTRAVENOUS at 08:10

## 2019-10-05 RX ADMIN — IPRATROPIUM BROMIDE AND ALBUTEROL SULFATE 3 ML: .5; 3 SOLUTION RESPIRATORY (INHALATION) at 11:10

## 2019-10-05 RX ADMIN — PROPOFOL 30 MCG/KG/MIN: 10 INJECTION, EMULSION INTRAVENOUS at 01:10

## 2019-10-05 RX ADMIN — HEPARIN SODIUM 5000 UNITS: 5000 INJECTION, SOLUTION INTRAVENOUS; SUBCUTANEOUS at 06:10

## 2019-10-05 RX ADMIN — IPRATROPIUM BROMIDE AND ALBUTEROL SULFATE 3 ML: .5; 3 SOLUTION RESPIRATORY (INHALATION) at 03:10

## 2019-10-05 RX ADMIN — LIDOCAINE HYDROCHLORIDE 100 MG: 10 INJECTION, SOLUTION EPIDURAL; INFILTRATION; INTRACAUDAL at 08:10

## 2019-10-05 RX ADMIN — DEXTROSE 125 ML: 10 SOLUTION INTRAVENOUS at 10:10

## 2019-10-05 RX ADMIN — PROPOFOL 30 MCG/KG/MIN: 10 INJECTION, EMULSION INTRAVENOUS at 05:10

## 2019-10-05 RX ADMIN — DEXTROSE 125 ML: 10 SOLUTION INTRAVENOUS at 05:10

## 2019-10-05 RX ADMIN — OXACILLIN SODIUM 12 G: 10 INJECTION, POWDER, FOR SOLUTION INTRAVENOUS at 02:10

## 2019-10-05 RX ADMIN — CHLORHEXIDINE GLUCONATE 0.12% ORAL RINSE 15 ML: 1.2 LIQUID ORAL at 08:10

## 2019-10-05 RX ADMIN — METRONIDAZOLE 500 MG: 500 INJECTION, SOLUTION INTRAVENOUS at 08:10

## 2019-10-05 RX ADMIN — IPRATROPIUM BROMIDE AND ALBUTEROL SULFATE 3 ML: .5; 3 SOLUTION RESPIRATORY (INHALATION) at 08:10

## 2019-10-05 RX ADMIN — IPRATROPIUM BROMIDE AND ALBUTEROL SULFATE 3 ML: .5; 3 SOLUTION RESPIRATORY (INHALATION) at 07:10

## 2019-10-05 RX ADMIN — MORPHINE SULFATE 2 MG: 2 INJECTION, SOLUTION INTRAMUSCULAR; INTRAVENOUS at 03:10

## 2019-10-05 RX ADMIN — CHLORHEXIDINE GLUCONATE 0.12% ORAL RINSE 15 ML: 1.2 LIQUID ORAL at 09:10

## 2019-10-05 RX ADMIN — METRONIDAZOLE 500 MG: 500 INJECTION, SOLUTION INTRAVENOUS at 10:10

## 2019-10-05 RX ADMIN — DEXTROSE 125 ML: 10 SOLUTION INTRAVENOUS at 11:10

## 2019-10-05 RX ADMIN — METRONIDAZOLE 500 MG: 500 INJECTION, SOLUTION INTRAVENOUS at 04:10

## 2019-10-05 NOTE — ASSESSMENT & PLAN NOTE
--MRI concerning for epidural abscess at L2-4 region  --no neuro deficits on physical exam  --neurosurgery consulted and following

## 2019-10-05 NOTE — ASSESSMENT & PLAN NOTE
--MRI concerning for epidural abscess at L2-4 region  --no neuro deficits on physical exam  --neurosurgery consulted and following; plan for OR Monday

## 2019-10-05 NOTE — SIGNIFICANT EVENT
Preoperative clearance  Discussed with Dr. Jerry.   Patient has 0.7-1.7% risk of myocardial infarction or cardiac arrest, intraoperatively or up to 30 days post-op, according to [facility] Perioperative Risk for Myocardial Infarction or Cardiac Arrest (DICKSON)    Sue Reich MD  PGY-2, Critical Care Medicine

## 2019-10-05 NOTE — PROGRESS NOTES
[facility]  Infectious Disease  Progress Note    Patient Name: Mesha Mckenzie  MRN: [id number]  Admission Date: 10/2/2019  Length of Stay: 3 days  Attending Physician: Malik Jerry MD  Primary Care Provider: Varun Shea MD PhD    Isolation Status: No active isolations  Assessment/Plan:      Endocarditis  62yo female with IVDU presents as transfer from [facility] for epidural abscess, likely secondary to MSSA bacteremia and endocarditis with vegetations of the MV and TV on DHARMESH 9/26 c/b bilateral multifocal pneumonia with cavitary lesions, right pleural effusion s/p thoracentesis on 10/1 w/ concern for empyema, and pan-colitis. Followed by ID at [facility], most recently on daptomycin, cefazolin, and vancomycin. Intubated shortly after arrival to [facility] for hypoxia. Noted to have penicillin allergy.    Recommendations:  -Continue oxacillin   -Consult general surgery for aspiration and possible drainage of shoulder abscesses        Anticipated Disposition: Per primary    Thank you for your consult. I will follow-up with patient. Please contact us if you have any additional questions.    Behram Khan, MD  Infectious Disease  [facility]    Subjective:     Principal Problem:Staphylococcus aureus bacteremia with sepsis    HPI: Mesha Mckenzie is a 64 yo female with IVDU (heroin), prior GIB in 2018, and spinal stenosis who presents from [facility] for neurosurgery eval for epidural abscess in setting of MSSA endocarditis. She had initially presented to [place] on 9/19 for evaluation of nausea, vomiting, and diarrhea for the past 5 days. 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 She was initially started on vancomycin, flagyl, and levoquin. Blood cultures from  9/19 positive for MSSA and were thought to be likely source. Urine culture from 9/19 also positive for MSSA. CT head on 9/19 and 9/22 negative for septic emboli. TTE on 9/21 showed possible vegetation on mitral valve (EF 55%).  DHARMESH was performed on 9/25, showing mitral valve vegetation (5x6mm) on the posterior leaflet as well as tricuspid valve vegetation (10x5mm) on septal leaflet. CT chest was repeated on 9/26 and was showed for previously seen bilateral consolidations that were now noted to be cavitary/cystic in nature. Repeat TTE on 9/26 reported reduced EF from previous 55% to 33%. Cardiothoracic evaluated patient for valve replacement but surgery was deferred due to patient's multiple medical issues. Last positive blood culture was 9/26. Her antibiotics were later changed to daptomycin, cefazolin, and vancomycin. Thoracentesis on 10/1 showed evidence of empyema. MRI spine on 10/1 showed lumbar paraspinal muscle infection and small dorsal epidural abscess from L2-4. She was transferred to [facility] for neurosurgery eval.     On arrival to [facility], she was noted to be encephalopathic. Shortly after arriving, she became hypoxic and hypertensive and required intubation. She also required neuromuscular blockade for tachypnea and vent dyssynchrony. Vasopressors were also started due to hypotension.      Interval History: Started on oxacillin. Norepi off since 10/3. Now with loculated lumps on posterior aspect of both shoulders.     Review of Systems   Unable to perform ROS: Intubated     Objective:     Vital Signs (Most Recent):  Temp: 98.2 °F (36.8 °C) (10/05/19 1502)  Pulse: 108 (10/05/19 1615)  Resp: (!) 22 (10/05/19 1510)  BP: (!) 143/79 (10/05/19 1600)  SpO2: 99 % (10/05/19 1615) Vital Signs (24h Range):  Temp:  [98.2 °F (36.8 °C)-98.9 °F (37.2 °C)] 98.2 °F (36.8 °C)  Pulse:  [] 108  Resp:  [16-22] 22  SpO2:  [91 %-100 %] 99 %  BP: ()/(44-79) 143/79  Arterial Line BP: (78-99)/(52-75) 78/54     Weight: 53.5 kg  (118 lb)  Body mass index is 20.25 kg/m².    Estimated Creatinine Clearance: 31.6 mL/min (A) (based on SCr of 1.5 mg/dL (H)).    Physical Exam   Constitutional: She appears well-developed and well-nourished. No distress.   HENT:   Head: Normocephalic and atraumatic.   Eyes: Conjunctivae are normal. Right eye exhibits no discharge. Left eye exhibits no discharge.   Neck: Neck supple.   Cardiovascular: Normal rate and normal heart sounds. Exam reveals no gallop and no friction rub.   No murmur heard.  Pulmonary/Chest: She has no wheezes. She has no rales.   Intubated   Abdominal: Soft. She exhibits no mass. There is no tenderness. There is no guarding.   Musculoskeletal: She exhibits no edema.   Loculated lumps along posterior aspect of both shoulders.    Neurological:   Sedated   Skin: Skin is warm and dry. She is not diaphoretic.       Significant Labs:   CBC:   Recent Labs   Lab 10/04/19  0405 10/05/19  0500 10/05/19  0840   WBC 3.67* 4.61  --    HGB 11.6* 7.9* 7.4*   HCT 35.8* 25.0* 24.3*   * 146*  --      CMP:   Recent Labs   Lab 10/04/19  0404  10/04/19  1514 10/05/19  0500 10/05/19  0956   *   < > 146* 148*  148*  148* 146*   K 3.6   < > 3.5 4.2  4.2  4.2 4.0   *   < > 115* 117*  117*  117* 116*   CO2 23   < > 23 23  23  23 22*   *   < > 78 74  74  74 69*   BUN 59*   < > 53* 54*  54*  54* 52*   CREATININE 1.3   < > 0.9 1.5*  1.5*  1.5* 1.5*   CALCIUM 7.4*   < > 7.0* 7.5*  7.5*  7.5* 7.6*   PROT 5.4*  --   --  5.4*  --    ALBUMIN 1.4*  --   --  1.4*  --    BILITOT 0.2  --   --  0.3  --    ALKPHOS 107  --   --  96  --    AST 26  --   --  17  --    ALT 9*  --   --  5*  --    ANIONGAP 10   < > 8 8  8  8 8   EGFRNONAA 43.2*   < > >60.0 36.3*  36.3*  36.3* 36.3*    < > = values in this interval not displayed.

## 2019-10-05 NOTE — ASSESSMENT & PLAN NOTE
--2/2 IVDA, patient reported injecting IV heroin to ID MD at OSH   --Blood cultures (9/19 - 9/26) have grown methicillin sensitive Staph aureus. Blood cultures as of 9/29 have been NGTD.  --mitral and tricuspid valve vegetations seen on DHARMESH 9/25; unchanged on TTE 9/28   --OSH abx regimen vanc, ancef and daptomycin. ID consulted, oxacillin desensitization in progress  --f/u Bcx here  --OSH PICC line d/c'd  --formal 2D echo results show EF 53%; normal L ventricular function; mild mitral sclerosis; small circumferential pericardial effusion  --neurosurgery consult for epidural abscess, likely plan for OR when stable  --cardiology consulted; no surgical intervention at this point   --bilateral shoulder abscess; general sx consulted

## 2019-10-05 NOTE — ASSESSMENT & PLAN NOTE
64yo female with IVDU presents as transfer from [facility] for epidural abscess, likely secondary to MSSA bacteremia and endocarditis with vegetations of the MV and TV on DHARMESH 9/26 c/b bilateral multifocal pneumonia with cavitary lesions, right pleural effusion s/p thoracentesis on 10/1 w/ concern for empyema, and pan-colitis. Followed by ID at [facility], most recently on daptomycin, cefazolin, and vancomycin. Intubated shortly after arrival [place] [facility] [place]. Noted to have penicillin allergy.    Recommendations:  -Continue oxacillin   -Consult general surgery for aspiration and possible drainage of shoulder abscesses

## 2019-10-05 NOTE — PROGRESS NOTES
[facility]  Critical Care Medicine  Progress Note    Patient Name: Mesha Mckenzie  MRN: [id number]  Admission Date: 10/2/2019  Hospital Length of Stay: 2 days  Code Status: Full Code  Attending Provider: Malik Jerry MD  Primary Care Provider: Varun Shea MD PhD   Principal Problem: Acute hypoxemic respiratory failure    Subjective:     HPI:  Patient is a 63 y.o. female with significant past medical history of depression, anxiety and IVDA presented to [facility] ED on 9/19 with c/o N/V/D and weakness x ~5 days. The patient was found to be in septic shock with pneumonia and colitis and was transferred to [facility] ICU for further management. Pt was admitted to the intensive care unit and was treated with broad-spectrum antibiotics. 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 CT was repeated on 9/25 and showed bilateral cavitary lesions likely secondary to septic emboli, small pericardial effusion and ileus. DHARMESH was performed on 9/25 which revealed  mitral and tricuspid valve endocarditis. Blood cultures (9/19 - 9/26) have grown methicillin sensitive Staph aureus. Blood cultures as of 9/29 have been NGTD. ID was following the patient for antibiotic management with most recent regimen being cefazolin, vancomycin and daptomycin. Repeat Echo on 9/28 showed LVEF 35% and persistent vegatations. On 9/30 the patient had an MRI brain that showed remote lacunar infarcts without acute intracranial abnormality. On 10/1 MRI revealed spinal epidural abscess at L2-4 level. Thoracentesis was performed on 10/1. The patient was followed by Cardiology team who considered valve replacement surgery but due to patient's other medical issues, Cardiovascular surgery intervention was deferred. The patient was transferred to [facility] for neurosurgery evaluation of epidural abscess.         Hospital/ICU Course:  [place] [facility] was  encephalopathic, but able to follow commands. She was hemodynamically stable and satting well on 2L NC. Shortly after arrival, the patient became acutely hypertensive and hypoxemic necessitating intubation and mechanical ventilation. She was extremely difficult to oxygenate (P:F ratio 52). Bronch was performed which was unremarkable. The patient became hypotensive and was started on vasopressors. She was paralyzed with nimbex due to tachypnea and vent dyssynchrony. Bedside echo was performed which showed reduced LVEF, hyperdynamic LV; no significant mitral regurg was noted. The patient went for CTA without evidence of pulmonary embolism. Imaging revealed left lung collaspse and repeat bronch was performed with sputum cx sent. Oxygenation improved quickly and paralytic was discontinued. Right chest tube placed & pleural fluid sent; gram stain with GPCs. Neurosurgery consulted for epidural abscess, plans for OR potentially Monday. Cardiology consult for endocarditis; currently not a surgical candidate. Repeat TTE performed with improved EF and without mention of previously noted vegetations. ID and allergy consulted; oxacillin densensitization in process given pcn allergy.         Interval History/Significant Events: No acute events overnight. Remains off of vasopressors. Tachypnic on SBT. Oxacillin desensitization in process.     Review of Systems   Unable to perform ROS: Intubated     Objective:     Vital Signs (Most Recent):  Temp: 98.2 °F (36.8 °C) (10/04/19 1502)  Pulse: (!) 116 (10/04/19 1745)  Resp: (!) 24 (10/04/19 1543)  BP: (!) 131/58 (10/04/19 1700)  SpO2: (!) 93 % (10/04/19 1745) Vital Signs (24h Range):  Temp:  [97.5 °F (36.4 °C)-98.3 °F (36.8 °C)] 98.2 °F (36.8 °C)  Pulse:  [] 116  Resp:  [21-28] 24  SpO2:  [92 %-100 %] 93 %  BP: ()/(50-75) 131/58  Arterial Line BP: ()/(49-77) 94/75   Weight: 53.5 kg (118 lb)  Body mass index is 20.25 kg/m².      Intake/Output Summary (Last 24 hours) at  10/4/2019 1800  Last data filed at 10/4/2019 1700  Gross per 24 hour   Intake 2228.26 ml   Output 1240 ml   Net 988.26 ml       Physical Exam   Constitutional: She appears well-developed. She appears lethargic. She is cooperative. She has a sickly appearance. She appears ill. No distress. She is sedated, intubated and restrained.   HENT:   Head: Normocephalic.   Eyes: No scleral icterus. Pupils are unequal.   Patient's right pupil was +2 and left pupil +1.   Neck: No tracheal deviation present.   Cardiovascular: Regular rhythm. Tachycardia present.   Pulses:       Radial pulses are 2+ on the right side, and 2+ on the left side.        Dorsalis pedis pulses are 2+ on the right side, and 2+ on the left side.   Pulmonary/Chest: She is intubated. She has no decreased breath sounds. She has no wheezes. She has no rhonchi. She has no rales.   Abdominal: Soft. Normal appearance. Bowel sounds are decreased. There is no tenderness.   Genitourinary:   Genitourinary Comments: Henriquez in place with clear yellow output    Neurological: She appears lethargic. No cranial nerve deficit. GCS eye subscore is 3. GCS verbal subscore is 1. GCS motor subscore is 6.   Pupils unequal. + cough, gag, and corneal reflexes. Moves all extremities equally, follows commands.    Skin: Skin is warm, dry and intact. She is not diaphoretic.       Vents:  Vent Mode: A/C (10/04/19 1658)  Ventilator Initiated: Yes (10/02/19 2200)  Set Rate: 20 bmp (10/04/19 1658)  Vt Set: 350 mL (10/04/19 1658)  Pressure Support: 10 cmH20 (10/04/19 0907)  PEEP/CPAP: 5 cmH20 (10/04/19 1658)  Oxygen Concentration (%): 21 (10/04/19 1658)  Peak Airway Pressure: 19 cmH2O (10/04/19 1658)  Plateau Pressure: 14 cmH20 (10/04/19 1658)  Total Ve: 8.58 mL (10/04/19 1658)  F/VT Ratio<105 (RSBI): (!) 67.61 (10/04/19 1543)  Lines/Drains/Airways     Central Venous Catheter Line                 Percutaneous Central Line Insertion/Assessment - triple lumen  10/03/19 0114 right internal  jugular 1 day          Drain                 Urethral Catheter 09/19/19 1240 Double-lumen 16 Fr. 15 days         Rectal Tube 09/27/19 1000 rectal tube w/ balloon (indicate number of mLs) 7 days         Chest Tube 10/03/19 0500 Right Midaxillary 1 day         NG/OG Tube 10/02/19 2200 Center mouth 1 day          Airway                 Airway - Non-Surgical 10/02/19 2155 Endotracheal Tube 1 day          Arterial Line                 Arterial Line 10/02/19 2330 Right Radial 1 day          Peripheral Intravenous Line                 Peripheral IV - Single Lumen 10/02/19 0825 20 G Anterior;Right Hand 2 days         Peripheral IV - Single Lumen 10/02/19 20 G Right Hand 2 days         Peripheral IV - Single Lumen 10/02/19 2037 20 G Left Wrist 1 day         Peripheral IV - Single Lumen 10/03/19 0300 20 G Left Antecubital 1 day              Significant Labs:    CBC/Anemia Profile:  Recent Labs   Lab 10/03/19  0136 10/03/19  0614 10/04/19  0405   WBC 14.05*  14.05* 8.29 3.67*   HGB 11.9*  11.9* 10.7* 11.6*   HCT 37.5  37.5 35.1* 35.8*     291 194 115*   MCV 96  96 99* 94   RDW 19.8*  19.8* 19.7* 18.6*        Chemistries:  Recent Labs   Lab 10/02/19  2000 10/03/19  0136  10/04/19  0404 10/04/19  1000 10/04/19  1514    145  145  145   < > 146* 143 146*   K 3.4* 5.0  5.0  5.0   < > 3.6 3.9 3.5    113*  113*  113*   < > 113* 113* 115*   CO2 28 22*  22*  22*   < > 23 23 23   BUN 53* 55*  55*  55*   < > 59* 59* 53*   CREATININE 1.1 1.3  1.3  1.3   < > 1.3 1.4 0.9   CALCIUM 8.0* 7.8*  7.8*  7.8*   < > 7.4* 7.3* 7.0*   ALBUMIN 1.7* 1.7*  --  1.4*  --   --    PROT 7.0 6.9  --  5.4*  --   --    BILITOT 0.8 0.6  --  0.2  --   --    ALKPHOS 179* 176*  --  107  --   --    ALT 25 27  --  9*  --   --    AST 80* 79*  --  26  --   --    MG 1.7 2.9*  --  1.9  --   --    PHOS 3.6 6.0*  --  5.0*  --   --     < > = values in this interval not displayed.       All pertinent labs within the past 24 hours have  been reviewed.    Significant Imaging:  I have reviewed all pertinent imaging results/findings within the past 24 hours.      ABG  Recent Labs   Lab 10/04/19  1113   PH 7.466*   PO2 67*   PCO2 28.5*   HCO3 20.5*   BE -3     Assessment/Plan:     Neuro  Encephalopathy, metabolic  --likely secondary to sepsis  --Brain MRI 9/30 with lacunar infarcts, neg for acute intracranial abnormality  --currently sedated with propofol & fentanyl  --repeat head CT d/t unequal pupils, no acute abnormality       Pulmonary  Acute hypoxemic respiratory failure  --multifactorial secondary to complete left lung collapse, septic emboli, pleural effusions, suspected pna  --intubated shortly after arrival for hypoxemia  --s/p bronch x2 with significant clearance of secretions from left mainstem & improved aeration on f/u xray  --f/u sputum cx  --continue cefepime & flagyl   --s/p thora 10/1; transudative based on lights criteria, though concern for empyema based on OSH pulm note   ---right chest tube placed this am, 345 mL output after TPA administration; pleural fluid with GPC on gram stain   --CTA neg for PE  --wean ventilator as tolerated        Pleural effusion  --Empyema with GPC on gram stain   --R chest tube inserted; -20 mmHg suction  --TPA/DNAse BID  --monitor output    Cardiac/Vascular  Acute bacterial endocarditis  --See staph bacteremia       Acute on chronic diastolic congestive heart failure  --last echo (OSH) with EF 35%, LV concentric remodeling, indeterminate diastolic function  --bedside echo with depressed EF, RV not significantly dilated  --repeat TTE with improved EF and without mention of previously noted vegetations    Renal/  ABIMBOLA (acute kidney injury)  --baseline creatinine ~ 0.7; was 2.9 on initial presentation to OSH, improved with hydration over hospital course  --FeNa/FeUrea consistent with pre-renal etiology; possible component of ATN given hypotension   --non anion gap metabolic acidosis on admit, briefly on  sodium bicarb drip   --improving     ID  * Staphylococcus aureus bacteremia with sepsis  --2/2 IVDA, patient reported injecting IV heroin to ID MD at OSH   --Blood cultures (9/19 - 9/26) have grown methicillin sensitive Staph aureus. Blood cultures as of 9/29 have been NGTD.  --mitral and tricuspid valve vegetations seen on DHARMESH 9/25; unchanged on TTE 9/28   --OSH abx regimen vanc, ancef and daptomycin. ID consulted, oxacillin desensitization in progress  --f/u Bcx here  --OSH PICC line d/c'd  --formal 2D echo results show EF 53%; normal L ventricular function; mild mitral sclerosis; small circumferential pericardial effusion  --neurosurgery consult for epidural abscess, likely plan for OR Monday   --cardiology consulted; no surgical intervention at this point   --bilateral shoulder abscess; consider general sx consult for I&D    Septic shock with acute organ dysfunction due to methicillin susceptible Staphylococcus aureus (MSSA)  --secondary to MV/TV endocarditis, epidural abscess, likely pna  --shock resolved    Epidural abscess  --MRI concerning for epidural abscess at L2-4 region  --no neuro deficits on physical exam  --neurosurgery consulted and following; plan for OR Monday    Hematology  Acute septic pulmonary embolism  --secondary to TV endocarditis      GI  Ileus  --Possible ileus on CT abd 9/25  --Was receiving TPN at OSH  --KUB with dilated loops of bowel  --OG tube to LWS     Pancolitis  --seen on CT 9/19 with improvement noted on f/u CT 9/25  --concern for ileus on 9/25 CT; rectal tube in place with liquid stool output  --stool 9/27 neg for c diff; stool cx neg  --KUB with distended loops of bowel; maintain NGT to LIWS  --continue flagyl    Critical Care Time: 95 minutes  Critical secondary to Patient has a condition that poses threat to life and bodily function: MSSA bacteremia, Endocarditis, Epidural Abscess, Septic Emboli      Critical care was time spent personally by me on the following activities:  development of treatment plan with patient or surrogate and bedside caregivers, discussions with consultants, evaluation of patient's response to treatment, examination of patient, ordering and performing treatments and interventions, ordering and review of laboratory studies, ordering and review of radiographic studies, pulse oximetry, re-evaluation of patient's condition. This critical care time did not overlap with that of any other provider or involve time for any procedures.     Tessa Kingsley NP  Critical Care Medicine  [facility]

## 2019-10-05 NOTE — SUBJECTIVE & OBJECTIVE
Interval History: naeon    Medications:  Continuous Infusions:   fentanyl 125 mcg/hr (10/05/19 1100)    propofol 30 mcg/kg/min (10/05/19 1100)     Scheduled Meds:   albuterol-ipratropium  3 mL Nebulization Q4H    chlorhexidine  15 mL Mouth/Throat BID    famotidine (PF)  20 mg Intravenous Daily    metronidazole  500 mg Intravenous Q8H    oxacillin 12 g in  mL CONTINUOUS INFUSION  12 g Intravenous Q24H     PRN Meds:Dextrose 10% Bolus, diphenhydrAMINE, EPINEPHrine, glucagon (human recombinant), ondansetron, sodium chloride 0.9%     Review of Systems    Objective:     Weight: 53.5 kg (118 lb)  Body mass index is 20.25 kg/m².  Vital Signs (Most Recent):  Temp: 98.2 °F (36.8 °C) (10/05/19 1102)  Pulse: 94 (10/05/19 1103)  Resp: 20 (10/05/19 1103)  BP: 129/60 (10/05/19 1102)  SpO2: 100 % (10/05/19 1103) Vital Signs (24h Range):  Temp:  [98.2 °F (36.8 °C)-98.9 °F (37.2 °C)] 98.2 °F (36.8 °C)  Pulse:  [] 94  Resp:  [16-24] 20  SpO2:  [91 %-100 %] 100 %  BP: ()/(44-79) 129/60  Arterial Line BP: ()/(52-75) 78/54     Date 10/05/19 0700 - 10/06/19 0659   Shift 7951-1044 4842-9647 6744-7060 24 Hour Total   INTAKE   NG/   300   IV Piggyback 104   104   Shift Total(mL/kg) 404(7.5)   404(7.5)   OUTPUT   Urine(mL/kg/hr) 265   265   Drains 0   0   Stool 25   25   Chest Tube 20   20   Shift Total(mL/kg) 310(5.8)   310(5.8)   Weight (kg) 53.5 53.5 53.5 53.5              Vent Mode: A/C  Oxygen Concentration (%):  [21-30] 30  Resp Rate Total:  [15 br/min-40 br/min] 20 br/min  Vt Set:  [350 mL] 350 mL  PEEP/CPAP:  [5 cmH20] 5 cmH20  Pressure Support:  [10 cmH20] 10 cmH20  Mean Airway Pressure:  [8 cmH20-9 cmH20] 9 cmH20         Chest Tube 10/03/19 0500 Right Midaxillary (Active)   Chest Tube WDL WDL 10/4/2019  7:10 AM   Function -20 cm H2O 10/4/2019  7:10 AM   Air Leak/Fluctuation air leak not present;fluctuation not present 10/4/2019  7:10 AM   Safety all tubing connections taped;2 rubber-tipped  hemostats w/ patient;all connections secured;suction checked 10/4/2019  7:10 AM   Securement tubing secured to body distal to insertion site w/ tape 10/4/2019  7:10 AM   Patency Intervention Tip/tilt 10/4/2019  7:10 AM   Drainage Description Serosanguineous 10/4/2019  7:10 AM   Dressing Appearance occlusive gauze dressing intact;clean and dry 10/4/2019  7:10 AM   Dressing Care dressing reinforced 10/4/2019  7:10 AM   Left Subcutaneous Emphysema none present 10/4/2019  7:10 AM   Right Subcutaneous Emphysema none present 10/4/2019  7:10 AM   Site Assessment Clean;Dry;Intact;No redness;No swelling 10/4/2019  7:10 AM   Surrounding Skin Dry;Intact 10/4/2019  7:10 AM   Output (mL) 0 mL 10/4/2019  7:00 AM            NG/OG Tube 10/02/19 2200 Center mouth (Active)   Placement Check placement verified by x-ray 10/4/2019  7:10 AM   Tolerance no signs/symptoms of discomfort 10/4/2019  7:10 AM   Securement secured to commercial device 10/4/2019  7:10 AM   Clamp Status/Tolerance unclamped;no emesis;no nausea;no restlessness;no abdominal distention 10/4/2019  7:10 AM   Suction Setting/Drainage Method suction at;low;intermittent setting 10/4/2019  7:10 AM   Insertion Site Appearance no redness, warmth, tenderness, skin breakdown, drainage 10/4/2019  7:10 AM   Drainage Bile 10/4/2019  3:00 AM   Intake (mL) 50 mL 10/4/2019  6:00 AM   Tube Output(mL)(Include Discarded Residual) 0 mL 10/4/2019  7:00 AM            Rectal Tube 09/27/19 1000 rectal tube w/ balloon (indicate number of mLs) (Active)   Balloon Inflation Volume (mL) 45 10/4/2019  3:00 AM   Reposition drainage bags for BMS & Henriquez on opposite sides of bed 10/4/2019  7:10 AM   Outcome comfort enhanced 10/4/2019  7:10 AM   Stool Color Brown;Green 10/4/2019  7:10 AM   Insertion Site Appearance no redness, warmth, tenderness, skin breakdown, drainage 10/4/2019  7:10 AM   Flush/Irrigation flushed w/;water;no resistance met 10/4/2019  3:00 AM   Intake (mL) 25 mL 10/4/2019  7:00 AM  "  Rectal Tube Output 0 mL 10/4/2019  3:00 AM            Urethral Catheter 09/19/19 1240 Double-lumen 16 Fr. (Active)   Site Assessment Clean;Intact 10/4/2019  7:10 AM   Collection Container Urimeter 10/4/2019  7:10 AM   Securement Method secured to top of thigh w/ adhesive device 10/4/2019  7:10 AM   Catheter Care Performed yes 10/4/2019  7:10 AM   Reason for Continuing Urinary Catheterization Critically ill in ICU requiring intensive monitoring 10/4/2019  7:10 AM   CAUTI Prevention Bundle StatLock in place w 1" slack;Intact seal between catheter & drainage tubing;Drainage bag/urimeter off the floor;Green sheeting clip in use;No dependent loops or kinks;Drainage bag/urimeter not overfilled (<2/3 full);Drainage bag/urimeter below bladder 10/4/2019  7:10 AM   Output (mL) 45 mL 10/4/2019 10:00 AM       Neurosurgery Physical Exam     [id number]  PERRL  Localizing in BUE  WD in BLE    Significant Labs:  Recent Labs   Lab 10/04/19  0404  10/04/19  1514 10/05/19  0500 10/05/19  0956   *   < > 78 74  74  74 69*   *   < > 146* 148*  148*  148* 146*   K 3.6   < > 3.5 4.2  4.2  4.2 4.0   *   < > 115* 117*  117*  117* 116*   CO2 23   < > 23 23  23  23 22*   BUN 59*   < > 53* 54*  54*  54* 52*   CREATININE 1.3   < > 0.9 1.5*  1.5*  1.5* 1.5*   CALCIUM 7.4*   < > 7.0* 7.5*  7.5*  7.5* 7.6*   MG 1.9  --   --  1.9  --     < > = values in this interval not displayed.     Recent Labs   Lab 10/04/19  0405 10/05/19  0500 10/05/19  0840   WBC 3.67* 4.61  --    HGB 11.6* 7.9* 7.4*   HCT 35.8* 25.0* 24.3*   * 146*  --      No results for input(s): LABPT, INR, APTT in the last 48 hours.  Microbiology Results (last 7 days)     Procedure Component Value Units Date/Time    Aerobic culture [[id number]] Collected:  10/03/19 0549    Order Status:  Completed Specimen:  Pleural Fluid Updated:  10/05/19 1113     Aerobic Bacterial Culture No growth    Culture, Body Fluid - Bactec [[id number]] Collected:  10/03/19 " 0549    Order Status:  Completed Specimen:  Pleural Fluid Updated:  10/05/19 1012     Body Fluid Culture, Sterile No Growth to date      No Growth to date      No Growth to date    Narrative:       received 2 culture bottles with specimen for pleural fluid.    Blood culture [[id number]] Collected:  10/02/19 0830    Order Status:  Completed Specimen:  Blood from Peripheral, Hand, Right Updated:  10/04/19 2212     Blood Culture, Routine No Growth to date      No Growth to date      No Growth to date    Blood culture [[id number]] Collected:  10/02/19 2025    Order Status:  Completed Specimen:  Blood from Peripheral, Wrist, Right Updated:  10/04/19 2212     Blood Culture, Routine No Growth to date      No Growth to date      No Growth to date    Culture, Anaerobic [[id number]] Collected:  10/03/19 0549    Order Status:  Completed Specimen:  Pleural Fluid Updated:  10/04/19 0725     Anaerobic Culture Culture in progress    Gram stain [[id number]] Collected:  10/03/19 0549    Order Status:  Completed Specimen:  Pleural Fluid Updated:  10/03/19 0955     Gram Stain Result Rare WBC's      Rare Gram positive cocci            Significant Diagnostics:  CT head: reviewed

## 2019-10-05 NOTE — ASSESSMENT & PLAN NOTE
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    Plan:  - When deemed medically stable, will consider OR for evacuation of abscess; please provide medical clearance   - q 1 hr neuro checks  - continue abx per primary team, will fu cultures  - Following

## 2019-10-05 NOTE — PLAN OF CARE
Patient free of falls/traumas/injuries. Plan of care discussed with patient and family.  Neuro: Pt AAOx4, follows commands.  Cardiac: SR-ST on telemetry with HR in 90-110s, Maps >65.  Respiratory: 2L O2 via NC with SpO2 96-98%  GI/: Henriquez in place with UOP 30-60ml/hr  Extubated today, possible surgery Monday or later this week.

## 2019-10-05 NOTE — ASSESSMENT & PLAN NOTE
--Empyema with GPC on gram stain   --R chest tube inserted; -20 mmHg suction  --TPA/DNAse BID  --monitor output

## 2019-10-05 NOTE — PROGRESS NOTES
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 MRI L spine showed L2-L4 epidural abscess and neurosurgery was consulted for evaluation. Of note, pt was intubated, bronched, chest tube plced and sedation and paralytics administered due to decompensation with desaturation and hypotension.    Post-Op Info:  * No surgery found *         Interval History: naeon    Medications:  Continuous Infusions:   fentanyl 125 mcg/hr (10/05/19 1100)    propofol 30 mcg/kg/min (10/05/19 1100)     Scheduled Meds:   albuterol-ipratropium  3 mL Nebulization Q4H    chlorhexidine  15 mL Mouth/Throat BID    famotidine (PF)  20 mg Intravenous Daily    metronidazole  500 mg Intravenous Q8H    oxacillin 12 g in  mL CONTINUOUS INFUSION  12 g Intravenous Q24H     PRN Meds:Dextrose 10% Bolus, diphenhydrAMINE, EPINEPHrine, glucagon (human recombinant), ondansetron, sodium chloride 0.9%     Review of Systems    Objective:     Weight: 53.5 kg (118 lb)  Body mass index is 20.25 kg/m².  Vital Signs (Most Recent):  Temp: 98.2 °F (36.8 °C) (10/05/19 1102)  Pulse: 94 (10/05/19 1103)  Resp: 20 (10/05/19 1103)  BP: 129/60 (10/05/19 1102)  SpO2: 100 % (10/05/19 1103) Vital Signs (24h Range):  Temp:  [98.2 °F (36.8 °C)-98.9 °F (37.2 °C)] 98.2 °F (36.8 °C)  Pulse:  [] 94  Resp:  [16-24] 20  SpO2:  [91 %-100 %] 100 %  BP: ()/(44-79) 129/60  Arterial Line BP: ()/(52-75) 78/54     Date 10/05/19 0700 - 10/06/19 0659   Shift 5867-7040 3747-6338 3291-2949 24 Hour Total   INTAKE   NG/   300   IV Piggyback 104   104   Shift Total(mL/kg) 404(7.5)   404(7.5)   OUTPUT   Urine(mL/kg/hr) 265   265   Drains 0   0   Stool 25   25   Chest Tube 20   20   Shift Total(mL/kg) 310(5.8)   310(5.8)   Weight (kg) 53.5 53.5 53.5 53.5              Vent Mode: A/C  Oxygen Concentration (%):  [21-30] 30  Resp  Rate Total:  [15 br/min-40 br/min] 20 br/min  Vt Set:  [350 mL] 350 mL  PEEP/CPAP:  [5 cmH20] 5 cmH20  Pressure Support:  [10 cmH20] 10 cmH20  Mean Airway Pressure:  [8 cmH20-9 cmH20] 9 cmH20         Chest Tube 10/03/19 0500 Right Midaxillary (Active)   Chest Tube WDL WDL 10/4/2019  7:10 AM   Function -20 cm H2O 10/4/2019  7:10 AM   Air Leak/Fluctuation air leak not present;fluctuation not present 10/4/2019  7:10 AM   Safety all tubing connections taped;2 rubber-tipped hemostats w/ patient;all connections secured;suction checked 10/4/2019  7:10 AM   Securement tubing secured to body distal to insertion site w/ tape 10/4/2019  7:10 AM   Patency Intervention Tip/tilt 10/4/2019  7:10 AM   Drainage Description Serosanguineous 10/4/2019  7:10 AM   Dressing Appearance occlusive gauze dressing intact;clean and dry 10/4/2019  7:10 AM   Dressing Care dressing reinforced 10/4/2019  7:10 AM   Left Subcutaneous Emphysema none present 10/4/2019  7:10 AM   Right Subcutaneous Emphysema none present 10/4/2019  7:10 AM   Site Assessment Clean;Dry;Intact;No redness;No swelling 10/4/2019  7:10 AM   Surrounding Skin Dry;Intact 10/4/2019  7:10 AM   Output (mL) 0 mL 10/4/2019  7:00 AM            NG/OG Tube 10/02/19 2200 Center mouth (Active)   Placement Check placement verified by x-ray 10/4/2019  7:10 AM   Tolerance no signs/symptoms of discomfort 10/4/2019  7:10 AM   Securement secured to commercial device 10/4/2019  7:10 AM   Clamp Status/Tolerance unclamped;no emesis;no nausea;no restlessness;no abdominal distention 10/4/2019  7:10 AM   Suction Setting/Drainage Method suction at;low;intermittent setting 10/4/2019  7:10 AM   Insertion Site Appearance no redness, warmth, tenderness, skin breakdown, drainage 10/4/2019  7:10 AM   Drainage Bile 10/4/2019  3:00 AM   Intake (mL) 50 mL 10/4/2019  6:00 AM   Tube Output(mL)(Include Discarded Residual) 0 mL 10/4/2019  7:00 AM            Rectal Tube 09/27/19 1000 rectal tube w/ balloon (indicate  "number of mLs) (Active)   Balloon Inflation Volume (mL) 45 10/4/2019  3:00 AM   Reposition drainage bags for BMS & Henriquez on opposite sides of bed 10/4/2019  7:10 AM   Outcome comfort enhanced 10/4/2019  7:10 AM   Stool Color Brown;Green 10/4/2019  7:10 AM   Insertion Site Appearance no redness, warmth, tenderness, skin breakdown, drainage 10/4/2019  7:10 AM   Flush/Irrigation flushed w/;water;no resistance met 10/4/2019  3:00 AM   Intake (mL) 25 mL 10/4/2019  7:00 AM   Rectal Tube Output 0 mL 10/4/2019  3:00 AM            Urethral Catheter 09/19/19 1240 Double-lumen 16 Fr. (Active)   Site Assessment Clean;Intact 10/4/2019  7:10 AM   Collection Container Urimeter 10/4/2019  7:10 AM   Securement Method secured to top of thigh w/ adhesive device 10/4/2019  7:10 AM   Catheter Care Performed yes 10/4/2019  7:10 AM   Reason for Continuing Urinary Catheterization Critically ill in ICU requiring intensive monitoring 10/4/2019  7:10 AM   CAUTI Prevention Bundle StatLock in place w 1" slack;Intact seal between catheter & drainage tubing;Drainage bag/urimeter off the floor;Green sheeting clip in use;No dependent loops or kinks;Drainage bag/urimeter not overfilled (<2/3 full);Drainage bag/urimeter below bladder 10/4/2019  7:10 AM   Output (mL) 45 mL 10/4/2019 10:00 AM       Neurosurgery Physical Exam     E2VTM5  PERRL  Localizing in BUE  WD in BLE    Significant Labs:  Recent Labs   Lab 10/04/19  0404  10/04/19  1514 10/05/19  0500 10/05/19  0956   *   < > 78 74  74  74 69*   *   < > 146* 148*  148*  148* 146*   K 3.6   < > 3.5 4.2  4.2  4.2 4.0   *   < > 115* 117*  117*  117* 116*   CO2 23   < > 23 23  23  23 22*   BUN 59*   < > 53* 54*  54*  54* 52*   CREATININE 1.3   < > 0.9 1.5*  1.5*  1.5* 1.5*   CALCIUM 7.4*   < > 7.0* 7.5*  7.5*  7.5* 7.6*   MG 1.9  --   --  1.9  --     < > = values in this interval not displayed.     Recent Labs   Lab 10/04/19  0405 10/05/19  0500 10/05/19  08   WBC " 3.67* 4.61  --    HGB 11.6* 7.9* 7.4*   HCT 35.8* 25.0* 24.3*   * 146*  --      No results for input(s): LABPT, INR, APTT in the last 48 hours.  Microbiology Results (last 7 days)     Procedure Component Value Units Date/Time    Aerobic culture [[id number]] Collected:  10/03/19 0549    Order Status:  Completed Specimen:  Pleural Fluid Updated:  10/05/19 1113     Aerobic Bacterial Culture No growth    Culture, Body Fluid - Bactec [[id number]] Collected:  10/03/19 0549    Order Status:  Completed Specimen:  Pleural Fluid Updated:  10/05/19 1012     Body Fluid Culture, Sterile No Growth to date      No Growth to date      No Growth to date    Narrative:       received 2 culture bottles with specimen for pleural fluid.    Blood culture [[id number]] Collected:  10/02/19 0830    Order Status:  Completed Specimen:  Blood from Peripheral, Hand, Right Updated:  10/04/19 2212     Blood Culture, Routine No Growth to date      No Growth to date      No Growth to date    Blood culture [[id number]] Collected:  10/02/19 2025    Order Status:  Completed Specimen:  Blood from Peripheral, Wrist, Right Updated:  10/04/19 2212     Blood Culture, Routine No Growth to date      No Growth to date      No Growth to date    Culture, Anaerobic [[id number]] Collected:  10/03/19 0549    Order Status:  Completed Specimen:  Pleural Fluid Updated:  10/04/19 0725     Anaerobic Culture Culture in progress    Gram stain [[id number]] Collected:  10/03/19 0549    Order Status:  Completed Specimen:  Pleural Fluid Updated:  10/03/19 0955     Gram Stain Result Rare WBC's      Rare Gram positive cocci            Significant Diagnostics:  CT head: reviewed    Assessment/Plan:     Epidural abscess  63yof pmh IVDU with endocarditis MSSA bacteremia acutely decompensated overnight on paralytics, sedation and pressors found to have lumbar epidural abscess.    Plan:  - When deemed medically stable, will consider OR for evacuation of abscess; please provide  medical clearance   - q 1 hr neuro checks  - continue abx per primary team, will fu cultures  - Following        Joseph Vaughan MD  Neurosurgery  [facility]

## 2019-10-05 NOTE — SUBJECTIVE & OBJECTIVE
Interval History: Started on oxacillin. Norepi off since 10/3. Now with loculated lumps on posterior aspect of both shoulders.     Review of Systems   Unable to perform ROS: Intubated     Objective:     Vital Signs (Most Recent):  Temp: 98.2 °F (36.8 °C) (10/05/19 1502)  Pulse: 108 (10/05/19 1615)  Resp: (!) 22 (10/05/19 1510)  BP: (!) 143/79 (10/05/19 1600)  SpO2: 99 % (10/05/19 1615) Vital Signs (24h Range):  Temp:  [98.2 °F (36.8 °C)-98.9 °F (37.2 °C)] 98.2 °F (36.8 °C)  Pulse:  [] 108  Resp:  [16-22] 22  SpO2:  [91 %-100 %] 99 %  BP: ()/(44-79) 143/79  Arterial Line BP: (78-99)/(52-75) 78/54     Weight: 53.5 kg (118 lb)  Body mass index is 20.25 kg/m².    Estimated Creatinine Clearance: 31.6 mL/min (A) (based on SCr of 1.5 mg/dL (H)).    Physical Exam   Constitutional: She appears well-developed and well-nourished. No distress.   HENT:   Head: Normocephalic and atraumatic.   Eyes: Conjunctivae are normal. Right eye exhibits no discharge. Left eye exhibits no discharge.   Neck: Neck supple.   Cardiovascular: Normal rate and normal heart sounds. Exam reveals no gallop and no friction rub.   No murmur heard.  Pulmonary/Chest: She has no wheezes. She has no rales.   Intubated   Abdominal: Soft. She exhibits no mass. There is no tenderness. There is no guarding.   Musculoskeletal: She exhibits no edema.   Loculated lumps along posterior aspect of both shoulders.    Neurological:   Sedated   Skin: Skin is warm and dry. She is not diaphoretic.       Significant Labs:   CBC:   Recent Labs   Lab 10/04/19  0405 10/05/19  0500 10/05/19  0840   WBC 3.67* 4.61  --    HGB 11.6* 7.9* 7.4*   HCT 35.8* 25.0* 24.3*   * 146*  --      CMP:   Recent Labs   Lab 10/04/19  0404  10/04/19  1514 10/05/19  0500 10/05/19  0956   *   < > 146* 148*  148*  148* 146*   K 3.6   < > 3.5 4.2  4.2  4.2 4.0   *   < > 115* 117*  117*  117* 116*   CO2 23   < > 23 23  23  23 22*   *   < > 78 74  74  74  69*   BUN 59*   < > 53* 54*  54*  54* 52*   CREATININE 1.3   < > 0.9 1.5*  1.5*  1.5* 1.5*   CALCIUM 7.4*   < > 7.0* 7.5*  7.5*  7.5* 7.6*   PROT 5.4*  --   --  5.4*  --    ALBUMIN 1.4*  --   --  1.4*  --    BILITOT 0.2  --   --  0.3  --    ALKPHOS 107  --   --  96  --    AST 26  --   --  17  --    ALT 9*  --   --  5*  --    ANIONGAP 10   < > 8 8  8  8 8   EGFRNONAA 43.2*   < > >60.0 36.3*  36.3*  36.3* 36.3*    < > = values in this interval not displayed.

## 2019-10-05 NOTE — PROGRESS NOTES
[facility]  Critical Care Medicine  Progress Note    Patient Name: Mesha Mckenzie  MRN: [id number]  Admission Date: 10/2/2019  Hospital Length of Stay: 3 days  Code Status: Full Code  Attending Provider: Malik Jerry MD  Primary Care Provider: Varun Shea MD PhD   Principal Problem: Staphylococcus aureus bacteremia with sepsis    Subjective:     HPI:  Patient is a 63 y.o. female with significant past medical history of depression, anxiety and IVDA presented to [facility] ED on 9/19 with c/o N/V/D and weakness x ~5 days. The patient was found to be in septic shock with pneumonia and colitis and was transferred to [facility] ICU for further management. Pt was admitted to the intensive care unit and was treated with broad-spectrum antibiotics. 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 CT was repeated on 9/25 and showed bilateral cavitary lesions likely secondary to septic emboli, small pericardial effusion and ileus. DHARMESH was performed on 9/25 which revealed  mitral and tricuspid valve endocarditis. Blood cultures (9/19 - 9/26) have grown methicillin sensitive Staph aureus. Blood cultures as of 9/29 have been NGTD. ID was following the patient for antibiotic management with most recent regimen being cefazolin, vancomycin and daptomycin. Repeat Echo on 9/28 showed LVEF 35% and persistent vegatations. On 9/30 the patient had an MRI brain that showed remote lacunar infarcts without acute intracranial abnormality. On 10/1 MRI revealed spinal epidural abscess at L2-4 level. Thoracentesis was performed on 10/1. The patient was followed by Cardiology team who considered valve replacement surgery but due to patient's other medical issues, Cardiovascular surgery intervention was deferred. The patient was transferred to [facility] for neurosurgery evaluation of epidural abscess.         Hospital/ICU Course:  Upon arrival to [facility], the patient  was encephalopathic, but able to follow commands. She was hemodynamically stable and satting well on 2L NC. Shortly after arrival, the patient became acutely hypertensive and hypoxemic necessitating intubation and mechanical ventilation. She was extremely difficult to oxygenate (P:F ratio 52). Bronch was performed which was unremarkable. The patient became hypotensive and was started on vasopressors. She was paralyzed with nimbex due to tachypnea and vent dyssynchrony. Bedside echo was performed which showed reduced LVEF, hyperdynamic LV; no significant mitral regurg was noted. The patient went for CTA without evidence of pulmonary embolism. Imaging revealed left lung collaspse and repeat bronch was performed with sputum cx sent. Oxygenation improved quickly and paralytic was discontinued. Right chest tube placed & pleural fluid sent; gram stain with GPCs. Neurosurgery consulted for epidural abscess, plans for OR potentially Monday. Cardiology consult for endocarditis; currently not a surgical candidate. Repeat TTE performed with improved EF and without mention of previously noted vegetations. ID and allergy consulted; oxacillin densensitization in process given pcn allergy.         Interval History/Significant Events: Stable overnight. Consulted general surgery for bilateral shoulder swelling; ultrasound concerning for phlegmon vs developing abscess without organized fluid collection. Plan for SBT in the afternoon.     Review of Systems   Unable to perform ROS: Intubated     Objective:     Vital Signs (Most Recent):  Temp: 98.2 °F (36.8 °C) (10/05/19 1102)  Pulse: 97 (10/05/19 1303)  Resp: 20 (10/05/19 1303)  BP: (!) 123/56 (10/05/19 1303)  SpO2: 99 % (10/05/19 1303) Vital Signs (24h Range):  Temp:  [98.2 °F (36.8 °C)-98.9 °F (37.2 °C)] 98.2 °F (36.8 °C)  Pulse:  [] 97  Resp:  [16-24] 20  SpO2:  [91 %-100 %] 99 %  BP: ()/(44-79) 123/56  Arterial Line BP: ()/(52-75) 78/54   Weight: 53.5 kg (118  lb)  Body mass index is 20.25 kg/m².      Intake/Output Summary (Last 24 hours) at 10/5/2019 1305  Last data filed at 10/5/2019 1200  Gross per 24 hour   Intake 1552.13 ml   Output 1585 ml   Net -32.87 ml       Physical Exam   Constitutional: She appears well-developed. She is cooperative. She has a sickly appearance. She appears ill. No distress. She is sedated, intubated and restrained.   HENT:   Head: Normocephalic and atraumatic.   Eyes: Right eye exhibits no discharge. Left eye exhibits no discharge. No scleral icterus. Pupils are unequal.   Neck: No tracheal deviation present.   Cardiovascular: Normal rate and regular rhythm.   Pulses:       Radial pulses are 2+ on the right side, and 2+ on the left side.        Dorsalis pedis pulses are 2+ on the right side, and 2+ on the left side.   Pulmonary/Chest: She is intubated. She has no decreased breath sounds. She has no wheezes. She has no rhonchi. She has no rales.   Chest tube in situ w/ clear output   Abdominal: Soft. Normal appearance. Bowel sounds are decreased. There is no tenderness.   Genitourinary:   Genitourinary Comments: Henriquez in place with clear yellow output   Rectal tube draining watery, dark green stool   Neurological: No cranial nerve deficit. GCS eye subscore is 3. GCS verbal subscore is 1. GCS motor subscore is 6.   + cough, gag, and corneal reflexes.   Skin: Skin is warm, dry and intact. She is not diaphoretic.       Vents:  Vent Mode: A/C (10/05/19 1303)  Ventilator Initiated: Yes (10/02/19 2200)  Set Rate: 20 bmp (10/05/19 1303)  Vt Set: 350 mL (10/05/19 1303)  Pressure Support: 10 cmH20 (10/05/19 1025)  PEEP/CPAP: 5 cmH20 (10/05/19 1303)  Oxygen Concentration (%): 30 (10/05/19 1103)  Peak Airway Pressure: 16 cmH2O (10/05/19 1303)  Plateau Pressure: 14 cmH20 (10/05/19 1303)  Total Ve: 7.37 mL (10/05/19 1303)  F/VT Ratio<105 (RSBI): 294.12 (10/05/19 1303)  Lines/Drains/Airways     Central Venous Catheter Line                 Percutaneous  Central Line Insertion/Assessment - triple lumen  10/03/19 0114 right internal jugular 2 days          Drain                 Urethral Catheter 09/19/19 1240 Double-lumen 16 Fr. 16 days         Rectal Tube 09/27/19 1000 rectal tube w/ balloon (indicate number of mLs) 8 days         Chest Tube 10/03/19 0500 Right Midaxillary 2 days         NG/OG Tube 10/02/19 2200 Center mouth 2 days          Airway                 Airway - Non-Surgical 10/02/19 2155 Endotracheal Tube 2 days          Peripheral Intravenous Line                 Peripheral IV - Single Lumen 10/03/19 0300 20 G Left Antecubital 2 days         Peripheral IV - Single Lumen 10/05/19 0818 22 G Anterior;Left Forearm less than 1 day              Significant Labs:    CBC/Anemia Profile:  Recent Labs   Lab 10/04/19  0405 10/05/19  0500 10/05/19  0840   WBC 3.67* 4.61  --    HGB 11.6* 7.9* 7.4*   HCT 35.8* 25.0* 24.3*   * 146*  --    MCV 94 95  --    RDW 18.6* 18.0*  --         Chemistries:  Recent Labs   Lab 10/04/19  0404  10/04/19  1514 10/05/19  0500 10/05/19  0956   *   < > 146* 148*  148*  148* 146*   K 3.6   < > 3.5 4.2  4.2  4.2 4.0   *   < > 115* 117*  117*  117* 116*   CO2 23   < > 23 23  23  23 22*   BUN 59*   < > 53* 54*  54*  54* 52*   CREATININE 1.3   < > 0.9 1.5*  1.5*  1.5* 1.5*   CALCIUM 7.4*   < > 7.0* 7.5*  7.5*  7.5* 7.6*   ALBUMIN 1.4*  --   --  1.4*  --    PROT 5.4*  --   --  5.4*  --    BILITOT 0.2  --   --  0.3  --    ALKPHOS 107  --   --  96  --    ALT 9*  --   --  5*  --    AST 26  --   --  17  --    MG 1.9  --   --  1.9  --    PHOS 5.0*  --   --  4.8*  --     < > = values in this interval not displayed.       All pertinent labs within the past 24 hours have been reviewed.    Significant Imaging:  I have reviewed all pertinent imaging results/findings within the past 24 hours.   CXR:  FINDINGS:  Support devices: The endotracheal tube terminates in the mid trachea.  The nasogastric tube terminates in the  gastric lumen.  The right internal jugular central venous line terminates in the superior vena cava.  There is a pigtail right lower lobe chest tube unchanged in position.    Chest: No pneumothorax or focal airspace opacification.  Mild blunting of the costophrenic angles bilaterally persists.  Silhouetting of the left hemidiaphragm also persists.  Prominence of the pulmonary vascularity present.  The cardiomediastinal silhouette, osseous and soft tissue structures are stable.      Impression       Small volume bilateral pleural effusions.  Pulmonary interstitial edema likely present.  There also may be pneumonia versus compressive atelectasis in the left lower lobe.      Electronically signed by: Radha Ortiz MD  Date: 10/05/2019  Time: 12:45           ABG  Recent Labs   Lab 10/05/19  0534   PH 7.422   PO2 79*   PCO2 33.2*   HCO3 21.7*   BE -3     Assessment/Plan:     Neuro  Encephalopathy, metabolic  --likely secondary to sepsis  --Brain MRI 9/30 with lacunar infarcts, neg for acute intracranial abnormality  --currently sedated with propofol & fentanyl  --repeat head CT d/t unequal pupils, no acute abnormality     Pulmonary  Acute hypoxemic respiratory failure  --multifactorial secondary to complete left lung collapse, septic emboli, pleural effusions, suspected pna  --intubated shortly after arrival for hypoxemia  --s/p bronch x2 with significant clearance of secretions from left mainstem & improved aeration on f/u xray  --f/u sputum cx  --continue cefepime & flagyl   --s/p thora 10/1; transudative based on lights criteria, though concern for empyema based on OSH pulm note   ---right chest tube placed this am, 345 mL output after TPA administration; pleural fluid with GPC on gram stain   --CTA neg for PE  --wean ventilator as tolerated    Pleural effusion  --Empyema with GPC on gram stain   --R chest tube inserted; -20 mmHg suction  --TPA/DNAse BID  --monitor output    Cardiac/Vascular  Acute bacterial  endocarditis  --See staph bacteremia       Acute on chronic diastolic congestive heart failure  --last echo (OSH) with EF 35%, LV concentric remodeling, indeterminate diastolic function  --bedside echo with depressed EF, RV not significantly dilated  --repeat TTE with improved EF and without mention of previously noted vegetations    Renal/  ABIMBOLA (acute kidney injury)  --baseline creatinine ~ 0.7; was 2.9 on initial presentation to OSH, improved with hydration over hospital course  --FeNa/FeUrea consistent with pre-renal etiology; possible component of ATN given hypotension   --non anion gap metabolic acidosis on admit, briefly on sodium bicarb drip   --improving     ID  * Staphylococcus aureus bacteremia with sepsis  --2/2 IVDA, patient reported injecting IV heroin to ID MD at OSH   --Blood cultures (9/19 - 9/26) have grown methicillin sensitive Staph aureus. Blood cultures as of 9/29 have been NGTD.  --mitral and tricuspid valve vegetations seen on DHARMESH 9/25; unchanged on TTE 9/28   --OSH abx regimen vanc, ancef and daptomycin. ID consulted, oxacillin desensitization in progress  --f/u Bcx here  --OSH PICC line d/c'd  --formal 2D echo results show EF 53%; normal L ventricular function; mild mitral sclerosis; small circumferential pericardial effusion  --neurosurgery consult for epidural abscess, likely plan for OR when stable  --cardiology consulted; no surgical intervention at this point   --bilateral shoulder abscess; general sx consulted    Septic shock with acute organ dysfunction due to methicillin susceptible Staphylococcus aureus (MSSA)  --secondary to MV/TV endocarditis, epidural abscess, likely pna  --shock resolved    Epidural abscess  --MRI concerning for epidural abscess at L2-4 region  --no neuro deficits on physical exam  --neurosurgery consulted and following    Hematology  Acute septic pulmonary embolism  --secondary to TV endocarditis    GI  Ileus  --Possible ileus on CT abd 9/25  --Was receiving  TPN at OSH  --KUB with dilated loops of bowel  --OG tube to LWS     Pancolitis  --seen on CT 9/19 with improvement noted on f/u CT 9/25  --concern for ileus on 9/25 CT; rectal tube in place with liquid stool output  --stool 9/27 neg for c diff; stool cx neg  --KUB with distended loops of bowel; maintain NGT to LIWS  --continue flagyl    Other  Preoperative clearance  Discussed with Dr. Jerry.   Patient has 0.7-1.7% risk of myocardial infarction or cardiac arrest, intraoperatively or up to 30 days post-op, according to Bergeron Perioperative Risk for Myocardial Infarction or Cardiac Arrest (DICKSON)     Critical Care Daily Checklist:    A: Awake: RASS Goal/Actual Goal: RASS Goal: -2-->light sedation  Actual: Joyner Agitation Sedation Scale (RASS): Light sedation   B: Spontaneous Breathing Trial Performed?     C: SAT & SBT Coordinated?  Yes                      D: Delirium: CAM-ICU     E: Early Mobility Performed? No   F: Feeding Goal: Goals: Patient to receive nutrition by RD follow-up  Status: Nutrition Goal Status: new   Current Diet Order   Procedures    Diet NPO      AS: Analgesia/Sedation --   T: Thromboembolic Prophylaxis Bleeding risk   H: HOB > 300 Yes   U: Stress Ulcer Prophylaxis (if needed) famotidine   G: Glucose Control --   B: Bowel Function     I: Indwelling Catheter (Lines & Henriquez) Necessity --   D: De-escalation of Antimicrobials/Pharmacotherapies Metronidazole/oxacillin    Plan for the day/ETD --    Code Status:  Family/Goals of Care: Full Code  --       Critical secondary to Patient has a condition that poses threat to life and bodily function: Severe Respiratory Distress      Critical care was time spent personally by me on the following activities: development of treatment plan with patient or surrogate and bedside caregivers, discussions with consultants, evaluation of patient's response to treatment, examination of patient, ordering and performing treatments and interventions, ordering and  review of laboratory studies, ordering and review of radiographic studies, pulse oximetry, re-evaluation of patient's condition. This critical care time did not overlap with that of any other provider or involve time for any procedures.     Sue Reich MD  Critical Care Medicine  [facility]

## 2019-10-05 NOTE — ASSESSMENT & PLAN NOTE
Discussed with Dr. Jerry.   Patient has 0.7-1.7% risk of myocardial infarction or cardiac arrest, intraoperatively or up to 30 days post-op, according to Bergeron Perioperative Risk for Myocardial Infarction or Cardiac Arrest (DICKSON)

## 2019-10-05 NOTE — SUBJECTIVE & OBJECTIVE
Interval History/Significant Events: Stable overnight. Consulted general surgery for bilateral shoulder swelling; ultrasound concerning for phlegmon vs developing abscess without organized fluid collection. Plan for SBT in the afternoon.     Review of Systems   Unable to perform ROS: Intubated     Objective:     Vital Signs (Most Recent):  Temp: 98.2 °F (36.8 °C) (10/05/19 1102)  Pulse: 97 (10/05/19 1303)  Resp: 20 (10/05/19 1303)  BP: (!) 123/56 (10/05/19 1303)  SpO2: 99 % (10/05/19 1303) Vital Signs (24h Range):  Temp:  [98.2 °F (36.8 °C)-98.9 °F (37.2 °C)] 98.2 °F (36.8 °C)  Pulse:  [] 97  Resp:  [16-24] 20  SpO2:  [91 %-100 %] 99 %  BP: ()/(44-79) 123/56  Arterial Line BP: ()/(52-75) 78/54   Weight: 53.5 kg (118 lb)  Body mass index is 20.25 kg/m².      Intake/Output Summary (Last 24 hours) at 10/5/2019 1305  Last data filed at 10/5/2019 1200  Gross per 24 hour   Intake 1552.13 ml   Output 1585 ml   Net -32.87 ml       Physical Exam   Constitutional: She appears well-developed. She is cooperative. She has a sickly appearance. She appears ill. No distress. She is sedated, intubated and restrained.   HENT:   Head: Normocephalic and atraumatic.   Eyes: Right eye exhibits no discharge. Left eye exhibits no discharge. No scleral icterus. Pupils are unequal.   Neck: No tracheal deviation present.   Cardiovascular: Normal rate and regular rhythm.   Pulses:       Radial pulses are 2+ on the right side, and 2+ on the left side.        Dorsalis pedis pulses are 2+ on the right side, and 2+ on the left side.   Pulmonary/Chest: She is intubated. She has no decreased breath sounds. She has no wheezes. She has no rhonchi. She has no rales.   Chest tube in situ w/ clear output   Abdominal: Soft. Normal appearance. Bowel sounds are decreased. There is no tenderness.   Genitourinary:   Genitourinary Comments: Henriquez in place with clear yellow output   Rectal tube draining watery, dark green stool   Neurological:  No cranial nerve deficit. GCS eye subscore is 3. GCS verbal subscore is 1. GCS motor subscore is 6.   + cough, gag, and corneal reflexes.   Skin: Skin is warm, dry and intact. She is not diaphoretic.       Vents:  Vent Mode: A/C (10/05/19 1303)  Ventilator Initiated: Yes (10/02/19 2200)  Set Rate: 20 bmp (10/05/19 1303)  Vt Set: 350 mL (10/05/19 1303)  Pressure Support: 10 cmH20 (10/05/19 1025)  PEEP/CPAP: 5 cmH20 (10/05/19 1303)  Oxygen Concentration (%): 30 (10/05/19 1103)  Peak Airway Pressure: 16 cmH2O (10/05/19 1303)  Plateau Pressure: 14 cmH20 (10/05/19 1303)  Total Ve: 7.37 mL (10/05/19 1303)  F/VT Ratio<105 (RSBI): 294.12 (10/05/19 1303)  Lines/Drains/Airways     Central Venous Catheter Line                 Percutaneous Central Line Insertion/Assessment - triple lumen  10/03/19 0114 right internal jugular 2 days          Drain                 Urethral Catheter 09/19/19 1240 Double-lumen 16 Fr. 16 days         Rectal Tube 09/27/19 1000 rectal tube w/ balloon (indicate number of mLs) 8 days         Chest Tube 10/03/19 0500 Right Midaxillary 2 days         NG/OG Tube 10/02/19 2200 Center mouth 2 days          Airway                 Airway - Non-Surgical 10/02/19 2155 Endotracheal Tube 2 days          Peripheral Intravenous Line                 Peripheral IV - Single Lumen 10/03/19 0300 20 G Left Antecubital 2 days         Peripheral IV - Single Lumen 10/05/19 0818 22 G Anterior;Left Forearm less than 1 day              Significant Labs:    CBC/Anemia Profile:  Recent Labs   Lab 10/04/19  0405 10/05/19  0500 10/05/19  0840   WBC 3.67* 4.61  --    HGB 11.6* 7.9* 7.4*   HCT 35.8* 25.0* 24.3*   * 146*  --    MCV 94 95  --    RDW 18.6* 18.0*  --         Chemistries:  Recent Labs   Lab 10/04/19  0404  10/04/19  1514 10/05/19  0500 10/05/19  0956   *   < > 146* 148*  148*  148* 146*   K 3.6   < > 3.5 4.2  4.2  4.2 4.0   *   < > 115* 117*  117*  117* 116*   CO2 23   < > 23 23  23  23 22*    BUN 59*   < > 53* 54*  54*  54* 52*   CREATININE 1.3   < > 0.9 1.5*  1.5*  1.5* 1.5*   CALCIUM 7.4*   < > 7.0* 7.5*  7.5*  7.5* 7.6*   ALBUMIN 1.4*  --   --  1.4*  --    PROT 5.4*  --   --  5.4*  --    BILITOT 0.2  --   --  0.3  --    ALKPHOS 107  --   --  96  --    ALT 9*  --   --  5*  --    AST 26  --   --  17  --    MG 1.9  --   --  1.9  --    PHOS 5.0*  --   --  4.8*  --     < > = values in this interval not displayed.       All pertinent labs within the past 24 hours have been reviewed.    Significant Imaging:  I have reviewed all pertinent imaging results/findings within the past 24 hours.   CXR:  FINDINGS:  Support devices: The endotracheal tube terminates in the mid trachea.  The nasogastric tube terminates in the gastric lumen.  The right internal jugular central venous line terminates in the superior vena cava.  There is a pigtail right lower lobe chest tube unchanged in position.    Chest: No pneumothorax or focal airspace opacification.  Mild blunting of the costophrenic angles bilaterally persists.  Silhouetting of the left hemidiaphragm also persists.  Prominence of the pulmonary vascularity present.  The cardiomediastinal silhouette, osseous and soft tissue structures are stable.      Impression       Small volume bilateral pleural effusions.  Pulmonary interstitial edema likely present.  There also may be pneumonia versus compressive atelectasis in the left lower lobe.      Electronically signed by: Radha Ortiz MD  Date: 10/05/2019  Time: 12:45

## 2019-10-06 LAB
ALBUMIN SERPL BCP-MCNC: 1.6 G/DL (ref 3.5–5.2)
ALLENS TEST: ABNORMAL
ALP SERPL-CCNC: 106 U/L (ref 55–135)
ALT SERPL W/O P-5'-P-CCNC: 5 U/L (ref 10–44)
ANION GAP SERPL CALC-SCNC: 11 MMOL/L (ref 8–16)
ANION GAP SERPL CALC-SCNC: 12 MMOL/L (ref 8–16)
AST SERPL-CCNC: 24 U/L (ref 10–40)
BACTERIA BLD CULT: NORMAL
BACTERIA BLD CULT: NORMAL
BASOPHILS # BLD AUTO: 0.03 K/UL (ref 0–0.2)
BASOPHILS NFR BLD: 0.5 % (ref 0–1.9)
BILIRUB SERPL-MCNC: 0.4 MG/DL (ref 0.1–1)
BUN SERPL-MCNC: 35 MG/DL (ref 8–23)
BUN SERPL-MCNC: 36 MG/DL (ref 8–23)
BUN SERPL-MCNC: 38 MG/DL (ref 8–23)
BUN SERPL-MCNC: 41 MG/DL (ref 8–23)
BUN SERPL-MCNC: 41 MG/DL (ref 8–23)
CALCIUM SERPL-MCNC: 7.5 MG/DL (ref 8.7–10.5)
CALCIUM SERPL-MCNC: 7.6 MG/DL (ref 8.7–10.5)
CALCIUM SERPL-MCNC: 7.7 MG/DL (ref 8.7–10.5)
CALCIUM SERPL-MCNC: 7.7 MG/DL (ref 8.7–10.5)
CALCIUM SERPL-MCNC: 7.8 MG/DL (ref 8.7–10.5)
CHLORIDE SERPL-SCNC: 113 MMOL/L (ref 95–110)
CHLORIDE SERPL-SCNC: 115 MMOL/L (ref 95–110)
CHLORIDE SERPL-SCNC: 116 MMOL/L (ref 95–110)
CO2 SERPL-SCNC: 19 MMOL/L (ref 23–29)
CO2 SERPL-SCNC: 20 MMOL/L (ref 23–29)
CO2 SERPL-SCNC: 20 MMOL/L (ref 23–29)
CO2 SERPL-SCNC: 21 MMOL/L (ref 23–29)
CO2 SERPL-SCNC: 21 MMOL/L (ref 23–29)
CREAT SERPL-MCNC: 1.2 MG/DL (ref 0.5–1.4)
CREAT SERPL-MCNC: 1.3 MG/DL (ref 0.5–1.4)
CREAT SERPL-MCNC: 1.3 MG/DL (ref 0.5–1.4)
CREAT SERPL-MCNC: 1.4 MG/DL (ref 0.5–1.4)
CREAT SERPL-MCNC: 1.4 MG/DL (ref 0.5–1.4)
CREAT UR-MCNC: 22 MG/DL (ref 15–325)
DELSYS: ABNORMAL
DIFFERENTIAL METHOD: ABNORMAL
EOSINOPHIL # BLD AUTO: 0.1 K/UL (ref 0–0.5)
EOSINOPHIL NFR BLD: 2.3 % (ref 0–8)
ERYTHROCYTE [DISTWIDTH] IN BLOOD BY AUTOMATED COUNT: 17.3 % (ref 11.5–14.5)
EST. GFR  (AFRICAN AMERICAN): 45.5 ML/MIN/1.73 M^2
EST. GFR  (AFRICAN AMERICAN): 45.5 ML/MIN/1.73 M^2
EST. GFR  (AFRICAN AMERICAN): 49.7 ML/MIN/1.73 M^2
EST. GFR  (AFRICAN AMERICAN): 49.7 ML/MIN/1.73 M^2
EST. GFR  (AFRICAN AMERICAN): 54.8 ML/MIN/1.73 M^2
EST. GFR  (NON AFRICAN AMERICAN): 39.5 ML/MIN/1.73 M^2
EST. GFR  (NON AFRICAN AMERICAN): 39.5 ML/MIN/1.73 M^2
EST. GFR  (NON AFRICAN AMERICAN): 43.2 ML/MIN/1.73 M^2
EST. GFR  (NON AFRICAN AMERICAN): 43.2 ML/MIN/1.73 M^2
EST. GFR  (NON AFRICAN AMERICAN): 47.5 ML/MIN/1.73 M^2
GLUCOSE SERPL-MCNC: 114 MG/DL (ref 70–110)
GLUCOSE SERPL-MCNC: 121 MG/DL (ref 70–110)
GLUCOSE SERPL-MCNC: 69 MG/DL (ref 70–110)
GLUCOSE SERPL-MCNC: 69 MG/DL (ref 70–110)
GLUCOSE SERPL-MCNC: 88 MG/DL (ref 70–110)
HCO3 UR-SCNC: 20.1 MMOL/L (ref 24–28)
HCT VFR BLD AUTO: 28.7 % (ref 37–48.5)
HGB BLD-MCNC: 8.9 G/DL (ref 12–16)
IMM GRANULOCYTES # BLD AUTO: 0.11 K/UL (ref 0–0.04)
IMM GRANULOCYTES NFR BLD AUTO: 1.8 % (ref 0–0.5)
LYMPHOCYTES # BLD AUTO: 1 K/UL (ref 1–4.8)
LYMPHOCYTES NFR BLD: 16.1 % (ref 18–48)
MAGNESIUM SERPL-MCNC: 1.6 MG/DL (ref 1.6–2.6)
MCH RBC QN AUTO: 30.1 PG (ref 27–31)
MCHC RBC AUTO-ENTMCNC: 31 G/DL (ref 32–36)
MCV RBC AUTO: 97 FL (ref 82–98)
MONOCYTES # BLD AUTO: 0.4 K/UL (ref 0.3–1)
MONOCYTES NFR BLD: 6.6 % (ref 4–15)
NEUTROPHILS # BLD AUTO: 4.5 K/UL (ref 1.8–7.7)
NEUTROPHILS NFR BLD: 72.7 % (ref 38–73)
NRBC BLD-RTO: 0 /100 WBC
PCO2 BLDA: 28.2 MMHG (ref 35–45)
PH SMN: 7.46 [PH] (ref 7.35–7.45)
PHOSPHATE SERPL-MCNC: 3.6 MG/DL (ref 2.7–4.5)
PLATELET # BLD AUTO: 200 K/UL (ref 150–350)
PMV BLD AUTO: 11.7 FL (ref 9.2–12.9)
PO2 BLDA: 34 MMHG (ref 40–60)
POC BE: -4 MMOL/L
POC SATURATED O2: 71 % (ref 95–100)
POC TCO2: 21 MMOL/L (ref 24–29)
POCT GLUCOSE: 108 MG/DL (ref 70–110)
POCT GLUCOSE: 124 MG/DL (ref 70–110)
POCT GLUCOSE: 132 MG/DL (ref 70–110)
POCT GLUCOSE: 66 MG/DL (ref 70–110)
POCT GLUCOSE: 85 MG/DL (ref 70–110)
POCT GLUCOSE: 86 MG/DL (ref 70–110)
POCT GLUCOSE: 97 MG/DL (ref 70–110)
POTASSIUM SERPL-SCNC: 3 MMOL/L (ref 3.5–5.1)
POTASSIUM SERPL-SCNC: 3.4 MMOL/L (ref 3.5–5.1)
POTASSIUM SERPL-SCNC: 3.8 MMOL/L (ref 3.5–5.1)
POTASSIUM SERPL-SCNC: 3.8 MMOL/L (ref 3.5–5.1)
POTASSIUM SERPL-SCNC: 4.1 MMOL/L (ref 3.5–5.1)
PROT SERPL-MCNC: 6 G/DL (ref 6–8.4)
RBC # BLD AUTO: 2.96 M/UL (ref 4–5.4)
SAMPLE: ABNORMAL
SITE: ABNORMAL
SODIUM SERPL-SCNC: 144 MMOL/L (ref 136–145)
SODIUM SERPL-SCNC: 146 MMOL/L (ref 136–145)
SODIUM SERPL-SCNC: 147 MMOL/L (ref 136–145)
SODIUM SERPL-SCNC: 148 MMOL/L (ref 136–145)
SODIUM SERPL-SCNC: 148 MMOL/L (ref 136–145)
SODIUM UR-SCNC: 118 MMOL/L (ref 20–250)
WBC # BLD AUTO: 6.21 K/UL (ref 3.9–12.7)

## 2019-10-06 PROCEDURE — 99900035 HC TECH TIME PER 15 MIN (STAT)

## 2019-10-06 PROCEDURE — 80053 COMPREHEN METABOLIC PANEL: CPT

## 2019-10-06 PROCEDURE — 82803 BLOOD GASES ANY COMBINATION: CPT

## 2019-10-06 PROCEDURE — 20000000 HC ICU ROOM

## 2019-10-06 PROCEDURE — 84100 ASSAY OF PHOSPHORUS: CPT

## 2019-10-06 PROCEDURE — 80048 BASIC METABOLIC PNL TOTAL CA: CPT | Mod: 91

## 2019-10-06 PROCEDURE — 99232 SBSQ HOSP IP/OBS MODERATE 35: CPT | Mod: GC,,, | Performed by: INTERNAL MEDICINE

## 2019-10-06 PROCEDURE — 92610 EVALUATE SWALLOWING FUNCTION: CPT

## 2019-10-06 PROCEDURE — 94640 AIRWAY INHALATION TREATMENT: CPT

## 2019-10-06 PROCEDURE — 63600175 PHARM REV CODE 636 W HCPCS: Performed by: STUDENT IN AN ORGANIZED HEALTH CARE EDUCATION/TRAINING PROGRAM

## 2019-10-06 PROCEDURE — 99291 PR CRITICAL CARE, E/M 30-74 MINUTES: ICD-10-PCS | Mod: GC,,, | Performed by: EMERGENCY MEDICINE

## 2019-10-06 PROCEDURE — S0030 INJECTION, METRONIDAZOLE: HCPCS | Performed by: INTERNAL MEDICINE

## 2019-10-06 PROCEDURE — 99291 CRITICAL CARE FIRST HOUR: CPT | Mod: GC,,, | Performed by: EMERGENCY MEDICINE

## 2019-10-06 PROCEDURE — 25000242 PHARM REV CODE 250 ALT 637 W/ HCPCS: Performed by: NURSE PRACTITIONER

## 2019-10-06 PROCEDURE — 25000003 PHARM REV CODE 250: Performed by: EMERGENCY MEDICINE

## 2019-10-06 PROCEDURE — 99232 SBSQ HOSP IP/OBS MODERATE 35: CPT | Mod: ,,, | Performed by: NEUROLOGICAL SURGERY

## 2019-10-06 PROCEDURE — 84300 ASSAY OF URINE SODIUM: CPT

## 2019-10-06 PROCEDURE — 94761 N-INVAS EAR/PLS OXIMETRY MLT: CPT

## 2019-10-06 PROCEDURE — 99232 PR SUBSEQUENT HOSPITAL CARE,LEVL II: ICD-10-PCS | Mod: GC,,, | Performed by: INTERNAL MEDICINE

## 2019-10-06 PROCEDURE — 99232 PR SUBSEQUENT HOSPITAL CARE,LEVL II: ICD-10-PCS | Mod: ,,, | Performed by: NEUROLOGICAL SURGERY

## 2019-10-06 PROCEDURE — 82570 ASSAY OF URINE CREATININE: CPT

## 2019-10-06 PROCEDURE — S0028 INJECTION, FAMOTIDINE, 20 MG: HCPCS | Performed by: NURSE PRACTITIONER

## 2019-10-06 PROCEDURE — 63600175 PHARM REV CODE 636 W HCPCS: Performed by: NURSE PRACTITIONER

## 2019-10-06 PROCEDURE — 85025 COMPLETE CBC W/AUTO DIFF WBC: CPT

## 2019-10-06 PROCEDURE — 83735 ASSAY OF MAGNESIUM: CPT

## 2019-10-06 PROCEDURE — 25000003 PHARM REV CODE 250: Performed by: NURSE PRACTITIONER

## 2019-10-06 PROCEDURE — 25000003 PHARM REV CODE 250: Performed by: INTERNAL MEDICINE

## 2019-10-06 PROCEDURE — 94770 HC EXHALED C02 TEST: CPT

## 2019-10-06 RX ORDER — DEXTROSE MONOHYDRATE 50 MG/ML
INJECTION, SOLUTION INTRAVENOUS CONTINUOUS
Status: ACTIVE | OUTPATIENT
Start: 2019-10-06 | End: 2019-10-07

## 2019-10-06 RX ORDER — POTASSIUM CHLORIDE 29.8 MG/ML
40 INJECTION INTRAVENOUS
Status: COMPLETED | OUTPATIENT
Start: 2019-10-06 | End: 2019-10-06

## 2019-10-06 RX ORDER — DEXTROSE MONOHYDRATE 50 MG/ML
INJECTION, SOLUTION INTRAVENOUS CONTINUOUS
Status: DISCONTINUED | OUTPATIENT
Start: 2019-10-06 | End: 2019-10-06

## 2019-10-06 RX ORDER — DEXTROSE MONOHYDRATE 100 MG/ML
INJECTION, SOLUTION INTRAVENOUS CONTINUOUS
Status: DISCONTINUED | OUTPATIENT
Start: 2019-10-06 | End: 2019-10-06

## 2019-10-06 RX ORDER — LABETALOL HCL 20 MG/4 ML
10 SYRINGE (ML) INTRAVENOUS EVERY 6 HOURS PRN
Status: DISCONTINUED | OUTPATIENT
Start: 2019-10-06 | End: 2019-10-08

## 2019-10-06 RX ORDER — HEPARIN SODIUM 5000 [USP'U]/ML
5000 INJECTION, SOLUTION INTRAVENOUS; SUBCUTANEOUS EVERY 8 HOURS
Status: DISCONTINUED | OUTPATIENT
Start: 2019-10-06 | End: 2019-10-06

## 2019-10-06 RX ORDER — POTASSIUM CHLORIDE 7.45 MG/ML
10 INJECTION INTRAVENOUS ONCE
Status: COMPLETED | OUTPATIENT
Start: 2019-10-07 | End: 2019-10-06

## 2019-10-06 RX ORDER — POTASSIUM CHLORIDE 29.8 MG/ML
40 INJECTION INTRAVENOUS 2 TIMES DAILY
Status: DISCONTINUED | OUTPATIENT
Start: 2019-10-06 | End: 2019-10-06

## 2019-10-06 RX ORDER — POTASSIUM CHLORIDE 14.9 MG/ML
20 INJECTION INTRAVENOUS
Status: COMPLETED | OUTPATIENT
Start: 2019-10-06 | End: 2019-10-06

## 2019-10-06 RX ADMIN — LABETALOL HCL IV SOLN PREFILLED SYRINGE 20 MG/4ML (5 MG/ML) 10 MG: 20/4 SOLUTION PREFILLED SYRINGE at 05:10

## 2019-10-06 RX ADMIN — IPRATROPIUM BROMIDE AND ALBUTEROL SULFATE 3 ML: .5; 3 SOLUTION RESPIRATORY (INHALATION) at 11:10

## 2019-10-06 RX ADMIN — POTASSIUM CHLORIDE 20 MEQ: 200 INJECTION, SOLUTION INTRAVENOUS at 01:10

## 2019-10-06 RX ADMIN — IPRATROPIUM BROMIDE AND ALBUTEROL SULFATE 3 ML: .5; 3 SOLUTION RESPIRATORY (INHALATION) at 08:10

## 2019-10-06 RX ADMIN — LABETALOL HCL IV SOLN PREFILLED SYRINGE 20 MG/4ML (5 MG/ML) 10 MG: 20/4 SOLUTION PREFILLED SYRINGE at 11:10

## 2019-10-06 RX ADMIN — IPRATROPIUM BROMIDE AND ALBUTEROL SULFATE 3 ML: .5; 3 SOLUTION RESPIRATORY (INHALATION) at 03:10

## 2019-10-06 RX ADMIN — CHLORHEXIDINE GLUCONATE 0.12% ORAL RINSE 15 ML: 1.2 LIQUID ORAL at 08:10

## 2019-10-06 RX ADMIN — POTASSIUM CHLORIDE 40 MEQ: 400 INJECTION, SOLUTION INTRAVENOUS at 11:10

## 2019-10-06 RX ADMIN — FENTANYL CITRATE 25 MCG: 50 INJECTION INTRAMUSCULAR; INTRAVENOUS at 12:10

## 2019-10-06 RX ADMIN — OXACILLIN SODIUM 12 G: 10 INJECTION, POWDER, FOR SOLUTION INTRAVENOUS at 12:10

## 2019-10-06 RX ADMIN — DEXTROSE MONOHYDRATE: 5 INJECTION, SOLUTION INTRAVENOUS at 05:10

## 2019-10-06 RX ADMIN — IPRATROPIUM BROMIDE AND ALBUTEROL SULFATE 3 ML: .5; 3 SOLUTION RESPIRATORY (INHALATION) at 04:10

## 2019-10-06 RX ADMIN — METRONIDAZOLE 500 MG: 500 INJECTION, SOLUTION INTRAVENOUS at 08:10

## 2019-10-06 RX ADMIN — METRONIDAZOLE 500 MG: 500 INJECTION, SOLUTION INTRAVENOUS at 05:10

## 2019-10-06 RX ADMIN — DEXTROSE: 5 SOLUTION INTRAVENOUS at 08:10

## 2019-10-06 RX ADMIN — HEPARIN SODIUM 5000 UNITS: 5000 INJECTION, SOLUTION INTRAVENOUS; SUBCUTANEOUS at 02:10

## 2019-10-06 RX ADMIN — FAMOTIDINE 20 MG: 10 INJECTION, SOLUTION INTRAVENOUS at 08:10

## 2019-10-06 RX ADMIN — POTASSIUM CHLORIDE 20 MEQ: 200 INJECTION, SOLUTION INTRAVENOUS at 02:10

## 2019-10-06 RX ADMIN — POTASSIUM CHLORIDE 10 MEQ: 7.46 INJECTION, SOLUTION INTRAVENOUS at 11:10

## 2019-10-06 RX ADMIN — METRONIDAZOLE 500 MG: 500 INJECTION, SOLUTION INTRAVENOUS at 11:10

## 2019-10-06 RX ADMIN — CHLORHEXIDINE GLUCONATE 0.12% ORAL RINSE 15 ML: 1.2 LIQUID ORAL at 09:10

## 2019-10-06 RX ADMIN — IPRATROPIUM BROMIDE AND ALBUTEROL SULFATE 3 ML: .5; 3 SOLUTION RESPIRATORY (INHALATION) at 07:10

## 2019-10-06 RX ADMIN — POTASSIUM CHLORIDE 40 MEQ: 400 INJECTION, SOLUTION INTRAVENOUS at 02:10

## 2019-10-06 RX ADMIN — DEXTROSE 125 ML: 10 SOLUTION INTRAVENOUS at 03:10

## 2019-10-06 RX ADMIN — OXACILLIN SODIUM 12 G: 10 INJECTION, POWDER, FOR SOLUTION INTRAVENOUS at 11:10

## 2019-10-06 RX ADMIN — POTASSIUM CHLORIDE 20 MEQ: 200 INJECTION, SOLUTION INTRAVENOUS at 03:10

## 2019-10-06 NOTE — SUBJECTIVE & OBJECTIVE
Current Facility-Administered Medications on File Prior to Encounter   Medication    lactated ringers infusion    sodium chloride 0.9% flush 3 mL     Current Outpatient Medications on File Prior to Encounter   Medication Sig    diazepam (VALIUM) 5 MG tablet Take 10 mg by mouth 2 (two) times daily.        Review of patient's allergies indicates:   Allergen Reactions    Pcn [penicillins]        Past Medical History:   Diagnosis Date    Anxiety     Carotid bruit     Chronic pain     Cystitis     Cystocele, unspecified (CODE)     Depression     GI bleed     History of uterine fibroid     Shortness of breath on exertion     Spinal stenosis     Urinary retention      Past Surgical History:   Procedure Laterality Date    ANTERIOR VAGINAL REPAIR  10-    CARDIAC CATHETERIZATION  age 12    CHOLECYSTECTOMY  2015    COLONOSCOPY  12/12/2018    aborted due to poor colon prep    COLONOSCOPY N/A 12/12/2018    Procedure: COLONOSCOPY;  Surgeon: Renard Gonsalves MD;  Location: [facility];  Service: Endoscopy;  Laterality: N/A;    HYSTERECTOMY  1989    AMANDA    tubiligation       Family History     Problem Relation (Age of Onset)    Alzheimer's disease Mother    Hypertension Brother, Sister    Osteoarthritis Mother    Thyroid disease Mother        Tobacco Use    Smoking status: Current Every Day Smoker     Packs/day: 0.50     Years: 40.00     Pack years: 20.00     Types: Cigarettes    Smokeless tobacco: Never Used   Substance and Sexual Activity    Alcohol use: No    Drug use: No    Sexual activity: Yes     Partners: Male     Review of Systems   Unable to perform ROS: Acuity of condition     Objective:     Vital Signs (Most Recent):  Temp: 99 °F (37.2 °C) (10/05/19 1900)  Pulse: (!) 112 (10/05/19 2115)  Resp: (!) 25 (10/05/19 2115)  BP: (!) 169/75 (10/05/19 2100)  SpO2: 96 % (10/05/19 2115) Vital Signs (24h Range):  Temp:  [98.2 °F (36.8 °C)-99 °F (37.2 °C)] 99 °F (37.2 °C)  Pulse:  [] 112  Resp:   [20-26] 25  SpO2:  [96 %-100 %] 96 %  BP: ()/(44-79) 169/75     Weight: 53.5 kg (118 lb)  Body mass index is 20.25 kg/m².    Physical Exam   Constitutional: She appears well-developed. No distress.   HENT:   Head: Normocephalic.   Eyes: Conjunctivae are normal. Right eye exhibits no discharge. Left eye exhibits no discharge.   Cardiovascular: Normal rate and regular rhythm.   Pulmonary/Chest: Effort normal.   Abdominal: Soft.   Musculoskeletal: She exhibits no edema.   Loculated lumps along posterior aspect of both shoulders.    Neurological:   Sedated   Skin: Skin is warm.   R shoulder large are of fluctuance with surrounding induration  L shoulder small area of fluctuance with surrounding induration       Significant Labs:  CBC:   Recent Labs   Lab 10/05/19  0500 10/05/19  0840   WBC 4.61  --    RBC 2.62*  --    HGB 7.9* 7.4*   HCT 25.0* 24.3*   *  --    MCV 95  --    MCH 30.2  --    MCHC 31.6*  --      CMP:   Recent Labs   Lab 10/05/19  0500  10/05/19  1558   GLU 74  74  74   < > 62*   CALCIUM 7.5*  7.5*  7.5*   < > 7.7*   ALBUMIN 1.4*  --   --    PROT 5.4*  --   --    *  148*  148*   < > 148*   K 4.2  4.2  4.2   < > 3.8   CO2 23  23  23   < > 21*   *  117*  117*   < > 118*   BUN 54*  54*  54*   < > 48*   CREATININE 1.5*  1.5*  1.5*   < > 1.5*   ALKPHOS 96  --   --    ALT 5*  --   --    AST 17  --   --    BILITOT 0.3  --   --     < > = values in this interval not displayed.

## 2019-10-06 NOTE — HPI
Patient is a 63 y.o. female with significant history of depression, anxiety and IVDA presented to [facility] ED on 9/19 with c/o N/V/D and weakness x ~5 days. The patient was found to be in septic shock with pneumonia and colitis and was transferred to [facility] ICU for further management. 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 CT was repeated on 9/25 and showed bilateral cavitary lesions likely secondary to septic emboli, small pericardial effusion and ileus. DHARMESH was performed on 9/25 which revealed mitral and tricuspid valve endocarditis. Blood cultures (9/19 - 9/26) have grown methicillin sensitive Staph aureus. Blood cultures as of 9/29 have been NGTD. Repeat Echo on 9/28 showed LVEF 35% and persistent vegatations. On 9/30 the patient had an MRI brain that showed remote lacunar infarcts without acute intracranial abnormality. On 10/1 MRI revealed spinal epidural abscess at L2-4 level. Thoracentesis was performed on 10/1. Cardiovascular surgery intervention was deferred. The patient was transferred to [facility] for neurosurgery evaluation of epidural abscess. Patient found to have b/l shoulder abscess for which surgery is consulted.

## 2019-10-06 NOTE — ASSESSMENT & PLAN NOTE
62yo female with IVDU presents as transfer from [facility] for epidural abscess, likely secondary to MSSA bacteremia and endocarditis with vegetations of the MV and TV on DHARMESH 9/26 c/b bilateral multifocal pneumonia with cavitary lesions, right pleural effusion s/p thoracentesis on 10/1 w/ concern for empyema, and pan-colitis. Followed by ID at [facility], most recently on daptomycin, cefazolin, and vancomycin. Intubated shortly after arrival to [facility] for hypoxia. Noted to have penicillin allergy.    Recommendations:  -Continue oxacillin   -S/p aspiration and possible drainage of shoulder abscesses by general surgery 10/6  -Blood cultures NGTD

## 2019-10-06 NOTE — PROGRESS NOTES
[facility]  Infectious Disease  Progress Note    Patient Name: Mesha Mckenzie  MRN: [id number]  Admission Date: 10/2/2019  Length of Stay: 4 days  Attending Physician: Malik Jerry MD  Primary Care Provider: Varun Shea MD PhD    Isolation Status: No active isolations  Assessment/Plan:      Endocarditis  64yo female with IVDU presents as transfer from [facility] for epidural abscess, likely secondary to MSSA bacteremia and endocarditis with vegetations of the MV and TV on DHARMESH 9/26 c/b bilateral multifocal pneumonia with cavitary lesions, right pleural effusion s/p thoracentesis on 10/1 w/ concern for empyema, and pan-colitis. Followed by ID at [facility], most recently on daptomycin, cefazolin, and vancomycin. [place]ub[place] shortly after [place] [facility] [place]. Noted to have penicillin allergy.    Recommendations:  -Continue oxacillin   -S/p aspiration and possible drainage of shoulder abscesses by general surgery 10/6  -Blood cultures NGTD      Will discuss with Staff    Thank you for your consult. I will follow-up with patient. Please contact us if you have any additional questions.    Alma Camilo MD  Infectious Disease  [facility]    Subjective:     Principal Problem:Staphylococcus aureus bacteremia with sepsis    HPI: Mesha Mckenzie is a 62 yo female with IVDU (heroin), prior GIB in 2018, and spinal stenosis who presents from [facility] for neurosurgery eval for epidural abscess in setting of MSSA endocarditis. She had initially presented to [place] on 9/19 for evaluation of nausea, vomiting, and diarrhea for the past 5 days. 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 She was initially started on vancomycin, flagyl, and levoquin. Blood  cultures from 9/19 positive for MSSA and were thought to be likely source. Urine culture from 9/19 also positive for MSSA. CT head on 9/19 and 9/22 negative for septic emboli. TTE on 9/21 showed possible vegetation on mitral valve (EF 55%).  DHARMESH was performed on 9/25, showing mitral valve vegetation (5x6mm) on the posterior leaflet as well as tricuspid valve vegetation (10x5mm) on septal leaflet. CT chest was repeated on 9/26 and was showed for previously seen bilateral consolidations that were now noted to be cavitary/cystic in nature. Repeat TTE on 9/26 reported reduced EF from previous 55% to 33%. Cardiothoracic evaluated patient for valve replacement but surgery was deferred due to patient's multiple medical issues. Last positive blood culture was 9/26. Her antibiotics were later changed to daptomycin, cefazolin, and vancomycin. Thoracentesis on 10/1 showed evidence of empyema. MRI spine on 10/1 showed lumbar paraspinal muscle infection and small dorsal epidural abscess from L2-4. [place] [facility] [place].     On [place] [facility] she was noted to be encephalopathic. Shortly after arriving, she became hypoxic and hypertensive and required intubation. She also required neuromuscular blockade for tachypnea and vent dyssynchrony. Vasopressors were also started due to hypotension.      Interval History: daughter stating that patient appears more somnolent today from yesterday. No growth on cultures this admission. Shoulder abscesses drained and packed by general surgery bilaterally.    Review of Systems   Unable to perform ROS: Acuity of condition     Objective:     Vital Signs (Most Recent):  Temp: 98.2 °F (36.8 °C) (10/06/19 1502)  Pulse: 102 (10/06/19 1542)  Resp: (!) 32 (10/06/19 1542)  BP: (!) 190/83 (10/06/19 1502)  SpO2: 98 % (10/06/19 1542) Vital Signs (24h Range):  Temp:  [97.5 °F (36.4 °C)-99 °F (37.2 °C)] 98.2 °F (36.8 °C)  Pulse:  [] 102  Resp:  [16-40] 32  SpO2:  [95 %-100 %]  98 %  BP: (143-199)/() 190/83     Weight: 53.5 kg (118 lb)  Body mass index is 20.25 kg/m².    Estimated Creatinine Clearance: 36.4 mL/min (based on SCr of 1.3 mg/dL).    Physical Exam   Constitutional: She appears well-developed and well-nourished. No distress.   HENT:   Head: Normocephalic and atraumatic.   Eyes: Conjunctivae are normal. Right eye exhibits no discharge. Left eye exhibits no discharge.   Neck: Neck supple.   Cardiovascular: Normal rate and normal heart sounds. Exam reveals no gallop and no friction rub.   No murmur heard.  Pulmonary/Chest: She has no wheezes. She has no rales.   Abdominal: Soft. She exhibits no mass. There is no tenderness. There is no guarding.   Musculoskeletal: She exhibits no edema.   Right sided chest tube without surrounding erythema   Neurological:   Patient appears somnolent on exam though arousable, able to follow some commands   Skin: Skin is warm and dry. She is not diaphoretic.       Significant Labs:   CBC:   Recent Labs   Lab 10/05/19  0500 10/05/19  0840 10/05/19  2204 10/06/19  0332   WBC 4.61  --  5.15 6.21   HGB 7.9* 7.4* 8.2* 8.9*   HCT 25.0* 24.3* 26.9* 28.7*   *  --  179 200     CMP:   Recent Labs   Lab 10/05/19  0500  10/05/19  2204 10/06/19  0332 10/06/19  1004   *  148*  148*   < > 148* 148*  148* 144   K 4.2  4.2  4.2   < > 3.0* 3.8  3.8 3.0*   *  117*  117*   < > 117* 116*  116* 113*   CO2 23  23  23   < > 21* 21*  21* 19*   GLU 74  74  74   < > 66* 69*  69* 114*   BUN 54*  54*  54*   < > 45* 41*  41* 38*   CREATININE 1.5*  1.5*  1.5*   < > 1.5* 1.4  1.4 1.3   CALCIUM 7.5*  7.5*  7.5*   < > 7.7* 7.7*  7.7* 7.8*   PROT 5.4*  --   --  6.0  --    ALBUMIN 1.4*  --   --  1.6*  --    BILITOT 0.3  --   --  0.4  --    ALKPHOS 96  --   --  106  --    AST 17  --   --  24  --    ALT 5*  --   --  5*  --    ANIONGAP 8  8  8   < > 10 11  11 12   EGFRNONAA 36.3*  36.3*  36.3*   < > 36.3* 39.5*  39.5* 43.2*    < > =  values in this interval not displayed.

## 2019-10-06 NOTE — ASSESSMENT & PLAN NOTE
Ultrasound concerning for developing abscess in left and right shoulder abscesses. S/p I&D by general surgery.  - will f/u on culture and stain

## 2019-10-06 NOTE — ASSESSMENT & PLAN NOTE
--likely secondary to sepsis  --Brain MRI 9/30 with lacunar infarcts, neg for acute intracranial abnormality  -- repeat CT w/o new abnormalities

## 2019-10-06 NOTE — ASSESSMENT & PLAN NOTE
Multifactorial secondary to complete left lung collapse, septic emboli, pleural effusions, suspected pna. Intubated shortly after arrival for hypoxemia. s/p bronch x2 with significant clearance of secretions from left mainstem & improved aeration on f/u xray. s/p thora 10/1; transudative based on lights criteria, though concern for empyema based on OSH pulm note. right chest tube placed 10/3, 345 mL output after TPA administration; pleural fluid with GPC on gram stain. CTA neg for PE    -- will continue to follow culture results  -- Extubated 10/5/19; now on RA  -- continue oxacillin  -- minimum output from chest tube; CXR stable

## 2019-10-06 NOTE — ASSESSMENT & PLAN NOTE
--seen on CT 9/19 with improvement noted on f/u CT 9/25  --concern for ileus on 9/25 CT; rectal tube in place with liquid stool output  --stool 9/27 neg for c diff; stool cx neg  --continue flagyl

## 2019-10-06 NOTE — ASSESSMENT & PLAN NOTE
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    Plan:  - medical risk stratification documented; continuing to eval for possible need for surgical drainage of epidural abscess    - q 1 hr neuro checks  - continue abx per primary team, will fu cultures

## 2019-10-06 NOTE — SUBJECTIVE & OBJECTIVE
Interval History: daughter stating that patient appears more somnolent today from yesterday. No growth on cultures this admission.    Review of Systems   Unable to perform ROS: Acuity of condition     Objective:     Vital Signs (Most Recent):  Temp: 98.2 °F (36.8 °C) (10/06/19 1502)  Pulse: 102 (10/06/19 1542)  Resp: (!) 32 (10/06/19 1542)  BP: (!) 190/83 (10/06/19 1502)  SpO2: 98 % (10/06/19 1542) Vital Signs (24h Range):  Temp:  [97.5 °F (36.4 °C)-99 °F (37.2 °C)] 98.2 °F (36.8 °C)  Pulse:  [] 102  Resp:  [16-40] 32  SpO2:  [95 %-100 %] 98 %  BP: (143-199)/() 190/83     Weight: 53.5 kg (118 lb)  Body mass index is 20.25 kg/m².    Estimated Creatinine Clearance: 36.4 mL/min (based on SCr of 1.3 mg/dL).    Physical Exam   Constitutional: She appears well-developed and well-nourished. No distress.   HENT:   Head: Normocephalic and atraumatic.   Eyes: Conjunctivae are normal. Right eye exhibits no discharge. Left eye exhibits no discharge.   Neck: Neck supple.   Cardiovascular: Normal rate and normal heart sounds. Exam reveals no gallop and no friction rub.   No murmur heard.  Pulmonary/Chest: She has no wheezes. She has no rales.   Abdominal: Soft. She exhibits no mass. There is no tenderness. There is no guarding.   Musculoskeletal: She exhibits no edema.   Right sided chest tube without surrounding erythema   Neurological:   Patient appears somnolent on exam though arousable, able to follow some commands   Skin: Skin is warm and dry. She is not diaphoretic.       Significant Labs:   CBC:   Recent Labs   Lab 10/05/19  0500 10/05/19  0840 10/05/19  2204 10/06/19  0332   WBC 4.61  --  5.15 6.21   HGB 7.9* 7.4* 8.2* 8.9*   HCT 25.0* 24.3* 26.9* 28.7*   *  --  179 200     CMP:   Recent Labs   Lab 10/05/19  0500  10/05/19  2204 10/06/19  0332 10/06/19  1004   *  148*  148*   < > 148* 148*  148* 144   K 4.2  4.2  4.2   < > 3.0* 3.8  3.8 3.0*   *  117*  117*   < > 117* 116*  116*  113*   CO2 23  23  23   < > 21* 21*  21* 19*   GLU 74  74  74   < > 66* 69*  69* 114*   BUN 54*  54*  54*   < > 45* 41*  41* 38*   CREATININE 1.5*  1.5*  1.5*   < > 1.5* 1.4  1.4 1.3   CALCIUM 7.5*  7.5*  7.5*   < > 7.7* 7.7*  7.7* 7.8*   PROT 5.4*  --   --  6.0  --    ALBUMIN 1.4*  --   --  1.6*  --    BILITOT 0.3  --   --  0.4  --    ALKPHOS 96  --   --  106  --    AST 17  --   --  24  --    ALT 5*  --   --  5*  --    ANIONGAP 8  8  8   < > 10 11  11 12   EGFRNONAA 36.3*  36.3*  36.3*   < > 36.3* 39.5*  39.5* 43.2*    < > = values in this interval not displayed.

## 2019-10-06 NOTE — SUBJECTIVE & OBJECTIVE
Interval History: No acute events overnight.     Medications:  Continuous Infusions:  Scheduled Meds:   albuterol-ipratropium  3 mL Nebulization Q4H    chlorhexidine  15 mL Mouth/Throat BID    famotidine (PF)  20 mg Intravenous Daily    metronidazole  500 mg Intravenous Q8H    oxacillin 12 g in  mL CONTINUOUS INFUSION  12 g Intravenous Q24H     PRN Meds:Dextrose 10% Bolus, diphenhydrAMINE, EPINEPHrine, fentaNYL, glucagon (human recombinant), labetalol, ondansetron, sodium chloride 0.9%     Review of patient's allergies indicates:   Allergen Reactions    Pcn [penicillins]      Objective:     Vital Signs (Most Recent):  Temp: 97.5 °F (36.4 °C) (10/06/19 0300)  Pulse: 99 (10/06/19 0630)  Resp: (!) 37 (10/06/19 0630)  BP: (!) 187/84 (10/06/19 0630)  SpO2: 98 % (10/06/19 0630) Vital Signs (24h Range):  Temp:  [97.5 °F (36.4 °C)-99 °F (37.2 °C)] 97.5 °F (36.4 °C)  Pulse:  [] 99  Resp:  [20-38] 37  SpO2:  [95 %-100 %] 98 %  BP: (117-189)/(55-86) 187/84     Weight: 53.5 kg (118 lb)  Body mass index is 20.25 kg/m².    Intake/Output - Last 3 Shifts       10/04 0700 - 10/05 0659 10/05 0700 - 10/06 0659 10/06 0700 - 10/07 0659    I.V. (mL/kg) 810.4 (15.1) 792.8 (14.8)     NG/ 345     IV Piggyback 66.9 1149.2     Total Intake(mL/kg) 1377.3 (25.7) 2287 (42.7)     Urine (mL/kg/hr) 1175 (0.9) 3400 (2.6) 200 (5.9)    Drains 80 0     Stool 25 25 0    Chest Tube 350 20     Total Output 1630 3445 200    Net -252.7 -1158 -200                 Physical Exam   Constitutional: She appears well-developed and well-nourished. No distress.   Confused   Cardiovascular: Normal rate and regular rhythm.   Pulmonary/Chest: Effort normal. No respiratory distress.   Skin: Skin is warm and dry.   Left shoulder wound appeared clean with very little expressed. Repacked with no erythema or cellulitis.    Right shoulder wound larger. No further purulent drainage. Packed. No erythema or cellulitis.       Significant Labs:  CBC:    Recent Labs   Lab 10/06/19  0332   WBC 6.21   RBC 2.96*   HGB 8.9*   HCT 28.7*      MCV 97   MCH 30.1   MCHC 31.0*     CMP:   Recent Labs   Lab 10/06/19  0332   GLU 69*  69*   CALCIUM 7.7*  7.7*   ALBUMIN 1.6*   PROT 6.0   *  148*   K 3.8  3.8   CO2 21*  21*   *  116*   BUN 41*  41*   CREATININE 1.4  1.4   ALKPHOS 106   ALT 5*   AST 24   BILITOT 0.4

## 2019-10-06 NOTE — NURSING
Updated MD Rachana on pt neurological status, VS, Labs, and blood glucose levels. Per MD to come assess patient at bedside and to start a D5 gtt. Will carry out orders and continue to monitor.

## 2019-10-06 NOTE — ASSESSMENT & PLAN NOTE
--Possible ileus on CT abd 9/25  --Was receiving TPN at OSH  --KUB with dilated loops of bowel  -- resolved w/ bowel rest

## 2019-10-06 NOTE — PROGRESS NOTES
Upon assessment, pt confused to time and place, somnolent, has to be frequently awakened to follow commands and answer questions. BG 74, SpO2 98% on room air. MD notified, CC team at bedside. ETCO2 monitoring ordered.

## 2019-10-06 NOTE — PROCEDURES
"Mesha Mckenzie is a 63 y.o. female patient.    Temp: 99 °F (37.2 °C) (10/05/19 1900)  Pulse: 109 (10/05/19 2031)  Resp: (!) 24 (10/05/19 2031)  BP: (!) 166/74 (10/05/19 1900)  SpO2: 97 % (10/05/19 2031)  Weight: 53.5 kg (118 lb) (10/03/19 1015)  Height: 5' 4" (162.6 cm) (10/03/19 1015)       Incision and Drainage  Date/Time: 10/5/2019 9:10 PM  Location procedure was performed: [facility] SURGICAL ICU (SICU)  Performed by: Bam Mcclendon MD  Authorized by: Bam Mcclendon MD   Pre-operative diagnosis: MSSA bacteremia  Post-operative diagnosis: MSSA bacteremia  Consent Done: Yes  Consent: Written consent obtained.  Consent given by: spouse  Time out: Immediately prior to procedure a "time out" was called to verify the correct patient, procedure, equipment, support staff and site/side marked as required.  Type: abscess  Body area: upper extremity (right and left shoulder)  Anesthesia: local infiltration    Anesthesia:  Local anesthesia used: yes  Local Anesthetic: lidocaine 1% without epinephrine  Anesthetic total: 10 mL  Description of findings: right shoulder with purulent drainage, left should with some fat necrosis   Scalpel size: 11  Incision type: single straight  Wound treatment: incision,  wound left open and  wound packed  Complications: No  Patient tolerance: Patient tolerated the procedure well with no immediate complications          Bam Mcclendon  10/5/2019  "

## 2019-10-06 NOTE — PLAN OF CARE
Plan of care reviewed with patient and , verbalized understanding, questions answered, reassurance provided. Afebril, HR 110s, SpO2 >95% on room air, SBP 170s. MD at bedside to drain shoulder abscesses, cultures obtained and sent.  cc/h. Potassium replacement given via central line. Pt is oriented to self but disoriented to time and place. Follows commands, drowsy and easily awakened. BG of 66 x2, covered with 125 mL D10 bolus per PRN orders. Flexiseal intact, evacuating liquid stool.

## 2019-10-06 NOTE — PT/OT/SLP EVAL
Speech Language Pathology Evaluation  Bedside Swallow    Patient Name:  Mesha Mckenzie   MRN:  [id number]  Admitting Diagnosis: Staphylococcus aureus bacteremia with sepsis    Recommendations:                 General Recommendations:  ongoing swallow assessment  Diet recommendations:  NPO, NPO   Aspiration Precautions: Standard aspiration precautions   General Precautions: Standard, fall  Communication strategies:  none    History:     Past Medical History:   Diagnosis Date    Anxiety     Carotid bruit     Chronic pain     Cystitis     Cystocele, unspecified (CODE)     Depression     GI bleed     History of uterine fibroid     Shortness of breath on exertion     Spinal stenosis     Urinary retention        Past Surgical History:   Procedure Laterality Date    ANTERIOR VAGINAL REPAIR  10-    CARDIAC CATHETERIZATION  age 12    CHOLECYSTECTOMY  2015    COLONOSCOPY  12/12/2018    aborted due to poor colon prep    COLONOSCOPY N/A 12/12/2018    Procedure: COLONOSCOPY;  Surgeon: Renard Gonsalves MD;  Location: [facility];  Service: Endoscopy;  Laterality: N/A;    HYSTERECTOMY  1989    AMANDA    tubiligation         Prior diet: regular / thin per daughter.     Subjective     Patient lethargic, roused to sternal rub only.     Pain/Comfort:  · Pain Rating 1: 0/10    Objective:     Oral Musculature Evaluation  · Oral Musculature: unable to assess due to poor participation/comprehension    Bedside Swallow Eval:   Consistencies Assessed:  · Thin liquids via ice chip x1     Oral Phase:   · Anterior loss    Pharyngeal Phase:   · no pharyngeal swallow per palpation.  O2 desat to 87%    Compensatory Strategies  · None    Treatment: Patient with poor ability to maintain adequate head control - unable to maintain head in neutral position.  Patient overall is not appropriate for PO intake at this time.  Skilled education was provided to patient and family members re: diet recs, standard aspiration precautions of  which to follow, and ongoing ST plan of care. Patient's daughter verbalized understanding.     Assessment:     Mesha Mckenzie is a 63 y.o. female with an SLP diagnosis of oropharyngeal dysphagia 2/2 somnolence.  Patient to remain NPO at this time.     Goals:   Multidisciplinary Problems     SLP Goals        Problem: SLP Goal    Goal Priority Disciplines Outcome   SLP Goal     SLP    Description:  Speech Language Pathology Goals  Goals expected to be met by 10/13:  1. Patient will participate in ongoing swallow assessment to determine least restrictive diet recommendations.                         Plan:     · Patient to be seen:  5 x/week   · Plan of Care expires:  11/06/19  · Plan of Care reviewed with:  patient, spouse   · SLP Follow-Up:  Yes       Discharge recommendations:  rehabilitation facility   Barriers to Discharge:  None    Time Tracking:     SLP Treatment Date:   10/06/19  Speech Start Time:  0855  Speech Stop Time:  0905     Speech Total Time (min):  10 min    Billable Minutes: Eval Swallow and Oral Function 10    Emily Abadie, CCC-SLP  10/06/2019

## 2019-10-06 NOTE — PROGRESS NOTES
[facility]  Neurosurgery  Progress Note    Subjective:     History of Present Illness: 63yof IVDU with persistent bacteremia found to have endocarditis bactreremia MSSA. MRI L spine showed L2-L4 epidural abscess and neurosurgery was consulted for evaluation. Of note, pt was intubated, bronched, chest tube plced and sedation and paralytics administered due to decompensation with desaturation and hypotension.    Post-Op Info:  * No surgery found *         Interval History: extubated yesterday     Medications:  Continuous Infusions:    Scheduled Meds:   albuterol-ipratropium  3 mL Nebulization Q4H    chlorhexidine  15 mL Mouth/Throat BID    famotidine (PF)  20 mg Intravenous Daily    heparin (porcine)  5,000 Units Subcutaneous Q8H    metronidazole  500 mg Intravenous Q8H    oxacillin 12 g in  mL CONTINUOUS INFUSION  12 g Intravenous Q24H    potassium chloride in water  40 mEq Intravenous Q4H     PRN Meds:Dextrose 10% Bolus, diphenhydrAMINE, EPINEPHrine, fentaNYL, glucagon (human recombinant), labetalol, ondansetron, sodium chloride 0.9%     Review of Systems    Objective:     Weight: 53.5 kg (118 lb)  Body mass index is 20.25 kg/m².  Vital Signs (Most Recent):  Temp: 97.8 °F (36.6 °C) (10/06/19 1105)  Pulse: 92 (10/06/19 1245)  Resp: 16 (10/06/19 1245)  BP: (!) 171/78 (10/06/19 1215)  SpO2: 100 % (10/06/19 1245) Vital Signs (24h Range):  Temp:  [97.5 °F (36.4 °C)-99 °F (37.2 °C)] 97.8 °F (36.6 °C)  Pulse:  [] 92  Resp:  [16-40] 16  SpO2:  [95 %-100 %] 100 %  BP: (123-199)/() 171/78     Date 10/06/19 0700 - 10/07/19 0659   Shift 8805-5236 6560-9423 8513-8042 24 Hour Total   INTAKE   IV Piggyback 124.8   124.8   Shift Total(mL/kg) 124.8(2.3)   124.8(2.3)   OUTPUT   Urine(mL/kg/hr) 1035   1035   Stool 0   0   Chest Tube 10   10   Shift Total(mL/kg) 1045(19.5)   1045(19.5)   Weight (kg) 53.5 53.5 53.5 53.5              Vent Mode: Spont  Oxygen Concentration (%):  [28] 28  Resp  Rate Total:  [15 br/min-22 br/min] 16 br/min  Vt Set:  [350 mL] 350 mL  PEEP/CPAP:  [5 cmH20] 5 cmH20  Pressure Support:  [10 cmH20] 10 cmH20  Mean Airway Pressure:  [8.1 cmH20-8.9 cmH20] 8.1 cmH20         Chest Tube 10/03/19 0500 Right Midaxillary (Active)   Chest Tube WDL WDL 10/4/2019  7:10 AM   Function -20 cm H2O 10/4/2019  7:10 AM   Air Leak/Fluctuation air leak not present;fluctuation not present 10/4/2019  7:10 AM   Safety all tubing connections taped;2 rubber-tipped hemostats w/ patient;all connections secured;suction checked 10/4/2019  7:10 AM   Securement tubing secured to body distal to insertion site w/ tape 10/4/2019  7:10 AM   Patency Intervention Tip/tilt 10/4/2019  7:10 AM   Drainage Description Serosanguineous 10/4/2019  7:10 AM   Dressing Appearance occlusive gauze dressing intact;clean and dry 10/4/2019  7:10 AM   Dressing Care dressing reinforced 10/4/2019  7:10 AM   Left Subcutaneous Emphysema none present 10/4/2019  7:10 AM   Right Subcutaneous Emphysema none present 10/4/2019  7:10 AM   Site Assessment Clean;Dry;Intact;No redness;No swelling 10/4/2019  7:10 AM   Surrounding Skin Dry;Intact 10/4/2019  7:10 AM   Output (mL) 0 mL 10/4/2019  7:00 AM            NG/OG Tube 10/02/19 2200 Center mouth (Active)   Placement Check placement verified by x-ray 10/4/2019  7:10 AM   Tolerance no signs/symptoms of discomfort 10/4/2019  7:10 AM   Securement secured to commercial device 10/4/2019  7:10 AM   Clamp Status/Tolerance unclamped;no emesis;no nausea;no restlessness;no abdominal distention 10/4/2019  7:10 AM   Suction Setting/Drainage Method suction at;low;intermittent setting 10/4/2019  7:10 AM   Insertion Site Appearance no redness, warmth, tenderness, skin breakdown, drainage 10/4/2019  7:10 AM   Drainage Bile 10/4/2019  3:00 AM   Intake (mL) 50 mL 10/4/2019  6:00 AM   Tube Output(mL)(Include Discarded Residual) 0 mL 10/4/2019  7:00 AM            Rectal Tube 09/27/19 1000 rectal tube w/ balloon  "(indicate number of mLs) (Active)   Balloon Inflation Volume (mL) 45 10/4/2019  3:00 AM   Reposition drainage bags for BMS & Henriquez on opposite sides of bed 10/4/2019  7:10 AM   Outcome comfort enhanced 10/4/2019  7:10 AM   Stool Color Brown;Green 10/4/2019  7:10 AM   Insertion Site Appearance no redness, warmth, tenderness, skin breakdown, drainage 10/4/2019  7:10 AM   Flush/Irrigation flushed w/;water;no resistance met 10/4/2019  3:00 AM   Intake (mL) 25 mL 10/4/2019  7:00 AM   Rectal Tube Output 0 mL 10/4/2019  3:00 AM            Urethral Catheter 09/19/19 1240 Double-lumen 16 Fr. (Active)   Site Assessment Clean;Intact 10/4/2019  7:10 AM   Collection Container Urimeter 10/4/2019  7:10 AM   Securement Method secured to top of thigh w/ adhesive device 10/4/2019  7:10 AM   Catheter Care Performed yes 10/4/2019  7:10 AM   Reason for Continuing Urinary Catheterization Critically ill in ICU requiring intensive monitoring 10/4/2019  7:10 AM   CAUTI Prevention Bundle StatLock in place w 1" slack;Intact seal between catheter & drainage tubing;Drainage bag/urimeter off the floor;Green sheeting clip in use;No dependent loops or kinks;Drainage bag/urimeter not overfilled (<2/3 full);Drainage bag/urimeter below bladder 10/4/2019  7:10 AM   Output (mL) 45 mL 10/4/2019 10:00 AM       Neurosurgery Physical Exam     [id number]  PERRL  Intermittently f/c; localizing briskly  W/d BLE with at least 3/5 strength   Responds to noxious stimuli throughout    Significant Labs:  Recent Labs   Lab 10/05/19  0500  10/05/19  2204 10/06/19  0332 10/06/19  1004   GLU 74  74  74   < > 66* 69*  69* 114*   *  148*  148*   < > 148* 148*  148* 144   K 4.2  4.2  4.2   < > 3.0* 3.8  3.8 3.0*   *  117*  117*   < > 117* 116*  116* 113*   CO2 23  23  23   < > 21* 21*  21* 19*   BUN 54*  54*  54*   < > 45* 41*  41* 38*   CREATININE 1.5*  1.5*  1.5*   < > 1.5* 1.4  1.4 1.3   CALCIUM 7.5*  7.5*  7.5*   < > 7.7* 7.7*  7.7* " 7.8*   MG 1.9  --   --  1.6  --     < > = values in this interval not displayed.     Recent Labs   Lab 10/05/19  0500 10/05/19  0840 10/05/19  2204 10/06/19  0332   WBC 4.61  --  5.15 6.21   HGB 7.9* 7.4* 8.2* 8.9*   HCT 25.0* 24.3* 26.9* 28.7*   *  --  179 200     No results for input(s): LABPT, INR, APTT in the last 48 hours.  Microbiology Results (last 7 days)     Procedure Component Value Units Date/Time    Culture, Body Fluid - Bactec [[id number]] Collected:  10/03/19 0549    Order Status:  Completed Specimen:  Pleural Fluid Updated:  10/06/19 1012     Body Fluid Culture, Sterile No Growth to date      No Growth to date      No Growth to date      No Growth to date    Narrative:       received 2 culture bottles with specimen for pleural fluid.    Culture, Anaerobe [[id number]] Collected:  10/05/19 2111    Order Status:  Sent Specimen:  Abscess from Shoulder, Right Updated:  10/06/19 0827    Aerobic culture [[id number]] Collected:  10/05/19 2111    Order Status:  Sent Specimen:  Abscess from Shoulder, Right Updated:  10/06/19 0827    Aerobic culture [[id number]] Collected:  10/05/19 2110    Order Status:  Sent Specimen:  Abscess from Shoulder, Left Updated:  10/06/19 0826    Culture, Anaerobe [[id number]] Collected:  10/05/19 2110    Order Status:  Sent Specimen:  Abscess from Shoulder, Left Updated:  10/06/19 0826    Blood culture [[id number]] Collected:  10/02/19 2025    Order Status:  Completed Specimen:  Blood from Peripheral, Wrist, Right Updated:  10/05/19 2212     Blood Culture, Routine No Growth to date      No Growth to date      No Growth to date      No Growth to date    Blood culture [[id number]] Collected:  10/02/19 0830    Order Status:  Completed Specimen:  Blood from Peripheral, Hand, Right Updated:  10/05/19 2212     Blood Culture, Routine No Growth to date      No Growth to date      No Growth to date      No Growth to date    Aerobic culture [[id number]] Collected:  10/03/19 0549    Order  Status:  Completed Specimen:  Pleural Fluid Updated:  10/05/19 1113     Aerobic Bacterial Culture No growth    Culture, Anaerobic [[id number]] Collected:  10/03/19 0549    Order Status:  Completed Specimen:  Pleural Fluid Updated:  10/04/19 0725     Anaerobic Culture Culture in progress    Gram stain [[id number]] Collected:  10/03/19 0549    Order Status:  Completed Specimen:  Pleural Fluid Updated:  10/03/19 0955     Gram Stain Result Rare WBC's      Rare Gram positive cocci            Significant Diagnostics:  CT head: reviewed    Assessment/Plan:     Epidural abscess  63yof pmh IVDU with endocarditis MSSA bacteremia acutely decompensated overnight on paralytics, sedation and pressors found to have lumbar epidural abscess.    Plan:  - medical risk stratification documented; continuing to eval for possible need for surgical drainage of epidural abscess    - q 1 hr neuro checks  - continue abx per primary team, will fu cultures          Joseph Vaughan MD  Neurosurgery  [facility]    I have seen and examined the patient with Dr. Vaughan and agree with his note as documented above     Flora Chaparro M.D.   [facility]  [phone]

## 2019-10-06 NOTE — SUBJECTIVE & OBJECTIVE
Interval History: naeon    Medications:  Continuous Infusions:    Scheduled Meds:   albuterol-ipratropium  3 mL Nebulization Q4H    chlorhexidine  15 mL Mouth/Throat BID    famotidine (PF)  20 mg Intravenous Daily    heparin (porcine)  5,000 Units Subcutaneous Q8H    metronidazole  500 mg Intravenous Q8H    oxacillin 12 g in  mL CONTINUOUS INFUSION  12 g Intravenous Q24H    potassium chloride in water  40 mEq Intravenous Q4H     PRN Meds:Dextrose 10% Bolus, diphenhydrAMINE, EPINEPHrine, fentaNYL, glucagon (human recombinant), labetalol, ondansetron, sodium chloride 0.9%     Review of Systems    Objective:     Weight: 53.5 kg (118 lb)  Body mass index is 20.25 kg/m².  Vital Signs (Most Recent):  Temp: 97.8 °F (36.6 °C) (10/06/19 1105)  Pulse: 92 (10/06/19 1245)  Resp: 16 (10/06/19 1245)  BP: (!) 171/78 (10/06/19 1215)  SpO2: 100 % (10/06/19 1245) Vital Signs (24h Range):  Temp:  [97.5 °F (36.4 °C)-99 °F (37.2 °C)] 97.8 °F (36.6 °C)  Pulse:  [] 92  Resp:  [16-40] 16  SpO2:  [95 %-100 %] 100 %  BP: (123-199)/() 171/78     Date 10/06/19 0700 - 10/07/19 0659   Shift 2569-7700 5354-3501 4684-7223 24 Hour Total   INTAKE   IV Piggyback 124.8   124.8   Shift Total(mL/kg) 124.8(2.3)   124.8(2.3)   OUTPUT   Urine(mL/kg/hr) 1035   1035   Stool 0   0   Chest Tube 10   10   Shift Total(mL/kg) 1045(19.5)   1045(19.5)   Weight (kg) 53.5 53.5 53.5 53.5              Vent Mode: Spont  Oxygen Concentration (%):  [28] 28  Resp Rate Total:  [15 br/min-22 br/min] 16 br/min  Vt Set:  [350 mL] 350 mL  PEEP/CPAP:  [5 cmH20] 5 cmH20  Pressure Support:  [10 cmH20] 10 cmH20  Mean Airway Pressure:  [8.1 cmH20-8.9 cmH20] 8.1 cmH20         Chest Tube 10/03/19 0500 Right Midaxillary (Active)   Chest Tube WDL WDL 10/4/2019  7:10 AM   Function -20 cm H2O 10/4/2019  7:10 AM   Air Leak/Fluctuation air leak not present;fluctuation not present 10/4/2019  7:10 AM   Safety all tubing connections taped;2 rubber-tipped hemostats w/  patient;all connections secured;suction checked 10/4/2019  7:10 AM   Securement tubing secured to body distal to insertion site w/ tape 10/4/2019  7:10 AM   Patency Intervention Tip/tilt 10/4/2019  7:10 AM   Drainage Description Serosanguineous 10/4/2019  7:10 AM   Dressing Appearance occlusive gauze dressing intact;clean and dry 10/4/2019  7:10 AM   Dressing Care dressing reinforced 10/4/2019  7:10 AM   Left Subcutaneous Emphysema none present 10/4/2019  7:10 AM   Right Subcutaneous Emphysema none present 10/4/2019  7:10 AM   Site Assessment Clean;Dry;Intact;No redness;No swelling 10/4/2019  7:10 AM   Surrounding Skin Dry;Intact 10/4/2019  7:10 AM   Output (mL) 0 mL 10/4/2019  7:00 AM            NG/OG Tube 10/02/19 2200 Center mouth (Active)   Placement Check placement verified by x-ray 10/4/2019  7:10 AM   Tolerance no signs/symptoms of discomfort 10/4/2019  7:10 AM   Securement secured to commercial device 10/4/2019  7:10 AM   Clamp Status/Tolerance unclamped;no emesis;no nausea;no restlessness;no abdominal distention 10/4/2019  7:10 AM   Suction Setting/Drainage Method suction at;low;intermittent setting 10/4/2019  7:10 AM   Insertion Site Appearance no redness, warmth, tenderness, skin breakdown, drainage 10/4/2019  7:10 AM   Drainage Bile 10/4/2019  3:00 AM   Intake (mL) 50 mL 10/4/2019  6:00 AM   Tube Output(mL)(Include Discarded Residual) 0 mL 10/4/2019  7:00 AM            Rectal Tube 09/27/19 1000 rectal tube w/ balloon (indicate number of mLs) (Active)   Balloon Inflation Volume (mL) 45 10/4/2019  3:00 AM   Reposition drainage bags for BMS & Henriquez on opposite sides of bed 10/4/2019  7:10 AM   Outcome comfort enhanced 10/4/2019  7:10 AM   Stool Color Brown;Green 10/4/2019  7:10 AM   Insertion Site Appearance no redness, warmth, tenderness, skin breakdown, drainage 10/4/2019  7:10 AM   Flush/Irrigation flushed w/;water;no resistance met 10/4/2019  3:00 AM   Intake (mL) 25 mL 10/4/2019  7:00 AM   Rectal Tube  "Output 0 mL 10/4/2019  3:00 AM            Urethral Catheter 09/19/19 1240 Double-lumen 16 Fr. (Active)   Site Assessment Clean;Intact 10/4/2019  7:10 AM   Collection Container Urimeter 10/4/2019  7:10 AM   Securement Method secured to top of thigh w/ adhesive device 10/4/2019  7:10 AM   Catheter Care Performed yes 10/4/2019  7:10 AM   Reason for Continuing Urinary Catheterization Critically ill in ICU requiring intensive monitoring 10/4/2019  7:10 AM   CAUTI Prevention Bundle StatLock in place w 1" slack;Intact seal between catheter & drainage tubing;Drainage bag/urimeter off the floor;Green sheeting clip in use;No dependent loops or kinks;Drainage bag/urimeter not overfilled (<2/3 full);Drainage bag/urimeter below bladder 10/4/2019  7:10 AM   Output (mL) 45 mL 10/4/2019 10:00 AM       Neurosurgery Physical Exam     [id number]  PERRL  Intermittently f/c; localizing briskly  W/d BLE with at least 3/5 strength   Responds to noxious stimuli throughout    Significant Labs:  Recent Labs   Lab 10/05/19  0500  10/05/19  2204 10/06/19  0332 10/06/19  1004   GLU 74  74  74   < > 66* 69*  69* 114*   *  148*  148*   < > 148* 148*  148* 144   K 4.2  4.2  4.2   < > 3.0* 3.8  3.8 3.0*   *  117*  117*   < > 117* 116*  116* 113*   CO2 23  23  23   < > 21* 21*  21* 19*   BUN 54*  54*  54*   < > 45* 41*  41* 38*   CREATININE 1.5*  1.5*  1.5*   < > 1.5* 1.4  1.4 1.3   CALCIUM 7.5*  7.5*  7.5*   < > 7.7* 7.7*  7.7* 7.8*   MG 1.9  --   --  1.6  --     < > = values in this interval not displayed.     Recent Labs   Lab 10/05/19  0500 10/05/19  0840 10/05/19  2204 10/06/19  0332   WBC 4.61  --  5.15 6.21   HGB 7.9* 7.4* 8.2* 8.9*   HCT 25.0* 24.3* 26.9* 28.7*   *  --  179 200     No results for input(s): LABPT, INR, APTT in the last 48 hours.  Microbiology Results (last 7 days)     Procedure Component Value Units Date/Time    Culture, Body Fluid - Bactec [[id number]] Collected:  10/03/19 0549    " Order Status:  Completed Specimen:  Pleural Fluid Updated:  10/06/19 1012     Body Fluid Culture, Sterile No Growth to date      No Growth to date      No Growth to date      No Growth to date    Narrative:       received 2 culture bottles with specimen for pleural fluid.    Culture, Anaerobe [[id number]] Collected:  10/05/19 2111    Order Status:  Sent Specimen:  Abscess from Shoulder, Right Updated:  10/06/19 0827    Aerobic culture [[id number]] Collected:  10/05/19 2111    Order Status:  Sent Specimen:  Abscess from Shoulder, Right Updated:  10/06/19 0827    Aerobic culture [[id number]] Collected:  10/05/19 2110    Order Status:  Sent Specimen:  Abscess from Shoulder, Left Updated:  10/06/19 0826    Culture, Anaerobe [[id number]] Collected:  10/05/19 2110    Order Status:  Sent Specimen:  Abscess from Shoulder, Left Updated:  10/06/19 0826    Blood culture [[id number]] Collected:  10/02/19 2025    Order Status:  Completed Specimen:  Blood from Peripheral, Wrist, Right Updated:  10/05/19 2212     Blood Culture, Routine No Growth to date      No Growth to date      No Growth to date      No Growth to date    Blood culture [[id number]] Collected:  10/02/19 0830    Order Status:  Completed Specimen:  Blood from Peripheral, Hand, Right Updated:  10/05/19 2212     Blood Culture, Routine No Growth to date      No Growth to date      No Growth to date      No Growth to date    Aerobic culture [[id number]] Collected:  10/03/19 0549    Order Status:  Completed Specimen:  Pleural Fluid Updated:  10/05/19 1113     Aerobic Bacterial Culture No growth    Culture, Anaerobic [[id number]] Collected:  10/03/19 0549    Order Status:  Completed Specimen:  Pleural Fluid Updated:  10/04/19 0725     Anaerobic Culture Culture in progress    Gram stain [[id number]] Collected:  10/03/19 0549    Order Status:  Completed Specimen:  Pleural Fluid Updated:  10/03/19 0955     Gram Stain Result Rare WBC's      Rare Gram positive cocci             Significant Diagnostics:  CT head: reviewed

## 2019-10-06 NOTE — ASSESSMENT & PLAN NOTE
--Empyema with GPC on gram stain   --R chest tube inserted; -20 mmHg suction  --TPA/DNAse X 2  --monitor output

## 2019-10-06 NOTE — ASSESSMENT & PLAN NOTE
2/2 IVDA, patient reported injecting IV heroin to ID MD at OSH. Blood cultures (9/19 - 9/26) have grown methicillin sensitive Staph aureus. Blood cultures as of 9/29 have been NGTD. mitral and tricuspid valve vegetations seen on DHARMESH 9/25; unchanged on TTE 9/28. OSH abx regimen vanc, ancef and daptomycin. Repeat BCx positive for MSSA. OSH PICC line d/c'd    --formal 2D echo results show EF 53%; normal L ventricular function; mild mitral sclerosis; small circumferential pericardial effusion. cardiology consulted; no surgical intervention at this point   --neurosurgery consult for epidural abscess, likely plan for OR when stable  --bilateral shoulder abscess, s/p I&D  --oxacillin started after passed penicillin challenge

## 2019-10-06 NOTE — ASSESSMENT & PLAN NOTE
64yo F with MSSA bacteremia and endocarditis, epidural abscess and superficial b/l shoulder abscesses    - I&D of b/l shoulders, packed and dressed  - Cultures sent  - Pt currently on oxicillin for MSSA bacteremia; recommend f/u of cultures to direct antibiotic therapy

## 2019-10-06 NOTE — PROGRESS NOTES
[facility]  General Surgery  Progress Note    Subjective:     History of Present Illness:  Patient is a 63 y.o. female with significant history of depression, anxiety and IVDA presented to [facility] ED on 9/19 with c/o N/V/D and weakness x ~5 days. The patient was found to be in septic shock with pneumonia and colitis and was transferred to [facility] ICU for further management. 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 CT was repeated on 9/25 and showed bilateral cavitary lesions likely secondary to septic emboli, small pericardial effusion and ileus. DHARMESH was performed on 9/25 which revealed mitral and tricuspid valve endocarditis. Blood cultures (9/19 - 9/26) have grown methicillin sensitive Staph aureus. Blood cultures as of 9/29 have been NGTD. Repeat Echo on 9/28 showed LVEF 35% and persistent vegatations. On 9/30 the patient had an MRI brain that showed remote lacunar infarcts without acute intracranial abnormality. On 10/1 MRI revealed spinal epidural abscess at L2-4 level. Thoracentesis was performed on 10/1. Cardiovascular surgery intervention was deferred. The patient was transferred to [facility] for neurosurgery evaluation of epidural abscess. Patient found to have b/l shoulder abscess for which surgery is consulted.    Post-Op Info:  * No surgery found *         Interval History: No acute events overnight.     Medications:  Continuous Infusions:  Scheduled Meds:   albuterol-ipratropium  3 mL Nebulization Q4H    chlorhexidine  15 mL Mouth/Throat BID    famotidine (PF)  20 mg Intravenous Daily    metronidazole  500 mg Intravenous Q8H    oxacillin 12 g in  mL CONTINUOUS INFUSION  12 g Intravenous Q24H     PRN Meds:Dextrose 10% Bolus, diphenhydrAMINE, EPINEPHrine, fentaNYL, glucagon (human recombinant), labetalol, ondansetron, sodium chloride 0.9%     Review of patient's allergies indicates:   Allergen Reactions     Pcn [penicillins]      Objective:     Vital Signs (Most Recent):  Temp: 97.5 °F (36.4 °C) (10/06/19 0300)  Pulse: 99 (10/06/19 0630)  Resp: (!) 37 (10/06/19 0630)  BP: (!) 187/84 (10/06/19 0630)  SpO2: 98 % (10/06/19 0630) Vital Signs (24h Range):  Temp:  [97.5 °F (36.4 °C)-99 °F (37.2 °C)] 97.5 °F (36.4 °C)  Pulse:  [] 99  Resp:  [20-38] 37  SpO2:  [95 %-100 %] 98 %  BP: (117-189)/(55-86) 187/84     Weight: 53.5 kg (118 lb)  Body mass index is 20.25 kg/m².    Intake/Output - Last 3 Shifts       10/04 0700 - 10/05 0659 10/05 0700 - 10/06 0659 10/06 0700 - 10/07 0659    I.V. (mL/kg) 810.4 (15.1) 792.8 (14.8)     NG/ 345     IV Piggyback 66.9 1149.2     Total Intake(mL/kg) 1377.3 (25.7) 2287 (42.7)     Urine (mL/kg/hr) 1175 (0.9) 3400 (2.6) 200 (5.9)    Drains 80 0     Stool 25 25 0    Chest Tube 350 20     Total Output 1630 3445 200    Net -252.7 -1158 -200                 Physical Exam   Constitutional: She appears well-developed and well-nourished. No distress.   Confused   Cardiovascular: Normal rate and regular rhythm.   Pulmonary/Chest: Effort normal. No respiratory distress.   Skin: Skin is warm and dry.   Left shoulder wound appeared clean with very little expressed. Repacked with no erythema or cellulitis.    Right shoulder wound larger. No further purulent drainage. Packed. No erythema or cellulitis.       Significant Labs:  CBC:   Recent Labs   Lab 10/06/19  0332   WBC 6.21   RBC 2.96*   HGB 8.9*   HCT 28.7*      MCV 97   MCH 30.1   MCHC 31.0*     CMP:   Recent Labs   Lab 10/06/19  0332   GLU 69*  69*   CALCIUM 7.7*  7.7*   ALBUMIN 1.6*   PROT 6.0   *  148*   K 3.8  3.8   CO2 21*  21*   *  116*   BUN 41*  41*   CREATININE 1.4  1.4   ALKPHOS 106   ALT 5*   AST 24   BILITOT 0.4         Assessment/Plan:     Abscess of upper extremity  64yo F with MSSA bacteremia and endocarditis, epidural abscess and superficial b/l shoulder abscesses    - I&D of b/l shoulders, packed and  dressed  - Cultures sent  - Pt currently on oxicillin for MSSA bacteremia; recommend f/u of cultures to direct antibiotic therapy  - continue BID packing changes  - Will sign off. Please call with questions.         Alessia Fisher MD  General Surgery  [facility]

## 2019-10-06 NOTE — PROGRESS NOTES
[facility]  Critical Care Medicine  Progress Note    Patient Name: Mesha Mckenzie  MRN: [id number]  Admission Date: 10/2/2019  Hospital Length of Stay: 4 days  Code Status: Full Code  Attending Provider: Malik Jerry MD  Primary Care Provider: Varun Shea MD PhD   Principal Problem: Staphylococcus aureus bacteremia with sepsis    Subjective:     HPI:  Patient is a 63 y.o. female with significant past medical history of depression, anxiety and IVDA presented to [facility] ED on 9/19 with c/o N/V/D and weakness x ~5 days. The patient was found to be in septic shock with pneumonia and colitis and was transferred to [facility] ICU for further management. Pt was admitted to the intensive care unit and was treated with broad-spectrum antibiotics. 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 CT was repeated on 9/25 and showed bilateral cavitary lesions likely secondary to septic emboli, small pericardial effusion and ileus. DHARMESH was performed on 9/25 which revealed  mitral and tricuspid valve endocarditis. Blood cultures (9/19 - 9/26) have grown methicillin sensitive Staph aureus. Blood cultures as of 9/29 have been NGTD. ID was following the patient for antibiotic management with most recent regimen being cefazolin, vancomycin and daptomycin. Repeat Echo on 9/28 showed LVEF 35% and persistent vegatations. On 9/30 the patient had an MRI brain that showed remote lacunar infarcts without acute intracranial abnormality. On 10/1 MRI revealed spinal epidural abscess at L2-4 level. Thoracentesis was performed on 10/1. The patient was followed by Cardiology team who considered valve replacement surgery but due to patient's other medical issues, Cardiovascular surgery intervention was deferred. The patient was transferred to [facility] for neurosurgery evaluation of epidural abscess.         Hospital/ICU Course:  [place] [facility] [place]  was encephalopathic, but able to follow commands. She was hemodynamically stable and satting well on 2L NC. Shortly after arrival, the patient became acutely hypertensive and hypoxemic necessitating intubation and mechanical ventilation. She was extremely difficult to oxygenate (P:F ratio 52). Bronch was performed which was unremarkable. The patient became hypotensive and was started on vasopressors. She was paralyzed with nimbex due to tachypnea and vent dyssynchrony. Bedside echo was performed which showed reduced LVEF, hyperdynamic LV; no significant mitral regurg was noted. The patient went for CTA without evidence of pulmonary embolism. Imaging revealed left lung collaspse and repeat bronch was performed with sputum cx sent. Oxygenation improved quickly and paralytic was discontinued. Right chest tube placed & pleural fluid sent; gram stain with GPCs. Neurosurgery consulted for epidural abscess, plans for OR potentially Monday. Cardiology consult for endocarditis; currently not a surgical candidate. Repeat TTE performed with improved EF and without mention of previously noted vegetations. ID and allergy consulted; oxacillin densensitization in process given pcn allergy.         Consulted general surgery for bilateral shoulder swelling; ultrasound concerning for phlegmon vs developing abscess without organized fluid collection. S/p I&D of shoulder abscesses 10/5/19. Extubated on 10/5/19 and doing well on RA.     Ongoing neurosurgery evaluation for drainage of epidural abscess. Neurologically stable.     Interval History/Significant Events: Somnolent overnight, with improvement in the afternoon. Neurologically stable on exam. Hypoglycemic and hypernatremic, resolved with D5W. Continuing oxacillin.     Review of Systems   Constitutional: Negative for chills.   HENT: Negative for sore throat and trouble swallowing.    Eyes: Negative for photophobia and pain.   Respiratory: Negative for cough and shortness of  breath.    Cardiovascular: Negative for chest pain.   Gastrointestinal: Negative for abdominal pain.   Genitourinary: Negative for dysuria.   Musculoskeletal: Positive for back pain.   Neurological: Positive for weakness.     Objective:     Vital Signs (Most Recent):  Temp: 97.8 °F (36.6 °C) (10/06/19 1105)  Pulse: 97 (10/06/19 1345)  Resp: (!) 25 (10/06/19 1345)  BP: (!) 180/77 (10/06/19 1300)  SpO2: 99 % (10/06/19 1345) Vital Signs (24h Range):  Temp:  [97.5 °F (36.4 °C)-99 °F (37.2 °C)] 97.8 °F (36.6 °C)  Pulse:  [] 97  Resp:  [16-40] 25  SpO2:  [95 %-100 %] 99 %  BP: (143-199)/() 180/77   Weight: 53.5 kg (118 lb)  Body mass index is 20.25 kg/m².      Intake/Output Summary (Last 24 hours) at 10/6/2019 1357  Last data filed at 10/6/2019 1300  Gross per 24 hour   Intake 1861.98 ml   Output 4185 ml   Net -2323.02 ml       Physical Exam   Constitutional: She appears well-developed. She is cooperative. She has a sickly appearance. She appears ill. No distress. She is sedated and restrained.   HENT:   Head: Normocephalic and atraumatic.   Eyes: Right eye exhibits no discharge. Left eye exhibits no discharge. No scleral icterus. Pupils are unequal.   Neck: No tracheal deviation present.   Cardiovascular: Normal rate and regular rhythm.   Pulses:       Radial pulses are 2+ on the right side, and 2+ on the left side.        Dorsalis pedis pulses are 2+ on the right side, and 2+ on the left side.   Pulmonary/Chest: She has no decreased breath sounds. She has no wheezes. She has no rhonchi. She has no rales.   Chest tube in situ w/ clear output   Abdominal: Soft. Normal appearance. Bowel sounds are decreased. There is no tenderness.   Genitourinary:   Genitourinary Comments: Henriquez in place with clear yellow output   Rectal tube draining watery, dark green stool   Neurological: No cranial nerve deficit. GCS eye subscore is 3. GCS verbal subscore is 1. GCS motor subscore is 6.   Somnolent but following  commands  GCS 15   Skin: Skin is warm, dry and intact. She is not diaphoretic.       Vents:  Vent Mode: Spont (10/05/19 1401)  Ventilator Initiated: Yes (10/02/19 2200)  Set Rate: 20 bmp (10/05/19 1303)  Vt Set: 350 mL (10/05/19 1303)  Pressure Support: 10 cmH20 (10/05/19 1401)  PEEP/CPAP: 5 cmH20 (10/05/19 1401)  Oxygen Concentration (%): 28 (10/05/19 1502)  Peak Airway Pressure: 16 cmH2O (10/05/19 1401)  Plateau Pressure: 14 cmH20 (10/05/19 1401)  Total Ve: 12.6 mL (10/05/19 1401)  Negative Inspiratory Force (cm H2O): -31 (10/05/19 1510)  F/VT Ratio<105 (RSBI): (!) 27.89 (10/05/19 1401)  Lines/Drains/Airways     Central Venous Catheter Line                 Percutaneous Central Line Insertion/Assessment - triple lumen  10/03/19 0114 right internal jugular 3 days          Drain                 Urethral Catheter 09/19/19 1240 Double-lumen 16 Fr. 17 days         Rectal Tube 09/27/19 1000 rectal tube w/ balloon (indicate number of mLs) 9 days         Chest Tube 10/03/19 0500 Right Midaxillary 3 days          Peripheral Intravenous Line                 Peripheral IV - Single Lumen 10/03/19 0300 20 G Left Antecubital 3 days              Significant Labs:    CBC/Anemia Profile:  Recent Labs   Lab 10/05/19  0500 10/05/19  0840 10/05/19  2204 10/06/19  0332   WBC 4.61  --  5.15 6.21   HGB 7.9* 7.4* 8.2* 8.9*   HCT 25.0* 24.3* 26.9* 28.7*   *  --  179 200   MCV 95  --  97 97   RDW 18.0*  --  17.3* 17.3*        Chemistries:  Recent Labs   Lab 10/05/19  0500  10/05/19  2204 10/06/19  0332 10/06/19  1004   *  148*  148*   < > 148* 148*  148* 144   K 4.2  4.2  4.2   < > 3.0* 3.8  3.8 3.0*   *  117*  117*   < > 117* 116*  116* 113*   CO2 23  23  23   < > 21* 21*  21* 19*   BUN 54*  54*  54*   < > 45* 41*  41* 38*   CREATININE 1.5*  1.5*  1.5*   < > 1.5* 1.4  1.4 1.3   CALCIUM 7.5*  7.5*  7.5*   < > 7.7* 7.7*  7.7* 7.8*   ALBUMIN 1.4*  --   --  1.6*  --    PROT 5.4*  --   --  6.0  --     BILITOT 0.3  --   --  0.4  --    ALKPHOS 96  --   --  106  --    ALT 5*  --   --  5*  --    AST 17  --   --  24  --    MG 1.9  --   --  1.6  --    PHOS 4.8*  --   --  3.6  --     < > = values in this interval not displayed.       ABGs:   Recent Labs   Lab 10/06/19  0934   PH 7.461*   PCO2 28.2*   HCO3 20.1*   POCSATURATED 71*   BE -4     Blood Culture: No results for input(s): LABBLOO in the last 48 hours.  All pertinent labs within the past 24 hours have been reviewed.    Significant Imaging:  I have reviewed all pertinent imaging results/findings within the past 24 hours.         ABG  Recent Labs   Lab 10/06/19  0934   PH 7.461*   PO2 34*   PCO2 28.2*   HCO3 20.1*   BE -4     Assessment/Plan:     Neuro  Encephalopathy, metabolic  --likely secondary to sepsis  --Brain MRI 9/30 with lacunar infarcts, neg for acute intracranial abnormality  -- repeat CT w/o new abnormalities    Pulmonary  Acute hypoxemic respiratory failure  Multifactorial secondary to complete left lung collapse, septic emboli, pleural effusions, suspected pna. Intubated shortly after arrival for hypoxemia. s/p bronch x2 with significant clearance of secretions from left mainstem & improved aeration on f/u xray. s/p thora 10/1; transudative based on lights criteria, though concern for empyema based on OSH pulm note. right chest tube placed 10/3, 345 mL output after TPA administration; pleural fluid with GPC on gram stain. CTA neg for PE    -- will continue to follow culture results  -- Extubated 10/5/19; now on RA  -- continue oxacillin  -- minimum output from chest tube; CXR stable    Pleural effusion  --Empyema with GPC on gram stain   --R chest tube inserted; -20 mmHg suction  --TPA/DNAse X 2  --monitor output    Cardiac/Vascular  Acute bacterial endocarditis  --See staph bacteremia       Acute on chronic diastolic congestive heart failure  --last echo (OSH) with EF 35%, LV concentric remodeling, indeterminate diastolic function  --bedside echo  with depressed EF, RV not significantly dilated  --repeat TTE with improved EF and without mention of previously noted vegetations    Renal/  ABIMBOLA (acute kidney injury)  --baseline creatinine ~ 0.7; was 2.9 on initial presentation to OSH, improved with hydration over hospital course  --FeNa/FeUrea consistent with pre-renal etiology; possible component of ATN given hypotension   --non anion gap metabolic acidosis on admit, briefly on sodium bicarb drip   --improving     ID  * Staphylococcus aureus bacteremia with sepsis  2/2 IVDA, patient reported injecting IV heroin to ID MD at OSH. Blood cultures (9/19 - 9/26) have grown methicillin sensitive Staph aureus. Blood cultures as of 9/29 have been NGTD. mitral and tricuspid valve vegetations seen on DHARMESH 9/25; unchanged on TTE 9/28. OSH abx regimen vanc, ancef and daptomycin. Repeat BCx positive for MSSA. OSH PICC line d/c'd    --formal 2D echo results show EF 53%; normal L ventricular function; mild mitral sclerosis; small circumferential pericardial effusion. cardiology consulted; no surgical intervention at this point   --neurosurgery consult for epidural abscess, likely plan for OR when stable  --bilateral shoulder abscess, s/p I&D  --oxacillin started after passed penicillin challenge    Abscess of upper extremity  Ultrasound concerning for developing abscess in left and right shoulder abscesses. S/p I&D by general surgery.  - will f/u on culture and stain    Septic shock with acute organ dysfunction due to methicillin susceptible Staphylococcus aureus (MSSA)  --secondary to MV/TV endocarditis, epidural abscess, likely pna  --shock resolved    Epidural abscess  --MRI concerning for epidural abscess at L2-4 region  --no neuro deficits on physical exam  --neurosurgery consulted and following    Hematology  Acute septic pulmonary embolism  --secondary to TV endocarditis    GI  Ileus  --Possible ileus on CT abd 9/25  --Was receiving TPN at OSH  --KUB with dilated loops  of bowel  -- resolved w/ bowel rest    Pancolitis  --seen on CT 9/19 with improvement noted on f/u CT 9/25  --concern for ileus on 9/25 CT; rectal tube in place with liquid stool output  --stool 9/27 neg for c diff; stool cx neg  --continue flagyl    Other  Preoperative clearance  Discussed with Dr. Jerry.   Patient has 0.7-1.7% risk of myocardial infarction or cardiac arrest, intraoperatively or up to 30 days post-op, according to Bergeron Perioperative Risk for Myocardial Infarction or Cardiac Arrest (DICKSON)       Critical Care Daily Checklist:    A: Awake: RASS Goal/Actual Goal: RASS Goal: 0-->alert and calm  Actual: Joyner Agitation Sedation Scale (RASS): Drowsy   B: Spontaneous Breathing Trial Performed? Spon. Breathing Trial Initiated?: Initiated (10/05/19 1510)   C: SAT & SBT Coordinated?  --                      D: Delirium: CAM-ICU     E: Early Mobility Performed? Yes   F: Feeding Goal: Goals: Patient to receive nutrition by RD follow-up  Status: Nutrition Goal Status: new   Current Diet Order   Procedures    Diet NPO      AS: Analgesia/Sedation Prn fentanyl   T: Thromboembolic Prophylaxis heparin   H: HOB > 300 Yes   U: Stress Ulcer Prophylaxis (if needed) famotidine   G: Glucose Control --   B: Bowel Function     I: Indwelling Catheter (Lines & Astorga) Necessity astorga   D: De-escalation of Antimicrobials/Pharmacotherapies oxacillin    Plan for the day/ETD --    Code Status:  Family/Goals of Care: Full Code  --       Critical secondary to Patient has a condition that poses threat to life and bodily function: encephalopathy requiring close airway watch      Critical care was time spent personally by me on the following activities: development of treatment plan with patient or surrogate and bedside caregivers, discussions with consultants, evaluation of patient's response to treatment, examination of patient, ordering and performing treatments and interventions, ordering and review of laboratory studies,  ordering and review of radiographic studies, pulse oximetry, re-evaluation of patient's condition. This critical care time did not overlap with that of any other provider or involve time for any procedures.     Sue Reich MD  Critical Care Medicine  [facility]

## 2019-10-06 NOTE — PLAN OF CARE
Patient free of falls/traumas/injuries. Plan of care discussed with patient and family.  Neuro: Pt disoriented to place, time, and situation, Pt very somnolent, follows commands.  Cardiac: SR-ST on telemetry with HR in 90-110s, Maps >65.  Respiratory: Room air with SpO2 96-98%  GI/: Henriquez in place with -175ml/hr  D5 gtt initated for low blood sugars. Possible surgery in the AM.

## 2019-10-06 NOTE — CONSULTS
[facility]  General Surgery  Consult Note    Patient Name: Mesha Mckenzie  MRN: [id number]  Code Status: Full Code  Admission Date: 10/2/2019  Hospital Length of Stay: 3 days  Attending Physician: Malik Jerry MD  Primary Care Provider: Varun Shea MD PhD    Patient information was obtained from past medical records and ER records.     Inpatient consult to General Surgery  Consult performed by: Bam Mcclendon MD  Consult ordered by: Fiona Winterbottom, APRN, CNS        Subjective:     Principal Problem: Staphylococcus aureus bacteremia with sepsis    History of Present Illness: Patient is a 63 y.o. female with significant history of depression, anxiety and IVDA presented to [facility] ED on 9/19 with c/o N/V/D and weakness x ~5 days. The patient was found to be in septic shock with pneumonia and colitis and was transferred to [facility] ICU for further management. 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 CT was repeated on 9/25 and showed bilateral cavitary lesions likely secondary to septic emboli, small pericardial effusion and ileus. DHARMESH was performed on 9/25 which revealed mitral and tricuspid valve endocarditis. Blood cultures (9/19 - 9/26) have grown methicillin sensitive Staph aureus. Blood cultures as of 9/29 have been NGTD. Repeat Echo on 9/28 showed LVEF 35% and persistent vegatations. On 9/30 the patient had an MRI brain that showed remote lacunar infarcts without acute intracranial abnormality. On 10/1 MRI revealed spinal epidural abscess at L2-4 level. Thoracentesis was performed on 10/1. Cardiovascular surgery intervention was deferred. The patient was transferred to [facility] for neurosurgery evaluation of epidural abscess. Patient found to have b/l shoulder abscess for which surgery is consulted.    Current Facility-Administered Medications on File Prior to Encounter   Medication    lactated  ringers infusion    sodium chloride 0.9% flush 3 mL     Current Outpatient Medications on File Prior to Encounter   Medication Sig    diazepam (VALIUM) 5 MG tablet Take 10 mg by mouth 2 (two) times daily.        Review of patient's allergies indicates:   Allergen Reactions    Pcn [penicillins]        Past Medical History:   Diagnosis Date    Anxiety     Carotid bruit     Chronic pain     Cystitis     Cystocele, unspecified (CODE)     Depression     GI bleed     History of uterine fibroid     Shortness of breath on exertion     Spinal stenosis     Urinary retention      Past Surgical History:   Procedure Laterality Date    ANTERIOR VAGINAL REPAIR  10-    CARDIAC CATHETERIZATION  age 12    CHOLECYSTECTOMY  2015    COLONOSCOPY  12/12/2018    aborted due to poor colon prep    COLONOSCOPY N/A 12/12/2018    Procedure: COLONOSCOPY;  Surgeon: Renard Gonsalves MD;  [facility];  Service: Endoscopy;  Laterality: N/A;    HYSTERECTOMY  1989    AMANDA    tubiligation       Family History     Problem Relation (Age of Onset)    Alzheimer's disease Mother    Hypertension Brother, Sister    Osteoarthritis Mother    Thyroid disease Mother        Tobacco Use    Smoking status: Current Every Day Smoker     Packs/day: 0.50     Years: 40.00     Pack years: 20.00     Types: Cigarettes    Smokeless tobacco: Never Used   Substance and Sexual Activity    Alcohol use: No    Drug use: No    Sexual activity: Yes     Partners: Male     Review of Systems   Unable to perform ROS: Acuity of condition     Objective:     Vital Signs (Most Recent):  Temp: 99 °F (37.2 °C) (10/05/19 1900)  Pulse: (!) 112 (10/05/19 2115)  Resp: (!) 25 (10/05/19 2115)  BP: (!) 169/75 (10/05/19 2100)  SpO2: 96 % (10/05/19 2115) Vital Signs (24h Range):  Temp:  [98.2 °F (36.8 °C)-99 °F (37.2 °C)] 99 °F (37.2 °C)  Pulse:  [] 112  Resp:  [20-26] 25  SpO2:  [96 %-100 %] 96 %  BP: ()/(44-79) 169/75     Weight: 53.5 kg (118  lb)  Body mass index is 20.25 kg/m².    Physical Exam   Constitutional: She appears well-developed. No distress.   HENT:   Head: Normocephalic.   Eyes: Conjunctivae are normal. Right eye exhibits no discharge. Left eye exhibits no discharge.   Cardiovascular: Normal rate and regular rhythm.   Pulmonary/Chest: Effort normal.   Abdominal: Soft.   Musculoskeletal: She exhibits no edema.   Loculated lumps along posterior aspect of both shoulders.    Neurological:   Sedated   Skin: Skin is warm.   R shoulder large are of fluctuance with surrounding induration  L shoulder small area of fluctuance with surrounding induration       Significant Labs:  CBC:   Recent Labs   Lab 10/05/19  0500 10/05/19  0840   WBC 4.61  --    RBC 2.62*  --    HGB 7.9* 7.4*   HCT 25.0* 24.3*   *  --    MCV 95  --    MCH 30.2  --    MCHC 31.6*  --      CMP:   Recent Labs   Lab 10/05/19  0500  10/05/19  1558   GLU 74  74  74   < > 62*   CALCIUM 7.5*  7.5*  7.5*   < > 7.7*   ALBUMIN 1.4*  --   --    PROT 5.4*  --   --    *  148*  148*   < > 148*   K 4.2  4.2  4.2   < > 3.8   CO2 23  23  23   < > 21*   *  117*  117*   < > 118*   BUN 54*  54*  54*   < > 48*   CREATININE 1.5*  1.5*  1.5*   < > 1.5*   ALKPHOS 96  --   --    ALT 5*  --   --    AST 17  --   --    BILITOT 0.3  --   --     < > = values in this interval not displayed.           Assessment/Plan:     Abscess of upper extremity  62yo F with MSSA bacteremia and endocarditis, epidural abscess and superficial b/l shoulder abscesses    - I&D of b/l shoulders, packed and dressed  - Cultures sent  - Pt currently on oxicillin for MSSA bacteremia; recommend f/u of cultures to direct antibiotic therapy      VTE Risk Mitigation (From admission, onward)         Ordered     IP VTE HIGH RISK PATIENT  Once      10/02/19 2153     Place sequential compression device  Until discontinued      10/02/19 1946                Thank you for your consult. I will follow-up with  patient. Please contact us if you have any additional questions.    Bam Mcclendon MD  General Surgery  [facility]

## 2019-10-06 NOTE — ASSESSMENT & PLAN NOTE
64yo F with MSSA bacteremia and endocarditis, epidural abscess and superficial b/l shoulder abscesses    - I&D of b/l shoulders, packed and dressed  - Cultures sent  - Pt currently on oxicillin for MSSA bacteremia; recommend f/u of cultures to direct antibiotic therapy  - continue BID packing changes  - Will sign off. Please call with questions.

## 2019-10-06 NOTE — SUBJECTIVE & OBJECTIVE
Interval History/Significant Events: Somnolent overnight, with improvement in the afternoon. Neurologically stable on exam. Hypoglycemic and hypernatremic, resolved with D5W. Continuing oxacillin.     Review of Systems   Constitutional: Negative for chills.   HENT: Negative for sore throat and trouble swallowing.    Eyes: Negative for photophobia and pain.   Respiratory: Negative for cough and shortness of breath.    Cardiovascular: Negative for chest pain.   Gastrointestinal: Negative for abdominal pain.   Genitourinary: Negative for dysuria.   Musculoskeletal: Positive for back pain.   Neurological: Positive for weakness.     Objective:     Vital Signs (Most Recent):  Temp: 97.8 °F (36.6 °C) (10/06/19 1105)  Pulse: 97 (10/06/19 1345)  Resp: (!) 25 (10/06/19 1345)  BP: (!) 180/77 (10/06/19 1300)  SpO2: 99 % (10/06/19 1345) Vital Signs (24h Range):  Temp:  [97.5 °F (36.4 °C)-99 °F (37.2 °C)] 97.8 °F (36.6 °C)  Pulse:  [] 97  Resp:  [16-40] 25  SpO2:  [95 %-100 %] 99 %  BP: (143-199)/() 180/77   Weight: 53.5 kg (118 lb)  Body mass index is 20.25 kg/m².      Intake/Output Summary (Last 24 hours) at 10/6/2019 1357  Last data filed at 10/6/2019 1300  Gross per 24 hour   Intake 1861.98 ml   Output 4185 ml   Net -2323.02 ml       Physical Exam   Constitutional: She appears well-developed. She is cooperative. She has a sickly appearance. She appears ill. No distress. She is sedated and restrained.   HENT:   Head: Normocephalic and atraumatic.   Eyes: Right eye exhibits no discharge. Left eye exhibits no discharge. No scleral icterus. Pupils are unequal.   Neck: No tracheal deviation present.   Cardiovascular: Normal rate and regular rhythm.   Pulses:       Radial pulses are 2+ on the right side, and 2+ on the left side.        Dorsalis pedis pulses are 2+ on the right side, and 2+ on the left side.   Pulmonary/Chest: She has no decreased breath sounds. She has no wheezes. She has no rhonchi. She has no rales.    Chest tube in situ w/ clear output   Abdominal: Soft. Normal appearance. Bowel sounds are decreased. There is no tenderness.   Genitourinary:   Genitourinary Comments: Henriquez in place with clear yellow output   Rectal tube draining watery, dark green stool   Neurological: No cranial nerve deficit. GCS eye subscore is 3. GCS verbal subscore is 1. GCS motor subscore is 6.   Somnolent but following commands  GCS 15   Skin: Skin is warm, dry and intact. She is not diaphoretic.       Vents:  Vent Mode: Spont (10/05/19 1401)  Ventilator Initiated: Yes (10/02/19 2200)  Set Rate: 20 bmp (10/05/19 1303)  Vt Set: 350 mL (10/05/19 1303)  Pressure Support: 10 cmH20 (10/05/19 1401)  PEEP/CPAP: 5 cmH20 (10/05/19 1401)  Oxygen Concentration (%): 28 (10/05/19 1502)  Peak Airway Pressure: 16 cmH2O (10/05/19 1401)  Plateau Pressure: 14 cmH20 (10/05/19 1401)  Total Ve: 12.6 mL (10/05/19 1401)  Negative Inspiratory Force (cm H2O): -31 (10/05/19 1510)  F/VT Ratio<105 (RSBI): (!) 27.89 (10/05/19 1401)  Lines/Drains/Airways     Central Venous Catheter Line                 Percutaneous Central Line Insertion/Assessment - triple lumen  10/03/19 0114 right internal jugular 3 days          Drain                 Urethral Catheter 09/19/19 1240 Double-lumen 16 Fr. 17 days         Rectal Tube 09/27/19 1000 rectal tube w/ balloon (indicate number of mLs) 9 days         Chest Tube 10/03/19 0500 Right Midaxillary 3 days          Peripheral Intravenous Line                 Peripheral IV - Single Lumen 10/03/19 0300 20 G Left Antecubital 3 days              Significant Labs:    CBC/Anemia Profile:  Recent Labs   Lab 10/05/19  0500 10/05/19  0840 10/05/19  2204 10/06/19  0332   WBC 4.61  --  5.15 6.21   HGB 7.9* 7.4* 8.2* 8.9*   HCT 25.0* 24.3* 26.9* 28.7*   *  --  179 200   MCV 95  --  97 97   RDW 18.0*  --  17.3* 17.3*        Chemistries:  Recent Labs   Lab 10/05/19  0500  10/05/19  2204 10/06/19  0332 10/06/19  1004   *  148*  148*    < > 148* 148*  148* 144   K 4.2  4.2  4.2   < > 3.0* 3.8  3.8 3.0*   *  117*  117*   < > 117* 116*  116* 113*   CO2 23  23  23   < > 21* 21*  21* 19*   BUN 54*  54*  54*   < > 45* 41*  41* 38*   CREATININE 1.5*  1.5*  1.5*   < > 1.5* 1.4  1.4 1.3   CALCIUM 7.5*  7.5*  7.5*   < > 7.7* 7.7*  7.7* 7.8*   ALBUMIN 1.4*  --   --  1.6*  --    PROT 5.4*  --   --  6.0  --    BILITOT 0.3  --   --  0.4  --    ALKPHOS 96  --   --  106  --    ALT 5*  --   --  5*  --    AST 17  --   --  24  --    MG 1.9  --   --  1.6  --    PHOS 4.8*  --   --  3.6  --     < > = values in this interval not displayed.       ABGs:   Recent Labs   Lab 10/06/19  0934   PH 7.461*   PCO2 28.2*   HCO3 20.1*   POCSATURATED 71*   BE -4     Blood Culture: No results for input(s): LABBLOO in the last 48 hours.  All pertinent labs within the past 24 hours have been reviewed.    Significant Imaging:  I have reviewed all pertinent imaging results/findings within the past 24 hours.

## 2019-10-07 PROBLEM — L02.91 ABSCESS: Status: ACTIVE | Noted: 2019-10-07

## 2019-10-07 PROBLEM — I63.81 RIGHT THALAMIC STROKE: Status: ACTIVE | Noted: 2019-10-07

## 2019-10-07 LAB
ALBUMIN SERPL BCP-MCNC: 1.6 G/DL (ref 3.5–5.2)
ALLENS TEST: ABNORMAL
ALLENS TEST: ABNORMAL
ALP SERPL-CCNC: 98 U/L (ref 55–135)
ALT SERPL W/O P-5'-P-CCNC: 5 U/L (ref 10–44)
ANION GAP SERPL CALC-SCNC: 10 MMOL/L (ref 8–16)
ANION GAP SERPL CALC-SCNC: 10 MMOL/L (ref 8–16)
ANION GAP SERPL CALC-SCNC: 7 MMOL/L (ref 8–16)
ANION GAP SERPL CALC-SCNC: 8 MMOL/L (ref 8–16)
ANION GAP SERPL CALC-SCNC: 8 MMOL/L (ref 8–16)
AST SERPL-CCNC: 20 U/L (ref 10–40)
BACTERIA BLD CULT: NORMAL
BACTERIA SPEC AEROBE CULT: NO GROWTH
BACTERIA SPEC ANAEROBE CULT: NORMAL
BASOPHILS # BLD AUTO: 0.04 K/UL (ref 0–0.2)
BASOPHILS NFR BLD: 0.6 % (ref 0–1.9)
BILIRUB SERPL-MCNC: 0.3 MG/DL (ref 0.1–1)
BUN SERPL-MCNC: 26 MG/DL (ref 8–23)
BUN SERPL-MCNC: 27 MG/DL (ref 8–23)
BUN SERPL-MCNC: 29 MG/DL (ref 8–23)
BUN SERPL-MCNC: 33 MG/DL (ref 8–23)
BUN SERPL-MCNC: 33 MG/DL (ref 8–23)
CA-I BLDV-SCNC: 1.07 MMOL/L (ref 1.06–1.42)
CALCIUM SERPL-MCNC: 7.3 MG/DL (ref 8.7–10.5)
CALCIUM SERPL-MCNC: 7.4 MG/DL (ref 8.7–10.5)
CALCIUM SERPL-MCNC: 7.5 MG/DL (ref 8.7–10.5)
CALCIUM SERPL-MCNC: 7.6 MG/DL (ref 8.7–10.5)
CALCIUM SERPL-MCNC: 7.6 MG/DL (ref 8.7–10.5)
CHLORIDE SERPL-SCNC: 109 MMOL/L (ref 95–110)
CHLORIDE SERPL-SCNC: 110 MMOL/L (ref 95–110)
CHLORIDE SERPL-SCNC: 110 MMOL/L (ref 95–110)
CHLORIDE SERPL-SCNC: 111 MMOL/L (ref 95–110)
CHLORIDE SERPL-SCNC: 111 MMOL/L (ref 95–110)
CO2 SERPL-SCNC: 19 MMOL/L (ref 23–29)
CO2 SERPL-SCNC: 20 MMOL/L (ref 23–29)
CO2 SERPL-SCNC: 21 MMOL/L (ref 23–29)
CREAT SERPL-MCNC: 0.9 MG/DL (ref 0.5–1.4)
CREAT SERPL-MCNC: 1 MG/DL (ref 0.5–1.4)
CREAT SERPL-MCNC: 1.1 MG/DL (ref 0.5–1.4)
CREAT SERPL-MCNC: 1.2 MG/DL (ref 0.5–1.4)
CREAT SERPL-MCNC: 1.2 MG/DL (ref 0.5–1.4)
DELSYS: ABNORMAL
DELSYS: ABNORMAL
DIFFERENTIAL METHOD: ABNORMAL
EOSINOPHIL # BLD AUTO: 0.1 K/UL (ref 0–0.5)
EOSINOPHIL NFR BLD: 1.9 % (ref 0–8)
ERYTHROCYTE [DISTWIDTH] IN BLOOD BY AUTOMATED COUNT: 17.2 % (ref 11.5–14.5)
EST. GFR  (AFRICAN AMERICAN): 54.8 ML/MIN/1.73 M^2
EST. GFR  (AFRICAN AMERICAN): 54.8 ML/MIN/1.73 M^2
EST. GFR  (AFRICAN AMERICAN): >60 ML/MIN/1.73 M^2
EST. GFR  (NON AFRICAN AMERICAN): 47.5 ML/MIN/1.73 M^2
EST. GFR  (NON AFRICAN AMERICAN): 47.5 ML/MIN/1.73 M^2
EST. GFR  (NON AFRICAN AMERICAN): 52.8 ML/MIN/1.73 M^2
EST. GFR  (NON AFRICAN AMERICAN): 59.3 ML/MIN/1.73 M^2
EST. GFR  (NON AFRICAN AMERICAN): >60 ML/MIN/1.73 M^2
GLUCOSE SERPL-MCNC: 121 MG/DL (ref 70–110)
GLUCOSE SERPL-MCNC: 132 MG/DL (ref 70–110)
GLUCOSE SERPL-MCNC: 134 MG/DL (ref 70–110)
GLUCOSE SERPL-MCNC: 134 MG/DL (ref 70–110)
GLUCOSE SERPL-MCNC: 148 MG/DL (ref 70–110)
HCO3 UR-SCNC: 18.7 MMOL/L (ref 24–28)
HCO3 UR-SCNC: 19.3 MMOL/L (ref 24–28)
HCT VFR BLD AUTO: 30.6 % (ref 37–48.5)
HGB BLD-MCNC: 9.4 G/DL (ref 12–16)
IMM GRANULOCYTES # BLD AUTO: 0.31 K/UL (ref 0–0.04)
IMM GRANULOCYTES NFR BLD AUTO: 4.3 % (ref 0–0.5)
LYMPHOCYTES # BLD AUTO: 1.6 K/UL (ref 1–4.8)
LYMPHOCYTES NFR BLD: 21.9 % (ref 18–48)
MAGNESIUM SERPL-MCNC: 1.2 MG/DL (ref 1.6–2.6)
MCH RBC QN AUTO: 29.5 PG (ref 27–31)
MCHC RBC AUTO-ENTMCNC: 30.7 G/DL (ref 32–36)
MCV RBC AUTO: 96 FL (ref 82–98)
MONOCYTES # BLD AUTO: 0.5 K/UL (ref 0.3–1)
MONOCYTES NFR BLD: 7.1 % (ref 4–15)
NEUTROPHILS # BLD AUTO: 4.6 K/UL (ref 1.8–7.7)
NEUTROPHILS NFR BLD: 64.2 % (ref 38–73)
NRBC BLD-RTO: 0 /100 WBC
PCO2 BLDA: 13.1 MMHG (ref 35–45)
PCO2 BLDA: 23.1 MMHG (ref 35–45)
PH SMN: 7.53 [PH] (ref 7.35–7.45)
PH SMN: 7.76 [PH] (ref 7.35–7.45)
PHOSPHATE SERPL-MCNC: 3.3 MG/DL (ref 2.7–4.5)
PLATELET # BLD AUTO: 252 K/UL (ref 150–350)
PMV BLD AUTO: 11.6 FL (ref 9.2–12.9)
PO2 BLDA: 128 MMHG (ref 80–100)
PO2 BLDA: 71 MMHG (ref 80–100)
POC BE: -3 MMOL/L
POC BE: 0 MMOL/L
POC SATURATED O2: 100 % (ref 95–100)
POC SATURATED O2: 96 % (ref 95–100)
POC TCO2: 19 MMOL/L (ref 23–27)
POC TCO2: 20 MMOL/L (ref 23–27)
POCT GLUCOSE: 122 MG/DL (ref 70–110)
POCT GLUCOSE: 127 MG/DL (ref 70–110)
POCT GLUCOSE: 130 MG/DL (ref 70–110)
POCT GLUCOSE: 132 MG/DL (ref 70–110)
POCT GLUCOSE: 138 MG/DL (ref 70–110)
POCT GLUCOSE: 150 MG/DL (ref 70–110)
POTASSIUM SERPL-SCNC: 2.8 MMOL/L (ref 3.5–5.1)
POTASSIUM SERPL-SCNC: 2.8 MMOL/L (ref 3.5–5.1)
POTASSIUM SERPL-SCNC: 3.4 MMOL/L (ref 3.5–5.1)
POTASSIUM SERPL-SCNC: 3.6 MMOL/L (ref 3.5–5.1)
POTASSIUM SERPL-SCNC: 4 MMOL/L (ref 3.5–5.1)
PROT SERPL-MCNC: 5.9 G/DL (ref 6–8.4)
RBC # BLD AUTO: 3.19 M/UL (ref 4–5.4)
SAMPLE: ABNORMAL
SAMPLE: ABNORMAL
SITE: ABNORMAL
SITE: ABNORMAL
SODIUM SERPL-SCNC: 137 MMOL/L (ref 136–145)
SODIUM SERPL-SCNC: 138 MMOL/L (ref 136–145)
SODIUM SERPL-SCNC: 139 MMOL/L (ref 136–145)
SODIUM SERPL-SCNC: 140 MMOL/L (ref 136–145)
SODIUM SERPL-SCNC: 140 MMOL/L (ref 136–145)
WBC # BLD AUTO: 7.22 K/UL (ref 3.9–12.7)

## 2019-10-07 PROCEDURE — 25000003 PHARM REV CODE 250: Performed by: INTERNAL MEDICINE

## 2019-10-07 PROCEDURE — 63600175 PHARM REV CODE 636 W HCPCS: Performed by: STUDENT IN AN ORGANIZED HEALTH CARE EDUCATION/TRAINING PROGRAM

## 2019-10-07 PROCEDURE — 63600175 PHARM REV CODE 636 W HCPCS: Performed by: NURSE PRACTITIONER

## 2019-10-07 PROCEDURE — 99291 PR CRITICAL CARE, E/M 30-74 MINUTES: ICD-10-PCS | Mod: ,,, | Performed by: NURSE PRACTITIONER

## 2019-10-07 PROCEDURE — 99223 PR INITIAL HOSPITAL CARE,LEVL III: ICD-10-PCS | Mod: ,,, | Performed by: PSYCHIATRY & NEUROLOGY

## 2019-10-07 PROCEDURE — 92526 ORAL FUNCTION THERAPY: CPT

## 2019-10-07 PROCEDURE — 20000000 HC ICU ROOM

## 2019-10-07 PROCEDURE — 94640 AIRWAY INHALATION TREATMENT: CPT

## 2019-10-07 PROCEDURE — 99900035 HC TECH TIME PER 15 MIN (STAT)

## 2019-10-07 PROCEDURE — 80053 COMPREHEN METABOLIC PANEL: CPT

## 2019-10-07 PROCEDURE — 94761 N-INVAS EAR/PLS OXIMETRY MLT: CPT

## 2019-10-07 PROCEDURE — 84100 ASSAY OF PHOSPHORUS: CPT

## 2019-10-07 PROCEDURE — 85025 COMPLETE CBC W/AUTO DIFF WBC: CPT

## 2019-10-07 PROCEDURE — 97535 SELF CARE MNGMENT TRAINING: CPT

## 2019-10-07 PROCEDURE — 36600 WITHDRAWAL OF ARTERIAL BLOOD: CPT

## 2019-10-07 PROCEDURE — 99232 SBSQ HOSP IP/OBS MODERATE 35: CPT | Mod: ,,, | Performed by: INTERNAL MEDICINE

## 2019-10-07 PROCEDURE — 99232 PR SUBSEQUENT HOSPITAL CARE,LEVL II: ICD-10-PCS | Mod: ,,, | Performed by: INTERNAL MEDICINE

## 2019-10-07 PROCEDURE — S0028 INJECTION, FAMOTIDINE, 20 MG: HCPCS | Performed by: NURSE PRACTITIONER

## 2019-10-07 PROCEDURE — 25000003 PHARM REV CODE 250: Performed by: NURSE PRACTITIONER

## 2019-10-07 PROCEDURE — 25000242 PHARM REV CODE 250 ALT 637 W/ HCPCS: Performed by: NURSE PRACTITIONER

## 2019-10-07 PROCEDURE — 83735 ASSAY OF MAGNESIUM: CPT

## 2019-10-07 PROCEDURE — 99223 1ST HOSP IP/OBS HIGH 75: CPT | Mod: ,,, | Performed by: PSYCHIATRY & NEUROLOGY

## 2019-10-07 PROCEDURE — S0030 INJECTION, METRONIDAZOLE: HCPCS | Performed by: INTERNAL MEDICINE

## 2019-10-07 PROCEDURE — 63600175 PHARM REV CODE 636 W HCPCS: Performed by: INTERNAL MEDICINE

## 2019-10-07 PROCEDURE — 27000221 HC OXYGEN, UP TO 24 HOURS

## 2019-10-07 PROCEDURE — 99291 CRITICAL CARE FIRST HOUR: CPT | Mod: ,,, | Performed by: NURSE PRACTITIONER

## 2019-10-07 PROCEDURE — 80048 BASIC METABOLIC PNL TOTAL CA: CPT

## 2019-10-07 PROCEDURE — 82803 BLOOD GASES ANY COMBINATION: CPT

## 2019-10-07 PROCEDURE — 97803 MED NUTRITION INDIV SUBSEQ: CPT

## 2019-10-07 PROCEDURE — 82330 ASSAY OF CALCIUM: CPT

## 2019-10-07 RX ORDER — POTASSIUM CHLORIDE 7.45 MG/ML
10 INJECTION INTRAVENOUS
Status: DISCONTINUED | OUTPATIENT
Start: 2019-10-07 | End: 2019-10-07

## 2019-10-07 RX ORDER — MAGNESIUM SULFATE HEPTAHYDRATE 40 MG/ML
2 INJECTION, SOLUTION INTRAVENOUS
Status: DISCONTINUED | OUTPATIENT
Start: 2019-10-07 | End: 2019-10-07

## 2019-10-07 RX ORDER — ENOXAPARIN SODIUM 100 MG/ML
30 INJECTION SUBCUTANEOUS EVERY 24 HOURS
Status: DISCONTINUED | OUTPATIENT
Start: 2019-10-07 | End: 2019-10-08

## 2019-10-07 RX ORDER — MAGNESIUM SULFATE HEPTAHYDRATE 40 MG/ML
2 INJECTION, SOLUTION INTRAVENOUS
Status: COMPLETED | OUTPATIENT
Start: 2019-10-07 | End: 2019-10-07

## 2019-10-07 RX ORDER — POTASSIUM CHLORIDE 29.8 MG/ML
40 INJECTION INTRAVENOUS ONCE
Status: COMPLETED | OUTPATIENT
Start: 2019-10-07 | End: 2019-10-07

## 2019-10-07 RX ORDER — DEXTROSE MONOHYDRATE 50 MG/ML
INJECTION, SOLUTION INTRAVENOUS CONTINUOUS
Status: DISCONTINUED | OUTPATIENT
Start: 2019-10-07 | End: 2019-10-08

## 2019-10-07 RX ORDER — ACETAMINOPHEN 650 MG/1
650 SUPPOSITORY RECTAL ONCE
Status: DISCONTINUED | OUTPATIENT
Start: 2019-10-07 | End: 2019-10-07

## 2019-10-07 RX ORDER — HYDRALAZINE HYDROCHLORIDE 20 MG/ML
10 INJECTION INTRAMUSCULAR; INTRAVENOUS EVERY 8 HOURS PRN
Status: DISCONTINUED | OUTPATIENT
Start: 2019-10-07 | End: 2019-10-08

## 2019-10-07 RX ORDER — POTASSIUM CHLORIDE 14.9 MG/ML
20 INJECTION INTRAVENOUS ONCE
Status: COMPLETED | OUTPATIENT
Start: 2019-10-07 | End: 2019-10-07

## 2019-10-07 RX ORDER — POTASSIUM CHLORIDE 29.8 MG/ML
80 INJECTION INTRAVENOUS
Status: DISCONTINUED | OUTPATIENT
Start: 2019-10-07 | End: 2019-10-11

## 2019-10-07 RX ORDER — POTASSIUM CHLORIDE 7.45 MG/ML
40 INJECTION INTRAVENOUS
Status: DISCONTINUED | OUTPATIENT
Start: 2019-10-07 | End: 2019-10-07

## 2019-10-07 RX ORDER — MAGNESIUM SULFATE HEPTAHYDRATE 40 MG/ML
4 INJECTION, SOLUTION INTRAVENOUS
Status: DISCONTINUED | OUTPATIENT
Start: 2019-10-07 | End: 2019-10-07

## 2019-10-07 RX ORDER — POTASSIUM CHLORIDE 7.45 MG/ML
80 INJECTION INTRAVENOUS
Status: DISCONTINUED | OUTPATIENT
Start: 2019-10-07 | End: 2019-10-07

## 2019-10-07 RX ORDER — ACETAMINOPHEN 325 MG/1
650 TABLET ORAL EVERY 6 HOURS PRN
Status: DISCONTINUED | OUTPATIENT
Start: 2019-10-07 | End: 2019-10-25 | Stop reason: HOSPADM

## 2019-10-07 RX ORDER — POTASSIUM CHLORIDE 14.9 MG/ML
60 INJECTION INTRAVENOUS
Status: DISCONTINUED | OUTPATIENT
Start: 2019-10-07 | End: 2019-10-11

## 2019-10-07 RX ORDER — MAGNESIUM SULFATE HEPTAHYDRATE 40 MG/ML
4 INJECTION, SOLUTION INTRAVENOUS
Status: DISCONTINUED | OUTPATIENT
Start: 2019-10-07 | End: 2019-10-11

## 2019-10-07 RX ORDER — POTASSIUM CHLORIDE 29.8 MG/ML
40 INJECTION INTRAVENOUS
Status: DISCONTINUED | OUTPATIENT
Start: 2019-10-07 | End: 2019-10-11

## 2019-10-07 RX ORDER — POTASSIUM CHLORIDE 7.45 MG/ML
60 INJECTION INTRAVENOUS
Status: DISCONTINUED | OUTPATIENT
Start: 2019-10-07 | End: 2019-10-07

## 2019-10-07 RX ORDER — MAGNESIUM SULFATE HEPTAHYDRATE 40 MG/ML
2 INJECTION, SOLUTION INTRAVENOUS
Status: DISCONTINUED | OUTPATIENT
Start: 2019-10-07 | End: 2019-10-11

## 2019-10-07 RX ADMIN — MAGNESIUM SULFATE IN WATER 2 G: 40 INJECTION, SOLUTION INTRAVENOUS at 10:10

## 2019-10-07 RX ADMIN — MICONAZOLE NITRATE: 20 OINTMENT TOPICAL at 09:10

## 2019-10-07 RX ADMIN — IPRATROPIUM BROMIDE AND ALBUTEROL SULFATE 3 ML: .5; 3 SOLUTION RESPIRATORY (INHALATION) at 11:10

## 2019-10-07 RX ADMIN — POTASSIUM CHLORIDE 60 MEQ: 200 INJECTION, SOLUTION INTRAVENOUS at 11:10

## 2019-10-07 RX ADMIN — POTASSIUM CHLORIDE 40 MEQ: 400 INJECTION, SOLUTION INTRAVENOUS at 06:10

## 2019-10-07 RX ADMIN — METRONIDAZOLE 500 MG: 500 INJECTION, SOLUTION INTRAVENOUS at 04:10

## 2019-10-07 RX ADMIN — METRONIDAZOLE 500 MG: 500 INJECTION, SOLUTION INTRAVENOUS at 09:10

## 2019-10-07 RX ADMIN — IPRATROPIUM BROMIDE AND ALBUTEROL SULFATE 3 ML: .5; 3 SOLUTION RESPIRATORY (INHALATION) at 07:10

## 2019-10-07 RX ADMIN — ENOXAPARIN SODIUM 30 MG: 100 INJECTION SUBCUTANEOUS at 07:10

## 2019-10-07 RX ADMIN — CHLORHEXIDINE GLUCONATE 0.12% ORAL RINSE 15 ML: 1.2 LIQUID ORAL at 08:10

## 2019-10-07 RX ADMIN — POTASSIUM CHLORIDE 20 MEQ: 200 INJECTION, SOLUTION INTRAVENOUS at 06:10

## 2019-10-07 RX ADMIN — OXACILLIN SODIUM 12 G: 10 INJECTION, POWDER, FOR SOLUTION INTRAVENOUS at 11:10

## 2019-10-07 RX ADMIN — LABETALOL HCL IV SOLN PREFILLED SYRINGE 20 MG/4ML (5 MG/ML) 10 MG: 20/4 SOLUTION PREFILLED SYRINGE at 12:10

## 2019-10-07 RX ADMIN — DEXTROSE: 5 SOLUTION INTRAVENOUS at 03:10

## 2019-10-07 RX ADMIN — IPRATROPIUM BROMIDE AND ALBUTEROL SULFATE 3 ML: .5; 3 SOLUTION RESPIRATORY (INHALATION) at 03:10

## 2019-10-07 RX ADMIN — METRONIDAZOLE 500 MG: 500 INJECTION, SOLUTION INTRAVENOUS at 11:10

## 2019-10-07 RX ADMIN — LABETALOL HCL IV SOLN PREFILLED SYRINGE 20 MG/4ML (5 MG/ML) 10 MG: 20/4 SOLUTION PREFILLED SYRINGE at 05:10

## 2019-10-07 RX ADMIN — MICONAZOLE NITRATE: 20 OINTMENT TOPICAL at 01:10

## 2019-10-07 RX ADMIN — MAGNESIUM SULFATE IN WATER 2 G: 40 INJECTION, SOLUTION INTRAVENOUS at 08:10

## 2019-10-07 RX ADMIN — IPRATROPIUM BROMIDE AND ALBUTEROL SULFATE 3 ML: .5; 3 SOLUTION RESPIRATORY (INHALATION) at 09:10

## 2019-10-07 RX ADMIN — LABETALOL HCL IV SOLN PREFILLED SYRINGE 20 MG/4ML (5 MG/ML) 10 MG: 20/4 SOLUTION PREFILLED SYRINGE at 09:10

## 2019-10-07 RX ADMIN — FAMOTIDINE 20 MG: 10 INJECTION, SOLUTION INTRAVENOUS at 08:10

## 2019-10-07 RX ADMIN — DEXTROSE MONOHYDRATE: 5 INJECTION, SOLUTION INTRAVENOUS at 01:10

## 2019-10-07 RX ADMIN — POTASSIUM CHLORIDE 20 MEQ: 200 INJECTION, SOLUTION INTRAVENOUS at 03:10

## 2019-10-07 NOTE — ASSESSMENT & PLAN NOTE
--MRI concerning for epidural abscess at L2-4 region  --neurosurgery consulted and following; possible OR for abscess drainage this Wednesday (10/9)

## 2019-10-07 NOTE — ASSESSMENT & PLAN NOTE
--baseline creatinine ~ 0.7; was 2.9 on initial presentation to OSH, improved with hydration over hospital course  --FeNa/FeUrea consistent with pre-renal etiology; possible component of ATN given hypotension   --non anion gap metabolic acidosis on admit, briefly on sodium bicarb drip   --now improving; will continue to monitor

## 2019-10-07 NOTE — PLAN OF CARE
K resulted 2.8, requested standing PRN replacements, one time dose of 60 mEq received. SBP >190, 20 mg Labetalol given, HTN not relieved. Afebrile. Pt remains oriented to self but disoriented to place and time. -200 cc/h. Pupils intermittently unequal in size, MD aware, no neurological deficits noted overnight. Plan of care reviewed with patient and spouse, verbalized understanding, reinforcement needed.

## 2019-10-07 NOTE — ASSESSMENT & PLAN NOTE
Multifactorial secondary to complete left lung collapse, septic emboli, pleural effusions, suspected pna. Intubated shortly after arrival for hypoxemia. s/p bronch x2 with significant clearance of secretions from left mainstem & improved aeration on f/u xray. s/p thora 10/1; transudative based on lights criteria, though concern for empyema based on OSH pulm note. right chest tube placed 10/3, 345 mL output after TPA administration; pleural fluid with GPC on gram stain. CTA neg for PE    -- will continue to follow culture results  -- Extubated 10/5/19; now on RA  -- continue oxacillin  --R Chest tube removed

## 2019-10-07 NOTE — PROGRESS NOTES
"[facility]  Adult Nutrition  Progress Note    SUMMARY       Recommendations  Recommendation/Intervention:   1. As medically able, recommend initiating TF of Impact Peptide 1.5 at a goal rate of 35 mL/hr - to provide 1260 kcal/day, 79g protein/day, and 647mL free fluid/day. 2. If unable to initiate TF, recommend initiating Custom TPN 70g AA and 190g Dextrose + IV lipids daily - to provide 1426 kcal/day and 70g protein/day.   RD to monitor.    Goals: Patient to receive nutrition by RD follow-up  Nutrition Goal Status: goal not met  Communication of RD Recs: (POC)    Reason for Assessment  Reason For Assessment: RD follow-up  Diagnosis: (respiratory failure)  Relevant Medical History: IVDU, endocarditis  General Information Comments: Extubated 10/5. Patient with AMS/disoriented. SLP recommended NPO status. NFPE completed 9/23, patient continues with severe malnutrition.  Nutrition Discharge Planning: Unable to determine at this time.    Nutrition Risk Screen  Nutrition Risk Screen: dysphagia or difficulty swallowing    Nutrition/Diet History  Spiritual, Cultural Beliefs, Latter-day Practices, Values that Affect Care: no  Factors Affecting Nutritional Intake: NPO    Anthropometrics  Temp: 98.3 °F (36.8 °C)  Height Method: Stated  Height: 5' 4" (162.6 cm)  Height (inches): 64 in  Weight Method: Bed Scale  Weight: 53.5 kg (118 lb)  Weight (lb): 118 lb  Ideal Body Weight (IBW), Female: 120 lb  % Ideal Body Weight, Female (lb): 98.33 lb  BMI (Calculated): 20.3  BMI Grade: 18.5-24.9 - normal    Lab/Procedures/Meds  Pertinent Labs Reviewed: reviewed  Pertinent Labs Comments: K 2.8, BUN 33, Glu 134, Ca 7.6, Alb 1.6  Pertinent Medications Reviewed: reviewed  Pertinent Medications Comments: famotidine, D5    Estimated/Assessed Needs  Weight Used For Calorie Calculations: 47.4 kg (104 lb 8 oz)(admit weight)  Energy Calorie Requirements (kcal): 1422 kcal/day  Energy Need Method: Kcal/kg(30)  Protein " Requirements: 57-71 g/day(1.2-1.5 g/kg)  Weight Used For Protein Calculations: 47.4 kg (104 lb 8 oz)(admit weight)  Fluid Requirements (mL): 1 mL/kcal or per MD  Estimated Fluid Requirement Method: RDA Method  RDA Method (mL): 1422    Nutrition Prescription Ordered  Current Diet Order: NPO    Evaluation of Received Nutrient/Fluid Intake  Other Calories (kcal): 306(D5)  I/O: Noted  Comments: LBM 10/5  % Intake of Estimated Energy Needs: 0 - 25 %  % Meal Intake: NPO    Nutrition Risk  Level of Risk/Frequency of Follow-up: high(2x/week)     Assessment and Plan  Severe malnutrition  Nutrition Problem  Malnutrition in the context of Acute Illness/Injury    Related to (etiology):  Inadequate energy intake and increased nutrient needs    Signs and Symptoms (as evidenced by):  Energy Intake: <50% of estimated energy requirement for 8 days  Body Fat Depletion: mild depletion of orbitals, triceps and thoracic and lumbar region   Muscle Mass Depletion: mild and moderate depletion of temples, clavicle region, scapular region, interosseous muscle and lower extremities   Weight Loss: 22.6% x 1 year   Fluid Accumulation: mild    Interventions (treatment strategy):  Collaboration of nutrition care with other providers    Nutrition Diagnosis Status:  Continues    Monitor and Evaluation  Food and Nutrient Intake: energy intake  Food and Nutrient Adminstration: enteral and parenteral nutrition administration  Anthropometric Measurements: weight, weight change  Biochemical Data, Medical Tests and Procedures: electrolyte and renal panel, gastrointestinal profile, inflammatory profile  Nutrition-Focused Physical Findings: overall appearance     Malnutrition Assessment  Malnutrition Type: acute illness or injury  Orbital Region (Subcutaneous Fat Loss): mild depletion  Upper Arm Region (Subcutaneous Fat Loss): mild depletion  Thoracic and Lumbar Region: mild depletion   Jain Region (Muscle Loss): moderate depletion  Clavicle Bone Region  (Muscle Loss): moderate depletion  Clavicle and Acromion Bone Region (Muscle Loss): moderate depletion  Scapular Bone Region (Muscle Loss): mild depletion  Dorsal Hand (Muscle Loss): mild depletion  Patellar Region (Muscle Loss): mild depletion  Anterior Thigh Region (Muscle Loss): mild depletion  Posterior Calf Region (Muscle Loss): mild depletion   Edema (Fluid Accumulation): 0-->no edema present     Nutrition Follow-Up  RD Follow-up?: Yes

## 2019-10-07 NOTE — SUBJECTIVE & OBJECTIVE
Interval History/Significant Events: No acute overnight events. Patient slightly tachypneic, sent for CXR which was unremarkable. Repeat head CT done showing new R basal ganglia infarcts. Will repeat STAT MRI.    Review of Systems   Unable to perform ROS: Mental status change     Objective:     Vital Signs (Most Recent):  Temp: 98.7 °F (37.1 °C) (10/07/19 1100)  Pulse: 89 (10/07/19 1533)  Resp: (!) 26 (10/07/19 1533)  BP: (!) 186/90 (10/07/19 1101)  SpO2: 100 % (10/07/19 1533) Vital Signs (24h Range):  Temp:  [98 °F (36.7 °C)-98.7 °F (37.1 °C)] 98.7 °F (37.1 °C)  Pulse:  [] 89  Resp:  [19-37] 26  SpO2:  [91 %-100 %] 100 %  BP: (147-195)/() 186/90   Weight: 53.5 kg (118 lb)  Body mass index is 20.25 kg/m².      Intake/Output Summary (Last 24 hours) at 10/7/2019 1647  Last data filed at 10/7/2019 1600  Gross per 24 hour   Intake 2763.2 ml   Output 2305 ml   Net 458.2 ml       Physical Exam   Constitutional: She appears lethargic. She is sleeping. She has a sickly appearance. She appears ill.   Cardiovascular: Normal rate and regular rhythm.   Pulses:       Radial pulses are 1+ on the right side, and 1+ on the left side.        Dorsalis pedis pulses are 1+ on the right side, and 1+ on the left side.   Pulmonary/Chest: Tachypnea noted. She has decreased breath sounds in the right upper field, the right middle field, the right lower field, the left upper field, the left middle field and the left lower field.   Abdominal: Normal appearance.   Neurological: She appears lethargic. GCS eye subscore is 3. GCS verbal subscore is 4. GCS motor subscore is 5.   Skin: Skin is warm, dry and intact.       Vents:  Vent Mode: Spont (10/05/19 1401)  Ventilator Initiated: Yes (10/02/19 2200)  Set Rate: 20 bmp (10/05/19 1303)  Vt Set: 350 mL (10/05/19 1303)  Pressure Support: 10 cmH20 (10/05/19 1401)  PEEP/CPAP: 5 cmH20 (10/05/19 1401)  Oxygen Concentration (%): 28 (10/05/19 1502)  Peak Airway Pressure: 16 cmH2O (10/05/19  1401)  Plateau Pressure: 14 cmH20 (10/05/19 1401)  Total Ve: 12.6 mL (10/05/19 1401)  Negative Inspiratory Force (cm H2O): -31 (10/05/19 1510)  F/VT Ratio<105 (RSBI): (!) 27.89 (10/05/19 1401)  Lines/Drains/Airways     Central Venous Catheter Line                 Percutaneous Central Line Insertion/Assessment - triple lumen  10/03/19 0114 right internal jugular 4 days          Drain                 Urethral Catheter 09/19/19 1240 Double-lumen 16 Fr. 18 days         Rectal Tube 09/27/19 1000 rectal tube w/ balloon (indicate number of mLs) 10 days          Peripheral Intravenous Line                 Peripheral IV - Single Lumen 10/03/19 0300 20 G Left Antecubital 4 days              Significant Labs:    CBC/Anemia Profile:  Recent Labs   Lab 10/05/19  2204 10/06/19  0332 10/07/19  0332   WBC 5.15 6.21 7.22   HGB 8.2* 8.9* 9.4*   HCT 26.9* 28.7* 30.6*    200 252   MCV 97 97 96   RDW 17.3* 17.3* 17.2*        Chemistries:  Recent Labs   Lab 10/06/19  0332  10/06/19  2111 10/07/19  0332 10/07/19  1215   *  148*   < > 146* 140  140 139   K 3.8  3.8   < > 3.4* 2.8*  2.8* 3.6   *  116*   < > 115* 110  110 111*   CO2 21*  21*   < > 20* 20*  20* 20*   BUN 41*  41*   < > 35* 33*  33* 29*   CREATININE 1.4  1.4   < > 1.2 1.2  1.2 1.1   CALCIUM 7.7*  7.7*   < > 7.6* 7.6*  7.6* 7.3*   ALBUMIN 1.6*  --   --  1.6*  --    PROT 6.0  --   --  5.9*  --    BILITOT 0.4  --   --  0.3  --    ALKPHOS 106  --   --  98  --    ALT 5*  --   --  5*  --    AST 24  --   --  20  --    MG 1.6  --   --  1.2*  --    PHOS 3.6  --   --  3.3  --     < > = values in this interval not displayed.       All pertinent labs within the past 24 hours have been reviewed.    Significant Imaging:  I have reviewed all pertinent imaging results/findings within the past 24 hours.

## 2019-10-07 NOTE — ASSESSMENT & PLAN NOTE
--likely secondary to sepsis/metabolic processes  --Brain MRI 9/30 with lacunar infarcts  --repeat head CT (10/7) revealed new right basal ganglia infarcts  --continue ABx regimen (oxacillin and flagyl)

## 2019-10-07 NOTE — PT/OT/SLP PROGRESS
"Speech Language Pathology Treatment    Patient Name:  Mesha Mckenzie   MRN:  [id number]   [id number]    Admitting Diagnosis: Staphylococcus aureus bacteremia with sepsis    Recommendations:                 General Recommendations:  Dysphagia therapy  Diet recommendations:  NPO, Liquid Diet Level: NPO   Aspiration Precautions: Strict aspiration precautions   General Precautions: Standard, aspiration, fall, NPO  Communication strategies:  go to room if call light pushed    Subjective     "Nasty." Pt re: PO trial pureed apple sauce x2 provided this session.     Pain/Comfort:  · Pain Rating 1: other (see comments)(Indication of pain unappreciated)  · Pain Rating Post-Intervention 1: other (see comments)(Indication of pain unappreciated)    Objective:     Has the patient been evaluated by SLP for swallowing?   Yes  Keep patient NPO? Yes   Current Respiratory Status: room air      Pt asleep upon entry with niece present at bedside. HOB raised. Given extensive verbal stimulation, gentle tactile stimulation via gentle shoulder shake/ sternal rub, and thermal stimulation via cool, damp rag applied to face, pt eventually awakened. Pt provided with the following PO trials: thin water via tsp x4, honey thickened water via tsp x4 and cup sip x1, and pureed apple sauce via 1/2 tsp x2. Pt opened her mouth for initial thin water tsp trials x2. Although tsp in anterior oral cavity and extensive verbal prompting for bolus retrieval provided, attempt to create labial seal around tsp for bolus acceptance unappreciated. On 3rd thin water tsp trial, pt observed to bite the lateral edges of the tsp with attempt to create labial seal around tsp to strip bolus unappreciated. With 4th and final thin water tsp trial, pt observed to initially bite lateral edges of the tsp then to initiate bolus retrieval. However impaired labial seal around tsp observed characterized by anterior loss of thin liquid, followed by impaired A-P transit " characterized by additional anterior loss of thin liquid. Although negligible volume appeared to be swallowed given extent of anterior loss of thin liquid, significant wet and congested coughing appreciated. On initial honey thickened water tsp trials x~2 as well as on 2/2 pureed apple sauce trials, pt observed to bite lateral edges of tsp prior to creating labial seal around tsp in order to strip bolus. Oral phase of swallow appeared WFL across remaining honey thickened water tsp trials x~3 and honey thickened water trial via cup sip x1. Overt clinical signs aspiration unappreciated across honey thickened water tsp trials and pureed apple sauce trials. However wet and congested coughing appreciated following cup sip honey thickened water. Education provided to pt and niece re: definition/ risk/ overt clinical sign aspiration, ongoing SLP recommendation for NPO, and ongoing SLP POC. Although indication of pt's understanding unappreciated, pt's niece verbalized understanding of all education provided and agreement with SLP POC. White board updated. No further questions.     Results discussed with NSG who verbalized understanding of all information provided.     Assessment:     Mesha Mckenzie is a 63 y.o. female with an SLP diagnosis of dysphagia.     Goals:   Multidisciplinary Problems     SLP Goals        Problem: SLP Goal    Goal Priority Disciplines Outcome   SLP Goal     SLP Ongoing, Progressing   Description:  Speech Language Pathology Goals  Goals expected to be met by 10/13:  1. Patient will participate in ongoing swallow assessment to determine least restrictive diet recommendations.                       Plan:     · Patient to be seen:  5 x/week   · Plan of Care expires:  11/06/19  · Plan of Care reviewed with:  patient, family   · SLP Follow-Up:  Yes       Discharge recommendations:  rehabilitation facility     Time Tracking:     SLP Treatment Date:   10/07/19  Speech Start Time:  1037  Speech Stop  Time:  1056     Speech Total Time (min):  19 min    Billable Minutes: Treatment Swallowing Dysfunction 10 and [facility]eld Care/Home Management Training 9    LEROY Simpson, CCC-SLP  [phone]  10/7/2019

## 2019-10-07 NOTE — PROGRESS NOTES
[facility]  Critical Care Medicine  Progress Note    Patient Name: Mesha Mckenzie  MRN: [id number]  Admission Date: 10/2/2019  Hospital Length of Stay: 5 days  Code Status: Full Code  Attending Provider: Ok Rehman MD  Primary Care Provider: Varun Shea MD PhD   Principal Problem: Staphylococcus aureus bacteremia with sepsis    Subjective:     HPI:  Patient is a 63 y.o. female with significant past medical history of depression, anxiety and IVDA presented to [facility] ED on 9/19 with c/o N/V/D and weakness x ~5 days. The patient was found to be in septic shock with pneumonia and colitis and was transferred to [facility] ICU for further management. Pt was admitted to the intensive care unit and was treated with broad-spectrum antibiotics. 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 CT was repeated on 9/25 and showed bilateral cavitary lesions likely secondary to septic emboli, small pericardial effusion and ileus. DHARMESH was performed on 9/25 which revealed  mitral and tricuspid valve endocarditis. Blood cultures (9/19 - 9/26) have grown methicillin sensitive Staph aureus. Blood cultures as of 9/29 have been NGTD. ID was following the patient for antibiotic management with most recent regimen being cefazolin, vancomycin and daptomycin. Repeat Echo on 9/28 showed LVEF 35% and persistent vegatations. On 9/30 the patient had an MRI brain that showed remote lacunar infarcts without acute intracranial abnormality. On 10/1 MRI revealed spinal epidural abscess at L2-4 level. Thoracentesis was performed on 10/1. The patient was followed by Cardiology team who considered valve replacement surgery but due to patient's other medical issues, Cardiovascular surgery intervention was deferred. The patient was transferred to [facility] for neurosurgery evaluation of epidural abscess.         Hospital/ICU Course:  Upon arrival to [facility], the patient  was encephalopathic, but able to follow commands. She was hemodynamically stable and satting well on 2L NC. Shortly after arrival, the patient became acutely hypertensive and hypoxemic necessitating intubation and mechanical ventilation. She was extremely difficult to oxygenate (P:F ratio 52). Bronch was performed which was unremarkable. The patient became hypotensive and was started on vasopressors. She was paralyzed with nimbex due to tachypnea and vent dyssynchrony. Bedside echo was performed which showed reduced LVEF, hyperdynamic LV; no significant mitral regurg was noted. The patient went for CTA without evidence of pulmonary embolism. Imaging revealed left lung collaspse and repeat bronch was performed with sputum cx sent. Oxygenation improved quickly and paralytic was discontinued. Right chest tube placed & pleural fluid sent; gram stain with GPCs. Neurosurgery consulted for epidural abscess, plans for OR potentially Monday. Cardiology consult for endocarditis; currently not a surgical candidate. Repeat TTE performed with improved EF and without mention of previously noted vegetations. ID and allergy consulted; oxacillin densensitization in process given pcn allergy.         Consulted general surgery for bilateral shoulder swelling; ultrasound concerning for phlegmon vs developing abscess without organized fluid collection. S/p I&D of shoulder abscesses 10/5/19. Extubated on 10/5/19 and doing well on RA. Ongoing neurosurgery evaluation for drainage of epidural abscess; neurosurgery team verbalized possible OR procedure on Wednesday (10/9).    Repeat CXR done for tachypnea/increased WOB. CXR unremarkable. Head CT was repeated and showed new R basal ganglia infarcts.    Interval History/Significant Events: No acute overnight events. Patient slightly tachypneic, sent for CXR which was unremarkable. Repeat head CT done showing new R basal ganglia infarcts. Will repeat STAT MRI.    Review of Systems   Unable to  perform ROS: Mental status change     Objective:     Vital Signs (Most Recent):  Temp: 98.7 °F (37.1 °C) (10/07/19 1100)  Pulse: 89 (10/07/19 1533)  Resp: (!) 26 (10/07/19 1533)  BP: (!) 186/90 (10/07/19 1101)  SpO2: 100 % (10/07/19 1533) Vital Signs (24h Range):  Temp:  [98 °F (36.7 °C)-98.7 °F (37.1 °C)] 98.7 °F (37.1 °C)  Pulse:  [] 89  Resp:  [19-37] 26  SpO2:  [91 %-100 %] 100 %  BP: (147-195)/() 186/90   Weight: 53.5 kg (118 lb)  Body mass index is 20.25 kg/m².      Intake/Output Summary (Last 24 hours) at 10/7/2019 1647  Last data filed at 10/7/2019 1600  Gross per 24 hour   Intake 2763.2 ml   Output 2305 ml   Net 458.2 ml       Physical Exam   Constitutional: She appears lethargic. She is sleeping. She has a sickly appearance. She appears ill.   Cardiovascular: Normal rate and regular rhythm.   Pulses:       Radial pulses are 1+ on the right side, and 1+ on the left side.        Dorsalis pedis pulses are 1+ on the right side, and 1+ on the left side.   Pulmonary/Chest: Tachypnea noted. She has decreased breath sounds in the right upper field, the right middle field, the right lower field, the left upper field, the left middle field and the left lower field.   Abdominal: Normal appearance.   Neurological: She appears lethargic. GCS eye subscore is 3. GCS verbal subscore is 4. GCS motor subscore is 5.   Skin: Skin is warm, dry and intact.       Vents:  Vent Mode: Spont (10/05/19 1401)  Ventilator Initiated: Yes (10/02/19 2200)  Set Rate: 20 bmp (10/05/19 1303)  Vt Set: 350 mL (10/05/19 1303)  Pressure Support: 10 cmH20 (10/05/19 1401)  PEEP/CPAP: 5 cmH20 (10/05/19 1401)  Oxygen Concentration (%): 28 (10/05/19 1502)  Peak Airway Pressure: 16 cmH2O (10/05/19 1401)  Plateau Pressure: 14 cmH20 (10/05/19 1401)  Total Ve: 12.6 mL (10/05/19 1401)  Negative Inspiratory Force (cm H2O): -31 (10/05/19 1510)  F/VT Ratio<105 (RSBI): (!) 27.89 (10/05/19 1401)  Lines/Drains/Airways     Central Venous Catheter  Line                 Percutaneous Central Line Insertion/Assessment - triple lumen  10/03/19 0114 right internal jugular 4 days          Drain                 Urethral Catheter 09/19/19 1240 Double-lumen 16 Fr. 18 days         Rectal Tube 09/27/19 1000 rectal tube w/ balloon (indicate number of mLs) 10 days          Peripheral Intravenous Line                 Peripheral IV - Single Lumen 10/03/19 0300 20 G Left Antecubital 4 days              Significant Labs:    CBC/Anemia Profile:  Recent Labs   Lab 10/05/19  2204 10/06/19  0332 10/07/19  0332   WBC 5.15 6.21 7.22   HGB 8.2* 8.9* 9.4*   HCT 26.9* 28.7* 30.6*    200 252   MCV 97 97 96   RDW 17.3* 17.3* 17.2*        Chemistries:  Recent Labs   Lab 10/06/19  0332  10/06/19  2111 10/07/19  0332 10/07/19  1215   *  148*   < > 146* 140  140 139   K 3.8  3.8   < > 3.4* 2.8*  2.8* 3.6   *  116*   < > 115* 110  110 111*   CO2 21*  21*   < > 20* 20*  20* 20*   BUN 41*  41*   < > 35* 33*  33* 29*   CREATININE 1.4  1.4   < > 1.2 1.2  1.2 1.1   CALCIUM 7.7*  7.7*   < > 7.6* 7.6*  7.6* 7.3*   ALBUMIN 1.6*  --   --  1.6*  --    PROT 6.0  --   --  5.9*  --    BILITOT 0.4  --   --  0.3  --    ALKPHOS 106  --   --  98  --    ALT 5*  --   --  5*  --    AST 24  --   --  20  --    MG 1.6  --   --  1.2*  --    PHOS 3.6  --   --  3.3  --     < > = values in this interval not displayed.       All pertinent labs within the past 24 hours have been reviewed.    Significant Imaging:  I have reviewed all pertinent imaging results/findings within the past 24 hours.      ABG  Recent Labs   Lab 10/07/19  1056   PH 7.530*   PO2 71*   PCO2 23.1*   HCO3 19.3*   BE -3     Assessment/Plan:     Neuro  Encephalopathy, metabolic  --likely secondary to sepsis/metabolic processes  --Brain MRI 9/30 with lacunar infarcts  --repeat head CT (10/7) revealed new right basal ganglia infarcts  --continue ABx regimen (oxacillin and flagyl)      Pulmonary  Acute hypoxemic  respiratory failure  Multifactorial secondary to complete left lung collapse, septic emboli, pleural effusions, suspected pna. Intubated shortly after arrival for hypoxemia. s/p bronch x2 with significant clearance of secretions from left mainstem & improved aeration on f/u xray. s/p thora 10/1; transudative based on lights criteria, though concern for empyema based on OSH pulm note. right chest tube placed 10/3, 345 mL output after TPA administration; pleural fluid with GPC on gram stain. CTA neg for PE    -- will continue to follow culture results  -- Extubated 10/5/19; now on RA  -- continue oxacillin  --R Chest tube removed    Pleural effusion  --Empyema with GPC on gram stain   --R chest tube removed    Cardiac/Vascular  Acute bacterial endocarditis  --See staph bacteremia       Acute on chronic diastolic congestive heart failure  --last echo (OSH) with EF 35%, LV concentric remodeling, indeterminate diastolic function  --bedside echo with depressed EF, RV not significantly dilated  --repeat TTE with improved EF and without mention of previously noted vegetations    Renal/  ABIMBOLA (acute kidney injury)  --baseline creatinine ~ 0.7; was 2.9 on initial presentation to OSH, improved with hydration over hospital course  --FeNa/FeUrea consistent with pre-renal etiology; possible component of ATN given hypotension   --non anion gap metabolic acidosis on admit, briefly on sodium bicarb drip   --now improving; will continue to monitor    ID  * Staphylococcus aureus bacteremia with sepsis  2/2 IVDA, patient reported injecting IV heroin to ID MD at OSH. Blood cultures (9/19 - 9/26) have grown methicillin sensitive Staph aureus. Blood cultures as of 9/29 have been NGTD. mitral and tricuspid valve vegetations seen on DHARMESH 9/25; unchanged on TTE 9/28. OSH abx regimen vanc, ancef and daptomycin. Repeat BCx positive for MSSA. OSH PICC line d/c'd    --formal 2D echo results show EF 53%; normal L ventricular function; mild mitral  sclerosis; small circumferential pericardial effusion. cardiology consulted; no surgical intervention at this point   --neurosurgery consult for epidural abscess, likely plan for OR when stable  --bilateral shoulder abscess, s/p I&D  --oxacillin started after passed penicillin challenge    Abscess of upper extremity  Ultrasound concerning for developing abscess in left and right shoulder abscesses. S/p I&D by general surgery.  - will f/u on culture and stain    Septic shock with acute organ dysfunction due to methicillin susceptible Staphylococcus aureus (MSSA)  --secondary to MV/TV endocarditis, epidural abscess, likely pna  --shock resolved    Epidural abscess  --MRI concerning for epidural abscess at L2-4 region  --neurosurgery consulted and following; possible OR for abscess drainage this Wednesday (10/9)    Hematology  Acute septic pulmonary embolism  --secondary to TV endocarditis    GI  Ileus  --Possible ileus on CT abd 9/25  --Was receiving TPN at OSH  --KUB with dilated loops of bowel  -- resolved w/ bowel rest    Pancolitis  --seen on CT 9/19 with improvement noted on f/u CT 9/25  --concern for ileus on 9/25 CT; rectal tube in place with liquid stool output  --stool 9/27 neg for c diff; stool cx neg  --remains NPO  --continue flagyl    Other  Preoperative clearance  Discussed with Dr. Jerry.   Patient has 0.7-1.7% risk of myocardial infarction or cardiac arrest, intraoperatively or up to 30 days post-op, according to Bergeron Perioperative Risk for Myocardial Infarction or Cardiac Arrest (DICKSON)       Critical Care Daily Checklist:    A: Awake: RASS Goal/Actual Goal: RASS Goal: 0-->alert and calm  Actual: Joyner Agitation Sedation Scale (RASS): Drowsy   B: Spontaneous Breathing Trial Performed? Spon. Breathing Trial Initiated?: Initiated (10/05/19 6559)   C: SAT & SBT Coordinated?  n/a                      D: Delirium: CAM-ICU     E: Early Mobility Performed? No   F: Feeding Goal: Goals: Patient to  receive nutrition by RD follow-up  Status: Nutrition Goal Status: goal not met   Current Diet Order   Procedures    Diet NPO      AS: Analgesia/Sedation n/a   T: Thromboembolic Prophylaxis scds   H: HOB > 300 Yes   U: Stress Ulcer Prophylaxis (if needed) Famotidine IV   G: Glucose Control done   B: Bowel Function     I: Indwelling Catheter (Lines & Astorga) Necessity astorga   D: De-escalation of Antimicrobials/Pharmacotherapies done    Plan for the day/ETD Repeat MRI; cont course    Code Status:  Family/Goals of Care: Full Code  Cont course     Critical Care Time: 70 minutes  Critical secondary to Patient has a condition that poses threat to life and bodily function. Patient hypertensive and acute neuro changes are ongoing requiring higher level of care.     Critical care was time spent personally by me on the following activities: development of treatment plan with patient or surrogate and bedside caregivers, discussions with consultants, evaluation of patient's response to treatment, examination of patient, ordering and performing treatments and interventions, ordering and review of laboratory studies, ordering and review of radiographic studies, pulse oximetry, re-evaluation of patient's condition. This critical care time did not overlap with that of any other provider or involve time for any procedures.     Ramirez Padilla NP  Critical Care Medicine  [facility]

## 2019-10-07 NOTE — ASSESSMENT & PLAN NOTE
64yo female with IVDU presents as transfer from [facility] for epidural abscess, likely secondary to MSSA bacteremia and endocarditis with vegetations of the MV and TV on DHARMESH 9/26 c/b bilateral multifocal pneumonia with cavitary lesions, right pleural effusion s/p thoracentesis on 10/1 w/ concern for empyema, and pan-colitis. Followed by ID at [facility], most recently on daptomycin, cefazolin, and vancomycin. Intubated shortly after arrival to [facility] for hypoxia. Noted to have penicillin allergy.    Recommendations:  -Continue oxacillin   -Blood cxs clear on 9/29  -S/p aspiration and possible drainage of shoulder abscesses by general surgery 10/6  -Blood cultures NGTD  -await NSG plan as this will help determine duration

## 2019-10-07 NOTE — SUBJECTIVE & OBJECTIVE
Interval History: Patient with stable AMS. No events    Review of Systems   Unable to perform ROS: Acuity of condition     Objective:     Vital Signs (Most Recent):  Temp: 98.7 °F (37.1 °C) (10/07/19 1100)  Pulse: 90 (10/07/19 1141)  Resp: (!) 34 (10/07/19 1141)  BP: (!) 186/90 (10/07/19 1101)  SpO2: (!) 93 % (10/07/19 1141) Vital Signs (24h Range):  Temp:  [98 °F (36.7 °C)-98.7 °F (37.1 °C)] 98.7 °F (37.1 °C)  Pulse:  [] 90  Resp:  [16-37] 34  SpO2:  [91 %-100 %] 93 %  BP: (147-196)/() 186/90     Weight: 53.5 kg (118 lb)  Body mass index is 20.25 kg/m².    Estimated Creatinine Clearance: 39.5 mL/min (based on SCr of 1.2 mg/dL).    Physical Exam   Constitutional: She appears well-developed and well-nourished. No distress.   HENT:   Head: Normocephalic and atraumatic.   Eyes: Conjunctivae are normal. Right eye exhibits no discharge. Left eye exhibits no discharge.   Neck: Neck supple.   Cardiovascular: Normal rate and normal heart sounds. Exam reveals no gallop and no friction rub.   No murmur heard.  Pulmonary/Chest: She has no wheezes. She has no rales.   Abdominal: Soft. She exhibits no mass. There is no tenderness. There is no guarding.   Musculoskeletal: She exhibits no edema.   Right sided chest tube without surrounding erythema   Neurological:   Patient appears somnolent on exam though arousable, able to follow some commands   Skin: Skin is warm and dry. She is not diaphoretic.       Significant Labs: All pertinent labs within the past 24 hours have been reviewed.    Significant Imaging: I have reviewed all pertinent imaging results/findings within the past 24 hours.

## 2019-10-07 NOTE — ASSESSMENT & PLAN NOTE
--seen on CT 9/19 with improvement noted on f/u CT 9/25  --concern for ileus on 9/25 CT; rectal tube in place with liquid stool output  --stool 9/27 neg for c diff; stool cx neg  --remains NPO  --continue flagyl

## 2019-10-07 NOTE — PROGRESS NOTES
Wound care consult received from MD team for assessment of I&D shoulder wounds.  Pt S/p aspiration and  drainage of shoulder abscesses by general surgery 10/6.  See assessment and photo below.  Abrasion to nose and R heel pressure injury resolved. Notified nurse who also reports moisture to bilateral groin.  Sacrum/buttocks intact and waffle overlay in place.   Spoke with Tessa Kingsley NP who approved wound care recommendations:  - Silver hydrofiber packing to R/L shoulder wounds to decrease bioburden and manage drainage, promoting healing  - Antifungal moisture barrier to groin to prevent skin breakdown r/t moisture.   - Pressure injury prevention interventions- waffle overlay and heel protectors  Wound care team to follow pt prn.  [id number]      10/07/19 1107        Urethral Catheter 09/19/19 1240 Double-lumen 16 Fr.   Placement Date/Time: 09/19/19 1240   Present Prior to Hospital Arrival?: No  Hand Hygiene: Performed  Inserted by: Technologist   Name: Lauren Matias  Insertion attempts (enter comment if more than 2 attempts): 1  Catheter Type: Double-lumen ...   Output (mL) 100 mL        Incision/Site 10/06/19 0700 Right Shoulder   Date First Assessed/Time First Assessed: 10/06/19 0700   Side: Right  Location: (c) Shoulder   Wound Image    Incision WDL WDL   Dressing Appearance Intact   Drainage Amount Small   Drainage Characteristics/Odor Serosanguineous   Appearance Necrotic;Slough;Pink   Red (%), Wound Tissue Color 20 %   Yellow (%), Wound Tissue Color 80 %   Periwound Area Intact   Wound Edges Open   Wound Length (cm) 2 cm   Wound Width (cm) 0.5 cm   Wound Depth (cm) 0.8 cm   Wound Volume (cm^3) 0.8 cm^3   Wound Surface Area (cm^2) 1 cm^2   Tunneling (depth (cm)/location) 2@ 11 o'clock   Care Cleansed with:;Sterile normal saline   Packing Incision packed with  (silver hydrofiber)   Dressing Change Due 10/09/19        Incision/Site 10/06/19 0700 Left Shoulder   Date First Assessed/Time First Assessed:  10/06/19 0700   Side: Left  Location: (c) Shoulder   Wound Image    Incision WDL ex   Dressing Appearance Intact   Drainage Amount Small   Drainage Characteristics/Odor Serosanguineous   Appearance Pink;Red;Slough   Red (%), Wound Tissue Color 80 %   Yellow (%), Wound Tissue Color 20 %   Periwound Area Intact   Wound Edges Open   Wound Length (cm) 0.5 cm   Wound Width (cm) 1.5 cm   Wound Depth (cm) 0.6 cm   Wound Volume (cm^3) 0.45 cm^3   Wound Surface Area (cm^2) 0.75 cm^2   Care Cleansed with:;Sterile normal saline   Packing Incision packed with  (silver hydrofiber)   Dressing Change Due 10/09/19   [REMOVED]      Wound 09/24/19 1015 Abrasion(s) anterior Nose #1   Final Assessment Date: 10/07/19  Date First Assessed/Time First Assessed: 09/24/19 1015   Pre-existing: No  Primary Wound Type: Abrasion(s)  Orientation: anterior  Location: Nose  Wound/PI Number (optional): #1  Wound Outcome: Healed   Wound Image    Wound WDL   (RESOLVED)   [REMOVED]      Pressure Injury 10/03/19 1906 Right Heel Stage 1   Final Assessment Date: 10/07/19  Date First Assessed/Time First Assessed: 10/03/19 1906   Pressure Injury Present on Admission: yes  Side: Right  Location: Heel  Is this injury device related?: No  Staging: Stage 1  Wound Outcome: Healed   Wound Image      L heel intact

## 2019-10-07 NOTE — PROGRESS NOTES
[facility]  Infectious Disease  Progress Note    Patient Name: Mesha Mckenzie  MRN: [id number]  Admission Date: 10/2/2019  Length of Stay: 5 days  Attending Physician: Ok Rehman MD  Primary Care Provider: Varun Shea MD PhD    Isolation Status: No active isolations  Assessment/Plan:      Endocarditis  64yo female with IVDU presents as transfer from [facility] for epidural abscess, likely secondary to MSSA bacteremia and endocarditis with vegetations of the MV and TV on DHARMESH 9/26 c/b bilateral multifocal pneumonia with cavitary lesions, right pleural effusion s/p thoracentesis on 10/1 w/ concern for empyema, and pan-colitis. Followed by ID at [facility], most recently on daptomycin, cefazolin, and vancomycin. Intubated shortly after arrival to [facility] for hypoxia. Noted to have penicillin allergy.    Recommendations:  -Continue oxacillin   -Blood cxs clear on 9/29  -S/p aspiration and possible drainage of shoulder abscesses by general surgery 10/6  -Blood cultures NGTD  -await NSG plan as this will help determine duration        Thank you for your consult. I will follow-up with patient. Please contact us if you have any additional questions.    Varun Thorpe MD  Infectious Disease  [facility]    Subjective:     Principal Problem:Staphylococcus aureus bacteremia with sepsis    HPI: Mesha Mckenzie is a 64 yo female with IVDU (heroin), prior GIB in 2018, and spinal stenosis who presents from [facility] for neurosurgery eval for epidural abscess in setting of MSSA endocarditis. She had initially presented to [place] on 9/19 for evaluation of nausea, vomiting, and diarrhea for the past 5 days. 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 She was initially  started on vancomycin, flagyl, and levoquin. Blood cultures from 9/19 positive for MSSA and were thought to be likely source. Urine culture from 9/19 also positive for MSSA. CT head on 9/19 and 9/22 negative for septic emboli. TTE on 9/21 showed possible vegetation on mitral valve (EF 55%).  DHARMESH was performed on 9/25, showing mitral valve vegetation (5x6mm) on the posterior leaflet as well as tricuspid valve vegetation (10x5mm) on septal leaflet. CT chest was repeated on 9/26 and was showed for previously seen bilateral consolidations that were now noted to be cavitary/cystic in nature. Repeat TTE on 9/26 reported reduced EF from previous 55% to 33%. Cardiothoracic evaluated patient for valve replacement but surgery was deferred due to patient's multiple medical issues. Last positive blood culture was 9/26. Her antibiotics were later changed to daptomycin, cefazolin, and vancomycin. Thoracentesis on 10/1 showed evidence of empyema. MRI spine on 10/1 showed lumbar paraspinal muscle infection and small dorsal epidural abscess from L2-4. She was transferred to [facility] for neurosurgery eval.     On arrival to [facility], she was noted to be encephalopathic. Shortly after arriving, she became hypoxic and hypertensive and required intubation. She also required neuromuscular blockade for tachypnea and vent dyssynchrony. Vasopressors were also started due to hypotension.      Interval History: Patient with stable AMS. No events    Review of Systems   Unable to perform ROS: Acuity of condition     Objective:     Vital Signs (Most Recent):  Temp: 98.7 °F (37.1 °C) (10/07/19 1100)  Pulse: 90 (10/07/19 1141)  Resp: (!) 34 (10/07/19 1141)  BP: (!) 186/90 (10/07/19 1101)  SpO2: (!) 93 % (10/07/19 1141) Vital Signs (24h Range):  Temp:  [98 °F (36.7 °C)-98.7 °F (37.1 °C)] 98.7 °F (37.1 °C)  Pulse:  [] 90  Resp:  [16-37] 34  SpO2:  [91 %-100 %] 93 %  BP: (147-196)/() 186/90     Weight: 53.5 kg (118 lb)  Body mass index is 20.25  kg/m².    Estimated Creatinine Clearance: 39.5 mL/min (based on SCr of 1.2 mg/dL).    Physical Exam   Constitutional: She appears well-developed and well-nourished. No distress.   HENT:   Head: Normocephalic and atraumatic.   Eyes: Conjunctivae are normal. Right eye exhibits no discharge. Left eye exhibits no discharge.   Neck: Neck supple.   Cardiovascular: Normal rate and normal heart sounds. Exam reveals no gallop and no friction rub.   No murmur heard.  Pulmonary/Chest: She has no wheezes. She has no rales.   Abdominal: Soft. She exhibits no mass. There is no tenderness. There is no guarding.   Musculoskeletal: She exhibits no edema.   Right sided chest tube without surrounding erythema   Neurological:   Patient appears somnolent on exam though arousable, able to follow some commands   Skin: Skin is warm and dry. She is not diaphoretic.       Significant Labs: All pertinent labs within the past 24 hours have been reviewed.    Significant Imaging: I have reviewed all pertinent imaging results/findings within the past 24 hours.

## 2019-10-07 NOTE — PLAN OF CARE
Recommend ongoing NPO. SLP goal remains appropriate.     LEROY Simpson, CCC-SLP  [phone]  10/7/2019

## 2019-10-08 ENCOUNTER — ANESTHESIA EVENT (OUTPATIENT)
Dept: SURGERY | Facility: HOSPITAL | Age: 64
DRG: 028 | End: 2019-10-08
Payer: MEDICARE

## 2019-10-08 LAB
ABO + RH BLD: NORMAL
ALLENS TEST: ABNORMAL
ANION GAP SERPL CALC-SCNC: 6 MMOL/L (ref 8–16)
ANION GAP SERPL CALC-SCNC: 7 MMOL/L (ref 8–16)
ANION GAP SERPL CALC-SCNC: 8 MMOL/L (ref 8–16)
ANISOCYTOSIS BLD QL SMEAR: SLIGHT
ANISOCYTOSIS BLD QL SMEAR: SLIGHT
APTT BLDCRRT: 26.4 SEC (ref 21–32)
BACTERIA FLD CULT: NORMAL
BACTERIA SPEC AEROBE CULT: ABNORMAL
BASOPHILS # BLD AUTO: ABNORMAL K/UL (ref 0–0.2)
BASOPHILS NFR BLD: 0 % (ref 0–1.9)
BASOPHILS NFR BLD: 0 % (ref 0–1.9)
BLD GP AB SCN CELLS X3 SERPL QL: NORMAL
BUN SERPL-MCNC: 18 MG/DL (ref 8–23)
BUN SERPL-MCNC: 20 MG/DL (ref 8–23)
BUN SERPL-MCNC: 21 MG/DL (ref 8–23)
BUN SERPL-MCNC: 22 MG/DL (ref 8–23)
BUN SERPL-MCNC: 25 MG/DL (ref 8–23)
BURR CELLS BLD QL SMEAR: ABNORMAL
BURR CELLS BLD QL SMEAR: ABNORMAL
CALCIUM SERPL-MCNC: 7.1 MG/DL (ref 8.7–10.5)
CALCIUM SERPL-MCNC: 7.3 MG/DL (ref 8.7–10.5)
CALCIUM SERPL-MCNC: 7.4 MG/DL (ref 8.7–10.5)
CALCIUM SERPL-MCNC: 7.4 MG/DL (ref 8.7–10.5)
CALCIUM SERPL-MCNC: 7.7 MG/DL (ref 8.7–10.5)
CHLORIDE SERPL-SCNC: 108 MMOL/L (ref 95–110)
CHLORIDE SERPL-SCNC: 109 MMOL/L (ref 95–110)
CHLORIDE SERPL-SCNC: 110 MMOL/L (ref 95–110)
CHLORIDE UR-SCNC: 104 MMOL/L (ref 25–200)
CO2 SERPL-SCNC: 18 MMOL/L (ref 23–29)
CO2 SERPL-SCNC: 19 MMOL/L (ref 23–29)
CO2 SERPL-SCNC: 19 MMOL/L (ref 23–29)
CO2 SERPL-SCNC: 20 MMOL/L (ref 23–29)
CO2 SERPL-SCNC: 20 MMOL/L (ref 23–29)
CREAT SERPL-MCNC: 0.8 MG/DL (ref 0.5–1.4)
CREAT SERPL-MCNC: 0.9 MG/DL (ref 0.5–1.4)
DIFFERENTIAL METHOD: ABNORMAL
DIFFERENTIAL METHOD: ABNORMAL
EOSINOPHIL # BLD AUTO: ABNORMAL K/UL (ref 0–0.5)
EOSINOPHIL NFR BLD: 1 % (ref 0–8)
EOSINOPHIL NFR BLD: 1 % (ref 0–8)
ERYTHROCYTE [DISTWIDTH] IN BLOOD BY AUTOMATED COUNT: 16.9 % (ref 11.5–14.5)
ERYTHROCYTE [DISTWIDTH] IN BLOOD BY AUTOMATED COUNT: 17.2 % (ref 11.5–14.5)
EST. GFR  (AFRICAN AMERICAN): >60 ML/MIN/1.73 M^2
EST. GFR  (NON AFRICAN AMERICAN): >60 ML/MIN/1.73 M^2
GIANT PLATELETS BLD QL SMEAR: PRESENT
GLUCOSE SERPL-MCNC: 106 MG/DL (ref 70–110)
GLUCOSE SERPL-MCNC: 111 MG/DL (ref 70–110)
GLUCOSE SERPL-MCNC: 116 MG/DL (ref 70–110)
GLUCOSE SERPL-MCNC: 119 MG/DL (ref 70–110)
GLUCOSE SERPL-MCNC: 120 MG/DL (ref 70–110)
HCO3 UR-SCNC: 19.2 MMOL/L (ref 24–28)
HCT VFR BLD AUTO: 30.3 % (ref 37–48.5)
HCT VFR BLD AUTO: 30.7 % (ref 37–48.5)
HGB BLD-MCNC: 9.5 G/DL (ref 12–16)
HGB BLD-MCNC: 9.5 G/DL (ref 12–16)
HYPOCHROMIA BLD QL SMEAR: ABNORMAL
HYPOCHROMIA BLD QL SMEAR: ABNORMAL
IMM GRANULOCYTES # BLD AUTO: ABNORMAL K/UL (ref 0–0.04)
IMM GRANULOCYTES # BLD AUTO: ABNORMAL K/UL (ref 0–0.04)
IMM GRANULOCYTES NFR BLD AUTO: ABNORMAL % (ref 0–0.5)
IMM GRANULOCYTES NFR BLD AUTO: ABNORMAL % (ref 0–0.5)
INR PPP: 1.4 (ref 0.8–1.2)
LYMPHOCYTES # BLD AUTO: ABNORMAL K/UL (ref 1–4.8)
LYMPHOCYTES NFR BLD: 13 % (ref 18–48)
LYMPHOCYTES NFR BLD: 13 % (ref 18–48)
MAGNESIUM SERPL-MCNC: 1.7 MG/DL (ref 1.6–2.6)
MCH RBC QN AUTO: 29.9 PG (ref 27–31)
MCH RBC QN AUTO: 30.1 PG (ref 27–31)
MCHC RBC AUTO-ENTMCNC: 30.9 G/DL (ref 32–36)
MCHC RBC AUTO-ENTMCNC: 31.4 G/DL (ref 32–36)
MCV RBC AUTO: 95 FL (ref 82–98)
MCV RBC AUTO: 97 FL (ref 82–98)
MONOCYTES # BLD AUTO: ABNORMAL K/UL (ref 0.3–1)
MONOCYTES NFR BLD: 3 % (ref 4–15)
MONOCYTES NFR BLD: 6 % (ref 4–15)
NEUTROPHILS NFR BLD: 80 % (ref 38–73)
NEUTROPHILS NFR BLD: 83 % (ref 38–73)
NRBC BLD-RTO: 0 /100 WBC
NRBC BLD-RTO: 0 /100 WBC
OVALOCYTES BLD QL SMEAR: ABNORMAL
PCO2 BLDA: 23.6 MMHG (ref 35–45)
PH SMN: 7.52 [PH] (ref 7.35–7.45)
PHOSPHATE SERPL-MCNC: 2.7 MG/DL (ref 2.7–4.5)
PLATELET # BLD AUTO: 283 K/UL (ref 150–350)
PLATELET # BLD AUTO: 287 K/UL (ref 150–350)
PLATELET BLD QL SMEAR: ABNORMAL
PMV BLD AUTO: 10.6 FL (ref 9.2–12.9)
PMV BLD AUTO: 11.1 FL (ref 9.2–12.9)
PO2 BLDA: 57 MMHG (ref 80–100)
POC BE: -4 MMOL/L
POC SATURATED O2: 93 % (ref 95–100)
POC TCO2: 20 MMOL/L (ref 23–27)
POCT GLUCOSE: 103 MG/DL (ref 70–110)
POCT GLUCOSE: 106 MG/DL (ref 70–110)
POCT GLUCOSE: 108 MG/DL (ref 70–110)
POCT GLUCOSE: 108 MG/DL (ref 70–110)
POCT GLUCOSE: 114 MG/DL (ref 70–110)
POCT GLUCOSE: 114 MG/DL (ref 70–110)
POCT GLUCOSE: 118 MG/DL (ref 70–110)
POCT GLUCOSE: 121 MG/DL (ref 70–110)
POCT GLUCOSE: 127 MG/DL (ref 70–110)
POCT GLUCOSE: 128 MG/DL (ref 70–110)
POIKILOCYTOSIS BLD QL SMEAR: SLIGHT
POIKILOCYTOSIS BLD QL SMEAR: SLIGHT
POLYCHROMASIA BLD QL SMEAR: ABNORMAL
POLYCHROMASIA BLD QL SMEAR: ABNORMAL
POTASSIUM SERPL-SCNC: 3.1 MMOL/L (ref 3.5–5.1)
POTASSIUM SERPL-SCNC: 3.2 MMOL/L (ref 3.5–5.1)
POTASSIUM SERPL-SCNC: 3.5 MMOL/L (ref 3.5–5.1)
POTASSIUM SERPL-SCNC: 3.7 MMOL/L (ref 3.5–5.1)
POTASSIUM SERPL-SCNC: 4.5 MMOL/L (ref 3.5–5.1)
POTASSIUM UR-SCNC: 54 MMOL/L (ref 15–95)
PROTHROMBIN TIME: 13.8 SEC (ref 9–12.5)
RBC # BLD AUTO: 3.16 M/UL (ref 4–5.4)
RBC # BLD AUTO: 3.18 M/UL (ref 4–5.4)
SAMPLE: ABNORMAL
SITE: ABNORMAL
SODIUM SERPL-SCNC: 134 MMOL/L (ref 136–145)
SODIUM SERPL-SCNC: 135 MMOL/L (ref 136–145)
SODIUM SERPL-SCNC: 135 MMOL/L (ref 136–145)
SODIUM SERPL-SCNC: 136 MMOL/L (ref 136–145)
SODIUM SERPL-SCNC: 136 MMOL/L (ref 136–145)
SODIUM UR-SCNC: 79 MMOL/L (ref 20–250)
WBC # BLD AUTO: 9.64 K/UL (ref 3.9–12.7)
WBC # BLD AUTO: 9.69 K/UL (ref 3.9–12.7)

## 2019-10-08 PROCEDURE — 85730 THROMBOPLASTIN TIME PARTIAL: CPT

## 2019-10-08 PROCEDURE — 82803 BLOOD GASES ANY COMBINATION: CPT

## 2019-10-08 PROCEDURE — 20000000 HC ICU ROOM

## 2019-10-08 PROCEDURE — 82436 ASSAY OF URINE CHLORIDE: CPT

## 2019-10-08 PROCEDURE — 85610 PROTHROMBIN TIME: CPT

## 2019-10-08 PROCEDURE — 99291 CRITICAL CARE FIRST HOUR: CPT | Mod: ,,, | Performed by: NURSE PRACTITIONER

## 2019-10-08 PROCEDURE — 27000221 HC OXYGEN, UP TO 24 HOURS

## 2019-10-08 PROCEDURE — 85007 BL SMEAR W/DIFF WBC COUNT: CPT | Mod: 91

## 2019-10-08 PROCEDURE — 99900035 HC TECH TIME PER 15 MIN (STAT)

## 2019-10-08 PROCEDURE — 84100 ASSAY OF PHOSPHORUS: CPT

## 2019-10-08 PROCEDURE — 94640 AIRWAY INHALATION TREATMENT: CPT

## 2019-10-08 PROCEDURE — 80048 BASIC METABOLIC PNL TOTAL CA: CPT | Mod: 91

## 2019-10-08 PROCEDURE — 36600 WITHDRAWAL OF ARTERIAL BLOOD: CPT

## 2019-10-08 PROCEDURE — 84300 ASSAY OF URINE SODIUM: CPT

## 2019-10-08 PROCEDURE — 86901 BLOOD TYPING SEROLOGIC RH(D): CPT

## 2019-10-08 PROCEDURE — 63600175 PHARM REV CODE 636 W HCPCS: Performed by: NURSE PRACTITIONER

## 2019-10-08 PROCEDURE — 97530 THERAPEUTIC ACTIVITIES: CPT

## 2019-10-08 PROCEDURE — 63600175 PHARM REV CODE 636 W HCPCS: Performed by: STUDENT IN AN ORGANIZED HEALTH CARE EDUCATION/TRAINING PROGRAM

## 2019-10-08 PROCEDURE — 99233 SBSQ HOSP IP/OBS HIGH 50: CPT | Mod: ,,, | Performed by: PSYCHIATRY & NEUROLOGY

## 2019-10-08 PROCEDURE — 99232 PR SUBSEQUENT HOSPITAL CARE,LEVL II: ICD-10-PCS | Mod: GC,,, | Performed by: NEUROLOGICAL SURGERY

## 2019-10-08 PROCEDURE — 83735 ASSAY OF MAGNESIUM: CPT

## 2019-10-08 PROCEDURE — 85027 COMPLETE CBC AUTOMATED: CPT | Mod: 91

## 2019-10-08 PROCEDURE — 99233 PR SUBSEQUENT HOSPITAL CARE,LEVL III: ICD-10-PCS | Mod: ,,, | Performed by: PSYCHIATRY & NEUROLOGY

## 2019-10-08 PROCEDURE — 25000003 PHARM REV CODE 250: Performed by: NURSE PRACTITIONER

## 2019-10-08 PROCEDURE — 99291 PR CRITICAL CARE, E/M 30-74 MINUTES: ICD-10-PCS | Mod: ,,, | Performed by: NURSE PRACTITIONER

## 2019-10-08 PROCEDURE — 84133 ASSAY OF URINE POTASSIUM: CPT

## 2019-10-08 PROCEDURE — S0030 INJECTION, METRONIDAZOLE: HCPCS | Performed by: INTERNAL MEDICINE

## 2019-10-08 PROCEDURE — 94761 N-INVAS EAR/PLS OXIMETRY MLT: CPT

## 2019-10-08 PROCEDURE — 99232 SBSQ HOSP IP/OBS MODERATE 35: CPT | Mod: GC,,, | Performed by: NEUROLOGICAL SURGERY

## 2019-10-08 PROCEDURE — 25000003 PHARM REV CODE 250: Performed by: INTERNAL MEDICINE

## 2019-10-08 PROCEDURE — 25500020 PHARM REV CODE 255: Performed by: INTERNAL MEDICINE

## 2019-10-08 PROCEDURE — 25000242 PHARM REV CODE 250 ALT 637 W/ HCPCS: Performed by: NURSE PRACTITIONER

## 2019-10-08 RX ORDER — MUPIROCIN 20 MG/G
OINTMENT TOPICAL
Status: DISCONTINUED | OUTPATIENT
Start: 2019-10-08 | End: 2019-10-25 | Stop reason: HOSPADM

## 2019-10-08 RX ORDER — MUPIROCIN 20 MG/G
1 OINTMENT TOPICAL
Status: DISCONTINUED | OUTPATIENT
Start: 2019-10-08 | End: 2019-10-25 | Stop reason: HOSPADM

## 2019-10-08 RX ORDER — HYDRALAZINE HYDROCHLORIDE 20 MG/ML
10 INJECTION INTRAMUSCULAR; INTRAVENOUS EVERY 8 HOURS PRN
Status: DISCONTINUED | OUTPATIENT
Start: 2019-10-08 | End: 2019-10-16

## 2019-10-08 RX ORDER — SODIUM CHLORIDE 9 MG/ML
INJECTION, SOLUTION INTRAVENOUS CONTINUOUS
Status: DISCONTINUED | OUTPATIENT
Start: 2019-10-08 | End: 2019-10-09

## 2019-10-08 RX ORDER — LABETALOL HCL 20 MG/4 ML
10 SYRINGE (ML) INTRAVENOUS EVERY 6 HOURS PRN
Status: DISCONTINUED | OUTPATIENT
Start: 2019-10-08 | End: 2019-10-25 | Stop reason: HOSPADM

## 2019-10-08 RX ORDER — DEXTROSE MONOHYDRATE 100 MG/ML
INJECTION, SOLUTION INTRAVENOUS CONTINUOUS
Status: DISCONTINUED | OUTPATIENT
Start: 2019-10-08 | End: 2019-10-11

## 2019-10-08 RX ORDER — VANCOMYCIN HCL IN 5 % DEXTROSE 1G/250ML
1000 PLASTIC BAG, INJECTION (ML) INTRAVENOUS
Status: COMPLETED | OUTPATIENT
Start: 2019-10-08 | End: 2019-10-09

## 2019-10-08 RX ORDER — DEXTROSE MONOHYDRATE 100 MG/ML
INJECTION, SOLUTION INTRAVENOUS CONTINUOUS
Status: DISCONTINUED | OUTPATIENT
Start: 2019-10-08 | End: 2019-10-08

## 2019-10-08 RX ADMIN — METRONIDAZOLE 500 MG: 500 INJECTION, SOLUTION INTRAVENOUS at 03:10

## 2019-10-08 RX ADMIN — IPRATROPIUM BROMIDE AND ALBUTEROL SULFATE 3 ML: .5; 3 SOLUTION RESPIRATORY (INHALATION) at 04:10

## 2019-10-08 RX ADMIN — IPRATROPIUM BROMIDE AND ALBUTEROL SULFATE 3 ML: .5; 3 SOLUTION RESPIRATORY (INHALATION) at 09:10

## 2019-10-08 RX ADMIN — DEXTROSE: 5 SOLUTION INTRAVENOUS at 06:10

## 2019-10-08 RX ADMIN — DEXTROSE: 10 SOLUTION INTRAVENOUS at 05:10

## 2019-10-08 RX ADMIN — MAGNESIUM SULFATE IN WATER 2 G: 40 INJECTION, SOLUTION INTRAVENOUS at 06:10

## 2019-10-08 RX ADMIN — MICONAZOLE NITRATE: 20 OINTMENT TOPICAL at 08:10

## 2019-10-08 RX ADMIN — OXACILLIN SODIUM 12 G: 10 INJECTION, POWDER, FOR SOLUTION INTRAVENOUS at 10:10

## 2019-10-08 RX ADMIN — IOHEXOL 100 ML: 350 INJECTION, SOLUTION INTRAVENOUS at 10:10

## 2019-10-08 RX ADMIN — DEXTROSE: 10 SOLUTION INTRAVENOUS at 10:10

## 2019-10-08 RX ADMIN — MICONAZOLE NITRATE: 20 OINTMENT TOPICAL at 09:10

## 2019-10-08 RX ADMIN — SODIUM CHLORIDE: 0.9 INJECTION, SOLUTION INTRAVENOUS at 04:10

## 2019-10-08 RX ADMIN — METRONIDAZOLE 500 MG: 500 INJECTION, SOLUTION INTRAVENOUS at 10:10

## 2019-10-08 RX ADMIN — IPRATROPIUM BROMIDE AND ALBUTEROL SULFATE 3 ML: .5; 3 SOLUTION RESPIRATORY (INHALATION) at 12:10

## 2019-10-08 RX ADMIN — POTASSIUM CHLORIDE 60 MEQ: 200 INJECTION, SOLUTION INTRAVENOUS at 05:10

## 2019-10-08 RX ADMIN — METRONIDAZOLE 500 MG: 500 INJECTION, SOLUTION INTRAVENOUS at 11:10

## 2019-10-08 RX ADMIN — IPRATROPIUM BROMIDE AND ALBUTEROL SULFATE 3 ML: .5; 3 SOLUTION RESPIRATORY (INHALATION) at 11:10

## 2019-10-08 NOTE — ASSESSMENT & PLAN NOTE
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 Since extubation, ~ 48 hrs ago, her mental status has been fluctuating. CT head showed a R thalamic hypodensity and MRI brain showed an acute R thalamic infarct. Also noted to have a few subacute infarcts in bilateral cerebral hemispheres along with remote microhemorrhages.   DHARMESH from 9/25 showed vegetations on mitral and tricuspid leaflets. Most recent TTE from 10/3 showed no evidence of vegetations.    Etiology - embolic 2/2 endocarditis, although pattern of stroke seems like small vessel disease    -- Will need CTA to rule out mycotic aneurysms.  -- No antithrombotics     Antithrombotics for secondary stroke prevention: Antiplatelets: None: Not indicated - bacterial endocarditis    Statins for secondary stroke prevention and hyperlipidemia, if present:   Statins: Atorvastatin- 40 mg daily   -- LDL 80    Aggressive risk factor modification: Smoking, Bacterial endocarditis     Rehab efforts: The patient has been evaluated by a stroke team provider and the therapy needs have been fully considered based off the presenting complaints and exam findings. The following therapy evaluations are needed: PT evaluate and treat, OT evaluate and treat, SLP evaluate and treat    Diagnostics ordered/pending: CTA Head to assess vasculature  - Assess for mycotic aneurysm.    VTE prophylaxis: Heparin 5000 units SQ every 8 hours    BP parameters: Infarct: No intervention, SBP <220

## 2019-10-08 NOTE — SUBJECTIVE & OBJECTIVE
Interval History/Significant Events: Head CT was repeated and showed new R basal ganglia infarcts (10/70. MRI of brain showed evidence of R thalamic infarct and ventriculitis. CTA of head ordered to be completed 10/8.    Review of Systems   Respiratory: Negative for chest tightness.    Cardiovascular: Negative for chest pain.   Gastrointestinal: Positive for abdominal pain. Negative for abdominal distention.     Objective:     Vital Signs (Most Recent):  Temp: 97.5 °F (36.4 °C) (10/08/19 0300)  Pulse: 95 (10/08/19 0630)  Resp: (!) 30 (10/08/19 0630)  BP: (!) 166/75 (10/08/19 0600)  SpO2: (!) 94 % (10/08/19 0630) Vital Signs (24h Range):  Temp:  [97.5 °F (36.4 °C)-98.7 °F (37.1 °C)] 97.5 °F (36.4 °C)  Pulse:  [81-96] 95  Resp:  [20-34] 30  SpO2:  [90 %-100 %] 94 %  BP: (156-194)/(65-90) 166/75   Weight: 53.5 kg (118 lb)  Body mass index is 20.25 kg/m².      Intake/Output Summary (Last 24 hours) at 10/8/2019 0802  Last data filed at 10/8/2019 0700  Gross per 24 hour   Intake 3296.2 ml   Output 2055 ml   Net 1241.2 ml       Physical Exam   Constitutional: She appears lethargic. She is sleeping. She has a sickly appearance. She appears ill.   Cardiovascular: Normal rate and regular rhythm.   Pulses:       Radial pulses are 1+ on the right side, and 1+ on the left side.        Dorsalis pedis pulses are 1+ on the right side, and 1+ on the left side.   Pulmonary/Chest: Tachypnea noted. She has decreased breath sounds in the right upper field, the right middle field, the right lower field, the left upper field, the left middle field and the left lower field.   Abdominal: Normal appearance.   Neurological: She appears lethargic. GCS eye subscore is 3. GCS verbal subscore is 4. GCS motor subscore is 5.   Skin: Skin is warm, dry and intact.       Vents:  Vent Mode: Spont (10/05/19 1401)  Ventilator Initiated: Yes (10/02/19 2200)  Set Rate: 20 bmp (10/05/19 1303)  Vt Set: 350 mL (10/05/19 1303)  Pressure Support: 10 cmH20  (10/05/19 1401)  PEEP/CPAP: 5 cmH20 (10/05/19 1401)  Oxygen Concentration (%): 28 (10/05/19 1502)  Peak Airway Pressure: 16 cmH2O (10/05/19 1401)  Plateau Pressure: 14 cmH20 (10/05/19 1401)  Total Ve: 12.6 mL (10/05/19 1401)  Negative Inspiratory Force (cm H2O): -31 (10/05/19 1510)  F/VT Ratio<105 (RSBI): (!) 27.89 (10/05/19 1401)  Lines/Drains/Airways     Central Venous Catheter Line                 Percutaneous Central Line Insertion/Assessment - triple lumen  10/03/19 0114 right internal jugular 5 days          Drain                 Urethral Catheter 09/19/19 1240 Double-lumen 16 Fr. 18 days         Rectal Tube 09/27/19 1000 rectal tube w/ balloon (indicate number of mLs) 10 days              Significant Labs:    CBC/Anemia Profile:  Recent Labs   Lab 10/07/19  0332 10/08/19  0321   WBC 7.22 9.64   HGB 9.4* 9.5*   HCT 30.6* 30.7*    283   MCV 96 97   RDW 17.2* 17.2*        Chemistries:  Recent Labs   Lab 10/07/19  0332  10/07/19  1717 10/07/19  2225 10/08/19  0321     140   < > 138 137 136   K 2.8*  2.8*   < > 4.0 3.4* 4.5     110   < > 111* 109 110   CO2 20*  20*   < > 19* 21* 18*   BUN 33*  33*   < > 27* 26* 25*   CREATININE 1.2  1.2   < > 1.0 0.9 0.9   CALCIUM 7.6*  7.6*   < > 7.4* 7.5* 7.4*   ALBUMIN 1.6*  --   --   --   --    PROT 5.9*  --   --   --   --    BILITOT 0.3  --   --   --   --    ALKPHOS 98  --   --   --   --    ALT 5*  --   --   --   --    AST 20  --   --   --   --    MG 1.2*  --   --   --  1.7   PHOS 3.3  --   --   --  2.7    < > = values in this interval not displayed.       All pertinent labs within the past 24 hours have been reviewed.    Significant Imaging:  I have reviewed all pertinent imaging results/findings within the past 24 hours.

## 2019-10-08 NOTE — CONSULTS
[facility]  Vascular Neurology  Comprehensive Stroke Center  Consult Note    Inpatient consult to Vascular (Stroke) Neurology  Consult performed by: Dominguez Reynolds MD  Consult ordered by: Winter Albert NP        Assessment/Plan:     Patient is a 63 y.o. year old female with:    * Staphylococcus aureus bacteremia with sepsis  -- On Oxacillin  -- BCx from 9/29 NGTD  -- ID following.  -- Management per primary     Right thalamic stroke  64 y/o with bacterial endocarditis on abx, MSSA bacteremia, epidural abscess, b/l shoulder abscess s/p I&D who was [facility] evaluation. Since extubation, ~ 48 hrs ago, her mental status has been fluctuating. CT head showed a R thalamic hypodensity and MRI brain showed an acute R thalamic infarct. Also noted to have a few subacute infarcts in bilateral cerebral hemispheres along with remote microhemorrhages.   DHARMESH from 9/25 showed vegetations on mitral and tricuspid leaflets. Most recent TTE from 10/3 showed no evidence of vegetations.    Etiology - embolic 2/2 endocarditis, although pattern of stroke seems like small vessel disease    -- Will need CTA to rule out mycotic aneurysms.  -- No antithrombotics     Antithrombotics for secondary stroke prevention: Antiplatelets: None: Not indicated - bacterial endocarditis    Statins for secondary stroke prevention and hyperlipidemia, if present:   Statins: Atorvastatin- 40 mg daily   -- LDL 80    Aggressive risk factor modification: Smoking, Bacterial endocarditis     Rehab efforts: The patient has been evaluated by a stroke team provider and the therapy needs have been fully considered based off the presenting complaints and exam findings. The following therapy evaluations are needed: PT evaluate and treat, OT evaluate and treat, SLP evaluate and treat    Diagnostics ordered/pending: CTA Head to assess vasculature  - Assess for mycotic aneurysm.    VTE prophylaxis: Heparin 5000 units SQ  every 8 hours    BP parameters: Infarct: No intervention, SBP <220        Endocarditis  -- Stroke risk factor  -- H/o IVDU  -- DHARMESH from 9/25 with vegetations on the mitral and tricuspid valve. TTE from 10/3 with no evidence of vegetations.  -- BCx negative since 9/29  -- ID following. On Oxacillin     Spinal epidural abscess  -- MRI L-spine from 10/1 with rim enhancing epidural lesions at the level of L2-L3, L3-L4  -- NSGY following.   -- Planned for drainage once medically stable.        STROKE DOCUMENTATION          NIH Scale:  1a. Level of Consciousness: 1-->Not alert, but arousable by minor stimulation to obey, answer, or respond  1b. LOC Questions: 1-->Answers one question correctly  1c. LOC Commands: 0-->Performs both tasks correctly  2. Best Gaze: 0-->Normal  3. Visual: 0-->No visual loss  4. Facial Palsy: 0-->Normal symmetrical movements  5a. Motor Arm, Left: 1-->Drift, limb holds 90 (or 45) degrees, but drifts down before full 10 seconds, does not hit bed or other support  5b. Motor Arm, Right: 0-->No drift, limb holds 90 (or 45) degrees for full 10 secs  6a. Motor Leg, Left: 1-->Drift, leg falls by the end of the 5-sec period but does not hit bed  6b. Motor Leg, Right: 1-->Drift, leg falls by the end of the 5-sec period but does not hit bed  7. Limb Ataxia: 0-->Absent  8. Sensory: 0-->Normal, no sensory loss  9. Best Language: 0-->No aphasia, normal  10. Dysarthria: 1-->Mild-to-moderate dysarthria, patient slurs at least some words and, at worst, can be understood with some difficulty  11. Extinction and Inattention (formerly Neglect): 0-->No abnormality  Total (NIH Stroke Scale): 6    Modified Troy Score: 0  Dorothea Coma Scale:13   ABCD2 Score:    [id number] Score:   HAS -BLED Score:   ICH Score:   Hunt & Shah Classification:       Thrombolysis Candidate? No, Infective endocarditis     Delays to Thrombolysis?  No    Interventional Revascularization Candidate?   Is the patient eligible for mechanical  endovascular reperfusion (NATALI)?  No; No large vessel occlusion      Hemorrhagic change of an Ischemic Stroke: Does this patient have an ischemic stroke with hemorrhagic changes? No     Subjective:     History of Present Illness:  64 y/o female with PMH of IVDU, bacterial endocarditis (DHARMESH from 9/25 with mitral and tricuspid vegetations) on oxacillin (On abx since 9/19 and Cx negative since 9/29) , MSSA bacteremia, epidural abscess, pancolitis, septic pulmonary emboli who was transferred from [facility] for NSGY eval.  History obtained from patient and son at bedside.    Shortly after arrival to [facility] she was intubated for hypoxic respiratory failure. Hospital course complicated by bl shoulder abscess s/p I&D with cultures growing MSSA. She is on Oxacillin for MSSA bacteremia and is being followed by ID. TRICIA following for epidural abscess.  Since extubation, her mental status has been fluctuating. CT head showed a new R thalamic hypodensity. MRI brain showed an acute infarct in the R thalamus with additional subacute infarcts bilaterally. She denies any weakness/headache/slurred speech. Says that she feels numb in both lower extremities upto the knee.    No prior history of stroke. MRI brain from her admission at [facility] showed multiple chronic infarcts bilaterally.  Utox at OSH positive for BZDs and opiates.  No history of DM, HTN.            Past Medical History:   Diagnosis Date    Anxiety     Carotid bruit     Chronic pain     Cystitis     Cystocele, unspecified (CODE)     Depression     GI bleed     History of uterine fibroid     Shortness of breath on exertion     Spinal stenosis     Urinary retention      Past Surgical History:   Procedure Laterality Date    ANTERIOR VAGINAL REPAIR  10-    CARDIAC CATHETERIZATION  age 12    CHOLECYSTECTOMY  2015    COLONOSCOPY  12/12/2018    aborted due to poor colon prep    COLONOSCOPY N/A 12/12/2018    Procedure: COLONOSCOPY;  Surgeon: Renard Gonsalves MD;   Location: [facility];  Service: Endoscopy;  Laterality: N/A;    HYSTERECTOMY  1989    AMANDA    tubiligation       Family History   Problem Relation Age of Onset    Alzheimer's disease Mother     Thyroid disease Mother     Osteoarthritis Mother     Hypertension Brother     Hypertension Sister     Breast cancer Neg Hx     Colon cancer Neg Hx     Ovarian cancer Neg Hx      Social History     Tobacco Use    Smoking status: Current Every Day Smoker     Packs/day: 0.50     Years: 40.00     Pack years: 20.00     Types: Cigarettes    Smokeless tobacco: Never Used   Substance Use Topics    Alcohol use: No    Drug use: No     Review of patient's allergies indicates:   Allergen Reactions    Pcn [penicillins]        Medications: I have reviewed the current medication administration record.    Medications Prior to Admission   Medication Sig Dispense Refill Last Dose    diazepam (VALIUM) 5 MG tablet Take 10 mg by mouth 2 (two) times daily.    12/12/2018 at Unknown time       Review of Systems   Constitutional: Positive for chills. Negative for fever.   HENT: Positive for trouble swallowing.    Eyes: Negative for visual disturbance.   Cardiovascular: Negative for chest pain.   Neurological: Positive for numbness. Negative for facial asymmetry, speech difficulty, weakness and headaches.        Numbness in both her legs.   Psychiatric/Behavioral: Positive for confusion. Negative for agitation.     Objective:     Vital Signs (Most Recent):  Temp: 97.6 °F (36.4 °C) (10/07/19 1900)  Pulse: 93 (10/07/19 2230)  Resp: (!) 32 (10/07/19 2230)  BP: (!) 181/84 (10/07/19 2200)  SpO2: 96 % (10/07/19 2230)    Vital Signs Range (Last 24H):  Temp:  [97.6 °F (36.4 °C)-98.7 °F (37.1 °C)]   Pulse:  []   Resp:  [22-37]   BP: (156-194)/()   SpO2:  [90 %-100 %]     Physical Exam   Constitutional: She appears lethargic. No distress.   Eyes: EOM are normal.   Neurological: She appears lethargic.   Nursing note and vitals  reviewed.      Neurological Exam:   LOC: drowsy  Language: No aphasia  Articulation: Mild dysarthria  Orientation: Not oriented to place, Not oriented to time  EOM (CN III, IV, VI): Full/intact  Facial Sensation (CN V): Normal  Facial Movement (CN VII): Symmetric facial expression    Motor: Arm left  Paresis: 4/5  Leg left  Paresis: 3/5  Arm right  Normal 5/5  Leg right Paresis: 3/5  Cebellar: No evidence of appendicular or axial ataxia  Sensation: Intact to light touch.      Laboratory:  CMP:   Recent Labs   Lab 10/07/19  0332  10/07/19  1717   CALCIUM 7.6*  7.6*   < > 7.4*   ALBUMIN 1.6*  --   --    PROT 5.9*  --   --      140   < > 138   K 2.8*  2.8*   < > 4.0   CO2 20*  20*   < > 19*     110   < > 111*   BUN 33*  33*   < > 27*   CREATININE 1.2  1.2   < > 1.0   ALKPHOS 98  --   --    ALT 5*  --   --    AST 20  --   --    BILITOT 0.3  --   --     < > = values in this interval not displayed.     CBC:   Recent Labs   Lab 10/07/19  0332   WBC 7.22   RBC 3.19*   HGB 9.4*   HCT 30.6*      MCV 96   MCH 29.5   MCHC 30.7*     Lipid Panel: No results for input(s): CHOL, LDLCALC, HDL, TRIG in the last 168 hours.  Coagulation:   Recent Labs   Lab 10/02/19  2000   INR 1.1     Hgb A1C: No results for input(s): HGBA1C in the last 168 hours.  TSH: No results for input(s): TSH in the last 168 hours.    Diagnostic Results:    Brain/Vessel imaging:    MRI Brain (10/7/2019) -         Acute right thalamic infarct.          Additional scattered foci of DWI hyperintense signal without corresponding decreased signal on the ADC maps.  The findings most likely represent subacute embolic infarctions.          Small foci of DWI hyperintensity within the lateral ventricles.  The findings are suspicious for possible ventriculitis.    Several punctate remote microhemorrhages.    Cardiac Evaluation:     TTE (10/3/2019) -     · Low normal left ventricular systolic function. The estimated ejection fraction is  53%  · Local segmental wall motion abnormalities.  · Normal LV diastolic function.  · Normal right ventricular systolic function.  · Mild mitral sclerosis.  · Mechanically ventilated; cannot use inferior caval vein diameter to estimate central venous pressure.  · Small circumferential pericardial effusion.    TTE (9/26/2019) -     · Mild concentric left ventricular hypertrophy.  · Mild left ventricular enlargement.  · Moderately decreased left ventricular systolic function. The estimated ejection fraction is 33%  · Grade III (severe) left ventricular diastolic dysfunction consistent with restrictive physiology.  · Mild global hypokinetic wall motion.  · Normal right ventricular systolic function.  · Mild left atrial enlargement.  · Mild right atrial enlargement.  · There is a right atrial mass present. Echo lucent mobile small vegetation ssen at R atrial - TV septal leaflet junction In hi R atrium At septal surface there is more echo dense structure ( ~ 1.5x .8 cm) mobile which could be vegetation vs normal prominent Eustachian valve  · Moderate-to-severe mitral regurgitation.  · Normal central venous pressure (3 mm Hg).  · The estimated PA systolic pressure is 50 mm Hg  · Moderate to severe pulmonary hypertension present.    DHARMESH (9/25/2019) -     · Mild left ventricular enlargement.  · Low normal left ventricular systolic function. The estimated ejection fraction is 50%  · Normal LV diastolic function.  · Mild left atrial enlargement.  · No interatrial septal defect present.  · Normal appearing left atrial appendage. No thrombus is present in the appendage. Normal appendage velocities.  · Moderate-to-severe mitral regurgitation.  · Moderate tricuspid regurgitation.  · 1x.5 cmmobile vegetation on tricuspid valve septal leaflet atrial side  · 5mmx6 mm mobile vegetation on posterior leaflet P3 scallop on atrial side  · Aortic valve wnl      Dominguez Reynolds MD  [facility]  Department of  Vascular Neurology   [facility]

## 2019-10-08 NOTE — ASSESSMENT & PLAN NOTE
-- MRI L-spine from 10/1 with rim enhancing epidural lesions at the level of L2-L3, L3-L4  -- NSGY following.   -- Planned for drainage once medically stable.

## 2019-10-08 NOTE — HPI
62 y/o female with PMH of IVDU, bacterial endocarditis (DHARMESH from 9/25 with mitral and tricuspid vegetations) on oxacillin (On abx since 9/19 and Cx negative since 9/29) , MSSA bacteremia, epidural abscess, pancolitis, septic pulmonary emboli who was transferred from [facility] for NSGY eval.  History obtained from patient and son at bedside.    Shortly after arrival to [facility] she was intubated for hypoxic respiratory failure. Hospital course complicated by bl shoulder abscess s/p I&D with cultures growing MSSA. She is on Oxacillin for MSSA bacteremia and is being followed by ID. TRICIA following for epidural abscess.  Since extubation, her mental status has been fluctuating. CT head showed a new R thalamic hypodensity. MRI brain showed an acute infarct in the R thalamus with additional subacute infarcts bilaterally. She denies any weakness/headache/slurred speech. Says that she feels numb in both lower extremities upto the knee.    No prior history of stroke. MRI brain from her admission at [facility] showed multiple chronic infarcts bilaterally.  Utox at OSH positive for BZDs and opiates.  No history of DM, HTN.

## 2019-10-08 NOTE — ASSESSMENT & PLAN NOTE
64 y/o with bacterial endocarditis on abx, MSSA bacteremia, epidural abscess, b/l shoulder abscess s/p I&D who was transferred from [facility] for NSGY evaluation. Since extubation, ~ 48 hrs ago, her mental status has been fluctuating. CT head showed a R thalamic hypodensity and MRI brain showed an acute R thalamic infarct. Also noted to have a few subacute infarcts in bilateral cerebral hemispheres along with remote microhemorrhages.   DHARMESH from 9/25 showed vegetations on mitral and tricuspid leaflets. Most recent TTE from 10/3 showed no evidence of vegetations.    Etiology - embolic 2/2 endocarditis, although pattern of stroke seems like small vessel disease    -- CTA ordered for today to rule out mycotic aneurysms.  -- No antithrombotics     Antithrombotics for secondary stroke prevention: Antiplatelets: None: Not indicated - bacterial endocarditis    Statins for secondary stroke prevention and hyperlipidemia, if present:   Statins: Atorvastatin- 40 mg daily   -- LDL 80    Aggressive risk factor modification: Smoking, Bacterial endocarditis     Rehab efforts: The patient has been evaluated by a stroke team provider and the therapy needs have been fully considered based off the presenting complaints and exam findings. The following therapy evaluations are needed: PT evaluate and treat, OT evaluate and treat, SLP evaluate and treat    Diagnostics ordered/pending: CTA Head to assess vasculature  - Assess for mycotic aneurysm.    VTE prophylaxis: Heparin 5000 units SQ every 8 hours    BP parameters: Infarct: No intervention, SBP <220

## 2019-10-08 NOTE — PROGRESS NOTES
[facility]  Vascular Neurology  Comprehensive Stroke Center  Progress Note    Assessment/Plan:     * Staphylococcus aureus bacteremia with sepsis  -- On Oxacillin  -- BCx from 9/29 NGTD  -- ID following.  -- Management per primary     Right thalamic stroke  62 y/o with bacterial endocarditis on abx, MSSA bacteremia, epidural abscess, b/l shoulder abscess s/p I&D who was transferred from [facility] for NSGY evaluation. Since extubation, ~ 48 hrs ago, her mental status has been fluctuating. CT head showed a R thalamic hypodensity and MRI brain showed an acute R thalamic infarct. Also noted to have a few subacute infarcts in bilateral cerebral hemispheres along with remote microhemorrhages.   DHARMESH from 9/25 showed vegetations on mitral and tricuspid leaflets. Most recent TTE from 10/3 showed no evidence of vegetations.    Etiology - embolic 2/2 endocarditis, although pattern of stroke seems like small vessel disease    -- CTA ordered for today to rule out mycotic aneurysms.  -- No antithrombotics     Antithrombotics for secondary stroke prevention: Antiplatelets: None: Not indicated - bacterial endocarditis    Statins for secondary stroke prevention and hyperlipidemia, if present:   Statins: Atorvastatin- 40 mg daily   -- LDL 80    Aggressive risk factor modification: Smoking, Bacterial endocarditis     Rehab efforts: The patient has been evaluated by a stroke team provider and the therapy needs have been fully considered based off the presenting complaints and exam findings. The following therapy evaluations are needed: PT evaluate and treat, OT evaluate and treat, SLP evaluate and treat    Diagnostics ordered/pending: CTA Head to assess vasculature  - Assess for mycotic aneurysm.    VTE prophylaxis: Heparin 5000 units SQ every 8 hours    BP parameters: Infarct: No intervention, SBP <220        Endocarditis  -- Stroke risk factor  -- H/o IVDU  -- DHARMESH from 9/25 with vegetations on the mitral and tricuspid  valve. TTE from 10/3 with no evidence of vegetations.  -- BCx negative since 9/29  -- ID following. On Oxacillin          No notes on file    STROKE DOCUMENTATION        NIH Scale:  1a. Level of Consciousness: 0-->Alert, keenly responsive  1b. LOC Questions: 0-->Answers both questions correctly  1c. LOC Commands: 0-->Performs both tasks correctly  2. Best Gaze: 0-->Normal  3. Visual: 0-->No visual loss  4. Facial Palsy: 0-->Normal symmetrical movements  5a. Motor Arm, Left: 0-->No drift, limb holds 90 (or 45) degrees for full 10 secs  5b. Motor Arm, Right: 0-->No drift, limb holds 90 (or 45) degrees for full 10 secs  6a. Motor Leg, Left: 2-->Some effort against gravity, leg falls to bed by 5 secs, but has some effort against gravity  6b. Motor Leg, Right: 2-->Some effort against gravity, leg falls to bed by 5 secs, but has some effort against gravity  7. Limb Ataxia: 0-->Absent  8. Sensory: 0-->Normal, no sensory loss  9. Best Language: 0-->No aphasia, normal  10. Dysarthria: 1-->Mild-to-moderate dysarthria, patient slurs at least some words and, at worst, can be understood with some difficulty  11. Extinction and Inattention (formerly Neglect): 0-->No abnormality  Total (NIH Stroke Scale): 5       Modified Range Score: 0  Dorothea Coma Scale:    ABCD2 Score:    [id number] Score:   HAS -BLED Score:   ICH Score:   Hunt & Shah Classification:      Hemorrhagic change of an Ischemic Stroke: Does this patient have an ischemic stroke with hemorrhagic changes? No         Past Medical History:   Diagnosis Date    Anxiety     Carotid bruit     Chronic pain     Cystitis     Cystocele, unspecified (CODE)     Depression     GI bleed     History of uterine fibroid     Shortness of breath on exertion     Spinal stenosis     Urinary retention      Past Surgical History:   Procedure Laterality Date    ANTERIOR VAGINAL REPAIR  10-    CARDIAC CATHETERIZATION  age 12    CHOLECYSTECTOMY  2015    COLONOSCOPY   12/12/2018    aborted due to poor colon prep    COLONOSCOPY N/A 12/12/2018    Procedure: COLONOSCOPY;  Surgeon: Renard Gonsalves MD;  Location: [facility];  Service: Endoscopy;  Laterality: N/A;    HYSTERECTOMY  1989    AMANDA    tubiligation       Family History   Problem Relation Age of Onset    Alzheimer's disease Mother     Thyroid disease Mother     Osteoarthritis Mother     Hypertension Brother     Hypertension Sister     Breast cancer Neg Hx     Colon cancer Neg Hx     Ovarian cancer Neg Hx      Social History     Tobacco Use    Smoking status: Current Every Day Smoker     Packs/day: 0.50     Years: 40.00     Pack years: 20.00     Types: Cigarettes    Smokeless tobacco: Never Used   Substance Use Topics    Alcohol use: No    Drug use: No     Review of patient's allergies indicates:   Allergen Reactions    Pcn [penicillins]        Medications: I have reviewed the current medication administration record.    Medications Prior to Admission   Medication Sig Dispense Refill Last Dose    diazepam (VALIUM) 5 MG tablet Take 10 mg by mouth 2 (two) times daily.    12/12/2018 at Unknown time       Review of Systems   Constitutional: Positive for chills. Negative for fever.   HENT: Positive for trouble swallowing.    Eyes: Negative for visual disturbance.   Respiratory: Negative for shortness of breath.    Cardiovascular: Negative for chest pain.   Gastrointestinal: Negative for abdominal pain.   Neurological: Positive for numbness. Negative for facial asymmetry, speech difficulty, weakness and headaches.        Numbness in both her legs.   Psychiatric/Behavioral: Positive for confusion. Negative for agitation.     Objective:     Vital Signs (Most Recent):  Temp: 97.5 °F (36.4 °C) (10/08/19 1500)  Pulse: 102 (10/08/19 1630)  Resp: (!) 28 (10/08/19 1630)  BP: (!) 184/83 (10/08/19 1600)  SpO2: 96 % (10/08/19 1630)    Vital Signs Range (Last 24H):  Temp:  [97.5 °F (36.4 °C)-97.7 °F (36.5 °C)]   Pulse:  []    Resp:  [20-55]   BP: (156-201)/(65-89)   SpO2:  [90 %-100 %]     Physical Exam   Constitutional: She appears lethargic. No distress.   Eyes: EOM are normal.   Neurological: She appears lethargic.   Nursing note and vitals reviewed.      Neurological Exam:   LOC: drowsy  Language: No aphasia  Articulation: Mild dysarthria  Orientation: Not oriented to place, Not oriented to time  EOM (CN III, IV, VI): Full/intact  Facial Sensation (CN V): Normal  Facial Movement (CN VII): Symmetric facial expression    Motor: Arm left  Paresis: 4/5  Leg left  Paresis: 3/5  Arm right  Normal 5/5  Leg right Paresis: 3/5  Cebellar: No evidence of appendicular or axial ataxia  Sensation: Intact to light touch.      Laboratory:  CMP:   Recent Labs   Lab 10/08/19  1516   CALCIUM 7.4*      K 3.2*   CO2 20*      BUN 21   CREATININE 0.9     CBC:   Recent Labs   Lab 10/08/19  0321   WBC 9.64   RBC 3.16*   HGB 9.5*   HCT 30.7*      MCV 97   MCH 30.1   MCHC 30.9*     Lipid Panel: No results for input(s): CHOL, LDLCALC, HDL, TRIG in the last 168 hours.  Coagulation:   Recent Labs   Lab 10/02/19  2000   INR 1.1     Hgb A1C: No results for input(s): HGBA1C in the last 168 hours.  TSH: No results for input(s): TSH in the last 168 hours.    Diagnostic Results:    Brain/Vessel imaging:    MRI Brain (10/7/2019) -         Acute right thalamic infarct.          Additional scattered foci of DWI hyperintense signal without corresponding decreased signal on the ADC maps.  The findings most likely represent subacute embolic infarctions.          Small foci of DWI hyperintensity within the lateral ventricles.  The findings are suspicious for possible ventriculitis.    Cardiac Evaluation:     TTE (10/3/2019) -     · Low normal left ventricular systolic function. The estimated ejection fraction is 53%  · Local segmental wall motion abnormalities.  · Normal LV diastolic function.  · Normal right ventricular systolic function.  · Mild mitral  sclerosis.  · Mechanically ventilated; cannot use inferior caval vein diameter to estimate central venous pressure.  · Small circumferential pericardial effusion.    TTE (9/26/2019) -     · Mild concentric left ventricular hypertrophy.  · Mild left ventricular enlargement.  · Moderately decreased left ventricular systolic function. The estimated ejection fraction is 33%  · Grade III (severe) left ventricular diastolic dysfunction consistent with restrictive physiology.  · Mild global hypokinetic wall motion.  · Normal right ventricular systolic function.  · Mild left atrial enlargement.  · Mild right atrial enlargement.  · There is a right atrial mass present. Echo lucent mobile small vegetation ssen at R atrial - TV septal leaflet junction In hi R atrium At septal surface there is more echo dense structure ( ~ 1.5x .8 cm) mobile which could be vegetation vs normal prominent Eustachian valve  · Moderate-to-severe mitral regurgitation.  · Normal central venous pressure (3 mm Hg).  · The estimated PA systolic pressure is 50 mm Hg  · Moderate to severe pulmonary hypertension present.    DHARMESH (9/25/2019) -     · Mild left ventricular enlargement.  · Low normal left ventricular systolic function. The estimated ejection fraction is 50%  · Normal LV diastolic function.  · Mild left atrial enlargement.  · No interatrial septal defect present.  · Normal appearing left atrial appendage. No thrombus is present in the appendage. Normal appendage velocities.  · Moderate-to-severe mitral regurgitation.  · Moderate tricuspid regurgitation.  · 1x.5 cmmobile vegetation on tricuspid valve septal leaflet atrial side  · 5mmx6 mm mobile vegetation on posterior leaflet P3 scallop on atrial side  · Aortic valve wnl      eRnard Leblanc MD  [facility]  Department of Vascular Neurology   [facility]

## 2019-10-08 NOTE — ASSESSMENT & PLAN NOTE
-- Stroke risk factor  -- H/o IVDU  -- DHARMESH from 9/25 with vegetations on the mitral and tricuspid valve. TTE from 10/3 with no evidence of vegetations.  -- BCx negative since 9/29  -- ID following. On Oxacillin

## 2019-10-08 NOTE — PT/OT/SLP PROGRESS
Speech Language Pathology      Mesha Mckenzie   [id number]    MRN: [id number]    Patient not seen today secondary to Nursing hold (Comment)(Upon SLP attempt to treat pt at 1127, NSG reported pt NPO 2/2 colitis. ). Will follow up according to SLP POC if pt medically stable and available.     LEROY Simpson, CCC-SLP  [phone]  10/8/2019

## 2019-10-08 NOTE — PT/OT/SLP PROGRESS
Physical Therapy Treatment/Change Plan of Care    Patient Name:  Mesha Mckenzie   MRN:  [id number]    Recommendations:     Discharge Recommendations:  rehabilitation facility   Discharge Equipment Recommendations: (will determine DME needs closer to discharge)   Barriers to discharge: Decreased caregiver support family will not be able to assist at current functional level.     Assessment:     Mesha Mckenzie is a 63 y.o. female admitted with a medical diagnosis of Staphylococcus aureus bacteremia with sepsis.  She presents with the following impairments/functional limitations:  weakness, gait instability, impaired balance, impaired endurance, impaired functional mobilty, decreased lower extremity function, decreased safety awareness, impaired coordination pt erickson treatment better being able to start sitting on EOB. Pt will benefit from cont skilled PT 4x/wk to progress physically. Pt will benefit from inpt rehab when medically stable. Pt is s/p acute R thalamic infarct.    Rehab Prognosis: Good; patient would benefit from acute skilled PT services to address these deficits and reach maximum level of function.    Recent Surgery: Procedure(s) (LRB):  LAMINECTOMY, SPINE, Open L3-5 (N/A)      Plan:     During this hospitalization, patient to be seen 4 x/week to address the identified rehab impairments via gait training, therapeutic activities, therapeutic exercises, neuromuscular re-education and progress toward the following goals:    · Plan of Care Expires:  10/31/19    Subjective     Chief Complaint: pt c/o pain during treatment.   Patient/Family Comments/goals:  To get better and go home.   Pain/Comfort:  · Pain Rating 1: 10/10  · Location 1: (buttocks)  · Pain Rating Post-Intervention 1: 10/10      Objective:     Communicated with nurse prior to session.  Patient found supine with telemetry, blood pressure cuff, pulse ox (continuous), central line, bowel management system, SCD, astorga catheter(CVP, B Hari Yue  boots) upon PT entry to room.     General Precautions: Standard, fall   Orthopedic Precautions:N/A   Braces:       Functional Mobility:  · Bed Mobility:  Pt needed verbal cues for hand placement and sequencing for functional mobility.    · Rolling Left:  total assistance  · Supine to Sit: total assistance  · Sit to Supine: total assistance  ·   · Balance: pt was total assist static sitting balance. pt sat on EOB x 5 min.  pt was total assist to wipe face with wash cloth and was unable to brush teeth sitting on EOB       AM-PAC 6 CLICK MOBILITY  Turning over in bed (including adjusting bedclothes, sheets and blankets)?: 2  Sitting down on and standing up from a chair with arms (e.g., wheelchair, bedside commode, etc.): 1  Moving from lying on back to sitting on the side of the bed?: 2  Moving to and from a bed to a chair (including a wheelchair)?: 2  Need to walk in hospital room?: 1  Climbing 3-5 steps with a railing?: 1  Basic Mobility Total Score: 9         Patient left supine with all lines intact, call button in reach and  present..    GOALS:   Multidisciplinary Problems     Physical Therapy Goals        Problem: Physical Therapy Goal    Goal Priority Disciplines Outcome Goal Variances Interventions   Physical Therapy Goal     PT, PT/OT Ongoing, Progressing     Description:  Goals to be met by: 10/31/2019    Patient will increase functional independence with mobility by performin. Supine <> sit with Moderate Assistance.-not met  2. Sit <> stand transfer with Moderate Assistance using Rolling Walker.-not met  3. Bed <> chair transfer via Step Transfer with Moderate Assistance using Rolling Walker.-not met  4. Dynamic sitting at edge of bed x 10 minutes with Minimal Assistance to prepare for functional tasks in sitting.-not met  5. Able to tolerate exercise for 15-20 reps with assistance as needed.-not met  6. Pt receive gait training ~ 50 ft with Rw and min assist - not met                             Time Tracking:     PT Received On: 10/08/19  PT Start Time: 1024     PT Stop Time: 1048  PT Total Time (min): 24 min     Billable Minutes: Therapeutic Activity 24 min    Treatment Type: Treatment  PT/PTA: PT     PTA Visit Number: 0     Melany Abreu, PT  10/08/2019

## 2019-10-08 NOTE — PLAN OF CARE
Plan of care reviewed with patient and family, verbalize understanding, reinforcement needed. Pt oriented to self and place, intermittently disoriented to time, disorientation increases when pt is tired. VSS, one dose of labetalol given for SBP > 180. No acute events overnight.

## 2019-10-08 NOTE — ASSESSMENT & PLAN NOTE
--consider TPN vs tube feedings (via NG tube) after patient has undergone possible procedure for epidural abscesses (expected Wed, 10/9)  --currently on D10 gtt to maintain blood glucose

## 2019-10-08 NOTE — ASSESSMENT & PLAN NOTE
--seen on CT 9/19 with improvement noted on f/u CT 9/25  --concern for ileus on 9/25 CT; rectal tube in place with liquid stool output  --stool 9/27 neg for c diff; stool cx neg  --remains NPO  --continue flagyl (x 7 days)

## 2019-10-08 NOTE — ASSESSMENT & PLAN NOTE
--likely secondary to sepsis/metabolic processes  --Brain MRI 9/30 with lacunar infarcts  --repeat head CT (10/7) revealed new right basal ganglia infarcts  --MRI of brain revealed R thalamic infarct, ventriculitis, subactue embolic infarctions  --plan for CTA head (10/8)  --continue ABx regimen (oxacillin and flagyl)

## 2019-10-08 NOTE — PLAN OF CARE
CM discussed patient D/C POC needs with CCM team in IDT rounds. Per CCM team, patient is not medically stable for stepdown at this time. Patient requires continued monitoring and interventions from CCM service for MSSA endocarditis with right and left heart involvement, without significant valvular regurgitation or large vegetations.  Awaiting neurosurgical intervention for L2-4 epidural abscess - plan tomorrow (10/09) if not amenable to IR approach.  New thalamic infarction on MRI.  CTA to r/o mycotic aneurysm. CM remains available for any further needs or concerns.     Kita Kelly RN, [facility]  Case Management-Critical Care     [phone]

## 2019-10-08 NOTE — PROGRESS NOTES
Patient transferred to the MRI bed, tele, O2 sensor and BP cuff applied,, placed in MRI, tolerating well, WCTM

## 2019-10-08 NOTE — SUBJECTIVE & OBJECTIVE
Past Medical History:   Diagnosis Date    Anxiety     Carotid bruit     Chronic pain     Cystitis     Cystocele, unspecified (CODE)     Depression     GI bleed     History of uterine fibroid     Shortness of breath on exertion     Spinal stenosis     Urinary retention      Past Surgical History:   Procedure Laterality Date    ANTERIOR VAGINAL REPAIR  10-    CARDIAC CATHETERIZATION  age 12    CHOLECYSTECTOMY  2015    COLONOSCOPY  12/12/2018    aborted due to poor colon prep    COLONOSCOPY N/A 12/12/2018    Procedure: COLONOSCOPY;  Surgeon: Renard Gonsalves MD;  Location: [facility];  Service: Endoscopy;  Laterality: N/A;    HYSTERECTOMY  1989    [facility]    tubiligation       Family History   Problem Relation Age of Onset    Alzheimer's disease Mother     Thyroid disease Mother     Osteoarthritis Mother     Hypertension Brother     Hypertension Sister     Breast cancer Neg Hx     Colon cancer Neg Hx     Ovarian cancer Neg Hx      Social History     Tobacco Use    Smoking status: Current Every Day Smoker     Packs/day: 0.50     Years: 40.00     Pack years: 20.00     Types: Cigarettes    Smokeless tobacco: Never Used   Substance Use Topics    Alcohol use: No    Drug use: No     Review of patient's allergies indicates:   Allergen Reactions    Pcn [penicillins]        Medications: I have reviewed the current medication administration record.    Medications Prior to Admission   Medication Sig Dispense Refill Last Dose    diazepam (VALIUM) 5 MG tablet Take 10 mg by mouth 2 (two) times daily.    12/12/2018 at Unknown time       Review of Systems   Constitutional: Positive for chills. Negative for fever.   HENT: Positive for trouble swallowing.    Eyes: Negative for visual disturbance.   Cardiovascular: Negative for chest pain.   Neurological: Positive for numbness. Negative for facial asymmetry, speech difficulty, weakness and headaches.        Numbness in both her legs.    Psychiatric/Behavioral: Positive for confusion. Negative for agitation.     Objective:     Vital Signs (Most Recent):  Temp: 97.6 °F (36.4 °C) (10/07/19 1900)  Pulse: 93 (10/07/19 2230)  Resp: (!) 32 (10/07/19 2230)  BP: (!) 181/84 (10/07/19 2200)  SpO2: 96 % (10/07/19 2230)    Vital Signs Range (Last 24H):  Temp:  [97.6 °F (36.4 °C)-98.7 °F (37.1 °C)]   Pulse:  []   Resp:  [22-37]   BP: (156-194)/()   SpO2:  [90 %-100 %]     Physical Exam   Constitutional: She appears lethargic. No distress.   Eyes: EOM are normal.   Neurological: She appears lethargic.   Nursing note and vitals reviewed.      Neurological Exam:   LOC: drowsy  Language: No aphasia  Articulation: Mild dysarthria  Orientation: Not oriented to place, Not oriented to time  EOM (CN III, IV, VI): Full/intact  Facial Sensation (CN V): Normal  Facial Movement (CN VII): Symmetric facial expression    Motor: Arm left  Paresis: 4/5  Leg left  Paresis: 3/5  Arm right  Normal 5/5  Leg right Paresis: 3/5  Cebellar: No evidence of appendicular or axial ataxia  Sensation: Intact to light touch.      Laboratory:  CMP:   Recent Labs   Lab 10/07/19  0332  10/07/19  1717   CALCIUM 7.6*  7.6*   < > 7.4*   ALBUMIN 1.6*  --   --    PROT 5.9*  --   --      140   < > 138   K 2.8*  2.8*   < > 4.0   CO2 20*  20*   < > 19*     110   < > 111*   BUN 33*  33*   < > 27*   CREATININE 1.2  1.2   < > 1.0   ALKPHOS 98  --   --    ALT 5*  --   --    AST 20  --   --    BILITOT 0.3  --   --     < > = values in this interval not displayed.     CBC:   Recent Labs   Lab 10/07/19  0332   WBC 7.22   RBC 3.19*   HGB 9.4*   HCT 30.6*      MCV 96   MCH 29.5   MCHC 30.7*     Lipid Panel: No results for input(s): CHOL, LDLCALC, HDL, TRIG in the last 168 hours.  Coagulation:   Recent Labs   Lab 10/02/19  2000   INR 1.1     Hgb A1C: No results for input(s): HGBA1C in the last 168 hours.  TSH: No results for input(s): TSH in the last 168 hours.    Diagnostic  Results:    Brain/Vessel imaging:    MRI Brain (10/7/2019) -         Acute right thalamic infarct.          Additional scattered foci of DWI hyperintense signal without corresponding decreased signal on the ADC maps.  The findings most likely represent subacute embolic infarctions.          Small foci of DWI hyperintensity within the lateral ventricles.  The findings are suspicious for possible ventriculitis.    Cardiac Evaluation:     TTE (10/3/2019) -     · Low normal left ventricular systolic function. The estimated ejection fraction is 53%  · Local segmental wall motion abnormalities.  · Normal LV diastolic function.  · Normal right ventricular systolic function.  · Mild mitral sclerosis.  · Mechanically ventilated; cannot use inferior caval vein diameter to estimate central venous pressure.  · Small circumferential pericardial effusion.    TTE (9/26/2019) -     · Mild concentric left ventricular hypertrophy.  · Mild left ventricular enlargement.  · Moderately decreased left ventricular systolic function. The estimated ejection fraction is 33%  · Grade III (severe) left ventricular diastolic dysfunction consistent with restrictive physiology.  · Mild global hypokinetic wall motion.  · Normal right ventricular systolic function.  · Mild left atrial enlargement.  · Mild right atrial enlargement.  · There is a right atrial mass present. Echo lucent mobile small vegetation ssen at R atrial - TV septal leaflet junction In hi R atrium At septal surface there is more echo dense structure ( ~ 1.5x .8 cm) mobile which could be vegetation vs normal prominent Eustachian valve  · Moderate-to-severe mitral regurgitation.  · Normal central venous pressure (3 mm Hg).  · The estimated PA systolic pressure is 50 mm Hg  · Moderate to severe pulmonary hypertension present.    DHARMESH (9/25/2019) -     · Mild left ventricular enlargement.  · Low normal left ventricular systolic function. The estimated ejection fraction is 50%  · Normal  LV diastolic function.  · Mild left atrial enlargement.  · No interatrial septal defect present.  · Normal appearing left atrial appendage. No thrombus is present in the appendage. Normal appendage velocities.  · Moderate-to-severe mitral regurgitation.  · Moderate tricuspid regurgitation.  · 1x.5 cmmobile vegetation on tricuspid valve septal leaflet atrial side  · 5mmx6 mm mobile vegetation on posterior leaflet P3 scallop on atrial side  · Aortic valve wnl

## 2019-10-08 NOTE — ASSESSMENT & PLAN NOTE
Multifactorial secondary to complete left lung collapse, septic emboli, pleural effusions, suspected pna. Intubated shortly after arrival for hypoxemia. s/p bronch x2 with significant clearance of secretions from left mainstem & improved aeration on f/u xray. s/p thora 10/1; transudative based on lights criteria, though concern for empyema based on OSH pulm note. right chest tube placed 10/3, 345 mL output after TPA administration; pleural fluid with GPC on gram stain. CTA neg for PE.    -- will continue to follow culture results  -- Extubated 10/5/19; now on RA  -- continue oxacillin  --R Chest tube removed

## 2019-10-08 NOTE — ASSESSMENT & PLAN NOTE
--secondary to MV/TV endocarditis, epidural abscess, likely pna  --as of 9/29, blood cultures are NGTD

## 2019-10-08 NOTE — PLAN OF CARE
Pt with Epidural abscess @ L2 - 3 and L3 - 4 levels as seen on the MRI L spine.     Epidural abscesses are not amenable to aspiration by Interventional radiology due to high risk of rupture and spread of infection.

## 2019-10-08 NOTE — ANESTHESIA PREPROCEDURE EVALUATION
[facility]  Anesthesia Pre-Operative Evaluation         Patient Name: Mesha Mckenzie  YOB: 1955  MRN: [id number]    SUBJECTIVE:     Pre-operative evaluation for Procedure(s) (LRB):  LAMINECTOMY, SPINE, Open L3-5 (N/A)     10/08/2019    Mesha Mckenzie is a 63 y.o. female w/ a significant PMHx of depression, IVDU presented to outside hospital in septic shock with MSSA in blood cultures. DHARMESH showed mitral and tricuspid valve endocarditis. On 10/1 MRI revealed spinal epidural abscess at L2-4 level.  The patient was transferred to [facility] for neurosurgery evaluation of epidural abscess. Of note, on arrival pt was intubated, bronched, chest tube plced and sedation and paralytics administered due to decompensation with desaturation and hypotension. Off pressors as of 10/08/2019.    NSG discussing with IR regarding IR drainage of abscess for laminectomy.     MRI brain with scattered septic emboli. Pt continues to have AMS. On room air.     Patient now presents for the above procedure(s).      LDA:        Percutaneous Central Line Insertion/Assessment - triple lumen  10/03/19 0114 right internal jugular (Active)   Dressing biopatch in place;dressing dry and intact 10/8/2019  3:15 AM   Securement secured w/ sutures 10/8/2019  3:15 AM   Additional Site Signs no erythema;no warmth;no edema;no pain;no palpable cord;no streak formation;no drainage 10/8/2019  3:15 AM   Distal Patency/Care infusing 10/8/2019  3:15 AM   Medial Patency/Care infusing 10/8/2019  3:15 AM   Proximal Patency/Care infusing 10/8/2019  3:15 AM   Waveform normal 10/8/2019  3:15 AM   Line Interventions line leveled/zeroed 10/8/2019  3:15 AM   Dressing Change Due 10/10/19 10/8/2019  3:15 AM   Daily Line Review Performed 10/7/2019  3:00 PM   Number of days: 5            Rectal Tube 09/27/19 1000 rectal tube w/ balloon (indicate number of mLs) (Active)   Balloon Inflation Volume (mL) 45 10/8/2019  3:15 AM  "  Reposition drainage bags for BMS & Henriquez on opposite sides of bed 10/8/2019  3:15 AM   Outcome gas expelled;stool evacuated 10/8/2019  3:15 AM   Stool Color Brown 10/8/2019  3:15 AM   Insertion Site Appearance no redness, warmth, tenderness, skin breakdown, drainage 10/8/2019  3:15 AM   Flush/Irrigation flushed w/;water;no resistance met 10/7/2019  3:00 PM   Intake (mL) 60 mL 10/7/2019  8:00 AM   Rectal Tube Output 300 mL 10/8/2019  8:00 AM   Number of days: 10            Urethral Catheter 09/19/19 1240 Double-lumen 16 Fr. (Active)   Site Assessment Clean;Intact 10/8/2019  3:15 AM   Collection Container Urimeter 10/8/2019  3:15 AM   Securement Method secured to top of thigh w/ adhesive device 10/8/2019  3:15 AM   Catheter Care Performed yes 10/8/2019  3:15 AM   Reason for Continuing Urinary Catheterization Critically ill in ICU requiring intensive monitoring 10/8/2019  3:15 AM   CAUTI Prevention Bundle StatLock in place w 1" slack;Intact seal between catheter & drainage tubing;Drainage bag/urimeter off the floor;Green sheeting clip in use;No dependent loops or kinks;Drainage bag/urimeter not overfilled (<2/3 full);Drainage bag/urimeter below bladder 10/7/2019  7:15 PM   Output (mL) 20 mL 10/8/2019  9:00 AM   Number of days: 18       Prev airway: 10/2 ICU intubation, video laryngoscope, RSI, 7.5mm tube, grade 1 view   Drips:   dextrose 10 % in water (D10W)         Patient Active Problem List   Diagnosis    Mixed incontinence urge and stress (male)(female)    Cystocele    Urinary hesitancy    Spinal stenosis    Dehydration    ABIMBOLA (acute kidney injury)    Pancolitis    Pleural effusion    Acute hypoxemic respiratory failure    Staphylococcus aureus bacteremia with sepsis    Acute on chronic diastolic congestive heart failure    Encephalopathy, metabolic    Normocytic anemia    Severe malnutrition    IV drug user    Hyperglycemia    Acute bacterial endocarditis    Acute septic pulmonary embolism    " Tricuspid valve vegetation    Mitral valve vegetation    Thrombocytopenia, acquired    Spinal epidural abscess    Epidural abscess    Septic shock with acute organ dysfunction due to methicillin susceptible Staphylococcus aureus (MSSA)    Shock    Endocarditis    Ileus    Preoperative clearance    Abscess of upper extremity    Abscess    Right thalamic stroke       Review of patient's allergies indicates:   Allergen Reactions    Pcn [penicillins]        Current Inpatient Medications:   albuterol-ipratropium  3 mL Nebulization Q4H    enoxaparin  30 mg Subcutaneous Daily    metronidazole  500 mg Intravenous Q8H    miconazole nitrate 2%   Topical (Top) BID    oxacillin 12 g in  mL CONTINUOUS INFUSION  12 g Intravenous Q24H       Current Facility-Administered Medications on File Prior to Encounter   Medication Dose Route Frequency Provider Last Rate Last Dose    lactated ringers infusion   Intravenous Continuous Renard Gonsalves MD 75 mL/hr at 12/12/18 0826      sodium chloride 0.9% flush 3 mL  3 mL Intravenous PRN Renard Gonsalves MD         Current Outpatient Medications on File Prior to Encounter   Medication Sig Dispense Refill    diazepam (VALIUM) 5 MG tablet Take 10 mg by mouth 2 (two) times daily.          Past Surgical History:   Procedure Laterality Date    ANTERIOR VAGINAL REPAIR  10-    CARDIAC CATHETERIZATION  age 12    CHOLECYSTECTOMY  2015    COLONOSCOPY  12/12/2018    aborted due to poor colon prep    COLONOSCOPY N/A 12/12/2018    Procedure: COLONOSCOPY;  Surgeon: Renard Gonsalves MD;  Location: [facility];  Service: Endoscopy;  Laterality: N/A;    [facility]Y  1989    [facility]    tubiligation         Social History     Socioeconomic History    Marital status:      Spouse name: Not on file    Number of children: Not on file    Years of education: Not on file    Highest education level: Not on file   Occupational History    Not on file   Social Needs     Financial resource strain: Not on file    Food insecurity:     Worry: Not on file     Inability: Not on file    Transportation needs:     Medical: Not on file     Non-medical: Not on file   Tobacco Use    Smoking status: Current Every Day Smoker     Packs/day: 0.50     Years: 40.00     Pack years: 20.00     Types: Cigarettes    Smokeless tobacco: Never Used   Substance and Sexual Activity    Alcohol use: No    Drug use: No    Sexual activity: Yes     Partners: Male   Lifestyle    Physical activity:     Days per week: Not on file     Minutes per session: Not on file    Stress: Not on file   Relationships    Social connections:     Talks on phone: Not on file     Gets together: Not on file     Attends Yazidi service: Not on file     Active member of club or organization: Not on file     Attends meetings of clubs or organizations: Not on file     Relationship status: Not on file   Other Topics Concern    Not on file   Social History Narrative    Not on file       OBJECTIVE:     Vital Signs Range (Last 24H):  Temp:  [36.4 °C (97.5 °F)-37.1 °C (98.7 °F)]   Pulse:  []   Resp:  [20-35]   BP: (156-194)/(65-90)   SpO2:  [90 %-100 %]       Significant Labs:  Lab Results   Component Value Date    WBC 9.64 10/08/2019    HGB 9.5 (L) 10/08/2019    HCT 30.7 (L) 10/08/2019     10/08/2019    CHOL 137 04/23/2019    TRIG 101 04/23/2019    HDL 37 (L) 04/23/2019    ALT 5 (L) 10/07/2019    AST 20 10/07/2019     10/08/2019    K 4.5 10/08/2019     10/08/2019    CREATININE 0.9 10/08/2019    BUN 25 (H) 10/08/2019    CO2 18 (L) 10/08/2019    TSH 1.29 04/23/2019    INR 1.1 10/02/2019    HGBA1C 5.7 (H) 04/23/2019       Diagnostic Studies: No relevant studies.    EKG:   Results for orders placed or performed during the hospital encounter of 10/02/19   EKG 12-lead    Collection Time: 10/04/19  5:45 AM    Narrative    Test Reason : R00.0,    Vent. Rate : 115 BPM     Atrial Rate : 115 BPM     P-R Int : 138 ms           QRS Dur : 086 ms      QT Int : 360 ms       P-R-T Axes : 078 044 016 degrees     QTc Int : 498 ms    Sinus tachycardia  Cannot rule out Inferior infarct (cited on or before 02-OCT-2019)  Nonspecific ST-t wave abnormality  Abnormal ECG  When compared with ECG of 03-OCT-2019 00:10,  Premature atrial complexes are no longer Present  Vent. rate has increased BY  48 BPM  Nonspecific T wave abnormality now evident in Lateral leads  Confirmed by STEFANY NIEVES MD ([id number]) on 10/4/2019 1:34:39 PM    Referred By: MAHESH MACEDO           Confirmed By:STEFANY NIEVES MD       ECHOCARDIOGRAM:  TTE:  Results for orders placed or performed during the hospital encounter of 10/02/19   Echo Color Flow Doppler? Yes   Result Value Ref Range    AV mean gradient 4 mmHg    Ao peak jose a 1.21 m/s    Ao VTI 22.28 cm    IVS 0.84 0.6 - 1.1 cm    LA size 2.54 cm    Left Atrium Major Axis 4.45 cm    Left Atrium Minor Axis 4.11 cm    LVIDD 4.47 3.5 - 6.0 cm    LVIDS 3.09 2.1 - 4.0 cm    LVOT diameter 1.97 cm    LVOT peak VTI 13.56 cm    PW 0.89 0.6 - 1.1 cm    MV Peak A Jose A 0.86 m/s    E wave decelartion time 204.81 msec    MV Peak E Jose A 0.51 m/s    PV Peak D Jose A 0.33 m/s    PV Peak S Jose A 0.42 m/s    RA Major Axis 3.64 cm    RA Width 2.33 cm    RVDD 2.54 cm    Sinus 2.95 cm    TAPSE 1.99 cm    TDI LATERAL 0.05 m/s    TDI SEPTAL 0.04 m/s    LA WIDTH 3.18 cm    LV Diastolic Volume 91.07 mL    LV Systolic Volume 37.51 mL    LVOT peak jose a 0.81 m/s    LV LATERAL E/E' RATIO 10.20 m/s    LV SEPTAL E/E' RATIO 12.75 m/s    FS 31 %    LA volume 29.34 cm3    LV mass 124.58 g    Left Ventricle Relative Wall Thickness 0.40 cm    AV valve area 1.85 cm2    AV Velocity Ratio 0.67     AV index (prosthetic) 0.61     E/A ratio 0.59     Mean e' 0.05 m/s    Pulm vein S/D ratio 1.27     LVOT area 3.0 cm2    LVOT stroke volume 41.31 cm3    AV peak gradient 6 mmHg    E/E' ratio 11.33 m/s    LV Systolic Volume Index 24.0 mL/m2    LV Diastolic Volume Index 58.26 mL/m2     LA Volume Index 18.8 mL/m2    LV Mass Index 80 g/m2    BSA 1.55 m2    Narrative    · Low normal left ventricular systolic function. The estimated ejection   fraction is 53%  · Local segmental wall motion abnormalities.  · Normal LV diastolic function.  · Normal right ventricular systolic function.  · Mild mitral sclerosis.  · Mechanically ventilated; cannot use inferior caval vein diameter to   estimate central venous pressure.          DHARMESH:    Results for orders placed or performed during the hospital encounter of 09/19/19   Transesophageal echo (DHARMESH) with possible cardioversion   Result Value Ref Range    BSA 1.48 m2    Narrative    · Mild left ventricular enlargement.  · Low normal left ventricular systolic function. The estimated ejection   fraction is 50%  · Normal LV diastolic function.  · Mild left atrial enlargement.  · No interatrial septal defect present.  · Normal appearing left atrial appendage. No thrombus is present in the   appendage. Normal appendage velocities.  · Moderate-to-severe mitral regurgitation.  · Moderate tricuspid regurgitation.  · 1x.5 cmmobile vegetation on tricuspid valve septal leaflet atrial side  · 5mmx6 mm mobile vegetation on posterior leaflet P3 scallop on atrial   side  · Aortic valve wnl              ASSESSMENT/PLAN:         Anesthesia Evaluation    I have reviewed the Patient Summary Reports.     I have reviewed the Medications.     Review of Systems  Anesthesia Hx:  No problems with previous Anesthesia    Social:  Smoker IVDU   Hematology/Oncology:     Oncology Normal    -- Anemia:   EENT/Dental:EENT/Dental Normal   Cardiovascular:   Hypertension CHF PVD: EF 50%    Pulmonary:   Pneumonia    Hepatic/GI:   PUD,    Musculoskeletal:  Musculoskeletal Normal    Neurological:  Neurology Normal    Psych:   depression          Physical Exam  General:  Malnutrition    Airway/Jaw/Neck:  Airway Findings: Mouth Opening: Normal Patient unable to follow commands to assess airway      Dental:  Dental Findings: Periodontal disease, Severe   Chest/Lungs:  Chest/Lungs Clear    Heart/Vascular:  Heart Findings: Normal Heart murmur: negative       Mental Status:  Mental Status Findings:  Lethargic, Somnolent, Confusion         Anesthesia Plan  Type of Anesthesia, risks & benefits discussed:  Anesthesia Type:  general  Patient's Preference:   Intra-op Monitoring Plan: standard ASA monitors, arterial line and central line  Intra-op Monitoring Plan Comments:   Post Op Pain Control Plan: multimodal analgesia, IV/PO Opioids PRN and per primary service following discharge from PACU  Post Op Pain Control Plan Comments:   Induction:   IV  Beta Blocker:  Patient is not currently on a Beta-Blocker (No further documentation required).       Informed Consent: Patient representative understands risks and agrees with Anesthesia plan.  Questions answered. Anesthesia consent signed with patient representative.  ASA Score: 4     Day of Surgery Review of History & Physical:    H&P update referred to the surgeon.         Ready For Surgery From Anesthesia Perspective.

## 2019-10-08 NOTE — SUBJECTIVE & OBJECTIVE
Past Medical History:   Diagnosis Date    Anxiety     Carotid bruit     Chronic pain     Cystitis     Cystocele, unspecified (CODE)     Depression     GI bleed     History of uterine fibroid     Shortness of breath on exertion     Spinal stenosis     Urinary retention      Past Surgical History:   Procedure Laterality Date    ANTERIOR VAGINAL REPAIR  10-    CARDIAC CATHETERIZATION  age 12    CHOLECYSTECTOMY  2015    COLONOSCOPY  12/12/2018    aborted due to poor colon prep    COLONOSCOPY N/A 12/12/2018    Procedure: COLONOSCOPY;  Surgeon: Renard Gonsalves MD;  Location: [facility];  Service: Endoscopy;  Laterality: N/A;    HYSTERECTOMY  1989    AMANDA    tubiligation       Family History   Problem Relation Age of Onset    Alzheimer's disease Mother     Thyroid disease Mother     Osteoarthritis Mother     Hypertension Brother     Hypertension Sister     Breast cancer Neg Hx     Colon cancer Neg Hx     Ovarian cancer Neg Hx      Social History     Tobacco Use    Smoking status: Current Every Day Smoker     Packs/day: 0.50     Years: 40.00     Pack years: 20.00     Types: Cigarettes    Smokeless tobacco: Never Used   Substance Use Topics    Alcohol use: No    Drug use: No     Review of patient's allergies indicates:   Allergen Reactions    Pcn [penicillins]        Medications: I have reviewed the current medication administration record.    Medications Prior to Admission   Medication Sig Dispense Refill Last Dose    diazepam (VALIUM) 5 MG tablet Take 10 mg by mouth 2 (two) times daily.    12/12/2018 at Unknown time       Review of Systems   Constitutional: Positive for chills. Negative for fever.   HENT: Positive for trouble swallowing.    Eyes: Negative for visual disturbance.   Respiratory: Negative for shortness of breath.    Cardiovascular: Negative for chest pain.   Gastrointestinal: Negative for abdominal pain.   Neurological: Positive for numbness. Negative for facial  asymmetry, speech difficulty, weakness and headaches.        Numbness in both her legs.   Psychiatric/Behavioral: Positive for confusion. Negative for agitation.     Objective:     Vital Signs (Most Recent):  Temp: 97.5 °F (36.4 °C) (10/08/19 1500)  Pulse: 102 (10/08/19 1630)  Resp: (!) 28 (10/08/19 1630)  BP: (!) 184/83 (10/08/19 1600)  SpO2: 96 % (10/08/19 1630)    Vital Signs Range (Last 24H):  Temp:  [97.5 °F (36.4 °C)-97.7 °F (36.5 °C)]   Pulse:  []   Resp:  [20-55]   BP: (156-201)/(65-89)   SpO2:  [90 %-100 %]     Physical Exam   Constitutional: She appears lethargic. No distress.   Eyes: EOM are normal.   Neurological: She appears lethargic.   Nursing note and vitals reviewed.      Neurological Exam:   LOC: drowsy  Language: No aphasia  Articulation: Mild dysarthria  Orientation: Not oriented to place, Not oriented to time  EOM (CN III, IV, VI): Full/intact  Facial Sensation (CN V): Normal  Facial Movement (CN VII): Symmetric facial expression    Motor: Arm left  Paresis: 4/5  Leg left  Paresis: 3/5  Arm right  Normal 5/5  Leg right Paresis: 3/5  Cebellar: No evidence of appendicular or axial ataxia  Sensation: Intact to light touch.      Laboratory:  CMP:   Recent Labs   Lab 10/08/19  1516   CALCIUM 7.4*      K 3.2*   CO2 20*      BUN 21   CREATININE 0.9     CBC:   Recent Labs   Lab 10/08/19  0321   WBC 9.64   RBC 3.16*   HGB 9.5*   HCT 30.7*      MCV 97   MCH 30.1   MCHC 30.9*     Lipid Panel: No results for input(s): CHOL, LDLCALC, HDL, TRIG in the last 168 hours.  Coagulation:   Recent Labs   Lab 10/02/19  2000   INR 1.1     Hgb A1C: No results for input(s): HGBA1C in the last 168 hours.  TSH: No results for input(s): TSH in the last 168 hours.    Diagnostic Results:    Brain/Vessel imaging:    MRI Brain (10/7/2019) -         Acute right thalamic infarct.          Additional scattered foci of DWI hyperintense signal without corresponding decreased signal on the ADC maps.  The  findings most likely represent subacute embolic infarctions.          Small foci of DWI hyperintensity within the lateral ventricles.  The findings are suspicious for possible ventriculitis.    Cardiac Evaluation:     TTE (10/3/2019) -     · Low normal left ventricular systolic function. The estimated ejection fraction is 53%  · Local segmental wall motion abnormalities.  · Normal LV diastolic function.  · Normal right ventricular systolic function.  · Mild mitral sclerosis.  · Mechanically ventilated; cannot use inferior caval vein diameter to estimate central venous pressure.  · Small circumferential pericardial effusion.    TTE (9/26/2019) -     · Mild concentric left ventricular hypertrophy.  · Mild left ventricular enlargement.  · Moderately decreased left ventricular systolic function. The estimated ejection fraction is 33%  · Grade III (severe) left ventricular diastolic dysfunction consistent with restrictive physiology.  · Mild global hypokinetic wall motion.  · Normal right ventricular systolic function.  · Mild left atrial enlargement.  · Mild right atrial enlargement.  · There is a right atrial mass present. Echo lucent mobile small vegetation ssen at R atrial - TV septal leaflet junction In hi R atrium At septal surface there is more echo dense structure ( ~ 1.5x .8 cm) mobile which could be vegetation vs normal prominent Eustachian valve  · Moderate-to-severe mitral regurgitation.  · Normal central venous pressure (3 mm Hg).  · The estimated PA systolic pressure is 50 mm Hg  · Moderate to severe pulmonary hypertension present.    DHARMESH (9/25/2019) -     · Mild left ventricular enlargement.  · Low normal left ventricular systolic function. The estimated ejection fraction is 50%  · Normal LV diastolic function.  · Mild left atrial enlargement.  · No interatrial septal defect present.  · Normal appearing left atrial appendage. No thrombus is present in the appendage. Normal appendage  velocities.  · Moderate-to-severe mitral regurgitation.  · Moderate tricuspid regurgitation.  · 1x.5 cmmobile vegetation on tricuspid valve septal leaflet atrial side  · 5mmx6 mm mobile vegetation on posterior leaflet P3 scallop on atrial side  · Aortic valve wnl

## 2019-10-08 NOTE — PLAN OF CARE
Problem: Physical Therapy Goal  Goal: Physical Therapy Goal  Description  Goals to be met by: 10/31/2019    Patient will increase functional independence with mobility by performin. Supine <> sit with Moderate Assistance.-not met  2. Sit <> stand transfer with Moderate Assistance using Rolling Walker.-not met  3. Bed <> chair transfer via Step Transfer with Moderate Assistance using Rolling Walker.-not met  4. Dynamic sitting at edge of bed x 10 minutes with Minimal Assistance to prepare for functional tasks in sitting.-not met  5. Able to tolerate exercise for 15-20 reps with assistance as needed.-not met  6. Pt receive gait training ~ 50 ft with Rw and min assist - not met           Outcome: Ongoing, Progressing   Goals updated for time frame and content and are appropriate. Melany Abreu, PT 10/8/2019

## 2019-10-08 NOTE — ASSESSMENT & PLAN NOTE
2/2 IVDA, patient reported injecting IV heroin to ID MD at OSH. Blood cultures (9/19 - 9/26) have grown methicillin sensitive Staph aureus. Blood cultures as of 9/29 have been NGTD. mitral and tricuspid valve vegetations seen on DHARMESH 9/25; unchanged on TTE 9/28. OSH abx regimen vanc, ancef and daptomycin. Repeat BCx positive for MSSA. OSH PICC line d/c'd.    --formal 2D echo results show EF 53%; normal L ventricular function; mild mitral sclerosis; small circumferential pericardial effusion. cardiology consulted; no surgical intervention at this point   --neurosurgery consult for epidural abscess, likely plan for OR when stable  --bilateral shoulder abscess, s/p I&D  --oxacillin started after passed penicillin challenge

## 2019-10-08 NOTE — ASSESSMENT & PLAN NOTE
--MRI concerning for epidural abscess at L2-4 region  --neurosurgery consulted and following; possible OR for abscess drainage this Wednesday (10/9)  --IR consulted per request of neurosurgery on 10/8 for possible sooner drainage

## 2019-10-08 NOTE — PROGRESS NOTES
[facility]  Critical Care Medicine  Progress Note    Patient Name: Mesha Mckenzie  MRN: [id number]  Admission Date: 10/2/2019  Hospital Length of Stay: 6 days  Code Status: Full Code  Attending Provider: Ok Rehman MD  Primary Care Provider: Varun Shea MD PhD   Principal Problem: Staphylococcus aureus bacteremia with sepsis    Subjective:     HPI:  Patient is a 63 y.o. female with significant past medical history of depression, anxiety and IVDA presented to [facility] ED on 9/19 with c/o N/V/D and weakness x ~5 days. The patient was found to be in septic shock with pneumonia and colitis and was transferred to [facility] ICU for further management. Pt was admitted to the intensive care unit and was treated with broad-spectrum antibiotics. 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 CT was repeated on 9/25 and showed bilateral cavitary lesions likely secondary to septic emboli, small pericardial effusion and ileus. DHARMESH was performed on 9/25 which revealed  mitral and tricuspid valve endocarditis. Blood cultures (9/19 - 9/26) have grown methicillin sensitive Staph aureus. Blood cultures as of 9/29 have been NGTD. ID was following the patient for antibiotic management with most recent regimen being cefazolin, vancomycin and daptomycin. Repeat Echo on 9/28 showed LVEF 35% and persistent vegatations. On 9/30 the patient had an MRI brain that showed remote lacunar infarcts without acute intracranial abnormality. On 10/1 MRI revealed spinal epidural abscess at L2-4 level. Thoracentesis was performed on 10/1. The patient was followed by Cardiology team who considered valve replacement surgery but due to patient's other medical issues, Cardiovascular surgery intervention was deferred. The patient was transferred to [facility] for neurosurgery evaluation of epidural abscess.         Hospital/ICU Course:  Upon arrival to [facility], the patient  was encephalopathic, but able to follow commands. She was hemodynamically stable and satting well on 2L NC. Shortly after arrival, the patient became acutely hypertensive and hypoxemic necessitating intubation and mechanical ventilation. She was extremely difficult to oxygenate (P:F ratio 52). Bronch was performed which was unremarkable. The patient became hypotensive and was started on vasopressors. She was paralyzed with nimbex due to tachypnea and vent dyssynchrony. Bedside echo was performed which showed reduced LVEF, hyperdynamic LV; no significant mitral regurg was noted. The patient went for CTA without evidence of pulmonary embolism. Imaging revealed left lung collaspse and repeat bronch was performed with sputum cx sent. Oxygenation improved quickly and paralytic was discontinued. Right chest tube placed & pleural fluid sent; gram stain with GPCs. Neurosurgery consulted for epidural abscess, plans for OR potentially Monday. Cardiology consult for endocarditis; currently not a surgical candidate. Repeat TTE performed with improved EF and without mention of previously noted vegetations. ID and allergy consulted; oxacillin densensitization in process given pcn allergy.         Consulted general surgery for bilateral shoulder swelling; ultrasound concerning for phlegmon vs developing abscess without organized fluid collection. S/p I&D of shoulder abscesses 10/5/19. Extubated on 10/5/19 and doing well on RA. Ongoing neurosurgery evaluation for drainage of epidural abscess; neurosurgery team verbalized possible OR procedure on Wednesday (10/9) with deferred consult to IR to possibly drain the abscesses on 10/8.    Repeat CXR done for tachypnea/increased WOB. CXR unremarkable. Head CT was repeated and showed new R basal ganglia infarcts. MRI of brain showed evidence of R thalamic infarct and ventriculitis. CTA of head ordered to be completed 10/8.    Interval History/Significant Events: Head CT was repeated and  showed new R basal ganglia infarcts (10/70. MRI of brain showed evidence of R thalamic infarct and ventriculitis. CTA of head ordered to be completed 10/8.    Review of Systems   Respiratory: Negative for chest tightness.    Cardiovascular: Negative for chest pain.   Gastrointestinal: Positive for abdominal pain. Negative for abdominal distention.     Objective:     Vital Signs (Most Recent):  Temp: 97.5 °F (36.4 °C) (10/08/19 0300)  Pulse: 95 (10/08/19 0630)  Resp: (!) 30 (10/08/19 0630)  BP: (!) 166/75 (10/08/19 0600)  SpO2: (!) 94 % (10/08/19 0630) Vital Signs (24h Range):  Temp:  [97.5 °F (36.4 °C)-98.7 °F (37.1 °C)] 97.5 °F (36.4 °C)  Pulse:  [81-96] 95  Resp:  [20-34] 30  SpO2:  [90 %-100 %] 94 %  BP: (156-194)/(65-90) 166/75   Weight: 53.5 kg (118 lb)  Body mass index is 20.25 kg/m².      Intake/Output Summary (Last 24 hours) at 10/8/2019 0802  Last data filed at 10/8/2019 0700  Gross per 24 hour   Intake 3296.2 ml   Output 2055 ml   Net 1241.2 ml       Physical Exam   Constitutional: She appears lethargic. She is sleeping. She has a sickly appearance. She appears ill.   Cardiovascular: Normal rate and regular rhythm.   Pulses:       Radial pulses are 1+ on the right side, and 1+ on the left side.        Dorsalis pedis pulses are 1+ on the right side, and 1+ on the left side.   Pulmonary/Chest: Tachypnea noted. She has decreased breath sounds in the right upper field, the right middle field, the right lower field, the left upper field, the left middle field and the left lower field.   Abdominal: Normal appearance.   Neurological: She appears lethargic. GCS eye subscore is 3. GCS verbal subscore is 4. GCS motor subscore is 5.   Skin: Skin is warm, dry and intact.       Vents:  Vent Mode: Spont (10/05/19 1401)  Ventilator Initiated: Yes (10/02/19 2200)  Set Rate: 20 bmp (10/05/19 1303)  Vt Set: 350 mL (10/05/19 1303)  Pressure Support: 10 cmH20 (10/05/19 1401)  PEEP/CPAP: 5 cmH20 (10/05/19 1401)  Oxygen  Concentration (%): 28 (10/05/19 1502)  Peak Airway Pressure: 16 cmH2O (10/05/19 1401)  Plateau Pressure: 14 cmH20 (10/05/19 1401)  Total Ve: 12.6 mL (10/05/19 1401)  Negative Inspiratory Force (cm H2O): -31 (10/05/19 1510)  F/VT Ratio<105 (RSBI): (!) 27.89 (10/05/19 1401)  Lines/Drains/Airways     Central Venous Catheter Line                 Percutaneous Central Line Insertion/Assessment - triple lumen  10/03/19 0114 right internal jugular 5 days          Drain                 Urethral Catheter 09/19/19 1240 Double-lumen 16 Fr. 18 days         Rectal Tube 09/27/19 1000 rectal tube w/ balloon (indicate number of mLs) 10 days              Significant Labs:    CBC/Anemia Profile:  Recent Labs   Lab 10/07/19  0332 10/08/19  0321   WBC 7.22 9.64   HGB 9.4* 9.5*   HCT 30.6* 30.7*    283   MCV 96 97   RDW 17.2* 17.2*        Chemistries:  Recent Labs   Lab 10/07/19  0332  10/07/19  1717 10/07/19  2225 10/08/19  0321     140   < > 138 137 136   K 2.8*  2.8*   < > 4.0 3.4* 4.5     110   < > 111* 109 110   CO2 20*  20*   < > 19* 21* 18*   BUN 33*  33*   < > 27* 26* 25*   CREATININE 1.2  1.2   < > 1.0 0.9 0.9   CALCIUM 7.6*  7.6*   < > 7.4* 7.5* 7.4*   ALBUMIN 1.6*  --   --   --   --    PROT 5.9*  --   --   --   --    BILITOT 0.3  --   --   --   --    ALKPHOS 98  --   --   --   --    ALT 5*  --   --   --   --    AST 20  --   --   --   --    MG 1.2*  --   --   --  1.7   PHOS 3.3  --   --   --  2.7    < > = values in this interval not displayed.       All pertinent labs within the past 24 hours have been reviewed.    Significant Imaging:  I have reviewed all pertinent imaging results/findings within the past 24 hours.      ABG  Recent Labs   Lab 10/08/19  0838   PH 7.517*   PO2 57*   PCO2 23.6*   HCO3 19.2*   BE -4     Assessment/Plan:     Neuro  Encephalopathy, metabolic  --likely secondary to sepsis/metabolic processes  --Brain MRI 9/30 with lacunar infarcts  --repeat head CT (10/7) revealed new right  basal ganglia infarcts  --MRI of brain revealed R thalamic infarct, ventriculitis, subactue embolic infarctions  --plan for CTA head (10/8)  --continue ABx regimen (oxacillin and flagyl)      Pulmonary  Acute hypoxemic respiratory failure  Multifactorial secondary to complete left lung collapse, septic emboli, pleural effusions, suspected pna. Intubated shortly after arrival for hypoxemia. s/p bronch x2 with significant clearance of secretions from left mainstem & improved aeration on f/u xray. s/p thora 10/1; transudative based on lights criteria, though concern for empyema based on OSH pulm note. right chest tube placed 10/3, 345 mL output after TPA administration; pleural fluid with GPC on gram stain. CTA neg for PE.    -- will continue to follow culture results  -- Extubated 10/5/19; now on RA  -- continue oxacillin  --R Chest tube removed    Pleural effusion  --Empyema with GPC on gram stain   --R chest tube removed    Cardiac/Vascular  Acute bacterial endocarditis  --See staph bacteremia       Acute on chronic diastolic congestive heart failure  --last echo (OSH) with EF 35%, LV concentric remodeling, indeterminate diastolic function  --bedside echo with depressed EF, RV not significantly dilated  --repeat TTE with improved EF and without mention of previously noted vegetations    Renal/  ABIMBOLA (acute kidney injury)  --baseline creatinine ~ 0.7; was 2.9 on initial presentation to OSH, improved with hydration over hospital course  --FeNa/FeUrea consistent with pre-renal etiology; possible component of ATN given hypotension   --non anion gap metabolic acidosis on admit, briefly on sodium bicarb drip   --now improving; will continue to monitor    ID  * Staphylococcus aureus bacteremia with sepsis  2/2 IVDA, patient reported injecting IV heroin to ID MD at OSH. Blood cultures (9/19 - 9/26) have grown methicillin sensitive Staph aureus. Blood cultures as of 9/29 have been NGTD. mitral and tricuspid valve  vegetations seen on DHARMESH 9/25; unchanged on TTE 9/28. OSH abx regimen vanc, ancef and daptomycin. Repeat BCx positive for MSSA. OSH PICC line d/c'd.    --formal 2D echo results show EF 53%; normal L ventricular function; mild mitral sclerosis; small circumferential pericardial effusion. cardiology consulted; no surgical intervention at this point   --neurosurgery consult for epidural abscess, likely plan for OR when stable  --bilateral shoulder abscess, s/p I&D  --oxacillin started after passed penicillin challenge    Abscess of upper extremity  Ultrasound concerning for developing abscess in left and right shoulder abscesses. S/p I&D by general surgery.  - will f/u on culture and stain    Septic shock with acute organ dysfunction due to methicillin susceptible Staphylococcus aureus (MSSA)  --secondary to MV/TV endocarditis, epidural abscess, likely pna  --as of 9/29, blood cultures are NGTD    Epidural abscess  --MRI concerning for epidural abscess at L2-4 region  --neurosurgery consulted and following; possible OR for abscess drainage this Wednesday (10/9)  --IR consulted per request of neurosurgery on 10/8 for possible sooner drainage    Hematology  Acute septic pulmonary embolism  --secondary to TV endocarditis    Endocrine  Severe malnutrition  --consider TPN vs tube feedings (via NG tube) after patient has undergone possible procedure for epidural abscesses (expected Wed, 10/9)  --currently on D10 gtt to maintain blood glucose    GI  Ileus  --Possible ileus on CT abd 9/25  --Was receiving TPN at OSH  --KUB with dilated loops of bowel  -- resolved w/ bowel rest    Pancolitis  --seen on CT 9/19 with improvement noted on f/u CT 9/25  --concern for ileus on 9/25 CT; rectal tube in place with liquid stool output  --stool 9/27 neg for c diff; stool cx neg  --remains NPO  --continue flagyl (x 7 days)       Critical Care Daily Checklist:    A: Awake: RASS Goal/Actual Goal: RASS Goal: 0-->alert and calm  Actual:  Joyner Agitation Sedation Scale (RASS): Drowsy   B: Spontaneous Breathing Trial Performed? Spon. Breathing Trial Initiated?: Initiated (10/05/19 9320)   C: SAT & SBT Coordinated?  n/a                      D: Delirium: CAM-ICU     E: Early Mobility Performed? No   F: Feeding Goal: Goals: Patient to receive nutrition by RD follow-up  Status: Nutrition Goal Status: goal not met   Current Diet Order   Procedures    Diet NPO      AS: Analgesia/Sedation n/a   T: Thromboembolic Prophylaxis lovenox sq   H: HOB > 300 Yes   U: Stress Ulcer Prophylaxis (if needed) famotidine   G: Glucose Control accuchecks q1h   B: Bowel Function     I: Indwelling Catheter (Lines & Astorga) Necessity RIJ CVC, astorga   D: De-escalation of Antimicrobials/Pharmacotherapies done    Plan for the day/ETD CTA head    Code Status:  Family/Goals of Care: Full Code  Cont course     Critical Care Time: 70 minutes  Critical secondary to Patient has a condition that poses threat to life and bodily function.     Critical care was time spent personally by me on the following activities: development of treatment plan with patient or surrogate and bedside caregivers, discussions with consultants, evaluation of patient's response to treatment, examination of patient, ordering and performing treatments and interventions, ordering and review of laboratory studies, ordering and review of radiographic studies, pulse oximetry, re-evaluation of patient's condition. This critical care time did not overlap with that of any other provider or involve time for any procedures.     Ramirez Padilla NP  Critical Care Medicine  [facility]

## 2019-10-09 ENCOUNTER — ANESTHESIA (OUTPATIENT)
Dept: SURGERY | Facility: HOSPITAL | Age: 64
DRG: 028 | End: 2019-10-09
Payer: MEDICARE

## 2019-10-09 LAB
ALLENS TEST: ABNORMAL
ANION GAP SERPL CALC-SCNC: 10 MMOL/L (ref 8–16)
ANION GAP SERPL CALC-SCNC: 6 MMOL/L (ref 8–16)
ANION GAP SERPL CALC-SCNC: 7 MMOL/L (ref 8–16)
ANION GAP SERPL CALC-SCNC: 9 MMOL/L (ref 8–16)
ANISOCYTOSIS BLD QL SMEAR: SLIGHT
ANISOCYTOSIS BLD QL SMEAR: SLIGHT
BACTERIA SPEC AEROBE CULT: NO GROWTH
BACTERIA SPEC AEROBE CULT: NO GROWTH
BASOPHILS # BLD AUTO: 0.04 K/UL (ref 0–0.2)
BASOPHILS # BLD AUTO: ABNORMAL K/UL (ref 0–0.2)
BASOPHILS # BLD AUTO: ABNORMAL K/UL (ref 0–0.2)
BASOPHILS NFR BLD: 0 % (ref 0–1.9)
BASOPHILS NFR BLD: 0.4 % (ref 0–1.9)
BASOPHILS NFR BLD: 1 % (ref 0–1.9)
BUN SERPL-MCNC: 12 MG/DL (ref 8–23)
BUN SERPL-MCNC: 13 MG/DL (ref 8–23)
BUN SERPL-MCNC: 14 MG/DL (ref 8–23)
BUN SERPL-MCNC: 16 MG/DL (ref 8–23)
CA-I BLDV-SCNC: 1.04 MMOL/L (ref 1.06–1.42)
CALCIUM SERPL-MCNC: 6.9 MG/DL (ref 8.7–10.5)
CALCIUM SERPL-MCNC: 7 MG/DL (ref 8.7–10.5)
CALCIUM SERPL-MCNC: 7.1 MG/DL (ref 8.7–10.5)
CALCIUM SERPL-MCNC: 7.1 MG/DL (ref 8.7–10.5)
CHLORIDE SERPL-SCNC: 109 MMOL/L (ref 95–110)
CHLORIDE SERPL-SCNC: 110 MMOL/L (ref 95–110)
CHLORIDE SERPL-SCNC: 110 MMOL/L (ref 95–110)
CHLORIDE SERPL-SCNC: 112 MMOL/L (ref 95–110)
CO2 SERPL-SCNC: 16 MMOL/L (ref 23–29)
CO2 SERPL-SCNC: 17 MMOL/L (ref 23–29)
CO2 SERPL-SCNC: 17 MMOL/L (ref 23–29)
CO2 SERPL-SCNC: 19 MMOL/L (ref 23–29)
CREAT SERPL-MCNC: 0.8 MG/DL (ref 0.5–1.4)
DIFFERENTIAL METHOD: ABNORMAL
EOSINOPHIL # BLD AUTO: 0.1 K/UL (ref 0–0.5)
EOSINOPHIL # BLD AUTO: ABNORMAL K/UL (ref 0–0.5)
EOSINOPHIL # BLD AUTO: ABNORMAL K/UL (ref 0–0.5)
EOSINOPHIL NFR BLD: 1 % (ref 0–8)
EOSINOPHIL NFR BLD: 1.4 % (ref 0–8)
EOSINOPHIL NFR BLD: 4 % (ref 0–8)
ERYTHROCYTE [DISTWIDTH] IN BLOOD BY AUTOMATED COUNT: 17 % (ref 11.5–14.5)
ERYTHROCYTE [DISTWIDTH] IN BLOOD BY AUTOMATED COUNT: 17.2 % (ref 11.5–14.5)
ERYTHROCYTE [DISTWIDTH] IN BLOOD BY AUTOMATED COUNT: 17.2 % (ref 11.5–14.5)
EST. GFR  (AFRICAN AMERICAN): >60 ML/MIN/1.73 M^2
EST. GFR  (NON AFRICAN AMERICAN): >60 ML/MIN/1.73 M^2
GLUCOSE SERPL-MCNC: 104 MG/DL (ref 70–110)
GLUCOSE SERPL-MCNC: 111 MG/DL (ref 70–110)
GLUCOSE SERPL-MCNC: 119 MG/DL (ref 70–110)
GLUCOSE SERPL-MCNC: 126 MG/DL (ref 70–110)
GRAM STN SPEC: NORMAL
HCO3 UR-SCNC: 16.8 MMOL/L (ref 24–28)
HCT VFR BLD AUTO: 25.2 % (ref 37–48.5)
HCT VFR BLD AUTO: 27.5 % (ref 37–48.5)
HCT VFR BLD AUTO: 29.7 % (ref 37–48.5)
HGB BLD-MCNC: 8 G/DL (ref 12–16)
HGB BLD-MCNC: 8.5 G/DL (ref 12–16)
HGB BLD-MCNC: 9.5 G/DL (ref 12–16)
HYPOCHROMIA BLD QL SMEAR: ABNORMAL
HYPOCHROMIA BLD QL SMEAR: ABNORMAL
IMM GRANULOCYTES # BLD AUTO: 0.46 K/UL (ref 0–0.04)
IMM GRANULOCYTES # BLD AUTO: ABNORMAL K/UL (ref 0–0.04)
IMM GRANULOCYTES # BLD AUTO: ABNORMAL K/UL (ref 0–0.04)
IMM GRANULOCYTES NFR BLD AUTO: 5 % (ref 0–0.5)
IMM GRANULOCYTES NFR BLD AUTO: ABNORMAL % (ref 0–0.5)
IMM GRANULOCYTES NFR BLD AUTO: ABNORMAL % (ref 0–0.5)
LYMPHOCYTES # BLD AUTO: 1.4 K/UL (ref 1–4.8)
LYMPHOCYTES # BLD AUTO: ABNORMAL K/UL (ref 1–4.8)
LYMPHOCYTES # BLD AUTO: ABNORMAL K/UL (ref 1–4.8)
LYMPHOCYTES NFR BLD: 14 % (ref 18–48)
LYMPHOCYTES NFR BLD: 15.2 % (ref 18–48)
LYMPHOCYTES NFR BLD: 18 % (ref 18–48)
MAGNESIUM SERPL-MCNC: 1.5 MG/DL (ref 1.6–2.6)
MCH RBC QN AUTO: 30.1 PG (ref 27–31)
MCH RBC QN AUTO: 30.5 PG (ref 27–31)
MCH RBC QN AUTO: 30.9 PG (ref 27–31)
MCHC RBC AUTO-ENTMCNC: 30.9 G/DL (ref 32–36)
MCHC RBC AUTO-ENTMCNC: 31.7 G/DL (ref 32–36)
MCHC RBC AUTO-ENTMCNC: 32 G/DL (ref 32–36)
MCV RBC AUTO: 96 FL (ref 82–98)
MCV RBC AUTO: 97 FL (ref 82–98)
MCV RBC AUTO: 98 FL (ref 82–98)
MONOCYTES # BLD AUTO: 0.6 K/UL (ref 0.3–1)
MONOCYTES # BLD AUTO: ABNORMAL K/UL (ref 0.3–1)
MONOCYTES # BLD AUTO: ABNORMAL K/UL (ref 0.3–1)
MONOCYTES NFR BLD: 3 % (ref 4–15)
MONOCYTES NFR BLD: 4 % (ref 4–15)
MONOCYTES NFR BLD: 6.4 % (ref 4–15)
NEUTROPHILS # BLD AUTO: 6.7 K/UL (ref 1.8–7.7)
NEUTROPHILS # BLD AUTO: ABNORMAL K/UL (ref 1.8–7.7)
NEUTROPHILS NFR BLD: 71.6 % (ref 38–73)
NEUTROPHILS NFR BLD: 73 % (ref 38–73)
NEUTROPHILS NFR BLD: 81 % (ref 38–73)
NEUTS BAND NFR BLD MANUAL: 1 %
NRBC BLD-RTO: 0 /100 WBC
OVALOCYTES BLD QL SMEAR: ABNORMAL
OVALOCYTES BLD QL SMEAR: ABNORMAL
PCO2 BLDA: 28.6 MMHG (ref 35–45)
PH SMN: 7.38 [PH] (ref 7.35–7.45)
PHOSPHATE SERPL-MCNC: 3.1 MG/DL (ref 2.7–4.5)
PLATELET # BLD AUTO: 228 K/UL (ref 150–350)
PLATELET # BLD AUTO: 257 K/UL (ref 150–350)
PLATELET # BLD AUTO: 285 K/UL (ref 150–350)
PLATELET BLD QL SMEAR: ABNORMAL
PMV BLD AUTO: 10.5 FL (ref 9.2–12.9)
PMV BLD AUTO: 10.8 FL (ref 9.2–12.9)
PMV BLD AUTO: 10.9 FL (ref 9.2–12.9)
PO2 BLDA: 46 MMHG (ref 40–60)
POC BE: -8 MMOL/L
POC SATURATED O2: 81 % (ref 95–100)
POC TCO2: 18 MMOL/L (ref 24–29)
POCT GLUCOSE: 105 MG/DL (ref 70–110)
POCT GLUCOSE: 106 MG/DL (ref 70–110)
POCT GLUCOSE: 106 MG/DL (ref 70–110)
POCT GLUCOSE: 107 MG/DL (ref 70–110)
POCT GLUCOSE: 109 MG/DL (ref 70–110)
POCT GLUCOSE: 113 MG/DL (ref 70–110)
POCT GLUCOSE: 115 MG/DL (ref 70–110)
POCT GLUCOSE: 115 MG/DL (ref 70–110)
POCT GLUCOSE: 123 MG/DL (ref 70–110)
POCT GLUCOSE: 126 MG/DL (ref 70–110)
POIKILOCYTOSIS BLD QL SMEAR: SLIGHT
POIKILOCYTOSIS BLD QL SMEAR: SLIGHT
POLYCHROMASIA BLD QL SMEAR: ABNORMAL
POLYCHROMASIA BLD QL SMEAR: ABNORMAL
POTASSIUM SERPL-SCNC: 3 MMOL/L (ref 3.5–5.1)
POTASSIUM SERPL-SCNC: 3.1 MMOL/L (ref 3.5–5.1)
POTASSIUM SERPL-SCNC: 3.7 MMOL/L (ref 3.5–5.1)
POTASSIUM SERPL-SCNC: 4.1 MMOL/L (ref 3.5–5.1)
RBC # BLD AUTO: 2.59 M/UL (ref 4–5.4)
RBC # BLD AUTO: 2.82 M/UL (ref 4–5.4)
RBC # BLD AUTO: 3.11 M/UL (ref 4–5.4)
SAMPLE: ABNORMAL
SITE: ABNORMAL
SODIUM SERPL-SCNC: 133 MMOL/L (ref 136–145)
SODIUM SERPL-SCNC: 136 MMOL/L (ref 136–145)
SODIUM SERPL-SCNC: 136 MMOL/L (ref 136–145)
SODIUM SERPL-SCNC: 137 MMOL/L (ref 136–145)
WBC # BLD AUTO: 10.23 K/UL (ref 3.9–12.7)
WBC # BLD AUTO: 10.69 K/UL (ref 3.9–12.7)
WBC # BLD AUTO: 9.29 K/UL (ref 3.9–12.7)

## 2019-10-09 PROCEDURE — S0030 INJECTION, METRONIDAZOLE: HCPCS | Performed by: NURSE PRACTITIONER

## 2019-10-09 PROCEDURE — 27000221 HC OXYGEN, UP TO 24 HOURS

## 2019-10-09 PROCEDURE — D9220A PRA ANESTHESIA: ICD-10-PCS | Mod: ,,, | Performed by: ANESTHESIOLOGY

## 2019-10-09 PROCEDURE — 85007 BL SMEAR W/DIFF WBC COUNT: CPT

## 2019-10-09 PROCEDURE — 63600175 PHARM REV CODE 636 W HCPCS: Performed by: STUDENT IN AN ORGANIZED HEALTH CARE EDUCATION/TRAINING PROGRAM

## 2019-10-09 PROCEDURE — 99233 SBSQ HOSP IP/OBS HIGH 50: CPT | Mod: ,,, | Performed by: PSYCHIATRY & NEUROLOGY

## 2019-10-09 PROCEDURE — 99900035 HC TECH TIME PER 15 MIN (STAT)

## 2019-10-09 PROCEDURE — 99291 CRITICAL CARE FIRST HOUR: CPT | Mod: ,,, | Performed by: NURSE PRACTITIONER

## 2019-10-09 PROCEDURE — 99291 PR CRITICAL CARE, E/M 30-74 MINUTES: ICD-10-PCS | Mod: ,,, | Performed by: NURSE PRACTITIONER

## 2019-10-09 PROCEDURE — 99233 SBSQ HOSP IP/OBS HIGH 50: CPT | Mod: GC,,, | Performed by: NEUROLOGICAL SURGERY

## 2019-10-09 PROCEDURE — 25000003 PHARM REV CODE 250: Performed by: NURSE ANESTHETIST, CERTIFIED REGISTERED

## 2019-10-09 PROCEDURE — D9220A PRA ANESTHESIA: Mod: ,,, | Performed by: ANESTHESIOLOGY

## 2019-10-09 PROCEDURE — 25000003 PHARM REV CODE 250: Performed by: INTERNAL MEDICINE

## 2019-10-09 PROCEDURE — 63267 EXCISE INTRSPINL LESION LMBR: CPT | Mod: ,,, | Performed by: NEUROLOGICAL SURGERY

## 2019-10-09 PROCEDURE — 63600175 PHARM REV CODE 636 W HCPCS: Performed by: NURSE PRACTITIONER

## 2019-10-09 PROCEDURE — 63600175 PHARM REV CODE 636 W HCPCS: Performed by: NEUROLOGICAL SURGERY

## 2019-10-09 PROCEDURE — 37000009 HC ANESTHESIA EA ADD 15 MINS: Performed by: NEUROLOGICAL SURGERY

## 2019-10-09 PROCEDURE — 83735 ASSAY OF MAGNESIUM: CPT

## 2019-10-09 PROCEDURE — 63600175 PHARM REV CODE 636 W HCPCS: Performed by: NURSE ANESTHETIST, CERTIFIED REGISTERED

## 2019-10-09 PROCEDURE — 87102 FUNGUS ISOLATION CULTURE: CPT | Mod: 59

## 2019-10-09 PROCEDURE — 87070 CULTURE OTHR SPECIMN AEROBIC: CPT | Mod: 59

## 2019-10-09 PROCEDURE — 87205 SMEAR GRAM STAIN: CPT | Mod: 59

## 2019-10-09 PROCEDURE — 27201423 OPTIME MED/SURG SUP & DEVICES STERILE SUPPLY: Performed by: NEUROLOGICAL SURGERY

## 2019-10-09 PROCEDURE — 99233 PR SUBSEQUENT HOSPITAL CARE,LEVL III: ICD-10-PCS | Mod: GC,,, | Performed by: NEUROLOGICAL SURGERY

## 2019-10-09 PROCEDURE — 99232 PR SUBSEQUENT HOSPITAL CARE,LEVL II: ICD-10-PCS | Mod: ,,, | Performed by: INTERNAL MEDICINE

## 2019-10-09 PROCEDURE — 87176 TISSUE HOMOGENIZATION CULTR: CPT

## 2019-10-09 PROCEDURE — 87075 CULTR BACTERIA EXCEPT BLOOD: CPT | Mod: 59

## 2019-10-09 PROCEDURE — 36000710: Performed by: NEUROLOGICAL SURGERY

## 2019-10-09 PROCEDURE — 82803 BLOOD GASES ANY COMBINATION: CPT

## 2019-10-09 PROCEDURE — 25000242 PHARM REV CODE 250 ALT 637 W/ HCPCS: Performed by: NURSE PRACTITIONER

## 2019-10-09 PROCEDURE — 84100 ASSAY OF PHOSPHORUS: CPT

## 2019-10-09 PROCEDURE — 37000008 HC ANESTHESIA 1ST 15 MINUTES: Performed by: NEUROLOGICAL SURGERY

## 2019-10-09 PROCEDURE — S0030 INJECTION, METRONIDAZOLE: HCPCS | Performed by: INTERNAL MEDICINE

## 2019-10-09 PROCEDURE — S0030 INJECTION, METRONIDAZOLE: HCPCS | Performed by: NURSE ANESTHETIST, CERTIFIED REGISTERED

## 2019-10-09 PROCEDURE — 25000003 PHARM REV CODE 250: Performed by: NEUROLOGICAL SURGERY

## 2019-10-09 PROCEDURE — 25000003 PHARM REV CODE 250: Performed by: NURSE PRACTITIONER

## 2019-10-09 PROCEDURE — 85027 COMPLETE CBC AUTOMATED: CPT | Mod: 91

## 2019-10-09 PROCEDURE — 20000000 HC ICU ROOM

## 2019-10-09 PROCEDURE — 80048 BASIC METABOLIC PNL TOTAL CA: CPT | Mod: 91

## 2019-10-09 PROCEDURE — 87116 MYCOBACTERIA CULTURE: CPT

## 2019-10-09 PROCEDURE — 99233 PR SUBSEQUENT HOSPITAL CARE,LEVL III: ICD-10-PCS | Mod: ,,, | Performed by: PSYCHIATRY & NEUROLOGY

## 2019-10-09 PROCEDURE — 63267 PR EXCIS INTRASP LESN,XDURAL,LUMBAR: ICD-10-PCS | Mod: ,,, | Performed by: NEUROLOGICAL SURGERY

## 2019-10-09 PROCEDURE — 36000711: Performed by: NEUROLOGICAL SURGERY

## 2019-10-09 PROCEDURE — 94761 N-INVAS EAR/PLS OXIMETRY MLT: CPT

## 2019-10-09 PROCEDURE — 87206 SMEAR FLUORESCENT/ACID STAI: CPT

## 2019-10-09 PROCEDURE — 94640 AIRWAY INHALATION TREATMENT: CPT

## 2019-10-09 PROCEDURE — 99232 SBSQ HOSP IP/OBS MODERATE 35: CPT | Mod: ,,, | Performed by: INTERNAL MEDICINE

## 2019-10-09 PROCEDURE — 82330 ASSAY OF CALCIUM: CPT

## 2019-10-09 RX ORDER — METRONIDAZOLE 500 MG/100ML
INJECTION, SOLUTION INTRAVENOUS
Status: DISCONTINUED | OUTPATIENT
Start: 2019-10-09 | End: 2019-10-09

## 2019-10-09 RX ORDER — HYDROMORPHONE HYDROCHLORIDE 1 MG/ML
0.5 INJECTION, SOLUTION INTRAMUSCULAR; INTRAVENOUS; SUBCUTANEOUS ONCE
Status: COMPLETED | OUTPATIENT
Start: 2019-10-09 | End: 2019-10-09

## 2019-10-09 RX ORDER — LIDOCAINE HYDROCHLORIDE AND EPINEPHRINE 10; 10 MG/ML; UG/ML
INJECTION, SOLUTION INFILTRATION; PERINEURAL
Status: DISCONTINUED | OUTPATIENT
Start: 2019-10-09 | End: 2019-10-09 | Stop reason: HOSPADM

## 2019-10-09 RX ORDER — ROCURONIUM BROMIDE 10 MG/ML
INJECTION, SOLUTION INTRAVENOUS
Status: DISCONTINUED | OUTPATIENT
Start: 2019-10-09 | End: 2019-10-09

## 2019-10-09 RX ORDER — GLYCOPYRROLATE 0.2 MG/ML
INJECTION INTRAMUSCULAR; INTRAVENOUS
Status: DISCONTINUED | OUTPATIENT
Start: 2019-10-09 | End: 2019-10-09

## 2019-10-09 RX ORDER — PROPOFOL 10 MG/ML
VIAL (ML) INTRAVENOUS
Status: DISCONTINUED | OUTPATIENT
Start: 2019-10-09 | End: 2019-10-09

## 2019-10-09 RX ORDER — BUPIVACAINE HYDROCHLORIDE AND EPINEPHRINE 5; 5 MG/ML; UG/ML
INJECTION, SOLUTION EPIDURAL; INTRACAUDAL; PERINEURAL
Status: DISCONTINUED | OUTPATIENT
Start: 2019-10-09 | End: 2019-10-09 | Stop reason: HOSPADM

## 2019-10-09 RX ORDER — NEOSTIGMINE METHYLSULFATE 0.5 MG/ML
INJECTION, SOLUTION INTRAVENOUS
Status: DISCONTINUED | OUTPATIENT
Start: 2019-10-09 | End: 2019-10-09

## 2019-10-09 RX ORDER — LIDOCAINE HCL/PF 100 MG/5ML
SYRINGE (ML) INTRAVENOUS
Status: DISCONTINUED | OUTPATIENT
Start: 2019-10-09 | End: 2019-10-09

## 2019-10-09 RX ORDER — PHENYLEPHRINE HYDROCHLORIDE 10 MG/ML
INJECTION INTRAVENOUS
Status: DISCONTINUED | OUTPATIENT
Start: 2019-10-09 | End: 2019-10-09

## 2019-10-09 RX ORDER — FENTANYL CITRATE 50 UG/ML
INJECTION, SOLUTION INTRAMUSCULAR; INTRAVENOUS
Status: DISCONTINUED | OUTPATIENT
Start: 2019-10-09 | End: 2019-10-09

## 2019-10-09 RX ORDER — BACITRACIN 50000 [IU]/1
INJECTION, POWDER, FOR SOLUTION INTRAMUSCULAR
Status: DISCONTINUED | OUTPATIENT
Start: 2019-10-09 | End: 2019-10-09 | Stop reason: HOSPADM

## 2019-10-09 RX ORDER — VANCOMYCIN HYDROCHLORIDE 1 G/20ML
INJECTION, POWDER, LYOPHILIZED, FOR SOLUTION INTRAVENOUS
Status: DISCONTINUED | OUTPATIENT
Start: 2019-10-09 | End: 2019-10-09 | Stop reason: HOSPADM

## 2019-10-09 RX ORDER — ONDANSETRON 2 MG/ML
INJECTION INTRAMUSCULAR; INTRAVENOUS
Status: DISCONTINUED | OUTPATIENT
Start: 2019-10-09 | End: 2019-10-09

## 2019-10-09 RX ADMIN — POTASSIUM CHLORIDE 60 MEQ: 200 INJECTION, SOLUTION INTRAVENOUS at 04:10

## 2019-10-09 RX ADMIN — PHENYLEPHRINE HYDROCHLORIDE 200 MCG: 10 INJECTION INTRAVENOUS at 11:10

## 2019-10-09 RX ADMIN — SODIUM CHLORIDE, SODIUM GLUCONATE, SODIUM ACETATE, POTASSIUM CHLORIDE, MAGNESIUM CHLORIDE, SODIUM PHOSPHATE, DIBASIC, AND POTASSIUM PHOSPHATE: .53; .5; .37; .037; .03; .012; .00082 INJECTION, SOLUTION INTRAVENOUS at 10:10

## 2019-10-09 RX ADMIN — SODIUM CHLORIDE, SODIUM GLUCONATE, SODIUM ACETATE, POTASSIUM CHLORIDE, MAGNESIUM CHLORIDE, SODIUM PHOSPHATE, DIBASIC, AND POTASSIUM PHOSPHATE: .53; .5; .37; .037; .03; .012; .00082 INJECTION, SOLUTION INTRAVENOUS at 11:10

## 2019-10-09 RX ADMIN — ROCURONIUM BROMIDE 40 MG: 10 INJECTION, SOLUTION INTRAVENOUS at 10:10

## 2019-10-09 RX ADMIN — IPRATROPIUM BROMIDE AND ALBUTEROL SULFATE 3 ML: .5; 3 SOLUTION RESPIRATORY (INHALATION) at 12:10

## 2019-10-09 RX ADMIN — PROPOFOL 120 MG: 10 INJECTION, EMULSION INTRAVENOUS at 10:10

## 2019-10-09 RX ADMIN — DEXTROSE: 10 SOLUTION INTRAVENOUS at 02:10

## 2019-10-09 RX ADMIN — PHENYLEPHRINE HYDROCHLORIDE 100 MCG: 10 INJECTION INTRAVENOUS at 11:10

## 2019-10-09 RX ADMIN — FENTANYL CITRATE 25 MCG: 50 INJECTION INTRAMUSCULAR; INTRAVENOUS at 08:10

## 2019-10-09 RX ADMIN — MICONAZOLE NITRATE: 20 OINTMENT TOPICAL at 09:10

## 2019-10-09 RX ADMIN — LIDOCAINE HYDROCHLORIDE 50 MG: 20 INJECTION, SOLUTION INTRAVENOUS at 10:10

## 2019-10-09 RX ADMIN — GLYCOPYRROLATE 0.4 MG: 0.2 INJECTION, SOLUTION INTRAMUSCULAR; INTRAVENOUS at 12:10

## 2019-10-09 RX ADMIN — HYDROMORPHONE HYDROCHLORIDE 0.5 MG: 1 INJECTION, SOLUTION INTRAMUSCULAR; INTRAVENOUS; SUBCUTANEOUS at 11:10

## 2019-10-09 RX ADMIN — IPRATROPIUM BROMIDE AND ALBUTEROL SULFATE 3 ML: .5; 3 SOLUTION RESPIRATORY (INHALATION) at 09:10

## 2019-10-09 RX ADMIN — Medication 750 MG: at 11:10

## 2019-10-09 RX ADMIN — IPRATROPIUM BROMIDE AND ALBUTEROL SULFATE 3 ML: .5; 3 SOLUTION RESPIRATORY (INHALATION) at 07:10

## 2019-10-09 RX ADMIN — POTASSIUM CHLORIDE 40 MEQ: 400 INJECTION, SOLUTION INTRAVENOUS at 12:10

## 2019-10-09 RX ADMIN — FENTANYL CITRATE 100 MCG: 50 INJECTION, SOLUTION INTRAMUSCULAR; INTRAVENOUS at 10:10

## 2019-10-09 RX ADMIN — ONDANSETRON 4 MG: 2 INJECTION INTRAMUSCULAR; INTRAVENOUS at 12:10

## 2019-10-09 RX ADMIN — METRONIDAZOLE 500 MG: 500 INJECTION, SOLUTION INTRAVENOUS at 04:10

## 2019-10-09 RX ADMIN — GLYCOPYRROLATE 0.2 MG: 0.2 INJECTION, SOLUTION INTRAMUSCULAR; INTRAVENOUS at 11:10

## 2019-10-09 RX ADMIN — OXACILLIN SODIUM 12 G: 10 INJECTION, POWDER, FOR SOLUTION INTRAVENOUS at 10:10

## 2019-10-09 RX ADMIN — IPRATROPIUM BROMIDE AND ALBUTEROL SULFATE 3 ML: .5; 3 SOLUTION RESPIRATORY (INHALATION) at 03:10

## 2019-10-09 RX ADMIN — OXACILLIN SODIUM 12 G: 10 INJECTION, POWDER, FOR SOLUTION INTRAVENOUS at 11:10

## 2019-10-09 RX ADMIN — NEOSTIGMINE METHYLSULFATE 3.5 MG: 0.5 INJECTION INTRAVENOUS at 12:10

## 2019-10-09 RX ADMIN — IPRATROPIUM BROMIDE AND ALBUTEROL SULFATE 3 ML: .5; 3 SOLUTION RESPIRATORY (INHALATION) at 04:10

## 2019-10-09 RX ADMIN — METRONIDAZOLE 500 MG: 500 INJECTION, SOLUTION INTRAVENOUS at 11:10

## 2019-10-09 RX ADMIN — SODIUM CHLORIDE: 0.9 INJECTION, SOLUTION INTRAVENOUS at 02:10

## 2019-10-09 RX ADMIN — METRONIDAZOLE 500 MG: 500 INJECTION, SOLUTION INTRAVENOUS at 07:10

## 2019-10-09 RX ADMIN — MAGNESIUM SULFATE IN WATER 2 G: 40 INJECTION, SOLUTION INTRAVENOUS at 06:10

## 2019-10-09 RX ADMIN — METRONIDAZOLE 500 MG: 500 SOLUTION INTRAVENOUS at 12:10

## 2019-10-09 NOTE — ANESTHESIA POSTPROCEDURE EVALUATION
Anesthesia Post Evaluation    Patient: Mesha Mckenzie    Procedure(s) Performed: Procedure(s) (LRB):  LAMINECTOMY, SPINE, L3, Open (N/A)    Final Anesthesia Type: general  Patient location during evaluation: ICU  Patient participation: No - Unable to Participate, Sedation  Level of consciousness: sedated  Post-procedure vital signs: reviewed and stable  Pain management: adequate  Airway patency: patent  PONV status at discharge: No PONV  Anesthetic complications: no      Cardiovascular status: blood pressure returned to baseline  Respiratory status: unassisted  Hydration status: euvolemic  Follow-up not needed.          Vitals Value Taken Time   /68 10/9/2019  1:16 PM   Temp 36.4 °C (97.5 °F) 10/9/2019 12:45 PM   Pulse 91 10/9/2019  1:29 PM   Resp 25 10/9/2019  1:29 PM   SpO2 100 % 10/9/2019  1:29 PM   Vitals shown include unvalidated device data.      No case tracking events are documented in the log.      Pain/Ephraim Score: No data recorded

## 2019-10-09 NOTE — PROGRESS NOTES
Physical Therapy   Not seen    Mesha Mckenzie   MRN: [id number]         Unable to see pt for therapy this morning, as she was heading to surgery (laminectomy) for an L2-L4 epidural abscess. Will attempt to see for therapy at next available date. Will need new orders s/p surgery.     Rosmery Fitzgerald, PT  10/9/2019

## 2019-10-09 NOTE — BRIEF OP NOTE
[facility]  Brief Operative Note    SUMMARY     Surgery Date: 10/9/2019     Surgeon(s) and Role:     * Martin Lewis DO - Primary     * Flora Chaparro MD - Assisting     * Joseph Vaughan MD - Resident - Assisting   *Juma Varghese MD- Resident Assisting        Pre-op Diagnosis:  Epidural abscess [G06.2]    Post-op Diagnosis:  Post-Op Diagnosis Codes:     * Epidural abscess [G06.2]    Procedure(s) (LRB):  LAMINECTOMY, SPINE, L3, Open (N/A)    Anesthesia: General    Description of Procedure: L3 Laminectomy with paraspinal abscess washout and culture    Description of the findings of the procedure: laminectomy L3 with removal phlegma, infected tissue    Estimated Blood Loss: * No values recorded between 10/9/2019 11:18 AM and 10/9/2019 12:38 PM *  Minimal         Specimens:   Specimen (12h ago, onward)    None

## 2019-10-09 NOTE — PT/OT/SLP PROGRESS
Speech Language Pathology  Discharge Summary      Mesha Mckenzie  MRN: [id number]    Patient not seen today secondary to Pt ROSELYN to OR for surgery. SLP to discontinue current order set and awake new orders once patient medically appropriate.     Emily Abadie, TIANA-SLP

## 2019-10-09 NOTE — SUBJECTIVE & OBJECTIVE
Interval History: naeon. NPO    Medications:  Continuous Infusions:   sodium chloride 0.9% 100 mL/hr at 10/09/19 0800    dextrose 10 % in water (D10W) 50 mL/hr at 10/09/19 1028     Scheduled Meds:   albuterol-ipratropium  3 mL Nebulization Q4H    metronidazole  500 mg Intravenous Q8H    miconazole nitrate 2%   Topical (Top) BID    oxacillin 12 g in  mL CONTINUOUS INFUSION  12 g Intravenous Q24H     PRN Meds:acetaminophen, bacitracin, calcium gluconate IVPB, calcium gluconate IVPB, calcium gluconate IVPB, Dextrose 10% Bolus, fentaNYL, gelatin adsorbable 100cm top sponge, hydrALAZINE, labetalol, magnesium sulfate IVPB, magnesium sulfate IVPB, mupirocin, mupirocin, ondansetron, potassium chloride in water **AND** potassium chloride in water **AND** potassium chloride in water, sodium chloride 0.9%, sodium phosphate IVPB, sodium phosphate IVPB, sodium phosphate IVPB, thrombin (bovine), vancomycin (VANCOCIN) IVPB     Review of Systems    Objective:     Weight: 53.5 kg (118 lb)  Body mass index is 20.25 kg/m².  Vital Signs (Most Recent):  Temp: 98.2 °F (36.8 °C) (10/09/19 0700)  Pulse: 103 (10/09/19 0815)  Resp: (!) 27 (10/09/19 0815)  BP: (!) 183/88 (10/09/19 0800)  SpO2: 96 % (10/09/19 0815) Vital Signs (24h Range):  Temp:  [97.5 °F (36.4 °C)-98.2 °F (36.8 °C)] 98.2 °F (36.8 °C)  Pulse:  [] 103  Resp:  [19-55] 27  SpO2:  [94 %-99 %] 96 %  BP: (171-217)/() 183/88     Date 10/09/19 0700 - 10/10/19 0659   Shift 1353-0077 5876-8979 5574-8584 24 Hour Total   INTAKE   IV Piggyback 41.6   41.6   Shift Total(mL/kg) 41.6(0.8)   41.6(0.8)   OUTPUT   Urine(mL/kg/hr) 325   325   Shift Total(mL/kg) 325(6.1)   325(6.1)   Weight (kg) 53.5 53.5 53.5 53.5              Oxygen Concentration (%):  [28] 28         Chest Tube 10/03/19 0500 Right Midaxillary (Active)   Chest Tube WDL WDL 10/4/2019  7:10 AM   Function -20 cm H2O 10/4/2019  7:10 AM   Air Leak/Fluctuation air leak not present;fluctuation not present  10/4/2019  7:10 AM   Safety all tubing connections taped;2 rubber-tipped hemostats w/ patient;all connections secured;suction checked 10/4/2019  7:10 AM   Securement tubing secured to body distal to insertion site w/ tape 10/4/2019  7:10 AM   Patency Intervention Tip/tilt 10/4/2019  7:10 AM   Drainage Description Serosanguineous 10/4/2019  7:10 AM   Dressing Appearance occlusive gauze dressing intact;clean and dry 10/4/2019  7:10 AM   Dressing Care dressing reinforced 10/4/2019  7:10 AM   Left Subcutaneous Emphysema none present 10/4/2019  7:10 AM   Right Subcutaneous Emphysema none present 10/4/2019  7:10 AM   Site Assessment Clean;Dry;Intact;No redness;No swelling 10/4/2019  7:10 AM   Surrounding Skin Dry;Intact 10/4/2019  7:10 AM   Output (mL) 0 mL 10/4/2019  7:00 AM            NG/OG Tube 10/02/19 2200 Center mouth (Active)   Placement Check placement verified by x-ray 10/4/2019  7:10 AM   Tolerance no signs/symptoms of discomfort 10/4/2019  7:10 AM   Securement secured to commercial device 10/4/2019  7:10 AM   Clamp Status/Tolerance unclamped;no emesis;no nausea;no restlessness;no abdominal distention 10/4/2019  7:10 AM   Suction Setting/Drainage Method suction at;low;intermittent setting 10/4/2019  7:10 AM   Insertion Site Appearance no redness, warmth, tenderness, skin breakdown, drainage 10/4/2019  7:10 AM   Drainage Bile 10/4/2019  3:00 AM   Intake (mL) 50 mL 10/4/2019  6:00 AM   Tube Output(mL)(Include Discarded Residual) 0 mL 10/4/2019  7:00 AM            Rectal Tube 09/27/19 1000 rectal tube w/ balloon (indicate number of mLs) (Active)   Balloon Inflation Volume (mL) 45 10/4/2019  3:00 AM   Reposition drainage bags for BMS & Henriquez on opposite sides of bed 10/4/2019  7:10 AM   Outcome comfort enhanced 10/4/2019  7:10 AM   Stool Color Brown;Green 10/4/2019  7:10 AM   Insertion Site Appearance no redness, warmth, tenderness, skin breakdown, drainage 10/4/2019  7:10 AM   Flush/Irrigation flushed w/;water;no  "resistance met 10/4/2019  3:00 AM   Intake (mL) 25 mL 10/4/2019  7:00 AM   Rectal Tube Output 0 mL 10/4/2019  3:00 AM            Urethral Catheter 09/19/19 1240 Double-lumen 16 Fr. (Active)   Site Assessment Clean;Intact 10/4/2019  7:10 AM   Collection Container Urimeter 10/4/2019  7:10 AM   Securement Method secured to top of thigh w/ adhesive device 10/4/2019  7:10 AM   Catheter Care Performed yes 10/4/2019  7:10 AM   Reason for Continuing Urinary Catheterization Critically ill in ICU requiring intensive monitoring 10/4/2019  7:10 AM   CAUTI Prevention Bundle StatLock in place w 1" slack;Intact seal between catheter & drainage tubing;Drainage bag/urimeter off the floor;Green sheeting clip in use;No dependent loops or kinks;Drainage bag/urimeter not overfilled (<2/3 full);Drainage bag/urimeter below bladder 10/4/2019  7:10 AM   Output (mL) 45 mL 10/4/2019 10:00 AM       Neurosurgery Physical Exam     E4V4M6  PERRL   f/cx4   BLE 3/5 HF, 4/5 KE EHL    Significant Labs:  Recent Labs   Lab 10/08/19  0321  10/08/19  1636 10/08/19  2235 10/09/19  0401   *   < > 119* 106 111*      < > 135* 134* 133*   K 4.5   < > 3.1* 3.5 4.1      < > 109 108 109   CO2 18*   < > 20* 19* 17*   BUN 25*   < > 20 18 16   CREATININE 0.9   < > 0.9 0.8 0.8   CALCIUM 7.4*   < > 7.3* 7.1* 7.1*   MG 1.7  --   --   --  1.5*    < > = values in this interval not displayed.     Recent Labs   Lab 10/08/19  0321 10/08/19  1636 10/09/19  0401   WBC 9.64 9.69 10.23   HGB 9.5* 9.5* 9.5*   HCT 30.7* 30.3* 29.7*    287 285     Recent Labs   Lab 10/08/19  1636   INR 1.4*   APTT 26.4     Microbiology Results (last 7 days)     Procedure Component Value Units Date/Time    Aerobic culture [[id number]] Collected:  10/05/19 2110    Order Status:  Completed Specimen:  Abscess from Shoulder, Left Updated:  10/09/19 0957     Aerobic Bacterial Culture No growth      No growth    Aerobic culture [[id number]]  (Abnormal)  (Susceptibility) " Collected:  10/05/19 2111    Order Status:  Completed Specimen:  Abscess from Shoulder, Right Updated:  10/08/19 1300     Aerobic Bacterial Culture STAPHYLOCOCCUS AUREUS  Rare  No other significant isolate      Culture, Body Fluid - Bactec [[id number]] Collected:  10/03/19 0549    Order Status:  Completed Specimen:  Pleural Fluid Updated:  10/08/19 1012     Body Fluid Culture, Sterile No growth after 5 days.    Narrative:       received 2 culture bottles with specimen for pleural fluid.    Culture, Anaerobe [[id number]] Collected:  10/05/19 2111    Order Status:  Completed Specimen:  Abscess from Shoulder, Right Updated:  10/08/19 0913     Anaerobic Culture Culture in progress    Culture, Anaerobe [[id number]] Collected:  10/05/19 2110    Order Status:  Completed Specimen:  Abscess from Shoulder, Left Updated:  10/08/19 0911     Anaerobic Culture Culture in progress    Blood culture [[id number]] Collected:  10/02/19 2025    Order Status:  Completed Specimen:  Blood from Peripheral, Wrist, Right Updated:  10/07/19 2212     Blood Culture, Routine No growth after 5 days.    Blood culture [[id number]] Collected:  10/02/19 0830    Order Status:  Completed Specimen:  Blood from Peripheral, Hand, Right Updated:  10/07/19 2212     Blood Culture, Routine No growth after 5 days.    Aerobic culture [[id number]] Collected:  10/03/19 0549    Order Status:  Completed Specimen:  Pleural Fluid Updated:  10/07/19 0932     Aerobic Bacterial Culture No growth    Culture, Anaerobic [[id number]] Collected:  10/03/19 0549    Order Status:  Completed Specimen:  Pleural Fluid Updated:  10/07/19 0838     Anaerobic Culture No anaerobes isolated    Gram stain [[id number]] Collected:  10/03/19 0549    Order Status:  Completed Specimen:  Pleural Fluid Updated:  10/03/19 0955     Gram Stain Result Rare WBC's      Rare Gram positive cocci            Significant Diagnostic  No new images

## 2019-10-09 NOTE — ASSESSMENT & PLAN NOTE
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    Plan:  - NPO. AC held  - OR today for lumbar epidural abscess  - continue NCC plan

## 2019-10-09 NOTE — ASSESSMENT & PLAN NOTE
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 Since extubation, ~ 48 hrs ago, her mental status has been fluctuating. CT head showed a R thalamic hypodensity and MRI brain showed an acute R thalamic infarct. Also noted to have a few subacute infarcts in bilateral cerebral hemispheres along with remote microhemorrhages.   DHARMESH from 9/25 showed vegetations on mitral and tricuspid leaflets. Most recent TTE from 10/3 showed no evidence of vegetations.    Etiology - embolic 2/2 endocarditis, although pattern of stroke seems like small vessel disease        Antithrombotics for secondary stroke prevention: Antiplatelets: None: Not indicated - bacterial endocarditis    Statins for secondary stroke prevention and hyperlipidemia, if present:   Statins: Atorvastatin- 40 mg daily   -- LDL 80    Aggressive risk factor modification: Smoking, Bacterial endocarditis     Rehab efforts: The patient has been evaluated by a stroke team provider and the therapy needs have been fully considered based off the presenting complaints and exam findings. The following therapy evaluations are needed: PT evaluate and treat, OT evaluate and treat, SLP evaluate and treat    Diagnostics ordered/pending: None      VTE prophylaxis: Heparin 5000 units SQ every 8 hours    BP parameters: Infarct: No intervention, SBP <220

## 2019-10-09 NOTE — ASSESSMENT & PLAN NOTE
--MRI concerning for epidural abscesses at L2-4 region  --neurosurgery consulted and following  --laminectomy completed on 10/9; note pending

## 2019-10-09 NOTE — PROGRESS NOTES
[facility]  Neurosurgery  Progress Note    Subjective:     History of Present Illness: 63yof IVDU with persistent bacteremia found to have endocarditis bactreremia MSSA. MRI L spine showed L2-L4 epidural abscess and neurosurgery was consulted for evaluation. Of note, pt was intubated, bronched, chest tube plced and sedation and paralytics administered due to decompensation with desaturation and hypotension.    Post-Op Info:  Procedure(s) (LRB):  LAMINECTOMY, SPINE, Open L3-5 (N/A)   Day of Surgery     Interval History: naeon. NPO    Medications:  Continuous Infusions:   sodium chloride 0.9% 100 mL/hr at 10/09/19 0800    dextrose 10 % in water (D10W) 50 mL/hr at 10/09/19 1028     Scheduled Meds:   albuterol-ipratropium  3 mL Nebulization Q4H    metronidazole  500 mg Intravenous Q8H    miconazole nitrate 2%   Topical (Top) BID    oxacillin 12 g in  mL CONTINUOUS INFUSION  12 g Intravenous Q24H     PRN Meds:acetaminophen, bacitracin, calcium gluconate IVPB, calcium gluconate IVPB, calcium gluconate IVPB, Dextrose 10% Bolus, fentaNYL, gelatin adsorbable 100cm top sponge, hydrALAZINE, labetalol, magnesium sulfate IVPB, magnesium sulfate IVPB, mupirocin, mupirocin, ondansetron, potassium chloride in water **AND** potassium chloride in water **AND** potassium chloride in water, sodium chloride 0.9%, sodium phosphate IVPB, sodium phosphate IVPB, sodium phosphate IVPB, thrombin (bovine), vancomycin (VANCOCIN) IVPB     Review of Systems    Objective:     Weight: 53.5 kg (118 lb)  Body mass index is 20.25 kg/m².  Vital Signs (Most Recent):  Temp: 98.2 °F (36.8 °C) (10/09/19 0700)  Pulse: 103 (10/09/19 0815)  Resp: (!) 27 (10/09/19 0815)  BP: (!) 183/88 (10/09/19 0800)  SpO2: 96 % (10/09/19 0815) Vital Signs (24h Range):  Temp:  [97.5 °F (36.4 °C)-98.2 °F (36.8 °C)] 98.2 °F (36.8 °C)  Pulse:  [] 103  Resp:  [19-55] 27  SpO2:  [94 %-99 %] 96 %  BP: (171-217)/() 183/88     Date 10/09/19  0700 - 10/10/19 0659   Shift 7855-6764 3457-2558 7121-0846 24 Hour Total   INTAKE   IV Piggyback 41.6   41.6   Shift Total(mL/kg) 41.6(0.8)   41.6(0.8)   OUTPUT   Urine(mL/kg/hr) 325   325   Shift Total(mL/kg) 325(6.1)   325(6.1)   Weight (kg) 53.5 53.5 53.5 53.5              Oxygen Concentration (%):  [28] 28         Chest Tube 10/03/19 0500 Right Midaxillary (Active)   Chest Tube WDL WDL 10/4/2019  7:10 AM   Function -20 cm H2O 10/4/2019  7:10 AM   Air Leak/Fluctuation air leak not present;fluctuation not present 10/4/2019  7:10 AM   Safety all tubing connections taped;2 rubber-tipped hemostats w/ patient;all connections secured;suction checked 10/4/2019  7:10 AM   Securement tubing secured to body distal to insertion site w/ tape 10/4/2019  7:10 AM   Patency Intervention Tip/tilt 10/4/2019  7:10 AM   Drainage Description Serosanguineous 10/4/2019  7:10 AM   Dressing Appearance occlusive gauze dressing intact;clean and dry 10/4/2019  7:10 AM   Dressing Care dressing reinforced 10/4/2019  7:10 AM   Left Subcutaneous Emphysema none present 10/4/2019  7:10 AM   Right Subcutaneous Emphysema none present 10/4/2019  7:10 AM   Site Assessment Clean;Dry;Intact;No redness;No swelling 10/4/2019  7:10 AM   Surrounding Skin Dry;Intact 10/4/2019  7:10 AM   Output (mL) 0 mL 10/4/2019  7:00 AM            NG/OG Tube 10/02/19 2200 Center mouth (Active)   Placement Check placement verified by x-ray 10/4/2019  7:10 AM   Tolerance no signs/symptoms of discomfort 10/4/2019  7:10 AM   Securement secured to commercial device 10/4/2019  7:10 AM   Clamp Status/Tolerance unclamped;no emesis;no nausea;no restlessness;no abdominal distention 10/4/2019  7:10 AM   Suction Setting/Drainage Method suction at;low;intermittent setting 10/4/2019  7:10 AM   Insertion Site Appearance no redness, warmth, tenderness, skin breakdown, drainage 10/4/2019  7:10 AM   Drainage Bile 10/4/2019  3:00 AM   Intake (mL) 50 mL 10/4/2019  6:00 AM   Tube  "Output(mL)(Include Discarded Residual) 0 mL 10/4/2019  7:00 AM            Rectal Tube 09/27/19 1000 rectal tube w/ balloon (indicate number of mLs) (Active)   Balloon Inflation Volume (mL) 45 10/4/2019  3:00 AM   Reposition drainage bags for BMS & Henriquez on opposite sides of bed 10/4/2019  7:10 AM   Outcome comfort enhanced 10/4/2019  7:10 AM   Stool Color Brown;Green 10/4/2019  7:10 AM   Insertion Site Appearance no redness, warmth, tenderness, skin breakdown, drainage 10/4/2019  7:10 AM   Flush/Irrigation flushed w/;water;no resistance met 10/4/2019  3:00 AM   Intake (mL) 25 mL 10/4/2019  7:00 AM   Rectal Tube Output 0 mL 10/4/2019  3:00 AM            Urethral Catheter 09/19/19 1240 Double-lumen 16 Fr. (Active)   Site Assessment Clean;Intact 10/4/2019  7:10 AM   Collection Container Urimeter 10/4/2019  7:10 AM   Securement Method secured to top of thigh w/ adhesive device 10/4/2019  7:10 AM   Catheter Care Performed yes 10/4/2019  7:10 AM   Reason for Continuing Urinary Catheterization Critically ill in ICU requiring intensive monitoring 10/4/2019  7:10 AM   CAUTI Prevention Bundle StatLock in place w 1" slack;Intact seal between catheter & drainage tubing;Drainage bag/urimeter off the floor;Green sheeting clip in use;No dependent loops or kinks;Drainage bag/urimeter not overfilled (<2/3 full);Drainage bag/urimeter below bladder 10/4/2019  7:10 AM   Output (mL) 45 mL 10/4/2019 10:00 AM       Neurosurgery Physical Exam     E4V4M6  PERRL   f/cx4   BLE 3/5 HF, 4/5 KE EHL    Significant Labs:  Recent Labs   Lab 10/08/19  0321  10/08/19  1636 10/08/19  2235 10/09/19  0401   *   < > 119* 106 111*      < > 135* 134* 133*   K 4.5   < > 3.1* 3.5 4.1      < > 109 108 109   CO2 18*   < > 20* 19* 17*   BUN 25*   < > 20 18 16   CREATININE 0.9   < > 0.9 0.8 0.8   CALCIUM 7.4*   < > 7.3* 7.1* 7.1*   MG 1.7  --   --   --  1.5*    < > = values in this interval not displayed.     Recent Labs   Lab 10/08/19  0321 " 10/08/19  1636 10/09/19  0401   WBC 9.64 9.69 10.23   HGB 9.5* 9.5* 9.5*   HCT 30.7* 30.3* 29.7*    287 285     Recent Labs   Lab 10/08/19  1636   INR 1.4*   APTT 26.4     Microbiology Results (last 7 days)     Procedure Component Value Units Date/Time    Aerobic culture [[id number]] Collected:  10/05/19 2110    Order Status:  Completed Specimen:  Abscess from Shoulder, Left Updated:  10/09/19 0957     Aerobic Bacterial Culture No growth      No growth    Aerobic culture [[id number]]  (Abnormal)  (Susceptibility) Collected:  10/05/19 2111    Order Status:  Completed Specimen:  Abscess from Shoulder, Right Updated:  10/08/19 1300     Aerobic Bacterial Culture STAPHYLOCOCCUS AUREUS  Rare  No other significant isolate      Culture, Body Fluid - Bactec [[id number]] Collected:  10/03/19 0549    Order Status:  Completed Specimen:  Pleural Fluid Updated:  10/08/19 1012     Body Fluid Culture, Sterile No growth after 5 days.    Narrative:       received 2 culture bottles with specimen for pleural fluid.    Culture, Anaerobe [[id number]] Collected:  10/05/19 2111    Order Status:  Completed Specimen:  Abscess from Shoulder, Right Updated:  10/08/19 0913     Anaerobic Culture Culture in progress    Culture, Anaerobe [[id number]] Collected:  10/05/19 2110    Order Status:  Completed Specimen:  Abscess from Shoulder, Left Updated:  10/08/19 0911     Anaerobic Culture Culture in progress    Blood culture [[id number]] Collected:  10/02/19 2025    Order Status:  Completed Specimen:  Blood from Peripheral, Wrist, Right Updated:  10/07/19 2212     Blood Culture, Routine No growth after 5 days.    Blood culture [[id number]] Collected:  10/02/19 0830    Order Status:  Completed Specimen:  Blood from Peripheral, Hand, Right Updated:  10/07/19 2212     Blood Culture, Routine No growth after 5 days.    Aerobic culture [[id number]] Collected:  10/03/19 0549    Order Status:  Completed Specimen:  Pleural Fluid Updated:  10/07/19 0932      Aerobic Bacterial Culture No growth    Culture, Anaerobic [[id number]] Collected:  10/03/19 0549    Order Status:  Completed Specimen:  Pleural Fluid Updated:  10/07/19 0838     Anaerobic Culture No anaerobes isolated    Gram stain [[id number]] Collected:  10/03/19 0549    Order Status:  Completed Specimen:  Pleural Fluid Updated:  10/03/19 0955     Gram Stain Result Rare WBC's      Rare Gram positive cocci            Significant Diagnostic  No new images    Assessment/Plan:     Epidural abscess  63yof pmh IVDU with endocarditis MSSA bacteremia acutely decompensated overnight on paralytics, sedation and pressors found to have lumbar epidural abscess.    Plan:  - NPO. AC held  - OR today for lumbar epidural abscess  - continue NCC plan          Juma Varghese MD  Neurosurgery  [facility]

## 2019-10-09 NOTE — ASSESSMENT & PLAN NOTE
Multifactorial secondary to complete left lung collapse, septic emboli, pleural effusions, suspected pna. Intubated shortly after arrival for hypoxemia. s/p bronch x2 with significant clearance of secretions from left mainstem & improved aeration on f/u xray. s/p thora 10/1; transudative based on lights criteria, though concern for empyema based on OSH pulm note. right chest tube placed 10/3, 345 mL output after TPA administration; pleural fluid with GPC on gram stain. CTA neg for PE.    -- will continue to follow culture results  -- Extubated 10/5/19; now on RA  --continue oxacillin (per ID recs)  --R Chest tube removed

## 2019-10-09 NOTE — PROGRESS NOTES
[facility]  Vascular Neurology  Comprehensive Stroke Center  Progress Note    Assessment/Plan:     Right thalamic stroke  64 y/o with bacterial endocarditis on abx, MSSA bacteremia, epidural abscess, b/l shoulder abscess s/p I&D who was transferred from [facility] for NSGY evaluation. Since extubation, ~ 48 hrs ago, her mental status has been fluctuating. CT head showed a R thalamic hypodensity and MRI brain showed an acute R thalamic infarct. Also noted to have a few subacute infarcts in bilateral cerebral hemispheres along with remote microhemorrhages.   DHARMESH from 9/25 showed vegetations on mitral and tricuspid leaflets. Most recent TTE from 10/3 showed no evidence of vegetations.    Etiology - embolic 2/2 endocarditis, although pattern of stroke seems like small vessel disease        Antithrombotics for secondary stroke prevention: Antiplatelets: None: Not indicated - bacterial endocarditis    Statins for secondary stroke prevention and hyperlipidemia, if present:   Statins: Atorvastatin- 40 mg daily   -- LDL 80    Aggressive risk factor modification: Smoking, Bacterial endocarditis     Rehab efforts: The patient has been evaluated by a stroke team provider and the therapy needs have been fully considered based off the presenting complaints and exam findings. The following therapy evaluations are needed: PT evaluate and treat, OT evaluate and treat, SLP evaluate and treat    Diagnostics ordered/pending: None      VTE prophylaxis: Heparin 5000 units SQ every 8 hours    BP parameters: Infarct: No intervention, SBP <220        Endocarditis  -- Stroke risk factor  -- H/o IVDU  -- DHARMESH from 9/25 with vegetations on the mitral and tricuspid valve. TTE from 10/3 with no evidence of vegetations.  -- BCx negative since 9/29  -- ID following. On Oxacillin          No notes on file    STROKE DOCUMENTATION        NIH Scale:  1a. Level of Consciousness: 0-->Alert, keenly responsive  1b. LOC Questions: 0-->Answers  both questions correctly  1c. LOC Commands: 0-->Performs both tasks correctly  2. Best Gaze: 0-->Normal  3. Visual: 0-->No visual loss  4. Facial Palsy: 0-->Normal symmetrical movements  5a. Motor Arm, Left: 0-->No drift, limb holds 90 (or 45) degrees for full 10 secs  5b. Motor Arm, Right: 0-->No drift, limb holds 90 (or 45) degrees for full 10 secs  6a. Motor Leg, Left: 2-->Some effort against gravity, leg falls to bed by 5 secs, but has some effort against gravity  6b. Motor Leg, Right: 2-->Some effort against gravity, leg falls to bed by 5 secs, but has some effort against gravity  7. Limb Ataxia: 0-->Absent  8. Sensory: 0-->Normal, no sensory loss  9. Best Language: 0-->No aphasia, normal  10. Dysarthria: 0-->Normal  11. Extinction and Inattention (formerly Neglect): 0-->No abnormality  Total (NIH Stroke Scale): 4       Modified [place] Score: 0  [place] Coma Scale:    ABCD2 Score:    [id number] Score:   HAS -BLED Score:   ICH Score:   Hunt & Shah Classification:      Hemorrhagic change of an Ischemic Stroke: Does this patient have an ischemic stroke with hemorrhagic changes? No     Neurologic Chief Complaint: AMS and weakness    Subjective:     Interval History: Patient is seen for follow-up neurological assessment and treatment recommendations: went for Laminectomy today. No change in neuro exam.     HPI, Past Medical, Family, and Social History remains the same as documented in the initial encounter.     Review of Systems   Constitutional: Positive for chills. Negative for fever.   HENT: Positive for trouble swallowing.    Eyes: Negative for visual disturbance.   Respiratory: Negative for shortness of breath.    Cardiovascular: Negative for chest pain.   Gastrointestinal: Negative for abdominal pain.   Neurological: Positive for weakness and numbness. Negative for facial asymmetry, speech difficulty and headaches.        Numbness in both her legs.   Psychiatric/Behavioral: Positive for confusion. Negative  for agitation.     Scheduled Meds:   albuterol-ipratropium  3 mL Nebulization Q4H    metronidazole  500 mg Intravenous Q8H    miconazole nitrate 2%   Topical (Top) BID    oxacillin 12 g in  mL CONTINUOUS INFUSION  12 g Intravenous Q24H     Continuous Infusions:   dextrose 10 % in water (D10W) 50 mL/hr at 10/09/19 1600     PRN Meds:acetaminophen, calcium gluconate IVPB, calcium gluconate IVPB, calcium gluconate IVPB, Dextrose 10% Bolus, fentaNYL, hydrALAZINE, labetalol, magnesium sulfate IVPB, magnesium sulfate IVPB, mupirocin, mupirocin, ondansetron, potassium chloride in water **AND** potassium chloride in water **AND** potassium chloride in water, sodium chloride 0.9%, sodium phosphate IVPB, sodium phosphate IVPB, sodium phosphate IVPB    Objective:     Vital Signs (Most Recent):  Temp: 97.5 °F (36.4 °C) (10/09/19 1500)  Pulse: 106 (10/09/19 1630)  Resp: (!) 27 (10/09/19 1630)  BP: (!) 171/76 (10/09/19 1630)  SpO2: 100 % (10/09/19 1630)  BP Location: Left arm    Vital Signs Range (Last 24H):  Temp:  [97.5 °F (36.4 °C)-98.2 °F (36.8 °C)]   Pulse:  []   Resp:  [19-39]   BP: (135-217)/()   SpO2:  [95 %-100 %]   BP Location: Left arm    Physical Exam   Constitutional: She is oriented to person, place, and time. She appears well-developed. No distress.   Eyes: EOM are normal.   Neck: Normal range of motion. No thyromegaly present.   Cardiovascular: Normal rate, regular rhythm and normal heart sounds.   Pulmonary/Chest: Effort normal. No respiratory distress.   Neurological: She is alert and oriented to person, place, and time.   Skin: Skin is warm and dry.   Nursing note and vitals reviewed.      Neurological Exam:   LOC: alert  Attention Span: Good   Articulation: No dysarthria  Orientation: Person, Place, Time   Visual Fields: Full  EOM (CN III, IV, VI): Full/intact  Pupils (CN II, III): PERRL  Motor: Arm left  Normal 5/5  Leg left  Paresis: 2/5  Arm right  Normal 5/5  Leg right Paresis:  2/5  Sensation: Intact to light touch, temperature and vibration    Laboratory:  CMP:   Recent Labs   Lab 10/09/19  1400   CALCIUM 6.9*      K 3.0*   CO2 17*      BUN 13   CREATININE 0.8     CBC:   Recent Labs   Lab 10/09/19  1400   WBC 10.69   RBC 2.82*   HGB 8.5*   HCT 27.5*      MCV 98   MCH 30.1   MCHC 30.9*     Lipid Panel: No results for input(s): CHOL, LDLCALC, HDL, TRIG in the last 168 hours.  Hgb A1C: No results for input(s): HGBA1C in the last 168 hours.    Diagnostic Results     Brain Imaging   MRI Brain (10/7/2019) -           Acute right thalamic infarct.            Additional scattered foci of DWI hyperintense signal without corresponding decreased signal on the ADC maps.  The findings most likely represent subacute embolic infarctions.             Small foci of DWI hyperintensity within the lateral ventricles.  The findings are suspicious for possible ventriculitis.       CTA head  10/8/2019   Atherosclerotic plaquing of the distal vertebral arteries with dominant distal left vertebral artery and moderate irregularity concerning for noncalcified plaquing and stenosis.  No high-grade stenosis or proximal occlusion.      Cardiac Evaluation:      TTE (10/3/2019) -      · Low normal left ventricular systolic function. The estimated ejection fraction is 53%  · Local segmental wall motion abnormalities.  · Normal LV diastolic function.  · Normal right ventricular systolic function.  · Mild mitral sclerosis.  · Mechanically ventilated; cannot use inferior caval vein diameter to estimate central venous pressure.  · Small circumferential pericardial effusion.     TTE (9/26/2019) -      · Mild concentric left ventricular hypertrophy.  · Mild left ventricular enlargement.  · Moderately decreased left ventricular systolic function. The estimated ejection fraction is 33%  · Grade III (severe) left ventricular diastolic dysfunction consistent with restrictive physiology.  · Mild global  hypokinetic wall motion.  · Normal right ventricular systolic function.  · Mild left atrial enlargement.  · Mild right atrial enlargement.  · There is a right atrial mass present. Echo lucent mobile small vegetation ssen at R atrial - TV septal leaflet junction In hi R atrium At septal surface there is more echo dense structure ( ~ 1.5x .8 cm) mobile which could be vegetation vs normal prominent Eustachian valve  · Moderate-to-severe mitral regurgitation.  · Normal central venous pressure (3 mm Hg).  · The estimated PA systolic pressure is 50 mm Hg  · Moderate to severe pulmonary hypertension present.     DHARMESH (9/25/2019) -      · Mild left ventricular enlargement.  · Low normal left ventricular systolic function. The estimated ejection fraction is 50%  · Normal LV diastolic function.  · Mild left atrial enlargement.  · No interatrial septal defect present.  · Normal appearing left atrial appendage. No thrombus is present in the appendage. Normal appendage velocities.  · Moderate-to-severe mitral regurgitation.  · Moderate tricuspid regurgitation.  · 1x.5 cmmobile vegetation on tricuspid valve septal leaflet atrial side  · 5mmx6 mm mobile vegetation on posterior leaflet P3 scallop on atrial side  · Aortic valve wnl          Renard Leblanc MD  Comprehensive Stroke Center  Department of Vascular Neurology   [facility]

## 2019-10-09 NOTE — PROGRESS NOTES
Called Leeanna (daughter) [phone] no answer just continued to ring, called  Ari () [phone], he answered informed him pt was back for OR in [facility] [address]. He started will get in touch daughter to let her know.

## 2019-10-09 NOTE — OP NOTE
DATE OF PROCEDURE:  10/09/2019    PREOPERATIVE DIAGNOSES:  1.  L2-L4 lumbar epidural abscess.  2.  Lumbar paraspinal infection  3.  Severe low back and leg pain.  3.  Proximal lower extremity weakness.  4.  Acute bacterial endocarditis.  5.  IV drug abuse.  6.  Bacteremia.    POSTOPERATIVE DIAGNOSES:  1.  L2-L4 lumbar epidural abscess.  2.  Lumbar paraspinal infection  3.  Severe low back and leg pain.  3.  Proximal lower extremity weakness.  4.  Acute bacterial endocarditis.  5.  IV drug abuse.  6.  Bacteremia.    PROCEDURES PERFORMED:  1.  L3 full laminectomy and medial facetectomy for evacuation of epidural phlegmon.  2.  Use of intraoperative fluoro.    ATTENDING:  Martin Lewis D.O.    FIRST ASSISTANT:  Joseph Vaughan M.D. (RES)    SECOND ASSISTANT:  Juma Delarosa M.D.    LEVEL OF INVOLVEMENT OF ATTENDING:  Present for the critical portion of the   case.    INDICATIONS:  This is a 63-year-old female with multiple medical comorbidities   including IV drug abuse who presented with weakness over 5 days and was found to   be in septic shock with pneumonia, colitis and was found to have a lumbar   paraspinal infection and epidural abscess from L2 to L4.  She was initially   hemodynamically unstable and requiring intubation and sedation and, therefore, a   reliable neurologic exam could not be obtained.  After she was stabilized and   was eventually extubated, it was discovered that she was having significant low   back and leg pain as well as some hip flexion weakness that appeared to be more   pain limited.  Decision was made to take the patient for a lumbar laminectomy   and washout.  Consents were obtained.  All risks, benefits and alternatives to   surgery were discussed with the patient.    PROCEDURE IN DETAIL:  The patient was correctly identified and taken to the   Operating Room where the Anesthesia team administered general endotracheal   anesthesia.  The patient was carefully positioned in the prone  position over a   Roosevelt frame with her arms outstretched.  All pressure points were padded.    Prophylactic IV antibiotics were held to obtain cultures during surgery.  Using   fluoroscopy, a midline incision was planned and then prepped and draped in the   typical sterile fashion.  A timeout was performed.  The incision was infiltrated   with local anesthetic and incised with a 10 blade scalpel.  Subperiosteal   dissection was carried out using Bovie cautery and Rayo elevators.    Self-retaining retractors were placed for greater exposure.  This revealed the   underlying L3 lamina.  Next, using a high-speed drill, laminectomy was performed   at L3 as well as the inferior aspect of L2.  The supraspinous ligament was kept   intact to preserve the posterior tension band.  Once the laminectomy was   performed, the ligamentum flavum was removed and an epidural phlegmon was   discovered, more eccentric to the left.  This tissue was removed with Kerrisons   and sent for various cultures.  There was no clare pus.  The rubbery phlegmon   tissue was removed until the thecal sac was fully decompressed from L2 to L3   down to L3 to L4.  The wound was then irrigated with bacitracin solution using a   Pulsavac.  Next, vancomycin powder was placed in the wound and then closed in a   layered fashion, first with 0 Vicryls in the muscle and fascial layer followed   by 2-0 Vicryls in the dermal layer followed by staples on the skin.    FINDINGS: dorsal epidural phlegmon, no pus    COMPLICATIONS:  None.    ESTIMATED BLOOD LOSS:  25 mL.    DRAINS:  None.    INCISION:  Midline lumbar.    WOUND CLASSIFICATION:  Contaminated.    CONDITION:  Stable.    PROGNOSIS:  Fair-Good      GURWINDER/IN  dd: 10/09/2019 16:25:35 (CDT)  td: 10/09/2019 18:33:39 (CDT)  Doc ID   #[id number]  Job ID #[id number]    CC:

## 2019-10-09 NOTE — ASSESSMENT & PLAN NOTE
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    Plan:  - Plan for OR tomorrow  - NPO midnight. Hold anticoagulation.    - q 1 hr neuro checks  - continue abx per primary team, will fu cultures

## 2019-10-09 NOTE — ASSESSMENT & PLAN NOTE
2/2 IVDA, patient reported injecting IV heroin to ID MD at OSH. Blood cultures (9/19 - 9/26) have grown methicillin sensitive Staph aureus. Blood cultures as of 9/29 have been NGTD. mitral and tricuspid valve vegetations seen on DHARMESH 9/25; unchanged on TTE 9/28. OSH abx regimen vanc, ancef and daptomycin. Repeat BCx positive for MSSA. OSH PICC line d/c'd.    --as of 9/29, blood cultures are NGTD  --formal 2D echo results show EF 53%; normal L ventricular function; mild mitral sclerosis; small circumferential pericardial effusion. cardiology consulted; no surgical intervention at this point   --followed by neurosurgery; OR laminectomy complete; OR note to follow  --bilateral shoulder abscess, s/p I&D; R shoulder abscess + staph aureus   --oxacillin started after passed penicillin challenge; ID recs to continue oxacillin x 6 weeks

## 2019-10-09 NOTE — SUBJECTIVE & OBJECTIVE
Interval History: Went to OR for L3 laminectomy today    Review of Systems   Unable to perform ROS: Acuity of condition   Constitutional: Positive for chills. Negative for fever.   HENT: Positive for trouble swallowing.    Eyes: Negative for visual disturbance.   Respiratory: Negative for shortness of breath.    Cardiovascular: Negative for chest pain.   Gastrointestinal: Negative for abdominal pain.   Neurological: Positive for numbness. Negative for facial asymmetry, speech difficulty, weakness and headaches.        Numbness in both her legs.   Psychiatric/Behavioral: Positive for confusion. Negative for agitation.     Objective:     Vital Signs (Most Recent):  Temp: 97.5 °F (36.4 °C) (10/09/19 1245)  Pulse: 106 (10/09/19 1246)  Resp: (!) 21 (10/09/19 1246)  BP: 135/60 (10/09/19 1245)  SpO2: 99 % (10/09/19 1246) Vital Signs (24h Range):  Temp:  [97.5 °F (36.4 °C)-98.2 °F (36.8 °C)] 97.5 °F (36.4 °C)  Pulse:  [] 106  Resp:  [19-55] 21  SpO2:  [94 %-99 %] 99 %  BP: (135-217)/() 135/60     Weight: 53.5 kg (118 lb)  Body mass index is 20.25 kg/m².    Estimated Creatinine Clearance: 59.2 mL/min (based on SCr of 0.8 mg/dL).    Physical Exam   Constitutional: She appears well-developed and well-nourished. No distress.   HENT:   Head: Normocephalic and atraumatic.   Eyes: Conjunctivae are normal. Right eye exhibits no discharge. Left eye exhibits no discharge.   Neck: Neck supple.   Cardiovascular: Normal rate and normal heart sounds. Exam reveals no gallop and no friction rub.   No murmur heard.  Pulmonary/Chest: She has no wheezes. She has no rales.   Abdominal: Soft. She exhibits no mass. There is no tenderness. There is no guarding.   Musculoskeletal: She exhibits no edema.   Right sided chest tube without surrounding erythema   Neurological:   Patient appears somnolent on exam though arousable, able to follow some commands   Skin: Skin is warm and dry. She is not diaphoretic.       Significant Labs: All  pertinent labs within the past 24 hours have been reviewed.    Significant Imaging: I have reviewed all pertinent imaging results/findings within the past 24 hours.

## 2019-10-09 NOTE — PLAN OF CARE
Plan of care reviewed with patient, spouse, and daughter. Verbalize understanding. Reinforcement needed. VSS, pt oriented to self, intermittently oriented to time and situation, more confused about time. BG stable in the low 100s. PRN BP medication not required overnight. NS at 100 mL/h, D10 at 75 mL/h, Oxacillin at 20.8 mL/h. UO > 100cc/h. Pt tolerated travel to CT with no issues. Will continue to monitor and implement.

## 2019-10-09 NOTE — ASSESSMENT & PLAN NOTE
--seen on CT 9/19 with improvement noted on f/u CT 9/25  --concern for ileus on 9/25 CT; rectal tube in place with liquid stool output  --stool 9/27 neg for c diff; stool cx neg  --remains NPO  --flagyl course completed (7/7 days)

## 2019-10-09 NOTE — SUBJECTIVE & OBJECTIVE
Interval History: naeon    Medications:  Continuous Infusions:   sodium chloride 0.9% 100 mL/hr at 10/08/19 2000    dextrose 10 % in water (D10W) 50 mL/hr at 10/08/19 2000     Scheduled Meds:   albuterol-ipratropium  3 mL Nebulization Q4H    metronidazole  500 mg Intravenous Q8H    miconazole nitrate 2%   Topical (Top) BID    oxacillin 12 g in  mL CONTINUOUS INFUSION  12 g Intravenous Q24H     PRN Meds:acetaminophen, calcium gluconate IVPB, calcium gluconate IVPB, calcium gluconate IVPB, Dextrose 10% Bolus, fentaNYL, hydrALAZINE, labetalol, magnesium sulfate IVPB, magnesium sulfate IVPB, mupirocin, mupirocin, ondansetron, potassium chloride in water **AND** potassium chloride in water **AND** potassium chloride in water, sodium chloride 0.9%, sodium phosphate IVPB, sodium phosphate IVPB, sodium phosphate IVPB, vancomycin (VANCOCIN) IVPB     Review of Systems    Objective:     Weight: 53.5 kg (118 lb)  Body mass index is 20.25 kg/m².  Vital Signs (Most Recent):  Temp: 97.5 °F (36.4 °C) (10/08/19 1900)  Pulse: 99 (10/08/19 2030)  Resp: (!) 29 (10/08/19 2030)  BP: (!) 184/83 (10/08/19 2000)  SpO2: 97 % (10/08/19 2030) Vital Signs (24h Range):  Temp:  [97.5 °F (36.4 °C)-97.7 °F (36.5 °C)] 97.5 °F (36.4 °C)  Pulse:  [] 99  Resp:  [19-55] 29  SpO2:  [91 %-100 %] 97 %  BP: (166-201)/(71-89) 184/83     Date 10/08/19 0700 - 10/09/19 0659   Shift 6990-6452 5979-0963 8854-7541 24 Hour Total   INTAKE   I.V.(mL/kg) 562.4(10.5) 845(15.8)  1407.4(26.3)   IV Piggyback 166.4 145.6  312   Shift Total(mL/kg) 728.8(13.6) 990.6(18.5)  1719.4(32.1)   OUTPUT   Urine(mL/kg/hr) 850(2) 515  1365   Stool 450 100  550   Shift Total(mL/kg) 1300(24.3) 615(11.5)  1915(35.8)   Weight (kg) 53.5 53.5 53.5 53.5                        Chest Tube 10/03/19 0500 Right Midaxillary (Active)   Chest Tube WDL WDL 10/4/2019  7:10 AM   Function -20 cm H2O 10/4/2019  7:10 AM   Air Leak/Fluctuation air leak not present;fluctuation not present  10/4/2019  7:10 AM   Safety all tubing connections taped;2 rubber-tipped hemostats w/ patient;all connections secured;suction checked 10/4/2019  7:10 AM   Securement tubing secured to body distal to insertion site w/ tape 10/4/2019  7:10 AM   Patency Intervention Tip/tilt 10/4/2019  7:10 AM   Drainage Description Serosanguineous 10/4/2019  7:10 AM   Dressing Appearance occlusive gauze dressing intact;clean and dry 10/4/2019  7:10 AM   Dressing Care dressing reinforced 10/4/2019  7:10 AM   Left Subcutaneous Emphysema none present 10/4/2019  7:10 AM   Right Subcutaneous Emphysema none present 10/4/2019  7:10 AM   Site Assessment Clean;Dry;Intact;No redness;No swelling 10/4/2019  7:10 AM   Surrounding Skin Dry;Intact 10/4/2019  7:10 AM   Output (mL) 0 mL 10/4/2019  7:00 AM            NG/OG Tube 10/02/19 2200 Center mouth (Active)   Placement Check placement verified by x-ray 10/4/2019  7:10 AM   Tolerance no signs/symptoms of discomfort 10/4/2019  7:10 AM   Securement secured to commercial device 10/4/2019  7:10 AM   Clamp Status/Tolerance unclamped;no emesis;no nausea;no restlessness;no abdominal distention 10/4/2019  7:10 AM   Suction Setting/Drainage Method suction at;low;intermittent setting 10/4/2019  7:10 AM   Insertion Site Appearance no redness, warmth, tenderness, skin breakdown, drainage 10/4/2019  7:10 AM   Drainage Bile 10/4/2019  3:00 AM   Intake (mL) 50 mL 10/4/2019  6:00 AM   Tube Output(mL)(Include Discarded Residual) 0 mL 10/4/2019  7:00 AM            Rectal Tube 09/27/19 1000 rectal tube w/ balloon (indicate number of mLs) (Active)   Balloon Inflation Volume (mL) 45 10/4/2019  3:00 AM   Reposition drainage bags for BMS & Henriquez on opposite sides of bed 10/4/2019  7:10 AM   Outcome comfort enhanced 10/4/2019  7:10 AM   Stool Color Brown;Green 10/4/2019  7:10 AM   Insertion Site Appearance no redness, warmth, tenderness, skin breakdown, drainage 10/4/2019  7:10 AM   Flush/Irrigation flushed w/;water;no  "resistance met 10/4/2019  3:00 AM   Intake (mL) 25 mL 10/4/2019  7:00 AM   Rectal Tube Output 0 mL 10/4/2019  3:00 AM            Urethral Catheter 09/19/19 1240 Double-lumen 16 Fr. (Active)   Site Assessment Clean;Intact 10/4/2019  7:10 AM   Collection Container Urimeter 10/4/2019  7:10 AM   Securement Method secured to top of thigh w/ adhesive device 10/4/2019  7:10 AM   Catheter Care Performed yes 10/4/2019  7:10 AM   Reason for Continuing Urinary Catheterization Critically ill in ICU requiring intensive monitoring 10/4/2019  7:10 AM   CAUTI Prevention Bundle StatLock in place w 1" slack;Intact seal between catheter & drainage tubing;Drainage bag/urimeter off the floor;Green sheeting clip in use;No dependent loops or kinks;Drainage bag/urimeter not overfilled (<2/3 full);Drainage bag/urimeter below bladder 10/4/2019  7:10 AM   Output (mL) 45 mL 10/4/2019 10:00 AM       Neurosurgery Physical Exam     [id number]  PERRL   f/cx4   BLE 3/5 HF, 4/5 KE EHL    Significant Labs:  Recent Labs   Lab 10/07/19  0332  10/08/19  0321 10/08/19  1045 10/08/19  1516 10/08/19  1636   *  134*   < > 116* 111* 120* 119*     140   < > 136 135* 136 135*   K 2.8*  2.8*   < > 4.5 3.7 3.2* 3.1*     110   < > 110 109 109 109   CO2 20*  20*   < > 18* 19* 20* 20*   BUN 33*  33*   < > 25* 22 21 20   CREATININE 1.2  1.2   < > 0.9 0.9 0.9 0.9   CALCIUM 7.6*  7.6*   < > 7.4* 7.7* 7.4* 7.3*   MG 1.2*  --  1.7  --   --   --     < > = values in this interval not displayed.     Recent Labs   Lab 10/07/19  0332 10/08/19  0321 10/08/19  1636   WBC 7.22 9.64 9.69   HGB 9.4* 9.5* 9.5*   HCT 30.6* 30.7* 30.3*    283 287     Recent Labs   Lab 10/08/19  1636   INR 1.4*   APTT 26.4     Microbiology Results (last 7 days)     Procedure Component Value Units Date/Time    Aerobic culture [[id number]]  (Abnormal)  (Susceptibility) Collected:  10/05/19 2111    Order Status:  Completed Specimen:  Abscess from Shoulder, Right Updated:  " 10/08/19 1300     Aerobic Bacterial Culture STAPHYLOCOCCUS AUREUS  Rare  No other significant isolate      Culture, Body Fluid - Bactec [[id number]] Collected:  10/03/19 0549    Order Status:  Completed Specimen:  Pleural Fluid Updated:  10/08/19 1012     Body Fluid Culture, Sterile No growth after 5 days.    Narrative:       received 2 culture bottles with specimen for pleural fluid.    Culture, Anaerobe [[id number]] Collected:  10/05/19 2111    Order Status:  Completed Specimen:  Abscess from Shoulder, Right Updated:  10/08/19 0913     Anaerobic Culture Culture in progress    Culture, Anaerobe [[id number]] Collected:  10/05/19 2110    Order Status:  Completed Specimen:  Abscess from Shoulder, Left Updated:  10/08/19 0911     Anaerobic Culture Culture in progress    Blood culture [[id number]] Collected:  10/02/19 2025    Order Status:  Completed Specimen:  Blood from Peripheral, Wrist, Right Updated:  10/07/19 2212     Blood Culture, Routine No growth after 5 days.    Blood culture [[id number]] Collected:  10/02/19 0830    Order Status:  Completed Specimen:  Blood from Peripheral, Hand, Right Updated:  10/07/19 2212     Blood Culture, Routine No growth after 5 days.    Aerobic culture [[id number]] Collected:  10/03/19 0549    Order Status:  Completed Specimen:  Pleural Fluid Updated:  10/07/19 0932     Aerobic Bacterial Culture No growth    Culture, Anaerobic [[id number]] Collected:  10/03/19 0549    Order Status:  Completed Specimen:  Pleural Fluid Updated:  10/07/19 0838     Anaerobic Culture No anaerobes isolated    Aerobic culture [[id number]] Collected:  10/05/19 2110    Order Status:  Completed Specimen:  Abscess from Shoulder, Left Updated:  10/07/19 0735     Aerobic Bacterial Culture No growth    Gram stain [[id number]] Collected:  10/03/19 0549    Order Status:  Completed Specimen:  Pleural Fluid Updated:  10/03/19 0955     Gram Stain Result Rare WBC's      Rare Gram positive cocci            Significant  Diagnostic  MRI Brain small acute R thalamic infarct

## 2019-10-09 NOTE — ASSESSMENT & PLAN NOTE
--likely secondary to sepsis/metabolic processes  --Brain MRI 9/30 with lacunar infarcts  --repeat head CT (10/7) revealed new right basal ganglia infarcts  --MRI of brain revealed R thalamic infarct, ventriculitis, subactue embolic infarctions  --plan for CTA head (10/8)  --continue ABx regimen (oxacillin and flagyl)  --CTA of head completed 10/8 showing atherosclerotic plaquing of the distal vertebral arteries and concern for noncalcified plaquing and stenosis; no high-grade stenosis or proximal occlusion

## 2019-10-09 NOTE — SUBJECTIVE & OBJECTIVE
Neurologic Chief Complaint: AMS and weakness    Subjective:     Interval History: Patient is seen for follow-up neurological assessment and treatment recommendations: went for Laminectomy today. No change in neuro exam.     HPI, Past Medical, Family, and Social History remains the same as documented in the initial encounter.     Review of Systems   Constitutional: Positive for chills. Negative for fever.   HENT: Positive for trouble swallowing.    Eyes: Negative for visual disturbance.   Respiratory: Negative for shortness of breath.    Cardiovascular: Negative for chest pain.   Gastrointestinal: Negative for abdominal pain.   Neurological: Positive for weakness and numbness. Negative for facial asymmetry, speech difficulty and headaches.        Numbness in both her legs.   Psychiatric/Behavioral: Positive for confusion. Negative for agitation.     Scheduled Meds:   albuterol-ipratropium  3 mL Nebulization Q4H    metronidazole  500 mg Intravenous Q8H    miconazole nitrate 2%   Topical (Top) BID    oxacillin 12 g in  mL CONTINUOUS INFUSION  12 g Intravenous Q24H     Continuous Infusions:   dextrose 10 % in water (D10W) 50 mL/hr at 10/09/19 1600     PRN Meds:acetaminophen, calcium gluconate IVPB, calcium gluconate IVPB, calcium gluconate IVPB, Dextrose 10% Bolus, fentaNYL, hydrALAZINE, labetalol, magnesium sulfate IVPB, magnesium sulfate IVPB, mupirocin, mupirocin, ondansetron, potassium chloride in water **AND** potassium chloride in water **AND** potassium chloride in water, sodium chloride 0.9%, sodium phosphate IVPB, sodium phosphate IVPB, sodium phosphate IVPB    Objective:     Vital Signs (Most Recent):  Temp: 97.5 °F (36.4 °C) (10/09/19 1500)  Pulse: 106 (10/09/19 1630)  Resp: (!) 27 (10/09/19 1630)  BP: (!) 171/76 (10/09/19 1630)  SpO2: 100 % (10/09/19 1630)  BP Location: Left arm    Vital Signs Range (Last 24H):  Temp:  [97.5 °F (36.4 °C)-98.2 °F (36.8 °C)]   Pulse:  []   Resp:  [19-39]   BP:  (135-217)/()   SpO2:  [95 %-100 %]   BP Location: Left arm    Physical Exam   Constitutional: She is oriented to person, place, and time. She appears well-developed. No distress.   Eyes: EOM are normal.   Neck: Normal range of motion. No thyromegaly present.   Cardiovascular: Normal rate, regular rhythm and normal heart sounds.   Pulmonary/Chest: Effort normal. No respiratory distress.   Neurological: She is alert and oriented to person, place, and time.   Skin: Skin is warm and dry.   Nursing note and vitals reviewed.      Neurological Exam:   LOC: alert  Attention Span: Good   Articulation: No dysarthria  Orientation: Person, Place, Time   Visual Fields: Full  EOM (CN III, IV, VI): Full/intact  Pupils (CN II, III): PERRL  Motor: Arm left  Normal 5/5  Leg left  Paresis: 2/5  Arm right  Normal 5/5  Leg right Paresis: 2/5  Sensation: Intact to light touch, temperature and vibration    Laboratory:  CMP:   Recent Labs   Lab 10/09/19  1400   CALCIUM 6.9*      K 3.0*   CO2 17*      BUN 13   CREATININE 0.8     CBC:   Recent Labs   Lab 10/09/19  1400   WBC 10.69   RBC 2.82*   HGB 8.5*   HCT 27.5*      MCV 98   MCH 30.1   MCHC 30.9*     Lipid Panel: No results for input(s): CHOL, LDLCALC, HDL, TRIG in the last 168 hours.  Hgb A1C: No results for input(s): HGBA1C in the last 168 hours.    Diagnostic Results     Brain Imaging   MRI Brain (10/7/2019) -           Acute right thalamic infarct.            Additional scattered foci of DWI hyperintense signal without corresponding decreased signal on the ADC maps.  The findings most likely represent subacute embolic infarctions.             Small foci of DWI hyperintensity within the lateral ventricles.  The findings are suspicious for possible ventriculitis.       CTA head  10/8/2019   Atherosclerotic plaquing of the distal vertebral arteries with dominant distal left vertebral artery and moderate irregularity concerning for noncalcified plaquing and  stenosis.  No high-grade stenosis or proximal occlusion.      Cardiac Evaluation:      TTE (10/3/2019) -      · Low normal left ventricular systolic function. The estimated ejection fraction is 53%  · Local segmental wall motion abnormalities.  · Normal LV diastolic function.  · Normal right ventricular systolic function.  · Mild mitral sclerosis.  · Mechanically ventilated; cannot use inferior caval vein diameter to estimate central venous pressure.  · Small circumferential pericardial effusion.     TTE (9/26/2019) -      · Mild concentric left ventricular hypertrophy.  · Mild left ventricular enlargement.  · Moderately decreased left ventricular systolic function. The estimated ejection fraction is 33%  · Grade III (severe) left ventricular diastolic dysfunction consistent with restrictive physiology.  · Mild global hypokinetic wall motion.  · Normal right ventricular systolic function.  · Mild left atrial enlargement.  · Mild right atrial enlargement.  · There is a right atrial mass present. Echo lucent mobile small vegetation ssen at R atrial - TV septal leaflet junction In hi R atrium At septal surface there is more echo dense structure ( ~ 1.5x .8 cm) mobile which could be vegetation vs normal prominent Eustachian valve  · Moderate-to-severe mitral regurgitation.  · Normal central venous pressure (3 mm Hg).  · The estimated PA systolic pressure is 50 mm Hg  · Moderate to severe pulmonary hypertension present.     DHARMESH (9/25/2019) -      · Mild left ventricular enlargement.  · Low normal left ventricular systolic function. The estimated ejection fraction is 50%  · Normal LV diastolic function.  · Mild left atrial enlargement.  · No interatrial septal defect present.  · Normal appearing left atrial appendage. No thrombus is present in the appendage. Normal appendage velocities.  · Moderate-to-severe mitral regurgitation.  · Moderate tricuspid regurgitation.  · 1x.5 cmmobile vegetation on tricuspid valve septal  leaflet atrial side  · 5mmx6 mm mobile vegetation on posterior leaflet P3 scallop on atrial side  · Aortic valve wnl

## 2019-10-09 NOTE — PLAN OF CARE
Pt VSS at this time. HR 90s-112, SBP<220 per MD, O2>97% 3L NC r/t am ABG, afibrile. Disoriented to person and situation following commands and moving all extremities equally. Family member at bedside, updated on current condition and POC for am shift. All questions answered and emotional support provided. D10W @50cc/hr for hypoglycemia precautions, NS@100cc/hr for pre-op and post contrast of CTA, ABX 20.8cc/hr, 410mequ of K for 3.2. Pre-sx bath given. Pt assisted with weight shift, and encouraged to cough/deep breathe. Remains free of falls and injury for am shift. MD updated on current condition, vitals, labs, and gtts. No new orders received, will continue to monitor.

## 2019-10-09 NOTE — TRANSFER OF CARE
"Anesthesia Transfer of Care Note    Patient: Mesha Mckenzie    Procedure(s) Performed: Procedure(s) (LRB):  LAMINECTOMY, SPINE, Open L3-5 (N/A)    Patient location: PACU    Anesthesia Type: general    Transport from OR: Transported from OR on 6-10 L/min O2 by face mask with adequate spontaneous ventilation    Post pain: adequate analgesia    Post assessment: no apparent anesthetic complications    Post vital signs: stable    Level of consciousness: sedated    Complications: none    Transfer of care protocol was followed      Last vitals:   Visit Vitals  /60 (BP Location: Left arm, Patient Position: Lying)   Pulse 106   Temp 36.4 °C (97.5 °F) (Oral)   Resp (!) 21   Ht 5' 4" (1.626 m)   Wt 53.5 kg (118 lb)   SpO2 99%   Breastfeeding? No   BMI 20.25 kg/m²     "

## 2019-10-09 NOTE — PROGRESS NOTES
[facility]  Infectious Disease  Progress Note    Patient Name: Mesha Mckenzie  MRN: [id number]  Admission Date: 10/2/2019  Length of Stay: 7 days  Attending Physician: Ok Rehman MD  Primary Care Provider: Varun Shea MD PhD    Isolation Status: No active isolations  Assessment/Plan:      Endocarditis  64yo female with IVDU presents as transfer from [facility] for epidural abscess, likely secondary to MSSA bacteremia and endocarditis with vegetations of the MV and TV on DHARMESH 9/26 c/b bilateral multifocal pneumonia with cavitary lesions, right pleural effusion s/p thoracentesis on 10/1 w/ concern for empyema, and pan-colitis. Followed by ID at [facility], most recently on daptomycin, cefazolin, and vancomycin. Intubated shortly after arrival to [facility] for hypoxia. Noted to have penicillin allergy.    Recommendations:  -Continue oxacillin   -Blood cxs clear on 9/29  -S/p aspiration and possible drainage of shoulder abscesses by general surgery 10/6  -s/p laminectomy on 10/9  -ID will sign off  -Patient will be discharged on OXACILLIN 12G IV continuous  for 6 weeks, estimated end of therapy date 11/20    Please have the following outpatient labs drawn WEEKLY and faxed to Infectious Diseases clinic at [phone]:  - CBC with diff  - CMP  - ESR  - CRP  We will arrange for a follow-up appointment in Infectious Diseases clinic in 3-4 weeks.    Prior to discharge, please ensure the patient's follow-up has been scheduled.  If there is still no follow-up scheduled in Infectious Diseases clinic, please send an EPIC message to the Infectious Diseases charge nurse Maite Iraheta.                    Thank you for your consult. I will sign off. Please contact us if you have any additional questions.    Varun Thorpe MD  Infectious Disease  [facility]    Subjective:     Principal Problem:Staphylococcus aureus bacteremia with sepsis    HPI: Mesha Mckenzie is  a 64 yo female with IVDU (heroin), prior GIB in 2018, and spinal stenosis who presents from [facility] for neurosurgery eval for epidural abscess in setting of MSSA endocarditis. She had initially presented to [place] on 9/19 for evaluation of nausea, vomiting, and diarrhea for the past 5 days. 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 She was initially started on vancomycin, flagyl, and levoquin. Blood cultures from 9/19 positive for MSSA and were thought to be likely source. Urine culture from 9/19 also positive for MSSA. CT head on 9/19 and 9/22 negative for septic emboli. TTE on 9/21 showed possible vegetation on mitral valve (EF 55%).  DHARMESH was performed on 9/25, showing mitral valve vegetation (5x6mm) on the posterior leaflet as well as tricuspid valve vegetation (10x5mm) on septal leaflet. CT chest was repeated on 9/26 and was showed for previously seen bilateral consolidations that were now noted to be cavitary/cystic in nature. Repeat TTE on 9/26 reported reduced EF from previous 55% to 33%. Cardiothoracic evaluated patient for valve replacement but surgery was deferred due to patient's multiple medical issues. Last positive blood culture was 9/26. Her antibiotics were later changed to daptomycin, cefazolin, and vancomycin. Thoracentesis on 10/1 showed evidence of empyema. MRI spine on 10/1 showed lumbar paraspinal muscle infection and small dorsal epidural abscess from L2-4. She was transferred to [facility] [place] [facility] for neurosurgery eval.     On arrival to [facility], she was noted to be encephalopathic. Shortly after arriving, she became hypoxic and hypertensive and required intubation. She also required neuromuscular blockade for tachypnea and vent dyssynchrony. Vasopressors were also started due to hypotension.  Interval History:  Went to OR for L3 laminectomy today    Review of Systems   Unable to perform ROS: Acuity of condition   Constitutional: Positive for chills. Negative for fever.   HENT: Positive for trouble swallowing.    Eyes: Negative for visual disturbance.   Respiratory: Negative for shortness of breath.    Cardiovascular: Negative for chest pain.   Gastrointestinal: Negative for abdominal pain.   Neurological: Positive for numbness. Negative for facial asymmetry, speech difficulty, weakness and headaches.        Numbness in both her legs.   Psychiatric/Behavioral: Positive for confusion. Negative for agitation.     Objective:     Vital Signs (Most Recent):  Temp: 97.5 °F (36.4 °C) (10/09/19 1245)  Pulse: 106 (10/09/19 1246)  Resp: (!) 21 (10/09/19 1246)  BP: 135/60 (10/09/19 1245)  SpO2: 99 % (10/09/19 1246) Vital Signs (24h Range):  Temp:  [97.5 °F (36.4 °C)-98.2 °F (36.8 °C)] 97.5 °F (36.4 °C)  Pulse:  [] 106  Resp:  [19-55] 21  SpO2:  [94 %-99 %] 99 %  BP: (135-217)/() 135/60     Weight: 53.5 kg (118 lb)  Body mass index is 20.25 kg/m².    Estimated Creatinine Clearance: 59.2 mL/min (based on SCr of 0.8 mg/dL).    Physical Exam   Constitutional: She appears well-developed and well-nourished. No distress.   HENT:   Head: Normocephalic and atraumatic.   Eyes: Conjunctivae are normal. Right eye exhibits no discharge. Left eye exhibits no discharge.   Neck: Neck supple.   Cardiovascular: Normal rate and normal heart sounds. Exam reveals no gallop and no friction rub.   No murmur heard.  Pulmonary/Chest: She has no wheezes. She has no rales.   Abdominal: Soft. She exhibits no mass. There is no tenderness. There is no guarding.   Musculoskeletal: She exhibits no edema.   Right sided chest tube without surrounding erythema   Neurological:   Patient appears somnolent on exam though arousable, able to follow some commands   Skin: Skin is warm and dry. She is not diaphoretic.       Significant Labs: All pertinent labs  within the past 24 hours have been reviewed.    Significant Imaging: I have reviewed all pertinent imaging results/findings within the past 24 hours.

## 2019-10-09 NOTE — PROGRESS NOTES
Physical Therapy Discharge Summary    Name: Mesha Mckenzie  MRN: [id number]   Principal Problem: Staphylococcus aureus bacteremia with sepsis     Patient Discharged from acute Physical Therapy on 10/9/19.  Please refer to prior PT noted date on 10/8/19 for functional status.     Assessment:     Has not met her goals.    Objective:     GOALS:   Multidisciplinary Problems     Physical Therapy Goals        Problem: Physical Therapy Goal    Goal Priority Disciplines Outcome Goal Variances Interventions   Physical Therapy Goal     PT, PT/OT Ongoing, Progressing     Description:  Goals to be met by: 10/31/2019    Patient will increase functional independence with mobility by performin. Supine <> sit with Moderate Assistance.-not met  2. Sit <> stand transfer with Moderate Assistance using Rolling Walker.-not met  3. Bed <> chair transfer via Step Transfer with Moderate Assistance using Rolling Walker.-not met  4. Dynamic sitting at edge of bed x 10 minutes with Minimal Assistance to prepare for functional tasks in sitting.-not met  5. Able to tolerate exercise for 15-20 reps with assistance as needed.-not met  6. Pt receive gait training ~ 50 ft with Rw and min assist - not met                            Reasons for Discontinuation of Therapy Services  Sx- laminectomy      Plan:     Patient Discharged to: ICU.    Rosmery Fitzgerald, PT  10/9/2019

## 2019-10-09 NOTE — PROGRESS NOTES
[facility]  Neurosurgery  Progress Note    Subjective:     History of Present Illness: 63yof IVDU with persistent bacteremia found to have endocarditis bactreremia MSSA. MRI L spine showed L2-L4 epidural abscess and neurosurgery was consulted for evaluation. Of note, pt was intubated, bronched, chest tube plced and sedation and paralytics administered due to decompensation with desaturation and hypotension.    Post-Op Info:  Procedure(s) (LRB):  LAMINECTOMY, SPINE, Open L3-5 (N/A)         Interval History: naeon    Medications:  Continuous Infusions:   sodium chloride 0.9% 100 mL/hr at 10/08/19 2000    dextrose 10 % in water (D10W) 50 mL/hr at 10/08/19 2000     Scheduled Meds:   albuterol-ipratropium  3 mL Nebulization Q4H    metronidazole  500 mg Intravenous Q8H    miconazole nitrate 2%   Topical (Top) BID    oxacillin 12 g in  mL CONTINUOUS INFUSION  12 g Intravenous Q24H     PRN Meds:acetaminophen, calcium gluconate IVPB, calcium gluconate IVPB, calcium gluconate IVPB, Dextrose 10% Bolus, fentaNYL, hydrALAZINE, labetalol, magnesium sulfate IVPB, magnesium sulfate IVPB, mupirocin, mupirocin, ondansetron, potassium chloride in water **AND** potassium chloride in water **AND** potassium chloride in water, sodium chloride 0.9%, sodium phosphate IVPB, sodium phosphate IVPB, sodium phosphate IVPB, vancomycin (VANCOCIN) IVPB     Review of Systems    Objective:     Weight: 53.5 kg (118 lb)  Body mass index is 20.25 kg/m².  Vital Signs (Most Recent):  Temp: 97.5 °F (36.4 °C) (10/08/19 1900)  Pulse: 99 (10/08/19 2030)  Resp: (!) 29 (10/08/19 2030)  BP: (!) 184/83 (10/08/19 2000)  SpO2: 97 % (10/08/19 2030) Vital Signs (24h Range):  Temp:  [97.5 °F (36.4 °C)-97.7 °F (36.5 °C)] 97.5 °F (36.4 °C)  Pulse:  [] 99  Resp:  [19-55] 29  SpO2:  [91 %-100 %] 97 %  BP: (166-201)/(71-89) 184/83     Date 10/08/19 0700 - 10/09/19 0659   Shift 9485-5775 9285-0702 3074-4778 24 Hour Total   INTAKE    I.V.(mL/kg) 562.4(10.5) 845(15.8)  1407.4(26.3)   IV Piggyback 166.4 145.6  312   Shift Total(mL/kg) 728.8(13.6) 990.6(18.5)  1719.4(32.1)   OUTPUT   Urine(mL/kg/hr) 850(2) 515  1365   Stool 450 100  550   Shift Total(mL/kg) 1300(24.3) 615(11.5)  1915(35.8)   Weight (kg) 53.5 53.5 53.5 53.5                        Chest Tube 10/03/19 0500 Right Midaxillary (Active)   Chest Tube WDL WDL 10/4/2019  7:10 AM   Function -20 cm H2O 10/4/2019  7:10 AM   Air Leak/Fluctuation air leak not present;fluctuation not present 10/4/2019  7:10 AM   Safety all tubing connections taped;2 rubber-tipped hemostats w/ patient;all connections secured;suction checked 10/4/2019  7:10 AM   Securement tubing secured to body distal to insertion site w/ tape 10/4/2019  7:10 AM   Patency Intervention Tip/tilt 10/4/2019  7:10 AM   Drainage Description Serosanguineous 10/4/2019  7:10 AM   Dressing Appearance occlusive gauze dressing intact;clean and dry 10/4/2019  7:10 AM   Dressing Care dressing reinforced 10/4/2019  7:10 AM   Left Subcutaneous Emphysema none present 10/4/2019  7:10 AM   Right Subcutaneous Emphysema none present 10/4/2019  7:10 AM   Site Assessment Clean;Dry;Intact;No redness;No swelling 10/4/2019  7:10 AM   Surrounding Skin Dry;Intact 10/4/2019  7:10 AM   Output (mL) 0 mL 10/4/2019  7:00 AM            NG/OG Tube 10/02/19 2200 Center mouth (Active)   Placement Check placement verified by x-ray 10/4/2019  7:10 AM   Tolerance no signs/symptoms of discomfort 10/4/2019  7:10 AM   Securement secured to commercial device 10/4/2019  7:10 AM   Clamp Status/Tolerance unclamped;no emesis;no nausea;no restlessness;no abdominal distention 10/4/2019  7:10 AM   Suction Setting/Drainage Method suction at;low;intermittent setting 10/4/2019  7:10 AM   Insertion Site Appearance no redness, warmth, tenderness, skin breakdown, drainage 10/4/2019  7:10 AM   Drainage Bile 10/4/2019  3:00 AM   Intake (mL) 50 mL 10/4/2019  6:00 AM   Tube  "Output(mL)(Include Discarded Residual) 0 mL 10/4/2019  7:00 AM            Rectal Tube 09/27/19 1000 rectal tube w/ balloon (indicate number of mLs) (Active)   Balloon Inflation Volume (mL) 45 10/4/2019  3:00 AM   Reposition drainage bags for BMS & Henriquez on opposite sides of bed 10/4/2019  7:10 AM   Outcome comfort enhanced 10/4/2019  7:10 AM   Stool Color Brown;Green 10/4/2019  7:10 AM   Insertion Site Appearance no redness, warmth, tenderness, skin breakdown, drainage 10/4/2019  7:10 AM   Flush/Irrigation flushed w/;water;no resistance met 10/4/2019  3:00 AM   Intake (mL) 25 mL 10/4/2019  7:00 AM   Rectal Tube Output 0 mL 10/4/2019  3:00 AM            Urethral Catheter 09/19/19 1240 Double-lumen 16 Fr. (Active)   Site Assessment Clean;Intact 10/4/2019  7:10 AM   Collection Container Urimeter 10/4/2019  7:10 AM   Securement Method secured to top of thigh w/ adhesive device 10/4/2019  7:10 AM   Catheter Care Performed yes 10/4/2019  7:10 AM   Reason for Continuing Urinary Catheterization Critically ill in ICU requiring intensive monitoring 10/4/2019  7:10 AM   CAUTI Prevention Bundle StatLock in place w 1" slack;Intact seal between catheter & drainage tubing;Drainage bag/urimeter off the floor;Green sheeting clip in use;No dependent loops or kinks;Drainage bag/urimeter not overfilled (<2/3 full);Drainage bag/urimeter below bladder 10/4/2019  7:10 AM   Output (mL) 45 mL 10/4/2019 10:00 AM       Neurosurgery Physical Exam     E4V4M6  PERRL   f/cx4   BLE 3/5 HF, 4/5 KE EHL    Significant Labs:  Recent Labs   Lab 10/07/19  0332  10/08/19  0321 10/08/19  1045 10/08/19  1516 10/08/19  1636   *  134*   < > 116* 111* 120* 119*     140   < > 136 135* 136 135*   K 2.8*  2.8*   < > 4.5 3.7 3.2* 3.1*     110   < > 110 109 109 109   CO2 20*  20*   < > 18* 19* 20* 20*   BUN 33*  33*   < > 25* 22 21 20   CREATININE 1.2  1.2   < > 0.9 0.9 0.9 0.9   CALCIUM 7.6*  7.6*   < > 7.4* 7.7* 7.4* 7.3*   MG 1.2*  -- "  1.7  --   --   --     < > = values in this interval not displayed.     Recent Labs   Lab 10/07/19  0332 10/08/19  0321 10/08/19  1636   WBC 7.22 9.64 9.69   HGB 9.4* 9.5* 9.5*   HCT 30.6* 30.7* 30.3*    283 287     Recent Labs   Lab 10/08/19  1636   INR 1.4*   APTT 26.4     Microbiology Results (last 7 days)     Procedure Component Value Units Date/Time    Aerobic culture [[id number]]  (Abnormal)  (Susceptibility) Collected:  10/05/19 2111    Order Status:  Completed Specimen:  Abscess from Shoulder, Right Updated:  10/08/19 1300     Aerobic Bacterial Culture STAPHYLOCOCCUS AUREUS  Rare  No other significant isolate      Culture, Body Fluid - Bactec [[id number]] Collected:  10/03/19 0549    Order Status:  Completed Specimen:  Pleural Fluid Updated:  10/08/19 1012     Body Fluid Culture, Sterile No growth after 5 days.    Narrative:       received 2 culture bottles with specimen for pleural fluid.    Culture, Anaerobe [[id number]] Collected:  10/05/19 2111    Order Status:  Completed Specimen:  Abscess from Shoulder, Right Updated:  10/08/19 0913     Anaerobic Culture Culture in progress    Culture, Anaerobe [[id number]] Collected:  10/05/19 2110    Order Status:  Completed Specimen:  Abscess from Shoulder, Left Updated:  10/08/19 0911     Anaerobic Culture Culture in progress    Blood culture [[id number]] Collected:  10/02/19 2025    Order Status:  Completed Specimen:  Blood from Peripheral, Wrist, Right Updated:  10/07/19 2212     Blood Culture, Routine No growth after 5 days.    Blood culture [[id number]] Collected:  10/02/19 0830    Order Status:  Completed Specimen:  Blood from Peripheral, Hand, Right Updated:  10/07/19 2212     Blood Culture, Routine No growth after 5 days.    Aerobic culture [[id number]] Collected:  10/03/19 0549    Order Status:  Completed Specimen:  Pleural Fluid Updated:  10/07/19 0932     Aerobic Bacterial Culture No growth    Culture, Anaerobic [[id number]] Collected:  10/03/19 0549     Order Status:  Completed Specimen:  Pleural Fluid Updated:  10/07/19 0838     Anaerobic Culture No anaerobes isolated    Aerobic culture [[id number]] Collected:  10/05/19 2110    Order Status:  Completed Specimen:  Abscess from Shoulder, Left Updated:  10/07/19 0735     Aerobic Bacterial Culture No growth    Gram stain [[id number]] Collected:  10/03/19 0549    Order Status:  Completed Specimen:  Pleural Fluid Updated:  10/03/19 0955     Gram Stain Result Rare WBC's      Rare Gram positive cocci            Significant Diagnostic  MRI Brain small acute R thalamic infarct    Assessment/Plan:     Epidural abscess  63yof pmh IVDU with endocarditis MSSA bacteremia acutely decompensated overnight on paralytics, sedation and pressors found to have lumbar epidural abscess.    Plan:  - Plan for OR tomorrow  - NPO midnight. Hold anticoagulation.    - q 1 hr neuro checks  - continue abx per primary team, will fu cultures          Juma Varghese MD  Neurosurgery  [facility]

## 2019-10-09 NOTE — ASSESSMENT & PLAN NOTE
64yo female with IVDU presents as transfer from [facility] for epidural abscess, likely secondary to MSSA bacteremia and endocarditis with vegetations of the MV and TV on DHARMESH 9/26 c/b bilateral multifocal pneumonia with cavitary lesions, right pleural effusion s/p thoracentesis on 10/1 w/ concern for empyema, and pan-colitis. Followed by ID at [facility], most recently on daptomycin, cefazolin, and vancomycin. Intubated shortly after arrival to [facility] for hypoxia. Noted to have penicillin allergy.    Recommendations:  -Continue oxacillin   -Blood cxs clear on 9/29  -S/p aspiration and possible drainage of shoulder abscesses by general surgery 10/6  -s/p laminectomy on 10/9  -ID will sign off  -Patient will be discharged on OXACILLIN 12G IV continuous  for 6 weeks, estimated end of therapy date 11/20    Please have the following outpatient labs drawn WEEKLY and faxed to Infectious Diseases clinic at [phone]:  - CBC with diff  - CMP  - ESR  - CRP  We will arrange for a follow-up appointment in Infectious Diseases clinic in 3-4 weeks.    Prior to discharge, please ensure the patient's follow-up has been scheduled.  If there is still no follow-up scheduled in Infectious Diseases clinic, please send an EPIC message to the Infectious Diseases charge nurse Maite Iraheta.

## 2019-10-10 LAB
ALLENS TEST: ABNORMAL
ANION GAP SERPL CALC-SCNC: 4 MMOL/L (ref 8–16)
ANION GAP SERPL CALC-SCNC: 7 MMOL/L (ref 8–16)
ANION GAP SERPL CALC-SCNC: 7 MMOL/L (ref 8–16)
ANION GAP SERPL CALC-SCNC: 8 MMOL/L (ref 8–16)
BACTERIA SPEC ANAEROBE CULT: NORMAL
BASOPHILS # BLD AUTO: 0.03 K/UL (ref 0–0.2)
BASOPHILS NFR BLD: 0.3 % (ref 0–1.9)
BUN SERPL-MCNC: 10 MG/DL (ref 8–23)
BUN SERPL-MCNC: 11 MG/DL (ref 8–23)
BUN SERPL-MCNC: 9 MG/DL (ref 8–23)
BUN SERPL-MCNC: 9 MG/DL (ref 8–23)
CALCIUM SERPL-MCNC: 7 MG/DL (ref 8.7–10.5)
CALCIUM SERPL-MCNC: 7.5 MG/DL (ref 8.7–10.5)
CALCIUM SERPL-MCNC: 7.6 MG/DL (ref 8.7–10.5)
CALCIUM SERPL-MCNC: 7.7 MG/DL (ref 8.7–10.5)
CHLORIDE SERPL-SCNC: 107 MMOL/L (ref 95–110)
CHLORIDE SERPL-SCNC: 110 MMOL/L (ref 95–110)
CO2 SERPL-SCNC: 18 MMOL/L (ref 23–29)
CO2 SERPL-SCNC: 18 MMOL/L (ref 23–29)
CO2 SERPL-SCNC: 19 MMOL/L (ref 23–29)
CO2 SERPL-SCNC: 20 MMOL/L (ref 23–29)
CREAT SERPL-MCNC: 0.8 MG/DL (ref 0.5–1.4)
DELSYS: ABNORMAL
DIFFERENTIAL METHOD: ABNORMAL
EOSINOPHIL # BLD AUTO: 0.1 K/UL (ref 0–0.5)
EOSINOPHIL NFR BLD: 0.9 % (ref 0–8)
ERYTHROCYTE [DISTWIDTH] IN BLOOD BY AUTOMATED COUNT: 17.1 % (ref 11.5–14.5)
EST. GFR  (AFRICAN AMERICAN): >60 ML/MIN/1.73 M^2
EST. GFR  (NON AFRICAN AMERICAN): >60 ML/MIN/1.73 M^2
GLUCOSE SERPL-MCNC: 105 MG/DL (ref 70–110)
GLUCOSE SERPL-MCNC: 108 MG/DL (ref 70–110)
GLUCOSE SERPL-MCNC: 118 MG/DL (ref 70–110)
GLUCOSE SERPL-MCNC: 126 MG/DL (ref 70–110)
HCO3 UR-SCNC: 18.3 MMOL/L (ref 24–28)
HCT VFR BLD AUTO: 26.3 % (ref 37–48.5)
HGB BLD-MCNC: 8.1 G/DL (ref 12–16)
IMM GRANULOCYTES # BLD AUTO: 0.5 K/UL (ref 0–0.04)
IMM GRANULOCYTES NFR BLD AUTO: 4.3 % (ref 0–0.5)
LYMPHOCYTES # BLD AUTO: 1.4 K/UL (ref 1–4.8)
LYMPHOCYTES NFR BLD: 11.8 % (ref 18–48)
MAGNESIUM SERPL-MCNC: 1.5 MG/DL (ref 1.6–2.6)
MCH RBC QN AUTO: 29.9 PG (ref 27–31)
MCHC RBC AUTO-ENTMCNC: 30.8 G/DL (ref 32–36)
MCV RBC AUTO: 97 FL (ref 82–98)
MODE: ABNORMAL
MONOCYTES # BLD AUTO: 0.7 K/UL (ref 0.3–1)
MONOCYTES NFR BLD: 6 % (ref 4–15)
NEUTROPHILS # BLD AUTO: 8.9 K/UL (ref 1.8–7.7)
NEUTROPHILS NFR BLD: 76.7 % (ref 38–73)
NRBC BLD-RTO: 0 /100 WBC
PCO2 BLDA: 23.4 MMHG (ref 35–45)
PH SMN: 7.5 [PH] (ref 7.35–7.45)
PHOSPHATE SERPL-MCNC: 3.1 MG/DL (ref 2.7–4.5)
PLATELET # BLD AUTO: 258 K/UL (ref 150–350)
PMV BLD AUTO: 10.8 FL (ref 9.2–12.9)
PO2 BLDA: 64 MMHG (ref 80–100)
POC BE: -5 MMOL/L
POC SATURATED O2: 95 % (ref 95–100)
POC TCO2: 19 MMOL/L (ref 23–27)
POCT GLUCOSE: 102 MG/DL (ref 70–110)
POCT GLUCOSE: 108 MG/DL (ref 70–110)
POCT GLUCOSE: 108 MG/DL (ref 70–110)
POCT GLUCOSE: 111 MG/DL (ref 70–110)
POCT GLUCOSE: 113 MG/DL (ref 70–110)
POCT GLUCOSE: 113 MG/DL (ref 70–110)
POCT GLUCOSE: 118 MG/DL (ref 70–110)
POCT GLUCOSE: 120 MG/DL (ref 70–110)
POCT GLUCOSE: 128 MG/DL (ref 70–110)
POCT GLUCOSE: 99 MG/DL (ref 70–110)
POTASSIUM SERPL-SCNC: 3.2 MMOL/L (ref 3.5–5.1)
POTASSIUM SERPL-SCNC: 3.3 MMOL/L (ref 3.5–5.1)
POTASSIUM SERPL-SCNC: 3.4 MMOL/L (ref 3.5–5.1)
POTASSIUM SERPL-SCNC: 3.7 MMOL/L (ref 3.5–5.1)
POTASSIUM SERPL-SCNC: 5 MMOL/L (ref 3.5–5.1)
RBC # BLD AUTO: 2.71 M/UL (ref 4–5.4)
SAMPLE: ABNORMAL
SITE: ABNORMAL
SODIUM SERPL-SCNC: 132 MMOL/L (ref 136–145)
SODIUM SERPL-SCNC: 133 MMOL/L (ref 136–145)
SODIUM SERPL-SCNC: 133 MMOL/L (ref 136–145)
SODIUM SERPL-SCNC: 134 MMOL/L (ref 136–145)
WBC # BLD AUTO: 11.6 K/UL (ref 3.9–12.7)

## 2019-10-10 PROCEDURE — 93010 EKG 12-LEAD: ICD-10-PCS | Mod: ,,, | Performed by: INTERNAL MEDICINE

## 2019-10-10 PROCEDURE — 84132 ASSAY OF SERUM POTASSIUM: CPT

## 2019-10-10 PROCEDURE — 25000003 PHARM REV CODE 250: Performed by: NURSE PRACTITIONER

## 2019-10-10 PROCEDURE — 85025 COMPLETE CBC W/AUTO DIFF WBC: CPT

## 2019-10-10 PROCEDURE — 94640 AIRWAY INHALATION TREATMENT: CPT

## 2019-10-10 PROCEDURE — 82803 BLOOD GASES ANY COMBINATION: CPT

## 2019-10-10 PROCEDURE — 93005 ELECTROCARDIOGRAM TRACING: CPT

## 2019-10-10 PROCEDURE — 63600175 PHARM REV CODE 636 W HCPCS: Performed by: NURSE PRACTITIONER

## 2019-10-10 PROCEDURE — 99233 SBSQ HOSP IP/OBS HIGH 50: CPT | Mod: ,,, | Performed by: NURSE PRACTITIONER

## 2019-10-10 PROCEDURE — 99233 PR SUBSEQUENT HOSPITAL CARE,LEVL III: ICD-10-PCS | Mod: ,,, | Performed by: NURSE PRACTITIONER

## 2019-10-10 PROCEDURE — 25000242 PHARM REV CODE 250 ALT 637 W/ HCPCS: Performed by: NURSE PRACTITIONER

## 2019-10-10 PROCEDURE — 84100 ASSAY OF PHOSPHORUS: CPT

## 2019-10-10 PROCEDURE — 93010 ELECTROCARDIOGRAM REPORT: CPT | Mod: ,,, | Performed by: INTERNAL MEDICINE

## 2019-10-10 PROCEDURE — 27000221 HC OXYGEN, UP TO 24 HOURS

## 2019-10-10 PROCEDURE — 20600001 HC STEP DOWN PRIVATE ROOM

## 2019-10-10 PROCEDURE — 99900035 HC TECH TIME PER 15 MIN (STAT)

## 2019-10-10 PROCEDURE — 92610 EVALUATE SWALLOWING FUNCTION: CPT

## 2019-10-10 PROCEDURE — 63600175 PHARM REV CODE 636 W HCPCS: Performed by: STUDENT IN AN ORGANIZED HEALTH CARE EDUCATION/TRAINING PROGRAM

## 2019-10-10 PROCEDURE — 36600 WITHDRAWAL OF ARTERIAL BLOOD: CPT

## 2019-10-10 PROCEDURE — 80048 BASIC METABOLIC PNL TOTAL CA: CPT | Mod: 91

## 2019-10-10 PROCEDURE — 94761 N-INVAS EAR/PLS OXIMETRY MLT: CPT

## 2019-10-10 PROCEDURE — 36415 COLL VENOUS BLD VENIPUNCTURE: CPT

## 2019-10-10 PROCEDURE — 83735 ASSAY OF MAGNESIUM: CPT

## 2019-10-10 RX ORDER — HEPARIN SODIUM 5000 [USP'U]/ML
5000 INJECTION, SOLUTION INTRAVENOUS; SUBCUTANEOUS EVERY 8 HOURS
Status: DISCONTINUED | OUTPATIENT
Start: 2019-10-10 | End: 2019-10-11

## 2019-10-10 RX ORDER — SODIUM CHLORIDE FOR INHALATION 3 %
4 VIAL, NEBULIZER (ML) INHALATION EVERY 8 HOURS
Status: DISPENSED | OUTPATIENT
Start: 2019-10-11 | End: 2019-10-12

## 2019-10-10 RX ADMIN — MAGNESIUM SULFATE IN WATER 2 G: 40 INJECTION, SOLUTION INTRAVENOUS at 08:10

## 2019-10-10 RX ADMIN — FENTANYL CITRATE 25 MCG: 50 INJECTION INTRAMUSCULAR; INTRAVENOUS at 08:10

## 2019-10-10 RX ADMIN — SODIUM CHLORIDE SOLN NEBU 3% 4 ML: 3 NEBU SOLN at 11:10

## 2019-10-10 RX ADMIN — LABETALOL HCL IV SOLN PREFILLED SYRINGE 20 MG/4ML (5 MG/ML) 10 MG: 20/4 SOLUTION PREFILLED SYRINGE at 08:10

## 2019-10-10 RX ADMIN — HEPARIN SODIUM 5000 UNITS: 5000 INJECTION, SOLUTION INTRAVENOUS; SUBCUTANEOUS at 01:10

## 2019-10-10 RX ADMIN — POTASSIUM CHLORIDE 40 MEQ: 400 INJECTION, SOLUTION INTRAVENOUS at 04:10

## 2019-10-10 RX ADMIN — HEPARIN SODIUM 5000 UNITS: 5000 INJECTION, SOLUTION INTRAVENOUS; SUBCUTANEOUS at 10:10

## 2019-10-10 RX ADMIN — POTASSIUM CHLORIDE 40 MEQ: 400 INJECTION, SOLUTION INTRAVENOUS at 08:10

## 2019-10-10 RX ADMIN — MICONAZOLE NITRATE: 20 OINTMENT TOPICAL at 09:10

## 2019-10-10 RX ADMIN — IPRATROPIUM BROMIDE AND ALBUTEROL SULFATE 3 ML: .5; 3 SOLUTION RESPIRATORY (INHALATION) at 07:10

## 2019-10-10 RX ADMIN — IPRATROPIUM BROMIDE AND ALBUTEROL SULFATE 3 ML: .5; 3 SOLUTION RESPIRATORY (INHALATION) at 11:10

## 2019-10-10 RX ADMIN — POTASSIUM CHLORIDE 60 MEQ: 200 INJECTION, SOLUTION INTRAVENOUS at 05:10

## 2019-10-10 RX ADMIN — OXACILLIN SODIUM 12 G: 10 INJECTION, POWDER, FOR SOLUTION INTRAVENOUS at 11:10

## 2019-10-10 RX ADMIN — IPRATROPIUM BROMIDE AND ALBUTEROL SULFATE 3 ML: .5; 3 SOLUTION RESPIRATORY (INHALATION) at 04:10

## 2019-10-10 RX ADMIN — IPRATROPIUM BROMIDE AND ALBUTEROL SULFATE 3 ML: .5; 3 SOLUTION RESPIRATORY (INHALATION) at 01:10

## 2019-10-10 RX ADMIN — DEXTROSE: 10 SOLUTION INTRAVENOUS at 08:10

## 2019-10-10 RX ADMIN — IPRATROPIUM BROMIDE AND ALBUTEROL SULFATE 3 ML: .5; 3 SOLUTION RESPIRATORY (INHALATION) at 12:10

## 2019-10-10 NOTE — NURSING TRANSFER
Nursing Transfer Note      10/10/2019     Transfer To: [id number], from [id number]    Transfer via bed    Transfer with cardiac monitoring    Transported by [email or web address]    Medicines sent: oxacillin, potassium chloride    Chart send with patient: Yes    Notified: daughter    Patient reassessed at: 1700, 10/10/19 (date, time)    Upon arrival to floor: cardiac monitor applied, patient oriented to room, call bell in reach and bed in lowest position

## 2019-10-10 NOTE — PT/OT/SLP EVAL
Speech Language Pathology Evaluation  Bedside Swallow    Patient Name:  Mesha Mckenzie   MRN:  [id number]  Admitting Diagnosis: Epidural abscess    Recommendations:                 General Recommendations:  ongoing swallow assessment  Diet recommendations:  NPO, NPO   Aspiration Precautions: Meds crushed in puree and Standard aspiration precautions   General Precautions: Standard, fall  Communication strategies:  none    History:     Past Medical History:   Diagnosis Date    Anxiety     Carotid bruit     Chronic pain     Cystitis     Cystocele, unspecified (CODE)     Depression     GI bleed     History of uterine fibroid     Shortness of breath on exertion     Spinal stenosis     Urinary retention        Past Surgical History:   Procedure Laterality Date    ANTERIOR VAGINAL REPAIR  10-    CARDIAC CATHETERIZATION  age 12    CHOLECYSTECTOMY  2015    COLONOSCOPY  12/12/2018    aborted due to poor colon prep    COLONOSCOPY N/A 12/12/2018    Procedure: COLONOSCOPY;  Surgeon: Renard Gonsalves MD;  Location: [facility];  Service: Endoscopy;  Laterality: N/A;    HYSTERECTOMY  1989    AMANDA    LAMINECTOMY N/A 10/9/2019    Procedure: LAMINECTOMY, SPINE, L3, Open;  Surgeon: Martin Lewis DO;  Location: [facility] OR [address] [place] [address];  Service: Neurosurgery;  Laterality: N/A;    tubiligation       Prior diet: NPO per previous sLP evaluation 106    Subjective     Patient awake;alert.     Pain/Comfort:  · Pain Rating 1: 0/10    Objective:     Oral Musculature Evaluation  · Oral Musculature: WFL  · Dentition: scattered dentition  · Secretion Management: adequate  · Mucosal Quality: adequate  · Mandibular Strength and Mobility: WFL  · Oral Labial Strength and Mobility: WFL  · Lingual Strength and Mobility: WFL  · Velar Elevation: WFL  · Volitional Cough: Elicited; present  · Volitional Swallow: Elicited  · Voice Prior to PO Intake: Clear, low volume    Bedside Swallow Eval:   Consistencies Assessed:  · Thin liquids via  ice chips x2  · Nectar thick liquids via cup sip x2  · Puree via full teaspoon x3  · Solids via 1 /4 cracker     Oral Phase:   · Prolonged mastication with regular solids and poor bolus formation.     Pharyngeal Phase:   · intermittent throat clears and cough noted throughout trials.    · Patient with poor acceptance of PO trials limiting SLP full assessment and ability to safely place on PO diet.     Compensatory Strategies  · None    Treatment: SLP educated patient of importance of participation in swallow evaluation. Patient appears safe for PO meds crushed in puree at this time. SLP to continue to follow. Patient verbalized understanding to all education.      Assessment:     Mesha Mckenzie is a 63 y.o. female with an SLP diagnosis of poor PO intake. .      Goals:   Multidisciplinary Problems     SLP Goals        Problem: SLP Goal    Goal Priority Disciplines Outcome   SLP Goal     SLP Ongoing, Progressing   Description:  Speech Language Pathology Goals  Goals expected to be met by 10/17:  1. Patient will participate in ongoing swallow assessment to determine least restrictive diet recommendations.                          Plan:     · Patient to be seen:  5 x/week   · Plan of Care expires:  11/06/19  · Plan of Care reviewed with:  patient   · SLP Follow-Up:  Yes       Discharge recommendations:  rehabilitation facility   Barriers to Discharge:  None    Time Tracking:     SLP Treatment Date:   10/10/19  Speech Start Time:  1300  Speech Stop Time:  1308     Speech Total Time (min):  8 min    Billable Minutes: Eval Swallow and Oral Function 8    Emily Abadie, CCC-SLP  10/10/2019

## 2019-10-10 NOTE — PROGRESS NOTES
[facility]  Critical Care Medicine  Progress Note    Patient Name: Mesha Mckenzie  MRN: [id number]  Admission Date: 10/2/2019  Hospital Length of Stay: 8 days  Code Status: Full Code  Attending Provider: Ok Rehman MD  Primary Care Provider: Varun Shea MD PhD   Principal Problem: Epidural abscess    Subjective:     HPI:  Patient is a 63 y.o. female with significant past medical history of depression, anxiety and IVDA presented to [facility] ED on 9/19 with c/o N/V/D and weakness x ~5 days. The patient was found to be in septic shock with pneumonia and colitis and was transferred to [facility] ICU for further management. Pt was admitted to the intensive care unit and was treated with broad-spectrum antibiotics. 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 CT was repeated on 9/25 and showed bilateral cavitary lesions likely secondary to septic emboli, small pericardial effusion and ileus. DHARMESH was performed on 9/25 which revealed  mitral and tricuspid valve endocarditis. Blood cultures (9/19 - 9/26) have grown methicillin sensitive Staph aureus. Blood cultures as of 9/29 have been NGTD. ID was following the patient for antibiotic management with most recent regimen being cefazolin, vancomycin and daptomycin. Repeat Echo on 9/28 showed LVEF 35% and persistent vegatations. On 9/30 the patient had an MRI brain that showed remote lacunar infarcts without acute intracranial abnormality. On 10/1 MRI revealed spinal epidural abscess at L2-4 level. Thoracentesis was performed on 10/1. The patient was followed by Cardiology team who considered valve replacement surgery but due to patient's other medical issues, Cardiovascular surgery intervention was deferred. The patient was transferred to [facility] for neurosurgery evaluation of epidural abscess.         Hospital/ICU Course:  Upon arrival to [facility], the patient was encephalopathic, but  able to follow commands. She was hemodynamically stable and satting well on 2L NC. Shortly after arrival, the patient became acutely hypertensive and hypoxemic necessitating intubation and mechanical ventilation. She was extremely difficult to oxygenate (P:F ratio 52). Bronch was performed which was unremarkable. The patient became hypotensive and was started on vasopressors. She was paralyzed with nimbex due to tachypnea and vent dyssynchrony. Bedside echo was performed which showed reduced LVEF, hyperdynamic LV; no significant mitral regurg was noted. The patient went for CTA without evidence of pulmonary embolism. Imaging revealed left lung collaspse and repeat bronch was performed with sputum cx sent. Oxygenation improved quickly and paralytic was discontinued. Right chest tube placed & pleural fluid sent; gram stain with GPCs. Neurosurgery consulted for epidural abscess, plans for OR potentially Monday. Cardiology consult for endocarditis; currently not a surgical candidate. Repeat TTE performed with improved EF and without mention of previously noted vegetations. ID and allergy consulted; oxacillin densensitization in process given pcn allergy.         Consulted general surgery for bilateral shoulder swelling; ultrasound concerning for phlegmon vs developing abscess without organized fluid collection. S/p I&D of shoulder abscesses 10/5/19. Extubated on 10/5/19 and doing well on RA. Ongoing neurosurgery evaluation for drainage of epidural abscess; neurosurgery team verbalized possible OR procedure on Wednesday (10/9) with deferred consult to IR to possibly drain the abscesses on 10/8.    Repeat CXR done for tachypnea/increased WOB. CXR unremarkable. Head CT was repeated and showed new R basal ganglia infarcts. MRI of brain showed evidence of R thalamic infarct and ventriculitis. CTA of head completed 10/8 showing atherosclerotic plaquing of the distal vertebral arteries and concern for noncalcified plaquing and  stenosis; no high-grade stenosis or proximal occlusion. Patient underwent laminectomy on 10/9 with epidural cultures sent to lab. Continuing antibiotic regimen. Plan for step-down with plan for swallow evaluation and appropriate diet to follow.    Interval History/Significant Events: Patient underwent laminectomy on 10/9. Patient got slightly tachycardic overnight treated with pain medicine and eventually came back down to 110s. Patient remains hemodynamically stable.    Review of Systems   Respiratory: Negative for chest tightness.    Cardiovascular: Negative for chest pain.   Gastrointestinal: Positive for abdominal pain. Negative for abdominal distention.     Objective:     Vital Signs (Most Recent):  Temp: 99.1 °F (37.3 °C) (10/10/19 0715)  Pulse: (!) 112 (10/10/19 1415)  Resp: (!) 30 (10/10/19 1415)  BP: (!) 184/77 (10/10/19 1415)  SpO2: 100 % (10/10/19 1415) Vital Signs (24h Range):  Temp:  [97.7 °F (36.5 °C)-99.1 °F (37.3 °C)] 99.1 °F (37.3 °C)  Pulse:  [] 112  Resp:  [24-40] 30  SpO2:  [94 %-100 %] 100 %  BP: (140-192)/() 184/77   Weight: 53.5 kg (118 lb)  Body mass index is 20.25 kg/m².      Intake/Output Summary (Last 24 hours) at 10/10/2019 1500  Last data filed at 10/10/2019 1400  Gross per 24 hour   Intake 3791 ml   Output 2100 ml   Net 1691 ml       Physical Exam   Constitutional: She appears lethargic. She is sleeping. She appears ill.   Cardiovascular: Regular rhythm. Tachycardia present.   Pulses:       Radial pulses are 1+ on the right side, and 1+ on the left side.        Dorsalis pedis pulses are 1+ on the right side, and 1+ on the left side.   Pulmonary/Chest: Tachypnea noted. She has decreased breath sounds in the right lower field and the left lower field.   Abdominal: Normal appearance.   Neurological: She appears lethargic. GCS eye subscore is 3. GCS verbal subscore is 4. GCS motor subscore is 5.   Skin: Skin is warm, dry and intact.            Vents:  Vent Mode: Spont (10/05/19  1401)  Ventilator Initiated: Yes (10/02/19 2200)  Set Rate: 20 bmp (10/05/19 1303)  Vt Set: 350 mL (10/05/19 1303)  Pressure Support: 10 cmH20 (10/05/19 1401)  PEEP/CPAP: 5 cmH20 (10/05/19 1401)  Oxygen Concentration (%): 28 (10/09/19 0436)  Peak Airway Pressure: 16 cmH2O (10/05/19 1401)  Plateau Pressure: 14 cmH20 (10/05/19 1401)  Total Ve: 12.6 mL (10/05/19 1401)  Negative Inspiratory Force (cm H2O): -31 (10/05/19 1510)  F/VT Ratio<105 (RSBI): (!) 27.89 (10/05/19 1401)  Lines/Drains/Airways     Central Venous Catheter Line                 Percutaneous Central Line Insertion/Assessment - triple lumen  10/03/19 0114 right internal jugular 7 days          Drain                 Urethral Catheter 09/19/19 1240 Double-lumen 16 Fr. 21 days         Rectal Tube 09/27/19 1000 rectal tube w/ balloon (indicate number of mLs) 13 days              Significant Labs:    CBC/Anemia Profile:  Recent Labs   Lab 10/09/19  1400 10/09/19  2107 10/10/19  0310   WBC 10.69 9.29 11.60   HGB 8.5* 8.0* 8.1*   HCT 27.5* 25.2* 26.3*    228 258   MCV 98 97 97   RDW 17.0* 17.2* 17.1*        Chemistries:  Recent Labs   Lab 10/09/19  0401  10/09/19  2200 10/10/19  0310 10/10/19  0600 10/10/19  1036   *   < > 136 133*  --  134*   K 4.1   < > 3.7 3.4* 3.7 5.0      < > 110 107  --  110   CO2 17*   < > 16* 18*  --  20*   BUN 16   < > 12 11  --  10   CREATININE 0.8   < > 0.8 0.8  --  0.8   CALCIUM 7.1*   < > 7.1* 7.0*  --  7.5*   MG 1.5*  --   --  1.5*  --   --    PHOS 3.1  --   --  3.1  --   --     < > = values in this interval not displayed.       All pertinent labs within the past 24 hours have been reviewed.    Significant Imaging:  I have reviewed all pertinent imaging results/findings within the past 24 hours.      [facility]  Recent Labs   Lab 10/09/19  0811   PH 7.377   PO2 46   PCO2 28.6*   HCO3 16.8*   BE -8     Assessment/Plan:     Neuro  Encephalopathy, metabolic  --likely secondary to sepsis/metabolic processes  --Brain MRI  9/30 with lacunar infarcts  --repeat head CT (10/7) revealed new right basal ganglia infarcts  --MRI of brain revealed R thalamic infarct, ventriculitis, subactue embolic infarctions  --plan for CTA head (10/8)  --CTA of head completed 10/8 showing atherosclerotic plaquing of the distal vertebral arteries and concern for noncalcified plaquing and stenosis; no high-grade stenosis or proximal occlusion  --continue ABx regimen (oxacillin)      Pulmonary  Acute hypoxemic respiratory failure  Multifactorial secondary to complete left lung collapse, septic emboli, pleural effusions, suspected pna. Intubated shortly after arrival for hypoxemia. s/p bronch x2 with significant clearance of secretions from left mainstem & improved aeration on f/u xray. s/p thora 10/1; transudative based on lights criteria, though concern for empyema based on OSH pulm note. right chest tube placed 10/3, 345 mL output after TPA administration; pleural fluid with GPC on gram stain. CTA neg for PE.    -- will continue to follow culture results  -- Extubated 10/5/19; now on RA  --continue oxacillin (per ID recs)  --R Chest tube removed  --patient currently on room air    Pleural effusion  --Empyema with GPC on gram stain   --R chest tube removed    Cardiac/Vascular  Acute bacterial endocarditis  --See staph bacteremia       Acute on chronic diastolic congestive heart failure  --last echo (OSH) with EF 35%, LV concentric remodeling, indeterminate diastolic function  --bedside echo with depressed EF, RV not significantly dilated  --repeat TTE with improved EF and without mention of previously noted vegetations    Renal/  ABIMBOLA (acute kidney injury)  --baseline creatinine ~ 0.7; was 2.9 on initial presentation to OSH, improved with hydration over hospital course  --FeNa/FeUrea consistent with pre-renal etiology; possible component of ATN given hypotension   --non anion gap metabolic acidosis on admit, briefly on sodium bicarb drip   --now improving;  will continue to monitor    ID  * Epidural abscess  --MRI concerning for epidural abscesses at L2-4 region  --neurosurgery consulted and following  --laminectomy completed on 10/9; L3 with washout and culture pending    Abscess of upper extremity  Ultrasound concerning for developing abscess in left and right shoulder abscesses. S/p I&D by general surgery.  --Right shoulder growing staph aureus  --treated with oxacillin    Septic shock with acute organ dysfunction due to methicillin susceptible Staphylococcus aureus (MSSA)  2/2 IVDA, patient reported injecting IV heroin to ID MD at OSH. Blood cultures (9/19 - 9/26) have grown methicillin sensitive Staph aureus. Blood cultures as of 9/29 have been NGTD. mitral and tricuspid valve vegetations seen on DHARMESH 9/25; unchanged on TTE 9/28. OSH abx regimen vanc, ancef and daptomycin. Repeat BCx positive for MSSA. OSH PICC line d/c'd.    --as of 9/29, blood cultures are NGTD  --formal 2D echo results show EF 53%; normal L ventricular function; mild mitral sclerosis; small circumferential pericardial effusion. cardiology consulted; no surgical intervention at this point  --followed by neurosurgery; OR laminectomy complete; OR note to follow  --bilateral shoulder abscess, s/p I&D; R shoulder abscess + staph aureus   --oxacillin started after passed penicillin challenge; ID recs to continue oxacillin x 6 weeks      Hematology  Acute septic pulmonary embolism  --secondary to MV/TV endocarditis    Endocrine  Severe malnutrition  --consider TPN vs tube feedings (via NG tube); swallow study ordered; plan to follow-up  --currently on D10 gtt to maintain blood glucose    GI  Ileus  --Possible ileus on CT abd 9/25  --Was receiving TPN at OSH  --KUB with dilated loops of bowel  -- resolved w/ bowel rest    Pancolitis  --seen on CT 9/19 with improvement noted on f/u CT 9/25  --concern for ileus on 9/25 CT; rectal tube in place with liquid stool output  --stool 9/27 neg for c diff; stool  cx neg  --remains NPO  --flagyl course completed (7/7 days)    I have spent 35 min with this patient, with over 50% of this time spent coordinating care and speaking with the family  Ramirez Padilla, OSEAS  Critical Care Medicine  [facility]

## 2019-10-10 NOTE — PLAN OF CARE
Pt VSS at this time. HR 90s-112, SBP<220 per MD, O2>97% 2L NC, afibrile. Disoriented to person and situation following commands and moving all extremities equally. Family member at bedside, updated on current condition and POC for am shift. All questions answered and emotional support provided. D10W @50cc/hr for hypoglycemia precautions BG Q2hr,  ABX 20.8cc/hr, 60mequ of K for 3.0. L3 open laminectomy 1010. Neuro check Q1hr. Pt assisted with weight shift, and encouraged to cough/deep breathe. Remains free of falls and injury for am shift. MD updated on current condition, vitals, labs, and gtts. No new orders received, will continue to monitor.

## 2019-10-10 NOTE — RESIDENT HANDOFF
ICU Transfer of Care Note  Critical Care Medicine    Admit Date: 10/2/2019  LOS: 8    CC: Epidural abscess    Code Status: Full Code     Transfer to Hospital Medicine.     HPI and Hospital Course:     HPI:  Patient is a 63 y.o. female with significant past medical history of depression, anxiety and IVDA presented to [facility] ED on 9/19 with c/o N/V/D and weakness x ~5 days. The patient was found to be in septic shock with pneumonia and colitis and was transferred to [facility] ICU for further management. Pt was admitted to the intensive care unit and was treated with broad-spectrum antibiotics. 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 CT was repeated on 9/25 and showed bilateral cavitary lesions likely secondary to septic emboli, small pericardial effusion and ileus. DHARMESH was performed on 9/25 which revealed  mitral and tricuspid valve endocarditis. Blood cultures (9/19 - 9/26) have grown methicillin sensitive Staph aureus. Blood cultures as of 9/29 have been NGTD. ID was following the patient for antibiotic management with most recent regimen being cefazolin, vancomycin and daptomycin. Repeat Echo on 9/28 showed LVEF 35% and persistent vegatations. On 9/30 the patient had an MRI brain that showed remote lacunar infarcts without acute intracranial abnormality. On 10/1 MRI revealed spinal epidural abscess at L2-4 level. Thoracentesis was performed on 10/1. The patient was followed by Cardiology team who considered valve replacement surgery but due to patient's other medical issues, Cardiovascular surgery intervention was deferred. The patient was transferred to [facility] for neurosurgery evaluation of epidural abscess.         Hospital/ICU Course:  Upon arrival to [facility], the patient was encephalopathic, but able to follow commands. She was hemodynamically stable and satting well on 2L NC. Shortly after arrival, the patient became acutely hypertensive and  hypoxemic necessitating intubation and mechanical ventilation. She was extremely difficult to oxygenate (P:F ratio 52). Bronch was performed which was unremarkable. The patient became hypotensive and was started on vasopressors. She was paralyzed with nimbex due to tachypnea and vent dyssynchrony. Bedside echo was performed which showed reduced LVEF, hyperdynamic LV; no significant mitral regurg was noted. The patient went for CTA without evidence of pulmonary embolism. Imaging revealed left lung collaspse and repeat bronch was performed with sputum cx sent. Oxygenation improved quickly and paralytic was discontinued. Right chest tube placed & pleural fluid sent; gram stain with GPCs. Neurosurgery consulted for epidural abscess, plans for OR potentially Monday. Cardiology consult for endocarditis; currently not a surgical candidate. Repeat TTE performed with improved EF and without mention of previously noted vegetations. ID and allergy consulted; oxacillin densensitization in process given pcn allergy.         Consulted general surgery for bilateral shoulder swelling; ultrasound concerning for phlegmon vs developing abscess without organized fluid collection. S/p I&D of shoulder abscesses 10/5/19. Extubated on 10/5/19 and doing well on RA. Ongoing neurosurgery evaluation for drainage of epidural abscess; neurosurgery team verbalized possible OR procedure on Wednesday (10/9) with deferred consult to IR to possibly drain the abscesses on 10/8.    Repeat CXR done for tachypnea/increased WOB. CXR unremarkable. Head CT was repeated and showed new R basal ganglia infarcts. MRI of brain showed evidence of R thalamic infarct and ventriculitis. CTA of head completed 10/8 showing atherosclerotic plaquing of the distal vertebral arteries and concern for noncalcified plaquing and stenosis; no high-grade stenosis or proximal occlusion.Patient underwent laminectomy on 10/9.      To Follow Up:   --A swallow evaluation is ordered  for the patient as of 10/10. If she passes the swallow study, a full liquid diet is to be started and progressed as tolerated. If not, patient needs NG tube with tube feeds.  --continue d10 gtt while NPO; continue accuchecks  --telemetry bed necessary  --continue antibiotics  --follow-up on culture results        Discharge Plan:   --d/c to home when medically stable      Call with questions.

## 2019-10-10 NOTE — PLAN OF CARE
Patient's chart was reviewed by a stroke team provider.  There is no new imaging to review.  There is no pending diagnostic work up.   For other recommendations please see our previous note completed earlier today, 10/10/2019.      There are no new recommendations at this time. Discussed patient with staff. Please contact stroke team for any questions or concerns. Will sign off.

## 2019-10-10 NOTE — ASSESSMENT & PLAN NOTE
--consider TPN vs tube feedings (via NG tube); swallow study ordered; plan to follow-up  --currently on D10 gtt to maintain blood glucose

## 2019-10-10 NOTE — PROVIDER TRANSFER
[facility] ICU Acceptance Note    Date of Admit: 10/2/2019  Date of Transfer / Stepdown: 10/10/2019  Kami, C/J, L, Onc (IV chemo w/in 1 month), Gyn/Onc, or other special case?: no **  ICU team stepping patient down: MICU,   ICU team member giving verbal handoff: Ramirez Padilla  Accepting  team: R    Brief History of Present Illness:        Patient is a 63 y.o. female with significant past medical history of depression, anxiety and IVDA presented to [facility] ED on 9/19 with c/o N/V/D and weakness x ~5 days.     The patient was found to be in septic shock with pneumonia and colitis and was transferred to [facility] ICU for further management. Pt was admitted to the intensive care unit and was treated with broad-spectrum antibiotics. 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 CT was repeated on 9/25 and showed bilateral cavitary lesions likely secondary to septic emboli, small pericardial effusion and ileus. DHARMESH was performed on 9/25 which revealed  mitral and tricuspid valve endocarditis. Blood cultures (9/19 - 9/26) have grown methicillin sensitive Staph aureus. Blood cultures as of 9/29 have been NGTD. ID was following the patient for antibiotic management with most recent regimen being cefazolin, vancomycin and daptomycin. Repeat Echo on 9/28 showed LVEF 35% and persistent vegatations. On 9/30 the patient had an MRI brain that showed remote lacunar infarcts without acute intracranial abnormality. On 10/1 MRI revealed spinal epidural abscess at L2-4 level. Thoracentesis was performed on 10/1. The patient was followed by Cardiology team who considered valve replacement surgery but due to patient's other medical issues, Cardiovascular surgery intervention was deferred. The patient was transferred to [facility] for neurosurgery evaluation of epidural abscess.     Hospital/ICU Course:     Upon arrival to [facility], the patient was encephalopathic, but  able to follow commands. She was hemodynamically stable and satting well on 2L NC. Shortly after arrival, the patient became acutely hypertensive and hypoxemic necessitating intubation and mechanical ventilation. She was extremely difficult to oxygenate (P:F ratio 52). Bronch was performed which was unremarkable. The patient became hypotensive and was started on vasopressors. She was paralyzed with nimbex due to tachypnea and vent dyssynchrony. Bedside echo was performed which showed reduced LVEF, hyperdynamic LV; no significant mitral regurg was noted. The patient went for CTA without evidence of pulmonary embolism. Imaging revealed left lung collaspse and repeat bronch was performed with sputum cx sent. Oxygenation improved quickly and paralytic was discontinued. Right chest tube placed & pleural fluid sent; gram stain with GPCs. Neurosurgery consulted for epidural abscess, plans for OR potentially Monday. Cardiology consult for endocarditis; currently not a surgical candidate. Repeat TTE performed with improved EF and without mention of previously noted vegetations. ID and allergy consulted; oxacillin densensitization in process given pcn allergy.          Consulted general surgery for bilateral shoulder swelling; ultrasound concerning for phlegmon vs developing abscess without organized fluid collection. S/p I&D of shoulder abscesses 10/5/19. Extubated on 10/5/19 and doing well on RA. Ongoing neurosurgery evaluation for drainage of epidural abscess; neurosurgery team verbalized possible OR procedure on Wednesday (10/9) with deferred consult to IR to possibly drain the abscesses on 10/8.     Repeat CXR done for tachypnea/increased WOB. CXR unremarkable. Head CT was repeated and showed new R basal ganglia infarcts. MRI of brain showed evidence of R thalamic infarct and ventriculitis. CTA of head completed 10/8 showing atherosclerotic plaquing of the distal vertebral arteries and concern for noncalcified plaquing  and stenosis; no high-grade stenosis or proximal occlusion.Patient underwent laminectomy on 10/9.      Consultants and Procedures:     Consultants:  Yash,, ID, vasc neuro, cards    Procedures:        Transfer Information:     Diet:  TFs, NPO    Physical Activity:  PT/OT    To Do / Pending Studies / Follow ups:  --f/u ST recs  --PT/OT  --ID recs  --SBP goal <220  --f/u cultures      Patient has been accepted by Hospital Medicine Team R, who will assume care of the patient upon arrival to the floor from the ICU. Please contact ICU team with any concerns prior to arrival. Please contact [facility] Medicine at [phone] or [phone] (please do NOT leave a voicemail) when patient arrives to the floor.      Yesenia White  Internal Medicine  [facility]

## 2019-10-10 NOTE — PROGRESS NOTES
Critical care team notified of patients Sinus tachycardia with frequent PVCs, EKG obtained, no new orders. Will continue to monitor.

## 2019-10-10 NOTE — PLAN OF CARE
Problem: SLP Goal  Goal: SLP Goal  Description  Speech Language Pathology Goals  Goals expected to be met by 10/17:  1. Patient will participate in ongoing swallow assessment to determine least restrictive diet recommendations.       Outcome: Ongoing, Progressing     POC implemented.   Emily P. Abadie M.S., CCC-SLP  Speech Language Pathologist  [phone]  10/10/2019

## 2019-10-10 NOTE — PLAN OF CARE
HR ST with frequent PVCs since 0300, Critical care notified. BP stable at 168/77, afebrile throughout shift. O2 saturation 99% on room air. D10/Oxacillin infusing. CVP 5-6. UOP>55cc/hr. 100cc from BMS. Patient is awake, alert and oriented to person and place; patient is confused about time and situation. Patient follows commands and nods appropriately. No new skin breakdown noted this shift. POC reviewed with patient and spouse, no comments/questions at this time. Will continue to monitor.

## 2019-10-10 NOTE — ASSESSMENT & PLAN NOTE
--MRI concerning for epidural abscesses at L2-4 region  --neurosurgery consulted and following  --laminectomy completed on 10/9; L3 with washout and culture pending

## 2019-10-10 NOTE — PROGRESS NOTES
MD. Darrian Rivera notified of patients continued St with PVCs, no new orders at this time. Nurse currently replacing potassium. Will continue to monitor.

## 2019-10-10 NOTE — SUBJECTIVE & OBJECTIVE
Interval History/Significant Events: Patient underwent laminectomy on 10/9. Patient got slightly tachycardic overnight treated with pain medicine and eventually came back down to 110s. Patient remains hemodynamically stable.    Review of Systems   Respiratory: Negative for chest tightness.    Cardiovascular: Negative for chest pain.   Gastrointestinal: Positive for abdominal pain. Negative for abdominal distention.     Objective:     Vital Signs (Most Recent):  Temp: 99.1 °F (37.3 °C) (10/10/19 0715)  Pulse: (!) 112 (10/10/19 1415)  Resp: (!) 30 (10/10/19 1415)  BP: (!) 184/77 (10/10/19 1415)  SpO2: 100 % (10/10/19 1415) Vital Signs (24h Range):  Temp:  [97.7 °F (36.5 °C)-99.1 °F (37.3 °C)] 99.1 °F (37.3 °C)  Pulse:  [] 112  Resp:  [24-40] 30  SpO2:  [94 %-100 %] 100 %  BP: (140-192)/() 184/77   Weight: 53.5 kg (118 lb)  Body mass index is 20.25 kg/m².      Intake/Output Summary (Last 24 hours) at 10/10/2019 1500  Last data filed at 10/10/2019 1400  Gross per 24 hour   Intake 3791 ml   Output 2100 ml   Net 1691 ml       Physical Exam   Constitutional: She appears lethargic. She is sleeping. She appears ill.   Cardiovascular: Regular rhythm. Tachycardia present.   Pulses:       Radial pulses are 1+ on the right side, and 1+ on the left side.        Dorsalis pedis pulses are 1+ on the right side, and 1+ on the left side.   Pulmonary/Chest: Tachypnea noted. She has decreased breath sounds in the right lower field and the left lower field.   Abdominal: Normal appearance.   Neurological: She appears lethargic. GCS eye subscore is 3. GCS verbal subscore is 4. GCS motor subscore is 5.   Skin: Skin is warm, dry and intact.            Vents:  Vent Mode: Spont (10/05/19 1401)  Ventilator Initiated: Yes (10/02/19 2200)  Set Rate: 20 bmp (10/05/19 1303)  Vt Set: 350 mL (10/05/19 1303)  Pressure Support: 10 cmH20 (10/05/19 1401)  PEEP/CPAP: 5 cmH20 (10/05/19 1401)  Oxygen Concentration (%): 28 (10/09/19 0436)  Peak  Airway Pressure: 16 cmH2O (10/05/19 1401)  Plateau Pressure: 14 cmH20 (10/05/19 1401)  Total Ve: 12.6 mL (10/05/19 1401)  Negative Inspiratory Force (cm H2O): -31 (10/05/19 1510)  F/VT Ratio<105 (RSBI): (!) 27.89 (10/05/19 1401)  Lines/Drains/Airways     Central Venous Catheter Line                 Percutaneous Central Line Insertion/Assessment - triple lumen  10/03/19 0114 right internal jugular 7 days          Drain                 Urethral Catheter 09/19/19 1240 Double-lumen 16 Fr. 21 days         Rectal Tube 09/27/19 1000 rectal tube w/ balloon (indicate number of mLs) 13 days              Significant Labs:    CBC/Anemia Profile:  Recent Labs   Lab 10/09/19  1400 10/09/19  2107 10/10/19  0310   WBC 10.69 9.29 11.60   HGB 8.5* 8.0* 8.1*   HCT 27.5* 25.2* 26.3*    228 258   MCV 98 97 97   RDW 17.0* 17.2* 17.1*        Chemistries:  Recent Labs   Lab 10/09/19  0401  10/09/19  2200 10/10/19  0310 10/10/19  0600 10/10/19  1036   *   < > 136 133*  --  134*   K 4.1   < > 3.7 3.4* 3.7 5.0      < > 110 107  --  110   CO2 17*   < > 16* 18*  --  20*   BUN 16   < > 12 11  --  10   CREATININE 0.8   < > 0.8 0.8  --  0.8   CALCIUM 7.1*   < > 7.1* 7.0*  --  7.5*   MG 1.5*  --   --  1.5*  --   --    PHOS 3.1  --   --  3.1  --   --     < > = values in this interval not displayed.       All pertinent labs within the past 24 hours have been reviewed.    Significant Imaging:  I have reviewed all pertinent imaging results/findings within the past 24 hours.

## 2019-10-10 NOTE — ASSESSMENT & PLAN NOTE
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  S/p L3 laminectomy with paraspinal abscess debridement and cultures 10/09    Plan:  - No new imaging. No HOB restrictions. No drains in place  - F/u post op cultures  - 2week wound follow up with neurosurgery  - Continue all further care per primary team. Neurosurgery will sign off at this time. Please contact with any further questions.  Thank you

## 2019-10-10 NOTE — ASSESSMENT & PLAN NOTE
--likely secondary to sepsis/metabolic processes  --Brain MRI 9/30 with lacunar infarcts  --repeat head CT (10/7) revealed new right basal ganglia infarcts  --MRI of brain revealed R thalamic infarct, ventriculitis, subactue embolic infarctions  --plan for CTA head (10/8)  --CTA of head completed 10/8 showing atherosclerotic plaquing of the distal vertebral arteries and concern for noncalcified plaquing and stenosis; no high-grade stenosis or proximal occlusion  --continue ABx regimen (oxacillin)

## 2019-10-10 NOTE — ASSESSMENT & PLAN NOTE
Ultrasound concerning for developing abscess in left and right shoulder abscesses. S/p I&D by general surgery.  --Right shoulder growing staph aureus  --treated with oxacillin

## 2019-10-10 NOTE — ASSESSMENT & PLAN NOTE
Multifactorial secondary to complete left lung collapse, septic emboli, pleural effusions, suspected pna. Intubated shortly after arrival for hypoxemia. s/p bronch x2 with significant clearance of secretions from left mainstem & improved aeration on f/u xray. s/p thora 10/1; transudative based on lights criteria, though concern for empyema based on OSH pulm note. right chest tube placed 10/3, 345 mL output after TPA administration; pleural fluid with GPC on gram stain. CTA neg for PE.    -- will continue to follow culture results  -- Extubated 10/5/19; now on RA  --continue oxacillin (per ID recs)  --R Chest tube removed  --patient currently on room air

## 2019-10-10 NOTE — PROGRESS NOTES
[facility]  Neurosurgery  Progress Note    Subjective:     History of Present Illness: 63yof IVDU with persistent bacteremia found to have endocarditis bactreremia MSSA. MRI L spine showed L2-L4 epidural abscess and neurosurgery was consulted for evaluation. Of note, pt was intubated, bronched, chest tube plced and sedation and paralytics administered due to decompensation with desaturation and hypotension.    Post-Op Info:  Procedure(s) (LRB):  LAMINECTOMY, SPINE, L3, Open (N/A)   1 Day Post-Op     Interval History: naeon. S/p L3 laminectomy and debridement of paraspinal abscess.    Medications:  Continuous Infusions:   dextrose 10 % in water (D10W) 50 mL/hr at 10/10/19 0800     Scheduled Meds:   albuterol-ipratropium  3 mL Nebulization Q4H    miconazole nitrate 2%   Topical (Top) BID    oxacillin 12 g in  mL CONTINUOUS INFUSION  12 g Intravenous Q24H     PRN Meds:acetaminophen, calcium gluconate IVPB, calcium gluconate IVPB, calcium gluconate IVPB, Dextrose 10% Bolus, fentaNYL, hydrALAZINE, labetalol, magnesium sulfate IVPB, magnesium sulfate IVPB, mupirocin, mupirocin, ondansetron, potassium chloride in water **AND** potassium chloride in water **AND** potassium chloride in water, sodium chloride 0.9%, sodium phosphate IVPB, sodium phosphate IVPB, sodium phosphate IVPB     Review of Systems    Objective:     Weight: 53.5 kg (118 lb)  Body mass index is 20.25 kg/m².  Vital Signs (Most Recent):  Temp: 98.7 °F (37.1 °C) (10/10/19 0300)  Pulse: (!) 115 (10/10/19 0800)  Resp: (!) 25 (10/10/19 0800)  BP: (!) 181/84 (10/10/19 0800)  SpO2: 100 % (10/10/19 0800) Vital Signs (24h Range):  Temp:  [97.5 °F (36.4 °C)-98.7 °F (37.1 °C)] 98.7 °F (37.1 °C)  Pulse:  [] 115  Resp:  [21-36] 25  SpO2:  [94 %-100 %] 100 %  BP: (135-187)/() 181/84     Date 10/10/19 0700 - 10/11/19 0659   Shift 3858-9269 2572-6723 5376-4865 24 Hour Total   INTAKE   Shift Total(mL/kg)       OUTPUT    Urine(mL/kg/hr) 275   275   Shift Total(mL/kg) 275(5.1)   275(5.1)   Weight (kg) 53.5 53.5 53.5 53.5                        Chest Tube 10/03/19 0500 Right Midaxillary (Active)   Chest Tube WDL WDL 10/4/2019  7:10 AM   Function -20 cm H2O 10/4/2019  7:10 AM   Air Leak/Fluctuation air leak not present;fluctuation not present 10/4/2019  7:10 AM   Safety all tubing connections taped;2 rubber-tipped hemostats w/ patient;all connections secured;suction checked 10/4/2019  7:10 AM   Securement tubing secured to body distal to insertion site w/ tape 10/4/2019  7:10 AM   Patency Intervention Tip/tilt 10/4/2019  7:10 AM   Drainage Description Serosanguineous 10/4/2019  7:10 AM   Dressing Appearance occlusive gauze dressing intact;clean and dry 10/4/2019  7:10 AM   Dressing Care dressing reinforced 10/4/2019  7:10 AM   Left Subcutaneous Emphysema none present 10/4/2019  7:10 AM   Right Subcutaneous Emphysema none present 10/4/2019  7:10 AM   Site Assessment Clean;Dry;Intact;No redness;No swelling 10/4/2019  7:10 AM   Surrounding Skin Dry;Intact 10/4/2019  7:10 AM   Output (mL) 0 mL 10/4/2019  7:00 AM            NG/OG Tube 10/02/19 2200 Center mouth (Active)   Placement Check placement verified by x-ray 10/4/2019  7:10 AM   Tolerance no signs/symptoms of discomfort 10/4/2019  7:10 AM   Securement secured to commercial device 10/4/2019  7:10 AM   Clamp Status/Tolerance unclamped;no emesis;no nausea;no restlessness;no abdominal distention 10/4/2019  7:10 AM   Suction Setting/Drainage Method suction at;low;intermittent setting 10/4/2019  7:10 AM   Insertion Site Appearance no redness, warmth, tenderness, skin breakdown, drainage 10/4/2019  7:10 AM   Drainage Bile 10/4/2019  3:00 AM   Intake (mL) 50 mL 10/4/2019  6:00 AM   Tube Output(mL)(Include Discarded Residual) 0 mL 10/4/2019  7:00 AM            Rectal Tube 09/27/19 1000 rectal tube w/ balloon (indicate number of mLs) (Active)   Balloon Inflation Volume (mL) 45 10/4/2019   "3:00 AM   Reposition drainage bags for BMS & Henriquez on opposite sides of bed 10/4/2019  7:10 AM   Outcome comfort enhanced 10/4/2019  7:10 AM   Stool Color Brown;Green 10/4/2019  7:10 AM   Insertion Site Appearance no redness, warmth, tenderness, skin breakdown, drainage 10/4/2019  7:10 AM   Flush/Irrigation flushed w/;water;no resistance met 10/4/2019  3:00 AM   Intake (mL) 25 mL 10/4/2019  7:00 AM   Rectal Tube Output 0 mL 10/4/2019  3:00 AM            Urethral Catheter 09/19/19 1240 Double-lumen 16 Fr. (Active)   Site Assessment Clean;Intact 10/4/2019  7:10 AM   Collection Container Urimeter 10/4/2019  7:10 AM   Securement Method secured to top of thigh w/ adhesive device 10/4/2019  7:10 AM   Catheter Care Performed yes 10/4/2019  7:10 AM   Reason for Continuing Urinary Catheterization Critically ill in ICU requiring intensive monitoring 10/4/2019  7:10 AM   CAUTI Prevention Bundle StatLock in place w 1" slack;Intact seal between catheter & drainage tubing;Drainage bag/urimeter off the floor;Green sheeting clip in use;No dependent loops or kinks;Drainage bag/urimeter not overfilled (<2/3 full);Drainage bag/urimeter below bladder 10/4/2019  7:10 AM   Output (mL) 45 mL 10/4/2019 10:00 AM       Neurosurgery Physical Exam     [id number]  PERRL   f/cx4   BLE 3/5 HF, 4/5 KE, DF PF  Earlene,bar incision CDI    Significant Labs:  Recent Labs   Lab 10/09/19  0401  10/09/19  1400 10/09/19  2200 10/10/19  0310 10/10/19  0600   *   < > 126* 104 108  --    *   < > 136 136 133*  --    K 4.1   < > 3.0* 3.7 3.4* 3.7      < > 110 110 107  --    CO2 17*   < > 17* 16* 18*  --    BUN 16   < > 13 12 11  --    CREATININE 0.8   < > 0.8 0.8 0.8  --    CALCIUM 7.1*   < > 6.9* 7.1* 7.0*  --    MG 1.5*  --   --   --  1.5*  --     < > = values in this interval not displayed.     Recent Labs   Lab 10/09/19  1400 10/09/19  2107 10/10/19  0310   WBC 10.69 9.29 11.60   HGB 8.5* 8.0* 8.1*   HCT 27.5* 25.2* 26.3*    228 258 "     Recent Labs   Lab 10/08/19  1636   INR 1.4*   APTT 26.4     Microbiology Results (last 7 days)     Procedure Component Value Units Date/Time    Culture, Anaerobe [[id number]] Collected:  10/05/19 2111    Order Status:  Completed Specimen:  Abscess from Shoulder, Right Updated:  10/10/19 0725     Anaerobic Culture No anaerobes isolated    Culture, Anaerobe [[id number]] Collected:  10/05/19 2110    Order Status:  Completed Specimen:  Abscess from Shoulder, Left Updated:  10/10/19 0725     Anaerobic Culture Culture in progress    Aerobic culture [[id number]] Collected:  10/09/19 1131    Order Status:  Completed Specimen:  Abscess from Back Updated:  10/10/19 0715     Aerobic Bacterial Culture No growth    Narrative:       1) Perispinal    Aerobic culture [[id number]] Collected:  10/09/19 1223    Order Status:  Completed Specimen:  Tissue from Back Updated:  10/10/19 0715     Aerobic Bacterial Culture No growth    Narrative:       Epidural phlegmon    Aerobic culture [[id number]] Collected:  10/09/19 1131    Order Status:  Completed Specimen:  Abscess from Back Updated:  10/10/19 0715     Aerobic Bacterial Culture No growth    Narrative:       2) Perispinal    Culture, Anaerobe [[id number]] Collected:  10/09/19 1131    Order Status:  Completed Specimen:  Abscess from Back Updated:  10/10/19 0639     Anaerobic Culture Culture in progress    Narrative:       1) Perispinal    Culture, Anaerobe [[id number]] Collected:  10/09/19 1223    Order Status:  Completed Specimen:  Tissue from Back Updated:  10/10/19 0639     Anaerobic Culture Culture in progress    Narrative:       Epidural phlegmon    Culture, Anaerobe [[id number]] Collected:  10/09/19 1131    Order Status:  Completed Specimen:  Abscess from Back Updated:  10/10/19 0639     Anaerobic Culture Culture in progress    Narrative:       2) Perispinal    Gram stain [[id number]] Collected:  10/09/19 1223    Order Status:  Completed Specimen:  Tissue from Back Updated:  10/09/19  1547     Gram Stain Result Rare WBC's      No organisms seen    Narrative:       Epidural phlegmon    Gram stain [[id number]] Collected:  10/09/19 1131    Order Status:  Completed Specimen:  Abscess from Back Updated:  10/09/19 1409     Gram Stain Result Rare WBC's      No organisms seen    Narrative:       1) Perispinal    Gram stain [[id number]] Collected:  10/09/19 1131    Order Status:  Completed Specimen:  Abscess from Back Updated:  10/09/19 1407     Gram Stain Result Rare WBC's      No organisms seen    Narrative:       2) Perispinal    Fungus culture [[id number]] Collected:  10/09/19 1223    Order Status:  Sent Specimen:  Tissue from Back Updated:  10/09/19 1243    AFB Culture & Smear [[id number]] Collected:  10/09/19 1223    Order Status:  Sent Specimen:  Tissue from Back Updated:  10/09/19 1242    Fungus culture [[id number]] Collected:  10/09/19 1131    Order Status:  Sent Specimen:  Abscess from Back Updated:  10/09/19 1159    AFB Culture & Smear [[id number]] Collected:  10/09/19 1131    Order Status:  Sent Specimen:  Abscess from Back Updated:  10/09/19 1158    AFB Culture & Smear [[id number]] Collected:  10/09/19 1131    Order Status:  Sent Specimen:  Abscess from Back Updated:  10/09/19 1156    Fungus culture [[id number]] Collected:  10/09/19 1131    Order Status:  Sent Specimen:  Abscess from Back Updated:  10/09/19 1156    Aerobic culture [[id number]] Collected:  10/05/19 2110    Order Status:  Completed Specimen:  Abscess from Shoulder, Left Updated:  10/09/19 0957     Aerobic Bacterial Culture No growth      No growth    Aerobic culture [[id number]]  (Abnormal)  (Susceptibility) Collected:  10/05/19 2111    Order Status:  Completed Specimen:  Abscess from Shoulder, Right Updated:  10/08/19 1300     Aerobic Bacterial Culture STAPHYLOCOCCUS AUREUS  Rare  No other significant isolate      Culture, Body Fluid - Bactec [[id number]] Collected:  10/03/19 0549    Order Status:  Completed Specimen:  Pleural Fluid  Updated:  10/08/19 1012     Body Fluid Culture, Sterile No growth after 5 days.    Narrative:       received 2 culture bottles with specimen for pleural fluid.    Blood culture [[id number]] Collected:  10/02/19 2025    Order Status:  Completed Specimen:  Blood from Peripheral, Wrist, Right Updated:  10/07/19 2212     Blood Culture, Routine No growth after 5 days.    Blood culture [[id number]] Collected:  10/02/19 0830    Order Status:  Completed Specimen:  Blood from Peripheral, Hand, Right Updated:  10/07/19 2212     Blood Culture, Routine No growth after 5 days.    Aerobic culture [[id number]] Collected:  10/03/19 0549    Order Status:  Completed Specimen:  Pleural Fluid Updated:  10/07/19 0932     Aerobic Bacterial Culture No growth    Culture, Anaerobic [[id number]] Collected:  10/03/19 0549    Order Status:  Completed Specimen:  Pleural Fluid Updated:  10/07/19 0838     Anaerobic Culture No anaerobes isolated    Gram stain [[id number]] Collected:  10/03/19 0549    Order Status:  Completed Specimen:  Pleural Fluid Updated:  10/03/19 0955     Gram Stain Result Rare WBC's      Rare Gram positive cocci            Significant Diagnostic  No new images    Assessment/Plan:     Epidural abscess  63yof pmh IVDU with endocarditis MSSA bacteremia acutely decompensated overnight on paralytics, sedation and pressors found to have lumbar epidural abscess.  S/p L3 laminectomy with paraspinal abscess debridement and cultures 10/09    Plan:  - No new imaging. No HOB restrictions. No drains in place  - F/u post op cultures  - 2week wound follow up with neurosurgery  - Continue all further care per primary team. Neurosurgery will sign off at this time. Please contact with any further questions.  Thank you          Juma Varghese MD  Neurosurgery  [facility]

## 2019-10-10 NOTE — SUBJECTIVE & OBJECTIVE
Interval History: naeon. S/p L3 laminectomy and debridement of paraspinal abscess.    Medications:  Continuous Infusions:   dextrose 10 % in water (D10W) 50 mL/hr at 10/10/19 0800     Scheduled Meds:   albuterol-ipratropium  3 mL Nebulization Q4H    miconazole nitrate 2%   Topical (Top) BID    oxacillin 12 g in  mL CONTINUOUS INFUSION  12 g Intravenous Q24H     PRN Meds:acetaminophen, calcium gluconate IVPB, calcium gluconate IVPB, calcium gluconate IVPB, Dextrose 10% Bolus, fentaNYL, hydrALAZINE, labetalol, magnesium sulfate IVPB, magnesium sulfate IVPB, mupirocin, mupirocin, ondansetron, potassium chloride in water **AND** potassium chloride in water **AND** potassium chloride in water, sodium chloride 0.9%, sodium phosphate IVPB, sodium phosphate IVPB, sodium phosphate IVPB     Review of Systems    Objective:     Weight: 53.5 kg (118 lb)  Body mass index is 20.25 kg/m².  Vital Signs (Most Recent):  Temp: 98.7 °F (37.1 °C) (10/10/19 0300)  Pulse: (!) 115 (10/10/19 0800)  Resp: (!) 25 (10/10/19 0800)  BP: (!) 181/84 (10/10/19 0800)  SpO2: 100 % (10/10/19 0800) Vital Signs (24h Range):  Temp:  [97.5 °F (36.4 °C)-98.7 °F (37.1 °C)] 98.7 °F (37.1 °C)  Pulse:  [] 115  Resp:  [21-36] 25  SpO2:  [94 %-100 %] 100 %  BP: (135-187)/() 181/84     Date 10/10/19 0700 - 10/11/19 0659   Shift 4415-8248 6233-3549 0228-9129 24 Hour Total   INTAKE   Shift Total(mL/kg)       OUTPUT   Urine(mL/kg/hr) 275   275   Shift Total(mL/kg) 275(5.1)   275(5.1)   Weight (kg) 53.5 53.5 53.5 53.5                        Chest Tube 10/03/19 0500 Right Midaxillary (Active)   Chest Tube WDL WDL 10/4/2019  7:10 AM   Function -20 cm H2O 10/4/2019  7:10 AM   Air Leak/Fluctuation air leak not present;fluctuation not present 10/4/2019  7:10 AM   Safety all tubing connections taped;2 rubber-tipped hemostats w/ patient;all connections secured;suction checked 10/4/2019  7:10 AM   Securement tubing secured to body distal to insertion  site w/ tape 10/4/2019  7:10 AM   Patency Intervention Tip/tilt 10/4/2019  7:10 AM   Drainage Description Serosanguineous 10/4/2019  7:10 AM   Dressing Appearance occlusive gauze dressing intact;clean and dry 10/4/2019  7:10 AM   Dressing Care dressing reinforced 10/4/2019  7:10 AM   Left Subcutaneous Emphysema none present 10/4/2019  7:10 AM   Right Subcutaneous Emphysema none present 10/4/2019  7:10 AM   Site Assessment Clean;Dry;Intact;No redness;No swelling 10/4/2019  7:10 AM   Surrounding Skin Dry;Intact 10/4/2019  7:10 AM   Output (mL) 0 mL 10/4/2019  7:00 AM            NG/OG Tube 10/02/19 2200 Center mouth (Active)   Placement Check placement verified by x-ray 10/4/2019  7:10 AM   Tolerance no signs/symptoms of discomfort 10/4/2019  7:10 AM   Securement secured to commercial device 10/4/2019  7:10 AM   Clamp Status/Tolerance unclamped;no emesis;no nausea;no restlessness;no abdominal distention 10/4/2019  7:10 AM   Suction Setting/Drainage Method suction at;low;intermittent setting 10/4/2019  7:10 AM   Insertion Site Appearance no redness, warmth, tenderness, skin breakdown, drainage 10/4/2019  7:10 AM   Drainage Bile 10/4/2019  3:00 AM   Intake (mL) 50 mL 10/4/2019  6:00 AM   Tube Output(mL)(Include Discarded Residual) 0 mL 10/4/2019  7:00 AM            Rectal Tube 09/27/19 1000 rectal tube w/ balloon (indicate number of mLs) (Active)   Balloon Inflation Volume (mL) 45 10/4/2019  3:00 AM   Reposition drainage bags for BMS & Henriquez on opposite sides of bed 10/4/2019  7:10 AM   Outcome comfort enhanced 10/4/2019  7:10 AM   Stool Color Brown;Green 10/4/2019  7:10 AM   Insertion Site Appearance no redness, warmth, tenderness, skin breakdown, drainage 10/4/2019  7:10 AM   Flush/Irrigation flushed w/;water;no resistance met 10/4/2019  3:00 AM   Intake (mL) 25 mL 10/4/2019  7:00 AM   Rectal Tube Output 0 mL 10/4/2019  3:00 AM            Urethral Catheter 09/19/19 1240 Double-lumen 16 Fr. (Active)   Site Assessment  "Clean;Intact 10/4/2019  7:10 AM   Collection Container Urimeter 10/4/2019  7:10 AM   Securement Method secured to top of thigh w/ adhesive device 10/4/2019  7:10 AM   Catheter Care Performed yes 10/4/2019  7:10 AM   Reason for Continuing Urinary Catheterization Critically ill in ICU requiring intensive monitoring 10/4/2019  7:10 AM   CAUTI Prevention Bundle StatLock in place w 1" slack;Intact seal between catheter & drainage tubing;Drainage bag/urimeter off the floor;Green sheeting clip in use;No dependent loops or kinks;Drainage bag/urimeter not overfilled (<2/3 full);Drainage bag/urimeter below bladder 10/4/2019  7:10 AM   Output (mL) 45 mL 10/4/2019 10:00 AM       Neurosurgery Physical Exam     E4V4M6  PERRL   f/cx4   BLE 3/5 HF, 4/5 KE, DF PF  Earlene,bar incision CDI    Significant Labs:  Recent Labs   Lab 10/09/19  0401  10/09/19  1400 10/09/19  2200 10/10/19  0310 10/10/19  0600   *   < > 126* 104 108  --    *   < > 136 136 133*  --    K 4.1   < > 3.0* 3.7 3.4* 3.7      < > 110 110 107  --    CO2 17*   < > 17* 16* 18*  --    BUN 16   < > 13 12 11  --    CREATININE 0.8   < > 0.8 0.8 0.8  --    CALCIUM 7.1*   < > 6.9* 7.1* 7.0*  --    MG 1.5*  --   --   --  1.5*  --     < > = values in this interval not displayed.     Recent Labs   Lab 10/09/19  1400 10/09/19  2107 10/10/19  0310   WBC 10.69 9.29 11.60   HGB 8.5* 8.0* 8.1*   HCT 27.5* 25.2* 26.3*    228 258     Recent Labs   Lab 10/08/19  1636   INR 1.4*   APTT 26.4     Microbiology Results (last 7 days)     Procedure Component Value Units Date/Time    Culture, Anaerobe [[id number]] Collected:  10/05/19 2111    Order Status:  Completed Specimen:  Abscess from Shoulder, Right Updated:  10/10/19 0725     Anaerobic Culture No anaerobes isolated    Culture, Anaerobe [[id number]] Collected:  10/05/19 2110    Order Status:  Completed Specimen:  Abscess from Shoulder, Left Updated:  10/10/19 0725     Anaerobic Culture Culture in progress    Aerobic " culture [[id number]] Collected:  10/09/19 1131    Order Status:  Completed Specimen:  Abscess from Back Updated:  10/10/19 0715     Aerobic Bacterial Culture No growth    Narrative:       1) Perispinal    Aerobic culture [[id number]] Collected:  10/09/19 1223    Order Status:  Completed Specimen:  Tissue from Back Updated:  10/10/19 0715     Aerobic Bacterial Culture No growth    Narrative:       Epidural phlegmon    Aerobic culture [[id number]] Collected:  10/09/19 1131    Order Status:  Completed Specimen:  Abscess from Back Updated:  10/10/19 0715     Aerobic Bacterial Culture No growth    Narrative:       2) Perispinal    Culture, Anaerobe [[id number]] Collected:  10/09/19 1131    Order Status:  Completed Specimen:  Abscess from Back Updated:  10/10/19 0639     Anaerobic Culture Culture in progress    Narrative:       1) Perispinal    Culture, Anaerobe [[id number]] Collected:  10/09/19 1223    Order Status:  Completed Specimen:  Tissue from Back Updated:  10/10/19 0639     Anaerobic Culture Culture in progress    Narrative:       Epidural phlegmon    Culture, Anaerobe [[id number]] Collected:  10/09/19 1131    Order Status:  Completed Specimen:  Abscess from Back Updated:  10/10/19 0639     Anaerobic Culture Culture in progress    Narrative:       2) Perispinal    Gram stain [[id number]] Collected:  10/09/19 1223    Order Status:  Completed Specimen:  Tissue from Back Updated:  10/09/19 1547     Gram Stain Result Rare WBC's      No organisms seen    Narrative:       Epidural phlegmon    Gram stain [[id number]] Collected:  10/09/19 1131    Order Status:  Completed Specimen:  Abscess from Back Updated:  10/09/19 1409     Gram Stain Result Rare WBC's      No organisms seen    Narrative:       1) Perispinal    Gram stain [[id number]] Collected:  10/09/19 1131    Order Status:  Completed Specimen:  Abscess from Back Updated:  10/09/19 1407     Gram Stain Result Rare WBC's      No organisms seen    Narrative:       2)  Perispinal    Fungus culture [[id number]] Collected:  10/09/19 1223    Order Status:  Sent Specimen:  Tissue from Back Updated:  10/09/19 1243    AFB Culture & Smear [[id number]] Collected:  10/09/19 1223    Order Status:  Sent Specimen:  Tissue from Back Updated:  10/09/19 1242    Fungus culture [[id number]] Collected:  10/09/19 1131    Order Status:  Sent Specimen:  Abscess from Back Updated:  10/09/19 1159    AFB Culture & Smear [[id number]] Collected:  10/09/19 1131    Order Status:  Sent Specimen:  Abscess from Back Updated:  10/09/19 1158    AFB Culture & Smear [[id number]] Collected:  10/09/19 1131    Order Status:  Sent Specimen:  Abscess from Back Updated:  10/09/19 1156    Fungus culture [[id number]] Collected:  10/09/19 1131    Order Status:  Sent Specimen:  Abscess from Back Updated:  10/09/19 1156    Aerobic culture [[id number]] Collected:  10/05/19 2110    Order Status:  Completed Specimen:  Abscess from Shoulder, Left Updated:  10/09/19 0957     Aerobic Bacterial Culture No growth      No growth    Aerobic culture [[id number]]  (Abnormal)  (Susceptibility) Collected:  10/05/19 2111    Order Status:  Completed Specimen:  Abscess from Shoulder, Right Updated:  10/08/19 1300     Aerobic Bacterial Culture STAPHYLOCOCCUS AUREUS  Rare  No other significant isolate      Culture, Body Fluid - Bactec [[id number]] Collected:  10/03/19 0549    Order Status:  Completed Specimen:  Pleural Fluid Updated:  10/08/19 1012     Body Fluid Culture, Sterile No growth after 5 days.    Narrative:       received 2 culture bottles with specimen for pleural fluid.    Blood culture [[id number]] Collected:  10/02/19 2025    Order Status:  Completed Specimen:  Blood from Peripheral, Wrist, Right Updated:  10/07/19 2212     Blood Culture, Routine No growth after 5 days.    Blood culture [[id number]] Collected:  10/02/19 0830    Order Status:  Completed Specimen:  Blood from Peripheral, Hand, Right Updated:  10/07/19 2212     Blood Culture,  Routine No growth after 5 days.    Aerobic culture [[id number]] Collected:  10/03/19 0549    Order Status:  Completed Specimen:  Pleural Fluid Updated:  10/07/19 0932     Aerobic Bacterial Culture No growth    Culture, Anaerobic [[id number]] Collected:  10/03/19 0549    Order Status:  Completed Specimen:  Pleural Fluid Updated:  10/07/19 0838     Anaerobic Culture No anaerobes isolated    Gram stain [[id number]] Collected:  10/03/19 0549    Order Status:  Completed Specimen:  Pleural Fluid Updated:  10/03/19 0955     Gram Stain Result Rare WBC's      Rare Gram positive cocci            Significant Diagnostic  No new images

## 2019-10-11 LAB
ALBUMIN SERPL BCP-MCNC: 1.4 G/DL (ref 3.5–5.2)
ALLENS TEST: ABNORMAL
ALP SERPL-CCNC: 94 U/L (ref 55–135)
ALT SERPL W/O P-5'-P-CCNC: <5 U/L (ref 10–44)
ANION GAP SERPL CALC-SCNC: 6 MMOL/L (ref 8–16)
ANION GAP SERPL CALC-SCNC: 6 MMOL/L (ref 8–16)
ANION GAP SERPL CALC-SCNC: 7 MMOL/L (ref 8–16)
ANION GAP SERPL CALC-SCNC: 7 MMOL/L (ref 8–16)
ANION GAP SERPL CALC-SCNC: 8 MMOL/L (ref 8–16)
ANISOCYTOSIS BLD QL SMEAR: SLIGHT
AST SERPL-CCNC: 11 U/L (ref 10–40)
BACTERIA #/AREA URNS AUTO: ABNORMAL /HPF
BASOPHILS # BLD AUTO: 0.04 K/UL (ref 0–0.2)
BASOPHILS # BLD AUTO: ABNORMAL K/UL (ref 0–0.2)
BASOPHILS NFR BLD: 0 % (ref 0–1.9)
BASOPHILS NFR BLD: 0.3 % (ref 0–1.9)
BILIRUB SERPL-MCNC: 0.2 MG/DL (ref 0.1–1)
BILIRUB UR QL STRIP: NEGATIVE
BUN SERPL-MCNC: 13 MG/DL (ref 8–23)
BUN SERPL-MCNC: 16 MG/DL (ref 8–23)
CALCIUM SERPL-MCNC: 7.3 MG/DL (ref 8.7–10.5)
CALCIUM SERPL-MCNC: 7.3 MG/DL (ref 8.7–10.5)
CALCIUM SERPL-MCNC: 7.4 MG/DL (ref 8.7–10.5)
CALCIUM SERPL-MCNC: 7.4 MG/DL (ref 8.7–10.5)
CALCIUM SERPL-MCNC: 7.5 MG/DL (ref 8.7–10.5)
CHLORIDE SERPL-SCNC: 108 MMOL/L (ref 95–110)
CHLORIDE SERPL-SCNC: 109 MMOL/L (ref 95–110)
CHLORIDE SERPL-SCNC: 109 MMOL/L (ref 95–110)
CHLORIDE SERPL-SCNC: 110 MMOL/L (ref 95–110)
CHLORIDE SERPL-SCNC: 110 MMOL/L (ref 95–110)
CLARITY UR REFRACT.AUTO: ABNORMAL
CO2 SERPL-SCNC: 19 MMOL/L (ref 23–29)
CO2 SERPL-SCNC: 20 MMOL/L (ref 23–29)
CO2 SERPL-SCNC: 21 MMOL/L (ref 23–29)
COLOR UR AUTO: YELLOW
CREAT SERPL-MCNC: 0.8 MG/DL (ref 0.5–1.4)
CREAT SERPL-MCNC: 0.9 MG/DL (ref 0.5–1.4)
DELSYS: ABNORMAL
DIFFERENTIAL METHOD: ABNORMAL
DIFFERENTIAL METHOD: ABNORMAL
EOSINOPHIL # BLD AUTO: 0.1 K/UL (ref 0–0.5)
EOSINOPHIL # BLD AUTO: ABNORMAL K/UL (ref 0–0.5)
EOSINOPHIL NFR BLD: 0 % (ref 0–8)
EOSINOPHIL NFR BLD: 0.6 % (ref 0–8)
ERYTHROCYTE [DISTWIDTH] IN BLOOD BY AUTOMATED COUNT: 17.6 % (ref 11.5–14.5)
ERYTHROCYTE [DISTWIDTH] IN BLOOD BY AUTOMATED COUNT: 18.2 % (ref 11.5–14.5)
EST. GFR  (AFRICAN AMERICAN): >60 ML/MIN/1.73 M^2
EST. GFR  (NON AFRICAN AMERICAN): >60 ML/MIN/1.73 M^2
GLUCOSE SERPL-MCNC: 111 MG/DL (ref 70–110)
GLUCOSE SERPL-MCNC: 111 MG/DL (ref 70–110)
GLUCOSE SERPL-MCNC: 114 MG/DL (ref 70–110)
GLUCOSE SERPL-MCNC: 117 MG/DL (ref 70–110)
GLUCOSE SERPL-MCNC: 120 MG/DL (ref 70–110)
GLUCOSE UR QL STRIP: NEGATIVE
HCO3 UR-SCNC: 18.4 MMOL/L (ref 24–28)
HCT VFR BLD AUTO: 25.2 % (ref 37–48.5)
HCT VFR BLD AUTO: 28.2 % (ref 37–48.5)
HGB BLD-MCNC: 8.5 G/DL (ref 12–16)
HGB BLD-MCNC: 8.8 G/DL (ref 12–16)
HGB UR QL STRIP: ABNORMAL
HYALINE CASTS UR QL AUTO: 2 /LPF
HYPOCHROMIA BLD QL SMEAR: ABNORMAL
IMM GRANULOCYTES # BLD AUTO: 0.73 K/UL (ref 0–0.04)
IMM GRANULOCYTES # BLD AUTO: ABNORMAL K/UL (ref 0–0.04)
IMM GRANULOCYTES NFR BLD AUTO: 4.7 % (ref 0–0.5)
IMM GRANULOCYTES NFR BLD AUTO: ABNORMAL % (ref 0–0.5)
KETONES UR QL STRIP: NEGATIVE
LACTATE SERPL-SCNC: 0.8 MMOL/L (ref 0.5–2.2)
LEUKOCYTE ESTERASE UR QL STRIP: ABNORMAL
LYMPHOCYTES # BLD AUTO: 2.3 K/UL (ref 1–4.8)
LYMPHOCYTES # BLD AUTO: ABNORMAL K/UL (ref 1–4.8)
LYMPHOCYTES NFR BLD: 14.8 % (ref 18–48)
LYMPHOCYTES NFR BLD: 9 % (ref 18–48)
MAGNESIUM SERPL-MCNC: 1.5 MG/DL (ref 1.6–2.6)
MCH RBC QN AUTO: 30 PG (ref 27–31)
MCH RBC QN AUTO: 31.4 PG (ref 27–31)
MCHC RBC AUTO-ENTMCNC: 31.2 G/DL (ref 32–36)
MCHC RBC AUTO-ENTMCNC: 33.7 G/DL (ref 32–36)
MCV RBC AUTO: 93 FL (ref 82–98)
MCV RBC AUTO: 96 FL (ref 82–98)
MICROSCOPIC COMMENT: ABNORMAL
MONOCYTES # BLD AUTO: 1 K/UL (ref 0.3–1)
MONOCYTES # BLD AUTO: ABNORMAL K/UL (ref 0.3–1)
MONOCYTES NFR BLD: 6.5 % (ref 4–15)
MONOCYTES NFR BLD: 8 % (ref 4–15)
NEUTROPHILS # BLD AUTO: 11.4 K/UL (ref 1.8–7.7)
NEUTROPHILS NFR BLD: 73.1 % (ref 38–73)
NEUTROPHILS NFR BLD: 83 % (ref 38–73)
NITRITE UR QL STRIP: NEGATIVE
NRBC BLD-RTO: 0 /100 WBC
NRBC BLD-RTO: 0 /100 WBC
OVALOCYTES BLD QL SMEAR: ABNORMAL
PCO2 BLDA: 24.8 MMHG (ref 35–45)
PH SMN: 7.48 [PH] (ref 7.35–7.45)
PH UR STRIP: 6 [PH] (ref 5–8)
PHOSPHATE SERPL-MCNC: 3 MG/DL (ref 2.7–4.5)
PLATELET # BLD AUTO: 276 K/UL (ref 150–350)
PLATELET # BLD AUTO: 291 K/UL (ref 150–350)
PMV BLD AUTO: 10.8 FL (ref 9.2–12.9)
PMV BLD AUTO: 10.9 FL (ref 9.2–12.9)
PO2 BLDA: 50 MMHG (ref 80–100)
POC BE: -5 MMOL/L
POC SATURATED O2: 89 % (ref 95–100)
POC TCO2: 19 MMOL/L (ref 23–27)
POCT GLUCOSE: 109 MG/DL (ref 70–110)
POCT GLUCOSE: 111 MG/DL (ref 70–110)
POCT GLUCOSE: 132 MG/DL (ref 70–110)
POCT GLUCOSE: 133 MG/DL (ref 70–110)
POCT GLUCOSE: 150 MG/DL (ref 70–110)
POIKILOCYTOSIS BLD QL SMEAR: SLIGHT
POLYCHROMASIA BLD QL SMEAR: ABNORMAL
POTASSIUM SERPL-SCNC: 3.1 MMOL/L (ref 3.5–5.1)
POTASSIUM SERPL-SCNC: 3.3 MMOL/L (ref 3.5–5.1)
POTASSIUM SERPL-SCNC: 3.8 MMOL/L (ref 3.5–5.1)
PROT SERPL-MCNC: 5.5 G/DL (ref 6–8.4)
PROT UR QL STRIP: ABNORMAL
RBC # BLD AUTO: 2.71 M/UL (ref 4–5.4)
RBC # BLD AUTO: 2.93 M/UL (ref 4–5.4)
RBC #/AREA URNS AUTO: 20 /HPF (ref 0–4)
SAMPLE: ABNORMAL
SITE: ABNORMAL
SODIUM SERPL-SCNC: 134 MMOL/L (ref 136–145)
SODIUM SERPL-SCNC: 136 MMOL/L (ref 136–145)
SODIUM SERPL-SCNC: 137 MMOL/L (ref 136–145)
SODIUM SERPL-SCNC: 137 MMOL/L (ref 136–145)
SODIUM SERPL-SCNC: 138 MMOL/L (ref 136–145)
SP GR UR STRIP: 1.02 (ref 1–1.03)
URN SPEC COLLECT METH UR: ABNORMAL
WBC # BLD AUTO: 12.6 K/UL (ref 3.9–12.7)
WBC # BLD AUTO: 15.57 K/UL (ref 3.9–12.7)
WBC #/AREA URNS AUTO: 45 /HPF (ref 0–5)
YEAST UR QL AUTO: ABNORMAL

## 2019-10-11 PROCEDURE — 97803 MED NUTRITION INDIV SUBSEQ: CPT

## 2019-10-11 PROCEDURE — 92526 ORAL FUNCTION THERAPY: CPT

## 2019-10-11 PROCEDURE — 87449 NOS EACH ORGANISM AG IA: CPT

## 2019-10-11 PROCEDURE — 25000003 PHARM REV CODE 250: Performed by: NURSE PRACTITIONER

## 2019-10-11 PROCEDURE — 80048 BASIC METABOLIC PNL TOTAL CA: CPT | Mod: 91

## 2019-10-11 PROCEDURE — 25000242 PHARM REV CODE 250 ALT 637 W/ HCPCS: Performed by: NURSE PRACTITIONER

## 2019-10-11 PROCEDURE — 83605 ASSAY OF LACTIC ACID: CPT

## 2019-10-11 PROCEDURE — 99232 PR SUBSEQUENT HOSPITAL CARE,LEVL II: ICD-10-PCS | Mod: ,,, | Performed by: INTERNAL MEDICINE

## 2019-10-11 PROCEDURE — 97530 THERAPEUTIC ACTIVITIES: CPT

## 2019-10-11 PROCEDURE — 97535 SELF CARE MNGMENT TRAINING: CPT

## 2019-10-11 PROCEDURE — 25000242 PHARM REV CODE 250 ALT 637 W/ HCPCS: Performed by: STUDENT IN AN ORGANIZED HEALTH CARE EDUCATION/TRAINING PROGRAM

## 2019-10-11 PROCEDURE — 63600175 PHARM REV CODE 636 W HCPCS: Performed by: INTERNAL MEDICINE

## 2019-10-11 PROCEDURE — 82803 BLOOD GASES ANY COMBINATION: CPT

## 2019-10-11 PROCEDURE — 85025 COMPLETE CBC W/AUTO DIFF WBC: CPT

## 2019-10-11 PROCEDURE — 80053 COMPREHEN METABOLIC PANEL: CPT

## 2019-10-11 PROCEDURE — 97164 PT RE-EVAL EST PLAN CARE: CPT

## 2019-10-11 PROCEDURE — 84100 ASSAY OF PHOSPHORUS: CPT

## 2019-10-11 PROCEDURE — 97165 OT EVAL LOW COMPLEX 30 MIN: CPT

## 2019-10-11 PROCEDURE — 85027 COMPLETE CBC AUTOMATED: CPT

## 2019-10-11 PROCEDURE — 25000003 PHARM REV CODE 250: Performed by: INTERNAL MEDICINE

## 2019-10-11 PROCEDURE — 20600001 HC STEP DOWN PRIVATE ROOM

## 2019-10-11 PROCEDURE — 27000221 HC OXYGEN, UP TO 24 HOURS

## 2019-10-11 PROCEDURE — 85007 BL SMEAR W/DIFF WBC COUNT: CPT

## 2019-10-11 PROCEDURE — 63600175 PHARM REV CODE 636 W HCPCS: Performed by: NURSE PRACTITIONER

## 2019-10-11 PROCEDURE — 83735 ASSAY OF MAGNESIUM: CPT

## 2019-10-11 PROCEDURE — 99232 SBSQ HOSP IP/OBS MODERATE 35: CPT | Mod: ,,, | Performed by: INTERNAL MEDICINE

## 2019-10-11 PROCEDURE — 94761 N-INVAS EAR/PLS OXIMETRY MLT: CPT

## 2019-10-11 PROCEDURE — 94668 MNPJ CHEST WALL SBSQ: CPT

## 2019-10-11 PROCEDURE — 36600 WITHDRAWAL OF ARTERIAL BLOOD: CPT

## 2019-10-11 PROCEDURE — 36415 COLL VENOUS BLD VENIPUNCTURE: CPT

## 2019-10-11 PROCEDURE — 99900035 HC TECH TIME PER 15 MIN (STAT)

## 2019-10-11 PROCEDURE — 87040 BLOOD CULTURE FOR BACTERIA: CPT | Mod: 59

## 2019-10-11 PROCEDURE — 94640 AIRWAY INHALATION TREATMENT: CPT

## 2019-10-11 PROCEDURE — 81001 URINALYSIS AUTO W/SCOPE: CPT

## 2019-10-11 PROCEDURE — 94667 MNPJ CHEST WALL 1ST: CPT

## 2019-10-11 RX ORDER — ENOXAPARIN SODIUM 100 MG/ML
40 INJECTION SUBCUTANEOUS EVERY 24 HOURS
Status: DISCONTINUED | OUTPATIENT
Start: 2019-10-11 | End: 2019-10-25 | Stop reason: HOSPADM

## 2019-10-11 RX ORDER — MAGNESIUM SULFATE HEPTAHYDRATE 40 MG/ML
2 INJECTION, SOLUTION INTRAVENOUS ONCE
Status: COMPLETED | OUTPATIENT
Start: 2019-10-11 | End: 2019-10-11

## 2019-10-11 RX ORDER — METRONIDAZOLE 500 MG/1
500 TABLET ORAL EVERY 8 HOURS
Status: DISCONTINUED | OUTPATIENT
Start: 2019-10-11 | End: 2019-10-14

## 2019-10-11 RX ORDER — POTASSIUM CHLORIDE 20 MEQ/15ML
60 SOLUTION ORAL ONCE
Status: COMPLETED | OUTPATIENT
Start: 2019-10-11 | End: 2019-10-11

## 2019-10-11 RX ORDER — CEFEPIME HYDROCHLORIDE 2 G/1
2 INJECTION, POWDER, FOR SOLUTION INTRAVENOUS
Status: DISCONTINUED | OUTPATIENT
Start: 2019-10-11 | End: 2019-10-14

## 2019-10-11 RX ADMIN — MAGNESIUM SULFATE IN WATER 2 G: 40 INJECTION, SOLUTION INTRAVENOUS at 08:10

## 2019-10-11 RX ADMIN — MICONAZOLE NITRATE: 20 OINTMENT TOPICAL at 12:10

## 2019-10-11 RX ADMIN — POTASSIUM CHLORIDE 60 MEQ: 20 SOLUTION ORAL at 06:10

## 2019-10-11 RX ADMIN — CEFEPIME 2 G: 2 INJECTION, POWDER, FOR SOLUTION INTRAVENOUS at 06:10

## 2019-10-11 RX ADMIN — SODIUM CHLORIDE SOLN NEBU 3% 4 ML: 3 NEBU SOLN at 03:10

## 2019-10-11 RX ADMIN — ENOXAPARIN SODIUM 40 MG: 100 INJECTION SUBCUTANEOUS at 06:10

## 2019-10-11 RX ADMIN — POTASSIUM CHLORIDE 60 MEQ: 20 SOLUTION ORAL at 08:10

## 2019-10-11 RX ADMIN — SODIUM CHLORIDE, SODIUM LACTATE, POTASSIUM CHLORIDE, AND CALCIUM CHLORIDE 500 ML: .6; .31; .03; .02 INJECTION, SOLUTION INTRAVENOUS at 07:10

## 2019-10-11 RX ADMIN — MICONAZOLE NITRATE: 20 OINTMENT TOPICAL at 08:10

## 2019-10-11 RX ADMIN — METRONIDAZOLE 500 MG: 500 TABLET ORAL at 10:10

## 2019-10-11 RX ADMIN — IPRATROPIUM BROMIDE AND ALBUTEROL SULFATE 3 ML: .5; 3 SOLUTION RESPIRATORY (INHALATION) at 03:10

## 2019-10-11 RX ADMIN — IPRATROPIUM BROMIDE AND ALBUTEROL SULFATE 3 ML: .5; 3 SOLUTION RESPIRATORY (INHALATION) at 04:10

## 2019-10-11 RX ADMIN — MICONAZOLE NITRATE: 20 OINTMENT TOPICAL at 10:10

## 2019-10-11 RX ADMIN — VANCOMYCIN HYDROCHLORIDE 1250 MG: 1.25 INJECTION, POWDER, LYOPHILIZED, FOR SOLUTION INTRAVENOUS at 08:10

## 2019-10-11 RX ADMIN — IPRATROPIUM BROMIDE AND ALBUTEROL SULFATE 3 ML: .5; 3 SOLUTION RESPIRATORY (INHALATION) at 07:10

## 2019-10-11 RX ADMIN — HEPARIN SODIUM 5000 UNITS: 5000 INJECTION, SOLUTION INTRAVENOUS; SUBCUTANEOUS at 05:10

## 2019-10-11 NOTE — PT/OT/SLP PROGRESS
Speech Language Pathology Treatment    Patient Name:  Mesha Mckenzie   MRN:  [id number]  Admitting Diagnosis: Epidural abscess    Recommendations:                 General Recommendations:  ongoing swallow assessment   Diet recommendations:  Mechanical soft, Liquid Diet Level: Nectar Thick   Aspiration Precautions:   · 1 bite/sip at a time  · Assistance with meals  · Feed only when awake/alert  · Monitor for s/s of aspiration  · Remain upright 30 minutes post meal  · Small bites/sips  · Encourage PO intake.   · Standard aspiration precautions   General Precautions: Standard, NPO, fall  Communication strategies:  none    Subjective     Patient remains lethargic, spouse present for session.     Pain/Comfort:  · Pain Rating 1: 0/10    Objective:     Has the patient been evaluated by SLP for swallowing?   Yes  Keep patient NPO? No   Current Respiratory Status: room air      Patient required several cues for participation in trials this service date. Consistent coughing and throat clears noted post thin liquids trials. Subsequent nectar thickened trials via cup and straw sips, puree x3, and 1/2 cracker were tolerated well with no overt signs of airway compromise. Patient required consistent cues to participate in trials.  Patient appears safe to begin PO consumption of nectar thickened liquids and mechanical soft consistencies, however consider calorie count as ST with concerns for meeting full nutritional needs PO. Skilled education was provided to patient and family members re: diet recs, standard aspiration precautions of which to follow, and ongoing ST plan of care.     Assessment:     Mesha Mckenzie is a 63 y.o. female with an SLP diagnosis of Dysphagia.     Goals:   Multidisciplinary Problems     SLP Goals        Problem: SLP Goal    Goal Priority Disciplines Outcome   SLP Goal     SLP Ongoing, Progressing   Description:  Speech Language Pathology Goals  Goals expected to be met by 10/17:  1. Patient will  participate in ongoing swallow assessment to determine least restrictive diet recommendations.                          Plan:     · Patient to be seen:  3 x/week   · Plan of Care expires:  11/06/19  · Plan of Care reviewed with:  patient, spouse   · SLP Follow-Up:  Yes       Discharge recommendations:  rehabilitation facility   Barriers to Discharge:  None    Time Tracking:     SLP Treatment Date:   10/11/19  Speech Start Time:  1030  Speech Stop Time:  1046     Speech Total Time (min):  16 min    Billable Minutes: Treatment Swallowing Dysfunction 8 and Seld Care/Home Management Training 8    Emily Abadie, CCC-SLP  10/11/2019

## 2019-10-11 NOTE — PT/OT/SLP RE-EVAL
Physical Therapy Re-Assessment / Treatment    Patient Name:  Mesha Mckenzie   MRN:  [id number]  Admit Date: 10/2/2019  Admitting Diagnosis:  Epidural abscess   Length of Stay: 9 days  Recent Surgery: Procedure(s) (LRB):  LAMINECTOMY, SPINE, L3, Open (N/A) 2 Days Post-Op    Hospital Course:  10/2/2019: Admit date  10/3/19: PT initial evaluation    Please refer to PT initial evaluation for PLOF and social history.  Recommendations:     Discharge Recommendations:  rehabilitation facility   Discharge Equipment Recommendations: none   Barriers to discharge: Inaccessible home and Decreased caregiver support    Assessment:     Mesha Mckenzie is a 63 y.o. female admitted with a medical diagnosis of Epidural abscess.  Mrs. Mckenzie presented with the functional impairments of weakness, gait instability, impaired cardiopulmonary response to activity, impaired endurance, pain, impaired cognition, and impaired functional mobility.  Mrs. Mckenzie required Max(A)x2 with bed mobility transition to EOB sitting.  Mrs. Mckenzie required this level assistance due to cognitive status and pain.  At EOB Mrs. Mckenzie was able to maintain position with Min(A)x2.  She was limited due to cognitive status, decreased safety awareness, and pain.  Mrs. Mckenzie will continue to benefit from acute inpatient physical therapy services to address the above functional limitations and return her to the highest level of function.  The plan for next physical therapy session will be to attempt standing from EOB.     Problem List: weakness, gait instability, impaired cardiopulmonary response to activity, impaired endurance, impaired balance, pain, impaired functional mobilty, decreased coordination, impaired cognition  Rehab Prognosis: Good   Plan:     During this hospitalization, patient to be seen 4 x/week to address the above listed problems via gait training, therapeutic activities, therapeutic exercises, neuromuscular re-education  · Plan  of Care Expires:  11/08/19   Plan of Care Reviewed with: patient, spouse    Subjective   Communicated with RN prior to session.  Patient found left sidelying upon PT entry to room, agreeable to evaluation. Mesha Mckenzie's  present during session.    Chief Complaint: Mrs. Mckenzie chief complaint today was being nauseous.   Patient/Family Comments/goals: Mrs. Mckenzie had no comments or goals prior to beginning physical therapy session.  Pain/Comfort:  Pain Rating 1: 0/10    Objective:   Patient found with: blood pressure cuff, bowel management system, astorga catheter, peripheral IV, NG tube     General Precautions: Standard, Cardiac fall, NPO(Spinal)   Orthopedic Precautions:N/A   Braces:     Oxygen Device: Nasal Cannula 3L  Vitals:   Blood Pressure (mmHg) (via cuff) 146/62   MAP(via cuff) 89   Heart Rate (bpm) 116   SaO2: 97%      Exam:  · Mental Status: Patient is AxOx2 and follows all commands. Pt is Lethargic and Confused during session.  · Skin Integrity: Visible skin intact  · Edema: none noted  · Sensation: Intact  · Hearing: Intact  · Vision:  Intact  · Posture: slouched posture, rounded shoulders, forward head, increased kyphosis, posterior pelvic tilt and increased cervical flexion  · Range of Motion:  · RUE: WFL  · LUE: WFL  · RLE: WFL  · LLE: WFL  · Strength Exam:  · Upper Extremity Strength  · Grossly 3/5 per observation   Lower Extremity Strength  - Grossly 3/5 per observation      Outcome Measures:  AM-PAC 6 CLICK MOBILITY  Turning over in bed (including adjusting bedclothes, sheets and blankets)?: 2  Sitting down on and standing up from a chair with arms (e.g., wheelchair, bedside commode, etc.): 1  Moving from lying on back to sitting on the side of the bed?: 2  Moving to and from a bed to a chair (including a wheelchair)?: 1  Need to walk in hospital room?: 1  Climbing 3-5 steps with a railing?: 1  Basic Mobility Total Score: 8     Functional Mobility:  Additional staff present:  Student PT  Bed Mobility:   · Rolling/Turning to Left: total assistance and 2 persons  · Rolling/Turning to Right: total assistance and 2 persons  · Scooting to HOB via drawsheet: total assistance and 2 persons  · Supine to Sit: total assistance and 2 persons; from left side of bed  · Scooting anteriorly to EOB to have both feet planted on floor: total assistance  · Sit to Supine: total assistance and 2 persons; to left side of bed    Sitting Balance at Edge of Bed:   Assistance Level Required: Moderate Assistance and 2 persons   Time: 8 minutes   Postural deviations noted: slouched posture, rounded shoulders, forward head, increased kyphosis, posterior pelvic tilt and increased cervical flexion.   Encouraged: Mrs. Mckenzie was encouraged to sit up straight and look forward.  She stated she had to look down due to feeling nauseous when she looked up.   Comments: Mrs. Mckenzie required Mod(A)x2 due to cognitive status and pain.  Mrs. Mckenzie requested to lie back down and continued to lean back attempting to lie down.  Mrs. Mckenzie was encouraged to sit as long as she could because it would help with her endurance and the recovery process.    Therapeutic Activities & Exercises:   1. Spinal Precautions: patient unable to voice 3/3 precautions without assistance. Patient able to maintain precautions throughout session with 2 verbal cues.    Education:  Mrs. Mckenzie and spouse was educated on POC and goals for physical therapy session today.  Mrs. Mckenzie and spouse were educated on spinal precautions of no bending, lifting, or twisting (BLT).  Mrs. Mckenzie was educated on the importance of sitting EOB to assist in her recovery process.    Patient left left sidelying, with head in midline, neutral pelvis & heels floated for skin protection with all lines intact, call button in reach and RN notified.    GOALS:   Multidisciplinary Problems     Physical Therapy Goals        Problem: Physical Therapy Goal     Goal Priority Disciplines Outcome Goal Variances Interventions   Physical Therapy Goal     PT, PT/OT Ongoing, Progressing     Description:  Goals to be met by: 2019    Patient will increase functional independence with mobility by performin. Supine <> sit with Moderate Assistance.-not met  2. Sit <> stand transfer with Moderate Assistance using Rolling Walker.-not met  3. Bed <> chair transfer via Step Transfer with Moderate Assistance using Rolling Walker.-not met  4. Dynamic sitting at edge of bed x 10 minutes with Minimal Assistance to prepare for functional tasks in sitting.-not met  5. Able to tolerate exercise for 15-20 reps with assistance as needed.-not met  6. Pt receive gait training ~ 50 ft with Rw and min assist - not met                            Time Tracking:     PT Received On: 10/11/19  PT Start Time: 931     PT Stop Time: 100  PT Total Time (min): 30 min     Billable Minutes: Re-eval 10 minutes (1 unit) and Therapeutic Activity 20 minutes (1 unit)    Messi Whitten, DARREL  10/11/2019

## 2019-10-11 NOTE — PLAN OF CARE
POC reviewed with pt, acknowledged understanding. Pt slightly confused and mumbles when she speaks. Pt remains free of falls/injuries. Pt on telemetry remains ST. Pt blood glucose being monitored q 4. Pt tolerating NPO diet w/ NG TF @ goal 40ml/hr. Pt voids per cathter output recorded. Pt has a flexy seal, output recorded. Pt pain controlled with prescribed meds. Pt turned q 2 and wearing SCDs. Pt on 3 L NC denies SOB.No acute events throughout shift. No distress noted, will continue to monitor.

## 2019-10-11 NOTE — CARE UPDATE
Rapid Response Nurse Follow-up Note     Followed up with patient for proactive rounding.   No acute issues at this time. Reviewed plan of care with primary RNOumar. Plan to move patient to [place] [id number].  Please call Rapid Response RN, Tabitha Albert RN with any questions or concerns at [phone].

## 2019-10-11 NOTE — CODE/ RAPID DOCUMENTATION
Rapid Response Respiratory Follow Up Therapy Note     Followed up with patient for proactive rounding and follow up from rapid from yesterday. No acute issues at this time.  RR 20 pt is currently being changed. Plan of care reviewed with primary RTSelma.  Please call Rapid Response RTTabitha, RRT at [phone] with any questions or concerns.

## 2019-10-11 NOTE — PLAN OF CARE
Problem: SLP Goal  Goal: SLP Goal  Description  Speech Language Pathology Goals  Goals expected to be met by 10/17:  1. Patient will participate in ongoing swallow assessment to determine least restrictive diet recommendations.         Outcome: Ongoing, Progressing   SLP with recs for mechanical soft consistencies (level 5) and nectar thickened liquids.   Emily P. Abadie M.S., CCC-SLP  Speech Language Pathologist  [phone]  10/11/2019

## 2019-10-11 NOTE — NURSING
Attempted again to give report. Nurse stated she had just received another patient. Will call back in 10 minutes

## 2019-10-11 NOTE — CODE/ RAPID DOCUMENTATION
"RAPID RESPONSE NURSE NOTE     Admit Date: 10/2/2019  LOS: 8  Code Status: Full Code   Date of Consult: 10/10/2019  : 1955  Age: 63 y.o.  Weight:   Wt Readings from Last 1 Encounters:   10/03/19 53.5 kg (118 lb)     Sex: female  Race: White   [facility]   MRN: [id number]  Time Rapid Response Team page Received: 0  Time Rapid Response Team at Bedside:   Time Rapid Response Team left Bedside:   Was the patient discharged from an ICU this admission?   yes  Was the patient discharged from a PACU within last 24 hours?  no  Did the patient receive conscious sedation/general anesthesia within last 24 hours?  no  Was the patient in the ED within the past 24 hours?  no  Was the patient started on NIPPV within the past 24 hours?  No  Did this progress into an ARC or CPA:  no  Attending Physician: Ok Rehman MD  Primary Service: [facility] [place] [place] CRITICAL CARE MEDICINE  Consult Requested By: Ok Rehman MD     SITUATION     Reason for Call: Rapid page at 1900 for tachpnea/respiratory distress, hypertension, and altered mental status.   Called to evaluate the patient for Respiratory    BACKGROUND     Why is the patient in the hospital?: Epidural abscess    Patient has a past medical history of Anxiety, Carotid bruit, Chronic pain, Cystitis, Cystocele, unspecified (CODE), Depression, GI bleed, History of uterine fibroid, Shortness of breath on exertion, Spinal stenosis, and Urinary retention.    ASSESSMENT/INTERVENTIONS     BP (!) 180/118 (BP Location: Right arm, Patient Position: Sitting)   Pulse (!) 120   Temp 97.7 °F (36.5 °C) (Oral)   Resp (!) 28   Ht 5' 4" (1.626 m)   Wt 53.5 kg (118 lb)   SpO2 (!) 90%   Breastfeeding? No   BMI 20.25 kg/m²     What did you find: Upon initial assessment, pt. Sitting up in hospital bed with family at bs. Primary RN, charge RN, and RT x3 at bs. Pt. Alert but disoriented at baseline x4, follows simple commands. Airway patent, secretions maintained, tachypneic " at 34 breaths/min, mildly labored breathing, sating 96% on 3L NC O2, bilateral auscultation revealed crackles and rales. Pt. Set up in bed at 90 degrees. Critical care physician, Elizabet ALBRECHT at  for assessment. Given duoneb by RT, stat chest xray, ABG, poct dex, ekg ordered. Plan to upgrade care to [place] [id number]. Primary RN, Oumar and charge RN updated on POC and instructed to call with questions or concerns.    ABG results:  PH: 7.501  CO2: 23.4  PO2: 64  Base Excess: -5  Bicarb 18.3    Vitals:   HR: 113  BP: 181/99 (132)  Spo2: 96% on 3L NC  RR: 34 breath/min  LOC: Alert - disoriented x4.        RECOMMENDATIONS     We recommend: Stat EKG, duoneb, ABG, poct dex, chest xray, mucinex, aspiration precautions, HOB at >45 degrees, possible mittens/restraints secondary to confusion.     FOLLOW-UP/CONTINGENCY PLAN     Patient needs a second visit at : 10/10P    Call the Rapid Response Nurse, Tabitha Albert RN at x [phone] for additional questions or concerns.    PHYSICIAN ESCALATION     Orders received and case discussed with Dr. Mcneal.    Disposition: Tx to [facility], bed 1028.

## 2019-10-11 NOTE — PLAN OF CARE
Upon shift change, rapid response was called by day team. Patient to be transferred to room 1028. Blood pressure 2028 191/87. 10mg labetolol IV push given. Blood pressure decreased to 164/75. Attempted to give report to floor nurse. Patient is currently resting, all safety measures in place, family to remain at bedside. Will attempt to give report again and will continue to monitor.

## 2019-10-11 NOTE — CARE UPDATE
Rapid Response Nurse Chart Check     Chart check completed, abnormal VS noted. Bedside RN Yissel ([id number]) contacted, no concerns verbalized at this time, instructed to call [phone] for further concerns or assistance.

## 2019-10-11 NOTE — PLAN OF CARE
Evaluation complete 10/11/19  Marine Elizabeth OT    Problem: Occupational Therapy Goal  Goal: Occupational Therapy Goal  Description  Goals to be met by: 10/31/19     Patient will increase functional independence with ADLs by performing:    UE Dressing with Moderate Assistance.  LE Dressing with Moderate Assistance.  Grooming while seated with Minimal Assistance.  Toileting from bedside commode with Moderate Assistance for hygiene and clothing management.   Rolling to Right, Left with Moderate Assistance.   Supine to sit with Moderate Assistance.  Toilet transfer to bedside commode with Moderate Assistance.     10/11/2019 1020 by Marine Elizabeth OT  Outcome: Ongoing, Progressing

## 2019-10-11 NOTE — ASSESSMENT & PLAN NOTE
Nutrition Problem  Malnutrition in the context of Acute Illness/Injury    Related to (etiology):  Inadequate energy intake and increased nutrient needs    Signs and Symptoms (as evidenced by):  Energy Intake: <50% of estimated energy requirement for 8 days  Body Fat Depletion: mild depletion of orbitals, triceps and thoracic and lumbar region   Muscle Mass Depletion: mild and moderate depletion of temples, clavicle region, scapular region, interosseous muscle and lower extremities   Weight Loss: 22.6% x 1 year   Fluid Accumulation: mild    Interventions (treatment strategy):  Collaboration of nutrition care with other providers  Enteral Nutrition     Nutrition Diagnosis Status:  Continues

## 2019-10-11 NOTE — PT/OT/SLP EVAL
Occupational Therapy   Evaluation    Name: Mesha Mckenzie  MRN: [id number]  Admitting Diagnosis:  Epidural abscess 2 Days Post-Op    Recommendations:     Discharge Recommendations: rehabilitation facility  Discharge Equipment Recommendations:  (TBD)  Barriers to discharge:  None    Assessment:     Mesha Mckenzie is a 63 y.o. female with a medical diagnosis of Epidural abscess.  She presents with dizziness and nausea upon movement, which limited functional mobility during session. Performance deficits affecting function: weakness, gait instability, impaired balance, impaired self care skills, impaired functional mobilty, decreased lower extremity function, decreased safety awareness, impaired coordination.  Pt would benefit from skilled OT services in order to maximize independence with ADLs and facilitate safe discharge.      Rehab Prognosis: Good; patient would benefit from acute skilled OT services to address these deficits and reach maximum level of function.       Plan:     Patient to be seen 3 x/week to address the above listed problems via self-care/home management, therapeutic activities, therapeutic exercises  · Plan of Care Expires: 11/10/19  · Plan of Care Reviewed with: patient, spouse    Subjective     Chief Complaint: dizziness and nausea   Patient/Family Comments/goals: to get better and go home    Occupational Profile:  Living Environment: pt lives with  and pets in a single story home with 1 AILYN and a walk in shower   Previous level of function: PTA, pt (I) in ADL and functional mobility and was driving  Roles and Routines: retired LPN, enjoys watching TV  Equipment Used at Home:  none  Assistance upon Discharge:  available for assistance if needed    Pain/Comfort:  · Pain Rating 1: 0/10    Patients cultural, spiritual, Hoahaoism conflicts given the current situation: no    Objective:     Communicated with: RN prior to session.  Patient found HOB elevated with blood pressure cuff,  astorga catheter, bowel management system, peripheral IV, NG tube upon OT entry to room.    General Precautions: Standard, fall, NPO   Orthopedic Precautions:N/A   Braces: N/A     Occupational Performance:    Bed Mobility:    · Patient completed Rolling/Turning to Right with total assistance  · Patient completed Scooting/Bridging with total assistance  · Patient completed Supine to Sit with total assistance  · Patient completed Sit to Supine with total assistance   · Balance: pt was total assist static sitting balance seated EOB. Pt sat for ~45 seconds before stating she was nauseous and requested to return supine    Functional Mobility/Transfers:  · Unable to assess 2* to nausea and dizziness    Activities of Daily Living:  · Grooming: minimum assistance to wash face seated EOB    Cognitive/Visual Perceptual:  Cognitive/Psychosocial Skills:     -       Oriented to: Person and Situation   -       Follows Commands/attention:Follows two-step commands  -       Communication: mumbles and speaks softly  -       Memory: No Deficits noted  -       Safety awareness/insight to disability: intact   -       Mood/Affect/Coping skills/emotional control: Lethargic    Physical Exam:  Balance: total A static sitting EOB    Sensation:    -       Intact  Dominant hand:    -       left  Upper Extremity Range of Motion:     -       Right Upper Extremity: WFL  -       Left Upper Extremity: WFL  Upper Extremity Strength:    -       Right Upper Extremity: WFL  -       Left Upper Extremity: WFL   Strength:    -       Right Upper Extremity: WFL  -       Left Upper Extremity: WFL  Fine Motor Coordination:    -       Intact    AMPAC 6 Click ADL:  AMPAC Total Score: 7    Treatment & Education:  -Pt educated on role of OT, POC and goals for therapy  -Pt educated on importance of OOB activities with staff member assistance and sitting OOB majority of the day.   -Pt verbalized understanding. Pt expressed no further  concerns/questions  -Whiteboard updated    Education:    Patient left HOB elevated with all lines intact, call button in reach, RN notified and spouse present    GOALS:   Multidisciplinary Problems     Occupational Therapy Goals        Problem: Occupational Therapy Goal    Goal Priority Disciplines Outcome Interventions   Occupational Therapy Goal     OT, PT/OT Ongoing, Progressing    Description:  Goals to be met by: 10/31/19     Patient will increase functional independence with ADLs by performing:    UE Dressing with Moderate Assistance.  LE Dressing with Moderate Assistance.  Grooming while seated with Minimal Assistance.  Toileting from bedside commode with Moderate Assistance for hygiene and clothing management.   Rolling to Right, Left with Moderate Assistance.   Supine to sit with Moderate Assistance.  Toilet transfer to bedside commode with Moderate Assistance.                      History:     Past Medical History:   Diagnosis Date    Anxiety     Carotid bruit     Chronic pain     Cystitis     Cystocele, unspecified (CODE)     Depression     GI bleed     History of uterine fibroid     Shortness of breath on exertion     Spinal stenosis     Urinary retention        Past Surgical History:   Procedure Laterality Date    ANTERIOR VAGINAL REPAIR  10-    CARDIAC CATHETERIZATION  age 12    CHOLECYSTECTOMY  2015    COLONOSCOPY  12/12/2018    aborted due to poor colon prep    COLONOSCOPY N/A 12/12/2018    Procedure: COLONOSCOPY;  Surgeon: Renard Gonsalves MD;  Location: [facility];  Service: Endoscopy;  Laterality: N/A;    HYSTERECTOMY  1989    AMANDA    LAMINECTOMY N/A 10/9/2019    Procedure: LAMINECTOMY, SPINE, L3, Open;  Surgeon: Martin Lewis DO;  Location: [facility] OR [address] [place] [address];  Service: Neurosurgery;  Laterality: N/A;    tubiligation         Time Tracking:     OT Date of Treatment: 10/11/19  OT Start Time: 0839  OT Stop Time: 0900  OT Total Time (min): 21 min    Billable Minutes:Evaluation  12  Self Care/Home Management 9    Marine Elizabeth OT  10/11/2019

## 2019-10-11 NOTE — SIGNIFICANT EVENT
Pt febrile to 101.2F at 1626 with change in mental status. Last fever 9/30. Evaluated at bedside. Pt only responsive to sternal rub initially. However, mental status improved and pt was able to answer questions appropriately including her daughter's name who was at bedside at the time. BP stable at 135/60,  (has been tachycardic since day prior ). ABG with pH 7.479 (improved from day prior 7.5), pCO2 24.8. She was recultured. CXR was unchanged with LLL opacity. LA wnl. WBC bumped up to 15.57 from 12.6. Pt noted to have watery stool throughout shift, so cdiff ordered. ABX broadened to vanc, cefepime, flagyl. Plan to exchange astorga now and likely remove central line in AM.

## 2019-10-11 NOTE — PLAN OF CARE
Recommendations    Pt meets severe malnutrition criteria.     1. Current TF meeting 126% EPN.    - Recommend modifying TF of Impact Peptide 1.5 to a goal rate of 35 mL/hr - to provide 1260 kcal/day, 79g protein/day, and 647mL free fluid/day.    2. RD following.     Goals: Patient to receive nutrition by RD follow-up  Nutrition Goal Status: goal met

## 2019-10-11 NOTE — PLAN OF CARE
Critical Care stepdown to [facility] medicine team for continued medical management.       10/11/19 1217   Discharge Reassessment   Assessment Type Discharge Planning Reassessment   Provided patient/caregiver education on the expected discharge date and the discharge plan Yes  (patient's spouse at the bedside)   Do you have any problems affording any of your prescribed medications? TBD   Discharge Plan A Rehab   Discharge Plan B Long-term acute care facility (LTAC)   DME Needed Upon Discharge    (pending progression )   Anticipated Discharge Disposition   (Therapy documentation today recommending Rehab)   Can the patient answer the patient profile reliably? No, cognitively impaired   Describe the patient's ability to walk at the present time. Major restrictions/daily assistance from another person   How often would a person be available to care for the patient? Whenever needed

## 2019-10-11 NOTE — PROGRESS NOTES
Pharmacokinetic Initial Assessment: IV Vancomycin    Assessment/Plan:    Initiate intravenous vancomycin with loading dose of 1250 mg once followed by a maintenance dose of vancomycin 750mg IV every 24 hours  Desired empiric serum trough concentration is 15 to 20 mcg/mL  Draw vancomycin trough level 30 min prior to third dose on 10/13 at approximately 1830  Pharmacy will continue to follow and monitor vancomycin.      Please contact pharmacy at extension [phone]   with any questions regarding this assessment.     Thank you for the consult,   Dyllan Carter       Patient brief summary:  Mesha Mckenzie is a 63 y.o. female initiated on antimicrobial therapy with IV Vancomycin for treatment of suspected bacteremia    Drug Allergies:   Review of patient's allergies indicates:   Allergen Reactions    Pcn [penicillins]        Actual Body Weight:   53 kg      Renal Function:   Estimated Creatinine Clearance: 59.2 mL/min (based on SCr of 0.8 mg/dL).,     Dialysis Method (if applicable):  N/A    CBC (last 72 hours):  Recent Labs   Lab Result Units 10/09/19  0401 10/09/19  1400 10/09/19  2107 10/10/19  0310 10/11/19  0504 10/11/19  1645   WBC K/uL 10.23 10.69 9.29 11.60 12.60 15.57*   Hemoglobin g/dL 9.5* 8.5* 8.0* 8.1* 8.8* 8.5*   Hematocrit % 29.7* 27.5* 25.2* 26.3* 28.2* 25.2*   Platelets K/uL 285 257 228 258 276 291   Gran% % 81.0* 73.0 71.6 76.7* 83.0* 73.1*   Lymph% % 14.0* 18.0 15.2* 11.8* 9.0* 14.8*   Mono% % 4.0 3.0* 6.4 6.0 8.0 6.5   Eosinophil% % 1.0 4.0 1.4 0.9 0.0 0.6   Basophil% % 0.0 1.0 0.4 0.3 0.0 0.3   Differential Method  Manual Manual Automated Automated Manual Automated       Metabolic Panel (last 72 hours):  Recent Labs   Lab Result Units 10/08/19  2235 10/09/19  0401 10/09/19  0949 10/09/19  1400 10/09/19  2200 10/10/19  0310 10/10/19  0600 10/10/19  1036 10/10/19  1619 10/10/19  2215 10/11/19  0501 10/11/19  0937 10/11/19  1529 10/11/19  1645   Sodium mmol/L 134* 133* 137 136 136 133*  --  134* 133*  132* 134* 136 137 137  138   Potassium mmol/L 3.5 4.1 3.1* 3.0* 3.7 3.4* 3.7 5.0 3.2* 3.3* 3.1* 3.8 3.3* 3.3*  3.3*   Chloride mmol/L 108 109 112* 110 110 107  --  110 107 107 108 110 110 109  109   CO2 mmol/L 19* 17* 19* 17* 16* 18*  --  20* 19* 18* 19* 20* 21* 21*  21*   Glucose mg/dL 106 111* 119* 126* 104 108  --  105 118* 126* 120* 114* 117* 111*  111*   BUN, Bld mg/dL 18 16 14 13 12 11  --  10 9 9 13 16 16 16  16   Creatinine mg/dL 0.8 0.8 0.8 0.8 0.8 0.8  --  0.8 0.8 0.8 0.8 0.9 0.8 0.8  0.8   Albumin g/dL  --   --   --   --   --   --   --   --   --   --   --   --   --  1.4*   Total Bilirubin mg/dL  --   --   --   --   --   --   --   --   --   --   --   --   --  0.2   Alkaline Phosphatase U/L  --   --   --   --   --   --   --   --   --   --   --   --   --  94   AST U/L  --   --   --   --   --   --   --   --   --   --   --   --   --  11   ALT U/L  --   --   --   --   --   --   --   --   --   --   --   --   --  <5*   Magnesium mg/dL  --  1.5*  --   --   --  1.5*  --   --   --   --  1.5*  --   --   --    Phosphorus mg/dL  --  3.1  --   --   --  3.1  --   --   --   --  3.0  --   --   --        Drug levels (last 3 results):  No results for input(s): VANCOMYCINRA, VANCOMYCINPE, VANCOMYCINTR in the last 72 hours.    Microbiologic Results:  Microbiology Results (last 7 days)     Procedure Component Value Units Date/Time    Clostridium difficile EIA [[id number]]     Order Status:  No result Specimen:  Stool     Blood culture [[id number]] Collected:  10/11/19 1701    Order Status:  Sent Specimen:  Blood Updated:  10/11/19 1705    Blood culture [[id number]] Collected:  10/11/19 1701    Order Status:  Sent Specimen:  Blood Updated:  10/11/19 1705    Culture, Anaerobe [[id number]] Collected:  10/09/19 1223    Order Status:  Completed Specimen:  Tissue from Back Updated:  10/11/19 0849     Anaerobic Culture Culture in progress    Narrative:       Epidural phlegmon    Culture, Anaerobe [[id number]] Collected:  10/09/19  1131    Order Status:  Completed Specimen:  Abscess from Back Updated:  10/11/19 0848     Anaerobic Culture Culture in progress    Narrative:       2) Perispinal    Culture, Anaerobe [[id number]] Collected:  10/09/19 1131    Order Status:  Completed Specimen:  Abscess from Back Updated:  10/11/19 0847     Anaerobic Culture Culture in progress    Narrative:       1) Perispinal    AFB Culture & Smear [[id number]] Collected:  10/09/19 1131    Order Status:  Completed Specimen:  Abscess from Back Updated:  10/10/19 2127     AFB Culture & Smear Culture in progress     AFB CULTURE STAIN No acid fast bacilli seen.    Narrative:       1) Perispinal    AFB Culture & Smear [[id number]] Collected:  10/09/19 1223    Order Status:  Completed Specimen:  Tissue from Back Updated:  10/10/19 2127     AFB Culture & Smear Culture in progress     AFB CULTURE STAIN No acid fast bacilli seen.    Narrative:       Epidural phlegmon    AFB Culture & Smear [[id number]] Collected:  10/09/19 1131    Order Status:  Completed Specimen:  Abscess from Back Updated:  10/10/19 2127     AFB Culture & Smear Culture in progress     AFB CULTURE STAIN No acid fast bacilli seen.    Narrative:       2) Perispinal    Culture, Anaerobe [[id number]] Collected:  10/05/19 2111    Order Status:  Completed Specimen:  Abscess from Shoulder, Right Updated:  10/10/19 0725     Anaerobic Culture No anaerobes isolated    Culture, Anaerobe [[id number]] Collected:  10/05/19 2110    Order Status:  Completed Specimen:  Abscess from Shoulder, Left Updated:  10/10/19 0725     Anaerobic Culture Culture in progress    Aerobic culture [[id number]] Collected:  10/09/19 1131    Order Status:  Completed Specimen:  Abscess from Back Updated:  10/10/19 0715     Aerobic Bacterial Culture No growth    Narrative:       1) Perispinal    Aerobic culture [[id number]] Collected:  10/09/19 1223    Order Status:  Completed Specimen:  Tissue from Back Updated:  10/10/19 0715     Aerobic Bacterial  Culture No growth    Narrative:       Epidural phlegmon    Aerobic culture [[id number]] Collected:  10/09/19 1131    Order Status:  Completed Specimen:  Abscess from Back Updated:  10/10/19 0715     Aerobic Bacterial Culture No growth    Narrative:       2) Perispinal    Gram stain [[id number]] Collected:  10/09/19 1223    Order Status:  Completed Specimen:  Tissue from Back Updated:  10/09/19 1547     Gram Stain Result Rare WBC's      No organisms seen    Narrative:       Epidural phlegmon    Gram stain [[id number]] Collected:  10/09/19 1131    Order Status:  Completed Specimen:  Abscess from Back Updated:  10/09/19 1409     Gram Stain Result Rare WBC's      No organisms seen    Narrative:       1) Perispinal    Gram stain [[id number]] Collected:  10/09/19 1131    Order Status:  Completed Specimen:  Abscess from Back Updated:  10/09/19 1407     Gram Stain Result Rare WBC's      No organisms seen    Narrative:       2) Perispinal    Fungus culture [[id number]] Collected:  10/09/19 1223    Order Status:  Sent Specimen:  Tissue from Back Updated:  10/09/19 1243    Fungus culture [[id number]] Collected:  10/09/19 1131    Order Status:  Sent Specimen:  Abscess from Back Updated:  10/09/19 1159    Fungus culture [[id number]] Collected:  10/09/19 1131    Order Status:  Sent Specimen:  Abscess from Back Updated:  10/09/19 1156    Aerobic culture [[id number]] Collected:  10/05/19 2110    Order Status:  Completed Specimen:  Abscess from Shoulder, Left Updated:  10/09/19 0957     Aerobic Bacterial Culture No growth      No growth    Aerobic culture [[id number]]  (Abnormal)  (Susceptibility) Collected:  10/05/19 2111    Order Status:  Completed Specimen:  Abscess from Shoulder, Right Updated:  10/08/19 1300     Aerobic Bacterial Culture STAPHYLOCOCCUS AUREUS  Rare  No other significant isolate      Culture, Body Fluid - Bactec [[id number]] Collected:  10/03/19 0549    Order Status:  Completed Specimen:  Pleural Fluid Updated:  10/08/19  1012     Body Fluid Culture, Sterile No growth after 5 days.    Narrative:       received 2 culture bottles with specimen for pleural fluid.    Blood culture [[id number]] Collected:  10/02/19 2025    Order Status:  Completed Specimen:  Blood from Peripheral, Wrist, Right Updated:  10/07/19 2212     Blood Culture, Routine No growth after 5 days.    Blood culture [[id number]] Collected:  10/02/19 0830    Order Status:  Completed Specimen:  Blood from Peripheral, Hand, Right Updated:  10/07/19 2212     Blood Culture, Routine No growth after 5 days.    Aerobic culture [[id number]] Collected:  10/03/19 0549    Order Status:  Completed Specimen:  Pleural Fluid Updated:  10/07/19 0932     Aerobic Bacterial Culture No growth    Culture, Anaerobic [[id number]] Collected:  10/03/19 0549    Order Status:  Completed Specimen:  Pleural Fluid Updated:  10/07/19 0838     Anaerobic Culture No anaerobes isolated

## 2019-10-11 NOTE — CONSULTS
"[facility]  Adult Nutrition  Progress Note    SUMMARY       Recommendations    Pt meets severe malnutrition criteria.     1. Current TF meeting 126% EPN.    - Recommend modifying TF of Impact Peptide 1.5 to a goal rate of 35 mL/hr - to provide 1260 kcal/day, 79g protein/day, and 647mL free fluid/day.    2. RD following.     Goals: Patient to receive nutrition by RD follow-up  Nutrition Goal Status: goal met  Communication of RD Recs: (POC)    Reason for Assessment    Reason For Assessment: RD follow-up  Diagnosis: (respiratory failure)  Relevant Medical History: IVDU, endocarditis  Interdisciplinary Rounds: did not attend  General Information Comments: S/p laminectomy completed on 10/9, s/p RR on 10/10. Pt resting in bed with no family members at bedside. Pt continues to mumble while speaking. Reports tolerating TF with no N/V/abdominal cramping, but states it feels upset. NFPE completed 9/23. Pt continues with severe malnutrition.  Nutrition Discharge Planning: Unable to determine at this time.    Nutrition Risk Screen    Nutrition Risk Screen: dysphagia or difficulty swallowing    Nutrition/Diet History    Spiritual, Cultural Beliefs, Episcopal Practices, Values that Affect Care: no  Factors Affecting Nutritional Intake: NPO    Anthropometrics    Temp: 98.4 °F (36.9 °C)  Height Method: Stated  Height: 5' 4" (162.6 cm)  Height (inches): 64 in  Weight Method: Bed Scale  Weight: 53.5 kg (118 lb)  Weight (lb): 118 lb  Ideal Body Weight (IBW), Female: 120 lb  % Ideal Body Weight, Female (lb): 98.33 lb  BMI (Calculated): 20.3  BMI Grade: 18.5-24.9 - normal     Lab/Procedures/Meds    Pertinent Labs Reviewed: reviewed  Pertinent Labs Comments: Na 134, K 3.1, glucose 120, Mg 1.5  Pertinent Medications Reviewed: reviewed  Pertinent Medications Comments: IVF    Estimated/Assessed Needs    Weight Used For Calorie Calculations: 47.4 kg (104 lb 8 oz)(admit weight)  Energy Calorie Requirements (kcal): 1422 " kcal/day  Energy Need Method: Kcal/kg(30)  Protein Requirements: 57-71 g/day(1.2-1.5 g/kg)  Weight Used For Protein Calculations: 47.4 kg (104 lb 8 oz)(admit weight)  Fluid Requirements (mL): 1 mL/kcal or per MD  Estimated Fluid Requirement Method: RDA Method  RDA Method (mL): 1422     Nutrition Prescription Ordered    Current Diet Order: NPO  Current Nutrition Support Formula Ordered: Impact Peptide 1.5  Current Nutrition Support Rate Ordered: 40 (ml)  Current Nutrition Support Frequency Ordered: mL/hr    Evaluation of Received Nutrient/Fluid Intake    Enteral Calories (kcal): 1440  Enteral Protein (gm): 90  Enteral (Free Water) Fluid (mL): 739  % Kcal Needs: 127  % Protein Needs: 113  I/O: -1.45L x 24 hrss, +3.57L since admit  Energy Calories Required: meeting needs  Protein Required: meeting needs  Fluid Required: (per MD)  Comments: LBM 10/10  Tolerance: tolerating  % Intake of Estimated Energy Needs: Other: >100%  % Meal Intake: NPO    Nutrition Risk    Level of Risk/Frequency of Follow-up: (f/u 1 x wk)     Assessment and Plan    Severe malnutrition  Nutrition Problem  Malnutrition in the context of Acute Illness/Injury    Related to (etiology):  Inadequate energy intake and increased nutrient needs    Signs and Symptoms (as evidenced by):  Energy Intake: <50% of estimated energy requirement for 8 days  Body Fat Depletion: mild depletion of orbitals, triceps and thoracic and lumbar region   Muscle Mass Depletion: mild and moderate depletion of temples, clavicle region, scapular region, interosseous muscle and lower extremities   Weight Loss: 22.6% x 1 year   Fluid Accumulation: mild    Interventions (treatment strategy):  Collaboration of nutrition care with other providers  Enteral Nutrition     Nutrition Diagnosis Status:  Continues         Monitor and Evaluation    Food and Nutrient Intake: energy intake  Food and Nutrient Adminstration: enteral and parenteral nutrition administration  Anthropometric  Measurements: weight, weight change  Biochemical Data, Medical Tests and Procedures: electrolyte and renal panel, gastrointestinal profile, inflammatory profile  Nutrition-Focused Physical Findings: overall appearance     Malnutrition Assessment  Malnutrition Type: acute illness or injury              Orbital Region (Subcutaneous Fat Loss): mild depletion  Upper Arm Region (Subcutaneous Fat Loss): mild depletion  Thoracic and Lumbar Region: mild depletion   [place] Region (Muscle Loss): moderate depletion  Clavicle Bone Region (Muscle Loss): moderate depletion  Clavicle and Acromion Bone Region (Muscle Loss): moderate depletion  Scapular Bone Region (Muscle Loss): mild depletion  Dorsal Hand (Muscle Loss): mild depletion  Patellar Region (Muscle Loss): mild depletion  Anterior Thigh Region (Muscle Loss): mild depletion  Posterior Calf Region (Muscle Loss): mild depletion   Edema (Fluid Accumulation): 0-->no edema present             Nutrition Follow-Up    RD Follow-up?: Yes

## 2019-10-11 NOTE — PROGRESS NOTES
Hospital Medicine  Progress Note      Patient Name: Mesha Mckenzie  MRN: [id number]  Date of Admission: 10/2/2019     Principal Problem: Epidural abscess     Subjective     Stepped down overnight from MICU. Remained afebrile and HD stable. Somnolent on exam, but answering appropriately to some questions, oriented to person, time and place.  at bedside reports she is slowly improving. Worked with ST today who recommends mechanical soft, but concerned she will not eat enough on her own due to encephalopathy, so will continue TFs today.       Review of Systems     Limited due to encephalopathy    Medications  Scheduled Meds:   albuterol-ipratropium  3 mL Nebulization Q4H    enoxaparin  40 mg Subcutaneous Daily    miconazole nitrate 2%   Topical (Top) BID    oxacillin 12 g in  mL CONTINUOUS INFUSION  12 g Intravenous Q24H    sodium chloride 3%  4 mL Nebulization Q8H     Continuous Infusions:  PRN Meds:.acetaminophen, Dextrose 10% Bolus, hydrALAZINE, influenza, labetalol, mupirocin, mupirocin, ondansetron, pneumoc 13-susanna conj-dip cr(PF), sodium chloride 0.9%    Objective    Physical Examination    Temp:  [97.7 °F (36.5 °C)-99.2 °F (37.3 °C)]   Pulse:  []   Resp:  [16-36]   BP: (127-217)/()   SpO2:  [87 %-100 %]     Gen: NAD, somnolent, ill appearing, minimally conversant  Head: NC, AT, NGT in place  Eyes: PERRLA, EOMI  Neck: Rt IJ central line  CV: RRR, no M/R/G, no peripheral edema, no JVD  Resp: CTAB, no increased work of breathing   GI: Soft, NT, ND, +BS  Ext: MAEW, no c/c/e  Neuro: AAOx3,  no focal neurologic deficits      CBC  Recent Labs   Lab 10/09/19  2107 10/10/19  0310 10/11/19  0504   WBC 9.29 11.60 12.60   HGB 8.0* 8.1* 8.8*   HCT 25.2* 26.3* 28.2*    258 276     CMP  Recent Labs   Lab 10/05/19  0500  10/06/19  0332  10/07/19  0332  10/08/19  1636  10/09/19  0401  10/10/19  0310  10/10/19  2215 10/11/19  0501 10/11/19  0937   *  148*  148*   < > 148*  148*   < >  140  140   < > 135*   < > 133*   < > 133*   < > 132* 134* 136   K 4.2  4.2  4.2   < > 3.8  3.8   < > 2.8*  2.8*   < > 3.1*   < > 4.1   < > 3.4*   < > 3.3* 3.1* 3.8   *  117*  117*   < > 116*  116*   < > 110  110   < > 109   < > 109   < > 107   < > 107 108 110   CO2 23  23  23   < > 21*  21*   < > 20*  20*   < > 20*   < > 17*   < > 18*   < > 18* 19* 20*   BUN 54*  54*  54*   < > 41*  41*   < > 33*  33*   < > 20   < > 16   < > 11   < > 9 13 16   CREATININE 1.5*  1.5*  1.5*   < > 1.4  1.4   < > 1.2  1.2   < > 0.9   < > 0.8   < > 0.8   < > 0.8 0.8 0.9   GLU 74  74  74   < > 69*  69*   < > 134*  134*   < > 119*   < > 111*   < > 108   < > 126* 120* 114*   CALCIUM 7.5*  7.5*  7.5*   < > 7.7*  7.7*   < > 7.6*  7.6*   < > 7.3*   < > 7.1*   < > 7.0*   < > 7.6* 7.3* 7.4*   MG 1.9  --  1.6  --  1.2*   < >  --   --  1.5*  --  1.5*  --   --  1.5*  --    PHOS 4.8*  --  3.6  --  3.3   < >  --   --  3.1  --  3.1  --   --  3.0  --    ALKPHOS 96  --  106  --  98  --   --   --   --   --   --   --   --   --   --    ALT 5*  --  5*  --  5*  --   --   --   --   --   --   --   --   --   --    AST 17  --  24  --  20  --   --   --   --   --   --   --   --   --   --    ALBUMIN 1.4*  --  1.6*  --  1.6*  --   --   --   --   --   --   --   --   --   --    PROT 5.4*  --  6.0  --  5.9*  --   --   --   --   --   --   --   --   --   --    BILITOT 0.3  --  0.4  --  0.3  --   --   --   --   --   --   --   --   --   --    INR  --   --   --   --   --   --  1.4*  --   --   --   --   --   --   --   --     < > = values in this interval not displayed.               Assessment and Plan:      Septic shock with acute organ dysfunction due to methicillin susceptible Staphylococcus aureus (MSSA)  Epidural abscess   Abscess of upper extremity  --due to IVDU. patient reported injecting IV heroin to ID MD at OSH. Blood cultures (9/19 - 9/26) have grown methicillin sensitive Staph aureus. Blood cultures as of 9/29 have been NGTD.  mitral and tricuspid valve vegetations seen on DHARMESH 9/25; unchanged on TTE 9/28.   --Blood Cx NGTD since 9/29  --formal 2D echo results show EF 53%; normal L ventricular function; mild mitral sclerosis; small circumferential pericardial effusion. cardiology consulted; no surgical intervention at this point  --followed by neurosurgery; OR laminectomy complete 10/9; L3 washout   --bilateral shoulder abscess, s/p I&D; R shoulder abscess + staph aureus (10/5)  --oxacillin started after passed penicillin challenge; ID recs to continue oxacillin x 6 weeks with f/u in clinic      Encephalopathy, metabolic  --likely due to infection and septic emboli  Brain MRI 9/30 with lacunar infarcts  --repeat head CT (10/7) revealed new right basal ganglia infarcts  --MRI of brain revealed R thalamic infarct, ventriculitis, subactue embolic infarctions  --CTA of head 10/8: atherosclerotic plaquing of the distal vertebral arteries and concern for noncalcified plaquing and stenosis; no high-grade stenosis or proximal occlusion. No mycotic aneurysms  --vasc neuro recommending against systemic anticoagulation and asa due to high risk for hemorrhage from septic emboli  --continue ABx regimen (oxacillin)  --improving    Dysphagia  --due to encephalopathy  --cleared for diet by ST, but will continue TFs per NGT today to ensure adequate nutrition, since pt somnolent and unlikely to take in adequate intake    Acute hypoxemic respiratory failure  Pleural effusion  Multifactorial secondary to complete left lung collapse, septic emboli, pleural effusions, suspected pn  --s/p thora 10/1- transudative effusion; chest tube placed due to concern for empyema at OSH (10/3); since removed  --pleural fluid with GPC on gram stain  --CTA neg for PE  --extubated on 10/5  --on 3L NC; wean as tolerated  --continue oxacillin per ID  --duo nebs    Pancolitis  --seen on CT 9/19 with improvement noted on f/u CT 9/25  --concern for ileus on 9/25 CT; rectal tube in  place with liquid stool output  --stool 9/27 neg for c diff; stool cx neg  --flagyl course completed (7/7 days)  --now tolerating tube feeds    Severe malnutrition  Hypoglycemia  --started on TFs yesterday via NGT; tolerating  --glucose stable throughout the day, so discontinued D10    Lines: RT IJ- plan to remove when have adequate peripheral access    Diet: tube feeds  VTE PPX: lovenox  Goals of care: FULL   Dispo:: rehab when medically stable  Yesenia Lerner M.D.  Department of Hospital Medicine  [facility]  [phone] (pager)

## 2019-10-11 NOTE — PLAN OF CARE
Plan of Care:  Discharge Recommendation: Rehab.  Please continue Progressive Mobility Protocol as appropriate.  Appropriate transfer level with nursing staff: Bed <> Chair: bed in bed-chair position daily.    Sandi Argueta PT, DPT  10/11/2019  Pager: [phone]

## 2019-10-12 LAB
ANION GAP SERPL CALC-SCNC: 7 MMOL/L (ref 8–16)
ANION GAP SERPL CALC-SCNC: 8 MMOL/L (ref 8–16)
ANION GAP SERPL CALC-SCNC: 8 MMOL/L (ref 8–16)
ANION GAP SERPL CALC-SCNC: 9 MMOL/L (ref 8–16)
BACTERIA SPEC AEROBE CULT: NO GROWTH
BACTERIA SPEC AEROBE CULT: NO GROWTH
BASOPHILS # BLD AUTO: 0.08 K/UL (ref 0–0.2)
BASOPHILS NFR BLD: 0.6 % (ref 0–1.9)
BUN SERPL-MCNC: 21 MG/DL (ref 8–23)
BUN SERPL-MCNC: 21 MG/DL (ref 8–23)
BUN SERPL-MCNC: 22 MG/DL (ref 8–23)
BUN SERPL-MCNC: 24 MG/DL (ref 8–23)
C DIFF GDH STL QL: NEGATIVE
C DIFF TOX A+B STL QL IA: NEGATIVE
CALCIUM SERPL-MCNC: 7.4 MG/DL (ref 8.7–10.5)
CALCIUM SERPL-MCNC: 7.5 MG/DL (ref 8.7–10.5)
CALCIUM SERPL-MCNC: 7.5 MG/DL (ref 8.7–10.5)
CALCIUM SERPL-MCNC: 7.6 MG/DL (ref 8.7–10.5)
CHLORIDE SERPL-SCNC: 108 MMOL/L (ref 95–110)
CHLORIDE SERPL-SCNC: 109 MMOL/L (ref 95–110)
CHLORIDE SERPL-SCNC: 113 MMOL/L (ref 95–110)
CHLORIDE SERPL-SCNC: 113 MMOL/L (ref 95–110)
CO2 SERPL-SCNC: 17 MMOL/L (ref 23–29)
CO2 SERPL-SCNC: 18 MMOL/L (ref 23–29)
CO2 SERPL-SCNC: 19 MMOL/L (ref 23–29)
CO2 SERPL-SCNC: 20 MMOL/L (ref 23–29)
CREAT SERPL-MCNC: 0.7 MG/DL (ref 0.5–1.4)
DIFFERENTIAL METHOD: ABNORMAL
EOSINOPHIL # BLD AUTO: 0.1 K/UL (ref 0–0.5)
EOSINOPHIL NFR BLD: 0.8 % (ref 0–8)
ERYTHROCYTE [DISTWIDTH] IN BLOOD BY AUTOMATED COUNT: 18.5 % (ref 11.5–14.5)
EST. GFR  (AFRICAN AMERICAN): >60 ML/MIN/1.73 M^2
EST. GFR  (NON AFRICAN AMERICAN): >60 ML/MIN/1.73 M^2
GLUCOSE SERPL-MCNC: 110 MG/DL (ref 70–110)
GLUCOSE SERPL-MCNC: 111 MG/DL (ref 70–110)
GLUCOSE SERPL-MCNC: 122 MG/DL (ref 70–110)
GLUCOSE SERPL-MCNC: 99 MG/DL (ref 70–110)
HCT VFR BLD AUTO: 25.2 % (ref 37–48.5)
HGB BLD-MCNC: 7.9 G/DL (ref 12–16)
IMM GRANULOCYTES # BLD AUTO: 0.66 K/UL (ref 0–0.04)
IMM GRANULOCYTES NFR BLD AUTO: 4.7 % (ref 0–0.5)
LYMPHOCYTES # BLD AUTO: 2 K/UL (ref 1–4.8)
LYMPHOCYTES NFR BLD: 14.4 % (ref 18–48)
MAGNESIUM SERPL-MCNC: 1.5 MG/DL (ref 1.6–2.6)
MCH RBC QN AUTO: 30.6 PG (ref 27–31)
MCHC RBC AUTO-ENTMCNC: 31.3 G/DL (ref 32–36)
MCV RBC AUTO: 98 FL (ref 82–98)
MONOCYTES # BLD AUTO: 1 K/UL (ref 0.3–1)
MONOCYTES NFR BLD: 7.3 % (ref 4–15)
NEUTROPHILS # BLD AUTO: 10.1 K/UL (ref 1.8–7.7)
NEUTROPHILS NFR BLD: 72.2 % (ref 38–73)
NRBC BLD-RTO: 0 /100 WBC
PHOSPHATE SERPL-MCNC: 2.2 MG/DL (ref 2.7–4.5)
PLATELET # BLD AUTO: 261 K/UL (ref 150–350)
PMV BLD AUTO: 10.8 FL (ref 9.2–12.9)
POCT GLUCOSE: 101 MG/DL (ref 70–110)
POCT GLUCOSE: 109 MG/DL (ref 70–110)
POCT GLUCOSE: 151 MG/DL (ref 70–110)
POCT GLUCOSE: 93 MG/DL (ref 70–110)
POTASSIUM SERPL-SCNC: 2.9 MMOL/L (ref 3.5–5.1)
POTASSIUM SERPL-SCNC: 3 MMOL/L (ref 3.5–5.1)
POTASSIUM SERPL-SCNC: 3.2 MMOL/L (ref 3.5–5.1)
POTASSIUM SERPL-SCNC: 3.7 MMOL/L (ref 3.5–5.1)
RBC # BLD AUTO: 2.58 M/UL (ref 4–5.4)
SODIUM SERPL-SCNC: 135 MMOL/L (ref 136–145)
SODIUM SERPL-SCNC: 136 MMOL/L (ref 136–145)
SODIUM SERPL-SCNC: 139 MMOL/L (ref 136–145)
SODIUM SERPL-SCNC: 139 MMOL/L (ref 136–145)
WBC # BLD AUTO: 14.04 K/UL (ref 3.9–12.7)

## 2019-10-12 PROCEDURE — 36415 COLL VENOUS BLD VENIPUNCTURE: CPT

## 2019-10-12 PROCEDURE — 94668 MNPJ CHEST WALL SBSQ: CPT

## 2019-10-12 PROCEDURE — 85025 COMPLETE CBC W/AUTO DIFF WBC: CPT

## 2019-10-12 PROCEDURE — 27000221 HC OXYGEN, UP TO 24 HOURS

## 2019-10-12 PROCEDURE — 94664 DEMO&/EVAL PT USE INHALER: CPT

## 2019-10-12 PROCEDURE — 83735 ASSAY OF MAGNESIUM: CPT

## 2019-10-12 PROCEDURE — 20600001 HC STEP DOWN PRIVATE ROOM

## 2019-10-12 PROCEDURE — 80048 BASIC METABOLIC PNL TOTAL CA: CPT

## 2019-10-12 PROCEDURE — 99232 SBSQ HOSP IP/OBS MODERATE 35: CPT | Mod: ,,, | Performed by: INTERNAL MEDICINE

## 2019-10-12 PROCEDURE — 25000242 PHARM REV CODE 250 ALT 637 W/ HCPCS: Performed by: NURSE PRACTITIONER

## 2019-10-12 PROCEDURE — 25000003 PHARM REV CODE 250: Performed by: INTERNAL MEDICINE

## 2019-10-12 PROCEDURE — 25000242 PHARM REV CODE 250 ALT 637 W/ HCPCS: Performed by: STUDENT IN AN ORGANIZED HEALTH CARE EDUCATION/TRAINING PROGRAM

## 2019-10-12 PROCEDURE — 99232 PR SUBSEQUENT HOSPITAL CARE,LEVL II: ICD-10-PCS | Mod: ,,, | Performed by: INTERNAL MEDICINE

## 2019-10-12 PROCEDURE — 94640 AIRWAY INHALATION TREATMENT: CPT

## 2019-10-12 PROCEDURE — 27000646 HC AEROBIKA DEVICE

## 2019-10-12 PROCEDURE — 63600175 PHARM REV CODE 636 W HCPCS: Performed by: INTERNAL MEDICINE

## 2019-10-12 PROCEDURE — 99900035 HC TECH TIME PER 15 MIN (STAT)

## 2019-10-12 PROCEDURE — 84100 ASSAY OF PHOSPHORUS: CPT

## 2019-10-12 PROCEDURE — 94761 N-INVAS EAR/PLS OXIMETRY MLT: CPT

## 2019-10-12 PROCEDURE — 31720 CLEARANCE OF AIRWAYS: CPT

## 2019-10-12 RX ORDER — SODIUM CHLORIDE FOR INHALATION 3 %
4 VIAL, NEBULIZER (ML) INHALATION EVERY 8 HOURS
Status: COMPLETED | OUTPATIENT
Start: 2019-10-13 | End: 2019-10-14

## 2019-10-12 RX ORDER — IPRATROPIUM BROMIDE AND ALBUTEROL SULFATE 2.5; .5 MG/3ML; MG/3ML
3 SOLUTION RESPIRATORY (INHALATION) EVERY 8 HOURS
Status: DISCONTINUED | OUTPATIENT
Start: 2019-10-13 | End: 2019-10-20

## 2019-10-12 RX ORDER — MAGNESIUM SULFATE HEPTAHYDRATE 40 MG/ML
2 INJECTION, SOLUTION INTRAVENOUS ONCE
Status: COMPLETED | OUTPATIENT
Start: 2019-10-12 | End: 2019-10-12

## 2019-10-12 RX ADMIN — METRONIDAZOLE 500 MG: 500 TABLET ORAL at 09:10

## 2019-10-12 RX ADMIN — IPRATROPIUM BROMIDE AND ALBUTEROL SULFATE 3 ML: .5; 3 SOLUTION RESPIRATORY (INHALATION) at 03:10

## 2019-10-12 RX ADMIN — CEFEPIME 2 G: 2 INJECTION, POWDER, FOR SOLUTION INTRAVENOUS at 05:10

## 2019-10-12 RX ADMIN — METRONIDAZOLE 500 MG: 500 TABLET ORAL at 05:10

## 2019-10-12 RX ADMIN — VANCOMYCIN HYDROCHLORIDE 750 MG: 750 INJECTION, POWDER, LYOPHILIZED, FOR SOLUTION INTRAVENOUS at 06:10

## 2019-10-12 RX ADMIN — SODIUM CHLORIDE SOLN NEBU 3% 4 ML: 3 NEBU SOLN at 11:10

## 2019-10-12 RX ADMIN — IPRATROPIUM BROMIDE AND ALBUTEROL SULFATE 3 ML: .5; 3 SOLUTION RESPIRATORY (INHALATION) at 07:10

## 2019-10-12 RX ADMIN — SODIUM CHLORIDE SOLN NEBU 3% 4 ML: 3 NEBU SOLN at 08:10

## 2019-10-12 RX ADMIN — CEFEPIME 2 G: 2 INJECTION, POWDER, FOR SOLUTION INTRAVENOUS at 09:10

## 2019-10-12 RX ADMIN — IPRATROPIUM BROMIDE AND ALBUTEROL SULFATE 3 ML: .5; 3 SOLUTION RESPIRATORY (INHALATION) at 01:10

## 2019-10-12 RX ADMIN — METRONIDAZOLE 500 MG: 500 TABLET ORAL at 03:10

## 2019-10-12 RX ADMIN — SODIUM CHLORIDE SOLN NEBU 3% 4 ML: 3 NEBU SOLN at 01:10

## 2019-10-12 RX ADMIN — MAGNESIUM SULFATE IN WATER 2 G: 40 INJECTION, SOLUTION INTRAVENOUS at 09:10

## 2019-10-12 RX ADMIN — MICONAZOLE NITRATE: 20 OINTMENT TOPICAL at 09:10

## 2019-10-12 RX ADMIN — ENOXAPARIN SODIUM 40 MG: 100 INJECTION SUBCUTANEOUS at 05:10

## 2019-10-12 RX ADMIN — IPRATROPIUM BROMIDE AND ALBUTEROL SULFATE 3 ML: .5; 3 SOLUTION RESPIRATORY (INHALATION) at 12:10

## 2019-10-12 RX ADMIN — IPRATROPIUM BROMIDE AND ALBUTEROL SULFATE 3 ML: .5; 3 SOLUTION RESPIRATORY (INHALATION) at 11:10

## 2019-10-12 RX ADMIN — IPRATROPIUM BROMIDE AND ALBUTEROL SULFATE 3 ML: .5; 3 SOLUTION RESPIRATORY (INHALATION) at 04:10

## 2019-10-12 RX ADMIN — CEFEPIME 2 G: 2 INJECTION, POWDER, FOR SOLUTION INTRAVENOUS at 01:10

## 2019-10-12 RX ADMIN — POTASSIUM PHOSPHATE, MONOBASIC AND POTASSIUM PHOSPHATE, DIBASIC 20 MMOL: 224; 236 INJECTION, SOLUTION, CONCENTRATE INTRAVENOUS at 10:10

## 2019-10-12 RX ADMIN — SODIUM CHLORIDE SOLN NEBU 3% 4 ML: 3 NEBU SOLN at 03:10

## 2019-10-12 NOTE — CONSULTS
Inpatient consult to Physical Medicine Rehab  Consult performed by: Mariah Muller NP  Consult ordered by: Yesenia Lerner MD  Reason for consult: Assess rehab needs      Reviewed patient history and current admission.  Rehab team following.  Full consult to follow.    CHRISTOPHE Cartagena, FNP-C  Physical Medicine & Rehabilitation   10/12/2019  Spectralink: [id number]

## 2019-10-12 NOTE — CARE UPDATE
"RAPID RESPONSE NURSE PROACTIVE ROUNDING NOTE     Time of Visit: 915    Admit Date: 10/2/2019  LOS: 10  Code Status: Full Code   Date of Visit: 10/12/2019  : 1955  Age: 63 y.o.  Sex: female  Race: White  Bed: 1028/1028 A:   MRN: [id number]  Was the patient discharged from an ICU this admission? yes   Was the patient discharged from a PACU within last 24 hours?  no  Did the patient receive conscious sedation/general anesthesia in last 24 hours?  no  Was the patient in the ED within the past 24 hours?  no  Was the patient started on NIPPV within the past 24 hours?  no  Attending Physician: Yesenia Lerner*  Primary Service: [facility] MED R    ASSESSMENT     Diagnosis: Epidural abscess    Abnormal Vital Signs: BP (!) 140/82 (BP Location: Right arm, Patient Position: Lying)   Pulse (!) 130   Temp 98.9 °F (37.2 °C) (Oral)   Resp (!) 22   Ht 5' 4" (1.626 m)   Wt 53.5 kg (118 lb)   SpO2 95%   Breastfeeding? No   BMI 20.25 kg/m²      Clinical Issues: Circulatory    Patient  has a past medical history of Anxiety, Carotid bruit, Chronic pain, Cystitis, Cystocele, unspecified (CODE), Depression, GI bleed, History of uterine fibroid, Shortness of breath on exertion, Spinal stenosis, and Urinary retention.    Pt experienced febrile episode and AMS over night. Blood cx & CXR obtained. Vanc, cefepime, and flagyl started. C-diff negative. Tylenol administered for fever. Blood cx results pending.      INTERVENTIONS/ RECOMMENDATIONS     Monitor VS. Pt appears to be stable at this time. NAD noted, Respirations even and unlabored. Notify primary team of any changes in pt's condition.     Discussed plan of care with RN, Pravin, [id number].    PHYSICIAN ESCALATION     Yes/No  no    Orders received and case discussed with NA.    Disposition: Remain in room 1028 A.    FOLLOW-UP     Call back the Rapid Response Nurse, Flaca Kingsley RN at [phone] for additional questions or concerns.          "

## 2019-10-12 NOTE — PLAN OF CARE
Pt awake and alert, oriented to self only. Pt NPO. R NGT intact and patent, TFs @ 40, tolerating well. Fecal tube intact and patent, moderate output during shift. C. Diff sample sent, contact precautions initiated. Henriquez catheter changed. Pt on tele, ST. Tmax 101.2- tylenol given. Family at bedside. Turned Q2. Pt remains free of falls and injury. No acute events this shift. Will continue to monitor.

## 2019-10-12 NOTE — PLAN OF CARE
POC reviewed with pt, acknowledged understanding. Pt confused, knows her name and follows command. Pt remains free of falls/injuries. Pt on telemetry remains ST. Pt blood glucose being monitored q 4. Pt tolerating npo diet, and TF.  Pt wearing SCDs. Pt turned q 2 use of a wedge pillow. Pt on 3 L NC. Pt pain controlled with prescribed meds. No acute events throughout shift. No distress noted, will continue to monitor.

## 2019-10-13 LAB
ALLENS TEST: ABNORMAL
ANION GAP SERPL CALC-SCNC: 7 MMOL/L (ref 8–16)
ANION GAP SERPL CALC-SCNC: 9 MMOL/L (ref 8–16)
BASOPHILS # BLD AUTO: 0.08 K/UL (ref 0–0.2)
BASOPHILS # BLD AUTO: 0.1 K/UL (ref 0–0.2)
BASOPHILS NFR BLD: 0.5 % (ref 0–1.9)
BASOPHILS NFR BLD: 0.6 % (ref 0–1.9)
BNP SERPL-MCNC: 691 PG/ML (ref 0–99)
BUN SERPL-MCNC: 25 MG/DL (ref 8–23)
BUN SERPL-MCNC: 27 MG/DL (ref 8–23)
CALCIUM SERPL-MCNC: 7.7 MG/DL (ref 8.7–10.5)
CALCIUM SERPL-MCNC: 7.7 MG/DL (ref 8.7–10.5)
CHLORIDE SERPL-SCNC: 109 MMOL/L (ref 95–110)
CHLORIDE SERPL-SCNC: 113 MMOL/L (ref 95–110)
CO2 SERPL-SCNC: 18 MMOL/L (ref 23–29)
CO2 SERPL-SCNC: 19 MMOL/L (ref 23–29)
CREAT SERPL-MCNC: 0.7 MG/DL (ref 0.5–1.4)
CREAT SERPL-MCNC: 0.7 MG/DL (ref 0.5–1.4)
DELSYS: ABNORMAL
DIFFERENTIAL METHOD: ABNORMAL
DIFFERENTIAL METHOD: ABNORMAL
EOSINOPHIL # BLD AUTO: 0.1 K/UL (ref 0–0.5)
EOSINOPHIL # BLD AUTO: 0.1 K/UL (ref 0–0.5)
EOSINOPHIL NFR BLD: 0.4 % (ref 0–8)
EOSINOPHIL NFR BLD: 0.5 % (ref 0–8)
ERYTHROCYTE [DISTWIDTH] IN BLOOD BY AUTOMATED COUNT: 18.6 % (ref 11.5–14.5)
ERYTHROCYTE [DISTWIDTH] IN BLOOD BY AUTOMATED COUNT: 18.9 % (ref 11.5–14.5)
EST. GFR  (AFRICAN AMERICAN): >60 ML/MIN/1.73 M^2
EST. GFR  (AFRICAN AMERICAN): >60 ML/MIN/1.73 M^2
EST. GFR  (NON AFRICAN AMERICAN): >60 ML/MIN/1.73 M^2
EST. GFR  (NON AFRICAN AMERICAN): >60 ML/MIN/1.73 M^2
FLOW: 2
GLUCOSE SERPL-MCNC: 109 MG/DL (ref 70–110)
GLUCOSE SERPL-MCNC: 122 MG/DL (ref 70–110)
HCO3 UR-SCNC: 18.1 MMOL/L (ref 24–28)
HCT VFR BLD AUTO: 25.9 % (ref 37–48.5)
HCT VFR BLD AUTO: 26.1 % (ref 37–48.5)
HGB BLD-MCNC: 8.1 G/DL (ref 12–16)
HGB BLD-MCNC: 8.3 G/DL (ref 12–16)
IMM GRANULOCYTES # BLD AUTO: 0.52 K/UL (ref 0–0.04)
IMM GRANULOCYTES # BLD AUTO: 0.62 K/UL (ref 0–0.04)
IMM GRANULOCYTES NFR BLD AUTO: 3.4 % (ref 0–0.5)
IMM GRANULOCYTES NFR BLD AUTO: 3.8 % (ref 0–0.5)
LACTATE SERPL-SCNC: 0.8 MMOL/L (ref 0.5–2.2)
LYMPHOCYTES # BLD AUTO: 1.9 K/UL (ref 1–4.8)
LYMPHOCYTES # BLD AUTO: 1.9 K/UL (ref 1–4.8)
LYMPHOCYTES NFR BLD: 11.7 % (ref 18–48)
LYMPHOCYTES NFR BLD: 12.2 % (ref 18–48)
MAGNESIUM SERPL-MCNC: 1.5 MG/DL (ref 1.6–2.6)
MAGNESIUM SERPL-MCNC: 2.2 MG/DL (ref 1.6–2.6)
MCH RBC QN AUTO: 30.9 PG (ref 27–31)
MCH RBC QN AUTO: 31 PG (ref 27–31)
MCHC RBC AUTO-ENTMCNC: 31.3 G/DL (ref 32–36)
MCHC RBC AUTO-ENTMCNC: 31.8 G/DL (ref 32–36)
MCV RBC AUTO: 97 FL (ref 82–98)
MCV RBC AUTO: 99 FL (ref 82–98)
MODE: ABNORMAL
MONOCYTES # BLD AUTO: 1 K/UL (ref 0.3–1)
MONOCYTES # BLD AUTO: 1 K/UL (ref 0.3–1)
MONOCYTES NFR BLD: 6.3 % (ref 4–15)
MONOCYTES NFR BLD: 6.7 % (ref 4–15)
NEUTROPHILS # BLD AUTO: 11.6 K/UL (ref 1.8–7.7)
NEUTROPHILS # BLD AUTO: 12.5 K/UL (ref 1.8–7.7)
NEUTROPHILS NFR BLD: 76.7 % (ref 38–73)
NEUTROPHILS NFR BLD: 77.2 % (ref 38–73)
NRBC BLD-RTO: 0 /100 WBC
NRBC BLD-RTO: 0 /100 WBC
PCO2 BLDA: 23 MMHG (ref 35–45)
PH SMN: 7.5 [PH] (ref 7.35–7.45)
PHOSPHATE SERPL-MCNC: 3 MG/DL (ref 2.7–4.5)
PLATELET # BLD AUTO: 308 K/UL (ref 150–350)
PLATELET # BLD AUTO: 309 K/UL (ref 150–350)
PMV BLD AUTO: 10.5 FL (ref 9.2–12.9)
PMV BLD AUTO: 10.6 FL (ref 9.2–12.9)
PO2 BLDA: 131 MMHG (ref 80–100)
POC BE: -5 MMOL/L
POC SATURATED O2: 99 % (ref 95–100)
POC TCO2: 19 MMOL/L (ref 23–27)
POCT GLUCOSE: 122 MG/DL (ref 70–110)
POCT GLUCOSE: 137 MG/DL (ref 70–110)
POTASSIUM SERPL-SCNC: 2.8 MMOL/L (ref 3.5–5.1)
POTASSIUM SERPL-SCNC: 4.1 MMOL/L (ref 3.5–5.1)
RBC # BLD AUTO: 2.62 M/UL (ref 4–5.4)
RBC # BLD AUTO: 2.68 M/UL (ref 4–5.4)
SAMPLE: ABNORMAL
SITE: ABNORMAL
SODIUM SERPL-SCNC: 137 MMOL/L (ref 136–145)
SODIUM SERPL-SCNC: 138 MMOL/L (ref 136–145)
VANCOMYCIN TROUGH SERPL-MCNC: 18 UG/ML (ref 10–22)
WBC # BLD AUTO: 15.18 K/UL (ref 3.9–12.7)
WBC # BLD AUTO: 16.23 K/UL (ref 3.9–12.7)

## 2019-10-13 PROCEDURE — 80048 BASIC METABOLIC PNL TOTAL CA: CPT | Mod: 91

## 2019-10-13 PROCEDURE — 83735 ASSAY OF MAGNESIUM: CPT | Mod: 91

## 2019-10-13 PROCEDURE — 83880 ASSAY OF NATRIURETIC PEPTIDE: CPT

## 2019-10-13 PROCEDURE — 27000221 HC OXYGEN, UP TO 24 HOURS

## 2019-10-13 PROCEDURE — 94668 MNPJ CHEST WALL SBSQ: CPT

## 2019-10-13 PROCEDURE — 85025 COMPLETE CBC W/AUTO DIFF WBC: CPT | Mod: 91

## 2019-10-13 PROCEDURE — 99232 SBSQ HOSP IP/OBS MODERATE 35: CPT | Mod: ,,, | Performed by: INTERNAL MEDICINE

## 2019-10-13 PROCEDURE — 93010 ELECTROCARDIOGRAM REPORT: CPT | Mod: ,,, | Performed by: INTERNAL MEDICINE

## 2019-10-13 PROCEDURE — 83735 ASSAY OF MAGNESIUM: CPT

## 2019-10-13 PROCEDURE — 83605 ASSAY OF LACTIC ACID: CPT

## 2019-10-13 PROCEDURE — 63600175 PHARM REV CODE 636 W HCPCS: Performed by: INTERNAL MEDICINE

## 2019-10-13 PROCEDURE — 93010 EKG 12-LEAD: ICD-10-PCS | Mod: ,,, | Performed by: INTERNAL MEDICINE

## 2019-10-13 PROCEDURE — 25000242 PHARM REV CODE 250 ALT 637 W/ HCPCS: Performed by: INTERNAL MEDICINE

## 2019-10-13 PROCEDURE — 36600 WITHDRAWAL OF ARTERIAL BLOOD: CPT

## 2019-10-13 PROCEDURE — 80202 ASSAY OF VANCOMYCIN: CPT

## 2019-10-13 PROCEDURE — 94664 DEMO&/EVAL PT USE INHALER: CPT

## 2019-10-13 PROCEDURE — 27000186 HC CIRCUIT, HFJV

## 2019-10-13 PROCEDURE — 99900035 HC TECH TIME PER 15 MIN (STAT)

## 2019-10-13 PROCEDURE — 25000003 PHARM REV CODE 250: Performed by: INTERNAL MEDICINE

## 2019-10-13 PROCEDURE — 84100 ASSAY OF PHOSPHORUS: CPT

## 2019-10-13 PROCEDURE — 31720 CLEARANCE OF AIRWAYS: CPT

## 2019-10-13 PROCEDURE — 94761 N-INVAS EAR/PLS OXIMETRY MLT: CPT

## 2019-10-13 PROCEDURE — 36415 COLL VENOUS BLD VENIPUNCTURE: CPT

## 2019-10-13 PROCEDURE — 20600001 HC STEP DOWN PRIVATE ROOM

## 2019-10-13 PROCEDURE — 94640 AIRWAY INHALATION TREATMENT: CPT

## 2019-10-13 PROCEDURE — 93005 ELECTROCARDIOGRAM TRACING: CPT

## 2019-10-13 PROCEDURE — 99232 PR SUBSEQUENT HOSPITAL CARE,LEVL II: ICD-10-PCS | Mod: ,,, | Performed by: INTERNAL MEDICINE

## 2019-10-13 PROCEDURE — 80048 BASIC METABOLIC PNL TOTAL CA: CPT

## 2019-10-13 PROCEDURE — 82803 BLOOD GASES ANY COMBINATION: CPT

## 2019-10-13 RX ORDER — POTASSIUM CHLORIDE 7.45 MG/ML
10 INJECTION INTRAVENOUS
Status: DISCONTINUED | OUTPATIENT
Start: 2019-10-13 | End: 2019-10-13

## 2019-10-13 RX ORDER — MAGNESIUM SULFATE HEPTAHYDRATE 40 MG/ML
2 INJECTION, SOLUTION INTRAVENOUS ONCE
Status: COMPLETED | OUTPATIENT
Start: 2019-10-13 | End: 2019-10-13

## 2019-10-13 RX ORDER — POTASSIUM CHLORIDE 20 MEQ/15ML
60 SOLUTION ORAL ONCE
Status: COMPLETED | OUTPATIENT
Start: 2019-10-13 | End: 2019-10-13

## 2019-10-13 RX ORDER — FUROSEMIDE 20 MG/1
20 TABLET ORAL ONCE
Status: COMPLETED | OUTPATIENT
Start: 2019-10-13 | End: 2019-10-13

## 2019-10-13 RX ADMIN — IPRATROPIUM BROMIDE AND ALBUTEROL SULFATE 3 ML: .5; 3 SOLUTION RESPIRATORY (INHALATION) at 03:10

## 2019-10-13 RX ADMIN — METRONIDAZOLE 500 MG: 500 TABLET ORAL at 09:10

## 2019-10-13 RX ADMIN — SODIUM CHLORIDE SOLN NEBU 3% 4 ML: 3 NEBU SOLN at 03:10

## 2019-10-13 RX ADMIN — ENOXAPARIN SODIUM 40 MG: 100 INJECTION SUBCUTANEOUS at 05:10

## 2019-10-13 RX ADMIN — MAGNESIUM SULFATE IN WATER 2 G: 40 INJECTION, SOLUTION INTRAVENOUS at 09:10

## 2019-10-13 RX ADMIN — POTASSIUM CHLORIDE 10 MEQ: 149 INJECTION, SOLUTION, CONCENTRATE INTRAVENOUS at 10:10

## 2019-10-13 RX ADMIN — CEFEPIME 2 G: 2 INJECTION, POWDER, FOR SOLUTION INTRAVENOUS at 01:10

## 2019-10-13 RX ADMIN — POTASSIUM CHLORIDE 60 MEQ: 20 SOLUTION ORAL at 09:10

## 2019-10-13 RX ADMIN — MICONAZOLE NITRATE: 20 OINTMENT TOPICAL at 09:10

## 2019-10-13 RX ADMIN — VANCOMYCIN HYDROCHLORIDE 750 MG: 750 INJECTION, POWDER, LYOPHILIZED, FOR SOLUTION INTRAVENOUS at 06:10

## 2019-10-13 RX ADMIN — FUROSEMIDE 20 MG: 20 TABLET ORAL at 06:10

## 2019-10-13 RX ADMIN — CEFEPIME 2 G: 2 INJECTION, POWDER, FOR SOLUTION INTRAVENOUS at 05:10

## 2019-10-13 RX ADMIN — METRONIDAZOLE 500 MG: 500 TABLET ORAL at 02:10

## 2019-10-13 RX ADMIN — IPRATROPIUM BROMIDE AND ALBUTEROL SULFATE 3 ML: .5; 3 SOLUTION RESPIRATORY (INHALATION) at 07:10

## 2019-10-13 RX ADMIN — SODIUM CHLORIDE SOLN NEBU 3% 4 ML: 3 NEBU SOLN at 07:10

## 2019-10-13 RX ADMIN — CEFEPIME 2 G: 2 INJECTION, POWDER, FOR SOLUTION INTRAVENOUS at 09:10

## 2019-10-13 RX ADMIN — METRONIDAZOLE 500 MG: 500 TABLET ORAL at 05:10

## 2019-10-13 NOTE — CARE UPDATE
"RAPID RESPONSE NURSE PROACTIVE ROUNDING NOTE     Time of Visit: 1248    Admit Date: 10/2/2019  LOS: 11  Code Status: Full Code   Date of Visit: 10/13/2019  : 1955  Age: 63 y.o.  Sex: female  Race: White  Bed: [address] [place] [address] A:   MRN: [id number]  Was the patient discharged from an ICU this admission? yes   Was the patient discharged from a PACU within last 24 hours?  no  Did the patient receive conscious sedation/general anesthesia in last 24 hours?  no  Was the patient in the ED within the past 24 hours?  no  Was the patient started on NIPPV within the past 24 hours?  no  Attending Physician: Yesenia Lerner*  Primary Service: [facility] MED R    ASSESSMENT     Diagnosis: Epidural abscess    Abnormal Vital Signs: BP (!) 159/66 (BP Location: Left arm, Patient Position: Lying)   Pulse (!) 126   Temp 99.5 °F (37.5 °C) (Axillary)   Resp (!) 40   Ht 5' 4" (1.626 m)   Wt 53.5 kg (118 lb)   SpO2 99%   Breastfeeding? No   BMI 20.25 kg/m²      Clinical Issues: Respiratory    Patient  has a past medical history of Anxiety, Carotid bruit, Chronic pain, Cystitis, Cystocele, unspecified (CODE), Depression, GI bleed, History of uterine fibroid, Shortness of breath on exertion, Spinal stenosis, and Urinary retention.    Patient lethargic but does arouse upon assessment.  Respirations in the 40's and HR in the 120's.  Daughter present at the bedside and voices concerns about antibiotics.  Patient requiring frequent deep suctioning.  Dr. Lerner aware of all findings, ABG ordered.  Will follow up with patient.     INTERVENTIONS/ RECOMMENDATIONS     ABG    Discussed plan of care with RNPravin and Charge Yissel FISHER    PHYSICIAN ESCALATION     Yes/No  yes    Orders received and case discussed with Dr. Lerner.    Disposition: Remain in room 1028.    FOLLOW-UP     Call back the Rapid Response Nurse, Ayde Dewey RN at [phone] for additional questions or concerns.        "

## 2019-10-13 NOTE — CARE UPDATE
Rapid Response Nurse Follow-up Note     Followed up with patient for proactive rounding.   No acute issues at this time. Reviewed plan of care with primary RNPravin  Please call Rapid Response RN, Mindy Varghese RN with any questions or concerns at [phone].

## 2019-10-13 NOTE — CARE UPDATE
Rapid Response Respiratory Therapy Proactive Rounding Note      Time of visit: 1148     Code Status: Full Code   : 1955  Age: 63 y.o.  Weight:   Wt Readings from Last 1 Encounters:   10/03/19 53.5 kg (118 lb)     Sex: female  Race: White   [facility]: [facility] A:   MRN: [id number]    SITUATION     Evaluated patient for: LDA Check     BACKGROUND     Patient has a past medical history of Anxiety, Carotid bruit, Chronic pain, Cystitis, Cystocele, unspecified (CODE), Depression, GI bleed, History of uterine fibroid, Shortness of breath on exertion, Spinal stenosis, and Urinary retention.  Clinically Significant Surgical Hx: None    ASSESSMENT/INTERVENTIONS     Upon arrival in room pt is not in resp distress at this time. Pt is on   2 lpm NC with a nasal trumpet in left nare for frequent suctioning.     Pulse: 126 Respiratory rate: 24 Temperature: Temp: 98.6 °F (37 °C) BP: BP: (!) 164/74 SpO2:99%   Level of Consciousness: Level of Consciousness (AVPU): alert  Respiratory Effort: Respiratory Effort: Mouth breathing Expansion/Accessory Muscle Usage: Expansion/Accessory Muscles/Retractions: abdominal muscle use  All Lung Field Breath Sounds: All Lung Fields Breath Sounds: Anterior:, Posterior:, Lateral:, coarse  CANDACE Breath Sounds: crackles, coarse  LLL Breath Sounds: diminished  RUL Breath Sounds: crackles, coarse  RML Breath Sounds: crackles, coarse  RLL Breath Sounds: diminished  Mobility at time of assessment: General Mobility: generalized weakness, moderately impaired  O2 Device/Concentration: NC 2 lpm  Most recent blood gas:   Recent Labs     10/13/19  1327   PH 7.503*   PCO2 23.0*   PO2 131*   HCO3 18.1*   POCSATURATED 99   BE -5   Surgical airway: No  Ambu at bedside: Ambu bag with the patient?: Yes, Adult Ambu    Current Respiratory Care Orders:   10/12/19 2302  Pulse Oximetry Continuous Continuous      10/12/19 2301   10/12/19 1600  ACAPELLA TREATMENT Q4H Every 4 hours (9 of 38 released)    Release     10/12/19 1209   10/11/19 2200  Chest physiotherapy TID 3 times daily (7 of 19 released)    Release   Question: Indications: Answer: COPIOUS SPUTUM PRODUCTION    10/11/19 1803   10/10/19 1922  Inhalation Treatment Q8H Every 8 hours (9 of 9 released)      10/10/19 1921   10/05/19 1534  Oxygen Continuous Continuous     References: Oxygen Titration Protocol   Question Answer Comment   Device type: Low flow    Device: Nasal Cannula (1- 5 Liters)    LPM: 2    Titrate O2 per Oxygen Titration Protocol: Yes    To maintain SpO2 goal of: >= 90%    Notify MD of: Inability to achieve desired SpO2; Sudden change in patient status and requires 20% increase in FiO2; Patient requires >60% FiO2        10/05/19 1534   Unscheduled  END TIDAL CO2 MONITOR PRN Use PRN (0 of 85925 released)    Release    10/06/19 1136   Unscheduled  POCT Arterial Blood Gas-Resp PRN Use PRN (0 of 78067 released)    Release   Comments: Notify Physician if: see parameters below.   Question: Component: Answer: Blood Gas    10/09/19 0110   Unscheduled  ASP/SUCTION NASOTRACHEAL PRN              RECOMMENDATIONS     We recommend: continue with current POC     ESCALATION      Physician Escalation (Yes/No) no     Discussed plan of care primary RT, T Roth     FOLLOW-UP     Please call back the Rapid Response RT, Anahi Soria, CRT at x [phone] for any questions or concerns.

## 2019-10-13 NOTE — PROGRESS NOTES
Hospital Medicine  Progress Note      Patient Name: Mesha Mckenzie  MRN: [id number]  Date of Admission: 10/2/2019     Principal Problem: Epidural abscess     Subjective     Febrile yesterday afternoon. Broadened abx (vanc,cefepime and flagyl) and has since remained afebrile. Continues to have excessive drowsiness, but is arousable and will answer some questions. BCx has remained NGTD. Henriquez was exchanged yesterday (has hx of urinary retention per daughter requiring straight cath). Central line removed today. Continues to have coarse bilateral breath sounds, and difficulty managing upper airway secretions.      Review of Systems     Limited due to encephalopathy    Medications  Scheduled Meds:   [START ON 10/13/2019] albuterol-ipratropium  3 mL Nebulization Q8H    ceFEPime (MAXIPIME) IVPB  2 g Intravenous Q8H    enoxaparin  40 mg Subcutaneous Daily    metroNIDAZOLE  500 mg Per NG tube Q8H    miconazole nitrate 2%   Topical (Top) BID    sodium chloride 3%  4 mL Nebulization Q8H    [START ON 10/13/2019] sodium chloride 3%  4 mL Nebulization Q8H    vancomycin (VANCOCIN) IVPB (custom)  750 mg Intravenous Q24H     Continuous Infusions:  PRN Meds:.acetaminophen, Dextrose 10% Bolus, hydrALAZINE, influenza, labetalol, mupirocin, mupirocin, ondansetron, pneumoc 13-susanna conj-dip cr(PF), sodium chloride 0.9%    Objective    Physical Examination    Temp:  [97.5 °F (36.4 °C)-99.3 °F (37.4 °C)]   Pulse:  []   Resp:  [18-32]   BP: (140-192)/(74-84)   SpO2:  [90 %-100 %]     Gen: NAD, somnolent, ill appearing,   Head: NC, AT, NGT in place  Eyes: PERRLA, EOMI  Neck: Rt IJ central line (removed in afternoon)  CV: RRR, no M/R/G, no peripheral edema, no JVD  Resp:coarse breath sounds bilateral with referred upper airway sounds,   GI: Soft, NT, ND, +BS  Ext: MAEW, no c/c/e  Neuro: AAOx3,  no focal neurologic deficits      CBC  Recent Labs   Lab 10/11/19  0504 10/11/19  1645 10/12/19  0502   WBC 12.60 15.57* 14.04*   HGB  8.8* 8.5* 7.9*   HCT 28.2* 25.2* 25.2*    291 261     CMP  Recent Labs   Lab 10/06/19  0332  10/07/19  0332  10/08/19  1636  10/10/19  0310  10/11/19  0501  10/11/19  1645 10/12/19  0502 10/12/19  1014 10/12/19  1700   *  148*   < > 140  140   < > 135*   < > 133*   < > 134*   < > 137  138 139 139 136   K 3.8  3.8   < > 2.8*  2.8*   < > 3.1*   < > 3.4*   < > 3.1*   < > 3.3*  3.3* 3.7 3.2* 3.0*   *  116*   < > 110  110   < > 109   < > 107   < > 108   < > 109  109 113* 113* 108   CO2 21*  21*   < > 20*  20*   < > 20*   < > 18*   < > 19*   < > 21*  21* 17* 18* 20*   BUN 41*  41*   < > 33*  33*   < > 20   < > 11   < > 13   < > 16  16 21 21 22   CREATININE 1.4  1.4   < > 1.2  1.2   < > 0.9   < > 0.8   < > 0.8   < > 0.8  0.8 0.7 0.7 0.7   GLU 69*  69*   < > 134*  134*   < > 119*   < > 108   < > 120*   < > 111*  111* 99 111* 110   CALCIUM 7.7*  7.7*   < > 7.6*  7.6*   < > 7.3*   < > 7.0*   < > 7.3*   < > 7.3*  7.4* 7.4* 7.5* 7.6*   MG 1.6  --  1.2*   < >  --    < > 1.5*  --  1.5*  --   --  1.5*  --   --    PHOS 3.6  --  3.3   < >  --    < > 3.1  --  3.0  --   --  2.2*  --   --    ALKPHOS 106  --  98  --   --   --   --   --   --   --  94  --   --   --    ALT 5*  --  5*  --   --   --   --   --   --   --  <5*  --   --   --    AST 24  --  20  --   --   --   --   --   --   --  11  --   --   --    ALBUMIN 1.6*  --  1.6*  --   --   --   --   --   --   --  1.4*  --   --   --    PROT 6.0  --  5.9*  --   --   --   --   --   --   --  5.5*  --   --   --    BILITOT 0.4  --  0.3  --   --   --   --   --   --   --  0.2  --   --   --    INR  --   --   --   --  1.4*  --   --   --   --   --   --   --   --   --     < > = values in this interval not displayed.               Assessment and Plan:      Septic shock with acute organ dysfunction due to methicillin susceptible Staphylococcus aureus (MSSA)  Epidural abscess   Abscess of upper extremity  --due to IVDU. patient reported injecting IV heroin to  DAYNE ALBRECHT at OSH. Blood cultures (9/19 - 9/26) have grown methicillin sensitive Staph aureus. Blood cultures as of 9/29 have been NGTD. mitral and tricuspid valve vegetations seen on DHARMESH 9/25; unchanged on TTE 9/28.   --Blood Cx NGTD since 9/29  --formal 2D echo results show EF 53%; normal L ventricular function; mild mitral sclerosis; small circumferential pericardial effusion. cardiology consulted; no surgical intervention at this point  --followed by neurosurgery; OR laminectomy complete 10/9; L3 washout   --bilateral shoulder abscess, s/p I&D; R shoulder abscess + staph aureus (10/5)  --oxacillin started after passed penicillin challenge; ID recs to continue oxacillin x 6 weeks with f/u in clinic  (10/11): febrile, new leukocytosis and acute change in mental status prompting rebroadening abx to van, cefepime, flagyl and reculturing. Henriquez exchanged and central line removed today.   --continue vanc, cefepime and flayl today and likely de-escalate to cefepime flagyl in AM if cx remain NGTD      Encephalopathy, metabolic  --likely due to infection and septic emboli  Brain MRI 9/30 with lacunar infarcts  --repeat head CT (10/7) revealed new right basal ganglia infarcts  --MRI of brain revealed R thalamic infarct, ventriculitis, subactue embolic infarctions  --CTA of head 10/8: atherosclerotic plaquing of the distal vertebral arteries and concern for noncalcified plaquing and stenosis; no high-grade stenosis or proximal occlusion. No mycotic aneurysms  --vasc neuro recommending against systemic anticoagulation and asa due to high risk for hemorrhage from septic emboli  --broadened to vanc, cefepime flagyl (10/11); de-escalate to oxacllin pending clinical stability and septic workup results      Dysphagia  --due to encephalopathy  --cleared for diet by ST, but will continue TFs per NGT today to ensure adequate nutrition, since pt somnolent and unlikely to take in adequate intake    Acute hypoxemic respiratory  failure  Pleural effusion  Multifactorial secondary to complete left lung collapse, septic emboli, pleural effusions, suspected pn  --s/p thora 10/1- transudative effusion; chest tube placed due to concern for empyema at OSH (10/3); since removed  --pleural fluid with GPC on gram stain  --CTA neg for PE  --extubated on 10/5  --on 3L NC; wean as tolerated  --on cefepime/flagyl and vanc since yesteday with plans to de-escalate back to oxacillin pending stability and cultures  --duo nebs spaced out due to tachycardia    Pancolitis  --seen on CT 9/19 with improvement noted on f/u CT 9/25  --concern for ileus on 9/25 CT; rectal tube in place with liquid stool output  --stool 9/27 neg for c diff; stool cx neg  --flagyl course completed (7/7 days)  --cefepime and flagyl resumed yesterday due to fever and leukocytosis- continues to watery stool output  --now tolerating tube feeds    Severe malnutrition  Hypoglycemia  --started on TFs yesterday via NGT; tolerating  --stable    Lines: RT IJ- removed today    Diet: tube feeds  VTE PPX: lovenox  Goals of care: FULL   Dispo:: rehab when medically stable  Yesenia Lerner M.D.  Department of Hospital Medicine  [facility]  [phone] (pager)

## 2019-10-13 NOTE — PROGRESS NOTES
Hospital Medicine  Progress Note      Patient Name: Mesha Mckenzie  MRN: [id number]  Date of Admission: 10/2/2019     Principal Problem: Epidural abscess     Subjective     Afebrile overnight. This morning reported feeling somewhat better and answered some questions appropriately.  at bedside. Complained of bilateral leg pain. Alerted by rapid response team that pt was very somnolent and was having a low grade temp of 100F and very tachypneic with RR up to 40. However, still arousable and answering questions. CXR from this morning unchanged. RT and nursing assisting with aggressive pulm toilet. Pt has known  ABG and repeat labs ordered. pH 7.5 same as it was on day of stepdown from MICU. Continuing broad spectrum abx coverage for now. Plan to re-scan chest/abd if febrile, since al;ready on broad spect abx, but suspect all related to known septic emboli.      Review of Systems     Limited due to encephalopathy    Medications  Scheduled Meds:   albuterol-ipratropium  3 mL Nebulization Q8H    ceFEPime (MAXIPIME) IVPB  2 g Intravenous Q8H    enoxaparin  40 mg Subcutaneous Daily    metroNIDAZOLE  500 mg Per NG tube Q8H    miconazole nitrate 2%   Topical (Top) BID    sodium chloride 3%  4 mL Nebulization Q8H    vancomycin (VANCOCIN) IVPB (custom)  750 mg Intravenous Q24H     Continuous Infusions:  PRN Meds:.acetaminophen, Dextrose 10% Bolus, hydrALAZINE, influenza, labetalol, mupirocin, mupirocin, ondansetron, pneumoc 13-susanna conj-dip cr(PF), sodium chloride 0.9%    Objective    Physical Examination    Temp:  [97.5 °F (36.4 °C)-100 °F (37.8 °C)]   Pulse:  [118-130]   Resp:  [18-40]   BP: (144-189)/(66-84)   SpO2:  [90 %-99 %]     Gen: NAD, somnolent, ill appearing,   Head: NC, AT, NGT in place  Eyes: PERRLA, EOMI  CV: RRR, no M/R/G, no peripheral edema, no JVD  Resp:coarse breath sounds bilateral with referred upper airway sounds,   GI: Soft, NT, ND, +BS  Ext: MAEW, no c/c/e  Neuro: AAOx3,  no focal  neurologic deficits      CBC  Recent Labs   Lab 10/11/19  1645 10/12/19  0502 10/13/19  0535   WBC 15.57* 14.04* 16.23*   HGB 8.5* 7.9* 8.3*   HCT 25.2* 25.2* 26.1*    261 308     CMP  Recent Labs   Lab 10/07/19  0332  10/08/19  1636  10/11/19  0501  10/11/19  1645 10/12/19  0502  10/12/19  1700 10/12/19  2142 10/13/19  0535     140   < > 135*   < > 134*   < > 137  138 139   < > 136 135* 137   K 2.8*  2.8*   < > 3.1*   < > 3.1*   < > 3.3*  3.3* 3.7   < > 3.0* 2.9* 2.8*     110   < > 109   < > 108   < > 109  109 113*   < > 108 109 109   CO2 20*  20*   < > 20*   < > 19*   < > 21*  21* 17*   < > 20* 19* 19*   BUN 33*  33*   < > 20   < > 13   < > 16  16 21   < > 22 24* 25*   CREATININE 1.2  1.2   < > 0.9   < > 0.8   < > 0.8  0.8 0.7   < > 0.7 0.7 0.7   *  134*   < > 119*   < > 120*   < > 111*  111* 99   < > 110 122* 109   CALCIUM 7.6*  7.6*   < > 7.3*   < > 7.3*   < > 7.3*  7.4* 7.4*   < > 7.6* 7.5* 7.7*   MG 1.2*   < >  --    < > 1.5*  --   --  1.5*  --   --   --  1.5*   PHOS 3.3   < >  --    < > 3.0  --   --  2.2*  --   --   --  3.0   ALKPHOS 98  --   --   --   --   --  94  --   --   --   --   --    ALT 5*  --   --   --   --   --  <5*  --   --   --   --   --    AST 20  --   --   --   --   --  11  --   --   --   --   --    ALBUMIN 1.6*  --   --   --   --   --  1.4*  --   --   --   --   --    PROT 5.9*  --   --   --   --   --  5.5*  --   --   --   --   --    BILITOT 0.3  --   --   --   --   --  0.2  --   --   --   --   --    INR  --   --  1.4*  --   --   --   --   --   --   --   --   --     < > = values in this interval not displayed.               Assessment and Plan:      Septic shock with acute organ dysfunction due to methicillin susceptible Staphylococcus aureus (MSSA)  Epidural abscess   Abscess of upper extremity  --due to IVDU. patient reported injecting IV heroin to ID MD at OSH. Blood cultures (9/19 - 9/26) have grown methicillin sensitive Staph aureus. Blood cultures  as of 9/29 have been NGTD. mitral and tricuspid valve vegetations seen on DHARMESH 9/25; unchanged on TTE 9/28.   --Blood Cx NGTD since 9/29  --formal 2D echo results show EF 53%; normal L ventricular function; mild mitral sclerosis; small circumferential pericardial effusion. cardiology consulted; no surgical intervention at this point  --followed by neurosurgery; OR laminectomy complete 10/9; L3 washout   --bilateral shoulder abscess, s/p I&D; R shoulder abscess + staph aureus (10/5)  --oxacillin started after passed penicillin challenge; ID recs to continue oxacillin x 6 weeks with f/u in clinic  --(10/11): febrile, new leukocytosis and acute change in mental status prompting rebroadening abx to van, cefepime, flagyl and reculturing. Henriquez exchanged and central line removed today.   --BCx from 10/11- NGTD  --continue vanc, cefepime and flagyl today and likely de-escalate to cefepime flagyl in AM if cx remain NGTD      Encephalopathy, metabolic  --likely due to infection and septic emboli  Brain MRI 9/30 with lacunar infarcts  --repeat head CT (10/7) revealed new right basal ganglia infarcts  --MRI of brain revealed R thalamic infarct, ventriculitis, subactue embolic infarctions  --CTA of head 10/8: atherosclerotic plaquing of the distal vertebral arteries and concern for noncalcified plaquing and stenosis; no high-grade stenosis or proximal occlusion. No mycotic aneurysms  --vasc neuro recommending against systemic anticoagulation and asa due to high risk for hemorrhage from septic emboli  --broadened to vanc, cefepime flagyl (10/11); de-escalate to oxacllin pending clinical stability and septic workup results. Re-assess larry.      Dysphagia  --due to encephalopathy  --cleared for diet by ST, but will continue TFs per NGT today to ensure adequate nutrition, since pt somnolent and unlikely to take in adequate intake    Acute hypoxemic respiratory failure  Pleural effusion  Tachypnea   Multifactorial secondary to  complete left lung collapse, septic emboli, pleural effusions, suspected pn  --s/p thora 10/1- transudative effusion; chest tube placed due to concern for empyema at OSH (10/3); since removed  --pleural fluid with GPC on gram stain  --CTA neg for PE 10/3  --extubated on 10/5  --on 3L NC; wean as tolerated  --on cefepime/flagyl and vanc since 10/12 with plans to de-escalate back to oxacillin pending stability and cultures  --duo nebs spaced out due to tachycardia  --continues to be tachypneic with pH 7.5 (since 10/10) likely from underlying lung issues including septic emboli, possible new asp PNA. NO PE seen on CTA from 10/3, but could have developed one since. However, she has high risk for brain bleed if were to AC and neurosx has already rec'd against systemic anticoagulation. Hold off on repeat CTA for now.    Pancolitis  --seen on CT 9/19 with improvement noted on f/u CT 9/25  --concern for ileus on 9/25 CT; rectal tube in place with liquid stool output  --stool 9/27 neg for c diff; stool cx neg  --flagyl course completed (7/7 days)  --cefepime and flagyl resumed 10/11 due to fever and leukocytosis- continues w watery stool output  --cdiff neg again on 10/12  --now tolerating tube feeds    Severe malnutrition  Hypoglycemia  --started on TFs  via NGT; tolerating  --stable    Hypokalemia  Hypomagenesemia  --repleting K, Mg  --f/u repeat labs    Tachycardia  --has been tachycardic since prior to step down from ICU  --EKG with sinus tach  --possible inflammatory response to infection/ sepsis  --duo-nebs may be contributing  --BP stable  --No PE on CTA from 10/3    Henriquez in place for chronic urinary retention.     Diet: tube feeds  VTE PPX: lovenox  Goals of care: FULL   Dispo: rehab when medically stable  Yesenia Lerner M.D.  Department of Hospital Medicine  [facility]  [phone] (pager)

## 2019-10-13 NOTE — PLAN OF CARE
Pt oriented to self, sleepy but arousable. NPO, no complaints of nausea or vomiting. R nare NG with cont TF @ 40. Tolerating well. Henriquez, adequate urine output overnight. Rectal tube in place. Telle, cont pluse ox. BP elevated. O2 Sats stable on 2L NC with NT airway and frequent suctioning. Pt slept between care. Family at bedside. Bed low and locked, call bell within reach. Will continue to monitor.

## 2019-10-13 NOTE — CARE UPDATE
"RAPID RESPONSE NURSE PROACTIVE ROUNDING NOTE     Time of Visit:     Admit Date: 10/2/2019  LOS: 10  Code Status: Full Code   Date of Visit: 10/12/2019  : 1955  Age: 63 y.o.  Sex: female  Race: White  Bed: 1028/1028 A:   MRN: [id number]  Was the patient discharged from an ICU this admission? yes   Was the patient discharged from a PACU within last 24 hours?  no  Did the patient receive conscious sedation/general anesthesia in last 24 hours?  no  Was the patient in the ED within the past 24 hours?  no  Was the patient started on NIPPV within the past 24 hours?  no  Attending Physician: Yesenia Lerner*  Primary Service: [facility]    ASSESSMENT     Diagnosis: Epidural abscess    Abnormal Vital Signs: BP (!) 174/84   Pulse (!) 130   Temp 98.3 °F (36.8 °C)   Resp (!) 32   Ht 5' 4" (1.626 m)   Wt 53.5 kg (118 lb)   SpO2 (!) 93%   Breastfeeding? No   BMI 20.25 kg/m²      Clinical Issues: Respiratory    Patient  has a past medical history of Anxiety, Carotid bruit, Chronic pain, Cystitis, Cystocele, unspecified (CODE), Depression, GI bleed, History of uterine fibroid, Shortness of breath on exertion, Spinal stenosis, and Urinary retention.    Called by SARWAT Maguire with concerns for respiratory rate in the 30s with SpO2 94. In the interim between call and assessment, patient received breathing treatment and NT suctioning with thick secretions suctioned per primary RN and visualized in suction canister. Upon assessment, patient is lethargic but arousable. Nasal airway present in left nare.  and SpO2 96% on 2L NC. RR 30. Breath sounds slightly coarse and diminished in bilateral lower lobes. Patient denies SOB and CP. Family member at bedside states patient looks "much better" after suctioning and breathing treatment.     INTERVENTIONS/ RECOMMENDATIONS     Continue with pulmonary hygiene routine as ordered.     Discussed plan of care with RNTheresa [id number] eugenie FAM " CN.    PHYSICIAN ESCALATION     Yes/No  no    Orders received and case discussed with NA.    Disposition: Remain in room 1028A.    FOLLOW-UP     Call back the Rapid Response Nurse, Felicita Yanes RN at [phone] for additional questions or concerns.

## 2019-10-13 NOTE — CARE UPDATE
Rapid Response Respiratory Follow Up Therapy Note     Followed up with patient for proactive rounding.   Rapid nurse called for ABG Pt has RR 40 at this time Resp therapist obtained ABG and notified MD of results. RT NT suctioned pt .Pt has left nare trumpet in place and is on NC 2lpm. Spo2 99% at this time.   Plan of care reviewed with primary RTROHAN.  Please call Rapid Response RT, Anahi Soria, CRT at [phone] with any questions or concerns.

## 2019-10-14 PROBLEM — Z51.5 PALLIATIVE CARE ENCOUNTER: Status: ACTIVE | Noted: 2019-10-14

## 2019-10-14 PROBLEM — R53.81 DEBILITY: Status: ACTIVE | Noted: 2019-10-14

## 2019-10-14 LAB
ANION GAP SERPL CALC-SCNC: 8 MMOL/L (ref 8–16)
ANION GAP SERPL CALC-SCNC: 9 MMOL/L (ref 8–16)
BACTERIA SPEC AEROBE CULT: NO GROWTH
BACTERIA SPEC ANAEROBE CULT: NORMAL
BASOPHILS # BLD AUTO: 0.05 K/UL (ref 0–0.2)
BASOPHILS NFR BLD: 0.4 % (ref 0–1.9)
BUN SERPL-MCNC: 28 MG/DL (ref 8–23)
BUN SERPL-MCNC: 28 MG/DL (ref 8–23)
CALCIUM SERPL-MCNC: 7.7 MG/DL (ref 8.7–10.5)
CALCIUM SERPL-MCNC: 7.9 MG/DL (ref 8.7–10.5)
CHLORIDE SERPL-SCNC: 113 MMOL/L (ref 95–110)
CHLORIDE SERPL-SCNC: 115 MMOL/L (ref 95–110)
CO2 SERPL-SCNC: 17 MMOL/L (ref 23–29)
CO2 SERPL-SCNC: 18 MMOL/L (ref 23–29)
CREAT SERPL-MCNC: 0.6 MG/DL (ref 0.5–1.4)
CREAT SERPL-MCNC: 0.7 MG/DL (ref 0.5–1.4)
DIFFERENTIAL METHOD: ABNORMAL
EOSINOPHIL # BLD AUTO: 0.1 K/UL (ref 0–0.5)
EOSINOPHIL NFR BLD: 0.7 % (ref 0–8)
ERYTHROCYTE [DISTWIDTH] IN BLOOD BY AUTOMATED COUNT: 19.2 % (ref 11.5–14.5)
EST. GFR  (AFRICAN AMERICAN): >60 ML/MIN/1.73 M^2
EST. GFR  (AFRICAN AMERICAN): >60 ML/MIN/1.73 M^2
EST. GFR  (NON AFRICAN AMERICAN): >60 ML/MIN/1.73 M^2
EST. GFR  (NON AFRICAN AMERICAN): >60 ML/MIN/1.73 M^2
GLUCOSE SERPL-MCNC: 101 MG/DL (ref 70–110)
GLUCOSE SERPL-MCNC: 98 MG/DL (ref 70–110)
HCT VFR BLD AUTO: 22.5 % (ref 37–48.5)
HGB BLD-MCNC: 7.2 G/DL (ref 12–16)
IMM GRANULOCYTES # BLD AUTO: 0.39 K/UL (ref 0–0.04)
IMM GRANULOCYTES NFR BLD AUTO: 3.3 % (ref 0–0.5)
LYMPHOCYTES # BLD AUTO: 1.7 K/UL (ref 1–4.8)
LYMPHOCYTES NFR BLD: 14.1 % (ref 18–48)
MAGNESIUM SERPL-MCNC: 1.6 MG/DL (ref 1.6–2.6)
MCH RBC QN AUTO: 30.9 PG (ref 27–31)
MCHC RBC AUTO-ENTMCNC: 32 G/DL (ref 32–36)
MCV RBC AUTO: 97 FL (ref 82–98)
MONOCYTES # BLD AUTO: 0.9 K/UL (ref 0.3–1)
MONOCYTES NFR BLD: 7.2 % (ref 4–15)
NEUTROPHILS # BLD AUTO: 8.7 K/UL (ref 1.8–7.7)
NEUTROPHILS NFR BLD: 74.3 % (ref 38–73)
NRBC BLD-RTO: 0 /100 WBC
PHOSPHATE SERPL-MCNC: 2 MG/DL (ref 2.7–4.5)
PLATELET # BLD AUTO: 247 K/UL (ref 150–350)
PMV BLD AUTO: 10.7 FL (ref 9.2–12.9)
POCT GLUCOSE: 108 MG/DL (ref 70–110)
POCT GLUCOSE: 114 MG/DL (ref 70–110)
POCT GLUCOSE: 132 MG/DL (ref 70–110)
POCT GLUCOSE: 152 MG/DL (ref 70–110)
POTASSIUM SERPL-SCNC: 3.1 MMOL/L (ref 3.5–5.1)
POTASSIUM SERPL-SCNC: 3.1 MMOL/L (ref 3.5–5.1)
RBC # BLD AUTO: 2.33 M/UL (ref 4–5.4)
SODIUM SERPL-SCNC: 139 MMOL/L (ref 136–145)
SODIUM SERPL-SCNC: 141 MMOL/L (ref 136–145)
WBC # BLD AUTO: 11.73 K/UL (ref 3.9–12.7)

## 2019-10-14 PROCEDURE — 20600001 HC STEP DOWN PRIVATE ROOM

## 2019-10-14 PROCEDURE — 43752 NASAL/OROGASTRIC W/TUBE PLMT: CPT

## 2019-10-14 PROCEDURE — 99222 PR INITIAL HOSPITAL CARE,LEVL II: ICD-10-PCS | Mod: ,,, | Performed by: NURSE PRACTITIONER

## 2019-10-14 PROCEDURE — 84100 ASSAY OF PHOSPHORUS: CPT

## 2019-10-14 PROCEDURE — 97535 SELF CARE MNGMENT TRAINING: CPT

## 2019-10-14 PROCEDURE — 63600175 PHARM REV CODE 636 W HCPCS: Performed by: INTERNAL MEDICINE

## 2019-10-14 PROCEDURE — 99223 1ST HOSP IP/OBS HIGH 75: CPT | Mod: ,,, | Performed by: INTERNAL MEDICINE

## 2019-10-14 PROCEDURE — 31720 CLEARANCE OF AIRWAYS: CPT

## 2019-10-14 PROCEDURE — 99900026 HC AIRWAY MAINTENANCE (STAT)

## 2019-10-14 PROCEDURE — 25000242 PHARM REV CODE 250 ALT 637 W/ HCPCS: Performed by: INTERNAL MEDICINE

## 2019-10-14 PROCEDURE — 94640 AIRWAY INHALATION TREATMENT: CPT

## 2019-10-14 PROCEDURE — 99232 SBSQ HOSP IP/OBS MODERATE 35: CPT | Mod: ,,, | Performed by: INTERNAL MEDICINE

## 2019-10-14 PROCEDURE — 99900035 HC TECH TIME PER 15 MIN (STAT)

## 2019-10-14 PROCEDURE — 99232 PR SUBSEQUENT HOSPITAL CARE,LEVL II: ICD-10-PCS | Mod: ,,, | Performed by: INTERNAL MEDICINE

## 2019-10-14 PROCEDURE — 94761 N-INVAS EAR/PLS OXIMETRY MLT: CPT

## 2019-10-14 PROCEDURE — 99222 1ST HOSP IP/OBS MODERATE 55: CPT | Mod: ,,, | Performed by: NURSE PRACTITIONER

## 2019-10-14 PROCEDURE — 27000221 HC OXYGEN, UP TO 24 HOURS

## 2019-10-14 PROCEDURE — 80048 BASIC METABOLIC PNL TOTAL CA: CPT | Mod: 91

## 2019-10-14 PROCEDURE — 83735 ASSAY OF MAGNESIUM: CPT

## 2019-10-14 PROCEDURE — 36415 COLL VENOUS BLD VENIPUNCTURE: CPT

## 2019-10-14 PROCEDURE — 99233 PR SUBSEQUENT HOSPITAL CARE,LEVL III: ICD-10-PCS | Mod: ,,, | Performed by: INTERNAL MEDICINE

## 2019-10-14 PROCEDURE — 94664 DEMO&/EVAL PT USE INHALER: CPT

## 2019-10-14 PROCEDURE — 99233 SBSQ HOSP IP/OBS HIGH 50: CPT | Mod: ,,, | Performed by: INTERNAL MEDICINE

## 2019-10-14 PROCEDURE — 94668 MNPJ CHEST WALL SBSQ: CPT

## 2019-10-14 PROCEDURE — 25000003 PHARM REV CODE 250: Performed by: INTERNAL MEDICINE

## 2019-10-14 PROCEDURE — 25000003 PHARM REV CODE 250: Performed by: NURSE PRACTITIONER

## 2019-10-14 PROCEDURE — 99223 PR INITIAL HOSPITAL CARE,LEVL III: ICD-10-PCS | Mod: ,,, | Performed by: INTERNAL MEDICINE

## 2019-10-14 PROCEDURE — 97530 THERAPEUTIC ACTIVITIES: CPT

## 2019-10-14 PROCEDURE — 85025 COMPLETE CBC W/AUTO DIFF WBC: CPT

## 2019-10-14 RX ORDER — POTASSIUM CHLORIDE 20 MEQ/15ML
60 SOLUTION ORAL ONCE
Status: COMPLETED | OUTPATIENT
Start: 2019-10-14 | End: 2019-10-14

## 2019-10-14 RX ORDER — MAGNESIUM SULFATE HEPTAHYDRATE 40 MG/ML
2 INJECTION, SOLUTION INTRAVENOUS ONCE
Status: COMPLETED | OUTPATIENT
Start: 2019-10-14 | End: 2019-10-14

## 2019-10-14 RX ADMIN — MAGNESIUM SULFATE IN WATER 2 G: 40 INJECTION, SOLUTION INTRAVENOUS at 07:10

## 2019-10-14 RX ADMIN — CEFEPIME 2 G: 2 INJECTION, POWDER, FOR SOLUTION INTRAVENOUS at 09:10

## 2019-10-14 RX ADMIN — ENOXAPARIN SODIUM 40 MG: 100 INJECTION SUBCUTANEOUS at 04:10

## 2019-10-14 RX ADMIN — IPRATROPIUM BROMIDE AND ALBUTEROL SULFATE 3 ML: .5; 3 SOLUTION RESPIRATORY (INHALATION) at 04:10

## 2019-10-14 RX ADMIN — VANCOMYCIN HYDROCHLORIDE 750 MG: 750 INJECTION, POWDER, LYOPHILIZED, FOR SOLUTION INTRAVENOUS at 05:10

## 2019-10-14 RX ADMIN — POTASSIUM PHOSPHATE, MONOBASIC AND POTASSIUM PHOSPHATE, DIBASIC 30 MMOL: 224; 236 INJECTION, SOLUTION, CONCENTRATE INTRAVENOUS at 09:10

## 2019-10-14 RX ADMIN — IPRATROPIUM BROMIDE AND ALBUTEROL SULFATE 3 ML: .5; 3 SOLUTION RESPIRATORY (INHALATION) at 07:10

## 2019-10-14 RX ADMIN — LABETALOL HCL IV SOLN PREFILLED SYRINGE 20 MG/4ML (5 MG/ML) 10 MG: 20/4 SOLUTION PREFILLED SYRINGE at 04:10

## 2019-10-14 RX ADMIN — METRONIDAZOLE 500 MG: 500 TABLET ORAL at 05:10

## 2019-10-14 RX ADMIN — SODIUM CHLORIDE SOLN NEBU 3% 4 ML: 3 NEBU SOLN at 04:10

## 2019-10-14 RX ADMIN — OXACILLIN SODIUM 12 G: 10 INJECTION, POWDER, FOR SOLUTION INTRAVENOUS at 09:10

## 2019-10-14 RX ADMIN — MICONAZOLE NITRATE: 20 OINTMENT TOPICAL at 09:10

## 2019-10-14 RX ADMIN — METRONIDAZOLE 500 MG: 500 TABLET ORAL at 02:10

## 2019-10-14 RX ADMIN — CEFEPIME 2 G: 2 INJECTION, POWDER, FOR SOLUTION INTRAVENOUS at 05:10

## 2019-10-14 RX ADMIN — POTASSIUM CHLORIDE 60 MEQ: 20 SOLUTION ORAL at 07:10

## 2019-10-14 RX ADMIN — IPRATROPIUM BROMIDE AND ALBUTEROL SULFATE 3 ML: .5; 3 SOLUTION RESPIRATORY (INHALATION) at 12:10

## 2019-10-14 RX ADMIN — SODIUM CHLORIDE SOLN NEBU 3% 4 ML: 3 NEBU SOLN at 08:10

## 2019-10-14 RX ADMIN — SODIUM CHLORIDE SOLN NEBU 3% 4 ML: 3 NEBU SOLN at 12:10

## 2019-10-14 RX ADMIN — LABETALOL HCL IV SOLN PREFILLED SYRINGE 20 MG/4ML (5 MG/ML) 10 MG: 20/4 SOLUTION PREFILLED SYRINGE at 12:10

## 2019-10-14 RX ADMIN — CEFEPIME 2 G: 2 INJECTION, POWDER, FOR SOLUTION INTRAVENOUS at 01:10

## 2019-10-14 NOTE — CONSULTS
[facility]  Physical Medicine & Rehab  Consult Note    Patient Name: Mesha Mckenzie  MRN: [id number]  Admission Date: 10/2/2019  Hospital Length of Stay: 12 days  Attending Physician: Yesenia Lerner*    Inpatient consult to Physical Medicine & Rehabilitation  Consult performed by: Mariah Muller NP  Consult requested by:  Yesenia Lerner*    Collaborating Physician: Bonnie Brady MD  Reason for Consult:  Assess rehabilitation needs     Consults  Subjective:     Principal Problem: Epidural abscess    HPI: Mesha Mckenzie is a 63-year-old female with PMHx of IVDU. Patient presented to [place] 9/19 with N/V/D, weakness, and fever. Was treated for MV/TV valve endocarditis with septic emboli eventually transferred to [facility] on 10/2. Blood cultures (9/19 - 9/26) grew MSSA. Repeat blood cxs 9/29 negative. 10/5 bilateral shoulder abscess, s/p I&D was + staph aureus. MRI L spine showed L2-L4 epidural abscess and NSGY consulted and S/p L3 laminectomy and debridement of paraspinal abscess. Hospital course complicated by fever and new leukocytosis and AMS(ID recommending Vanc, Cefepime, and Flagyl. Blood cx 10/11 NGTD), pancolitis (tolerating tube feeds, rectal tube in place, improvement), tachycardia (possible inflammatory response to infection per HM).    Functional History: Patient lives with her .  Prior to admission, (I). DME: none.     Hospital Course: 10/11/19: Evaluated by PT & OT. Bed mobility total- totalA x 2 ppl. Sat EOB modA x 2 ppl 8 mins. Grooming Misha.     Past Medical History:   Diagnosis Date    Anxiety     Carotid bruit     Chronic pain     Cystitis     Cystocele, unspecified (CODE)     Depression     GI bleed     History of uterine fibroid     Shortness of breath on exertion     Spinal stenosis     Urinary retention      Past Surgical History:   Procedure Laterality Date    ANTERIOR VAGINAL REPAIR  10-    CARDIAC CATHETERIZATION  age 12     CHOLECYSTECTOMY  2015    COLONOSCOPY  12/12/2018    aborted due to poor colon prep    COLONOSCOPY N/A 12/12/2018    Procedure: COLONOSCOPY;  Surgeon: Renard Gonsalves MD;  [facility];  Service: Endoscopy;  Laterality: N/A;    HYSTERECTOMY  1989    AMANDA    LAMINECTOMY N/A 10/9/2019    Procedure: LAMINECTOMY, SPINE, L3, Open;  Surgeon: Martin Lewis DO;  Location: [facility] OR [address] [place] [address];  Service: Neurosurgery;  Laterality: N/A;    tubiligation       Review of patient's allergies indicates:   Allergen Reactions    Pcn [penicillins]        Scheduled Medications:    albuterol-ipratropium  3 mL Nebulization Q8H    ceFEPime (MAXIPIME) IVPB  2 g Intravenous Q8H    enoxaparin  40 mg Subcutaneous Daily    metroNIDAZOLE  500 mg Per NG tube Q8H    miconazole nitrate 2%   Topical (Top) BID    potassium phosphate IVPB  30 mmol Intravenous Once    sodium chloride 3%  4 mL Nebulization Q8H    vancomycin (VANCOCIN) IVPB (custom)  750 mg Intravenous Q24H       PRN Medications: acetaminophen, Dextrose 10% Bolus, hydrALAZINE, influenza, labetalol, mupirocin, mupirocin, ondansetron, pneumoc 13-susanna conj-dip cr(PF), sodium chloride 0.9%    Family History     Problem Relation (Age of Onset)    Alzheimer's disease Mother    Hypertension Brother, Sister    Osteoarthritis Mother    Thyroid disease Mother        Tobacco Use    Smoking status: Current Every Day Smoker     Packs/day: 0.50     Years: 40.00     Pack years: 20.00     Types: Cigarettes    Smokeless tobacco: Never Used   Substance and Sexual Activity    Alcohol use: No    Drug use: No    Sexual activity: Yes     Partners: Male     Review of Systems   Reason unable to perform ROS: unable to get full ROS 2/2 lethargy    Constitutional: Positive for activity change.   Musculoskeletal: Positive for gait problem.   Neurological: Positive for weakness.   Psychiatric/Behavioral: Positive for confusion.     Objective:     Vital Signs (Most Recent):  Temp: 96.9 °F  (36.1 °C) (10/14/19 1327)  Pulse: 88 (10/14/19 1327)  Resp: 14 (10/14/19 1327)  BP: (!) 155/67 (10/14/19 1327)  SpO2: 97 % (10/14/19 1327)    Vital Signs (24h Range):  Temp:  [96.9 °F (36.1 °C)-100 °F (37.8 °C)] 96.9 °F (36.1 °C)  Pulse:  [] 88  Resp:  [14-32] 14  SpO2:  [93 %-100 %] 97 %  BP: (138-186)/(63-80) 155/67     Body mass index is 20.25 kg/m².    Physical Exam   Constitutional: She appears well-developed and well-nourished.   HENT:   Head: Atraumatic.   Pulmonary/Chest: Effort normal. No respiratory distress.   Musculoskeletal: She exhibits no tenderness or deformity.   Neurological:   - lethargic  - Not following all commands  - Able to answer name   Skin: Skin is warm and dry.   Psychiatric:   - bilateral wrist restraints in place   Nursing note and vitals reviewed.         Diagnostic Results:   Labs: Reviewed   EKG: Reviewed   CT: Reviewed    Assessment/Plan:     * Epidural abscess  - MRI L spine showed L2-L4 epidural abscess   - NSGY consulted and no sx at this time.    Debility  - Related to prolonged/acute hospital course.     Recommendations  -  Encourage mobility, OOB in chair at least 3 hours per day, and early ambulation as appropriate  -  PT/OT evaluate and treat  -  Pain management  -  Monitor for and prevent skin breakdown and pressure ulcers  · Early mobility, repositioning/weight shifting every 20-30 minutes when sitting, turn patient every 2 hours, proper mattress/overlay and chair cushioning, pressure relief/heel protector boots  -  DVT prophylaxis    -  Reviewed discharge options (IP rehab, SNF, HH therapy, and OP therapy)    Septic shock with acute organ dysfunction due to methicillin susceptible Staphylococcus aureus (MSSA)  - ID recommending Vanc, Cefepime, and Flagyl.   - Blood cx 10/11 NGTD    Pancolitis  -tolerating tube feeds  - rectal tube in place  - improvement    Participating with therapy. Will follow progress and discuss with rehab team for post acute care/rehab  recommendation.    Thank you for your consult.     Mariah Muller NP  Department of Physical Medicine & Rehab  [facility]

## 2019-10-14 NOTE — CODE/ RAPID DOCUMENTATION
"RAPID RESPONSE NURSE PROACTIVE ROUNDING NOTE     Time of Visit: 1030    Admit Date: 10/2/2019  LOS: 12  Code Status: Full Code   Date of Visit: 10/14/2019  : 1955  Age: 63 y.o.  Sex: female  Race: White  Bed: [address] [place] [address] A:   MRN: [id number]  Was the patient discharged from an ICU this admission? yes   Was the patient discharged from a PACU within last 24 hours?  no  Did the patient receive conscious sedation/general anesthesia in last 24 hours?  no  Was the patient in the ED within the past 24 hours?  no  Was the patient started on NIPPV within the past 24 hours?  no  Attending Physician: Yesenia Lerner*  Primary Service: [facility] MED R    ASSESSMENT     Diagnosis: Epidural abscess    Abnormal Vital Signs: BP (!) 155/67 (BP Location: Left arm, Patient Position: Lying)   Pulse 88   Temp 96.9 °F (36.1 °C) (Axillary)   Resp 14   Ht 5' 4" (1.626 m)   Wt 53.5 kg (118 lb)   SpO2 97%   Breastfeeding? No   BMI 20.25 kg/m²      Clinical Issues: Respiratory    Patient  has a past medical history of Anxiety, Carotid bruit, Chronic pain, Cystitis, Cystocele, unspecified (CODE), Depression, GI bleed, History of uterine fibroid, Shortness of breath on exertion, Spinal stenosis, and Urinary retention.    Upon entrance, pt resting comfortably in bed. Bedside RN and MD at bedside to assess pt. No concerns verbalized at this time.      INTERVENTIONS/ RECOMMENDATIONS     PRN suctioning. Monitor respiratory status.     Discussed plan of care with Jason FISHER.     PHYSICIAN ESCALATION     Yes/No  no    Disposition: Remain in room 1028.    FOLLOW-UP     Call back the Rapid Response Nurse, Ivone Barnard RN at [phone] for additional questions or concerns.          "

## 2019-10-14 NOTE — CARE UPDATE
Rapid Response Nurse Follow-up Note     Followed up with patient for proactive rounding.   Pt partially removed NGT while TF going. Trumpet removed per pt also. NGT removed, and trumpet replaced. RT NT suction and removed copious amounts of thick creamy tan secretions. NGT replaced. Chest xray to eval for new aspirate and KUB for NGT. CARMITA Lopez PA-C notified.  HR down to 115, O2 sats remain stable 96% 2 LNC. No acute respiratory distress noted. Reviewed plan of care with primary RN, MARÍA.   Please call Rapid Response RN, Kelli Owens RN with any questions or concerns at [phone].

## 2019-10-14 NOTE — NURSING
"POC reviewed. Pt Alert to self and place only but more awake compared to previous 2 days. Right nare NGT with TF @ continuous rate of 40 ml/hr with no nausea or complications noted. Left nare Nasal "trumpet" or airway remains intact for frequent nasopharyngeal suctioning to minimize trauma and maintain airway. Pt still shallow with tachypnea 32-40 breathes/min. Rapid nurse and respiratory therapist updated and following pt condition as well. Pt able to cough mildly compared to yesterday she could not protect airway. Pt coughs to command but still requires frequent nasopharyngeal suctioning via nasal airway. Flexiseal remains intact with watery BMs. Henriquez intact and remains in for accurate I&Os in critically sick patient. Pt turned frequently with pillows and wedge. Daughter at bedside all shift. No acute distress or needs at this time. WCTM   "

## 2019-10-14 NOTE — SUBJECTIVE & OBJECTIVE
Past Medical History:   Diagnosis Date    Anxiety     Carotid bruit     Chronic pain     Cystitis     Cystocele, unspecified (CODE)     Depression     GI bleed     History of uterine fibroid     Shortness of breath on exertion     Spinal stenosis     Urinary retention      Past Surgical History:   Procedure Laterality Date    ANTERIOR VAGINAL REPAIR  10-    CARDIAC CATHETERIZATION  age 12    CHOLECYSTECTOMY  2015    COLONOSCOPY  12/12/2018    aborted due to poor colon prep    COLONOSCOPY N/A 12/12/2018    Procedure: COLONOSCOPY;  Surgeon: Renard Gonsalves MD;  Location: [facility];  Service: Endoscopy;  Laterality: N/A;    HYSTERECTOMY  1989    AMANDA    LAMINECTOMY N/A 10/9/2019    Procedure: LAMINECTOMY, SPINE, L3, Open;  Surgeon: Martin Lewis DO;  Location: [facility] OR [address] [place] [address];  Service: Neurosurgery;  Laterality: N/A;    tubiligation       Review of patient's allergies indicates:   Allergen Reactions    Pcn [penicillins]        Scheduled Medications:    albuterol-ipratropium  3 mL Nebulization Q8H    ceFEPime (MAXIPIME) IVPB  2 g Intravenous Q8H    enoxaparin  40 mg Subcutaneous Daily    metroNIDAZOLE  500 mg Per NG tube Q8H    miconazole nitrate 2%   Topical (Top) BID    potassium phosphate IVPB  30 mmol Intravenous Once    sodium chloride 3%  4 mL Nebulization Q8H    vancomycin (VANCOCIN) IVPB (custom)  750 mg Intravenous Q24H       PRN Medications: acetaminophen, Dextrose 10% Bolus, hydrALAZINE, influenza, labetalol, mupirocin, mupirocin, ondansetron, pneumoc 13-susanna conj-dip cr(PF), sodium chloride 0.9%    Family History     Problem Relation (Age of Onset)    Alzheimer's disease Mother    Hypertension Brother, Sister    Osteoarthritis Mother    Thyroid disease Mother        Tobacco Use    Smoking status: Current Every Day Smoker     Packs/day: 0.50     Years: 40.00     Pack years: 20.00     Types: Cigarettes    Smokeless tobacco: Never Used   Substance and Sexual Activity     Alcohol use: No    Drug use: No    Sexual activity: Yes     Partners: Male     Review of Systems   Reason unable to perform ROS: unable to get full ROS 2/2 lethargy    Constitutional: Positive for activity change.   Musculoskeletal: Positive for gait problem.   Neurological: Positive for weakness.   Psychiatric/Behavioral: Positive for confusion.     Objective:     Vital Signs (Most Recent):  Temp: 96.9 °F (36.1 °C) (10/14/19 1327)  Pulse: 88 (10/14/19 1327)  Resp: 14 (10/14/19 1327)  BP: (!) 155/67 (10/14/19 1327)  SpO2: 97 % (10/14/19 1327)    Vital Signs (24h Range):  Temp:  [96.9 °F (36.1 °C)-100 °F (37.8 °C)] 96.9 °F (36.1 °C)  Pulse:  [] 88  Resp:  [14-32] 14  SpO2:  [93 %-100 %] 97 %  BP: (138-186)/(63-80) 155/67     Body mass index is 20.25 kg/m².    Physical Exam   Constitutional: She appears well-developed and well-nourished.   HENT:   Head: Atraumatic.   Pulmonary/Chest: Effort normal. No respiratory distress.   Musculoskeletal: She exhibits no tenderness or deformity.   Neurological:   - lethargic  - Not following all commands  - Able to answer name   Skin: Skin is warm and dry.   Psychiatric:   - bilateral wrist restraints in place   Nursing note and vitals reviewed.         Diagnostic Results:   Labs: Reviewed   EKG: Reviewed   CT: Reviewed

## 2019-10-14 NOTE — CONSULTS
[facility]  Palliative Medicine  Consult Note    Patient Name: Mesha Mckenzie  MRN: [id number]  Admission Date: 10/2/2019  Hospital Length of Stay: 12 days  Code Status: Full Code   Attending Provider: Yesenia Lerner*  Consulting Provider: Sharon Pollard MD  Primary Care Physician: Varun Shea MD PhD  Principal Problem:Epidural abscess    Patient information was obtained from spouse/SO, relative(s) and ER records.      Consults  Assessment/Plan:     63 year old female with history IVDU being treated for sepsis 2/2 TV/MV endocarditis. Prolonged hospital course has been complicated by worsening metabolic encephalopathy, hypoxic respiratory failure, epidural abscess, bilateral shoulder abscesses, and pancolitis.     GOC/advanced care planning     Spoke with patients  at bedside. Patients daughter Leeanna was on speaker phone.  Patient unable to participate in conversation 2/2 mental status. Introduced palliative care and how we can be helpful going forward. Daughter expressed a lot of frustration about not understanding the current plan of care, not understanding why her mother is no longer in the ICU, not understanding why the specialists are no longer seeing her, and about getting mixed messages as far as her moms condition and prognosis.   Most of the visit was spent reassuring patients daughter that she is getting excellent care. We talked about why some specialists have signed off and why some like ID are getting re-involved. We discussed the events of the weekend and her worsening mental status and how were worried that the patient is getting sicker. Daughter seemed very surprised that her mother could be getting worse.   Briefly discussed code status. Daughter was able to verbalize that her mother would never want to live on machines longterm. We talked about  Ari being the default decision maker. He understands but will want input from daughter Leeanna.      -Will continue GOC conversations. Would like to arrange for an in person meeting with  and daughter Leeanna. Leeanna currently unsure when she can come back to the hospital.   -Patient is Full code       Thank you for your consult. I will follow-up with patient. Please contact us if you have any additional questions.    Subjective:     HPI:   63 year old female with PMH of depression and IV drug use initially presented to [facility] on 9/19 febrile with N/V and weakness. 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 Patient was started on abx and transferred to [facility] for a higher level of care and neurosurgery evaluation with MRI L spine showing L2-L4 epidural abscess now s/p L3 laminectomy and debridement. Hospital course has further been complicated by acute hypoxic respiratory failure 2/2 left lung collapse requiring intubation now s/p extubation, metabolic encephalopathy that continues to worsen, bilateral shoulder abscesses s/p I&D, and pancolitis. Patient is not a candidate for cardiac surgery. Patient was stepped down to the floor 10/10. She has since spiked a temp with worsening tachycardia and mental status thought to be 2/2 septic emboli. Abx were broadened. ID has been re-consulted. Palliative has been consulted to assist with GOC     Hospital Course:  No notes on file        Past Medical History:   Diagnosis Date    Anxiety     Carotid bruit     Chronic pain     Cystitis     Cystocele, unspecified (CODE)     Depression     GI bleed     History of uterine fibroid     Shortness of breath on exertion     Spinal stenosis     Urinary retention        Past Surgical History:   Procedure Laterality Date    ANTERIOR VAGINAL REPAIR  10-    CARDIAC CATHETERIZATION  age 12    CHOLECYSTECTOMY  2015    COLONOSCOPY  12/12/2018    aborted due to poor colon prep    COLONOSCOPY N/A 12/12/2018    Procedure: COLONOSCOPY;  Surgeon: Renard Gonsalves MD;   Location: [facility] ENDO;  Service: Endoscopy;  Laterality: N/A;    HYSTERECTOMY  1989    AMANDA    LAMINECTOMY N/A 10/9/2019    Procedure: LAMINECTOMY, SPINE, L3, Open;  Surgeon: Martin Lewis DO;  Location: [facility] OR [address] [place] [address];  Service: Neurosurgery;  Laterality: N/A;    tubiligation         Review of patient's allergies indicates:   Allergen Reactions    Pcn [penicillins]        Medications:  Continuous Infusions:  Scheduled Meds:   albuterol-ipratropium  3 mL Nebulization Q8H    ceFEPime (MAXIPIME) IVPB  2 g Intravenous Q8H    enoxaparin  40 mg Subcutaneous Daily    metroNIDAZOLE  500 mg Per NG tube Q8H    miconazole nitrate 2%   Topical (Top) BID    sodium chloride 3%  4 mL Nebulization Q8H    vancomycin (VANCOCIN) IVPB (custom)  750 mg Intravenous Q24H     PRN Meds:acetaminophen, Dextrose 10% Bolus, hydrALAZINE, influenza, labetalol, mupirocin, mupirocin, ondansetron, pneumoc 13-susanna conj-dip cr(PF), sodium chloride 0.9%    Family History     Problem Relation (Age of Onset)    Alzheimer's disease Mother    Hypertension Brother, Sister    Osteoarthritis Mother    Thyroid disease Mother        Tobacco Use    Smoking status: Current Every Day Smoker     Packs/day: 0.50     Years: 40.00     Pack years: 20.00     Types: Cigarettes    Smokeless tobacco: Never Used   Substance and Sexual Activity    Alcohol use: No    Drug use: No    Sexual activity: Yes     Partners: Male       Review of Systems   Unable to perform ROS: Mental status change     Objective:     Vital Signs (Most Recent):  Temp: 96.9 °F (36.1 °C) (10/14/19 1327)  Pulse: 101 (10/14/19 1527)  Resp: 14 (10/14/19 1327)  BP: (!) 155/67 (10/14/19 1327)  SpO2: (!) 92 % (10/14/19 1527) Vital Signs (24h Range):  Temp:  [96.9 °F (36.1 °C)-100 °F (37.8 °C)] 96.9 °F (36.1 °C)  Pulse:  [] 101  Resp:  [14-32] 14  SpO2:  [92 %-100 %] 92 %  BP: (138-186)/(63-80) 155/67     Weight: 53.5 kg (118 lb)  Body mass index is 20.25 kg/m².    Review of Symptoms-  unable to obtain due to patient's mental status   Symptom Assessment (ESAS 0-10 scale)   ESAS 0 1 2 3 4 5 6 7 8 9 10   Pain              Dyspnea              Anxiety              Nausea              Depression               Anorexia              Fatigue              Insomnia              Restlessness               Agitation              CAM / Delirium __ --  _x__+   Constipation     _x_ --  ___+   Diarrhea           x__ --  ___+  Bowel Management Plan (BMP): No    Pain Assessment: patient appears comfortable     OME in 24 hours: 0     Performance Status: 30      Physical Exam   Constitutional: She appears lethargic. She appears toxic. She appears ill. No distress.   HENT:   NG in place    Cardiovascular: Tachycardia present.   Pulmonary/Chest: No respiratory distress.   Abdominal: She exhibits no distension.   Musculoskeletal:   Wrists in soft restraints    Neurological: She appears lethargic.   Does not answer simple questions        Significant Labs: All pertinent labs within the past 24 hours have been reviewed.  CBC:   Recent Labs   Lab 10/14/19  0430   WBC 11.73   HGB 7.2*   HCT 22.5*   MCV 97        BMP:  Recent Labs   Lab 10/14/19  0430   GLU 98  101     139   K 3.1*  3.1*   *  113*   CO2 17*  18*   BUN 28*  28*   CREATININE 0.6  0.7   CALCIUM 7.7*  7.9*   MG 1.6     LFT:  Lab Results   Component Value Date    AST 11 10/11/2019    ALKPHOS 94 10/11/2019    BILITOT 0.2 10/11/2019     Albumin:   Albumin   Date Value Ref Range Status   10/11/2019 1.4 (L) 3.5 - 5.2 g/dL Final     Protein:   Total Protein   Date Value Ref Range Status   10/11/2019 5.5 (L) 6.0 - 8.4 g/dL Final     Lactic acid:   Lab Results   Component Value Date    LACTATE 0.8 10/13/2019    LACTATE 0.8 10/11/2019       Significant Imaging: I have reviewed all pertinent imaging results/findings within the past 24 hours.    Advance Care Planning   Advanced Directives::  Living Will: No  LaPOST: No  Do Not Resuscitate  Status: No  Medical Power of :  Patient's default decision would be her  Ari. He relies on patient's daughter Leeanna to help him make decisions      Decision-Making Capacity: Family answered questions       Living Arrangements: Lives with spouse    Psychosocial/Cultural:  Patient lives with spouse Ari. Has a daughter Leeanna and son Nirmal from a previous marriage. Prior to getting sick patient enjoyed taking care of her dogs and cats at home. She was very attentive to her dog with diabetes. Patient was driving and independent in ADLs     Spiritual:     F- Aarti and Belief: Mormon    I - Importance:  reports patient prays everyday but does not attend mass   .  C - Community: no    A - Address in Care: family enjoys visits from        > 50% of 75 min visit spent in chart review, face to face discussion of goals of care,  symptom assessment, coordination of care and emotional support.    Sharon Pollard MD  Palliative Medicine  [facility]

## 2019-10-14 NOTE — PROGRESS NOTES
Plan of care reviewed with patient and family, verbalizes understanding. Patient oriented to person and place. Patient on 2L NC, sats remains above 95%, patient frequently suctioned, close to every hour using oral and naso-tracheal tube. Patient reports no SOB or trouble breathing, cont pulse ox on at all times. IM team made aware of worsening lung sounds and frequent suctioning. Patient with adequate urine output. Patient with continued loose stool noted through rectal tube. Wound care provided to bi lateral shoulder wounds. Patient turned every 2 hours, no skin breakdown noted to sacral area. Dressing to posterior back CDI. Patient able to be out of restraints for a few hours during shift, tolerated well with family at bedside. PRN labetalol given for high blood pressure, patients BP and heart rate respond approprietly. Patients blood sugars checked AC/HS. Patient alert, able to hold approiate conversation with me at points during the day. Patient up out of bed during shift, tolerated well, remains free of any falls/trauma. Patient states no other needs at this time, family at bedside, WCTM.

## 2019-10-14 NOTE — HOSPITAL COURSE
10/11/19: Evaluated by PT & OT. Bed mobility total- totalA x 2 ppl. Sat EOB modA x 2 ppl 8 mins. Grooming Misha.   10/14/19: Participated w/ PT & OT. Bed mobility totalA. Sit to stand maxA- HHA.  sat EOB mod-max assist for ~10 minutes.   10/21/19: Participated w/ PT & OT. Bed mobility min-modA. Sit to stand maxA w/ RW. Bed to chair modA- HHA. Toileting totalA. Ambulated  12ft then 12ft then 20ft with RW with modA.

## 2019-10-14 NOTE — PT/OT/SLP PROGRESS
"Physical Therapy Treatment    Patient Name:  Mesha Mckenzie   MRN:  [id number]    Recommendations:     Discharge Recommendations:  rehabilitation facility   Discharge Equipment Recommendations: (TBD as pt progresses)   Barriers to discharge: Inaccessible home and Decreased caregiver support    Assessment:     Mesha Mckenzie is a 63 y.o. female admitted with a medical diagnosis of Epidural abscess.  She presents with the following impairments/functional limitations:  weakness, impaired endurance, impaired functional mobilty, impaired balance, decreased lower extremity function, decreased upper extremity function, pain, decreased safety awareness(lethargy) . Pt limited with functional mobility due to pt inconsistent with following commands and answering questions. Pt lethargic and falling asleep at times.    Rehab Prognosis: Fair; patient would benefit from acute skilled PT services to address these deficits and reach maximum level of function.    Recent Surgery: Procedure(s) (LRB):  LAMINECTOMY, SPINE, L3, Open (N/A) 5 Days Post-Op    Plan:     During this hospitalization, patient to be seen 4 x/week to address the identified rehab impairments via gait training, therapeutic activities, therapeutic exercises, neuromuscular re-education and progress toward the following goals:    · Plan of Care Expires:  11/08/19    Subjective   Pt states "yes" when asked if she was in pain    Pain/Comfort:  · Pain Rating 1: (pt c/o pain but unable to grade pain or specify where it is)      Objective:     Communicated with nurse prior to session.  Patient found supine with astorga catheter, bowel management system, NG tube, telemetry, peripheral IV, pulse ox (continuous) upon PT entry to room.     General Precautions: Standard, NPO, fall   Orthopedic Precautions:N/A   Braces: N/A     Functional Mobility:  · Bed Mobility:     · Rolling Right: total assistance  · Supine to Sit: total assistance  · Sit to Supine: total " assistance  · Transfers:     · Sit to Stand:  maximal assistance with hand-held assist      AM-PAC 6 CLICK MOBILITY  Turning over in bed (including adjusting bedclothes, sheets and blankets)?: 2  Sitting down on and standing up from a chair with arms (e.g., wheelchair, bedside commode, etc.): 2  Moving from lying on back to sitting on the side of the bed?: 2  Moving to and from a bed to a chair (including a wheelchair)?: 1  Need to walk in hospital room?: 1  Climbing 3-5 steps with a railing?: 1  Basic Mobility Total Score: 9     Therapeutic Activities and Exercises:   pt sat on the EOB ~ 12 min with moderate to max assist due to R sided lean (pt may have been trying to return to supine) and posterior lean. Pt stood x 2 times with HHA with max assist of 1 for ~ 15 sec each trial.     Patient left supine with all lines intact, call button in reach, bed alarm on and nurse notified..    GOALS:   Multidisciplinary Problems     Physical Therapy Goals        Problem: Physical Therapy Goal    Goal Priority Disciplines Outcome Goal Variances Interventions   Physical Therapy Goal     PT, PT/OT Ongoing, Progressing     Description:  Goals to be met by: 2019    Patient will increase functional independence with mobility by performin. Supine <> sit with Moderate Assistance.-not met  2. Sit <> stand transfer with Moderate Assistance using Rolling Walker.-not met  3. Bed <> chair transfer via Step Transfer with Moderate Assistance using Rolling Walker.-not met  4. Dynamic sitting at edge of bed x 10 minutes with Minimal Assistance to prepare for functional tasks in sitting.-not met  5. Able to tolerate exercise for 15-20 reps with assistance as needed.-not met  6. Pt receive gait training ~ 50 ft with Rw and min assist - not met                             Time Tracking:     PT Received On: 10/14/19  PT Start Time: 840     PT Stop Time: 55  PT Total Time (min): 15 min     Billable Minutes: Therapeutic Activity  15    Treatment Type: Treatment  PT/PTA: PT     PTA Visit Number: 0     Lauren Paige, PT  10/14/2019

## 2019-10-14 NOTE — ASSESSMENT & PLAN NOTE
64yo female with IVDU presents as transfer from [facility] for epidural abscess, likely secondary to MSSA bacteremia and endocarditis with vegetations of the MV and TV on DHARMESH 9/26 c/b bilateral multifocal pneumonia with cavitary lesions, right pleural effusion s/p thoracentesis on 10/1 w/ concern for empyema, pan-colitis, and MRI brain 10/8 with evidence of new embolic infarcts. Followed by ID at [facility], most recently on daptomycin, cefazolin, and vancomycin. Intubated shortly after arrival to [facility] for hypoxia. Noted to have penicillin allergy, allergy tested here and found to be false allergy. On 10/4 the patient was started on oxacillin with plans for 6 week course (end date 11/20). On 10/6 patient underwent aspiration and drainage of shoulder abscesses (growing MSSA). On 10/9 she underwent L3 laminectomy with washout of infection. Blood cultures have been without growth since 9/29. On 10/11 the patient had a change in mental status characterized by somnolence and is waxing and waning in nature, isolated fever 101.2, and leukocytosis with WBC 15K. Antibiotics broadened from oxacillin to Vanc, Cefepime, and flagyl. Blood cultures 10/11 without growth thus far. UA with 45 WBC, 20 RBC but no reflex?. CXR with no  Patient has been afebrile since but still with altered mental status. If mental status was related to underlying infection would expect it to have improved with broadening of antibiotics. Patient could have had some insult to brain and or aspirated.     Recommendations:  -Recommend stopping Cefepime, Vanc, and Flagyl  -Restart Oxacillin for MSSA Endocarditis   -Will monitor patient closely with this de-escalation  -Obtain urine culture  -Follow up blood cultures  -Recommend obtaining repeat CT head to evaluate new change in mental status  -Aspiration precautions  -Will still plan to discharge patient on OXACILLIN 12G IV continuous  for 6 weeks, estimated end of therapy date  11/20

## 2019-10-14 NOTE — PLAN OF CARE
Problem: Physical Therapy Goal  Goal: Physical Therapy Goal  Description  Goals to be met by: 2019    Patient will increase functional independence with mobility by performin. Supine <> sit with Moderate Assistance.-not met  2. Sit <> stand transfer with Moderate Assistance using Rolling Walker.-not met  3. Bed <> chair transfer via Step Transfer with Moderate Assistance using Rolling Walker.-not met  4. Dynamic sitting at edge of bed x 10 minutes with Minimal Assistance to prepare for functional tasks in sitting.-not met  5. Able to tolerate exercise for 15-20 reps with assistance as needed.-not met  6. Pt receive gait training ~ 50 ft with Rw and min assist - not met            Outcome: Ongoing, Progressing   Pt's goals remain appropriate and pt will continue to benefit from skilled PT services to work towards improved functional mobility including: bed mobility, transfers, and gait.   Lauren Paige, PT  10/14/2019     Respiratory symptoms:  Negative except as documented in HPI. Cardiovascular symptoms:  Negative except as documented in HPI. Chest discomfort  Gastrointestinal symptoms: Negative except for documented as above in the HPI   Genitourinary symptoms:  Negative except as documented in HPI. Musculoskeletal symptoms:  Negative except as documented in HPI. Neurologic symptoms:  Negative except as documented in HPI. Remainder of 10 systems, all negative except for mentioned above      Except as noted above the remainder of the review of systems was reviewed and negative. PAST MEDICAL HISTORY     Past Medical History:   Diagnosis Date    ADD (attention deficit disorder)          SURGICALHISTORY       Past Surgical History:   Procedure Laterality Date    APPENDECTOMY           CURRENT MEDICATIONS       Previous Medications    AMPHETAMINE-DEXTROAMPHETAMINE (ADDERALL, 15MG,) 15 MG TABLET    Take 15 mg by mouth daily . LISDEXAMFETAMINE (VYVANSE) 30 MG CAPSULE    Take 30 mg by mouth every morning. ALLERGIES     Patient has no known allergies. FAMILY HISTORY     History reviewed. No pertinent family history.        SOCIAL HISTORY       Social History     Socioeconomic History    Marital status: Single     Spouse name: None    Number of children: None    Years of education: None    Highest education level: None   Occupational History    None   Social Needs    Financial resource strain: None    Food insecurity:     Worry: None     Inability: None    Transportation needs:     Medical: None     Non-medical: None   Tobacco Use    Smoking status: Never Smoker    Smokeless tobacco: Never Used   Substance and Sexual Activity    Alcohol use: No    Drug use: No    Sexual activity: None   Lifestyle    Physical activity:     Days per week: None     Minutes per session: None    Stress: None   Relationships    Social connections:     Talks on phone: None     Gets together: None     Attends [employer] a cardiologist.    EKG was revewed  and revealed normal sinus rhythm, rate is 70-85. no acute ST elevations, positive in 2 leads flipped T waves , no Q waves. DC interval is normal.QRS duration is normal. Axes are normal. There are no PVCs    RADIOLOGY:   Non-plain filmimages such as CT, Ultrasound and MRI are read by the radiologist. Plain radiographic images are visualized and preliminarily interpreted by the emergency physician with the below findings:        Interpretation per the Radiologist below, if available at the time ofthis note:    No orders to display         ED BEDSIDE ULTRASOUND:   Performed by ED Physician - none    LABS:  Labs Reviewed - No data to display    All other labs were within normal range or not returned as of this dictation. EMERGENCY DEPARTMENT COURSE and DIFFERENTIAL DIAGNOSIS/MDM:   Vitals:    Vitals:    09/01/19 0112   BP: (!) 141/67   Pulse: 61   Resp: 16   Temp: 98.5 °F (36.9 °C)   TempSrc: Oral   SpO2: 99%   Weight: 170 lb (77.1 kg)   Height: 5' 11\" (1.803 m)           MDM     I believe patient needs a work-up for cardiomyopathy to make absolutely certain that he does not have the beginnings of this. Pediatrician follow-up in a week. I am almost certain that this is an anxiety issue, we discussed the need to be on Vyvanse. Mother understands that Vyvanse can certainly cause chest pain and palpitations. Mother is evaluating the situation as we speak. I am pain to take him off of practice at this particular juncture because he is very attached to his football game. We discussed the schoolwork. We also discussed the fact that if he gets chest pain or shortness of breath during practice, he needs to stop. Pediatrician next week and mother is aware.   He does not have practice for the next couple of days, he will rest.  He will not play football if he has chest pain at the beginning of a football game or football practice    CRITICAL CARE TIME   Total Critical Care time was minutes, excluding separately reportableprocedures. There was a high probability of clinicallysignificant/life threatening deterioration in the patient's condition which required my urgent intervention. CONSULTS:  None    PROCEDURES:  Unless otherwise noted below, none     Procedures    FINAL IMPRESSION      1. Atypical chest pain    2. Anxiety state          DISPOSITION/PLAN   DISPOSITION Decision To Discharge 09/01/2019 01:44:15 AM      PATIENT REFERRED TO:  Romi Steven DO  [id number]  [facility] [address]  [phone]    In 3 days  Return for weakening, worsening, chest pain, fever      DISCHARGE MEDICATIONS:  New Prescriptions    ALPRAZOLAM (XANAX) 0.25 MG TABLET    Take 1 tablet by mouth 3 times daily as needed for Anxiety for up to 30 days.           (Please note that portions of this note were completed with a voice recognition program.  Efforts were made to edit the dictations but occasionally words are mis-transcribed.)    Michel Salmon MD (electronically signed)  Attending Emergency Physician          Michel Salmon MD  09/01/19 [id number]

## 2019-10-14 NOTE — SUBJECTIVE & OBJECTIVE
Past Medical History:   Diagnosis Date    Anxiety     Carotid bruit     Chronic pain     Cystitis     Cystocele, unspecified (CODE)     Depression     GI bleed     History of uterine fibroid     Shortness of breath on exertion     Spinal stenosis     Urinary retention        Past Surgical History:   Procedure Laterality Date    ANTERIOR VAGINAL REPAIR  10-    CARDIAC CATHETERIZATION  age 12    CHOLECYSTECTOMY  2015    COLONOSCOPY  12/12/2018    aborted due to poor colon prep    COLONOSCOPY N/A 12/12/2018    Procedure: COLONOSCOPY;  Surgeon: Renard Gonsalves MD;  Location: [facility];  Service: Endoscopy;  Laterality: N/A;    HYSTERECTOMY  1989    AMANDA    LAMINECTOMY N/A 10/9/2019    Procedure: LAMINECTOMY, SPINE, L3, Open;  Surgeon: Martin Lewis DO;  Location: [facility] OR [address] [place] [address];  Service: Neurosurgery;  Laterality: N/A;    tubiligation         Review of patient's allergies indicates:   Allergen Reactions    Pcn [penicillins]        Medications:  Continuous Infusions:  Scheduled Meds:   albuterol-ipratropium  3 mL Nebulization Q8H    ceFEPime (MAXIPIME) IVPB  2 g Intravenous Q8H    enoxaparin  40 mg Subcutaneous Daily    metroNIDAZOLE  500 mg Per NG tube Q8H    miconazole nitrate 2%   Topical (Top) BID    sodium chloride 3%  4 mL Nebulization Q8H    vancomycin (VANCOCIN) IVPB (custom)  750 mg Intravenous Q24H     PRN Meds:acetaminophen, Dextrose 10% Bolus, hydrALAZINE, influenza, labetalol, mupirocin, mupirocin, ondansetron, pneumoc 13-susanna conj-dip cr(PF), sodium chloride 0.9%    Family History     Problem Relation (Age of Onset)    Alzheimer's disease Mother    Hypertension Brother, Sister    Osteoarthritis Mother    Thyroid disease Mother        Tobacco Use    Smoking status: Current Every Day Smoker     Packs/day: 0.50     Years: 40.00     Pack years: 20.00     Types: Cigarettes    Smokeless tobacco: Never Used   Substance and Sexual Activity    Alcohol use: No    Drug use:  No    Sexual activity: Yes     Partners: Male       Review of Systems   Unable to perform ROS: Mental status change     Objective:     Vital Signs (Most Recent):  Temp: 96.9 °F (36.1 °C) (10/14/19 1327)  Pulse: 101 (10/14/19 1527)  Resp: 14 (10/14/19 1327)  BP: (!) 155/67 (10/14/19 1327)  SpO2: (!) 92 % (10/14/19 1527) Vital Signs (24h Range):  Temp:  [96.9 °F (36.1 °C)-100 °F (37.8 °C)] 96.9 °F (36.1 °C)  Pulse:  [] 101  Resp:  [14-32] 14  SpO2:  [92 %-100 %] 92 %  BP: (138-186)/(63-80) 155/67     Weight: 53.5 kg (118 lb)  Body mass index is 20.25 kg/m².    Review of Symptoms- unable to obtain due to patient's mental status   Symptom Assessment (ESAS 0-10 scale)   ESAS 0 1 2 3 4 5 6 7 8 9 10   Pain              Dyspnea              Anxiety              Nausea              Depression               Anorexia              Fatigue              Insomnia              Restlessness               Agitation              CAM / Delirium __ --  _x__+   Constipation     _x_ --  ___+   Diarrhea           x__ --  ___+  Bowel Management Plan (BMP): No    Pain Assessment: patient appears comfortable     OME in 24 hours: 0     Performance Status: 30      Physical Exam   Constitutional: She appears lethargic. She appears toxic. She appears ill. No distress.   HENT:   NG in place    Cardiovascular: Tachycardia present.   Pulmonary/Chest: No respiratory distress.   Abdominal: She exhibits no distension.   Musculoskeletal:   Wrists in soft restraints    Neurological: She appears lethargic.   Does not answer simple questions        Significant Labs: All pertinent labs within the past 24 hours have been reviewed.  CBC:   Recent Labs   Lab 10/14/19  0430   WBC 11.73   HGB 7.2*   HCT 22.5*   MCV 97        BMP:  Recent Labs   Lab 10/14/19  0430   GLU 98  101     139   K 3.1*  3.1*   *  113*   CO2 17*  18*   BUN 28*  28*   CREATININE 0.6  0.7   CALCIUM 7.7*  7.9*   MG 1.6     LFT:  Lab Results   Component Value  Date    AST 11 10/11/2019    ALKPHOS 94 10/11/2019    BILITOT 0.2 10/11/2019     Albumin:   Albumin   Date Value Ref Range Status   10/11/2019 1.4 (L) 3.5 - 5.2 g/dL Final     Protein:   Total Protein   Date Value Ref Range Status   10/11/2019 5.5 (L) 6.0 - 8.4 g/dL Final     Lactic acid:   Lab Results   Component Value Date    LACTATE 0.8 10/13/2019    LACTATE 0.8 10/11/2019       Significant Imaging: I have reviewed all pertinent imaging results/findings within the past 24 hours.    Advance Care Planning   Advanced Directives::  Living Will: No  LaPOST: No  Do Not Resuscitate Status: No  Medical Power of :  Patient's default decision would be her  Ari. He relies on patient's daughter Leeanna to help him make decisions      Decision-Making Capacity: Family answered questions       Living Arrangements: Lives with spouse    Psychosocial/Cultural:  Patient lives with spouse Air. Has a daughter Leeanna and son Nirmal from a previous marriage. Prior to getting sick patient enjoyed taking care of her dogs and cats at home. She was very attentive to her dog with diabetes. Patient was driving and independent in ADLs     Spiritual:     F- Aarti and Belief: [employer]ism    I - Importance:  reports patient prays everyday but does not attend mass   .  C - Community: no    A - Address in Care: family enjoys visits from

## 2019-10-14 NOTE — HPI
Mesha Mckenzie is a 63-year-old female with PMHx of IVDU. Patient presented to [place] 9/19 with N/V/D, weakness, and fever. Was treated for MV/TV valve endocarditis with septic emboli eventually transferred to [facility] on 10/2. Blood cultures (9/19 - 9/26) grew MSSA. Repeat blood cxs 9/29 negative. 10/5 bilateral shoulder abscess, s/p I&D was + staph aureus. MRI L spine showed L2-L4 epidural abscess and NSGY consulted and S/p L3 laminectomy and debridement of paraspinal abscess. Hospital course complicated by fever and new leukocytosis and AMS(ID recommending Vanc, Cefepime, and Flagyl. Blood cx 10/11 NGTD), pancolitis (tolerating tube feeds, rectal tube in place, improvement), tachycardia (possible inflammatory response to infection per HM).    Functional History: Patient lives with her .  Prior to admission, (I). DME: none.

## 2019-10-14 NOTE — PT/OT/SLP PROGRESS
Occupational Therapy   Treatment    Name: Mesha Mckenzie  MRN: [id number]  Admitting Diagnosis:  Epidural abscess  5 Days Post-Op    Recommendations:     Discharge Recommendations: rehabilitation facility  Discharge Equipment Recommendations:  (TBD)  Barriers to discharge:  None    Assessment:     Mesha Mckenzie is a 63 y.o. female with a medical diagnosis of Epidural abscess.  She presents with lethargy, but can be aroused with tactile and verbal input. Session was limited due to pt's cognitive status, decreased safety awareness and pain. Performance deficits affecting function are weakness, impaired cardiopulmonary response to activity, impaired endurance, impaired self care skills, impaired functional mobilty, impaired balance, decreased coordination, impaired coordination. Pt would benefit from skilled OT services in order to maximize independence with ADLs and facilitate safe discharge.      Rehab Prognosis:  Good; patient would benefit from acute skilled OT services to address these deficits and reach maximum level of function.       Plan:     Patient to be seen 3 x/week to address the above listed problems via self-care/home management, therapeutic activities, therapeutic exercises  · Plan of Care Expires: 11/10/19  · Plan of Care Reviewed with: patient    Subjective     Pain/Comfort:  · Pain Rating 1: (unable to rate pain or state location of pain)  · Pain Addressed 1: Reposition, Cessation of Activity, Distraction    Objective:     Communicated with: RN prior to session.  Patient found HOB elevated with astorga catheter, bowel management system, NG tube, telemetry, peripheral IV upon OT entry to room.    General Precautions: Standard, fall, NPO   Orthopedic Precautions:N/A   Braces: N/A     Occupational Performance:     Bed Mobility:    · Patient completed Rolling/Turning to Right with total assistance  · Patient completed Supine to Sit with total assistance  · Patient completed Sit to Supine with total  assistance   · Pt sat EOB mod-max assist for ~10 minutes     Functional Mobility/Transfers:  · Patient completed 2x Sit <> Stand Transfer with maximal assistance and of 2 persons  with  hand-held assist       Activities of Daily Living:  · Grooming: maximal assistance to wash face and comb hair seated EOB      AMPA 6 Click ADL: 7    Treatment & Education:  -Pt required verbal and physical cues to open eyes and look forward throughout session  -Pt required encouragement to remain seated EOB and not return supine  -Pt educated on role of OT, POC and goals for therapy  -Pt educated on importance of OOB activities with staff member assistance and sitting OOB majority of the day.   -whiteboard updated        Patient left HOB elevated with all lines intact, call button in reach, restraints reapplied at end of session and RN notifiedEducation:      GOALS:   Multidisciplinary Problems     Occupational Therapy Goals        Problem: Occupational Therapy Goal    Goal Priority Disciplines Outcome Interventions   Occupational Therapy Goal     OT, PT/OT Ongoing, Progressing    Description:  Goals to be met by: 10/31/19     Patient will increase functional independence with ADLs by performing:    UE Dressing with Moderate Assistance.  LE Dressing with Moderate Assistance.  Grooming while seated with Minimal Assistance.  Toileting from bedside commode with Moderate Assistance for hygiene and clothing management.   Rolling to Right, Left with Moderate Assistance.   Supine to sit with Moderate Assistance.  Toilet transfer to bedside commode with Moderate Assistance.                      Time Tracking:     OT Date of Treatment: 10/14/19  OT Start Time: 0840  OT Stop Time: 0855  OT Total Time (min): 15 min    Billable Minutes:Self Care/Home Management 15    Marine Elizabeth OT  10/14/2019

## 2019-10-14 NOTE — PROGRESS NOTES
[facility]  Infectious Disease  Progress Note    Patient Name: Mesha Mckenzie  MRN: [id number]  Admission Date: 10/2/2019  Length of Stay: 12 days  Attending Physician: Yesenia Lerner*  Primary Care Provider: Varun Shea MD PhD    Isolation Status: No active isolations  Assessment/Plan:      Endocarditis  62yo female with IVDU presents as transfer from [facility] for epidural abscess, likely secondary to MSSA bacteremia and endocarditis with vegetations of the MV and TV on DHARMESH 9/26 c/b bilateral multifocal pneumonia with cavitary lesions, right pleural effusion s/p thoracentesis on 10/1 w/ concern for empyema, pan-colitis, and MRI brain 10/8 with evidence of new embolic infarcts. Followed by ID at [facility], most recently on daptomycin, cefazolin, and vancomycin. Intubated shortly after arrival to [facility] for hypoxia. Noted to have penicillin allergy, allergy tested here and found to be false allergy. On 10/4 the patient was started on oxacillin with plans for 6 week course (end date 11/20). On 10/6 patient underwent aspiration and drainage of shoulder abscesses (growing MSSA). On 10/9 she underwent L3 laminectomy with washout of infection. Blood cultures have been without growth since 9/29. On 10/11 the patient had a change in mental status characterized by somnolence and is waxing and waning in nature, isolated fever 101.2, and leukocytosis with WBC 15K. Antibiotics broadened from oxacillin to Vanc, Cefepime, and flagyl. Blood cultures 10/11 without growth thus far. UA with 45 WBC, 20 RBC but no reflex?. CXR with no  Patient has been afebrile since but still with altered mental status. If mental status was related to underlying infection would expect it to have improved with broadening of antibiotics. Patient could have had some insult to brain and or aspirated.     Recommendations:  -Recommend stopping Cefepime, Vanc, and Flagyl  -Restart Oxacillin for MSSA  Endocarditis   -Will monitor patient closely with this de-escalation  -Obtain urine culture  -Follow up blood cultures  -Recommend obtaining repeat CT head to evaluate new change in mental status  -Aspiration precautions  -Will still plan to discharge patient on OXACILLIN 12G IV continuous  for 6 weeks, estimated end of therapy date 11/20                  Thank you for your consult. I will follow-up with patient. Please contact us if you have any additional questions.    Bradley Villalobos MD  Infectious Disease  [facility]    Subjective:     Principal Problem:Epidural abscess    HPI: Mesha Mckenzie is a 64 yo female with IVDU (heroin), prior GIB in 2018, and spinal stenosis who presents from [facility] for neurosurgery eval for epidural abscess in setting of MSSA endocarditis. She had initially presented to [place] on 9/19 for evaluation of nausea, vomiting, and diarrhea for the past 5 days. 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 She was initially started on vancomycin, flagyl, and levoquin. Blood cultures from 9/19 positive for MSSA and were thought to be likely source. Urine culture from 9/19 also positive for MSSA. CT head on 9/19 and 9/22 negative for septic emboli. TTE on 9/21 showed possible vegetation on mitral valve (EF 55%).  DHARMESH was performed on 9/25, showing mitral valve vegetation (5x6mm) on the posterior leaflet as well as tricuspid valve vegetation (10x5mm) on septal leaflet. CT chest was repeated on 9/26 and was showed for previously seen bilateral consolidations that were now noted to be cavitary/cystic in nature. Repeat TTE on 9/26 reported reduced EF from previous 55% to 33%. Cardiothoracic evaluated patient for valve replacement but surgery was deferred due to patient's  multiple medical issues. Last positive blood culture was 9/26. Her antibiotics were later changed to daptomycin, cefazolin, and vancomycin. Thoracentesis on 10/1 showed evidence of empyema. MRI spine on 10/1 showed lumbar paraspinal muscle infection and small dorsal epidural abscess from L2-4. She was [place] [facility] [place].     On arrival to [facility], she was noted to be encephalopathic. Shortly after arriving, she became hypoxic and hypertensive and required intubation. She also required neuromuscular blockade for tachypnea and vent dyssynchrony. Vasopressors were also started due to hypotension.  Interval History:   ID is being re-consulted for an acute change in patient's clinical status. On 10/11 the patient became much more somnolent, had one fever up to 101.2, and developed leukocytosis. She is now waxing and waning in mental status, at times waking up and being able to converse and at other times minimally responsive to questions. Central line discontinued at the time. Has been afebrile since 10/11. Blood cultures since then have been without growth. The patient denies pain anywhere but difficult to get full history given somnolence. Having loose stools per nursing.     Review of Systems   Unable to perform ROS: Mental status change     Objective:     Vital Signs (Most Recent):  Temp: 96.9 °F (36.1 °C) (10/14/19 1327)  Pulse: 101 (10/14/19 1527)  Resp: 14 (10/14/19 1327)  BP: (!) 155/67 (10/14/19 1327)  SpO2: (!) 92 % (10/14/19 1527) Vital Signs (24h Range):  Temp:  [96.9 °F (36.1 °C)-100 °F (37.8 °C)] 96.9 °F (36.1 °C)  Pulse:  [] 101  Resp:  [14-32] 14  SpO2:  [92 %-100 %] 92 %  BP: (138-186)/(63-80) 155/67     Weight: 53.5 kg (118 lb)  Body mass index is 20.25 kg/m².    Estimated Creatinine Clearance: 67.7 mL/min (based on SCr of 0.7 mg/dL).    Physical Exam   Constitutional: She is oriented to person, place, and time. She appears well-developed and well-nourished. No distress.   Ill  appearing lady   HENT:   Head: Normocephalic and atraumatic.   NGT and trumpet in place   Eyes: Pupils are equal, round, and reactive to light. No scleral icterus.   Neck: Normal range of motion. Neck supple. No JVD present.   Cardiovascular: Normal rate, regular rhythm and normal heart sounds. Exam reveals no gallop and no friction rub.   No murmur heard.  Pulmonary/Chest: Effort normal. No respiratory distress. She has no wheezes. She has no rales.   Bilateral course breath sounds   Abdominal: Soft. Bowel sounds are normal. She exhibits no distension. There is no tenderness.   Musculoskeletal: She exhibits no edema.   Neurological: She is alert and oriented to person, place, and time.   Skin: Skin is warm and dry.       Significant Labs:   Blood Culture:   Recent Labs   Lab 10/01/19  0534 10/02/19  0445 10/02/19  0830 10/02/19  2025 10/11/19  1701   LABBLOO No growth after 5 days. No growth after 5 days. No growth after 5 days. No growth after 5 days. No Growth to date  No Growth to date  No Growth to date  No Growth to date  No Growth to date  No Growth to date     CBC:   Recent Labs   Lab 10/13/19  0535 10/13/19  1347 10/14/19  0430   WBC 16.23* 15.18* 11.73   HGB 8.3* 8.1* 7.2*   HCT 26.1* 25.9* 22.5*    309 247     CMP:   Recent Labs   Lab 10/13/19  0535 10/13/19  1347 10/14/19  0430    138 141  139   K 2.8* 4.1 3.1*  3.1*    113* 115*  113*   CO2 19* 18* 17*  18*    122* 98  101   BUN 25* 27* 28*  28*   CREATININE 0.7 0.7 0.6  0.7   CALCIUM 7.7* 7.7* 7.7*  7.9*   ANIONGAP 9 7* 9  8   EGFRNONAA >60.0 >60.0 >60.0  >60.0       Significant Imaging:   FINDINGS:  There is a right IJ CVP line with tip projected over SVC.  NG tube is seen coursing towards the stomach.  Trachea is patent.  Cardiac silhouette appears unchanged.  There is atherosclerosis.  Persistent opacities in the left lower lung zone.  Quite sprayed pulmonary opacities are also identified, overall similar to  prior.  There is no pneumothorax or free air below the diaphragm.  There is no acute osseous abnormality.      Impression       Stable appearance of the chest.  No interval worsening.

## 2019-10-14 NOTE — PLAN OF CARE
CM at bedside to see patient .  Palliative Care consult pending.  Explained CM team continuing to follow the patient throughout the hospital admission for discharge needs and assistance.  CM name, phone # and anticipated D/C date updated on whiteboard.     10/14/19 1038   Discharge Reassessment   Assessment Type Discharge Planning Reassessment   Provided patient/caregiver education on the expected discharge date and the discharge plan Yes   Discharge Plan A Rehab   Discharge Plan B Hospice/home   DME Needed Upon Discharge    (TBD)   Anticipated Discharge Disposition Rehab   Post-Acute Status   Post-Acute Authorization Placement   Post-Acute Placement Status Awaiting Internal Medical Clearance

## 2019-10-14 NOTE — PLAN OF CARE
Goals remain appropriate, continue POC  Marine Elizabeth, JORDAN  10/14/2019    Problem: Occupational Therapy Goal  Goal: Occupational Therapy Goal  Description  Goals to be met by: 10/31/19     Patient will increase functional independence with ADLs by performing:    UE Dressing with Moderate Assistance.  LE Dressing with Moderate Assistance.  Grooming while seated with Minimal Assistance.  Toileting from bedside commode with Moderate Assistance for hygiene and clothing management.   Rolling to Right, Left with Moderate Assistance.   Supine to sit with Moderate Assistance.  Toilet transfer to bedside commode with Moderate Assistance.     Outcome: Ongoing, Progressing

## 2019-10-14 NOTE — HPI
63 year old female with PMH of depression and IV drug use initially presented to [place] on 9/19 febrile with N/V and weakness. 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 Patient was started on abx and transferred [place] [facility] [place] for a higher level of care and neurosurgery evaluation with MRI L spine showing L2-L4 epidural abscess now s/p L3 laminectomy and debridement. Hospital course has further been complicated by acute hypoxic respiratory failure 2/2 left lung collapse requiring intubation now s/p extubation, metabolic encephalopathy that continues to worsen, bilateral shoulder abscesses s/p I&D, and pancolitis. Patient is not a candidate for cardiac surgery. Patient was stepped down to the floor 10/10. She has since spiked a temp with worsening tachycardia and mental status thought to be 2/2 septic emboli. Abx were broadened. ID has been re-consulted. Palliative has been consulted to assist with GOC

## 2019-10-14 NOTE — CONSULTS
Palliative Care Acknowledgement of Consult - .date    Consult received. Palliative Care Provider:_ Dr. Pollard will touch base with team prior to seeing patient. Full consult to follow.    Thank you for allowing us to be a part of the care of this patient.          Lauren Knowles, [place], ACHP-SW

## 2019-10-14 NOTE — PROGRESS NOTES
Pt removed NG tube, nasal airway, and nasal cannula. Continued to be uncooperative and attempting to remove medical devices. MD notified, rapid nurse at bedside. NG tube and nasal airway reinserted. Soft, nonviolent restraints placed to left and right wrists. Chest x-ray ordered. VS stable. WCTM.

## 2019-10-14 NOTE — MEDICAL/APP STUDENT
Subjective:       Patient ID: Mesha Mckenzie is a 63 y.o. female.    Chief Complaint: No chief complaint on file.    HPI   Patient is a 64 yo F with history of IV drug use who was transferred from [facility] for epidural abscess secondary to MSSA bacteremia and endocarditis.  She originally presented to [place] on 9/19/19 for a 5 day history of nausea, vomiting, and diarrhea.  She was transferred to the [facility] MICU for septic shock requiring pressors.  They found bilateral pneumonia and pan-colitis and she was placed on vancomycin, flagyl, and levoquin.  Blood and urine cultures grew MSSA. CT chest (9/26/19) showed bilateral consolidation with cavitary/cystic lesions.  A DHARMESH (9/26/19) revealed vegetations on the mitral valve and tricuspid valve. On 10/1/19, thoracentesis was performed with concern for empyema and an MRI showed an epidural abscess at L2-L3.      [place] [facility] [place].  On admission she was encephalopathic and hypoxic resulting in intubation.  Neurosurgery performed an L3 laminectomy with debridement of the abscess on 10/12/19.  ID recommended oxacillin 12 g IV for 6 weeks to end on 11/20/19 with weekly CBC, CMP, ESR, and CRP.  At this time ID signed off.      On 10/11/19, the patient became febrile with Tmax of 101.2 and became more somnolent.  Antibiotics where broadened to include vancomycin, cefepime, and flagyl.  The Henriquez catheter was exchanged and the central line was removed.  Repeat blood cultures where obtained and CXR showed no new changes.  ID was consulted for de-escalation of antibiotics.      Overnight, the patient had to be placed in restraints due to attempting to pull out lines.  The patient remained afebrile overnight.      Review of Systems    Unable to perform due to mental status    Objective:     Temp:  [96.9 °F (36.1 °C)-100 °F (37.8 °C)] 96.9 °F (36.1 °C)  Pulse:  [] 103  Resp:  [14-32] 31  SpO2:  [92  %-100 %] 94 %  BP: (138-186)/(63-80) 155/67  Physical Exam   Constitutional: No distress.   HENT:   Head: Normocephalic and atraumatic.   Eyes: Pupils are equal, round, and reactive to light.   Cardiovascular: Normal rate, regular rhythm, normal heart sounds and intact distal pulses.   No murmur heard.  Pulmonary/Chest: Effort normal. No stridor. No respiratory distress. She has no wheezes.   Coarse breathe sounds bilaterally   Abdominal: Soft. Bowel sounds are normal. She exhibits no distension and no mass. There is no tenderness. There is no guarding.   Musculoskeletal: She exhibits no edema or deformity.   Lymphadenopathy:     She has no cervical adenopathy.   Neurological:   Somnolent but arousable    Skin: Skin is warm and dry. Capillary refill takes less than 2 seconds. She is not diaphoretic.       CBC:  Recent Labs   Lab 10/14/19  0430   WBC 11.73   HGB 7.2*   HCT 22.5*        CMP:  Recent Labs   Lab 10/14/19  0430   CALCIUM 7.7*  7.9*     139   K 3.1*  3.1*   CO2 17*  18*   *  113*   BUN 28*  28*   CREATININE 0.6  0.7     Blood culture (10/11/19) - no growth to date    CXR (10/14/19) - no significant changes     XR Abdomen (10/14/19) - enteric tube tip in stomach     Assessment:       1. Acute bacterial endocarditis    2. Epidural abscess    3. Hypoxemia    4. Endocarditis    5. Bradycardia    6. Acute hypoxemic respiratory failure    7. Shock    8. Encephalopathy, metabolic    9. Endocarditis, unspecified chronicity, unspecified endocarditis type    10. Tachycardia    11. Staphylococcus aureus bacteremia with sepsis    12. Abscess of upper extremity    13. Arrhythmia    14. Debility    15. Pancolitis        Plan:       Endocarditis  - DHARMESH from 9/26/19 shows mitral and tricuspid regurgitation  - Patient remains afebrile and without a leukocytosis   - Blood cultures (10/11/19) show no growth to date  - Obtain urine cultures  - Restart oxacillin for MSSA endocarditis   - Would  recommend repeating CT head due to change in mental status       Patria Hernandez  Medical Student Year 4

## 2019-10-14 NOTE — PROGRESS NOTES
Pharmacokinetic Assessment Follow Up: IV Vancomycin    Vancomycin serum concentration assessment(s):    · The trough level was drawn correctly and can be used to guide therapy at this time. The measurement is within the desired definitive target range of 15 to 20 mcg/mL.    Vancomycin Regimen Plan:    · Continue regimen to Vancomycin 750 mg IV every 24 hours with next serum trough concentration measured at 1800 prior to next dose on 10/16/19    Drug levels (last 3 results):  Recent Labs   Lab Result Units 10/13/19  1823   Vancomycin-Trough ug/mL 18.0       Pharmacy will continue to follow and monitor vancomycin.    Please contact pharmacy at extension [phone] for questions regarding this assessment.    Thank you for the consult,   Kanika Beaulieu       Patient brief summary:  Mesha Mckenzie is a 63 y.o. female initiated on antimicrobial therapy with IV Vancomycin for treatment of bacteremia    Drug Allergies:   Review of patient's allergies indicates:   Allergen Reactions    Pcn [penicillins]        Actual Body Weight:   53.5 kg    Renal Function:   Estimated Creatinine Clearance: 67.7 mL/min (based on SCr of 0.7 mg/dL).,     CBC (last 72 hours):  Recent Labs   Lab Result Units 10/11/19  0504 10/11/19  1645 10/12/19  0502 10/13/19  0535 10/13/19  1347   WBC K/uL 12.60 15.57* 14.04* 16.23* 15.18*   Hemoglobin g/dL 8.8* 8.5* 7.9* 8.3* 8.1*   Hematocrit % 28.2* 25.2* 25.2* 26.1* 25.9*   Platelets K/uL 276 291 261 308 309   Gran% % 83.0* 73.1* 72.2 77.2* 76.7*   Lymph% % 9.0* 14.8* 14.4* 11.7* 12.2*   Mono% % 8.0 6.5 7.3 6.3 6.7   Eosinophil% % 0.0 0.6 0.8 0.4 0.5   Basophil% % 0.0 0.3 0.6 0.6 0.5   Differential Method  Manual Automated Automated Automated Automated       Metabolic Panel (last 72 hours):  Recent Labs   Lab Result Units 10/10/19  2215 10/11/19  0501 10/11/19  0937 10/11/19  1529 10/11/19  1645 10/11/19  1954 10/12/19  0502 10/12/19  1014 10/12/19  1700 10/12/19  2142 10/13/19  0535 10/13/19  1347   Sodium  mmol/L 132* 134* 136 137 137  138  --  139 139 136 135* 137 138   Potassium mmol/L 3.3* 3.1* 3.8 3.3* 3.3*  3.3*  --  3.7 3.2* 3.0* 2.9* 2.8* 4.1   Chloride mmol/L 107 108 110 110 109  109  --  113* 113* 108 109 109 113*   CO2 mmol/L 18* 19* 20* 21* 21*  21*  --  17* 18* 20* 19* 19* 18*   Glucose mg/dL 126* 120* 114* 117* 111*  111*  --  99 111* 110 122* 109 122*   Glucose, UA   --   --   --   --   --  Negative  --   --   --   --   --   --    BUN, Bld mg/dL 9 13 16 16 16  16  --  21 21 22 24* 25* 27*   Creatinine mg/dL 0.8 0.8 0.9 0.8 0.8  0.8  --  0.7 0.7 0.7 0.7 0.7 0.7   Albumin g/dL  --   --   --   --  1.4*  --   --   --   --   --   --   --    Total Bilirubin mg/dL  --   --   --   --  0.2  --   --   --   --   --   --   --    Alkaline Phosphatase U/L  --   --   --   --  94  --   --   --   --   --   --   --    AST U/L  --   --   --   --  11  --   --   --   --   --   --   --    ALT U/L  --   --   --   --  <5*  --   --   --   --   --   --   --    Magnesium mg/dL  --  1.5*  --   --   --   --  1.5*  --   --   --  1.5* 2.2   Phosphorus mg/dL  --  3.0  --   --   --   --  2.2*  --   --   --  3.0  --        Vancomycin Administrations:  vancomycin given in the last 96 hours                   vancomycin (VANCOCIN) 750 mg in dextrose 5 % 250 mL IVPB (mg) 750 mg New Bag 10/13/19 1819     750 mg New Bag 10/12/19 1830    vancomycin 1.25 g in dextrose 5% 250 mL IVPB (ready to mix) (mg) 1,250 mg New Bag 10/11/19 2035                Microbiologic Results:  Microbiology Results (last 7 days)     Procedure Component Value Units Date/Time    Blood culture [[id number]] Collected:  10/11/19 1701    Order Status:  Completed Specimen:  Blood Updated:  10/13/19 1812     Blood Culture, Routine No Growth to date      No Growth to date      No Growth to date    Blood culture [[id number]] Collected:  10/11/19 1701    Order Status:  Completed Specimen:  Blood Updated:  10/13/19 1812     Blood Culture, Routine No Growth to date      No  Growth to date      No Growth to date    Aerobic culture [[id number]] Collected:  10/09/19 1131    Order Status:  Completed Specimen:  Abscess from Back Updated:  10/12/19 0957     Aerobic Bacterial Culture No growth    Narrative:       2) Perispinal    Aerobic culture [[id number]] Collected:  10/09/19 1131    Order Status:  Completed Specimen:  Abscess from Back Updated:  10/12/19 0957     Aerobic Bacterial Culture No growth    Narrative:       1) Perispinal    Clostridium difficile EIA [[id number]] Collected:  10/11/19 1909    Order Status:  Completed Specimen:  Stool Updated:  10/12/19 0441     C. diff Antigen Negative     C difficile Toxins A+B, EIA Negative     Comment: Testing not recommended for children <24 months old.       Culture, Anaerobe [[id number]] Collected:  10/09/19 1223    Order Status:  Completed Specimen:  Tissue from Back Updated:  10/11/19 0849     Anaerobic Culture Culture in progress    Narrative:       Epidural phlegmon    Culture, Anaerobe [[id number]] Collected:  10/09/19 1131    Order Status:  Completed Specimen:  Abscess from Back Updated:  10/11/19 0848     Anaerobic Culture Culture in progress    Narrative:       2) Perispinal    Culture, Anaerobe [[id number]] Collected:  10/09/19 1131    Order Status:  Completed Specimen:  Abscess from Back Updated:  10/11/19 0847     Anaerobic Culture Culture in progress    Narrative:       1) Perispinal    AFB Culture & Smear [[id number]] Collected:  10/09/19 1131    Order Status:  Completed Specimen:  Abscess from Back Updated:  10/10/19 2127     AFB Culture & Smear Culture in progress     AFB CULTURE STAIN No acid fast bacilli seen.    Narrative:       1) Perispinal    AFB Culture & Smear [[id number]] Collected:  10/09/19 1223    Order Status:  Completed Specimen:  Tissue from Back Updated:  10/10/19 2127     AFB Culture & Smear Culture in progress     AFB CULTURE STAIN No acid fast bacilli seen.    Narrative:       Epidural phlegmon    AFB Culture &  Smear [[id number]] Collected:  10/09/19 1131    Order Status:  Completed Specimen:  Abscess from Back Updated:  10/10/19 2127     AFB Culture & Smear Culture in progress     AFB CULTURE STAIN No acid fast bacilli seen.    Narrative:       2) Perispinal    Culture, Anaerobe [[id number]] Collected:  10/05/19 2111    Order Status:  Completed Specimen:  Abscess from Shoulder, Right Updated:  10/10/19 0725     Anaerobic Culture No anaerobes isolated    Culture, Anaerobe [[id number]] Collected:  10/05/19 2110    Order Status:  Completed Specimen:  Abscess from Shoulder, Left Updated:  10/10/19 0725     Anaerobic Culture Culture in progress    Aerobic culture [[id number]] Collected:  10/09/19 1223    Order Status:  Completed Specimen:  Tissue from Back Updated:  10/10/19 0715     Aerobic Bacterial Culture No growth    Narrative:       Epidural phlegmon    Gram stain [[id number]] Collected:  10/09/19 1223    Order Status:  Completed Specimen:  Tissue from Back Updated:  10/09/19 1547     Gram Stain Result Rare WBC's      No organisms seen    Narrative:       Epidural phlegmon    Gram stain [[id number]] Collected:  10/09/19 1131    Order Status:  Completed Specimen:  Abscess from Back Updated:  10/09/19 1409     Gram Stain Result Rare WBC's      No organisms seen    Narrative:       1) Perispinal    Gram stain [[id number]] Collected:  10/09/19 1131    Order Status:  Completed Specimen:  Abscess from Back Updated:  10/09/19 1407     Gram Stain Result Rare WBC's      No organisms seen    Narrative:       2) Perispinal    Fungus culture [[id number]] Collected:  10/09/19 1223    Order Status:  Sent Specimen:  Tissue from Back Updated:  10/09/19 1243    Fungus culture [[id number]] Collected:  10/09/19 1131    Order Status:  Sent Specimen:  Abscess from Back Updated:  10/09/19 1159    Fungus culture [[id number]] Collected:  10/09/19 1131    Order Status:  Sent Specimen:  Abscess from Back Updated:  10/09/19 1156    Aerobic culture  [[id number]] Collected:  10/05/19 2110    Order Status:  Completed Specimen:  Abscess from Shoulder, Left Updated:  10/09/19 0957     Aerobic Bacterial Culture No growth      No growth    Aerobic culture [[id number]]  (Abnormal)  (Susceptibility) Collected:  10/05/19 2111    Order Status:  Completed Specimen:  Abscess from Shoulder, Right Updated:  10/08/19 1300     Aerobic Bacterial Culture STAPHYLOCOCCUS AUREUS  Rare  No other significant isolate      Culture, Body Fluid - Bactec [[id number]] Collected:  10/03/19 0549    Order Status:  Completed Specimen:  Pleural Fluid Updated:  10/08/19 1012     Body Fluid Culture, Sterile No growth after 5 days.    Narrative:       received 2 culture bottles with specimen for pleural fluid.    Blood culture [[id number]] Collected:  10/02/19 2025    Order Status:  Completed Specimen:  Blood from Peripheral, Wrist, Right Updated:  10/07/19 2212     Blood Culture, Routine No growth after 5 days.    Blood culture [[id number]] Collected:  10/02/19 0830    Order Status:  Completed Specimen:  Blood from Peripheral, Hand, Right Updated:  10/07/19 2212     Blood Culture, Routine No growth after 5 days.    Aerobic culture [[id number]] Collected:  10/03/19 0549    Order Status:  Completed Specimen:  Pleural Fluid Updated:  10/07/19 0932     Aerobic Bacterial Culture No growth    Culture, Anaerobic [[id number]] Collected:  10/03/19 0549    Order Status:  Completed Specimen:  Pleural Fluid Updated:  10/07/19 0838     Anaerobic Culture No anaerobes isolated

## 2019-10-14 NOTE — SUBJECTIVE & OBJECTIVE
Interval History:   ID is being re-consulted for an acute change in patient's clinical status. On 10/11 the patient became much more somnolent, had one fever up to 101.2, and developed leukocytosis. She is now waxing and waning in mental status, at times waking up and being able to converse and at other times minimally responsive to questions. Central line discontinued at the time. Has been afebrile since 10/11. Blood cultures since then have been without growth. The patient denies pain anywhere but difficult to get full history given somnolence. Having loose stools per nursing.     Review of Systems   Unable to perform ROS: Mental status change     Objective:     Vital Signs (Most Recent):  Temp: 96.9 °F (36.1 °C) (10/14/19 1327)  Pulse: 101 (10/14/19 1527)  Resp: 14 (10/14/19 1327)  BP: (!) 155/67 (10/14/19 1327)  SpO2: (!) 92 % (10/14/19 1527) Vital Signs (24h Range):  Temp:  [96.9 °F (36.1 °C)-100 °F (37.8 °C)] 96.9 °F (36.1 °C)  Pulse:  [] 101  Resp:  [14-32] 14  SpO2:  [92 %-100 %] 92 %  BP: (138-186)/(63-80) 155/67     Weight: 53.5 kg (118 lb)  Body mass index is 20.25 kg/m².    Estimated Creatinine Clearance: 67.7 mL/min (based on SCr of 0.7 mg/dL).    Physical Exam   Constitutional: She is oriented to person, place, and time. She appears well-developed and well-nourished. No distress.   Ill appearing lady   HENT:   Head: Normocephalic and atraumatic.   NGT and trumpet in place   Eyes: Pupils are equal, round, and reactive to light. No scleral icterus.   Neck: Normal range of motion. Neck supple. No JVD present.   Cardiovascular: Normal rate, regular rhythm and normal heart sounds. Exam reveals no gallop and no friction rub.   No murmur heard.  Pulmonary/Chest: Effort normal. No respiratory distress. She has no wheezes. She has no rales.   Bilateral course breath sounds   Abdominal: Soft. Bowel sounds are normal. She exhibits no distension. There is no tenderness.   Musculoskeletal: She exhibits no  edema.   Neurological: She is alert and oriented to person, place, and time.   Skin: Skin is warm and dry.       Significant Labs:   Blood Culture:   Recent Labs   Lab 10/01/19  0534 10/02/19  0445 10/02/19  0830 10/02/19  2025 10/11/19  1701   LABBLOO No growth after 5 days. No growth after 5 days. No growth after 5 days. No growth after 5 days. No Growth to date  No Growth to date  No Growth to date  No Growth to date  No Growth to date  No Growth to date     CBC:   Recent Labs   Lab 10/13/19  0535 10/13/19  1347 10/14/19  0430   WBC 16.23* 15.18* 11.73   HGB 8.3* 8.1* 7.2*   HCT 26.1* 25.9* 22.5*    309 247     CMP:   Recent Labs   Lab 10/13/19  0535 10/13/19  1347 10/14/19  0430    138 141  139   K 2.8* 4.1 3.1*  3.1*    113* 115*  113*   CO2 19* 18* 17*  18*    122* 98  101   BUN 25* 27* 28*  28*   CREATININE 0.7 0.7 0.6  0.7   CALCIUM 7.7* 7.7* 7.7*  7.9*   ANIONGAP 9 7* 9  8   EGFRNONAA >60.0 >60.0 >60.0  >60.0       Significant Imaging:   FINDINGS:  There is a right IJ CVP line with tip projected over SVC.  NG tube is seen coursing towards the stomach.  Trachea is patent.  Cardiac silhouette appears unchanged.  There is atherosclerosis.  Persistent opacities in the left lower lung zone.  Quite sprayed pulmonary opacities are also identified, overall similar to prior.  There is no pneumothorax or free air below the diaphragm.  There is no acute osseous abnormality.      Impression       Stable appearance of the chest.  No interval worsening.

## 2019-10-14 NOTE — PLAN OF CARE
Pt oriented to self and answers most direct questions appropriately, sleepy but arousable. NPO, no complaints of nausea or vomiting. R nare NG with cont TF @ 40, held after midnight per MD order. Tolerating TF. Henriquez, adequate urine output overnight. Rectal tube in place. Soft, non violent restraints to left and right wrist intact. Frequent rounds for safety. Telle, cont pluse ox. BP elevated. O2 Sats stable on 2L NC with NT airway and frequent suctioning. Pt slept between care. HOB elevated 45%. Turned Q2 with wedge.  Bed low and locked, call bell within reach. Will continue to monitor.

## 2019-10-15 LAB
ANION GAP SERPL CALC-SCNC: 9 MMOL/L (ref 8–16)
BASOPHILS # BLD AUTO: 0.13 K/UL (ref 0–0.2)
BASOPHILS NFR BLD: 1 % (ref 0–1.9)
BUN SERPL-MCNC: 23 MG/DL (ref 8–23)
CALCIUM SERPL-MCNC: 8.3 MG/DL (ref 8.7–10.5)
CHLORIDE SERPL-SCNC: 115 MMOL/L (ref 95–110)
CO2 SERPL-SCNC: 17 MMOL/L (ref 23–29)
CREAT SERPL-MCNC: 0.7 MG/DL (ref 0.5–1.4)
DIFFERENTIAL METHOD: ABNORMAL
EOSINOPHIL # BLD AUTO: 0.1 K/UL (ref 0–0.5)
EOSINOPHIL NFR BLD: 0.5 % (ref 0–8)
ERYTHROCYTE [DISTWIDTH] IN BLOOD BY AUTOMATED COUNT: 19.8 % (ref 11.5–14.5)
EST. GFR  (AFRICAN AMERICAN): >60 ML/MIN/1.73 M^2
EST. GFR  (NON AFRICAN AMERICAN): >60 ML/MIN/1.73 M^2
GLUCOSE SERPL-MCNC: 107 MG/DL (ref 70–110)
HCT VFR BLD AUTO: 26.2 % (ref 37–48.5)
HGB BLD-MCNC: 7.7 G/DL (ref 12–16)
IMM GRANULOCYTES # BLD AUTO: 0.29 K/UL (ref 0–0.04)
IMM GRANULOCYTES NFR BLD AUTO: 2.1 % (ref 0–0.5)
LYMPHOCYTES # BLD AUTO: 2.2 K/UL (ref 1–4.8)
LYMPHOCYTES NFR BLD: 16.4 % (ref 18–48)
MAGNESIUM SERPL-MCNC: 1.6 MG/DL (ref 1.6–2.6)
MCH RBC QN AUTO: 30.4 PG (ref 27–31)
MCHC RBC AUTO-ENTMCNC: 29.4 G/DL (ref 32–36)
MCV RBC AUTO: 104 FL (ref 82–98)
MONOCYTES # BLD AUTO: 0.9 K/UL (ref 0.3–1)
MONOCYTES NFR BLD: 6.5 % (ref 4–15)
NEUTROPHILS # BLD AUTO: 10 K/UL (ref 1.8–7.7)
NEUTROPHILS NFR BLD: 73.5 % (ref 38–73)
NRBC BLD-RTO: 0 /100 WBC
PHOSPHATE SERPL-MCNC: 3.5 MG/DL (ref 2.7–4.5)
PLATELET # BLD AUTO: 288 K/UL (ref 150–350)
PMV BLD AUTO: 10.4 FL (ref 9.2–12.9)
POCT GLUCOSE: 104 MG/DL (ref 70–110)
POCT GLUCOSE: 86 MG/DL (ref 70–110)
POCT GLUCOSE: 92 MG/DL (ref 70–110)
POCT GLUCOSE: 96 MG/DL (ref 70–110)
POCT GLUCOSE: 97 MG/DL (ref 70–110)
POTASSIUM SERPL-SCNC: 3.5 MMOL/L (ref 3.5–5.1)
RBC # BLD AUTO: 2.53 M/UL (ref 4–5.4)
SODIUM SERPL-SCNC: 141 MMOL/L (ref 136–145)
WBC # BLD AUTO: 13.58 K/UL (ref 3.9–12.7)

## 2019-10-15 PROCEDURE — 31720 CLEARANCE OF AIRWAYS: CPT

## 2019-10-15 PROCEDURE — 99233 PR SUBSEQUENT HOSPITAL CARE,LEVL III: ICD-10-PCS | Mod: ,,, | Performed by: INTERNAL MEDICINE

## 2019-10-15 PROCEDURE — 99233 SBSQ HOSP IP/OBS HIGH 50: CPT | Mod: ,,, | Performed by: FAMILY MEDICINE

## 2019-10-15 PROCEDURE — 80048 BASIC METABOLIC PNL TOTAL CA: CPT

## 2019-10-15 PROCEDURE — 94761 N-INVAS EAR/PLS OXIMETRY MLT: CPT

## 2019-10-15 PROCEDURE — 20600001 HC STEP DOWN PRIVATE ROOM

## 2019-10-15 PROCEDURE — 94668 MNPJ CHEST WALL SBSQ: CPT

## 2019-10-15 PROCEDURE — 99900035 HC TECH TIME PER 15 MIN (STAT)

## 2019-10-15 PROCEDURE — 94640 AIRWAY INHALATION TREATMENT: CPT

## 2019-10-15 PROCEDURE — 99233 SBSQ HOSP IP/OBS HIGH 50: CPT | Mod: ,,, | Performed by: INTERNAL MEDICINE

## 2019-10-15 PROCEDURE — 27000221 HC OXYGEN, UP TO 24 HOURS

## 2019-10-15 PROCEDURE — 84100 ASSAY OF PHOSPHORUS: CPT

## 2019-10-15 PROCEDURE — 99233 PR SUBSEQUENT HOSPITAL CARE,LEVL III: ICD-10-PCS | Mod: ,,, | Performed by: FAMILY MEDICINE

## 2019-10-15 PROCEDURE — 25000242 PHARM REV CODE 250 ALT 637 W/ HCPCS: Performed by: INTERNAL MEDICINE

## 2019-10-15 PROCEDURE — 63600175 PHARM REV CODE 636 W HCPCS: Performed by: INTERNAL MEDICINE

## 2019-10-15 PROCEDURE — 85025 COMPLETE CBC W/AUTO DIFF WBC: CPT

## 2019-10-15 PROCEDURE — 83735 ASSAY OF MAGNESIUM: CPT

## 2019-10-15 PROCEDURE — 63600175 PHARM REV CODE 636 W HCPCS: Performed by: FAMILY MEDICINE

## 2019-10-15 RX ORDER — MAGNESIUM SULFATE HEPTAHYDRATE 40 MG/ML
2 INJECTION, SOLUTION INTRAVENOUS ONCE
Status: COMPLETED | OUTPATIENT
Start: 2019-10-15 | End: 2019-10-15

## 2019-10-15 RX ORDER — SODIUM CHLORIDE, SODIUM LACTATE, POTASSIUM CHLORIDE, CALCIUM CHLORIDE 600; 310; 30; 20 MG/100ML; MG/100ML; MG/100ML; MG/100ML
INJECTION, SOLUTION INTRAVENOUS CONTINUOUS
Status: DISCONTINUED | OUTPATIENT
Start: 2019-10-15 | End: 2019-10-16

## 2019-10-15 RX ADMIN — IPRATROPIUM BROMIDE AND ALBUTEROL SULFATE 3 ML: .5; 3 SOLUTION RESPIRATORY (INHALATION) at 12:10

## 2019-10-15 RX ADMIN — MAGNESIUM SULFATE IN WATER 2 G: 40 INJECTION, SOLUTION INTRAVENOUS at 03:10

## 2019-10-15 RX ADMIN — MICONAZOLE NITRATE: 20 OINTMENT TOPICAL at 08:10

## 2019-10-15 RX ADMIN — ENOXAPARIN SODIUM 40 MG: 100 INJECTION SUBCUTANEOUS at 05:10

## 2019-10-15 RX ADMIN — OXACILLIN SODIUM 12 G: 10 INJECTION, POWDER, FOR SOLUTION INTRAVENOUS at 10:10

## 2019-10-15 RX ADMIN — IPRATROPIUM BROMIDE AND ALBUTEROL SULFATE 3 ML: .5; 3 SOLUTION RESPIRATORY (INHALATION) at 07:10

## 2019-10-15 RX ADMIN — IPRATROPIUM BROMIDE AND ALBUTEROL SULFATE 3 ML: .5; 3 SOLUTION RESPIRATORY (INHALATION) at 04:10

## 2019-10-15 RX ADMIN — MICONAZOLE NITRATE: 20 OINTMENT TOPICAL at 09:10

## 2019-10-15 RX ADMIN — SODIUM CHLORIDE, SODIUM LACTATE, POTASSIUM CHLORIDE, AND CALCIUM CHLORIDE: .6; .31; .03; .02 INJECTION, SOLUTION INTRAVENOUS at 06:10

## 2019-10-15 NOTE — MEDICAL/APP STUDENT
Subjective:       Patient ID: Mesha Mckenzie is a 63 y.o. female.    Chief Complaint: No chief complaint on file.    HPI     Patient is a 62 yo F with history of IV drug use who was transferred from [facility] for epidural abscess secondary to MSSA bacteremia and endocarditis.  She originally presented to [place] on 9/19/19 for a 5 day history of nausea, vomiting, and diarrhea.  She was transferred to the [facility] MICU for septic shock requiring pressors.  They found bilateral pneumonia and pan-colitis and she was placed on vancomycin, flagyl, and levoquin.  Blood and urine cultures grew MSSA. CT chest (9/26/19) showed bilateral consolidation with cavitary/cystic lesions.  A DHARMESH (9/26/19) revealed vegetations on the mitral valve and tricuspid valve. On 10/1/19, thoracentesis was performed with concern for empyema and an MRI showed an epidural abscess at L2-L3.      [place] [facility] [place].  On admission she was encephalopathic and hypoxic resulting in intubation.  Neurosurgery performed an L3 laminectomy with debridement of the abscess on 10/12/19.  ID recommended oxacillin 12 g IV for 6 weeks to end on 11/20/19 with weekly CBC, CMP, ESR, and CRP.  At this time ID signed off.      On 10/11/19, the patient became febrile with Tmax of 101.2 and became more somnolent.  Antibiotics where broadened to include vancomycin, cefepime, and flagyl.  The Henriquez catheter was exchanged and the central line was removed.  Repeat blood cultures where obtained and CXR showed no new changes.  ID was consulted for de-escalation of antibiotics.      Overnight, the patient the patient increased O2 requirement to 3 L on nasal cannula.  The daughter states that she feels that the patient's mental status has declined since the patient is unable to recognize her.  The patient remained afebrile overnight.     Review of Systems      Unable to perform due to mental status     Objective:     Temp:  [97.3 °F (36.3 °C)-98.9 °F (37.2 °C)] 97.6 °F (36.4 °C)  Pulse:  [] 114  Resp:  [16-30] 22  SpO2:  [91 %-99 %] 95 %  BP: (171-187)/(74-83) 177/77  Physical Exam   Constitutional: No distress.   HENT:   Head: Normocephalic and atraumatic.   Eyes: Pupils are equal, round, and reactive to light.   Cardiovascular: Regular rhythm, normal heart sounds and intact distal pulses. Tachycardia present.   No murmur heard.  Pulmonary/Chest: Effort normal. No stridor. Tachypnea noted. No respiratory distress. She has no wheezes.   Coarse breathe sounds bilaterally   Abdominal: Soft. Bowel sounds are normal. She exhibits no distension and no mass. There is no tenderness. There is no guarding.   Musculoskeletal: She exhibits no edema or deformity.   Lymphadenopathy:     She has no cervical adenopathy.   Neurological:   Somnolent but arousable    Skin: Skin is warm and dry. Capillary refill takes less than 2 seconds. She is not diaphoretic.       CBC:  Recent Labs   Lab 10/15/19  0412   WBC 13.58*   HGB 7.7*   HCT 26.2*        CMP:  Recent Labs   Lab 10/15/19  0407   CALCIUM 8.3*      K 3.5   CO2 17*   *   BUN 23   CREATININE 0.7     Blood culture (10/11/19) - no growth to date    CXR (10/14/19) - no significant changes     CT Head (10/14/19)   Impression       No significant detrimental interval change compared to prior examination.  Evolving right thalamic infarct without evidence of intracranial hemorrhage or midline shift.  Stable size and configuration of the ventricular system compared to prior exam.    Patchy and confluent supratentorial white matter hypoattenuation suggestive of moderately advanced chronic microvascular ischemic change.         Assessment:       1. Acute bacterial endocarditis    2. Epidural abscess    3. Hypoxemia    4. Endocarditis    5. Bradycardia    6. Acute hypoxemic respiratory failure    7. Shock    8. Encephalopathy, metabolic    9. Endocarditis,  unspecified chronicity, unspecified endocarditis type    10. Tachycardia    11. Staphylococcus aureus bacteremia with sepsis    12. Abscess of upper extremity    13. Arrhythmia    14. Debility    15. Pancolitis        Plan:       Endocarditis  - DHARMESH from 9/26/19 shows mitral and tricuspid regurgitation  - Patient remains afebrile   - New leukocytosis today  - Blood cultures (10/11/19) show no growth to date  - Obtain urine cultures  - Continue oxacillin for MSSA endocarditis   - Repeat CXR for concern of aspiration        Patria Hernandez  Medical Student Year 4

## 2019-10-15 NOTE — PROGRESS NOTES
[facility]  Infectious Disease  Progress Note    Patient Name: Mesha Mckenzie  MRN: [id number]  Admission Date: 10/2/2019  Length of Stay: 13 days  Attending Physician: Sushil Ennis MD  Primary Care Provider: Varun Shea MD PhD    Isolation Status: No active isolations  Assessment/Plan:      Endocarditis  62yo female with IVDU presents as transfer from [facility] for epidural abscess, likely secondary to MSSA bacteremia and endocarditis with vegetations of the MV and TV on DHARMESH 9/26 c/b bilateral multifocal pneumonia with cavitary lesions, right pleural effusion s/p thoracentesis on 10/1 w/ concern for empyema, pan-colitis, and MRI brain 10/8 with evidence of new embolic infarcts. Followed by ID at [facility], most recently on daptomycin, cefazolin, and vancomycin. Intubated shortly after arrival to [facility] for hypoxia. Noted to have penicillin allergy, allergy tested here and found to be false allergy. On 10/4 the patient was started on oxacillin with plans for 6 week course (end date 11/20). On 10/6 patient underwent aspiration and drainage of shoulder abscesses (growing MSSA). On 10/9 she underwent L3 laminectomy with washout of infection. Blood cultures have been without growth since 9/29.     On 10/11 the patient had a change in mental status characterized by somnolence and is waxing and waning in nature, isolated fever 101.2, and leukocytosis with WBC 15K which normalized and now back to 13K. Antibiotics broadened from oxacillin to Vanc, Cefepime, and flagyl. Blood cultures 10/11 without growth thus far. UA with 45 WBC, 20 RBC but no reflex?. Patient has been afebrile since but still with altered mental status. If mental status were related to underlying infection would expect it to have improved with broadening of antibiotics. Antibiotics de-escalated to oxacillin on 10/14. CT of brain with evolving thalamic infarct but would also not expect this to cause this much  somnolence. The patient could have other septic emboli to the brain which are too small to visualize on imaging. She could very well be also be aspirating although CXR with no focal consolidation. Would complete septic workup and look for urine infection and swallow eval per SLP.    Recommendations:  -Continue Oxacillin for MSSA Endocarditis   -Will monitor patient closely with this de-escalation  -Obtain urine culture  -Follow up blood cultures  -Aspiration precautions  -agree with palliative consult and goals of care discussion  -Will still plan to discharge patient on OXACILLIN 12G IV continuous  for 6 weeks, estimated end of therapy date 11/20                    Thank you for your consult. I will follow-up with patient. Please contact us if you have any additional questions.    Bradley Villalobos MD  Infectious Disease  [facility]    Subjective:     Principal Problem:Epidural abscess    HPI: Mesha Mckenzie is a 64 yo female with IVDU (heroin), prior GIB in 2018, and spinal stenosis who presents from [facility] for neurosurgery eval for epidural abscess in setting of MSSA endocarditis. She had initially presented to [place] on 9/19 for evaluation of nausea, vomiting, and diarrhea for the past 5 days. 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 She was initially started on vancomycin, flagyl, and levoquin. Blood cultures from 9/19 positive for MSSA and were thought to be likely source. Urine culture from 9/19 also positive for MSSA. CT head on 9/19 and 9/22 negative for septic emboli. TTE on 9/21 showed possible vegetation on mitral valve (EF 55%).  DHARMESH was performed on 9/25, showing mitral valve vegetation (5x6mm) on the posterior leaflet as well as tricuspid valve vegetation (10x5mm) on septal  leaflet. CT chest was repeated on 9/26 and was showed for previously seen bilateral consolidations that were now noted to be cavitary/cystic in nature. Repeat TTE on 9/26 reported reduced EF from previous 55% to 33%. Cardiothoracic evaluated patient for valve replacement but surgery was deferred due to patient's multiple medical issues. Last positive blood culture was 9/26. Her antibiotics were later changed to daptomycin, cefazolin, and vancomycin. Thoracentesis on 10/1 showed evidence of empyema. MRI spine on 10/1 showed lumbar paraspinal muscle infection and small dorsal epidural abscess from L2-4. She was transferred to [facility] for neurosurgery eval.     On arrival to [facility], she was noted to be encephalopathic. Shortly after arriving, she became hypoxic and hypertensive and required intubation. She also required neuromuscular blockade for tachypnea and vent dyssynchrony. Vasopressors were also started due to hypotension.  Interval History:   No acute events overnight.Patient pulled out NG and nasal trumpet, and able to be replaced. Patient continues to be altered and according to daughter at bedside seems to be worsened from yesterday. According to her, her mom recognized her yesterday and now does not today.      Review of Systems   Unable to perform ROS: Mental status change     Objective:     Vital Signs (Most Recent):  Temp: 97.6 °F (36.4 °C) (10/15/19 1216)  Pulse: (!) 114 (10/15/19 1518)  Resp: (!) 22 (10/15/19 1301)  BP: (!) 177/77 (10/15/19 1216)  SpO2: (!) 93 % (10/15/19 1304) Vital Signs (24h Range):  Temp:  [97.3 °F (36.3 °C)-98.9 °F (37.2 °C)] 97.6 °F (36.4 °C)  Pulse:  [] 114  Resp:  [16-31] 22  SpO2:  [91 %-99 %] 93 %  BP: (140-187)/(65-83) 177/77     Weight: 53.5 kg (118 lb)  Body mass index is 20.25 kg/m².    Estimated Creatinine Clearance: 67.7 mL/min (based on SCr of 0.7 mg/dL).    Physical Exam   Constitutional: She appears well-developed and well-nourished. No distress.   Ill appearing lady    HENT:   Head: Normocephalic and atraumatic.   NGT and trumpet in place   Eyes: Pupils are equal, round, and reactive to light. No scleral icterus.   Neck: Normal range of motion. Neck supple. No JVD present.   Cardiovascular: Normal rate, regular rhythm and normal heart sounds. Exam reveals no gallop and no friction rub.   No murmur heard.  Pulmonary/Chest: Effort normal. No respiratory distress. She has no wheezes. She has no rales.   Bilateral course breath sounds   Abdominal: Soft. Bowel sounds are normal. She exhibits no distension. There is no tenderness.   Musculoskeletal: She exhibits no edema.   Neurological:   Somnolent  Able to be aroused by touch and responding to simple questions appropriately  Able to tell me her name and that she is in a hospital   Skin: Skin is warm and dry.       Significant Labs:   Blood Culture:   Recent Labs   Lab 10/01/19  0534 10/02/19  0445 10/02/19  0830 10/02/19  2025 10/11/19  1701   LABBLOO No growth after 5 days. No growth after 5 days. No growth after 5 days. No growth after 5 days. No Growth to date  No Growth to date  No Growth to date  No Growth to date  No Growth to date  No Growth to date  No Growth to date  No Growth to date     CBC:   Recent Labs   Lab 10/14/19  0430 10/15/19  0412   WBC 11.73 13.58*   HGB 7.2* 7.7*   HCT 22.5* 26.2*    288     CMP:   Recent Labs   Lab 10/14/19  0430 10/15/19  0407     139 141   K 3.1*  3.1* 3.5   *  113* 115*   CO2 17*  18* 17*   GLU 98  101 107   BUN 28*  28* 23   CREATININE 0.6  0.7 0.7   CALCIUM 7.7*  7.9* 8.3*   ANIONGAP 9  8 9   EGFRNONAA >60.0  >60.0 >60.0     Recent Labs   Lab 09/29/19  1615 10/02/19  2100 10/11/19  1954   COLORU Yellow Yellow  Yellow Yellow   APPEARANCEUA Clear Cloudy*  Cloudy* Hazy*   PHUR 7.0 7.0  7.0 6.0   SPECGRAV 1.010 1.015  1.015 1.020   PROTEINUA 2+* 2+*  2+* 1+*   GLUCUA Negative 1+*  1+* Negative   KETONESU Negative Negative  Negative Negative    BILIRUBINUA Negative Negative  Negative Negative   OCCULTUA 2+* 2+*  2+* 3+*   NITRITE Negative Negative  Negative Negative   UROBILINOGEN Negative  --   --    LEUKOCYTESUR Trace* 1+*  1+* 2+*   RBCUA 1 0 20*   WBCUA 3 10* 45*   BACTERIA Moderate* Rare Few*   SQUAMEPITHEL 0 1  --    HYALINECASTS 0 0 2*       Significant Imaging: I have reviewed all pertinent imaging results/findings within the past 24 hours.

## 2019-10-15 NOTE — PROGRESS NOTES
Wound care follow up:  Shoulder wounds remain stable with less drainage and slightly smaller measurements. Will continue with wound care orders in place for silver dressing to decrease bacteria load. Wound care team to follow prn  [id number]     10/15/19 1536        Incision/Site 10/06/19 0700 Right Shoulder   Date First Assessed/Time First Assessed: 10/06/19 0700   Side: Right  Location: (c) Shoulder   Wound Image    Incision WDL ex   Dressing Appearance Intact   Drainage Amount Moderate   Drainage Characteristics/Odor Serosanguineous;No odor   Appearance Pink;Slough   Red (%), Wound Tissue Color 50 %   Yellow (%), Wound Tissue Color 50 %   Periwound Area Intact   Wound Edges Open   Wound Length (cm) 2 cm   Wound Width (cm) 0.5 cm   Wound Depth (cm) 0.5 cm   Wound Volume (cm^3) 0.5 cm^3   Wound Surface Area (cm^2) 1 cm^2   Tunneling (depth (cm)/location) 1.5 @ 11 o'clock   Dressing Reinforced        Incision/Site 10/06/19 0700 Left Shoulder   Date First Assessed/Time First Assessed: 10/06/19 0700   Side: Left  Location: (c) Shoulder   Wound Image    Incision WDL ex   Dressing Appearance Intact   Drainage Amount Moderate   Drainage Characteristics/Odor Serosanguineous   Appearance Pink;Slough   Red (%), Wound Tissue Color 80 %   Yellow (%), Wound Tissue Color 20 %   Periwound Area Intact   Wound Edges Open   Wound Length (cm) 0.5 cm   Wound Width (cm) 1.5 cm   Wound Depth (cm) 0.5 cm   Wound Volume (cm^3) 0.38 cm^3   Wound Surface Area (cm^2) 0.75 cm^2   Dressing Reinforced

## 2019-10-15 NOTE — PT/OT/SLP PROGRESS
Speech Language Pathology      Mesha Mckenzie  MRN: [id number]    Patient not seen today secondary to Nursing hold. Will follow-up per plan of care.     LEROY Mcrae, CCC-SLP  10/15/2019

## 2019-10-15 NOTE — CODE/ RAPID DOCUMENTATION
"RAPID RESPONSE NURSE PROACTIVE ROUNDING NOTE     Time of Visit: 0600    Admit Date: 10/2/2019  LOS: 13  Code Status: Full Code   Date of Visit: 10/15/2019  : 1955  Age: 63 y.o.  Sex: female  Race: White  Bed: 1028/1028 A:   MRN: [id number]  Was the patient discharged from an ICU this admission? yes   Was the patient discharged from a PACU within last 24 hours?  no  Did the patient receive conscious sedation/general anesthesia in last 24 hours?  no  Was the patient in the ED within the past 24 hours?  no  Was the patient started on NIPPV within the past 24 hours?  no  Attending Physician: Yesenia Lerner*  [place] [facility]    ASSESSMENT     Diagnosis: Epidural abscess    Abnormal Vital Signs: BP (!) 187/83 (BP Location: Left arm, Patient Position: Lying)   Pulse 105   Temp 98.6 °F (37 °C) (Axillary)   Resp (!) 22   Ht 5' 4" (1.626 m)   Wt 53.5 kg (118 lb)   SpO2 96%   Breastfeeding? No   BMI 20.25 kg/m²      Clinical Issues: Neuro    Patient  has a past medical history of Anxiety, Carotid bruit, Chronic pain, Cystitis, Cystocele, unspecified (CODE), Depression, GI bleed, History of uterine fibroid, Shortness of breath on exertion, Spinal stenosis, and Urinary retention.    Patient seen as follow up from previous shift. Patient resting in bed. No acute distress noted. Per charge Ting, patient pulled out NGT and nasal trumpet this shift. Upon replacement it was noted patient had no gag with stimulation. Oriented to self only.   Respiratory rate elevated but currently even and unlabored. Review of ABG 10/14 shows metabolic acidosis with compensatory respiratory alkalosis. AM bicarb 17 on BMP. Concerns for decompensation should underlying metabolic acidosis not improve. Patient remains at risk for aspiration as well s/t to somnolence and poor airway protection.      INTERVENTIONS/ RECOMMENDATIONS     Recommend repeat ABG. Hold TF while patient is lethargic and still needs nasal " trumpet. NGT to LIS while TFs held. Continue to monitor glucose levels and neuro status. Consider IVF versus IV nutrition until mental status and aspiration risk improves. RRT to continue to monitor.    Discussed plan of care with charge RNTing.    PHYSICIAN ESCALATION     Yes/No  no    Orders received and case discussed with NA.    Disposition: Remain in room 1028.    FOLLOW-UP     Call back the Rapid Response Nurse, Carlie Quick RN at [phone] for additional questions or concerns.

## 2019-10-15 NOTE — PROGRESS NOTES
[facility]  Palliative Medicine  Progress Note    Patient Name: Mesha Mckenzie  MRN: [id number]  Admission Date: 10/2/2019  Hospital Length of Stay: 13 days  Code Status: Full Code   Attending Provider: Sushil Ennis MD  Consulting Provider: Shraon Pollard MD  Primary Care Physician: Varun Shea MD PhD  Principal Problem:Epidural abscess    Patient information was obtained from relative(s) and ER records.      Assessment/Plan:     Palliative care encounter  63 year old female with history IVDU being treated for sepsis 2/2 TV/MV endocarditis. Prolonged hospital course has been complicated by worsening metabolic encephalopathy, hypoxic respiratory failure, epidural abscess, bilateral shoulder abscesses, and pancolitis.     GO/advanced care planning     10/14 Spoke with patients  at bedside. Patients daughter Leeanna was on speaker phone.  Patient unable to participate in conversation 2/2 mental status. Introduced palliative care and how we can be helpful going forward. Daughter expressed a lot of frustration about not understanding the current plan of care, not understanding why her mother is no longer in the ICU, not understanding why the specialists are no longer seeing her, and about getting mixed messages as far as her moms condition and prognosis.   Most of the visit was spent reassuring patients daughter that she is getting excellent care. We talked about why some specialists have signed off and why some like ID are getting re-involved. We discussed the events of the weekend and her worsening mental status and how were worried that the patient is getting sicker. Daughter seemed very surprised that her mother could be getting worse.   Briefly discussed code status. Daughter was able to verbalize that her mother would never want to live on machines longterm.     10/15   Spoke with mariam Durán at bedside. She explained that Dr. Ennis talked to her about how she  might have to make some tough decisions in the next few days. I explained that I agreed with Dr. Ennis and expressed how I am very worried about her mom. She again expressed that her mother would not want to be on a ventilator longterm. They are going to talk about code status as a family tonight. She expressed that she does not view a feeding tube as life support. I did not go into detail about feeding tubes but did explain to her that it's important to know that a feeding tube does not eliminate the risk for aspiration which is a common misconception. She was surprised and thanked me for letting her know. She shared that ID recommended a neuro consult for eeg which she would like.   -Continue GOC discussions while working to build rapport with daughter Leeanna who is still in shock and processing   - Ari who is patient's default decision maker would like daughter Leeanna Yu to be primary decision maker. Emergency contact changed to Leeanna in EMR         I will follow-up with patient. Please contact us if you have any additional questions.    Subjective:     Chief Complaint: No chief complaint on file.      HPI:   63 year old female with PMH of depression and IV drug use initially presented to [facility] on 9/19 febrile with N/V and weakness. 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 Patient was started on abx and [place] [facility] [place] for a higher level of care and neurosurgery evaluation with MRI L spine showing L2-L4 epidural abscess now s/p L3 laminectomy and debridement. Hospital course has further been complicated by acute hypoxic respiratory failure 2/2 left lung collapse requiring intubation now s/p extubation, metabolic encephalopathy that continues to worsen, bilateral shoulder abscesses s/p I&D, and pancolitis. Patient is not a candidate for cardiac surgery. Patient was stepped down to the floor 10/10. She has since spiked a temp with worsening  tachycardia and mental status thought to be 2/2 septic emboli. Abx were broadened. ID has been re-consulted. Palliative has been consulted to assist with [facility]     Hospital Course:  No notes on file        Past Medical History:   Diagnosis Date    Anxiety     Carotid bruit     Chronic pain     Cystitis     Cystocele, unspecified (CODE)     Depression     GI bleed     History of uterine fibroid     Shortness of breath on exertion     Spinal stenosis     Urinary retention        Past Surgical History:   Procedure Laterality Date    ANTERIOR VAGINAL REPAIR  10-    CARDIAC CATHETERIZATION  age 12    CHOLECYSTECTOMY  2015    COLONOSCOPY  12/12/2018    aborted due to poor colon prep    COLONOSCOPY N/A 12/12/2018    Procedure: COLONOSCOPY;  Surgeon: Renard Gonsalves MD;  Location[facility];  Service: Endoscopy;  Laterality: N/A;    HYSTERECTOMY  1989    AMANDA    LAMINECTOMY N/A 10/9/2019    Procedure: LAMINECTOMY, SPINE, L3, Open;  Surgeon: Martin Lewis DO;  Location: [facility] OR [address] [place] [address];  Service: Neurosurgery;  Laterality: N/A;    tubiligation         Review of patient's allergies indicates:   Allergen Reactions    Pcn [penicillins]        Medications:  Continuous Infusions:   lactated ringers       Scheduled Meds:   albuterol-ipratropium  3 mL Nebulization Q8H    enoxaparin  40 mg Subcutaneous Daily    magnesium sulfate IVPB  2 g Intravenous Once    miconazole nitrate 2%   Topical (Top) BID    oxacillin 12 g in  mL CONTINUOUS INFUSION  12 g Intravenous Q24H     PRN Meds:acetaminophen, Dextrose 10% Bolus, hydrALAZINE, influenza, labetalol, mupirocin, mupirocin, ondansetron, pneumoc 13-susanna conj-dip cr(PF), sodium chloride 0.9%    Family History     Problem Relation (Age of Onset)    Alzheimer's disease Mother    Hypertension Brother, Sister    Osteoarthritis Mother    Thyroid disease Mother        Tobacco Use    Smoking status: Current Every Day Smoker     Packs/day: 0.50     Years:  40.00     Pack years: 20.00     Types: Cigarettes    Smokeless tobacco: Never Used   Substance and Sexual Activity    Alcohol use: No    Drug use: No    Sexual activity: Yes     Partners: Male       Review of Systems   Unable to perform ROS: Mental status change     Objective:     Vital Signs (Most Recent):  Temp: 97.6 °F (36.4 °C) (10/15/19 1216)  Pulse: 107 (10/15/19 1301)  Resp: (!) 22 (10/15/19 1301)  BP: (!) 177/77 (10/15/19 1216)  SpO2: (!) 93 % (10/15/19 1304) Vital Signs (24h Range):  Temp:  [97.3 °F (36.3 °C)-98.9 °F (37.2 °C)] 97.6 °F (36.4 °C)  Pulse:  [] 107  Resp:  [16-31] 22  SpO2:  [91 %-98 %] 93 %  BP: (140-187)/(65-83) 177/77     Weight: 53.5 kg (118 lb)  Body mass index is 20.25 kg/m².    Review of Symptoms- unable to obtain due to patient's mental status   Symptom Assessment (ESAS 0-10 scale)   ESAS 0 1 2 3 4 5 6 7 8 9 10   Pain              Dyspnea              Anxiety              Nausea              Depression               Anorexia              Fatigue              Insomnia              Restlessness               Agitation              CAM / Delirium __ --  _x__+   Constipation     _x_ --  ___+   Diarrhea           x__ --  ___+  Bowel Management Plan (BMP): No    Pain Assessment: patient appears comfortable     OME in 24 hours: 0     Performance Status: 30      Physical Exam   Constitutional: She appears lethargic. She appears toxic. She appears ill. No distress.   HENT:   NG in place    Eyes:   Eyes remain closed    Cardiovascular: Tachycardia present.   Pulmonary/Chest: No respiratory distress.   Abdominal: She exhibits no distension.   Neurological: She appears lethargic.   Does not answer simple questions        Significant Labs: All pertinent labs within the past 24 hours have been reviewed.  CBC:   Recent Labs   Lab 10/15/19  0412   WBC 13.58*   HGB 7.7*   HCT 26.2*   *        BMP:  Recent Labs   Lab 10/15/19  0407         K 3.5   *   CO2 17*    BUN 23   CREATININE 0.7   CALCIUM 8.3*   MG 1.6     LFT:  Lab Results   Component Value Date    AST 11 10/11/2019    ALKPHOS 94 10/11/2019    BILITOT 0.2 10/11/2019     Albumin:   Albumin   Date Value Ref Range Status   10/11/2019 1.4 (L) 3.5 - 5.2 g/dL Final     Protein:   Total Protein   Date Value Ref Range Status   10/11/2019 5.5 (L) 6.0 - 8.4 g/dL Final     Lactic acid:   Lab Results   Component Value Date    LACTATE 0.8 10/13/2019    LACTATE 0.8 10/11/2019       Significant Imaging: I have reviewed all pertinent imaging results/findings within the past 24 hours.    Advance Care Planning   Advanced Directives::  Living Will: No  LaPOST: No  Do Not Resuscitate Status: No  Medical Power of :  Patient's default decision would be her  Ari. He would like the primary decision maker to be patient's daughter Leeanna Yu     Decision-Making Capacity: Family answered questions       Living Arrangements: Lives with spouse    Psychosocial/Cultural:  Patient lives with spouse Ari. Has a daughter Leeanna and son Nirmal from a previous marriage. Prior to getting sick patient enjoyed taking care of her dogs and cats at home. She was very attentive to her dog with diabetes. Patient was driving and independent in ADLs     Spiritual:     F- Aarti and Belief: Rastafarian    I - Importance:  reports patient prays everyday but does not attend mass   .  C - Community: no    A - Address in Care: family enjoys visits from        > 50% of 40 min visit spent in chart review, face to face discussion of goals of care,  symptom assessment, coordination of care and emotional support.    Sharon Pollard MD  Palliative Medicine  [facility]

## 2019-10-15 NOTE — SUBJECTIVE & OBJECTIVE
Past Medical History:   Diagnosis Date    Anxiety     Carotid bruit     Chronic pain     Cystitis     Cystocele, unspecified (CODE)     Depression     GI bleed     History of uterine fibroid     Shortness of breath on exertion     Spinal stenosis     Urinary retention        Past Surgical History:   Procedure Laterality Date    ANTERIOR VAGINAL REPAIR  10-    CARDIAC CATHETERIZATION  age 12    CHOLECYSTECTOMY  2015    COLONOSCOPY  12/12/2018    aborted due to poor colon prep    COLONOSCOPY N/A 12/12/2018    Procedure: COLONOSCOPY;  Surgeon: Renard Gonsalves MD;  Location: [facility];  Service: Endoscopy;  Laterality: N/A;    HYSTERECTOMY  1989    AMANDA    LAMINECTOMY N/A 10/9/2019    Procedure: LAMINECTOMY, SPINE, L3, Open;  Surgeon: Martin Lewis DO;  Location: [facility] OR [address] [place] [address];  Service: Neurosurgery;  Laterality: N/A;    tubiligation         Review of patient's allergies indicates:   Allergen Reactions    Pcn [penicillins]        Medications:  Continuous Infusions:   lactated ringers       Scheduled Meds:   albuterol-ipratropium  3 mL Nebulization Q8H    enoxaparin  40 mg Subcutaneous Daily    magnesium sulfate IVPB  2 g Intravenous Once    miconazole nitrate 2%   Topical (Top) BID    oxacillin 12 g in  mL CONTINUOUS INFUSION  12 g Intravenous Q24H     PRN Meds:acetaminophen, Dextrose 10% Bolus, hydrALAZINE, influenza, labetalol, mupirocin, mupirocin, ondansetron, pneumoc 13-susanna conj-dip cr(PF), sodium chloride 0.9%    Family History     Problem Relation (Age of Onset)    Alzheimer's disease Mother    Hypertension Brother, Sister    Osteoarthritis Mother    Thyroid disease Mother        Tobacco Use    Smoking status: Current Every Day Smoker     Packs/day: 0.50     Years: 40.00     Pack years: 20.00     Types: Cigarettes    Smokeless tobacco: Never Used   Substance and Sexual Activity    Alcohol use: No    Drug use: No    Sexual activity: Yes     Partners: Male        Review of Systems   Unable to perform ROS: Mental status change     Objective:     Vital Signs (Most Recent):  Temp: 97.6 °F (36.4 °C) (10/15/19 1216)  Pulse: 107 (10/15/19 1301)  Resp: (!) 22 (10/15/19 1301)  BP: (!) 177/77 (10/15/19 1216)  SpO2: (!) 93 % (10/15/19 1304) Vital Signs (24h Range):  Temp:  [97.3 °F (36.3 °C)-98.9 °F (37.2 °C)] 97.6 °F (36.4 °C)  Pulse:  [] 107  Resp:  [16-31] 22  SpO2:  [91 %-98 %] 93 %  BP: (140-187)/(65-83) 177/77     Weight: 53.5 kg (118 lb)  Body mass index is 20.25 kg/m².    Review of Symptoms- unable to obtain due to patient's mental status   Symptom Assessment (ESAS 0-10 scale)   ESAS 0 1 2 3 4 5 6 7 8 9 10   Pain              Dyspnea              Anxiety              Nausea              Depression               Anorexia              Fatigue              Insomnia              Restlessness               Agitation              CAM / Delirium __ --  _x__+   Constipation     _x_ --  ___+   Diarrhea           x__ --  ___+  Bowel Management Plan (BMP): No    Pain Assessment: patient appears comfortable     OME in 24 hours: 0     Performance Status: 30      Physical Exam   Constitutional: She appears lethargic. She appears toxic. She appears ill. No distress.   HENT:   NG in place    Eyes:   Eyes remain closed    Cardiovascular: Tachycardia present.   Pulmonary/Chest: No respiratory distress.   Abdominal: She exhibits no distension.   Neurological: She appears lethargic.   Does not answer simple questions        Significant Labs: All pertinent labs within the past 24 hours have been reviewed.  CBC:   Recent Labs   Lab 10/15/19  0412   WBC 13.58*   HGB 7.7*   HCT 26.2*   *        BMP:  Recent Labs   Lab 10/15/19  0407         K 3.5   *   CO2 17*   BUN 23   CREATININE 0.7   CALCIUM 8.3*   MG 1.6     LFT:  Lab Results   Component Value Date    AST 11 10/11/2019    ALKPHOS 94 10/11/2019    BILITOT 0.2 10/11/2019     Albumin:   Albumin   Date  Value Ref Range Status   10/11/2019 1.4 (L) 3.5 - 5.2 g/dL Final     Protein:   Total Protein   Date Value Ref Range Status   10/11/2019 5.5 (L) 6.0 - 8.4 g/dL Final     Lactic acid:   Lab Results   Component Value Date    LACTATE 0.8 10/13/2019    LACTATE 0.8 10/11/2019       Significant Imaging: I have reviewed all pertinent imaging results/findings within the past 24 hours.    Advance Care Planning   Advanced Directives::  Living Will: No  LaPOST: No  Do Not Resuscitate Status: No  Medical Power of :  Patient's default decision would be her  Ari. He would like the primary decision maker to be patient's daughter Leeanna Yu     Decision-Making Capacity: Family answered questions       Living Arrangements: Lives with spouse    Psychosocial/Cultural:  Patient lives with spouse Ari. Has a daughter Leeanna and son Nirmal from a previous marriage. Prior to getting sick patient enjoyed taking care of her dogs and cats at home. She was very attentive to her dog with diabetes. Patient was driving and independent in ADLs     Spiritual:     F- Aarti and Belief: Zoroastrianism    I - Importance:  reports patient prays everyday but does not attend mass   .  C - Community: no    A - Address in Care: family enjoys visits from

## 2019-10-15 NOTE — ASSESSMENT & PLAN NOTE
62yo female with IVDU presents as transfer from [facility] for epidural abscess, likely secondary to MSSA bacteremia and endocarditis with vegetations of the MV and TV on DHARMESH 9/26 c/b bilateral multifocal pneumonia with cavitary lesions, right pleural effusion s/p thoracentesis on 10/1 w/ concern for empyema, pan-colitis, and MRI brain 10/8 with evidence of new embolic infarcts. Followed by ID at [facility], most recently on daptomycin, cefazolin, and vancomycin. Intubated shortly after arrival to [facility] for hypoxia. Noted to have penicillin allergy, allergy tested here and found to be false allergy. On 10/4 the patient was started on oxacillin with plans for 6 week course (end date 11/20). On 10/6 patient underwent aspiration and drainage of shoulder abscesses (growing MSSA). On 10/9 she underwent L3 laminectomy with washout of infection. Blood cultures have been without growth since 9/29.     On 10/11 the patient had a change in mental status characterized by somnolence and is waxing and waning in nature, isolated fever 101.2, and leukocytosis with WBC 15K which normalized and now back to 13K. Antibiotics broadened from oxacillin to Vanc, Cefepime, and flagyl. Blood cultures 10/11 without growth thus far. UA with 45 WBC, 20 RBC but no reflex?. Patient has been afebrile since but still with altered mental status. If mental status were related to underlying infection would expect it to have improved with broadening of antibiotics. Antibiotics de-escalated to oxacillin on 10/14. CT of brain with evolving thalamic infarct but would also not expect this to cause this much somnolence. The patient could have other septic emboli to the brain which are too small to visualize on imaging. She could very well be also be aspirating although CXR with no focal consolidation. Would complete septic workup and look for urine infection and swallow eval per SLP.    Recommendations:  -Continue Oxacillin for MSSA  Endocarditis   -Will monitor patient closely with this de-escalation  -Obtain urine culture  -Follow up blood cultures  -Aspiration precautions  -agree with palliative consult and goals of care discussion  -Will still plan to discharge patient on OXACILLIN 12G IV continuous  for 6 weeks, estimated end of therapy date 11/20

## 2019-10-15 NOTE — CARE UPDATE
Rapid Response Respiratory Therapy Proactive Rounding Note      Time of visit: 1230    Code Status: Full Code   : 1955  Age: 63 y.o.  Weight:   Wt Readings from Last 1 Encounters:   10/03/19 53.5 kg (118 lb)     Sex: female  Race: White   [facility] A:   MRN: [id number]    SITUATION     Evaluated patient for: mews score 4, LDA check    BACKGROUND     Patient has a past medical history of Anxiety, Carotid bruit, Chronic pain, Cystitis, Cystocele, unspecified (CODE), Depression, GI bleed, History of uterine fibroid, Shortness of breath on exertion, Spinal stenosis, and Urinary retention.    ASSESSMENT/INTERVENTIONS     Upon arrival in room pt is wearing NC 3lpm. Pt is resting comfortably. Pt has a left nare trumpet in place. Pt responds to pain. No resp concerns at this time.     Pulse: 99 Respiratory rate: 28 Temperature: Temp: 97.6 °F (36.4 °C) BP: BP: (!) 177/77 SpO2: 99%  Level of Consciousness: Level of Consciousness (AVPU): responds to pain  Respiratory Effort: Respiratory Effort: Mouth breathing, Unlabored Expansion/Accessory Muscle Usage: Expansion/Accessory Muscles/Retractions: expansion symmetric, no retractions, no use of accessory muscles  All Lung Field Breath Sounds: All Lung Fields Breath Sounds: Anterior:, Lateral:, diminished, rhonchi  CANDACE Breath Sounds: diminished, rhonchi  LLL Breath Sounds: diminished, rhonchi  RUL Breath Sounds: diminished, rhonchi  RML Breath Sounds: diminished, rhonchi  RLL Breath Sounds: diminished, rhonchi  Mobility at time of assessment: General Mobility: generalized weakness, significantly impaired  O2 Device/Concentration: NC 3 lpm  Most recent blood gas:   Recent Labs     10/13/19  1327   PH 7.503*   PCO2 23.0*   PO2 131*   HCO3 18.1*   POCSATURATED 99   BE -5      NIPPV: No Surgical airway: No  ETCO2 monitored: ETCO2 (mmHg): 23 mmHg  Ambu at bedside: Ambu bag with the patient?: Yes, Adult Ambu    Current Respiratory Care Orders:   10/12/19 2302  Pulse  Oximetry Continuous Continuous      10/12/19 2301   10/12/19 1600  ACAPELLA TREATMENT Q4H Every 4 hours (21 of 38 released)    Release    10/12/19 1209   10/11/19 2200  Chest physiotherapy TID 3 times daily (13 of 28 released)    Release   Question: Indications: Answer: COPIOUS SPUTUM PRODUCTION    10/11/19 1803   10/10/19 1922  Inhalation Treatment Q8H Every 8 hours (9 of 9 released)      10/10/19 1921   10/05/19 1534  Oxygen Continuous Continuous     References: Oxygen Titration Protocol   Question Answer Comment   Device type: Low flow    Device: Nasal Cannula (1- 5 Liters)    LPM: 2    Titrate O2 per Oxygen Titration Protocol: Yes    To maintain SpO2 goal of: >= 90%    Notify MD of: Inability to achieve desired SpO2; Sudden change in patient status and requires 20% increase in FiO2; Patient requires >60% FiO2        10/05/19 1534   Unscheduled  END TIDAL CO2 MONITOR PRN Use PRN (0 of 74038 released)    Release    10/06/19 1136   Unscheduled  ASP/SUCTION NASOTRACHEAL PRN             RECOMMENDATIONS     We recommend: continue to monitor closely continue with current POC    ESCALATION      Physician Escalation (Yes/No) no     Discussed plan of care primary RTSHAHANA     FOLLOW-UP     Please call back the Rapid Response RT, Anahi Sroia, CRT at x [phone] for any questions or concerns.

## 2019-10-15 NOTE — CONSULTS
"Consult received     Neurology consulted for "EEG"    Call from primary team: NO    Chart reviewed. Patient is a 64 yo female w/ history of IV drug use and subsequent development of epidural abscess 2/2 MSSA bacteremia as well as endocarditis, resulting in septic emboli to the brain parenchyma w/ noted evidence of small acute right thalamic infarct w/ additional scattered foci of DWI hyperintense signal without corresponding decreased signal on the ADC maps.  The findings most likely represent subacute embolic infarctions.  Patient is currently undergoing IV Abx therapy for her bacteremia and now has an uptrending Leukocytosis.   Also noted to have a UTI  Infection based on last UA.   Patient w/ progressive drop in their Hgb, with no clear etiology,    - Recommend aggressive metabolic and infectious treatment as all of these issues can contribute to patient's AMS  - If concerned that patient had an acute worsening w/ focal deficits, would recommend a repeat MRI  - Based on chart review, no observed rhythmic jerking to consider EEG. Likely will show diffuse slowing w/ focal abnormalities correlating to patient's recent infarcts. Primary team mentioned that they might want to get an EEG    Full Consult note to follow.    Elisa Michael MD  PGY-III Neurology   "

## 2019-10-15 NOTE — CARE UPDATE
"RAPID RESPONSE NURSE PROACTIVE ROUNDING NOTE     Time of Visit: 08:29    Admit Date: 10/2/2019  LOS: 13  Code Status: Full Code   Date of Visit: 10/15/2019  : 1955  Age: 63 y.o.  Sex: female  Race: White  Bed: [address] [place] [address] A:   MRN: [id number]  Was the patient discharged from an ICU this admission? yes   Was the patient discharged from a PACU within last 24 hours?  no  Did the patient receive conscious sedation/general anesthesia in last 24 hours?  no  Was the patient in the ED within the past 24 hours?  no  Was the patient started on NIPPV within the past 24 hours?  no  Attending Physician: Sushil Ennis MD  Primary Service: [facility]     ASSESSMENT     Diagnosis: Epidural abscess    Abnormal Vital Signs: BP (!) 187/83 (BP Location: Left arm, Patient Position: Lying)   Pulse (!) 111   Temp 98.6 °F (37 °C) (Axillary)   Resp (!) 24   Ht 5' 4" (1.626 m)   Wt 53.5 kg (118 lb)   SpO2 98%   Breastfeeding? No   BMI 20.25 kg/m²      Clinical Issues: Neuro    Patient  has a past medical history of Anxiety, Carotid bruit, Chronic pain, Cystitis, Cystocele, unspecified (CODE), Depression, GI bleed, History of uterine fibroid, Shortness of breath on exertion, Spinal stenosis, and Urinary retention.    Patient is drowsy but easily arousable and follows commands. She has no complaints at this time. Bedside Rn in room at time of rounds.     INTERVENTIONS/ RECOMMENDATIONS     Repeat ABG.    Discussed plan of care with RNMarylin.    PHYSICIAN ESCALATION     Yes/No  no    Orders received and case discussed with NA.    Disposition: Remain in room 1028.    FOLLOW-UP     Call back the Rapid Response Nurse, Rg Gordon RN at [phone] for additional questions or concerns.        "

## 2019-10-15 NOTE — PLAN OF CARE
POC reviewed with pt, acknowledged understanding. Pt confused and lethargic, but follows commands. Pt remains free of falls/injuries. Pt on telemetry remains SR and low ST. Pt blood glucose being monitored q 4. Pt tolerating NPO diet. Pt pain controlled with prescribed meds. Pt wearing SCDs. Pt turned q 2, and use of wedge pillow. NG tube clamped. Frequent suctioning of patient. No acute events throughout shift. No distress noted, will continue to monitor.

## 2019-10-15 NOTE — PROGRESS NOTES
Hospital Medicine  Progress Note      Patient Name: Mesha Mckenzie  MRN: [id number]  Date of Admission: 10/2/2019     Principal Problem: Epidural abscess     Subjective  Stable overnight. Continues to encephalopathic with excessive drowsiness, but arouses to voice and touch and answers some questions appropriately. ID consulted for assistance with abx and recommend de-escalating back to oxacillin, since septic workup from 10/11 neg. Recommend repeat CTH to re-assess for septic phenomenon. (MRI from 10/7 revealed subacute embolic infarctions and small rt thalamic infarct.) Continuing aggressive pulm toilet. Palliative care consulted and are assisting with goals of care and emotional support.      Review of Systems     Limited due to encephalopathy  Cardiac: No chest pain  Neuro: NO headache or vision changes  Pulmonary: Positive for shortness of breath and increased secretions  GI: Positive for watery stool    Medications  Scheduled Meds:   albuterol-ipratropium  3 mL Nebulization Q8H    enoxaparin  40 mg Subcutaneous Daily    miconazole nitrate 2%   Topical (Top) BID    oxacillin 12 g in  mL CONTINUOUS INFUSION  12 g Intravenous Q24H     Continuous Infusions:  PRN Meds:.acetaminophen, Dextrose 10% Bolus, hydrALAZINE, influenza, labetalol, mupirocin, mupirocin, ondansetron, pneumoc 13-susanna conj-dip cr(PF), sodium chloride 0.9%    Objective    Physical Examination    Temp:  [96.9 °F (36.1 °C)-100 °F (37.8 °C)]   Pulse:  []   Resp:  [14-32]   BP: (138-186)/(63-80)   SpO2:  [91 %-100 %]     Gen: NAD, somnolent, ill appearing,   Head: NC, AT, NGT in place  Eyes: PERRLA, EOMI  CV: RRR, no M/R/G, no peripheral edema, no JVD  Resp:coarse breath sounds bilateral with referred upper airway sounds, improved  GI: Soft, NT, ND, +BS  Ext: MAEW, no c/c/e  Neuro: AAOx3,  no focal neurologic deficits, follows commands      CBC  Recent Labs   Lab 10/13/19  0535 10/13/19  1347 10/14/19  0430   WBC 16.23* 15.18* 11.73    HGB 8.3* 8.1* 7.2*   HCT 26.1* 25.9* 22.5*    309 247     CMP  Recent Labs   Lab 10/08/19  1636  10/11/19  1645 10/12/19  0502  10/13/19  0535 10/13/19  1347 10/14/19  0430   *   < > 137  138 139   < > 137 138 141  139   K 3.1*   < > 3.3*  3.3* 3.7   < > 2.8* 4.1 3.1*  3.1*      < > 109  109 113*   < > 109 113* 115*  113*   CO2 20*   < > 21*  21* 17*   < > 19* 18* 17*  18*   BUN 20   < > 16  16 21   < > 25* 27* 28*  28*   CREATININE 0.9   < > 0.8  0.8 0.7   < > 0.7 0.7 0.6  0.7   *   < > 111*  111* 99   < > 109 122* 98  101   CALCIUM 7.3*   < > 7.3*  7.4* 7.4*   < > 7.7* 7.7* 7.7*  7.9*   MG  --    < >  --  1.5*  --  1.5* 2.2 1.6   PHOS  --    < >  --  2.2*  --  3.0  --  2.0*   ALKPHOS  --   --  94  --   --   --   --   --    ALT  --   --  <5*  --   --   --   --   --    AST  --   --  11  --   --   --   --   --    ALBUMIN  --   --  1.4*  --   --   --   --   --    PROT  --   --  5.5*  --   --   --   --   --    BILITOT  --   --  0.2  --   --   --   --   --    INR 1.4*  --   --   --   --   --   --   --     < > = values in this interval not displayed.               Assessment and Plan:      Septic shock with acute organ dysfunction due to methicillin susceptible Staphylococcus aureus (MSSA)  Epidural abscess   Abscess of upper extremity  --due to IVDU. patient reported injecting IV heroin to DAYNE ALBRECHT at OSH. Blood cultures (9/19 - 9/26) have grown methicillin sensitive Staph aureus. Blood cultures as of 9/29 have been NGTD. mitral and tricuspid valve vegetations seen on DHARMESH 9/25; unchanged on TTE 9/28.   --Blood Cx NGTD since 9/29  --formal 2D echo results show EF 53%; normal L ventricular function; mild mitral sclerosis; small circumferential pericardial effusion. cardiology consulted; no surgical intervention at this point  --followed by neurosurgery; OR laminectomy complete 10/9; L3 washout   --bilateral shoulder abscess, s/p I&D; R shoulder abscess + staph aureus  (10/5)  --oxacillin started after passed penicillin challenge; ID recs to continue oxacillin x 6 weeks with f/u in clinic  --(10/11): febrile, new leukocytosis and acute change in mental status prompting rebroadening abx to van, cefepime, flagyl and reculturing. Henriquez exchanged and central line removed today.   --BCx from 10/11- NGTD  --ID re-consulted; appreciate recs  --DC vanc, cefepime and flagyl and resume oxacillin today      Encephalopathy, metabolic  --likely due to infection and septic emboli  Brain MRI 9/30 with lacunar infarcts  --repeat head CT (10/7) revealed new right basal ganglia infarcts  --MRI of brain revealed R thalamic infarct, ventriculitis, subactue embolic infarctions  --CTA of head 10/8: atherosclerotic plaquing of the distal vertebral arteries and concern for noncalcified plaquing and stenosis; no high-grade stenosis or proximal occlusion. No mycotic aneurysms  --[facility] neuro recommending against systemic anticoagulation and asa due to high risk for hemorrhage from septic emboli  --broadened to vanc, cefepime flagyl (10/11); de-escalate to oxacllin per ID recs      Dysphagia  --due to encephalopathy  --cleared for diet by ST, but will continue TFs per NGT today to ensure adequate nutrition, since pt somnolent and unlikely to take in adequate intake  --NPO for now due to high risk for aspiration given AMS    Acute hypoxemic respiratory failure  Pleural effusion  Tachypnea   Multifactorial secondary to complete left lung collapse, septic emboli, pleural effusions, suspected pna  --s/p thora 10/1- transudative effusion; chest tube placed due to concern for empyema at OSH (10/3); since removed  --pleural fluid with GPC on gram stain  --CTA neg for PE 10/3  --extubated on 10/5  --on 3L NC; wean as tolerated  --on cefepime/flagyl and vanc since 10/12 with plans to de-escalate back to oxacillin pending stability and cultures  --duo nebs spaced out due to tachycardia  --continues to be tachypneic  with pH 7.5 (since 10/10) likely from underlying lung issues including septic emboli, possible new asp PNA. NO PE seen on CTA from 10/3, but could have developed one since. However, she has high risk for brain bleed if were to AC and neurosx has already rec'd against systemic anticoagulation. Hold off on repeat CTA chest for now.  --continue aggressive pulmonary toilet with CPT, acapella, deep suctioning    Pancolitis  --seen on CT 9/19 with improvement noted on f/u CT 9/25  --concern for ileus on 9/25 CT; rectal tube in place with liquid stool output  --stool 9/27 neg for c diff; stool cx neg  --flagyl course completed (7/7 days)  --cdiff neg again on 10/12  --now tolerating tube feeds    Severe malnutrition  Hypoglycemia  --started on TFs  via NGT; tolerating  --stable    Hypokalemia  Hypomagenesemia  --repleting K, Mg  --f/u repeat labs    Tachycardia  --has been tachycardic since prior to step down from ICU  --EKG with sinus tach  --possible inflammatory response to infection/ sepsis  --duo-nebs may be contributing  --BP stable  --No PE on CTA from 10/3    Henriquez in place for chronic urinary retention. Replaced on 10/11.    Diet: tube feeds, npo  VTE PPX: lovenox  Goals of care: FULL   Dispo: rehab vs LTAC when medically stable    Yesenia Lerner M.D.  Department of Hospital Medicine  [facility]  [phone] (pager)

## 2019-10-15 NOTE — SUBJECTIVE & OBJECTIVE
Interval History:   No acute events overnight.Patient pulled out NG and nasal trumpet, and able to be replaced. Patient continues to be altered and according to daughter at bedside seems to be worsened from yesterday. According to her, her mom recognized her yesterday and now does not today.      Review of Systems   Unable to perform ROS: Mental status change     Objective:     Vital Signs (Most Recent):  Temp: 97.6 °F (36.4 °C) (10/15/19 1216)  Pulse: (!) 114 (10/15/19 1518)  Resp: (!) 22 (10/15/19 1301)  BP: (!) 177/77 (10/15/19 1216)  SpO2: (!) 93 % (10/15/19 1304) Vital Signs (24h Range):  Temp:  [97.3 °F (36.3 °C)-98.9 °F (37.2 °C)] 97.6 °F (36.4 °C)  Pulse:  [] 114  Resp:  [16-31] 22  SpO2:  [91 %-99 %] 93 %  BP: (140-187)/(65-83) 177/77     Weight: 53.5 kg (118 lb)  Body mass index is 20.25 kg/m².    Estimated Creatinine Clearance: 67.7 mL/min (based on SCr of 0.7 mg/dL).    Physical Exam   Constitutional: She appears well-developed and well-nourished. No distress.   Ill appearing lady   HENT:   Head: Normocephalic and atraumatic.   NGT and trumpet in place   Eyes: Pupils are equal, round, and reactive to light. No scleral icterus.   Neck: Normal range of motion. Neck supple. No JVD present.   Cardiovascular: Normal rate, regular rhythm and normal heart sounds. Exam reveals no gallop and no friction rub.   No murmur heard.  Pulmonary/Chest: Effort normal. No respiratory distress. She has no wheezes. She has no rales.   Bilateral course breath sounds   Abdominal: Soft. Bowel sounds are normal. She exhibits no distension. There is no tenderness.   Musculoskeletal: She exhibits no edema.   Neurological:   Somnolent  Able to be aroused by touch and responding to simple questions appropriately  Able to tell me her name and that she is in a hospital   Skin: Skin is warm and dry.       Significant Labs:   Blood Culture:   Recent Labs   Lab 10/01/19  0534 10/02/19  0445 10/02/19  0830 10/02/19  2025  10/11/19  1701   LABBLOO No growth after 5 days. No growth after 5 days. No growth after 5 days. No growth after 5 days. No Growth to date  No Growth to date  No Growth to date  No Growth to date  No Growth to date  No Growth to date  No Growth to date  No Growth to date     CBC:   Recent Labs   Lab 10/14/19  0430 10/15/19  0412   WBC 11.73 13.58*   HGB 7.2* 7.7*   HCT 22.5* 26.2*    288     CMP:   Recent Labs   Lab 10/14/19  0430 10/15/19  0407     139 141   K 3.1*  3.1* 3.5   *  113* 115*   CO2 17*  18* 17*   GLU 98  101 107   BUN 28*  28* 23   CREATININE 0.6  0.7 0.7   CALCIUM 7.7*  7.9* 8.3*   ANIONGAP 9  8 9   EGFRNONAA >60.0  >60.0 >60.0     Recent Labs   Lab 09/29/19  1615 10/02/19  2100 10/11/19  1954   COLORU Yellow Yellow  Yellow Yellow   APPEARANCEUA Clear Cloudy*  Cloudy* Hazy*   PHUR 7.0 7.0  7.0 6.0   SPECGRAV 1.010 1.015  1.015 1.020   PROTEINUA 2+* 2+*  2+* 1+*   GLUCUA Negative 1+*  1+* Negative   KETONESU Negative Negative  Negative Negative   BILIRUBINUA Negative Negative  Negative Negative   OCCULTUA 2+* 2+*  2+* 3+*   NITRITE Negative Negative  Negative Negative   UROBILINOGEN Negative  --   --    LEUKOCYTESUR Trace* 1+*  1+* 2+*   RBCUA 1 0 20*   WBCUA 3 10* 45*   BACTERIA Moderate* Rare Few*   SQUAMEPITHEL 0 1  --    HYALINECASTS 0 0 2*       Significant Imaging: I have reviewed all pertinent imaging results/findings within the past 24 hours.

## 2019-10-15 NOTE — ASSESSMENT & PLAN NOTE
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 Prolonged hospital course has been complicated by worsening metabolic encephalopathy, hypoxic respiratory failure, epidural abscess, bilateral shoulder abscesses, and pancolitis.     [facility]/advanced care planning     10/14 Spoke with patients  at bedside. Patients daughter Leeanna was on speaker phone.  Patient unable to participate in conversation 2/2 mental status. Introduced palliative care and how we can be helpful going forward. Daughter expressed a lot of frustration about not understanding the current plan of care, not understanding why her mother is no longer in the ICU, not understanding why the specialists are no longer seeing her, and about getting mixed messages as far as her moms condition and prognosis.   Most of the visit was spent reassuring patients daughter that she is getting excellent care. We talked about why some specialists have signed off and why some like ID are getting re-involved. We discussed the events of the weekend and her worsening mental status and how were worried that the patient is getting sicker. Daughter seemed very surprised that her mother could be getting worse.   Briefly discussed code status. Daughter was able to verbalize that her mother would never want to live on machines longterm.     10/15   Spoke with mariam Durán at bedside. She explained that Dr. Ennis talked to her about how she might have to make some tough decisions in the next few days. I explained that I agreed with Dr. Ennis and expressed how I am very worried about her mom. She again expressed that her mother would not want to be on a ventilator longterm. They are going to talk about code status as a family tonight. She expressed that she does not view a feeding tube as life support. I did not go into detail about feeding tubes but did explain to her that it's important to know that a feeding tube does not  eliminate the risk for aspiration which is a common misconception. She was surprised and thanked me for letting her know. She shared that ID recommended a neuro consult for eeg which she would like.   -Continue GOC discussions while working to build rapport with daughter Leeanna who is still in shock and processing   - Ari who is patient's default decision maker would like daughter Leeanna Yu to be primary decision maker. Emergency contact changed to Leeanna in EMR

## 2019-10-16 PROBLEM — I10 HTN (HYPERTENSION): Status: ACTIVE | Noted: 2019-10-16

## 2019-10-16 PROBLEM — N17.9 AKI (ACUTE KIDNEY INJURY): Status: RESOLVED | Noted: 2019-09-19 | Resolved: 2019-10-16

## 2019-10-16 LAB
ANION GAP SERPL CALC-SCNC: 12 MMOL/L (ref 8–16)
ANION GAP SERPL CALC-SCNC: 12 MMOL/L (ref 8–16)
ANION GAP SERPL CALC-SCNC: 15 MMOL/L (ref 8–16)
BACTERIA BLD CULT: NORMAL
BACTERIA BLD CULT: NORMAL
BACTERIA SPEC ANAEROBE CULT: NORMAL
BASOPHILS # BLD AUTO: 0.07 K/UL (ref 0–0.2)
BASOPHILS NFR BLD: 0.7 % (ref 0–1.9)
BUN SERPL-MCNC: 21 MG/DL (ref 8–23)
BUN SERPL-MCNC: 21 MG/DL (ref 8–23)
BUN SERPL-MCNC: 22 MG/DL (ref 8–23)
CALCIUM SERPL-MCNC: 7.5 MG/DL (ref 8.7–10.5)
CALCIUM SERPL-MCNC: 8.1 MG/DL (ref 8.7–10.5)
CALCIUM SERPL-MCNC: 8.5 MG/DL (ref 8.7–10.5)
CHLORIDE SERPL-SCNC: 116 MMOL/L (ref 95–110)
CHLORIDE SERPL-SCNC: 118 MMOL/L (ref 95–110)
CHLORIDE SERPL-SCNC: 118 MMOL/L (ref 95–110)
CO2 SERPL-SCNC: 16 MMOL/L (ref 23–29)
CO2 SERPL-SCNC: 16 MMOL/L (ref 23–29)
CO2 SERPL-SCNC: 17 MMOL/L (ref 23–29)
CREAT SERPL-MCNC: 0.7 MG/DL (ref 0.5–1.4)
DIFFERENTIAL METHOD: ABNORMAL
EOSINOPHIL # BLD AUTO: 0.1 K/UL (ref 0–0.5)
EOSINOPHIL NFR BLD: 0.5 % (ref 0–8)
ERYTHROCYTE [DISTWIDTH] IN BLOOD BY AUTOMATED COUNT: 19.5 % (ref 11.5–14.5)
EST. GFR  (AFRICAN AMERICAN): >60 ML/MIN/1.73 M^2
EST. GFR  (NON AFRICAN AMERICAN): >60 ML/MIN/1.73 M^2
GLUCOSE SERPL-MCNC: 80 MG/DL (ref 70–110)
GLUCOSE SERPL-MCNC: 85 MG/DL (ref 70–110)
GLUCOSE SERPL-MCNC: 93 MG/DL (ref 70–110)
HCT VFR BLD AUTO: 24.2 % (ref 37–48.5)
HGB BLD-MCNC: 7 G/DL (ref 12–16)
IMM GRANULOCYTES # BLD AUTO: 0.15 K/UL (ref 0–0.04)
IMM GRANULOCYTES NFR BLD AUTO: 1.5 % (ref 0–0.5)
LYMPHOCYTES # BLD AUTO: 1.9 K/UL (ref 1–4.8)
LYMPHOCYTES NFR BLD: 19.1 % (ref 18–48)
MAGNESIUM SERPL-MCNC: 1.9 MG/DL (ref 1.6–2.6)
MCH RBC QN AUTO: 29.9 PG (ref 27–31)
MCHC RBC AUTO-ENTMCNC: 28.9 G/DL (ref 32–36)
MCV RBC AUTO: 103 FL (ref 82–98)
MONOCYTES # BLD AUTO: 0.6 K/UL (ref 0.3–1)
MONOCYTES NFR BLD: 5.6 % (ref 4–15)
NEUTROPHILS # BLD AUTO: 7.4 K/UL (ref 1.8–7.7)
NEUTROPHILS NFR BLD: 72.6 % (ref 38–73)
NRBC BLD-RTO: 0 /100 WBC
PHOSPHATE SERPL-MCNC: 2.3 MG/DL (ref 2.7–4.5)
PHOSPHATE SERPL-MCNC: 3 MG/DL (ref 2.7–4.5)
PHOSPHATE SERPL-MCNC: 5 MG/DL (ref 2.7–4.5)
PLATELET # BLD AUTO: 260 K/UL (ref 150–350)
PMV BLD AUTO: 9.7 FL (ref 9.2–12.9)
POCT GLUCOSE: 70 MG/DL (ref 70–110)
POCT GLUCOSE: 75 MG/DL (ref 70–110)
POCT GLUCOSE: 80 MG/DL (ref 70–110)
POCT GLUCOSE: 95 MG/DL (ref 70–110)
POCT GLUCOSE: 95 MG/DL (ref 70–110)
POTASSIUM SERPL-SCNC: 2.5 MMOL/L (ref 3.5–5.1)
POTASSIUM SERPL-SCNC: 2.6 MMOL/L (ref 3.5–5.1)
POTASSIUM SERPL-SCNC: 3 MMOL/L (ref 3.5–5.1)
RBC # BLD AUTO: 2.34 M/UL (ref 4–5.4)
SODIUM SERPL-SCNC: 144 MMOL/L (ref 136–145)
SODIUM SERPL-SCNC: 147 MMOL/L (ref 136–145)
SODIUM SERPL-SCNC: 149 MMOL/L (ref 136–145)
WBC # BLD AUTO: 10.17 K/UL (ref 3.9–12.7)

## 2019-10-16 PROCEDURE — 97116 GAIT TRAINING THERAPY: CPT

## 2019-10-16 PROCEDURE — 83735 ASSAY OF MAGNESIUM: CPT

## 2019-10-16 PROCEDURE — 99233 PR SUBSEQUENT HOSPITAL CARE,LEVL III: ICD-10-PCS | Mod: ,,, | Performed by: FAMILY MEDICINE

## 2019-10-16 PROCEDURE — 87086 URINE CULTURE/COLONY COUNT: CPT

## 2019-10-16 PROCEDURE — 25000003 PHARM REV CODE 250: Performed by: STUDENT IN AN ORGANIZED HEALTH CARE EDUCATION/TRAINING PROGRAM

## 2019-10-16 PROCEDURE — 84100 ASSAY OF PHOSPHORUS: CPT

## 2019-10-16 PROCEDURE — 36415 COLL VENOUS BLD VENIPUNCTURE: CPT

## 2019-10-16 PROCEDURE — 87088 URINE BACTERIA CULTURE: CPT

## 2019-10-16 PROCEDURE — 94668 MNPJ CHEST WALL SBSQ: CPT

## 2019-10-16 PROCEDURE — 92526 ORAL FUNCTION THERAPY: CPT

## 2019-10-16 PROCEDURE — 25000242 PHARM REV CODE 250 ALT 637 W/ HCPCS: Performed by: INTERNAL MEDICINE

## 2019-10-16 PROCEDURE — 25000003 PHARM REV CODE 250: Performed by: FAMILY MEDICINE

## 2019-10-16 PROCEDURE — 84100 ASSAY OF PHOSPHORUS: CPT | Mod: 91

## 2019-10-16 PROCEDURE — 97530 THERAPEUTIC ACTIVITIES: CPT

## 2019-10-16 PROCEDURE — 20600001 HC STEP DOWN PRIVATE ROOM

## 2019-10-16 PROCEDURE — 80048 BASIC METABOLIC PNL TOTAL CA: CPT | Mod: 91

## 2019-10-16 PROCEDURE — 99233 SBSQ HOSP IP/OBS HIGH 50: CPT | Mod: ,,, | Performed by: FAMILY MEDICINE

## 2019-10-16 PROCEDURE — 97535 SELF CARE MNGMENT TRAINING: CPT

## 2019-10-16 PROCEDURE — 99900035 HC TECH TIME PER 15 MIN (STAT)

## 2019-10-16 PROCEDURE — 99222 1ST HOSP IP/OBS MODERATE 55: CPT | Mod: ,,, | Performed by: PSYCHIATRY & NEUROLOGY

## 2019-10-16 PROCEDURE — 94640 AIRWAY INHALATION TREATMENT: CPT

## 2019-10-16 PROCEDURE — 31720 CLEARANCE OF AIRWAYS: CPT

## 2019-10-16 PROCEDURE — 25000003 PHARM REV CODE 250: Performed by: NURSE PRACTITIONER

## 2019-10-16 PROCEDURE — 85025 COMPLETE CBC W/AUTO DIFF WBC: CPT

## 2019-10-16 PROCEDURE — 94770 HC EXHALED C02 TEST: CPT

## 2019-10-16 PROCEDURE — 63600175 PHARM REV CODE 636 W HCPCS: Performed by: FAMILY MEDICINE

## 2019-10-16 PROCEDURE — 80048 BASIC METABOLIC PNL TOTAL CA: CPT

## 2019-10-16 PROCEDURE — 94761 N-INVAS EAR/PLS OXIMETRY MLT: CPT

## 2019-10-16 PROCEDURE — 63600175 PHARM REV CODE 636 W HCPCS: Performed by: INTERNAL MEDICINE

## 2019-10-16 PROCEDURE — 99232 PR SUBSEQUENT HOSPITAL CARE,LEVL II: ICD-10-PCS | Mod: ,,, | Performed by: INTERNAL MEDICINE

## 2019-10-16 PROCEDURE — 99232 SBSQ HOSP IP/OBS MODERATE 35: CPT | Mod: ,,, | Performed by: INTERNAL MEDICINE

## 2019-10-16 PROCEDURE — 94664 DEMO&/EVAL PT USE INHALER: CPT

## 2019-10-16 PROCEDURE — 27000221 HC OXYGEN, UP TO 24 HOURS

## 2019-10-16 PROCEDURE — 99222 PR INITIAL HOSPITAL CARE,LEVL II: ICD-10-PCS | Mod: ,,, | Performed by: PSYCHIATRY & NEUROLOGY

## 2019-10-16 RX ORDER — AMLODIPINE BESYLATE 5 MG/1
5 TABLET ORAL DAILY
Status: DISCONTINUED | OUTPATIENT
Start: 2019-10-16 | End: 2019-10-17

## 2019-10-16 RX ORDER — IPRATROPIUM BROMIDE AND ALBUTEROL SULFATE 2.5; .5 MG/3ML; MG/3ML
3 SOLUTION RESPIRATORY (INHALATION) EVERY 4 HOURS PRN
Status: DISCONTINUED | OUTPATIENT
Start: 2019-10-16 | End: 2019-10-25 | Stop reason: HOSPADM

## 2019-10-16 RX ORDER — HYDRALAZINE HYDROCHLORIDE 20 MG/ML
10 INJECTION INTRAMUSCULAR; INTRAVENOUS EVERY 6 HOURS PRN
Status: DISCONTINUED | OUTPATIENT
Start: 2019-10-16 | End: 2019-10-24

## 2019-10-16 RX ORDER — POTASSIUM CHLORIDE 7.45 MG/ML
10 INJECTION INTRAVENOUS
Status: COMPLETED | OUTPATIENT
Start: 2019-10-16 | End: 2019-10-16

## 2019-10-16 RX ORDER — METOPROLOL TARTRATE 25 MG/1
25 TABLET, FILM COATED ORAL 2 TIMES DAILY
Status: DISCONTINUED | OUTPATIENT
Start: 2019-10-16 | End: 2019-10-18

## 2019-10-16 RX ORDER — IBUPROFEN 200 MG
24 TABLET ORAL
Status: DISCONTINUED | OUTPATIENT
Start: 2019-10-16 | End: 2019-10-25 | Stop reason: HOSPADM

## 2019-10-16 RX ORDER — GLUCAGON 1 MG
1 KIT INJECTION
Status: DISCONTINUED | OUTPATIENT
Start: 2019-10-16 | End: 2019-10-25 | Stop reason: HOSPADM

## 2019-10-16 RX ORDER — IBUPROFEN 200 MG
16 TABLET ORAL
Status: DISCONTINUED | OUTPATIENT
Start: 2019-10-16 | End: 2019-10-25 | Stop reason: HOSPADM

## 2019-10-16 RX ADMIN — IPRATROPIUM BROMIDE AND ALBUTEROL SULFATE 3 ML: .5; 3 SOLUTION RESPIRATORY (INHALATION) at 04:10

## 2019-10-16 RX ADMIN — POTASSIUM CHLORIDE 10 MEQ: 10 INJECTION, SOLUTION INTRAVENOUS at 11:10

## 2019-10-16 RX ADMIN — POTASSIUM CHLORIDE 10 MEQ: 10 INJECTION, SOLUTION INTRAVENOUS at 09:10

## 2019-10-16 RX ADMIN — IPRATROPIUM BROMIDE AND ALBUTEROL SULFATE 3 ML: .5; 3 SOLUTION RESPIRATORY (INHALATION) at 08:10

## 2019-10-16 RX ADMIN — ENOXAPARIN SODIUM 40 MG: 100 INJECTION SUBCUTANEOUS at 04:10

## 2019-10-16 RX ADMIN — HYDRALAZINE HYDROCHLORIDE 10 MG: 20 INJECTION INTRAMUSCULAR; INTRAVENOUS at 12:10

## 2019-10-16 RX ADMIN — AMLODIPINE BESYLATE 5 MG: 5 TABLET ORAL at 04:10

## 2019-10-16 RX ADMIN — METOPROLOL TARTRATE 25 MG: 25 TABLET ORAL at 09:10

## 2019-10-16 RX ADMIN — METOPROLOL TARTRATE 25 MG: 25 TABLET ORAL at 12:10

## 2019-10-16 RX ADMIN — LABETALOL HCL IV SOLN PREFILLED SYRINGE 20 MG/4ML (5 MG/ML) 10 MG: 20/4 SOLUTION PREFILLED SYRINGE at 12:10

## 2019-10-16 RX ADMIN — ACETAMINOPHEN 650 MG: 325 TABLET ORAL at 04:10

## 2019-10-16 RX ADMIN — POTASSIUM CHLORIDE 10 MEQ: 10 INJECTION, SOLUTION INTRAVENOUS at 12:10

## 2019-10-16 RX ADMIN — LABETALOL HCL IV SOLN PREFILLED SYRINGE 20 MG/4ML (5 MG/ML) 10 MG: 20/4 SOLUTION PREFILLED SYRINGE at 04:10

## 2019-10-16 RX ADMIN — MICONAZOLE NITRATE: 20 OINTMENT TOPICAL at 08:10

## 2019-10-16 RX ADMIN — POTASSIUM CHLORIDE 10 MEQ: 10 INJECTION, SOLUTION INTRAVENOUS at 08:10

## 2019-10-16 RX ADMIN — POTASSIUM PHOSPHATE, MONOBASIC AND POTASSIUM PHOSPHATE, DIBASIC 15 MMOL: 224; 236 INJECTION, SOLUTION, CONCENTRATE INTRAVENOUS at 09:10

## 2019-10-16 RX ADMIN — IPRATROPIUM BROMIDE AND ALBUTEROL SULFATE 3 ML: .5; 3 SOLUTION RESPIRATORY (INHALATION) at 11:10

## 2019-10-16 RX ADMIN — IPRATROPIUM BROMIDE AND ALBUTEROL SULFATE 3 ML: .5; 3 SOLUTION RESPIRATORY (INHALATION) at 12:10

## 2019-10-16 RX ADMIN — MICONAZOLE NITRATE: 20 OINTMENT TOPICAL at 10:10

## 2019-10-16 RX ADMIN — OXACILLIN SODIUM 12 G: 10 INJECTION, POWDER, FOR SOLUTION INTRAVENOUS at 09:10

## 2019-10-16 NOTE — ASSESSMENT & PLAN NOTE
- Patient requires adequate BP control at this time  - Concern for recurrence of HTNsive infarcts and hemorrhagic transformation of likely septic/embolic infarcts

## 2019-10-16 NOTE — ASSESSMENT & PLAN NOTE
- likely due to infection and septic emboli  - Brain MRI 9/30 with lacunar infarcts  - repeat head CT (10/7) revealed new right basal ganglia infarcts  - MRI of brain revealed R thalamic infarct, ventriculitis, subactue embolic infarctions  -CTA of head 10/8: atherosclerotic plaquing of the distal vertebral arteries and concern for noncalcified plaquing and stenosis; no high-grade stenosis or proximal occlusion. No mycotic aneurysms  - vas neuro recommending against systemic anticoagulation and asa due to high risk for hemorrhage from septic emboli

## 2019-10-16 NOTE — SUBJECTIVE & OBJECTIVE
Interval History: Patient seen and examined this am. She is constitutionally greatly improved from day prior when she was not responding verbally and did not follow commands. Today she follows commands and is able to answer simple questions appropriately including her name as well as name of children. She states that [place] is the president and that the month is September. Limited ROS due to patient condition. Denies fevers, chills, shortness of breath, chest pain, abdominal pain. Reports lt hip and rt knee pain.    Review of Systems   Reason unable to perform ROS: limited due to patient condition.   Constitutional: Negative for chills and fever.   Respiratory: Negative for shortness of breath.    Cardiovascular: Negative for chest pain.   Gastrointestinal: Negative for abdominal pain.   Musculoskeletal:        Lt hip and right knee pain     Objective:     Vital Signs (Most Recent):  Temp: 98.6 °F (37 °C) (10/16/19 1227)  Pulse: (!) 114 (10/16/19 1227)  Resp: 18 (10/16/19 1227)  BP: (!) 191/88 (10/16/19 1227)  SpO2: 98 % (10/16/19 1227) Vital Signs (24h Range):  Temp:  [98.1 °F (36.7 °C)-99.5 °F (37.5 °C)] 98.6 °F (37 °C)  Pulse:  [] 114  Resp:  [16-22] 18  SpO2:  [93 %-99 %] 98 %  BP: (161-218)/(71-93) 191/88     Weight: 53.5 kg (118 lb)  Body mass index is 20.25 kg/m².    Intake/Output Summary (Last 24 hours) at 10/16/2019 1558  Last data filed at 10/16/2019 1108  Gross per 24 hour   Intake 1041.67 ml   Output 2200 ml   Net -1158.33 ml      Physical Exam   Constitutional: No distress.   Eyes: Pupils are equal, round, and reactive to light. EOM are normal.   Neck: Neck supple. No JVD present.   Cardiovascular: Regular rhythm.   Murmur heard.  tachycardic   Pulmonary/Chest: Effort normal. No respiratory distress. She has no wheezes. She has no rales.   Tachypnea. Less labored than day prior   Abdominal: Soft. She exhibits no distension. There is no tenderness. There is no guarding.   Musculoskeletal: She  exhibits no edema.   Improved bilateral  strength compared to exam one day ago. No unilateral deficits.   Neurological: She is alert. GCS eye subscore is 4. GCS verbal subscore is 4. GCS motor subscore is 6.   Oriented to person.   Skin: Skin is warm and dry. No rash noted. She is not diaphoretic.       Significant Labs:   BMP:   Recent Labs   Lab 10/16/19  0324 10/16/19  0533   GLU 85 93   * 147*   K 2.5* 2.6*   * 118*   CO2 16* 17*   BUN 21 22   CREATININE 0.7 0.7   CALCIUM 7.5* 8.1*   MG 1.9  --      CBC:   Recent Labs   Lab 10/15/19  0412 10/16/19  0324   WBC 13.58* 10.17   HGB 7.7* 7.0*   HCT 26.2* 24.2*    260       Significant Imaging: I have reviewed all pertinent imaging results/findings within the past 24 hours.

## 2019-10-16 NOTE — ASSESSMENT & PLAN NOTE
Multifactorial secondary to complete left lung collapse, septic emboli, pleural effusions, suspected pna  --s/p thora 10/1- transudative effusion; chest tube placed due to concern for empyema at OSH (10/3); since removed  --pleural fluid with GPC on gram stain  --CTA neg for PE 10/3  --extubated on 10/5  --on 3L NC; wean as tolerated  --on cefepime/flagyl and vanc since 10/12 with plans to de-escalate back to oxacillin pending stability and cultures  --duo nebs spaced out due to tachycardia  --continues to be tachypneic with pH 7.5 (since 10/10) likely from underlying lung issues including septic emboli, possible new asp PNA. NO PE seen on CTA from 10/3, but could have developed one since. However, she has high risk for brain bleed if were to AC and neurosx has already rec'd against systemic anticoagulation. Hold off on repeat CTA chest for now.  --continue aggressive pulmonary toilet with CPT, acapella, deep suctioning

## 2019-10-16 NOTE — ASSESSMENT & PLAN NOTE
Uncontrolled  - start metroprolol 25mg BID  - start amlodipine 5mg daily  - hydralazine and labetolol prn

## 2019-10-16 NOTE — PT/OT/SLP PROGRESS
Physical Therapy Treatment    Patient Name:  Mesha Mckenzie   MRN:  [id number]    Recommendations:     Discharge Recommendations:  rehabilitation facility   Discharge Equipment Recommendations: (TBD as pt progresses)   Barriers to discharge: Decreased caregiver support    Assessment:     Mesha Mckenzie is a 63 y.o. female admitted with a medical diagnosis of Epidural abscess.  She presents with the following impairments/functional limitations:  weakness, gait instability, impaired endurance, impaired balance, impaired functional mobilty, impaired cardiopulmonary response to activity, pain, decreased safety awareness . Pt more alert and able to follow commands. Pt progressing well with mobility.    Rehab Prognosis: Good; patient would benefit from acute skilled PT services to address these deficits and reach maximum level of function.    Recent Surgery: Procedure(s) (LRB):  LAMINECTOMY, SPINE, L3, Open (N/A) 7 Days Post-Op    Plan:     During this hospitalization, patient to be seen 4 x/week to address the identified rehab impairments via gait training, therapeutic activities, therapeutic exercises, neuromuscular re-education and progress toward the following goals:    · Plan of Care Expires:  11/08/19    Subjective   Pt alert throughout treatment    Pain/Comfort:  · Pain Rating 1: (pt c/o pain, unable to grade ; appears to be in the abdomen)  · Location - Orientation 1: generalized  · Location 1: abdomen  · Pain Addressed 1: Reposition, Cessation of Activity  · Pain Rating Post-Intervention 1: (shakes her head yes when asked if she is in pain after returning to bed, unable to grade)      Objective:     Communicated with nurse prior to session.  Patient found supine with astorga catheter, peripheral IV, bowel management system, telemetry, pulse ox (continuous) upon PT entry to room.     General Precautions: Standard, fall, NPO   Orthopedic Precautions:N/A   Braces: N/A     Functional Mobility:  · Bed Mobility:      · Rolling Right: moderate assistance  · Supine to Sit: moderate assistance  · Sit to Supine: moderate assistance  · Transfers:     · Sit to Stand:  moderate assistance with rolling walker  · Bed to Chair: moderate assistance with  hand-held assist  using  Stand Pivot  · Gait: 2 steps then 5ft then 10ft then 14ft with RW with max assist of 2 +1 to follow with bedside chair. pt performed gait with flexed trunk, decreased step length, and decreased clearance of B feet during swing phase. pt limited with gait distance due to SOB and fatigue. pt rested in sitting between gait trials    AM-PAC 6 CLICK MOBILITY  Turning over in bed (including adjusting bedclothes, sheets and blankets)?: 3  Sitting down on and standing up from a chair with arms (e.g., wheelchair, bedside commode, etc.): 2  Moving from lying on back to sitting on the side of the bed?: 2  Moving to and from a bed to a chair (including a wheelchair)?: 2  Need to walk in hospital room?: 2  Climbing 3-5 steps with a railing?: 1  Basic Mobility Total Score: 12     Patient left supine with all lines intact, call button in reach, nurse notified and  present..    GOALS:   Multidisciplinary Problems     Physical Therapy Goals        Problem: Physical Therapy Goal    Goal Priority Disciplines Outcome Goal Variances Interventions   Physical Therapy Goal     PT, PT/OT Ongoing, Progressing     Description:  Goals to be met by: 2019    Patient will increase functional independence with mobility by performin. Supine <> sit with Moderate Assistance.-not met  2. Sit <> stand transfer with Moderate Assistance using Rolling Walker.- met  Revised goal: sit to stand with RW with minimal assist-not met  3. Bed <> chair transfer via Step Transfer with Moderate Assistance using Rolling Walker.-met  Revised goal: bed to/from chair with RW with minimal assist-not met  4. Dynamic sitting at edge of bed x 10 minutes with Minimal Assistance to prepare for functional  tasks in sitting.-not met  5. Able to tolerate exercise for 15-20 reps with assistance as needed.-not met  6. Pt receive gait training ~ 50 ft with Rw and min assist - not met                              Time Tracking:     PT Received On: 10/16/19  PT Start Time: 1015     PT Stop Time: 1055  PT Total Time (min): 40 min     Billable Minutes: Gait Training 30 and Therapeutic Activity 10    Treatment Type: Treatment  PT/PTA: PT     PTA Visit Number: 0     Lauren Paige, PT  10/16/2019

## 2019-10-16 NOTE — ASSESSMENT & PLAN NOTE
- Multifactorial secondary to complete left lung collapse, septic emboli, pleural effusions, suspected pna  - s/p thora 10/1- transudative effusion; chest tube placed due to concern for empyema at OSH (10/3); since removed  --duo nebs spaced out due to tachycardia  --continues to be tachypneic with pH 7.5 (since 10/10) likely from underlying lung issues including septic emboli, possible new asp PNA. NO PE seen on CTA from 10/3, but could have developed one since. However, she has high risk for brain bleed if were to AC and neurosx has already rec'd against systemic anticoagulation. Hold off on repeat CTA chest for now.

## 2019-10-16 NOTE — SUBJECTIVE & OBJECTIVE
Past Medical History:   Diagnosis Date    Anxiety     Carotid bruit     Chronic pain     Cystitis     Cystocele, unspecified (CODE)     Depression     GI bleed     History of uterine fibroid     Shortness of breath on exertion     Spinal stenosis     Urinary retention        Past Surgical History:   Procedure Laterality Date    ANTERIOR VAGINAL REPAIR  10-    CARDIAC CATHETERIZATION  age 12    CHOLECYSTECTOMY  2015    COLONOSCOPY  12/12/2018    aborted due to poor colon prep    COLONOSCOPY N/A 12/12/2018    Procedure: COLONOSCOPY;  Surgeon: Renard Gonsalves MD;  Location: [facility];  Service: Endoscopy;  Laterality: N/A;    HYSTERECTOMY  1989    AMANDA    LAMINECTOMY N/A 10/9/2019    Procedure: LAMINECTOMY, SPINE, L3, Open;  Surgeon: Martin Lewis DO;  [facility] [address] [place] [address];  Service: Neurosurgery;  Laterality: N/A;    tubiligation         Review of patient's allergies indicates:   Allergen Reactions    Pcn [penicillins]        Current Facility-Administered Medications on File Prior to Encounter   Medication    sodium chloride 0.9% flush 3 mL    [DISCONTINUED] lactated ringers infusion     Current Outpatient Medications on File Prior to Encounter   Medication Sig    diazepam (VALIUM) 5 MG tablet Take 10 mg by mouth 2 (two) times daily.      Family History     Problem Relation (Age of Onset)    Alzheimer's disease Mother    Hypertension Brother, Sister    Osteoarthritis Mother    Thyroid disease Mother        Tobacco Use    Smoking status: Current Every Day Smoker     Packs/day: 0.50     Years: 40.00     Pack years: 20.00     Types: Cigarettes    Smokeless tobacco: Never Used   Substance and Sexual Activity    Alcohol use: No    Drug use: No    Sexual activity: Yes     Partners: Male     Review of Systems   Constitutional: Positive for activity change, fatigue and fever.   HENT: Positive for trouble swallowing.    Cardiovascular: Negative.    Gastrointestinal: Negative.     Endocrine: Negative.    Musculoskeletal: Negative.    Skin: Negative.    Allergic/Immunologic: Negative.    Neurological: Positive for weakness. Negative for speech difficulty, light-headedness, numbness and headaches.   Hematological: Negative.    Psychiatric/Behavioral: Positive for confusion.     Objective:     Vital Signs (Most Recent):  Temp: 98.6 °F (37 °C) (10/16/19 1227)  Pulse: (!) 114 (10/16/19 1227)  Resp: 18 (10/16/19 1227)  BP: (!) 191/88 (10/16/19 1227)  SpO2: 98 % (10/16/19 1227) Vital Signs (24h Range):  Temp:  [98.1 °F (36.7 °C)-99.5 °F (37.5 °C)] 98.6 °F (37 °C)  Pulse:  [] 114  Resp:  [16-22] 18  SpO2:  [93 %-99 %] 98 %  BP: (161-218)/(71-93) 191/88     Weight: 53.5 kg (118 lb)  Body mass index is 20.25 kg/m².    Physical Exam  General:  Well-developed, well-nourished, thin, appears older than stated age.  HEENT:  NCAT, PERRLA, EOMI  NG tube and nasal trumpet in place  Neck:  Supple  CVS:  No LE edema    Neurologic Exam:  Mental Status:  AAOx to self, , year, hospital and [facility]. Difficulty stating why she is in the hospital.  Speech dysarthric , thought content appropriate.  Cranial Nerves:  PERRLA, EOMI.  Facial movement, sensation intact and symmetric.  Palate raises symmetrically, tongue protrudes midline.    Motor:  Normal bulk and tone.  BUE shoulder abduction 4/5, biceps/triceps4-/5, wrist flexion/extension4-/5,  strength 4-/5.  BLE hip flexors 3/5, knee flexion/extension 4-/5, plantarflexion/dorsiflexion 4/5.  Sensory:  Intact to light touch at all extremities and face without inattention.   Reflexes:  Biceps, brachioradialis, patellar, Achilles 2+ and symmetric.  No ankle clonus.  Downgoing toe bilaterally.  Coordination:  Mild tremor noted w/ activation of the RUE  Gait:  Deferred 2/2 patient's condition         Significant Labs:   Hemoglobin A1c: No results for input(s): HGBA1C in the last 720 hours.  Blood Culture: No results for input(s): LABBLOO in the last 48  hours.  BMP:   Recent Labs   Lab 10/15/19  0407 10/16/19  0324 10/16/19  0533    85 93    149* 147*   K 3.5 2.5* 2.6*   * 118* 118*   CO2 17* 16* 17*   BUN 23 21 22   CREATININE 0.7 0.7 0.7   CALCIUM 8.3* 7.5* 8.1*   MG 1.6 1.9  --      CBC:   Recent Labs   Lab 10/15/19  0412 10/16/19  0324   WBC 13.58* 10.17   HGB 7.7* 7.0*   HCT 26.2* 24.2*    260     CMP:   Recent Labs   Lab 10/15/19  0407 10/16/19  0324 10/16/19  0533    85 93    149* 147*   K 3.5 2.5* 2.6*   * 118* 118*   CO2 17* 16* 17*   BUN 23 21 22   CREATININE 0.7 0.7 0.7   CALCIUM 8.3* 7.5* 8.1*   MG 1.6 1.9  --    ANIONGAP 9 15 12   EGFRNONAA >60.0 >60.0 >60.0     CSF Culture: No results for input(s): CSFCULTURE in the last 48 hours.  CSF Studies: No results for input(s): ALIQUT, APPEARCSF, COLORCSF, CSFWBC, CSFRBC, GLUCCSF, LDHCSF, PROTEINCSF, VDRLCSF in the last 48 hours.  Inflammatory Markers: No results for input(s): SEDRATE, CRP, PROCAL in the last 48 hours.  POCT Glucose:   Recent Labs   Lab 10/16/19  0029 10/16/19  0433 10/16/19  1309   POCTGLUCOSE 95 95 80     Prealbumin: No results for input(s): PREALBUMIN in the last 48 hours.  Respiratory Culture: No results for input(s): GSRESP, RESPIRATORYC in the last 48 hours.  Urine Culture: No results for input(s): LABURIN in the last 48 hours.  Urine Studies: No results for input(s): COLORU, APPEARANCEUA, PHUR, SPECGRAV, PROTEINUA, GLUCUA, KETONESU, BILIRUBINUA, OCCULTUA, NITRITE, UROBILINOGEN, LEUKOCYTESUR, RBCUA, WBCUA, BACTERIA, SQUAMEPITHEL, HYALINECASTS in the last 48 hours.    Invalid input(s): ANAI  All pertinent lab results from the past 24 hours have been reviewed.    Significant Imaging: I have reviewed all pertinent imaging results/findings within the past 24 hours.     MRI Brain 10/07      1. Small acute right thalamic infarct.  2. Additional scattered foci of DWI hyperintense signal without corresponding decreased signal on the ADC maps.   The findings most likely represent subacute embolic infarctions.  3. Small foci of DWI hyperintensity within the lateral ventricles.  The findings are suspicious for possible ventriculitis.  Suggest correlation with CSF analysis.  4. Several punctate remote microhemorrhages.

## 2019-10-16 NOTE — HPI
Patient is a 64 yo female w/ past medical history significant for IVDU who presented as transfer from [facility] on 10/02 for epidural abscess, likely secondary to MSSA bacteremia and endocarditis with vegetations of the MV and TV on DHARMESH 9/26 c/b bilateral multifocal pneumonia with cavitary lesions, right pleural effusion s/p thoracentesis on 10/1 w/ concern for empyema, pan-colitis, and MRI brain 10/8 with evidence of new embolic infarcts. She initially was hospitalized on 9/19 in [facility] and has been hospitalized since then  Followed by ID at [facility], most recently on daptomycin, cefazolin, and vancomycin. Intubated shortly after arrival to [facility] for hypoxia. Noted to have penicillin allergy, allergy tested here and found to be false allergy. On 10/4 the patient was started on oxacillin with plans for 6 week course (end date 11/20). On 10/6 patient underwent aspiration and drainage of shoulder abscesses (growing MSSA). On 10/9 she underwent L3 laminectomy with washout of infection. Blood cultures have been without growth since 9/29.   Patient has been with waxing and waning mentation and not at her baseline since her arrival to [facility]. She is currently on broad spectrum ABx and is followed by ID for bacteremia, PNA and likely UTI.  Neurology consulted for AMS.

## 2019-10-16 NOTE — ASSESSMENT & PLAN NOTE
Notable this admission: Epidural Abcess, Abcess Upper Extremity, Septic Pulmonary Emboli  - Patient clinically improving. Afebrile. Leukocytosis improved.  Appreciate ID recs  - Continue Oxacillin for MSSA Endocarditis   - Follow up high risk urine culture   - Follow up blood cultures  - Aspiration precautions  - Could consider repeating abdominal imaging given abdominal pain if urine culture negative  - Recommend removing all lines and catheters including astorga once clinically feasible since they are potential sources of infection

## 2019-10-16 NOTE — ASSESSMENT & PLAN NOTE
--seen on CT 9/19 with improvement noted on f/u CT 9/25  --concern for ileus on 9/25 CT; rectal tube in place with liquid stool output  --stool 9/27 neg for c diff; stool cx neg  --flagyl course completed (7/7 days)  --cdiff neg again on 10/12  --now tolerating tube feeds

## 2019-10-16 NOTE — ASSESSMENT & PLAN NOTE
Per ID  -Continue Oxacillin for MSSA Endocarditis   -Recommend removing all lines and catheters including astorga once clinically feasible since they are potential sources of infection  -Will still plan to discharge patient on OXACILLIN 12G IV continuous  for 6 weeks, estimated end of therapy date 11/20  -Please contact us prior to discharge so we can arrange for follow up

## 2019-10-16 NOTE — ASSESSMENT & PLAN NOTE
Patient is a 64 yo female w/ complicated and prolonged hospital course outlined above, who initially presented to the OSH on 9/19 and has been hospitalized since for MSSA bacteremia, endocarditis ( vegetation noted on multiple valves), epidural abscess s/p wash out by NSGY, all complicated by what appears to be multiple infarcts of likely both HTNsive etiology (R thalamus) and likely additional punctate infarcts 2/2 septic emboli.   Neurology consulted for AMS.     - At this time believe this is a multifactorial process, but even today patient appears brighter and more aware, confirmed by RR team that has been seeing patient daily  - Exam at this time is largely non-focal, so would avoid imaging at this time  - Concerned for persistently elevated BP   - Initial permissive HTN window for patient's thalamic infarct and at this time normotension would be the goal   - Would consider gradual decrease of patient's BP to goal less than 150, but avoid precipitous drops right away    - Although thalamic infarct is more consistent w/ a HTNsive infarct the smaller infarcts are more fitting for a mycotic origin, and those infarcts tend to have a significantly higher rate of bleeding  - Patient persistently anemic w/ Hgb 7.0 this AM   - Correction per primary team  - Metabolic abnormalities such as hypernatremia and hypokalemia to be addressed by primary team   - EEG is superfluous at this time, as there are multiple metabolic and infectious etiologies that can contribute to patient's intermittent confusion  - Continue Tx of patient's infection   - ID team following  - PT/OT as per primary team   - Avoid sedating medication  - Appreciate consult, will follow up with the patient

## 2019-10-16 NOTE — PLAN OF CARE
Problem: Physical Therapy Goal  Goal: Physical Therapy Goal  Description  Goals to be met by: 2019    Patient will increase functional independence with mobility by performin. Supine <> sit with Moderate Assistance.-not met  2. Sit <> stand transfer with Moderate Assistance using Rolling Walker.- met  Revised goal: sit to stand with RW with minimal assist-not met  3. Bed <> chair transfer via Step Transfer with Moderate Assistance using Rolling Walker.-met  Revised goal: bed to/from chair with RW with minimal assist-not met  4. Dynamic sitting at edge of bed x 10 minutes with Minimal Assistance to prepare for functional tasks in sitting.-not met  5. Able to tolerate exercise for 15-20 reps with assistance as needed.-not met  6. Pt receive gait training ~ 50 ft with Rw and min assist - not met             Outcome: Ongoing, Progressing  Pt's goals revised as needed and pt will continue to benefit from skilled PT services to work towards improved functional mobility including: bed mobility, transfers, and gait.   Lauren Paige, PT  10/16/2019

## 2019-10-16 NOTE — PLAN OF CARE
Goals remain appropriate, continue POC  Marine Elizabeth OT  10/16/2019    Problem: Occupational Therapy Goal  Goal: Occupational Therapy Goal  Description  Goals to be met by: 10/31/19     Patient will increase functional independence with ADLs by performing:    UE Dressing with Moderate Assistance.  LE Dressing with Moderate Assistance.  Grooming while seated with Minimal Assistance. Goal met 10/16  Toileting from bedside commode with Moderate Assistance for hygiene and clothing management.   Rolling to Right, Left with Moderate Assistance.   Supine to sit with Moderate Assistance.  Toilet transfer to bedside commode with Moderate Assistance.     Outcome: Ongoing, Progressing

## 2019-10-16 NOTE — PROGRESS NOTES
[facility] Hospital Medicine  Progress Note    Patient Name: Mesha Mckenzie  MRN: [id number]  Patient Class: IP- Inpatient   Admission Date: 10/2/2019  Length of Stay: 13 days  Attending Physician: Sushil Ennis MD  Primary Care Provider: Varun Shea MD PhD    Hospital Medicine Team: [facility] R Sushil Ennis MD    Subjective:     Principal Problem:Epidural abscess        HPI:  No notes on file    Overview/Hospital Course:  No notes on file    Interval History: Patient seen and examined. She is lethargic on exam and unable to provide ROS. Daughter present during exam and contributed to history. Patient did wake up and follow some simple commands but more somnolence and worsened mentation than day prior per daughter and nurse.    Review of Systems   Unable to perform ROS: Acuity of condition     Objective:     Vital Signs (Most Recent):  Temp: 98.1 °F (36.7 °C) (10/15/19 1859)  Pulse: (!) 116 (10/15/19 1932)  Resp: 17 (10/15/19 1859)  BP: (!) 180/80 (10/15/19 1859)  SpO2: (!) 93 % (10/15/19 2033) Vital Signs (24h Range):  Temp:  [97.3 °F (36.3 °C)-99.5 °F (37.5 °C)] 98.1 °F (36.7 °C)  Pulse:  [] 116  Resp:  [16-30] 17  SpO2:  [91 %-99 %] 93 %  BP: (161-187)/(71-83) 180/80     Weight: 53.5 kg (118 lb)  Body mass index is 20.25 kg/m².    Intake/Output Summary (Last 24 hours) at 10/15/2019 2040  Last data filed at 10/15/2019 1835  Gross per 24 hour   Intake 0 ml   Output 2010 ml   Net -2010 ml      Physical Exam   Constitutional: She appears lethargic. She appears distressed.   HENT:   Head: Normocephalic.   Nose: Nose normal.   Eyes: Pupils are equal, round, and reactive to light. EOM are normal.   Neck: Neck supple. No JVD present.   Cardiovascular:   Murmur heard.  tachycardic   Pulmonary/Chest: She has no wheezes.   tachypnea labored breathing   Abdominal: Soft. She exhibits no distension. There is no tenderness. There is no guarding.   Musculoskeletal: She exhibits no  edema.   Neurological: She appears lethargic. GCS eye subscore is 3. GCS verbal subscore is 3. GCS motor subscore is 4.   Patient follows simple commands. Upper extremity strength 3/5 bilateral. Wiggles toes.   Skin: She is diaphoretic.       Significant Labs:   BMP:   Recent Labs   Lab 10/15/19  0407         K 3.5   *   CO2 17*   BUN 23   CREATININE 0.7   CALCIUM 8.3*   MG 1.6     CBC:   Recent Labs   Lab 10/14/19  0430 10/15/19  0412   WBC 11.73 13.58*   HGB 7.2* 7.7*   HCT 22.5* 26.2*    288       Significant Imaging: I have reviewed all pertinent imaging results/findings within the past 24 hours.      Assessment/Plan:      Palliative care encounter  Appreciate palliative care recs      Acute bacterial endocarditis  Per ID  -Continue Oxacillin for MSSA Endocarditis   -Will monitor patient closely with this de-escalation  -Obtain urine culture  -Follow up blood cultures  -Aspiration precautions  -agree with palliative consult and goals of care discussion  -Will still plan to discharge patient on OXACILLIN 12G IV continuous  for 6 weeks, estimated end of therapy date 11/20      Severe malnutrition  - Npo  - holding tube feeds due to concern for possible recent aspiration    Encephalopathy, metabolic  - Encephalopathy likely multiple etiology metabolic, infectious  - Neurology consulted and recommend aggressive metabolic and infectious treatement.   - consider repeat MRI if new focal deficits  - no EEG at this time per neuro  - Neuro and ID following      Acute hypoxemic respiratory failure  Multifactorial secondary to complete left lung collapse, septic emboli, pleural effusions, suspected pna  --s/p thora 10/1- transudative effusion; chest tube placed due to concern for empyema at OSH (10/3); since removed  --pleural fluid with GPC on gram stain  --CTA neg for PE 10/3  --extubated on 10/5  --on 3L NC; wean as tolerated  --on cefepime/flagyl and vanc since 10/12 with plans to de-escalate  back to oxacillin pending stability and cultures  --duo nebs spaced out due to tachycardia  --continues to be tachypneic with pH 7.5 (since 10/10) likely from underlying lung issues including septic emboli, possible new asp PNA. NO PE seen on CTA from 10/3, but could have developed one since. However, she has high risk for brain bleed if were to AC and neurosx has already rec'd against systemic anticoagulation. Hold off on repeat CTA chest for now.  --continue aggressive pulmonary toilet with CPT, acapella, deep suctioning    Pancolitis  --seen on CT 9/19 with improvement noted on f/u CT 9/25  --concern for ileus on 9/25 CT; rectal tube in place with liquid stool output  --stool 9/27 neg for c diff; stool cx neg  --flagyl course completed (7/7 days)  --cdiff neg again on 10/12  --now tolerating tube feeds        VTE Risk Mitigation (From admission, onward)         Ordered     enoxaparin injection 40 mg  Daily      10/11/19 1124     IP VTE HIGH RISK PATIENT  Once      10/06/19 0812     Place sequential compression device  Until discontinued      10/02/19 1946                      Sushil Ennis MD  Department of Hospital Medicine   [facility]

## 2019-10-16 NOTE — PROGRESS NOTES
[facility] Hospital Medicine  Progress Note    Patient Name: Mesha Mckenzie  MRN: [id number]  Patient Class: IP- Inpatient   Admission Date: 10/2/2019  Length of Stay: 14 days  Attending Physician: Sushil Ennis MD  Primary Care Provider: Varun Shea MD PhD    Hospital Medicine Team: [facility] HOSP MED R Sushil Ennis MD    Subjective:     Principal Problem:Epidural abscess        HPI:  No notes on file    Overview/Hospital Course:  No notes on file    Interval History: Patient seen and examined this am. She is constitutionally greatly improved from day prior when she was not responding verbally and did not follow commands. Today she follows commands and is able to answer simple questions appropriately including her name as well as name of children. She states that Little is the president and that the month is September. Limited ROS due to patient condition. Denies fevers, chills, shortness of breath, chest pain, abdominal pain. Reports lt hip and rt knee pain.    Review of Systems   Reason unable to perform ROS: limited due to patient condition.   Constitutional: Negative for chills and fever.   Respiratory: Negative for shortness of breath.    Cardiovascular: Negative for chest pain.   Gastrointestinal: Negative for abdominal pain.   Musculoskeletal:        Lt hip and right knee pain     Objective:     Vital Signs (Most Recent):  Temp: 98.6 °F (37 °C) (10/16/19 1227)  Pulse: (!) 114 (10/16/19 1227)  Resp: 18 (10/16/19 1227)  BP: (!) 191/88 (10/16/19 1227)  SpO2: 98 % (10/16/19 1227) Vital Signs (24h Range):  Temp:  [98.1 °F (36.7 °C)-99.5 °F (37.5 °C)] 98.6 °F (37 °C)  Pulse:  [] 114  Resp:  [16-22] 18  SpO2:  [93 %-99 %] 98 %  BP: (161-218)/(71-93) 191/88     Weight: 53.5 kg (118 lb)  Body mass index is 20.25 kg/m².    Intake/Output Summary (Last 24 hours) at 10/16/2019 1558  Last data filed at 10/16/2019 1108  Gross per 24 hour   Intake 1041.67 ml   Output 2200 ml   Net -1158.33 ml       Physical Exam   Constitutional: No distress.   Eyes: Pupils are equal, round, and reactive to light. EOM are normal.   Neck: Neck supple. No JVD present.   Cardiovascular: Regular rhythm.   Murmur heard.  tachycardic   Pulmonary/Chest: Effort normal. No respiratory distress. She has no wheezes. She has no rales.   Tachypnea. Less labored than day prior   Abdominal: Soft. She exhibits no distension. There is no tenderness. There is no guarding.   Musculoskeletal: She exhibits no edema.   Improved bilateral  strength compared to exam one day ago. No unilateral deficits.   Neurological: She is alert. GCS eye subscore is 4. GCS verbal subscore is 4. GCS motor subscore is 6.   Oriented to person.   Skin: Skin is warm and dry. No rash noted. She is not diaphoretic.       Significant Labs:   BMP:   Recent Labs   Lab 10/16/19  0324 10/16/19  0533   GLU 85 93   * 147*   K 2.5* 2.6*   * 118*   CO2 16* 17*   BUN 21 22   CREATININE 0.7 0.7   CALCIUM 7.5* 8.1*   MG 1.9  --      CBC:   Recent Labs   Lab 10/15/19  0412 10/16/19  0324   WBC 13.58* 10.17   HGB 7.7* 7.0*   HCT 26.2* 24.2*    260       Significant Imaging: I have reviewed all pertinent imaging results/findings within the past 24 hours.      Assessment/Plan:      Septic shock with acute organ dysfunction due to methicillin susceptible Staphylococcus aureus (MSSA)  Notable this admission: Epidural Abcess, Abcess Upper Extremity, Septic Pulmonary Emboli  - Patient clinically improving. Afebrile. Leukocytosis improved.  Appreciate ID recs  - Continue Oxacillin for MSSA Endocarditis   - Follow up high risk urine culture   - Follow up blood cultures  - Aspiration precautions  - Could consider repeating abdominal imaging given abdominal pain if urine culture negative  - Recommend removing all lines and catheters including astorga once clinically feasible since they are potential sources of infection      Acute bacterial endocarditis  Per  ID  -Continue Oxacillin for MSSA Endocarditis   -Recommend removing all lines and catheters including astorga once clinically feasible since they are potential sources of infection  -Will still plan to discharge patient on OXACILLIN 12G IV continuous  for 6 weeks, estimated end of therapy date 11/20  -Please contact us prior to discharge so we can arrange for follow up         Encephalopathy, metabolic  - Encephalopathy likely multiple etiology metabolic, infectious  - Neurology consulted and recommend aggressive metabolic and infectious treatement.   - consider repeat MRI if new focal deficits  - no EEG at this time per neuro  - Neuro and ID following      Pancolitis  - seen on CT 9/19 with improvement noted on f/u CT 9/25  - concern for ileus on 9/25 CT; rectal tube in place with liquid stool output  - stool 9/27 neg for c diff; stool cx neg ; cdiff neg again on 10/12  - flagyl course completed (7/7 days)  - currently holding tube feeds due to concern for aspiration      HTN (hypertension)  Uncontrolled  - start metroprolol 25mg BID  - start amlodipine 5mg daily  - hydralazine and labetolol prn        Palliative care encounter  Appreciate palliative care recs      Right thalamic stroke  - likely due to infection and septic emboli  - Brain MRI 9/30 with lacunar infarcts  - repeat head CT (10/7) revealed new right basal ganglia infarcts  - MRI of brain revealed R thalamic infarct, ventriculitis, subactue embolic infarctions  -CTA of head 10/8: atherosclerotic plaquing of the distal vertebral arteries and concern for noncalcified plaquing and stenosis; no high-grade stenosis or proximal occlusion. No mycotic aneurysms  - vasc neuro recommending against systemic anticoagulation and asa due to high risk for hemorrhage from septic emboli      Severe malnutrition  - Npo  - holding tube feeds due to concern for possible recent aspiration    Acute hypoxemic respiratory failure  Multifactorial secondary to complete left lung  collapse, septic emboli, pleural effusions, suspected pna  --s/p thora 10/1- transudative effusion; chest tube placed due to concern for empyema at OSH (10/3); since removed  --pleural fluid with GPC on gram stain  --CTA neg for PE 10/3  --extubated on 10/5  --on 3L NC; wean as tolerated  --on cefepime/flagyl and vanc since 10/12 with plans to de-escalate back to oxacillin pending stability and cultures  --duo nebs spaced out due to tachycardia  --continues to be tachypneic with pH 7.5 (since 10/10) likely from underlying lung issues including septic emboli, possible new asp PNA. NO PE seen on CTA from 10/3, but could have developed one since. However, she has high risk for brain bleed if were to AC and neurosx has already rec'd against systemic anticoagulation. Hold off on repeat CTA chest for now.  --continue aggressive pulmonary toilet with CPT, acapella, deep suctioning    Pleural effusion  - Multifactorial secondary to complete left lung collapse, septic emboli, pleural effusions, suspected pna  - s/p thora 10/1- transudative effusion; chest tube placed due to concern for empyema at OSH (10/3); since removed  --duo nebs spaced out due to tachycardia  --continues to be tachypneic with pH 7.5 (since 10/10) likely from underlying lung issues including septic emboli, possible new asp PNA. NO PE seen on CTA from 10/3, but could have developed one since. However, she has high risk for brain bleed if were to AC and neurosx has already rec'd against systemic anticoagulation. Hold off on repeat CTA chest for now.      VTE Risk Mitigation (From admission, onward)         Ordered     enoxaparin injection 40 mg  Daily      10/11/19 1124     IP VTE HIGH RISK PATIENT  Once      10/06/19 0812     Place sequential compression device  Until discontinued      10/02/19 1946                      Sushil Ennis MD  Department of Hospital Medicine   [facility]

## 2019-10-16 NOTE — ASSESSMENT & PLAN NOTE
Per ID  -Continue Oxacillin for MSSA Endocarditis   -Will monitor patient closely with this de-escalation  -Obtain urine culture  -Follow up blood cultures  -Aspiration precautions  -agree with palliative consult and goals of care discussion  -Will still plan to discharge patient on OXACILLIN 12G IV continuous  for 6 weeks, estimated end of therapy date 11/20

## 2019-10-16 NOTE — PLAN OF CARE
POC reviewed with pt, who nodded a gesture of understanding. Pt oriented to person and place. Pt is difficult to understand.Pt remains free of falls/injuries. Pt on telemetry remains ST. Pt blood glucose being monitored q 4. Pt tolerating npo diet. Tube feedings held.Pt pain controlled with prescribed meds. Pt on 2 L NC. Pt wearing SCDs. Pt turned q 2. No acute events throughout shift. Ambulated rodriguez with PT. PT was suctioned through nasal trumpet and removed later in day.Frequent checks made.Meds given through NG tube was flushed and clamped. Bed in lowest position call light in reach bed rails up x2. WCTM

## 2019-10-16 NOTE — SUBJECTIVE & OBJECTIVE
Interval History: Patient seen and examined. She is lethargic on exam and unable to provide ROS. Daughter present during exam and contributed to history. Patient did wake up and follow some simple commands but more somnolence and worsened mentation than day prior per daughter and nurse.    Review of Systems   Unable to perform ROS: Acuity of condition     Objective:     Vital Signs (Most Recent):  Temp: 98.1 °F (36.7 °C) (10/15/19 1859)  Pulse: (!) 116 (10/15/19 1932)  Resp: 17 (10/15/19 1859)  BP: (!) 180/80 (10/15/19 1859)  SpO2: (!) 93 % (10/15/19 2033) Vital Signs (24h Range):  Temp:  [97.3 °F (36.3 °C)-99.5 °F (37.5 °C)] 98.1 °F (36.7 °C)  Pulse:  [] 116  Resp:  [16-30] 17  SpO2:  [91 %-99 %] 93 %  BP: (161-187)/(71-83) 180/80     Weight: 53.5 kg (118 lb)  Body mass index is 20.25 kg/m².    Intake/Output Summary (Last 24 hours) at 10/15/2019 2040  Last data filed at 10/15/2019 1835  Gross per 24 hour   Intake 0 ml   Output 2010 ml   Net -2010 ml      Physical Exam   Constitutional: She appears lethargic. She appears distressed.   HENT:   Head: Normocephalic.   Nose: Nose normal.   Eyes: Pupils are equal, round, and reactive to light. EOM are normal.   Neck: Neck supple. No JVD present.   Cardiovascular:   Murmur heard.  tachycardic   Pulmonary/Chest: She has no wheezes.   tachypnea labored breathing   Abdominal: Soft. She exhibits no distension. There is no tenderness. There is no guarding.   Musculoskeletal: She exhibits no edema.   Neurological: She appears lethargic. GCS eye subscore is 3. GCS verbal subscore is 3. GCS motor subscore is 4.   Patient follows simple commands. Upper extremity strength 3/5 bilateral. Wiggles toes.   Skin: She is diaphoretic.       Significant Labs:   BMP:   Recent Labs   Lab 10/15/19  0407         K 3.5   *   CO2 17*   BUN 23   CREATININE 0.7   CALCIUM 8.3*   MG 1.6     CBC:   Recent Labs   Lab 10/14/19  0430 10/15/19  0412   WBC 11.73 13.58*   HGB 7.2*  7.7*   HCT 22.5* 26.2*    422       Significant Imaging: I have reviewed all pertinent imaging results/findings within the past 24 hours.

## 2019-10-16 NOTE — PT/OT/SLP PROGRESS
Speech-Language Pathology Treatment    Patient Name:  Mesha Mckenzie   MRN:  [id number]  Admitting Diagnosis: Epidural abscess    Recommendations:                General Recommendations:  Dysphagia therapy & ongoing swallow assessment  Diet recommendations:  NPO, Liquid Diet Level: NPO   Aspiration Precautions: Continue alternate means of nutrition, Frequent oral care and Strict aspiration precautions   General Precautions: Standard, fall, NPO  Communication strategies:  provide increased time to answer and go to room if call light pushed    Subjective   Awake yet lethargic & weak.     Pain/Comfort:  · Pain Rating 1: 10/10  · Location - Side 1: Left  · Location 1: hip  · Pain Addressed 2: Nurse notified, Reposition, Distraction  · Pain Rating Post-Intervention 2: 10/10    Objective:     Has the patient been evaluated by SLP for swallowing?   Yes  Keep patient NPO? No   Current Respiratory Status: nasal cannula      Pt repositioned to upright. . Initially, pt with weak, congested voicing yet able to clear given cue. Pt with weak volitional cough. No volitional swallow elicited. Pt required encouragement to accept PO trials. Pt tolerated ice chip x1 with timely swallow & without overt s/s aspiration. When presented with tsp of thin, pt with complete anterior loss. Pt deferred puree & nectar thickened liquid trials. Pt then presented with ice chips x2 in hopes of recommendation for ice chips for pleasure but pt demonstrated coughing following swallow of second chip. SLP recs continue NPO at this time 2/2 high risk of aspiration.     Assessment:     Mesha Mckenzie is a 63 y.o. female with an SLP diagnosis of Dysphagia.  She presents with risk of aspiration.    Goals:   Multidisciplinary Problems     SLP Goals        Problem: SLP Goal    Goal Priority Disciplines Outcome   SLP Goal     SLP Ongoing, Progressing   Description:  Speech Language Pathology Goals  Goals expected to be met by 10/17:  1. Patient will  participate in ongoing swallow assessment to determine least restrictive diet recommendations.                          Plan:     · Patient to be seen:  4 x/week   · Plan of Care expires:  11/06/19  · Plan of Care reviewed with:  patient   · SLP Follow-Up:  Yes       Discharge recommendations:  rehabilitation facility     Time Tracking:     SLP Treatment Date:   10/16/19  Speech Start Time:  0959  Speech Stop Time:  1016     Speech Total Time (min):  17 min    Billable Minutes: Treatment Swallowing Dysfunction 9 and Seld Care/Home Management Training 8    Arabella Matthews CCC-SLP  10/16/2019

## 2019-10-16 NOTE — PLAN OF CARE
POC reviewed with pt, acknowledged understanding. Pt oriented to person. Pt remains free of falls/injuries. Pt on telemetry remains ST. Pt blood glucose being monitored q 4. Pt tolerating npo diet. Pt pain controlled with prescribed meds. Pt on 1 L NC. Pt wearing SCDs. Pt turned q 2. No acute events throughout shift. No distress noted, will continue to monitor.

## 2019-10-16 NOTE — ASSESSMENT & PLAN NOTE
62yo female with IVDU presents as transfer from [facility] for epidural abscess, likely secondary to MSSA bacteremia and endocarditis with vegetations of the MV and TV on DHARMESH 9/26 c/b bilateral multifocal pneumonia with cavitary lesions, right pleural effusion s/p thoracentesis on 10/1 w/ concern for empyema, pan-colitis, and MRI brain 10/8 with evidence of new embolic infarcts. Followed by ID at [facility], most recently on daptomycin, cefazolin, and vancomycin. Intubated shortly after arrival to [facility] for hypoxia. Noted to have penicillin allergy, allergy tested here and found to be false allergy. On 10/4 the patient was started on oxacillin with plans for 6 week course (end date 11/20). On 10/6 patient underwent aspiration and drainage of shoulder abscesses (growing MSSA). On 10/9 she underwent L3 laminectomy with washout of infection. Blood cultures have been without growth since 9/29.     On 10/11 the patient had a change in mental status characterized by somnolence and is waxing and waning in nature, isolated fever 101.2, and leukocytosis with WBC 15K which normalized and now back to 13K. Antibiotics broadened from oxacillin to Vanc, Cefepime, and flagyl. Blood cultures 10/11 without growth thus far. UA with 45 WBC, 20 RBC but no reflex?. Patient has been afebrile since but still with altered mental status. If mental status were related to underlying infection would expect it to have improved with broadening of antibiotics. Antibiotics de-escalated to oxacillin on 10/14. CT of brain with evolving thalamic infarct but would also not expect this to cause this much somnolence. The patient could have other septic emboli to the brain which are too small to visualize on imaging. She could very well be also be aspirating although CXR with no focal consolidation. Now able to tell me she has abdominal tenderness. Still with astorga in place. Patient has been afebrile since 10/11, blood cultures without  growth, now with improvement in mental status although could be part of waxing and waning delirium.     Recommendations:  -Continue Oxacillin for MSSA Endocarditis   -Follow up high risk urine culture   -Follow up blood cultures  -Aspiration precautions  -Could consider repeating abdominal imaging given abdominal pain if urine culture negative  -Recommend removing all lines and catheters including astorga once clinically feasible since they are potential sources of infection  -agree with palliative consult and goals of care discussion    Discharge recommendations  -Will still plan to discharge patient on OXACILLIN 12G IV continuous  for 6 weeks, estimated end of therapy date 11/20  -Please contact us prior to discharge so we can arrange for follow up

## 2019-10-16 NOTE — SUBJECTIVE & OBJECTIVE
Interval History:   No acute events overnight. Afebrile. Patient seems more awake today and responsive to questions than the past few days. She says she is in pain and points to her lower abdomen.    Review of Systems   Unable to perform ROS: Mental status change     Objective:     Vital Signs (Most Recent):  Temp: 98.2 °F (36.8 °C) (10/16/19 0542)  Pulse: (!) 112 (10/16/19 1109)  Resp: (!) 22 (10/16/19 0818)  BP: (!) 177/73 (10/16/19 0818)  SpO2: 99 % (10/16/19 0818) Vital Signs (24h Range):  Temp:  [97.6 °F (36.4 °C)-99.5 °F (37.5 °C)] 98.2 °F (36.8 °C)  Pulse:  [] 112  Resp:  [16-28] 22  SpO2:  [91 %-99 %] 99 %  BP: (161-218)/(71-93) 177/73     Weight: 53.5 kg (118 lb)  Body mass index is 20.25 kg/m².    Estimated Creatinine Clearance: 67.7 mL/min (based on SCr of 0.7 mg/dL).    Physical Exam   Constitutional: She appears well-developed and well-nourished. No distress.   Ill appearing lady   HENT:   Head: Normocephalic and atraumatic.   NGT and trumpet in place   Eyes: Pupils are equal, round, and reactive to light. No scleral icterus.   Neck: Normal range of motion. Neck supple. No JVD present.   Cardiovascular: Regular rhythm and normal heart sounds. Exam reveals no gallop and no friction rub.   No murmur heard.  Tachycardic   Pulmonary/Chest: Effort normal. No respiratory distress. She has no wheezes. She has no rales.   Bilateral course breath sounds  Some tachypnea present with increased work of breathing   Abdominal: Soft. Bowel sounds are normal. She exhibits no distension. There is tenderness (bilateral lower quadrants).   Musculoskeletal: She exhibits no edema.   Neurological:   More alert and responsive today  Opens eyes to voice and can stay awake longer to respond to questions  Oriented to self and place   Skin: Skin is warm and dry.       Significant Labs:   Blood Culture:   Recent Labs   Lab 10/01/19  0534 10/02/19  0445 10/02/19  0830 10/02/19  2025 10/11/19  1701   LABBLOO No growth after 5  days. No growth after 5 days. No growth after 5 days. No growth after 5 days. No Growth to date  No Growth to date  No Growth to date  No Growth to date  No Growth to date  No Growth to date  No Growth to date  No Growth to date  No Growth to date  No Growth to date     CBC:   Recent Labs   Lab 10/15/19  0412 10/16/19  0324   WBC 13.58* 10.17   HGB 7.7* 7.0*   HCT 26.2* 24.2*    260     CMP:   Recent Labs   Lab 10/15/19  0407 10/16/19  0324 10/16/19  0533    149* 147*   K 3.5 2.5* 2.6*   * 118* 118*   CO2 17* 16* 17*    85 93   BUN 23 21 22   CREATININE 0.7 0.7 0.7   CALCIUM 8.3* 7.5* 8.1*   ANIONGAP 9 15 12   EGFRNONAA >60.0 >60.0 >60.0       Significant Imaging: I have reviewed all pertinent imaging results/findings within the past 24 hours.

## 2019-10-16 NOTE — CONSULTS
[facility]  Neurology  Consult Note    Patient Name: Mesha Mckenzie  MRN: [id number]  Admission Date: 10/2/2019  Hospital Length of Stay: 14 days  Code Status: Full Code   Attending Provider: Sushil Ennis MD   Consulting Provider: Elisa Michael MD  Primary Care Physician: Varun Shea MD PhD  Principal Problem:Epidural abscess    Consults   Subjective:     Chief Complaint:  AMS     HPI:   Patient is a 62 yo female w/ past medical history significant for IVDU who presented as transfer from [facility] on 10/02 for epidural abscess, likely secondary to MSSA bacteremia and endocarditis with vegetations of the MV and TV on DHARMESH 9/26 c/b bilateral multifocal pneumonia with cavitary lesions, right pleural effusion s/p thoracentesis on 10/1 w/ concern for empyema, pan-colitis, and MRI brain 10/8 with evidence of new embolic infarcts. She initially was hospitalized on 9/19 in [facility] and has been hospitalized since then  Followed by ID at [facility], most recently on daptomycin, cefazolin, and vancomycin. [place] [facility] [place] Noted to have penicillin allergy, allergy tested here and found to be false allergy. On 10/4 the patient was started on oxacillin with plans for 6 week course (end date 11/20). On 10/6 patient underwent aspiration and drainage of shoulder abscesses (growing MSSA). On 10/9 she underwent L3 laminectomy with washout of infection. Blood cultures have been without growth since 9/29.   Patient has been with waxing and waning mentation and not at her baseline since her arrival [place] [facility]. She is currently on broad spectrum ABx and is followed by ID for bacteremia, PNA and likely UTI.  Neurology consulted for AMS.      Past Medical History:   Diagnosis Date    Anxiety     Carotid bruit     Chronic pain     Cystitis     Cystocele, unspecified (CODE)     Depression     GI bleed     History of uterine fibroid      Shortness of breath on exertion     Spinal stenosis     Urinary retention        Past Surgical History:   Procedure Laterality Date    ANTERIOR VAGINAL REPAIR  10-    CARDIAC CATHETERIZATION  age 12    CHOLECYSTECTOMY  2015    COLONOSCOPY  12/12/2018    aborted due to poor colon prep    COLONOSCOPY N/A 12/12/2018    Procedure: COLONOSCOPY;  Surgeon: Renard Gonsalves MD;  Location: [facility];  Service: Endoscopy;  Laterality: N/A;    HYSTERECTOMY  1989    AMANDA    LAMINECTOMY N/A 10/9/2019    Procedure: LAMINECTOMY, SPINE, L3, Open;  Surgeon: Martin Lewis DO;  Location: [facility] OR [address] [place] [address];  Service: Neurosurgery;  Laterality: N/A;    tubiligation         Review of patient's allergies indicates:   Allergen Reactions    Pcn [penicillins]        Current Facility-Administered Medications on File Prior to Encounter   Medication    sodium chloride 0.9% flush 3 mL    [DISCONTINUED] lactated ringers infusion     Current Outpatient Medications on File Prior to Encounter   Medication Sig    diazepam (VALIUM) 5 MG tablet Take 10 mg by mouth 2 (two) times daily.      Family History     Problem Relation (Age of Onset)    Alzheimer's disease Mother    Hypertension Brother, Sister    Osteoarthritis Mother    Thyroid disease Mother        Tobacco Use    Smoking status: Current Every Day Smoker     Packs/day: 0.50     Years: 40.00     Pack years: 20.00     Types: Cigarettes    Smokeless tobacco: Never Used   Substance and Sexual Activity    Alcohol use: No    Drug use: No    Sexual activity: Yes     Partners: Male     Review of Systems   Constitutional: Positive for activity change, fatigue and fever.   HENT: Positive for trouble swallowing.    Cardiovascular: Negative.    Gastrointestinal: Negative.    Endocrine: Negative.    Musculoskeletal: Negative.    Skin: Negative.    Allergic/Immunologic: Negative.    Neurological: Positive for weakness. Negative for speech difficulty, light-headedness, numbness and  headaches.   Hematological: Negative.    Psychiatric/Behavioral: Positive for confusion.     Objective:     Vital Signs (Most Recent):  Temp: 98.6 °F (37 °C) (10/16/19 1227)  Pulse: (!) 114 (10/16/19 1227)  Resp: 18 (10/16/19 1227)  BP: (!) 191/88 (10/16/19 1227)  SpO2: 98 % (10/16/19 1227) Vital Signs (24h Range):  Temp:  [98.1 °F (36.7 °C)-99.5 °F (37.5 °C)] 98.6 °F (37 °C)  Pulse:  [] 114  Resp:  [16-22] 18  SpO2:  [93 %-99 %] 98 %  BP: (161-218)/(71-93) 191/88     Weight: 53.5 kg (118 lb)  Body mass index is 20.25 kg/m².    Physical Exam  General:  Well-developed, well-nourished, thin, appears older than stated age.  HEENT:  NCAT, PERRLA, EOMI  NG tube and nasal trumpet in place  Neck:  Supple  CVS:  No LE edema    Neurologic Exam:  Mental Status:  AAOx to self, , year, hospital and [facility]. Difficulty stating why she is in the hospital.  Speech dysarthric , thought content appropriate.  Cranial Nerves:  PERRLA, EOMI.  Facial movement, sensation intact and symmetric.  Palate raises symmetrically, tongue protrudes midline.    Motor:  Normal bulk and tone.  BUE shoulder abduction 4/5, biceps/triceps4-/5, wrist flexion/extension4-/5,  strength 4-/5.  BLE hip flexors 3/5, knee flexion/extension 4-/5, plantarflexion/dorsiflexion 4/5.  Sensory:  Intact to light touch at all extremities and face without inattention.   Reflexes:  Biceps, brachioradialis, patellar, Achilles 2+ and symmetric.  No ankle clonus.  Downgoing toe bilaterally.  Coordination:  Mild tremor noted w/ activation of the RUE  Gait:  Deferred 2/2 patient's condition         Significant Labs:   Hemoglobin A1c: No results for input(s): HGBA1C in the last 720 hours.  Blood Culture: No results for input(s): LABBLOO in the last 48 hours.  BMP:   Recent Labs   Lab 10/15/19  0407 10/16/19  0324 10/16/19  0533    85 93    149* 147*   K 3.5 2.5* 2.6*   * 118* 118*   CO2 17* 16* 17*   BUN 23 21 22   CREATININE 0.7 0.7 0.7    CALCIUM 8.3* 7.5* 8.1*   MG 1.6 1.9  --      CBC:   Recent Labs   Lab 10/15/19  0412 10/16/19  0324   WBC 13.58* 10.17   HGB 7.7* 7.0*   HCT 26.2* 24.2*    260     CMP:   Recent Labs   Lab 10/15/19  0407 10/16/19  0324 10/16/19  0533    85 93    149* 147*   K 3.5 2.5* 2.6*   * 118* 118*   CO2 17* 16* 17*   BUN 23 21 22   CREATININE 0.7 0.7 0.7   CALCIUM 8.3* 7.5* 8.1*   MG 1.6 1.9  --    ANIONGAP 9 15 12   EGFRNONAA >60.0 >60.0 >60.0     CSF Culture: No results for input(s): CSFCULTURE in the last 48 hours.  CSF Studies: No results for input(s): ALIQUT, APPEARCSF, COLORCSF, CSFWBC, CSFRBC, GLUCCSF, LDHCSF, PROTEINCSF, VDRLCSF in the last 48 hours.  Inflammatory Markers: No results for input(s): SEDRATE, CRP, PROCAL in the last 48 hours.  POCT Glucose:   Recent Labs   Lab 10/16/19  0029 10/16/19  0433 10/16/19  1309   POCTGLUCOSE 95 95 80     Prealbumin: No results for input(s): PREALBUMIN in the last 48 hours.  Respiratory Culture: No results for input(s): GSRESP, RESPIRATORYC in the last 48 hours.  Urine Culture: No results for input(s): LABURIN in the last 48 hours.  Urine Studies: No results for input(s): COLORU, APPEARANCEUA, PHUR, SPECGRAV, PROTEINUA, GLUCUA, KETONESU, BILIRUBINUA, OCCULTUA, NITRITE, UROBILINOGEN, LEUKOCYTESUR, RBCUA, WBCUA, BACTERIA, SQUAMEPITHEL, HYALINECASTS in the last 48 hours.    Invalid input(s): WRIGHTSUR  All pertinent lab results from the past 24 hours have been reviewed.    Significant Imaging: I have reviewed all pertinent imaging results/findings within the past 24 hours.     MRI Brain 10/07      1. Small acute right thalamic infarct.  2. Additional scattered foci of DWI hyperintense signal without corresponding decreased signal on the ADC maps.  The findings most likely represent subacute embolic infarctions.  3. Small foci of DWI hyperintensity within the lateral ventricles.  The findings are suspicious for possible ventriculitis.  Suggest correlation  with CSF analysis.  4. Several punctate remote microhemorrhages.    Assessment and Plan:     HTN (hypertension)  - Patient requires adequate BP control at this time  - Concern for recurrence of HTNsive infarcts and hemorrhagic transformation of likely septic/embolic infarcts     Debility  - PT/OT    Right thalamic stroke  - BP control as HTN is a stroke RF  - 2/2 stroke prevention     Endocarditis  - ID recommendations    Acute bacterial endocarditis  - ID following, recommendations as per that team    Encephalopathy, metabolic  Patient is a 64 yo female w/ complicated and prolonged hospital course outlined above, who initially presented to the OSH on 9/19 and has been hospitalized since for MSSA bacteremia, endocarditis ( vegetation noted on multiple valves), epidural abscess s/p wash out by NSGY, all complicated by what appears to be multiple infarcts of likely both HTNsive etiology (R thalamus) and likely additional punctate infarcts 2/2 septic emboli.   Neurology consulted for AMS.     - At this time believe this is a multifactorial process, but even today patient appears brighter and more aware, confirmed by RR team that has been seeing patient daily  - Exam at this time is largely non-focal, so would avoid imaging at this time  - Concerned for persistently elevated BP   - Initial permissive HTN window for patient's thalamic infarct and at this time normotension would be the goal   - Would consider gradual decrease of patient's BP to goal less than 150, but avoid precipitous drops right away    - Although thalamic infarct is more consistent w/ a HTNsive infarct the smaller infarcts are more fitting for a mycotic origin, and those infarcts tend to have a significantly higher rate of bleeding  - Patient persistently anemic w/ Hgb 7.0 this AM   - Correction per primary team  - Metabolic abnormalities such as hypernatremia and hypokalemia to be addressed by primary team   - EEG is superfluous at this time, as there  are multiple metabolic and infectious etiologies that can contribute to patient's intermittent confusion  - Continue Tx of patient's infection   - ID team following  - PT/OT as per primary team   - Avoid sedating medication  - Appreciate consult, will follow up with the patient         VTE Risk Mitigation (From admission, onward)         Ordered     enoxaparin injection 40 mg  Daily      10/11/19 1124     IP VTE HIGH RISK PATIENT  Once      10/06/19 0812     Place sequential compression device  Until discontinued      10/02/19 1946                Thank you for your consult. I will follow-up with patient. Please contact us if you have any additional questions.    Elisa Michael MD  Neurology  [facility]

## 2019-10-16 NOTE — PROGRESS NOTES
[facility]  Infectious Disease  Progress Note    Patient Name: Mesha Mckenzie  MRN: [id number]  Admission Date: 10/2/2019  Length of Stay: 14 days  Attending Physician: Sushil Ennis MD  Primary Care Provider: Varun Shea MD PhD    Isolation Status: No active isolations  Assessment/Plan:      Endocarditis  62yo female with IVDU presents as transfer from [facility] for epidural abscess, likely secondary to MSSA bacteremia and endocarditis with vegetations of the MV and TV on DHARMESH 9/26 c/b bilateral multifocal pneumonia with cavitary lesions, right pleural effusion s/p thoracentesis on 10/1 w/ concern for empyema, pan-colitis, and MRI brain 10/8 with evidence of new embolic infarcts. Followed by ID at [facility], most recently on daptomycin, cefazolin, and vancomycin. Intubated shortly after arrival to [facility] [place] [place] for hypoxia. Noted to have penicillin allergy, allergy tested here and found to be false allergy. On 10/4 the patient was started on oxacillin with plans for 6 week course (end date 11/20). On 10/6 patient underwent aspiration and drainage of shoulder abscesses (growing MSSA). On 10/9 she underwent L3 laminectomy with washout of infection. Blood cultures have been without growth since 9/29.     On 10/11 the patient had a change in mental status characterized by somnolence and is waxing and waning in nature, isolated fever 101.2, and leukocytosis with WBC 15K which normalized and now back to 13K. Antibiotics broadened from oxacillin to Vanc, Cefepime, and flagyl. Blood cultures 10/11 without growth thus far. UA with 45 WBC, 20 RBC but no reflex?. Patient has been afebrile since but still with altered mental status. If mental status were related to underlying infection would expect it to have improved with broadening of antibiotics. Antibiotics de-escalated to oxacillin on 10/14. CT of brain with evolving thalamic infarct but would also not expect this to cause this much  somnolence. The patient could have other septic emboli to the brain which are too small to visualize on imaging. She could very well be also be aspirating although CXR with no focal consolidation. Now able to tell me she has abdominal tenderness. Still with astorga in place. Patient has been afebrile since 10/11, blood cultures without growth, now with improvement in mental status although could be part of waxing and waning delirium.     Recommendations:  -Continue Oxacillin for MSSA Endocarditis   -Follow up high risk urine culture   -Follow up blood cultures  -Aspiration precautions  -Could consider repeating abdominal imaging given abdominal pain if urine culture negative  -Recommend removing all lines and catheters including astorga once clinically feasible since they are potential sources of infection  -agree with palliative consult and goals of care discussion    Discharge recommendations  -Will still plan to discharge patient on OXACILLIN 12G IV continuous  for 6 weeks, estimated end of therapy date 11/20  -Please contact us prior to discharge so we can arrange for follow up                     Thank you for your consult. I will sign off. Please contact us if you have any additional questions.    Bradley Villalobos MD  Infectious Disease  [facility]    Subjective:     Principal Problem:Epidural abscess    HPI: Mesha Mckenzie is a 64 yo female with IVDU (heroin), prior GIB in 2018, and spinal stenosis who presents from [facility] for neurosurgery eval for epidural abscess in setting of MSSA endocarditis. She had initially presented to [place] on 9/19 for evaluation of nausea, vomiting, and diarrhea for the past 5 days. 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 She was initially started on  vancomycin, flagyl, and levoquin. Blood cultures from 9/19 positive for MSSA and were thought to be likely source. Urine culture from 9/19 also positive for MSSA. CT head on 9/19 and 9/22 negative for septic emboli. TTE on 9/21 showed possible vegetation on mitral valve (EF 55%).  DHARMESH was performed on 9/25, showing mitral valve vegetation (5x6mm) on the posterior leaflet as well as tricuspid valve vegetation (10x5mm) on septal leaflet. CT chest was repeated on 9/26 and was showed for previously seen bilateral consolidations that were now noted to be cavitary/cystic in nature. Repeat TTE on 9/26 reported reduced EF from previous 55% to 33%. Cardiothoracic evaluated patient for valve replacement but surgery was deferred due to patient's multiple medical issues. Last positive blood culture was 9/26. Her antibiotics were later changed to daptomycin, cefazolin, and vancomycin. Thoracentesis on 10/1 showed evidence of empyema. MRI spine on 10/1 showed lumbar paraspinal muscle infection and small dorsal epidural abscess from L2-4. [place] [facility] [place].     On [place] [facility] she was noted to be encephalopathic. Shortly after arriving, she became hypoxic and hypertensive and required intubation. She also required neuromuscular blockade for tachypnea and vent dyssynchrony. Vasopressors were also started due to hypotension.  Interval History:   No acute events overnight. Afebrile. Patient seems more awake today and responsive to questions than the past few days. She says she is in pain and points to her lower abdomen.    Review of Systems   Unable to perform ROS: Mental status change     Objective:     Vital Signs (Most Recent):  Temp: 98.2 °F (36.8 °C) (10/16/19 0542)  Pulse: (!) 112 (10/16/19 1109)  Resp: (!) 22 (10/16/19 0818)  BP: (!) 177/73 (10/16/19 0818)  SpO2: 99 % (10/16/19 0818) Vital Signs (24h Range):  Temp:  [97.6 °F (36.4 °C)-99.5 °F (37.5 °C)] 98.2 °F (36.8 °C)  Pulse:  []  112  Resp:  [16-28] 22  SpO2:  [91 %-99 %] 99 %  BP: (161-218)/(71-93) 177/73     Weight: 53.5 kg (118 lb)  Body mass index is 20.25 kg/m².    Estimated Creatinine Clearance: 67.7 mL/min (based on SCr of 0.7 mg/dL).    Physical Exam   Constitutional: She appears well-developed and well-nourished. No distress.   Ill appearing lady   HENT:   Head: Normocephalic and atraumatic.   NGT and trumpet in place   Eyes: Pupils are equal, round, and reactive to light. No scleral icterus.   Neck: Normal range of motion. Neck supple. No JVD present.   Cardiovascular: Regular rhythm and normal heart sounds. Exam reveals no gallop and no friction rub.   No murmur heard.  Tachycardic   Pulmonary/Chest: Effort normal. No respiratory distress. She has no wheezes. She has no rales.   Bilateral course breath sounds  Some tachypnea present with increased work of breathing   Abdominal: Soft. Bowel sounds are normal. She exhibits no distension. There is tenderness (bilateral lower quadrants).   Musculoskeletal: She exhibits no edema.   Neurological:   More alert and responsive today  Opens eyes to voice and can stay awake longer to respond to questions  Oriented to self and place   Skin: Skin is warm and dry.       Significant Labs:   Blood Culture:   Recent Labs   Lab 10/01/19  0534 10/02/19  0445 10/02/19  0830 10/02/19  2025 10/11/19  1701   LABBLOO No growth after 5 days. No growth after 5 days. No growth after 5 days. No growth after 5 days. No Growth to date  No Growth to date  No Growth to date  No Growth to date  No Growth to date  No Growth to date  No Growth to date  No Growth to date  No Growth to date  No Growth to date     CBC:   Recent Labs   Lab 10/15/19  0412 10/16/19  0324   WBC 13.58* 10.17   HGB 7.7* 7.0*   HCT 26.2* 24.2*    260     CMP:   Recent Labs   Lab 10/15/19  0407 10/16/19  0324 10/16/19  0533    149* 147*   K 3.5 2.5* 2.6*   * 118* 118*   CO2 17* 16* 17*    85 93   BUN 23 21  22   CREATININE 0.7 0.7 0.7   CALCIUM 8.3* 7.5* 8.1*   ANIONGAP 9 15 12   EGFRNONAA >60.0 >60.0 >60.0       Significant Imaging: I have reviewed all pertinent imaging results/findings within the past 24 hours.

## 2019-10-16 NOTE — CARE UPDATE
Rapid Response Respiratory Therapy Proactive Rounding Note      Time of visit: 1700    Code Status: Full Code   : 1955  Age: 63 y.o.  Weight:   Wt Readings from Last 1 Encounters:   10/03/19 53.5 kg (118 lb)     Sex: female  Race: White   Bed: 8/8 A:   MRN: [id number]    SITUATION     Evaluated patient for: nasal trumpet    BACKGROUND     Patient has a past medical history of Anxiety, Carotid bruit, Chronic pain, Cystitis, Cystocele, unspecified (CODE), Depression, GI bleed, History of uterine fibroid, Shortness of breath on exertion, Spinal stenosis, and Urinary retention.    ASSESSMENT/INTERVENTIONS     Upon arrival in room called to assess nasal trumpet.  The Rt assigned said she was told it was too small and could not pass a catheter.  I removed it and there was a mucus plug in it.  I told her to have the night RT place a new one in the early morning hours to give her nare a break.      Pulse:  Respiratory rate:  Temperature: Temp: 97 °F (36.1 °C) BP: BP: (!) 191/77 SpO2:   Level of Consciousness: Level of Consciousness (AVPU): alert  Respiratory Effort: Respiratory Effort: Normal, Unlabored Expansion/Accessory Muscle Usage: Expansion/Accessory Muscles/Retractions: no use of accessory muscles, no retractions, expansion symmetric  All Lung Field Breath Sounds: All Lung Fields Breath Sounds: Anterior:, Lateral:, rhonchi  CANDACE Breath Sounds: diminished, coarse  LLL Breath Sounds: coarse, diminished  RUL Breath Sounds: coarse, diminished  RML Breath Sounds: diminished, coarse  RLL Breath Sounds: coarse, diminished  Mobility at time of assessment: General Mobility: moderately impaired(weakness)  O2 Device/Concentration: NC  Most recent blood gas: No results for input(s): PH, PCO2, PO2, HCO3, POCSATURATED, BE in the last 72 hours.  P/F Ratio:    NIPPV: No Surgical airway: No  ETCO2 monitored: ETCO2 (mmHg): 23 mmHg  Ambu at bedside: Ambu bag with the patient?: Yes, Adult Ambu    Current Respiratory  Care Orders:   Pulse Oximetry Continuous Continuous      10/12/19 2301     10/12/19 1600  ACAPELLA TREATMENT Q4H Every 4 hours (27 of 56 released)    Release    10/12/19 1209   10/11/19 2200  Chest physiotherapy TID 3 times daily (16 of 28 released)    Release   Question: Indications: Answer: COPIOUS SPUTUM PRODUCTION    10/11/19 1803   10/10/19 1922  Inhalation Treatment Q8H Every 8 hours (9 of 9 released)      10/10/19 1921   10/05/19 1534  Oxygen Continuous Continuous     References: Oxygen Titration Protocol   Question Answer Comment   Device type: Low flow    Device: Nasal Cannula (1- 5 Liters)    LPM: 2    Titrate O2 per Oxygen Titration Protocol: Yes    To maintain SpO2 goal of: >= 90%    Notify MD of: Inability to achieve desired SpO2; Sudden change in patient status and requires 20% increase in FiO2; Patient requires >60% FiO2        10/05/19 1534   Unscheduled  END TIDAL CO2 MONITOR PRN Use PRN (0 of 47424 released)    Release    10/06/19 1136   Unscheduled  ASP/SUCTION NASOTRACHEAL PRN             RECOMMENDATIONS     We recommend: placing a new nasal trumpet tomorrow    ESCALATION      Physician Escalation (Yes/No)    Care discussed with:   Discussed plan of care primary RT,      FOLLOW-UP     Please call back the Rapid Response RT, Ching Magaña, RRT at x [phone] for any questions or concerns.

## 2019-10-16 NOTE — CARE UPDATE
Rapid Response Nurse Chart Check     Chart check completed, abnormal VS noted. Bedside RN Alberta contacted, no concerns verbalized at this time, instructed to call [phone] for further concerns or assistance.

## 2019-10-16 NOTE — PT/OT/SLP PROGRESS
Occupational Therapy   Treatment    Name: Mesha Mckenzie  MRN: [id number]  Admitting Diagnosis:  Epidural abscess  7 Days Post-Op    Recommendations:     Discharge Recommendations: rehabilitation facility  Discharge Equipment Recommendations:  (TBD as pt progresses)  Barriers to discharge:  None    Assessment:     Mesha Mckenzie is a 63 y.o. female with a medical diagnosis of Epidural abscess.  She presents with increased alertness and participation from prior session. Performance deficits affecting function are weakness, impaired endurance, impaired balance, impaired cardiopulmonary response to activity, decreased safety awareness, pain, impaired self care skills. Pt would benefit from skilled OT services in order to maximize independence with ADLs and facilitate safe discharge.        Rehab Prognosis:  Good; patient would benefit from acute skilled OT services to address these deficits and reach maximum level of function.       Plan:     Patient to be seen 3 x/week to address the above listed problems via self-care/home management, therapeutic activities, therapeutic exercises  · Plan of Care Expires: 11/10/19  · Plan of Care Reviewed with: patient    Subjective     Pain/Comfort:  · Pain Rating 1: (pt c/o of pain, unable to grade. appears to be in abdomen)  · Location - Orientation 1: generalized  · Location 1: abdomen  · Pain Addressed 1: Reposition, Cessation of Activity    Objective:     Communicated with: RN prior to session.  Patient found HOB elevated with astorga catheter, peripheral IV, bowel management system, telemetry, pulse ox (continuous) upon OT entry to room.    General Precautions: Standard, fall, NPO   Orthopedic Precautions:N/A   Braces: N/A     Occupational Performance:     Bed Mobility:    · Patient completed Rolling/Turning to Right with moderate assistance  · Patient completed Supine to Sit with moderate assistance     Functional Mobility/Transfers:  · Patient completed Sit <> Stand  Transfer with moderate assistance  with  rolling walker   · Patient completed Bed <> Chair Transfer using Stand Pivot technique with moderate assistance with hand-held assist  · Functional Mobility: pt ambulated 5 feet, then 10 ft, then 14 feet with RW and max assist x2 +1 to follow with bedside chair to simulate household and community distances. Pt required seated rest breaks between trials. Pt demonstrated SOB and fatigue during functional mobility.    Activities of Daily Living:  · Grooming: minimum assistance to wash face and min A to comb hair after initial demonstration from OT       [facility] 6 Click ADL: 8    Treatment & Education:  -Pt showed increased ability to follow 1 step commands   -Pt performed 10x shoulder flexion AROM with target given from therapist   -Pt educated on role of OT, POC and goals for therapy  -Pt educated on importance of OOB activities with staff member assistance  -Pt verbalized understanding. Pt expressed no further concerns/questions  -Whiteboard updated      Patient left up in chair with all lines intact and physical therapy presentEducation:      GOALS:   Multidisciplinary Problems     Occupational Therapy Goals        Problem: Occupational Therapy Goal    Goal Priority Disciplines Outcome Interventions   Occupational Therapy Goal     OT, PT/OT Ongoing, Progressing    Description:  Goals to be met by: 10/31/19     Patient will increase functional independence with ADLs by performing:    UE Dressing with Moderate Assistance.  LE Dressing with Moderate Assistance.  Grooming while seated with Minimal Assistance.  Toileting from bedside commode with Moderate Assistance for hygiene and clothing management.   Rolling to Right, Left with Moderate Assistance.   Supine to sit with Moderate Assistance.  Toilet transfer to bedside commode with Moderate Assistance.                      Time Tracking:     OT Date of Treatment: 10/16/19  OT Start Time: 1013  OT Stop Time: 1048  OT Total Time  (min): 35 min    Billable Minutes:Self Care/Home Management 15  Therapeutic Activity 20    Marine Elizabeth OT  10/16/2019

## 2019-10-16 NOTE — ASSESSMENT & PLAN NOTE
- seen on CT 9/19 with improvement noted on f/u CT 9/25  - concern for ileus on 9/25 CT; rectal tube in place with liquid stool output  - stool 9/27 neg for c diff; stool cx neg ; cdiff neg again on 10/12  - flagyl course completed (7/7 days)  - currently holding tube feeds due to concern for aspiration

## 2019-10-16 NOTE — ASSESSMENT & PLAN NOTE
- Encephalopathy likely multiple etiology metabolic, infectious  - Neurology consulted and recommend aggressive metabolic and infectious treatement.   - consider repeat MRI if new focal deficits  - no EEG at this time per neuro  - Neuro and ID following

## 2019-10-16 NOTE — PLAN OF CARE
GCS 14. Patient arouses to verbal stimuli. Able to state name,  and location. PERRL @ 3mm bilaterally. Left sided weakness noted. Neuro consulted for EEG.    Left nasal trumpet intact. O2 weaned to 1L for SPO2 93%. Lungs rhonchorous in bases.  Aspiration precautions in place. IVF reordered. Mg replaced. Henriquez care provided. Patient turned q2. Groin and buttocks excoriated. Rectal tube intact.     Continuing to allow permissive HTN with goal -200. Did not require prn antihypertensives. Patient continues to be tachycardic on tele. Restraints d/c'ed at 1600. Patient sleeping for most of shift. Not pulling at lines currently. Daughter Leeanna updated on POC, to be patient's primary decision maker in case of emergency. Family to remain at bedside. Will monitor closely.

## 2019-10-16 NOTE — CARE UPDATE
Removed nasopharyngeal airway tube due to mucus plug. Will report to next shift for a new nasal trumpet if needed. SpO2 is currently 98% on 2L nasal cannula.

## 2019-10-17 PROBLEM — K51.00 PANCOLITIS: Status: RESOLVED | Noted: 2019-09-19 | Resolved: 2019-10-17

## 2019-10-17 PROBLEM — J96.01 ACUTE HYPOXEMIC RESPIRATORY FAILURE: Status: RESOLVED | Noted: 2019-09-20 | Resolved: 2019-10-17

## 2019-10-17 LAB
ANION GAP SERPL CALC-SCNC: 16 MMOL/L (ref 8–16)
BACTERIA UR CULT: NORMAL
BACTERIA UR CULT: NORMAL
BASOPHILS # BLD AUTO: 0.06 K/UL (ref 0–0.2)
BASOPHILS NFR BLD: 0.6 % (ref 0–1.9)
BUN SERPL-MCNC: 20 MG/DL (ref 8–23)
CALCIUM SERPL-MCNC: 8.1 MG/DL (ref 8.7–10.5)
CHLORIDE SERPL-SCNC: 116 MMOL/L (ref 95–110)
CO2 SERPL-SCNC: 17 MMOL/L (ref 23–29)
CREAT SERPL-MCNC: 0.7 MG/DL (ref 0.5–1.4)
DIFFERENTIAL METHOD: ABNORMAL
EOSINOPHIL # BLD AUTO: 0.1 K/UL (ref 0–0.5)
EOSINOPHIL NFR BLD: 0.6 % (ref 0–8)
ERYTHROCYTE [DISTWIDTH] IN BLOOD BY AUTOMATED COUNT: 20.1 % (ref 11.5–14.5)
EST. GFR  (AFRICAN AMERICAN): >60 ML/MIN/1.73 M^2
EST. GFR  (NON AFRICAN AMERICAN): >60 ML/MIN/1.73 M^2
GLUCOSE SERPL-MCNC: 78 MG/DL (ref 70–110)
HCT VFR BLD AUTO: 27.4 % (ref 37–48.5)
HGB BLD-MCNC: 8.2 G/DL (ref 12–16)
IMM GRANULOCYTES # BLD AUTO: 0.13 K/UL (ref 0–0.04)
IMM GRANULOCYTES NFR BLD AUTO: 1.3 % (ref 0–0.5)
LYMPHOCYTES # BLD AUTO: 2.5 K/UL (ref 1–4.8)
LYMPHOCYTES NFR BLD: 25.5 % (ref 18–48)
MAGNESIUM SERPL-MCNC: 1.5 MG/DL (ref 1.6–2.6)
MCH RBC QN AUTO: 30.6 PG (ref 27–31)
MCHC RBC AUTO-ENTMCNC: 29.9 G/DL (ref 32–36)
MCV RBC AUTO: 102 FL (ref 82–98)
MONOCYTES # BLD AUTO: 0.5 K/UL (ref 0.3–1)
MONOCYTES NFR BLD: 4.8 % (ref 4–15)
NEUTROPHILS # BLD AUTO: 6.5 K/UL (ref 1.8–7.7)
NEUTROPHILS NFR BLD: 67.2 % (ref 38–73)
NRBC BLD-RTO: 0 /100 WBC
PHOSPHATE SERPL-MCNC: 4.4 MG/DL (ref 2.7–4.5)
PLATELET # BLD AUTO: 283 K/UL (ref 150–350)
PMV BLD AUTO: 10.1 FL (ref 9.2–12.9)
POCT GLUCOSE: 79 MG/DL (ref 70–110)
POCT GLUCOSE: 88 MG/DL (ref 70–110)
POCT GLUCOSE: 89 MG/DL (ref 70–110)
POCT GLUCOSE: 92 MG/DL (ref 70–110)
POTASSIUM SERPL-SCNC: 2.6 MMOL/L (ref 3.5–5.1)
RBC # BLD AUTO: 2.68 M/UL (ref 4–5.4)
SODIUM SERPL-SCNC: 149 MMOL/L (ref 136–145)
WBC # BLD AUTO: 9.72 K/UL (ref 3.9–12.7)

## 2019-10-17 PROCEDURE — 99233 PR SUBSEQUENT HOSPITAL CARE,LEVL III: ICD-10-PCS | Mod: ,,, | Performed by: INTERNAL MEDICINE

## 2019-10-17 PROCEDURE — 99900035 HC TECH TIME PER 15 MIN (STAT)

## 2019-10-17 PROCEDURE — 99232 SBSQ HOSP IP/OBS MODERATE 35: CPT | Mod: ,,, | Performed by: PSYCHIATRY & NEUROLOGY

## 2019-10-17 PROCEDURE — 36415 COLL VENOUS BLD VENIPUNCTURE: CPT

## 2019-10-17 PROCEDURE — 85025 COMPLETE CBC W/AUTO DIFF WBC: CPT

## 2019-10-17 PROCEDURE — 99233 SBSQ HOSP IP/OBS HIGH 50: CPT | Mod: ,,, | Performed by: FAMILY MEDICINE

## 2019-10-17 PROCEDURE — 27000221 HC OXYGEN, UP TO 24 HOURS

## 2019-10-17 PROCEDURE — 25000003 PHARM REV CODE 250: Performed by: PHYSICIAN ASSISTANT

## 2019-10-17 PROCEDURE — 97535 SELF CARE MNGMENT TRAINING: CPT

## 2019-10-17 PROCEDURE — 94664 DEMO&/EVAL PT USE INHALER: CPT

## 2019-10-17 PROCEDURE — 94640 AIRWAY INHALATION TREATMENT: CPT

## 2019-10-17 PROCEDURE — 25000242 PHARM REV CODE 250 ALT 637 W/ HCPCS: Performed by: INTERNAL MEDICINE

## 2019-10-17 PROCEDURE — 80048 BASIC METABOLIC PNL TOTAL CA: CPT

## 2019-10-17 PROCEDURE — 63600175 PHARM REV CODE 636 W HCPCS: Performed by: INTERNAL MEDICINE

## 2019-10-17 PROCEDURE — 99232 PR SUBSEQUENT HOSPITAL CARE,LEVL II: ICD-10-PCS | Mod: ,,, | Performed by: PSYCHIATRY & NEUROLOGY

## 2019-10-17 PROCEDURE — 20600001 HC STEP DOWN PRIVATE ROOM

## 2019-10-17 PROCEDURE — 25000003 PHARM REV CODE 250: Performed by: STUDENT IN AN ORGANIZED HEALTH CARE EDUCATION/TRAINING PROGRAM

## 2019-10-17 PROCEDURE — 25000003 PHARM REV CODE 250: Performed by: FAMILY MEDICINE

## 2019-10-17 PROCEDURE — 94761 N-INVAS EAR/PLS OXIMETRY MLT: CPT

## 2019-10-17 PROCEDURE — 99233 PR SUBSEQUENT HOSPITAL CARE,LEVL III: ICD-10-PCS | Mod: ,,, | Performed by: FAMILY MEDICINE

## 2019-10-17 PROCEDURE — 84100 ASSAY OF PHOSPHORUS: CPT

## 2019-10-17 PROCEDURE — 83735 ASSAY OF MAGNESIUM: CPT

## 2019-10-17 PROCEDURE — 94668 MNPJ CHEST WALL SBSQ: CPT

## 2019-10-17 PROCEDURE — 63600175 PHARM REV CODE 636 W HCPCS: Performed by: FAMILY MEDICINE

## 2019-10-17 PROCEDURE — 92526 ORAL FUNCTION THERAPY: CPT

## 2019-10-17 PROCEDURE — 99233 SBSQ HOSP IP/OBS HIGH 50: CPT | Mod: ,,, | Performed by: INTERNAL MEDICINE

## 2019-10-17 PROCEDURE — 63600175 PHARM REV CODE 636 W HCPCS: Performed by: PHYSICIAN ASSISTANT

## 2019-10-17 PROCEDURE — 63600175 PHARM REV CODE 636 W HCPCS: Performed by: NURSE PRACTITIONER

## 2019-10-17 RX ORDER — AMLODIPINE BESYLATE 10 MG/1
10 TABLET ORAL DAILY
Status: DISCONTINUED | OUTPATIENT
Start: 2019-10-18 | End: 2019-10-25 | Stop reason: HOSPADM

## 2019-10-17 RX ORDER — AMLODIPINE BESYLATE 5 MG/1
5 TABLET ORAL ONCE
Status: COMPLETED | OUTPATIENT
Start: 2019-10-17 | End: 2019-10-17

## 2019-10-17 RX ORDER — MAGNESIUM SULFATE HEPTAHYDRATE 40 MG/ML
2 INJECTION, SOLUTION INTRAVENOUS ONCE
Status: COMPLETED | OUTPATIENT
Start: 2019-10-17 | End: 2019-10-17

## 2019-10-17 RX ORDER — SODIUM CHLORIDE, SODIUM LACTATE, POTASSIUM CHLORIDE, CALCIUM CHLORIDE 600; 310; 30; 20 MG/100ML; MG/100ML; MG/100ML; MG/100ML
INJECTION, SOLUTION INTRAVENOUS CONTINUOUS
Status: ACTIVE | OUTPATIENT
Start: 2019-10-17 | End: 2019-10-17

## 2019-10-17 RX ORDER — POTASSIUM CHLORIDE 20 MEQ/15ML
40 SOLUTION ORAL
Status: COMPLETED | OUTPATIENT
Start: 2019-10-17 | End: 2019-10-17

## 2019-10-17 RX ADMIN — ACETAMINOPHEN 650 MG: 325 TABLET ORAL at 04:10

## 2019-10-17 RX ADMIN — SODIUM CHLORIDE, SODIUM LACTATE, POTASSIUM CHLORIDE, AND CALCIUM CHLORIDE: .6; .31; .03; .02 INJECTION, SOLUTION INTRAVENOUS at 08:10

## 2019-10-17 RX ADMIN — MAGNESIUM SULFATE IN WATER 2 G: 40 INJECTION, SOLUTION INTRAVENOUS at 08:10

## 2019-10-17 RX ADMIN — POTASSIUM CHLORIDE 40 MEQ: 20 SOLUTION ORAL at 08:10

## 2019-10-17 RX ADMIN — AMLODIPINE BESYLATE 5 MG: 5 TABLET ORAL at 08:10

## 2019-10-17 RX ADMIN — IPRATROPIUM BROMIDE AND ALBUTEROL SULFATE 3 ML: .5; 3 SOLUTION RESPIRATORY (INHALATION) at 07:10

## 2019-10-17 RX ADMIN — ENOXAPARIN SODIUM 40 MG: 100 INJECTION SUBCUTANEOUS at 04:10

## 2019-10-17 RX ADMIN — IPRATROPIUM BROMIDE AND ALBUTEROL SULFATE 3 ML: .5; 3 SOLUTION RESPIRATORY (INHALATION) at 04:10

## 2019-10-17 RX ADMIN — IPRATROPIUM BROMIDE AND ALBUTEROL SULFATE 3 ML: .5; 3 SOLUTION RESPIRATORY (INHALATION) at 11:10

## 2019-10-17 RX ADMIN — ONDANSETRON 4 MG: 2 INJECTION INTRAMUSCULAR; INTRAVENOUS at 08:10

## 2019-10-17 RX ADMIN — HYDRALAZINE HYDROCHLORIDE 10 MG: 20 INJECTION INTRAMUSCULAR; INTRAVENOUS at 08:10

## 2019-10-17 RX ADMIN — POTASSIUM CHLORIDE 40 MEQ: 20 SOLUTION ORAL at 10:10

## 2019-10-17 RX ADMIN — METOPROLOL TARTRATE 25 MG: 25 TABLET ORAL at 08:10

## 2019-10-17 RX ADMIN — MICONAZOLE NITRATE: 20 OINTMENT TOPICAL at 08:10

## 2019-10-17 RX ADMIN — OXACILLIN SODIUM 12 G: 10 INJECTION, POWDER, FOR SOLUTION INTRAVENOUS at 09:10

## 2019-10-17 RX ADMIN — ACETAMINOPHEN 650 MG: 325 TABLET ORAL at 09:10

## 2019-10-17 RX ADMIN — AMLODIPINE BESYLATE 5 MG: 5 TABLET ORAL at 10:10

## 2019-10-17 NOTE — ASSESSMENT & PLAN NOTE
- Encephalopathy likely multiple etiology metabolic, infectious  - appreciate Neuro recs  - encephalopathy greatly improving over last two days. Advanced diet to po pureed with thick liquids per speech therapy.

## 2019-10-17 NOTE — ASSESSMENT & PLAN NOTE
Notable this admission: Epidural Abcess, Abcess Upper Extremity, Septic Pulmonary Emboli  - Patient clinically improving. Afebrile. Leukocytosis improved.  Appreciate ID recs  - Continue Oxacillin for MSSA Endocarditis

## 2019-10-17 NOTE — PT/OT/SLP PROGRESS
Speech Language Pathology Treatment    Patient Name:  Mesha Mckenzie   MRN:  [id number]  Admitting Diagnosis: Epidural abscess    Recommendations:                 General Recommendations:  Dysphagia therapy  Diet recommendations:  Puree, Liquid Diet Level: Nectar Thick   Aspiration Precautions: 1 bite/sip at a time, Assistance with meals and Assistance with thickening liquids, Eliminate distractions, HOB to 90 degrees, Meds crushed in puree, Monitor for s/s of aspiration, No straws, Remain upright 30 minutes post meal, Small bites/sips and Strict aspiration precautions   Recs sent to team via secure chat   General Precautions: Standard, pureed diet, nectar thick, fall, aspiration  Communication strategies:  go to room if call light pushed    Subjective   Pt with continued lethargy yet increased alertness compared to yesterday. NSG at bedside for portion of session. Spouse at bedside.     Pain/Comfort:  Pain Rating 1: 10/10  Location - Side 1: Left  Location 1: hip  Pain Addressed 2: Distraction, Nurse notified  Pain Rating Post-Intervention 2: 10/10    Objective:     Has the patient been evaluated by SLP for swallowing?   Yes  Keep patient NPO? No   Current Respiratory Status: nasal cannula      Pt repositioned to upright. Pt able to elicit clear voicing & fair volitional cough prior to PO trials. Pt tolerated ice chips x2 without s/s aspiration. Given 1 tsp thin, pt wih some anterior loss yet no overt s/s aspiration. Given small cup sip of thin, pt with delayed swallow which appeared uncoordinated, followed by multiple swallows. No overt s/s aspiration. Pt tolerated bites of puree x3 with timely swallow & no s/s aspiration. Pt deferred further puree trials. Pt tolerated cup sips of nectar thick liquid without s/s aspiration. SLP educated pt &  on all recs & precaution, risks & s/s aspiration, role of SLP, POC, etc. All verbalized understanding. Whiteboard updated.     Assessment:     Mesha Mckenzie is a  63 y.o. female with an SLP diagnosis of Dysphagia.  She presents with risk of aspiration.    Goals:   Multidisciplinary Problems     SLP Goals        Problem: SLP Goal    Goal Priority Disciplines Outcome   SLP Goal     SLP Ongoing, Progressing   Description:  Speech Language Pathology Goals  Goals expected to be met by 10/24  1. Pt will tolerate puree & nectar thick liquids without s/s aspiration  2. Ongoing swallow assessment to determine least restrictive PO consistencies    Speech Language Pathology Goals  Goals expected to be met by 10/17:  1. Patient will participate in ongoing swallow assessment to determine least restrictive diet recommendations.                           Plan:     · Patient to be seen:  4 x/week   · Plan of Care expires:  11/06/19  · Plan of Care reviewed with:  patient, spouse   · SLP Follow-Up:  Yes       Discharge recommendations:  rehabilitation facility     Time Tracking:     SLP Treatment Date:   10/17/19  Speech Start Time:  1020  Speech Stop Time:  1045     Speech Total Time (min):  25 min    Billable Minutes: Treatment Swallowing Dysfunction 17 and Seld Care/Home Management Training 10    Arabella Matthews CCC-SLP  10/17/2019

## 2019-10-17 NOTE — PLAN OF CARE
VS stable. Telemetry= NSR 90-80s. SpO2= 100-99% on 2L NC. Oxacillin at 20.8 ml/hr. NGT in place, clamped. No complaints overnight. Daughter at the bedside. Lower back sutures dry, intact, incision approximated. Henriquez catheter in place with clear, yellow urine. Rectal tube in place with loose, brown stool. Patient was turned from side to side, no pressure ulcer noted. Patient remains NPO. Will continue to monitor.

## 2019-10-17 NOTE — SUBJECTIVE & OBJECTIVE
Interval Hx: Patient looks much better than previous visits. She is now alert and oriented to person, place. Able to answer simple questions and follow commands     Past Medical History:   Diagnosis Date    Anxiety     Carotid bruit     Chronic pain     Cystitis     Cystocele, unspecified (CODE)     Depression     GI bleed     History of uterine fibroid     Shortness of breath on exertion     Spinal stenosis     Urinary retention        Past Surgical History:   Procedure Laterality Date    ANTERIOR VAGINAL REPAIR  10-    CARDIAC CATHETERIZATION  age 12    CHOLECYSTECTOMY  2015    COLONOSCOPY  12/12/2018    aborted due to poor colon prep    COLONOSCOPY N/A 12/12/2018    Procedure: COLONOSCOPY;  Surgeon: Renard Gonsalves MD;  Location: [facility] ENDO;  Service: Endoscopy;  Laterality: N/A;    HYSTERECTOMY  1989    AMANDA    LAMINECTOMY N/A 10/9/2019    Procedure: LAMINECTOMY, SPINE, L3, Open;  Surgeon: Martin Lewis DO;  Location: [facility] OR [address] [place] [address];  Service: Neurosurgery;  Laterality: N/A;    tubiligation         Review of patient's allergies indicates:   Allergen Reactions    Pcn [penicillins]        Medications:  Continuous Infusions:    Scheduled Meds:   albuterol-ipratropium  3 mL Nebulization Q8H    [START ON 10/18/2019] amLODIPine  10 mg Per NG tube Daily    enoxaparin  40 mg Subcutaneous Daily    metoprolol tartrate  25 mg Per NG tube BID    miconazole nitrate 2%   Topical (Top) BID    oxacillin 12 g in  mL CONTINUOUS INFUSION  12 g Intravenous Q24H     PRN Meds:acetaminophen, albuterol-ipratropium, Dextrose 10% Bolus, Dextrose 10% Bolus, Dextrose 10% Bolus, glucagon (human recombinant), glucose, glucose, hydrALAZINE, influenza, labetalol, mupirocin, mupirocin, ondansetron, pneumoc 13-susanna conj-dip cr(PF), promethazine (PHENERGAN) IVPB, sodium chloride 0.9%    Family History     Problem Relation (Age of Onset)    Alzheimer's disease Mother    Hypertension Brother, Sister     Osteoarthritis Mother    Thyroid disease Mother        Tobacco Use    Smoking status: Current Every Day Smoker     Packs/day: 0.50     Years: 40.00     Pack years: 20.00     Types: Cigarettes    Smokeless tobacco: Never Used   Substance and Sexual Activity    Alcohol use: No    Drug use: No    Sexual activity: Yes     Partners: Male       Review of Systems  Objective:     Vital Signs (Most Recent):  Temp: 96.3 °F (35.7 °C) (10/17/19 1143)  Pulse: 102 (10/17/19 1143)  Resp: 18 (10/17/19 1143)  BP: 135/61 (10/17/19 1143)  SpO2: 98 % (10/17/19 1143) Vital Signs (24h Range):  Temp:  [96.3 °F (35.7 °C)-98.2 °F (36.8 °C)] 96.3 °F (35.7 °C)  Pulse:  [] 102  Resp:  [16-24] 18  SpO2:  [94 %-100 %] 98 %  BP: (135-192)/(61-78) 135/61     Weight: 53.5 kg (118 lb)  Body mass index is 20.25 kg/m².    Review of Symptoms- unable to obtain due to patient's mental status   Symptom Assessment (ESAS 0-10 scale)   ESAS 0 1 2 3 4 5 6 7 8 9 10   Pain    x          Dyspnea              Anxiety              Nausea              Depression               Anorexia              Fatigue              Insomnia              Restlessness               Agitation              CAM / Delirium __ --  _x__+   Constipation     _x_ --  ___+   Diarrhea           x__ --  ___+  Bowel Management Plan (BMP): No    Pain Assessment: patient reports mild pain in legs     OME in 24 hours: 0     Performance Status: 50      Physical Exam   Constitutional: She appears well-developed. No distress.   HENT:   NG in place    Eyes:   Eyes remain closed    Neck: Normal range of motion.   Cardiovascular: Tachycardia present.   Pulmonary/Chest: No respiratory distress.   Abdominal: She exhibits no distension.   Neurological: She is alert.   Oriented to person and place. Able to answer simple questions. Follows commands        Significant Labs: All pertinent labs within the past 24 hours have been reviewed.  CBC:   Recent Labs   Lab 10/17/19  0359   WBC 9.72   HGB  8.2*   HCT 27.4*   *        BMP:  Recent Labs   Lab 10/17/19  0359   GLU 78   *   K 2.6*   *   CO2 17*   BUN 20   CREATININE 0.7   CALCIUM 8.1*   MG 1.5*     LFT:  Lab Results   Component Value Date    AST 11 10/11/2019    ALKPHOS 94 10/11/2019    BILITOT 0.2 10/11/2019     Albumin:   Albumin   Date Value Ref Range Status   10/11/2019 1.4 (L) 3.5 - 5.2 g/dL Final     Protein:   Total Protein   Date Value Ref Range Status   10/11/2019 5.5 (L) 6.0 - 8.4 g/dL Final     Lactic acid:   Lab Results   Component Value Date    LACTATE 0.8 10/13/2019    LACTATE 0.8 10/11/2019       Significant Imaging: I have reviewed all pertinent imaging results/findings within the past 24 hours.    Advance Care Planning   Advanced Directives::  Living Will: No  LaPOST: No  Do Not Resuscitate Status: No  Medical Power of :  Patient's default decision would be her  Ari. He would like the primary decision maker to be patient's daughter Leeanna Yu     Decision-Making Capacity: Unable to make complex decisions        Living Arrangements: Lives with spouse    Psychosocial/Cultural:  Patient lives with spouse Ari. Has a daughter Leeanna and son Nirmal from a previous marriage. Prior to getting sick patient enjoyed taking care of her dogs and cats at home. She was very attentive to her dog with diabetes. Patient was driving and independent in ADLs     Spiritual:     F- Aarti and Belief: Spiritism    I - Importance:  reports patient prays everyday but does not attend mass   .  C - Community: no    A - Address in Care: family enjoys visits from

## 2019-10-17 NOTE — PLAN OF CARE
POC reviewed with pt who verbalized understanding Pt oriented to person and place. PT speech is clear and able to communicate neesPt remains free of falls/injuries. Pt on telemetry remains ST. Pt blood glucose being monitored q 4. Pt tolerating nectar thick liquids. Pt pain controlled with prescribed meds. Pt on 2 L NC. Pt wearing SCDs. Pt turned q 2. No acute events throughout shift. Ambulated rodriguez with PT.Frequent checks made.Meds given through NG tube was flushed and clamped. Bed in lowest position call light in reach bed rails up x2. WCTM

## 2019-10-17 NOTE — ASSESSMENT & PLAN NOTE
"63 year old female with history IVDU being treated for sepsis 2/2 TV/MV endocarditis. Prolonged hospital course has been complicated by worsening metabolic encephalopathy, hypoxic respiratory failure, epidural abscess, bilateral shoulder abscesses, and pancolitis.     [facility]/advanced care planning     10/16- Patient now alert and answering questions but unable to make complex medical decisions. Spoke with daughter Leeanna on the phone. Expressed that we are all pleasantly surprised by the change in patient's mental state. Leeanna was able to verbalize that she's still taking things day by day. She is happy her mother is doing better but understands that she "still has a lot going on." We talked about how hopefully patient will get to the point where she can express her own wishes but in the meantime it is still important to discuss things like code status as a family so we have a plan in place for if things don't continue to go the way we hope. Expressed that it is often easier to have these difficult discussions when things are going well rather than waiting until decisions become more emergent. Leeanna agreed.       10/15   Spoke with daughter Leeanna at bedside. She explained that Dr. Ennis talked to her about how she might have to make some tough decisions in the next few days. I explained that I agreed with Dr. Ennis and expressed how I am very worried about her mom. She again expressed that her mother would not want to be on a ventilator longterm. They are going to talk about code status as a family tonight. She expressed that she does not view a feeding tube as life support. I did not go into detail about feeding tubes but did explain to her that it's important to know that a feeding tube does not eliminate the risk for aspiration which is a common misconception. She was surprised and thanked me for letting her know. She shared that ID recommended a neuro consult for eeg which she would like.    Ari " who is patient's default decision maker would like daughter Leeanna Yu to be primary decision maker. Emergency contact changed to Leeanna in EMR     10/14 Spoke with patients  at bedside. Patients daughter Leeanna was on speaker phone.  Patient unable to participate in conversation 2/2 mental status. Introduced palliative care and how we can be helpful going forward. Daughter expressed a lot of frustration about not understanding the current plan of care, not understanding why her mother is no longer in the ICU, not understanding why the specialists are no longer seeing her, and about getting mixed messages as far as her moms condition and prognosis.   Most of the visit was spent reassuring patients daughter that she is getting excellent care. We talked about why some specialists have signed off and why some like ID are getting re-involved. We discussed the events of the weekend and her worsening mental status and how were worried that the patient is getting sicker. Daughter seemed very surprised that her mother could be getting worse.   Briefly discussed code status. Daughter was able to verbalize that her mother would never want to live on machines longterm.

## 2019-10-17 NOTE — CARE UPDATE
Nurse samson told respiratory, the patient is doing much better and doesn't need the nasal trumpet anymore.

## 2019-10-17 NOTE — SUBJECTIVE & OBJECTIVE
Subjective:     Interval History: Continues to improve in regards to her mentation. BP is somewhat better managed overnight although elevated this AM. Amlodipine increased to 10 QD.   No lateralizing focal deficits noted. Continue metabolic and infectious management.       Current Facility-Administered Medications   Medication Dose Route Frequency Provider Last Rate Last Dose    acetaminophen tablet 650 mg  650 mg Oral Q6H PRN Jimi Gill MD   650 mg at 10/16/19 1647    albuterol-ipratropium 2.5 mg-0.5 mg/3 mL nebulizer solution 3 mL  3 mL Nebulization Q8H Yesenia Lerner MD   3 mL at 10/17/19 0755    albuterol-ipratropium 2.5 mg-0.5 mg/3 mL nebulizer solution 3 mL  3 mL Nebulization Q4H PRN Ok Holley PA-C        [START ON 10/18/2019] amLODIPine tablet 10 mg  10 mg Per NG tube Daily Sushil Ennis MD        dextrose 10% (D10W) Bolus  12.5 g Intravenous PRN Malik Jerry MD 1,000 mL/hr at 10/06/19 0359 125 mL at 10/06/19 0359    dextrose 10% (D10W) Bolus  12.5 g Intravenous PRN Sushil Ennis MD        dextrose 10% (D10W) Bolus  25 g Intravenous PRN Sushil Ennis MD        enoxaparin injection 40 mg  40 mg Subcutaneous Daily Yesenia Lerner MD   40 mg at 10/16/19 1642    glucagon (human recombinant) injection 1 mg  1 mg Intramuscular PRN Ok Holley PA-C        glucose chewable tablet 16 g  16 g Oral PRN Ok Holley PA-C        glucose chewable tablet 24 g  24 g Oral PRN Ok Holley PA-C        hydrALAZINE injection 10 mg  10 mg Intravenous Q6H PRN Sushil Ennis MD   10 mg at 10/17/19 0839    influenza (HIGH-DOSE PF) vaccine 0.5 mL  0.5 mL Intramuscular Prior to discharge Ok Rehman MD        labetalol 20 mg/4 mL (5 mg/mL) IV syring  10 mg Intravenous Q6H PRN Mariann Licea NP   10 mg at 10/16/19 1642    metoprolol tartrate (LOPRESSOR) tablet 25 mg  25 mg Per NG tube BID Sushil Ennis MD   25 mg at 10/17/19 0886    miconazole  nitrate 2% ointment   Topical (Top) BID Tessa Kingsley NP        mupirocin 2 % ointment 1 g  1 g Nasal On Call Procedure Giorgi Owens MD        mupirocin 2 % ointment   Nasal On Call Procedure Giorgi Owens MD        ondansetron injection 4 mg  4 mg Intravenous Q8H PRN Winter Albert NP   4 mg at 10/17/19 0840    oxacillin 12 g in  mL CONTINUOUS INFUSION  12 g Intravenous Q24H Yesenia Lerner MD 20.8 mL/hr at 10/16/19 2128 12 g at 10/16/19 2128    pneumoc 13-susanna conj-dip cr(PF) (PREVNAR 13 (PF)) 0.5 mL  0.5 mL Intramuscular Prior to discharge Ok Rehman MD        promethazine (PHENERGAN) 6.25 mg in dextrose 5 % 50 mL IVPB  6.25 mg Intravenous Q6H PRN Ok Holley PA-C        sodium chloride 0.9% flush 10 mL  10 mL Intravenous PRN Winter Albert NP         Facility-Administered Medications Ordered in Other Encounters   Medication Dose Route Frequency Provider Last Rate Last Dose    sodium chloride 0.9% flush 3 mL  3 mL Intravenous PRN Renard Gonsalves MD           Review of Systems   Constitutional: Positive for activity change, fatigue and fever.   HENT: Positive for trouble swallowing.    Cardiovascular: Negative.    Gastrointestinal: Negative.    Endocrine: Negative.    Musculoskeletal: Negative.    Skin: Negative.    Allergic/Immunologic: Negative.    Neurological: Positive for weakness. Negative for speech difficulty, light-headedness, numbness and headaches.   Hematological: Negative.    Psychiatric/Behavioral: Positive for confusion.     Objective:     Vital Signs (Most Recent):  Temp: 96.3 °F (35.7 °C) (10/17/19 1143)  Pulse: 102 (10/17/19 1143)  Resp: 18 (10/17/19 1143)  BP: 135/61 (10/17/19 1143)  SpO2: 98 % (10/17/19 1143) Vital Signs (24h Range):  Temp:  [96.3 °F (35.7 °C)-98.2 °F (36.8 °C)] 96.3 °F (35.7 °C)  Pulse:  [] 102  Resp:  [16-24] 18  SpO2:  [94 %-100 %] 98 %  BP: (135-192)/(61-78) 135/61     Weight: 53.5 kg (118 lb)  Body mass  index is 20.25 kg/m².    Physical Exam  General:  Well-developed, well-nourished, thin, appears older than stated age.  HEENT:  NCAT, PERRLA, EOMI  NG tube and nasal trumpet in place  Neck:  Supple  CVS:  No LE edema     Neurologic Exam:  Mental Status:  AAOx to self, , year, hospital and [facility]. Difficulty stating why she is in the hospital.  Speech dysarthric , thought content appropriate, and is improved from yesterday. More talkative this AM.   Cranial Nerves:  PERRLA, EOMI.  Facial movement, sensation intact and symmetric.  Palate raises symmetrically, tongue protrudes midline.    Motor:  Normal bulk and tone.  RUE shoulder abduction 4/5, biceps/triceps4/5, wrist flexion/extension4/5,  strength 4-/5.  RLE hip flexors 3/5, knee flexion/extension 4/5, plantarflexion/dorsiflexion 4/5.  LUE shoulder abduction 4-/5, biceps/triceps4-/5, wrist flexion/extension4-/5,  strength 4-/5.  BLE hip flexors 3/5, knee flexion/extension 4-/5, plantarflexion/dorsiflexion 4-/5.  Sensory:  Intact to light touch at all extremities and face without inattention.   Reflexes:  Biceps, brachioradialis, patellar, Achilles 2+ and symmetric.  No ankle clonus.  Downgoing toe bilaterally.  Coordination:  Mild tremor noted w/ activation of the RUE.  Gait:  Deferred 2/2 patient's condition       Significant Labs:   Hemoglobin A1c: No results for input(s): HGBA1C in the last 720 hours.  Blood Culture: No results for input(s): LABBLOO in the last 48 hours.  BMP:   Recent Labs   Lab 10/16/19  0324 10/16/19  0533 10/16/19  1612 10/17/19  0359   GLU 85 93 80 78   * 147* 144 149*   K 2.5* 2.6* 3.0* 2.6*   * 118* 116* 116*   CO2 16* 17* 16* 17*   BUN 21 22 21 20   CREATININE 0.7 0.7 0.7 0.7   CALCIUM 7.5* 8.1* 8.5* 8.1*   MG 1.9  --   --  1.5*     CBC:   Recent Labs   Lab 10/16/19  0324 10/17/19  0359   WBC 10.17 9.72   HGB 7.0* 8.2*   HCT 24.2* 27.4*    283     CMP:   Recent Labs   Lab 10/16/19  0324 10/16/19  0576  10/16/19  1612 10/17/19  0359   GLU 85 93 80 78   * 147* 144 149*   K 2.5* 2.6* 3.0* 2.6*   * 118* 116* 116*   CO2 16* 17* 16* 17*   BUN 21 22 21 20   CREATININE 0.7 0.7 0.7 0.7   CALCIUM 7.5* 8.1* 8.5* 8.1*   MG 1.9  --   --  1.5*   ANIONGAP 15 12 12 16   EGFRNONAA >60.0 >60.0 >60.0 >60.0     CSF Culture: No results for input(s): CSFCULTURE in the last 48 hours.  CSF Studies: No results for input(s): ALIQUT, APPEARCSF, COLORCSF, CSFWBC, CSFRBC, GLUCCSF, LDHCSF, PROTEINCSF, VDRLCSF in the last 48 hours.  Inflammatory Markers: No results for input(s): SEDRATE, CRP, PROCAL in the last 48 hours.  POCT Glucose:   Recent Labs   Lab 10/17/19  0028 10/17/19  0512 10/17/19  0844   POCTGLUCOSE 92 89 79     Prealbumin: No results for input(s): PREALBUMIN in the last 48 hours.  Respiratory Culture: No results for input(s): GSRESP, RESPIRATORYC in the last 48 hours.  Urine Culture: No results for input(s): LABURIN in the last 48 hours.  Urine Studies: No results for input(s): COLORU, APPEARANCEUA, PHUR, SPECGRAV, PROTEINUA, GLUCUA, KETONESU, BILIRUBINUA, OCCULTUA, NITRITE, UROBILINOGEN, LEUKOCYTESUR, RBCUA, WBCUA, BACTERIA, SQUAMEPITHEL, HYALINECASTS in the last 48 hours.    Invalid input(s): WRIGHTSUR  All pertinent lab results from the past 24 hours have been reviewed.    Significant Imaging: I have reviewed all pertinent imaging results/findings within the past 24 hours.

## 2019-10-17 NOTE — ASSESSMENT & PLAN NOTE
- Per neuro BP goal less than 150, but avoid precipitous drops  - continue metroprolol 25mg BID  - increase amlodipine to 10mg daily  - hydralazine and labetolol prn

## 2019-10-17 NOTE — CARE UPDATE
Rapid Response Nurse Follow-up Note     Followed up with patient for proactive rounding.   No acute issues at this time. Reviewed plan of care with primary RNSarah.   Please call Rapid Response RN, Tabitha Albert RN with any questions or concerns at [phone].

## 2019-10-17 NOTE — PROGRESS NOTES
[facility]  Neurology  Progress Note    Patient Name: Mesha Mckenzie  MRN: [id number]  Admission Date: 10/2/2019  Hospital Length of Stay: 15 days  Code Status: Full Code   Attending Provider: Sushil Ennis MD  Primary Care Physician: Varun Shea MD PhD   Principal Problem:Encephalopathy, metabolic      Subjective:     Interval History: Continues to improve in regards to her mentation. BP is somewhat better managed overnight although elevated this AM. Amlodipine increased to 10 QD.   No lateralizing focal deficits noted. Continue metabolic and infectious management.       Current Facility-Administered Medications   Medication Dose Route Frequency Provider Last Rate Last Dose    acetaminophen tablet 650 mg  650 mg Oral Q6H PRN Jimi Gill MD   650 mg at 10/16/19 1647    albuterol-ipratropium 2.5 mg-0.5 mg/3 mL nebulizer solution 3 mL  3 mL Nebulization Q8H Yesenia Lerner MD   3 mL at 10/17/19 0755    albuterol-ipratropium 2.5 mg-0.5 mg/3 mL nebulizer solution 3 mL  3 mL Nebulization Q4H PRN Ok Holley PA-C        [START ON 10/18/2019] amLODIPine tablet 10 mg  10 mg Per NG tube Daily Sushil Ennis MD        dextrose 10% (D10W) Bolus  12.5 g Intravenous PRN Malik Jerry MD 1,000 mL/hr at 10/06/19 0359 125 mL at 10/06/19 0359    dextrose 10% (D10W) Bolus  12.5 g Intravenous PRN Sushil Ennis MD        dextrose 10% (D10W) Bolus  25 g Intravenous PRN Sushil Ennis MD        enoxaparin injection 40 mg  40 mg Subcutaneous Daily Yesenia Lerner MD   40 mg at 10/16/19 1642    glucagon (human recombinant) injection 1 mg  1 mg Intramuscular PRN Ok Holley PA-C        glucose chewable tablet 16 g  16 g Oral PRN Ok Holley PA-C        glucose chewable tablet 24 g  24 g Oral PRN Ok Holley PA-C        hydrALAZINE injection 10 mg  10 mg Intravenous Q6H PRN Sushil Ennis MD   10 mg at 10/17/19 0839    influenza (HIGH-DOSE PF)  vaccine 0.5 mL  0.5 mL Intramuscular Prior to discharge Ok Rehman MD        labetalol 20 mg/4 mL (5 mg/mL) IV syring  10 mg Intravenous Q6H PRN Mariann Licea NP   10 mg at 10/16/19 1642    metoprolol tartrate (LOPRESSOR) tablet 25 mg  25 mg Per NG tube BID Sushil Ennis MD   25 mg at 10/17/19 0826    miconazole nitrate 2% ointment   Topical (Top) BID Tessa Kingsley NP        mupirocin 2 % ointment 1 g  1 g Nasal On Call Procedure Giorgi Owens MD        mupirocin 2 % ointment   Nasal On Call Procedure Giorgi Owens MD        ondansetron injection 4 mg  4 mg Intravenous Q8H PRN Winter Albert NP   4 mg at 10/17/19 0840    oxacillin 12 g in  mL CONTINUOUS INFUSION  12 g Intravenous Q24H Yesenia Lerner MD 20.8 mL/hr at 10/16/19 2128 12 g at 10/16/19 2128    pneumoc 13-susanna conj-dip cr(PF) (PREVNAR 13 (PF)) 0.5 mL  0.5 mL Intramuscular Prior to discharge Ok Rehman MD        promethazine (PHENERGAN) 6.25 mg in dextrose 5 % 50 mL IVPB  6.25 mg Intravenous Q6H PRN Ok Holley PA-C        sodium chloride 0.9% flush 10 mL  10 mL Intravenous PRN Winter Albert NP         Facility-Administered Medications Ordered in Other Encounters   Medication Dose Route Frequency Provider Last Rate Last Dose    sodium chloride 0.9% flush 3 mL  3 mL Intravenous PRN Renard Gonsalves MD           Review of Systems   Constitutional: Positive for activity change, fatigue and fever.   HENT: Positive for trouble swallowing.    Cardiovascular: Negative.    Gastrointestinal: Negative.    Endocrine: Negative.    Musculoskeletal: Negative.    Skin: Negative.    Allergic/Immunologic: Negative.    Neurological: Positive for weakness. Negative for speech difficulty, light-headedness, numbness and headaches.   Hematological: Negative.    Psychiatric/Behavioral: Positive for confusion.     Objective:     Vital Signs (Most Recent):  Temp: 96.3 °F (35.7 °C) (10/17/19  1143)  Pulse: 102 (10/17/19 1143)  Resp: 18 (10/17/19 1143)  BP: 135/61 (10/17/19 1143)  SpO2: 98 % (10/17/19 1143) Vital Signs (24h Range):  Temp:  [96.3 °F (35.7 °C)-98.2 °F (36.8 °C)] 96.3 °F (35.7 °C)  Pulse:  [] 102  Resp:  [16-24] 18  SpO2:  [94 %-100 %] 98 %  BP: (135-192)/(61-78) 135/61     Weight: 53.5 kg (118 lb)  Body mass index is 20.25 kg/m².    Physical Exam  General:  Well-developed, well-nourished, thin, appears older than stated age.  HEENT:  NCAT, PERRLA, EOMI  NG tube and nasal trumpet in place  Neck:  Supple  CVS:  No LE edema     Neurologic Exam:  Mental Status:  AAOx to self, , year, hospital and [facility]. Difficulty stating why she is in the hospital.  Speech dysarthric , thought content appropriate, and is improved from yesterday. More talkative this AM.   Cranial Nerves:  PERRLA, EOMI.  Facial movement, sensation intact and symmetric.  Palate raises symmetrically, tongue protrudes midline.    Motor:  Normal bulk and tone.  RUE shoulder abduction 4/5, biceps/triceps4/5, wrist flexion/extension4/5,  strength 4-/5.   RLE hip flexors 3/5, knee flexion/extension 4/5, plantarflexion/dorsiflexion 4/5.  LUE shoulder abduction 4-/5, biceps/triceps4-/5, wrist flexion/extension4-/5,  strength 4-/5.  BLE hip flexors 3/5, knee flexion/extension 4-/5, plantarflexion/dorsiflexion 4-/5.  Sensory:  Intact to light touch at all extremities and face without inattention.   Reflexes:  Biceps, brachioradialis, patellar, Achilles 2+ and symmetric.  No ankle clonus.  Downgoing toe bilaterally.  Coordination:  Mild tremor noted w/ activation of the RUE.  Gait:  Deferred 2/2 patient's condition       Significant Labs:   Hemoglobin A1c: No results for input(s): HGBA1C in the last 720 hours.  Blood Culture: No results for input(s): LABBLOO in the last 48 hours.  BMP:   Recent Labs   Lab 10/16/19  0324 10/16/19  0533 10/16/19  1612 10/17/19  0359   GLU 85 93 80 78   * 147* 144 149*   K 2.5*  2.6* 3.0* 2.6*   * 118* 116* 116*   CO2 16* 17* 16* 17*   BUN 21 22 21 20   CREATININE 0.7 0.7 0.7 0.7   CALCIUM 7.5* 8.1* 8.5* 8.1*   MG 1.9  --   --  1.5*     CBC:   Recent Labs   Lab 10/16/19  0324 10/17/19  0359   WBC 10.17 9.72   HGB 7.0* 8.2*   HCT 24.2* 27.4*    283     CMP:   Recent Labs   Lab 10/16/19  0324 10/16/19  0533 10/16/19  1612 10/17/19  0359   GLU 85 93 80 78   * 147* 144 149*   K 2.5* 2.6* 3.0* 2.6*   * 118* 116* 116*   CO2 16* 17* 16* 17*   BUN 21 22 21 20   CREATININE 0.7 0.7 0.7 0.7   CALCIUM 7.5* 8.1* 8.5* 8.1*   MG 1.9  --   --  1.5*   ANIONGAP 15 12 12 16   EGFRNONAA >60.0 >60.0 >60.0 >60.0     CSF Culture: No results for input(s): CSFCULTURE in the last 48 hours.  CSF Studies: No results for input(s): ALIQUT, APPEARCSF, COLORCSF, CSFWBC, CSFRBC, GLUCCSF, LDHCSF, PROTEINCSF, VDRLCSF in the last 48 hours.  Inflammatory Markers: No results for input(s): SEDRATE, CRP, PROCAL in the last 48 hours.  POCT Glucose:   Recent Labs   Lab 10/17/19  0028 10/17/19  0512 10/17/19  0844   POCTGLUCOSE 92 89 79     Prealbumin: No results for input(s): PREALBUMIN in the last 48 hours.  Respiratory Culture: No results for input(s): GSRESP, RESPIRATORYC in the last 48 hours.  Urine Culture: No results for input(s): LABURIN in the last 48 hours.  Urine Studies: No results for input(s): COLORU, APPEARANCEUA, PHUR, SPECGRAV, PROTEINUA, GLUCUA, KETONESU, BILIRUBINUA, OCCULTUA, NITRITE, UROBILINOGEN, LEUKOCYTESUR, RBCUA, WBCUA, BACTERIA, SQUAMEPITHEL, HYALINECASTS in the last 48 hours.    Invalid input(s): ANAI  All pertinent lab results from the past 24 hours have been reviewed.    Significant Imaging: I have reviewed all pertinent imaging results/findings within the past 24 hours.    Assessment and Plan:     * Encephalopathy, metabolic  Patient is a 64 yo female w/ complicated and prolonged hospital course outlined above, who initially presented to the OSH on 9/19 and has been  hospitalized since for MSSA bacteremia, endocarditis ( vegetation noted on multiple valves), epidural abscess s/p wash out by NSGY, all complicated by what appears to be multiple infarcts of likely both HTNsive etiology (R thalamus) and likely additional punctate infarcts 2/2 septic emboli.   Neurology consulted for AMS.     Patient continues to improve, able to follow commands, conversant and back to baseline in regards to her cognition as per family member at bedside.     - At this time believe this is a multifactorial process, but even today patient appears brighter and more aware, confirmed by RR team that has been seeing patient daily   - Continues to improve as metabolic/infectious issues improve  - Concerned for persistently elevated BP   - Initial permissive HTN window for patient's thalamic infarct and at this time normotension would be the goal   - Would consider gradual decrease of patient's BP to goal less than 150, but avoid precipitous drops right away    - Although thalamic infarct is more consistent w/ a HTNsive infarct the smaller infarcts are more fitting for a mycotic origin, and those infarcts tend to have a significantly higher rate of bleeding   - Continue BP control    - Metoprolol 25 mg BID    - Amlodipine 10 mg QD  - Anemia persistent, although improved this AM   - Metabolic abnormalities such as hypernatremia and hypokalemia to be addressed by primary team   - Continue Tx of patient's infection   - ID team following  - PT/OT as per primary team   - Avoid sedating medication  - Appreciate consult, no further recommendations at this time  - Neurology will sign off     HTN (hypertension)  - Patient requires adequate BP control at this time  - Concern for recurrence of HTNsive infarcts and hemorrhagic transformation of likely septic/embolic infarcts     Debility  - PT/OT for deconditioning as well as 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

## 2019-10-17 NOTE — SUBJECTIVE & OBJECTIVE
Interval History: Patient seen and examined. Patient appears to continue to improve constitutionally and is alert and responsive this am. She admits to generalized weakness and some right knee pain but otherwise does not complain of new symptoms. States she would like water for dry mouth. Denies fevers, chills, nausea, chest pain, shortness of breath, cough, dysuria.    Review of Systems   Constitutional: Negative for chills and fever.   HENT: Negative for drooling.         Reports dry mouth   Respiratory: Negative for cough, shortness of breath and wheezing.    Cardiovascular: Negative for chest pain and palpitations.   Gastrointestinal: Negative for abdominal pain, diarrhea, nausea and vomiting.   Genitourinary: Negative for dysuria and flank pain.   Musculoskeletal: Positive for arthralgias. Negative for back pain, neck pain and neck stiffness.   Neurological: Positive for weakness (generalized). Negative for numbness and headaches.     Objective:     Vital Signs (Most Recent):  Temp: 98.9 °F (37.2 °C) (10/17/19 1610)  Pulse: 106 (10/17/19 1647)  Resp: 16 (10/17/19 1647)  BP: (!) 169/73 (10/17/19 1610)  SpO2: 99 % (10/17/19 1647) Vital Signs (24h Range):  Temp:  [96.3 °F (35.7 °C)-98.9 °F (37.2 °C)] 98.9 °F (37.2 °C)  Pulse:  [] 106  Resp:  [16-24] 16  SpO2:  [94 %-100 %] 99 %  BP: (135-192)/(61-78) 169/73     Weight: 53.5 kg (118 lb)  Body mass index is 20.25 kg/m².    Intake/Output Summary (Last 24 hours) at 10/17/2019 1829  Last data filed at 10/17/2019 1600  Gross per 24 hour   Intake 1195 ml   Output 1800 ml   Net -605 ml      Physical Exam   Constitutional: No distress.   HENT:   Head: Normocephalic.   Mouth/Throat: No oropharyngeal exudate.   Eyes: Pupils are equal, round, and reactive to light. EOM are normal.   Neck: Normal range of motion. Neck supple.   Cardiovascular: Regular rhythm.   Murmur heard.  tachy   Pulmonary/Chest: Effort normal. No respiratory distress. She has no wheezes. She has no  rales.   Abdominal: She exhibits no distension. There is no tenderness. There is no guarding.   Musculoskeletal: She exhibits no edema.   Able to lift legs against gravity. Right  slightly weaker than left.   Neurological: She is alert.   Improved mentation. Alert to person and surroundings. Follows commands. Appropriate verbal responses   Skin: Skin is warm and dry. No rash noted. She is not diaphoretic.       Significant Labs:   BMP:   Recent Labs   Lab 10/17/19  0359   GLU 78   *   K 2.6*   *   CO2 17*   BUN 20   CREATININE 0.7   CALCIUM 8.1*   MG 1.5*     CBC:   Recent Labs   Lab 10/16/19  0324 10/17/19  0359   WBC 10.17 9.72   HGB 7.0* 8.2*   HCT 24.2* 27.4*    283       Significant Imaging: I have reviewed all pertinent imaging results/findings within the past 24 hours.

## 2019-10-17 NOTE — ASSESSMENT & PLAN NOTE
-Continue Oxacillin for MSSA Endocarditis   -Recommend removing all lines and catheters including satorga once clinically feasible since they are potential sources of infection  -Will still plan to discharge patient on OXACILLIN 12G IV continuous  for 6 weeks, estimated end of therapy date 11/20  - Contact ID prior to DC

## 2019-10-17 NOTE — PROGRESS NOTES
"[facility]  Palliative Medicine  Progress Note    Patient Name: Mesha Mckenzie  MRN: [id number]  Admission Date: 10/2/2019  Hospital Length of Stay: 15 days  Code Status: Full Code   Attending Provider: Sushil Ennis MD  Consulting Provider: Sharon Pollard MD  Primary Care Physician: Varun Shea MD PhD  Principal Problem:Encephalopathy, metabolic    Patient information was obtained from patient and relative(s).      Assessment/Plan:     Palliative care encounter  63 year old female with history IVDU being treated for sepsis 2/2 TV/MV endocarditis. Prolonged hospital course has been complicated by worsening metabolic encephalopathy, hypoxic respiratory failure, epidural abscess, bilateral shoulder abscesses, and pancolitis.     GOC/advanced care planning     10/16- Patient now alert and answering questions but unable to make complex medical decisions. Spoke with daughter Leeanna on the phone. Expressed that we are all pleasantly surprised by the change in patient's mental state. Leeanna was able to verbalize that she's still taking things day by day. She is happy her mother is doing better but understands that she "still has a lot going on." We talked about how hopefully patient will get to the point where she can express her own wishes but in the meantime it is still important to discuss things like code status as a family so we have a plan in place for if things don't continue to go the way we hope. Expressed that it is often easier to have these difficult discussions when things are going well rather than waiting until decisions become more emergent. Leeanna agreed.       10/15   Spoke with daughter Leeanna at bedside. She explained that Dr. Ennis talked to her about how she might have to make some tough decisions in the next few days. I explained that I agreed with Dr. Ennis and expressed how I am very worried about her mom. She again expressed that her mother would not want to be " on a ventilator longterm. They are going to talk about code status as a family tonight. She expressed that she does not view a feeding tube as life support. I did not go into detail about feeding tubes but did explain to her that it's important to know that a feeding tube does not eliminate the risk for aspiration which is a common misconception. She was surprised and thanked me for letting her know. She shared that ID recommended a neuro consult for eeg which she would like.    Ari who is patient's default decision maker would like daughter Leeanna Yu to be primary decision maker. Emergency contact changed to Leeanna in EMR     10/14 Spoke with patients  at bedside. Patients daughter Leeanna was on speaker phone.  Patient unable to participate in conversation 2/2 mental status. Introduced palliative care and how we can be helpful going forward. Daughter expressed a lot of frustration about not understanding the current plan of care, not understanding why her mother is no longer in the ICU, not understanding why the specialists are no longer seeing her, and about getting mixed messages as far as her moms condition and prognosis.   Most of the visit was spent reassuring patients daughter that she is getting excellent care. We talked about why some specialists have signed off and why some like ID are getting re-involved. We discussed the events of the weekend and her worsening mental status and how were worried that the patient is getting sicker. Daughter seemed very surprised that her mother could be getting worse.   Briefly discussed code status. Daughter was able to verbalize that her mother would never want to live on machines longterm.             I will sign off. Have encouraged continued GOC conversations as a family. Please re-consult if we can be of further assistance     Subjective:     Chief Complaint: No chief complaint on file.      HPI:   63 year old female with PMH of depression  and IV drug use initially presented to [facility] on 9/19 febrile with N/V and weakness. 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 Patient was started on abx and transferred to [facility] [place]2 for a higher level of care and neurosurgery evaluation with MRI L spine showing L2-L4 epidural abscess now s/p L3 laminectomy and debridement. Hospital course has further been complicated by acute hypoxic respiratory failure 2/2 left lung collapse requiring intubation now s/p extubation, metabolic encephalopathy that continues to worsen, bilateral shoulder abscesses s/p I&D, and pancolitis. Patient is not a candidate for cardiac surgery. Patient was stepped down to the floor 10/10. She has since spiked a temp with worsening tachycardia and mental status thought to be 2/2 septic emboli. Abx were broadened. ID has been re-consulted. Palliative has been consulted to assist with [facility]     Hospital Course:  No notes on file    Interval Hx: Patient looks much better than previous visits. She is now alert and oriented to person, place. Able to answer simple questions and follow commands     Past Medical History:   Diagnosis Date    Anxiety     Carotid bruit     Chronic pain     Cystitis     Cystocele, unspecified (CODE)     Depression     GI bleed     History of uterine fibroid     Shortness of breath on exertion     Spinal stenosis     Urinary retention        Past Surgical History:   Procedure Laterality Date    ANTERIOR VAGINAL REPAIR  10-    CARDIAC CATHETERIZATION  age 12    CHOLECYSTECTOMY  2015    COLONOSCOPY  12/12/2018    aborted due to poor colon prep    COLONOSCOPY N/A 12/12/2018    Procedure: COLONOSCOPY;  Surgeon: Renard Gonsalves MD;  Location[facility];  Service: Endoscopy;  Laterality: N/A;    HYSTERECTOMY  1989    AMANDA    LAMINECTOMY N/A 10/9/2019    Procedure: LAMINECTOMY, SPINE, L3, Open;  Surgeon: Martin Lewis DO;  Location: [facility] OR [address] [place] [address];  Service:  Neurosurgery;  Laterality: N/A;    tubiligation         Review of patient's allergies indicates:   Allergen Reactions    Pcn [penicillins]        Medications:  Continuous Infusions:    Scheduled Meds:   albuterol-ipratropium  3 mL Nebulization Q8H    [START ON 10/18/2019] amLODIPine  10 mg Per NG tube Daily    enoxaparin  40 mg Subcutaneous Daily    metoprolol tartrate  25 mg Per NG tube BID    miconazole nitrate 2%   Topical (Top) BID    oxacillin 12 g in  mL CONTINUOUS INFUSION  12 g Intravenous Q24H     PRN Meds:acetaminophen, albuterol-ipratropium, Dextrose 10% Bolus, Dextrose 10% Bolus, Dextrose 10% Bolus, glucagon (human recombinant), glucose, glucose, hydrALAZINE, influenza, labetalol, mupirocin, mupirocin, ondansetron, pneumoc 13-susanna conj-dip cr(PF), promethazine (PHENERGAN) IVPB, sodium chloride 0.9%    Family History     Problem Relation (Age of Onset)    Alzheimer's disease Mother    Hypertension Brother, Sister    Osteoarthritis Mother    Thyroid disease Mother        Tobacco Use    Smoking status: Current Every Day Smoker     Packs/day: 0.50     Years: 40.00     Pack years: 20.00     Types: Cigarettes    Smokeless tobacco: Never Used   Substance and Sexual Activity    Alcohol use: No    Drug use: No    Sexual activity: Yes     Partners: Male       Review of Systems  Objective:     Vital Signs (Most Recent):  Temp: 96.3 °F (35.7 °C) (10/17/19 1143)  Pulse: 102 (10/17/19 1143)  Resp: 18 (10/17/19 1143)  BP: 135/61 (10/17/19 1143)  SpO2: 98 % (10/17/19 1143) Vital Signs (24h Range):  Temp:  [96.3 °F (35.7 °C)-98.2 °F (36.8 °C)] 96.3 °F (35.7 °C)  Pulse:  [] 102  Resp:  [16-24] 18  SpO2:  [94 %-100 %] 98 %  BP: (135-192)/(61-78) 135/61     Weight: 53.5 kg (118 lb)  Body mass index is 20.25 kg/m².    Review of Symptoms- unable to obtain due to patient's mental status   Symptom Assessment (ESAS 0-10 scale)   ESAS 0 1 2 3 4 5 6 7 8 9 10   Pain    x          Dyspnea              Anxiety               Nausea              Depression               Anorexia              Fatigue              Insomnia              Restlessness               Agitation              CAM / Delirium __ --  _x__+   Constipation     _x_ --  ___+   Diarrhea           x__ --  ___+  Bowel Management Plan (BMP): No    Pain Assessment: patient reports mild pain in legs     OME in 24 hours: 0     Performance Status: 50      Physical Exam   Constitutional: She appears well-developed. No distress.   HENT:   NG in place    Eyes:   Eyes remain closed    Neck: Normal range of motion.   Cardiovascular: Tachycardia present.   Pulmonary/Chest: No respiratory distress.   Abdominal: She exhibits no distension.   Neurological: She is alert.   Oriented to person and place. Able to answer simple questions. Follows commands        Significant Labs: All pertinent labs within the past 24 hours have been reviewed.  CBC:   Recent Labs   Lab 10/17/19  0359   WBC 9.72   HGB 8.2*   HCT 27.4*   *        BMP:  Recent Labs   Lab 10/17/19  0359   GLU 78   *   K 2.6*   *   CO2 17*   BUN 20   CREATININE 0.7   CALCIUM 8.1*   MG 1.5*     LFT:  Lab Results   Component Value Date    AST 11 10/11/2019    ALKPHOS 94 10/11/2019    BILITOT 0.2 10/11/2019     Albumin:   Albumin   Date Value Ref Range Status   10/11/2019 1.4 (L) 3.5 - 5.2 g/dL Final     Protein:   Total Protein   Date Value Ref Range Status   10/11/2019 5.5 (L) 6.0 - 8.4 g/dL Final     Lactic acid:   Lab Results   Component Value Date    LACTATE 0.8 10/13/2019    LACTATE 0.8 10/11/2019       Significant Imaging: I have reviewed all pertinent imaging results/findings within the past 24 hours.    Advance Care Planning   Advanced Directives::  Living Will: No  LaPOST: No  Do Not Resuscitate Status: No  Medical Power of :  Patient's default decision would be her  Ari. He would like the primary decision maker to be patient's daughter Leeanna uY     Decision-Making  Capacity: Unable to make complex decisions        Living Arrangements: Lives with spouse    Psychosocial/Cultural:  Patient lives with spouse Ari. Has a daughter Leeanna and son Nirmal from a previous marriage. Prior to getting sick patient enjoyed taking care of her dogs and cats at home. She was very attentive to her dog with diabetes. Patient was driving and independent in ADLs     Spiritual:     F- Aarti and Belief: Pentecostalism    I - Importance:  reports patient prays everyday but does not attend mass   .  C - Community: no    A - Address in Care: family enjoys visits from        > 50% of 35 min visit spent in chart review, face to face discussion of goals of care,  symptom assessment, coordination of care and emotional support.    Sharon Pollard MD  Palliative Medicine  [facility]

## 2019-10-17 NOTE — ASSESSMENT & PLAN NOTE
Patient is a 64 yo female w/ complicated and prolonged hospital course outlined above, who initially presented to the OSH on 9/19 and has been hospitalized since for MSSA bacteremia, endocarditis ( vegetation noted on multiple valves), epidural abscess s/p wash out by NSGY, all complicated by what appears to be multiple infarcts of likely both HTNsive etiology (R thalamus) and likely additional punctate infarcts 2/2 septic emboli.   Neurology consulted for AMS.     Patient continues to improve, able to follow commands, conversant and back to baseline in regards to her cognition as per family member at bedside.     - At this time believe this is a multifactorial process, but even today patient appears brighter and more aware, confirmed by RR team that has been seeing patient daily   - Continues to improve as metabolic/infectious issues improve  - Concerned for persistently elevated BP   - Initial permissive HTN window for patient's thalamic infarct and at this time normotension would be the goal   - Would consider gradual decrease of patient's BP to goal less than 150, but avoid precipitous drops right away    - Although thalamic infarct is more consistent w/ a HTNsive infarct the smaller infarcts are more fitting for a mycotic origin, and those infarcts tend to have a significantly higher rate of bleeding   - Continue BP control    - Metoprolol 25 mg BID    - Amlodipine 10 mg QD  - Anemia persistent, although improved this AM   - Metabolic abnormalities such as hypernatremia and hypokalemia to be addressed by primary team   - Continue Tx of patient's infection   - ID team following  - PT/OT as per primary team   - Avoid sedating medication  - Appreciate consult, no further recommendations at this time  - Neurology will sign off

## 2019-10-17 NOTE — PLAN OF CARE
Problem: SLP Goal  Goal: SLP Goal  Description  Speech Language Pathology Goals  Goals expected to be met by 10/17:  1. Patient will participate in ongoing swallow assessment to determine least restrictive diet recommendations.         Outcome: Ongoing, Progressing    Pt was seen today for ST session.

## 2019-10-17 NOTE — PROGRESS NOTES
[facility] Hospital Medicine  Progress Note    Patient Name: Mesha Mckenzie  MRN: [id number]  Patient Class: IP- Inpatient   Admission Date: 10/2/2019  Length of Stay: 15 days  Attending Physician: Sushil Ennis MD  Primary Care Provider: Varun Shea MD PhD    Hospital Medicine Team: [facility] R Sushil Ennis MD    Subjective:     Principal Problem:Encephalopathy, metabolic        HPI:  No notes on file    Overview/Hospital Course:  No notes on file    Interval History: Patient seen and examined. Patient appears to continue to improve constitutionally and is alert and responsive this am. She admits to generalized weakness and some right knee pain but otherwise does not complain of new symptoms. States she would like water for dry mouth. Denies fevers, chills, nausea, chest pain, shortness of breath, cough, dysuria.    Review of Systems   Constitutional: Negative for chills and fever.   HENT: Negative for drooling.         Reports dry mouth   Respiratory: Negative for cough, shortness of breath and wheezing.    Cardiovascular: Negative for chest pain and palpitations.   Gastrointestinal: Negative for abdominal pain, diarrhea, nausea and vomiting.   Genitourinary: Negative for dysuria and flank pain.   Musculoskeletal: Positive for arthralgias. Negative for back pain, neck pain and neck stiffness.   Neurological: Positive for weakness (generalized). Negative for numbness and headaches.     Objective:     Vital Signs (Most Recent):  Temp: 98.9 °F (37.2 °C) (10/17/19 1610)  Pulse: 106 (10/17/19 1647)  Resp: 16 (10/17/19 1647)  BP: (!) 169/73 (10/17/19 1610)  SpO2: 99 % (10/17/19 1647) Vital Signs (24h Range):  Temp:  [96.3 °F (35.7 °C)-98.9 °F (37.2 °C)] 98.9 °F (37.2 °C)  Pulse:  [] 106  Resp:  [16-24] 16  SpO2:  [94 %-100 %] 99 %  BP: (135-192)/(61-78) 169/73     Weight: 53.5 kg (118 lb)  Body mass index is 20.25 kg/m².    Intake/Output Summary (Last 24 hours) at 10/17/2019  1829  Last data filed at 10/17/2019 1600  Gross per 24 hour   Intake 1195 ml   Output 1800 ml   Net -605 ml      Physical Exam   Constitutional: No distress.   HENT:   Head: Normocephalic.   Mouth/Throat: No oropharyngeal exudate.   Eyes: Pupils are equal, round, and reactive to light. EOM are normal.   Neck: Normal range of motion. Neck supple.   Cardiovascular: Regular rhythm.   Murmur heard.  tachy   Pulmonary/Chest: Effort normal. No respiratory distress. She has no wheezes. She has no rales.   Abdominal: She exhibits no distension. There is no tenderness. There is no guarding.   Musculoskeletal: She exhibits no edema.   Able to lift legs against gravity. Right  slightly weaker than left.   Neurological: She is alert.   Improved mentation. Alert to person and surroundings. Follows commands. Appropriate verbal responses   Skin: Skin is warm and dry. No rash noted. She is not diaphoretic.       Significant Labs:   BMP:   Recent Labs   Lab 10/17/19  0359   GLU 78   *   K 2.6*   *   CO2 17*   BUN 20   CREATININE 0.7   CALCIUM 8.1*   MG 1.5*     CBC:   Recent Labs   Lab 10/16/19  0324 10/17/19  0359   WBC 10.17 9.72   HGB 7.0* 8.2*   HCT 24.2* 27.4*    283       Significant Imaging: I have reviewed all pertinent imaging results/findings within the past 24 hours.      Assessment/Plan:      * Encephalopathy, metabolic  - Encephalopathy likely multiple etiology metabolic, infectious  - appreciate Neuro recs  - encephalopathy greatly improving over last two days. Advanced diet to po pureed with thick liquids per speech therapy.      Septic shock with acute organ dysfunction due to methicillin susceptible Staphylococcus aureus (MSSA)  Notable this admission: Epidural Abcess, Abcess Upper Extremity, Septic Pulmonary Emboli  - Patient clinically improving. Afebrile. Leukocytosis improved.  Appreciate ID recs  - Continue Oxacillin for MSSA Endocarditis       Acute bacterial endocarditis  -Continue  Oxacillin for MSSA Endocarditis   -Recommend removing all lines and catheters including astorga once clinically feasible since they are potential sources of infection  -Will still plan to discharge patient on OXACILLIN 12G IV continuous  for 6 weeks, estimated end of therapy date 11/20  - Contact ID prior to DC        HTN (hypertension)  - Per neuro BP goal less than 150, but avoid precipitous drops  - continue metroprolol 25mg BID  - increase amlodipine to 10mg daily  - hydralazine and labetolol prn        Palliative care encounter  Appreciate palliative care recs      Right thalamic stroke  - likely due to infection and septic emboli  - Brain MRI 9/30 with lacunar infarcts  - repeat head CT (10/7) revealed new right basal ganglia infarcts  - MRI of brain revealed R thalamic infarct, ventriculitis, subactue embolic infarctions  -CTA of head 10/8: atherosclerotic plaquing of the distal vertebral arteries and concern for noncalcified plaquing and stenosis; no high-grade stenosis or proximal occlusion. No mycotic aneurysms  - vasc neuro recommending against systemic anticoagulation and asa due to high risk for hemorrhage from septic emboli      Severe malnutrition  - Start pureed with thick liquid diet per updated speech therapy recs    Pleural effusion  - Multifactorial secondary to complete left lung collapse, septic emboli, pleural effusions, suspected pna  - s/p thora 10/1- transudative effusion; chest tube placed due to concern for empyema at OSH (10/3); since removed  --duo nebs spaced out due to tachycardia  --continues to be tachypneic with pH 7.5 (since 10/10) likely from underlying lung issues including septic emboli, possible new asp PNA. NO PE seen on CTA from 10/3, but could have developed one since. However, she has high risk for brain bleed if were to AC and neurosx has already rec'd against systemic anticoagulation. Hold off on repeat CTA chest for now.      VTE Risk Mitigation (From admission, onward)          Ordered     enoxaparin injection 40 mg  Daily      10/11/19 1124     IP VTE HIGH RISK PATIENT  Once      10/06/19 0812     Place sequential compression device  Until discontinued      10/02/19 1946                      Sushil Ennis MD  Department of Hospital Medicine   [facility]

## 2019-10-18 PROBLEM — E87.5 HYPERKALEMIA: Status: ACTIVE | Noted: 2019-10-18

## 2019-10-18 LAB
ANION GAP SERPL CALC-SCNC: 11 MMOL/L (ref 8–16)
ANION GAP SERPL CALC-SCNC: 12 MMOL/L (ref 8–16)
ANION GAP SERPL CALC-SCNC: 8 MMOL/L (ref 8–16)
BASOPHILS # BLD AUTO: 0.04 K/UL (ref 0–0.2)
BASOPHILS NFR BLD: 0.4 % (ref 0–1.9)
BUN SERPL-MCNC: 11 MG/DL (ref 8–23)
BUN SERPL-MCNC: 14 MG/DL (ref 8–23)
BUN SERPL-MCNC: 15 MG/DL (ref 8–23)
CALCIUM SERPL-MCNC: 7.2 MG/DL (ref 8.7–10.5)
CALCIUM SERPL-MCNC: 7.8 MG/DL (ref 8.7–10.5)
CALCIUM SERPL-MCNC: 7.9 MG/DL (ref 8.7–10.5)
CHLORIDE SERPL-SCNC: 115 MMOL/L (ref 95–110)
CHLORIDE SERPL-SCNC: 118 MMOL/L (ref 95–110)
CHLORIDE SERPL-SCNC: 120 MMOL/L (ref 95–110)
CO2 SERPL-SCNC: 18 MMOL/L (ref 23–29)
CO2 SERPL-SCNC: 20 MMOL/L (ref 23–29)
CO2 SERPL-SCNC: 21 MMOL/L (ref 23–29)
CREAT SERPL-MCNC: 0.6 MG/DL (ref 0.5–1.4)
DIFFERENTIAL METHOD: ABNORMAL
EOSINOPHIL # BLD AUTO: 0.1 K/UL (ref 0–0.5)
EOSINOPHIL NFR BLD: 0.9 % (ref 0–8)
ERYTHROCYTE [DISTWIDTH] IN BLOOD BY AUTOMATED COUNT: 20.6 % (ref 11.5–14.5)
EST. GFR  (AFRICAN AMERICAN): >60 ML/MIN/1.73 M^2
EST. GFR  (NON AFRICAN AMERICAN): >60 ML/MIN/1.73 M^2
GLUCOSE SERPL-MCNC: 109 MG/DL (ref 70–110)
GLUCOSE SERPL-MCNC: 115 MG/DL (ref 70–110)
GLUCOSE SERPL-MCNC: 95 MG/DL (ref 70–110)
HCT VFR BLD AUTO: 25.2 % (ref 37–48.5)
HGB BLD-MCNC: 7.4 G/DL (ref 12–16)
IMM GRANULOCYTES # BLD AUTO: 0.09 K/UL (ref 0–0.04)
IMM GRANULOCYTES NFR BLD AUTO: 1 % (ref 0–0.5)
LYMPHOCYTES # BLD AUTO: 2.3 K/UL (ref 1–4.8)
LYMPHOCYTES NFR BLD: 26.2 % (ref 18–48)
MAGNESIUM SERPL-MCNC: 1.7 MG/DL (ref 1.6–2.6)
MCH RBC QN AUTO: 30.2 PG (ref 27–31)
MCHC RBC AUTO-ENTMCNC: 29.4 G/DL (ref 32–36)
MCV RBC AUTO: 103 FL (ref 82–98)
MONOCYTES # BLD AUTO: 0.5 K/UL (ref 0.3–1)
MONOCYTES NFR BLD: 5 % (ref 4–15)
NEUTROPHILS # BLD AUTO: 5.9 K/UL (ref 1.8–7.7)
NEUTROPHILS NFR BLD: 66.5 % (ref 38–73)
NRBC BLD-RTO: 0 /100 WBC
PHOSPHATE SERPL-MCNC: 2.3 MG/DL (ref 2.7–4.5)
PLATELET # BLD AUTO: 272 K/UL (ref 150–350)
PMV BLD AUTO: 10.5 FL (ref 9.2–12.9)
POCT GLUCOSE: 114 MG/DL (ref 70–110)
POCT GLUCOSE: 114 MG/DL (ref 70–110)
POCT GLUCOSE: 127 MG/DL (ref 70–110)
POCT GLUCOSE: 96 MG/DL (ref 70–110)
POTASSIUM SERPL-SCNC: 2.4 MMOL/L (ref 3.5–5.1)
POTASSIUM SERPL-SCNC: 2.8 MMOL/L (ref 3.5–5.1)
POTASSIUM SERPL-SCNC: 3 MMOL/L (ref 3.5–5.1)
RBC # BLD AUTO: 2.45 M/UL (ref 4–5.4)
SODIUM SERPL-SCNC: 146 MMOL/L (ref 136–145)
SODIUM SERPL-SCNC: 147 MMOL/L (ref 136–145)
SODIUM SERPL-SCNC: 150 MMOL/L (ref 136–145)
WBC # BLD AUTO: 8.93 K/UL (ref 3.9–12.7)

## 2019-10-18 PROCEDURE — 80048 BASIC METABOLIC PNL TOTAL CA: CPT | Mod: 91

## 2019-10-18 PROCEDURE — 99233 SBSQ HOSP IP/OBS HIGH 50: CPT | Mod: ,,, | Performed by: FAMILY MEDICINE

## 2019-10-18 PROCEDURE — 83735 ASSAY OF MAGNESIUM: CPT

## 2019-10-18 PROCEDURE — 92526 ORAL FUNCTION THERAPY: CPT

## 2019-10-18 PROCEDURE — 94761 N-INVAS EAR/PLS OXIMETRY MLT: CPT

## 2019-10-18 PROCEDURE — 36415 COLL VENOUS BLD VENIPUNCTURE: CPT

## 2019-10-18 PROCEDURE — 25000003 PHARM REV CODE 250: Performed by: FAMILY MEDICINE

## 2019-10-18 PROCEDURE — 99233 PR SUBSEQUENT HOSPITAL CARE,LEVL III: ICD-10-PCS | Mod: ,,, | Performed by: FAMILY MEDICINE

## 2019-10-18 PROCEDURE — 25000242 PHARM REV CODE 250 ALT 637 W/ HCPCS: Performed by: INTERNAL MEDICINE

## 2019-10-18 PROCEDURE — 63600175 PHARM REV CODE 636 W HCPCS: Performed by: INTERNAL MEDICINE

## 2019-10-18 PROCEDURE — 94640 AIRWAY INHALATION TREATMENT: CPT

## 2019-10-18 PROCEDURE — 63600175 PHARM REV CODE 636 W HCPCS: Performed by: FAMILY MEDICINE

## 2019-10-18 PROCEDURE — 27000221 HC OXYGEN, UP TO 24 HOURS

## 2019-10-18 PROCEDURE — 20600001 HC STEP DOWN PRIVATE ROOM

## 2019-10-18 PROCEDURE — 85025 COMPLETE CBC W/AUTO DIFF WBC: CPT

## 2019-10-18 PROCEDURE — 99900035 HC TECH TIME PER 15 MIN (STAT)

## 2019-10-18 PROCEDURE — 94664 DEMO&/EVAL PT USE INHALER: CPT

## 2019-10-18 PROCEDURE — 94668 MNPJ CHEST WALL SBSQ: CPT

## 2019-10-18 PROCEDURE — 84100 ASSAY OF PHOSPHORUS: CPT

## 2019-10-18 PROCEDURE — 97803 MED NUTRITION INDIV SUBSEQ: CPT

## 2019-10-18 PROCEDURE — 25000003 PHARM REV CODE 250: Performed by: STUDENT IN AN ORGANIZED HEALTH CARE EDUCATION/TRAINING PROGRAM

## 2019-10-18 RX ORDER — POTASSIUM CHLORIDE 20 MEQ/15ML
20 SOLUTION ORAL ONCE
Status: COMPLETED | OUTPATIENT
Start: 2019-10-18 | End: 2019-10-18

## 2019-10-18 RX ORDER — METOPROLOL TARTRATE 50 MG/1
50 TABLET ORAL 2 TIMES DAILY
Status: DISCONTINUED | OUTPATIENT
Start: 2019-10-18 | End: 2019-10-19

## 2019-10-18 RX ORDER — POTASSIUM CHLORIDE 7.45 MG/ML
10 INJECTION INTRAVENOUS
Status: COMPLETED | OUTPATIENT
Start: 2019-10-18 | End: 2019-10-18

## 2019-10-18 RX ORDER — POTASSIUM CHLORIDE 20 MEQ/15ML
40 SOLUTION ORAL ONCE
Status: COMPLETED | OUTPATIENT
Start: 2019-10-18 | End: 2019-10-18

## 2019-10-18 RX ORDER — DEXTROSE MONOHYDRATE 50 MG/ML
INJECTION, SOLUTION INTRAVENOUS CONTINUOUS
Status: DISCONTINUED | OUTPATIENT
Start: 2019-10-18 | End: 2019-10-19

## 2019-10-18 RX ADMIN — ACETAMINOPHEN 650 MG: 325 TABLET ORAL at 08:10

## 2019-10-18 RX ADMIN — POTASSIUM CHLORIDE 20 MEQ: 20 SOLUTION ORAL at 08:10

## 2019-10-18 RX ADMIN — METOPROLOL TARTRATE 50 MG: 50 TABLET ORAL at 08:10

## 2019-10-18 RX ADMIN — IPRATROPIUM BROMIDE AND ALBUTEROL SULFATE 3 ML: .5; 3 SOLUTION RESPIRATORY (INHALATION) at 03:10

## 2019-10-18 RX ADMIN — ENOXAPARIN SODIUM 40 MG: 100 INJECTION SUBCUTANEOUS at 04:10

## 2019-10-18 RX ADMIN — MICONAZOLE NITRATE: 20 OINTMENT TOPICAL at 09:10

## 2019-10-18 RX ADMIN — POTASSIUM CHLORIDE 10 MEQ: 10 INJECTION, SOLUTION INTRAVENOUS at 11:10

## 2019-10-18 RX ADMIN — POTASSIUM CHLORIDE 10 MEQ: 10 INJECTION, SOLUTION INTRAVENOUS at 10:10

## 2019-10-18 RX ADMIN — IPRATROPIUM BROMIDE AND ALBUTEROL SULFATE 3 ML: .5; 3 SOLUTION RESPIRATORY (INHALATION) at 07:10

## 2019-10-18 RX ADMIN — AMLODIPINE BESYLATE 10 MG: 10 TABLET ORAL at 08:10

## 2019-10-18 RX ADMIN — OXACILLIN SODIUM 12 G: 10 INJECTION, POWDER, FOR SOLUTION INTRAVENOUS at 09:10

## 2019-10-18 RX ADMIN — DIBASIC SODIUM PHOSPHATE, MONOBASIC POTASSIUM PHOSPHATE AND MONOBASIC SODIUM PHOSPHATE 2 TABLET: 852; 155; 130 TABLET ORAL at 08:10

## 2019-10-18 RX ADMIN — POTASSIUM CHLORIDE 40 MEQ: 20 SOLUTION ORAL at 08:10

## 2019-10-18 RX ADMIN — DEXTROSE: 5 SOLUTION INTRAVENOUS at 08:10

## 2019-10-18 RX ADMIN — POTASSIUM CHLORIDE 10 MEQ: 10 INJECTION, SOLUTION INTRAVENOUS at 09:10

## 2019-10-18 RX ADMIN — IPRATROPIUM BROMIDE AND ALBUTEROL SULFATE 3 ML: .5; 3 SOLUTION RESPIRATORY (INHALATION) at 11:10

## 2019-10-18 NOTE — PLAN OF CARE
POC reviewed with pt. Who verbalized understanding. AAOx 4. Remains free of falls and injuries.VSS. Tolerating nectar thick liquids and dysphagia diet, denies nausea. Pain controlled Tylenol. Assist with activity. BG monitored at night time with no coverage needed. NG-CL. Rectal Tube. Henriquez. 1 L NC. Telemetry-[facility]. No acute events. No distress noted. WCTM.

## 2019-10-18 NOTE — PT/OT/SLP PROGRESS
Speech Language Pathology Treatment    Patient Name:  Mesha Mckenzie   MRN:  [id number]  Admitting Diagnosis: Encephalopathy, metabolic    Recommendations:                 General Recommendations:  Dysphagia therapy  Diet recommendations:  Mechanical soft, Liquid Diet Level: Thin   Aspiration Precautions: Feed only when awake/alert, Meds crushed in puree and Standard aspiration precautions   General Precautions: Standard, pureed diet, nectar thick, fall, aspiration  Communication strategies:  none    Subjective     Pt awake alert and cooperative     Pain/Comfort:  · Pain Rating 1: 0/10  · Pain Rating Post-Intervention 1: 0/10    Objective:     Has the patient been evaluated by SLP for swallowing?   Yes  Keep patient NPO? No   Current Respiratory Status: nasal cannula      Pt with significant improvement in overall alertness, participation, oral acceptance and oral control of PO trials. Pt with reporting dislike for current diet of purees at this time. SLP offered pt thin liquids via straw. Pt willingly accepted and with cyclic sips consumed ~5oz of thin liquids with adequate oral control containment timely swallow and no overt clinical signs of aspiration. Pt also offered trials of regular solids and chewed in a timely fashion without difficultly. SLP discussed with spouse at the bedside, pt and RN pt appearing safe as long as maintains alert and lucid state to advance to regular solids and thin liquids. Team notified of diet recs. Speech will continue to follow.     Assessment:     Mesha Mckenzie is a 63 y.o. female with an SLP diagnosis of Dysphagia.     Goals:   Multidisciplinary Problems     SLP Goals        Problem: SLP Goal    Goal Priority Disciplines Outcome   SLP Goal     SLP Ongoing, Progressing   Description:  Speech Language Pathology Goals  Goals expected to be met by 10/25  1. Pt will tolerate thin and mechanical soft diet without s/s aspiration                             Plan:     · Patient to  be seen:  3 x/week   · Plan of Care expires:  11/06/19  · Plan of Care reviewed with:  patient   · SLP Follow-Up:  Yes       Discharge recommendations:  rehabilitation facility   Barriers to Discharge:  Level of Skilled Assistance Needed      Time Tracking:     SLP Treatment Date:   10/18/19  Speech Start Time:  1019  Speech Stop Time:  1032     Speech Total Time (min):  13 min    Billable Minutes: Treatment Swallowing Dysfunction 13    Mnoae Sanchez CCC-SLP  10/18/2019

## 2019-10-18 NOTE — PLAN OF CARE
Problem: Oral Intake Inadequate  Goal: Improved Oral Intake  Outcome: Ongoing, Progressing       Recommendations    Pt meets severe malnutrition criteria.     1. Continue pureed diet as tolerated and advance texture per SLP.   2. Recommend adding Boost Plus with meals with texture per SLP.   3. RD following.     Goals: pt to consume >50% of all meals  Nutrition Goal Status: new

## 2019-10-18 NOTE — PLAN OF CARE
Pt doing well and able to advance diet. Goals updated accordingly     Monae Sanchez MS, CCC-SLP  Speech Language Pathologist  Pager: [phone]  Date 10/18/2019

## 2019-10-18 NOTE — PROGRESS NOTES
"[facility]  Adult Nutrition  Progress Note    SUMMARY       Recommendations    Pt meets severe malnutrition criteria.     1. Continue pureed diet as tolerated and advance texture per SLP.   2. Recommend adding Boost Plus with meals with texture per SLP.   3. RD following.     Goals: pt to consume >50% of all meals  Nutrition Goal Status: new  Communication of RD Recs: (POC)    Reason for Assessment    Reason For Assessment: RD follow-up  Diagnosis: (respiratory failure)  Relevant Medical History: IVDU, endocarditis  Interdisciplinary Rounds: did not attend  General Information Comments: S/p laminectomy completed on 10/9. Pt reports really hungry, but does not like the pureed diet. Would like to try Boost with meals. C/o nausea. NFPE completed 9/23. Pt continues with severe malnutrition.   Nutrition Discharge Planning: adequate po intake    Nutrition Risk Screen    Nutrition Risk Screen: dysphagia or difficulty swallowing    Nutrition/Diet History    Spiritual, Cultural Beliefs, Sikh Practices, Values that Affect Care: no  Factors Affecting Nutritional Intake: (food preferences)    Anthropometrics    Temp: 97.3 °F (36.3 °C)  Height Method: Stated  Height: 5' 4" (162.6 cm)  Height (inches): 64 in  Weight Method: Bed Scale  Weight: 53.5 kg (118 lb)  Weight (lb): 118 lb  Ideal Body Weight (IBW), Female: 120 lb  % Ideal Body Weight, Female (lb): 98.33 lb  BMI (Calculated): 20.3  BMI Grade: 18.5-24.9 - normal     Lab/Procedures/Meds    Pertinent Labs Reviewed: reviewed  Pertinent Labs Comments: Na 150, K 2.8, phos 2.3  Pertinent Medications Reviewed: reviewed  Pertinent Medications Comments: amlodipine, metoprolol    Estimated/Assessed Needs    Weight Used For Calorie Calculations: 47.4 kg (104 lb 8 oz)(admit weight)  Energy Calorie Requirements (kcal): 1422 kcal/day  Energy Need Method: Kcal/kg(30)  Protein Requirements: 57-71 g/day(1.2-1.5 g/kg)  Weight Used For Protein Calculations: 47.4 kg " (104 lb 8 oz)(admit weight)  Fluid Requirements (mL): 1 mL/kcal or per MD  Estimated Fluid Requirement Method: RDA Method  RDA Method (mL): 1422     Nutrition Prescription Ordered    Current Diet Order: Pureed  Nutrition Order Comments: Nectar Thick Liquids    Evaluation of Received Nutrient/Fluid Intake    I/O: -5.73L since 10/4  Comments: LBM 10/17  Tolerance: tolerating  % Intake of Estimated Energy Needs: 0 - 25 %  % Meal Intake: 0 - 25 %    Nutrition Risk    Level of Risk/Frequency of Follow-up: (f/u 1 x wk)     Assessment and Plan    Severe malnutrition  Nutrition Problem  Malnutrition in the context of Acute Illness/Injury    Related to (etiology):  Inadequate energy intake and increased nutrient needs    Signs and Symptoms (as evidenced by):  Energy Intake: <50% of estimated energy requirement for 8 days  Body Fat Depletion: mild depletion of orbitals, triceps and thoracic and lumbar region   Muscle Mass Depletion: mild and moderate depletion of temples, clavicle region, scapular region, interosseous muscle and lower extremities   Weight Loss: 22.6% x 1 year   Fluid Accumulation: mild    Interventions (treatment strategy):  Collaboration of nutrition care with other providers    Nutrition Diagnosis Status:  Continues         Monitor and Evaluation    Food and Nutrient Intake: energy intake, food and beverage intake  Food and Nutrient Adminstration: diet order  Physical Activity and Function: nutrition-related ADLs and IADLs  Anthropometric Measurements: weight, weight change  Biochemical Data, Medical Tests and Procedures: electrolyte and renal panel, gastrointestinal profile, inflammatory profile  Nutrition-Focused Physical Findings: overall appearance     Malnutrition Assessment  Malnutrition Type: acute illness or injury              Orbital Region (Subcutaneous Fat Loss): mild depletion  Upper Arm Region (Subcutaneous Fat Loss): mild depletion  Thoracic and Lumbar Region: mild depletion   Jew Region  (Muscle Loss): moderate depletion  Clavicle Bone Region (Muscle Loss): moderate depletion  Clavicle and Acromion Bone Region (Muscle Loss): moderate depletion  Scapular Bone Region (Muscle Loss): mild depletion  Dorsal Hand (Muscle Loss): mild depletion  Patellar Region (Muscle Loss): mild depletion  Anterior Thigh Region (Muscle Loss): mild depletion  Posterior Calf Region (Muscle Loss): mild depletion   Edema (Fluid Accumulation): 0-->no edema present             Nutrition Follow-Up    RD Follow-up?: Yes

## 2019-10-18 NOTE — PLAN OF CARE
Plan of care reviewed with pt.'s . Pt Oriented to self, and sometimes place.  at bedside. Ng tube intact. Rectal tube intact. Henriquez intact and free of loops. Pt turned q2. Pt tolerating thin liquids well.  Complaints of pain relieved with tylenol. No acute events at this time. Bed in low and locked position with call light in reach. [facility]

## 2019-10-19 LAB
ANION GAP SERPL CALC-SCNC: 10 MMOL/L (ref 8–16)
BASOPHILS # BLD AUTO: 0.06 K/UL (ref 0–0.2)
BASOPHILS NFR BLD: 0.5 % (ref 0–1.9)
BUN SERPL-MCNC: 9 MG/DL (ref 8–23)
CALCIUM SERPL-MCNC: 7.4 MG/DL (ref 8.7–10.5)
CHLORIDE SERPL-SCNC: 116 MMOL/L (ref 95–110)
CO2 SERPL-SCNC: 18 MMOL/L (ref 23–29)
CREAT SERPL-MCNC: 0.6 MG/DL (ref 0.5–1.4)
DIFFERENTIAL METHOD: ABNORMAL
EOSINOPHIL # BLD AUTO: 0.1 K/UL (ref 0–0.5)
EOSINOPHIL NFR BLD: 0.9 % (ref 0–8)
ERYTHROCYTE [DISTWIDTH] IN BLOOD BY AUTOMATED COUNT: 20.4 % (ref 11.5–14.5)
EST. GFR  (AFRICAN AMERICAN): >60 ML/MIN/1.73 M^2
EST. GFR  (NON AFRICAN AMERICAN): >60 ML/MIN/1.73 M^2
GLUCOSE SERPL-MCNC: 103 MG/DL (ref 70–110)
HCT VFR BLD AUTO: 24 % (ref 37–48.5)
HGB BLD-MCNC: 7.4 G/DL (ref 12–16)
IMM GRANULOCYTES # BLD AUTO: 0.15 K/UL (ref 0–0.04)
IMM GRANULOCYTES NFR BLD AUTO: 1.3 % (ref 0–0.5)
LYMPHOCYTES # BLD AUTO: 2.9 K/UL (ref 1–4.8)
LYMPHOCYTES NFR BLD: 24.8 % (ref 18–48)
MAGNESIUM SERPL-MCNC: 1.2 MG/DL (ref 1.6–2.6)
MCH RBC QN AUTO: 31.4 PG (ref 27–31)
MCHC RBC AUTO-ENTMCNC: 30.8 G/DL (ref 32–36)
MCV RBC AUTO: 102 FL (ref 82–98)
MONOCYTES # BLD AUTO: 0.5 K/UL (ref 0.3–1)
MONOCYTES NFR BLD: 4.6 % (ref 4–15)
NEUTROPHILS # BLD AUTO: 7.9 K/UL (ref 1.8–7.7)
NEUTROPHILS NFR BLD: 67.9 % (ref 38–73)
NRBC BLD-RTO: 0 /100 WBC
PHOSPHATE SERPL-MCNC: 2 MG/DL (ref 2.7–4.5)
PLATELET # BLD AUTO: 256 K/UL (ref 150–350)
PMV BLD AUTO: 9.7 FL (ref 9.2–12.9)
POCT GLUCOSE: 103 MG/DL (ref 70–110)
POCT GLUCOSE: 104 MG/DL (ref 70–110)
POCT GLUCOSE: 108 MG/DL (ref 70–110)
POCT GLUCOSE: 118 MG/DL (ref 70–110)
POCT GLUCOSE: 122 MG/DL (ref 70–110)
POCT GLUCOSE: 89 MG/DL (ref 70–110)
POTASSIUM SERPL-SCNC: 3.4 MMOL/L (ref 3.5–5.1)
RBC # BLD AUTO: 2.36 M/UL (ref 4–5.4)
SODIUM SERPL-SCNC: 144 MMOL/L (ref 136–145)
WBC # BLD AUTO: 11.6 K/UL (ref 3.9–12.7)

## 2019-10-19 PROCEDURE — 63600175 PHARM REV CODE 636 W HCPCS: Performed by: FAMILY MEDICINE

## 2019-10-19 PROCEDURE — 25000003 PHARM REV CODE 250: Performed by: STUDENT IN AN ORGANIZED HEALTH CARE EDUCATION/TRAINING PROGRAM

## 2019-10-19 PROCEDURE — 84100 ASSAY OF PHOSPHORUS: CPT

## 2019-10-19 PROCEDURE — 20600001 HC STEP DOWN PRIVATE ROOM

## 2019-10-19 PROCEDURE — 63600175 PHARM REV CODE 636 W HCPCS: Performed by: INTERNAL MEDICINE

## 2019-10-19 PROCEDURE — 99900035 HC TECH TIME PER 15 MIN (STAT)

## 2019-10-19 PROCEDURE — 94761 N-INVAS EAR/PLS OXIMETRY MLT: CPT

## 2019-10-19 PROCEDURE — 83735 ASSAY OF MAGNESIUM: CPT

## 2019-10-19 PROCEDURE — 80048 BASIC METABOLIC PNL TOTAL CA: CPT

## 2019-10-19 PROCEDURE — 99232 PR SUBSEQUENT HOSPITAL CARE,LEVL II: ICD-10-PCS | Mod: ,,, | Performed by: FAMILY MEDICINE

## 2019-10-19 PROCEDURE — 94640 AIRWAY INHALATION TREATMENT: CPT

## 2019-10-19 PROCEDURE — 25000242 PHARM REV CODE 250 ALT 637 W/ HCPCS: Performed by: INTERNAL MEDICINE

## 2019-10-19 PROCEDURE — 99232 SBSQ HOSP IP/OBS MODERATE 35: CPT | Mod: ,,, | Performed by: FAMILY MEDICINE

## 2019-10-19 PROCEDURE — 94664 DEMO&/EVAL PT USE INHALER: CPT

## 2019-10-19 PROCEDURE — 85025 COMPLETE CBC W/AUTO DIFF WBC: CPT

## 2019-10-19 PROCEDURE — 25000003 PHARM REV CODE 250: Performed by: FAMILY MEDICINE

## 2019-10-19 PROCEDURE — 36415 COLL VENOUS BLD VENIPUNCTURE: CPT

## 2019-10-19 RX ORDER — METOPROLOL TARTRATE 100 MG/1
100 TABLET ORAL 2 TIMES DAILY
Status: DISCONTINUED | OUTPATIENT
Start: 2019-10-19 | End: 2019-10-25 | Stop reason: HOSPADM

## 2019-10-19 RX ORDER — OXYCODONE HYDROCHLORIDE 5 MG/1
5 TABLET ORAL EVERY 8 HOURS PRN
Status: DISCONTINUED | OUTPATIENT
Start: 2019-10-19 | End: 2019-10-25

## 2019-10-19 RX ORDER — OXYCODONE HYDROCHLORIDE 10 MG/1
10 TABLET ORAL EVERY 8 HOURS PRN
Status: DISCONTINUED | OUTPATIENT
Start: 2019-10-19 | End: 2019-10-25

## 2019-10-19 RX ADMIN — ACETAMINOPHEN 650 MG: 325 TABLET ORAL at 09:10

## 2019-10-19 RX ADMIN — ENOXAPARIN SODIUM 40 MG: 100 INJECTION SUBCUTANEOUS at 05:10

## 2019-10-19 RX ADMIN — MAGNESIUM SULFATE HEPTAHYDRATE 1 G: 500 INJECTION, SOLUTION INTRAMUSCULAR; INTRAVENOUS at 01:10

## 2019-10-19 RX ADMIN — POTASSIUM PHOSPHATE, MONOBASIC AND POTASSIUM PHOSPHATE, DIBASIC 15 MMOL: 224; 236 INJECTION, SOLUTION, CONCENTRATE INTRAVENOUS at 03:10

## 2019-10-19 RX ADMIN — MICONAZOLE NITRATE: 20 OINTMENT TOPICAL at 09:10

## 2019-10-19 RX ADMIN — METOPROLOL TARTRATE 50 MG: 50 TABLET ORAL at 09:10

## 2019-10-19 RX ADMIN — IPRATROPIUM BROMIDE AND ALBUTEROL SULFATE 3 ML: .5; 3 SOLUTION RESPIRATORY (INHALATION) at 07:10

## 2019-10-19 RX ADMIN — OXACILLIN SODIUM 12 G: 10 INJECTION, POWDER, FOR SOLUTION INTRAVENOUS at 10:10

## 2019-10-19 RX ADMIN — ACETAMINOPHEN 650 MG: 325 TABLET ORAL at 08:10

## 2019-10-19 RX ADMIN — METOPROLOL TARTRATE 100 MG: 100 TABLET ORAL at 08:10

## 2019-10-19 RX ADMIN — AMLODIPINE BESYLATE 10 MG: 10 TABLET ORAL at 09:10

## 2019-10-19 NOTE — ASSESSMENT & PLAN NOTE
- Per neuro BP goal less than 150, but avoid precipitous drops  - increase metroprolol to 100mg BID  - icontinue amlodipine to 10mg daily  - hydralazine and labetolol prn

## 2019-10-19 NOTE — SUBJECTIVE & OBJECTIVE
Interval History: Patient seen and examined. She continues to appear to be constitutionally improving. She complains of some discomfort related to rectal tube but otherwise denies pain at this time. She is tolerating purreed diet.    Review of Systems   Constitutional: Negative for chills and fever.   HENT: Negative for drooling and sore throat.    Respiratory: Negative for cough, choking and shortness of breath.    Cardiovascular: Negative for chest pain and leg swelling.   Gastrointestinal: Positive for rectal pain (discomfort from rectal tube). Negative for abdominal distention, abdominal pain, nausea and vomiting.   Genitourinary: Negative for dysuria and flank pain.   Musculoskeletal: Negative for back pain and neck pain.   Neurological: Negative for syncope and headaches.     Objective:     Vital Signs (Most Recent):  Temp: 98.9 °F (37.2 °C) (10/18/19 1645)  Pulse: 105 (10/18/19 1645)  Resp: 18 (10/18/19 1513)  BP: 135/79 (10/18/19 1645)  SpO2: 96 % (10/18/19 1645) Vital Signs (24h Range):  Temp:  [96.9 °F (36.1 °C)-98.9 °F (37.2 °C)] 98.9 °F (37.2 °C)  Pulse:  [] 105  Resp:  [16-30] 18  SpO2:  [95 %-99 %] 96 %  BP: (135-177)/(68-79) 135/79     Weight: 53.5 kg (118 lb)  Body mass index is 20.25 kg/m².    Intake/Output Summary (Last 24 hours) at 10/18/2019 1901  Last data filed at 10/18/2019 1800  Gross per 24 hour   Intake 950 ml   Output 1900 ml   Net -950 ml      Physical Exam   Constitutional: No distress.   HENT:   Head: Normocephalic.   Mouth/Throat: No oropharyngeal exudate.   Eyes: Pupils are equal, round, and reactive to light. EOM are normal.   Neck: Normal range of motion. Neck supple.   Cardiovascular: Regular rhythm.   Murmur heard.  tachy   Pulmonary/Chest: Effort normal. No respiratory distress. She has no wheezes. She has no rales.   Abdominal: She exhibits no distension. There is no tenderness. There is no guarding.   Musculoskeletal: She exhibits no edema or tenderness.   Moves all  extremities   Neurological: She is alert.   Alert to person and answering questions appropriately. States year is 1990 and month is December. Answers president is Anurag.   Skin: Skin is warm and dry. No rash noted. She is not diaphoretic.       Significant Labs:   BMP:   Recent Labs   Lab 10/18/19  0505  10/18/19  1719   GLU 95   < > 115*   *   < > 146*   K 2.8*   < > 2.4*   *   < > 115*   CO2 18*   < > 20*   BUN 15   < > 11   CREATININE 0.6   < > 0.6   CALCIUM 7.8*   < > 7.2*   MG 1.7  --   --     < > = values in this interval not displayed.     CBC:   Recent Labs   Lab 10/17/19  0359 10/18/19  0505   WBC 9.72 8.93   HGB 8.2* 7.4*   HCT 27.4* 25.2*    272       Significant Imaging: I have reviewed all pertinent imaging results/findings within the past 24 hours.

## 2019-10-19 NOTE — SUBJECTIVE & OBJECTIVE
Interval History: Patient seen and examined. Found alert and having conversation with  who was present during exam. She reports that she continues to feel better. She is tolerating po intake now and is requesting NG tube to be removed. Denies overnight events.    Review of Systems   Constitutional: Negative for chills and fever.   HENT: Negative for drooling and sore throat.    Respiratory: Negative for cough, choking and shortness of breath.    Cardiovascular: Negative for chest pain and leg swelling.   Gastrointestinal: Negative for abdominal distention, abdominal pain, nausea and vomiting.   Genitourinary: Negative for dysuria and flank pain.   Musculoskeletal: Negative for back pain and neck pain.   Neurological: Negative for syncope and headaches.     Objective:     Vital Signs (Most Recent):  Temp: 98.1 °F (36.7 °C) (10/19/19 1740)  Pulse: 101 (10/19/19 1740)  Resp: 20 (10/19/19 1740)  BP: (!) 179/79 (10/19/19 1740)  SpO2: 98 % (10/19/19 1740) Vital Signs (24h Range):  Temp:  [97.2 °F (36.2 °C)-99 °F (37.2 °C)] 98.1 °F (36.7 °C)  Pulse:  [] 101  Resp:  [16-25] 20  SpO2:  [95 %-98 %] 98 %  BP: (142-179)/(63-79) 179/79     Weight: 53.5 kg (118 lb)  Body mass index is 20.25 kg/m².    Intake/Output Summary (Last 24 hours) at 10/19/2019 1819  Last data filed at 10/19/2019 1808  Gross per 24 hour   Intake 2307.91 ml   Output 2400 ml   Net -92.09 ml      Physical Exam    Significant Labs:   BMP:   Recent Labs   Lab 10/19/19  0406         K 3.4*   *   CO2 18*   BUN 9   CREATININE 0.6   CALCIUM 7.4*   MG 1.2*     CBC:   Recent Labs   Lab 10/18/19  0505 10/19/19  0406   WBC 8.93 11.60   HGB 7.4* 7.4*   HCT 25.2* 24.0*    256       Significant Imaging: I have reviewed all pertinent imaging results/findings within the past 24 hours.

## 2019-10-19 NOTE — ASSESSMENT & PLAN NOTE
- Na 150  - calculated fluid deficit 1.9L  - started D5W at 50ml/h but decreased after repeat Na two hours later was 147  - Continue D5W at 25ml/hr  - closely monitor to avoid rapid correction

## 2019-10-19 NOTE — PROGRESS NOTES
[facility] Hospital Medicine  Progress Note    Patient Name: Mesha Mckenzie  MRN: [id number]  Patient Class: IP- Inpatient   Admission Date: 10/2/2019  Length of Stay: 17 days  Attending Physician: Sushil Ennis MD  Primary Care Provider: Varun Shea MD PhD    Hospital Medicine Team: [facility] HOSP MED R Sushil Ennis MD    Subjective:     Principal Problem:Encephalopathy, metabolic        HPI:  No notes on file    Overview/Hospital Course:  No notes on file    Interval History: Patient seen and examined. Found alert and having conversation with  who was present during exam. She reports that she continues to feel better. She is tolerating po intake now and is requesting NG tube to be removed. Denies overnight events.    Review of Systems   Constitutional: Negative for chills and fever.   HENT: Negative for drooling and sore throat.    Respiratory: Negative for cough, choking and shortness of breath.    Cardiovascular: Negative for chest pain and leg swelling.   Gastrointestinal: Negative for abdominal distention, abdominal pain, nausea and vomiting.   Genitourinary: Negative for dysuria and flank pain.   Musculoskeletal: Negative for back pain and neck pain.   Neurological: Negative for syncope and headaches.     Objective:     Vital Signs (Most Recent):  Temp: 98.1 °F (36.7 °C) (10/19/19 1740)  Pulse: 101 (10/19/19 1740)  Resp: 20 (10/19/19 1740)  BP: (!) 179/79 (10/19/19 1740)  SpO2: 98 % (10/19/19 1740) Vital Signs (24h Range):  Temp:  [97.2 °F (36.2 °C)-99 °F (37.2 °C)] 98.1 °F (36.7 °C)  Pulse:  [] 101  Resp:  [16-25] 20  SpO2:  [95 %-98 %] 98 %  BP: (142-179)/(63-79) 179/79     Weight: 53.5 kg (118 lb)  Body mass index is 20.25 kg/m².    Intake/Output Summary (Last 24 hours) at 10/19/2019 1819  Last data filed at 10/19/2019 1808  Gross per 24 hour   Intake 2307.91 ml   Output 2400 ml   Net -92.09 ml      Physical Exam:  HEENT: atraumatic, normocephalic, EOMI, NG tube in  place  Respiratory: clear to auscultation bilaterally, no wheezes, no ronchi  Cardiac: regular rate and rhythm  Abdomen: soft, non-tender, non-distended, no guarding  : astorga present  Neuro: Alert and oriented  Skin: warm, dry, intact      Significant Labs:   BMP:   Recent Labs   Lab 10/19/19  0406         K 3.4*   *   CO2 18*   BUN 9   CREATININE 0.6   CALCIUM 7.4*   MG 1.2*     CBC:   Recent Labs   Lab 10/18/19  0505 10/19/19  0406   WBC 8.93 11.60   HGB 7.4* 7.4*   HCT 25.2* 24.0*    256       Significant Imaging: I have reviewed all pertinent imaging results/findings within the past 24 hours.      Assessment/Plan:      * Encephalopathy, metabolic  - Encephalopathy likely multiple etiology metabolic, infectious  - appreciate Neuro recs  - Improving. Advanced diet to mechanical soft      Septic shock with acute organ dysfunction due to methicillin susceptible Staphylococcus aureus (MSSA)  Notable this admission: Epidural Abcess, Abcess Upper Extremity, Septic Pulmonary Emboli  - Patient clinically improving. Afebrile. Leukocytosis improved.  Appreciate ID recs  - Continue Oxacillin for MSSA Endocarditis       Acute bacterial endocarditis  -Continue Oxacillin for MSSA Endocarditis   -Recommend removing all lines and catheters including astorga once clinically feasible since they are potential sources of infection  -Will still plan to discharge patient on OXACILLIN 12G IV continuous  for 6 weeks, estimated end of therapy date 11/20  - Contact ID prior to DC        HTN (hypertension)  - Per neuro BP goal less than 150, but avoid precipitous drops  - increase metroprolol to 100mg BID  - icontinue amlodipine to 10mg daily  - hydralazine and labetolol prn        Palliative care encounter  Appreciate palliative care recs      Right thalamic stroke  - likely due to infection and septic emboli  - Brain MRI 9/30 with lacunar infarcts  - repeat head CT (10/7) revealed new right basal ganglia  infarcts  - MRI of brain revealed R thalamic infarct, ventriculitis, subactue embolic infarctions  -CTA of head 10/8: atherosclerotic plaquing of the distal vertebral arteries and concern for noncalcified plaquing and stenosis; no high-grade stenosis or proximal occlusion. No mycotic aneurysms  - [facility] neuro recommending against systemic anticoagulation and asa due to high risk for hemorrhage from septic emboli      Hypernatremia  - Na 150 to 144 in last 24hrs  - will stop fluids and continue to monitor as patient eating      Severe malnutrition  - Advanced to mechanical soft diet with boost ensure supplement with meals    Pleural effusion  - Multifactorial secondary to complete left lung collapse, septic emboli, pleural effusions, suspected pna  - s/p thora 10/1- transudative effusion; chest tube placed due to concern for empyema at OSH (10/3); since removed  --duo nebs spaced out due to tachycardia  --continues to be tachypneic with pH 7.5 (since 10/10) likely from underlying lung issues including septic emboli, possible new asp PNA. NO PE seen on CTA from 10/3, but could have developed one since. However, she has high risk for brain bleed if were to AC and neurosx has already rec'd against systemic anticoagulation. Hold off on repeat CTA chest for now.    Hypokalemia  Improved  Continue to monitor        VTE Risk Mitigation (From admission, onward)         Ordered     enoxaparin injection 40 mg  Daily      10/11/19 1124     IP VTE HIGH RISK PATIENT  Once      10/06/19 0812     Place sequential compression device  Until discontinued      10/02/19 1946                      Sushil Ennis MD  Department of Hospital Medicine   [facility]

## 2019-10-19 NOTE — PROGRESS NOTES
[facility] Hospital Medicine  Progress Note    Patient Name: Mesha Mckenzie  MRN: [id number]  Patient Class: IP- Inpatient   Admission Date: 10/2/2019  Length of Stay: 16 days  Attending Physician: Sushil Ennis MD  Primary Care Provider: Varun Shea MD PhD    Hospital Medicine Team: [facility] HOSP MED R Sushil Ennis MD    Subjective:     Principal Problem:Encephalopathy, metabolic        HPI:  No notes on file    Overview/Hospital Course:  No notes on file    Interval History: Patient seen and examined. She continues to appear to be constitutionally improving. She complains of some discomfort related to rectal tube but otherwise denies pain at this time. She is tolerating purreed diet.    Review of Systems   Constitutional: Negative for chills and fever.   HENT: Negative for drooling and sore throat.    Respiratory: Negative for cough, choking and shortness of breath.    Cardiovascular: Negative for chest pain and leg swelling.   Gastrointestinal: Positive for rectal pain (discomfort from rectal tube). Negative for abdominal distention, abdominal pain, nausea and vomiting.   Genitourinary: Negative for dysuria and flank pain.   Musculoskeletal: Negative for back pain and neck pain.   Neurological: Negative for syncope and headaches.     Objective:     Vital Signs (Most Recent):  Temp: 98.9 °F (37.2 °C) (10/18/19 1645)  Pulse: 105 (10/18/19 1645)  Resp: 18 (10/18/19 1513)  BP: 135/79 (10/18/19 1645)  SpO2: 96 % (10/18/19 1645) Vital Signs (24h Range):  Temp:  [96.9 °F (36.1 °C)-98.9 °F (37.2 °C)] 98.9 °F (37.2 °C)  Pulse:  [] 105  Resp:  [16-30] 18  SpO2:  [95 %-99 %] 96 %  BP: (135-177)/(68-79) 135/79     Weight: 53.5 kg (118 lb)  Body mass index is 20.25 kg/m².    Intake/Output Summary (Last 24 hours) at 10/18/2019 1901  Last data filed at 10/18/2019 1800  Gross per 24 hour   Intake 950 ml   Output 1900 ml   Net -950 ml      Physical Exam   Constitutional: No distress.   HENT:    Head: Normocephalic.   Mouth/Throat: No oropharyngeal exudate.   Eyes: Pupils are equal, round, and reactive to light. EOM are normal.   Neck: Normal range of motion. Neck supple.   Cardiovascular: Regular rhythm.   Murmur heard.  tachy   Pulmonary/Chest: Effort normal. No respiratory distress. She has no wheezes. She has no rales.   Abdominal: She exhibits no distension. There is no tenderness. There is no guarding.   Musculoskeletal: She exhibits no edema or tenderness.   Moves all extremities   Neurological: She is alert.   Alert to person and answering questions appropriately. States year is 1990 and month is December. Answers president is Anurag.   Skin: Skin is warm and dry. No rash noted. She is not diaphoretic.       Significant Labs:   BMP:   Recent Labs   Lab 10/18/19  0505  10/18/19  1719   GLU 95   < > 115*   *   < > 146*   K 2.8*   < > 2.4*   *   < > 115*   CO2 18*   < > 20*   BUN 15   < > 11   CREATININE 0.6   < > 0.6   CALCIUM 7.8*   < > 7.2*   MG 1.7  --   --     < > = values in this interval not displayed.     CBC:   Recent Labs   Lab 10/17/19  0359 10/18/19  0505   WBC 9.72 8.93   HGB 8.2* 7.4*   HCT 27.4* 25.2*    272       Significant Imaging: I have reviewed all pertinent imaging results/findings within the past 24 hours.      Assessment/Plan:      * Encephalopathy, metabolic  - Encephalopathy likely multiple etiology metabolic, infectious  - appreciate Neuro recs  - Improving. Advanced diet to mechanical soft      Septic shock with acute organ dysfunction due to methicillin susceptible Staphylococcus aureus (MSSA)  Notable this admission: Epidural Abcess, Abcess Upper Extremity, Septic Pulmonary Emboli  - Patient clinically improving. Afebrile. Leukocytosis improved.  Appreciate ID recs  - Continue Oxacillin for MSSA Endocarditis       Acute bacterial endocarditis  -Continue Oxacillin for MSSA Endocarditis   -Recommend removing all lines and catheters including astorga once  clinically feasible since they are potential sources of infection  -Will still plan to discharge patient on OXACILLIN 12G IV continuous  for 6 weeks, estimated end of therapy date 11/20  - Contact ID prior to DC        HTN (hypertension)  - Per neuro BP goal less than 150, but avoid precipitous drops  - continue metroprolol 25mg BID  - increase amlodipine to 10mg daily  - hydralazine and labetolol prn        Palliative care encounter  Appreciate palliative care recs      Right thalamic stroke  - likely due to infection and septic emboli  - Brain MRI 9/30 with lacunar infarcts  - repeat head CT (10/7) revealed new right basal ganglia infarcts  - MRI of brain revealed R thalamic infarct, ventriculitis, subactue embolic infarctions  -CTA of head 10/8: atherosclerotic plaquing of the distal vertebral arteries and concern for noncalcified plaquing and stenosis; no high-grade stenosis or proximal occlusion. No mycotic aneurysms  - [facility] neuro recommending against systemic anticoagulation and asa due to high risk for hemorrhage from septic emboli      Hypernatremia  - Na 150  - calculated fluid deficit 1.9L  - started D5W at 50ml/h but decreased after repeat Na two hours later was 147  - Continue D5W at 25ml/hr  - closely monitor to avoid rapid correction      Severe malnutrition  - Advanced to mechanical soft diet with boost ensure supplement with meals    Pleural effusion  - Multifactorial secondary to complete left lung collapse, septic emboli, pleural effusions, suspected pna  - s/p thora 10/1- transudative effusion; chest tube placed due to concern for empyema at OSH (10/3); since removed  --duo nebs spaced out due to tachycardia  --continues to be tachypneic with pH 7.5 (since 10/10) likely from underlying lung issues including septic emboli, possible new asp PNA. NO PE seen on CTA from 10/3, but could have developed one since. However, she has high risk for brain bleed if were to AC and neurosx has already rec'd  against systemic anticoagulation. Hold off on repeat CTA chest for now.    Hypokalemia  repleted        VTE Risk Mitigation (From admission, onward)         Ordered     enoxaparin injection 40 mg  Daily      10/11/19 1124     IP VTE HIGH RISK PATIENT  Once      10/06/19 0812     Place sequential compression device  Until discontinued      10/02/19 1946                      Sushil Ennis MD  Department of Hospital Medicine   [facility]

## 2019-10-19 NOTE — ASSESSMENT & PLAN NOTE
- Encephalopathy likely multiple etiology metabolic, infectious  - appreciate Neuro recs  - Improving. Advanced diet to mechanical soft

## 2019-10-19 NOTE — ASSESSMENT & PLAN NOTE
-Continue Oxacillin for MSSA Endocarditis   -Recommend removing all lines and catheters including astorga once clinically feasible since they are potential sources of infection  -Will still plan to discharge patient on OXACILLIN 12G IV continuous  for 6 weeks, estimated end of therapy date 11/20  - Contact ID prior to DC

## 2019-10-20 PROBLEM — E83.51 HYPOCALCEMIA: Status: ACTIVE | Noted: 2019-10-20

## 2019-10-20 PROBLEM — R25.2 LEG CRAMPING: Status: ACTIVE | Noted: 2019-10-20

## 2019-10-20 LAB
ANION GAP SERPL CALC-SCNC: 9 MMOL/L (ref 8–16)
ANION GAP SERPL CALC-SCNC: 9 MMOL/L (ref 8–16)
BASOPHILS # BLD AUTO: 0.03 K/UL (ref 0–0.2)
BASOPHILS NFR BLD: 0.3 % (ref 0–1.9)
BUN SERPL-MCNC: 11 MG/DL (ref 8–23)
BUN SERPL-MCNC: 9 MG/DL (ref 8–23)
CALCIUM SERPL-MCNC: 6.8 MG/DL (ref 8.7–10.5)
CALCIUM SERPL-MCNC: 7.3 MG/DL (ref 8.7–10.5)
CHLORIDE SERPL-SCNC: 110 MMOL/L (ref 95–110)
CHLORIDE SERPL-SCNC: 110 MMOL/L (ref 95–110)
CO2 SERPL-SCNC: 18 MMOL/L (ref 23–29)
CO2 SERPL-SCNC: 20 MMOL/L (ref 23–29)
CREAT SERPL-MCNC: 0.6 MG/DL (ref 0.5–1.4)
CREAT SERPL-MCNC: 0.7 MG/DL (ref 0.5–1.4)
DIFFERENTIAL METHOD: ABNORMAL
EOSINOPHIL # BLD AUTO: 0.2 K/UL (ref 0–0.5)
EOSINOPHIL NFR BLD: 1.9 % (ref 0–8)
ERYTHROCYTE [DISTWIDTH] IN BLOOD BY AUTOMATED COUNT: 20 % (ref 11.5–14.5)
EST. GFR  (AFRICAN AMERICAN): >60 ML/MIN/1.73 M^2
EST. GFR  (AFRICAN AMERICAN): >60 ML/MIN/1.73 M^2
EST. GFR  (NON AFRICAN AMERICAN): >60 ML/MIN/1.73 M^2
EST. GFR  (NON AFRICAN AMERICAN): >60 ML/MIN/1.73 M^2
GLUCOSE SERPL-MCNC: 87 MG/DL (ref 70–110)
GLUCOSE SERPL-MCNC: 93 MG/DL (ref 70–110)
HCT VFR BLD AUTO: 22.8 % (ref 37–48.5)
HGB BLD-MCNC: 7.1 G/DL (ref 12–16)
IMM GRANULOCYTES # BLD AUTO: 0.23 K/UL (ref 0–0.04)
IMM GRANULOCYTES NFR BLD AUTO: 2.4 % (ref 0–0.5)
LYMPHOCYTES # BLD AUTO: 2.6 K/UL (ref 1–4.8)
LYMPHOCYTES NFR BLD: 27 % (ref 18–48)
MAGNESIUM SERPL-MCNC: 1.3 MG/DL (ref 1.6–2.6)
MCH RBC QN AUTO: 31.4 PG (ref 27–31)
MCHC RBC AUTO-ENTMCNC: 31.1 G/DL (ref 32–36)
MCV RBC AUTO: 101 FL (ref 82–98)
MONOCYTES # BLD AUTO: 0.5 K/UL (ref 0.3–1)
MONOCYTES NFR BLD: 4.6 % (ref 4–15)
NEUTROPHILS # BLD AUTO: 6.2 K/UL (ref 1.8–7.7)
NEUTROPHILS NFR BLD: 63.8 % (ref 38–73)
NRBC BLD-RTO: 0 /100 WBC
PHOSPHATE SERPL-MCNC: 3.6 MG/DL (ref 2.7–4.5)
PLATELET # BLD AUTO: 219 K/UL (ref 150–350)
PMV BLD AUTO: 10 FL (ref 9.2–12.9)
POCT GLUCOSE: 104 MG/DL (ref 70–110)
POCT GLUCOSE: 109 MG/DL (ref 70–110)
POCT GLUCOSE: 115 MG/DL (ref 70–110)
POCT GLUCOSE: 128 MG/DL (ref 70–110)
POCT GLUCOSE: 85 MG/DL (ref 70–110)
POTASSIUM SERPL-SCNC: 2.4 MMOL/L (ref 3.5–5.1)
POTASSIUM SERPL-SCNC: 3.3 MMOL/L (ref 3.5–5.1)
RBC # BLD AUTO: 2.26 M/UL (ref 4–5.4)
SODIUM SERPL-SCNC: 137 MMOL/L (ref 136–145)
SODIUM SERPL-SCNC: 139 MMOL/L (ref 136–145)
WBC # BLD AUTO: 9.75 K/UL (ref 3.9–12.7)

## 2019-10-20 PROCEDURE — 99232 PR SUBSEQUENT HOSPITAL CARE,LEVL II: ICD-10-PCS | Mod: ,,, | Performed by: FAMILY MEDICINE

## 2019-10-20 PROCEDURE — 36415 COLL VENOUS BLD VENIPUNCTURE: CPT

## 2019-10-20 PROCEDURE — 80048 BASIC METABOLIC PNL TOTAL CA: CPT

## 2019-10-20 PROCEDURE — 25000242 PHARM REV CODE 250 ALT 637 W/ HCPCS: Performed by: PHYSICIAN ASSISTANT

## 2019-10-20 PROCEDURE — 80048 BASIC METABOLIC PNL TOTAL CA: CPT | Mod: 91

## 2019-10-20 PROCEDURE — 63600175 PHARM REV CODE 636 W HCPCS: Performed by: INTERNAL MEDICINE

## 2019-10-20 PROCEDURE — 83735 ASSAY OF MAGNESIUM: CPT

## 2019-10-20 PROCEDURE — 99232 SBSQ HOSP IP/OBS MODERATE 35: CPT | Mod: ,,, | Performed by: FAMILY MEDICINE

## 2019-10-20 PROCEDURE — 94640 AIRWAY INHALATION TREATMENT: CPT

## 2019-10-20 PROCEDURE — 85025 COMPLETE CBC W/AUTO DIFF WBC: CPT

## 2019-10-20 PROCEDURE — 25000003 PHARM REV CODE 250: Performed by: STUDENT IN AN ORGANIZED HEALTH CARE EDUCATION/TRAINING PROGRAM

## 2019-10-20 PROCEDURE — 99900035 HC TECH TIME PER 15 MIN (STAT)

## 2019-10-20 PROCEDURE — 84100 ASSAY OF PHOSPHORUS: CPT

## 2019-10-20 PROCEDURE — 25000003 PHARM REV CODE 250: Performed by: FAMILY MEDICINE

## 2019-10-20 PROCEDURE — 94761 N-INVAS EAR/PLS OXIMETRY MLT: CPT

## 2019-10-20 PROCEDURE — 20600001 HC STEP DOWN PRIVATE ROOM

## 2019-10-20 PROCEDURE — 94664 DEMO&/EVAL PT USE INHALER: CPT

## 2019-10-20 PROCEDURE — 63600175 PHARM REV CODE 636 W HCPCS: Performed by: FAMILY MEDICINE

## 2019-10-20 PROCEDURE — 27000646 HC AEROBIKA DEVICE

## 2019-10-20 RX ORDER — POTASSIUM CHLORIDE 20 MEQ/15ML
40 SOLUTION ORAL ONCE
Status: COMPLETED | OUTPATIENT
Start: 2019-10-20 | End: 2019-10-20

## 2019-10-20 RX ORDER — POTASSIUM CHLORIDE 7.45 MG/ML
10 INJECTION INTRAVENOUS
Status: COMPLETED | OUTPATIENT
Start: 2019-10-20 | End: 2019-10-20

## 2019-10-20 RX ORDER — LEVALBUTEROL INHALATION SOLUTION 0.63 MG/3ML
0.63 SOLUTION RESPIRATORY (INHALATION) EVERY 6 HOURS PRN
Status: DISCONTINUED | OUTPATIENT
Start: 2019-10-20 | End: 2019-10-25 | Stop reason: HOSPADM

## 2019-10-20 RX ADMIN — MICONAZOLE NITRATE: 20 OINTMENT TOPICAL at 09:10

## 2019-10-20 RX ADMIN — ACETAMINOPHEN 650 MG: 325 TABLET ORAL at 09:10

## 2019-10-20 RX ADMIN — POTASSIUM CHLORIDE 10 MEQ: 10 INJECTION, SOLUTION INTRAVENOUS at 10:10

## 2019-10-20 RX ADMIN — METOPROLOL TARTRATE 100 MG: 100 TABLET ORAL at 08:10

## 2019-10-20 RX ADMIN — IPRATROPIUM BROMIDE AND ALBUTEROL SULFATE 3 ML: .5; 3 SOLUTION RESPIRATORY (INHALATION) at 12:10

## 2019-10-20 RX ADMIN — POTASSIUM CHLORIDE 10 MEQ: 10 INJECTION, SOLUTION INTRAVENOUS at 11:10

## 2019-10-20 RX ADMIN — OXACILLIN SODIUM 12 G: 10 INJECTION, POWDER, FOR SOLUTION INTRAVENOUS at 09:10

## 2019-10-20 RX ADMIN — ACETAMINOPHEN 650 MG: 325 TABLET ORAL at 03:10

## 2019-10-20 RX ADMIN — MAGNESIUM SULFATE HEPTAHYDRATE 1 G: 500 INJECTION, SOLUTION INTRAMUSCULAR; INTRAVENOUS at 11:10

## 2019-10-20 RX ADMIN — METOPROLOL TARTRATE 100 MG: 100 TABLET ORAL at 09:10

## 2019-10-20 RX ADMIN — OXYCODONE HYDROCHLORIDE 5 MG: 5 TABLET ORAL at 03:10

## 2019-10-20 RX ADMIN — POTASSIUM CHLORIDE 10 MEQ: 10 INJECTION, SOLUTION INTRAVENOUS at 09:10

## 2019-10-20 RX ADMIN — CALCIUM GLUCONATE 1000 MG: 98 INJECTION, SOLUTION INTRAVENOUS at 11:10

## 2019-10-20 RX ADMIN — ENOXAPARIN SODIUM 40 MG: 100 INJECTION SUBCUTANEOUS at 05:10

## 2019-10-20 RX ADMIN — OXYCODONE HYDROCHLORIDE 10 MG: 10 TABLET ORAL at 08:10

## 2019-10-20 RX ADMIN — ACETAMINOPHEN 650 MG: 325 TABLET ORAL at 02:10

## 2019-10-20 RX ADMIN — AMLODIPINE BESYLATE 10 MG: 10 TABLET ORAL at 08:10

## 2019-10-20 RX ADMIN — POTASSIUM CHLORIDE 40 MEQ: 20 SOLUTION ORAL at 06:10

## 2019-10-20 RX ADMIN — POTASSIUM CHLORIDE 40 MEQ: 20 SOLUTION ORAL at 08:10

## 2019-10-20 RX ADMIN — POTASSIUM CHLORIDE 10 MEQ: 10 INJECTION, SOLUTION INTRAVENOUS at 08:10

## 2019-10-20 RX ADMIN — ACETAMINOPHEN 650 MG: 325 TABLET ORAL at 08:10

## 2019-10-20 RX ADMIN — MICONAZOLE NITRATE: 20 OINTMENT TOPICAL at 08:10

## 2019-10-20 NOTE — ASSESSMENT & PLAN NOTE
- Per neuro BP goal less than 150, but avoid precipitous drops  - increase metroprolol to 100mg BID  - continue amlodipine to 10mg daily  - hydralazine and labetolol prn

## 2019-10-20 NOTE — SUBJECTIVE & OBJECTIVE
Interval History: Patient seen and examined and found without acute distress. She is complaining today of bilateral aching leg pain that began last night. She is alert and oriented today and speaking comfortably. She denies chest pain, shortness of breath, abdominal pain, nausea, vomiting, or altered mental status.    Review of Systems   Constitutional: Negative for chills and fever.   HENT: Negative for drooling and sore throat.    Respiratory: Negative for cough, choking and shortness of breath.    Cardiovascular: Negative for chest pain and leg swelling.   Gastrointestinal: Negative for abdominal distention, abdominal pain, nausea and vomiting.   Genitourinary: Negative for dysuria and flank pain.   Musculoskeletal: Positive for back pain and myalgias. Negative for neck pain.   Neurological: Negative for syncope and headaches.     Objective:     Vital Signs (Most Recent):  Temp: 98.1 °F (36.7 °C) (10/20/19 1326)  Pulse: 85 (10/20/19 1326)  Resp: 14 (10/20/19 1326)  BP: 115/61 (10/20/19 1326)  SpO2: (!) 93 % (10/20/19 1326) Vital Signs (24h Range):  Temp:  [98 °F (36.7 °C)-98.9 °F (37.2 °C)] 98.1 °F (36.7 °C)  Pulse:  [] 85  Resp:  [14-22] 14  SpO2:  [92 %-98 %] 93 %  BP: (115-179)/(61-79) 115/61     Weight: 53.5 kg (118 lb)  Body mass index is 20.25 kg/m².    Intake/Output Summary (Last 24 hours) at 10/20/2019 1412  Last data filed at 10/20/2019 0700  Gross per 24 hour   Intake 1222.91 ml   Output 1900 ml   Net -677.09 ml      Physical Exam   Constitutional: She is oriented to person, place, and time. No distress.   HENT:   Head: Normocephalic.   Mouth/Throat: No oropharyngeal exudate.   Eyes: Pupils are equal, round, and reactive to light. EOM are normal.   Neck: Normal range of motion. Neck supple.   Cardiovascular: Regular rhythm.   Murmur heard.  tachy   Pulmonary/Chest: Effort normal. No respiratory distress. She has no wheezes. She has no rales.   Abdominal: She exhibits no distension. There is no  tenderness. There is no guarding.   Musculoskeletal: She exhibits no edema or tenderness.   Moves all extremities   Neurological: She is alert and oriented to person, place, and time.   Skin: Skin is warm and dry. No rash noted. She is not diaphoretic.       Significant Labs:   BMP:   Recent Labs   Lab 10/20/19  0529   GLU 93      K 2.4*      CO2 20*   BUN 9   CREATININE 0.6   CALCIUM 6.8*   MG 1.3*     CBC:   Recent Labs   Lab 10/19/19  0406 10/20/19  0529   WBC 11.60 9.75   HGB 7.4* 7.1*   HCT 24.0* 22.8*    219       Significant Imaging: I have reviewed all pertinent imaging results/findings within the past 24 hours.

## 2019-10-20 NOTE — PLAN OF CARE
POC is reviewed and understood by patient. Oriented x4. VSS, besides temp at 100.5. Tylenol given. MD on call notified. Urinalysis complete. NPO with tube feedings. Pt tolerates all well. Pt reaching goal at 40cc/hr on tube feed rate. Up with assist x2 to chair. Indwelling astorga catheter reinserted due to urinary retention. Receives Hydrocodone PRN for pain.  in room at all times. Call bell in reach. HOB elevated. WCTM.

## 2019-10-20 NOTE — PLAN OF CARE
POC is reviewed and understood by family. Oriented x1 to self mostly. Mechanical soft diet. Not much appetite. Rolls well in bed. Uses astorga catheter and rectal tube to void and have BM. Received Oxycodone 10mg for pain. No signs of distress noted.

## 2019-10-20 NOTE — PLAN OF CARE
POC is reviewed and understood by patient, and family. Oriented x4, but is confused at times. VSS. BP elevated at times due to pain. Receives Oxycodone 5 or 10 mg prn for pain. Soft, mechanical diet, but does not tolerate it well. Uses astorga catheter to void, and also uses a rectal tube. Rolls well in bed. Family in room at all times. HOB elevated. No signs of distress noted. Call bell in reach. WCTM.

## 2019-10-20 NOTE — PROGRESS NOTES
[facility] Hospital Medicine  Progress Note    Patient Name: Mesha Mckenzie  MRN: [id number]  Patient Class: IP- Inpatient   Admission Date: 10/2/2019  Length of Stay: 18 days  Attending Physician: Sushil Ennis MD  Primary Care Provider: Varun Shea MD PhD    Hospital Medicine Team: [facility] HOSP MED R Sushil Ennis MD    Subjective:     Principal Problem:Encephalopathy, metabolic        HPI:  No notes on file    Overview/Hospital Course:  No notes on file    Interval History: Patient seen and examined and found without acute distress. She is complaining today of bilateral aching leg pain that began last night. She is alert and oriented today and speaking comfortably. She denies chest pain, shortness of breath, abdominal pain, nausea, vomiting, or altered mental status.    Review of Systems   Constitutional: Negative for chills and fever.   HENT: Negative for drooling and sore throat.    Respiratory: Negative for cough, choking and shortness of breath.    Cardiovascular: Negative for chest pain and leg swelling.   Gastrointestinal: Negative for abdominal distention, abdominal pain, nausea and vomiting.   Genitourinary: Negative for dysuria and flank pain.   Musculoskeletal: Positive for back pain and myalgias. Negative for neck pain.   Neurological: Negative for syncope and headaches.     Objective:     Vital Signs (Most Recent):  Temp: 98.1 °F (36.7 °C) (10/20/19 1326)  Pulse: 85 (10/20/19 1326)  Resp: 14 (10/20/19 1326)  BP: 115/61 (10/20/19 1326)  SpO2: (!) 93 % (10/20/19 1326) Vital Signs (24h Range):  Temp:  [98 °F (36.7 °C)-98.9 °F (37.2 °C)] 98.1 °F (36.7 °C)  Pulse:  [] 85  Resp:  [14-22] 14  SpO2:  [92 %-98 %] 93 %  BP: (115-179)/(61-79) 115/61     Weight: 53.5 kg (118 lb)  Body mass index is 20.25 kg/m².    Intake/Output Summary (Last 24 hours) at 10/20/2019 1412  Last data filed at 10/20/2019 0700  Gross per 24 hour   Intake 1222.91 ml   Output 1900 ml   Net -677.09 ml       Physical Exam   Constitutional: She is oriented to person, place, and time. No distress.   HENT:   Head: Normocephalic.   Mouth/Throat: No oropharyngeal exudate.   Eyes: Pupils are equal, round, and reactive to light. EOM are normal.   Neck: Normal range of motion. Neck supple.   Cardiovascular: Regular rhythm.   Murmur heard.  tachy   Pulmonary/Chest: Effort normal. No respiratory distress. She has no wheezes. She has no rales.   Abdominal: She exhibits no distension. There is no tenderness. There is no guarding.   Musculoskeletal: She exhibits no edema or tenderness.   Moves all extremities   Neurological: She is alert and oriented to person, place, and time.   Skin: Skin is warm and dry. No rash noted. She is not diaphoretic.       Significant Labs:   BMP:   Recent Labs   Lab 10/20/19  0529   GLU 93      K 2.4*      CO2 20*   BUN 9   CREATININE 0.6   CALCIUM 6.8*   MG 1.3*     CBC:   Recent Labs   Lab 10/19/19  0406 10/20/19  0529   WBC 11.60 9.75   HGB 7.4* 7.1*   HCT 24.0* 22.8*    219       Significant Imaging: I have reviewed all pertinent imaging results/findings within the past 24 hours.      Assessment/Plan:      * Encephalopathy, metabolic  - Encephalopathy likely multiple etiology metabolic, infectious  - appreciate Neuro recs  - Improving. Advanced diet to mechanical soft      Septic shock with acute organ dysfunction due to methicillin susceptible Staphylococcus aureus (MSSA)  Notable this admission: Epidural Abcess, Abcess Upper Extremity, Septic Pulmonary Emboli  - Patient clinically improving. Afebrile. Leukocytosis improved.  Appreciate ID recs  - Continue Oxacillin for MSSA Endocarditis       Acute bacterial endocarditis  -Continue Oxacillin for MSSA Endocarditis   -Recommend removing all lines and catheters including astorga once clinically feasible since they are potential sources of infection  -Will still plan to discharge patient on OXACILLIN 12G IV continuous  for 6  weeks, estimated end of therapy date 11/20  - Contact ID prior to DC        Hypocalcemia  repleted  Repeat BMP this afternoon      HTN (hypertension)  - Per neuro BP goal less than 150, but avoid precipitous drops  - increase metroprolol to 100mg BID  - continue amlodipine to 10mg daily  - hydralazine and labetolol prn        Palliative care encounter  Appreciate palliative care recs      Right thalamic stroke  - likely due to infection and septic emboli  - Brain MRI 9/30 with lacunar infarcts  - repeat head CT (10/7) revealed new right basal ganglia infarcts  - MRI of brain revealed R thalamic infarct, ventriculitis, subactue embolic infarctions  -CTA of head 10/8: atherosclerotic plaquing of the distal vertebral arteries and concern for noncalcified plaquing and stenosis; no high-grade stenosis or proximal occlusion. No mycotic aneurysms  - vasc neuro recommending against systemic anticoagulation and asa due to high risk for hemorrhage from septic emboli      Hypernatremia  - Na 150 to 144 in last 24hrs  - will stop fluids and continue to monitor as patient eating      Severe malnutrition  - Advanced to mechanical soft diet with boost ensure supplement with meals    Pleural effusion  - Multifactorial secondary to complete left lung collapse, septic emboli, pleural effusions, suspected pna  - s/p thora 10/1- transudative effusion; chest tube placed due to concern for empyema at OSH (10/3); since removed  --duo nebs spaced out due to tachycardia  --continues to be tachypneic with pH 7.5 (since 10/10) likely from underlying lung issues including septic emboli, possible new asp PNA. NO PE seen on CTA from 10/3, but could have developed one since. However, she has high risk for brain bleed if were to AC and neurosx has already rec'd against systemic anticoagulation. Hold off on repeat CTA chest for now.    Hypokalemia  K+ 2.4 replete KCL solution 40meq po x1 and KCL 10meq iv x4  - repeat BMP in afternoon        VTE  Risk Mitigation (From admission, onward)         Ordered     enoxaparin injection 40 mg  Daily      10/11/19 1124     IP VTE HIGH RISK PATIENT  Once      10/06/19 0812     Place sequential compression device  Until discontinued      10/02/19 1946                      Sushil Ennis MD  Department of Hospital Medicine   [facility]

## 2019-10-21 PROBLEM — E83.51 HYPOCALCEMIA: Status: RESOLVED | Noted: 2019-10-20 | Resolved: 2019-10-21

## 2019-10-21 LAB
ANION GAP SERPL CALC-SCNC: 8 MMOL/L (ref 8–16)
BASOPHILS # BLD AUTO: 0.05 K/UL (ref 0–0.2)
BASOPHILS NFR BLD: 0.5 % (ref 0–1.9)
BUN SERPL-MCNC: 11 MG/DL (ref 8–23)
CALCIUM SERPL-MCNC: 7.3 MG/DL (ref 8.7–10.5)
CHLORIDE SERPL-SCNC: 110 MMOL/L (ref 95–110)
CO2 SERPL-SCNC: 19 MMOL/L (ref 23–29)
CREAT SERPL-MCNC: 0.6 MG/DL (ref 0.5–1.4)
DIFFERENTIAL METHOD: ABNORMAL
EOSINOPHIL # BLD AUTO: 0.2 K/UL (ref 0–0.5)
EOSINOPHIL NFR BLD: 1.7 % (ref 0–8)
ERYTHROCYTE [DISTWIDTH] IN BLOOD BY AUTOMATED COUNT: 20.5 % (ref 11.5–14.5)
EST. GFR  (AFRICAN AMERICAN): >60 ML/MIN/1.73 M^2
EST. GFR  (NON AFRICAN AMERICAN): >60 ML/MIN/1.73 M^2
GLUCOSE SERPL-MCNC: 88 MG/DL (ref 70–110)
HCT VFR BLD AUTO: 24.4 % (ref 37–48.5)
HGB BLD-MCNC: 7.6 G/DL (ref 12–16)
IMM GRANULOCYTES # BLD AUTO: 0.3 K/UL (ref 0–0.04)
IMM GRANULOCYTES NFR BLD AUTO: 3 % (ref 0–0.5)
LYMPHOCYTES # BLD AUTO: 2.7 K/UL (ref 1–4.8)
LYMPHOCYTES NFR BLD: 26.2 % (ref 18–48)
MAGNESIUM SERPL-MCNC: 1.4 MG/DL (ref 1.6–2.6)
MCH RBC QN AUTO: 31.1 PG (ref 27–31)
MCHC RBC AUTO-ENTMCNC: 31.1 G/DL (ref 32–36)
MCV RBC AUTO: 100 FL (ref 82–98)
MONOCYTES # BLD AUTO: 0.4 K/UL (ref 0.3–1)
MONOCYTES NFR BLD: 4.3 % (ref 4–15)
NEUTROPHILS # BLD AUTO: 6.5 K/UL (ref 1.8–7.7)
NEUTROPHILS NFR BLD: 64.3 % (ref 38–73)
NRBC BLD-RTO: 0 /100 WBC
PHOSPHATE SERPL-MCNC: 3.4 MG/DL (ref 2.7–4.5)
PLATELET # BLD AUTO: 241 K/UL (ref 150–350)
PMV BLD AUTO: 10.4 FL (ref 9.2–12.9)
POCT GLUCOSE: 108 MG/DL (ref 70–110)
POCT GLUCOSE: 120 MG/DL (ref 70–110)
POCT GLUCOSE: 82 MG/DL (ref 70–110)
POCT GLUCOSE: 88 MG/DL (ref 70–110)
POCT GLUCOSE: 88 MG/DL (ref 70–110)
POCT GLUCOSE: 95 MG/DL (ref 70–110)
POTASSIUM SERPL-SCNC: 3.1 MMOL/L (ref 3.5–5.1)
RBC # BLD AUTO: 2.44 M/UL (ref 4–5.4)
SODIUM SERPL-SCNC: 137 MMOL/L (ref 136–145)
WBC # BLD AUTO: 10.12 K/UL (ref 3.9–12.7)

## 2019-10-21 PROCEDURE — 99232 PR SUBSEQUENT HOSPITAL CARE,LEVL II: ICD-10-PCS | Mod: ,,, | Performed by: FAMILY MEDICINE

## 2019-10-21 PROCEDURE — 97530 THERAPEUTIC ACTIVITIES: CPT

## 2019-10-21 PROCEDURE — 99232 SBSQ HOSP IP/OBS MODERATE 35: CPT | Mod: ,,, | Performed by: FAMILY MEDICINE

## 2019-10-21 PROCEDURE — 36415 COLL VENOUS BLD VENIPUNCTURE: CPT

## 2019-10-21 PROCEDURE — 97535 SELF CARE MNGMENT TRAINING: CPT

## 2019-10-21 PROCEDURE — 20600001 HC STEP DOWN PRIVATE ROOM

## 2019-10-21 PROCEDURE — 83735 ASSAY OF MAGNESIUM: CPT

## 2019-10-21 PROCEDURE — 84100 ASSAY OF PHOSPHORUS: CPT

## 2019-10-21 PROCEDURE — 63600175 PHARM REV CODE 636 W HCPCS: Performed by: INTERNAL MEDICINE

## 2019-10-21 PROCEDURE — 80048 BASIC METABOLIC PNL TOTAL CA: CPT

## 2019-10-21 PROCEDURE — 25000003 PHARM REV CODE 250: Performed by: STUDENT IN AN ORGANIZED HEALTH CARE EDUCATION/TRAINING PROGRAM

## 2019-10-21 PROCEDURE — 92526 ORAL FUNCTION THERAPY: CPT

## 2019-10-21 PROCEDURE — 97116 GAIT TRAINING THERAPY: CPT

## 2019-10-21 PROCEDURE — 25000003 PHARM REV CODE 250: Performed by: FAMILY MEDICINE

## 2019-10-21 PROCEDURE — 85025 COMPLETE CBC W/AUTO DIFF WBC: CPT

## 2019-10-21 RX ADMIN — METOPROLOL TARTRATE 100 MG: 100 TABLET ORAL at 09:10

## 2019-10-21 RX ADMIN — ACETAMINOPHEN 650 MG: 325 TABLET ORAL at 09:10

## 2019-10-21 RX ADMIN — OXYCODONE HYDROCHLORIDE 10 MG: 10 TABLET ORAL at 09:10

## 2019-10-21 RX ADMIN — OXYCODONE HYDROCHLORIDE 5 MG: 5 TABLET ORAL at 03:10

## 2019-10-21 RX ADMIN — ENOXAPARIN SODIUM 40 MG: 100 INJECTION SUBCUTANEOUS at 05:10

## 2019-10-21 RX ADMIN — ACETAMINOPHEN 650 MG: 325 TABLET ORAL at 03:10

## 2019-10-21 RX ADMIN — MICONAZOLE NITRATE: 20 OINTMENT TOPICAL at 09:10

## 2019-10-21 RX ADMIN — AMLODIPINE BESYLATE 10 MG: 10 TABLET ORAL at 09:10

## 2019-10-21 RX ADMIN — OXYCODONE HYDROCHLORIDE 10 MG: 10 TABLET ORAL at 12:10

## 2019-10-21 RX ADMIN — OXACILLIN SODIUM 12 G: 10 INJECTION, POWDER, FOR SOLUTION INTRAVENOUS at 10:10

## 2019-10-21 NOTE — PLAN OF CARE
Problem: SLP Goal  Goal: SLP Goal  Description  Speech Language Pathology Goals  Goals expected to be met by 10/25  1. Pt will tolerate thin and mechanical soft diet without s/s aspiration            Outcome: Ongoing, Progressing    Pt with limited appetite and minimal po trials accepted this date. No overt s/s aspiration observed with thin liquid trials. Pt refused solid boluses.  Continue current plan of care and aspiration precautions.     LEROY Park, CCC-SLP  Speech Language Pathologist  [phone]

## 2019-10-21 NOTE — PT/OT/SLP PROGRESS
"Physical Therapy Treatment    Patient Name:  Mesha Mckenzie   MRN:  [id number]    Recommendations:     Discharge Recommendations:  rehabilitation facility   Discharge Equipment Recommendations: (TBD as pt progresses)   Barriers to discharge: Decreased caregiver support    Assessment:     Mesha Mckenzie is a 63 y.o. female admitted with a medical diagnosis of Encephalopathy, metabolic.  She presents with the following impairments/functional limitations:  weakness, gait instability, impaired endurance, impaired balance, impaired functional mobilty, pain, impaired cardiopulmonary response to activity, impaired self care skills . Pt is improved with bed mobility and gait, remains with difficulty with transfers, limited distance due to weakness, SOB, and fatigue.    Rehab Prognosis: Good; patient would benefit from acute skilled PT services to address these deficits and reach maximum level of function.    Recent Surgery: Procedure(s) (LRB):  LAMINECTOMY, SPINE, L3, Open (N/A) 12 Days Post-Op    Plan:     During this hospitalization, patient to be seen 4 x/week to address the identified rehab impairments via gait training, therapeutic activities, therapeutic exercises, neuromuscular re-education and progress toward the following goals:    · Plan of Care Expires:  11/08/19    Subjective   "I am cold"    Pain/Comfort:  · Pain Rating 1: 10/10  · Location - Orientation 1: generalized  · Location 1: back  · Pain Addressed 1: Reposition, Cessation of Activity  · Pain Rating Post-Intervention 1: 6/10      Objective:     Communicated with nurse prior to session.  Patient found supine with astorga catheter, peripheral IV, bowel management system, telemetry upon PT entry to room.     General Precautions: Standard, fall, aspiration, nectar thick   Orthopedic Precautions:N/A   Braces: N/A     Functional Mobility:  · Bed Mobility:     · Rolling Right: minimum assistance  · Supine to Sit: minimum assistance  · Transfers:     · Sit " to Stand:  maximal assistance with rolling walker  · Bed to Chair: maximal assistance with  hand-held assist  using  Stand Pivot  · Gait: 12ft then 12ft then 20ft with RW with moderate assist; pt performed gait with flexed trunk, cross over gait with L foot crossing over R foot, narrow ROBBI, difficulty directing the walker, and at slow pace. +1 person to follow with bedside chair  AM-PAC 6 CLICK MOBILITY  Turning over in bed (including adjusting bedclothes, sheets and blankets)?: 3  Sitting down on and standing up from a chair with arms (e.g., wheelchair, bedside commode, etc.): 2  Moving from lying on back to sitting on the side of the bed?: 2  Moving to and from a bed to a chair (including a wheelchair)?: 2  Need to walk in hospital room?: 2  Climbing 3-5 steps with a railing?: 1  Basic Mobility Total Score: 12     Therapeutic Activities and Exercises:   Pt soiled with BM from BMS leakage and required cleaning and linen change. Pt performed B LE exs in sitting x 10 reps: hip flex, knee ext, and ankle pumps.    Patient left up in chair with all lines intact, call button in reach and nurse notified..    GOALS:   Multidisciplinary Problems     Physical Therapy Goals        Problem: Physical Therapy Goal    Goal Priority Disciplines Outcome Goal Variances Interventions   Physical Therapy Goal     PT, PT/OT Ongoing, Progressing     Description:  Goals to be met by: 2019    Patient will increase functional independence with mobility by performin. Supine <> sit with Moderate Assistance.- met  Revised goal: supine to/from sit with minimal assist-not met  2. Sit <> stand transfer with Moderate Assistance using Rolling Walker.- met  Revised goal: sit to stand with RW with minimal assist-not met  3. Bed <> chair transfer via Step Transfer with Moderate Assistance using Rolling Walker.-met  Revised goal: bed to/from chair with RW with minimal assist-not met  4. Dynamic sitting at edge of bed x 10 minutes with  Minimal Assistance to prepare for functional tasks in sitting.-not met  5. Able to tolerate exercise for 15-20 reps with assistance as needed.-not met  6. Pt receive gait training ~ 50 ft with Rw and min assist - not met                               Time Tracking:     PT Received On: 10/21/19  PT Start Time: 0845     PT Stop Time: 0934  PT Total Time (min): 49 min     Billable Minutes: Gait Training 30 and Therapeutic Activity 19    Treatment Type: Treatment  PT/PTA: PT     PTA Visit Number: 0     Lauren Paige, PT  10/21/2019

## 2019-10-21 NOTE — PLAN OF CARE
Plan of care reviewed with patient and family who demonstrated understanding. Patient disoriented to time place and situation. VSS on room air. Oxy 10 for pain control. Soft mechanical diet maintained patient consumed ensures and cake. Henriquez catheter in place with adequate urine output. Rectal tube in place and small amounts discharged. HOB elevated, call light within reach, bed rails x2 and frequent monitoring completed.

## 2019-10-21 NOTE — PLAN OF CARE
POC is reviewed and understood by patient. VSS while on room air. Up with PT, OT today. Poor appetite on a mechanical soft diet. Receives Oxycodone PRN for pain. Call bell in reach. HOB elevated. Family in room at all times. Uses astorga, and rectal tube. No signs of distress noted. WCTM.

## 2019-10-21 NOTE — PT/OT/SLP PROGRESS
Occupational Therapy   Treatment    Name: Mesha Mckenzie  MRN: [id number]  Admitting Diagnosis:  Encephalopathy, metabolic  12 Days Post-Op    Recommendations:     Discharge Recommendations: rehabilitation facility  Discharge Equipment Recommendations:  (TBD as pt progresses)  Barriers to discharge:  None    Assessment:     Mesha Mckenzie is a 63 y.o. female with a medical diagnosis of Encephalopathy, metabolic.  She presents with fatigue and found soiled in bedside chair 2* bowel management system issue. Performance deficits affecting function are weakness, impaired endurance, impaired self care skills, impaired functional mobilty, impaired balance, impaired cardiopulmonary response to activity, gait instability. Pt would benefit from skilled OT services in order to maximize independence with ADLs and facilitate safe discharge.      Rehab Prognosis:  Good; patient would benefit from acute skilled OT services to address these deficits and reach maximum level of function.       Plan:     Patient to be seen 3 x/week to address the above listed problems via self-care/home management, therapeutic activities, therapeutic exercises  · Plan of Care Expires: 11/10/19  · Plan of Care Reviewed with: patient    Subjective     Pain/Comfort:  · Pain Rating 1: 0/10  · Pain Addressed 1: Cessation of Activity, Reposition  · Pain Rating Post-Intervention 1: 6/10    Objective:     Communicated with: RN prior to session.  Patient found up in chair with bowel management system, astorga catheter, peripheral IV, telemetry upon OT entry to room.    General Precautions: Standard, fall, aspiration, nectar thick   Orthopedic Precautions:N/A   Braces: N/A     Occupational Performance:     Bed Mobility:    · Patient completed Rolling/Turning to Left with  moderate assistance  · Patient completed Rolling/Turning to Right with moderate assistance  · Patient completed Scooting/Bridging with moderate assistance  · Patient completed Sit to  Supine with moderate assistance     Functional Mobility/Transfers:  · Patient completed Bed <> Chair Transfer using Squat Pivot technique with moderate assistance with hand-held assist      Activities of Daily Living:  · Toileting: total assistance for perineal hygeine       [facility] 6 Click ADL: 9    Treatment & Education:  -Therapist provided facilitation and instruction of proper body mechanics, energy conservation, and fall prevention strategies during tasks listed above.  -Pt educated on role of OT, POC and goals for therapy  -Pt educated on importance of OOB activities with staff member assistance and sitting OOB majority of the day.   -Pt verbalized understanding. Pt expressed no further concerns/questions  -Whiteboard updated      Patient left HOB elevated with all lines intact, call button in reach and RN presentEducation:      GOALS:   Multidisciplinary Problems     Occupational Therapy Goals        Problem: Occupational Therapy Goal    Goal Priority Disciplines Outcome Interventions   Occupational Therapy Goal     OT, PT/OT Ongoing, Progressing    Description:  Goals to be met by: 10/31/19     Patient will increase functional independence with ADLs by performing:    UE Dressing with Moderate Assistance.  LE Dressing with Moderate Assistance.  Grooming while seated with Minimal Assistance.  Toileting from bedside commode with Moderate Assistance for hygiene and clothing management.   Rolling to Right, Left with Moderate Assistance. - goal met 10/21  Supine to sit with Moderate Assistance.- goal met 10/21  Toilet transfer to bedside commode with Moderate Assistance.                       Time Tracking:     OT Date of Treatment: 10/21/19  OT Start Time: 0945  OT Stop Time: 1000  OT Total Time (min): 15 min    Billable Minutes:Self Care/Home Management 15    Marine Elizabeth OT  10/21/2019

## 2019-10-21 NOTE — PLAN OF CARE
Discharge planning: Spoke with daughter Leeanna [phone] and discussed d/c plan to Rehab in the next few days. She requests referral to [facility]. Referral sent.

## 2019-10-21 NOTE — PLAN OF CARE
Problem: Physical Therapy Goal  Goal: Physical Therapy Goal  Description  Goals to be met by: 2019    Patient will increase functional independence with mobility by performin. Supine <> sit with Moderate Assistance.- met  Revised goal: supine to/from sit with minimal assist-not met  2. Sit <> stand transfer with Moderate Assistance using Rolling Walker.- met  Revised goal: sit to stand with RW with minimal assist-not met  3. Bed <> chair transfer via Step Transfer with Moderate Assistance using Rolling Walker.-met  Revised goal: bed to/from chair with RW with minimal assist-not met  4. Dynamic sitting at edge of bed x 10 minutes with Minimal Assistance to prepare for functional tasks in sitting.-not met  5. Able to tolerate exercise for 15-20 reps with assistance as needed.-not met  6. Pt receive gait training ~ 50 ft with Rw and min assist - not met              Outcome: Ongoing, Progressing  Pt's goals remain appropriate and pt will continue to benefit from skilled PT services to work towards improved functional mobility including: bed mobility, transfers, and gait.   Lauren Paige, PT  10/21/2019

## 2019-10-21 NOTE — SUBJECTIVE & OBJECTIVE
Interval History: Patient seen and examined. No acute distress. Conversational. Denies overnight events. Reports ongoing leg pain that is controlled with medications. Denies new symptoms.    Review of Systems   Constitutional: Negative for chills and fever.   HENT: Negative for drooling and sore throat.    Respiratory: Negative for cough, choking and shortness of breath.    Cardiovascular: Negative for chest pain and leg swelling.   Gastrointestinal: Negative for abdominal distention, abdominal pain, nausea and vomiting.   Genitourinary: Negative for dysuria and flank pain.   Musculoskeletal: Positive for back pain and myalgias. Negative for neck pain.   Neurological: Negative for syncope and headaches.     Objective:     Vital Signs (Most Recent):  Temp: 97.7 °F (36.5 °C) (10/21/19 1729)  Pulse: 83 (10/21/19 1729)  Resp: 14 (10/21/19 1729)  BP: (!) 146/67 (10/21/19 1729)  SpO2: 96 % (10/21/19 1729) Vital Signs (24h Range):  Temp:  [97.7 °F (36.5 °C)-98.7 °F (37.1 °C)] 97.7 °F (36.5 °C)  Pulse:  [] 83  Resp:  [14-18] 14  SpO2:  [94 %-96 %] 96 %  BP: (138-158)/(57-69) 146/67     Weight: 53.5 kg (118 lb)  Body mass index is 20.25 kg/m².    Intake/Output Summary (Last 24 hours) at 10/21/2019 1801  Last data filed at 10/21/2019 0200  Gross per 24 hour   Intake 200 ml   Output 550 ml   Net -350 ml      Physical Exam   Constitutional: She is oriented to person, place, and time. No distress.   HENT:   Head: Normocephalic.   Mouth/Throat: No oropharyngeal exudate.   Eyes: Pupils are equal, round, and reactive to light. EOM are normal.   Neck: Normal range of motion. Neck supple.   Cardiovascular: Regular rhythm.   Murmur heard.  Pulmonary/Chest: Effort normal. No respiratory distress. She has no wheezes. She has no rales.   Abdominal: She exhibits no distension. There is no tenderness. There is no guarding.   Musculoskeletal: She exhibits no edema or tenderness.   Moves all extremities   Neurological: She is alert and  oriented to person, place, and time.   Skin: Skin is warm and dry. No rash noted. She is not diaphoretic.       Significant Labs:   BMP:   Recent Labs   Lab 10/21/19  0542   GLU 88      K 3.1*      CO2 19*   BUN 11   CREATININE 0.6   CALCIUM 7.3*   MG 1.4*     CBC:   Recent Labs   Lab 10/20/19  0529 10/21/19  0542   WBC 9.75 10.12   HGB 7.1* 7.6*   HCT 22.8* 24.4*    241       Significant Imaging: I have reviewed all pertinent imaging results/findings within the past 24 hours.

## 2019-10-21 NOTE — ASSESSMENT & PLAN NOTE
No unilateral edema, lower extremity tenderness or skin changes. Extremities are warm and peripheral pulses palpable.   Start gabapentin 100mg TID

## 2019-10-21 NOTE — PLAN OF CARE
Goals remain appropriate, continue POC  Marine Elizabeth OT    Problem: Occupational Therapy Goal  Goal: Occupational Therapy Goal  Description  Goals to be met by: 10/31/19     Patient will increase functional independence with ADLs by performing:    UE Dressing with Moderate Assistance.  LE Dressing with Moderate Assistance.  Grooming while seated with Minimal Assistance.  Toileting from bedside commode with Moderate Assistance for hygiene and clothing management.   Rolling to Right, Left with Moderate Assistance. Goal met 10/21  Supine to sit with Moderate Assistance. Goal met 10/21  Toilet transfer to bedside commode with Moderate Assistance.     Outcome: Ongoing, Progressing

## 2019-10-21 NOTE — PROGRESS NOTES
[facility] Hospital Medicine  Progress Note    Patient Name: Mesha Mckenzie  MRN: [id number]  Patient Class: IP- Inpatient   Admission Date: 10/2/2019  Length of Stay: 19 days  Attending Physician: Sushil Ennis MD  Primary Care Provider: Varun Shea MD PhD    [facility] Medicine Team: [facility] Sushil Ennis MD    Subjective:     Principal Problem:Encephalopathy, metabolic        HPI:  No notes on file    Overview/Hospital Course:  No notes on file    Interval History: Patient seen and examined. No acute distress. Conversational. Denies overnight events. Reports ongoing leg pain that is controlled with medications. Denies new symptoms.    Review of Systems   Constitutional: Negative for chills and fever.   HENT: Negative for drooling and sore throat.    Respiratory: Negative for cough, choking and shortness of breath.    Cardiovascular: Negative for chest pain and leg swelling.   Gastrointestinal: Negative for abdominal distention, abdominal pain, nausea and vomiting.   Genitourinary: Negative for dysuria and flank pain.   Musculoskeletal: Positive for back pain and myalgias. Negative for neck pain.   Neurological: Negative for syncope and headaches.     Objective:     Vital Signs (Most Recent):  Temp: 97.7 °F (36.5 °C) (10/21/19 1729)  Pulse: 83 (10/21/19 1729)  Resp: 14 (10/21/19 1729)  BP: (!) 146/67 (10/21/19 1729)  SpO2: 96 % (10/21/19 1729) Vital Signs (24h Range):  Temp:  [97.7 °F (36.5 °C)-98.7 °F (37.1 °C)] 97.7 °F (36.5 °C)  Pulse:  [] 83  Resp:  [14-18] 14  SpO2:  [94 %-96 %] 96 %  BP: (138-158)/(57-69) 146/67     Weight: 53.5 kg (118 lb)  Body mass index is 20.25 kg/m².    Intake/Output Summary (Last 24 hours) at 10/21/2019 1801  Last data filed at 10/21/2019 0200  Gross per 24 hour   Intake 200 ml   Output 550 ml   Net -350 ml      Physical Exam   Constitutional: She is oriented to person, place, and time. No distress.   HENT:   Head: Normocephalic.    Mouth/Throat: No oropharyngeal exudate.   Eyes: Pupils are equal, round, and reactive to light. EOM are normal.   Neck: Normal range of motion. Neck supple.   Cardiovascular: Regular rhythm.   Murmur heard.  Pulmonary/Chest: Effort normal. No respiratory distress. She has no wheezes. She has no rales.   Abdominal: She exhibits no distension. There is no tenderness. There is no guarding.   Musculoskeletal: She exhibits no edema or tenderness.   Moves all extremities   Neurological: She is alert and oriented to person, place, and time.   Skin: Skin is warm and dry. No rash noted. She is not diaphoretic.       Significant Labs:   BMP:   Recent Labs   Lab 10/21/19  0542   GLU 88      K 3.1*      CO2 19*   BUN 11   CREATININE 0.6   CALCIUM 7.3*   MG 1.4*     CBC:   Recent Labs   Lab 10/20/19  0529 10/21/19  0542   WBC 9.75 10.12   HGB 7.1* 7.6*   HCT 22.8* 24.4*    241       Significant Imaging: I have reviewed all pertinent imaging results/findings within the past 24 hours.      Assessment/Plan:      * Encephalopathy, metabolic  - Encephalopathy likely multiple etiology metabolic, infectious  - appreciate Neuro recs  - Improving. Advanced diet to mechanical soft      Septic shock with acute organ dysfunction due to methicillin susceptible Staphylococcus aureus (MSSA)  Notable this admission: Epidural Abcess, Abcess Upper Extremity, Septic Pulmonary Emboli  - Patient clinically improving. Afebrile. Leukocytosis improved.  Appreciate ID recs  - Continue Oxacillin for MSSA Endocarditis       Acute bacterial endocarditis  -Continue Oxacillin for MSSA Endocarditis   -Recommend removing all lines and catheters including astorga once clinically feasible since they are potential sources of infection  -Will still plan to discharge patient on OXACILLIN 12G IV continuous  for 6 weeks, estimated end of therapy date 11/20  - Contact ID prior to DC        Leg cramping  No unilateral edema, lower extremity  tenderness or skin changes. Extremities are warm and peripheral pulses palpable.   Start gabapentin 100mg TID      HTN (hypertension)  - Per neuro BP goal less than 150, but avoid precipitous drops  - increase metroprolol to 100mg BID  - continue amlodipine to 10mg daily  - hydralazine and labetolol prn        Palliative care encounter  Appreciate palliative care recs      Right thalamic stroke  - likely due to infection and septic emboli  - Brain MRI 9/30 with lacunar infarcts  - repeat head CT (10/7) revealed new right basal ganglia infarcts  - MRI of brain revealed R thalamic infarct, ventriculitis, subactue embolic infarctions  -CTA of head 10/8: atherosclerotic plaquing of the distal vertebral arteries and concern for noncalcified plaquing and stenosis; no high-grade stenosis or proximal occlusion. No mycotic aneurysms  - vasc neuro recommending against systemic anticoagulation and asa due to high risk for hemorrhage from septic emboli      Hypernatremia  - Resolved      Severe malnutrition  - Advanced to mechanical soft diet with boost ensure supplement with meals    Pleural effusion  - Multifactorial secondary to complete left lung collapse, septic emboli, pleural effusions, suspected pna  - s/p thora 10/1- transudative effusion; chest tube placed due to concern for empyema at OSH (10/3); since removed  --duo nebs spaced out due to tachycardia  --continues to be tachypneic with pH 7.5 (since 10/10) likely from underlying lung issues including septic emboli, possible new asp PNA. NO PE seen on CTA from 10/3, but could have developed one since. However, she has high risk for brain bleed if were to AC and neurosx has already rec'd against systemic anticoagulation. Hold off on repeat CTA chest for now.    Hypokalemia  repleted        VTE Risk Mitigation (From admission, onward)         Ordered     enoxaparin injection 40 mg  Daily      10/11/19 1124     IP VTE HIGH RISK PATIENT  Once      10/06/19 0812     Place  sequential compression device  Until discontinued      10/02/19 1946                      Sushil Ennis MD  Department of Hospital Medicine   [facility]

## 2019-10-21 NOTE — PT/OT/SLP PROGRESS
"Speech Language Pathology Treatment    Patient Name:  Mesha Mckenzie   MRN:  [id number]  Admitting Diagnosis: Encephalopathy, metabolic    Recommendations:                 General Recommendations:  Dysphagia therapy  Diet recommendations:  Mechanical soft, Liquid Diet Level: Thin   Aspiration Precautions: Feed only when awake/alert, upright for all oral intake, small bites/sips, Meds crushed in puree and Standard aspiration precautions . Monitor for s/s aspiration.  General Precautions: Standard, aspiration, fall  Communication strategies:  none    Subjective   "I just don't want it" (pt stated of reduced desire for po intake)    Pain/Comfort:  · Pain Rating 1: (pt reported some pain in back, but did not give rating)  · Pain Addressed 1: Pre-medicate for activity, Reposition(pt reports having recently received pain medication)  · Pain Rating Post-Intervention 1: (no change in pain; pt left appearing comfortable in bed)    Objective:     Has the patient been evaluated by SLP for swallowing?   Yes  Keep patient NPO? No   Current Respiratory Status: nasal cannula      Pt seen this am following nursing approval.  Nursing denies difficulty with observed intake; however, states pt's appetite to be poor.  Family member in room indicates pt did not eat any breakfast.  He further states pt to be a picky eater, providing example where pt ate donut holes for breakfast lunch and dinner for some time.  Pt was roused and able to maintain alertness throughout session.  She was seated upright and agreed to trials of thin water via straw. Oral stage was adequate and no overt s/s of aspiration were observed with limited intake.  Pt refused solid bolus trials.  Education and encouragement provided regarding importance of nutrition, nutritional supplements, spacing small amounts throughout the day, aspiration precautions to reduced risk for aspiration, current diet recs, s/s aspiration to monitor for and plan of care. Pt/family " verbalized understanding.  Whiteboard updated.    Assessment:     Mesha Mckenzie is a 63 y.o. female with an SLP diagnosis of Dysphagia.    Goals:   Multidisciplinary Problems     SLP Goals        Problem: SLP Goal    Goal Priority Disciplines Outcome   SLP Goal     SLP Ongoing, Progressing   Description:  Speech Language Pathology Goals  Goals expected to be met by 10/25  1. Pt will tolerate thin and mechanical soft diet without s/s aspiration                             Plan:     · Patient to be seen:  3 x/week   · Plan of Care expires:  11/06/19  · Plan of Care reviewed with:  patient, spouse   · SLP Follow-Up:  Yes       Discharge recommendations:  rehabilitation facility per PT/OT      Time Tracking:     SLP Treatment Date:   10/21/19  Speech Start Time:  1018  Speech Stop Time:  1028     Speech Total Time (min):  10 min    Billable Minutes: Treatment Swallowing Dysfunction 10    LEROY Park, CCC-SLP  Speech Language Pathologist  [phone]  10/21/2019

## 2019-10-22 PROBLEM — R23.9 ALTERATION IN SKIN INTEGRITY: Status: ACTIVE | Noted: 2019-10-22

## 2019-10-22 PROBLEM — L02.91 ABSCESS: Status: ACTIVE | Noted: 2019-10-22

## 2019-10-22 LAB
ANION GAP SERPL CALC-SCNC: 8 MMOL/L (ref 8–16)
ANION GAP SERPL CALC-SCNC: 9 MMOL/L (ref 8–16)
BASOPHILS # BLD AUTO: 0.04 K/UL (ref 0–0.2)
BASOPHILS NFR BLD: 0.4 % (ref 0–1.9)
BUN SERPL-MCNC: 12 MG/DL (ref 8–23)
BUN SERPL-MCNC: 14 MG/DL (ref 8–23)
CALCIUM SERPL-MCNC: 7.2 MG/DL (ref 8.7–10.5)
CALCIUM SERPL-MCNC: 7.2 MG/DL (ref 8.7–10.5)
CHLORIDE SERPL-SCNC: 105 MMOL/L (ref 95–110)
CHLORIDE SERPL-SCNC: 110 MMOL/L (ref 95–110)
CO2 SERPL-SCNC: 20 MMOL/L (ref 23–29)
CO2 SERPL-SCNC: 20 MMOL/L (ref 23–29)
CREAT SERPL-MCNC: 0.6 MG/DL (ref 0.5–1.4)
CREAT SERPL-MCNC: 0.7 MG/DL (ref 0.5–1.4)
DIFFERENTIAL METHOD: ABNORMAL
EOSINOPHIL # BLD AUTO: 0.2 K/UL (ref 0–0.5)
EOSINOPHIL NFR BLD: 2.2 % (ref 0–8)
ERYTHROCYTE [DISTWIDTH] IN BLOOD BY AUTOMATED COUNT: 20.8 % (ref 11.5–14.5)
EST. GFR  (AFRICAN AMERICAN): >60 ML/MIN/1.73 M^2
EST. GFR  (AFRICAN AMERICAN): >60 ML/MIN/1.73 M^2
EST. GFR  (NON AFRICAN AMERICAN): >60 ML/MIN/1.73 M^2
EST. GFR  (NON AFRICAN AMERICAN): >60 ML/MIN/1.73 M^2
GLUCOSE SERPL-MCNC: 135 MG/DL (ref 70–110)
GLUCOSE SERPL-MCNC: 81 MG/DL (ref 70–110)
HCT VFR BLD AUTO: 21.8 % (ref 37–48.5)
HGB BLD-MCNC: 7 G/DL (ref 12–16)
IMM GRANULOCYTES # BLD AUTO: 0.26 K/UL (ref 0–0.04)
IMM GRANULOCYTES NFR BLD AUTO: 2.7 % (ref 0–0.5)
LYMPHOCYTES # BLD AUTO: 2.5 K/UL (ref 1–4.8)
LYMPHOCYTES NFR BLD: 25.5 % (ref 18–48)
MAGNESIUM SERPL-MCNC: 1.3 MG/DL (ref 1.6–2.6)
MCH RBC QN AUTO: 32 PG (ref 27–31)
MCHC RBC AUTO-ENTMCNC: 32.1 G/DL (ref 32–36)
MCV RBC AUTO: 100 FL (ref 82–98)
MONOCYTES # BLD AUTO: 0.5 K/UL (ref 0.3–1)
MONOCYTES NFR BLD: 4.7 % (ref 4–15)
NEUTROPHILS # BLD AUTO: 6.3 K/UL (ref 1.8–7.7)
NEUTROPHILS NFR BLD: 64.5 % (ref 38–73)
NRBC BLD-RTO: 0 /100 WBC
PHOSPHATE SERPL-MCNC: 4 MG/DL (ref 2.7–4.5)
PLATELET # BLD AUTO: 207 K/UL (ref 150–350)
PMV BLD AUTO: 10.6 FL (ref 9.2–12.9)
POCT GLUCOSE: 123 MG/DL (ref 70–110)
POCT GLUCOSE: 131 MG/DL (ref 70–110)
POCT GLUCOSE: 133 MG/DL (ref 70–110)
POCT GLUCOSE: 83 MG/DL (ref 70–110)
POCT GLUCOSE: 99 MG/DL (ref 70–110)
POTASSIUM SERPL-SCNC: 2.6 MMOL/L (ref 3.5–5.1)
POTASSIUM SERPL-SCNC: 3.2 MMOL/L (ref 3.5–5.1)
RBC # BLD AUTO: 2.19 M/UL (ref 4–5.4)
SODIUM SERPL-SCNC: 133 MMOL/L (ref 136–145)
SODIUM SERPL-SCNC: 139 MMOL/L (ref 136–145)
WBC # BLD AUTO: 9.69 K/UL (ref 3.9–12.7)

## 2019-10-22 PROCEDURE — 25000003 PHARM REV CODE 250: Performed by: INTERNAL MEDICINE

## 2019-10-22 PROCEDURE — 63600175 PHARM REV CODE 636 W HCPCS: Performed by: INTERNAL MEDICINE

## 2019-10-22 PROCEDURE — 97116 GAIT TRAINING THERAPY: CPT

## 2019-10-22 PROCEDURE — 85025 COMPLETE CBC W/AUTO DIFF WBC: CPT

## 2019-10-22 PROCEDURE — 80048 BASIC METABOLIC PNL TOTAL CA: CPT

## 2019-10-22 PROCEDURE — 20600001 HC STEP DOWN PRIVATE ROOM

## 2019-10-22 PROCEDURE — 99232 SBSQ HOSP IP/OBS MODERATE 35: CPT | Mod: ,,, | Performed by: INTERNAL MEDICINE

## 2019-10-22 PROCEDURE — 84100 ASSAY OF PHOSPHORUS: CPT

## 2019-10-22 PROCEDURE — 99232 PR SUBSEQUENT HOSPITAL CARE,LEVL II: ICD-10-PCS | Mod: ,,, | Performed by: INTERNAL MEDICINE

## 2019-10-22 PROCEDURE — 25000003 PHARM REV CODE 250: Performed by: FAMILY MEDICINE

## 2019-10-22 PROCEDURE — 99232 PR SUBSEQUENT HOSPITAL CARE,LEVL II: ICD-10-PCS | Mod: ,,, | Performed by: NURSE PRACTITIONER

## 2019-10-22 PROCEDURE — 83735 ASSAY OF MAGNESIUM: CPT

## 2019-10-22 PROCEDURE — 99232 SBSQ HOSP IP/OBS MODERATE 35: CPT | Mod: ,,, | Performed by: NURSE PRACTITIONER

## 2019-10-22 PROCEDURE — 25000003 PHARM REV CODE 250: Performed by: STUDENT IN AN ORGANIZED HEALTH CARE EDUCATION/TRAINING PROGRAM

## 2019-10-22 PROCEDURE — 36415 COLL VENOUS BLD VENIPUNCTURE: CPT

## 2019-10-22 RX ORDER — LOPERAMIDE HYDROCHLORIDE 2 MG/1
2 CAPSULE ORAL 4 TIMES DAILY PRN
Status: DISCONTINUED | OUTPATIENT
Start: 2019-10-22 | End: 2019-10-23

## 2019-10-22 RX ORDER — FOLIC ACID 1 MG/1
1 TABLET ORAL DAILY
Status: DISCONTINUED | OUTPATIENT
Start: 2019-10-22 | End: 2019-10-25 | Stop reason: HOSPADM

## 2019-10-22 RX ORDER — FERROUS SULFATE 325(65) MG
325 TABLET, DELAYED RELEASE (ENTERIC COATED) ORAL DAILY
Status: DISCONTINUED | OUTPATIENT
Start: 2019-10-22 | End: 2019-10-25 | Stop reason: HOSPADM

## 2019-10-22 RX ORDER — MAGNESIUM SULFATE HEPTAHYDRATE 40 MG/ML
4 INJECTION, SOLUTION INTRAVENOUS ONCE
Status: COMPLETED | OUTPATIENT
Start: 2019-10-22 | End: 2019-10-22

## 2019-10-22 RX ORDER — POTASSIUM CHLORIDE 7.45 MG/ML
10 INJECTION INTRAVENOUS
Status: COMPLETED | OUTPATIENT
Start: 2019-10-22 | End: 2019-10-22

## 2019-10-22 RX ORDER — POTASSIUM CHLORIDE 20 MEQ/15ML
40 SOLUTION ORAL ONCE
Status: DISCONTINUED | OUTPATIENT
Start: 2019-10-22 | End: 2019-10-23

## 2019-10-22 RX ADMIN — LOPERAMIDE HYDROCHLORIDE 2 MG: 2 CAPSULE ORAL at 04:10

## 2019-10-22 RX ADMIN — AMLODIPINE BESYLATE 10 MG: 10 TABLET ORAL at 09:10

## 2019-10-22 RX ADMIN — MAGNESIUM SULFATE IN WATER 4 G: 40 INJECTION, SOLUTION INTRAVENOUS at 09:10

## 2019-10-22 RX ADMIN — ENOXAPARIN SODIUM 40 MG: 100 INJECTION SUBCUTANEOUS at 04:10

## 2019-10-22 RX ADMIN — POTASSIUM CHLORIDE 10 MEQ: 7.46 INJECTION, SOLUTION INTRAVENOUS at 10:10

## 2019-10-22 RX ADMIN — MICONAZOLE NITRATE: 20 OINTMENT TOPICAL at 09:10

## 2019-10-22 RX ADMIN — ACETAMINOPHEN 650 MG: 325 TABLET ORAL at 07:10

## 2019-10-22 RX ADMIN — OXYCODONE HYDROCHLORIDE 10 MG: 10 TABLET ORAL at 04:10

## 2019-10-22 RX ADMIN — OXACILLIN SODIUM 12 G: 10 INJECTION, POWDER, FOR SOLUTION INTRAVENOUS at 10:10

## 2019-10-22 RX ADMIN — POTASSIUM BICARBONATE 50 MEQ: 978 TABLET, EFFERVESCENT ORAL at 09:10

## 2019-10-22 RX ADMIN — OXYCODONE HYDROCHLORIDE 10 MG: 10 TABLET ORAL at 02:10

## 2019-10-22 RX ADMIN — FOLIC ACID 1 MG: 1 TABLET ORAL at 09:10

## 2019-10-22 RX ADMIN — POTASSIUM CHLORIDE 10 MEQ: 7.46 INJECTION, SOLUTION INTRAVENOUS at 08:10

## 2019-10-22 RX ADMIN — Medication 1 CAPSULE: at 02:10

## 2019-10-22 RX ADMIN — FERROUS SULFATE TAB EC 325 MG (65 MG FE EQUIVALENT) 325 MG: 325 (65 FE) TABLET DELAYED RESPONSE at 09:10

## 2019-10-22 RX ADMIN — PSYLLIUM HUSK 1 PACKET: 3.4 POWDER ORAL at 02:10

## 2019-10-22 RX ADMIN — METOPROLOL TARTRATE 100 MG: 100 TABLET ORAL at 09:10

## 2019-10-22 NOTE — PROGRESS NOTES
[facility]  Physical Medicine & Rehab  Progress Note    Patient Name: Mesha Mckenzie  MRN: [id number]  Admission Date: 10/2/2019  Length of Stay: 20 days  Attending Physician: Yesenia Lerner*    Subjective:     Principal Problem:Encephalopathy, metabolic    Hospital Course:   10/11/19: Evaluated by PT & OT. Bed mobility total- totalA x 2 ppl. Sat EOB modA x 2 ppl 8 mins. Grooming Misha.   10/14/19: Participated w/ PT & OT. Bed mobility totalA. Sit to stand maxA- HHA.  sat EOB mod-max assist for ~10 minutes.   10/21/19: Participated w/ PT & OT. Bed mobility min-modA. Sit to stand maxA w/ RW. Bed to chair modA- HHA. Toileting totalA. Ambulated  12ft then 12ft then 20ft with RW with modA.     Interval History 10/22/2019:  Patient is seen for follow-up rehab evaluation and recommendations: Participating with therapy. Rectal tube in place.     HPI, Past Medical, Family, and Social History remains the same as documented in the initial encounter.    Scheduled Medications:    amLODIPine  10 mg Per NG tube Daily    enoxaparin  40 mg Subcutaneous Daily    ferrous sulfate  325 mg Oral Daily    folic acid  1 mg Oral Daily    magnesium sulfate IVPB  4 g Intravenous Once    metoprolol tartrate  100 mg Oral BID    miconazole nitrate 2%   Topical (Top) BID    oxacillin 12 g in  mL CONTINUOUS INFUSION  12 g Intravenous Q24H    potassium chloride 10%  40 mEq Oral Once       Diagnostic Results:   Labs: Reviewed  CT: Reviewed  EKG: Reviewed     PRN Medications: acetaminophen, albuterol-ipratropium, Dextrose 10% Bolus, Dextrose 10% Bolus, Dextrose 10% Bolus, glucagon (human recombinant), glucose, glucose, hydrALAZINE, influenza, labetalol, levalbuterol, mupirocin, mupirocin, ondansetron, oxyCODONE, oxyCODONE, pneumoc 13-susanna conj-dip cr(PF), promethazine (PHENERGAN) IVPB, sodium chloride 0.9%    Review of Systems   Reason unable to perform ROS: unable to get full ROS 2/2 lethargy     Constitutional: Positive for activity change. Negative for appetite change.   Musculoskeletal: Positive for gait problem.   Neurological: Positive for speech difficulty and weakness.   Psychiatric/Behavioral: Positive for confusion. Negative for behavioral problems.     Objective:     Vital Signs (Most Recent):  Temp: 98.2 °F (36.8 °C) (10/22/19 0809)  Pulse: 91 (10/22/19 0809)  Resp: 18 (10/22/19 0809)  BP: (!) 116/55 (10/22/19 0809)  SpO2: 97 % (10/22/19 0809)    Vital Signs (24h Range):  Temp:  [96.4 °F (35.8 °C)-98.5 °F (36.9 °C)] 98.2 °F (36.8 °C)  Pulse:  [71-92] 91  Resp:  [14-18] 18  SpO2:  [96 %-97 %] 97 %  BP: ()/(46-68) 116/55     Physical Exam   Constitutional: She appears well-developed and well-nourished.   HENT:   Head: Normocephalic and atraumatic.   Eyes: Pupils are equal, round, and reactive to light. EOM are normal.   Neck: Neck supple.   Pulmonary/Chest: Effort normal. No respiratory distress.   Abdominal:   Rectal tube in place   Musculoskeletal: She exhibits no tenderness or deformity.   Neurological:   - alert lying in bed  - nodding head and responding yes to answers  - generalized weakness present   Skin: Skin is warm and dry.   Psychiatric: She has a normal mood and affect. Her behavior is normal.   Nursing note and vitals reviewed.    Assessment/Plan:      * Encephalopathy, metabolic  - improving     Debility  - Related to prolonged/acute hospital course.     Recommendations  -  Encourage mobility, OOB in chair at least 3 hours per day, and early ambulation as appropriate  -  PT/OT evaluate and treat  -  Pain management  -  Monitor for and prevent skin breakdown and pressure ulcers  · Early mobility, repositioning/weight shifting every 20-30 minutes when sitting, turn patient every 2 hours, proper mattress/overlay and chair cushioning, pressure relief/heel protector boots  -  DVT prophylaxis    -  Reviewed discharge options (IP rehab, SNF, HH therapy, and OP therapy)    Septic  shock with acute organ dysfunction due to methicillin susceptible Staphylococcus aureus (MSSA)  -  Continue Oxacillin for MSSA Endocarditis    Epidural abscess  - MRI L spine showed L2-L4 epidural abscess and NSGY consulted and S/p L3 laminectomy and debridement of paraspinal abscess.     Hypokalemia  - K 2.6 10/22    Recommend inpatient rehab pending medical stability.     Mariah Muller NP  Department of Physical Medicine & Rehab   [facility]

## 2019-10-22 NOTE — PLAN OF CARE
Problem: Physical Therapy Goal  Goal: Physical Therapy Goal  Description  Goals to be met by: 2019    Patient will increase functional independence with mobility by performin. Supine <> sit with Moderate Assistance.- met  Revised goal: supine to/from sit with minimal assist-not met  2. Sit <> stand transfer with Moderate Assistance using Rolling Walker.- met  Revised goal: sit to stand with RW with minimal assist-not met  3. Bed <> chair transfer via Step Transfer with Moderate Assistance using Rolling Walker.-met  Revised goal: bed to/from chair with RW with minimal assist-not met  4. Dynamic sitting at edge of bed x 10 minutes with Minimal Assistance to prepare for functional tasks in sitting.-not met  5. Able to tolerate exercise for 15-20 reps with assistance as needed.-not met  6. Pt receive gait training ~ 50 ft with Rw and min assist - not met              Outcome: Ongoing, Progressing   Pt's goals remain appropriate and pt will continue to benefit from skilled PT services to work towards improved functional mobility including: bed mobility, transfers, and gait.   Lauren Paige, PT  10/22/2019

## 2019-10-22 NOTE — SUBJECTIVE & OBJECTIVE
Interval History 10/22/2019:  Patient is seen for follow-up rehab evaluation and recommendations: Participating with therapy. Rectal tube in place.     HPI, Past Medical, Family, and Social History remains the same as documented in the initial encounter.    Scheduled Medications:    amLODIPine  10 mg Per NG tube Daily    enoxaparin  40 mg Subcutaneous Daily    ferrous sulfate  325 mg Oral Daily    folic acid  1 mg Oral Daily    magnesium sulfate IVPB  4 g Intravenous Once    metoprolol tartrate  100 mg Oral BID    miconazole nitrate 2%   Topical (Top) BID    oxacillin 12 g in  mL CONTINUOUS INFUSION  12 g Intravenous Q24H    potassium chloride 10%  40 mEq Oral Once       Diagnostic Results:   Labs: Reviewed  CT: Reviewed  EKG: Reviewed     PRN Medications: acetaminophen, albuterol-ipratropium, Dextrose 10% Bolus, Dextrose 10% Bolus, Dextrose 10% Bolus, glucagon (human recombinant), glucose, glucose, hydrALAZINE, influenza, labetalol, levalbuterol, mupirocin, mupirocin, ondansetron, oxyCODONE, oxyCODONE, pneumoc 13-susanna conj-dip cr(PF), promethazine (PHENERGAN) IVPB, sodium chloride 0.9%    Review of Systems   Reason unable to perform ROS: unable to get full ROS 2/2 lethargy    Constitutional: Positive for activity change. Negative for appetite change.   Musculoskeletal: Positive for gait problem.   Neurological: Positive for speech difficulty and weakness.   Psychiatric/Behavioral: Positive for confusion. Negative for behavioral problems.     Objective:     Vital Signs (Most Recent):  Temp: 98.2 °F (36.8 °C) (10/22/19 0809)  Pulse: 91 (10/22/19 0809)  Resp: 18 (10/22/19 0809)  BP: (!) 116/55 (10/22/19 0809)  SpO2: 97 % (10/22/19 0809)    Vital Signs (24h Range):  Temp:  [96.4 °F (35.8 °C)-98.5 °F (36.9 °C)] 98.2 °F (36.8 °C)  Pulse:  [71-92] 91  Resp:  [14-18] 18  SpO2:  [96 %-97 %] 97 %  BP: ()/(46-68) 116/55     Physical Exam   Constitutional: She appears well-developed and well-nourished.    HENT:   Head: Normocephalic and atraumatic.   Eyes: Pupils are equal, round, and reactive to light. EOM are normal.   Neck: Neck supple.   Pulmonary/Chest: Effort normal. No respiratory distress.   Abdominal:   Rectal tube in place   Musculoskeletal: She exhibits no tenderness or deformity.   Neurological:   - alert lying in bed  - nodding head and responding yes to answers  - generalized weakness present   Skin: Skin is warm and dry.   Psychiatric: She has a normal mood and affect. Her behavior is normal.   Nursing note and vitals reviewed.    NEUROLOGICAL EXAMINATION:     CRANIAL NERVES     CN III, IV, VI   Pupils are equal, round, and reactive to light.  Extraocular motions are normal.

## 2019-10-22 NOTE — PROGRESS NOTES
"Wound care follow up:  Shoulder wounds improving with smaller measurements.  See assessment below. With less wound drainage to wounds, recommend d/c Aquacel AG and begin daily MediHoney to continue to decrease bioburden and promote healing.  Dr. Lerner agreed.  Modified orders and notified Nurse Pravin.   Pt c/o "burning" to buttocks. Upon assessment, red rash with fungal appearance to perianal skin due to leaking fecal tube at insertion site. Nurse Pravin adjusted and flushed rectal tube and cleansed pt from stool, applying antifungal ointment accordingly.  Recommended waffle overlay and for nursing to maintain pressure injury prevention interventions.  Wound care to follow pt prn  [id number]       10/22/19 1001        Incision/Site 10/06/19 0700 Right Shoulder   Date First Assessed/Time First Assessed: 10/06/19 0700   Side: Right  Location: (c) Shoulder   Wound Image    Incision WDL ex   Dressing Appearance Intact   Drainage Amount Small   Drainage Characteristics/Odor Serosanguineous   Appearance Pink;Red;Slough  (cartilage/tendon)   Red (%), Wound Tissue Color 50 %   Yellow (%), Wound Tissue Color 50 %   Periwound Area Intact   Wound Edges Open   Wound Length (cm) 0.8 cm   Wound Width (cm) 1 cm   Wound Depth (cm) 0.3 cm   Wound Volume (cm^3) 0.24 cm^3   Wound Surface Area (cm^2) 0.8 cm^2   Undermining (depth (cm)/location) .3 all around clock   Dressing Reinforced        Incision/Site 10/06/19 0700 Left Shoulder   Date First Assessed/Time First Assessed: 10/06/19 0700   Side: Left  Location: (c) Shoulder   Wound Image    Incision WDL ex   Dressing Appearance Intact   Drainage Amount Scant   Drainage Characteristics/Odor Serosanguineous   Appearance Red;Pink;Slough   Red (%), Wound Tissue Color 90 %   Yellow (%), Wound Tissue Color 10 %   Periwound Area Intact   Wound Edges Open   Wound Length (cm) 0.5 cm   Wound Width (cm) 0.7 cm   Wound Depth (cm) 0.3 cm   Wound Volume (cm^3) 0.1 cm^3   Wound Surface Area " (cm^2) 0.35 cm^2   Dressing Reinforced

## 2019-10-22 NOTE — NURSING
Pt AAO x4 with episodes of confusion/ hallucinations (Family not at bedside and random requests thinking she is at home.) Daughter at bedside all shift. Rectal tube remains intact with small leakage around tube. Stool still liquid/loose. MD ordered PRN Imodium and fiber to assist with diarrhea. Henriquez intact with adequate UOP. Pt drank Boost from each meal today with little liquid intake, see I&Os. Pt verbalizes no appetite. Pt educated on risks of malnurishment  And possible need for feeding tube or IV nutritional TPN. Bilateral posterior shoulder wounds dressed with medihoney covered by mepilex dressings.  Lower back incision with sutures intact and open to air. Pt encouraged and assisted with TQ2. No acute distress or needs at this time. WCTM

## 2019-10-22 NOTE — ASSESSMENT & PLAN NOTE
- MRI L spine showed L2-L4 epidural abscess and NSGY consulted and S/p L3 laminectomy and debridement of paraspinal abscess.

## 2019-10-22 NOTE — PT/OT/SLP PROGRESS
Physical Therapy Treatment    Patient Name:  Mesah Mckenzie   MRN:  [id number]    Recommendations:     Discharge Recommendations:  rehabilitation facility   Discharge Equipment Recommendations: (TBD as pt progresses)   Barriers to discharge: Decreased caregiver support    Assessment:     Mesha Mckenzie is a 63 y.o. female admitted with a medical diagnosis of Encephalopathy, metabolic.  She presents with the following impairments/functional limitations:  weakness, gait instability, impaired functional mobilty, pain, decreased safety awareness, decreased lower extremity function, impaired cardiopulmonary response to activity . Pt continues to improve with functional mobility, limited by fatigue and SOB    Rehab Prognosis: Good; patient would benefit from acute skilled PT services to address these deficits and reach maximum level of function.    Recent Surgery: Procedure(s) (LRB):  LAMINECTOMY, SPINE, L3, Open (N/A) 13 Days Post-Op    Plan:     During this hospitalization, patient to be seen 4 x/week to address the identified rehab impairments via gait training, therapeutic activities, therapeutic exercises, neuromuscular re-education and progress toward the following goals:    · Plan of Care Expires:  11/18/19    Subjective   Pt c/o being cold and SOB after gait  Pain/Comfort:  · Pain Rating 1: 10/10  · Location - Orientation 1: generalized  · Location 1: back  · Pain Addressed 1: Pre-medicate for activity, Reposition, Cessation of Activity  · Pain Rating Post-Intervention 1: (pt states she feels better in sitting or lying down, did not grade)      Objective:     Communicated with nurse prior to session.  Patient found supine with bowel management system, astorga catheter, peripheral IV, telemetry upon PT entry to room.     General Precautions: Standard, aspiration, fall   Orthopedic Precautions:N/A   Braces: N/A     Functional Mobility:  · Bed Mobility:     · Supine to Sit: moderate assistance  · Transfers:      · Sit to Stand:  maximal assistance with rolling walker  · Bed to Chair: moderate assistance with  hand-held assist  using  Stand Pivot  · Gait: 20ft x 2 trials then 30ft with RW with moderate assist +1 to follow with bedside chair. pt performed gait with flexed trunk, cross over gait with L over R LE causing narrow ROBBI and instability, decreased step length, and at slow pace. Pt limited with gait distance due to SOB and fatigue.    AM-PAC 6 CLICK MOBILITY  Turning over in bed (including adjusting bedclothes, sheets and blankets)?: 3  Sitting down on and standing up from a chair with arms (e.g., wheelchair, bedside commode, etc.): 2  Moving from lying on back to sitting on the side of the bed?: 2  Moving to and from a bed to a chair (including a wheelchair)?: 2  Need to walk in hospital room?: 2  Climbing 3-5 steps with a railing?: 1  Basic Mobility Total Score: 12     Therapeutic Activities and Exercises:   pt with noted leakage from the BMS during functional mobility, nurse notified.     Patient left up in chair with all lines intact, call button in reach, nurse notified and daughter present..    GOALS:   Multidisciplinary Problems     Physical Therapy Goals        Problem: Physical Therapy Goal    Goal Priority Disciplines Outcome Goal Variances Interventions   Physical Therapy Goal     PT, PT/OT Ongoing, Progressing     Description:  Goals to be met by: 2019    Patient will increase functional independence with mobility by performin. Supine <> sit with Moderate Assistance.- met  Revised goal: supine to/from sit with minimal assist-not met  2. Sit <> stand transfer with Moderate Assistance using Rolling Walker.- met  Revised goal: sit to stand with RW with minimal assist-not met  3. Bed <> chair transfer via Step Transfer with Moderate Assistance using Rolling Walker.-met  Revised goal: bed to/from chair with RW with minimal assist-not met  4. Dynamic sitting at edge of bed x 10 minutes with Minimal  Assistance to prepare for functional tasks in sitting.-not met  5. Able to tolerate exercise for 15-20 reps with assistance as needed.-not met  6. Pt receive gait training ~ 50 ft with Rw and min assist - not met                               Time Tracking:     PT Received On: 10/22/19  PT Start Time: 1022     PT Stop Time: 1055  PT Total Time (min): 33 min     Billable Minutes: Gait Training 33    Treatment Type: Treatment  PT/PTA: PT     PTA Visit Number: 0     Lauren Paige, PT  10/22/2019

## 2019-10-22 NOTE — PLAN OF CARE
Plan of care reviewed with patient and family who demonstrated understanding. Pt. Oriented to self, VSS while on room air. Poor appetite on mechanical soft diet. Prn oxycodone used for leg pain. Call light within reach, bed rails x3, family in room at all times. Henriquez and rectal tube in place. No signs of distress. Frequent monitoring completed.

## 2019-10-22 NOTE — PROGRESS NOTES
Hospital Medicine  Progress Note      Patient Name: Mesha Mckenzie  MRN: [id number]  Date of Admission: 10/2/2019     Principal Problem: Encephalopathy, metabolic     Subjective     No acute events overnight. Resting comfortably in chair and answering questions appropriately this morning. However, disoriented to time and place. Daughter at bedside. Labs significant for hypoK and hypoMg. Still having watery stool with flexi-seal in place. Will start metamucil, probiotics and PRN imodium.       Review of Systems     Constitutional: Negative for chills, fatigue, fever.   HENT: Negative for sore throat, trouble swallowing.    Eyes: Negative for photophobia, visual disturbance.   Respiratory: Negative for cough, shortness of breath.    Cardiovascular: Negative for chest pain, palpitations, leg swelling.   Gastrointestinal: Negative for abdominal pain, constipation, diarrhea, nausea, vomiting.   Endocrine: Negative for cold intolerance, heat intolerance.   Genitourinary: Negative for dysuria, frequency.   Musculoskeletal: Negative for arthralgias, myalgias.   Skin: Negative for rash, wound, erythema   Neurological: Negative for dizziness, syncope, weakness, light-headedness.   Psychiatric/Behavioral: POSITIVE for confusion, hallucinations, anxiety    Medications  Scheduled Meds:   amLODIPine  10 mg Per NG tube Daily    enoxaparin  40 mg Subcutaneous Daily    ferrous sulfate  325 mg Oral Daily    folic acid  1 mg Oral Daily    Lactobacillus rhamnosus GG  1 capsule Oral Daily    metoprolol tartrate  100 mg Oral BID    miconazole nitrate 2%   Topical (Top) BID    oxacillin 12 g in  mL CONTINUOUS INFUSION  12 g Intravenous Q24H    potassium chloride 10%  40 mEq Oral Once    psyllium husk (aspartame)  1 packet Oral Daily     Continuous Infusions:  PRN Meds:.acetaminophen, albuterol-ipratropium, Dextrose 10% Bolus, Dextrose 10% Bolus, Dextrose 10% Bolus, glucagon (human recombinant), glucose, glucose,  hydrALAZINE, influenza, labetalol, levalbuterol, loperamide, mupirocin, mupirocin, ondansetron, oxyCODONE, oxyCODONE, pneumoc 13-susanna conj-dip cr(PF), promethazine (PHENERGAN) IVPB, sodium chloride 0.9%    Objective    Physical Examination    Temp:  [96.4 °F (35.8 °C)-98.5 °F (36.9 °C)]   Pulse:  [72-92]   Resp:  [14-18]   BP: ()/(46-68)   SpO2:  [96 %-97 %]     Gen: NAD, conversant  Head: NC, AT  Eyes: PERRLA, EOMI  Throat: MMM, OP clear  CV: RRR, no M/R/G, no peripheral edema, no JVD  Resp: CTAB, no increased work of breathing on room air  GI: Soft, NT, ND, +BS. Flexi-seal in place.   Ext: MAEW, no c/c/e  Neuro: AAOx3, CN grossly intact, no focal neurologic deficits  Psychiatry: Normal mood, normal affect, no SI/HI    CBC  Recent Labs   Lab 10/20/19  0529 10/21/19  0542 10/22/19  0442   WBC 9.75 10.12 9.69   HGB 7.1* 7.6* 7.0*   HCT 22.8* 24.4* 21.8*    241 207     CMP  Recent Labs   Lab 10/20/19  0529 10/20/19  1424 10/21/19  0542 10/22/19  0442    137 137 139   K 2.4* 3.3* 3.1* 2.6*    110 110 110   CO2 20* 18* 19* 20*   BUN 9 11 11 12   CREATININE 0.6 0.7 0.6 0.6   GLU 93 87 88 81   CALCIUM 6.8* 7.3* 7.3* 7.2*   MG 1.3*  --  1.4* 1.3*   PHOS 3.6  --  3.4 4.0               Assessment and Plan:  Septic shock with acute organ dysfunction due to methicillin susceptible Staphylococcus aureus (MSSA) bacteremia,  Epidural Abcess,   Abscess Upper Extremities,   Septic Pulmonary and Brain Emboli,   Acute Bacterial Endocarditis  --sepsis resolved  --Continuing oxacillin for MSSA endocarditis per ID recs with estimated end date: 11/20/19. Will need f/up in clinic.    Hypokalemia  --ongoing problem during hospitalization  --suspect due to GI losses and inadequate PO intake  --continue to replete with IV and PO supplements  --f/u BMP this afternoon  --will work on bulking up watery stool    Metabolic encephalopathy  Right thalamic stroke  --mental status improving  --Encephalopathy likely multiple  etiology metabolic, infectious and due to stroke  - Brain MRI 9/30 with lacunar infarcts  - repeat head CT (10/7) revealed new right basal ganglia infarcts  - MRI of brain revealed R thalamic infarct, ventriculitis, subactue embolic infarctions  -CTA of head 10/8: atherosclerotic plaquing of the distal vertebral arteries and concern for noncalcified plaquing and stenosis; no high-grade stenosis or proximal occlusion. No mycotic aneurysms  - vasc neuro recommending against systemic anticoagulation and asa due to high risk for hemorrhage from septic emboli  --continue delirium precautions  --avoid sedating meds    Leg cramping  --improved with gabapentin; continue for now    Palliative care encounter  Appreciate palliative care recs  --remains FULL code    Debility  --PT/OT recommending rehab    Severe protein calorie malnutrition  --appreciate nutrition assistance  --continue mechanical soft diet and protein shake supplements    Pancolitis  -s/p abx tx  -c diff neg x 2  --still with watery stool, which is likely contributing to electrolyte derangements  --start metamucil and probiotics in attempts to bulk-up stool  --PRN imodium   --switch boost to boost breeze as lactose can be contributing        Diet: mechanical soft  VTE PPX: lovenox  Goals of care: full  Dispo: rehab pending clinical stability    Yesenia Lerner M.D.  Department of Hospital Medicine  [facility] jonathan  [phone] (pager)

## 2019-10-23 LAB
ABO + RH BLD: NORMAL
ANION GAP SERPL CALC-SCNC: 6 MMOL/L (ref 8–16)
ANION GAP SERPL CALC-SCNC: 7 MMOL/L (ref 8–16)
BASOPHILS # BLD AUTO: 0.03 K/UL (ref 0–0.2)
BASOPHILS # BLD AUTO: 0.04 K/UL (ref 0–0.2)
BASOPHILS NFR BLD: 0.3 % (ref 0–1.9)
BASOPHILS NFR BLD: 0.3 % (ref 0–1.9)
BLD GP AB SCN CELLS X3 SERPL QL: NORMAL
BLD PROD TYP BPU: NORMAL
BLOOD UNIT EXPIRATION DATE: NORMAL
BLOOD UNIT TYPE CODE: 6200
BLOOD UNIT TYPE: NORMAL
BUN SERPL-MCNC: 12 MG/DL (ref 8–23)
BUN SERPL-MCNC: 12 MG/DL (ref 8–23)
CALCIUM SERPL-MCNC: 7.6 MG/DL (ref 8.7–10.5)
CALCIUM SERPL-MCNC: 7.6 MG/DL (ref 8.7–10.5)
CHLORIDE SERPL-SCNC: 106 MMOL/L (ref 95–110)
CHLORIDE SERPL-SCNC: 108 MMOL/L (ref 95–110)
CO2 SERPL-SCNC: 22 MMOL/L (ref 23–29)
CO2 SERPL-SCNC: 22 MMOL/L (ref 23–29)
CODING SYSTEM: NORMAL
CREAT SERPL-MCNC: 0.6 MG/DL (ref 0.5–1.4)
CREAT SERPL-MCNC: 0.6 MG/DL (ref 0.5–1.4)
DIFFERENTIAL METHOD: ABNORMAL
DIFFERENTIAL METHOD: ABNORMAL
DISPENSE STATUS: NORMAL
EOSINOPHIL # BLD AUTO: 0.2 K/UL (ref 0–0.5)
EOSINOPHIL # BLD AUTO: 0.2 K/UL (ref 0–0.5)
EOSINOPHIL NFR BLD: 1.8 % (ref 0–8)
EOSINOPHIL NFR BLD: 2.6 % (ref 0–8)
ERYTHROCYTE [DISTWIDTH] IN BLOOD BY AUTOMATED COUNT: 21.4 % (ref 11.5–14.5)
ERYTHROCYTE [DISTWIDTH] IN BLOOD BY AUTOMATED COUNT: 21.9 % (ref 11.5–14.5)
EST. GFR  (AFRICAN AMERICAN): >60 ML/MIN/1.73 M^2
EST. GFR  (AFRICAN AMERICAN): >60 ML/MIN/1.73 M^2
EST. GFR  (NON AFRICAN AMERICAN): >60 ML/MIN/1.73 M^2
EST. GFR  (NON AFRICAN AMERICAN): >60 ML/MIN/1.73 M^2
GLUCOSE SERPL-MCNC: 83 MG/DL (ref 70–110)
GLUCOSE SERPL-MCNC: 96 MG/DL (ref 70–110)
HCT VFR BLD AUTO: 21.3 % (ref 37–48.5)
HCT VFR BLD AUTO: 21.8 % (ref 37–48.5)
HGB BLD-MCNC: 6.6 G/DL (ref 12–16)
HGB BLD-MCNC: 6.9 G/DL (ref 12–16)
IMM GRANULOCYTES # BLD AUTO: 0.27 K/UL (ref 0–0.04)
IMM GRANULOCYTES # BLD AUTO: 0.32 K/UL (ref 0–0.04)
IMM GRANULOCYTES NFR BLD AUTO: 2.3 % (ref 0–0.5)
IMM GRANULOCYTES NFR BLD AUTO: 3.5 % (ref 0–0.5)
LYMPHOCYTES # BLD AUTO: 2.3 K/UL (ref 1–4.8)
LYMPHOCYTES # BLD AUTO: 2.4 K/UL (ref 1–4.8)
LYMPHOCYTES NFR BLD: 20.2 % (ref 18–48)
LYMPHOCYTES NFR BLD: 26.5 % (ref 18–48)
MAGNESIUM SERPL-MCNC: 1.7 MG/DL (ref 1.6–2.6)
MCH RBC QN AUTO: 31.6 PG (ref 27–31)
MCH RBC QN AUTO: 32.5 PG (ref 27–31)
MCHC RBC AUTO-ENTMCNC: 31 G/DL (ref 32–36)
MCHC RBC AUTO-ENTMCNC: 31.7 G/DL (ref 32–36)
MCV RBC AUTO: 102 FL (ref 82–98)
MCV RBC AUTO: 103 FL (ref 82–98)
MONOCYTES # BLD AUTO: 0.5 K/UL (ref 0.3–1)
MONOCYTES # BLD AUTO: 0.8 K/UL (ref 0.3–1)
MONOCYTES NFR BLD: 5.9 % (ref 4–15)
MONOCYTES NFR BLD: 6.6 % (ref 4–15)
NEUTROPHILS # BLD AUTO: 5.7 K/UL (ref 1.8–7.7)
NEUTROPHILS # BLD AUTO: 8 K/UL (ref 1.8–7.7)
NEUTROPHILS NFR BLD: 61.2 % (ref 38–73)
NEUTROPHILS NFR BLD: 68.8 % (ref 38–73)
NRBC BLD-RTO: 0 /100 WBC
NRBC BLD-RTO: 0 /100 WBC
PHOSPHATE SERPL-MCNC: 3.1 MG/DL (ref 2.7–4.5)
PLATELET # BLD AUTO: 222 K/UL (ref 150–350)
PLATELET # BLD AUTO: 245 K/UL (ref 150–350)
PMV BLD AUTO: 10.3 FL (ref 9.2–12.9)
PMV BLD AUTO: 10.7 FL (ref 9.2–12.9)
POCT GLUCOSE: 101 MG/DL (ref 70–110)
POCT GLUCOSE: 176 MG/DL (ref 70–110)
POTASSIUM SERPL-SCNC: 2.6 MMOL/L (ref 3.5–5.1)
POTASSIUM SERPL-SCNC: 4.3 MMOL/L (ref 3.5–5.1)
RBC # BLD AUTO: 2.09 M/UL (ref 4–5.4)
RBC # BLD AUTO: 2.12 M/UL (ref 4–5.4)
SODIUM SERPL-SCNC: 135 MMOL/L (ref 136–145)
SODIUM SERPL-SCNC: 136 MMOL/L (ref 136–145)
TRANS ERYTHROCYTES VOL PATIENT: NORMAL ML
WBC # BLD AUTO: 11.61 K/UL (ref 3.9–12.7)
WBC # BLD AUTO: 9.22 K/UL (ref 3.9–12.7)

## 2019-10-23 PROCEDURE — 97535 SELF CARE MNGMENT TRAINING: CPT

## 2019-10-23 PROCEDURE — 86901 BLOOD TYPING SEROLOGIC RH(D): CPT

## 2019-10-23 PROCEDURE — 25000003 PHARM REV CODE 250: Performed by: INTERNAL MEDICINE

## 2019-10-23 PROCEDURE — 97116 GAIT TRAINING THERAPY: CPT

## 2019-10-23 PROCEDURE — 36430 TRANSFUSION BLD/BLD COMPNT: CPT

## 2019-10-23 PROCEDURE — 86920 COMPATIBILITY TEST SPIN: CPT

## 2019-10-23 PROCEDURE — 25000003 PHARM REV CODE 250: Performed by: STUDENT IN AN ORGANIZED HEALTH CARE EDUCATION/TRAINING PROGRAM

## 2019-10-23 PROCEDURE — 36415 COLL VENOUS BLD VENIPUNCTURE: CPT

## 2019-10-23 PROCEDURE — 20600001 HC STEP DOWN PRIVATE ROOM

## 2019-10-23 PROCEDURE — P9021 RED BLOOD CELLS UNIT: HCPCS

## 2019-10-23 PROCEDURE — 63600175 PHARM REV CODE 636 W HCPCS: Performed by: INTERNAL MEDICINE

## 2019-10-23 PROCEDURE — 92526 ORAL FUNCTION THERAPY: CPT

## 2019-10-23 PROCEDURE — 83735 ASSAY OF MAGNESIUM: CPT

## 2019-10-23 PROCEDURE — 99232 SBSQ HOSP IP/OBS MODERATE 35: CPT | Mod: ,,, | Performed by: INTERNAL MEDICINE

## 2019-10-23 PROCEDURE — 99232 PR SUBSEQUENT HOSPITAL CARE,LEVL II: ICD-10-PCS | Mod: ,,, | Performed by: INTERNAL MEDICINE

## 2019-10-23 PROCEDURE — 25000003 PHARM REV CODE 250: Performed by: FAMILY MEDICINE

## 2019-10-23 PROCEDURE — 84100 ASSAY OF PHOSPHORUS: CPT

## 2019-10-23 PROCEDURE — 80048 BASIC METABOLIC PNL TOTAL CA: CPT | Mod: 91

## 2019-10-23 PROCEDURE — 99232 PR SUBSEQUENT HOSPITAL CARE,LEVL II: ICD-10-PCS | Mod: ,,, | Performed by: NURSE PRACTITIONER

## 2019-10-23 PROCEDURE — 99232 SBSQ HOSP IP/OBS MODERATE 35: CPT | Mod: ,,, | Performed by: NURSE PRACTITIONER

## 2019-10-23 PROCEDURE — 85025 COMPLETE CBC W/AUTO DIFF WBC: CPT | Mod: 91

## 2019-10-23 RX ORDER — POTASSIUM CHLORIDE 20 MEQ/1
40 TABLET, EXTENDED RELEASE ORAL ONCE
Status: DISCONTINUED | OUTPATIENT
Start: 2019-10-23 | End: 2019-10-23

## 2019-10-23 RX ORDER — POTASSIUM CHLORIDE 20 MEQ/1
40 TABLET, EXTENDED RELEASE ORAL ONCE
Status: COMPLETED | OUTPATIENT
Start: 2019-10-23 | End: 2019-10-23

## 2019-10-23 RX ORDER — LOPERAMIDE HYDROCHLORIDE 2 MG/1
2 CAPSULE ORAL 2 TIMES DAILY
Status: DISCONTINUED | OUTPATIENT
Start: 2019-10-23 | End: 2019-10-25

## 2019-10-23 RX ORDER — POTASSIUM CHLORIDE 7.45 MG/ML
10 INJECTION INTRAVENOUS
Status: COMPLETED | OUTPATIENT
Start: 2019-10-23 | End: 2019-10-23

## 2019-10-23 RX ORDER — MAGNESIUM SULFATE HEPTAHYDRATE 40 MG/ML
4 INJECTION, SOLUTION INTRAVENOUS ONCE
Status: COMPLETED | OUTPATIENT
Start: 2019-10-23 | End: 2019-10-23

## 2019-10-23 RX ORDER — HYDROCODONE BITARTRATE AND ACETAMINOPHEN 500; 5 MG/1; MG/1
TABLET ORAL
Status: DISCONTINUED | OUTPATIENT
Start: 2019-10-23 | End: 2019-10-25 | Stop reason: HOSPADM

## 2019-10-23 RX ORDER — POTASSIUM CHLORIDE 20 MEQ/15ML
40 SOLUTION ORAL ONCE
Status: DISCONTINUED | OUTPATIENT
Start: 2019-10-23 | End: 2019-10-23

## 2019-10-23 RX ADMIN — POTASSIUM CHLORIDE 10 MEQ: 7.46 INJECTION, SOLUTION INTRAVENOUS at 09:10

## 2019-10-23 RX ADMIN — METOPROLOL TARTRATE 100 MG: 100 TABLET ORAL at 10:10

## 2019-10-23 RX ADMIN — FERROUS SULFATE TAB EC 325 MG (65 MG FE EQUIVALENT) 325 MG: 325 (65 FE) TABLET DELAYED RESPONSE at 08:10

## 2019-10-23 RX ADMIN — POTASSIUM CHLORIDE 40 MEQ: 20 TABLET, EXTENDED RELEASE ORAL at 12:10

## 2019-10-23 RX ADMIN — OXYCODONE HYDROCHLORIDE 10 MG: 10 TABLET ORAL at 08:10

## 2019-10-23 RX ADMIN — POTASSIUM CHLORIDE 40 MEQ: 20 TABLET, EXTENDED RELEASE ORAL at 09:10

## 2019-10-23 RX ADMIN — ENOXAPARIN SODIUM 40 MG: 100 INJECTION SUBCUTANEOUS at 04:10

## 2019-10-23 RX ADMIN — Medication 1 CAPSULE: at 08:10

## 2019-10-23 RX ADMIN — FOLIC ACID 1 MG: 1 TABLET ORAL at 08:10

## 2019-10-23 RX ADMIN — OXYCODONE HYDROCHLORIDE 10 MG: 10 TABLET ORAL at 01:10

## 2019-10-23 RX ADMIN — MICONAZOLE NITRATE: 20 OINTMENT TOPICAL at 09:10

## 2019-10-23 RX ADMIN — LOPERAMIDE HYDROCHLORIDE 2 MG: 2 CAPSULE ORAL at 10:10

## 2019-10-23 RX ADMIN — OXYCODONE HYDROCHLORIDE 10 MG: 10 TABLET ORAL at 05:10

## 2019-10-23 RX ADMIN — OXACILLIN SODIUM 12 G: 10 INJECTION, POWDER, FOR SOLUTION INTRAVENOUS at 10:10

## 2019-10-23 RX ADMIN — LOPERAMIDE HYDROCHLORIDE 2 MG: 2 CAPSULE ORAL at 08:10

## 2019-10-23 RX ADMIN — PSYLLIUM HUSK 1 PACKET: 3.4 POWDER ORAL at 08:10

## 2019-10-23 RX ADMIN — AMLODIPINE BESYLATE 10 MG: 10 TABLET ORAL at 08:10

## 2019-10-23 RX ADMIN — ACETAMINOPHEN 650 MG: 325 TABLET ORAL at 03:10

## 2019-10-23 RX ADMIN — PSYLLIUM HUSK 1 PACKET: 3.4 POWDER ORAL at 10:10

## 2019-10-23 RX ADMIN — POTASSIUM CHLORIDE 10 MEQ: 7.46 INJECTION, SOLUTION INTRAVENOUS at 08:10

## 2019-10-23 RX ADMIN — METOPROLOL TARTRATE 100 MG: 100 TABLET ORAL at 08:10

## 2019-10-23 RX ADMIN — MAGNESIUM SULFATE IN WATER 4 G: 40 INJECTION, SOLUTION INTRAVENOUS at 08:10

## 2019-10-23 RX ADMIN — POTASSIUM CHLORIDE 10 MEQ: 7.46 INJECTION, SOLUTION INTRAVENOUS at 10:10

## 2019-10-23 RX ADMIN — MICONAZOLE NITRATE: 20 OINTMENT TOPICAL at 08:10

## 2019-10-23 NOTE — NURSING
Pt calm and cooperative with moments of confusion persist. VSS on RA. Back incision with sutures remain CDI. IV AntiBx infusing 24 hr as ordered. KCl and Mg+ replaced per order with improved repeat labs. SBP starting to drop to 80s with MAP >60. 1 unit PRBCs transfusing as ordered with no complications or reaction noted. Pt up to chair for 2 hrs today with max assist x2. Fecal tube remains intact. Oral imodium administered for loose stools with noted decrease to stool volume. Henriquez intact with adequate UOP noted. No acute distress or needs at this time. WCTM

## 2019-10-23 NOTE — PT/OT/SLP PROGRESS
Speech Language Pathology Treatment    Patient Name:  Mesha Mckenzie   MRN:  [id number]  Admitting Diagnosis: Encephalopathy, metabolic    Recommendations:                 General Recommendations:  Follow-up not indicated  Diet recommendations:  Dental Soft, Liquid Diet Level: Thin   Aspiration Precautions: Standard aspiration precautions   General Precautions: Standard, fall  Communication strategies:  none    Subjective     Patient awake;alert. Completing PT session upon SLP entry to room.     Pain/Comfort:  · Pain Rating 1: 0/10    Objective:     Has the patient been evaluated by SLP for swallowing?   Yes  Keep patient NPO? No   Current Respiratory Status: room air      Patient tolerated thin liquids via straw sips x8 along with regular solid trials via 1/2 cracker. Patient with mildly delayed mastication of regular solids, though appears appropriate for diet advancement from mechanical soft to dental soft solids.  Skilled education was provided to patient and spouse regarding diet advancement and rationale, aspiration precautions, need for continued steady intake, and discharge status from acute ST. Patient and spouse verbalized understanding.     Assessment:     Mesha Mckenzie is a 63 y.o. female with an SLP diagnosis of no overt signs of oropharyngeal dysphagia. .  She presents with no further acute ST needs at this time.     Goals:   Multidisciplinary Problems     SLP Goals     Not on file          Multidisciplinary Problems (Resolved)        Problem: SLP Goal    Goal Priority Disciplines Outcome   SLP Goal   (Resolved)     SLP Met   Description:  Speech Language Pathology Goals  Goals expected to be met by 10/25  1. Pt will tolerate thin and mechanical soft diet without s/s aspiration                             Plan:     · Patient to be seen:  3 x/week   · Plan of Care expires:  11/06/19  · Plan of Care reviewed with:  patient, spouse   · SLP Follow-Up:  No       Discharge recommendations:   rehabilitation facility   Barriers to Discharge:  None    Time Tracking:     SLP Treatment Date:   10/23/19  Speech Start Time:  1010  Speech Stop Time:  1028     Speech Total Time (min):  16 min    Billable Minutes: Treatment Swallowing Dysfunction 8 and Seld Care/Home Management Training 8    Emily Abadie, CCC-SLP  10/23/2019

## 2019-10-23 NOTE — NURSING
"No acute events throughout day, VS and assessment per flow sheet, see below for updates:    Pulmonary:  RA with no oxygenation issues     Cardiovascular:  NSR on telemetry monitor.  BP WNL all shift.      Neurological:  Flat affect/withdrawn.  Appears frustrated with staff with assessment/bath/linen change/shampoo, etc.  Keeps repeating "That's enough".      Gastrointestinal: FlexiSeal in place with 200cc of thin, brown drainage.  Poor appetite.  Can we please get patient's diet changed to regular?  She is being told by dietary that she can't order certain food items (ex.toast) due to the dysphagia restrictions.      Genitourinary: Catheter intact draining clear yellow urine.  1200cc's overnight    Endocrine:  Can we change q 4 hour BBG checks?  BBGs have been WNL for days.      Skin/Bath: Bath completed last night (10/22) by RN.  Overlay waffle mattress applied.  Skin has some bruising.  Redness/tenderness noted to rectum near FlexiSeal.  Incision to back healing well - LÁZARO.        Infusions: ABX per eMAR     Plan of care communicated and reviewed with Mesha WARD Okmanojpetrasherry, son and daughter, all concerns addressed, will continue to monitor.     Karly Arce RN        "

## 2019-10-23 NOTE — PLAN OF CARE
Problem: Oral Intake Inadequate  Goal: Improved Oral Intake  Outcome: Ongoing, Not Progressing       Recommendations    Pt meets severe malnutrition criteria.     1. Continue Regular diet + Boost Plus as tolerated.     2. If TF warranted, recommend Isosource 1.5 advancing as tolerated to goal rate of 40 mL/hr to provide 1440 kcal, 65 gm protein, and 733 mL free fluid.     3. If TPN warranted, recommend Custom TPN 70 gm AA / 185 gm Dex + IV lipids to provide 1509 kcal, 70 gm protein, and GIR of 2.40.    4. RD following.     Goals: pt to consume >50% of all meals  Nutrition Goal Status: goal not met

## 2019-10-23 NOTE — PROGRESS NOTES
"[facility]  Adult Nutrition  Progress Note    SUMMARY       Recommendations    Pt meets severe malnutrition criteria.     1. Continue Regular diet + Boost Plus as tolerated.     2. If TF warranted, recommend Isosource 1.5 advancing as tolerated to goal rate of 40 mL/hr to provide 1440 kcal, 65 gm protein, and 733 mL free fluid.     3. If TPN warranted, recommend Custom TPN 70 gm AA / 185 gm Dex + IV lipids to provide 1509 kcal, 70 gm protein, and GIR of 2.40.    4. RD following.     Goals: pt to consume >50% of all meals  Nutrition Goal Status: goal not met  Communication of RD Recs: (POC)    Reason for Assessment    Reason For Assessment: RD follow-up  Diagnosis: (respiratory failure)  Relevant Medical History: IVDU, endocarditis  Interdisciplinary Rounds: did not attend  General Information Comments: S/p laminectomy completed on 10/9. Pt resting in bed with family member sleeping at bedside. Pt reports no appetite 2/2 pain, but does drink 2 Boost Breeze/day. NFPE completed 9/23, 10/23. Pt continues with severe malnutrition. Per RN note, pt may need TF or TPN.  Nutrition Discharge Planning: adequate po intake    Nutrition Risk Screen    Nutrition Risk Screen: dysphagia or difficulty swallowing    Nutrition/Diet History    Spiritual, Cultural Beliefs, Jehovah's witness Practices, Values that Affect Care: no  Factors Affecting Nutritional Intake: decreased appetite    Anthropometrics    Temp: 98.9 °F (37.2 °C)  Height Method: Stated  Height: 5' 4" (162.6 cm)  Height (inches): 64 in  Weight Method: Bed Scale  Weight: 53.5 kg (118 lb)  Weight (lb): 118 lb  Ideal Body Weight (IBW), Female: 120 lb  % Ideal Body Weight, Female (lb): 98.33 lb  BMI (Calculated): 20.3  BMI Grade: 18.5-24.9 - normal     Lab/Procedures/Meds    Pertinent Labs Reviewed: reviewed  Pertinent Labs Comments: noted  Pertinent Medications Reviewed: reviewed  Pertinent Medications Comments: amlodipine, ferrous sulfate, folic acid, " metoprolol, psyllium husk    Estimated/Assessed Needs    Weight Used For Calorie Calculations: 47.4 kg (104 lb 8 oz)(admit weight)  Energy Calorie Requirements (kcal): 1422 kcal/day  Energy Need Method: Kcal/kg(30)  Protein Requirements: 57-71 g/day(1.2-1.5 g/kg)  Weight Used For Protein Calculations: 47.4 kg (104 lb 8 oz)(admit weight)  Fluid Requirements (mL): 1 mL/kcal or per MD  Estimated Fluid Requirement Method: RDA Method  RDA Method (mL): 1422     Nutrition Prescription Ordered    Current Diet Order: Mechanical Soft  Oral Nutrition Supplement: Boost Breeze    Evaluation of Received Nutrient/Fluid Intake    Comments: LB 10/22  Tolerance: tolerating  % Intake of Estimated Energy Needs: 0 - 25 %  % Meal Intake: 0 - 25 %    Nutrition Risk    Level of Risk/Frequency of Follow-up: (f/u 1 x wk)     Assessment and Plan    Severe malnutrition  Nutrition Problem  Malnutrition in the context of Acute Illness/Injury    Related to (etiology):  Inadequate energy intake and increased nutrient needs    Signs and Symptoms (as evidenced by):  Energy Intake: <50% of estimated energy requirement for 8 days  Body Fat Depletion: mild depletion of orbitals, triceps and thoracic and lumbar region   Muscle Mass Depletion: mild and moderate depletion of temples, clavicle region, scapular region, interosseous muscle and lower extremities   Weight Loss: 22.6% x 1 year   Fluid Accumulation: mild    Interventions (treatment strategy):  Collaboration of nutrition care with other providers  [employer] - Boost Breeze    Nutrition Diagnosis Status:  Continues         Monitor and Evaluation    Food and Nutrient Intake: energy intake, food and beverage intake  Food and Nutrient Adminstration: diet order  Physical Activity and Function: nutrition-related ADLs and IADLs  Anthropometric Measurements: weight, weight change  Biochemical Data, Medical Tests and Procedures: electrolyte and renal panel, gastrointestinal profile, inflammatory  profile  Nutrition-Focused Physical Findings: overall appearance     Malnutrition Assessment  Malnutrition Type: acute illness or injury              Orbital Region (Subcutaneous Fat Loss): mild depletion  Upper Arm Region (Subcutaneous Fat Loss): moderate depletion  Thoracic and Lumbar Region: mild depletion   Restoration Region (Muscle Loss): moderate depletion  Clavicle Bone Region (Muscle Loss): moderate depletion  Clavicle and Acromion Bone Region (Muscle Loss): moderate depletion  Scapular Bone Region (Muscle Loss): mild depletion  Dorsal Hand (Muscle Loss): mild depletion  Patellar Region (Muscle Loss): mild depletion  Anterior Thigh Region (Muscle Loss): mild depletion  Posterior Calf Region (Muscle Loss): mild depletion           Nutrition Follow-Up    RD Follow-up?: Yes

## 2019-10-23 NOTE — PT/OT/SLP PROGRESS
"Physical Therapy Treatment    Patient Name:  Mesha Mckenzie   MRN:  [id number]    Recommendations:     Discharge Recommendations:  rehabilitation facility   Discharge Equipment Recommendations: (TBD as pt progresses)   Barriers to discharge: Decreased caregiver support    Assessment:     Mesha Mckenzie is a 63 y.o. female admitted with a medical diagnosis of Encephalopathy, metabolic.  She presents with the following impairments/functional limitations:  weakness, gait instability, impaired endurance, impaired balance, impaired cognition, impaired functional mobilty, pain, decreased safety awareness, decreased lower extremity function . Pt is limited with gait distance due to c/o pain and SOB.    Rehab Prognosis: Good; patient would benefit from acute skilled PT services to address these deficits and reach maximum level of function.    Recent Surgery: Procedure(s) (LRB):  LAMINECTOMY, SPINE, L3, Open (N/A) 14 Days Post-Op    Plan:     During this hospitalization, patient to be seen 4 x/week to address the identified rehab impairments via gait training, therapeutic activities, therapeutic exercises, neuromuscular re-education and progress toward the following goals:    · Plan of Care Expires:  10/25/19    Subjective   "It hurts too much"  Pain/Comfort:  · Pain Rating 1: 10/10  · Location - Orientation 1: generalized  · Location 1: back  · Pain Addressed 1: Pre-medicate for activity, Reposition, Cessation of Activity  · Pain Rating Post-Intervention 1: 10/10      Objective:     Communicated with nurse prior to session.  Patient found supine with bowel management system, astorga catheter, peripheral IV, telemetry upon PT entry to room.     General Precautions: Standard, aspiration, fall   Orthopedic Precautions:N/A   Braces: N/A     Functional Mobility:  · Bed Mobility:     · Rolling Right: minimum assistance  · Supine to Sit: moderate assistance  · Sit to Supine: minimum assistance  · Transfers:     · Sit to Stand:  " maximal assistance with rolling walker  · Bed to Chair: moderate assistance with  hand-held assist  using  Stand Pivot  · Gait: 40ft then 20ft then 10ft then 25ft with RW with moderate assist +1 to follow with bedside chair. Pt rested in sitting between gait trials. pt performed gait with flexed trunk, narrow ROBBI, crossover gait at times with L over R LE, and at slow pace. pt sat suddenly during the 3rd gait trial requiring max assist to remain upright prior to sitting in bedside chair, pt states due to pain.     AM-PAC 6 CLICK MOBILITY  Turning over in bed (including adjusting bedclothes, sheets and blankets)?: 3  Sitting down on and standing up from a chair with arms (e.g., wheelchair, bedside commode, etc.): 2  Moving from lying on back to sitting on the side of the bed?: 3  Moving to and from a bed to a chair (including a wheelchair)?: 2  Need to walk in hospital room?: 2  Climbing 3-5 steps with a railing?: 1  Basic Mobility Total Score: 13     Patient left supine with all lines intact, call button in reach, nurse notified and  present..    GOALS:   Multidisciplinary Problems     Physical Therapy Goals        Problem: Physical Therapy Goal    Goal Priority Disciplines Outcome Goal Variances Interventions   Physical Therapy Goal     PT, PT/OT Ongoing, Progressing     Description:  Goals to be met by: 2019    Patient will increase functional independence with mobility by performin. Supine <> sit with Moderate Assistance.- met  Revised goal: supine to/from sit with minimal assist-not met  2. Sit <> stand transfer with Moderate Assistance using Rolling Walker.- met  Revised goal: sit to stand with RW with minimal assist-not met  3. Bed <> chair transfer via Step Transfer with Moderate Assistance using Rolling Walker.-met  Revised goal: bed to/from chair with RW with minimal assist-not met  4. Dynamic sitting at edge of bed x 10 minutes with Minimal Assistance to prepare for functional tasks in  sitting.-not met  5. Able to tolerate exercise for 15-20 reps with assistance as needed.-not met  6. Pt receive gait training ~ 50 ft with Rw and min assist - not met                               Time Tracking:     PT Received On: 10/23/19  PT Start Time: 0928     PT Stop Time: 1010  PT Total Time (min): 42 min     Billable Minutes: Gait Training 42    Treatment Type: Treatment  PT/PTA: PT     PTA Visit Number: 0     Lauren Paige, PT  10/23/2019

## 2019-10-23 NOTE — NURSING
Critical Potassium of 2.6 received. MD aware with replacements ordered. Pt asymptomatic with VSS. WCTM

## 2019-10-23 NOTE — SUBJECTIVE & OBJECTIVE
Interval History 10/23/2019:  Patient is seen for follow-up rehab evaluation and recommendations: Participating with therapy. Rectal tube in place.     HPI, Past Medical, Family, and Social History remains the same as documented in the initial encounter.    Scheduled Medications:    amLODIPine  10 mg Per NG tube Daily    enoxaparin  40 mg Subcutaneous Daily    ferrous sulfate  325 mg Oral Daily    folic acid  1 mg Oral Daily    Lactobacillus rhamnosus GG  1 capsule Oral Daily    magnesium sulfate IVPB  4 g Intravenous Once    metoprolol tartrate  100 mg Oral BID    miconazole nitrate 2%   Topical (Top) BID    oxacillin 12 g in  mL CONTINUOUS INFUSION  12 g Intravenous Q24H    potassium chloride in water  10 mEq Intravenous Q1H    potassium chloride 10%  40 mEq Oral Once    psyllium husk (aspartame)  1 packet Oral Daily       Diagnostic Results: Labs: Reviewed    PRN Medications: acetaminophen, albuterol-ipratropium, Dextrose 10% Bolus, Dextrose 10% Bolus, Dextrose 10% Bolus, glucagon (human recombinant), glucose, glucose, hydrALAZINE, influenza, labetalol, levalbuterol, loperamide, mupirocin, mupirocin, ondansetron, oxyCODONE, oxyCODONE, pneumoc 13-susanna conj-dip cr(PF), promethazine (PHENERGAN) IVPB, sodium chloride 0.9%    Review of Systems   Reason unable to perform ROS: unable to get full ROS 2/2 lethargy    Constitutional: Positive for activity change. Negative for appetite change.   Musculoskeletal: Positive for gait problem.   Neurological: Positive for speech difficulty and weakness.   Psychiatric/Behavioral: Positive for confusion. Negative for behavioral problems.     Objective:     Vital Signs (Most Recent):  Temp: 98.2 °F (36.8 °C) (10/23/19 0710)  Pulse: 83 (10/23/19 0720)  Resp: 18 (10/23/19 0710)  BP: (!) 108/53 (10/23/19 0710)  SpO2: 96 % (10/23/19 0902)    Vital Signs (24h Range):  Temp:  [98 °F (36.7 °C)-99.3 °F (37.4 °C)] 98.2 °F (36.8 °C)  Pulse:  [78-97] 83  Resp:  [16-18]  18  SpO2:  [93 %-97 %] 96 %  BP: (107-146)/(53-65) 108/53     Physical Exam   Constitutional: She appears well-developed and well-nourished.   HENT:   Head: Normocephalic and atraumatic.   Eyes: Pupils are equal, round, and reactive to light. EOM are normal.   Neck: Neck supple.   Pulmonary/Chest: Effort normal. No respiratory distress.   Abdominal:   Rectal tube in place   Musculoskeletal: She exhibits no tenderness or deformity.   Neurological:   - alert lying in bed  - nodding head and responding yes to answers  - generalized weakness present   Skin: Skin is warm and dry.   Psychiatric: She has a normal mood and affect. Her behavior is normal.   Nursing note and vitals reviewed.    NEUROLOGICAL EXAMINATION:     CRANIAL NERVES     CN III, IV, VI   Pupils are equal, round, and reactive to light.  Extraocular motions are normal.

## 2019-10-23 NOTE — PLAN OF CARE
Goals remain appropriate, continue POC  Problem: Occupational Therapy Goal  Goal: Occupational Therapy Goal  Description  Goals to be met by: 10/31/19     Patient will increase functional independence with ADLs by performing:    UE Dressing with Moderate Assistance.  LE Dressing with Moderate Assistance.  Grooming while seated with Minimal Assistance.  Toileting from bedside commode with Moderate Assistance for hygiene and clothing management.   Rolling to Right, Left with Moderate Assistance. - goal met 10/21  Supine to sit with Moderate Assistance.- goal met 10/21  Toilet transfer to bedside commode with Moderate Assistance.      Outcome: Ongoing, Progressing

## 2019-10-23 NOTE — PROGRESS NOTES
[facility]  Physical Medicine & Rehab  Progress Note    Patient Name: Mesha Mckenzie  MRN: [id number]  Admission Date: 10/2/2019  Length of Stay: 21 days  Attending Physician: Yesenia Lerner*    Subjective:     Principal Problem:Encephalopathy, metabolic    Hospital Course:   10/11/19: Evaluated by PT & OT. Bed mobility total- totalA x 2 ppl. Sat EOB modA x 2 ppl 8 mins. Grooming Misha.   10/14/19: Participated w/ PT & OT. Bed mobility totalA. Sit to stand maxA- HHA.  sat EOB mod-max assist for ~10 minutes.   10/21/19: Participated w/ PT & OT. Bed mobility min-modA. Sit to stand maxA w/ RW. Bed to chair modA- HHA. Toileting totalA. Ambulated  12ft then 12ft then 20ft with RW with modA.     Interval History 10/23/2019:  Patient is seen for follow-up rehab evaluation and recommendations: Participating with therapy. Rectal tube in place.     HPI, Past Medical, Family, and Social History remains the same as documented in the initial encounter.    Scheduled Medications:    amLODIPine  10 mg Per NG tube Daily    enoxaparin  40 mg Subcutaneous Daily    ferrous sulfate  325 mg Oral Daily    folic acid  1 mg Oral Daily    Lactobacillus rhamnosus GG  1 capsule Oral Daily    magnesium sulfate IVPB  4 g Intravenous Once    metoprolol tartrate  100 mg Oral BID    miconazole nitrate 2%   Topical (Top) BID    oxacillin 12 g in  mL CONTINUOUS INFUSION  12 g Intravenous Q24H    potassium chloride in water  10 mEq Intravenous Q1H    potassium chloride 10%  40 mEq Oral Once    psyllium husk (aspartame)  1 packet Oral Daily       Diagnostic Results: Labs: Reviewed    PRN Medications: acetaminophen, albuterol-ipratropium, Dextrose 10% Bolus, Dextrose 10% Bolus, Dextrose 10% Bolus, glucagon (human recombinant), glucose, glucose, hydrALAZINE, influenza, labetalol, levalbuterol, loperamide, mupirocin, mupirocin, ondansetron, oxyCODONE, oxyCODONE, pneumoc 13-susanna conj-dip cr(PF), promethazine  (PHENERGAN) IVPB, sodium chloride 0.9%    Review of Systems   Reason unable to perform ROS: unable to get full ROS 2/2 lethargy    Constitutional: Positive for activity change. Negative for appetite change.   Musculoskeletal: Positive for gait problem.   Neurological: Positive for speech difficulty and weakness.   Psychiatric/Behavioral: Positive for confusion. Negative for behavioral problems.     Objective:     Vital Signs (Most Recent):  Temp: 98.2 °F (36.8 °C) (10/23/19 0710)  Pulse: 83 (10/23/19 0720)  Resp: 18 (10/23/19 0710)  BP: (!) 108/53 (10/23/19 0710)  SpO2: 96 % (10/23/19 0902)    Vital Signs (24h Range):  Temp:  [98 °F (36.7 °C)-99.3 °F (37.4 °C)] 98.2 °F (36.8 °C)  Pulse:  [78-97] 83  Resp:  [16-18] 18  SpO2:  [93 %-97 %] 96 %  BP: (107-146)/(53-65) 108/53     Physical Exam   Constitutional: She appears well-developed and well-nourished.   HENT:   Head: Normocephalic and atraumatic.   Eyes: Pupils are equal, round, and reactive to light. EOM are normal.   Neck: Neck supple.   Pulmonary/Chest: Effort normal. No respiratory distress.   Abdominal:   Rectal tube in place   Musculoskeletal: She exhibits no tenderness or deformity.   Neurological:   - alert lying in bed  - nodding head and responding yes to answers  - generalized weakness present   Skin: Skin is warm and dry.   Psychiatric: She has a normal mood and affect. Her behavior is normal.   Nursing note and vitals reviewed.      Assessment/Plan:      * Encephalopathy, metabolic  - improving     Debility  - Related to prolonged/acute hospital course.     Recommendations  -  Encourage mobility, OOB in chair at least 3 hours per day, and early ambulation as appropriate  -  PT/OT evaluate and treat  -  Pain management  -  Monitor for and prevent skin breakdown and pressure ulcers  · Early mobility, repositioning/weight shifting every 20-30 minutes when sitting, turn patient every 2 hours, proper mattress/overlay and chair cushioning, pressure  relief/heel protector boots  -  DVT prophylaxis    -  Reviewed discharge options (IP rehab, SNF, HH therapy, and OP therapy)    Septic shock with acute organ dysfunction due to methicillin susceptible Staphylococcus aureus (MSSA)  -  Continue Oxacillin for MSSA Endocarditis    Epidural abscess  - MRI L spine showed L2-L4 epidural abscess and NSGY consulted and S/p L3 laminectomy and debridement of paraspinal abscess.     Hypokalemia  - K 2.6    Continue to recommend inpatient rehab pending medical stability.     Mariah Muller NP  Department of Physical Medicine & Rehab   [facility]

## 2019-10-23 NOTE — PLAN OF CARE
Problem: Physical Therapy Goal  Goal: Physical Therapy Goal  Description  Goals to be met by: 2019    Patient will increase functional independence with mobility by performin. Supine <> sit with Moderate Assistance.- met  Revised goal: supine to/from sit with minimal assist-not met  2. Sit <> stand transfer with Moderate Assistance using Rolling Walker.- met  Revised goal: sit to stand with RW with minimal assist-not met  3. Bed <> chair transfer via Step Transfer with Moderate Assistance using Rolling Walker.-met  Revised goal: bed to/from chair with RW with minimal assist-not met  4. Dynamic sitting at edge of bed x 10 minutes with Minimal Assistance to prepare for functional tasks in sitting.-not met  5. Able to tolerate exercise for 15-20 reps with assistance as needed.-not met  6. Pt receive gait training ~ 50 ft with Rw and min assist - not met              Outcome: Ongoing, Progressing   Pt's goals remain appropriate and pt will continue to benefit from skilled PT services to work towards improved functional mobility including: bed mobility, transfers, and gait.   Lauren Paige, PT  10/23/2019

## 2019-10-23 NOTE — PT/OT/SLP PROGRESS
Occupational Therapy   Treatment    Name: Mesha Mckenzie  MRN: [id number]  Admitting Diagnosis:  Encephalopathy, metabolic  14 Days Post-Op    Recommendations:     Discharge Recommendations: rehabilitation facility  Discharge Equipment Recommendations:  (TBD as pt progresses)  Barriers to discharge:  None    Assessment:     Mesha Mckenzie is a 63 y.o. female with a medical diagnosis of Encephalopathy, metabolic.  She presents with hesitancy to participate in OT session 2/2 pain in back. Performance deficits affecting function are weakness, impaired endurance, impaired self care skills, impaired cognition, impaired functional mobilty, pain, decreased safety awareness, decreased lower extremity function. Pt would benefit from skilled OT services in order to maximize independence with ADLs and facilitate safe discharge.      Rehab Prognosis:  Good; patient would benefit from acute skilled OT services to address these deficits and reach maximum level of function.       Plan:     Patient to be seen 3 x/week to address the above listed problems via self-care/home management, therapeutic activities, therapeutic exercises  · Plan of Care Expires: 11/10/19  · Plan of Care Reviewed with: patient, spouse    Subjective     Pain/Comfort:  · Pain Rating 1: 10/10  · Location - Side 1: Left  · Location - Orientation 1: generalized  · Location 1: back  · Pain Addressed 1: Pre-medicate for activity, Reposition, Cessation of Activity  · Pain Rating Post-Intervention 1: 10/10    Objective:     Communicated with: RN prior to session.  Patient found HOB elevated with bowel management system, astorga catheter, peripheral IV, telemetry upon OT entry to room.    General Precautions: Standard, aspiration, fall   Orthopedic Precautions:N/A   Braces: N/A     Occupational Performance:     Bed Mobility:    · Patient completed Supine to Sit with maximal assistance     Activities of Daily Living:  · Bathing: maximal assistance to wash hair  "using shampoo shower cap and brush hair. Pt's hair is extremely matted. She stated "I think you better stop now this is pulling at my hair and hurting me" when attempting to brush hair. Pt declined to brush her own hair.       [facility] 6 Click ADL: 9    Treatment & Education:  -Therapist provided facilitation and instruction of proper body mechanics, energy conservation, and fall prevention strategies during tasks listed above.  -Pt educated on role of OT, POC and goals for therapy  -Pt educated on importance of OOB activities with staff member assistance and sitting OOB majority of the day.   -Pt verbalized understanding. Pt expressed no further concerns/questions  -Whiteboard updated       Patient left HOB elevated with all lines intact, call button in reach and  presentEducation:      GOALS:   Multidisciplinary Problems     Occupational Therapy Goals        Problem: Occupational Therapy Goal    Goal Priority Disciplines Outcome Interventions   Occupational Therapy Goal     OT, PT/OT Ongoing, Progressing    Description:  Goals to be met by: 10/31/19     Patient will increase functional independence with ADLs by performing:    UE Dressing with Moderate Assistance.  LE Dressing with Moderate Assistance.  Grooming while seated with Minimal Assistance.  Toileting from bedside commode with Moderate Assistance for hygiene and clothing management.   Rolling to Right, Left with Moderate Assistance. - goal met 10/21  Supine to sit with Moderate Assistance.- goal met 10/21  Toilet transfer to bedside commode with Moderate Assistance.                       Time Tracking:     OT Date of Treatment: 10/23/19  OT Start Time: 1320  OT Stop Time: 1335  OT Total Time (min): 15 min    Billable Minutes:Self Care/Home Management 15    Marine Elizabeth OT  10/23/2019    "

## 2019-10-23 NOTE — ASSESSMENT & PLAN NOTE
Nutrition Problem  Malnutrition in the context of Acute Illness/Injury    Related to (etiology):  Inadequate energy intake and increased nutrient needs    Signs and Symptoms (as evidenced by):  Energy Intake: <50% of estimated energy requirement for 8 days  Body Fat Depletion: mild depletion of orbitals, triceps and thoracic and lumbar region   Muscle Mass Depletion: mild and moderate depletion of temples, clavicle region, scapular region, interosseous muscle and lower extremities   Weight Loss: 22.6% x 1 year   Fluid Accumulation: mild    Interventions (treatment strategy):  Collaboration of nutrition care with other providers  [employer] - Boost Breeze    Nutrition Diagnosis Status:  Continues

## 2019-10-23 NOTE — PROGRESS NOTES
Hospital Medicine  Progress Note      Patient Name: Mesha Mckenzie  MRN: [id number]  Date of Admission: 10/2/2019     Principal Problem: Encephalopathy, metabolic     Subjective     Stable overnight. Conversant this morning. Reports diffuse body pain, but is in no distress. Labs significant for hypokalemia again today. Still with watery stools.       Review of Systems     Constitutional: Negative for chills, fatigue, fever.   HENT: Negative for sore throat, trouble swallowing.    Eyes: Negative for photophobia, visual disturbance.   Respiratory: Negative for cough, shortness of breath.    Cardiovascular: Negative for chest pain, palpitations, leg swelling.   Gastrointestinal: Negative for abdominal pain, constipation, diarrhea, nausea, vomiting.   Genitourinary: Negative for dysuria, frequency.   Musculoskeletal: POSITIVE for arthralgias, myalgias.   Skin: Negative for rash, wound, erythema   Neurological: Negative for dizziness, syncope, weakness, light-headedness.   Psychiatric/Behavioral: POSITIVE for confusion,      Medications  Scheduled Meds:   amLODIPine  10 mg Per NG tube Daily    enoxaparin  40 mg Subcutaneous Daily    ferrous sulfate  325 mg Oral Daily    folic acid  1 mg Oral Daily    Lactobacillus rhamnosus GG  1 capsule Oral Daily    loperamide  2 mg Oral BID    metoprolol tartrate  100 mg Oral BID    miconazole nitrate 2%   Topical (Top) BID    oxacillin 12 g in  mL CONTINUOUS INFUSION  12 g Intravenous Q24H    psyllium husk (aspartame)  1 packet Oral BID     Continuous Infusions:  PRN Meds:.acetaminophen, albuterol-ipratropium, Dextrose 10% Bolus, Dextrose 10% Bolus, Dextrose 10% Bolus, glucagon (human recombinant), glucose, glucose, hydrALAZINE, influenza, labetalol, levalbuterol, mupirocin, mupirocin, ondansetron, oxyCODONE, oxyCODONE, pneumoc 13-susanna conj-dip cr(PF), promethazine (PHENERGAN) IVPB, sodium chloride 0.9%    Objective    Physical Examination    Temp:  [98 °F (36.7  °C)-99.3 °F (37.4 °C)]   Pulse:  [78-97]   Resp:  [16-18]   BP: ()/(52-65)   SpO2:  [93 %-97 %]     Gen: NAD, conversant  Head: NC, AT  Eyes: PERRLA, EOMI  Throat: MMM, OP clear  CV: RRR, no M/R/G, no peripheral edema, no JVD  Resp: CTAB, no increased work of breathing on room air  GI: Soft, NT, ND, +BS. Flexi-seal in place.   Ext: MAEW, no c/c/e  Neuro: AAOx2, CN grossly intact, no focal neurologic deficits      CBC  Recent Labs   Lab 10/21/19  0542 10/22/19  0442 10/23/19  0520   WBC 10.12 9.69 9.22   HGB 7.6* 7.0* 6.6*   HCT 24.4* 21.8* 21.3*    207 222     CMP  Recent Labs   Lab 10/21/19  0542 10/22/19  0442 10/22/19  1355 10/23/19  0520    139 133* 135*   K 3.1* 2.6* 3.2* 2.6*    110 105 106   CO2 19* 20* 20* 22*   BUN 11 12 14 12   CREATININE 0.6 0.6 0.7 0.6   GLU 88 81 135* 96   CALCIUM 7.3* 7.2* 7.2* 7.6*   MG 1.4* 1.3*  --  1.7   PHOS 3.4 4.0  --  3.1               Assessment and Plan:  Septic shock with acute organ dysfunction due to methicillin susceptible Staphylococcus aureus (MSSA) bacteremia,  Epidural Abcess,   Abscess Upper Extremities,   Septic Pulmonary and Brain Emboli,   Acute Bacterial Endocarditis  --sepsis resolved  --Continuing oxacillin for MSSA endocarditis per ID recs with estimated end date: 11/20/19. Will need f/up in clinic.  --stable    Hypokalemia  --ongoing problem during hospitalization  --suspect due to GI losses and inadequate PO intake  --continue to replete with IV and PO supplements  --f/u BMP this afternoon  --will work on bulking up watery stool    Metabolic encephalopathy  Right thalamic stroke  --mental status improving  --Encephalopathy likely multiple etiology metabolic, infectious and due to stroke  - Brain MRI 9/30 with lacunar infarcts  - repeat head CT (10/7) revealed new right basal ganglia infarcts  - MRI of brain revealed R thalamic infarct, ventriculitis, subactue embolic infarctions  -CTA of head 10/8: atherosclerotic plaquing of the  distal vertebral arteries and concern for noncalcified plaquing and stenosis; no high-grade stenosis or proximal occlusion. No mycotic aneurysms  - vasc neuro recommending against systemic anticoagulation and asa due to high risk for hemorrhage from septic emboli  --continue delirium precautions  --avoid sedating meds    Leg cramping  --improved with gabapentin; continue for now    Palliative care encounter  Appreciate palliative care recs  --remains FULL code    Debility  --PT/OT recommending rehab    Severe protein calorie malnutrition  --appreciate nutrition assistance  --continue mechanical soft diet and protein shake supplements    Pancolitis  -s/p abx tx  -c diff neg x 2  --still with watery stool, which is likely contributing to electrolyte derangements  --start metamucil and probiotics in attempts to bulk-up stool  --PRN imodium   --continue boost breeze as lactose can be contributing        Diet: mechanical soft; advance pending ST recs  VTE PPX: lovenox  Goals of care: full  Dispo: rehab pending clinical stability    Yesenia Lerner M.D.  Department of Hospital Medicine  [facility]  [phone] (pager)

## 2019-10-24 LAB
ANION GAP SERPL CALC-SCNC: 6 MMOL/L (ref 8–16)
BASOPHILS # BLD AUTO: 0.04 K/UL (ref 0–0.2)
BASOPHILS NFR BLD: 0.4 % (ref 0–1.9)
BUN SERPL-MCNC: 11 MG/DL (ref 8–23)
CALCIUM SERPL-MCNC: 7.7 MG/DL (ref 8.7–10.5)
CHLORIDE SERPL-SCNC: 109 MMOL/L (ref 95–110)
CO2 SERPL-SCNC: 20 MMOL/L (ref 23–29)
CREAT SERPL-MCNC: 0.6 MG/DL (ref 0.5–1.4)
DIFFERENTIAL METHOD: ABNORMAL
EOSINOPHIL # BLD AUTO: 0.3 K/UL (ref 0–0.5)
EOSINOPHIL NFR BLD: 2.3 % (ref 0–8)
ERYTHROCYTE [DISTWIDTH] IN BLOOD BY AUTOMATED COUNT: 21.7 % (ref 11.5–14.5)
EST. GFR  (AFRICAN AMERICAN): >60 ML/MIN/1.73 M^2
EST. GFR  (NON AFRICAN AMERICAN): >60 ML/MIN/1.73 M^2
GLUCOSE SERPL-MCNC: 104 MG/DL (ref 70–110)
HCT VFR BLD AUTO: 26.6 % (ref 37–48.5)
HGB BLD-MCNC: 8.4 G/DL (ref 12–16)
IMM GRANULOCYTES # BLD AUTO: 0.26 K/UL (ref 0–0.04)
IMM GRANULOCYTES NFR BLD AUTO: 2.3 % (ref 0–0.5)
LYMPHOCYTES # BLD AUTO: 2 K/UL (ref 1–4.8)
LYMPHOCYTES NFR BLD: 18.4 % (ref 18–48)
MAGNESIUM SERPL-MCNC: 1.9 MG/DL (ref 1.6–2.6)
MCH RBC QN AUTO: 31.7 PG (ref 27–31)
MCHC RBC AUTO-ENTMCNC: 31.6 G/DL (ref 32–36)
MCV RBC AUTO: 100 FL (ref 82–98)
MONOCYTES # BLD AUTO: 0.6 K/UL (ref 0.3–1)
MONOCYTES NFR BLD: 5.4 % (ref 4–15)
NEUTROPHILS # BLD AUTO: 7.9 K/UL (ref 1.8–7.7)
NEUTROPHILS NFR BLD: 71.2 % (ref 38–73)
NRBC BLD-RTO: 0 /100 WBC
PHOSPHATE SERPL-MCNC: 2.7 MG/DL (ref 2.7–4.5)
PLATELET # BLD AUTO: 216 K/UL (ref 150–350)
PMV BLD AUTO: 10.4 FL (ref 9.2–12.9)
POTASSIUM SERPL-SCNC: 3.9 MMOL/L (ref 3.5–5.1)
RBC # BLD AUTO: 2.65 M/UL (ref 4–5.4)
SODIUM SERPL-SCNC: 135 MMOL/L (ref 136–145)
WBC # BLD AUTO: 11.11 K/UL (ref 3.9–12.7)

## 2019-10-24 PROCEDURE — 25000003 PHARM REV CODE 250: Performed by: INTERNAL MEDICINE

## 2019-10-24 PROCEDURE — 85025 COMPLETE CBC W/AUTO DIFF WBC: CPT

## 2019-10-24 PROCEDURE — C1751 CATH, INF, PER/CENT/MIDLINE: HCPCS

## 2019-10-24 PROCEDURE — 25000003 PHARM REV CODE 250: Performed by: FAMILY MEDICINE

## 2019-10-24 PROCEDURE — 84100 ASSAY OF PHOSPHORUS: CPT

## 2019-10-24 PROCEDURE — 63600175 PHARM REV CODE 636 W HCPCS: Performed by: INTERNAL MEDICINE

## 2019-10-24 PROCEDURE — 99232 SBSQ HOSP IP/OBS MODERATE 35: CPT | Mod: ,,, | Performed by: INTERNAL MEDICINE

## 2019-10-24 PROCEDURE — 99232 PR SUBSEQUENT HOSPITAL CARE,LEVL II: ICD-10-PCS | Mod: ,,, | Performed by: INTERNAL MEDICINE

## 2019-10-24 PROCEDURE — 36569 INSJ PICC 5 YR+ W/O IMAGING: CPT

## 2019-10-24 PROCEDURE — 25000003 PHARM REV CODE 250: Performed by: STUDENT IN AN ORGANIZED HEALTH CARE EDUCATION/TRAINING PROGRAM

## 2019-10-24 PROCEDURE — 83735 ASSAY OF MAGNESIUM: CPT

## 2019-10-24 PROCEDURE — 80048 BASIC METABOLIC PNL TOTAL CA: CPT

## 2019-10-24 PROCEDURE — 36415 COLL VENOUS BLD VENIPUNCTURE: CPT

## 2019-10-24 PROCEDURE — A4216 STERILE WATER/SALINE, 10 ML: HCPCS | Performed by: INTERNAL MEDICINE

## 2019-10-24 PROCEDURE — 76937 US GUIDE VASCULAR ACCESS: CPT

## 2019-10-24 PROCEDURE — 11000001 HC ACUTE MED/SURG PRIVATE ROOM

## 2019-10-24 RX ORDER — SODIUM CHLORIDE 0.9 % (FLUSH) 0.9 %
10 SYRINGE (ML) INJECTION EVERY 6 HOURS
Status: DISCONTINUED | OUTPATIENT
Start: 2019-10-24 | End: 2019-10-25 | Stop reason: HOSPADM

## 2019-10-24 RX ORDER — SODIUM CHLORIDE 0.9 % (FLUSH) 0.9 %
10 SYRINGE (ML) INJECTION
Status: DISCONTINUED | OUTPATIENT
Start: 2019-10-24 | End: 2019-10-25 | Stop reason: HOSPADM

## 2019-10-24 RX ORDER — POTASSIUM CHLORIDE 20 MEQ/1
20 TABLET, EXTENDED RELEASE ORAL ONCE
Status: COMPLETED | OUTPATIENT
Start: 2019-10-24 | End: 2019-10-24

## 2019-10-24 RX ADMIN — MICONAZOLE NITRATE: 20 OINTMENT TOPICAL at 08:10

## 2019-10-24 RX ADMIN — OXYCODONE HYDROCHLORIDE 10 MG: 10 TABLET ORAL at 10:10

## 2019-10-24 RX ADMIN — ALUMINUM HYDROXIDE, MAGNESIUM HYDROXIDE, AND SIMETHICONE 50 ML: 200; 200; 20 SUSPENSION ORAL at 01:10

## 2019-10-24 RX ADMIN — Medication 1 CAPSULE: at 08:10

## 2019-10-24 RX ADMIN — LOPERAMIDE HYDROCHLORIDE 2 MG: 2 CAPSULE ORAL at 08:10

## 2019-10-24 RX ADMIN — ACETAMINOPHEN 650 MG: 325 TABLET ORAL at 05:10

## 2019-10-24 RX ADMIN — MAGNESIUM SULFATE HEPTAHYDRATE 1 G: 500 INJECTION, SOLUTION INTRAMUSCULAR; INTRAVENOUS at 08:10

## 2019-10-24 RX ADMIN — METOPROLOL TARTRATE 100 MG: 100 TABLET ORAL at 08:10

## 2019-10-24 RX ADMIN — AMLODIPINE BESYLATE 10 MG: 10 TABLET ORAL at 08:10

## 2019-10-24 RX ADMIN — FERROUS SULFATE TAB EC 325 MG (65 MG FE EQUIVALENT) 325 MG: 325 (65 FE) TABLET DELAYED RESPONSE at 08:10

## 2019-10-24 RX ADMIN — ACETAMINOPHEN 650 MG: 325 TABLET ORAL at 07:10

## 2019-10-24 RX ADMIN — OXYCODONE HYDROCHLORIDE 10 MG: 10 TABLET ORAL at 05:10

## 2019-10-24 RX ADMIN — POTASSIUM CHLORIDE 20 MEQ: 1500 TABLET, EXTENDED RELEASE ORAL at 08:10

## 2019-10-24 RX ADMIN — Medication 10 ML: at 06:10

## 2019-10-24 RX ADMIN — OXYCODONE HYDROCHLORIDE 10 MG: 10 TABLET ORAL at 01:10

## 2019-10-24 RX ADMIN — FOLIC ACID 1 MG: 1 TABLET ORAL at 08:10

## 2019-10-24 RX ADMIN — ENOXAPARIN SODIUM 40 MG: 100 INJECTION SUBCUTANEOUS at 05:10

## 2019-10-24 RX ADMIN — OXACILLIN SODIUM 12 G: 10 INJECTION, POWDER, FOR SOLUTION INTRAVENOUS at 10:10

## 2019-10-24 NOTE — PROGRESS NOTES
Hospital Medicine  Progress Note      Patient Name: Mesha Mckenzie  MRN: [id number]  Date of Admission: 10/2/2019     Principal Problem: Encephalopathy, metabolic     Subjective     Potassium finally at goal today. Continues to have watery stools despite miralax and antidiarrheal agents. Will monitor electrolytes again in AM and reassess need for continued K supplementation on discharge. Complaining of abdominal discomfort today with tenderness to palpation. GI cocktail ordered.    Review of Systems     Constitutional: Negative for chills, fatigue, fever.   HENT: Negative for sore throat, trouble swallowing.    Eyes: Negative for photophobia, visual disturbance.   Respiratory: Negative for cough, shortness of breath.    Cardiovascular: Negative for chest pain, palpitations, leg swelling.   Gastrointestinal: Negative for constipation, diarrhea, nausea, vomiting. POSITIVE for abdominal pain.  Genitourinary: Negative for dysuria, frequency.   Musculoskeletal: POSITIVE for arthralgias, myalgias.   Skin: Negative for rash, wound, erythema   Neurological: Negative for dizziness, syncope, weakness, light-headedness.   Psychiatric/Behavioral: POSITIVE for confusion,      Medications  Scheduled Meds:   amLODIPine  10 mg Per NG tube Daily    enoxaparin  40 mg Subcutaneous Daily    ferrous sulfate  325 mg Oral Daily    folic acid  1 mg Oral Daily    Lactobacillus rhamnosus GG  1 capsule Oral Daily    loperamide  2 mg Oral BID    metoprolol tartrate  100 mg Oral BID    miconazole nitrate 2%   Topical (Top) BID    oxacillin 12 g in  mL CONTINUOUS INFUSION  12 g Intravenous Q24H    psyllium husk (aspartame)  1 packet Oral BID     Continuous Infusions:  PRN Meds:.sodium chloride, acetaminophen, albuterol-ipratropium, Dextrose 10% Bolus, Dextrose 10% Bolus, Dextrose 10% Bolus, glucagon (human recombinant), glucose, glucose, influenza, labetalol, levalbuterol, mupirocin, mupirocin, ondansetron, oxyCODONE, oxyCODONE,  pneumoc 13-susanna conj-dip cr(PF), promethazine (PHENERGAN) IVPB, sodium chloride 0.9%    Objective    Physical Examination    Temp:  [97.8 °F (36.6 °C)-98.9 °F (37.2 °C)]   Pulse:  [79-99]   Resp:  [14-19]   BP: ()/(42-59)   SpO2:  [94 %-97 %]     Gen: NAD, conversant  Head: NC, AT  Eyes: PERRLA, EOMI  Throat: MMM, OP clear  CV: RRR, no M/R/G, no peripheral edema, no JVD  Resp: CTAB, no increased work of breathing on room air  GI: Soft, tender to palpation in center of abdomen, no rebound , ND, +BS. Flexi-seal in place.   Ext: MAEW, no c/c/e  Neuro: AAOx2, CN grossly intact, no focal neurologic deficits      CBC  Recent Labs   Lab 10/23/19  0520 10/23/19  1508 10/24/19  0447   WBC 9.22 11.61 11.11   HGB 6.6* 6.9* 8.4*   HCT 21.3* 21.8* 26.6*    245 216     CMP  Recent Labs   Lab 10/22/19  0442  10/23/19  0520 10/23/19  1508 10/24/19  0447      < > 135* 136 135*   K 2.6*   < > 2.6* 4.3 3.9      < > 106 108 109   CO2 20*   < > 22* 22* 20*   BUN 12   < > 12 12 11   CREATININE 0.6   < > 0.6 0.6 0.6   GLU 81   < > 96 83 104   CALCIUM 7.2*   < > 7.6* 7.6* 7.7*   MG 1.3*  --  1.7  --  1.9   PHOS 4.0  --  3.1  --  2.7    < > = values in this interval not displayed.               Assessment and Plan:  Septic shock with acute organ dysfunction due to methicillin susceptible Staphylococcus aureus (MSSA) bacteremia,  Epidural Abcess,   Abscess Upper Extremities,   Septic Pulmonary and Brain Emboli,   Acute Bacterial Endocarditis  --sepsis resolved  --Continuing oxacillin for MSSA endocarditis per ID recs with estimated end date: 11/20/19. Will need f/up in clinic.  --PICC consult for line as pt likely medically stable for SNF lar[facility]    Hypokalemia  --ongoing problem during hospitalization  --suspect due to GI losses and inadequate PO intake  --continue to replete with IV and PO supplements  --will work on bulking up watery stool  --K at goal today, continue K supplements    Metabolic encephalopathy  Right  thalamic stroke  --mental status improving  --Encephalopathy likely multiple etiology metabolic, infectious and due to stroke  - Brain MRI 9/30 with lacunar infarcts  - repeat head CT (10/7) revealed new right basal ganglia infarcts  - MRI of brain revealed R thalamic infarct, ventriculitis, subactue embolic infarctions  -CTA of head 10/8: atherosclerotic plaquing of the distal vertebral arteries and concern for noncalcified plaquing and stenosis; no high-grade stenosis or proximal occlusion. No mycotic aneurysms  - [facility] neuro recommending against systemic anticoagulation and asa due to high risk for hemorrhage from septic emboli  --continue delirium precautions  --avoid sedating meds    Leg cramping  --improved with gabapentin; continue for now    Palliative care encounter  Appreciate palliative care recs  --remains FULL code    Debility  --PT/OT recommending rehab    Severe protein calorie malnutrition  --appreciate nutrition assistance  --continue mechanical soft diet and protein shake supplements    Pancolitis  -s/p abx tx  -c diff neg x 2  --still with watery stool, which is likely contributing to electrolyte derangements  --start metamucil and probiotics in attempts to bulk-up stool  --increase frequency of imodium  --switched to boost breeze as lactose can be contributing  --will need outpt GI follow-up        Diet: mechanical soft; advance pending ST recs  VTE PPX: lovenox  Goals of care: full  Dispo: SNF likely [facility] pending clinical stability    Yesenia Lerner M.D.  Department of Hospital Medicine  [facility]  [phone] (pager)

## 2019-10-24 NOTE — CONSULTS
Placed double lumen PICC to right brachial vein using u/s guidance.  31 cm in length, 25 cm arm circumference and 0 cm exposed.   Lot # [id number].

## 2019-10-24 NOTE — NURSING
"No acute events throughout night, VS and assessment per flow sheet, see below for updates:     Pulmonary:  RA with no oxygenation issues      Cardiovascular:  NSR on telemetry monitor.  BP WNL all shift.       Neurological:  Flat affect/withdrawn.  Appears frustrated with staff with any sort of nursing task (assessment/bath/linen change)  Keeps repeating "Beck Jimenez.       Gastrointestinal: FlexiSeal in place - no significant output overnight.  Leaking slightly around the rectum - antifungal cream applied twice per orders.  Poor appetite.       Genitourinary: Catheter intact draining clear yellow urine.  725cc's overnight      Skin/Bath:  Overlay waffle mattress in place.  Skin has some bruising.  Redness/tenderness noted to rectum near FlexiSeal.  Incision to back healing well - LÁZARO.  Preventative mepilex dressings in place to sacrum and bilateral heels.                          Infusions: ABX per eMAR      Patient c/o generalized pain (especially @ FlexiSeal site) this AM ~0545 - treated with oxycodone and tylenol.  Maybe today we can pull the FlexiSeal?      Plan of care communicated and reviewed with Mesha Mckenzie and spouse, all concerns addressed, will continue to monitor.      Karly Arce RN     "

## 2019-10-24 NOTE — PROGRESS NOTES
64 y/o female with L2-L4 epidural abscess now s/p L3 full laminectomy and facetectomy for evacuation of epidural phlegmon on 10/9 by Dr. Lewis    - Wound check today.  - Wound is well healing with good alignment. No erythema, edema, or drainage noted to incision. No TTP.   - Staples removed prior to wound check today.  - Do not soak incision for next 2-3 weeks. If incision gets wet please pat dry, do not rub or scrub.     Jenn Marinelli PA-C   Pager [phone]  Neurosurgery  [facility]

## 2019-10-24 NOTE — PLAN OF CARE
"Discharge planning: Received VM from Apryl at [facility] informing of denial of inpatient rehab request. Visited patient in room to inform and she is agreeable to going to snf and prefers "no nursing home snf". She requests [facility]. Referral placed. Called daughter Leeanna to discuss and left VM requesting return call.    1:30 Received call back from Leeanna and discussed insurance denial and she is agreeable to plan for [facility] snf.  "

## 2019-10-24 NOTE — NURSING
Pt resting in bed. VSS on RA. IV AntiBx on continuous infusion. K+ and Mg+ replacements complete. Norco elixir administered this am, PRN Q8hr. Report complete to Mart FISHER rm [id number].

## 2019-10-24 NOTE — PROCEDURES
"Mesha Mckenzie is a 63 y.o. female patient.    Temp: 97.8 °F (36.6 °C) (10/24/19 1134)  Pulse: 81 (10/24/19 1134)  Resp: 14 (10/24/19 1134)  BP: (!) 113/58 (10/24/19 1134)  SpO2: 97 % (10/24/19 1134)  Weight: 53.5 kg (118 lb) (10/03/19 1015)  Height: 5' 4" (162.6 cm) (10/03/19 1015)    PICC  Date/Time: 10/24/2019 3:16 PM  Performed by: Rosalva Michael RN  Assisting provider: Deena Albert RN  Consent Done: Yes  Time out: Immediately prior to procedure a time out was called to verify the correct patient, procedure, equipment, support staff and site/side marked as required  Indications: med administration and vascular access  Anesthesia: local infiltration  Local anesthetic: lidocaine 1% without epinephrine  Anesthetic Total (mL): 2  Preparation: skin prepped with ChloraPrep  Skin prep agent dried: skin prep agent completely dried prior to procedure  Sterile barriers: all five maximum sterile barriers used - cap, mask, sterile gown, sterile gloves, and large sterile sheet  Hand hygiene: hand hygiene performed prior to central venous catheter insertion  Location details: right brachial  Catheter type: double lumen  Catheter size: 5 Fr  Catheter Length: 31cm    Ultrasound guidance: yes  Vessel Caliber: medium and patent, compressibility normal  Vascular Doppler: not done  Needle advanced into vessel with real time Ultrasound guidance.  Guidewire confirmed in vessel.  Image recorded and saved.  Sterile sheath used.  Number of attempts: 1  Post-procedure: blood return through all ports, chlorhexidine patch and sterile dressing applied  Technical procedures used: 3CG  Specimens: No  Implants: No  Assessment: placement verified by x-ray  Complications: none          Deena Albert  10/24/2019  "

## 2019-10-25 ENCOUNTER — HOSPITAL ENCOUNTER (INPATIENT)
Facility: HOSPITAL | Age: 64
LOS: 2 days | Discharge: ANOTHER HOSPITAL | DRG: 096 | End: 2019-10-27
Attending: INTERNAL MEDICINE | Admitting: INTERNAL MEDICINE
Payer: MEDICARE

## 2019-10-25 VITALS
WEIGHT: 118 LBS | RESPIRATION RATE: 16 BRPM | BODY MASS INDEX: 20.14 KG/M2 | HEART RATE: 84 BPM | SYSTOLIC BLOOD PRESSURE: 160 MMHG | TEMPERATURE: 98 F | HEIGHT: 64 IN | OXYGEN SATURATION: 93 % | DIASTOLIC BLOOD PRESSURE: 73 MMHG

## 2019-10-25 DIAGNOSIS — G06.2 EPIDURAL ABSCESS: ICD-10-CM

## 2019-10-25 LAB
ANION GAP SERPL CALC-SCNC: 7 MMOL/L (ref 8–16)
ANION GAP SERPL CALC-SCNC: 8 MMOL/L (ref 8–16)
BASOPHILS # BLD AUTO: 0.06 K/UL (ref 0–0.2)
BASOPHILS NFR BLD: 0.7 % (ref 0–1.9)
BUN SERPL-MCNC: 10 MG/DL (ref 8–23)
BUN SERPL-MCNC: 10 MG/DL (ref 8–23)
CALCIUM SERPL-MCNC: 7.7 MG/DL (ref 8.7–10.5)
CALCIUM SERPL-MCNC: 7.9 MG/DL (ref 8.7–10.5)
CHLORIDE SERPL-SCNC: 107 MMOL/L (ref 95–110)
CHLORIDE SERPL-SCNC: 108 MMOL/L (ref 95–110)
CO2 SERPL-SCNC: 23 MMOL/L (ref 23–29)
CO2 SERPL-SCNC: 26 MMOL/L (ref 23–29)
CREAT SERPL-MCNC: 0.6 MG/DL (ref 0.5–1.4)
CREAT SERPL-MCNC: 0.6 MG/DL (ref 0.5–1.4)
DIFFERENTIAL METHOD: ABNORMAL
EOSINOPHIL # BLD AUTO: 0.2 K/UL (ref 0–0.5)
EOSINOPHIL NFR BLD: 2.3 % (ref 0–8)
ERYTHROCYTE [DISTWIDTH] IN BLOOD BY AUTOMATED COUNT: 21.9 % (ref 11.5–14.5)
EST. GFR  (AFRICAN AMERICAN): >60 ML/MIN/1.73 M^2
EST. GFR  (AFRICAN AMERICAN): >60 ML/MIN/1.73 M^2
EST. GFR  (NON AFRICAN AMERICAN): >60 ML/MIN/1.73 M^2
EST. GFR  (NON AFRICAN AMERICAN): >60 ML/MIN/1.73 M^2
GLUCOSE SERPL-MCNC: 91 MG/DL (ref 70–110)
GLUCOSE SERPL-MCNC: 96 MG/DL (ref 70–110)
HCT VFR BLD AUTO: 27.1 % (ref 37–48.5)
HGB BLD-MCNC: 8.6 G/DL (ref 12–16)
IMM GRANULOCYTES # BLD AUTO: 0.23 K/UL (ref 0–0.04)
IMM GRANULOCYTES NFR BLD AUTO: 2.7 % (ref 0–0.5)
LYMPHOCYTES # BLD AUTO: 1.5 K/UL (ref 1–4.8)
LYMPHOCYTES NFR BLD: 17.5 % (ref 18–48)
MAGNESIUM SERPL-MCNC: 1.6 MG/DL (ref 1.6–2.6)
MCH RBC QN AUTO: 31.6 PG (ref 27–31)
MCHC RBC AUTO-ENTMCNC: 31.7 G/DL (ref 32–36)
MCV RBC AUTO: 100 FL (ref 82–98)
MONOCYTES # BLD AUTO: 0.6 K/UL (ref 0.3–1)
MONOCYTES NFR BLD: 6.8 % (ref 4–15)
NEUTROPHILS # BLD AUTO: 6 K/UL (ref 1.8–7.7)
NEUTROPHILS NFR BLD: 70 % (ref 38–73)
NRBC BLD-RTO: 0 /100 WBC
PHOSPHATE SERPL-MCNC: 3.3 MG/DL (ref 2.7–4.5)
PLATELET # BLD AUTO: 259 K/UL (ref 150–350)
PMV BLD AUTO: 10 FL (ref 9.2–12.9)
POTASSIUM SERPL-SCNC: 2.6 MMOL/L (ref 3.5–5.1)
POTASSIUM SERPL-SCNC: 3.3 MMOL/L (ref 3.5–5.1)
RBC # BLD AUTO: 2.72 M/UL (ref 4–5.4)
SODIUM SERPL-SCNC: 138 MMOL/L (ref 136–145)
SODIUM SERPL-SCNC: 141 MMOL/L (ref 136–145)
WBC # BLD AUTO: 8.52 K/UL (ref 3.9–12.7)

## 2019-10-25 PROCEDURE — 63600175 PHARM REV CODE 636 W HCPCS: Performed by: INTERNAL MEDICINE

## 2019-10-25 PROCEDURE — 99232 SBSQ HOSP IP/OBS MODERATE 35: CPT | Mod: ,,, | Performed by: INTERNAL MEDICINE

## 2019-10-25 PROCEDURE — 11000004 HC SNF PRIVATE

## 2019-10-25 PROCEDURE — 90472 IMMUNIZATION ADMIN EACH ADD: CPT | Performed by: INTERNAL MEDICINE

## 2019-10-25 PROCEDURE — G0009 ADMIN PNEUMOCOCCAL VACCINE: HCPCS | Performed by: INTERNAL MEDICINE

## 2019-10-25 PROCEDURE — 80048 BASIC METABOLIC PNL TOTAL CA: CPT | Mod: 91

## 2019-10-25 PROCEDURE — 90471 IMMUNIZATION ADMIN: CPT | Performed by: INTERNAL MEDICINE

## 2019-10-25 PROCEDURE — 84100 ASSAY OF PHOSPHORUS: CPT

## 2019-10-25 PROCEDURE — 90670 PCV13 VACCINE IM: CPT | Performed by: INTERNAL MEDICINE

## 2019-10-25 PROCEDURE — 99232 PR SUBSEQUENT HOSPITAL CARE,LEVL II: ICD-10-PCS | Mod: ,,, | Performed by: INTERNAL MEDICINE

## 2019-10-25 PROCEDURE — G0008 ADMIN INFLUENZA VIRUS VAC: HCPCS | Performed by: INTERNAL MEDICINE

## 2019-10-25 PROCEDURE — A4216 STERILE WATER/SALINE, 10 ML: HCPCS | Performed by: INTERNAL MEDICINE

## 2019-10-25 PROCEDURE — 25000003 PHARM REV CODE 250: Performed by: FAMILY MEDICINE

## 2019-10-25 PROCEDURE — 25000003 PHARM REV CODE 250: Performed by: INTERNAL MEDICINE

## 2019-10-25 PROCEDURE — 85025 COMPLETE CBC W/AUTO DIFF WBC: CPT

## 2019-10-25 PROCEDURE — 90662 IIV NO PRSV INCREASED AG IM: CPT | Performed by: INTERNAL MEDICINE

## 2019-10-25 PROCEDURE — 83735 ASSAY OF MAGNESIUM: CPT

## 2019-10-25 PROCEDURE — 25000003 PHARM REV CODE 250: Performed by: STUDENT IN AN ORGANIZED HEALTH CARE EDUCATION/TRAINING PROGRAM

## 2019-10-25 PROCEDURE — 97530 THERAPEUTIC ACTIVITIES: CPT

## 2019-10-25 RX ORDER — CALCIUM CARBONATE 200(500)MG
500 TABLET,CHEWABLE ORAL 2 TIMES DAILY PRN
Status: CANCELLED | OUTPATIENT
Start: 2019-10-25

## 2019-10-25 RX ORDER — ACETAMINOPHEN 325 MG/1
650 TABLET ORAL EVERY 6 HOURS PRN
Status: CANCELLED | OUTPATIENT
Start: 2019-10-25

## 2019-10-25 RX ORDER — MAGNESIUM SULFATE HEPTAHYDRATE 40 MG/ML
2 INJECTION, SOLUTION INTRAVENOUS ONCE
Status: COMPLETED | OUTPATIENT
Start: 2019-10-25 | End: 2019-10-25

## 2019-10-25 RX ORDER — FERROUS SULFATE 325(65) MG
325 TABLET, DELAYED RELEASE (ENTERIC COATED) ORAL DAILY
Status: DISCONTINUED | OUTPATIENT
Start: 2019-10-26 | End: 2019-10-29 | Stop reason: HOSPADM

## 2019-10-25 RX ORDER — SODIUM CHLORIDE 0.9 % (FLUSH) 0.9 %
10 SYRINGE (ML) INJECTION EVERY 6 HOURS
Status: CANCELLED | OUTPATIENT
Start: 2019-10-25

## 2019-10-25 RX ORDER — AMOXICILLIN 250 MG
1 CAPSULE ORAL 2 TIMES DAILY
Status: CANCELLED | OUTPATIENT
Start: 2019-10-25

## 2019-10-25 RX ORDER — POTASSIUM CHLORIDE 20 MEQ/1
20 TABLET, EXTENDED RELEASE ORAL DAILY
Status: CANCELLED | OUTPATIENT
Start: 2019-10-26

## 2019-10-25 RX ORDER — LOPERAMIDE HYDROCHLORIDE 2 MG/1
2 CAPSULE ORAL 3 TIMES DAILY
Status: DISCONTINUED | OUTPATIENT
Start: 2019-10-25 | End: 2019-10-25 | Stop reason: HOSPADM

## 2019-10-25 RX ORDER — OXYCODONE HYDROCHLORIDE 5 MG/1
5 TABLET ORAL EVERY 8 HOURS PRN
Status: DISCONTINUED | OUTPATIENT
Start: 2019-10-25 | End: 2019-10-29 | Stop reason: HOSPADM

## 2019-10-25 RX ORDER — SODIUM CHLORIDE 0.9 % (FLUSH) 0.9 %
10 SYRINGE (ML) INJECTION
Status: CANCELLED | OUTPATIENT
Start: 2019-10-25

## 2019-10-25 RX ORDER — RAMELTEON 8 MG/1
8 TABLET ORAL NIGHTLY PRN
Status: CANCELLED | OUTPATIENT
Start: 2019-10-25

## 2019-10-25 RX ORDER — METOPROLOL TARTRATE 100 MG/1
100 TABLET ORAL 2 TIMES DAILY
Qty: 60 TABLET | Refills: 11 | Status: ON HOLD | OUTPATIENT
Start: 2019-10-25 | End: 2020-02-06 | Stop reason: HOSPADM

## 2019-10-25 RX ORDER — AMLODIPINE BESYLATE 10 MG/1
10 TABLET ORAL DAILY
Qty: 30 TABLET | Refills: 11 | Status: ON HOLD
Start: 2019-10-25 | End: 2019-11-19 | Stop reason: SDUPTHER

## 2019-10-25 RX ORDER — ALBUTEROL SULFATE 2.5 MG/.5ML
2.5 SOLUTION RESPIRATORY (INHALATION) EVERY 4 HOURS PRN
Status: DISCONTINUED | OUTPATIENT
Start: 2019-10-25 | End: 2019-10-28

## 2019-10-25 RX ORDER — ACETAMINOPHEN 325 MG/1
650 TABLET ORAL EVERY 6 HOURS PRN
Status: DISCONTINUED | OUTPATIENT
Start: 2019-10-25 | End: 2019-10-29 | Stop reason: HOSPADM

## 2019-10-25 RX ORDER — POTASSIUM CHLORIDE 20 MEQ/1
20 TABLET, EXTENDED RELEASE ORAL DAILY
Status: DISCONTINUED | OUTPATIENT
Start: 2019-10-26 | End: 2019-10-27

## 2019-10-25 RX ORDER — LOPERAMIDE HYDROCHLORIDE 2 MG/1
2 CAPSULE ORAL 3 TIMES DAILY PRN
Status: DISCONTINUED | OUTPATIENT
Start: 2019-10-25 | End: 2019-10-29 | Stop reason: HOSPADM

## 2019-10-25 RX ORDER — LEVALBUTEROL INHALATION SOLUTION 0.63 MG/3ML
0.63 SOLUTION RESPIRATORY (INHALATION) EVERY 6 HOURS PRN
Refills: 0 | Status: ON HOLD
Start: 2019-10-25 | End: 2019-11-19 | Stop reason: HOSPADM

## 2019-10-25 RX ORDER — METOPROLOL TARTRATE 100 MG/1
100 TABLET ORAL 2 TIMES DAILY
Status: CANCELLED | OUTPATIENT
Start: 2019-10-25

## 2019-10-25 RX ORDER — CALCIUM CARBONATE 200(500)MG
500 TABLET,CHEWABLE ORAL 2 TIMES DAILY PRN
Status: DISCONTINUED | OUTPATIENT
Start: 2019-10-25 | End: 2019-10-29 | Stop reason: HOSPADM

## 2019-10-25 RX ORDER — AMLODIPINE BESYLATE 10 MG/1
10 TABLET ORAL DAILY
Status: DISCONTINUED | OUTPATIENT
Start: 2019-10-26 | End: 2019-10-29 | Stop reason: HOSPADM

## 2019-10-25 RX ORDER — SODIUM CHLORIDE 0.9 % (FLUSH) 0.9 %
10 SYRINGE (ML) INJECTION EVERY 6 HOURS
Status: DISCONTINUED | OUTPATIENT
Start: 2019-10-26 | End: 2019-10-29 | Stop reason: HOSPADM

## 2019-10-25 RX ORDER — ONDANSETRON 4 MG/1
4 TABLET, ORALLY DISINTEGRATING ORAL EVERY 8 HOURS PRN
Status: DISCONTINUED | OUTPATIENT
Start: 2019-10-25 | End: 2019-10-29 | Stop reason: HOSPADM

## 2019-10-25 RX ORDER — IPRATROPIUM BROMIDE AND ALBUTEROL SULFATE 2.5; .5 MG/3ML; MG/3ML
3 SOLUTION RESPIRATORY (INHALATION) EVERY 4 HOURS PRN
Status: CANCELLED | OUTPATIENT
Start: 2019-10-25

## 2019-10-25 RX ORDER — AMLODIPINE BESYLATE 10 MG/1
10 TABLET ORAL DAILY
Status: CANCELLED | OUTPATIENT
Start: 2019-10-26

## 2019-10-25 RX ORDER — POTASSIUM CHLORIDE 20 MEQ/1
20 TABLET, EXTENDED RELEASE ORAL DAILY
Status: DISCONTINUED | OUTPATIENT
Start: 2019-10-25 | End: 2019-10-25 | Stop reason: HOSPADM

## 2019-10-25 RX ORDER — ONDANSETRON 2 MG/ML
4 INJECTION INTRAMUSCULAR; INTRAVENOUS EVERY 8 HOURS PRN
Status: DISCONTINUED | OUTPATIENT
Start: 2019-10-25 | End: 2019-10-29 | Stop reason: HOSPADM

## 2019-10-25 RX ORDER — OXYCODONE HYDROCHLORIDE 5 MG/1
5 TABLET ORAL EVERY 8 HOURS PRN
Status: CANCELLED | OUTPATIENT
Start: 2019-10-25

## 2019-10-25 RX ORDER — LOPERAMIDE HYDROCHLORIDE 2 MG/1
2 CAPSULE ORAL 3 TIMES DAILY PRN
Status: CANCELLED | OUTPATIENT
Start: 2019-10-25

## 2019-10-25 RX ORDER — FERROUS SULFATE 325(65) MG
325 TABLET, DELAYED RELEASE (ENTERIC COATED) ORAL DAILY
Status: CANCELLED | OUTPATIENT
Start: 2019-10-26

## 2019-10-25 RX ORDER — ONDANSETRON 2 MG/ML
4 INJECTION INTRAMUSCULAR; INTRAVENOUS EVERY 8 HOURS PRN
Status: CANCELLED | OUTPATIENT
Start: 2019-10-25

## 2019-10-25 RX ORDER — RAMELTEON 8 MG/1
8 TABLET ORAL NIGHTLY PRN
Status: DISCONTINUED | OUTPATIENT
Start: 2019-10-25 | End: 2019-10-29 | Stop reason: HOSPADM

## 2019-10-25 RX ORDER — FOLIC ACID 1 MG/1
1 TABLET ORAL DAILY
Status: CANCELLED | OUTPATIENT
Start: 2019-10-26

## 2019-10-25 RX ORDER — OXYCODONE HYDROCHLORIDE 5 MG/1
5 TABLET ORAL EVERY 8 HOURS PRN
Status: DISCONTINUED | OUTPATIENT
Start: 2019-10-25 | End: 2019-10-25 | Stop reason: HOSPADM

## 2019-10-25 RX ORDER — FOLIC ACID 1 MG/1
1 TABLET ORAL DAILY
Refills: 0
Start: 2019-10-25 | End: 2020-10-24

## 2019-10-25 RX ORDER — FOLIC ACID 1 MG/1
1 TABLET ORAL DAILY
Status: DISCONTINUED | OUTPATIENT
Start: 2019-10-26 | End: 2019-10-29 | Stop reason: HOSPADM

## 2019-10-25 RX ORDER — ALBUTEROL SULFATE 2.5 MG/.5ML
2.5 SOLUTION RESPIRATORY (INHALATION) EVERY 4 HOURS PRN
Status: CANCELLED | OUTPATIENT
Start: 2019-10-25

## 2019-10-25 RX ORDER — METOPROLOL TARTRATE 50 MG/1
100 TABLET ORAL 2 TIMES DAILY
Status: DISCONTINUED | OUTPATIENT
Start: 2019-10-25 | End: 2019-10-29 | Stop reason: HOSPADM

## 2019-10-25 RX ORDER — ONDANSETRON 4 MG/1
4 TABLET, ORALLY DISINTEGRATING ORAL EVERY 8 HOURS PRN
Status: CANCELLED | OUTPATIENT
Start: 2019-10-25

## 2019-10-25 RX ORDER — LANOLIN ALCOHOL/MO/W.PET/CERES
800 CREAM (GRAM) TOPICAL ONCE
Status: COMPLETED | OUTPATIENT
Start: 2019-10-25 | End: 2019-10-25

## 2019-10-25 RX ORDER — FERROUS SULFATE 325(65) MG
325 TABLET, DELAYED RELEASE (ENTERIC COATED) ORAL DAILY
Refills: 0 | Status: ON HOLD | COMMUNITY
Start: 2019-10-25 | End: 2019-11-19 | Stop reason: HOSPADM

## 2019-10-25 RX ORDER — LEVALBUTEROL INHALATION SOLUTION 0.63 MG/3ML
0.63 SOLUTION RESPIRATORY (INHALATION) EVERY 6 HOURS PRN
Status: DISCONTINUED | OUTPATIENT
Start: 2019-10-25 | End: 2019-10-28

## 2019-10-25 RX ORDER — AMOXICILLIN 250 MG
1 CAPSULE ORAL 2 TIMES DAILY
Status: DISCONTINUED | OUTPATIENT
Start: 2019-10-25 | End: 2019-10-29 | Stop reason: HOSPADM

## 2019-10-25 RX ORDER — IPRATROPIUM BROMIDE AND ALBUTEROL SULFATE 2.5; .5 MG/3ML; MG/3ML
3 SOLUTION RESPIRATORY (INHALATION) EVERY 4 HOURS PRN
Status: DISCONTINUED | OUTPATIENT
Start: 2019-10-25 | End: 2019-10-28

## 2019-10-25 RX ORDER — LOPERAMIDE HYDROCHLORIDE 2 MG/1
2 CAPSULE ORAL 3 TIMES DAILY
Refills: 0 | Status: ON HOLD
Start: 2019-10-25 | End: 2019-11-19 | Stop reason: HOSPADM

## 2019-10-25 RX ORDER — LEVALBUTEROL INHALATION SOLUTION 0.63 MG/3ML
0.63 SOLUTION RESPIRATORY (INHALATION) EVERY 6 HOURS PRN
Status: CANCELLED | OUTPATIENT
Start: 2019-10-25

## 2019-10-25 RX ORDER — SODIUM CHLORIDE 0.9 % (FLUSH) 0.9 %
10 SYRINGE (ML) INJECTION
Status: DISCONTINUED | OUTPATIENT
Start: 2019-10-25 | End: 2019-10-29 | Stop reason: HOSPADM

## 2019-10-25 RX ADMIN — METOPROLOL TARTRATE 100 MG: 100 TABLET ORAL at 09:10

## 2019-10-25 RX ADMIN — INFLUENZA A VIRUS A/MICHIGAN/45/2015 X-275 (H1N1) ANTIGEN (FORMALDEHYDE INACTIVATED), INFLUENZA A VIRUS A/SINGAPORE/INFIMH-16-0019/2016 IVR-186 (H3N2) ANTIGEN (FORMALDEHYDE INACTIVATED), AND INFLUENZA B VIRUS B/MARYLAND/15/2016 BX-69A (A B/COLORADO/6/2017-LIKE VIRUS) ANTIGEN (FORMALDEHYDE INACTIVATED) 0.5 ML: 60; 60; 60 INJECTION, SUSPENSION INTRAMUSCULAR at 06:10

## 2019-10-25 RX ADMIN — ACETAMINOPHEN 650 MG: 325 TABLET ORAL at 04:10

## 2019-10-25 RX ADMIN — ACETAMINOPHEN 650 MG: 325 TABLET ORAL at 09:10

## 2019-10-25 RX ADMIN — FOLIC ACID 1 MG: 1 TABLET ORAL at 09:10

## 2019-10-25 RX ADMIN — OXYCODONE HYDROCHLORIDE 5 MG: 5 TABLET ORAL at 01:10

## 2019-10-25 RX ADMIN — FERROUS SULFATE TAB EC 325 MG (65 MG FE EQUIVALENT) 325 MG: 325 (65 FE) TABLET DELAYED RESPONSE at 09:10

## 2019-10-25 RX ADMIN — PNEUMOCOCCAL 13-VALENT CONJUGATE VACCINE 0.5 ML: 2.2; 2.2; 2.2; 2.2; 2.2; 4.4; 2.2; 2.2; 2.2; 2.2; 2.2; 2.2; 2.2 INJECTION, SUSPENSION INTRAMUSCULAR at 06:10

## 2019-10-25 RX ADMIN — ENOXAPARIN SODIUM 40 MG: 100 INJECTION SUBCUTANEOUS at 05:10

## 2019-10-25 RX ADMIN — AMLODIPINE BESYLATE 10 MG: 10 TABLET ORAL at 09:10

## 2019-10-25 RX ADMIN — POTASSIUM CHLORIDE 20 MEQ: 1500 TABLET, EXTENDED RELEASE ORAL at 05:10

## 2019-10-25 RX ADMIN — OXYCODONE HYDROCHLORIDE 10 MG: 10 TABLET ORAL at 06:10

## 2019-10-25 RX ADMIN — RAMELTEON 8 MG: 8 TABLET ORAL at 10:10

## 2019-10-25 RX ADMIN — LOPERAMIDE HYDROCHLORIDE 2 MG: 2 CAPSULE ORAL at 09:10

## 2019-10-25 RX ADMIN — PSYLLIUM HUSK 1 PACKET: 3.4 POWDER ORAL at 09:10

## 2019-10-25 RX ADMIN — MICONAZOLE NITRATE: 20 OINTMENT TOPICAL at 09:10

## 2019-10-25 RX ADMIN — Medication 1 CAPSULE: at 09:10

## 2019-10-25 RX ADMIN — Medication 10 ML: at 06:10

## 2019-10-25 RX ADMIN — Medication 10 ML: at 12:10

## 2019-10-25 RX ADMIN — LOPERAMIDE HYDROCHLORIDE 2 MG: 2 CAPSULE ORAL at 10:10

## 2019-10-25 RX ADMIN — MAGNESIUM SULFATE IN WATER 2 G: 40 INJECTION, SOLUTION INTRAVENOUS at 01:10

## 2019-10-25 RX ADMIN — MAGNESIUM OXIDE TAB 400 MG (241.3 MG ELEMENTAL MG) 800 MG: 400 (241.3 MG) TAB at 06:10

## 2019-10-25 RX ADMIN — MICONAZOLE NITRATE: 20 OINTMENT TOPICAL at 10:10

## 2019-10-25 RX ADMIN — METOPROLOL TARTRATE 100 MG: 50 TABLET ORAL at 10:10

## 2019-10-25 RX ADMIN — ACETAMINOPHEN 650 MG: 325 TABLET ORAL at 10:10

## 2019-10-25 NOTE — PT/OT/SLP PROGRESS
Physical Therapy D/C Summary      Patient Name:  Mesha Mckenzie   MRN:  [id number]    Patient transferred to ICU for IV drip on 10/24/19. PT D/Perry due to transfer to ICU. PT will await further orders..    Lauren Paige, PT 10/25/19

## 2019-10-25 NOTE — PLAN OF CARE
Patient can be accepted to OS[facility] today.     RN can call report to [phone].    Transportation arranged for wheelchair van  via [facility] (x. [phone]). Requested pickup time is 7:30 PM . Requested pickup time is not a guarantee of time of arrival.     RN aware.    Kate Greene, RANI  [facility]

## 2019-10-25 NOTE — PROGRESS NOTES
Hospital Medicine  Progress Note      Patient Name: Mesha Mckenzie  MRN: [id number]  Date of Admission: 10/2/2019     Principal Problem: Encephalopathy, metabolic     Subjective  No acute events overnight. No new complaints this morning. Complains of diffuse body pains intermittently. AM BMP still pending. Plan to remove rectal tube and astorga catheter today.    Review of Systems     Constitutional: Negative for chills, fatigue, fever.   HENT: Negative for sore throat, trouble swallowing.    Eyes: Negative for photophobia, visual disturbance.   Respiratory: Negative for cough, shortness of breath.    Cardiovascular: Negative for chest pain, palpitations, leg swelling.   Gastrointestinal: Negative for constipation, diarrhea, nausea, vomiting. POSITIVE for abdominal pain.  Genitourinary: Negative for dysuria, frequency.   Musculoskeletal: POSITIVE for arthralgias, myalgias.   Skin: Negative for rash, wound, erythema   Neurological: Negative for dizziness, syncope, weakness, light-headedness.   Psychiatric/Behavioral: POSITIVE for confusion,      Medications  Scheduled Meds:   amLODIPine  10 mg Per NG tube Daily    enoxaparin  40 mg Subcutaneous Daily    ferrous sulfate  325 mg Oral Daily    folic acid  1 mg Oral Daily    Lactobacillus rhamnosus GG  1 capsule Oral Daily    loperamide  2 mg Oral TID    magnesium sulfate IVPB  2 g Intravenous Once    metoprolol tartrate  100 mg Oral BID    miconazole nitrate 2%   Topical (Top) BID    oxacillin 12 g in  mL CONTINUOUS INFUSION  12 g Intravenous Q24H    psyllium husk (aspartame)  1 packet Oral BID    sodium chloride 0.9%  10 mL Intravenous Q6H     Continuous Infusions:  PRN Meds:.sodium chloride, acetaminophen, albuterol-ipratropium, Dextrose 10% Bolus, Dextrose 10% Bolus, Dextrose 10% Bolus, glucagon (human recombinant), glucose, glucose, influenza, labetalol, levalbuterol, mupirocin, mupirocin, ondansetron, oxyCODONE, pneumoc 13-susanna conj-dip cr(PF),  promethazine (PHENERGAN) IVPB, sodium chloride 0.9%, Flushing PICC Protocol **AND** sodium chloride 0.9% **AND** sodium chloride 0.9%    Objective    Physical Examination    Temp:  [97.5 °F (36.4 °C)-98.8 °F (37.1 °C)]   Pulse:  []   Resp:  [12-20]   BP: (138-172)/(65-81)   SpO2:  [91 %-96 %]     Gen: NAD, conversant  Head: NC, AT  Eyes: PERRLA, EOMI  Throat: MMM, OP clear  CV: RRR, no M/R/G, no peripheral edema, no JVD  Resp: CTAB, no increased work of breathing on room air  GI: Soft, tender to palpation in center of abdomen, no rebound , ND, +BS. Flexi-seal in place.   Ext: MAEW, no c/c/e  Neuro: AAOx2, CN grossly intact, no focal neurologic deficits      CBC  Recent Labs   Lab 10/23/19  1508 10/24/19  0447 10/25/19  0701   WBC 11.61 11.11 8.52   HGB 6.9* 8.4* 8.6*   HCT 21.8* 26.6* 27.1*    216 259     CMP  Recent Labs   Lab 10/23/19  0520 10/23/19  1508 10/24/19  0447 10/25/19  0701   * 136 135*  --    K 2.6* 4.3 3.9  --     108 109  --    CO2 22* 22* 20*  --    BUN 12 12 11  --    CREATININE 0.6 0.6 0.6  --    GLU 96 83 104  --    CALCIUM 7.6* 7.6* 7.7*  --    MG 1.7  --  1.9 1.6   PHOS 3.1  --  2.7 3.3               Assessment and Plan:  Septic shock with acute organ dysfunction due to methicillin susceptible Staphylococcus aureus (MSSA) bacteremia,  Epidural Abcess,   Abscess Upper Extremities,   Septic Pulmonary and Brain Emboli,   Acute Bacterial Endocarditis  --sepsis resolved  --Continuing oxacillin for MSSA endocarditis per ID recs with estimated end date: 11/20/19. Will need f/up in clinic.  --PICC placed 10/24    Hypokalemia  --ongoing problem during hospitalization  --suspect due to GI losses and inadequate PO intake  --continue to replete with IV and PO supplements  --will work on bulking up watery stool  --K at goal yesterday  --today's BMP still pending    Metabolic encephalopathy  Right thalamic stroke  --mental status improving  --Encephalopathy likely multiple etiology  metabolic, infectious and due to stroke  - Brain MRI 9/30 with lacunar infarcts  - repeat head CT (10/7) revealed new right basal ganglia infarcts  - MRI of brain revealed R thalamic infarct, ventriculitis, subactue embolic infarctions  -CTA of head 10/8: atherosclerotic plaquing of the distal vertebral arteries and concern for noncalcified plaquing and stenosis; no high-grade stenosis or proximal occlusion. No mycotic aneurysms  - vasc neuro recommending against systemic anticoagulation and asa due to high risk for hemorrhage from septic emboli  --continue delirium precautions  --avoid sedating meds    Leg cramping  --improved with gabapentin; continue for now    Palliative care encounter  Appreciate palliative care recs  --remains FULL code    Debility  --PT/OT recommending rehab    Severe protein calorie malnutrition  --appreciate nutrition assistance  --continue mechanical soft diet and protein shake supplements    Pancolitis  -s/p abx tx  -c diff neg x 2  --still with watery stool, which is likely contributing to electrolyte derangements  --start metamucil and probiotics in attempts to bulk-up stool  --increase frequency of imodium  --switched to boost breeze as lactose can be contributing  --will need outpt GI follow-up  --remove rectal tube and assess    Urinary retention  --chronic problem per daughter  --she straight acth's at home  --remove astorga today and start straight cath  --PRN bladder scans    Diet: mechanical soft; advance pending ST recs  VTE PPX: lovenox  Goals of care: full  Dispo: pending insurance auth for SNF    Yesenia Lerner M.D.  Department of Hospital Medicine  [facility]  [phone] (pager)

## 2019-10-25 NOTE — NURSING
Patient alert and oriented X4, assessment and VS completed see flow sheet.  Henriquez catheter and rectal tube removed, pt tolerated well.  Pt. Refuse to take Magnesium infusion. MD notified, alternative medication will be given.

## 2019-10-25 NOTE — PT/OT/SLP PROGRESS
"Physical Therapy Treatment    Patient Name:  Mesha Mckenzie   MRN:  [id number]    Recommendations:     Discharge Recommendations:  rehabilitation facility   Discharge Equipment Recommendations: (TBD)   Barriers to discharge: Inaccessible home and Decreased caregiver support    Assessment:     Mesha Mckenzie is a 63 y.o. female admitted with a medical diagnosis of Encephalopathy, metabolic.  She presents with the following impairments/functional limitations:  weakness, impaired endurance, impaired self care skills, impaired functional mobilty, gait instability, impaired balance, impaired cognition, decreased lower extremity function, decreased safety awareness, decreased coordination, pain. Pt refuses active participation in session after Max encouragement needed to reach sitting EOB. "Attempts" single stand then refuses further participation.     Rehab Prognosis: Good and Fair; patient would benefit from acute skilled PT services to address these deficits and reach maximum level of function.    Recent Surgery: Procedure(s) (LRB):  LAMINECTOMY, SPINE, L3, Open (N/A) 16 Days Post-Op    Plan:     During this hospitalization, patient to be seen 4 x/week to address the identified rehab impairments via gait training, therapeutic activities, therapeutic exercises, neuromuscular re-education and progress toward the following goals:    · Plan of Care Expires:  10/25/19    Subjective     Chief Complaint: pain  Patient/Family Comments/goals: "I can't do that. I'm sorry, I just can't do any of that right now."   Pain/Comfort:  · Pain Rating 1: other (see comments)(unrated c/o severe generalized back pain)      Objective:     Communicated with NSG prior to session.  Patient found supine with telemetry, peripheral IV upon PTA entry to room. Son at bedside.    General Precautions: Standard, fall   Orthopedic Precautions:N/A   Braces: N/A     Functional Mobility:  · Bed Mobility:     · Supine to Sit: maximal assistance  · Sit " to Supine: moderate assistance  · Transfers:     · Sit to Stand:  total assistance with no AD; pt nonparticipatory with no LE effort to stand; pt refuses further attempts       AM-PAC 6 CLICK MOBILITY  Turning over in bed (including adjusting bedclothes, sheets and blankets)?: 3  Sitting down on and standing up from a chair with arms (e.g., wheelchair, bedside commode, etc.): 2  Moving from lying on back to sitting on the side of the bed?: 3  Moving to and from a bed to a chair (including a wheelchair)?: 2  Need to walk in hospital room?: 2  Climbing 3-5 steps with a railing?: 1  Basic Mobility Total Score: 13       Therapeutic Activities and Exercises:   Pt assisted with functional mobility as noted above.   Extensive education on pts need for OOB mobility and increased mobilization to prevent effects of prolonged immobility.     Patient left supine with all lines intact, call button in reach, NSG notified and son present..    GOALS:   Multidisciplinary Problems     Physical Therapy Goals        Problem: Physical Therapy Goal    Goal Priority Disciplines Outcome Goal Variances Interventions   Physical Therapy Goal     PT, PT/OT Ongoing, Progressing     Description:  Goals to be met by: 2019    Patient will increase functional independence with mobility by performin. Supine <> sit with Moderate Assistance.- met  Revised goal: supine to/from sit with minimal assist-not met  2. Sit <> stand transfer with Moderate Assistance using Rolling Walker.- met  Revised goal: sit to stand with RW with minimal assist-not met  3. Bed <> chair transfer via Step Transfer with Moderate Assistance using Rolling Walker.-met  Revised goal: bed to/from chair with RW with minimal assist-not met  4. Dynamic sitting at edge of bed x 10 minutes with Minimal Assistance to prepare for functional tasks in sitting.-not met  5. Able to tolerate exercise for 15-20 reps with assistance as needed.-not met  6. Pt receive gait training ~  50 ft with Rw and min assist - not met                               Time Tracking:     PT Received On: 10/25/19  PT Start Time: 1514     PT Stop Time: 1537  PT Total Time (min): 23 min     Billable Minutes: Therapeutic Activity 23    Treatment Type: Treatment  PT/PTA: PTA     PTA Visit Number: 1     Isaiah Powell PTA  10/25/2019

## 2019-10-26 LAB
ANION GAP SERPL CALC-SCNC: 9 MMOL/L (ref 8–16)
BASOPHILS # BLD AUTO: 0.03 K/UL (ref 0–0.2)
BASOPHILS NFR BLD: 0.4 % (ref 0–1.9)
BUN SERPL-MCNC: 9 MG/DL (ref 8–23)
CALCIUM SERPL-MCNC: 7.7 MG/DL (ref 8.7–10.5)
CHLORIDE SERPL-SCNC: 108 MMOL/L (ref 95–110)
CO2 SERPL-SCNC: 24 MMOL/L (ref 23–29)
CREAT SERPL-MCNC: 0.6 MG/DL (ref 0.5–1.4)
DIFFERENTIAL METHOD: ABNORMAL
EOSINOPHIL # BLD AUTO: 0.2 K/UL (ref 0–0.5)
EOSINOPHIL NFR BLD: 3.2 % (ref 0–8)
ERYTHROCYTE [DISTWIDTH] IN BLOOD BY AUTOMATED COUNT: 21.7 % (ref 11.5–14.5)
EST. GFR  (AFRICAN AMERICAN): >60 ML/MIN/1.73 M^2
EST. GFR  (NON AFRICAN AMERICAN): >60 ML/MIN/1.73 M^2
GLUCOSE SERPL-MCNC: 103 MG/DL (ref 70–110)
HCT VFR BLD AUTO: 26.1 % (ref 37–48.5)
HGB BLD-MCNC: 8 G/DL (ref 12–16)
IMM GRANULOCYTES # BLD AUTO: 0.14 K/UL (ref 0–0.04)
IMM GRANULOCYTES NFR BLD AUTO: 2 % (ref 0–0.5)
LYMPHOCYTES # BLD AUTO: 1 K/UL (ref 1–4.8)
LYMPHOCYTES NFR BLD: 14.1 % (ref 18–48)
MCH RBC QN AUTO: 30.7 PG (ref 27–31)
MCHC RBC AUTO-ENTMCNC: 30.7 G/DL (ref 32–36)
MCV RBC AUTO: 100 FL (ref 82–98)
MONOCYTES # BLD AUTO: 0.5 K/UL (ref 0.3–1)
MONOCYTES NFR BLD: 7 % (ref 4–15)
NEUTROPHILS # BLD AUTO: 5.1 K/UL (ref 1.8–7.7)
NEUTROPHILS NFR BLD: 73.3 % (ref 38–73)
NRBC BLD-RTO: 0 /100 WBC
PLATELET # BLD AUTO: 251 K/UL (ref 150–350)
PMV BLD AUTO: 10.1 FL (ref 9.2–12.9)
POTASSIUM SERPL-SCNC: 3.1 MMOL/L (ref 3.5–5.1)
RBC # BLD AUTO: 2.61 M/UL (ref 4–5.4)
SODIUM SERPL-SCNC: 141 MMOL/L (ref 136–145)
WBC # BLD AUTO: 6.89 K/UL (ref 3.9–12.7)

## 2019-10-26 PROCEDURE — 85025 COMPLETE CBC W/AUTO DIFF WBC: CPT

## 2019-10-26 PROCEDURE — A4216 STERILE WATER/SALINE, 10 ML: HCPCS | Performed by: INTERNAL MEDICINE

## 2019-10-26 PROCEDURE — 97165 OT EVAL LOW COMPLEX 30 MIN: CPT

## 2019-10-26 PROCEDURE — 63600175 PHARM REV CODE 636 W HCPCS: Performed by: INTERNAL MEDICINE

## 2019-10-26 PROCEDURE — 97535 SELF CARE MNGMENT TRAINING: CPT

## 2019-10-26 PROCEDURE — 80048 BASIC METABOLIC PNL TOTAL CA: CPT

## 2019-10-26 PROCEDURE — 11000004 HC SNF PRIVATE

## 2019-10-26 PROCEDURE — 25000003 PHARM REV CODE 250: Performed by: INTERNAL MEDICINE

## 2019-10-26 RX ADMIN — OXYCODONE HYDROCHLORIDE 5 MG: 5 TABLET ORAL at 06:10

## 2019-10-26 RX ADMIN — MICONAZOLE NITRATE: 20 OINTMENT TOPICAL at 09:10

## 2019-10-26 RX ADMIN — AMLODIPINE BESYLATE 10 MG: 10 TABLET ORAL at 09:10

## 2019-10-26 RX ADMIN — FOLIC ACID 1 MG: 1 TABLET ORAL at 09:10

## 2019-10-26 RX ADMIN — ACETAMINOPHEN 650 MG: 325 TABLET ORAL at 09:10

## 2019-10-26 RX ADMIN — OXACILLIN SODIUM 12 G: 10 INJECTION, POWDER, FOR SOLUTION INTRAVENOUS at 01:10

## 2019-10-26 RX ADMIN — OXYCODONE HYDROCHLORIDE 5 MG: 5 TABLET ORAL at 09:10

## 2019-10-26 RX ADMIN — METOPROLOL TARTRATE 100 MG: 50 TABLET ORAL at 09:10

## 2019-10-26 RX ADMIN — ACETAMINOPHEN 650 MG: 325 TABLET ORAL at 02:10

## 2019-10-26 RX ADMIN — RAMELTEON 8 MG: 8 TABLET ORAL at 09:10

## 2019-10-26 RX ADMIN — POTASSIUM CHLORIDE 20 MEQ: 1500 TABLET, EXTENDED RELEASE ORAL at 09:10

## 2019-10-26 RX ADMIN — Medication 1 PACKET: at 09:10

## 2019-10-26 RX ADMIN — Medication 10 ML: at 12:10

## 2019-10-26 RX ADMIN — Medication 10 ML: at 06:10

## 2019-10-26 RX ADMIN — FERROUS SULFATE TAB EC 325 MG (65 MG FE EQUIVALENT) 325 MG: 325 (65 FE) TABLET DELAYED RESPONSE at 09:10

## 2019-10-26 RX ADMIN — Medication 1 CAPSULE: at 09:10

## 2019-10-26 RX ADMIN — LOPERAMIDE HYDROCHLORIDE 2 MG: 2 CAPSULE ORAL at 09:10

## 2019-10-26 NOTE — PLAN OF CARE
Problem: Adult Inpatient Plan of Care  Goal: Plan of Care Review  Outcome: Ongoing, Progressing  Goal: Patient-Specific Goal (Individualization)  Outcome: Ongoing, Progressing  Goal: Absence of Hospital-Acquired Illness or Injury  Outcome: Ongoing, Progressing

## 2019-10-26 NOTE — NURSING TRANSFER
Pt arrived to unit with family. No distressed noted, transferred to bed safely. Personal belongings and family at bedside with patient. Denies pain at this time. Will monitor.

## 2019-10-26 NOTE — NURSING
Potassium level on 10/25 @ 1800 = 2.6. Dr. NEVIN Dolan notified. CBC and BMP ordered for this AM. Staff nurse notified.

## 2019-10-26 NOTE — PT/OT/SLP EVAL
Occupational Therapy  Evaluation/tx    Mesha Mckenzie   MRN: [id number]   Admitting Diagnosis: debility/metabolic encephalopathy, R thalmic stroke L2-L4 epidural abcess/ s/p L3 laminectomy/facetectomy for evacuation epidural phlegmon     OT Date of Treatment: 10/26/19   OT Start Time: 1121  OT Stop Time: 1205  OT Total Time (min): 44 min    Billable Minutes:  Evaluation 14  Self Care/Home Management 30    Diagnosis: debility/metabolic encephalopathy, R thalmic stroke L2-L4 epidural abcess/ s/p L3 laminectomy/facetectomy for evacuation epidural phlegmon     Past Medical History:   Diagnosis Date    Anxiety     Carotid bruit     Chronic pain     Cystitis     Cystocele, unspecified (CODE)     Depression     GI bleed     History of uterine fibroid     Shortness of breath on exertion     Spinal stenosis     Urinary retention       Past Surgical History:   Procedure Laterality Date    ANTERIOR VAGINAL REPAIR  8-    CARDIAC CATHETERIZATION  age 12    CHOLECYSTECTOMY  2015    COLONOSCOPY  12/12/2018    aborted due to poor colon prep    COLONOSCOPY N/A 12/12/2018    Procedure: COLONOSCOPY;  Surgeon: Renard Gonsalves MD;  Location: [facility];  Service: Endoscopy;  Laterality: N/A;    HYSTERECTOMY  1989    [facility]    LAMINECTOMY N/A 10/9/2019    Procedure: LAMINECTOMY, SPINE, L3, Open;  Surgeon: Martin Lewis DO;  Location: [facility] OR [address] [place] [address];  Service: Neurosurgery;  Laterality: N/A;    tubiligation           General Precautions: Standard, fall, aspiration  Orthopedic Precautions: spinal precautions  Braces: N/A    Spiritual, Cultural Beliefs, Anabaptism Practices, Values that Affect Care: no     Patient History:  Lives With: spouse  Living Arrangements: house  Home Accessibility: stairs to enter home(1)  Transportation Anticipated: family or friend will provide  Equipment Currently Used at Home: none    Prior level of function:    Bed Mobility/Transfers: independent  Grooming: independent  Bathing:  independent  Upper Body Dressing: independent  Lower Body Dressing: independent  Toileting: independent  Driving License: Yes  Occupation: Retired  Type of Occupation: nurse  Leisure and Hobbies: watch TV  IADL Comments: Per pt. and daughter report on this date:  Pt. lives with spouse in SS house and 1 step to enter.  Pt. was completely independent with mobility and ADL tasks.  Pt. 's spouse works but his job is flexible. Daughter reported that spouse will be available to assist pt. on d/c.      Dominant hand: right    Subjective:  Communicated with nurse prior to session.    Chief Complaint: pain  Patient/Family stated goals: wanting to be able to walk and go home    Pain/Comfort  Pain Rating 1: 7/10  Location 1: back  Pain Addressed 1: Pre-medicate for activity, Nurse notified  Pain Rating Post-Intervention 1: 9/10    Objective:   Patient found with: PICC line(supine in bed with daughter present)    Cognitive Exam:  Oriented to: Person  Follows Commands/attention: Follows one-step commands  Communication: clear/fluent  Memory:  Deficits noted with memory on this date, orientation  Safety awareness/insight to disability: impaired  Coping skills/emotional control: angry about sitting up initially    Visual/perceptual:  Wears reading glasses    Physical Exam:  Postural examination/scapula alignment:    -       Rounded shoulders  -       Forward head  -       Posterior pelvic tilt  Lateral weight shift to the left  Skin integrity: bruising noted in BUE; sx site on lower back  Edema: None noted in BUE    Sensation:   Not assessed 2/2 to pt. Wanting to lay back down    Upper Extremity Range of Motion:  Right Upper Extremity: appears to be WFL  Left Upper Extremity: appears to be WFL    Upper Extremity Strength:  Right Upper Extremity: not tested as pt. Wanting to return supine  Left Upper Extremity: not tested as pt. Wanting to return supine   Strength: fair    Fine motor coordination:   Not assessed    Gross motor  coordination: appears functional in BUE    Occupational Performance:    Bed Mobility:    · Patient completed Rolling/Turning to Left with  minimum assistance with use of bed rail and education  On log roll technique  ·   · Patient completed Rolling/Turning to Right with minimum assistance   · Supine to sit :  Total A  · Sit to supine Mod A at BLE    Functional Mobility/Transfers:  · Patient completed Sit <> Stand Transfer with maximal assistance  with  no assistive device from EOB x 1 trial prior to returning supine  · Functional Mobility: not performed    Activities of Daily Living:  · Feeding:  maximal assistance with daughter assist  · Grooming: maximal assistance seated EOB to wash face  · Bathing: total assistance seated EOB to wash upper body /LBD washed in supine  · Upper Body Dressing: maximal assistance to don gown  · Lower Body Dressing: total assistance to don socks  · Toileting: dependence soiled in BM on OT arrival    [facility] 6 Click:  [facility] Total Score: 9    OT Exercises: not performed    Additional Treatment:  Pt.and pt. daughterEducated on role of OT and POC   Pt. Educated on importance of therapy and OOB to maintain skin integrity, prevent blood clots as well as PNA  Pt. Educated on  Safety with standing and bed mobility  Charge nurse notified that pt. Needs air mattress as well as positioning boots to maintain skin integrity    Patient left left sidelying with all lines intact, call button in reach, charge notified and daughter present    Assessment:  Mesha Mckenzie is a 63 y.o. female with a medical diagnosis of debility/metabolic encephalopathy, R thalmic stroke L2-L4 epidural abcess/ s/p L3 laminectomy/facetectomy for evacuation epidural phlegmon  And presents with limitations in self-care skills, functional mobility and endurance. Pt. Limited by pain on this date and at times angry outburst noted with mobility and attempts to get pt. To engage in therapy session.  Pt. Demonstrated  understanding of role of OT to improve level of functioning and appeared to be willing to engage in future therapy session. Pt. Would beenfit from continued OT services to maximize safety and independence with ADL tasks.    Rehab identified problem list/impairments: impaired endurance, impaired self care skills, impaired functional mobilty, pain    Rehab potential is fair    Activity tolerance: Fair    Discharge recommendations: home health OT(with light assist /S)     Barriers to discharge: Other (Comment)(requires extensive assist at this time)     Equipment recommendations: bedside commode, walker, rolling, bath bench, wheelchair     GOALS:   Multidisciplinary Problems     Occupational Therapy Goals        Problem: Occupational Therapy Goal    Goal Priority Disciplines Outcome Interventions   Occupational Therapy Goal     OT, PT/OT     Description:  Goals to be met by: 11-15-19     Patient will increase functional independence with ADLs by performing:    Feeding with Modified Porter.  UE Dressing with Modified Porter.  LE Dressing with Stand-by Assistance.  Grooming while seated with Modified Porter.  Toileting from bedside commode with Stand-by Assistance for hygiene and clothing management.   Bathing from  shower chair/bench with Stand-by Assistance.  Rolling to Bilateral with Modified Porter.   Supine to sit with Modified Porter.  Stand pivot transfers with Stand-by Assistance.  Toilet transfer to bedside commode with Stand-by Assistance.  Pt. To be independent with HEP to improve level of endurance  Pt. To engage in dynamic standing task x 5 minutes with SBA  Pt. To be able to recall spinal precautions with 100% accuracy  Pt. To identify 2 leisure activities to engage in daily                        PLAN: Patient to be seen 5 x/week to address the above listed problems via self-care/home management, therapeutic activities, therapeutic exercises  Plan of Care expires:  11/25/19  Plan of Care reviewed with: patient, daughter    Selma SANTOS MARIA ISABEL Barney  10/26/2019

## 2019-10-26 NOTE — NURSING
Patient was discharge after receiving potassium and magnesium medication.  Tele monitor removed, peripheral IV removed, PICC line flushed and dressing reinforced before discharge.  Discharge instruction given, pt and daughter Man verbalize understanding.  No falls, no injuries during shift.

## 2019-10-26 NOTE — PROGRESS NOTES
Patient bladder scanned with result [id number].  Patient straight catheterized with a return of 700 ml of clear yellow urine. Patient tolerated procedure well.

## 2019-10-26 NOTE — PROGRESS NOTES
straight catheterized Patient with a return of 1000 ml of clear yellow urine.  Patient tolerated procedure well.

## 2019-10-27 ENCOUNTER — HOSPITAL ENCOUNTER (INPATIENT)
Facility: HOSPITAL | Age: 64
LOS: 3 days | Discharge: NURSING, REHAB OR LONG-TERM CARE | DRG: 291 | End: 2019-10-30
Attending: EMERGENCY MEDICINE | Admitting: INTERNAL MEDICINE
Payer: MEDICARE

## 2019-10-27 DIAGNOSIS — R06.02 SOB (SHORTNESS OF BREATH): ICD-10-CM

## 2019-10-27 DIAGNOSIS — J96.01 ACUTE HYPOXEMIC RESPIRATORY FAILURE: Primary | ICD-10-CM

## 2019-10-27 DIAGNOSIS — I38 ENDOCARDITIS: ICD-10-CM

## 2019-10-27 DIAGNOSIS — R06.03 RESPIRATORY DISTRESS: ICD-10-CM

## 2019-10-27 LAB
ANION GAP SERPL CALC-SCNC: 8 MMOL/L (ref 8–16)
BUN SERPL-MCNC: 9 MG/DL (ref 8–23)
CALCIUM SERPL-MCNC: 7.5 MG/DL (ref 8.7–10.5)
CHLORIDE SERPL-SCNC: 107 MMOL/L (ref 95–110)
CO2 SERPL-SCNC: 25 MMOL/L (ref 23–29)
CREAT SERPL-MCNC: 0.6 MG/DL (ref 0.5–1.4)
EST. GFR  (AFRICAN AMERICAN): >60 ML/MIN/1.73 M^2
EST. GFR  (NON AFRICAN AMERICAN): >60 ML/MIN/1.73 M^2
GLUCOSE SERPL-MCNC: 93 MG/DL (ref 70–110)
POTASSIUM SERPL-SCNC: 2.9 MMOL/L (ref 3.5–5.1)
SODIUM SERPL-SCNC: 140 MMOL/L (ref 136–145)

## 2019-10-27 PROCEDURE — 99291 CRITICAL CARE FIRST HOUR: CPT | Mod: 25

## 2019-10-27 PROCEDURE — 85610 PROTHROMBIN TIME: CPT

## 2019-10-27 PROCEDURE — 85025 COMPLETE CBC W/AUTO DIFF WBC: CPT

## 2019-10-27 PROCEDURE — 83880 ASSAY OF NATRIURETIC PEPTIDE: CPT

## 2019-10-27 PROCEDURE — 11000004 HC SNF PRIVATE

## 2019-10-27 PROCEDURE — 97530 THERAPEUTIC ACTIVITIES: CPT

## 2019-10-27 PROCEDURE — 97116 GAIT TRAINING THERAPY: CPT

## 2019-10-27 PROCEDURE — 63600175 PHARM REV CODE 636 W HCPCS: Performed by: INTERNAL MEDICINE

## 2019-10-27 PROCEDURE — 93010 EKG 12-LEAD: ICD-10-PCS | Mod: ,,, | Performed by: INTERNAL MEDICINE

## 2019-10-27 PROCEDURE — 93005 ELECTROCARDIOGRAM TRACING: CPT

## 2019-10-27 PROCEDURE — 99291 PR CRITICAL CARE, E/M 30-74 MINUTES: ICD-10-PCS | Mod: ,,, | Performed by: EMERGENCY MEDICINE

## 2019-10-27 PROCEDURE — 84484 ASSAY OF TROPONIN QUANT: CPT

## 2019-10-27 PROCEDURE — 93010 ELECTROCARDIOGRAM REPORT: CPT | Mod: ,,, | Performed by: INTERNAL MEDICINE

## 2019-10-27 PROCEDURE — A4216 STERILE WATER/SALINE, 10 ML: HCPCS | Performed by: INTERNAL MEDICINE

## 2019-10-27 PROCEDURE — 80048 BASIC METABOLIC PNL TOTAL CA: CPT

## 2019-10-27 PROCEDURE — 96361 HYDRATE IV INFUSION ADD-ON: CPT

## 2019-10-27 PROCEDURE — 25000003 PHARM REV CODE 250: Performed by: INTERNAL MEDICINE

## 2019-10-27 PROCEDURE — 97161 PT EVAL LOW COMPLEX 20 MIN: CPT

## 2019-10-27 PROCEDURE — 12000002 HC ACUTE/MED SURGE SEMI-PRIVATE ROOM

## 2019-10-27 PROCEDURE — 80053 COMPREHEN METABOLIC PANEL: CPT

## 2019-10-27 PROCEDURE — 25000003 PHARM REV CODE 250: Performed by: NURSE PRACTITIONER

## 2019-10-27 PROCEDURE — 18500000 HC LEAVE OF ABSENCE HOSPITAL SERVICES

## 2019-10-27 PROCEDURE — 99291 CRITICAL CARE FIRST HOUR: CPT | Mod: ,,, | Performed by: EMERGENCY MEDICINE

## 2019-10-27 PROCEDURE — 96374 THER/PROPH/DIAG INJ IV PUSH: CPT

## 2019-10-27 RX ORDER — NITROGLYCERIN 0.4 MG/1
TABLET SUBLINGUAL
Status: DISPENSED
Start: 2019-10-27 | End: 2019-10-28

## 2019-10-27 RX ORDER — POTASSIUM CHLORIDE 20 MEQ/1
40 TABLET, EXTENDED RELEASE ORAL DAILY
Status: DISCONTINUED | OUTPATIENT
Start: 2019-10-28 | End: 2019-10-29 | Stop reason: HOSPADM

## 2019-10-27 RX ORDER — NITROGLYCERIN 0.4 MG/1
0.4 TABLET SUBLINGUAL
Status: COMPLETED | OUTPATIENT
Start: 2019-10-28 | End: 2019-10-27

## 2019-10-27 RX ORDER — POTASSIUM CHLORIDE 20 MEQ/1
40 TABLET, EXTENDED RELEASE ORAL EVERY 4 HOURS
Status: COMPLETED | OUTPATIENT
Start: 2019-10-27 | End: 2019-10-27

## 2019-10-27 RX ADMIN — RAMELTEON 8 MG: 8 TABLET ORAL at 08:10

## 2019-10-27 RX ADMIN — FERROUS SULFATE TAB EC 325 MG (65 MG FE EQUIVALENT) 325 MG: 325 (65 FE) TABLET DELAYED RESPONSE at 09:10

## 2019-10-27 RX ADMIN — OXYCODONE HYDROCHLORIDE 5 MG: 5 TABLET ORAL at 08:10

## 2019-10-27 RX ADMIN — MICONAZOLE NITRATE: 20 OINTMENT TOPICAL at 08:10

## 2019-10-27 RX ADMIN — POTASSIUM CHLORIDE 20 MEQ: 1500 TABLET, EXTENDED RELEASE ORAL at 09:10

## 2019-10-27 RX ADMIN — METOPROLOL TARTRATE 100 MG: 50 TABLET ORAL at 09:10

## 2019-10-27 RX ADMIN — ACETAMINOPHEN 650 MG: 325 TABLET ORAL at 05:10

## 2019-10-27 RX ADMIN — Medication 1 PACKET: at 08:10

## 2019-10-27 RX ADMIN — POTASSIUM CHLORIDE 40 MEQ: 1500 TABLET, EXTENDED RELEASE ORAL at 09:10

## 2019-10-27 RX ADMIN — Medication 10 ML: at 12:10

## 2019-10-27 RX ADMIN — NITROGLYCERIN 0.4 MG: 0.4 TABLET SUBLINGUAL at 11:10

## 2019-10-27 RX ADMIN — ACETAMINOPHEN 650 MG: 325 TABLET ORAL at 04:10

## 2019-10-27 RX ADMIN — OXYCODONE HYDROCHLORIDE 5 MG: 5 TABLET ORAL at 09:10

## 2019-10-27 RX ADMIN — FOLIC ACID 1 MG: 1 TABLET ORAL at 09:10

## 2019-10-27 RX ADMIN — MICONAZOLE NITRATE: 20 OINTMENT TOPICAL at 09:10

## 2019-10-27 RX ADMIN — Medication 1 PACKET: at 09:10

## 2019-10-27 RX ADMIN — Medication 10 ML: at 07:10

## 2019-10-27 RX ADMIN — POTASSIUM CHLORIDE 40 MEQ: 1500 TABLET, EXTENDED RELEASE ORAL at 05:10

## 2019-10-27 RX ADMIN — OXACILLIN SODIUM 12 G: 10 INJECTION, POWDER, FOR SOLUTION INTRAVENOUS at 02:10

## 2019-10-27 RX ADMIN — Medication 1 CAPSULE: at 09:10

## 2019-10-27 RX ADMIN — Medication 10 ML: at 06:10

## 2019-10-27 RX ADMIN — METOPROLOL TARTRATE 100 MG: 50 TABLET ORAL at 08:10

## 2019-10-27 RX ADMIN — AMLODIPINE BESYLATE 10 MG: 10 TABLET ORAL at 09:10

## 2019-10-27 NOTE — PT/OT/SLP EVAL
PhysicalTherapy   Evaluation    Mesha Mckenzie   MRN: [id number]     PT Received On: 10/27/19  PT Start Time: 1402     PT Stop Time: 1436    PT Total Time (min): 34 min       Billable Minutes:  Evaluation 10, Gait Training 10, Therapeutic Activity 15     Diagnosis: <principal problem not specified> Diagnosis: debility/metabolic encephalopathy, R thalmic stroke L2-L4 epidural abcess/ s/p L3 laminectomy/facetectomy for evacuation epidural phlegmon   Past Medical History:   Diagnosis Date    Anxiety     Carotid bruit     Chronic pain     Cystitis     Cystocele, unspecified (CODE)     Depression     GI bleed     History of uterine fibroid     Shortness of breath on exertion     Spinal stenosis     Urinary retention       Past Surgical History:   Procedure Laterality Date    ANTERIOR VAGINAL REPAIR  10-    CARDIAC CATHETERIZATION  age 12    CHOLECYSTECTOMY  2015    COLONOSCOPY  12/12/2018    aborted due to poor colon prep    COLONOSCOPY N/A 12/12/2018    Procedure: COLONOSCOPY;  Surgeon: Renard Gonsalves MD;  [facility];  Service: Endoscopy;  Laterality: N/A;    [facility]    LAMINECTOMY N/A 10/9/2019    Procedure: LAMINECTOMY, SPINE, L3, Open;  Surgeon: Martin Lewis DO;  Location: [facility] OR [address] [place] [address];  Service: Neurosurgery;  Laterality: N/A;    tubiligation           General Precautions: Standard, fall, aspiration  Orthopedic Precautions: spinal precautions   Braces: N/A    Spiritual, Cultural Beliefs, Anabaptist Practices, Values that Affect Care: no    Patient History:  Lives With: spouse  Living Arrangements: house  Home Accessibility: stairs to enter home(one)  Transportation Anticipated: family or friend will provide  Living Environment Comment: Lives w/ spouse who works days. Other family members can assist  Equipment Currently Used at Home: none  DME owned (not currently used): none    Previous Level of Function:  Ambulation Skills: independent  Transfer Skills:  "independent  ADL Skills: independent    Subjective:  Communicated with patient prior to session.  Patient participates w/ approval of daughter and max encouragement.   Chief Complaint: 'leave me alone'  Patient goals: "I'm going home. Not doing this."    Pain/Comfort  Pain Rating 1: 0/10  Location - Side 1: Bilateral  Location - Orientation 1: generalized(reports pain high in all joints w/ movement)  Location 1: (all over)  Pain Addressed 1: Cessation of Activity, Distraction  Pain Rating Post-Intervention 1: 0/10  She c/o her rectal area pain, not rated, dtr reports is d/t bowel management system.  Objective:  Patient found right sidelying in bed with Patient found with: PICC line, peripheral IV    Cognitive Exam:  Oriented to: Person; did not know . Did get current year but not month, day, or day of week. She ID'd her dtr, Leeanna and said she has pets.  Follows Commands/attention: does not consistently follow commands, resistant  Communication: clear/fluent  Safety awareness/insight to disability: impaired    Physical Exam:  Postural examination/scapula alignment:    -       Rounded shoulders  -       Posterior pelvic tilt    Skin integrity: Visible skin intact  Edema: None noted     Sensation:      -       Intact. Light touch appears to be grossly intact    Upper Extremity Range of Motion:  Right Upper Extremity: WFL  Left Upper Extremity: WFL    Upper Extremity Strength:  Right Upper Extremity: grossly WFL  Left Upper Extremity: grossly WFL    Lower Extremity Range of Motion:  Right Lower Extremity: WFL  Left Lower Extremity: WFL    Lower Extremity Strength:  Right Lower Extremity: appears to be grossly WFL; patient not following directions for assessment  Left Lower Extremity: as above       Gross motor coordination: grossly WFL      AM-PAC 6 CLICK MOBILITY  Total Score:10    Bed Mobility:  Sit>Supine:not performed (PT and tech returned to assist patient to bed but nursing had already assisted her back to " bed after making up bed.)  Supine>Sit: bed w/ max/ TA  trials w/ patient resisting.    Transfers:  Sit<>Stand: from w/c w/ max assist of 1 and min of second person, max endouragement, w/ RW and in parallel bars  Stand Pivot Transfer: bed>w/c w/ max assist of 2, SQPT (patient refused to assist)      Gait:  Amb w/ RW and max assist 3 steps w/ w/c follow, then patient sits into w/c, refusing.  Ambulates in parallel bars 4 feet w/ max assist and w/c follow, sitting suddenly into w/c. Forward flexed posture. Steps w/ flexed hips and knees, sliding B feet forward       Advanced Gait:  Stairs: unable   Curb Step: unable    Wheelchair Mobility:  Patient propels w/c w/ BUEs and min assist 50 feet     Therex:  Patient refused to participate in exercises.    Balance:  Patient stands w/ RW and max assist w/ flexed posture  Sits EOB w/ min to max assist, trying to lie down.  Additional Treatment:  Patient and dtr educated on PT POC, need to participate  Educated on spinal precautions.    Patient left up in w/c  with call button in reach and family (ex KELY, Varun Willard 3) sitting w/ patient present. PT and tech to return in one hour. (however, nursing returned patient to bed at end of [employer] game per her request, and after making up bed)    Assessment:  Mesha Mckenzie is a 63 y.o. female with a medical diagnosis of <principal problem not specified>.Diagnosis: debility/metabolic encephalopathy, R thalmic stroke L2-L4 epidural abcess/ s/p L3 laminectomy/facetectomy for evacuation epidural phlegmon  . Patient has spinal precautions. Patient w/ decreased insight and resistant to therapy. She does appear to have good potential to progress and meet goals. Her daughter, Leeanna, was present and supported patient participating in therapy, even though patient wanted to refuse.  Patient will benefit from skilled Physical therapy to address deficits and improve safety and functional mobility to allow patient to progress to safe  discharge home w caregiver assistance         Rehab identified problem list/impairments: weakness, impaired endurance, impaired self care skills, impaired functional mobilty, gait instability, impaired balance, impaired cognition, decreased upper extremity function, decreased lower extremity function, pain, orthopedic precautions    Rehab potential is good.    Activity tolerance: Fair    Discharge recommendations: home with home health(and caregiver assistance, likely will need )     Barriers to discharge: Decreased caregiver support, Inaccessible home environment(1 Jesus; increased need of assistance)    Equipment recommendations: wheelchair, walker, rolling, bedside commode, bath bench     GOALS:   Multidisciplinary Problems     Physical Therapy Goals        Problem: Physical Therapy Goal    Goal Priority Disciplines Outcome Goal Variances Interventions   Physical Therapy Goal     PT, PT/OT      Description:  Goals to be met by: 19     Patient will increase functional independence with mobility by performin. Supine to sit with Contact Guard Assistance  2. Sit to supine with Contact Guard Assistance  3. Sit to stand transfer with Contact Guard Assistance  4. Bed to chair transfer with Contact Guard Assistance using Rolling Walker  5. Gait  x 100 feet with Contact Guard Assistance using Rolling Walker.   6. Wheelchair propulsion x150 feet with Stand-by Assistance using bilateral upper extremities  7. Stand for 3 minutes with Contact Guard Assistance using Rolling Walker and perform an activity  8. Lower extremity exercise program x20 reps per handout, with assistance as needed and gym therex  9.Patient will name 3/3 spinal precautions w/o cues= not met  10. Patient will perform car transfer (actual or simulated) w/ min assist = not met  11. Patient and family will participate in family training prior to discharge = not met                      PLAN:    Patient to be seen 5 x/week  to address the above  listed problems via gait training, therapeutic activities, therapeutic exercises, wheelchair management/training  Plan of Care Expires: 11/26/19    Megan Ann, PT 10/27/2019

## 2019-10-28 PROBLEM — K56.7 ILEUS: Status: RESOLVED | Noted: 2019-10-04 | Resolved: 2019-10-28

## 2019-10-28 PROBLEM — A41.01: Status: RESOLVED | Noted: 2019-10-03 | Resolved: 2019-10-28

## 2019-10-28 PROBLEM — E86.0 DEHYDRATION: Status: RESOLVED | Noted: 2019-09-19 | Resolved: 2019-10-28

## 2019-10-28 PROBLEM — R33.9 URINARY RETENTION: Status: ACTIVE | Noted: 2019-10-28

## 2019-10-28 PROBLEM — R65.21: Status: RESOLVED | Noted: 2019-10-03 | Resolved: 2019-10-28

## 2019-10-28 PROBLEM — R06.03 RESPIRATORY DISTRESS: Status: ACTIVE | Noted: 2019-10-28

## 2019-10-28 LAB
ALBUMIN SERPL BCP-MCNC: 1.8 G/DL (ref 3.5–5.2)
ALBUMIN SERPL BCP-MCNC: 1.9 G/DL (ref 3.5–5.2)
ALLENS TEST: ABNORMAL
ALP SERPL-CCNC: 136 U/L (ref 55–135)
ALP SERPL-CCNC: 147 U/L (ref 55–135)
ALT SERPL W/O P-5'-P-CCNC: 8 U/L (ref 10–44)
ALT SERPL W/O P-5'-P-CCNC: 8 U/L (ref 10–44)
ANION GAP SERPL CALC-SCNC: 10 MMOL/L (ref 8–16)
ANION GAP SERPL CALC-SCNC: 8 MMOL/L (ref 8–16)
AST SERPL-CCNC: 10 U/L (ref 10–40)
AST SERPL-CCNC: 10 U/L (ref 10–40)
BACTERIA #/AREA URNS AUTO: ABNORMAL /HPF
BASOPHILS # BLD AUTO: 0.01 K/UL (ref 0–0.2)
BASOPHILS # BLD AUTO: 0.06 K/UL (ref 0–0.2)
BASOPHILS NFR BLD: 0.1 % (ref 0–1.9)
BASOPHILS NFR BLD: 0.5 % (ref 0–1.9)
BILIRUB SERPL-MCNC: 0.1 MG/DL (ref 0.1–1)
BILIRUB SERPL-MCNC: 0.2 MG/DL (ref 0.1–1)
BILIRUB UR QL STRIP: NEGATIVE
BNP SERPL-MCNC: 1159 PG/ML (ref 0–99)
BUN SERPL-MCNC: 12 MG/DL (ref 8–23)
BUN SERPL-MCNC: 13 MG/DL (ref 8–23)
CALCIUM SERPL-MCNC: 8.2 MG/DL (ref 8.7–10.5)
CALCIUM SERPL-MCNC: 8.2 MG/DL (ref 8.7–10.5)
CHLORIDE SERPL-SCNC: 106 MMOL/L (ref 95–110)
CHLORIDE SERPL-SCNC: 107 MMOL/L (ref 95–110)
CLARITY UR REFRACT.AUTO: CLEAR
CO2 SERPL-SCNC: 22 MMOL/L (ref 23–29)
CO2 SERPL-SCNC: 26 MMOL/L (ref 23–29)
COLOR UR AUTO: ABNORMAL
CREAT SERPL-MCNC: 0.6 MG/DL (ref 0.5–1.4)
CREAT SERPL-MCNC: 0.7 MG/DL (ref 0.5–1.4)
DELSYS: ABNORMAL
DIFFERENTIAL METHOD: ABNORMAL
DIFFERENTIAL METHOD: ABNORMAL
EOSINOPHIL # BLD AUTO: 0.1 K/UL (ref 0–0.5)
EOSINOPHIL # BLD AUTO: 0.5 K/UL (ref 0–0.5)
EOSINOPHIL NFR BLD: 0.7 % (ref 0–8)
EOSINOPHIL NFR BLD: 4.2 % (ref 0–8)
EP: 5
ERYTHROCYTE [DISTWIDTH] IN BLOOD BY AUTOMATED COUNT: 21.2 % (ref 11.5–14.5)
ERYTHROCYTE [DISTWIDTH] IN BLOOD BY AUTOMATED COUNT: 21.2 % (ref 11.5–14.5)
ERYTHROCYTE [SEDIMENTATION RATE] IN BLOOD BY WESTERGREN METHOD: 4 MM/H
EST. GFR  (AFRICAN AMERICAN): >60 ML/MIN/1.73 M^2
EST. GFR  (AFRICAN AMERICAN): >60 ML/MIN/1.73 M^2
EST. GFR  (NON AFRICAN AMERICAN): >60 ML/MIN/1.73 M^2
EST. GFR  (NON AFRICAN AMERICAN): >60 ML/MIN/1.73 M^2
FIO2: 50
GLUCOSE SERPL-MCNC: 120 MG/DL (ref 70–110)
GLUCOSE SERPL-MCNC: 162 MG/DL (ref 70–110)
GLUCOSE UR QL STRIP: NEGATIVE
HCO3 UR-SCNC: 21.8 MMOL/L (ref 24–28)
HCT VFR BLD AUTO: 25.1 % (ref 37–48.5)
HCT VFR BLD AUTO: 29.4 % (ref 37–48.5)
HGB BLD-MCNC: 8.2 G/DL (ref 12–16)
HGB BLD-MCNC: 9.3 G/DL (ref 12–16)
HGB UR QL STRIP: ABNORMAL
HYALINE CASTS UR QL AUTO: 1 /LPF
IMM GRANULOCYTES # BLD AUTO: 0.11 K/UL (ref 0–0.04)
IMM GRANULOCYTES # BLD AUTO: 0.21 K/UL (ref 0–0.04)
IMM GRANULOCYTES NFR BLD AUTO: 1.5 % (ref 0–0.5)
IMM GRANULOCYTES NFR BLD AUTO: 1.9 % (ref 0–0.5)
INR PPP: 1 (ref 0.8–1.2)
IP: 10
KETONES UR QL STRIP: NEGATIVE
LACTATE SERPL-SCNC: 0.7 MMOL/L (ref 0.5–2.2)
LACTATE SERPL-SCNC: 2.5 MMOL/L (ref 0.5–2.2)
LEUKOCYTE ESTERASE UR QL STRIP: NEGATIVE
LYMPHOCYTES # BLD AUTO: 1.3 K/UL (ref 1–4.8)
LYMPHOCYTES # BLD AUTO: 2.7 K/UL (ref 1–4.8)
LYMPHOCYTES NFR BLD: 16.6 % (ref 18–48)
LYMPHOCYTES NFR BLD: 24.4 % (ref 18–48)
MAGNESIUM SERPL-MCNC: 1.3 MG/DL (ref 1.6–2.6)
MCH RBC QN AUTO: 32 PG (ref 27–31)
MCH RBC QN AUTO: 32.2 PG (ref 27–31)
MCHC RBC AUTO-ENTMCNC: 31.6 G/DL (ref 32–36)
MCHC RBC AUTO-ENTMCNC: 32.7 G/DL (ref 32–36)
MCV RBC AUTO: 101 FL (ref 82–98)
MCV RBC AUTO: 98 FL (ref 82–98)
MICROSCOPIC COMMENT: ABNORMAL
MODE: ABNORMAL
MONOCYTES # BLD AUTO: 0.5 K/UL (ref 0.3–1)
MONOCYTES # BLD AUTO: 0.8 K/UL (ref 0.3–1)
MONOCYTES NFR BLD: 6.8 % (ref 4–15)
MONOCYTES NFR BLD: 6.9 % (ref 4–15)
NEUTROPHILS # BLD AUTO: 5.6 K/UL (ref 1.8–7.7)
NEUTROPHILS # BLD AUTO: 6.9 K/UL (ref 1.8–7.7)
NEUTROPHILS NFR BLD: 62.2 % (ref 38–73)
NEUTROPHILS NFR BLD: 74.2 % (ref 38–73)
NITRITE UR QL STRIP: NEGATIVE
NON-SQ EPI CELLS #/AREA URNS AUTO: 1 /HPF
NRBC BLD-RTO: 0 /100 WBC
NRBC BLD-RTO: 0 /100 WBC
PCO2 BLDA: 37.9 MMHG (ref 35–45)
PH SMN: 7.37 [PH] (ref 7.35–7.45)
PH UR STRIP: 6 [PH] (ref 5–8)
PHOSPHATE SERPL-MCNC: 3.8 MG/DL (ref 2.7–4.5)
PLATELET # BLD AUTO: 248 K/UL (ref 150–350)
PLATELET # BLD AUTO: 369 K/UL (ref 150–350)
PMV BLD AUTO: 9.4 FL (ref 9.2–12.9)
PMV BLD AUTO: 9.6 FL (ref 9.2–12.9)
PO2 BLDA: 141 MMHG (ref 80–100)
POC BE: -4 MMOL/L
POC SATURATED O2: 99 % (ref 95–100)
POC TCO2: 23 MMOL/L (ref 23–27)
POTASSIUM SERPL-SCNC: 3.8 MMOL/L (ref 3.5–5.1)
POTASSIUM SERPL-SCNC: 4 MMOL/L (ref 3.5–5.1)
PROCALCITONIN SERPL IA-MCNC: 0.11 NG/ML
PROT SERPL-MCNC: 5.8 G/DL (ref 6–8.4)
PROT SERPL-MCNC: 6.3 G/DL (ref 6–8.4)
PROT UR QL STRIP: NEGATIVE
PROTHROMBIN TIME: 10.8 SEC (ref 9–12.5)
RBC # BLD AUTO: 2.55 M/UL (ref 4–5.4)
RBC # BLD AUTO: 2.91 M/UL (ref 4–5.4)
RBC #/AREA URNS AUTO: 6 /HPF (ref 0–4)
SAMPLE: ABNORMAL
SITE: ABNORMAL
SODIUM SERPL-SCNC: 138 MMOL/L (ref 136–145)
SODIUM SERPL-SCNC: 141 MMOL/L (ref 136–145)
SP GR UR STRIP: 1 (ref 1–1.03)
SP02: 100
SPONT RATE: 26
SQUAMOUS #/AREA URNS AUTO: 1 /HPF
TROPONIN I SERPL DL<=0.01 NG/ML-MCNC: 0.04 NG/ML (ref 0–0.03)
URN SPEC COLLECT METH UR: ABNORMAL
WBC # BLD AUTO: 11.06 K/UL (ref 3.9–12.7)
WBC # BLD AUTO: 7.51 K/UL (ref 3.9–12.7)
WBC #/AREA URNS AUTO: 2 /HPF (ref 0–5)
YEAST UR QL AUTO: ABNORMAL

## 2019-10-28 PROCEDURE — 18500000 HC LEAVE OF ABSENCE HOSPITAL SERVICES

## 2019-10-28 PROCEDURE — 85025 COMPLETE CBC W/AUTO DIFF WBC: CPT

## 2019-10-28 PROCEDURE — 27000221 HC OXYGEN, UP TO 24 HOURS

## 2019-10-28 PROCEDURE — 25000242 PHARM REV CODE 250 ALT 637 W/ HCPCS: Performed by: STUDENT IN AN ORGANIZED HEALTH CARE EDUCATION/TRAINING PROGRAM

## 2019-10-28 PROCEDURE — 99223 PR INITIAL HOSPITAL CARE,LEVL III: ICD-10-PCS | Mod: ,,, | Performed by: HOSPITALIST

## 2019-10-28 PROCEDURE — 27000190 HC CPAP FULL FACE MASK W/VALVE

## 2019-10-28 PROCEDURE — 36600 WITHDRAWAL OF ARTERIAL BLOOD: CPT

## 2019-10-28 PROCEDURE — 63600175 PHARM REV CODE 636 W HCPCS: Performed by: EMERGENCY MEDICINE

## 2019-10-28 PROCEDURE — 83735 ASSAY OF MAGNESIUM: CPT

## 2019-10-28 PROCEDURE — 63600175 PHARM REV CODE 636 W HCPCS: Performed by: INTERNAL MEDICINE

## 2019-10-28 PROCEDURE — 82803 BLOOD GASES ANY COMBINATION: CPT

## 2019-10-28 PROCEDURE — 93010 ELECTROCARDIOGRAM REPORT: CPT | Mod: ,,, | Performed by: INTERNAL MEDICINE

## 2019-10-28 PROCEDURE — 99900035 HC TECH TIME PER 15 MIN (STAT)

## 2019-10-28 PROCEDURE — 83605 ASSAY OF LACTIC ACID: CPT | Mod: 91

## 2019-10-28 PROCEDURE — 87040 BLOOD CULTURE FOR BACTERIA: CPT

## 2019-10-28 PROCEDURE — 63600175 PHARM REV CODE 636 W HCPCS: Performed by: PHYSICIAN ASSISTANT

## 2019-10-28 PROCEDURE — 25000003 PHARM REV CODE 250: Performed by: HOSPITALIST

## 2019-10-28 PROCEDURE — 94660 CPAP INITIATION&MGMT: CPT

## 2019-10-28 PROCEDURE — 97161 PT EVAL LOW COMPLEX 20 MIN: CPT

## 2019-10-28 PROCEDURE — 94761 N-INVAS EAR/PLS OXIMETRY MLT: CPT

## 2019-10-28 PROCEDURE — 84145 PROCALCITONIN (PCT): CPT

## 2019-10-28 PROCEDURE — 63600175 PHARM REV CODE 636 W HCPCS: Performed by: HOSPITALIST

## 2019-10-28 PROCEDURE — 93010 EKG 12-LEAD: ICD-10-PCS | Mod: ,,, | Performed by: INTERNAL MEDICINE

## 2019-10-28 PROCEDURE — 99223 1ST HOSP IP/OBS HIGH 75: CPT | Mod: ,,, | Performed by: HOSPITALIST

## 2019-10-28 PROCEDURE — 80053 COMPREHEN METABOLIC PANEL: CPT

## 2019-10-28 PROCEDURE — 83605 ASSAY OF LACTIC ACID: CPT

## 2019-10-28 PROCEDURE — 84100 ASSAY OF PHOSPHORUS: CPT

## 2019-10-28 PROCEDURE — 11000001 HC ACUTE MED/SURG PRIVATE ROOM

## 2019-10-28 PROCEDURE — 25000003 PHARM REV CODE 250: Performed by: INTERNAL MEDICINE

## 2019-10-28 PROCEDURE — 94640 AIRWAY INHALATION TREATMENT: CPT

## 2019-10-28 PROCEDURE — 63600175 PHARM REV CODE 636 W HCPCS: Performed by: STUDENT IN AN ORGANIZED HEALTH CARE EDUCATION/TRAINING PROGRAM

## 2019-10-28 PROCEDURE — 81001 URINALYSIS AUTO W/SCOPE: CPT

## 2019-10-28 PROCEDURE — 93005 ELECTROCARDIOGRAM TRACING: CPT

## 2019-10-28 RX ORDER — MAGNESIUM SULFATE HEPTAHYDRATE 40 MG/ML
4 INJECTION, SOLUTION INTRAVENOUS ONCE
Status: COMPLETED | OUTPATIENT
Start: 2019-10-28 | End: 2019-10-28

## 2019-10-28 RX ORDER — METHYLPREDNISOLONE SOD SUCC 125 MG
125 VIAL (EA) INJECTION ONCE
Status: DISCONTINUED | OUTPATIENT
Start: 2019-10-28 | End: 2019-10-28

## 2019-10-28 RX ORDER — LEVALBUTEROL INHALATION SOLUTION 0.63 MG/3ML
0.63 SOLUTION RESPIRATORY (INHALATION) EVERY 6 HOURS PRN
Status: DISCONTINUED | OUTPATIENT
Start: 2019-10-28 | End: 2019-10-30 | Stop reason: HOSPADM

## 2019-10-28 RX ORDER — AMLODIPINE BESYLATE 10 MG/1
10 TABLET ORAL DAILY
Status: DISCONTINUED | OUTPATIENT
Start: 2019-10-28 | End: 2019-10-30 | Stop reason: HOSPADM

## 2019-10-28 RX ORDER — FUROSEMIDE 10 MG/ML
40 INJECTION INTRAMUSCULAR; INTRAVENOUS 2 TIMES DAILY
Status: DISCONTINUED | OUTPATIENT
Start: 2019-10-28 | End: 2019-10-29

## 2019-10-28 RX ORDER — ENOXAPARIN SODIUM 100 MG/ML
40 INJECTION SUBCUTANEOUS EVERY 24 HOURS
Status: DISCONTINUED | OUTPATIENT
Start: 2019-10-28 | End: 2019-10-30 | Stop reason: HOSPADM

## 2019-10-28 RX ORDER — MAGNESIUM SULFATE HEPTAHYDRATE 40 MG/ML
4 INJECTION, SOLUTION INTRAVENOUS ONCE
Status: DISCONTINUED | OUTPATIENT
Start: 2019-10-28 | End: 2019-10-28

## 2019-10-28 RX ORDER — SODIUM CHLORIDE 0.9 % (FLUSH) 0.9 %
10 SYRINGE (ML) INJECTION
Status: DISCONTINUED | OUTPATIENT
Start: 2019-10-28 | End: 2019-10-30 | Stop reason: HOSPADM

## 2019-10-28 RX ORDER — DICYCLOMINE HYDROCHLORIDE 20 MG/1
20 TABLET ORAL ONCE
Status: COMPLETED | OUTPATIENT
Start: 2019-10-28 | End: 2019-10-28

## 2019-10-28 RX ORDER — VANCOMYCIN HCL IN 5 % DEXTROSE 1G/250ML
20 PLASTIC BAG, INJECTION (ML) INTRAVENOUS ONCE
Status: DISCONTINUED | OUTPATIENT
Start: 2019-10-28 | End: 2019-10-28

## 2019-10-28 RX ORDER — RAMELTEON 8 MG/1
8 TABLET ORAL NIGHTLY PRN
Status: DISCONTINUED | OUTPATIENT
Start: 2019-10-28 | End: 2019-10-30 | Stop reason: HOSPADM

## 2019-10-28 RX ORDER — ACETAMINOPHEN 325 MG/1
650 TABLET ORAL EVERY 6 HOURS PRN
Status: DISCONTINUED | OUTPATIENT
Start: 2019-10-28 | End: 2019-10-30 | Stop reason: HOSPADM

## 2019-10-28 RX ORDER — ONDANSETRON 2 MG/ML
4 INJECTION INTRAMUSCULAR; INTRAVENOUS EVERY 8 HOURS PRN
Status: DISCONTINUED | OUTPATIENT
Start: 2019-10-28 | End: 2019-10-30 | Stop reason: HOSPADM

## 2019-10-28 RX ORDER — FUROSEMIDE 10 MG/ML
40 INJECTION INTRAMUSCULAR; INTRAVENOUS
Status: COMPLETED | OUTPATIENT
Start: 2019-10-28 | End: 2019-10-28

## 2019-10-28 RX ORDER — METOPROLOL TARTRATE 100 MG/1
100 TABLET ORAL 2 TIMES DAILY
Status: DISCONTINUED | OUTPATIENT
Start: 2019-10-28 | End: 2019-10-30 | Stop reason: HOSPADM

## 2019-10-28 RX ORDER — IPRATROPIUM BROMIDE AND ALBUTEROL SULFATE 2.5; .5 MG/3ML; MG/3ML
3 SOLUTION RESPIRATORY (INHALATION)
Status: COMPLETED | OUTPATIENT
Start: 2019-10-28 | End: 2019-10-28

## 2019-10-28 RX ORDER — FERROUS SULFATE 325(65) MG
325 TABLET, DELAYED RELEASE (ENTERIC COATED) ORAL DAILY
Status: DISCONTINUED | OUTPATIENT
Start: 2019-10-28 | End: 2019-10-30 | Stop reason: HOSPADM

## 2019-10-28 RX ORDER — LOPERAMIDE HYDROCHLORIDE 2 MG/1
2 CAPSULE ORAL 3 TIMES DAILY
Status: DISCONTINUED | OUTPATIENT
Start: 2019-10-28 | End: 2019-10-30 | Stop reason: HOSPADM

## 2019-10-28 RX ORDER — FOLIC ACID 1 MG/1
1 TABLET ORAL DAILY
Status: DISCONTINUED | OUTPATIENT
Start: 2019-10-28 | End: 2019-10-30 | Stop reason: HOSPADM

## 2019-10-28 RX ADMIN — FUROSEMIDE 40 MG: 10 INJECTION, SOLUTION INTRAMUSCULAR; INTRAVENOUS at 01:10

## 2019-10-28 RX ADMIN — LOPERAMIDE HYDROCHLORIDE 2 MG: 2 CAPSULE ORAL at 09:10

## 2019-10-28 RX ADMIN — METOPROLOL TARTRATE 100 MG: 100 TABLET ORAL at 10:10

## 2019-10-28 RX ADMIN — FUROSEMIDE 40 MG: 10 INJECTION, SOLUTION INTRAMUSCULAR; INTRAVENOUS at 05:10

## 2019-10-28 RX ADMIN — OXACILLIN SODIUM 12 G: 10 INJECTION, POWDER, FOR SOLUTION INTRAVENOUS at 03:10

## 2019-10-28 RX ADMIN — PSYLLIUM HUSK 1 PACKET: 3.4 POWDER ORAL at 10:10

## 2019-10-28 RX ADMIN — FOLIC ACID 1 MG: 1 TABLET ORAL at 09:10

## 2019-10-28 RX ADMIN — FUROSEMIDE 40 MG: 10 INJECTION, SOLUTION INTRAMUSCULAR; INTRAVENOUS at 09:10

## 2019-10-28 RX ADMIN — LOPERAMIDE HYDROCHLORIDE 2 MG: 2 CAPSULE ORAL at 02:10

## 2019-10-28 RX ADMIN — ENOXAPARIN SODIUM 40 MG: 100 INJECTION SUBCUTANEOUS at 05:10

## 2019-10-28 RX ADMIN — PSYLLIUM HUSK 1 PACKET: 3.4 POWDER ORAL at 09:10

## 2019-10-28 RX ADMIN — METOPROLOL TARTRATE 100 MG: 100 TABLET ORAL at 09:10

## 2019-10-28 RX ADMIN — ALUMINUM HYDROXIDE, MAGNESIUM HYDROXIDE, AND SIMETHICONE 50 ML: 200; 200; 20 SUSPENSION ORAL at 03:10

## 2019-10-28 RX ADMIN — IPRATROPIUM BROMIDE AND ALBUTEROL SULFATE 3 ML: .5; 3 SOLUTION RESPIRATORY (INHALATION) at 12:10

## 2019-10-28 RX ADMIN — PIPERACILLIN AND TAZOBACTAM 4.5 G: 4; .5 INJECTION, POWDER, FOR SOLUTION INTRAVENOUS at 03:10

## 2019-10-28 RX ADMIN — AMLODIPINE BESYLATE 10 MG: 10 TABLET ORAL at 09:10

## 2019-10-28 RX ADMIN — ACETAMINOPHEN 650 MG: 325 TABLET ORAL at 06:10

## 2019-10-28 RX ADMIN — ACETAMINOPHEN 650 MG: 325 TABLET ORAL at 01:10

## 2019-10-28 RX ADMIN — Medication 1 CAPSULE: at 09:10

## 2019-10-28 RX ADMIN — SODIUM CHLORIDE 1605 ML: 0.9 INJECTION, SOLUTION INTRAVENOUS at 12:10

## 2019-10-28 RX ADMIN — FERROUS SULFATE TAB EC 325 MG (65 MG FE EQUIVALENT) 325 MG: 325 (65 FE) TABLET DELAYED RESPONSE at 09:10

## 2019-10-28 RX ADMIN — DICYCLOMINE HYDROCHLORIDE 20 MG: 20 TABLET ORAL at 02:10

## 2019-10-28 RX ADMIN — MAGNESIUM SULFATE IN WATER 4 G: 40 INJECTION, SOLUTION INTRAVENOUS at 05:10

## 2019-10-28 NOTE — PLAN OF CARE
10/28/19 0930   Discharge Assessment   Assessment Type Discharge Planning Assessment   Confirmed/corrected address and phone number on facesheet? Yes   Assessment information obtained from? Patient;Caregiver   Expected Length of Stay (days) 33   Communicated expected length of stay with patient/caregiver yes   Prior to hospitilization cognitive status: Alert/Oriented   Prior to hospitalization functional status: Assistive Equipment   Current cognitive status: Alert/Oriented   Current Functional Status: Assistive Equipment   Facility Arrived From: [facility]   Lives With spouse   Able to Return to Prior Arrangements no   Is patient able to care for self after discharge? No   Readmission Within the Last 30 Days current reason for admission unrelated to previous admission   If yes, [place] [facility]   Patient currently being followed by outpatient case management? No   Patient currently receives any other outside agency services? No   Do you have any problems affording any of your prescribed medications? TBD   Is the patient taking medications as prescribed? yes   Does the patient have transportation home? Yes   Transportation Anticipated family or friend will provide   Discharge Plan A Skilled Nursing Facility   Discharge Plan B Home Health;Home with family   DME Needed Upon Discharge  none   Patient/Family in Agreement with Plan yes   Met with patient and spouse at bedside. Known to this CM from past admission. Plan to return to [facility] when medically stable.

## 2019-10-28 NOTE — ED PROVIDER NOTES
Encounter Date: 10/27/2019       History     Chief Complaint   Patient presents with    Respiratory Distress     HPI   Pt is a 62 YO F w/ hx of IVDU, TV MV Endocarditis, Epidural abscess, GIB, CHF, presenting w/ c/o SOB. Per pt symptoms began around noon today progressively worsened. Denies cough, fever, chest pain associated w/ symptoms. First time occurrence of severe SOB. Pt has significant hx of endocarditis and valvular disease complicated by pericardial effusion. Currently in SNF receiving IV antibiotics (oxacillin) via PICC. Past cigarette smoker no hx of COPD. Per EMS pt became very anxious en route was Hypertensive at SNF.    Review of patient's allergies indicates:   Allergen Reactions    Pcn [penicillins]      Past Medical History:   Diagnosis Date    Anxiety     Carotid bruit     Chronic pain     Cystitis     Cystocele, unspecified (CODE)     Depression     GI bleed     History of uterine fibroid     Shortness of breath on exertion     Spinal stenosis     Urinary retention      Past Surgical History:   Procedure Laterality Date    ANTERIOR VAGINAL REPAIR  10-    CARDIAC CATHETERIZATION  age 12    CHOLECYSTECTOMY  2015    COLONOSCOPY  12/12/2018    aborted due to poor colon prep    COLONOSCOPY N/A 12/12/2018    Procedure: COLONOSCOPY;  Surgeon: Renard Gonsalves MD;  Location: [facility];  Service: Endoscopy;  Laterality: N/A;    HYSTERECTOMY  1989    AMANDA    LAMINECTOMY N/A 10/9/2019    Procedure: LAMINECTOMY, SPINE, L3, Open;  Surgeon: Martin Lewis DO;  Location: [facility] OR [address] [place] [address];  Service: Neurosurgery;  Laterality: N/A;    tubiligation       Family History   Problem Relation Age of Onset    Alzheimer's disease Mother     Thyroid disease Mother     Osteoarthritis Mother     Hypertension Brother     Hypertension Sister     Breast cancer Neg Hx     Colon cancer Neg Hx     Ovarian cancer Neg Hx      Social History     Tobacco Use    Smoking status: Current Every Day  Smoker     Packs/day: 0.50     Years: 40.00     Pack years: 20.00     Types: Cigarettes    Smokeless tobacco: Never Used   Substance Use Topics    Alcohol use: No    Drug use: No     Review of Systems   Unable to perform ROS: Severe respiratory distress   Respiratory: Positive for shortness of breath and wheezing.        Physical Exam     Initial Vitals [10/27/19 2347]   BP Pulse Resp Temp SpO2   (!) 197/92 98 (!) 30 98 °F (36.7 °C) 97 %      MAP       --         Physical Exam    Nursing note and vitals reviewed.  Constitutional: She appears well-developed and well-nourished. No distress.   HENT:   Head: Normocephalic and atraumatic.   Eyes: EOM are normal. Pupils are equal, round, and reactive to light.   Neck: Normal range of motion. Neck supple.   Cardiovascular: Normal rate, regular rhythm and intact distal pulses.   Pulmonary/Chest: She is in respiratory distress. She has wheezes. She has no rhonchi.   Abdominal: Soft. She exhibits no distension. There is no tenderness. There is no guarding.   Musculoskeletal: Normal range of motion. She exhibits no edema or tenderness.   Neurological: She is alert and oriented to person, place, and time.   Skin: Skin is warm and dry. Capillary refill takes less than 2 seconds.         ED Course   Procedures  Labs Reviewed   CBC W/ AUTO DIFFERENTIAL - Abnormal; Notable for the following components:       Result Value    RBC 2.91 (*)     Hemoglobin 9.3 (*)     Hematocrit 29.4 (*)     Mean Corpuscular Volume 101 (*)     Mean Corpuscular Hemoglobin 32.0 (*)     Mean Corpuscular Hemoglobin Conc 31.6 (*)     RDW 21.2 (*)     Platelets 369 (*)     Immature Granulocytes 1.9 (*)     Immature Grans (Abs) 0.21 (*)     All other components within normal limits   CULTURE, BLOOD   CULTURE, BLOOD   COMPREHENSIVE METABOLIC PANEL   LACTIC ACID, PLASMA   URINALYSIS, REFLEX TO URINE CULTURE   TROPONIN I   B-TYPE NATRIURETIC PEPTIDE   PROTIME-INR          Imaging Results    None             Resident MDM PGY-3  Mesha Mckenzie is a 63 y.o. YO female presenting w/ c/o respiratory distress O2 sat 60s initially and very anxious appearing. Initially w/ BP 210s, speaking in short sentences w/ increased WOB and wheezing.   Differentials include flash pulmonary edema, CHF, COPD exacerbation, PE, ACS, pleural effusion, cardiac tamponade, PTX  Bedside US w/ multiple B lines no obvious pericardial effusion noted, good LV squeeze, non dilated RV  WBCs 11, Trop 0.044- around baseline for patient, ABG- ph 7.37, pco2 37, Lactate 2.5. CXR w/ b/l fluffy infiltrate concerning for PNA vs pulmonary edema. EKG w/ Sinus tach no ST changes or other concerns for ischemia- repeat EKGs w/o interval changes  Given duoneb treatments, IV solumedrol, 40 mg Lasix. Pt improving on Bipap w/ improvement of WOB and BP to 120s/80s. Given x1 SL nitroglycerin.   Also given vanc/zosyn to cover for multifocal PNA- sputum and blood cultures sent.  Of note MAR reflects that pt received 1600cc IVF bolus however, she did not receive this.   Pt will be admitted to hospital medicine for further work up and management.    Yarely Guaman MD MPH  [facility] Emergency Medicine PGY-3  12:01 AM 10/28/2019                   Attending Attestation:   Physician Attestation Statement for Resident:  As the supervising MD   Physician Attestation Statement: I have personally seen and examined this patient.   I agree with the above history. -:   As the supervising MD I agree with the above PE.    As the supervising MD I agree with the above treatment, course, plan, and disposition.   -: Acute respiratory distress, which improved with BiPAP, diuresis.  Bedside ultrasound with B lines.  There is no pericardial effusion.  EKG is nonischemic.  She was given a tab of nitroglycerin with improvement of her blood pressure.    Clinically there was significant improvement during her time in the emergency department.  She was covered with antibiotics.  Fluids were mistakenly  ordered, although the NATALI states that patient received fluids she did not in fact actually received them.  I have reviewed and agree with the residents interpretation of the following: lab data, x-rays and EKG.  I have reviewed the following: old records at this facility, EKG reports and x-ray reports.        Attending Critical Care:   Critical Care Times:   ==============================================================  · Total Critical Care Time - exclusive of procedural time: 35 minutes.  ==============================================================         [unfilled]        Clinical Impression:       ICD-10-CM ICD-9-CM   1. Respiratory distress R06.03 786.09                                Yarely Guaman MD  Resident  10/28/19 0125       Yarely Guaman MD  Resident  10/28/19 0126       Yarely Guaman MD  Resident  10/28/19 0126       Tabitha Arzate MD  10/28/19 210

## 2019-10-28 NOTE — NURSING
"While in room with pt, pt stated "I don't feel right" and became increasingly agitated, and nauseous. Pt tachypneic & stating that she could not breath & felt like she was having a panic attack. O2Sat 66-74% on RA, charge nurse & respiratory called to bedside, and NRB placed.  at bedside. VS per assessment flowsheet. Denies chest pain. DENNY PICC patent. EMS called.     2325: EMS arrived.     2340: EMS left off unit for patient transportation to [facility] ED. Called [facility] ED and report was given to charge nurse Karly.         "

## 2019-10-28 NOTE — ED NOTES
Telemetry Verification   Patient placed on Telemetry Box  Verified with War Room  Box # [id number]   Monitor Tech Miroslava   Rate 99   Rhythm NSR

## 2019-10-28 NOTE — SUBJECTIVE & OBJECTIVE
Past Medical History:   Diagnosis Date    Anxiety     Carotid bruit     Chronic pain     Cystitis     Cystocele, unspecified (CODE)     Depression     GI bleed     History of uterine fibroid     Shortness of breath on exertion     Spinal stenosis     Urinary retention        Past Surgical History:   Procedure Laterality Date    ANTERIOR VAGINAL REPAIR  10-    CARDIAC CATHETERIZATION  age 12    CHOLECYSTECTOMY  2015    COLONOSCOPY  12/12/2018    aborted due to poor colon prep    COLONOSCOPY N/A 12/12/2018    Procedure: COLONOSCOPY;  Surgeon: Renard Gonsalves MD;  Location: [facility];  Service: Endoscopy;  Laterality: N/A;    HYSTERECTOMY  1989    AMANDA    LAMINECTOMY N/A 10/9/2019    Procedure: LAMINECTOMY, SPINE, L3, Open;  Surgeon: Martin Lewis DO;  Location: [facility] OR [address] [place] [address];  Service: Neurosurgery;  Laterality: N/A;    tubiligation         Review of patient's allergies indicates:   Allergen Reactions    Pcn [penicillins]        Current Facility-Administered Medications on File Prior to Encounter   Medication    [MAR Hold - Suspended Admission] acetaminophen tablet 650 mg    [MAR Hold - Suspended Admission] acetaminophen tablet 650 mg    [MAR Hold - Suspended Admission] acetaminophen tablet 650 mg    [MAR Hold - Suspended Admission] albuterol sulfate nebulizer solution 2.5 mg    [MAR Hold - Suspended Admission] albuterol-ipratropium 2.5 mg-0.5 mg/3 mL nebulizer solution 3 mL    [MAR Hold - Suspended Admission] amLODIPine tablet 10 mg    [MAR Hold - Suspended Admission] calcium carbonate 200 mg calcium (500 mg) chewable tablet 500 mg    [MAR Hold - Suspended Admission] ferrous sulfate EC tablet 325 mg    [MAR Hold - Suspended Admission] folic acid tablet 1 mg    [MAR Hold - Suspended Admission] Lactobacillus rhamnosus GG capsule 1 capsule    [MAR Hold - Suspended Admission] levalbuterol nebulizer solution 0.63 mg    [MAR Hold - Suspended Admission] loperamide capsule 2 mg     [MAR Hold - Suspended Admission] metoprolol tartrate (LOPRESSOR) tablet 100 mg    [MAR Hold - Suspended Admission] miconazole nitrate 2% ointment    [MAR Hold - Suspended Admission] ondansetron disintegrating tablet 4 mg    [MAR Hold - Suspended Admission] ondansetron injection 4 mg    [MAR Hold - Suspended Admission] oxacillin 12 g in  mL CONTINUOUS INFUSION    [MAR Hold - Suspended Admission] oxyCODONE immediate release tablet 5 mg    [MAR Hold - Suspended Admission] potassium chloride SA CR tablet 40 mEq    [COMPLETED] potassium chloride SA CR tablet 40 mEq    [MAR Hold - Suspended Admission] psyllium husk (aspartame) 3.4 gram packet 1 packet    [MAR Hold - Suspended Admission] ramelteon tablet 8 mg    [MAR Hold - Suspended Admission] senna-docusate 8.6-50 mg per tablet 1 tablet    [MAR Hold - Suspended Admission] sodium chloride 0.9% flush 10 mL    And    [MAR Hold - Suspended Admission] sodium chloride 0.9% flush 10 mL    sodium chloride 0.9% flush 3 mL    [DISCONTINUED] potassium chloride SA CR tablet 20 mEq     Current Outpatient Medications on File Prior to Encounter   Medication Sig    amLODIPine (NORVASC) 10 MG tablet Take 1 tablet (10 mg total) by mouth once daily.    ferrous sulfate 325 (65 FE) MG EC tablet Take 1 tablet (325 mg total) by mouth once daily.    folic acid (FOLVITE) 1 MG tablet Take 1 tablet (1 mg total) by mouth once daily.    Lactobacillus rhamnosus GG (CULTURELLE) 10 billion cell capsule Take 1 capsule by mouth once daily.    levalbuterol (XOPENEX) 0.63 mg/3 mL nebulizer solution Take 3 mLs (0.63 mg total) by nebulization every 6 (six) hours as needed for Wheezing or Shortness of Breath. Rescue    loperamide (IMODIUM) 2 mg capsule Take 1 capsule (2 mg total) by mouth 3 (three) times daily. for 10 days    metoprolol tartrate (LOPRESSOR) 100 MG tablet Take 1 tablet (100 mg total) by mouth 2 (two) times daily.    psyllium husk, aspartame, (METAMUCIL) 3.4 gram  PwPk packet Take 1 packet by mouth 2 (two) times daily.    sodium chloride 0.9% SolP 500 mL with oxacillin 1 gram SolR 12 g Inject 12 g into the vein continuous. for 26 days     Family History     Problem Relation (Age of Onset)    Alzheimer's disease Mother    Hypertension Brother, Sister    Osteoarthritis Mother    Thyroid disease Mother        Tobacco Use    Smoking status: Current Every Day Smoker     Packs/day: 0.50     Years: 40.00     Pack years: 20.00     Types: Cigarettes    Smokeless tobacco: Never Used   Substance and Sexual Activity    Alcohol use: No    Drug use: No    Sexual activity: Yes     Partners: Male     Review of Systems   Constitutional: Positive for diaphoresis. Negative for chills and fever.   HENT: Negative for congestion, nosebleeds and sore throat.    Eyes: Negative for photophobia and visual disturbance.   Respiratory: Positive for shortness of breath. Negative for cough and wheezing.    Cardiovascular: Negative for chest pain, palpitations and leg swelling.   Gastrointestinal: Positive for diarrhea (but improving). Negative for abdominal pain, nausea and vomiting.   Endocrine: Negative for polydipsia and polyuria.   Genitourinary: Positive for difficulty urinating (chronic). Negative for dysuria and hematuria.   Musculoskeletal: Positive for back pain. Negative for neck stiffness.   Skin: Negative for color change and rash.   Neurological: Negative for dizziness, syncope and light-headedness.     Objective:     Vital Signs (Most Recent):  Temp: 99.2 °F (37.3 °C) (10/28/19 0034)  Pulse: 94 (10/28/19 0146)  Resp: 20 (10/28/19 0200)  BP: (!) 149/72 (10/28/19 0146)  SpO2: 98 % (10/28/19 0146) Vital Signs (24h Range):  Temp:  [97.8 °F (36.6 °C)-99.2 °F (37.3 °C)] 98.1 °F (36.7 °C)  Pulse:  [] 93  Resp:  [16-38] 28  SpO2:  [66 %-100 %] 98 %  BP: (120-216)/(59-92) 123/59     Weight: 53.5 kg (117 lb 15.1 oz)  Body mass index is 20.25 kg/m².    Physical Exam   Constitutional: She is  oriented to person, place, and time. She appears well-developed and well-nourished. She is cooperative. No distress. Nasal cannula in place.   HENT:   Head: Normocephalic and atraumatic.   Mouth/Throat: Oropharynx is clear and moist.   Eyes: Pupils are equal, round, and reactive to light. Conjunctivae and EOM are normal. No scleral icterus.   Neck: Normal range of motion. Neck supple.   Cardiovascular: Normal rate, regular rhythm, S1 normal, S2 normal and intact distal pulses. Exam reveals no gallop.   Murmur heard.   Systolic murmur is present with a grade of 4/6.  4/6 holosystolic murmur at mitral auscultation site   Pulmonary/Chest: Effort normal. She has rales in the right lower field, the left middle field and the left lower field.   Abdominal: Soft. Bowel sounds are normal. She exhibits no distension. There is no tenderness. There is no guarding.   Musculoskeletal: Normal range of motion. She exhibits no edema, tenderness or deformity.   Lymphadenopathy:     She has no cervical adenopathy.   Neurological: She is alert and oriented to person, place, and time. No cranial nerve deficit.   Skin: Skin is warm and dry. No erythema.   Nursing note and vitals reviewed.        CRANIAL NERVES     CN III, IV, VI   Pupils are equal, round, and reactive to light.  Extraocular motions are normal.        Significant Labs:   CBC:   Recent Labs   Lab 10/26/19  0650 10/27/19  2359   WBC 6.89 11.06   HGB 8.0* 9.3*   HCT 26.1* 29.4*    369*     CMP:   Recent Labs   Lab 10/26/19  0650 10/27/19  1047 10/27/19  2359    140 138   K 3.1* 2.9* 4.0    107 106   CO2 24 25 22*    93 162*   BUN 9 9 13   CREATININE 0.6 0.6 0.7   CALCIUM 7.7* 7.5* 8.2*   PROT  --   --  6.3   ALBUMIN  --   --  1.9*   BILITOT  --   --  0.1   ALKPHOS  --   --  147*   AST  --   --  10   ALT  --   --  8*   ANIONGAP 9 8 10   EGFRNONAA >60.0 >60.0 >60.0     Cardiac Markers:   Recent Labs   Lab 10/27/19  2321   BNP 1,159*       Significant  Imaging: CXR: I have reviewed all pertinent results/findings within the past 24 hours and my personal findings are:  increased bilateral interstital markings consistent with pulmonary edema increased over prior film from 10/24.  Additionally increased focal consolidation in CANDACE suggestive of pneumonia.

## 2019-10-28 NOTE — ASSESSMENT & PLAN NOTE
Diuresing with lasix 40mg IV bid.  She may require a maintenance diuretic dose to keep her euvolemic.  Monitor potassium and magnesium levels while diuresing.

## 2019-10-28 NOTE — HPI
"63F recently hospitalized here 10/2-10/25 for acute bacterial endocarditis with sequelae of septic pulmonary emboli, septic emboli to brain causing thalamic stroke, spinal epidural abscess discharged to SNF for PT/OT returns for acute onset of respiratory distress.  She says the dyspnea came on suddenly and then she panicked which made it worse, and she normally takes something for anxiety.  She denies any other respiratory symptoms prior to this including cough or congestion.  She does admit to orthopnea for the past few weeks, and when asked if it has been worse the past couple days she answers "maybe."  She denies fevers or chills, but does report waking up with sweats for the past few nights.  She denies noticing any ankle edema.    In the ED she was put on BiPAP for her respiratory distress and given IV lasix and rapidly improved and was able to be weaned off the BiPAP.  At the time of my interview she is comfortable on nasal cannula.    "

## 2019-10-28 NOTE — PROGRESS NOTES
Patient  Bladder scanned with result of 331. Patient straight catheterized with result of 400 ml of clear yellow urine .  Patient tolerated procedure well.

## 2019-10-28 NOTE — PROGRESS NOTES
Pharmacokinetic Initial Assessment: IV Vancomycin    Assessment/Plan:    Initiate intravenous vancomycin with a dose of 1500 mg every 24 hours  Desired empiric serum trough concentration is 15 to 20 mcg/mL  Draw vancomycin trough level 30 min prior to third dose on 10/30 at approximately 0200  Pharmacy will continue to follow and monitor vancomycin.      Please contact pharmacy at extension [phone] with any questions regarding this assessment.     Thank you for the consult,   Varun Carrion       Patient brief summary:  Mesha Mckenzie is a 63 y.o. female initiated on antimicrobial therapy with IV Vancomycin for treatment of suspected pneumonia    Drug Allergies:   Review of patient's allergies indicates:   Allergen Reactions    Pcn [penicillins]        Actual Body Weight:   53.5kg    Renal Function:   Estimated Creatinine Clearance: 67.7 mL/min (based on SCr of 0.7 mg/dL).,     Dialysis Method (if applicable):  N/A    CBC (last 72 hours):  Recent Labs   Lab Result Units 10/25/19  0701 10/26/19  0650 10/27/19  2359   WBC K/uL 8.52 6.89 11.06   Hemoglobin g/dL 8.6* 8.0* 9.3*   Hematocrit % 27.1* 26.1* 29.4*   Platelets K/uL 259 251 369*   Gran% % 70.0 73.3* 62.2   Lymph% % 17.5* 14.1* 24.4   Mono% % 6.8 7.0 6.8   Eosinophil% % 2.3 3.2 4.2   Basophil% % 0.7 0.4 0.5   Differential Method  Automated Automated Automated       Metabolic Panel (last 72 hours):  Recent Labs   Lab Result Units 10/25/19  0701 10/25/19  1802 10/26/19  0650 10/27/19  1047 10/27/19  2359   Sodium mmol/L 138 141 141 140 138   Potassium mmol/L 3.3* 2.6* 3.1* 2.9* 4.0   Chloride mmol/L 108 107 108 107 106   CO2 mmol/L 23 26 24 25 22*   Glucose mg/dL 96 91 103 93 162*   BUN, Bld mg/dL 10 10 9 9 13   Creatinine mg/dL 0.6 0.6 0.6 0.6 0.7   Albumin g/dL  --   --   --   --  1.9*   Total Bilirubin mg/dL  --   --   --   --  0.1   Alkaline Phosphatase U/L  --   --   --   --  147*   AST U/L  --   --   --   --  10   ALT U/L  --   --   --   --  8*    Magnesium mg/dL 1.6  --   --   --   --    Phosphorus mg/dL 3.3  --   --   --   --        Drug levels (last 3 results):  No results for input(s): VANCOMYCINRA, VANCOMYCINPE, VANCOMYCINTR in the last 72 hours.    Microbiologic Results:  Microbiology Results (last 7 days)     Procedure Component Value Units Date/Time    Culture, Respiratory with Gram Stain [[id number]]     Order Status:  No result Specimen:  Respiratory from Sputum, Induced     Blood culture x two cultures. Draw prior to antibiotics. [[id number]] Collected:  10/28/19 0024    Order Status:  Sent Specimen:  Blood from Peripheral, Hand, Left Updated:  10/28/19 0038    Blood culture x two cultures. Draw prior to antibiotics. [[id number]] Collected:  10/28/19 0024    Order Status:  Sent Specimen:  Blood from Peripheral, Antecubital, Left Updated:  10/28/19 0038

## 2019-10-28 NOTE — PT/OT/SLP EVAL
Physical Therapy Evaluation    Patient Name:  Mesha Mckenzie   MRN:  [id number]  Admit Date: 10/27/2019  Admitting Diagnosis:  Acute hypoxemic respiratory failure   Length of Stay: 0 days  Recent Surgery: * No surgery found *      Recommendations:     Discharge Recommendations:  nursing facility, skilled   Discharge Equipment Recommendations: wheelchair, walker, rolling, bedside commode, bath bench   Barriers to discharge: Inaccessible home and Decreased caregiver support    Assessment:     Mesha Mckenzie is a 63 y.o. female admitted with a medical diagnosis of Acute hypoxemic respiratory failure.  She presents with the following impairments/functional limitations of weakness, decreased lower extremity function, decreased safety awareness, impaired cognition, and impaired functional mobility.  Mrs. Mckenzie was confused throughout physical therapy session today.  She refused EOB mobility assessments however did consent to in bed exercises.  For exercises in bed Mrs. Garcia required assistance as needed from SPT to complete exercises.  Her primary limitations were generalized weakness of B LE/UE's and confusion throughout treatment session.  Mrs. Mckenzie will continue to benefit from acute inpatient physical therapy services to address the above functional limitations and return to highest level of function.    Problem List: weakness, gait instability, impaired cardiopulmonary response to activity, impaired endurance, impaired balance, decreased lower extremity function, decreased safety awareness, impaired cognition, impaired functional mobilty, decreased coordination  Rehab Prognosis: Fair; patient would benefit from acute skilled PT services to address these deficits and reach maximum level of function.      Plan:     During this hospitalization, patient to be seen 4 x/week to address the identified rehab impairments via gait training, therapeutic activities, therapeutic exercises, neuromuscular  re-education and progress towards the established goals.    · Plan of Care Expires:  11/25/19    Subjective   Communicated with RN prior to session.  Patient found supine upon PT entry to room, agreeable to evaluation. Mesha Mckenzie's son present during session.    Chief Complaint: Mrs. Mckenzie's chief complaint today was being cold in hospital room.  Patient/Family Comments/goals: Mrs. Mckenzie had no comments or goals prior to beginning physical therapy session today.  Pain/Comfort:  · Pain Rating 1: 0/10    Living Environment:  Patient lives with  in a single family home with 1 AILYN.   Prior Level of Function: Patient reports being independent with mobility & with ADLs. Patient uses DME as follows: none. DME owned (not currently used): none.  Roles/Repsonsibilities: Hand Dominance: right Work: no. Drive: no. Managing Medicines/Managing Home: yes. Hobbies: N/A.    Patient reports they will have assistance from  upon discharge.    Objective:   Patient found with: telemetry, astorga catheter, peripheral IV, PICC line     General Precautions: Standard, Cardiac fall   Orthopedic Precautions:spinal precautions   Braces: N/A   Body mass index is 20.25 kg/m².  Oxygen Device: Nasal Cannula 3L  Vitals:   Heart Rate (bpm) 92   SaO2: 99%     Exams:  · Mental Status: Patient is AxOx2 and follows most verbal commands. Pt is Lethargic and Confused during session.  · Skin Integrity: Visible skin intact  · Edema: None noted  · Sensation: Intact  · Hearing: Intact  · Vision:  Intact  · Postural Assessment: Unable to assess due to being limited to in bed exercises today.  · Range of Motion:  · RUE: WFL  · LUE: WFL  · RLE: WFL  · LLE: WFL  · Strength Exam:  · Upper Extremity Strength  · Grossly 3+/5 per observation  · Lower Extremity Strength  · Grossly 3+/5 per observation      Outcome Measures:  AM-PAC 6 CLICK MOBILITY  Turning over in bed (including adjusting bedclothes, sheets and blankets)?: 2  Sitting down  on and standing up from a chair with arms (e.g., wheelchair, bedside commode, etc.): 2  Moving from lying on back to sitting on the side of the bed?: 2  Moving to and from a bed to a chair (including a wheelchair)?: 2  Need to walk in hospital room?: 2  Climbing 3-5 steps with a railing?: 1  Basic Mobility Total Score: 11     Functional Mobility:  Additional staff present: Student PT    Mrs. Mckenzie refused functional mobility assessments today.    Therapeutic Activities & Exercises:   1. Supine: B UE/LE AAROM x 10 reps in all planes through available ROM. Exercises performed to develop and maintain pt's  strength, endurance and ROM.   2. Spinal Precautions: patient able to voice 1/3 precautions without assistance. Patient able to maintain precautions throughout session with 3 verbal cues.    Education:  Mrs. Mckenzie was educated on POC and goals for physical therapy session today.  Mrs. Mckenzie was educated on the importance of attempting EOB exercises today for her strength and mobility. However, she refused on all three attempts.    Patient left supine, with head in midline, neutral pelvis & heels floated for skin protection with all lines intact, call button in reach and RN notified.    GOALS:   Multidisciplinary Problems     Physical Therapy Goals        Problem: Physical Therapy Goal    Goal Priority Disciplines Outcome Goal Variances Interventions   Physical Therapy Goal     PT, PT/OT Ongoing, Progressing     Description:  Goals to be met by: 2019    Patient will increase functional independence with mobility by performin. Supine <> sit with Moderate Assistance.  2. Sit <> stand transfer with Moderate Assistance using Rolling Walker.  3. Bed <> chair transfer via Step Transfer with Moderate Assistance using Rolling Walker.  4. Dynamic sitting at edge of bed x 10 minutes with Moderate Assistance to prepare for functional tasks in sitting.  5. Able to tolerate exercise for 15-20 reps with  assistance as needed.  6. Patient is able to recall 3/3 spinal precautions without assistance.                       History:     Past Medical History:   Diagnosis Date    Anxiety     Carotid bruit     Chronic pain     Cystitis     Cystocele, unspecified (CODE)     Depression     GI bleed     History of uterine fibroid     Shortness of breath on exertion     Spinal stenosis     Urinary retention        Past Surgical History:   Procedure Laterality Date    ANTERIOR VAGINAL REPAIR  8-    CARDIAC CATHETERIZATION  age 12    CHOLECYSTECTOMY  2015    COLONOSCOPY  12/12/2018    aborted due to poor colon prep    COLONOSCOPY N/A 12/12/2018    Procedure: COLONOSCOPY;  Surgeon: Renard Gonsalves MD;  Location: [facility];  Service: Endoscopy;  Laterality: N/A;    HYSTERECTOMY  1989    AMANDA    LAMINECTOMY N/A 10/9/2019    Procedure: LAMINECTOMY, SPINE, L3, Open;  Surgeon: Martin Lewis DO;  Location: [facility] OR [address] [place] [address];  Service: Neurosurgery;  Laterality: N/A;    tubiligation         Time Tracking:     PT Received On: 10/28/19  PT Start Time: 1453     PT Stop Time: 1511  PT Total Time (min): 18 min   Billable Minutes: Evaluation 18 minutes (1 unit)    DARREL Henson  10/28/2019

## 2019-10-28 NOTE — NURSING
Patient received from ed via stretcher. Assisted ED nurse to transfer patient to hospital bed. At this hand off I was verbally informed that patient was just straight cath. Prior to her coming to this unit. So when patient was requesting to be catheterized. I informed her she was just catheterized in the Ed. Patient consistently requesting to be cath. Bladder scan performed with a reading of 906ml. Straight cath performed obtained 825ml.of clear yellow urine.

## 2019-10-28 NOTE — ED NOTES
Pt arrives via [facility] from [facility]. Per report, pt's O2 sats 66%, on EMS arrival pt was 100% on 15 non rebreather, and 4L NC 96%. Pt appears restless. [facility] attempted nebulizer tx and pt became extremely anxious and did not tolerate. Pt denies chest pain, cough, fever.

## 2019-10-28 NOTE — H&P
"[facility] Hospital Medicine  History & Physical    Patient Name: Mesha Mckenzie  MRN: [id number]  Admission Date: 10/27/2019  Attending Physician: Yesenia Lerner*   Primary Care Provider: Varun Shea MD PhD    Hospital Medicine Team: Networked reference to record PCT  Ben Buck MD     Patient information was obtained from patient, past medical records and ER records.     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  She says the dyspnea came on suddenly and then she panicked which made it worse, and she normally takes something for anxiety.  She denies any other respiratory symptoms prior to this including cough or congestion.  She does admit to orthopnea for the past few weeks, and when asked if it has been worse the past couple days she answers "maybe."  She denies fevers or chills, but does report waking up with sweats for the past few nights.  She denies noticing any ankle edema.    In the ED she was put on BiPAP for her respiratory distress and given IV lasix and rapidly improved and was able to be weaned off the BiPAP.  At the time of my interview she is comfortable on nasal cannula.      Past Medical History:   Diagnosis Date    Anxiety     Carotid bruit     Chronic pain     Cystitis     Cystocele, unspecified (CODE)     Depression     GI bleed     History of uterine fibroid     Shortness of breath on exertion     Spinal stenosis     Urinary retention        Past Surgical History:   Procedure Laterality Date    ANTERIOR VAGINAL REPAIR  8-    CARDIAC " CATHETERIZATION  age 12    CHOLECYSTECTOMY  2015    COLONOSCOPY  12/12/2018    aborted due to poor colon prep    COLONOSCOPY N/A 12/12/2018    Procedure: COLONOSCOPY;  Surgeon: Renard Gonsalves MD;  [facility];  Service: Endoscopy;  Laterality: N/A;    HYSTERECTOMY  1989    AMANDA    LAMINECTOMY N/A 10/9/2019    Procedure: LAMINECTOMY, SPINE, L3, Open;  Surgeon: Martin Lewis DO;  Location: [facility] OR [address] [place] [address];  Service: Neurosurgery;  Laterality: N/A;    tubiligation         Review of patient's allergies indicates:   Allergen Reactions    Pcn [penicillins]        Current Facility-Administered Medications on File Prior to Encounter   Medication    [MAR Hold - Suspended Admission] acetaminophen tablet 650 mg    [MAR Hold - Suspended Admission] acetaminophen tablet 650 mg    [MAR Hold - Suspended Admission] acetaminophen tablet 650 mg    [MAR Hold - Suspended Admission] albuterol sulfate nebulizer solution 2.5 mg    [MAR Hold - Suspended Admission] albuterol-ipratropium 2.5 mg-0.5 mg/3 mL nebulizer solution 3 mL    [MAR Hold - Suspended Admission] amLODIPine tablet 10 mg    [MAR Hold - Suspended Admission] calcium carbonate 200 mg calcium (500 mg) chewable tablet 500 mg    [MAR Hold - Suspended Admission] ferrous sulfate EC tablet 325 mg    [MAR Hold - Suspended Admission] folic acid tablet 1 mg    [MAR Hold - Suspended Admission] Lactobacillus rhamnosus GG capsule 1 capsule    [MAR Hold - Suspended Admission] levalbuterol nebulizer solution 0.63 mg    [MAR Hold - Suspended Admission] loperamide capsule 2 mg    [MAR Hold - Suspended Admission] metoprolol tartrate (LOPRESSOR) tablet 100 mg    [MAR Hold - Suspended Admission] miconazole nitrate 2% ointment    [MAR Hold - Suspended Admission] ondansetron disintegrating tablet 4 mg    [MAR Hold - Suspended Admission] ondansetron injection 4 mg    [MAR Hold - Suspended Admission] oxacillin 12 g in  mL CONTINUOUS INFUSION    [MAR Hold -  Suspended Admission] oxyCODONE immediate release tablet 5 mg    [MAR Hold - Suspended Admission] potassium chloride SA CR tablet 40 mEq    [COMPLETED] potassium chloride SA CR tablet 40 mEq    [MAR Hold - Suspended Admission] psyllium husk (aspartame) 3.4 gram packet 1 packet    [MAR Hold - Suspended Admission] ramelteon tablet 8 mg    [MAR Hold - Suspended Admission] senna-docusate 8.6-50 mg per tablet 1 tablet    [MAR Hold - Suspended Admission] sodium chloride 0.9% flush 10 mL    And    [MAR Hold - Suspended Admission] sodium chloride 0.9% flush 10 mL    sodium chloride 0.9% flush 3 mL    [DISCONTINUED] potassium chloride SA CR tablet 20 mEq     Current Outpatient Medications on File Prior to Encounter   Medication Sig    amLODIPine (NORVASC) 10 MG tablet Take 1 tablet (10 mg total) by mouth once daily.    ferrous sulfate 325 (65 FE) MG EC tablet Take 1 tablet (325 mg total) by mouth once daily.    folic acid (FOLVITE) 1 MG tablet Take 1 tablet (1 mg total) by mouth once daily.    Lactobacillus rhamnosus GG (CULTURELLE) 10 billion cell capsule Take 1 capsule by mouth once daily.    levalbuterol (XOPENEX) 0.63 mg/3 mL nebulizer solution Take 3 mLs (0.63 mg total) by nebulization every 6 (six) hours as needed for Wheezing or Shortness of Breath. Rescue    loperamide (IMODIUM) 2 mg capsule Take 1 capsule (2 mg total) by mouth 3 (three) times daily. for 10 days    metoprolol tartrate (LOPRESSOR) 100 MG tablet Take 1 tablet (100 mg total) by mouth 2 (two) times daily.    psyllium husk, aspartame, (METAMUCIL) 3.4 gram PwPk packet Take 1 packet by mouth 2 (two) times daily.    sodium chloride 0.9% SolP 500 mL with oxacillin 1 gram SolR 12 g Inject 12 g into the vein continuous. for 26 days     Family History     Problem Relation (Age of Onset)    Alzheimer's disease Mother    Hypertension Brother, Sister    Osteoarthritis Mother    Thyroid disease Mother        Tobacco Use    Smoking status: Current  Every Day Smoker     Packs/day: 0.50     Years: 40.00     Pack years: 20.00     Types: Cigarettes    Smokeless tobacco: Never Used   Substance and Sexual Activity    Alcohol use: No    Drug use: No    Sexual activity: Yes     Partners: Male     Review of Systems   Constitutional: Positive for diaphoresis. Negative for chills and fever.   HENT: Negative for congestion, nosebleeds and sore throat.    Eyes: Negative for photophobia and visual disturbance.   Respiratory: Positive for shortness of breath. Negative for cough and wheezing.    Cardiovascular: Negative for chest pain, palpitations and leg swelling.   Gastrointestinal: Positive for diarrhea (but improving). Negative for abdominal pain, nausea and vomiting.   Endocrine: Negative for polydipsia and polyuria.   Genitourinary: Positive for difficulty urinating (chronic). Negative for dysuria and hematuria.   Musculoskeletal: Positive for back pain. Negative for neck stiffness.   Skin: Negative for color change and rash.   Neurological: Negative for dizziness, syncope and light-headedness.     Objective:     Vital Signs (Most Recent):  Temp: 99.2 °F (37.3 °C) (10/28/19 0034)  Pulse: 94 (10/28/19 0146)  Resp: 20 (10/28/19 0200)  BP: (!) 149/72 (10/28/19 0146)  SpO2: 98 % (10/28/19 0146) Vital Signs (24h Range):  Temp:  [97.8 °F (36.6 °C)-99.2 °F (37.3 °C)] 98.1 °F (36.7 °C)  Pulse:  [] 93  Resp:  [16-38] 28  SpO2:  [66 %-100 %] 98 %  BP: (120-216)/(59-92) 123/59     Weight: 53.5 kg (117 lb 15.1 oz)  Body mass index is 20.25 kg/m².    Physical Exam   Constitutional: She is oriented to person, place, and time. She appears well-developed and well-nourished. She is cooperative. No distress. Nasal cannula in place.   HENT:   Head: Normocephalic and atraumatic.   Mouth/Throat: Oropharynx is clear and moist.   Eyes: Pupils are equal, round, and reactive to light. Conjunctivae and EOM are normal. No scleral icterus.   Neck: Normal range of motion. Neck supple.    Cardiovascular: Normal rate, regular rhythm, S1 normal, S2 normal and intact distal pulses. Exam reveals no gallop.   Murmur heard.   Systolic murmur is present with a grade of 4/6.  4/6 holosystolic murmur at mitral auscultation site   Pulmonary/Chest: Effort normal. She has rales in the right lower field, the left middle field and the left lower field.   Abdominal: Soft. Bowel sounds are normal. She exhibits no distension. There is no tenderness. There is no guarding.   Musculoskeletal: Normal range of motion. She exhibits no edema, tenderness or deformity.   Lymphadenopathy:     She has no cervical adenopathy.   Neurological: She is alert and oriented to person, place, and time. No cranial nerve deficit.   Skin: Skin is warm and dry. No erythema.   Nursing note and vitals reviewed.        CRANIAL NERVES     CN III, IV, VI   Pupils are equal, round, and reactive to light.  Extraocular motions are normal.        Significant Labs:   CBC:   Recent Labs   Lab 10/26/19  0650 10/27/19  2359   WBC 6.89 11.06   HGB 8.0* 9.3*   HCT 26.1* 29.4*    369*     CMP:   Recent Labs   Lab 10/26/19  0650 10/27/19  1047 10/27/19  2359    140 138   K 3.1* 2.9* 4.0    107 106   CO2 24 25 22*    93 162*   BUN 9 9 13   CREATININE 0.6 0.6 0.7   CALCIUM 7.7* 7.5* 8.2*   PROT  --   --  6.3   ALBUMIN  --   --  1.9*   BILITOT  --   --  0.1   ALKPHOS  --   --  147*   AST  --   --  10   ALT  --   --  8*   ANIONGAP 9 8 10   EGFRNONAA >60.0 >60.0 >60.0     Cardiac Markers:   Recent Labs   Lab 10/27/19  2359   BNP 1,159*       Significant Imaging: CXR: I have reviewed all pertinent results/findings within the past 24 hours and my personal findings are:  increased bilateral interstital markings consistent with pulmonary edema increased over prior film from 10/24.  Additionally increased focal consolidation in CANDACE suggestive of pneumonia.    Assessment/Plan:     * Acute hypoxemic respiratory failure  Likely secondary to  pulmonary edema from decompensated heart failure, but given CXR appearance and report of night sweats PNA can't yet be ruled out despite absence of cough or other symptoms.  Her rapid improvement with diuresis and elevated BNP points more towards CHF, however.   Will get procalcitonin to evaluate infectious process further as her CBC shows no indication of reactive leukocytosis.  Given Vanc and Zosyn in ED for HCAP coverage, but will monitor without further antibiotics and treat for CHF pending further information.  Wean oxygen as able with diuresis.      Urinary retention  Patient self-caths PRN.      Debility  Continue PT/OT while hospitalized with goal to get back to SNF once acute issues resolving.        Endocarditis  Continue oxacillin 12g continuous infusion for MSSA infection.      Acute on chronic diastolic congestive heart failure  Diuresing with lasix 40mg IV bid.  She may require a maintenance diuretic dose to keep her euvolemic.  Monitor potassium and magnesium levels while diuresing.          VTE Risk Mitigation (From admission, onward)         Ordered     enoxaparin injection 40 mg  Daily      10/28/19 0257     IP VTE HIGH RISK PATIENT  Once      10/28/19 0257     Place sequential compression device  Until discontinued      10/28/19 0110                   Ben Buck MD  Department of Hospital Medicine   [facility]

## 2019-10-28 NOTE — ASSESSMENT & PLAN NOTE
Likely secondary to pulmonary edema from decompensated heart failure, but given CXR appearance and report of night sweats PNA can't yet be ruled out despite absence of cough or other symptoms.  Her rapid improvement with diuresis and elevated BNP points more towards CHF, however.   Will get procalcitonin to evaluate infectious process further as her CBC shows no indication of reactive leukocytosis.  Given Vanc and Zosyn in ED for HCAP coverage, but will monitor without further antibiotics and treat for CHF pending further information.  Wean oxygen as able with diuresis.

## 2019-10-28 NOTE — PLAN OF CARE
Plan of Care:  Discharge Recommendation: Long-term SNF.  Please continue Progressive Mobility Protocol as appropriate.  Appropriate transfer level with nursing staff: Bed in chair position with lights on throughout day.  DARREL Henson  10/28/2019

## 2019-10-28 NOTE — PLAN OF CARE
Problem: Fall Injury Risk  Goal: Absence of Fall and Fall-Related Injury  Outcome: Ongoing, Progressing     Problem: Adult Inpatient Plan of Care  Goal: Plan of Care Review  Outcome: Ongoing, Progressing  Goal: Patient-Specific Goal (Individualization)  Outcome: Ongoing, Progressing  Goal: Absence of Hospital-Acquired Illness or Injury  Outcome: Ongoing, Progressing  Goal: Optimal Comfort and Wellbeing  Outcome: Ongoing, Progressing  Goal: Readiness for Transition of Care  Outcome: Ongoing, Progressing  Goal: Rounds/Family Conference  Outcome: Ongoing, Progressing     Problem: Infection  Goal: Infection Symptom Resolution  Outcome: Ongoing, Progressing     Problem: Skin Injury Risk Increased  Goal: Skin Health and Integrity  Outcome: Ongoing, Progressing

## 2019-10-29 VITALS
OXYGEN SATURATION: 100 % | DIASTOLIC BLOOD PRESSURE: 60 MMHG | HEART RATE: 70 BPM | TEMPERATURE: 98 F | SYSTOLIC BLOOD PRESSURE: 118 MMHG | RESPIRATION RATE: 14 BRPM

## 2019-10-29 LAB
ALBUMIN SERPL BCP-MCNC: 1.6 G/DL (ref 3.5–5.2)
ALP SERPL-CCNC: 134 U/L (ref 55–135)
ALT SERPL W/O P-5'-P-CCNC: 6 U/L (ref 10–44)
ANION GAP SERPL CALC-SCNC: 6 MMOL/L (ref 8–16)
ASCENDING AORTA: 2.55 CM
AST SERPL-CCNC: 8 U/L (ref 10–40)
AV INDEX (PROSTH): 0.51
AV MEAN GRADIENT: 4 MMHG
AV PEAK GRADIENT: 6 MMHG
AV VALVE AREA: 1.77 CM2
AV VELOCITY RATIO: 0.64
BASOPHILS # BLD AUTO: 0.05 K/UL (ref 0–0.2)
BASOPHILS NFR BLD: 0.7 % (ref 0–1.9)
BILIRUB SERPL-MCNC: 0.1 MG/DL (ref 0.1–1)
BSA FOR ECHO PROCEDURE: 1.55 M2
BUN SERPL-MCNC: 16 MG/DL (ref 8–23)
CALCIUM SERPL-MCNC: 8 MG/DL (ref 8.7–10.5)
CHLORIDE SERPL-SCNC: 96 MMOL/L (ref 95–110)
CO2 SERPL-SCNC: 32 MMOL/L (ref 23–29)
CREAT SERPL-MCNC: 0.7 MG/DL (ref 0.5–1.4)
CV ECHO LV RWT: 0.42 CM
DIFFERENTIAL METHOD: ABNORMAL
DOP CALC AO PEAK VEL: 1.21 M/S
DOP CALC AO VTI: 25.74 CM
DOP CALC LVOT AREA: 3.5 CM2
DOP CALC LVOT DIAMETER: 2.11 CM
DOP CALC LVOT PEAK VEL: 0.77 M/S
DOP CALC LVOT STROKE VOLUME: 45.57 CM3
DOP CALCLVOT PEAK VEL VTI: 13.04 CM
E WAVE DECELERATION TIME: 172.21 MSEC
E/A RATIO: 0.89
E/E' RATIO: 12.36 M/S
ECHO LV POSTERIOR WALL: 1.02 CM (ref 0.6–1.1)
EOSINOPHIL # BLD AUTO: 0.5 K/UL (ref 0–0.5)
EOSINOPHIL NFR BLD: 7.1 % (ref 0–8)
ERYTHROCYTE [DISTWIDTH] IN BLOOD BY AUTOMATED COUNT: 20.9 % (ref 11.5–14.5)
EST. GFR  (AFRICAN AMERICAN): >60 ML/MIN/1.73 M^2
EST. GFR  (NON AFRICAN AMERICAN): >60 ML/MIN/1.73 M^2
FRACTIONAL SHORTENING: 28 % (ref 28–44)
GLUCOSE SERPL-MCNC: 116 MG/DL (ref 70–110)
HCT VFR BLD AUTO: 26.7 % (ref 37–48.5)
HGB BLD-MCNC: 8.3 G/DL (ref 12–16)
IMM GRANULOCYTES # BLD AUTO: 0.08 K/UL (ref 0–0.04)
IMM GRANULOCYTES NFR BLD AUTO: 1.2 % (ref 0–0.5)
INTERVENTRICULAR SEPTUM: 0.83 CM (ref 0.6–1.1)
IVRT: 0.05 MSEC
LA MAJOR: 5.25 CM
LA MINOR: 5.29 CM
LA WIDTH: 4.36 CM
LEFT ATRIUM SIZE: 4 CM
LEFT ATRIUM VOLUME INDEX: 50.2 ML/M2
LEFT ATRIUM VOLUME: 78.12 CM3
LEFT INTERNAL DIMENSION IN SYSTOLE: 3.5 CM (ref 2.1–4)
LEFT VENTRICLE DIASTOLIC VOLUME INDEX: 70.52 ML/M2
LEFT VENTRICLE DIASTOLIC VOLUME: 109.83 ML
LEFT VENTRICLE MASS INDEX: 100 G/M2
LEFT VENTRICLE SYSTOLIC VOLUME INDEX: 32.6 ML/M2
LEFT VENTRICLE SYSTOLIC VOLUME: 50.75 ML
LEFT VENTRICULAR INTERNAL DIMENSION IN DIASTOLE: 4.84 CM (ref 3.5–6)
LEFT VENTRICULAR MASS: 155.38 G
LV LATERAL E/E' RATIO: 9.71 M/S
LV SEPTAL E/E' RATIO: 17 M/S
LYMPHOCYTES # BLD AUTO: 1.6 K/UL (ref 1–4.8)
LYMPHOCYTES NFR BLD: 23.8 % (ref 18–48)
MAGNESIUM SERPL-MCNC: 2.6 MG/DL (ref 1.6–2.6)
MCH RBC QN AUTO: 31.8 PG (ref 27–31)
MCHC RBC AUTO-ENTMCNC: 31.1 G/DL (ref 32–36)
MCV RBC AUTO: 102 FL (ref 82–98)
MONOCYTES # BLD AUTO: 0.6 K/UL (ref 0.3–1)
MONOCYTES NFR BLD: 8.5 % (ref 4–15)
MV PEAK A VEL: 1.53 M/S
MV PEAK E VEL: 1.36 M/S
NEUTROPHILS # BLD AUTO: 3.9 K/UL (ref 1.8–7.7)
NEUTROPHILS NFR BLD: 58.7 % (ref 38–73)
NRBC BLD-RTO: 0 /100 WBC
PHOSPHATE SERPL-MCNC: 3.8 MG/DL (ref 2.7–4.5)
PISA TR MAX VEL: 3.06 M/S
PLATELET # BLD AUTO: 242 K/UL (ref 150–350)
PMV BLD AUTO: 9.4 FL (ref 9.2–12.9)
POTASSIUM SERPL-SCNC: 3.2 MMOL/L (ref 3.5–5.1)
PROT SERPL-MCNC: 5.7 G/DL (ref 6–8.4)
PULM VEIN S/D RATIO: 0.65
PV PEAK D VEL: 0.84 M/S
PV PEAK S VEL: 0.55 M/S
RA MAJOR: 4.17 CM
RA PRESSURE: 3 MMHG
RA WIDTH: 3.31 CM
RBC # BLD AUTO: 2.61 M/UL (ref 4–5.4)
RIGHT VENTRICULAR END-DIASTOLIC DIMENSION: 2.66 CM
RV TISSUE DOPPLER FREE WALL SYSTOLIC VELOCITY 1 (APICAL 4 CHAMBER VIEW): 13.85 CM/S
SINUS: 2.55 CM
SODIUM SERPL-SCNC: 134 MMOL/L (ref 136–145)
STJ: 2.45 CM
TDI LATERAL: 0.14 M/S
TDI SEPTAL: 0.08 M/S
TDI: 0.11 M/S
TR MAX PG: 37 MMHG
TRICUSPID ANNULAR PLANE SYSTOLIC EXCURSION: 2.16 CM
TV REST PULMONARY ARTERY PRESSURE: 40 MMHG
WBC # BLD AUTO: 6.72 K/UL (ref 3.9–12.7)

## 2019-10-29 PROCEDURE — 84100 ASSAY OF PHOSPHORUS: CPT

## 2019-10-29 PROCEDURE — 11000001 HC ACUTE MED/SURG PRIVATE ROOM

## 2019-10-29 PROCEDURE — 94761 N-INVAS EAR/PLS OXIMETRY MLT: CPT

## 2019-10-29 PROCEDURE — 83735 ASSAY OF MAGNESIUM: CPT

## 2019-10-29 PROCEDURE — 80053 COMPREHEN METABOLIC PANEL: CPT

## 2019-10-29 PROCEDURE — 94799 UNLISTED PULMONARY SVC/PX: CPT

## 2019-10-29 PROCEDURE — 99900035 HC TECH TIME PER 15 MIN (STAT)

## 2019-10-29 PROCEDURE — 25000003 PHARM REV CODE 250: Performed by: STUDENT IN AN ORGANIZED HEALTH CARE EDUCATION/TRAINING PROGRAM

## 2019-10-29 PROCEDURE — 25000003 PHARM REV CODE 250: Performed by: HOSPITALIST

## 2019-10-29 PROCEDURE — 63600175 PHARM REV CODE 636 W HCPCS: Performed by: HOSPITALIST

## 2019-10-29 PROCEDURE — 85025 COMPLETE CBC W/AUTO DIFF WBC: CPT

## 2019-10-29 RX ORDER — POTASSIUM CHLORIDE 750 MG/1
40 CAPSULE, EXTENDED RELEASE ORAL ONCE
Status: COMPLETED | OUTPATIENT
Start: 2019-10-29 | End: 2019-10-29

## 2019-10-29 RX ORDER — FUROSEMIDE 40 MG/1
40 TABLET ORAL DAILY
Status: DISCONTINUED | OUTPATIENT
Start: 2019-10-29 | End: 2019-10-30 | Stop reason: HOSPADM

## 2019-10-29 RX ORDER — SODIUM,POTASSIUM PHOSPHATES 280-250MG
2 POWDER IN PACKET (EA) ORAL ONCE
Status: DISCONTINUED | OUTPATIENT
Start: 2019-10-29 | End: 2019-10-29

## 2019-10-29 RX ADMIN — FERROUS SULFATE TAB EC 325 MG (65 MG FE EQUIVALENT) 325 MG: 325 (65 FE) TABLET DELAYED RESPONSE at 09:10

## 2019-10-29 RX ADMIN — ACETAMINOPHEN 650 MG: 325 TABLET ORAL at 07:10

## 2019-10-29 RX ADMIN — FOLIC ACID 1 MG: 1 TABLET ORAL at 09:10

## 2019-10-29 RX ADMIN — ENOXAPARIN SODIUM 40 MG: 100 INJECTION SUBCUTANEOUS at 04:10

## 2019-10-29 RX ADMIN — POTASSIUM CHLORIDE 40 MEQ: 750 CAPSULE, EXTENDED RELEASE ORAL at 09:10

## 2019-10-29 RX ADMIN — LOPERAMIDE HYDROCHLORIDE 2 MG: 2 CAPSULE ORAL at 08:10

## 2019-10-29 RX ADMIN — AMLODIPINE BESYLATE 10 MG: 10 TABLET ORAL at 09:10

## 2019-10-29 RX ADMIN — ACETAMINOPHEN 650 MG: 325 TABLET ORAL at 03:10

## 2019-10-29 RX ADMIN — LOPERAMIDE HYDROCHLORIDE 2 MG: 2 CAPSULE ORAL at 03:10

## 2019-10-29 RX ADMIN — METOPROLOL TARTRATE 100 MG: 100 TABLET ORAL at 09:10

## 2019-10-29 RX ADMIN — LOPERAMIDE HYDROCHLORIDE 2 MG: 2 CAPSULE ORAL at 09:10

## 2019-10-29 RX ADMIN — OXACILLIN SODIUM 12 G: 10 INJECTION, POWDER, FOR SOLUTION INTRAVENOUS at 04:10

## 2019-10-29 RX ADMIN — Medication 1 CAPSULE: at 09:10

## 2019-10-29 RX ADMIN — ACETAMINOPHEN 650 MG: 325 TABLET ORAL at 09:10

## 2019-10-29 RX ADMIN — METOPROLOL TARTRATE 100 MG: 100 TABLET ORAL at 08:10

## 2019-10-29 RX ADMIN — FUROSEMIDE 40 MG: 40 TABLET ORAL at 03:10

## 2019-10-29 NOTE — PT/OT/SLP PROGRESS
Physical Therapy  Continue Current Plan of Care     Patient Name:  Mesha Mckenzie   MRN:  [id number]  Admitting Diagnosis:  Acute hypoxemic respiratory failure   Recent Surgery: * No surgery found *    Admit Date: 10/27/2019  Length of Stay: 1 days    Patient not seen on this date, however per chart review pt continues to benefit from acute PT services. PT to follow up and POC allows. Please continue progressive mobility as appropriate.    Discharge Disposition Recommendation: SNF  DME Recommendation: wheelchair, MONIQUE, BSC    Sandi Argueta PT, DPT  10/29/2019   Pager: [phone]

## 2019-10-29 NOTE — DISCHARGE SUMMARY
[facility] Hospital Medicine  Discharge Summary      Patient Name: Mesha Mckenzie  MRN: [id number]  Admission Date: 10/2/2019  Hospital Length of Stay: 23 days  Discharge Date and Time: 10/25/2019  7:15 PM  Attending Physician: No att. providers found   Discharging Provider: Yesenia Lerner MD  Primary Care Provider: Varun Shea MD PhD  [facility] Medicine Team: [facility] HOSP MED  Yesenia Lerner MD    HPI:   history of depression, anxiety and IVDA presented to [facility] ED on 9/19 with c/o N/V/D and weakness x ~5 days. The patient was found to be in septic shock with pneumonia and colitis and was transferred to [facility] ICU for further management. Pt was admitted to the intensive care unit and was treated with broad-spectrum antibiotics. 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 CT was repeated on 9/25 and showed bilateral cavitary lesions likely secondary to septic emboli, small pericardial effusion and ileus. DHARMESH was performed on 9/25 which revealed  mitral and tricuspid valve endocarditis. Blood cultures (9/19 - 9/26) have grown methicillin sensitive Staph aureus. Blood cultures as of 9/29 have been NGTD. ID was following the patient for antibiotic management with most recent regimen being cefazolin, vancomycin and daptomycin. Repeat Echo on 9/28 showed LVEF 35% and persistent vegatations. On 9/30 the patient had an MRI brain that showed remote lacunar infarcts without acute intracranial abnormality. On 10/1 MRI revealed spinal epidural abscess at L2-4 level. Thoracentesis was performed on 10/1. The patient was followed by Cardiology team who considered valve replacement surgery but due to patient's other medical issues, Cardiovascular surgery intervention was deferred. The patient was transferred to [facility] for neurosurgery evaluation of epidural abscess.        Procedure(s) (LRB):  LAMINECTOMY, SPINE, L3,  Open (N/A)      Hospital Course:   Upon arrival to [facility], the patient was encephalopathic, but able to follow commands. She was hemodynamically stable and satting well on 2L NC. Shortly after arrival, the patient became acutely hypertensive and hypoxemic necessitating intubation and mechanical ventilation. She was extremely difficult to oxygenate (P:F ratio 52). Bronch was performed which was unremarkable. The patient became hypotensive and was started on vasopressors. She was paralyzed with nimbex due to tachypnea and vent dyssynchrony. Bedside echo was performed which showed reduced LVEF, hyperdynamic LV; no significant mitral regurg was noted. The patient went for CTA without evidence of pulmonary embolism. Imaging revealed left lung collaspse and repeat bronch was performed with sputum cx sent. Oxygenation improved quickly and paralytic was discontinued. Right chest tube placed & pleural fluid sent; gram stain with GPCs. Neurosurgery consulted for epidural abscess, plans for OR potentially Monday. Cardiology consult for endocarditis; currently not a surgical candidate. Repeat TTE performed with improved EF and without mention of previously noted vegetations. ID and allergy consulted; oxacillin densensitization in process given pcn allergy.          Consulted general surgery for bilateral shoulder swelling; ultrasound concerning for phlegmon vs developing abscess without organized fluid collection. S/p I&D of shoulder abscesses 10/5/19. Extubated on 10/5/19 and doing well on RA. Ongoing neurosurgery evaluation for drainage of epidural abscess; neurosurgery team verbalized possible OR procedure on Wednesday (10/9) with deferred consult to IR to possibly drain the abscesses on 10/8.     Repeat CXR done for tachypnea/increased WOB. CXR unremarkable. Head CT was repeated and showed new R basal ganglia infarcts. MRI of brain showed evidence of R thalamic infarct and ventriculitis. CTA of head completed 10/8 showing  atherosclerotic plaquing of the distal vertebral arteries and concern for noncalcified plaquing and stenosis; no high-grade stenosis or proximal occlusion.Patient underwent laminectomy on 10/9. Stitched removed prior to DC to SNF.    Stepped down to  on 10/10. On 10/11 pt became febrile with acute change in mental status. Oxacillin broadened to vanc and zosyn and pt was re-cultured. Henriquez was exchanged and central line removed the next day.  Few minutes after fever pt was awake and responsive. BCx remained neg. C diff again neg. So abx again de-escalated to oxacillin on 10/4 per ID recs. Neurology was consulted for encephalopathy and recommended tighter bp control for new SBP goal of <150. Pt continued to progress and participate with speech and PT/OT. Watery diarrhea persisted (neg cdiff), so started on loperamide and metamucil to try to bulk and slow down stool output. Continued aggressive electrolyte replacement. On 10/25 electrolyes stable and pt medically ready for discharge to SNF. Referrals to outpt neuro surgery, cardiology, GI and ID placed prior to discharge. Continuing oxacillin for MSSA endocarditis per ID recs with estimated end date: 11/20/19. Will need f/up in clinic. PICC line in  place  since 10/24.     Consults:   Consults (From admission, onward)        Status Ordering Provider     Inpatient consult to Allergy  Once     Provider:  (Not yet assigned)    Completed ROSIBEL ABDALLA     Inpatient consult to Cardiology  Once     Provider:  (Not yet assigned)    Completed MALLIKA YOON     Inpatient consult to General Surgery  Once     Provider:  (Not yet assigned)    Completed WINTERBOTTOM, FIONA     Inpatient consult to Infectious Diseases  Once     Provider:  (Not yet assigned)    Completed MALLIKA YOON     Inpatient consult to Neurology  Once     Provider:  (Not yet assigned)    Completed LORE SAXENA     Inpatient consult to Palliative Care  Once     Provider:  (Not yet assigned)     Completed NIKHIL MOSCOSO     Inpatient consult to Physical Medicine Rehab  Once     Provider:  (Not yet assigned)    Completed NIKHIL MOSCOSO     [facility]  Once     Provider:  (Not yet assigned)    Completed NIKHIL MOSCOSO     Inpatient consult to Registered Dietitian/Nutritionist  Once     Provider:  (Not yet assigned)    Completed MALLIKA YOON     Inpatient consult to Registered Dietitian/Nutritionist  Once     Provider:  (Not yet assigned)    Completed ROSIBEL ABDALLA     Inpatient consult to Vascular (Stroke) Neurology  Once     Provider:  Dominguez Reynolds MD    Completed MALLIKA YOON     Pharmacy to dose Vancomycin consult  Once     Provider:  (Not yet assigned)    Completed RAFFAELE FALCON          No new Assessment & Plan notes have been filed under this hospital service since the last note was generated.  Service: Hospital Medicine    Final Active Diagnoses:    Diagnosis Date Noted POA    PRINCIPAL PROBLEM:  Encephalopathy, metabolic [G93.41] 09/23/2019 Yes    Abscess [L02.91] 10/22/2019 Yes    Alteration in skin integrity [R23.9] 10/22/2019 Yes    Leg cramping [R25.2] 10/20/2019 Unknown    HTN (hypertension) [I10] 10/16/2019 Unknown    Debility [R53.81] 10/14/2019 Unknown    Palliative care encounter [Z51.5] 10/14/2019 Not Applicable    Staphylococcus aureus bacteremia with sepsis [A41.01]  Unknown    Right thalamic stroke [I63.9] 10/07/2019 Clinically Undetermined    Abscess of upper extremity [L02.419] 10/05/2019 Yes    Endocarditis [I38]  Yes    Epidural abscess [G06.2] 10/02/2019 Yes    Hypernatremia [E87.0] 10/02/2019 Unknown    Acute septic pulmonary embolism [I26.90] 09/27/2019 Yes    Acute bacterial endocarditis [I33.0] 09/27/2019 Yes    Acute on chronic diastolic congestive heart failure [I50.33] 09/23/2019 Yes    Severe malnutrition [E43] 09/23/2019 Yes    Acute hypoxemic respiratory failure  [J96.01] 09/20/2019 Yes    Pleural effusion [J90] 09/20/2019 Unknown    Hypokalemia [E87.6] 09/19/2019 Unknown      Problems Resolved During this Admission:    Diagnosis Date Noted Date Resolved POA    Hypocalcemia [E83.51] 10/20/2019 10/21/2019 Unknown    Ileus [K56.7] 10/04/2019 10/28/2019 Unknown    Septic shock with acute organ dysfunction due to methicillin susceptible Staphylococcus aureus (MSSA) [A41.01, R65.21] 10/03/2019 10/28/2019 Yes    ABIMBOLA (acute kidney injury) [N17.9] 09/19/2019 10/16/2019 Yes    Pancolitis [K51.00] 09/19/2019 10/17/2019 Yes       Discharged Condition: stable    Disposition: Skilled Nursing Facility    Follow Up:    Patient Instructions:      Ambulatory Referral to Cardiology   Referral Priority: Routine Referral Type: Consultation   Referral Reason: Specialty Services Required   Requested Specialty: Cardiology   Number of Visits Requested: 1     Ambulatory Referral to Infectious Disease   Referral Priority: Routine Referral Type: Consultation   Referral Reason: Specialty Services Required   Requested Specialty: Infectious Diseases   Number of Visits Requested: 1     Ambulatory Referral to Gastroenterology   Referral Priority: Routine Referral Type: Consultation   Referral Reason: Specialty Services Required   Requested Specialty: Gastroenterology   Number of Visits Requested: 1     Ambulatory Referral to Neurosurgery   Referral Priority: Routine Referral Type: Consultation   Referral Reason: Specialty Services Required   Requested Specialty: Neurosurgery   Number of Visits Requested: 1       Significant Diagnostic Studies: Labs: All labs within the past 24 hours have been reviewed    Pending Diagnostic Studies:     None         Medications:  Reconciled Home Medications:      Medication List      START taking these medications    amLODIPine 10 MG tablet  Commonly known as:  NORVASC  Take 1 tablet (10 mg total) by mouth once daily.     ferrous sulfate 325 (65 FE) MG EC  tablet  Take 1 tablet (325 mg total) by mouth once daily.     folic acid 1 MG tablet  Commonly known as:  FOLVITE  Take 1 tablet (1 mg total) by mouth once daily.     Lactobacillus rhamnosus GG 10 billion cell capsule  Commonly known as:  CULTURELLE  Take 1 capsule by mouth once daily.     levalbuterol 0.63 mg/3 mL nebulizer solution  Commonly known as:  XOPENEX  Take 3 mLs (0.63 mg total) by nebulization every 6 (six) hours as needed for Wheezing or Shortness of Breath. Rescue     loperamide 2 mg capsule  Commonly known as:  IMODIUM  Take 1 capsule (2 mg total) by mouth 3 (three) times daily. for 10 days     metoprolol tartrate 100 MG tablet  Commonly known as:  LOPRESSOR  Take 1 tablet (100 mg total) by mouth 2 (two) times daily.     psyllium husk (aspartame) 3.4 gram Pwpk packet  Commonly known as:  METAMUCIL  Take 1 packet by mouth 2 (two) times daily.     sodium chloride 0.9% SolP 500 mL with oxacillin 1 gram SolR 12 g  Inject 12 g into the vein continuous. for 26 days        STOP taking these medications    diazePAM 5 MG tablet  Commonly known as:  VALIUM            Indwelling Lines/Drains at time of discharge:   Lines/Drains/Airways     Peripherally Inserted Central Catheter Line                 PICC Double Lumen 10/24/19 1506 right brachial 4 days                Time spent on the discharge of patient: 35 minutes  Patient was seen and examined on the date of discharge and determined to be suitable for discharge.         Yesenia Lerner MD  Department of Hospital Medicine  [facility]

## 2019-10-29 NOTE — HOSPITAL COURSE
[place] arrival [place] [facility] the patient was encephalopathic, but able to follow commands. She was hemodynamically stable and satting well on 2L NC. Shortly after arrival, the patient became acutely hypertensive and hypoxemic necessitating intubation and mechanical ventilation. She was extremely difficult to oxygenate (P:F ratio 52). Bronch was performed which was unremarkable. The patient became hypotensive and was started on vasopressors. She was paralyzed with nimbex due to tachypnea and vent dyssynchrony. Bedside echo was performed which showed reduced LVEF, hyperdynamic LV; no significant mitral regurg was noted. The patient went for CTA without evidence of pulmonary embolism. Imaging revealed left lung collaspse and repeat bronch was performed with sputum cx sent. Oxygenation improved quickly and paralytic was discontinued. Right chest tube placed & pleural fluid sent; gram stain with GPCs. Neurosurgery consulted for epidural abscess, plans for OR potentially Monday. Cardiology consult for endocarditis; currently not a surgical candidate. Repeat TTE performed with improved EF and without mention of previously noted vegetations. ID and allergy consulted; oxacillin densensitization in process given pcn allergy.          Consulted general surgery for bilateral shoulder swelling; ultrasound concerning for phlegmon vs developing abscess without organized fluid collection. S/p I&D of shoulder abscesses 10/5/19. Extubated on 10/5/19 and doing well on RA. Ongoing neurosurgery evaluation for drainage of epidural abscess; neurosurgery team verbalized possible OR procedure on Wednesday (10/9) with deferred consult to IR to possibly drain the abscesses on 10/8.     Repeat CXR done for tachypnea/increased WOB. CXR unremarkable. Head CT was repeated and showed new R basal ganglia infarcts. MRI of brain showed evidence of R thalamic infarct and ventriculitis. CTA of head completed 10/8 showing atherosclerotic plaquing of the distal  vertebral arteries and concern for noncalcified plaquing and stenosis; no high-grade stenosis or proximal occlusion.Patient underwent laminectomy on 10/9. Stitched removed prior to DC to SNF.    Stepped down to  on 10/10. On 10/11 pt became febrile with acute change in mental status. Oxacillin broadened to vanc and zosyn and pt was re-cultured. Henriquez was exchanged and central line removed the next day.  Few minutes after fever pt was awake and responsive. BCx remained neg. C diff again neg. So abx again de-escalated to oxacillin on 10/4 per ID recs. Neurology was consulted for encephalopathy and recommended tighter bp control for new SBP goal of <150. Pt continued to progress and participate with speech and PT/OT. Watery diarrhea persisted (neg cdiff), so started on loperamide and metamucil to try to bulk and slow down stool output. Continued aggressive electrolyte replacement. On 10/25 electrolyes stable and pt medically ready for discharge to SNF. Referrals to outpt neuro surgery, cardiology, GI and ID placed prior to discharge. Continuing oxacillin for MSSA endocarditis per ID recs with estimated end date: 11/20/19. Will need f/up in clinic. PICC line in  place since 10/24.

## 2019-10-29 NOTE — PLAN OF CARE
Problem: Fall Injury Risk  Goal: Absence of Fall and Fall-Related Injury  Outcome: Ongoing, Progressing     Problem: Adult Inpatient Plan of Care  Goal: Plan of Care Review  Outcome: Ongoing, Progressing  Goal: Patient-Specific Goal (Individualization)  Outcome: Ongoing, Progressing  Goal: Absence of Hospital-Acquired Illness or Injury  Outcome: Ongoing, Progressing  Goal: Optimal Comfort and Wellbeing  Outcome: Ongoing, Progressing  Goal: Readiness for Transition of Care  Outcome: Ongoing, Progressing  Goal: Rounds/Family Conference  Outcome: Ongoing, Progressing     Problem: Infection  Goal: Infection Symptom Resolution  Outcome: Ongoing, Not Progressing     Problem: Skin Injury Risk Increased  Goal: Skin Health and Integrity  Outcome: Ongoing, Progressing  Pt free of falls/trauma/injuries. Bed in low position, wheels locked, family at bedside, and call light within reach. Skin integrity remains unchanged; Incontinence and astorga care provided throughout shift. Pt turned q2 and as needed during shift. Oxacillin infusing at 20.8 ml/hr as ordered. VSS and afebrile. ECHO completed during shift. No distress noted/reported at this time. Will continue to monitor.

## 2019-10-30 ENCOUNTER — HOSPITAL ENCOUNTER (INPATIENT)
Facility: HOSPITAL | Age: 64
LOS: 21 days | Discharge: HOME HEALTH | DRG: 291 | End: 2019-11-20
Attending: INTERNAL MEDICINE | Admitting: INTERNAL MEDICINE
Payer: MEDICARE

## 2019-10-30 VITALS
OXYGEN SATURATION: 94 % | HEIGHT: 64 IN | BODY MASS INDEX: 19.97 KG/M2 | TEMPERATURE: 99 F | RESPIRATION RATE: 18 BRPM | WEIGHT: 117 LBS | HEART RATE: 102 BPM | DIASTOLIC BLOOD PRESSURE: 55 MMHG | SYSTOLIC BLOOD PRESSURE: 112 MMHG

## 2019-10-30 DIAGNOSIS — E43 SEVERE MALNUTRITION: ICD-10-CM

## 2019-10-30 DIAGNOSIS — B95.8 ENDOCARDITIS DUE TO STAPHYLOCOCCUS: ICD-10-CM

## 2019-10-30 DIAGNOSIS — R06.03 RESPIRATORY DISTRESS: ICD-10-CM

## 2019-10-30 DIAGNOSIS — I33.0 ENDOCARDITIS DUE TO STAPHYLOCOCCUS: ICD-10-CM

## 2019-10-30 DIAGNOSIS — I50.33 ACUTE ON CHRONIC DIASTOLIC CONGESTIVE HEART FAILURE: ICD-10-CM

## 2019-10-30 DIAGNOSIS — J96.01 ACUTE HYPOXEMIC RESPIRATORY FAILURE: ICD-10-CM

## 2019-10-30 LAB
ALBUMIN SERPL BCP-MCNC: 1.7 G/DL (ref 3.5–5.2)
ALP SERPL-CCNC: 135 U/L (ref 55–135)
ALT SERPL W/O P-5'-P-CCNC: 7 U/L (ref 10–44)
ANION GAP SERPL CALC-SCNC: 10 MMOL/L (ref 8–16)
AST SERPL-CCNC: 11 U/L (ref 10–40)
BASOPHILS # BLD AUTO: 0.06 K/UL (ref 0–0.2)
BASOPHILS NFR BLD: 0.9 % (ref 0–1.9)
BILIRUB SERPL-MCNC: 0.2 MG/DL (ref 0.1–1)
BUN SERPL-MCNC: 16 MG/DL (ref 8–23)
CALCIUM SERPL-MCNC: 8.3 MG/DL (ref 8.7–10.5)
CHLORIDE SERPL-SCNC: 99 MMOL/L (ref 95–110)
CO2 SERPL-SCNC: 28 MMOL/L (ref 23–29)
CREAT SERPL-MCNC: 0.6 MG/DL (ref 0.5–1.4)
DIFFERENTIAL METHOD: ABNORMAL
EOSINOPHIL # BLD AUTO: 0.5 K/UL (ref 0–0.5)
EOSINOPHIL NFR BLD: 6.7 % (ref 0–8)
ERYTHROCYTE [DISTWIDTH] IN BLOOD BY AUTOMATED COUNT: 20 % (ref 11.5–14.5)
EST. GFR  (AFRICAN AMERICAN): >60 ML/MIN/1.73 M^2
EST. GFR  (NON AFRICAN AMERICAN): >60 ML/MIN/1.73 M^2
GLUCOSE SERPL-MCNC: 87 MG/DL (ref 70–110)
HCT VFR BLD AUTO: 28.6 % (ref 37–48.5)
HGB BLD-MCNC: 8.9 G/DL (ref 12–16)
IMM GRANULOCYTES # BLD AUTO: 0.12 K/UL (ref 0–0.04)
IMM GRANULOCYTES NFR BLD AUTO: 1.7 % (ref 0–0.5)
LYMPHOCYTES # BLD AUTO: 1.9 K/UL (ref 1–4.8)
LYMPHOCYTES NFR BLD: 27.7 % (ref 18–48)
MAGNESIUM SERPL-MCNC: 1.7 MG/DL (ref 1.6–2.6)
MCH RBC QN AUTO: 31.2 PG (ref 27–31)
MCHC RBC AUTO-ENTMCNC: 31.1 G/DL (ref 32–36)
MCV RBC AUTO: 100 FL (ref 82–98)
MONOCYTES # BLD AUTO: 0.5 K/UL (ref 0.3–1)
MONOCYTES NFR BLD: 6.9 % (ref 4–15)
NEUTROPHILS # BLD AUTO: 3.9 K/UL (ref 1.8–7.7)
NEUTROPHILS NFR BLD: 56.1 % (ref 38–73)
NRBC BLD-RTO: 0 /100 WBC
PHOSPHATE SERPL-MCNC: 3.6 MG/DL (ref 2.7–4.5)
PLATELET # BLD AUTO: 308 K/UL (ref 150–350)
PMV BLD AUTO: 9.7 FL (ref 9.2–12.9)
POTASSIUM SERPL-SCNC: 3.1 MMOL/L (ref 3.5–5.1)
PROT SERPL-MCNC: 6.1 G/DL (ref 6–8.4)
RBC # BLD AUTO: 2.85 M/UL (ref 4–5.4)
SODIUM SERPL-SCNC: 137 MMOL/L (ref 136–145)
WBC # BLD AUTO: 7 K/UL (ref 3.9–12.7)

## 2019-10-30 PROCEDURE — 63600175 PHARM REV CODE 636 W HCPCS: Performed by: STUDENT IN AN ORGANIZED HEALTH CARE EDUCATION/TRAINING PROGRAM

## 2019-10-30 PROCEDURE — 25000003 PHARM REV CODE 250: Performed by: STUDENT IN AN ORGANIZED HEALTH CARE EDUCATION/TRAINING PROGRAM

## 2019-10-30 PROCEDURE — 25000003 PHARM REV CODE 250: Performed by: HOSPITALIST

## 2019-10-30 PROCEDURE — 97530 THERAPEUTIC ACTIVITIES: CPT

## 2019-10-30 PROCEDURE — 11000004 HC SNF PRIVATE

## 2019-10-30 PROCEDURE — 85025 COMPLETE CBC W/AUTO DIFF WBC: CPT

## 2019-10-30 PROCEDURE — 84100 ASSAY OF PHOSPHORUS: CPT

## 2019-10-30 PROCEDURE — 99315 PR NURSING FAC DISCHRGE DAY,1-30 MIN: ICD-10-PCS | Mod: ,,, | Performed by: INTERNAL MEDICINE

## 2019-10-30 PROCEDURE — 97165 OT EVAL LOW COMPLEX 30 MIN: CPT

## 2019-10-30 PROCEDURE — 99238 HOSP IP/OBS DSCHRG MGMT 30/<: CPT | Mod: ,,, | Performed by: STUDENT IN AN ORGANIZED HEALTH CARE EDUCATION/TRAINING PROGRAM

## 2019-10-30 PROCEDURE — 99238 PR HOSPITAL DISCHARGE DAY,<30 MIN: ICD-10-PCS | Mod: ,,, | Performed by: STUDENT IN AN ORGANIZED HEALTH CARE EDUCATION/TRAINING PROGRAM

## 2019-10-30 PROCEDURE — 80053 COMPREHEN METABOLIC PANEL: CPT

## 2019-10-30 PROCEDURE — 99315 NF DSCHRG MGMT 30 MIN/LESS: CPT | Mod: ,,, | Performed by: INTERNAL MEDICINE

## 2019-10-30 PROCEDURE — 94799 UNLISTED PULMONARY SVC/PX: CPT

## 2019-10-30 PROCEDURE — 63600175 PHARM REV CODE 636 W HCPCS: Performed by: HOSPITALIST

## 2019-10-30 PROCEDURE — 83735 ASSAY OF MAGNESIUM: CPT

## 2019-10-30 RX ORDER — FUROSEMIDE 40 MG/1
40 TABLET ORAL DAILY
Status: DISCONTINUED | OUTPATIENT
Start: 2019-10-31 | End: 2019-11-15

## 2019-10-30 RX ORDER — AMLODIPINE BESYLATE 10 MG/1
10 TABLET ORAL DAILY
Status: CANCELLED | OUTPATIENT
Start: 2019-10-31

## 2019-10-30 RX ORDER — ACETAMINOPHEN 325 MG/1
650 TABLET ORAL EVERY 6 HOURS PRN
Status: DISCONTINUED | OUTPATIENT
Start: 2019-10-30 | End: 2019-11-20 | Stop reason: HOSPADM

## 2019-10-30 RX ORDER — METOPROLOL TARTRATE 100 MG/1
100 TABLET ORAL 2 TIMES DAILY
Status: CANCELLED | OUTPATIENT
Start: 2019-10-30

## 2019-10-30 RX ORDER — FERROUS SULFATE 325(65) MG
325 TABLET, DELAYED RELEASE (ENTERIC COATED) ORAL DAILY
Status: CANCELLED | OUTPATIENT
Start: 2019-10-31

## 2019-10-30 RX ORDER — CALCIUM CARBONATE 200(500)MG
500 TABLET,CHEWABLE ORAL 2 TIMES DAILY PRN
Status: CANCELLED | OUTPATIENT
Start: 2019-10-30

## 2019-10-30 RX ORDER — FERROUS SULFATE 325(65) MG
325 TABLET, DELAYED RELEASE (ENTERIC COATED) ORAL DAILY
Status: DISCONTINUED | OUTPATIENT
Start: 2019-10-31 | End: 2019-10-31

## 2019-10-30 RX ORDER — FUROSEMIDE 40 MG/1
40 TABLET ORAL DAILY
Status: CANCELLED | OUTPATIENT
Start: 2019-10-31

## 2019-10-30 RX ORDER — LEVALBUTEROL INHALATION SOLUTION 0.63 MG/3ML
0.63 SOLUTION RESPIRATORY (INHALATION) EVERY 6 HOURS PRN
Status: DISCONTINUED | OUTPATIENT
Start: 2019-10-30 | End: 2019-11-20 | Stop reason: HOSPADM

## 2019-10-30 RX ORDER — POTASSIUM CHLORIDE 750 MG/1
40 CAPSULE, EXTENDED RELEASE ORAL ONCE
Status: COMPLETED | OUTPATIENT
Start: 2019-10-30 | End: 2019-10-30

## 2019-10-30 RX ORDER — CALCIUM CARBONATE 200(500)MG
500 TABLET,CHEWABLE ORAL 2 TIMES DAILY PRN
Status: DISCONTINUED | OUTPATIENT
Start: 2019-10-30 | End: 2019-11-20 | Stop reason: HOSPADM

## 2019-10-30 RX ORDER — AMOXICILLIN 250 MG
1 CAPSULE ORAL 2 TIMES DAILY
Status: DISCONTINUED | OUTPATIENT
Start: 2019-10-30 | End: 2019-10-31

## 2019-10-30 RX ORDER — ENOXAPARIN SODIUM 100 MG/ML
40 INJECTION SUBCUTANEOUS EVERY 24 HOURS
Status: DISCONTINUED | OUTPATIENT
Start: 2019-10-30 | End: 2019-11-20 | Stop reason: HOSPADM

## 2019-10-30 RX ORDER — ONDANSETRON 2 MG/ML
4 INJECTION INTRAMUSCULAR; INTRAVENOUS EVERY 8 HOURS PRN
Status: DISCONTINUED | OUTPATIENT
Start: 2019-10-30 | End: 2019-11-02

## 2019-10-30 RX ORDER — FOLIC ACID 1 MG/1
1 TABLET ORAL DAILY
Status: CANCELLED | OUTPATIENT
Start: 2019-10-31

## 2019-10-30 RX ORDER — SODIUM CHLORIDE 0.9 % (FLUSH) 0.9 %
10 SYRINGE (ML) INJECTION
Status: CANCELLED | OUTPATIENT
Start: 2019-10-30

## 2019-10-30 RX ORDER — OXYCODONE HYDROCHLORIDE 5 MG/1
5 TABLET ORAL EVERY 6 HOURS PRN
Status: DISCONTINUED | OUTPATIENT
Start: 2019-10-30 | End: 2019-10-31

## 2019-10-30 RX ORDER — LOPERAMIDE HYDROCHLORIDE 2 MG/1
2 CAPSULE ORAL 3 TIMES DAILY
Status: DISCONTINUED | OUTPATIENT
Start: 2019-10-30 | End: 2019-11-01

## 2019-10-30 RX ORDER — LOPERAMIDE HYDROCHLORIDE 2 MG/1
2 CAPSULE ORAL 3 TIMES DAILY
Status: CANCELLED | OUTPATIENT
Start: 2019-10-30

## 2019-10-30 RX ORDER — LEVALBUTEROL INHALATION SOLUTION 0.63 MG/3ML
0.63 SOLUTION RESPIRATORY (INHALATION) EVERY 6 HOURS PRN
Status: CANCELLED | OUTPATIENT
Start: 2019-10-30

## 2019-10-30 RX ORDER — SODIUM CHLORIDE 0.9 % (FLUSH) 0.9 %
10 SYRINGE (ML) INJECTION
Status: DISCONTINUED | OUTPATIENT
Start: 2019-10-30 | End: 2019-11-20 | Stop reason: HOSPADM

## 2019-10-30 RX ORDER — METOPROLOL TARTRATE 50 MG/1
100 TABLET ORAL 2 TIMES DAILY
Status: DISCONTINUED | OUTPATIENT
Start: 2019-10-30 | End: 2019-11-15

## 2019-10-30 RX ORDER — AMLODIPINE BESYLATE 10 MG/1
10 TABLET ORAL DAILY
Status: DISCONTINUED | OUTPATIENT
Start: 2019-10-31 | End: 2019-11-15

## 2019-10-30 RX ORDER — ONDANSETRON 2 MG/ML
4 INJECTION INTRAMUSCULAR; INTRAVENOUS EVERY 8 HOURS PRN
Status: CANCELLED | OUTPATIENT
Start: 2019-10-30

## 2019-10-30 RX ORDER — RAMELTEON 8 MG/1
8 TABLET ORAL NIGHTLY PRN
Status: DISCONTINUED | OUTPATIENT
Start: 2019-10-30 | End: 2019-11-03

## 2019-10-30 RX ORDER — ENOXAPARIN SODIUM 100 MG/ML
40 INJECTION SUBCUTANEOUS EVERY 24 HOURS
Status: CANCELLED | OUTPATIENT
Start: 2019-10-30

## 2019-10-30 RX ORDER — AMOXICILLIN 250 MG
1 CAPSULE ORAL 2 TIMES DAILY
Status: CANCELLED | OUTPATIENT
Start: 2019-10-30

## 2019-10-30 RX ORDER — FOLIC ACID 1 MG/1
1 TABLET ORAL DAILY
Status: DISCONTINUED | OUTPATIENT
Start: 2019-10-31 | End: 2019-11-20 | Stop reason: HOSPADM

## 2019-10-30 RX ORDER — RAMELTEON 8 MG/1
8 TABLET ORAL NIGHTLY PRN
Status: CANCELLED | OUTPATIENT
Start: 2019-10-30

## 2019-10-30 RX ORDER — ACETAMINOPHEN 325 MG/1
650 TABLET ORAL EVERY 6 HOURS PRN
Status: CANCELLED | OUTPATIENT
Start: 2019-10-30

## 2019-10-30 RX ADMIN — METOPROLOL TARTRATE 100 MG: 100 TABLET ORAL at 10:10

## 2019-10-30 RX ADMIN — Medication 1 CAPSULE: at 10:10

## 2019-10-30 RX ADMIN — OXACILLIN SODIUM 12 G: 10 INJECTION, POWDER, FOR SOLUTION INTRAVENOUS at 11:10

## 2019-10-30 RX ADMIN — LOPERAMIDE HYDROCHLORIDE 2 MG: 2 CAPSULE ORAL at 08:10

## 2019-10-30 RX ADMIN — ACETAMINOPHEN 650 MG: 325 TABLET ORAL at 07:10

## 2019-10-30 RX ADMIN — AMLODIPINE BESYLATE 10 MG: 10 TABLET ORAL at 10:10

## 2019-10-30 RX ADMIN — OXACILLIN SODIUM 12 G: 10 INJECTION, POWDER, FOR SOLUTION INTRAVENOUS at 01:10

## 2019-10-30 RX ADMIN — OXYCODONE HYDROCHLORIDE 5 MG: 5 TABLET ORAL at 08:10

## 2019-10-30 RX ADMIN — FERROUS SULFATE TAB EC 325 MG (65 MG FE EQUIVALENT) 325 MG: 325 (65 FE) TABLET DELAYED RESPONSE at 09:10

## 2019-10-30 RX ADMIN — FOLIC ACID 1 MG: 1 TABLET ORAL at 10:10

## 2019-10-30 RX ADMIN — ACETAMINOPHEN 650 MG: 325 TABLET ORAL at 04:10

## 2019-10-30 RX ADMIN — FUROSEMIDE 40 MG: 40 TABLET ORAL at 10:10

## 2019-10-30 RX ADMIN — POTASSIUM CHLORIDE 40 MEQ: 750 CAPSULE, EXTENDED RELEASE ORAL at 10:10

## 2019-10-30 RX ADMIN — METOPROLOL TARTRATE 100 MG: 50 TABLET ORAL at 08:10

## 2019-10-30 RX ADMIN — ENOXAPARIN SODIUM 40 MG: 100 INJECTION SUBCUTANEOUS at 07:10

## 2019-10-30 NOTE — PT/OT/SLP EVAL
Occupational Therapy   Evaluation    Name: Mesha Mckenzie  MRN: [id number]  Admitting Diagnosis:  Acute hypoxemic respiratory failure      Recommendations:     Discharge Recommendations: nursing facility, skilled  Discharge Equipment Recommendations:  wheelchair, walker, rolling, bedside commode, bath bench  Barriers to discharge:  Inaccessible home environment, Decreased caregiver support    Assessment:     Mesha Mckenzie is a 63 y.o. female with a medical diagnosis of Acute hypoxemic respiratory failure.  She presents with impaired ADL and functional mobility performance. Pt reporting 8/10 LBP however willing to participate in OT evaluation. Pt oriented x3 with delayed processing noted however able to follow 2-step commands. She was able to recite 2/3 spinal precautions with teachback method utilized. During OT eval, pt limited in mobility 2/2 pain however able to tolerate bed mobility with min/mod (A) and EOB with SBA ~4 min before returning to supine. Writing OT unable to encourage pt in further standing evaluation. Performance deficits affecting function: weakness, impaired endurance, impaired self care skills, impaired functional mobilty, gait instability, impaired balance, decreased safety awareness, decreased lower extremity function, pain, orthopedic precautions.  Pt would benefit from continued OT skilled services 3x/wk to improve daily living skills to optimize QOL. Pt is recommended to discharge to SNF at this time.      Rehab Prognosis: Good; patient would benefit from acute skilled OT services to address these deficits and reach maximum level of function.       Plan:     Patient to be seen 3 x/week to address the above listed problems via self-care/home management, therapeutic activities, therapeutic exercises  · Plan of Care Expires: 11/30/19  · Plan of Care Reviewed with: patient, spouse    Subjective     Chief Complaint: 8/10 LBP with increase upon mobilizing to EOB   Patient/Family  "Comments/goals: "I want to do as much as I can, but my back is really hurting please let me lay back"    Occupational Profile:  Living Environment: Pt lives with  in [facility] with 2 small threshold steps to enter. Pt with shower for bathing.  Previous level of function: (I) with ADLs and fx'l mobility   Roles and Routines: Spouse; None other reported at time of evaluation.  Equipment Used at Home:  none  Assistance upon Discharge:  works part time and available as needed     Pain/Comfort:  · Pain Rating 1: 8/10  · Location - Orientation 1: lower  · Location 1: back  · Pain Addressed 1: Reposition, Distraction, Cessation of Activity  · Pain Addressed 2: Nurse notified    Patients cultural, spiritual, Mandaen conflicts given the current situation: no    Objective:     Communicated with: RN prior to session.  Patient found right sidelying with perineural catheter, telemetry, peripheral IV, bed alarm, PICC line upon OT entry to room.    General Precautions: Standard, fall   Orthopedic Precautions:spinal precautions   Braces: N/A     Occupational Performance:    Bed Mobility:    · Patient completed Rolling/Turning to Right with minimum assistance  · Patient completed Scooting/Bridging with moderate assistance  · Patient completed Supine to Sit with moderate assistance  · Patient completed Sit to Supine with minimum assistance    Functional Mobility/Transfers:  · NT as pt reporting 10/10 pain at EOB and unable to achieve standing assessment.    Activities of Daily Living:  · Lower Body Dressing: total assistance donning socks with pt with HOB elevated     Cognitive/Visual Perceptual:  Cognitive/Psychosocial Skills:     -       Oriented to: Person, Place and Situation   -       Follows Commands/attention:Follows two-step commands  -       Communication: clear/fluent  -       Memory: No Deficits noted  -       Safety awareness/insight to disability: impaired   -       Mood/Affect/Coping skills/emotional control: " Anxious    Physical Exam:  Balance:    -       Demo SBA at EOB for static sitting balance   Upper Extremity Range of Motion:     -       Right Upper Extremity: WFL  -       Left Upper Extremity: WFL  Upper Extremity Strength: -       Right Upper Extremity: WFL  -       Left Upper Extremity: WFL   Strength:    -       Right Upper Extremity: WFL  -       Left Upper Extremity: WFL    AMPAC 6 Click ADL:  AMPAC Total Score: 13    Treatment & Education:  Pt educated on role of occupational therapy, POC, and safety during ADLs and functional mobility. Pt and OT discussed importance of safe, continued mobility to optimize daily living skills. Pt verbalized understanding. White board updated during session. Pt given instruction to call for medical staff/nurse for assistance.     Education:    Patient left HOB elevated with all lines intact, call button in reach, bed alarm on, RN notified and spouse present    GOALS:   Multidisciplinary Problems     Occupational Therapy Goals        Problem: Occupational Therapy Goal    Goal Priority Disciplines Outcome Interventions   Occupational Therapy Goal     OT, PT/OT Ongoing, Progressing    Description:  Goals to be met by: 11/10/19     Patient will increase functional independence with ADLs by performing:    UE Dressing with Minimal Assistance.  LE Dressing with Maximum Assistance.  Grooming while standing with Moderate Assistance.  Toileting from bedside commode with Moderate Assistance for hygiene and clothing management.   Sitting at edge of bed x15 minutes with Stand-by Assistance for functional task and demo in imporvements in pain tolerance.  Rolling to Bilateral with Contact Guard Assistance.   Supine to sit with Contact Guard Assistance.  Step transfer with Moderate Assistance  Toilet transfer to bedside commode with Moderate Assistance.                      History:     Past Medical History:   Diagnosis Date    Anxiety     Carotid bruit     Chronic pain     Cystitis      Cystocele, unspecified (CODE)     Depression     GI bleed     History of uterine fibroid     Shortness of breath on exertion     Spinal stenosis     Urinary retention        Past Surgical History:   Procedure Laterality Date    ANTERIOR VAGINAL REPAIR  8-    CARDIAC CATHETERIZATION  age 12    CHOLECYSTECTOMY  2015    COLONOSCOPY  12/12/2018    aborted due to poor colon prep    COLONOSCOPY N/A 12/12/2018    Procedure: COLONOSCOPY;  Surgeon: Renard Gonsalves MD;  Location[facility];  Service: Endoscopy;  Laterality: N/A;    HYSTERECTOMY  1989    AMANDA    LAMINECTOMY N/A 10/9/2019    Procedure: LAMINECTOMY, SPINE, L3, Open;  Surgeon: Martin Lewis DO;  Location: [facility] OR [address] [place] [address];  Service: Neurosurgery;  Laterality: N/A;    tubiligation         Time Tracking:     OT Date of Treatment: 10/30/19  OT Start Time: 1017  OT Stop Time: 1037  OT Total Time (min): 20 min    Billable Minutes:Evaluation 12 min  Therapeutic Activity 8 min    Ernestina Mitchell OT  10/30/2019

## 2019-10-30 NOTE — PLAN OF CARE
Problem: Occupational Therapy Goal  Goal: Occupational Therapy Goal  Description  Goals to be met by: 11/10/19     Patient will increase functional independence with ADLs by performing:    UE Dressing with Minimal Assistance.  LE Dressing with Maximum Assistance.  Grooming while standing with Moderate Assistance.  Toileting from bedside commode with Moderate Assistance for hygiene and clothing management.   Sitting at edge of bed x15 minutes with Stand-by Assistance for functional task and demo in imporvements in pain tolerance.  Rolling to Bilateral with Contact Guard Assistance.   Supine to sit with Contact Guard Assistance.  Step transfer with Moderate Assistance  Toilet transfer to bedside commode with Moderate Assistance.   Evaluated pt and established OT POC. Continue OT as tolerated.  Ernestina Mitchell OT  10/30/2019  Outcome: Ongoing, Progressing

## 2019-10-30 NOTE — PLAN OF CARE
Problem: Adult Inpatient Plan of Care  Goal: Plan of Care Review  Outcome: Ongoing, Progressing     Problem: Infection  Goal: Infection Symptom Resolution  Outcome: Ongoing, Progressing     Problem: Skin Injury Risk Increased  Goal: Skin Health and Integrity  Outcome: Ongoing, Progressing     Problem: Fall Injury Risk  Goal: Absence of Fall and Fall-Related Injury  Outcome: Ongoing, Progressing     Patient AAOx4. Vitals WNL. Cardiac monitoring in place. Pain managed with prn tylenol. Oxacillin infusing continuously at rate of 20.8ml/hr. Turn q2hrs. Henriquez remains intact with clear yellow urine noted. Patient able to make needs known to staff. Bed in lowest position. Call light within reach. Monitoring.

## 2019-10-30 NOTE — NURSING
Home Oxygen Evaluation    Date Performed: 10/29/2019    1) Patient's Home O2 Sat on room air, while at rest: 88%        If O2 sats on room air at rest are 88% or below, patient qualifies. No additional testing needed. Document N/A in steps 2 and 3. If 89% or above, complete steps 2.      2) Patient's O2 Sat on room air while exercising: NA        If O2 sats on room air while exercising remain 89% or above patient does not qualify, no further testing needed Document N/A in step 3. If O2 sats on room air while exercising are 88% or below, continue to step 3.      3) Patient's O2 Sat while exercising on O2: NA         (Must show improvement from #2 for patients to qualify)    If O2 sats improve on oxygen, patient qualifies for portable oxygen. If not, the patient does not qualify.

## 2019-10-30 NOTE — PLAN OF CARE
Patient accepted to [facility]. Report to be called to [phone]. Transportation set  Up through Patient Flow. Requested 1 hr. Informed nurse.    Patient Flow request estimates  time is 5:30pm.

## 2019-10-30 NOTE — PLAN OF CARE
Plan of Care:  Discharge Recommendation: Long-term SNF.  Please continue Progressive Mobility Protocol as appropriate.  Appropriate transfer level with nursing staff: Bed <> Chair:  Stand Pivot with maximal assistance with hand-held assist.    DARREL Henson  10/30/2019

## 2019-10-30 NOTE — PT/OT/SLP PROGRESS
Physical Therapy Treatment    Patient Name:  Mesha Mckenzie   MRN:  [id number]  Admitting Diagnosis:  Acute hypoxemic respiratory failure   Recent Surgery: * No surgery found *    Admit Date: 10/27/2019  Length of Stay: 2 days    Recommendations:     Discharge Recommendations:  nursing facility, skilled   Discharge Equipment Recommendations: wheelchair, walker, rolling, bath bench, bedside commode   Barriers to discharge: Inaccessible home and Decreased caregiver support    Assessment:     Mesha Mckenzie is a 63 y.o. female admitted with a medical diagnosis of Acute hypoxemic respiratory failure.  She presents with the following impairments/functional limitations:  weakness, gait instability, impaired cardiopulmonary response to activity, impaired endurance, impaired balance, decreased safety awareness, pain, impaired functional mobilty, decreased coordination.  Mrs. Mckenzie tolerated physical therapy treatment well today.  Mrs. Mckenzie required moderate assistance to reach EOB positioning from SPT.  She was limited primarily due to the pain she was experiencing in her back and generalized weakness from bed rest.  Sit to stands were attempted however Mrs. Mckenzie could not achieve full standing due to her back pain.  Instead Mrs. Mckenzie performed partial stands with scooting towards HOB requiring moderate assistance from SPT.  Mrs. Mckenzie again was limited primarily due to pain and generalized weakness of B LE.  Mrs. Mckenzie will continue to benefit from acute inpatient physical therapy services to address the above functional limitations and return to the highest level of function.    Rehab Prognosis: Good; patient would benefit from acute skilled PT services to address these deficits and reach maximum level of function.    Recent Surgery: * No surgery found *      Plan:     During this hospitalization, patient to be seen 4 x/week to address the identified rehab impairments via gait training,  therapeutic activities, therapeutic exercises, neuromuscular re-education and progress toward the following goals:    · Plan of Care Expires:  11/25/19    Subjective   Communicated with RN prior to session.  Patient found supine upon PT entry to room, agreeable to evaluation. Mesha Mckenzie's son present during session.    Chief Complaint: Mrs. Mckenzie's chief complaint today was pain in lower back.  Patient/Family Comments/goals: Mrs. Mckenzie had no comments or goals prior to beginning physical therapy session today.  Pain/Comfort:  · Pain Rating 1: 10/10  · Location - Side 1: Bilateral  · Location - Orientation 1: upper  · Location 1: back  · Pain Addressed 1: Reposition, Distraction      Objective:   Patient found with: perineural catheter, telemetry, PICC line, peripheral IV   Mental Status: Patient is oriented to AxOx4 and follows all verbal commands. Patient is Lethargic during session.    General Precautions: Standard, Cardiac fall   Orthopedic Precautions:spinal precautions   Braces: N/A   Oxygen Device: Room Air  Vitals:   Heart Rate (bpm) 80   SaO2: 90%     Outcome Measures:  AM-PAC 6 CLICK MOBILITY  Turning over in bed (including adjusting bedclothes, sheets and blankets)?: 3  Sitting down on and standing up from a chair with arms (e.g., wheelchair, bedside commode, etc.): 3  Moving from lying on back to sitting on the side of the bed?: 2  Moving to and from a bed to a chair (including a wheelchair)?: 2  Need to walk in hospital room?: 2  Climbing 3-5 steps with a railing?: 1  Basic Mobility Total Score: 13     Functional Mobility:  Additional staff present: Student PT  Bed Mobility:   · Rolling/Turning to Left: moderate assistance  · Rolling/Turning to Right: moderate assistance  · Scooting to HOB via drawsheet: total assistance and 2 persons  · Supine to Sit: moderate assistance; from left side of bed  · Scooting anteriorly to EOB to have both feet planted on floor: minimum assistance  · Sit to  Supine: moderate assistance; to right side of bed    Sitting Balance at Edge of Bed:   Assistance Level Required: Contact Guard Assistance   Time: 5 minutes   Postural deviations noted: slouched posture, rounded shoulders, forward head, trunk deviated left and posterior pelvic tilt   Encouraged: Mrs. Mckenzie was encouraged to sit up straight and look ahead to help maintain a more neutral sitting posture.   Comments: Mrs. Mckenzie would sit neutral for a few seconds and then return to her postural deviation of trunk to left.  Mrs. Mckenzie stated that this position was best for her back pain.    Transfers:   · Sit <> Stand Transfer: moderate assistance with hand-held assist   · Stand <> Sit Transfer: moderate assistance with hand-held assist   · [id number] from EOB   · Sit to stands were attempted however full standing was not achieved due to pain.  · Mrs. Mckenzie performed 5 partial stands with scooting to HOB requiring moderate assistance from SPT.    Therapeutic Activities & Exercises:   1. Sternal Precautions: patient able to voice 3/3 precautions without assistance. Patient able to maintain precautions throughout session with 1 verbal cues.    Education:  Mrs. Mckenzie was educated on POC and goals for physical therapy session for today.  Mrs. Mckenzie educated on the importance of participating in physical therapy sessions as it would help improve her recovery.    Patient left HOB elevated, with head in midline, neutral pelvis & heels floated for skin protection with all lines intact, call button in reach and RN notified    GOALS:   Multidisciplinary Problems     Physical Therapy Goals        Problem: Physical Therapy Goal    Goal Priority Disciplines Outcome Goal Variances Interventions   Physical Therapy Goal     PT, PT/OT Ongoing, Progressing     Description:  Goals to be met by: 2019    Patient will increase functional independence with mobility by performin. Supine <> sit with  Moderate Assistance.  2. Sit <> stand transfer with Moderate Assistance using Rolling Walker.  3. Bed <> chair transfer via Step Transfer with Moderate Assistance using Rolling Walker.  4. Dynamic sitting at edge of bed x 10 minutes with Moderate Assistance to prepare for functional tasks in sitting.  5. Able to tolerate exercise for 15-20 reps with assistance as needed.  6. Patient is able to recall 3/3 spinal precautions without assistance.                     Time Tracking:     PT Received On: 10/30/19  PT Start Time: 1324     PT Stop Time: 1341  PT Total Time (min): 17 min     Billable Minutes:   · Therapeutic Activity 17 minutes (1 unit)    Treatment Type: Treatment  PT/PTA: PT       DARREL Henson  10/30/2019

## 2019-10-31 LAB
ALBUMIN SERPL BCP-MCNC: 1.8 G/DL (ref 3.5–5.2)
ALP SERPL-CCNC: 134 U/L (ref 55–135)
ALT SERPL W/O P-5'-P-CCNC: 6 U/L (ref 10–44)
ANION GAP SERPL CALC-SCNC: 10 MMOL/L (ref 8–16)
AST SERPL-CCNC: 13 U/L (ref 10–40)
BASOPHILS # BLD AUTO: 0.06 K/UL (ref 0–0.2)
BASOPHILS NFR BLD: 0.8 % (ref 0–1.9)
BILIRUB SERPL-MCNC: 0.1 MG/DL (ref 0.1–1)
BUN SERPL-MCNC: 19 MG/DL (ref 8–23)
CALCIUM SERPL-MCNC: 8.5 MG/DL (ref 8.7–10.5)
CHLORIDE SERPL-SCNC: 101 MMOL/L (ref 95–110)
CO2 SERPL-SCNC: 27 MMOL/L (ref 23–29)
CREAT SERPL-MCNC: 0.7 MG/DL (ref 0.5–1.4)
DIFFERENTIAL METHOD: ABNORMAL
EOSINOPHIL # BLD AUTO: 0.4 K/UL (ref 0–0.5)
EOSINOPHIL NFR BLD: 4.9 % (ref 0–8)
ERYTHROCYTE [DISTWIDTH] IN BLOOD BY AUTOMATED COUNT: 20.1 % (ref 11.5–14.5)
EST. GFR  (AFRICAN AMERICAN): >60 ML/MIN/1.73 M^2
EST. GFR  (NON AFRICAN AMERICAN): >60 ML/MIN/1.73 M^2
GLUCOSE SERPL-MCNC: 86 MG/DL (ref 70–110)
HCT VFR BLD AUTO: 27.9 % (ref 37–48.5)
HGB BLD-MCNC: 8.5 G/DL (ref 12–16)
IMM GRANULOCYTES # BLD AUTO: 0.3 K/UL (ref 0–0.04)
IMM GRANULOCYTES NFR BLD AUTO: 3.9 % (ref 0–0.5)
LYMPHOCYTES # BLD AUTO: 2.3 K/UL (ref 1–4.8)
LYMPHOCYTES NFR BLD: 30 % (ref 18–48)
MAGNESIUM SERPL-MCNC: 1.6 MG/DL (ref 1.6–2.6)
MCH RBC QN AUTO: 31.3 PG (ref 27–31)
MCHC RBC AUTO-ENTMCNC: 30.5 G/DL (ref 32–36)
MCV RBC AUTO: 103 FL (ref 82–98)
MONOCYTES # BLD AUTO: 0.6 K/UL (ref 0.3–1)
MONOCYTES NFR BLD: 7.7 % (ref 4–15)
NEUTROPHILS # BLD AUTO: 4.1 K/UL (ref 1.8–7.7)
NEUTROPHILS NFR BLD: 52.7 % (ref 38–73)
NRBC BLD-RTO: 0 /100 WBC
PHOSPHATE SERPL-MCNC: 3.4 MG/DL (ref 2.7–4.5)
PLATELET # BLD AUTO: 321 K/UL (ref 150–350)
PMV BLD AUTO: 9.9 FL (ref 9.2–12.9)
POTASSIUM SERPL-SCNC: 3.3 MMOL/L (ref 3.5–5.1)
PROT SERPL-MCNC: 6.3 G/DL (ref 6–8.4)
RBC # BLD AUTO: 2.72 M/UL (ref 4–5.4)
SODIUM SERPL-SCNC: 138 MMOL/L (ref 136–145)
WBC # BLD AUTO: 7.69 K/UL (ref 3.9–12.7)

## 2019-10-31 PROCEDURE — 63600175 PHARM REV CODE 636 W HCPCS: Performed by: STUDENT IN AN ORGANIZED HEALTH CARE EDUCATION/TRAINING PROGRAM

## 2019-10-31 PROCEDURE — 97535 SELF CARE MNGMENT TRAINING: CPT

## 2019-10-31 PROCEDURE — 84100 ASSAY OF PHOSPHORUS: CPT

## 2019-10-31 PROCEDURE — 80053 COMPREHEN METABOLIC PANEL: CPT

## 2019-10-31 PROCEDURE — 25000003 PHARM REV CODE 250: Performed by: NURSE PRACTITIONER

## 2019-10-31 PROCEDURE — 97110 THERAPEUTIC EXERCISES: CPT

## 2019-10-31 PROCEDURE — 97530 THERAPEUTIC ACTIVITIES: CPT

## 2019-10-31 PROCEDURE — 97802 MEDICAL NUTRITION INDIV IN: CPT

## 2019-10-31 PROCEDURE — 99306 1ST NF CARE HIGH MDM 50: CPT | Mod: ,,, | Performed by: INTERNAL MEDICINE

## 2019-10-31 PROCEDURE — 97166 OT EVAL MOD COMPLEX 45 MIN: CPT

## 2019-10-31 PROCEDURE — 25000003 PHARM REV CODE 250: Performed by: STUDENT IN AN ORGANIZED HEALTH CARE EDUCATION/TRAINING PROGRAM

## 2019-10-31 PROCEDURE — 97162 PT EVAL MOD COMPLEX 30 MIN: CPT

## 2019-10-31 PROCEDURE — 83735 ASSAY OF MAGNESIUM: CPT

## 2019-10-31 PROCEDURE — 11000004 HC SNF PRIVATE

## 2019-10-31 PROCEDURE — 25000003 PHARM REV CODE 250: Performed by: INTERNAL MEDICINE

## 2019-10-31 PROCEDURE — 25000003 PHARM REV CODE 250: Performed by: HOSPITALIST

## 2019-10-31 PROCEDURE — 99306 PR NURSING FACILITY CARE, INIT, HIGH SEVERITY: ICD-10-PCS | Mod: ,,, | Performed by: INTERNAL MEDICINE

## 2019-10-31 PROCEDURE — 85025 COMPLETE CBC W/AUTO DIFF WBC: CPT

## 2019-10-31 PROCEDURE — 36415 COLL VENOUS BLD VENIPUNCTURE: CPT

## 2019-10-31 RX ORDER — POTASSIUM CHLORIDE 20 MEQ/1
20 TABLET, EXTENDED RELEASE ORAL 2 TIMES DAILY WITH MEALS
Status: DISCONTINUED | OUTPATIENT
Start: 2019-11-01 | End: 2019-11-15

## 2019-10-31 RX ORDER — ASCORBIC ACID 250 MG
250 TABLET ORAL
Status: DISCONTINUED | OUTPATIENT
Start: 2019-10-31 | End: 2019-11-20 | Stop reason: HOSPADM

## 2019-10-31 RX ORDER — POTASSIUM CHLORIDE 20 MEQ/1
20 TABLET, EXTENDED RELEASE ORAL 2 TIMES DAILY
Status: DISCONTINUED | OUTPATIENT
Start: 2019-10-31 | End: 2019-10-31

## 2019-10-31 RX ORDER — VENLAFAXINE 37.5 MG/1
75 TABLET ORAL 2 TIMES DAILY
Status: DISCONTINUED | OUTPATIENT
Start: 2019-10-31 | End: 2019-11-20 | Stop reason: HOSPADM

## 2019-10-31 RX ORDER — POTASSIUM CHLORIDE 20 MEQ/1
20 TABLET, EXTENDED RELEASE ORAL ONCE
Status: DISCONTINUED | OUTPATIENT
Start: 2019-10-31 | End: 2019-10-31

## 2019-10-31 RX ORDER — POTASSIUM CHLORIDE 20 MEQ/1
40 TABLET, EXTENDED RELEASE ORAL EVERY 4 HOURS
Status: COMPLETED | OUTPATIENT
Start: 2019-10-31 | End: 2019-10-31

## 2019-10-31 RX ORDER — POTASSIUM CHLORIDE 20 MEQ/1
20 TABLET, EXTENDED RELEASE ORAL 2 TIMES DAILY WITH MEALS
Status: DISCONTINUED | OUTPATIENT
Start: 2019-10-31 | End: 2019-10-31

## 2019-10-31 RX ORDER — IBUPROFEN 200 MG
400 TABLET ORAL EVERY 6 HOURS PRN
Status: DISCONTINUED | OUTPATIENT
Start: 2019-10-31 | End: 2019-10-31

## 2019-10-31 RX ORDER — FERROUS SULFATE 325(65) MG
325 TABLET, DELAYED RELEASE (ENTERIC COATED) ORAL
Status: DISCONTINUED | OUTPATIENT
Start: 2019-11-02 | End: 2019-11-20 | Stop reason: HOSPADM

## 2019-10-31 RX ORDER — PANTOPRAZOLE SODIUM 40 MG/1
40 TABLET, DELAYED RELEASE ORAL DAILY
Status: DISCONTINUED | OUTPATIENT
Start: 2019-10-31 | End: 2019-11-20 | Stop reason: HOSPADM

## 2019-10-31 RX ORDER — ACETAMINOPHEN 325 MG/1
650 TABLET ORAL 3 TIMES DAILY
Status: DISCONTINUED | OUTPATIENT
Start: 2019-10-31 | End: 2019-11-20 | Stop reason: HOSPADM

## 2019-10-31 RX ORDER — GABAPENTIN 100 MG/1
200 CAPSULE ORAL 3 TIMES DAILY
Status: DISCONTINUED | OUTPATIENT
Start: 2019-10-31 | End: 2019-11-20 | Stop reason: HOSPADM

## 2019-10-31 RX ORDER — CLONIDINE HYDROCHLORIDE 0.1 MG/1
0.1 TABLET ORAL EVERY 4 HOURS PRN
Status: DISCONTINUED | OUTPATIENT
Start: 2019-10-31 | End: 2019-11-20 | Stop reason: HOSPADM

## 2019-10-31 RX ORDER — IBUPROFEN 200 MG
400 TABLET ORAL EVERY 6 HOURS PRN
Status: DISCONTINUED | OUTPATIENT
Start: 2019-10-31 | End: 2019-11-20 | Stop reason: HOSPADM

## 2019-10-31 RX ADMIN — LOPERAMIDE HYDROCHLORIDE 2 MG: 2 CAPSULE ORAL at 08:10

## 2019-10-31 RX ADMIN — FUROSEMIDE 40 MG: 40 TABLET ORAL at 08:10

## 2019-10-31 RX ADMIN — Medication 250 MG: at 09:10

## 2019-10-31 RX ADMIN — ALUMINUM HYDROXIDE, MAGNESIUM HYDROXIDE, AND SIMETHICONE 50 ML: 200; 200; 20 SUSPENSION ORAL at 07:10

## 2019-10-31 RX ADMIN — AMLODIPINE BESYLATE 10 MG: 10 TABLET ORAL at 08:10

## 2019-10-31 RX ADMIN — FERROUS SULFATE TAB EC 325 MG (65 MG FE EQUIVALENT) 325 MG: 325 (65 FE) TABLET DELAYED RESPONSE at 08:10

## 2019-10-31 RX ADMIN — OXYCODONE HYDROCHLORIDE 5 MG: 5 TABLET ORAL at 08:10

## 2019-10-31 RX ADMIN — OXYCODONE HYDROCHLORIDE 5 MG: 5 TABLET ORAL at 02:10

## 2019-10-31 RX ADMIN — IBUPROFEN 400 MG: 200 TABLET, FILM COATED ORAL at 06:10

## 2019-10-31 RX ADMIN — DOXYLAMINE SUCCINATE: 25 TABLET ORAL at 09:10

## 2019-10-31 RX ADMIN — Medication 1 CAPSULE: at 08:10

## 2019-10-31 RX ADMIN — POTASSIUM CHLORIDE 40 MEQ: 1500 TABLET, EXTENDED RELEASE ORAL at 04:10

## 2019-10-31 RX ADMIN — LOPERAMIDE HYDROCHLORIDE 2 MG: 2 CAPSULE ORAL at 09:10

## 2019-10-31 RX ADMIN — ACETAMINOPHEN 650 MG: 325 TABLET ORAL at 09:10

## 2019-10-31 RX ADMIN — ACETAMINOPHEN 650 MG: 325 TABLET ORAL at 02:10

## 2019-10-31 RX ADMIN — OXACILLIN SODIUM 12 G: 10 INJECTION, POWDER, FOR SOLUTION INTRAVENOUS at 09:10

## 2019-10-31 RX ADMIN — LOPERAMIDE HYDROCHLORIDE 2 MG: 2 CAPSULE ORAL at 02:10

## 2019-10-31 RX ADMIN — POTASSIUM CHLORIDE 40 MEQ: 1500 TABLET, EXTENDED RELEASE ORAL at 06:10

## 2019-10-31 RX ADMIN — GABAPENTIN 200 MG: 100 CAPSULE ORAL at 09:10

## 2019-10-31 RX ADMIN — ENOXAPARIN SODIUM 40 MG: 100 INJECTION SUBCUTANEOUS at 05:10

## 2019-10-31 RX ADMIN — METOPROLOL TARTRATE 100 MG: 50 TABLET ORAL at 09:10

## 2019-10-31 RX ADMIN — PANTOPRAZOLE SODIUM 40 MG: 40 TABLET, DELAYED RELEASE ORAL at 04:10

## 2019-10-31 RX ADMIN — METOPROLOL TARTRATE 100 MG: 50 TABLET ORAL at 08:10

## 2019-10-31 RX ADMIN — VENLAFAXINE 75 MG: 37.5 TABLET ORAL at 09:10

## 2019-10-31 RX ADMIN — FOLIC ACID 1 MG: 1 TABLET ORAL at 08:10

## 2019-10-31 NOTE — PT/OT/SLP EVAL
PhysicalTherapy   Evaluation    Mesha Mckenzie   MRN: [id number]     PT Received On: 10/31/19  PT Start Time: 0930     PT Stop Time: 1030    PT Total Time (min): 60 min       Billable Minutes:  Evaluation 15 and Therapeutic Activity 15Therapeutic exercise 30    Diagnosis: <principal problem not specified>  Past Medical History:   Diagnosis Date    Anxiety     Carotid bruit     Chronic pain     Cystitis     Cystocele, unspecified (CODE)     Depression     GI bleed     History of uterine fibroid     Shortness of breath on exertion     Spinal stenosis     Urinary retention       Past Surgical History:   Procedure Laterality Date    ANTERIOR VAGINAL REPAIR  10-    CARDIAC CATHETERIZATION  age 12    CHOLECYSTECTOMY  2015    COLONOSCOPY  12/12/2018    aborted due to poor colon prep    COLONOSCOPY N/A 12/12/2018    Procedure: COLONOSCOPY;  Surgeon: Renard Gonsalves MD;  [facility];  Service: Endoscopy;  Laterality: N/A;    HYSTERECTOMY  1989    AMANDA    LAMINECTOMY N/A 10/9/2019    Procedure: LAMINECTOMY, SPINE, L3, Open;  Surgeon: Martin Lewis DO;  Location: [facility] OR [address] [place] [address];  Service: Neurosurgery;  Laterality: N/A;    tubiligation           General Precautions: Standard, fall  Orthopedic Precautions: spinal precautions   Braces: N/A    Spiritual, Cultural Beliefs, Bahai Practices, Values that Affect Care: no    Patient History:  Lives With: spouse  Living Arrangements: house  Home Accessibility: stairs to enter home  Home Layout: Able to live on 1st floor  Transportation Anticipated: family or friend will provide  Living Environment Comment: Lives with  (). Three children live nearby (one daughter can stay with pt). Pt was (I) PTA, drives, cares for 5 animals daily. Walk-in shower, no seat. Regular bed height.   Equipment Currently Used at Home: none  DME owned (not currently used): none    Previous Level of Function:  Ambulation Skills: independent  Transfer  "Skills: independent  ADL Skills: independent  Work/Leisure Activity: independent    Subjective:  Communicated with nurse prior to session.  Agreeable to PT services.  Chief Complaint: Extreme pain  Patient goals: "I want to go home, get in and out of bed by myself."    Pain/Comfort  Pain Rating 1: 9/10  Location - Side 1: Bilateral  Location - Orientation 1: lower  Location 1: back  Pain Addressed 1: Reposition  Pain Rating Post-Intervention 1: 9/10    Objective:  Patient found seated in WC in pain with Patient found with: astorga catheter, peripheral IV    Cognitive Exam:  Oriented to: Person, Situation and NOT ORIENTED TO PLACE   Follows Commands/attention: Follows multistep  commands  Communication: clear/fluent  Safety awareness/insight to disability: intact    Physical Exam:  Postural examination/scapula alignment: -       Rounded shoulders  -       Forward head    Skin integrity: Visible skin intact  Edema: None    Sensation:   -       Intact    Upper Extremity Range of Motion:  Right Upper Extremity: WNL  Left Upper Extremity: WNL    Upper Extremity Strength:  Right Upper Extremity: WNL  Left Upper Extremity: WNL    Lower Extremity Range of Motion:  Right Lower Extremity: WFL except quadriceps 3+/5  Left Lower Extremity: WNL    Lower Extremity Strength:  Right Lower Extremity: WNL except quadriceps 3+/5  Left Lower Extremity: WNL     Fine motor coordination:  -       Intact    Gross motor coordination: WFL      AM-PAC 6 CLICK MOBILITY  Total Score:10    Bed Mobility:  Sit>Supine:Mod-Max A on mat   Supine>Sit: Mod-Max A on mat    Transfers:  Sit<>Stand: Did not attempt- too much pain  Slideboard Transfer: Mod A (3 trials)    Gait:  Did not attempt due to pain       Therex:  Side-lying hip abduction with LLE x 10 reps  Heel slides x 10 reps BLE  Ankle pumps x 20 reps BLE  Thoracic abdominus x 10 reps    Balance:  Sits edge of mat with CGA with slight posterior lean    Additional Treatment:  · Kinesiotape applied " to paraspinals along L2-3 in order to alleviate pain, increase blood flow, provide lift to area; I-strip cut; 35% tension; anchored at ends of tape.  · Can leave on for five days, can shower with tape. Remove with cold water and/or lotion/oil ().      Patient left supine with call button in reach.    Assessment:  Mesha Mckenzie is a 63 y.o. female with a medical diagnosis of  Metabolic encephalopathy, R thalamic stroke, L2-4 epidural abscess, s/p L3 laminectomy/facetectomy for evacuation of epidural phlegmon.  Pt presents with extreme pain and weakness in lower back, resulting in difficulty with thoracic/lumber flexion while seated. Seems to respond best to extension and side-lying. Was able to partake in a few actively assisted lower extremity exercises. Will benefit from continued PT services and encouragement to participate in therapy to allow for a return of functional mobility, including bed mobility, transfers, and gait.     Rehab identified problem list/impairments: weakness, impaired endurance, impaired self care skills, impaired functional mobilty, gait instability, impaired balance, decreased upper extremity function, decreased lower extremity function, pain    Rehab potential is fair.    Activity tolerance: Fair    Discharge recommendations: home health PT     Barriers to discharge: None    Equipment recommendations: wheelchair, walker, rolling, bedside commode, shower chair     GOALS:   Multidisciplinary Problems     Physical Therapy Goals        Problem: Physical Therapy Goal    Goal Priority Disciplines Outcome Goal Variances Interventions   Physical Therapy Goal     PT, PT/OT Ongoing, Progressing     Description:  Goals to be met by: 2019     Patient will increase functional independence with mobility by performin. Supine to sit with Contact Guard Assistance  2. Sit to supine with Contact Guard Assistance  3. Sit to stand transfer with Minimal Assistance  4. Bed to chair  transfer with Minimal Assistance using Rolling Walker  5. Gait  x 20 feet with Minimal Assistance using Rolling Walker.   6. Ascend/Descend 4 inch curb step with Minimal Assistance using Rolling Walker.  6a.  Ascend/Descend 4 inch curb step with use of RW via family assistance with 1 verbal cue or less for technique.  7. Stand for 3 minutes with Minimal Assistance using Rolling Walker  8. Increased functional strength to 4/5 for R quadriceps mm group.  9. Lower extremity exercise program x 20 reps per handout, with assistance as needed                      PLAN:    Patient to be seen 5 x/week  to address the above listed problems via gait training, therapeutic activities, therapeutic exercises, neuromuscular re-education, wheelchair management/training  Plan of Care Expires: 11/30/19    Rosmery Fitzgerald, PT 10/31/2019

## 2019-10-31 NOTE — PLAN OF CARE
Problem: Adult Inpatient Plan of Care  Goal: Plan of Care Review  Outcome: Ongoing, Progressing   Severe malnutrition  Malnutrition in the context of Acute Illness/Injury     Related to (etiology):  Inability to eat po (N/V) poor appetite, picky eater, SOB     Signs and Symptoms (as evidenced by):  Energy Intake: <50% of estimated energy requirement for > 7 days  Body Fat Depletion: moderate depletion of orbitals, triceps and thoracic and lumbar region   Muscle Mass Depletion: severe depletion of temples, clavicle region and interosseous muscle     Interventions/Recommendations (treatment strategy):  Mineral modified diet- 2 gm sodium  Commercial beverage- calories and protein- boost plus vanilla TID  Commercial food- protein- beneprotein to fluids TID  Nutrition education- nutrient needs, malnutrition     Nutrition Diagnosis Status:  New

## 2019-10-31 NOTE — CLINICAL REVIEW
Clinical Pharmacy Chart Review Note      Admit Date: 10/30/2019   LOS: 1 day       Mesha Mckenzie is a 63 y.o. female admitted to SNF for PT/OT after hospitalization for respiratory distress.    Active Hospital Problems    Diagnosis  POA    Respiratory distress [R06.03]  Yes      Resolved Hospital Problems   No resolved problems to display.     Review of patient's allergies indicates:   Allergen Reactions    Pcn [penicillins]      Patient Active Problem List    Diagnosis Date Noted    Respiratory distress 10/30/2019    Urinary retention 10/28/2019    Abscess 10/22/2019    Alteration in skin integrity 10/22/2019    Leg cramping 10/20/2019    HTN (hypertension) 10/16/2019    Debility 10/14/2019    Palliative care encounter 10/14/2019    Staphylococcus aureus bacteremia with sepsis     Right thalamic stroke 10/07/2019    Abscess of upper extremity 10/05/2019    Chronic pain     Hypernatremia 10/02/2019    Epidural abscess 10/02/2019    Acute bacterial endocarditis 09/27/2019    Acute septic pulmonary embolism 09/27/2019    Tricuspid valve vegetation 09/27/2019    Mitral valve vegetation 09/27/2019    Thrombocytopenia, acquired 09/27/2019    IV drug user 09/25/2019    Acute on chronic diastolic congestive heart failure 09/23/2019    Encephalopathy, metabolic 09/23/2019    Normocytic anemia 09/23/2019    Severe malnutrition 09/23/2019    Pleural effusion 09/20/2019    Acute hypoxemic respiratory failure 09/20/2019    Hypokalemia 09/19/2019    Mixed incontinence urge and stress (male)(female) 05/22/2013    Cystocele 05/22/2013    Urinary hesitancy 05/22/2013    Spinal stenosis 05/22/2013       Scheduled Meds:    acetaminophen  650 mg Oral TID    amLODIPine  10 mg Oral Daily    ascorbic acid (vitamin C)  250 mg Oral Q48H    enoxaparin  40 mg Subcutaneous Daily    [START ON 11/2/2019] ferrous sulfate  325 mg Oral Q48H    folic acid  1 mg Oral Daily    furosemide  40 mg Oral Daily     gabapentin  200 mg Oral TID    GI cocktail (mylanta 30 mL, lidocaine 2 % viscous 10 mL, dicyclomine 10 mL) 50 mL   Oral Once    Lactobacillus rhamnosus GG  1 capsule Oral Daily    loperamide  2 mg Oral TID    methyl salicylate-menthol 15-10%   Topical (Top) TID    metoprolol tartrate  100 mg Oral BID    pantoprazole  40 mg Oral Daily    [START ON 11/1/2019] potassium chloride  20 mEq Oral BID WM    potassium chloride  40 mEq Oral Q4H    psyllium husk (aspartame)  1 packet Oral BID    venlafaxine  75 mg Oral BID     Continuous Infusions:    oxacillin 12 g in  mL CONTINUOUS INFUSION 12 g (10/30/19 1605)     PRN Meds: acetaminophen, calcium carbonate, GI cocktail (mylanta 30 mL, lidocaine 2 % viscous 10 mL, dicyclomine 10 mL) 50 mL, ibuprofen, levalbuterol, ondansetron, ramelteon, sodium chloride 0.9%    OBJECTIVE:     Vital Signs (Last 24H)  Temp:  [98.2 °F (36.8 °C)-98.7 °F (37.1 °C)]   Pulse:  []   Resp:  [18]   BP: (112-148)/(55-69)   SpO2:  [93 %-94 %]     Laboratory:  CBC:   Recent Labs   Lab 10/29/19  0510 10/30/19  0534 10/31/19  0400   WBC 6.72 7.00 7.69   RBC 2.61* 2.85* 2.72*   HGB 8.3* 8.9* 8.5*   HCT 26.7* 28.6* 27.9*    308 321   * 100* 103*   MCH 31.8* 31.2* 31.3*   MCHC 31.1* 31.1* 30.5*     BMP:   Recent Labs   Lab 10/29/19  0510 10/30/19  0534 10/31/19  0400   * 87 86   * 137 138   K 3.2* 3.1* 3.3*   CL 96 99 101   CO2 32* 28 27   BUN 16 16 19   CREATININE 0.7 0.6 0.7   CALCIUM 8.0* 8.3* 8.5*   MG 2.6 1.7 1.6     CMP:   Recent Labs   Lab 10/29/19  0510 10/30/19  0534 10/31/19  0400   * 87 86   CALCIUM 8.0* 8.3* 8.5*   ALBUMIN 1.6* 1.7* 1.8*   PROT 5.7* 6.1 6.3   * 137 138   K 3.2* 3.1* 3.3*   CO2 32* 28 27   CL 96 99 101   BUN 16 16 19   CREATININE 0.7 0.6 0.7   ALKPHOS 134 135 134   ALT 6* 7* 6*   AST 8* 11 13   BILITOT 0.1 0.2 0.1     LFTs:   Recent Labs   Lab 10/29/19  0510 10/30/19  0534 10/31/19  0400   ALT 6* 7* 6*   AST 8* 11 13    ALKPHOS 134 135 134   BILITOT 0.1 0.2 0.1   PROT 5.7* 6.1 6.3   ALBUMIN 1.6* 1.7* 1.8*     Coagulation:   Recent Labs   Lab 10/27/19  2359   INR 1.0     Cardiac markers: No results for input(s): CKMB, TROPONINT, MYOGLOBIN in the last 168 hours.  ABGs:   Recent Labs   Lab 10/28/19  0005   PH 7.368   PCO2 37.9   PO2 141*   HCO3 21.8*   POCSATURATED 99   BE -4       Recent Labs   Lab 10/28/19  0210   COLORU Straw   SPECGRAV 1.005   PHUR 6.0   PROTEINUA Negative   BACTERIA Occasional   NITRITE Negative   LEUKOCYTESUR Negative   HYALINECASTS 1         ASSESSMENT/PLAN:     I have reviewed the medications in compliance with CMS Regulation F756 of the [place]. No irregularities were noted at this time.    Medications reviewed by PharmD, please re-consult if needed.        Lauren Rivera, Pharm. D.  Clinical Pharmacist  [facility]

## 2019-10-31 NOTE — ASSESSMENT & PLAN NOTE
Malnutrition in the context of Acute Illness/Injury    Related to (etiology):  Inability to eat po (N/V) poor appetite, picky eater, SOB    Signs and Symptoms (as evidenced by):  Energy Intake: <50% of estimated energy requirement for > 7 days  Body Fat Depletion: moderate depletion of orbitals, triceps and thoracic and lumbar region   Muscle Mass Depletion: severe depletion of temples, clavicle region and interosseous muscle    Interventions/Recommendations (treatment strategy):  Mineral modified diet- 2 gm sodium  Commercial beverage- calories and protein- boost plus vanilla TID  Commercial food- protein- beneprotein to fluids TID  Nutrition education- nutrient needs, malnutrition    Nutrition Diagnosis Status:  New

## 2019-10-31 NOTE — PT/OT/SLP DISCHARGE
Occupational Therapy Discharge Summary    Mesha Mckenzie  MRN: [id number]   Principal Problem: Acute hypoxemic respiratory failure      Patient Discharged from acute Occupational Therapy on 10/30/19.  Please refer to prior OT note dated 10/30/19 for functional status.    Assessment:      Patient was discharged unexpectedly.  Information required to complete an accurate discharge summary is unknown.  Refer to therapy initial evaluation and last progress note for initial and most recent functional status and goal achievement.  Recommendations made may be found in medical record.    Objective:     GOALS:   Multidisciplinary Problems     Occupational Therapy Goals        Problem: Occupational Therapy Goal    Goal Priority Disciplines Outcome Interventions   Occupational Therapy Goal     OT, PT/OT Ongoing, Progressing    Description:  Goals to be met by: 11/10/19     Patient will increase functional independence with ADLs by performing:    UE Dressing with Minimal Assistance.  LE Dressing with Maximum Assistance.  Grooming while standing with Moderate Assistance.  Toileting from bedside commode with Moderate Assistance for hygiene and clothing management.   Sitting at edge of bed x15 minutes with Stand-by Assistance for functional task and demo in imporvements in pain tolerance.  Rolling to Bilateral with Contact Guard Assistance.   Supine to sit with Contact Guard Assistance.  Step transfer with Moderate Assistance  Toilet transfer to bedside commode with Moderate Assistance.                      Reasons for Discontinuation of Therapy Services  Transfer to alternate level of care.      Plan:     Patient Discharged to: [facility]    Ernestina Mitchell OT  10/31/2019

## 2019-10-31 NOTE — PT/OT/SLP EVAL
Occupational Therapy  Evaluation/tx    Mesha Mckenzie   MRN: [id number]   Admitting Diagnosis: ARF, metabolic encephalopathy, evacuation of epidural phlegmon L3 laminectomy 10-09-19, thalamic stroke     OT Date of Treatment: 10/31/19   OT Start Time: 0845  OT Stop Time: 0936  OT Total Time (min): 51 min    Billable Minutes:  Evaluation 10  Self Care/Home Management 41    Diagnosis: ARF, metabolic encephalopathy, evacuation of epidural phlegmon L3 laminectomy 10-09-19, thalamic stroke       Past Medical History:   Diagnosis Date    Anxiety     Carotid bruit     Chronic pain     Cystitis     Cystocele, unspecified (CODE)     Depression     GI bleed     History of uterine fibroid     Shortness of breath on exertion     Spinal stenosis     Urinary retention       Past Surgical History:   Procedure Laterality Date    ANTERIOR VAGINAL REPAIR  10-    CARDIAC CATHETERIZATION  age 12    CHOLECYSTECTOMY  2015    COLONOSCOPY  12/12/2018    aborted due to poor colon prep    COLONOSCOPY N/A 12/12/2018    Procedure: COLONOSCOPY;  Surgeon: Renard Gonsalves MD;  Location: [facility];  Service: Endoscopy;  Laterality: N/A;    HYSTERECTOMY  1989    AMANDA    LAMINECTOMY N/A 10/9/2019    Procedure: LAMINECTOMY, SPINE, L3, Open;  Surgeon: Martin Lewis DO;  Location: [facility] OR [address] [place] [address];  Service: Neurosurgery;  Laterality: N/A;    tubiligation           General Precautions: Standard, fall  Orthopedic Precautions: spinal precautions  Braces: N/A    Spiritual, Cultural Beliefs, Mormon Practices, Values that Affect Care: no     Patient History:  Lives With: spouse  Living Arrangements: house  Home Accessibility: stairs to enter home  Home Layout: Able to live on 1st floor  Transportation Anticipated: family or friend will provide  Equipment Currently Used at Home: none    Prior level of function:    Bed Mobility/Transfers: independent  Grooming: independent  Bathing: independent  Upper Body Dressing:  independent  Lower Body Dressing: independent  Toileting: independent  Driving License: Yes  Occupation: Retired  Type of Occupation: nurse  Leisure and Hobbies: watch TV  IADL Comments: Pt. resides with spouse in SS house with 2 steps. Pt. was completely independent with ADL/IADL tasks and driving. pt. does not own any DME. Spouse works part-time     Dominant hand: right    Subjective:  Communicated with nurse prior to session.    Chief Complaint: pain and difficulty standing  Patient/Family stated goals: To go home    Pain/Comfort  Pain Rating 1: 8/10  Location - Side 1: Bilateral  Location - Orientation 1: lower  Location 1: back  Pain Addressed 1: Reposition, Distraction, Pre-medicate for activity  Pain Rating Post-Intervention 1: 8/10    Objective:   Patient found with: astorga catheter, PICC line(supine in bed daughter present)    Cognitive Exam:  Oriented to: Person and Place  Follows Commands/attention: Follows two-step commands  Communication: clear/fluent  Memory:  Confusion noted  Safety awareness/insight to disability: decreased insight  Coping skills/emotional control: Lashes out at times    Visual/perceptual:  Intact    Physical Exam:  Postural examination/scapula alignment:    -       Rounded shoulders  -       Forward head  -       Posterior pelvic tilt  Skin integrity: Visible skin intact sx site appears to still have sutures  Edema: None noted in BUE    Sensation:      -       Intact  In BUE  Upper Extremity Range of Motion:  Right Upper Extremity: A/AAROM WFL  Left Upper Extremity: A/AAROM WFL    Upper Extremity Strength:  Right Upper Extremity: not tested appears functional  Left Upper Extremity: Not tested appears functional   Strength: fair    Fine motor coordination:   Some difficulty noted opening containers    Gross motor coordination: appears WFL in BUE    Occupational Performance:    Bed Mobility:    · Patient completed Supine to Sit with maximal assistance     Functional  Mobility/Transfers:  · Patient completed Sit <> Stand Transfer with moderate assistance  with  no assistive device   · Patient completed Bed <> Chair Transfer using Stand Pivot technique with moderate assistance with no assistive device  · Functional Mobility: not tested    Activities of Daily Living:  · Grooming: minimum assistance seated at sink  · Bathing: maximal assistance with verbal and tactile cues throughout  · Upper Body Dressing: maximal assistance to don pajama top  · Lower Body Dressing: total assistance to don/doff socks  as well as assist to initially thread pants and stand as well as manage over hips in stand  · Toileting: total assistance with astorga on this date and reported BM in diaper    [facility] 6 Click:  [facility] Total Score: 13    OT Exercises: n/a    Additional Treatment:  Pt. And family educated on role of OT and POC   Education provided on spinal precautions as well as need for staff assist for all mobility  Pt. Educated on safety with transfers and ADL tasks    Patient left up in chair with all lines intact and in gym with S awaiting PT eval    Assessment:  Mesha Mckenzie is a 63 y.o. female with a medical diagnosis of ARF, metabolic encephalopathy, evacuation of epidural phlegmon L3 laminectomy 10-09-19, thalamic stroke   And presents with defciits in self-care skills, functional mobility, pain , spinal precautions, decreased cognition as well as anger noted at times with lashing out. Pt. Tolerated evaluation fairly and is motivated to get better and return home but does require max encouragement to participate and education on roleof therapy needs to be reiterated throughout.     Rehab identified problem list/impairments: impaired endurance, impaired self care skills, impaired functional mobilty, impaired cognition, decreased lower extremity function, pain, orthopedic precautions    Rehab potential is fair    Activity tolerance: Fair    Discharge recommendations: home health OT(with light  assist)     Barriers to discharge: Decreased caregiver support     Equipment recommendations: walker, rolling, wheelchair, commode, shower chair     GOALS:   Multidisciplinary Problems     Occupational Therapy Goals        Problem: Occupational Therapy Goal    Goal Priority Disciplines Outcome Interventions   Occupational Therapy Goal     OT, PT/OT     Description:  Goals to be met by: 11-20-19     Patient will increase functional independence with ADLs by performing:    Feeding with Set-up Assistance.  UE Dressing with Set-up Assistance.  LE Dressing with Supervision.  Grooming while seated with Supervision.  Toileting from bedside commode with Supervision for hygiene and clothing management.   Bathing from  shower chair/bench with Stand-by Assistance.  Rolling to Bilateral with Modified [place].   Supine to sit with Modified [place].  Stand pivot transfers with Supervision.  Toilet transfer to bedside commode with Supervision.  Pt. To be able to recall 3/3 spinal precautions  Pt. To be able to perform HEP for BUE to improve endurance with S   pt. To participate in dynamic standing task x 5 minutes with S  Pt. To participate in leisure activities 2x/week                      PLAN: Patient to be seen 5 x/week to address the above listed problems via self-care/home management, therapeutic activities, therapeutic exercises  Plan of Care expires: 11/30/19  Plan of Care reviewed with: patient, family    MARIA ISABEL Adams  10/31/2019

## 2019-10-31 NOTE — CONSULTS
"  [facility] - Skilled Nursing Care  Adult Nutrition  Consult Note    SUMMARY     Recommendations    Recommendation/Intervention: Recommend dc 2gm Na diet, change to regular diet, boost plus TID only vanilla, beneprotein with meals, RD to encourage po intake  Goals: PO to meet 50% of EEN and EPN needs stable weight > 110# by next RD visit  Nutrition Goal Status: new  Communication of RD Recs: reviewed with physician    Reason for Assessment    Reason For Assessment: consult  Diagnosis: (respiratory distress)  Relevant Medical History: s/p acute bacterial endocarditis, septic PE, spinal epidural abscess, severe malnutrition, COPD, depression, anxiety, spinal stenosis, chronic pain(sp Lami)  Interdisciplinary Rounds: attended  General Information Comments: patient is picky eater per daughter, family bringing in food, such as toast, and nutrition drinks such as boost and ensure clear, PO poor 0-25%, patient is not motivated to eat, no chewing no swallowing problems, no food allergies, NFPE completed, no major weight changes, smoker, IV drug use, (recent)   Nutrition Discharge Planning: DC on regular diet    Nutrition Risk Screen    Nutrition Risk Screen: no indicators present    Nutrition/Diet History    Typical Food/Fluid Intake: has been letting her  cook, small meals,   Spiritual, Cultural Beliefs, Anglican Practices, Values that Affect Care: no  Food Allergies: NKFA(cannot tolerate Zatarains seasoning on foods)  Factors Affecting Nutritional Intake: decreased appetite    Anthropometrics    Temp: 98.7 °F (37.1 °C)  Height Method: Stated  Height: 5' 4" (162.6 cm)  Height (inches): 64 in  Weight Method: Bed Scale  Weight: 52.1 kg (114 lb 13.8 oz)  Weight (lb): 114.86 lb  Ideal Body Weight (IBW), Female: 120 lb  % Ideal Body Weight, Female (lb): 95.72 lb  BMI (Calculated): 19.8  BMI Grade: 18.5-24.9 - normal  Usual Body Weight (UBW), k.18 kg  % Usual Body Weight: 98.17  % Weight Change From Usual Weight: " -2.03 %       Lab/Procedures/Meds    Pertinent Labs Reviewed: reviewed  Pertinent Labs Comments: Hg 8.5, Hct 27.9, K 3.3, glucose 86,  Pertinent Medications Reviewed: reviewed  Pertinent Medications Comments: lovenox, Fe, folic acid, lactobacillus GG, psyllium husk, lasix    Estimated/Assessed Needs    Weight Used For Calorie Calculations: 52.1 kg (114 lb 13.8 oz)  Energy Calorie Requirements (kcal): 0266-6333  Energy Need Method: Kcal/kg(30-35 kcal/kg )  Protein Requirements: 67-78g  Weight Used For Protein Calculations: 52.1 kg (114 lb 13.8 oz)(1.3-1.5g/kg)  Fluid Requirements (mL): 1 ml per Kcal or per MD  Estimated Fluid Requirement Method: RDA Method  RDA Method (mL): 1563  CHO Requirement: -      Nutrition Prescription Ordered    Current Diet Order: 2 gm Na  Nutrition Order Comments: PO 0-25% some food from home  Oral Nutrition Supplement: Boost plus TID only vanilla    Evaluation of Received Nutrient/Fluid Intake    Energy Calories Required: not meeting needs  Protein Required: not meeting needs  Fluid Required: not meeting needs  Tolerance: tolerating  % Intake of Estimated Energy Needs: 0 - 25 %  % Meal Intake: 0 - 25 %    Nutrition Risk    Level of Risk/Frequency of Follow-up: high     Assessment and Plan    Severe malnutrition  Malnutrition in the context of Acute Illness/Injury    Related to (etiology):  Inability to eat po (N/V) poor appetite, picky eater, SOB    Signs and Symptoms (as evidenced by):  Energy Intake: <50% of estimated energy requirement for > 7 days  Body Fat Depletion: moderate depletion of orbitals, triceps and thoracic and lumbar region   Muscle Mass Depletion: severe depletion of temples, clavicle region and interosseous muscle    Interventions/Recommendations (treatment strategy):  Mineral modified diet- 2 gm sodium  Commercial beverage- calories and protein- boost plus vanilla TID  Commercial food- protein- beneprotein to fluids TID  Nutrition education- nutrient needs,  malnutrition    Nutrition Diagnosis Status:  New           Monitor and Evaluation    Food and Nutrient Intake: energy intake  Food and Nutrient Adminstration: diet order  Knowledge/Beliefs/Attitudes: food and nutrition knowledge/skill  Physical Activity and Function: nutrition-related ADLs and IADLs  Anthropometric Measurements: weight change  Biochemical Data, Medical Tests and Procedures: glucose/endocrine profile, inflammatory profile, electrolyte and renal panel, gastrointestinal profile  Nutrition-Focused Physical Findings: overall appearance     Malnutrition Assessment   10/31/19  Malnutrition Type: acute illness or injury  Energy Intake: severe energy intake  Skin (Micronutrient): wounds unhealed, pallor, dry  Hair/Scalp (Micronutrient): dry, dull  Eyes (Micronutrient): conjunctiva dull, conjunctiva dry  Neck/Chest (Micronutrient): bony prominence, muscle wasting, subcutaneous fat loss  Musculoskeletal/Lower Extremities: subcutaneous fat loss, muscle wasting       Energy Intake (Malnutrition): less than or equal to 50% for greater than or equal to 5 days   Orbital Region (Subcutaneous Fat Loss): moderate depletion  Upper Arm Region (Subcutaneous Fat Loss): moderate depletion  Thoracic and Lumbar Region: mild depletion   Jainism Region (Muscle Loss): severe depletion  Clavicle Bone Region (Muscle Loss): severe depletion  Clavicle and Acromion Bone Region (Muscle Loss): severe depletion  Dorsal Hand (Muscle Loss): severe depletion  Patellar Region (Muscle Loss): moderate depletion  Anterior Thigh Region (Muscle Loss): moderate depletion  Posterior Calf Region (Muscle Loss): moderate depletion   Edema (Fluid Accumulation): 0-->no edema present             Nutrition Follow-Up    RD Follow-up?: Yes

## 2019-10-31 NOTE — PLAN OF CARE
Problem: Physical Therapy Goal  Goal: Physical Therapy Goal  Description  Goals to be met by: 2019     Patient will increase functional independence with mobility by performin. Supine to sit with Contact Guard Assistance  2. Sit to supine with Contact Guard Assistance  3. Sit to stand transfer with Minimal Assistance  4. Bed to chair transfer with Minimal Assistance using Rolling Walker  5. Gait  x 20 feet with Minimal Assistance using Rolling Walker.   6. Ascend/Descend 4 inch curb step with Minimal Assistance using Rolling Walker.  6a.  Ascend/Descend 4 inch curb step with use of RW via family assistance with 1 verbal cue or less for technique.  7. Stand for 3 minutes with Minimal Assistance using Rolling Walker  8. Increased functional strength to 4/5 for R quadriceps mm group.  9. Lower extremity exercise program x 20 reps per handout, with assistance as needed     Outcome: Ongoing, Progressing

## 2019-11-01 PROBLEM — F11.10 OPIOID ABUSE: Status: ACTIVE | Noted: 2019-11-01

## 2019-11-01 PROBLEM — Z79.899 CHRONIC PRESCRIPTION BENZODIAZEPINE USE: Status: ACTIVE | Noted: 2019-11-01

## 2019-11-01 PROBLEM — G93.41 SEPTIC ENCEPHALOPATHY: Status: ACTIVE | Noted: 2019-11-01

## 2019-11-01 PROBLEM — E87.0 HYPERNATREMIA: Status: RESOLVED | Noted: 2019-10-02 | Resolved: 2019-11-01

## 2019-11-01 PROBLEM — I33.0 ENDOCARDITIS DUE TO STAPHYLOCOCCUS: Status: ACTIVE | Noted: 2019-11-01

## 2019-11-01 PROBLEM — D63.8 ANEMIA OF INFECTION AND CHRONIC DISEASE: Status: ACTIVE | Noted: 2019-11-01

## 2019-11-01 PROBLEM — B95.8 ENDOCARDITIS DUE TO STAPHYLOCOCCUS: Status: ACTIVE | Noted: 2019-11-01

## 2019-11-01 PROBLEM — B99.9 ANEMIA OF INFECTION AND CHRONIC DISEASE: Status: ACTIVE | Noted: 2019-11-01

## 2019-11-01 PROCEDURE — 97530 THERAPEUTIC ACTIVITIES: CPT

## 2019-11-01 PROCEDURE — 25000003 PHARM REV CODE 250: Performed by: STUDENT IN AN ORGANIZED HEALTH CARE EDUCATION/TRAINING PROGRAM

## 2019-11-01 PROCEDURE — 25000003 PHARM REV CODE 250: Performed by: NURSE PRACTITIONER

## 2019-11-01 PROCEDURE — 92523 SPEECH SOUND LANG COMPREHEN: CPT

## 2019-11-01 PROCEDURE — 25000003 PHARM REV CODE 250: Performed by: INTERNAL MEDICINE

## 2019-11-01 PROCEDURE — 97110 THERAPEUTIC EXERCISES: CPT

## 2019-11-01 PROCEDURE — 63600175 PHARM REV CODE 636 W HCPCS: Performed by: STUDENT IN AN ORGANIZED HEALTH CARE EDUCATION/TRAINING PROGRAM

## 2019-11-01 PROCEDURE — 97803 MED NUTRITION INDIV SUBSEQ: CPT

## 2019-11-01 PROCEDURE — 11000004 HC SNF PRIVATE

## 2019-11-01 RX ORDER — ATORVASTATIN CALCIUM 20 MG/1
40 TABLET, FILM COATED ORAL DAILY
Status: DISCONTINUED | OUTPATIENT
Start: 2019-11-01 | End: 2019-11-20 | Stop reason: HOSPADM

## 2019-11-01 RX ORDER — LOPERAMIDE HYDROCHLORIDE 2 MG/1
2 CAPSULE ORAL 3 TIMES DAILY PRN
Status: DISCONTINUED | OUTPATIENT
Start: 2019-11-01 | End: 2019-11-07

## 2019-11-01 RX ADMIN — MICONAZOLE NITRATE: 20 OINTMENT TOPICAL at 08:11

## 2019-11-01 RX ADMIN — OXACILLIN SODIUM 12 G: 10 INJECTION, POWDER, FOR SOLUTION INTRAVENOUS at 11:11

## 2019-11-01 RX ADMIN — POTASSIUM CHLORIDE 20 MEQ: 1500 TABLET, EXTENDED RELEASE ORAL at 08:11

## 2019-11-01 RX ADMIN — LOPERAMIDE HYDROCHLORIDE 2 MG: 2 CAPSULE ORAL at 02:11

## 2019-11-01 RX ADMIN — VENLAFAXINE 75 MG: 37.5 TABLET ORAL at 08:11

## 2019-11-01 RX ADMIN — GABAPENTIN 200 MG: 100 CAPSULE ORAL at 08:11

## 2019-11-01 RX ADMIN — LOPERAMIDE HYDROCHLORIDE 2 MG: 2 CAPSULE ORAL at 08:11

## 2019-11-01 RX ADMIN — ENOXAPARIN SODIUM 40 MG: 100 INJECTION SUBCUTANEOUS at 04:11

## 2019-11-01 RX ADMIN — ACETAMINOPHEN 650 MG: 325 TABLET ORAL at 08:11

## 2019-11-01 RX ADMIN — GABAPENTIN 200 MG: 100 CAPSULE ORAL at 02:11

## 2019-11-01 RX ADMIN — DOXYLAMINE SUCCINATE: 25 TABLET ORAL at 02:11

## 2019-11-01 RX ADMIN — POTASSIUM CHLORIDE 20 MEQ: 1500 TABLET, EXTENDED RELEASE ORAL at 04:11

## 2019-11-01 RX ADMIN — FUROSEMIDE 40 MG: 40 TABLET ORAL at 08:11

## 2019-11-01 RX ADMIN — ACETAMINOPHEN 650 MG: 325 TABLET ORAL at 02:11

## 2019-11-01 RX ADMIN — ATORVASTATIN CALCIUM 40 MG: 20 TABLET, FILM COATED ORAL at 12:11

## 2019-11-01 RX ADMIN — DOXYLAMINE SUCCINATE: 25 TABLET ORAL at 08:11

## 2019-11-01 RX ADMIN — METOPROLOL TARTRATE 100 MG: 50 TABLET ORAL at 09:11

## 2019-11-01 RX ADMIN — FOLIC ACID 1 MG: 1 TABLET ORAL at 08:11

## 2019-11-01 RX ADMIN — PANTOPRAZOLE SODIUM 40 MG: 40 TABLET, DELAYED RELEASE ORAL at 08:11

## 2019-11-01 RX ADMIN — AMLODIPINE BESYLATE 10 MG: 10 TABLET ORAL at 08:11

## 2019-11-01 RX ADMIN — METOPROLOL TARTRATE 100 MG: 50 TABLET ORAL at 08:11

## 2019-11-01 RX ADMIN — Medication 1 CAPSULE: at 08:11

## 2019-11-01 RX ADMIN — ACETAMINOPHEN 650 MG: 325 TABLET ORAL at 09:11

## 2019-11-01 NOTE — PLAN OF CARE
Problem: Adult Inpatient Plan of Care  Goal: Plan of Care Review  11/1/2019 1227 by Shena Blanton, LPN  Outcome: Ongoing, Progressing  11/1/2019 1226 by Shena Blanton, LPN  Outcome: Ongoing, Progressing  Goal: Patient-Specific Goal (Individualization)  11/1/2019 1227 by Shena Blanton, LPN  Outcome: Ongoing, Progressing  11/1/2019 1226 by Shena Blanton, LPN  Outcome: Ongoing, Progressing  Goal: Absence of Hospital-Acquired Illness or Injury  11/1/2019 1227 by Shena Blanton, LPN  Outcome: Ongoing, Progressing  11/1/2019 1226 by Shena Blanton, LPN  Outcome: Ongoing, Progressing  Goal: Optimal Comfort and Wellbeing  11/1/2019 1227 by Shena Blanton, LPN  Outcome: Ongoing, Progressing  11/1/2019 1226 by Shena Blanton, LPN  Outcome: Ongoing, Progressing  Goal: Readiness for Transition of Care  11/1/2019 1227 by Shena Blanton, LPN  Outcome: Ongoing, Progressing  11/1/2019 1226 by Shena Blanton, LPN  Outcome: Ongoing, Progressing  Goal: Rounds/Family Conference  11/1/2019 1227 by Shena Blanton, LPN  Outcome: Ongoing, Progressing  11/1/2019 1226 by Shena Blanton, LPN  Outcome: Ongoing, Progressing

## 2019-11-01 NOTE — PT/OT/SLP EVAL
Speech Language Pathology  Evaluation    Mesha Mckenzie   MRN: [id number]   Admitting Diagnosis: hypoxemic respiratory distress; recent thalamic stroke    Diet recommendations: Solid Diet Level: Regular  Liquid Diet Level: Thin HOB to 90 degrees, Monitor for s/s of aspiration and Standard aspiration precautions    SLP Treatment Date: 11/01/19  Speech Start Time: 0834     Speech Stop Time: 0846     Speech Total (min): 12 min       TREATMENT BILLABLE MINUTES:  Eval 12     Diagnosis:  hypoxemic respiratory distress; recent thalamic stroke      Past Medical History:   Diagnosis Date    Anxiety     Carotid bruit     Chronic pain     Cystitis     Cystocele, unspecified (CODE)     Depression     GI bleed     History of uterine fibroid     Shortness of breath on exertion     Spinal stenosis     Urinary retention      Past Surgical History:   Procedure Laterality Date    ANTERIOR VAGINAL REPAIR  10-    CARDIAC CATHETERIZATION  age 12    CHOLECYSTECTOMY  2015    COLONOSCOPY  12/12/2018    aborted due to poor colon prep    COLONOSCOPY N/A 12/12/2018    Procedure: COLONOSCOPY;  Surgeon: Renard Gonsalves MD;  [facility]: [facility];  Service: Endoscopy;  Laterality: N/A;    HYSTERECTOMY  1989    AMANDA    LAMINECTOMY N/A 10/9/2019    Procedure: LAMINECTOMY, SPINE, L3, Open;  Surgeon: Martin Lewis DO;  Location: [facility] OR [address] [place] [address];  Service: Neurosurgery;  Laterality: N/A;    tubiligation       HPI: 63F recently hospitalized here 10/2-10/25 for acute bacterial endocarditis with sequelae of septic pulmonary emboli, septic emboli to brain causing thalamic stroke, spinal epidural abscess discharged to SNF for PT/OT returns for acute onset of respiratory distress.  She says the dyspnea came on suddenly and then she panicked which made it worse, and she normally takes something for anxiety.  She denies any other respiratory symptoms prior to this including cough or congestion.  She does admit to orthopnea for the  "past few weeks, and when asked if it has been worse the past couple days she answers "maybe."  She denies fevers or chills, but does report waking up with sweats for the past few nights.  She denies noticing any ankle edema.    In the ED she was put on BiPAP for her respiratory distress and given IV lasix and rapidly improved and was able to be weaned off the BiPAP.  At the time of my interview she is comfortable on nasal cannula.          General Precautions: Standard, aspiration    Current Respiratory Status: room air     Social Hx: Patient lives with . Pt is a retired LPN. Premorbidly, pt was fully independent, driving, and managing medications and finances.  Pt denies recent cognitive changes, but awareness/insight into deficits appears decreased.     Subjective:  "Tired." Pt replied when asked how she was feeling.  Pt maintained a fairly flat affect and appeared somewhat annoyed with some assessment tasks.      Objective:   Patient found with: PICC line    Oral Musculature Evaluation  Oral Musculature: WFL  Dentition: present and adequate  Mandibular Strength and Mobility: WFL  Oral Labial Strength and Mobility: WFL  Lingual Strength and Mobility: WFL     Cognitive Status:  Behavioral Observations: alert and cooperative-  Memory and Orientation: Pt was oriented to month and partially to place (stated "[facility]."  She did not accurately orient to year and did not appear fully oriented to situation.   Attention: decreased sustained attention due to decreased motivation and fatigue  Problem Solving: Simple problem solving q's were answered appropriately.  Min-mod cues needed for moderate level problem solving q's. Min cues needed to generate multiple causes for a hypothetical situation.   Pragmatics: flat affect and poor eye contact  Executive Function: insight/awareness and self-monitoring decreased    Auditory Comprehension: answers yes/no questions and able to follow commands    Verbal " Expression: Pt was able to express basic and functional information and needs.  She did not engage in conversation. During a word fluency task, pt listed 6 items in a category in one minute given moderate cues (15-20 is wnl).     Motor Speech: wfl    Voice: wfl    Reading: tba    Writing: tba    Visual-Spatial: tba    Additional Treatment:  Education provided to pt and  regarding role of SLP, reason for cognitive evaluation, cognition, benefits of cognitive therapy and stimulation, and SLP treatment plan and POC.  Pt nodded to demonstrate agreement with plan, but full understanding and insight/awareness into deficits and need for cognitive therapy appears to be decreased.     Assessment:  Mesha Mckenzie is a 63 y.o. female with a medical diagnosis of hypoxemic respiratory distress, recent thalamic stroke and presents with cognitive-linguistic deficits       Discharge recommendations: Discharge Facility/Level of Care Needs: (tbd)     Goals:   Multidisciplinary Problems     SLP Goals        Problem: SLP Goal    Goal Priority Disciplines Outcome   SLP Goal     SLP Ongoing, Progressing   Description:  Speech Language Pathology Goals  Goals expected to be met by 11/8:  1. Pt will orient x 4 given mod cues.   2. Following a 3 minute delay, pt will recall 3/3 words or facts given mod cues.  3. Pt will complete moderate level problem solving tasks with 70% accuracy given moderate cues.   4. Pt will complete mental manipulation tasks with 70% accuracy given moderate cues to improve cognitive flexibility.   5. Pt will list 8 items in a category in one minute given min-mod cues.   6. Pt will participate in assessment of reading, writing, visual spatial, and functional math abilities.                          Plan:   Patient to be seen Therapy Frequency: 5 x/week   Planned Interventions: Cognitive-Linguistic Therapy  Plan of Care expires: 12/01/19  Plan of Care reviewed with: patient, spouse                 Mukesh HAIR  LEROY Beck, CCC-SLP  11/01/2019        LEROY Madrid, CCC-SLP  Speech Language Pathologist  [phone]  11/1/2019

## 2019-11-01 NOTE — PLAN OF CARE
Problem: Occupational Therapy Goal  Goal: Occupational Therapy Goal  Description  Goals to be met by: 11-20-19     Patient will increase functional independence with ADLs by performing:    Feeding with Set-up Assistance.  UE Dressing with Set-up Assistance.  LE Dressing with Supervision.  Grooming while seated with Supervision.  Toileting from bedside commode with Supervision for hygiene and clothing management.   Bathing from  shower chair/bench with Stand-by Assistance.  Rolling to Bilateral with Modified [place].   Supine to sit with Modified [place].  Stand pivot transfers with Supervision.  Toilet transfer to bedside commode with Supervision.  Pt. To be able to recall 3/3 spinal precautions  Pt. To be able to perform HEP for BUE to improve endurance with S   pt. To participate in dynamic standing task x 5 minutes with S  Pt. To participate in leisure activities 2x/week     Outcome: Ongoing, Progressing

## 2019-11-01 NOTE — PROGRESS NOTES
Went to follow up with patient and she was sleeping. Flowsheets show she is taking her oral supplement and PO is 25%, some missed meals continue. She does have outside food, but PO intake is still inadequate. Will follow up again on Monday  Her diet has been changed to regular as recommended to improve food acceptance. Will follow labs and weights.

## 2019-11-01 NOTE — PLAN OF CARE
Problem: SLP Goal  Goal: SLP Goal  Description  Speech Language Pathology Goals  Goals expected to be met by 11/8:  1. Pt will orient x 4 given mod cues.   2. Following a 3 minute delay, pt will recall 3/3 words or facts given mod cues.  3. Pt will complete moderate level problem solving tasks with 70% accuracy given moderate cues.   4. Pt will complete mental manipulation tasks with 70% accuracy given moderate cues to improve cognitive flexibility.   5. Pt will list 8 items in a category in one minute given min-mod cues.   6. Pt will participate in assessment of reading, writing, visual spatial, and functional math abilities.        Outcome: Ongoing, Progressing  Speech/language/cognitive evaluation completed.  Recommending cognitive tx 5x/wk initially, but may decreased frequency as cognition improves.  LEROY Madrid, CCC-SLP  Speech Language Pathologist  [phone]  11/1/2019

## 2019-11-01 NOTE — PT/OT/SLP PROGRESS
"Physical Therapy  Treatment    Mesha Mckenzie   MRN: [id number]   Admitting Diagnosis:  Metabolic encephalopathy, R thalamic stroke, L2-4 epidural abscess, s/p L3 laminectomy/facetectomy for evacuation of epidural phlegmon.   PT Received On: 11/01/19        Billable Minutes:  Therapeutic Activity 27 and Therapeutic Exercise 15    Treatment Type: Treatment ed pt and  on POC, inc OOB tolerance, A with OOB mobility/CB/fall risk, pain control, pain free ROM/as tolerated, spinal prec    PT/PTA: PTA     PTA Visit Number: 1       General Precautions: Standard, fall  Orthopedic Precautions: spinal precautions   Braces: N/A    Spiritual, Cultural Beliefs, Latter-day Practices, Values that Affect Care: no    Subjective:  "tired, up since 4 am pain and mind wouldn't shut down" agreeable to therapy    Pain/Comfort  Pain Rating 1: 9/10(8 at rest 9 with mobility)  Location - Orientation 1: lower  Location 1: back  Pain Addressed 1: Pre-medicate for activity, Reposition, Distraction, Cessation of Activity, Nurse notified    Objective:   Patient found with: astorga catheter, peripheral IV(in bed,  present 1/2 session)     AM-PAC 6 CLICK MOBILITY  Total Score:10    Bed Mobility: ed on log roll  Supine>Sit: roll R with Rail CGA, CGA with LE mgmt, max/mod with trunk elev    Transfers:pt stood from  which was removed and BS chair placed  Sit<>Stand: with HHA/holding on to me from  max/mod  SB Transfer:EOB>WC min A x 2 ppl vcs for tech    Gait: refused      Therex:  x10 reps AP,GS,LAQ,hip flex    Balance:  EOB CGA once established with BUE Support    Patient left up in [place] with all lines intact, call button in reach, nsg/OT notified,   present and belongings in reach.    Assessment:  Mesha Mckenzie is a 63 y.o. female with a medical diagnosis of Metabolic encephalopathy, R thalamic stroke, L2-4 epidural abscess, s/p L3 laminectomy/facetectomy for evacuation of epidural phlegmon.      Pt tolerated fair, " decline coming down to gym 2* to hair being a mess wanting it washed, OT notified, refused gait 2* to pain,  pt would continue to benefit from skilled PT services to improve overall functional mobility, strength and endurance.  .    Rehab identified problem list/impairments: weakness, impaired endurance, impaired self care skills, impaired functional mobilty, gait instability, impaired balance, decreased upper extremity function, decreased lower extremity function, pain    Rehab potential is good.    Activity tolerance: Fair    Discharge recommendations: home health PT     Barriers to discharge: None    Equipment recommendations: wheelchair, walker, rolling, bedside commode, shower chair     GOALS:   Multidisciplinary Problems     Physical Therapy Goals        Problem: Physical Therapy Goal    Goal Priority Disciplines Outcome Goal Variances Interventions   Physical Therapy Goal     PT, PT/OT Ongoing, Progressing     Description:  Goals to be met by: 2019     Patient will increase functional independence with mobility by performin. Supine to sit with Contact Guard Assistance  2. Sit to supine with Contact Guard Assistance  3. Sit to stand transfer with Minimal Assistance  4. Bed to chair transfer with Minimal Assistance using Rolling Walker  5. Gait  x 20 feet with Minimal Assistance using Rolling Walker.   6. Ascend/Descend 4 inch curb step with Minimal Assistance using Rolling Walker.  6a.  Ascend/Descend 4 inch curb step with use of RW via family assistance with 1 verbal cue or less for technique.  7. Stand for 3 minutes with Minimal Assistance using Rolling Walker  8. Increased functional strength to 4/5 for R quadriceps mm group.  9. Lower extremity exercise program x 20 reps per handout, with assistance as needed                      PLAN:    Patient to be seen 5 x/week  to address the above listed problems via gait training, therapeutic activities, therapeutic exercises, neuromuscular  re-education, wheelchair management/training  Plan of Care expires: 11/30/19  Plan of Care reviewed with: patient, family    Mesha Hernandez, PTA  11/01/2019

## 2019-11-01 NOTE — H&P
Hospital Medicine  History and Physical Exam    Admit Date: 10/30/2019  JAMAL TBD  Principal Problem:  <principal problem not specified>   Primary care Physician: Varun Shea MD PhD  Code status: Full Code    HPI:  64 yo female with chronic back pain with chronic benzodiazepine, amphetamine and opioids use presented to ER with nausea, vomiting, diarrhea, and abdominal pain. She was admitted to critical care unit for septic shock due to MSSA endocarditis, bacteremia, intracranial and pulmonary septic emboli, lumbar epidural abscess and bilateral shoulder abscess. She underwent I&D of bilateral shoulders 10/5 and L3 full laminectomy 10/9. Patient requires IV continuous oxacillin until 11/20. She was transferred here 10/25 but developed SOB and transferred back to hospital 10/27. She was found to have acute diastolic heart failure. Patient initially required BiPAP for support and was able to be weaned off to room air after IV furosemide therapy. Patient states she has back pain that radiates down to the back of her legs. Pain medications only work for 15 minutes. She states pain is constant and is worsened with activity. She has associated BLE numbness and tingling.    Patient transferred to [facility] with PT and OT to improve functional status and ability to perform ADLs.     Past Medical History: Patient has a past medical history of Anxiety, Carotid bruit, Chronic pain, Cystitis, Cystocele, unspecified (CODE), Depression, GI bleed, History of uterine fibroid, Shortness of breath on exertion, Spinal stenosis, and Urinary retention.    Past Surgical History: Patient has a past surgical history that includes Hysterectomy (1989); Anterior vaginal repair (8-); tubiligation; Cardiac catheterization (age 12); Cholecystectomy (2015); Colonoscopy (12/12/2018); Colonoscopy (N/A, 12/12/2018); and Laminectomy (N/A, 10/9/2019).    Social History: Patient reports that she has been smoking cigarettes. She has a 20.00  pack-year smoking history. She has never used smokeless tobacco. She reports that she does not drink alcohol or use drugs.    Family History: family history includes Alzheimer's disease in her mother; Hypertension in her brother and sister; Osteoarthritis in her mother; Thyroid disease in her mother.    Medications: reviewed     Allergies: Patient is allergic to pcn [penicillins].    ROS  Constitutional: no fever or chills  Respiratory: no cough or shortness of breath  Cardiovascular: no chest pain or palpitations  Gastrointestinal: no nausea or vomiting, no abdominal pain or change in bowel habits  Genitourinary: no hematuria or dysuria  Integument/Breast: no rash or pruritis  Hematologic/Lymphatic: no easy bruising or lymphadenopathy  Musculoskeletal: no arthralgias or myalgias  Neurological: no seizures or tremors  Behavioral/Psych: no depression, + daily anxiety that last hours    PEx  Temp:  [98.7 °F (37.1 °C)]   Pulse:  [90-96]   Resp:  [18]   BP: (129-148)/(68-69)   SpO2:  [94 %]   Body mass index is 19.72 kg/m².     General appearance: no distress  Mental status: Alert and oriented x 3  HEENT:  conjunctivae/corneas clear, PERRL  Neck: supple, thyroid not enlarged  Pulm:   normal respiratory effort, CTA B, no c/w/r  Card: RRR, no murmur  Abd: soft, NT, ND, BS present; no masses, no organomegaly  Ext: no edema  Pulses: 2+, symmetric  Skin: color, texture, turgor normal. No rashes or lesions  Neuro: CN II-XII grossly intact, no focal numbness or weakness, normal strength and tone     Recent Labs   Lab 10/29/19  0510 10/30/19  0534 10/31/19  0400   * 137 138   K 3.2* 3.1* 3.3*   CL 96 99 101   CO2 32* 28 27   BUN 16 16 19   CREATININE 0.7 0.6 0.7   * 87 86   CALCIUM 8.0* 8.3* 8.5*   MG 2.6 1.7 1.6   PHOS 3.8 3.6 3.4     Recent Labs   Lab 10/27/19  2359  10/29/19  0510 10/30/19  0534 10/31/19  0400   ALKPHOS 147*   < > 134 135 134   ALT 8*   < > 6* 7* 6*   AST 10   < > 8* 11 13   ALBUMIN 1.9*   < >  1.6* 1.7* 1.8*   PROT 6.3   < > 5.7* 6.1 6.3   BILITOT 0.1   < > 0.1 0.2 0.1   INR 1.0  --   --   --   --     < > = values in this interval not displayed.       Recent Labs   Lab 10/29/19  0510 10/30/19  0534 10/31/19  0400   WBC 6.72 7.00 7.69   HGB 8.3* 8.9* 8.5*   HCT 26.7* 28.6* 27.9*    308 321   GRAN 58.7  3.9 56.1  3.9 52.7  4.1   LYMPH 23.8  1.6 27.7  1.9 30.0  2.3   MONO 8.5  0.6 6.9  0.5 7.7  0.6       Assessment and Plan:    Acute diastolic cardiac function  Pulmonary HTN  Moderate mitral regurgitation  Severe left atrial enlargement  Continue furosemide 40 mg daily  Fluid restriction 2000 mL  Okay to liberate diet to non-restrict salt diet due to malnutrition  Daily weights strict I/Os    Anemia of chronic infection  Probable iron deficiency  Ferrous sulfate 325 mg and ascorbic acid 250 mg every other night    Essential HTN - amlodipine 10 mg daily    dyspepsia  GI prophylaxis  protonix 40 mg daily for duration of stay. Would not continue at discharge  GI cocktail prn    Urinary retention  D/c astorga  Patient was performing CIC prior to illness  D/c rizwan. CIC QID  Bladder training    Septic shock with acute organ dysfunction due to methicillin susceptible Staphylococcus aureus (MSSA) bacteremia,  Epidural Abcess, Abscess Upper Extremities, Septic Pulmonary and Brain Emboli, and Acute Bacterial Endocarditis  Mitral and tricuspid valve infection  Associated with IVDU  --Continuing oxacillin for MSSA endocarditis per ID recs with estimated end date: 11/20/19. Will need f/up in clinic.  --PICC placed 10/24  Need to follow up with CTS as outpatient for valve repairs     Hypokalemia  Continue with daily supplementation.   Start KCl 20 mEq BID     Septic and metabolic encephalopathy  Embolic right thalamic stroke  debility  Improving  ST/PT/OT  Continue atorvastatin 40 mg daily  Consider starting ASA 81 mg daily    Severe protein malnutrition  Dietary consult  Regular diet  boost shakes with  meals TID      Chronic low back and lower extremity pain  previously seeing pain management prior to onset of acute illness  Multimodal pain control  Given patient's history. Would avoid opioids.  Restart gabapentin 200 mg TID  Start venlafaxine 75 mg BID, acetaminophen 650 mg TID and methyl salicylate-menthol  Ibuprofen 400 mg q4h prn pain  Can escalate gabapentin and venlafaxine as needed  Patient has not tolerated cybalta as above    Pancolitis  diarrhea  -c diff neg x 2  Stool now at once a day  Change loperamide to prn  Continue psyllium    Unspecified stenosis  Lumbar degenerative joint disease  Pain control as above    Generalized anxiety disorder  IV drug use  Opioid abuse  Chronic benzodiazepine and amphetamine use  Stop prescribed opoid.  Should address formal rehab program with patient  Abstinence from above substances advised  Venlafaxine 75 mg BID  Clonidine 0.1 mg prn anxiety  GI cocktail for opioid withdrawal induced GI discomfort    Acute hypoxic respiratory failure due to diastolic heart failure - resolved. Now on RA.     Alteration skin integrity miconazole to perineum BID    Continue the following medications for treatment of the indicated conditions:  ·  Indication Medication Dose Route  Frequency       Chronic pain acetaminophen  650 mg Oral TID    HTN amLODIPine  10 mg Oral Daily    Iron absoprtion ascorbic acid (vitamin C)  250 mg Oral Q48H    CVA prevention atorvastatin  40 mg Oral Daily    DVT prophylaxis enoxaparin  40 mg Subcutaneous Daily    anemia [START ON 11/2/2019] ferrous sulfate  325 mg Oral Q48H    anemia folic acid  1 mg Oral Daily    Acute Diastolic dysfunction furosemide  40 mg Oral Daily    Chronic pain gabapentin  200 mg Oral TID    pancolitis Lactobacillus rhamnosus GG  1 capsule Oral Daily    Chronic pain methyl salicylate-menthol 15-10%   Topical (Top) TID    HTN metoprolol tartrate  100 mg Oral BID    Diaper rash miconazole nitrate 2%   Topical (Top) BID     GI prophylaxis pantoprazole  40 mg Oral Daily    hypokalemia potassium chloride  20 mEq Oral BID WM    pancolitis psyllium husk (aspartame)  1 packet Oral BID    Chronic pain venlafaxine  75 mg Oral BID     Future Appointments   Date Time Provider Department Center   11/4/2019  2:40 PM Alondra Barksdale PA-C [facility]   11/20/2019 10:00 AM INFECTIOUS DISEASE, [facility]   11/25/2019  3:20 PM Martin Lewis,  [facility]     I certify that SNF services are required to be given on an inpatient basis because Mesha Mckenzie needs for skilled nursing care and/or skilled rehabilitation are required on a daily basis and such services can only practically be provided in a skilled nursing facility setting and are for an ongoing condition for which she received inpatient care in the hospital.     Time (minutes) spent in care of the patient (Greater than 1/2 spent in direct face-to-face contact) 40    Mary Grace Weiss MD

## 2019-11-01 NOTE — PT/OT/SLP PROGRESS
Occupational Therapy  Treatment    Mesha Mckenzie   MRN: [id number]   Admitting Diagnosis: <principal problem not specified>    OT Date of Treatment: 11/01/19       Billable Minutes:  Therapeutic Activity 38    General Precautions: Standard, fall  Orthopedic Precautions: spinal precautions  Braces: N/A    Spiritual, Cultural Beliefs, Nondenominational Practices, Values that Affect Care: no    Subjective:  Communicated with nsg prior to session.  I am just so tired I don't feel like doing anything     Pain/Comfort  Pain Rating 1: 9/10  Location - Orientation 1: lower  Location 1: back  Pain Addressed 1: Pre-medicate for activity, Reposition    Objective:   Pt. Seated in chair with G-son present     Occupational Performance:    Bed Mobility:    · Patient completed draw sheet to HOB with Total assistance and 2 persons  · Patient completed Sit to Supine with maximal assistance and 2 persons     Functional Mobility/Transfers:  · Patient completed Sit <> Stand Transfer with moderate assistance  with  rolling walker with 2nd person x 2 trials with cues for hand placement  And safety and Steps to transitioning into movements.  · Patient completed Bed <> Chair Transfer using Stand Pivot technique with moderate assistance with no assistive device      Activities of Daily Living:  · Feeding:  supervision with drinking water    [facility] 6 Click:  [facility] Total Score: 13    Additional Treatment:   Pt. With 1# dowel activity with AAROM 2x10 reps with  shd flex, bicep curls horz adb/add and forward flex motion to increase BUE ROM and strength,.   Pt. With therex performed to increase ROM, endurance selfcare task and fxl mobility for independence     Patient left supine with all lines intact, call button in reach and G son present    ASSESSMENT:  Mesha Mckenzie is a 63 y.o. female with a medical diagnosis of <principal problem not specified> Pt. participated fair with session on this day. Pt. Provided with encouragement during session on  this day. Pt. Limits self with  saying she can't and she's tired and just want to lie down Pt. At lest task side steps with pivot t/f's and UE arm exercises.Pt. Not up to doing but did performed  Pt. however Pt demos physical deficits with balance  functional mobility, UB strength, endurance  level of functional indep with daily tasks and activities and selfcare skills .Pt. Will continue to benefit from continued OT to progress towards goals      Rehab identified problem list/impairments: impaired endurance, impaired self care skills, impaired functional mobilty, impaired cognition, decreased lower extremity function, pain, orthopedic precautions    Rehab potential is fair    Activity tolerance: Fair    Discharge recommendations: home health OT(with light assist)     Barriers to discharge: Decreased caregiver support     Equipment recommendations: walker, rolling, wheelchair, commode, shower chair     GOALS:   Multidisciplinary Problems     Occupational Therapy Goals        Problem: Occupational Therapy Goal    Goal Priority Disciplines Outcome Interventions   Occupational Therapy Goal     OT, PT/OT     Description:  Goals to be met by: 11-20-19     Patient will increase functional independence with ADLs by performing:    Feeding with Set-up Assistance.  UE Dressing with Set-up Assistance.  LE Dressing with Supervision.  Grooming while seated with Supervision.  Toileting from bedside commode with Supervision for hygiene and clothing management.   Bathing from  shower chair/bench with Stand-by Assistance.  Rolling to Bilateral with Modified [place].   Supine to sit with Modified [place].  Stand pivot transfers with Supervision.  Toilet transfer to bedside commode with Supervision.  Pt. To be able to recall 3/3 spinal precautions  Pt. To be able to perform HEP for BUE to improve endurance with S   pt. To participate in dynamic standing task x 5 minutes with S  Pt. To participate in leisure activities  2x/week                  Plan:  Patient to be seen 5 x/week to address the above listed problems via self-care/home management, therapeutic activities, therapeutic exercises  Plan of Care expires: 11/30/19  Plan of Care reviewed with: patient  The MARIA ISABEL and ALEAH have collaborated and discussed the patient's status, treatment plan and progress toward established goals.   ALEAH Summers/MAHESH 11/1/2019

## 2019-11-01 NOTE — PROGRESS NOTES
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  Patient initially presented to ED with N/V/D and weakness. Treated with broad-spectrum antibiotics for pneumonia, colitis and septic shock. CT on 9/25 and showed bilateral cavitary lesions likely secondary to septic emboli, small pericardial effusion and ileus. DHARMESH on 9/25 revealed  mitral and tricuspid valve endocarditis and positive Blood cultures with MSSA. ID treated with cefazolin, vancomycin and daptomycin. Echo on 9/28 revealed LVEF 35% and persistent vegatations. 9/30 MRI brain that revealed remote lacunar infarcts without acute intracranial abnormality. 10/1 MRI revealed spinal epidural abscess at L2-4 level. Thoracentesis was performed on 10/1. Cardiology deferred surgical intervention/valve replacement at this time due to comorbidities. Patient transferred to [facility] for neurosurgery evaluation of epidural abscess. [facility] hospitalization complicated by encephalopathy and collapse lung. Chest tube placed & pleural fluid sent; gram stain with GPCs. I&D of shoulder abscesses 10/5/19, per gen surg. Head CT was repeated and showed new R basal ganglia infarcts. MRI of brain showed evidence of R thalamic infarct and ventriculitis. CTA of head completed 10/8 showing atherosclerotic plaquing of the distal vertebral arteries and concern for noncalcified plaquing and stenosis; no high-grade stenosis or proximal occlusion.  Patient underwent laminectomy on 10/9. Patient was deemed medically stable and transferred to [facility] to participate in acute inpatient therapy. Admission to SNF for secondary weakness and debility.    Patient reports insomnia since admission.  Patient also reports diarrhea this a.m.  All labs reviewed from 10/31. No leukocytosis. Hemoglobin stable at 8.5. Platelets stable at 321. Sodium at 138. Potasium 3.3. Creatinine stable at 0.7. 24 hr blood glucose 86. Afebrile. Patient's current blood pressure is at 154/73. Patient denies abdominal discomfort, nausea, or vomiting. Patient reports an adequate appetite.  Patient denies dysuria. Patient reports having irregular bowel movements. Patient will be treated at [facility] with PT and OT to improve functional status and ability to perform ADLs.     PEx  Constitutional: Patient appears well-developed and in no distress   HENT:   Head: Normocephalic and atraumatic.   Eyes: Pupils are equal, round, and reactive to light.   Neck: Normal range of motion. Neck supple.   Cardiovascular: Normal rate, regular rhythm and normal heart sounds.    Pulmonary/Chest: Effort normal and breath sounds are clear  Abdominal: Soft. Bowel sounds are normal.   Musculoskeletal: Normal range of motion. + generalized weakness.  Neurological: Alert and oriented to person, place, and time.   Psychiatric: Normal mood and affect. Behavior is normal.   Wounds/Skin: Skin is warm and dry.    Wound location:   Midline lower back incision completely healed over and claudia, pink scar  Wound length: 10 cm   Following: This NP weekly- last observed on 11/1/2019  Full body skin assessed with no other skin breakdown or wounds noted+++      Temp:  [98.3 °F (36.8 °C)-98.5 °F (36.9 °C)]   Pulse:  [90-98]   Resp:  [18-20]   BP: (129-154)/(68-73)   SpO2:  [94 %-95 %]   Body mass index is 19.26 kg/m².     ROS  Constitutional: Negative for fever and malaise/fatigue.   Eyes: Negative for blurred  vision, double vision and discharge.   Respiratory: Negative for cough, shortness of breath and wheezing.    Cardiovascular: Negative for chest pain, palpitations, claudication, and leg swelling.   Gastrointestinal: Negative for abdominal pain, constipation, diarrhea, nausea and vomiting.   Genitourinary: Negative for dysuria, frequency and urgency.   Musculoskeletal:  + generalized weakness. Negative for back pain and myalgias.  Skin: Negative for itching and rash.  Neurological: Negative for dizziness, speech change, seizures, and headaches.   Psychiatric/Behavioral: Negative for depression. The patient is not nervous/anxious.      Past Medical History: Patient has a past medical history of Anxiety, Carotid bruit, Chronic pain, Cystitis, Cystocele, unspecified (CODE), Depression, GI bleed, History of uterine fibroid, Shortness of breath on exertion, Spinal stenosis, and Urinary retention.    Past Surgical History: Patient has a past surgical history that includes Hysterectomy (1989); Anterior vaginal repair (8-); tubiligation; Cardiac catheterization (age 12); Cholecystectomy (2015); Colonoscopy (12/12/2018); Colonoscopy (N/A, 12/12/2018); and Laminectomy (N/A, 10/9/2019).    Social History: Patient reports that she has been smoking cigarettes. She has a 20.00 pack-year smoking history. She has never used smokeless tobacco. She reports that she does not drink alcohol or use drugs.    Family History: family history includes Alzheimer's disease in her mother; Hypertension in her brother and sister; Osteoarthritis in her mother; Thyroid disease in her mother.    Allergies: Patient is allergic to pcn [penicillins].      Assessment and Plan:  Epidural Abcess  Abscess Upper Extremities  Septic Pulmonary and Brain Emboli   Acute Bacterial Endocarditis  -hx IVDA  -Continuing oxacillin 12 g continuous for MSSA endocarditis per ID recs with end date 11/20     Metabolic encephalopathy  Right thalamic  stroke  -Encephalopathy likely multiple factorial etiology: metabolic, infectious, and due to stroke  -Brain MRI 9/30 with lacunar infarcts  -Repeat head CT(10/7) revealed new right basal ganglia infarcts  -MRI of brain revealed R thalamic infarct, ventriculitis, subactue embolic infarctions  -CTA of head 10/8: atherosclerotic plaquing of the distal vertebral arteries and concern for noncalcified plaquing and stenosis; no high-grade stenosis or proximal occlusion. No mycotic aneurysms  -Per [facility] neuro, no systemic anticoagulation and asa due to high risk for hemorrhage from septic emboli  -11/1 initiated atorvastatin 40 mg daily     Insomnia  -11/1 adjusted ramelteon to 8 mg q.p.m.    Diarrhea, new  -11/1 initiated Imodium 2 mg p.o. Q 4 hr p.r.n.    Hypokalemia  -11/1 initiated potassium 40 mEq q.4 hours x2     Urinary retention  Patient self-caths PRN   -11/1 move initiated order to discontinued indwelling Henriquez catheter    Debility  -Continue with PT/OT for gait training and strengthening and restoration of ADL's   -Encourage mobility, OOB in chair, and early ambulation as appropriate  -Fall precautions   -Monitor for bowel and bladder dysfunction  -Monitor for and prevent skin breakdown and pressure ulcers  -Continue DVT prophylaxis with lovenox     Acute on chronic diastolic congestive heart failure  -continue lasix 40 mg daily      Hypertension  -continue amlodipine 10 mg daily and Lopressor 100 mg b.i.d.    Anemia  -continue ferrous sulfate and vitamin-C    Chronic back pain  -continue Tylenol, gabapentin, ibuprofen, methyl salicylate-menthol 15-10% cream    Depression/anxiety  -continue venlafaxine    Future Appointments   Date Time Provider Department Center   11/4/2019  2:40 PM Alondra Barksdale PA-C [facility]   11/20/2019 10:00 AM INFECTIOUS DISEASE, [facility] INFECTD [facility]   11/25/2019  3:20 PM Martin Lewis DO [facility]       I certify that SNF services are  required to be given on an inpatient basis because Mesha Mckenzie needs for skilled nursing care and/or skilled rehabilitation are required on a daily basis and such services can only practically be provided in a skilled nursing facility setting and are for an ongoing condition for which she received inpatient care in the hospital.     Total time of the visit 72 min 908AM- 1020AM   Non physical exam/ non charting time: 52 minutes   Description of non physical exam/non charting time: counseling patient on clinical conditions and therapies provided regarding epidural abscess, bacterial endocarditis, stroke, new diarrhea, hypokalemia, urinary retention, chronic pain, acute pain regimen, postop care with follow-up, and the beginning of discharge planning. Extensive chart review completed including all consultation notes.  All pertinent laboratory and radiographical images reviewed.    Patient note was created using MModal Dictation.  Any errors in syntax or even information may not have been identified and edited on initial review prior to signing this note.     Messi Espinoza NP  DOS 11/1/2019

## 2019-11-02 LAB
BACTERIA BLD CULT: NORMAL
BACTERIA BLD CULT: NORMAL

## 2019-11-02 PROCEDURE — 25000003 PHARM REV CODE 250: Performed by: STUDENT IN AN ORGANIZED HEALTH CARE EDUCATION/TRAINING PROGRAM

## 2019-11-02 PROCEDURE — 94761 N-INVAS EAR/PLS OXIMETRY MLT: CPT

## 2019-11-02 PROCEDURE — 25000003 PHARM REV CODE 250: Performed by: INTERNAL MEDICINE

## 2019-11-02 PROCEDURE — 63600175 PHARM REV CODE 636 W HCPCS: Performed by: STUDENT IN AN ORGANIZED HEALTH CARE EDUCATION/TRAINING PROGRAM

## 2019-11-02 PROCEDURE — 11000004 HC SNF PRIVATE

## 2019-11-02 PROCEDURE — 25000003 PHARM REV CODE 250: Performed by: NURSE PRACTITIONER

## 2019-11-02 PROCEDURE — 97535 SELF CARE MNGMENT TRAINING: CPT

## 2019-11-02 PROCEDURE — A4216 STERILE WATER/SALINE, 10 ML: HCPCS | Performed by: STUDENT IN AN ORGANIZED HEALTH CARE EDUCATION/TRAINING PROGRAM

## 2019-11-02 RX ORDER — ONDANSETRON 4 MG/1
4 TABLET, ORALLY DISINTEGRATING ORAL EVERY 6 HOURS PRN
Status: DISCONTINUED | OUTPATIENT
Start: 2019-11-02 | End: 2019-11-20 | Stop reason: HOSPADM

## 2019-11-02 RX ADMIN — RAMELTEON 8 MG: 8 TABLET ORAL at 08:11

## 2019-11-02 RX ADMIN — ACETAMINOPHEN 650 MG: 325 TABLET ORAL at 08:11

## 2019-11-02 RX ADMIN — DOXYLAMINE SUCCINATE: 25 TABLET ORAL at 10:11

## 2019-11-02 RX ADMIN — OXACILLIN SODIUM 12 G: 1 INJECTION, POWDER, FOR SOLUTION INTRAMUSCULAR; INTRAVENOUS at 08:11

## 2019-11-02 RX ADMIN — ATORVASTATIN CALCIUM 40 MG: 20 TABLET, FILM COATED ORAL at 09:11

## 2019-11-02 RX ADMIN — MICONAZOLE NITRATE: 20 OINTMENT TOPICAL at 08:11

## 2019-11-02 RX ADMIN — DOXYLAMINE SUCCINATE: 25 TABLET ORAL at 08:11

## 2019-11-02 RX ADMIN — ENOXAPARIN SODIUM 40 MG: 100 INJECTION SUBCUTANEOUS at 05:11

## 2019-11-02 RX ADMIN — ACETAMINOPHEN 650 MG: 325 TABLET ORAL at 09:11

## 2019-11-02 RX ADMIN — POTASSIUM CHLORIDE 20 MEQ: 1500 TABLET, EXTENDED RELEASE ORAL at 05:11

## 2019-11-02 RX ADMIN — PANTOPRAZOLE SODIUM 40 MG: 40 TABLET, DELAYED RELEASE ORAL at 09:11

## 2019-11-02 RX ADMIN — Medication 10 ML: at 11:11

## 2019-11-02 RX ADMIN — VENLAFAXINE 75 MG: 37.5 TABLET ORAL at 09:11

## 2019-11-02 RX ADMIN — GABAPENTIN 200 MG: 100 CAPSULE ORAL at 02:11

## 2019-11-02 RX ADMIN — METOPROLOL TARTRATE 100 MG: 50 TABLET ORAL at 08:11

## 2019-11-02 RX ADMIN — METOPROLOL TARTRATE 100 MG: 50 TABLET ORAL at 09:11

## 2019-11-02 RX ADMIN — ACETAMINOPHEN 650 MG: 325 TABLET ORAL at 01:11

## 2019-11-02 RX ADMIN — GABAPENTIN 200 MG: 100 CAPSULE ORAL at 09:11

## 2019-11-02 RX ADMIN — FERROUS SULFATE TAB EC 325 MG (65 MG FE EQUIVALENT) 325 MG: 325 (65 FE) TABLET DELAYED RESPONSE at 08:11

## 2019-11-02 RX ADMIN — Medication 1 CAPSULE: at 10:11

## 2019-11-02 RX ADMIN — GABAPENTIN 200 MG: 100 CAPSULE ORAL at 08:11

## 2019-11-02 RX ADMIN — FOLIC ACID 1 MG: 1 TABLET ORAL at 09:11

## 2019-11-02 RX ADMIN — Medication 250 MG: at 08:11

## 2019-11-02 RX ADMIN — FUROSEMIDE 40 MG: 40 TABLET ORAL at 09:11

## 2019-11-02 RX ADMIN — POTASSIUM CHLORIDE 20 MEQ: 1500 TABLET, EXTENDED RELEASE ORAL at 09:11

## 2019-11-02 RX ADMIN — VENLAFAXINE 75 MG: 37.5 TABLET ORAL at 08:11

## 2019-11-02 RX ADMIN — AMLODIPINE BESYLATE 10 MG: 10 TABLET ORAL at 09:11

## 2019-11-02 NOTE — NURSING
"Patient requested to have bladder scanned, reading was 361, patient refused bedpan and states" I can not urinate on my own, I self cath at home and request to do that here."  I did an APARNA cath at this time and got 700 cc of clear yellow urine, Patient states " bladder scans are never right on me because my bladder is so tilted ". Told this information to charge nurse Sherry ,will continue to monitor.  "

## 2019-11-02 NOTE — NURSING
Patient scanned and scanner showes 151 cc in bladder, patient states she feels no fullness,offer bed pan and patient declines.

## 2019-11-02 NOTE — PT/OT/SLP PROGRESS
Occupational Therapy  Treatment    Mesha Mckenzie   MRN: [id number]   Admitting Diagnosis: Acute on chronic diastolic congestive heart failure    OT Date of Treatment: 11/02/19       Billable Minutes:  Self Care/Home Management 43    General Precautions: Standard, fall  Orthopedic Precautions: spinal precautions  Braces: N/A    Spiritual, Cultural Beliefs, Temple Practices, Values that Affect Care: no    Subjective:  Communicated with nurse prior to session.  Pt. Agreeable to OT assisting pt. With hair at sink to attempt to detangle    Pain/Comfort  Pain Rating 1: 6/10  Location - Side 1: Bilateral  Location - Orientation 1: lower  Location 1: back  Pain Addressed 1: Reposition, Distraction  Pain Rating Post-Intervention 1: 6/10    Objective:  Patient found with: (supinein bed spouse present)    Occupational Performance:    Bed Mobility:    · Patient completed Rolling/Turning to Left with  stand by assistance with vc's for technique to maintain spinal precautions  · Patient completed Rolling/Turning to Right with stand by assistance  With vc's  · Supine to sit Mod A  · Sit to sup[ine Mod A    Functional Mobility/Transfers:  · Patient completed Sit <> Stand Transfer with minimum assistance and moderate assistance  with  no assistive device   · Patient completed Bed <> Chair Transfer using Stand Pivot technique with minimum assistance and moderate assistance with no assistive device  · Functional Mobility: not tested    Activities of Daily Living:  · Grooming: maximal assistance seated at sink to attempt to wash and comb through hair   · Lower Body Dressing: maximal assistance donned in supine    AMPA 6 Click:  [facility] Total Score: 12    OT Exercises: not performed      Additional Treatment:  Pt. Educated on spinal precautions and importance of OOB   Pt. Sat up in w/c x ~ 30 minutes at sink  Brushed teeth with set up A at sink  Pt. Transferred back to bed with Mod A    Patient left supine with all lines intact and  call button in reach    ASSESSMENT:  Mesha Mckenzie is a 63 y.o. female with a medical diagnosis of Acute on chronic diastolic congestive heart failure and presents with deficits in self-care skills, functional mobility and endurance. XCognition appears to be improving. Pt. Noted to improve with transfers on this date. .    Rehab identified problem list/impairments: impaired endurance, impaired self care skills, impaired functional mobilty, impaired cognition, decreased lower extremity function, pain, orthopedic precautions    Rehab potential is fair    Activity tolerance: Fair    Discharge recommendations: home health OT(with light assist)     Barriers to discharge: Decreased caregiver support     Equipment recommendations: walker, rolling, wheelchair, commode, shower chair     GOALS:   Multidisciplinary Problems     Occupational Therapy Goals        Problem: Occupational Therapy Goal    Goal Priority Disciplines Outcome Interventions   Occupational Therapy Goal     OT, PT/OT Ongoing, Progressing    Description:  Goals to be met by: 11-20-19     Patient will increase functional independence with ADLs by performing:    Feeding with Set-up Assistance.  UE Dressing with Set-up Assistance.  LE Dressing with Supervision.  Grooming while seated with Supervision.  Toileting from bedside commode with Supervision for hygiene and clothing management.   Bathing from  shower chair/bench with Stand-by Assistance.  Rolling to Bilateral with Modified Cooper.   Supine to sit with Modified Cooper.  Stand pivot transfers with Supervision.  Toilet transfer to bedside commode with Supervision.  Pt. To be able to recall 3/3 spinal precautions  Pt. To be able to perform HEP for BUE to improve endurance with S   pt. To participate in dynamic standing task x 5 minutes with S  Pt. To participate in leisure activities 2x/week                      Plan:  Patient to be seen 5 x/week to address the above listed problems via  self-care/home management, therapeutic activities, therapeutic exercises  Plan of Care expires: 11/30/19  Plan of Care reviewed with: patient    MARIA ISABEL Adams  11/02/2019

## 2019-11-03 PROCEDURE — 25000003 PHARM REV CODE 250: Performed by: STUDENT IN AN ORGANIZED HEALTH CARE EDUCATION/TRAINING PROGRAM

## 2019-11-03 PROCEDURE — 97530 THERAPEUTIC ACTIVITIES: CPT

## 2019-11-03 PROCEDURE — 25000003 PHARM REV CODE 250: Performed by: INTERNAL MEDICINE

## 2019-11-03 PROCEDURE — 11000004 HC SNF PRIVATE

## 2019-11-03 PROCEDURE — 94761 N-INVAS EAR/PLS OXIMETRY MLT: CPT

## 2019-11-03 PROCEDURE — 63600175 PHARM REV CODE 636 W HCPCS: Performed by: STUDENT IN AN ORGANIZED HEALTH CARE EDUCATION/TRAINING PROGRAM

## 2019-11-03 PROCEDURE — 97110 THERAPEUTIC EXERCISES: CPT

## 2019-11-03 PROCEDURE — 25000003 PHARM REV CODE 250: Performed by: NURSE PRACTITIONER

## 2019-11-03 RX ORDER — RAMELTEON 8 MG/1
8 TABLET ORAL NIGHTLY
Status: DISCONTINUED | OUTPATIENT
Start: 2019-11-03 | End: 2019-11-07

## 2019-11-03 RX ADMIN — POTASSIUM CHLORIDE 20 MEQ: 1500 TABLET, EXTENDED RELEASE ORAL at 09:11

## 2019-11-03 RX ADMIN — METOPROLOL TARTRATE 100 MG: 50 TABLET ORAL at 09:11

## 2019-11-03 RX ADMIN — PSYLLIUM HUSK 1 PACKET: 3.4 POWDER ORAL at 09:11

## 2019-11-03 RX ADMIN — MICONAZOLE NITRATE: 20 OINTMENT TOPICAL at 08:11

## 2019-11-03 RX ADMIN — IBUPROFEN 400 MG: 200 TABLET, FILM COATED ORAL at 04:11

## 2019-11-03 RX ADMIN — Medication 1 CAPSULE: at 09:11

## 2019-11-03 RX ADMIN — RAMELTEON 8 MG: 8 TABLET ORAL at 08:11

## 2019-11-03 RX ADMIN — POTASSIUM CHLORIDE 20 MEQ: 1500 TABLET, EXTENDED RELEASE ORAL at 05:11

## 2019-11-03 RX ADMIN — DOXYLAMINE SUCCINATE: 25 TABLET ORAL at 08:11

## 2019-11-03 RX ADMIN — MICONAZOLE NITRATE: 20 OINTMENT TOPICAL at 09:11

## 2019-11-03 RX ADMIN — PANTOPRAZOLE SODIUM 40 MG: 40 TABLET, DELAYED RELEASE ORAL at 09:11

## 2019-11-03 RX ADMIN — GABAPENTIN 200 MG: 100 CAPSULE ORAL at 08:11

## 2019-11-03 RX ADMIN — VENLAFAXINE 75 MG: 37.5 TABLET ORAL at 08:11

## 2019-11-03 RX ADMIN — AMLODIPINE BESYLATE 10 MG: 10 TABLET ORAL at 09:11

## 2019-11-03 RX ADMIN — IBUPROFEN 400 MG: 200 TABLET, FILM COATED ORAL at 01:11

## 2019-11-03 RX ADMIN — GABAPENTIN 200 MG: 100 CAPSULE ORAL at 09:11

## 2019-11-03 RX ADMIN — OXACILLIN SODIUM 12 G: 1 INJECTION, POWDER, FOR SOLUTION INTRAMUSCULAR; INTRAVENOUS at 08:11

## 2019-11-03 RX ADMIN — ACETAMINOPHEN 650 MG: 325 TABLET ORAL at 08:11

## 2019-11-03 RX ADMIN — LOPERAMIDE HYDROCHLORIDE 2 MG: 2 CAPSULE ORAL at 01:11

## 2019-11-03 RX ADMIN — ATORVASTATIN CALCIUM 40 MG: 20 TABLET, FILM COATED ORAL at 09:11

## 2019-11-03 RX ADMIN — ENOXAPARIN SODIUM 40 MG: 100 INJECTION SUBCUTANEOUS at 05:11

## 2019-11-03 RX ADMIN — FOLIC ACID 1 MG: 1 TABLET ORAL at 09:11

## 2019-11-03 RX ADMIN — FUROSEMIDE 40 MG: 40 TABLET ORAL at 09:11

## 2019-11-03 RX ADMIN — ACETAMINOPHEN 650 MG: 325 TABLET ORAL at 03:11

## 2019-11-03 RX ADMIN — LOPERAMIDE HYDROCHLORIDE 2 MG: 2 CAPSULE ORAL at 08:11

## 2019-11-03 RX ADMIN — VENLAFAXINE 75 MG: 37.5 TABLET ORAL at 09:11

## 2019-11-03 RX ADMIN — METOPROLOL TARTRATE 100 MG: 50 TABLET ORAL at 08:11

## 2019-11-03 RX ADMIN — GABAPENTIN 200 MG: 100 CAPSULE ORAL at 03:11

## 2019-11-03 RX ADMIN — LOPERAMIDE HYDROCHLORIDE 2 MG: 2 CAPSULE ORAL at 04:11

## 2019-11-03 RX ADMIN — ACETAMINOPHEN 650 MG: 325 TABLET ORAL at 09:11

## 2019-11-03 RX ADMIN — IBUPROFEN 400 MG: 200 TABLET, FILM COATED ORAL at 08:11

## 2019-11-03 NOTE — PT/OT/SLP PROGRESS
Physical Therapy  Treatment    Mesha Mckenzie   MRN: [id number]   Admitting Diagnosis: Acute on chronic diastolic congestive heart failure    PT Received On: 11/03/19        Billable Minutes:  Therapeutic Activity 38 and Therapeutic Exercise 15    Treatment Type: Treatment  PT/PTA: PTA     PTA Visit Number: 2       General Precautions: Standard, fall  Orthopedic Precautions: spinal precautions   Braces: N/A    Spiritual, Cultural Beliefs, Scientologist Practices, Values that Affect Care: no    Subjective:  Multiple attempts awaiting nsg/PCT care cleaned/changed/pain meds/catheter, agreeable on 3 rd attempt    Pain/Comfort  Pain Rating 1: 9/10  Location - Orientation 1: lower  Location 1: back  Pain Addressed 1: Pre-medicate for activity, Reposition, Distraction, Cessation of Activity, Nurse notified  Pain Rating Post-Intervention 1: (inc with mobility, did not rate)    Objective:   Patient found with: (in bed) IV     AM-PAC 6 CLICK MOBILITY  Total Score:10    Bed Mobility:  Supine>Sit: mod A via log roll vcs for tech, inc time    Transfers:  Sit<>Stand: multiple attempts : from EOB mod A no AD, with RW max/mod x 2 attempts unsuccessful (pain/fear) in II bars x 2 attempts max/mod   Stand Pivot Transfer: mod A EOB>WC>BSC no AD holding therapist vcs for small steps pivoting     Gait: unable today with RW nor II bars (pain/fear/fatigue)     Therex:limited tolerable ROM with rest breaks  2x10 reps AP,LAQ,hip flex    Balance: static standing in II bars 25 sec mod A    Additional Treatment:  Ed on the importance of inc OOB tolerance, activity as tolerated,pain free ROM    Patient left up in chair with call button in reach, nsg Jes notified, nsg present and belongings in reach, ed nsg on mod A trf.    Assessment:  Mesha Mckenzie is a 63 y.o. female with a medical diagnosis of Acute on chronic diastolic congestive heart failure.  Pt tolerated fair, limited by fatigue/dec overall strength and endurance and pain, pt would  continue to benefit from skilled PT services to improve overall functional mobility, strength and endurance.  .    Rehab identified problem list/impairments: weakness, impaired endurance, impaired self care skills, impaired functional mobilty, gait instability, impaired balance, decreased upper extremity function, decreased lower extremity function, pain    Rehab potential is good.    Activity tolerance: Fair    Discharge recommendations: home health PT     Barriers to discharge: None    Equipment recommendations: wheelchair, walker, rolling, bedside commode, shower chair     GOALS:   Multidisciplinary Problems     Physical Therapy Goals        Problem: Physical Therapy Goal    Goal Priority Disciplines Outcome Goal Variances Interventions   Physical Therapy Goal     PT, PT/OT Ongoing, Progressing     Description:  Goals to be met by: 2019     Patient will increase functional independence with mobility by performin. Supine to sit with Contact Guard Assistance  2. Sit to supine with Contact Guard Assistance  3. Sit to stand transfer with Minimal Assistance  4. Bed to chair transfer with Minimal Assistance using Rolling Walker  5. Gait  x 20 feet with Minimal Assistance using Rolling Walker.   6. Ascend/Descend 4 inch curb step with Minimal Assistance using Rolling Walker.  6a.  Ascend/Descend 4 inch curb step with use of RW via family assistance with 1 verbal cue or less for technique.  7. Stand for 3 minutes with Minimal Assistance using Rolling Walker  8. Increased functional strength to 4/5 for R quadriceps mm group.  9. Lower extremity exercise program x 20 reps per handout, with assistance as needed                      PLAN:    Patient to be seen 5 x/week  to address the above listed problems via gait training, therapeutic activities, therapeutic exercises, neuromuscular re-education, wheelchair management/training  Plan of Care expires: 19  Plan of Care reviewed with: patient,  family    Meshaced Hernandez, PTA  11/03/2019

## 2019-11-04 ENCOUNTER — OFFICE VISIT (OUTPATIENT)
Dept: NEUROSURGERY | Facility: CLINIC | Age: 64
DRG: 291 | End: 2019-11-04
Attending: INTERNAL MEDICINE
Payer: MEDICARE

## 2019-11-04 VITALS
SYSTOLIC BLOOD PRESSURE: 135 MMHG | DIASTOLIC BLOOD PRESSURE: 60 MMHG | BODY MASS INDEX: 19.04 KG/M2 | WEIGHT: 111.56 LBS | TEMPERATURE: 99 F | HEART RATE: 86 BPM | HEIGHT: 64 IN

## 2019-11-04 DIAGNOSIS — G06.2 EPIDURAL ABSCESS: Primary | ICD-10-CM

## 2019-11-04 LAB
ANION GAP SERPL CALC-SCNC: 9 MMOL/L (ref 8–16)
ANISOCYTOSIS BLD QL SMEAR: SLIGHT
BASOPHILS # BLD AUTO: ABNORMAL K/UL (ref 0–0.2)
BASOPHILS NFR BLD: 0 % (ref 0–1.9)
BUN SERPL-MCNC: 15 MG/DL (ref 8–23)
CALCIUM SERPL-MCNC: 8.9 MG/DL (ref 8.7–10.5)
CHLORIDE SERPL-SCNC: 104 MMOL/L (ref 95–110)
CO2 SERPL-SCNC: 24 MMOL/L (ref 23–29)
CREAT SERPL-MCNC: 0.7 MG/DL (ref 0.5–1.4)
DIFFERENTIAL METHOD: ABNORMAL
EOSINOPHIL # BLD AUTO: ABNORMAL K/UL (ref 0–0.5)
EOSINOPHIL NFR BLD: 4 % (ref 0–8)
ERYTHROCYTE [DISTWIDTH] IN BLOOD BY AUTOMATED COUNT: 20 % (ref 11.5–14.5)
EST. GFR  (AFRICAN AMERICAN): >60 ML/MIN/1.73 M^2
EST. GFR  (NON AFRICAN AMERICAN): >60 ML/MIN/1.73 M^2
GLUCOSE SERPL-MCNC: 79 MG/DL (ref 70–110)
HCT VFR BLD AUTO: 28.1 % (ref 37–48.5)
HGB BLD-MCNC: 8.5 G/DL (ref 12–16)
HYPOCHROMIA BLD QL SMEAR: ABNORMAL
IMM GRANULOCYTES # BLD AUTO: ABNORMAL K/UL (ref 0–0.04)
IMM GRANULOCYTES NFR BLD AUTO: ABNORMAL % (ref 0–0.5)
LYMPHOCYTES # BLD AUTO: ABNORMAL K/UL (ref 1–4.8)
LYMPHOCYTES NFR BLD: 18 % (ref 18–48)
MAGNESIUM SERPL-MCNC: 1.6 MG/DL (ref 1.6–2.6)
MCH RBC QN AUTO: 32.1 PG (ref 27–31)
MCHC RBC AUTO-ENTMCNC: 30.2 G/DL (ref 32–36)
MCV RBC AUTO: 106 FL (ref 82–98)
MONOCYTES # BLD AUTO: ABNORMAL K/UL (ref 0.3–1)
MONOCYTES NFR BLD: 5 % (ref 4–15)
MYELOCYTES NFR BLD MANUAL: 2 %
NEUTROPHILS NFR BLD: 71 % (ref 38–73)
NRBC BLD-RTO: 0 /100 WBC
OVALOCYTES BLD QL SMEAR: ABNORMAL
PHOSPHATE SERPL-MCNC: 4.8 MG/DL (ref 2.7–4.5)
PLATELET # BLD AUTO: 330 K/UL (ref 150–350)
PLATELET BLD QL SMEAR: ABNORMAL
PMV BLD AUTO: 9.5 FL (ref 9.2–12.9)
POIKILOCYTOSIS BLD QL SMEAR: SLIGHT
POLYCHROMASIA BLD QL SMEAR: ABNORMAL
POTASSIUM SERPL-SCNC: 4.4 MMOL/L (ref 3.5–5.1)
RBC # BLD AUTO: 2.65 M/UL (ref 4–5.4)
SODIUM SERPL-SCNC: 137 MMOL/L (ref 136–145)
WBC # BLD AUTO: 8.12 K/UL (ref 3.9–12.7)

## 2019-11-04 PROCEDURE — 99999 PR PBB SHADOW E&M-EST. PATIENT-LVL III: CPT | Mod: PBBFAC,,, | Performed by: PHYSICIAN ASSISTANT

## 2019-11-04 PROCEDURE — 80048 BASIC METABOLIC PNL TOTAL CA: CPT

## 2019-11-04 PROCEDURE — 25000003 PHARM REV CODE 250: Performed by: INTERNAL MEDICINE

## 2019-11-04 PROCEDURE — 97116 GAIT TRAINING THERAPY: CPT

## 2019-11-04 PROCEDURE — 97530 THERAPEUTIC ACTIVITIES: CPT

## 2019-11-04 PROCEDURE — 97803 MED NUTRITION INDIV SUBSEQ: CPT

## 2019-11-04 PROCEDURE — 85007 BL SMEAR W/DIFF WBC COUNT: CPT

## 2019-11-04 PROCEDURE — 97535 SELF CARE MNGMENT TRAINING: CPT

## 2019-11-04 PROCEDURE — 99024 POSTOP FOLLOW-UP VISIT: CPT | Mod: S$GLB,,, | Performed by: PHYSICIAN ASSISTANT

## 2019-11-04 PROCEDURE — 25000003 PHARM REV CODE 250: Performed by: NURSE PRACTITIONER

## 2019-11-04 PROCEDURE — 83735 ASSAY OF MAGNESIUM: CPT

## 2019-11-04 PROCEDURE — 85027 COMPLETE CBC AUTOMATED: CPT

## 2019-11-04 PROCEDURE — 63600175 PHARM REV CODE 636 W HCPCS: Performed by: STUDENT IN AN ORGANIZED HEALTH CARE EDUCATION/TRAINING PROGRAM

## 2019-11-04 PROCEDURE — 25000003 PHARM REV CODE 250: Performed by: STUDENT IN AN ORGANIZED HEALTH CARE EDUCATION/TRAINING PROGRAM

## 2019-11-04 PROCEDURE — 97110 THERAPEUTIC EXERCISES: CPT

## 2019-11-04 PROCEDURE — 11000004 HC SNF PRIVATE

## 2019-11-04 PROCEDURE — 99999 PR PBB SHADOW E&M-EST. PATIENT-LVL III: ICD-10-PCS | Mod: PBBFAC,,, | Performed by: PHYSICIAN ASSISTANT

## 2019-11-04 PROCEDURE — 99024 PR POST-OP FOLLOW-UP VISIT: ICD-10-PCS | Mod: S$GLB,,, | Performed by: PHYSICIAN ASSISTANT

## 2019-11-04 PROCEDURE — 84100 ASSAY OF PHOSPHORUS: CPT

## 2019-11-04 PROCEDURE — 92507 TX SP LANG VOICE COMM INDIV: CPT

## 2019-11-04 RX ORDER — DIPHENOXYLATE HCL/ATROPINE 2.5-.025/5
5 LIQUID (ML) ORAL EVERY 6 HOURS
Status: DISCONTINUED | OUTPATIENT
Start: 2019-11-04 | End: 2019-11-04

## 2019-11-04 RX ORDER — LOSARTAN POTASSIUM 25 MG/1
25 TABLET ORAL DAILY
Status: DISCONTINUED | OUTPATIENT
Start: 2019-11-04 | End: 2019-11-15

## 2019-11-04 RX ORDER — DIPHENOXYLATE HYDROCHLORIDE AND ATROPINE SULFATE 2.5; .025 MG/1; MG/1
1 TABLET ORAL 4 TIMES DAILY PRN
Status: DISCONTINUED | OUTPATIENT
Start: 2019-11-04 | End: 2019-11-11

## 2019-11-04 RX ORDER — DIPHENOXYLATE HYDROCHLORIDE AND ATROPINE SULFATE 2.5; .025 MG/1; MG/1
1 TABLET ORAL ONCE
Status: COMPLETED | OUTPATIENT
Start: 2019-11-04 | End: 2019-11-06

## 2019-11-04 RX ADMIN — POTASSIUM CHLORIDE 20 MEQ: 1500 TABLET, EXTENDED RELEASE ORAL at 09:11

## 2019-11-04 RX ADMIN — FUROSEMIDE 40 MG: 40 TABLET ORAL at 09:11

## 2019-11-04 RX ADMIN — MICONAZOLE NITRATE: 20 OINTMENT TOPICAL at 09:11

## 2019-11-04 RX ADMIN — GABAPENTIN 200 MG: 100 CAPSULE ORAL at 09:11

## 2019-11-04 RX ADMIN — FOLIC ACID 1 MG: 1 TABLET ORAL at 09:11

## 2019-11-04 RX ADMIN — VENLAFAXINE 75 MG: 37.5 TABLET ORAL at 09:11

## 2019-11-04 RX ADMIN — Medication 250 MG: at 08:11

## 2019-11-04 RX ADMIN — Medication 1 CAPSULE: at 09:11

## 2019-11-04 RX ADMIN — ATORVASTATIN CALCIUM 40 MG: 20 TABLET, FILM COATED ORAL at 09:11

## 2019-11-04 RX ADMIN — METOPROLOL TARTRATE 100 MG: 50 TABLET ORAL at 09:11

## 2019-11-04 RX ADMIN — POTASSIUM CHLORIDE 20 MEQ: 1500 TABLET, EXTENDED RELEASE ORAL at 05:11

## 2019-11-04 RX ADMIN — ACETAMINOPHEN 650 MG: 325 TABLET ORAL at 09:11

## 2019-11-04 RX ADMIN — AMLODIPINE BESYLATE 10 MG: 10 TABLET ORAL at 09:11

## 2019-11-04 RX ADMIN — DIPHENOXYLATE HYDROCHLORIDE AND ATROPINE SULFATE 1 TABLET: 2.5; .025 TABLET ORAL at 05:11

## 2019-11-04 RX ADMIN — VENLAFAXINE 75 MG: 37.5 TABLET ORAL at 08:11

## 2019-11-04 RX ADMIN — PANTOPRAZOLE SODIUM 40 MG: 40 TABLET, DELAYED RELEASE ORAL at 09:11

## 2019-11-04 RX ADMIN — FERROUS SULFATE TAB EC 325 MG (65 MG FE EQUIVALENT) 325 MG: 325 (65 FE) TABLET DELAYED RESPONSE at 08:11

## 2019-11-04 RX ADMIN — IBUPROFEN 400 MG: 200 TABLET, FILM COATED ORAL at 09:11

## 2019-11-04 RX ADMIN — ACETAMINOPHEN 650 MG: 325 TABLET ORAL at 08:11

## 2019-11-04 RX ADMIN — GABAPENTIN 200 MG: 100 CAPSULE ORAL at 08:11

## 2019-11-04 RX ADMIN — MICONAZOLE NITRATE: 20 OINTMENT TOPICAL at 08:11

## 2019-11-04 RX ADMIN — METOPROLOL TARTRATE 100 MG: 50 TABLET ORAL at 08:11

## 2019-11-04 RX ADMIN — RAMELTEON 8 MG: 8 TABLET ORAL at 08:11

## 2019-11-04 RX ADMIN — IBUPROFEN 400 MG: 200 TABLET, FILM COATED ORAL at 05:11

## 2019-11-04 RX ADMIN — OXACILLIN SODIUM 12 G: 1 INJECTION, POWDER, FOR SOLUTION INTRAMUSCULAR; INTRAVENOUS at 08:11

## 2019-11-04 RX ADMIN — ENOXAPARIN SODIUM 40 MG: 100 INJECTION SUBCUTANEOUS at 05:11

## 2019-11-04 NOTE — PT/OT/SLP PROGRESS
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  patien agreeable to session; nurse okays patient to attend.    Pain/Comfort  Pain Rating 1: 10/10  Location - Side 1: Bilateral  Location - Orientation 1: lower  Location 1: back(anterior and posterior)  Pain Addressed 1: Nurse notified, Cessation of Activity, Distraction, Reposition  Pain Rating Post-Intervention 1: 9/10    Objective:  Patient found supine in bed, HOB elevated w/ IV in progress with       AM-PAC 6 CLICK MOBILITY  Total Score:13    Bed Mobility:  Sit>Supine:not performed  Supine>Sit: bed w/ min assist and cues for log roll    Transfers:  Sit<>Stand: from w/c x2 trials w/ RW an mod assist (patient w/ hands on RW and did not transition).  Stand Pivot Transfer: bed>w/c and w/c>chair w/ SPT w/ mod assist  Cues for technique    Gait:  Amb w/ RW and min assist overall,  encourages and manages IV pole and w/c closely follow, 40 feet x2 trials. Equal steps and sufficient foot clearance. Max encouragment to continue gait trial       Therex:  Ankle  Pumps,   LAQ   Hip flexion  x20 reps w/ assist as needed  UBE x10 minutes w/ one rest break for UE strength and endurance    Additional Treatment:  Patient can name 3/3 spinal precautions;  educated in gait and trf technique, observation, and can name 3/3 spinal precautions    Patient left up in chair with all lines intact, call button in reach, PCT notified and   present.    Assessment:  Mesha Mckenzie is a 63 y.o. 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  Patient w/ improved gait.and transfer technique. Her  was present for most of session and encouraged patient and she appeared to be content w/ her session. Patient will benefit from continued physical therapy to address deficits and improve safety and functional mobility. Continue with physical therapy plan of care.     Rehab identified problem list/impairments: weakness, impaired endurance, impaired self care skills, impaired functional mobilty, gait instability, impaired balance, decreased upper extremity function, decreased lower extremity function, pain    Rehab potential is good.    Activity tolerance: Good    Discharge recommendations: home health PT     Barriers to discharge: None    Equipment recommendations: wheelchair, walker, rolling, bedside commode, shower chair     GOALS:   Multidisciplinary Problems     Physical Therapy Goals        Problem: Physical Therapy Goal    Goal Priority Disciplines Outcome Goal Variances Interventions   Physical Therapy Goal     PT, PT/OT Ongoing, Progressing     Description:  Goals to be met by: 2019     Patient will increase functional independence with mobility by performin. Supine to sit with Contact Guard Assistance  2. Sit to supine with Contact Guard Assistance  3. Sit to stand transfer with Minimal Assistance  4. Bed to chair transfer with Minimal Assistance using Rolling Walker  5. Gait  x 20 feet with Minimal Assistance using Rolling Walker. = met 2019       N EW GOAL 2019 gait x 75 feet w/ RW and CGA = not met  6. Ascend/Descend 4 inch curb step with Minimal Assistance using Rolling Walker.  6a.  Ascend/Descend 4 inch curb step with use of RW via family assistance with 1 verbal cue or less for technique.  7. Stand for 3 minutes with Minimal Assistance using Rolling Walker  8. Increased functional strength  to 4/5 for R quadriceps mm group.  9. Lower extremity exercise program x 20 reps per handout, with assistance as needed                       PLAN:    Patient to be seen 5 x/week  to address the above listed problems via gait training, therapeutic activities, therapeutic exercises, neuromuscular re-education, wheelchair management/training  Plan of Care expires: 11/30/19  Plan of Care reviewed with: patient, family    Megan Ann, PT  11/04/2019

## 2019-11-04 NOTE — PROGRESS NOTES
Wound Check   Neurosurgery     Mesha Mckenzie is a 63 y.o. female who presents to clinic today for 2 week wound check, s/p L3 laminectomy for evacuation of epidural phlegmon.  Denies fevers, chills, night sweats or vomiting. She reports nausea that occurs every time she transfers from the bed to her wheel chair. Pain today is 10/10 in her buttocks from sitting in her wheel chair for so long. Strength is improving since surgery. She reports participating in therapy while admitted to rehab and is hopeful to be able to walk on her own. Denies wound drainage or swelling.     Physical Exam:   General: well developed, well nourished, no distress  Neurologic: Alert and oriented. Thought content appropriate.   GCS: Motor: 6/Verbal: 5/Eyes: 4 GCS Total: 15   Mental Status: Awake, Alert, Oriented x3   Cranial nerves: face symmetric, tongue midline, pupils equal, round, reactive to light with accomodation, EOMI.   Motor Strength: continued weakness in BLE, though improved compared to last progress note with neurosurgery. 4/5 b/l HF, 4/5 right EHL, otherwise grossly full strength.   Sensation: response to light touch throughout  No gait disturbances     Incision is clean, dry and intact with no signs of erythema, swelling or purulent drainage. Dissolvable sutures are intact. All skin edges are completely approximated.       Vitals:    11/04/19 1439   BP: 135/60   Pulse: 86   Temp: 99 °F (37.2 °C)             Assessment/Plan:   Mesha Mckenzie is a 63 y.o. female who presents for 2 week wound check, s/p L3 laminectomy for evacuation of epidural phlegmon. PICC line in place for IV Abx which she is receiving at [facility].      -Keep incision open to air   -Can shower and get incision wet, just pat dry and no vigorous scrubbing. Do not submerge incision for another 4 weeks.   -No lifting more than 10 lbs or excessive bending/twisting.   -Follow up with Dr. Lewis in 4 weeks   -Please call with any questions or concerns  prior to your next appointment.         Alondra Barksdale PA-C  [facility]  Department of Neurosurgery  [phone]

## 2019-11-04 NOTE — NURSING
Back from appt. From w/c to bed. Flushed VERENA PICC and hung oxacillin per pump at 20.8cc/hr. Pt c/o 3x loose stools today, admin PRN dose imodium and admin ibuprofen PRN c/o back discomfort. Instructed to call for assist, call light in reach.

## 2019-11-04 NOTE — PROGRESS NOTES
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  Patient initially presented to ED with N/V/D and weakness. Treated with broad-spectrum antibiotics for pneumonia, colitis and septic shock. CT on 9/25 and showed bilateral cavitary lesions likely secondary to septic emboli, small pericardial effusion and ileus. DHARMESH on 9/25 revealed  mitral and tricuspid valve endocarditis and positive Blood cultures with MSSA. ID treated with cefazolin, vancomycin and daptomycin. Echo on 9/28 revealed LVEF 35% and persistent vegatations. 9/30 MRI brain that revealed remote lacunar infarcts without acute intracranial abnormality. 10/1 MRI revealed spinal epidural abscess at L2-4 level. Thoracentesis was performed on 10/1. Cardiology deferred surgical intervention/valve replacement at this time due to comorbidities. Patient transferred to [facility] for neurosurgery evaluation of epidural abscess. [facility] hospitalization complicated by encephalopathy and collapse lung. Chest tube placed & pleural fluid sent; gram stain with GPCs. I&D of shoulder abscesses 10/5/19, per gen surg. Head CT was repeated and showed new R basal ganglia infarcts. MRI of brain showed evidence of R thalamic infarct and ventriculitis. CTA of head completed 10/8 showing atherosclerotic plaquing of the distal vertebral  arteries and concern for noncalcified plaquing and stenosis; no high-grade stenosis or proximal occlusion. Patient underwent laminectomy on 10/9. Patient was deemed medically stable and transferred to [facility] to participate in acute inpatient therapy. Admission to SNF for secondary weakness and debility.    Patient reports insomnia is slightly improved, continue plan and monitor.  Patient reports persistent diarrhea this a.m. All of today's labs reviewed. No leukocytosis. Hemoglobin stable at 8.5. Platelets stable at 330. Sodium at 137. Potasium 4.4. Creatinine stable at 0.7. 24 hr blood glucose 79. Afebrile. Patient's current blood pressure is at 161/68. Patient denies abdominal discomfort, nausea, or vomiting. Patient reports an adequate appetite.  Patient denies dysuria. Patient progessing well with PT/OT. Continuing to follow and treat all acute and chronic conditions.    PEx  Constitutional: Patient appears well-developed and in no distress   HENT:   Head: Normocephalic and atraumatic.   Eyes: Pupils are equal, round, and reactive to light.   Neck: Normal range of motion. Neck supple.   Cardiovascular: Normal rate, regular rhythm and normal heart sounds.    Pulmonary/Chest: Effort normal and breath sounds are clear  Abdominal: Soft. Bowel sounds are normal.   Musculoskeletal: Normal range of motion. + generalized weakness.  Neurological: Alert and oriented to person, place, and time.   Psychiatric: Normal mood and affect. Behavior is normal.   Wounds/Skin: Skin is warm and dry.    Wound location:   Midline lower back incision completely healed over and claudia, pink scar  Wound length: 10 cm   Following: This NP weekly- last observed on 11/4/2019    Temp:  [98.1 °F (36.7 °C)-98.3 °F (36.8 °C)]   Pulse:  [88-98]   Resp:  [16-18]   BP: (140-161)/(68-82)   SpO2:  [95 %-96 %]   Body mass index is 19.15 kg/m².     ROS  Constitutional: Negative for fever and malaise/fatigue.   Eyes: Negative for  blurred vision, double vision and discharge.   Respiratory: Negative for cough, shortness of breath and wheezing.    Cardiovascular: Negative for chest pain, palpitations, claudication, and leg swelling.   Gastrointestinal: Negative for abdominal pain, constipation, nausea and vomiting.   Genitourinary: Negative for dysuria, frequency and urgency.   Musculoskeletal:  + generalized weakness. Negative for back pain and myalgias. + diarrhea  Skin: Negative for itching and rash.  Neurological: Negative for dizziness, speech change, seizures, and headaches.   Psychiatric/Behavioral: Negative for depression. The patient is not nervous/anxious.      Past Medical History: Patient has a past medical history of Anxiety, Carotid bruit, Chronic pain, Cystitis, Cystocele, unspecified (CODE), Depression, GI bleed, History of uterine fibroid, Shortness of breath on exertion, Spinal stenosis, and Urinary retention.    Past Surgical History: Patient has a past surgical history that includes Hysterectomy (1989); Anterior vaginal repair (8-); tubiligation; Cardiac catheterization (age 12); Cholecystectomy (2015); Colonoscopy (12/12/2018); Colonoscopy (N/A, 12/12/2018); and Laminectomy (N/A, 10/9/2019).    Social History: Patient reports that she has been smoking cigarettes. She has a 20.00 pack-year smoking history. She has never used smokeless tobacco. She reports that she does not drink alcohol or use drugs.    Family History: family history includes Alzheimer's disease in her mother; Hypertension in her brother and sister; Osteoarthritis in her mother; Thyroid disease in her mother.    Allergies: Patient is allergic to pcn [penicillins].      Assessment and Plan:  Hypertension  -continue amlodipine 10 mg daily and Lopressor 100 mg b.i.d.  -11/4 initiated losartan 25 mg daily    Insomnia  -11/1 adjusted ramelteon to 8 mg q.p.m.  -11/4 stable    Diarrhea, new  -11/1 initiated Imodium 2 mg p.o. Q 4 hr p.r.n.  -11/4  diphenoxylate-atropine 2.5-0.025 mg per tablet 1 tablet qid prn and x 1 now    Hypokalemia  -11/1 initiated potassium 40 mEq q.4 hours x2  -11/4 stable    Urinary retention  Patient self-caths PRN   -11/1 move initiated order to discontinued indwelling Henriquez catheter  -11/4 initiated order for patient ok to self cath    Ongoing, Stable Problems    Epidural Abcess  Abscess Upper Extremities  Septic Pulmonary and Brain Emboli   Acute Bacterial Endocarditis  -hx IVDA  -Continuing oxacillin 12 g continuous for MSSA endocarditis per ID recs with end date 11/20     Metabolic encephalopathy  Right thalamic stroke  -Encephalopathy likely multiple factorial etiology: metabolic, infectious, and due to stroke  -Brain MRI 9/30 with lacunar infarcts  -Repeat head CT(10/7) revealed new right basal ganglia infarcts  -MRI of brain revealed R thalamic infarct, ventriculitis, subactue embolic infarctions  -CTA of head 10/8: atherosclerotic plaquing of the distal vertebral arteries and concern for noncalcified plaquing and stenosis; no high-grade stenosis or proximal occlusion. No mycotic aneurysms  -Per vasc neuro, no systemic anticoagulation and asa due to high risk for hemorrhage from septic emboli  -11/1 initiated atorvastatin 40 mg daily    Debility  -Continue with PT/OT for gait training and strengthening and restoration of ADL's   -Encourage mobility, OOB in chair, and early ambulation as appropriate  -Fall precautions   -Monitor for bowel and bladder dysfunction  -Monitor for and prevent skin breakdown and pressure ulcers  -Continue DVT prophylaxis with lovenox     Acute on chronic diastolic congestive heart failure  -continue lasix 40 mg daily      Anemia  -continue ferrous sulfate and vitamin-C    Chronic back pain  -continue Tylenol, gabapentin, ibuprofen, methyl salicylate-menthol 15-10% cream    Depression/anxiety  -continue venlafaxine    Future Appointments   Date Time Provider Department Center   11/4/2019  2:40 PM  Alondra Barksdale PA-C [facility] Cli   11/20/2019 10:00 AM INFECTIOUS DISEASE, [facility] INFECTD [facility] Cli   11/25/2019  3:20 PM Martin Lewis DO [facility]       Patient note was created using MModal Dictation.  Any errors in syntax or even information may not have been identified and edited on initial review prior to signing this note.     Messi Espinoza NP  DOS 11/4/2019

## 2019-11-04 NOTE — PT/OT/SLP PROGRESS
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 : Yes  Keep patient NPO?: No   General Precautions: Standard, aspiration  Current Respiratory Status: room air       Subjective:  "I like to do crossword puzzles."  "I don't know how to do that" re: pt stating that  actually manages finances     Pain/Comfort  Pain Rating 1: 0/10  Pain Rating Post-Intervention 1: 9/10("pain everywhere")  Pain Addressed 2: Cessation of Activity(nurse administered meds)    Objective:    Pt seen for ongoing cognitive-linguistic therapy sessions. Pt awake/alert with  at bedside upon arrival. Pt demonstrated better motivation this session and insight towards deficits. She was oriented to month and situation independently, however required min cues to orient to year and place. Pt recalled 2/3 unrelated words independently given a 3 minute delay and utilized compensatory association strategies and min cues to recall 3/3 words. She listed 8 animals and 9 beverages independently in one minute independently. Pt completed moderate level problem solving tasks with 80% accuracy independently/100% accuracy provided min cues, providing multiple hypothetical situations. Inconsistencies in sentences tasks completed with 80% accuracy independently/100% accuracy provided min cues. Pt completed mental manipulation task of rearranging words in alphabetical order with 80% accuracy independently/100% accuracy provided min cues. She completed a simple word-search " puzzle of 8 words in less than 2 minutes demonstrating good attention to task, visual spatial skills, reading abilities, and mental flexibility. She wrote her name and generated a sentence with poor legibility however she reported her handwriting is unchanged and she has been shaking in the mornings since the stroke. Pt completed clock-drawing task with no visual spatial deficits evident, good placement of all numbers, and appropriate attention to task.  Education provided to pt and  regarding SLP's role in pt's POC, rationale for each cognitive task, safety awareness, higher level cognition tasks in therapy that can be completed, and ongoing POC. Pt and  expressed understanding. No further questions.       Assessment:  Mesha Mckenzie is a 63 y.o. 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           Discharge recommendations: Discharge Facility/Level of Care Needs: (tbd)     Goals:   Multidisciplinary Problems     SLP Goals        Problem: SLP Goal    Goal Priority Disciplines Outcome   SLP Goal     SLP Ongoing, Progressing   Description:  Speech Language Pathology Goals  Goals expected to be met by 11/8:  1. Pt will orient x 4 given mod cues.   2. Following a 3 minute delay, pt will recall 3/3 words or facts given mod cues.  3. Pt will complete moderate level problem solving tasks with 70% accuracy given moderate cues.   4. Pt will complete mental manipulation tasks with 70% accuracy given moderate cues to improve cognitive flexibility.   5. Pt will list 8 items in a category in one minute given min-mod cues.   6. Pt will participate in assessment of reading, writing, visual spatial, and functional math abilities.                          Plan:   Patient to be seen Therapy Frequency: 5 x/week  Planned Interventions: Cognitive-Linguistic Therapy  Plan of Care  expires: 12/01/19  Plan of Care reviewed with: patient, spouse  SLP Follow-up?: Yes              GUANAKITO Patterson  11/04/2019

## 2019-11-04 NOTE — NURSING
Assisted transfer bed to w/, disconnected IVABX and flushed PICC line. Pt transported via hospital w/ van to neurology appt [facility].

## 2019-11-04 NOTE — PT/OT/SLP PROGRESS
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  Pt. Reported that she kept going to the bathroom with loose BM (nurse aware)    Pain/Comfort  Pain Rating 1: 10/10  Location - Side 1: Bilateral  Location - Orientation 1: lower  Location 1: back  Pain Addressed 1: Reposition, Distraction, Pre-medicate for activity  Pain Rating Post-Intervention 1: (reports continued pain )    Objective:  Patient found with: PICC line(supine in bed)    Occupational Performance:    Bed Mobility:    · Patient completed Rolling/Turning to Left with  stand by assistance with use of bed rail and vc's to maintain spinal precautions  · Patient completed Rolling/Turning to Right with stand by assistance  · Patient completed Supine to Sit with mod/maximal assistance  · Patient completed Sit to Supine with moderate assistance with assist at BLE    Functional Mobility/Transfers:  · Patient completed Sit <> Stand Transfer with moderate assistance  with  no assistive device  from EOB  · Functional Mobility: Pt. Performed 2 side step to HOB without an AD with Min/Mod A    Activities of Daily Living:  · Grooming: set up A seated with HOB elevated declined w/c 2/2 10/10 pain on this date  · Upper Body Dressing: moderate assistance to don gown forward  · Lower Body Dressing: total assistance to don pants in supine with rolling to manage over hips and making bridge  · Toileting: total assistance with loose BM noted in diaper on OT arrival   · Bathing Mod A seated EOB sponge bath    AMPA 6 Click:  [facility] Total Score: 13    OT Exercises: n/a    Additional  Treatment:  Pt. Educated on spinal precautions   pt educated on importance of OOB/mobility    Patient left supine with all lines intact and call button in reach    ASSESSMENT:  Mesha Mckenzie is a 63 y.o. 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 Presents with significant pain in back and requires max encouragement for participation. Pt. Has deficits with mobility, cognition and self-care skills and would benefit from continued OT services. .    Rehab identified problem list/impairments: impaired endurance, impaired self care skills, impaired functional mobilty, impaired cognition, decreased lower extremity function, pain, orthopedic precautions    Rehab potential is fair    Activity tolerance: Fair    Discharge recommendations: home health OT(with light assist)     Barriers to discharge: Decreased caregiver support     Equipment recommendations: walker, rolling, wheelchair, commode, shower chair     GOALS:   Multidisciplinary Problems     Occupational Therapy Goals        Problem: Occupational Therapy Goal    Goal Priority Disciplines Outcome Interventions   Occupational Therapy Goal     OT, PT/OT Ongoing, Progressing    Description:  Goals to be met by: 11-20-19     Patient will increase functional independence with ADLs by performing:    Feeding with Set-up Assistance.  UE Dressing with Set-up Assistance.  LE Dressing with Supervision.  Grooming while seated with Supervision.  Toileting from bedside commode with Supervision for hygiene and clothing management.   Bathing from  shower chair/bench with Stand-by Assistance.  Rolling to Bilateral with Modified Ponder.   Supine to sit with Modified Ponder.  Stand pivot transfers with Supervision.  Toilet transfer to bedside commode with Supervision.  Pt. To be able to recall 3/3 spinal precautions  Pt. To be able to perform HEP for BUE to improve endurance with S   pt. To participate in dynamic standing task x 5  minutes with S  Pt. To participate in leisure activities 2x/week                      Plan:  Patient to be seen 5 x/week to address the above listed problems via self-care/home management, therapeutic activities, therapeutic exercises  Plan of Care expires: 11/30/19  Plan of Care reviewed with: patient    MARIA ISBAEL Adams  11/04/2019

## 2019-11-04 NOTE — LETTER
November 4, 2019      Darling Mccullough MD  [address] [facility] [address]           [facility]  [address]  [facility] [address]  Phone: [phone]  Fax: [phone]          Patient: Mesha Mckenzie   MR Number: [id number]   YOB: 1955   Date of Visit: 11/4/2019       Dear Dr. Darling Mccullough:    Thank you for referring Mesha Mckenzie to me for evaluation. Attached you will find relevant portions of my assessment and plan of care.    If you have questions, please do not hesitate to call me. I look forward to following Mesha Mckenzie along with you.    Sincerely,    Alondra Barksdale PA-C    Enclosure  CC:  No Recipients    If you would like to receive this communication electronically, please contact [email or web address] or [phone] to request more information on [employer] Link access.    For providers and/or their staff who would like to refer a patient to [facility], please contact us through our one-stop-shop provider referral line, [facility], at [phone].    If you feel you have received this communication in error or would no longer like to receive these types of communications, please e-mail [email or web address]

## 2019-11-04 NOTE — PATIENT INSTRUCTIONS
-Keep incision open to air   -Can shower and get incision wet, just pat dry and no vigorous scrubbing. Do not submerge incision for another 4 weeks.   -No lifting more than 10 lbs or excessive bending/twisting.   -Follow up with Dr. Lewis in 4 weeks   -Please call with any questions or concerns prior to your next appointment.

## 2019-11-04 NOTE — PLAN OF CARE
Repositions with assist, no new skin breakdowns noted. Excoriation to buttocks, barrier applied. Afebrile. Oxacillin IVABX scheduled. Monitored for pain and safety. Safety maintained. Pain rmeds reviewed by MD

## 2019-11-04 NOTE — NURSING
Instructed on and performed sterile In/out cath, obtained 350cc cloudy yellow, no odor. Pt tolerated well. Call light in reach

## 2019-11-04 NOTE — PLAN OF CARE
Problem: SLP Goal  Goal: SLP Goal  Description  Speech Language Pathology Goals  Goals expected to be met by 11/8:  1. Pt will orient x 4 given mod cues.   2. Following a 3 minute delay, pt will recall 3/3 words or facts given mod cues.  3. Pt will complete moderate level problem solving tasks with 70% accuracy given moderate cues.   4. Pt will complete mental manipulation tasks with 70% accuracy given moderate cues to improve cognitive flexibility.   5. Pt will list 8 items in a category in one minute given min-mod cues.   6. Pt will participate in assessment of reading, writing, visual spatial, and functional math abilities.        Outcome: Ongoing, Progressing  Improved motivation, affect, and cognition noted today.  Cont POC.  LEROY Madrid, CCC-SLP  Speech Language Pathologist  [phone]  11/4/2019

## 2019-11-05 LAB
BACTERIA #/AREA URNS AUTO: ABNORMAL /HPF
BILIRUB UR QL STRIP: NEGATIVE
CLARITY UR REFRACT.AUTO: ABNORMAL
COLOR UR AUTO: ABNORMAL
GLUCOSE UR QL STRIP: NEGATIVE
HGB UR QL STRIP: ABNORMAL
KETONES UR QL STRIP: NEGATIVE
LEUKOCYTE ESTERASE UR QL STRIP: ABNORMAL
MICROSCOPIC COMMENT: ABNORMAL
NITRITE UR QL STRIP: NEGATIVE
PH UR STRIP: 6 [PH] (ref 5–8)
PROT UR QL STRIP: NEGATIVE
RBC #/AREA URNS AUTO: 17 /HPF (ref 0–4)
SP GR UR STRIP: 1.01 (ref 1–1.03)
URN SPEC COLLECT METH UR: ABNORMAL
WBC #/AREA URNS AUTO: 71 /HPF (ref 0–5)

## 2019-11-05 PROCEDURE — 97535 SELF CARE MNGMENT TRAINING: CPT

## 2019-11-05 PROCEDURE — 63600175 PHARM REV CODE 636 W HCPCS: Performed by: STUDENT IN AN ORGANIZED HEALTH CARE EDUCATION/TRAINING PROGRAM

## 2019-11-05 PROCEDURE — 87077 CULTURE AEROBIC IDENTIFY: CPT

## 2019-11-05 PROCEDURE — 87186 SC STD MICRODIL/AGAR DIL: CPT

## 2019-11-05 PROCEDURE — 97110 THERAPEUTIC EXERCISES: CPT

## 2019-11-05 PROCEDURE — 87086 URINE CULTURE/COLONY COUNT: CPT

## 2019-11-05 PROCEDURE — 81001 URINALYSIS AUTO W/SCOPE: CPT

## 2019-11-05 PROCEDURE — 25000003 PHARM REV CODE 250: Performed by: STUDENT IN AN ORGANIZED HEALTH CARE EDUCATION/TRAINING PROGRAM

## 2019-11-05 PROCEDURE — 87088 URINE BACTERIA CULTURE: CPT

## 2019-11-05 PROCEDURE — 92507 TX SP LANG VOICE COMM INDIV: CPT

## 2019-11-05 PROCEDURE — 25000003 PHARM REV CODE 250: Performed by: NURSE PRACTITIONER

## 2019-11-05 PROCEDURE — 25000003 PHARM REV CODE 250: Performed by: INTERNAL MEDICINE

## 2019-11-05 PROCEDURE — 11000004 HC SNF PRIVATE

## 2019-11-05 RX ADMIN — METOPROLOL TARTRATE 100 MG: 50 TABLET ORAL at 09:11

## 2019-11-05 RX ADMIN — FOLIC ACID 1 MG: 1 TABLET ORAL at 09:11

## 2019-11-05 RX ADMIN — PANTOPRAZOLE SODIUM 40 MG: 40 TABLET, DELAYED RELEASE ORAL at 09:11

## 2019-11-05 RX ADMIN — VENLAFAXINE 75 MG: 37.5 TABLET ORAL at 08:11

## 2019-11-05 RX ADMIN — MICONAZOLE NITRATE: 20 OINTMENT TOPICAL at 09:11

## 2019-11-05 RX ADMIN — LOPERAMIDE HYDROCHLORIDE 2 MG: 2 CAPSULE ORAL at 05:11

## 2019-11-05 RX ADMIN — ENOXAPARIN SODIUM 40 MG: 100 INJECTION SUBCUTANEOUS at 05:11

## 2019-11-05 RX ADMIN — AMLODIPINE BESYLATE 10 MG: 10 TABLET ORAL at 09:11

## 2019-11-05 RX ADMIN — DIPHENOXYLATE HYDROCHLORIDE AND ATROPINE SULFATE 1 TABLET: 2.5; .025 TABLET ORAL at 09:11

## 2019-11-05 RX ADMIN — FUROSEMIDE 40 MG: 40 TABLET ORAL at 09:11

## 2019-11-05 RX ADMIN — LOPERAMIDE HYDROCHLORIDE 2 MG: 2 CAPSULE ORAL at 08:11

## 2019-11-05 RX ADMIN — ACETAMINOPHEN 650 MG: 325 TABLET ORAL at 08:11

## 2019-11-05 RX ADMIN — ACETAMINOPHEN 650 MG: 325 TABLET ORAL at 09:11

## 2019-11-05 RX ADMIN — MICONAZOLE NITRATE: 20 OINTMENT TOPICAL at 08:11

## 2019-11-05 RX ADMIN — ATORVASTATIN CALCIUM 40 MG: 20 TABLET, FILM COATED ORAL at 09:11

## 2019-11-05 RX ADMIN — GABAPENTIN 200 MG: 100 CAPSULE ORAL at 02:11

## 2019-11-05 RX ADMIN — GABAPENTIN 200 MG: 100 CAPSULE ORAL at 09:11

## 2019-11-05 RX ADMIN — OXACILLIN SODIUM 12 G: 1 INJECTION, POWDER, FOR SOLUTION INTRAMUSCULAR; INTRAVENOUS at 10:11

## 2019-11-05 RX ADMIN — POTASSIUM CHLORIDE 20 MEQ: 1500 TABLET, EXTENDED RELEASE ORAL at 05:11

## 2019-11-05 RX ADMIN — POTASSIUM CHLORIDE 20 MEQ: 1500 TABLET, EXTENDED RELEASE ORAL at 09:11

## 2019-11-05 RX ADMIN — Medication 1 CAPSULE: at 09:11

## 2019-11-05 RX ADMIN — LOSARTAN POTASSIUM 25 MG: 25 TABLET, FILM COATED ORAL at 09:11

## 2019-11-05 RX ADMIN — VENLAFAXINE 75 MG: 37.5 TABLET ORAL at 09:11

## 2019-11-05 RX ADMIN — RAMELTEON 8 MG: 8 TABLET ORAL at 08:11

## 2019-11-05 RX ADMIN — METOPROLOL TARTRATE 100 MG: 50 TABLET ORAL at 08:11

## 2019-11-05 RX ADMIN — ACETAMINOPHEN 650 MG: 325 TABLET ORAL at 02:11

## 2019-11-05 RX ADMIN — GABAPENTIN 200 MG: 100 CAPSULE ORAL at 08:11

## 2019-11-05 NOTE — PROGRESS NOTES
"[facility]  Adult Nutrition  Progress Note    SUMMARY   Recommendations    Recommendation/Intervention: Recommend dc 2gm Na diet, change to regular diet, boost plus TID only vanilla, beneprotein with meals, RD to encourage po intake  Goals: PO to meet 50% of EEN and EPN needs stable weight > 110# by next RD visit  Nutrition Goal Status: new  Communication of RD Recs: reviewed with physician    Reason for Assessment    Reason For Assessment: RD follow-up  Diagnosis: (respiratory distress)  Relevant Medical History: s/p acute bacterial endocarditis, septic PE, spinal epidural abscess, severe malnutrition, COPD, depression, anxiety, spinal stenosis, chronic pain(sp Lami)  Interdisciplinary Rounds: attended  General Information Comments: PO has improved pt is ordering from memu, low sodium diet has been removed. encourage po intake  Nutrition Discharge Planning: DC on regular diet    Nutrition Risk Screen    Nutrition Risk Screen: no indicators present    Nutrition/Diet History    Typical Food/Fluid Intake: has been letting her  cook, small meals,   Spiritual, Cultural Beliefs, Episcopal Practices, Values that Affect Care: no  Food Allergies: NKFA(cannot tolerate Zatarains seasoning on foods)  Factors Affecting Nutritional Intake: decreased appetite    Anthropometrics    Temp: 98.1 °F (36.7 °C)  Height Method: Stated  Height: 5' 4" (162.6 cm)  Height (inches): 64 in  Weight Method: Bed Scale  Weight: 50.6 kg (111 lb 8.8 oz)  Weight (lb): 111.55 lb  Ideal Body Weight (IBW), Female: 120 lb  % Ideal Body Weight, Female (lb): 95.72 lb  BMI (Calculated): 19.8  BMI Grade: 18.5-24.9 - normal  Usual Body Weight (UBW), k.18 kg  % Usual Body Weight: 98.17  % Weight Change From Usual Weight: -2.03 %       Lab/Procedures/Meds    Pertinent Labs Reviewed: reviewed  Pertinent Labs Comments: PO4 4.8  Pertinent Medications Reviewed: reviewed  Pertinent Medications Comments: lovenox, Fe, folic acid, " lactobacillus GG, psyllium husk, lasix        Estimated/Assessed Needs    Weight Used For Calorie Calculations: 52.1 kg (114 lb 13.8 oz)  Energy Calorie Requirements (kcal): 1673-7768  Energy Need Method: Kcal/kg(30-35 kcal/kg )  Protein Requirements: 67-78g  Weight Used For Protein Calculations: 52.1 kg (114 lb 13.8 oz)(1.3-1.5g/kg)  Fluid Requirements (mL): 1 ml per Kcal or per MD  Estimated Fluid Requirement Method: RDA Method  RDA Method (mL): 1563  CHO Requirement: -      Nutrition Prescription Ordered    Current Diet Order: Regular  Nutrition Order Comments: 2000 ml fluid restriciton  Current Nutrition Support Frequency Ordered: beneprotein TID, boost plus BID,   Oral Nutrition Supplement: Boost plus TID only vanilla    Evaluation of Received Nutrient/Fluid Intake    Energy Calories Required: not meeting needs  Protein Required: not meeting needs  Fluid Required: meeting needs  Comments: PO improved from 25% to 50-75%  Tolerance: tolerating  % Intake of Estimated Energy Needs: 50 - 75 %  % Meal Intake: 50 - 75 %    Nutrition Risk    Level of Risk/Frequency of Follow-up: high     Assessment and Plan    Severe malnutrition  Malnutrition in the context of Acute Illness/Injury     Related to (etiology):  Inability to eat po (N/V) poor appetite, picky eater, SOB     Signs and Symptoms (as evidenced by):  Energy Intake: <50% of estimated energy requirement for > 7 days  Body Fat Depletion: moderate depletion of orbitals, triceps and thoracic and lumbar region   Muscle Mass Depletion: severe depletion of temples, clavicle region and interosseous muscle     Interventions/Recommendations (treatment strategy):  General diet  Commercial beverage- calories and protein- boost plus vanilla BID  Commercial food- protein- beneprotein to fluids TID  Nutrition education- nutrient needs, malnutrition     Nutrition Diagnosis Status:  Ongoing      Monitor and Evaluation    Food and Nutrient Intake: energy intake  Food and Nutrient  Adminstration: diet order  Knowledge/Beliefs/Attitudes: food and nutrition knowledge/skill  Physical Activity and Function: nutrition-related ADLs and IADLs  Anthropometric Measurements: weight change  Biochemical Data, Medical Tests and Procedures: glucose/endocrine profile, inflammatory profile, electrolyte and renal panel, gastrointestinal profile  Nutrition-Focused Physical Findings: overall appearance     Malnutrition Assessment  10/31/19  Malnutrition Type: acute illness or injury  Energy Intake: severe energy intake  Skin (Micronutrient): wounds unhealed, pallor, dry  Hair/Scalp (Micronutrient): dry, dull  Eyes (Micronutrient): conjunctiva dull, conjunctiva dry  Neck/Chest (Micronutrient): bony prominence, muscle wasting, subcutaneous fat loss  Musculoskeletal/Lower Extremities: subcutaneous fat loss, muscle wasting       Energy Intake (Malnutrition): less than or equal to 50% for greater than or equal to 5 days   Orbital Region (Subcutaneous Fat Loss): moderate depletion  Upper Arm Region (Subcutaneous Fat Loss): moderate depletion  Thoracic and Lumbar Region: mild depletion   [place] Region (Muscle Loss): severe depletion  Clavicle Bone Region (Muscle Loss): severe depletion  Clavicle and Acromion Bone Region (Muscle Loss): severe depletion  Dorsal Hand (Muscle Loss): severe depletion  Patellar Region (Muscle Loss): moderate depletion  Anterior Thigh Region (Muscle Loss): moderate depletion  Posterior Calf Region (Muscle Loss): moderate depletion   Edema (Fluid Accumulation): 0-->no edema present             Nutrition Follow-Up    RD Follow-up?: Yes

## 2019-11-05 NOTE — PLAN OF CARE
Problem: SLP Goal  Goal: SLP Goal  Description  Speech Language Pathology Goals  Goals expected to be met by 11/8:  1. Pt will orient x 4 given mod cues.   2. Following a 3 minute delay, pt will recall 3/3 words or facts given mod cues.  3. Pt will complete moderate level problem solving tasks with 70% accuracy given moderate cues.   4. Pt will complete mental manipulation tasks with 70% accuracy given moderate cues to improve cognitive flexibility.   5. Pt will list 8 items in a category in one minute given min-mod cues.   6. Pt will participate in assessment of reading, writing, visual spatial, and functional math abilities.        Outcome: Ongoing, Progressing    Pt continues to need encouragement due to decreased motivation. Cont POC.   LEROY Madrid, CCC-SLP  Speech Language Pathologist  [phone]  11/5/2019

## 2019-11-05 NOTE — PLAN OF CARE
Problem: Adult Inpatient Plan of Care  Goal: Plan of Care Review  Outcome: Ongoing, Progressing   Severe malnutrition  Malnutrition in the context of Acute Illness/Injury     Related to (etiology):  Inability to eat po (N/V) poor appetite, picky eater, SOB     Signs and Symptoms (as evidenced by):  Energy Intake: <50% of estimated energy requirement for > 7 days  Body Fat Depletion: moderate depletion of orbitals, triceps and thoracic and lumbar region   Muscle Mass Depletion: severe depletion of temples, clavicle region and interosseous muscle     Interventions/Recommendations (treatment strategy):  General diet  Commercial beverage- calories and protein- boost plus vanilla BID  Commercial food- protein- beneprotein to fluids TID  Nutrition education- nutrient needs, malnutrition     Nutrition Diagnosis Status:  Ongoing

## 2019-11-05 NOTE — NURSING
"Called to room by pt c/o "bloating". Performed sterile in/out cath obtained 300cc cloudy urine and sent specimen to lab for u/a reflex culture. Post in/out bladder scan reading 369. Pt verbalized no discomfort. Secure chat notified OSEAS Crawford. Will perform in/out cath at 6pm or earlier if needed per pt.   "

## 2019-11-05 NOTE — PT/OT/SLP PROGRESS
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  I am not doing that again.    Pain/Comfort  Pain Rating 1: 10/10  Location - Side 1: Bilateral  Location - Orientation 1: lower  Location 1: back  Pain Addressed 1: Reposition, Distraction, Nurse notified  Pain Rating Post-Intervention 1: 10/10  Pain Rating 2: 8/10  Location - Side 2: Bilateral  Location - Orientation 2: (ovaries)  Pain Addressed 2: Nurse notified    Objective:  Patient found with: (supine in bed/grandson present)    Occupational Performance:    Bed Mobility:    · Patient completed Rolling/Turning to Left with  minimum assistance   · Supine to sit Max A    Functional Mobility/Transfers:  · Patient completed Sit <> Stand Transfer with moderate assistance  with  no assistive device   · Patient completed Bed <> Chair Transfer using Stand Pivot technique with moderate assistance with no assistive device  · Patient completed Toilet Transfer Stand Pivot technique with moderate assistance with  grab bars  · Functional Mobility: not performed    Activities of Daily Living:  · Grooming: maximal assistance to wash hair and attempt to comb hair  · Bathing: moderate assistance seated on bedside commode with assist to wash buttocks region and below knees  · Upper Body Dressing: moderate assistance to don gown forward  · Lower Body Dressing: maximal assistance to don pants  · Toileting: total assistance reports not being aware when goes    [facility] 6 Click:  [facility] Total Score: 13    OT Exercises: n/a    Additional Treatment:  Pt. Tolerated shower seated on bedside commode  on this date with max encouragement   Pt. Required extensive assist for transfers  As well as self-care tasks    Patient left up in chair with all lines intact in gym with S    ASSESSMENT:  Mesha Mckenzie is a 63 y.o. 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  Pt. Has significant  Limitations 2/2 pain.  Pt. Tolerated shower fairly but reported had several reports of pain. .    Rehab identified problem list/impairments: impaired endurance, impaired self care skills, impaired functional mobilty, impaired cognition, decreased lower extremity function, pain, orthopedic precautions    Rehab potential is fair    Activity tolerance: Fair    Discharge recommendations: home health OT(with light assist)     Barriers to discharge: Decreased caregiver support     Equipment recommendations: walker, rolling, wheelchair, commode, shower chair     GOALS:   Multidisciplinary Problems     Occupational Therapy Goals        Problem: Occupational Therapy Goal    Goal Priority Disciplines Outcome Interventions   Occupational Therapy Goal     OT, PT/OT Ongoing, Progressing    Description:  Goals to be met by: 11-20-19     Patient will increase functional independence with ADLs by performing:    Feeding with Set-up Assistance.  UE Dressing with Set-up Assistance.  LE Dressing with Supervision.  Grooming while seated with Supervision.  Toileting from bedside commode with Supervision for hygiene and clothing management.   Bathing from  shower chair/bench with Stand-by Assistance.  Rolling to Bilateral with Modified [place].   Supine to sit with Modified [place].  Stand pivot transfers with Supervision.  Toilet transfer to bedside commode with Supervision.  Pt. To be able to recall 3/3 spinal precautions  Pt. To be able to perform HEP for BUE to improve endurance with S   pt. To participate in dynamic standing task x  5 minutes with S  Pt. To participate in leisure activities 2x/week                      Plan:  Patient to be seen 5 x/week to address the above listed problems via self-care/home management, therapeutic activities, therapeutic exercises  Plan of Care expires: 11/30/19  Plan of Care reviewed with: patient    MARIA ISABEL Adams  11/05/2019

## 2019-11-05 NOTE — DISCHARGE SUMMARY
"DISCHARGE SUMMARY  Hospital Medicine    Team: [facility]    Patient Name: Mesha Mckenzie  YOB: 1955    Admit Date: 10/27/2019    Discharge Date: 11/05/2019     Discharge Attending Physician: No att. providers found     Resident on Service:     Chief Complaint:     Princilpal Diagnoses:  Active Hospital Problems    Diagnosis  POA    *Acute hypoxemic respiratory failure [J96.01]  Yes    Urinary retention [R33.9]  Yes    Debility [R53.81]  Yes    Acute on chronic diastolic congestive heart failure [I50.33]  Yes      Resolved Hospital Problems   No resolved problems to display.       Discharged Condition: Admit problems have stabilized     HOSPITAL COURSE:      Initial Presentation:  63F recently hospitalized here 10/2-10/25 for acute bacterial endocarditis with sequelae of septic pulmonary emboli, septic emboli to brain causing thalamic stroke, spinal epidural abscess discharged to SNF for PT/OT returns for acute onset of respiratory distress.  She says the dyspnea came on suddenly and then she panicked which made it worse, and she normally takes something for anxiety.  She denies any other respiratory symptoms prior to this including cough or congestion.  She does admit to orthopnea for the past few weeks, and when asked if it has been worse the past couple days she answers "maybe."  She denies fevers or chills, but does report waking up with sweats for the past few nights.  She denies noticing any ankle edema.    In the ED she was put on BiPAP for her respiratory distress and given IV lasix and rapidly improved and was able to be weaned off the BiPAP.  At the time of my interview she is comfortable on nasal cannula.      Course of Principle Problem for Admission:  Patient was diuresed with lasix, repeat 2D echo without any vegetations and EF of 55%. She improved significantly with the lasix and was stable to go back to SNF without requiring any oxygen.          CONSULTS: N/A    Vitals:    " 10/30/19 0804 10/30/19 1104 10/30/19 1148 10/30/19 1627   BP: (!) 157/68  (!) 118/58 (!) 112/55   BP Location:   Right arm    Patient Position:   Lying Lying   Pulse: 104 90 82 102   Resp: 18  18 18   Temp: 98.7 °F (37.1 °C)  98 °F (36.7 °C) 98.7 °F (37.1 °C)   TempSrc: Oral  Oral Oral   SpO2: (!) 91%  (!) 93% (!) 94%   Weight:       Height:         Physical Exam   Constitutional: She is oriented to person, place, and time. She appears well-developed and well-nourished. She is cooperative. No distress.   Eyes: Conjunctivae and EOM are normal. No scleral icterus. .   Cardiovascular: Normal rate, regular rhythm, S1 normal, S2 normal and intact distal pulses. Exam reveals no gallop.   Murmur heard.   Pulmonary/Chest: Effort normal. She has rales in the right lower field, the left middle field and the left lower field.   Abdominal: Soft. Bowel sounds are normal. She exhibits no distension. There is no tenderness. There is no guarding.   Musculoskeletal: Normal range of motion. She exhibits no edema, tenderness or deformity.   Lymphadenopathy:     She has no cervical adenopathy.   Neurological: She is alert and oriented to person, place, and time. No cranial nerve deficit.   Skin: Skin is warm and dry. No erythema.    Last CBC/BMP/HgbA1c (if applicable):  Recent Results (from the past 336 hour(s))   CBC auto differential    Collection Time: 11/04/19  6:28 AM   Result Value Ref Range    WBC 8.12 3.90 - 12.70 K/uL    Hemoglobin 8.5 (L) 12.0 - 16.0 g/dL    Hematocrit 28.1 (L) 37.0 - 48.5 %    Platelets 330 150 - 350 K/uL   CBC with Automated Differential    Collection Time: 10/31/19  4:00 AM   Result Value Ref Range    WBC 7.69 3.90 - 12.70 K/uL    Hemoglobin 8.5 (L) 12.0 - 16.0 g/dL    Hematocrit 27.9 (L) 37.0 - 48.5 %    Platelets 321 150 - 350 K/uL   CBC with Automated Differential    Collection Time: 10/30/19  5:34 AM   Result Value Ref Range    WBC 7.00 3.90 - 12.70 K/uL    Hemoglobin 8.9 (L) 12.0 - 16.0 g/dL     Hematocrit 28.6 (L) 37.0 - 48.5 %    Platelets 308 150 - 350 K/uL     Recent Results (from the past 336 hour(s))   Basic Metabolic Panel (BMP)    Collection Time: 11/04/19  6:28 AM   Result Value Ref Range    Sodium 137 136 - 145 mmol/L    Potassium 4.4 3.5 - 5.1 mmol/L    Chloride 104 95 - 110 mmol/L    CO2 24 23 - 29 mmol/L    BUN, Bld 15 8 - 23 mg/dL    Creatinine 0.7 0.5 - 1.4 mg/dL    Calcium 8.9 8.7 - 10.5 mg/dL    Anion Gap 9 8 - 16 mmol/L   Basic metabolic panel    Collection Time: 10/27/19 10:47 AM   Result Value Ref Range    Sodium 140 136 - 145 mmol/L    Potassium 2.9 (L) 3.5 - 5.1 mmol/L    Chloride 107 95 - 110 mmol/L    CO2 25 23 - 29 mmol/L    BUN, Bld 9 8 - 23 mg/dL    Creatinine 0.6 0.5 - 1.4 mg/dL    Calcium 7.5 (L) 8.7 - 10.5 mg/dL    Anion Gap 8 8 - 16 mmol/L   Basic metabolic panel    Collection Time: 10/26/19  6:50 AM   Result Value Ref Range    Sodium 141 136 - 145 mmol/L    Potassium 3.1 (L) 3.5 - 5.1 mmol/L    Chloride 108 95 - 110 mmol/L    CO2 24 23 - 29 mmol/L    BUN, Bld 9 8 - 23 mg/dL    Creatinine 0.6 0.5 - 1.4 mg/dL    Calcium 7.7 (L) 8.7 - 10.5 mg/dL    Anion Gap 9 8 - 16 mmol/L     Lab Results   Component Value Date    HGBA1C 5.7 (H) 04/23/2019       Special Treatments/Procedures: N/A    Disposition:  SNF      Future Scheduled Appointments:  Future Appointments   Date Time Provider Department Center   11/20/2019 10:00 AM INFECTIOUS DISEASE, [facility] INFECT[facility]   12/9/2019  2:00 PM Martin Lewis DO [facility] NEUSUR [facility]       Follow-up Plans from This Hospitalization:  Neurosurgery and Infectious Disease    Discharge Medication List:     Mesha Mckenzie   Home Medication Instructions WILBER:[id number]    Printed on:11/05/19 4281   Medication Information                      amLODIPine (NORVASC) 10 MG tablet  Take 1 tablet (10 mg total) by mouth once daily.             ferrous sulfate 325 (65 FE) MG EC tablet  Take 1 tablet (325 mg total) by mouth once daily.              folic acid (FOLVITE) 1 MG tablet  Take 1 tablet (1 mg total) by mouth once daily.             Lactobacillus rhamnosus GG (CULTURELLE) 10 billion cell capsule  Take 1 capsule by mouth once daily.             levalbuterol (XOPENEX) 0.63 mg/3 mL nebulizer solution  Take 3 mLs (0.63 mg total) by nebulization every 6 (six) hours as needed for Wheezing or Shortness of Breath. Rescue             metoprolol tartrate (LOPRESSOR) 100 MG tablet  Take 1 tablet (100 mg total) by mouth 2 (two) times daily.             psyllium husk, aspartame, (METAMUCIL) 3.4 gram PwPk packet  Take 1 packet by mouth 2 (two) times daily.             sodium chloride 0.9% SolP 500 mL with oxacillin 1 gram SolR 12 g  Inject 12 g into the vein continuous. for 26 days                 Patient Instructions:  No discharge procedures on file.    At the time of discharge patient was told to take all medications as prescribed, to keep all followup appointments, and to call their primary care physician or return to the emergency room if they have any worsening or concerning symptoms.    Signing Physician:  Darling Mccullough MD

## 2019-11-05 NOTE — PLAN OF CARE
Problem: Physical Therapy Goal  Goal: Physical Therapy Goal  Description  Goals to be met by: 2019     Patient will increase functional independence with mobility by performin. Supine to sit with Contact Guard Assistance  2. Sit to supine with Contact Guard Assistance  3. Sit to stand transfer with Minimal Assistance  4. Bed to chair transfer with Minimal Assistance using Rolling Walker  5. Gait  x 20 feet with Minimal Assistance using Rolling Walker. = met 2019       N EW GOAL 2019 gait x 75 feet w/ RW and CGA = not met  6. Ascend/Descend 4 inch curb step with Minimal Assistance using Rolling Walker.  6a.  Ascend/Descend 4 inch curb step with use of RW via family assistance with 1 verbal cue or less for technique.  7. Stand for 3 minutes with Minimal Assistance using Rolling Walker  8. Increased functional strength to 4/5 for R quadriceps mm group.  9. Lower extremity exercise program x 20 reps per handout, with assistance as needed. Met (2019)       Outcome: Ongoing, Progressing  Met 2 goals.

## 2019-11-05 NOTE — PT/OT/SLP PROGRESS
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  Agreeable to PT exercises in bed due to fatigue and pain.    Pain/Comfort  Pain Rating 1: 7/10  Location - Side 1: Bilateral  Location - Orientation 1: lower  Location 1: back  Pain Addressed 1: Reposition  Pain Rating Post-Intervention 1: 7/10    Objective:  Patient found supine in bed.     AM-PAC 6 CLICK MOBILITY  Total Score:13    Bed Mobility:  Rolling (R/L)- SBA with bed rail.    Therex:  Clam shells- 2x10 reps  Heel slides- 2x10 reps  Hip abduction - 2x10 reps  TA isometric contractions - 2x10 reps   hip adduction isometrics - 10 reps- "That's hard", per pt.  SLR with AAROM- 2x10 reps      Additional Treatment:  Educated pt on side-lying with use of pillow for maintaining a neutral spine.    Patient left HOB elevated with call button in reach, son present.    Assessment:  Mesha Mckenzie is a 63 y.o. 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  Pt performed all exercises to the best of her ability and needed AAROM for SLR only. Will benefit from continued PT services to address her functional mobility.    Rehab identified problem list/impairments: weakness, impaired endurance, impaired self care skills, impaired functional mobilty, gait instability, impaired balance, decreased upper extremity function, decreased lower extremity function, pain    Rehab potential is fair.    Activity tolerance: Fair    Discharge recommendations: home health PT     Barriers to discharge: None    Equipment " recommendations: wheelchair, walker, rolling, bedside commode, shower chair     GOALS:   Multidisciplinary Problems     Physical Therapy Goals        Problem: Physical Therapy Goal    Goal Priority Disciplines Outcome Goal Variances Interventions   Physical Therapy Goal     PT, PT/OT Ongoing, Progressing     Description:  Goals to be met by: 2019     Patient will increase functional independence with mobility by performin. Supine to sit with Contact Guard Assistance  2. Sit to supine with Contact Guard Assistance  3. Sit to stand transfer with Minimal Assistance  4. Bed to chair transfer with Minimal Assistance using Rolling Walker  5. Gait  x 20 feet with Minimal Assistance using Rolling Walker. = met 2019       N EW GOAL 2019 gait x 75 feet w/ RW and CGA = not met  6. Ascend/Descend 4 inch curb step with Minimal Assistance using Rolling Walker.  6a.  Ascend/Descend 4 inch curb step with use of RW via family assistance with 1 verbal cue or less for technique.  7. Stand for 3 minutes with Minimal Assistance using Rolling Walker  8. Increased functional strength to 4/5 for R quadriceps mm group.  9. Lower extremity exercise program x 20 reps per handout, with assistance as needed. Met (2019)                        PLAN:    Patient to be seen 5 x/week  to address the above listed problems via gait training, therapeutic activities, therapeutic exercises, neuromuscular re-education, wheelchair management/training  Plan of Care expires: 19  Plan of Care reviewed with: patient, family    Rosmery Fitzgerald, PT  2019

## 2019-11-05 NOTE — PT/OT/SLP PROGRESS
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 : Yes  Keep patient NPO?: No   General Precautions: Standard, aspiration  Current Respiratory Status: room air       Subjective:  "I've got the shakes so bad."  "That was rough." re: pt referring to problem solving task     Pain/Comfort  Pain Rating 1: 0/10  Pain Rating Post-Intervention 1: 0/10    Objective:    Pt seen for continued cognitive-linguistic therapy sessions. Pt awake/alert with  at bedside. Pt appeared unmotivated and frustrated with tasks this date and required encouragement throughout session. She was oriented to place and situation however required mod cues, visual aid, and binary choice to orient to month and year correctly. Pt completed daily skills task with 66% accuracy independently for naming supplies needed/100% given mod cues and required mod cues to express steps of skills with 100% accuracy. She completed a 4-step sequencing task with 60% accuracy independently/100% accuracy provided min-mod cues. Pt completed medication management problem solving task involving comprehension questions with 50% accuracy independently/100% accuracy provided min-mod cues. Problem solving pictures task completed with 80% accuracy independently/100% accuracy provided min cues for problem, 80% accuracy independently/100% accuracy provided min cues for effects, and 60% accuracy independently/100% accuracy provided min cues for solutions. She attempted a crossword puzzle of 14 " words and completed 7 words accurately and independently. SLP wrote words in crossword as pt's hand shaking limited writing abilities. Pt became frustrated stating she did not know any more words and could not finish the puzzle and session was ending at this time also.   Education provided included importance of cognitive stimulation and rationale of all cognitive tasks, safety awareness at home, functional problem solving and POC. SLP recommends pt would benefit from assistance/supervision at home to ensure safety. Pt and  expressed understanding and could benefit from ongoing education. Call light left within reach.       Assessment:  Mesha Mckenzie is a 63 y.o. 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       Discharge recommendations: Discharge Facility/Level of Care Needs: ([place]/supervision at home )     Goals:   Multidisciplinary Problems     SLP Goals        Problem: SLP Goal    Goal Priority Disciplines Outcome   SLP Goal     SLP Ongoing, Progressing   Description:  Speech Language Pathology Goals  Goals expected to be met by 11/8:  1. Pt will orient x 4 given mod cues.   2. Following a 3 minute delay, pt will recall 3/3 words or facts given mod cues.  3. Pt will complete moderate level problem solving tasks with 70% accuracy given moderate cues.   4. Pt will complete mental manipulation tasks with 70% accuracy given moderate cues to improve cognitive flexibility.   5. Pt will list 8 items in a category in one minute given min-mod cues.   6. Pt will participate in assessment of reading, writing, visual spatial, and functional math abilities.                          Plan:   Patient to be seen Therapy Frequency: 5 x/week  Planned Interventions: Cognitive-Linguistic Therapy  Plan of Care expires: 12/01/19  Plan of Care reviewed with: patient, spouse  SLP  Follow-up?: Yes              GUANAKITO Patterson  11/05/2019

## 2019-11-06 LAB
C DIFF GDH STL QL: NEGATIVE
C DIFF TOX A+B STL QL IA: NEGATIVE
POCT GLUCOSE: 106 MG/DL (ref 70–110)
POCT GLUCOSE: 93 MG/DL (ref 70–110)
POCT GLUCOSE: 99 MG/DL (ref 70–110)

## 2019-11-06 PROCEDURE — 97110 THERAPEUTIC EXERCISES: CPT

## 2019-11-06 PROCEDURE — 97530 THERAPEUTIC ACTIVITIES: CPT

## 2019-11-06 PROCEDURE — 25000003 PHARM REV CODE 250: Performed by: NURSE PRACTITIONER

## 2019-11-06 PROCEDURE — 25000003 PHARM REV CODE 250: Performed by: INTERNAL MEDICINE

## 2019-11-06 PROCEDURE — 25000003 PHARM REV CODE 250: Performed by: STUDENT IN AN ORGANIZED HEALTH CARE EDUCATION/TRAINING PROGRAM

## 2019-11-06 PROCEDURE — 87449 NOS EACH ORGANISM AG IA: CPT

## 2019-11-06 PROCEDURE — 97535 SELF CARE MNGMENT TRAINING: CPT

## 2019-11-06 PROCEDURE — 11000004 HC SNF PRIVATE

## 2019-11-06 PROCEDURE — 63600175 PHARM REV CODE 636 W HCPCS: Performed by: STUDENT IN AN ORGANIZED HEALTH CARE EDUCATION/TRAINING PROGRAM

## 2019-11-06 PROCEDURE — 92507 TX SP LANG VOICE COMM INDIV: CPT

## 2019-11-06 RX ORDER — CIPROFLOXACIN 250 MG/1
250 TABLET, FILM COATED ORAL EVERY 12 HOURS
Status: COMPLETED | OUTPATIENT
Start: 2019-11-06 | End: 2019-11-11

## 2019-11-06 RX ADMIN — VENLAFAXINE 75 MG: 37.5 TABLET ORAL at 09:11

## 2019-11-06 RX ADMIN — ACETAMINOPHEN 650 MG: 325 TABLET ORAL at 02:11

## 2019-11-06 RX ADMIN — Medication 250 MG: at 09:11

## 2019-11-06 RX ADMIN — ONDANSETRON 4 MG: 4 TABLET, ORALLY DISINTEGRATING ORAL at 12:11

## 2019-11-06 RX ADMIN — AMLODIPINE BESYLATE 10 MG: 10 TABLET ORAL at 08:11

## 2019-11-06 RX ADMIN — LOSARTAN POTASSIUM 25 MG: 25 TABLET, FILM COATED ORAL at 08:11

## 2019-11-06 RX ADMIN — LOPERAMIDE HYDROCHLORIDE 2 MG: 2 CAPSULE ORAL at 02:11

## 2019-11-06 RX ADMIN — VENLAFAXINE 75 MG: 37.5 TABLET ORAL at 08:11

## 2019-11-06 RX ADMIN — CIPROFLOXACIN HYDROCHLORIDE 250 MG: 250 TABLET, FILM COATED ORAL at 09:11

## 2019-11-06 RX ADMIN — Medication 1 CAPSULE: at 08:11

## 2019-11-06 RX ADMIN — FUROSEMIDE 40 MG: 40 TABLET ORAL at 08:11

## 2019-11-06 RX ADMIN — DIPHENOXYLATE HYDROCHLORIDE AND ATROPINE SULFATE 1 TABLET: 2.5; .025 TABLET ORAL at 02:11

## 2019-11-06 RX ADMIN — GABAPENTIN 200 MG: 100 CAPSULE ORAL at 02:11

## 2019-11-06 RX ADMIN — ENOXAPARIN SODIUM 40 MG: 100 INJECTION SUBCUTANEOUS at 05:11

## 2019-11-06 RX ADMIN — FOLIC ACID 1 MG: 1 TABLET ORAL at 08:11

## 2019-11-06 RX ADMIN — MICONAZOLE NITRATE: 20 OINTMENT TOPICAL at 08:11

## 2019-11-06 RX ADMIN — POTASSIUM CHLORIDE 20 MEQ: 1500 TABLET, EXTENDED RELEASE ORAL at 05:11

## 2019-11-06 RX ADMIN — ACETAMINOPHEN 650 MG: 325 TABLET ORAL at 08:11

## 2019-11-06 RX ADMIN — ATORVASTATIN CALCIUM 40 MG: 20 TABLET, FILM COATED ORAL at 08:11

## 2019-11-06 RX ADMIN — RAMELTEON 8 MG: 8 TABLET ORAL at 09:11

## 2019-11-06 RX ADMIN — GABAPENTIN 200 MG: 100 CAPSULE ORAL at 09:11

## 2019-11-06 RX ADMIN — ACETAMINOPHEN 650 MG: 325 TABLET ORAL at 09:11

## 2019-11-06 RX ADMIN — POTASSIUM CHLORIDE 20 MEQ: 1500 TABLET, EXTENDED RELEASE ORAL at 07:11

## 2019-11-06 RX ADMIN — METOPROLOL TARTRATE 100 MG: 50 TABLET ORAL at 09:11

## 2019-11-06 RX ADMIN — MICONAZOLE NITRATE: 20 OINTMENT TOPICAL at 09:11

## 2019-11-06 RX ADMIN — PANTOPRAZOLE SODIUM 40 MG: 40 TABLET, DELAYED RELEASE ORAL at 08:11

## 2019-11-06 RX ADMIN — FERROUS SULFATE TAB EC 325 MG (65 MG FE EQUIVALENT) 325 MG: 325 (65 FE) TABLET DELAYED RESPONSE at 09:11

## 2019-11-06 RX ADMIN — GABAPENTIN 200 MG: 100 CAPSULE ORAL at 08:11

## 2019-11-06 RX ADMIN — METOPROLOL TARTRATE 100 MG: 50 TABLET ORAL at 08:11

## 2019-11-06 NOTE — PT/OT/SLP PROGRESS
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  My nose had been bleeding since this morning  (nsg cleared Pt to therapy after NB stopped     Pain/Comfort  Pain Rating 1: 6/10  Location - Side 1: Bilateral  Location - Orientation 1: lower  Location 1: back  Pain Addressed 1: Reposition  Pain Rating Post-Intervention 1: 7/10    Objective:   Pt. Supine on arrival marta ALANIZ son present     Occupational Performance:    Bed Mobility:    · Patient completed Scooting/Bridging with moderate assistance  · Patient completed Supine to Sit with moderate assistance  · Patient completed Sit to Supine with moderate assistance     Functional Mobility/Transfers:  · Patient completed Sit <> Stand Transfer with moderate assistance  with  rolling walker  · Patient completed Bed <> Chair Transfer using Stand Pivot technique with moderate assistance with no assistive device      Activities of Daily Living:  · Lower Body Dressing: maximal assistance to oralia pants EOB level and to manage over hips instance and Total A for BLE socks    AMPA 6 Click:  [facility] Total Score: 13    OT Exercises: UE Ergometer 10 min with intermittent breaks    Additional Treatment:      Patient left supine with all lines intact and call button in reach    ASSESSMENT:  Mesha Mckenzie is a 63 y.o. 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 participated fair with session on this day Pt. With with complaints of nose bleeding but it was only with Pt. Dab nose with kleenex  Pt. nsg notified .Pt demos physical  deficits with balance  functional mobility, UB strength, endurance  level of functional indep with daily tasks and activities and selfcare skills .Pt. Will continue to benefit from continued OT to progress towards goals      Rehab identified problem list/impairments: impaired endurance, impaired self care skills, impaired functional mobilty, impaired cognition, decreased lower extremity function, pain, orthopedic precautions    Rehab potential is fair    Activity tolerance: Fair    Discharge recommendations: home health OT(with light assist)     Barriers to discharge: Decreased caregiver support     Equipment recommendations: walker, rolling, wheelchair, commode, shower chair     GOALS:   Multidisciplinary Problems     Occupational Therapy Goals        Problem: Occupational Therapy Goal    Goal Priority Disciplines Outcome Interventions   Occupational Therapy Goal     OT, PT/OT Ongoing, Progressing    Description:  Goals to be met by: 11-20-19     Patient will increase functional independence with ADLs by performing:    Feeding with Set-up Assistance.  UE Dressing with Set-up Assistance.  LE Dressing with Supervision.  Grooming while seated with Supervision.  Toileting from bedside commode with Supervision for hygiene and clothing management.   Bathing from  shower chair/bench with Stand-by Assistance.  Rolling to Bilateral with Modified [place].   Supine to sit with Modified [place].  Stand pivot transfers with Supervision.  Toilet transfer to bedside commode with Supervision.  Pt. To be able to recall 3/3 spinal precautions  Pt. To be able to perform HEP for BUE to improve endurance with S   pt. To participate in dynamic standing task x 5 minutes with S  Pt. To participate in leisure activities 2x/week                  Plan:  Patient to be seen 5 x/week to address the above listed problems via self-care/home management, therapeutic activities, therapeutic exercises  Plan of Care expires: 11/30/19  Plan  of Care reviewed with: patient    MIKEL Summers  11/06/2019

## 2019-11-06 NOTE — NURSING
Pt with episode of stool incontinence. Stool loose, nonodorous. Pt reports that she just voided a good amount on her own. No need to cath at this time. Unable to collect stool sample as it is mixed with urine.

## 2019-11-06 NOTE — PLAN OF CARE
Problem: SLP Goal  Goal: SLP Goal  Description  Speech Language Pathology Goals  Goals expected to be met by 11/8:  1. Pt will orient x 4 given mod cues.   2. Following a 3 minute delay, pt will recall 3/3 words or facts given mod cues.  3. Pt will complete moderate level problem solving tasks with 70% accuracy given moderate cues.   4. Pt will complete mental manipulation tasks with 70% accuracy given moderate cues to improve cognitive flexibility.   5. Pt will list 8 items in a category in one minute given min-mod cues.   6. Pt will participate in assessment of reading, writing, visual spatial, and functional math abilities.        Outcome: Ongoing, Progressing    Pt continues to not appear highly motivated, but cooperated with SLP for cognitive tx.  Cont POC.   LEROY Madrid, CCC-SLP  Speech Language Pathologist  [phone]  11/6/2019

## 2019-11-06 NOTE — NURSING
Central line site care completed to right arm PICC. Site without redness, swelling, or drainage. Tolerated well.

## 2019-11-06 NOTE — PT/OT/SLP PROGRESS
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          Objective:      Pt seen for continued cognitive-linguistic therapy at bedside. No family present.  Pt was oriented to month, place, and situation ind'ly.  Moderate cues needed to orient to year.  Pt immediately recalled functional directions of increasing length and complexity with 70% accuracy ind'ly/90% given A.  Pt recalled facts from paragraphs read aloud by SLP with 80% accuracy ind'ly/93% given cues.  Pt stated 2 uses for common objects with 100% accuracy.  [place] category exclusion tasks were completed with 80% accuracy ind'ly/100% given cues.  Abstract tasks were completed with 60% accuracy.  Word list retention/category inclusion tasks were completed with 70% accuracy ind'ly/100% given cues.  Education provided to pt regarding rationale for cognitive tx tasks, immediate memory, compensatory memory strategies, roles of each discipline at SN[facility], progress, and SLP treatment plan/POC. Pt demonstrated understanding, but will benefit from continued reinforcement.     Assessment:  Mesha Mckenzie is a 63 y.o. 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     Discharge recommendations: Discharge Facility/Level of Care Needs: (assistance/supervision at " home)     Goals:   Multidisciplinary Problems     SLP Goals        Problem: SLP Goal    Goal Priority Disciplines Outcome   SLP Goal     SLP Ongoing, Progressing   Description:  Speech Language Pathology Goals  Goals expected to be met by 11/8:  1. Pt will orient x 4 given mod cues.   2. Following a 3 minute delay, pt will recall 3/3 words or facts given mod cues.  3. Pt will complete moderate level problem solving tasks with 70% accuracy given moderate cues.   4. Pt will complete mental manipulation tasks with 70% accuracy given moderate cues to improve cognitive flexibility.   5. Pt will list 8 items in a category in one minute given min-mod cues.   6. Pt will participate in assessment of reading, writing, visual spatial, and functional math abilities.                          Plan:   Patient to be seen Therapy Frequency: 5 x/week  Planned Interventions: Cognitive-Linguistic Therapy  Plan of Care expires: 12/01/19  Plan of Care reviewed with: patient  SLP Follow-up?: Yes              LEROY Madrid, CCC-SLP  11/06/2019     LEROY Madrid, CCC-SLP  Speech Language Pathologist  [phone]  11/6/2019

## 2019-11-06 NOTE — NURSING
Report received. Care assumed. Pt awake in bed with spouse at bedside. Discussed plan of care. Questions answered. Call light in reach. Will continue to monitor.

## 2019-11-06 NOTE — PT/OT/SLP PROGRESS
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  Agreeable to PT services. Reports that massage helped a lot.    Pain/Comfort  Pain Rating 1: 10/10  Location - Side 1: Bilateral  Location - Orientation 1: lower  Location 1: back  Pain Addressed 1: Cessation of Activity(massage and kinesiotape)  Pain Rating Post-Intervention 1: 1/10    Objective:  Patient found supine in bed, requesting to use restroom.       AM-PAC 6 CLICK MOBILITY  Total Score:12    Bed Mobility:  Sit>Supine:Mod-Max A   Supine>Sit: Mod-Max A     Transfers:  Sit<>Stand: Mod A   Stand Pivot Transfer: Mod A (bed<>bedside commode/WC)    Gait:  Amb 2 steps in parallel bars with CGA- stopped due to pain.     Wheelchair Mobility:  Patient propels w/c 10 ft with BUE, supervision     Therex:  Seated- hip flexion, long arc quads, ankle pumps with knee extension x 20 reps BLE      Balance:  Sitting balance- SBA  Stood (Mod A) while second person toileted pt (total A)    Additional Treatment:  · Soft tissue massage applied to pt's paraspinals, mm over scapulae, latissimus dorsi and quadratus lumborum for pain relief for 5 mins via skin lifts, rolls. Pt reports pain relief with this activity (in side-lying)  · Kinesiotape applied to paraspinals (surrounding but not over surgical site) in order to alleviate pain; I-strip cut; 35% tension in middle of tape; anchored at ends.  · Can leave on for five days, can shower with tape. Remove with cold water and/or lotion/oil  (19).      Patient left sidelying with call button in reach.    Assessment:  Mesha Mckenzie is a 63 y.o. 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  Ms. Mckenzie responded well to kinesiotape and was able to utilize the bedside commode (for first time since admission) with Mod A. Will benefit from continued PT services.    Rehab identified problem list/impairments: weakness, impaired endurance, impaired self care skills, impaired functional mobilty, gait instability, impaired balance, decreased upper extremity function, decreased lower extremity function, pain    Rehab potential is good.    Activity tolerance: Fair    Discharge recommendations: home health PT     Barriers to discharge: None    Equipment recommendations: wheelchair, walker, rolling, bedside commode, shower chair     GOALS:   Multidisciplinary Problems     Physical Therapy Goals        Problem: Physical Therapy Goal    Goal Priority Disciplines Outcome Goal Variances Interventions   Physical Therapy Goal     PT, PT/OT Ongoing, Progressing     Description:  Goals to be met by: 2019     Patient will increase functional independence with mobility by performin. Supine to sit with Contact Guard Assistance  2. Sit to supine with Contact Guard Assistance  3. Sit to stand transfer with Minimal Assistance  4. Bed to chair transfer with Minimal Assistance using Rolling Walker  5. Gait  x 20 feet with Minimal Assistance using Rolling Walker. = met 2019       N EW GOAL 2019 gait x 75 feet w/ RW and CGA = not met  6. Ascend/Descend 4 inch curb step with Minimal Assistance using Rolling Walker.  6a.  Ascend/Descend 4 inch curb step with use of RW via family assistance with 1 verbal cue or less for technique.  7. Stand for 3 minutes with Minimal Assistance using Rolling Walker  8. Increased functional strength to 4/5 for R quadriceps mm group.  9. Lower extremity exercise program x 20 reps per handout,  with assistance as needed. Met (11/5/2019)                        PLAN:    Patient to be seen 5 x/week  to address the above listed problems via gait training, therapeutic activities, therapeutic exercises, neuromuscular re-education, wheelchair management/training  Plan of Care expires: 11/30/19  Plan of Care reviewed with: patient, family    Rosmery SHAHANA Fitzgerald, PT  11/06/2019

## 2019-11-06 NOTE — NURSING
Patient received ice pack, head tilt as much patient can tolerate,  Suction at bed side, continue to monitor, patient aaox3, vss,

## 2019-11-06 NOTE — NURSING
Ice applied to bridge of nose. Suction set up in room. Pt instructed to call for any further bleeding. Verbalized understanding.

## 2019-11-07 PROCEDURE — 25000003 PHARM REV CODE 250: Performed by: NURSE PRACTITIONER

## 2019-11-07 PROCEDURE — 63600175 PHARM REV CODE 636 W HCPCS: Performed by: STUDENT IN AN ORGANIZED HEALTH CARE EDUCATION/TRAINING PROGRAM

## 2019-11-07 PROCEDURE — 97110 THERAPEUTIC EXERCISES: CPT

## 2019-11-07 PROCEDURE — 97116 GAIT TRAINING THERAPY: CPT

## 2019-11-07 PROCEDURE — 97530 THERAPEUTIC ACTIVITIES: CPT

## 2019-11-07 PROCEDURE — 11000004 HC SNF PRIVATE

## 2019-11-07 PROCEDURE — 25000003 PHARM REV CODE 250: Performed by: STUDENT IN AN ORGANIZED HEALTH CARE EDUCATION/TRAINING PROGRAM

## 2019-11-07 PROCEDURE — 92507 TX SP LANG VOICE COMM INDIV: CPT

## 2019-11-07 PROCEDURE — 97535 SELF CARE MNGMENT TRAINING: CPT

## 2019-11-07 PROCEDURE — 25000003 PHARM REV CODE 250: Performed by: INTERNAL MEDICINE

## 2019-11-07 RX ORDER — LOPERAMIDE HYDROCHLORIDE 2 MG/1
4 CAPSULE ORAL 3 TIMES DAILY
Status: DISCONTINUED | OUTPATIENT
Start: 2019-11-07 | End: 2019-11-11

## 2019-11-07 RX ADMIN — LOPERAMIDE HYDROCHLORIDE 4 MG: 2 CAPSULE ORAL at 09:11

## 2019-11-07 RX ADMIN — OXACILLIN SODIUM 12 G: 1 INJECTION, POWDER, FOR SOLUTION INTRAMUSCULAR; INTRAVENOUS at 03:11

## 2019-11-07 RX ADMIN — POTASSIUM CHLORIDE 20 MEQ: 1500 TABLET, EXTENDED RELEASE ORAL at 09:11

## 2019-11-07 RX ADMIN — CIPROFLOXACIN HYDROCHLORIDE 250 MG: 250 TABLET, FILM COATED ORAL at 09:11

## 2019-11-07 RX ADMIN — TRAZODONE HYDROCHLORIDE 25 MG: 50 TABLET ORAL at 09:11

## 2019-11-07 RX ADMIN — METOPROLOL TARTRATE 100 MG: 50 TABLET ORAL at 09:11

## 2019-11-07 RX ADMIN — GABAPENTIN 200 MG: 100 CAPSULE ORAL at 09:11

## 2019-11-07 RX ADMIN — LOSARTAN POTASSIUM 25 MG: 25 TABLET, FILM COATED ORAL at 09:11

## 2019-11-07 RX ADMIN — PANTOPRAZOLE SODIUM 40 MG: 40 TABLET, DELAYED RELEASE ORAL at 09:11

## 2019-11-07 RX ADMIN — MICONAZOLE NITRATE: 20 OINTMENT TOPICAL at 09:11

## 2019-11-07 RX ADMIN — GABAPENTIN 200 MG: 100 CAPSULE ORAL at 02:11

## 2019-11-07 RX ADMIN — ACETAMINOPHEN 650 MG: 325 TABLET ORAL at 09:11

## 2019-11-07 RX ADMIN — FOLIC ACID 1 MG: 1 TABLET ORAL at 09:11

## 2019-11-07 RX ADMIN — AMLODIPINE BESYLATE 10 MG: 10 TABLET ORAL at 09:11

## 2019-11-07 RX ADMIN — ENOXAPARIN SODIUM 40 MG: 100 INJECTION SUBCUTANEOUS at 04:11

## 2019-11-07 RX ADMIN — ATORVASTATIN CALCIUM 40 MG: 20 TABLET, FILM COATED ORAL at 09:11

## 2019-11-07 RX ADMIN — LOPERAMIDE HYDROCHLORIDE 4 MG: 2 CAPSULE ORAL at 04:11

## 2019-11-07 RX ADMIN — VENLAFAXINE 75 MG: 37.5 TABLET ORAL at 09:11

## 2019-11-07 RX ADMIN — POTASSIUM CHLORIDE 20 MEQ: 1500 TABLET, EXTENDED RELEASE ORAL at 04:11

## 2019-11-07 RX ADMIN — IBUPROFEN 400 MG: 200 TABLET, FILM COATED ORAL at 06:11

## 2019-11-07 RX ADMIN — ACETAMINOPHEN 650 MG: 325 TABLET ORAL at 02:11

## 2019-11-07 RX ADMIN — Medication 1 CAPSULE: at 09:11

## 2019-11-07 RX ADMIN — FUROSEMIDE 40 MG: 40 TABLET ORAL at 09:11

## 2019-11-07 NOTE — PT/OT/SLP PROGRESS
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  Agreeable to PT.    Pain/Comfort  Pain Rating 1: 7/10  Location - Side 1: Bilateral  Location - Orientation 1: lower  Location 1: back  Pain Addressed 1: (massage)  Pain Rating Post-Intervention 1: 6/10    Objective:  Patient found supine in bed, napping.       AM-PAC 6 CLICK MOBILITY  Total Score:     Bed Mobility:  Sit>Supine:Min A  Supine>Sit: Min A    Transfers:  Sit<>Stand: Min-ModA   Stand Pivot Transfer: Min-ModA  With HHA    Gait:  Amb 8 ft x 2 trials in parallel bars with CGA, swing-through gait pattern ( limited by pain ).     Therex:  SAQ x 20  Heel slides x 20  Hip abductionx 20  posterio pelvic tilts x 10 reps      Additional Treatment:  Massage to lower back with skin rolls/lifts, fascial stretching x 5 minutes in R side-lying.    Patient left up in chair with call button in reach and daughter present.    Assessment:  Mesha Mckenzie is a 63 y.o. 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  Ms. Mckenzie is progressing and drawing closer to meeting her goals. Continue POC with focus on meeting goals, improving functional mobility.Attempt three trials of gait tomorrow.    Rehab identified problem list/impairments: weakness, impaired endurance, impaired self care skills, impaired functional mobilty, gait instability, impaired balance, decreased upper extremity function, decreased lower extremity function, pain    Rehab potential is  fair.    Activity tolerance: Fair    Discharge recommendations: home health PT     Barriers to discharge: None    Equipment recommendations: wheelchair, walker, rolling, bedside commode, shower chair     GOALS:   Multidisciplinary Problems     Physical Therapy Goals        Problem: Physical Therapy Goal    Goal Priority Disciplines Outcome Goal Variances Interventions   Physical Therapy Goal     PT, PT/OT Ongoing, Progressing     Description:  Goals to be met by: 2019     Patient will increase functional independence with mobility by performin. Supine to sit with Contact Guard Assistance  2. Sit to supine with Contact Guard Assistance  3. Sit to stand transfer with Minimal Assistance  4. Bed to chair transfer with Minimal Assistance using Rolling Walker  5. Gait  x 20 feet with Minimal Assistance using Rolling Walker. = met 2019       N EW GOAL 2019 gait x 75 feet w/ RW and CGA = not met  6. Ascend/Descend 4 inch curb step with Minimal Assistance using Rolling Walker.  6a.  Ascend/Descend 4 inch curb step with use of RW via family assistance with 1 verbal cue or less for technique.  7. Stand for 3 minutes with Minimal Assistance using Rolling Walker  8. Increased functional strength to 4/5 for R quadriceps mm group.  9. Lower extremity exercise program x 20 reps per handout, with assistance as needed. Met (2019)                        PLAN:    Patient to be seen 5 x/week  to address the above listed problems via gait training, therapeutic activities, therapeutic exercises, neuromuscular re-education, wheelchair management/training  Plan of Care expires: 19  Plan of Care reviewed with: patient, family    Rosmery Fitzgerald, PT  2019

## 2019-11-07 NOTE — NURSING
Secure chat notified Yvette, NP of c-diff negative (infection control nurse called this am and d/c'd isolation). Also notified NP of oral suction canister with pink tinged substance, pt reports had nose bleed and oral suctioned too.

## 2019-11-07 NOTE — PT/OT/SLP PROGRESS
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 : Yes  Keep patient NPO?: No   General Precautions: Standard, aspiration, fall  Current Respiratory Status: room air       Subjective:  Awake. Seen in room partially seated upright in bed.      No pain pre/post session. Pt reports she was just given something for pain.     Objective:    Pt oriented to month, facility name & state IND'ly. Pt required mod to max A to orient to year, day of week & city. Pt named an average of 8 items in a concrete category, over 3 trials given mod cueing to utilize strategies. Pt completed mental manipulation task of recalling & organizing items in a sequenced order with 50% acc INDly, 100% acc given min A. Pt read paragraph (required larger print because we could not locate glasses) fluently. Pt formulated 2 sentences in writing, handwriting poor legibility yet pt reports this handwriting at baseline. Pt completed clock drawing task WNL.       Assessment:  Mesha Mckenzie is a 63 y.o. 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 Pt will orient x 4 given mod cues.   2. Following a 3 minute delay, pt  will recall 3/3 words or facts given mod cues.  3. Pt will complete moderate level problem solving tasks with 70% accuracy given moderate cues.   4. Pt will complete mental manipulation tasks with 70% accuracy given moderate cues to improve cognitive flexibility.   5. Pt will list 8 items in a category in one minute given min-mod cues.   6. Pt will participate in assessment of reading, writing, visual spatial, and functional math abilities.                          Plan:   Patient to be seen Therapy Frequency: 5 x/week  Planned Interventions: Cognitive-Linguistic Therapy  Plan of Care expires: 12/01/19  Plan of Care reviewed with: patient  SLP Follow-up?: Yes              Arabella Matthews CCC-SLP  11/07/2019

## 2019-11-07 NOTE — PROGRESS NOTES
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  Patient initially presented to ED with N/V/D and weakness. Treated with broad-spectrum antibiotics for pneumonia, colitis and septic shock. CT on 9/25 and showed bilateral cavitary lesions likely secondary to septic emboli, small pericardial effusion and ileus. DHARMESH on 9/25 revealed  mitral and tricuspid valve endocarditis and positive Blood cultures with MSSA. ID treated with cefazolin, vancomycin and daptomycin. Echo on 9/28 revealed LVEF 35% and persistent vegatations. 9/30 MRI brain that revealed remote lacunar infarcts without acute intracranial abnormality. 10/1 MRI revealed spinal epidural abscess at L2-4 level. Thoracentesis was performed on 10/1. Cardiology deferred surgical intervention/valve replacement at this time due to comorbidities. Patient transferred to [facility] [place] for neurosurgery evaluation of epidural abscess. [facility] [place] hospitalization complicated by encephalopathy and collapse lung. Chest tube placed & pleural fluid sent; gram stain with GPCs. I&D of shoulder abscesses 10/5/19, per gen surg. Head CT was repeated and showed new R basal ganglia infarcts. MRI of brain showed evidence of R thalamic infarct and ventriculitis. CTA of head completed 10/8 showing atherosclerotic plaquing of the distal vertebral arteries and concern for noncalcified plaquing and stenosis; no high-grade stenosis or proximal occlusion.  Patient underwent laminectomy on 10/9. Patient was deemed medically stable and transferred to [facility] to participate in acute inpatient therapy. Admission to SNF for secondary weakness and debility.    Interval history:  Patient noted to have epistaxis from right nare.  Pressure held, ice pack applied, bedside suction setup.  Bleeding stopped within a few minutes.  Patient instructed to not blow her nose and try to avoid sneezing.  Patient denies any recent nose bleeds.  Yesterday, patient complained of groin pain- UA with reflex ordered- evaluated today and reveals positive UA, culture pending.  Patient is noted to have 3-4 episodes of diarrhea for the past few days- yesterday diarrhea was found a liver patient's bed.  I suspect that UTI is likely Ecoli- her continuous antibiotic infusion of IV oxacillin would not cover E coli if that is what the culture turns out to be.  Patient continues to endorse back and leg pain. Pain is chronic in nature, worse with movement, relieved with rest and medications.     Past Medical History: Patient has a past medical history of Anxiety, Carotid bruit, Chronic pain, Cystitis, Cystocele, unspecified (CODE), Depression, GI bleed, History of uterine fibroid, Shortness of breath on exertion, Spinal stenosis, and Urinary retention.    Past Surgical History: Patient has a past surgical history that includes Hysterectomy (1989); Anterior vaginal repair (8-); tubiligation; Cardiac catheterization (age 12); Cholecystectomy (2015); Colonoscopy (12/12/2018); Colonoscopy (N/A, 12/12/2018); and Laminectomy (N/A, 10/9/2019).    Social History: Patient reports that she has been smoking cigarettes. She has a 20.00 pack-year smoking history. She has never used smokeless tobacco. She reports that she does not drink alcohol or use drugs.    Family History: family history includes Alzheimer's disease in her mother; Hypertension in her brother and sister;  Osteoarthritis in her mother; Thyroid disease in her mother.    Allergies: Patient is allergic to pcn [penicillins].    ROS  Constitutional: Negative for fever. + fatigue.   Eyes: Negative for blurred vision, double vision and discharge.   Respiratory: + for dry intermittent cough, epistaxis.  Negative for shortness of breath and wheezing.    Cardiovascular: Negative for chest pain, palpitations, and leg swelling.   Gastrointestinal: Negative for abdominal pain, constipation, nausea and vomiting. + diarrhea  Genitourinary:  Patient self caths or is incontinent; endorses groin pain  Musculoskeletal:  + generalized weakness. + for back and bilateral leg pain and myalgias  Skin: Negative for itching and rash.  Neurological: Negative for dizziness, speech change, and headaches.   Psychiatric/Behavioral: + for depression. The patient is not nervous/anxious.      PEx   Temp:  [98.2 °F (36.8 °C)]   Pulse:  [87]   Resp:  [18]   BP: (155)/(71)   SpO2:  [94 %]   Body mass index is 18.43 kg/m².     Constitutional: Patient appears ill.  No distress noted  HENT:   Head: Normocephalic and atraumatic.   Eyes: Pupils are equal, round, and reactive to light.   Neck: Normal range of motion. Neck supple.   Cardiovascular: Normal rate, regular rhythm + murmur  Pulmonary/Chest: Effort normal and breath sounds are clear  Abdominal: Soft. Bowel sounds are normal.   Musculoskeletal: Normal range of motion. + generalized weakness.  Neurological: Alert and oriented to person, place, and time.   Psychiatric: Normal mood and affect. Behavior is normal.   Wounds/Skin: Skin is warm and dry.    Wound location:   Midline lower back incision completely healed over and claudia, pink scar  Wound length: 10 cm   Following: This NP weekly- last observed on 11/6/2019        Assessment and Plan:       Problems addressed today    Presumptive UTI  - UA positive, urine culture pending.  Initiated Cipro 250 mg BID x5 days to cover for suspected gram-negative kandi  UTI related to diarrhea near vaginal area.  Will tailor antibiotic therapy once culture and sensitivity have resulted.     Diarrhea, worsening  -11/1 initiated Imodium 2 mg p.o. Q 4 hr p.r.n.  -11/4 diphenoxylate-atropine 2.5-0.025 mg per tablet 1 tablet qid prn and x 1 now  - 11/6 ordered for C diff culture, special contact isolation placed for 24 hr pending C diff result.  Continue probiotic    Epistaxis  - pressure held, ice pack applied, bedside suction setup.  Bleeding subsided shortly after began.  Will monitor for further bleeding.       Ongoing problems     Hypertension  -continue amlodipine 10 mg daily and Lopressor 100 mg b.i.d,  losartan 25 mg daily    Insomnia  - continue ramelteon to 8 mg q.p.m.    Urinary retention  - Patient self-caths PRN at home; patient encouraged to continue self cathing here as well    Epidural Abcess  Abscess Upper Extremities  Septic Pulmonary and Brain Emboli   Acute Bacterial Endocarditis  -hx IVDA  -Continuing oxacillin 12 g continuous for MSSA endocarditis per ID recs with end date 11/20     Metabolic encephalopathy  Right thalamic stroke  -Encephalopathy likely multiple factorial etiology: metabolic, infectious, and due to stroke  -Brain MRI 9/30 with lacunar infarcts  -Repeat head CT(10/7) revealed new right basal ganglia infarcts  -MRI of brain revealed R thalamic infarct, ventriculitis, subactue embolic infarctions  -CTA of head 10/8: atherosclerotic plaquing of the distal vertebral arteries and concern for noncalcified plaquing and stenosis; no high-grade stenosis or proximal occlusion. No mycotic aneurysms  -Per vasc neuro, no systemic anticoagulation and asa due to high risk for hemorrhage from septic emboli  -11/1 initiated atorvastatin 40 mg daily    Debility  -Continue with PT/OT for gait training and strengthening and restoration of ADL's   -Encourage mobility, OOB in chair, and early ambulation as appropriate  -Fall precautions   -Monitor for bowel and bladder  dysfunction  -Monitor for and prevent skin breakdown and pressure ulcers  -Continue DVT prophylaxis with lovenox     Acute on chronic diastolic congestive heart failure  -continue lasix 40 mg daily      Anemia  -continue ferrous sulfate and vitamin-C    Chronic back pain  -continue Tylenol, gabapentin, ibuprofen, methyl salicylate-menthol 15-10% cream    Depression/anxiety  -continue venlafaxine    Future Appointments   Date Time Provider Department Center   11/20/2019 10:00 AM INFECTIOUS DISEASE, [facility]   12/9/2019  2:00 PM Martin Lewis DO [facility]         Tabitha Alarcon NP       DOS 11/6/2019       Patient note was created using MModal Dictation.  Any errors in syntax or even information may not have been identified and edited on initial review prior to signing this note.

## 2019-11-07 NOTE — PLAN OF CARE
Problem: SLP Goal  Goal: SLP Goal  Description  Speech Language Pathology Goals  Goals expected to be met by 11/8:  1. Pt will orient x 4 given mod cues.   2. Following a 3 minute delay, pt will recall 3/3 words or facts given mod cues.  3. Pt will complete moderate level problem solving tasks with 70% accuracy given moderate cues.   4. Pt will complete mental manipulation tasks with 70% accuracy given moderate cues to improve cognitive flexibility.   5. Pt will list 8 items in a category in one minute given min-mod cues.   6. Pt will participate in assessment of reading, writing, visual spatial, and functional math abilities.        Outcome: Ongoing, Progressing    Pt was seen for ST session.

## 2019-11-07 NOTE — PLAN OF CARE
Problem: Fall Injury Risk  Goal: Absence of Fall and Fall-Related Injury  Intervention: Promote Injury-Free Environment  Flowsheets (Taken 11/7/2019 0415)  Safety Promotion/Fall Prevention: assistive device/personal item within reach  Environmental Safety Modification: assistive device/personal items within reach; clutter free environment maintained; lighting adjusted

## 2019-11-07 NOTE — PLAN OF CARE
Problem: Occupational Therapy Goal  Goal: Occupational Therapy Goal  Description  Goals to be met by: 11-20-19     Patient will increase functional independence with ADLs by performing:    Feeding with Set-up Assistance.  UE Dressing with Set-up Assistance.  LE Dressing with Supervision.  Grooming while seated with Supervision.  Toileting from bedside commode with Supervision for hygiene and clothing management.   Bathing from  shower chair/bench with Stand-by Assistance.  Rolling to Bilateral with Modified [place].   Supine to sit with Modified [place].  Stand pivot transfers with Supervision.  Toilet transfer to bedside commode with Supervision.  Pt. To be able to recall 3/3 spinal precautions  Pt. To be able to perform HEP for BUE to improve endurance with S   pt. To participate in dynamic standing task x 5 minutes with S  Pt. To participate in leisure activities 2x/week     Outcome: Ongoing, Progressing

## 2019-11-07 NOTE — PROGRESS NOTES
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      HPI: Ms Mckenzie is a 64 yo female with sig pmhx of Depression, Anemia, Anxiety GI bleed, Urinary retention, and IVDA who presents to SNF s/p hospitalization for Acute Bacterial Endocarditis, embolic stroke, epidural abscess, and acute on chronic CHF.  Patient initially presented to ED with N/V/D and weakness. Treated with broad-spectrum antibiotics for pneumonia, colitis and septic shock. CT on 9/25 and showed bilateral cavitary lesions likely secondary to septic emboli, small pericardial effusion and ileus. DHARMESH on 9/25 revealed  mitral and tricuspid valve endocarditis and positive Blood cultures with MSSA. ID treated with cefazolin, vancomycin and daptomycin. Echo on 9/28 revealed LVEF 35% and persistent vegatations. 9/30 MRI brain that revealed remote lacunar infarcts without acute intracranial abnormality. 10/1 MRI revealed spinal epidural abscess at L2-4 level. Thoracentesis was performed on 10/1. Cardiology deferred surgical intervention/valve replacement at this time due to comorbidities. Patient transferred to [facility] [place] for neurosurgery evaluation of epidural abscess. [facility] hospitalization complicated by encephalopathy and collapse lung. Chest tube placed & pleural fluid sent; gram stain with GPCs. I&D of shoulder abscesses 10/5/19, per gen surg. Head CT was repeated and showed new R basal ganglia infarcts. MRI of brain showed evidence of R thalamic infarct and ventriculitis. CTA of head completed 10/8 showing atherosclerotic plaquing of the distal vertebral arteries and concern for noncalcified plaquing and stenosis; no high-grade stenosis or proximal occlusion.  Patient underwent laminectomy on 10/9. Patient was deemed medically stable and transferred to [facility] to participate in acute inpatient therapy. Admission to SNF for secondary weakness and debility.    Interval history:  On 11/4, patient complained of groin pain- + UA, with Ucx resulted with GRN, final organism and susceptibilities pending.  Patient is stil endorsing 3-4 episodes of diarrhea per day- C diff culture obtained yesterday and is negative.  Patient had Imodium ordered PRN but was not be given enough- last dose was once on 11/6. Patient continues to endorse back and leg pain. Pain is chronic in nature, worse with movement, relieved with rest and medications.  Patient also endorses nausea today.  No further episodes of epistaxis from yesterday's episode.    Past Medical History: Patient has a past medical history of Anxiety, Carotid bruit, Chronic pain, Cystitis, Cystocele, unspecified (CODE), Depression, GI bleed, History of uterine fibroid, Shortness of breath on exertion, Spinal stenosis, and Urinary retention.    Past Surgical History: Patient has a past surgical history that includes Hysterectomy (1989); Anterior vaginal repair (8-); tubiligation; Cardiac catheterization (age 12); Cholecystectomy (2015); Colonoscopy (12/12/2018); Colonoscopy (N/A, 12/12/2018); and Laminectomy (N/A, 10/9/2019).    Social History: Patient reports that she has been smoking cigarettes. She has a 20.00 pack-year smoking history. She has never used smokeless tobacco. She reports that she does not drink alcohol or use drugs.    Family History: family history includes Alzheimer's disease in her mother; Hypertension in her brother and sister; Osteoarthritis in her mother; Thyroid disease in her mother.    Allergies: Patient is allergic to pcn [penicillins].    ROS  Constitutional: Negative for fever. + fatigue, insomnia.   Eyes: Negative for blurred vision, double vision and discharge.    Respiratory: + for dry intermittent cough.  Negative for shortness of breath and wheezing.    Cardiovascular: Negative for chest pain, palpitations, and leg swelling.   Gastrointestinal: Negative for abdominal pain, constipation, nausea and vomiting. + diarrhea  Genitourinary:  Patient self caths or is incontinent; endorses groin pain, improving  Musculoskeletal:  + generalized weakness. + for back and bilateral leg pain and myalgias  Skin: Negative for itching and rash.  Neurological: Negative for dizziness, speech change, and headaches.   Psychiatric/Behavioral: + for depression. The patient is not nervous/anxious.      PEx   Temp:  [98.2 °F (36.8 °C)]   Pulse:  [87]   Resp:  [18]   BP: (155)/(71)   SpO2:  [94 %]   Body mass index is 18.43 kg/m².     Constitutional: Patient appears ill.  No distress noted  HENT:   Head: Normocephalic and atraumatic.   Eyes: Pupils are equal, round, and reactive to light.   Neck: Normal range of motion. Neck supple.   Cardiovascular: Normal rate, regular rhythm + murmur  Pulmonary/Chest: Effort normal and breath sounds are clear  Abdominal: Soft. Bowel sounds are normal.   Musculoskeletal: Normal range of motion. + generalized weakness.  Neurological: Alert and oriented to person, place, and time.   Psychiatric: Normal mood and affect. Behavior is normal.   Wounds/Skin: Skin is warm and dry.    Wound location:   Midline lower back incision completely healed over and claudia, pink scar  Wound length: 10 cm   Following: This NP weekly- last observed on 11/6/2019        Assessment and Plan:       Problems addressed today    GNR UTI  - UA positive, urine culture + for GNR- organism and susceptibilities pending.  Continue Cipro 250 mg BID x5 days  Will tailor antibiotic therapy once culture and sensitivity have resulted.     Diarrhea, worsening  -11/1 initiated Imodium 2 mg p.o. Q 4 hr p.r.n.  -11/4 diphenoxylate-atropine 2.5-0.025 mg per tablet 1 tablet qid prn and x 1 now  - 11/6  ordered for C diff culture, special contact isolation placed for 24 hr pending C diff result.  Continue probiotic  - 11/7 C diff negative.  Initiated Imodium 4 mg p.o. TID    Insomnia  - initiated trazodone 25 mg qHS..  Discontinued ramelteon    Epistaxis  - pressure held, ice pack applied, bedside suction setup.  Bleeding subsided shortly after began.  Will monitor for further bleeding.  - 11/7 no further episodes       Ongoing problems     Hypertension  -continue amlodipine 10 mg daily and Lopressor 100 mg b.i.d,  losartan 25 mg daily    Urinary retention  - Patient self-caths PRN at home; patient encouraged to continue self cathing here as well    Epidural Abcess  Abscess Upper Extremities  Septic Pulmonary and Brain Emboli   Acute Bacterial Endocarditis  -hx IVDA  -Continuing oxacillin 12 g continuous for MSSA endocarditis per ID recs with end date 11/20     Metabolic encephalopathy  Right thalamic stroke  -Encephalopathy likely multiple factorial etiology: metabolic, infectious, and due to stroke  -Brain MRI 9/30 with lacunar infarcts  -Repeat head CT(10/7) revealed new right basal ganglia infarcts  -MRI of brain revealed R thalamic infarct, ventriculitis, subactue embolic infarctions  -CTA of head 10/8: atherosclerotic plaquing of the distal vertebral arteries and concern for noncalcified plaquing and stenosis; no high-grade stenosis or proximal occlusion. No mycotic aneurysms  -Per vasc neuro, no systemic anticoagulation and asa due to high risk for hemorrhage from septic emboli  -continue atorvastatin 40 mg daily    Debility  -Continue with PT/OT for gait training and strengthening and restoration of ADL's   -Encourage mobility, OOB in chair, and early ambulation as appropriate  -Fall precautions   -Monitor for bowel and bladder dysfunction  -Monitor for and prevent skin breakdown and pressure ulcers  -Continue DVT prophylaxis with lovenox     Acute on chronic diastolic congestive heart failure  -continue  lasix 40 mg daily      Anemia  -continue ferrous sulfate and vitamin-C    Chronic back pain  -continue Tylenol, gabapentin, ibuprofen, methyl salicylate-menthol 15-10% cream    Depression/anxiety  -continue venlafaxine    Future Appointments   Date Time Provider Department Center   11/20/2019 10:00 AM INFECTIOUS DISEASE, [facility]   12/9/2019  2:00 PM [facility]       DOS 11/7/2019       Patient note was created using MModal Dictation.  Any errors in syntax or even information may not have been identified and edited on initial review prior to signing this note.

## 2019-11-07 NOTE — NURSING
In/out cath supplies brought to bedside. Pt verbalized knowledge of procedure and would call nursing or PCT  after completing each to monitor output

## 2019-11-07 NOTE — PT/OT/SLP PROGRESS
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  I have been crapping my pants alll morning   Pain/Comfort  Pain Rating 1: 10/10  Location - Side 1: Bilateral  Location - Orientation 1: lower  Location 1: back    Objective:   Pt. Supine on arrival with family present     Occupational Performance:    Bed Mobility:    · Patient completed Supine to Sit with maximal assistance  · Patient completed Sit to Supine with maximal assistance     Functional Mobility/Transfers:  · Patient completed Sit <> Stand Transfer with moderate assistance  with  rolling walker with cues for safety and hand placement Patient completed Bed <> Chair Transfer using Stand Pivot technique with moderate assistance with no assistive device      Activities of Daily Living:  · Lower Body Dressing: maximal assistance to oralia pants seated EOB and to manage over hips instance maia AMAYA for bal  · Toileting: total assistance for all asepcts Pt. incot with [facility] 6 Click:  [facility] Total Score: 13    OT Exercises: UE Ergometer 10 min with breaks    Additional Treatment:   Pt. With 1# dowel activity with 2x10 reps with  shd flex, bicep curls horz adb/add and forward flex motion to increase BUE ROM and strength,.   Pt. With standing and therex performed to increase ROM, endurance selfcare task and fxl mobility for independence     Patient left supine with all lines intact and call button in reach    ASSESSMENT:  Mesha Mckenzie is a 63 y.o. 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 Participated fair  well with session on this day. Pt. Still continues to complain of pain which is a limitation for Pt. During session Pt demos physical deficits with balance  functional mobility, UB strength, endurance  level of functional indep with daily tasks and activities and selfcare skills .Pt. Will continue to benefit from continued OT to progress towards goals      Rehab identified problem list/impairments: impaired endurance, impaired self care skills, impaired functional mobilty, impaired cognition, decreased lower extremity function, pain, orthopedic precautions    Rehab potential is fair    Activity tolerance: Fair    Discharge recommendations: home health OT(with light assist)     Barriers to discharge: Decreased caregiver support     Equipment recommendations: walker, rolling, wheelchair, commode, shower chair     GOALS:   Multidisciplinary Problems     Occupational Therapy Goals        Problem: Occupational Therapy Goal    Goal Priority Disciplines Outcome Interventions   Occupational Therapy Goal     OT, PT/OT Ongoing, Progressing    Description:  Goals to be met by: 11-20-19     Patient will increase functional independence with ADLs by performing:    Feeding with Set-up Assistance.  UE Dressing with Set-up Assistance.  LE Dressing with Supervision.  Grooming while seated with Supervision.  Toileting from bedside commode with Supervision for hygiene and clothing management.   Bathing from  shower chair/bench with Stand-by Assistance.  Rolling to Bilateral with Modified [place].   Supine to sit with Modified [place].  Stand pivot transfers with Supervision.  Toilet transfer to bedside commode with Supervision.  Pt. To be able to recall 3/3 spinal precautions  Pt. To be able to perform HEP for BUE to improve endurance with S   pt. To participate in dynamic standing task x 5 minutes with S  Pt. To participate in leisure activities 2x/week                      Plan:  Patient to be seen 5 x/week to address the  above listed problems via self-care/home management, therapeutic activities, therapeutic exercises  Plan of Care expires: 11/30/19  Plan of Care reviewed with: patient    ALEAH Summers/MAHESH  11/07/2019

## 2019-11-07 NOTE — PLAN OF CARE
Repositions minimal assist, no new skin breakdowns noted. Afebrile. Oxacillin IVABX infusing per pump at 20.8cc/hr  ciprofloxacin PO scheduled. Pt knowledgeable with self catheter. Stool for C-diff negative. Monitored for pain and safety. Safety maintained.

## 2019-11-08 LAB
ANION GAP SERPL CALC-SCNC: 10 MMOL/L (ref 8–16)
ANISOCYTOSIS BLD QL SMEAR: SLIGHT
BACTERIA UR CULT: ABNORMAL
BASOPHILS # BLD AUTO: ABNORMAL K/UL (ref 0–0.2)
BASOPHILS NFR BLD: 0 % (ref 0–1.9)
BUN SERPL-MCNC: 26 MG/DL (ref 8–23)
CALCIUM SERPL-MCNC: 9.1 MG/DL (ref 8.7–10.5)
CHLORIDE SERPL-SCNC: 102 MMOL/L (ref 95–110)
CO2 SERPL-SCNC: 23 MMOL/L (ref 23–29)
CREAT SERPL-MCNC: 0.8 MG/DL (ref 0.5–1.4)
DIFFERENTIAL METHOD: ABNORMAL
EOSINOPHIL # BLD AUTO: ABNORMAL K/UL (ref 0–0.5)
EOSINOPHIL NFR BLD: 3 % (ref 0–8)
ERYTHROCYTE [DISTWIDTH] IN BLOOD BY AUTOMATED COUNT: 19.7 % (ref 11.5–14.5)
EST. GFR  (AFRICAN AMERICAN): >60 ML/MIN/1.73 M^2
EST. GFR  (NON AFRICAN AMERICAN): >60 ML/MIN/1.73 M^2
GLUCOSE SERPL-MCNC: 77 MG/DL (ref 70–110)
HCT VFR BLD AUTO: 26.8 % (ref 37–48.5)
HGB BLD-MCNC: 8.1 G/DL (ref 12–16)
HYPOCHROMIA BLD QL SMEAR: ABNORMAL
IMM GRANULOCYTES # BLD AUTO: ABNORMAL K/UL (ref 0–0.04)
IMM GRANULOCYTES NFR BLD AUTO: ABNORMAL % (ref 0–0.5)
LYMPHOCYTES # BLD AUTO: ABNORMAL K/UL (ref 1–4.8)
LYMPHOCYTES NFR BLD: 11 % (ref 18–48)
MAGNESIUM SERPL-MCNC: 1.6 MG/DL (ref 1.6–2.6)
MCH RBC QN AUTO: 32.1 PG (ref 27–31)
MCHC RBC AUTO-ENTMCNC: 30.2 G/DL (ref 32–36)
MCV RBC AUTO: 106 FL (ref 82–98)
MONOCYTES # BLD AUTO: ABNORMAL K/UL (ref 0.3–1)
MONOCYTES NFR BLD: 4 % (ref 4–15)
MYELOCYTES NFR BLD MANUAL: 2 %
NEUTROPHILS NFR BLD: 80 % (ref 38–73)
NRBC BLD-RTO: 0 /100 WBC
OVALOCYTES BLD QL SMEAR: ABNORMAL
PHOSPHATE SERPL-MCNC: 5 MG/DL (ref 2.7–4.5)
PLATELET # BLD AUTO: 340 K/UL (ref 150–350)
PMV BLD AUTO: 9.5 FL (ref 9.2–12.9)
POIKILOCYTOSIS BLD QL SMEAR: SLIGHT
POLYCHROMASIA BLD QL SMEAR: ABNORMAL
POTASSIUM SERPL-SCNC: 4 MMOL/L (ref 3.5–5.1)
RBC # BLD AUTO: 2.52 M/UL (ref 4–5.4)
SODIUM SERPL-SCNC: 135 MMOL/L (ref 136–145)
WBC # BLD AUTO: 9.19 K/UL (ref 3.9–12.7)

## 2019-11-08 PROCEDURE — 25000003 PHARM REV CODE 250: Performed by: NURSE PRACTITIONER

## 2019-11-08 PROCEDURE — 97535 SELF CARE MNGMENT TRAINING: CPT

## 2019-11-08 PROCEDURE — 85007 BL SMEAR W/DIFF WBC COUNT: CPT

## 2019-11-08 PROCEDURE — 97803 MED NUTRITION INDIV SUBSEQ: CPT

## 2019-11-08 PROCEDURE — 85027 COMPLETE CBC AUTOMATED: CPT

## 2019-11-08 PROCEDURE — 92507 TX SP LANG VOICE COMM INDIV: CPT

## 2019-11-08 PROCEDURE — 84100 ASSAY OF PHOSPHORUS: CPT

## 2019-11-08 PROCEDURE — 97110 THERAPEUTIC EXERCISES: CPT

## 2019-11-08 PROCEDURE — 63600175 PHARM REV CODE 636 W HCPCS: Performed by: NURSE PRACTITIONER

## 2019-11-08 PROCEDURE — 80048 BASIC METABOLIC PNL TOTAL CA: CPT

## 2019-11-08 PROCEDURE — 83735 ASSAY OF MAGNESIUM: CPT

## 2019-11-08 PROCEDURE — 11000004 HC SNF PRIVATE

## 2019-11-08 PROCEDURE — 63600175 PHARM REV CODE 636 W HCPCS: Performed by: STUDENT IN AN ORGANIZED HEALTH CARE EDUCATION/TRAINING PROGRAM

## 2019-11-08 PROCEDURE — 97116 GAIT TRAINING THERAPY: CPT

## 2019-11-08 PROCEDURE — 25000003 PHARM REV CODE 250: Performed by: STUDENT IN AN ORGANIZED HEALTH CARE EDUCATION/TRAINING PROGRAM

## 2019-11-08 PROCEDURE — 97530 THERAPEUTIC ACTIVITIES: CPT

## 2019-11-08 PROCEDURE — 25000003 PHARM REV CODE 250: Performed by: INTERNAL MEDICINE

## 2019-11-08 RX ADMIN — GABAPENTIN 200 MG: 100 CAPSULE ORAL at 10:11

## 2019-11-08 RX ADMIN — AMLODIPINE BESYLATE 10 MG: 10 TABLET ORAL at 09:11

## 2019-11-08 RX ADMIN — METOPROLOL TARTRATE 100 MG: 50 TABLET ORAL at 09:11

## 2019-11-08 RX ADMIN — CIPROFLOXACIN HYDROCHLORIDE 250 MG: 250 TABLET, FILM COATED ORAL at 10:11

## 2019-11-08 RX ADMIN — MICONAZOLE NITRATE: 20 OINTMENT TOPICAL at 09:11

## 2019-11-08 RX ADMIN — Medication 1 CAPSULE: at 09:11

## 2019-11-08 RX ADMIN — DOXYLAMINE SUCCINATE: 25 TABLET ORAL at 09:11

## 2019-11-08 RX ADMIN — VENLAFAXINE 75 MG: 37.5 TABLET ORAL at 10:11

## 2019-11-08 RX ADMIN — FOLIC ACID 1 MG: 1 TABLET ORAL at 09:11

## 2019-11-08 RX ADMIN — GABAPENTIN 200 MG: 100 CAPSULE ORAL at 09:11

## 2019-11-08 RX ADMIN — LOPERAMIDE HYDROCHLORIDE 4 MG: 2 CAPSULE ORAL at 03:11

## 2019-11-08 RX ADMIN — GABAPENTIN 200 MG: 100 CAPSULE ORAL at 03:11

## 2019-11-08 RX ADMIN — LOSARTAN POTASSIUM 25 MG: 25 TABLET, FILM COATED ORAL at 09:11

## 2019-11-08 RX ADMIN — FUROSEMIDE 40 MG: 40 TABLET ORAL at 09:11

## 2019-11-08 RX ADMIN — PANTOPRAZOLE SODIUM 40 MG: 40 TABLET, DELAYED RELEASE ORAL at 09:11

## 2019-11-08 RX ADMIN — POTASSIUM CHLORIDE 20 MEQ: 1500 TABLET, EXTENDED RELEASE ORAL at 06:11

## 2019-11-08 RX ADMIN — ACETAMINOPHEN 650 MG: 325 TABLET ORAL at 09:11

## 2019-11-08 RX ADMIN — IBUPROFEN 400 MG: 200 TABLET, FILM COATED ORAL at 12:11

## 2019-11-08 RX ADMIN — ACETAMINOPHEN 650 MG: 325 TABLET ORAL at 03:11

## 2019-11-08 RX ADMIN — LOPERAMIDE HYDROCHLORIDE 4 MG: 2 CAPSULE ORAL at 09:11

## 2019-11-08 RX ADMIN — ATORVASTATIN CALCIUM 40 MG: 20 TABLET, FILM COATED ORAL at 09:11

## 2019-11-08 RX ADMIN — ENOXAPARIN SODIUM 40 MG: 100 INJECTION SUBCUTANEOUS at 06:11

## 2019-11-08 RX ADMIN — METOPROLOL TARTRATE 100 MG: 50 TABLET ORAL at 10:11

## 2019-11-08 RX ADMIN — CIPROFLOXACIN HYDROCHLORIDE 250 MG: 250 TABLET, FILM COATED ORAL at 09:11

## 2019-11-08 RX ADMIN — TRAZODONE HYDROCHLORIDE 25 MG: 50 TABLET ORAL at 10:11

## 2019-11-08 RX ADMIN — OXACILLIN SODIUM 12 G: 1 INJECTION, POWDER, FOR SOLUTION INTRAMUSCULAR; INTRAVENOUS at 02:11

## 2019-11-08 RX ADMIN — ACETAMINOPHEN 650 MG: 325 TABLET ORAL at 10:11

## 2019-11-08 RX ADMIN — LOPERAMIDE HYDROCHLORIDE 4 MG: 2 CAPSULE ORAL at 10:11

## 2019-11-08 RX ADMIN — VENLAFAXINE 75 MG: 37.5 TABLET ORAL at 09:11

## 2019-11-08 RX ADMIN — POTASSIUM CHLORIDE 20 MEQ: 1500 TABLET, EXTENDED RELEASE ORAL at 09:11

## 2019-11-08 RX ADMIN — Medication 250 MG: at 10:11

## 2019-11-08 RX ADMIN — FERROUS SULFATE TAB EC 325 MG (65 MG FE EQUIVALENT) 325 MG: 325 (65 FE) TABLET DELAYED RESPONSE at 10:11

## 2019-11-08 NOTE — PLAN OF CARE
Problem: Fall Injury Risk  Goal: Absence of Fall and Fall-Related Injury  Intervention: Promote Injury-Free Environment  Flowsheets (Taken 11/8/2019 0528)  Environmental Safety Modification: assistive device/personal items within reach; clutter free environment maintained     Problem: Skin Injury Risk Increased  Goal: Skin Health and Integrity  Intervention: Promote and Optimize Oral Intake  Flowsheets (Taken 11/8/2019 0528)  Oral Nutrition Promotion: rest periods promoted; safe use of adaptive equipment encouraged

## 2019-11-08 NOTE — PT/OT/SLP PROGRESS
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    PT Received On: 11/08/19        Billable Minutes:  Gait Training 15, Therapeutic Activity 15 and Therapeutic Exercise 15    Treatment Type: Treatment  PT/PTA: PTA     PTA Visit Number: 1       General Precautions: Standard, fall  Orthopedic Precautions: spinal precautions   Braces: N/A    Spiritual, Cultural Beliefs, Yarsanism Practices, Values that Affect Care: no    Subjective:  "Don't know if I can do it, I sat up too long" pt agreeable with encouragement    Pain/Comfort  Pain Rating 1: 9/10  Location - Side 1: Bilateral  Location - Orientation 1: lower  Location 1: back  Pain Addressed 1: Pre-medicate for activity, Reposition, Distraction, Cessation of Activity  Pain Rating Post-Intervention 1: (inc with mobility, did not rate)    Objective:  Patient found with: peripheral IV(in wc)     AM-PAC 6 CLICK MOBILITY  Total Score:13    Bed Mobility:  Sit>Supine:mod A with vcs for log roll    Transfers:  Sit<>Stand: with RW mod A vcs for tech  Stand Pivot Transfer: mod A no AD WC>EOB holding on to me    Gait:  Amb with RW mod/min ~ 5 ft, 33 ft and 37 ft seated rest break     Therex:  2x10 reps AP,GS,LAQ    Additional Treatment:  Mini elliptical x 5 min    Patient left supine with call button in reach and belongings in reach.    Assessment:  Mesha Mckenzie is a 63 y.o. 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  Pt tolerated fairly well, limited by pain, continues to require education/encouragement on the importance of PT services,  pt would continue to benefit from skilled PT services to improve overall functional mobility, strength and endurance.  .    Rehab identified problem list/impairments: weakness, impaired endurance, impaired self care skills, impaired functional mobilty, gait instability, impaired balance, decreased upper extremity function, decreased " lower extremity function, pain    Rehab potential is good.    Activity tolerance: Fair    Discharge recommendations: home health PT     Barriers to discharge: None    Equipment recommendations: wheelchair, walker, rolling, bedside commode, shower chair     GOALS:   Multidisciplinary Problems     Physical Therapy Goals        Problem: Physical Therapy Goal    Goal Priority Disciplines Outcome Goal Variances Interventions   Physical Therapy Goal     PT, PT/OT Ongoing, Progressing     Description:  Goals to be met by: 2019     Patient will increase functional independence with mobility by performin. Supine to sit with Contact Guard Assistance  2. Sit to supine with Contact Guard Assistance  3. Sit to stand transfer with Minimal Assistance  4. Bed to chair transfer with Minimal Assistance using Rolling Walker  5. Gait  x 20 feet with Minimal Assistance using Rolling Walker. = met 2019       N EW GOAL 2019 gait x 75 feet w/ RW and CGA = not met  6. Ascend/Descend 4 inch curb step with Minimal Assistance using Rolling Walker.  6a.  Ascend/Descend 4 inch curb step with use of RW via family assistance with 1 verbal cue or less for technique.  7. Stand for 3 minutes with Minimal Assistance using Rolling Walker  8. Increased functional strength to 4/5 for R quadriceps mm group.  9. Lower extremity exercise program x 20 reps per handout, with assistance as needed. Met (2019)                        PLAN:    Patient to be seen 5 x/week  to address the above listed problems via gait training, therapeutic activities, therapeutic exercises, neuromuscular re-education, wheelchair management/training  Plan of Care expires: 19  Plan of Care reviewed with: patient, family    Mesha Hernandez, JORDEN  2019

## 2019-11-08 NOTE — PLAN OF CARE
Problem: Occupational Therapy Goal  Goal: Occupational Therapy Goal  Description  Goals to be met by: 11-20-19     Patient will increase functional independence with ADLs by performing:    Feeding with Set-up Assistance.  UE Dressing with Set-up Assistance.  LE Dressing with Supervision.  Grooming while seated with Supervision.  Toileting from bedside commode with Supervision for hygiene and clothing management.   Bathing from  shower chair/bench with Stand-by Assistance.  Rolling to Bilateral with Modified [place].   Supine to sit with Modified [place].  Stand pivot transfers with Supervision.  Toilet transfer to bedside commode with Supervision.  Pt. To be able to recall 3/3 spinal precautions  Pt. To be able to perform HEP for BUE to improve endurance with S   pt. To participate in dynamic standing task x 5 minutes with S  Pt. To participate in leisure activities 2x/week     Outcome: Ongoing, Progressing

## 2019-11-08 NOTE — PT/OT/SLP PROGRESS
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        Objective:      Pt was seen for continued cognitive-linguistic therapy.  present at bedside. Pt was oriented to month and place ind'ly.  Min cues needed to orient to situation. Cues were not effective in assisting with orientation to year.  Pt was able to recall her , but was about 10 years off recalling her current age.  Pt recalled number of pets ind'ly and names of pets with 60% accuracy ind'ly/80% given cues.  Following a 3 minute delay, pt recalled facts with 83% accuracy.  [place] convergent categorization tasks were completed with 100% accuracy. Pt also named an additional category member with 100% accuracy.  Abstract convergent tasks were completed with 100% accuracy.  She named additional abstract category members with 60% accuracy ind'ly/100% given cues.  Pt compared/contrasted 2 given items with 100% accuracy.      Assessment:  Mesha Mckenzie is a 63 y.o. 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    Discharge recommendations: Discharge Facility/Level of Care Needs: (assistance/supervision at home upon d/c)     Goals:   Multidisciplinary Problems     SLP Goals        Problem: SLP Goal    Goal Priority Disciplines Outcome   SLP Goal     SLP Ongoing, Progressing   Description:  Speech Language Pathology Goals  Goals expected " to be met by 11/8 - goals remain appropriate - reassess on 11/15:  1. Pt will orient x 4 given mod cues.   2. Following a 3 minute delay, pt will recall 3/3 words or facts given mod cues.  3. Pt will complete moderate level problem solving tasks with 70% accuracy given moderate cues.   4. Pt will complete mental manipulation tasks with 70% accuracy given moderate cues to improve cognitive flexibility.   5. Pt will list 8 items in a category in one minute given min-mod cues.   6. Pt will participate in assessment of reading, writing, visual spatial, and functional math abilities.                           Plan:   Patient to be seen Therapy Frequency: 5 x/week  Planned Interventions: Cognitive-Linguistic Therapy  Plan of Care expires: 12/01/19  Plan of Care reviewed with: patient, spouse  SLP Follow-up?: Yes              LEROY Madrid, CCC-SLP  11/08/2019     LEROY Madrid, CCC-SLP  Speech Language Pathologist  [phone]  11/8/2019

## 2019-11-08 NOTE — PHYSICIAN QUERY
PT Name: Mesha Mckenzie  MR #: [id number]     Physician Query Form - Diagnosis Clarification      CDS/: Brittanie Macdonald               Contact information:[email or web address]    This form is a permanent document in the medical record.     Query Date: November 8, 2019    By submitting this query, we are merely seeking further clarification of documentation.  Please utilize your independent clinical judgment when addressing the question(s) below.     The medical record contains the following:      Findings Supporting Clinical Information Location in Medical Record   IV Vancomycin for treatment of suspected pneumonia         given CXR appearance and report of night sweats PNA can't yet be ruled out despite absence of cough or other symptoms.  Given Vanc and Zosyn in ED for HCAP coverage    Given Vanc and Zosyn in ED for HCAP coverage, but will monitor without further antibiotics and treat for CHF pending further information    Endocarditis  Continue oxacillin 12g continuous infusion for MSSA infection      Previously described multifocal consolidation has increased slightly.  Worsening pneumonia, aspiration or edema    Previously noted nodularity persist. Persistent small left effusion.      Patient was diuresed with lasix, repeat 2D echo without any vegetations and EF of 55%. She improved significantly with the lasix and was stable to go back to SNF without requiring any oxygen    Also given vanc/zosyn to cover for multifocal PNA- sputum and blood cultures sent    Zosyn 4.5g IV x 1 dose Pharmacy PN 10/28        H&P 10/28                                CXR 10/28                DS 10/30 Dr. Mccullough            ED Provider Notes 10/28      MAR 10/28     Please clarify if the ___pneumonia___ diagnosis has been:    [  ] Ruled In   [  ] Ruled In, Now Resolved   [ X  ] Ruled Out   [  ] Other/Clarification of findings (please specify):     [  ] Clinically undetermined     Please document in your progress notes daily  for the duration of treatment, until resolved, and include in your discharge summary.

## 2019-11-08 NOTE — DISCHARGE SUMMARY
Discharge Summary  Hospital Medicine    Attending Provider on Discharge: Mary Grace Weiss MD  Hospital Medicine Team: [facility] HOSP MED R  Date of Admission:  10/27/2019     Date of Discharge:  10/30/2019  Length of Stay:  LOS: 3 days   Code status: Full Code     Active Hospital Problems    Diagnosis  POA    *Acute hypoxemic respiratory failure [J96.01]  Yes    Urinary retention [R33.9]  Yes    Debility [R53.81]  Yes    Acute on chronic diastolic congestive heart failure [I50.33]  Yes      Resolved Hospital Problems   No resolved problems to display.       Hospital Course  Patient was here for three days. She developed SOB and hypoxia and was referred to ER. Patient left before seeing for admit evaluation.    Discharge Condition: Fair    Disposition: ER    Time spent  on the discharge of the patient including review of hospital course with the patient. reviewing discharge medications and arranging follow-up care less than 30 minutes    Mary Grace Weiss MD

## 2019-11-08 NOTE — PROGRESS NOTES
Patient bladder scanned with 387 ml result. Patient put on bed pan and voided 400 ml of clear yellow urine.

## 2019-11-08 NOTE — DISCHARGE SUMMARY
Discharge Summary  Hospital Medicine    Attending Provider on Discharge: Mary Grace Weiss MD  Hospital Medicine Team: Networked reference to record PCT   Date of Admission:  10/25/2019     Date of Discharge:  10/27/2019  Length of Stay:  LOS: 2 days   Code status: Full Code     Active Hospital Problems    Diagnosis  POA    Epidural abscess [G06.2]  Yes      Resolved Hospital Problems   No resolved problems to display.       Hospital Course  Patient was here for three days. She developed SOB and hypoxia and was referred to ER. Patient left before seeing for admit evaluation.    Discharge Condition: Fair    Disposition: ER    Time spent  on the discharge of the patient including review of hospital course with the patient. reviewing discharge medications and arranging follow-up care less than 30 minutes    Mary Grace Weiss MD

## 2019-11-08 NOTE — PLAN OF CARE
Problem: SLP Goal  Goal: SLP Goal  Description  Speech Language Pathology Goals  Goals expected to be met by 11/8 - goals remain appropriate - reassess on 11/15:  1. Pt will orient x 4 given mod cues.   2. Following a 3 minute delay, pt will recall 3/3 words or facts given mod cues.  3. Pt will complete moderate level problem solving tasks with 70% accuracy given moderate cues.   4. Pt will complete mental manipulation tasks with 70% accuracy given moderate cues to improve cognitive flexibility.   5. Pt will list 8 items in a category in one minute given min-mod cues.   6. Pt will participate in assessment of reading, writing, visual spatial, and functional math abilities.         Outcome: Ongoing, Progressing    Goals reviewed and remain appropriate.  Cont POC.  LEROY Madrid, CCC-SLP  Speech Language Pathologist  [phone]  11/8/2019

## 2019-11-08 NOTE — PT/OT/SLP PROGRESS
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  I think I went again in my diaper    Pain/Comfort  Pain Rating 1: 8/10  Location - Orientation 1: lower  Location 1: back    Objective:   Pt.supine on arrival with  present  And UEIV    Occupational Performance:  Bed Mobility:    · Patient completed Supine to Sit with maximal assistance for UB management     Functional Mobility/Transfers:  · Patient completed Sit <> Stand Transfer with moderate assistance  with  rolling walker with cues for safety and hand placement Patient completed Bed <> Chair Transfer using Stand Pivot technique with moderate assistance with no assistive device    Activities of Daily Living:  · Grooming: supervision at sink level for oral care  · Bathing: moderate assistance bed level for BLE and post abdiaziz area  · Upper Body Dressing: maximal assistance to manage gowns  · Lower Body Dressing: maximal assistance to oralia pants seated EOB and to manage over hips instance with RW for bal  · Toileting: total assistance pt. declined 3n1 and rather  bed pan     AMPA 6 Click:  [facility] Total Score: 13    OT Exercises: UE Ergometer 10 min with breaks    Patient left up in chair with all lines intact, call button in reach and PCT and   present    ASSESSMENT:  Mesha Mckenzie is a 63 y.o. 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 participated fair with session on this day. Pt. Pt demos physical deficits with balance  functional mobility, UB strength, endurance  level of functional indep  with daily tasks and activities and selfcare skills .Pt. Will continue to benefit from continued OT to progress towards goals      Rehab identified problem list/impairments: impaired endurance, impaired self care skills, impaired functional mobilty, impaired cognition, decreased lower extremity function, pain, orthopedic precautions    Rehab potential is fair    Activity tolerance: Fair    Discharge recommendations: home health OT(with light assist)     Barriers to discharge: Decreased caregiver support     Equipment recommendations: walker, rolling, wheelchair, commode, shower chair     GOALS:   Multidisciplinary Problems     Occupational Therapy Goals        Problem: Occupational Therapy Goal    Goal Priority Disciplines Outcome Interventions   Occupational Therapy Goal     OT, PT/OT Ongoing, Progressing    Description:  Goals to be met by: 11-20-19     Patient will increase functional independence with ADLs by performing:    Feeding with Set-up Assistance.  UE Dressing with Set-up Assistance.  LE Dressing with Supervision.  Grooming while seated with Supervision.  Toileting from bedside commode with Supervision for hygiene and clothing management.   Bathing from  shower chair/bench with Stand-by Assistance.  Rolling to Bilateral with Modified [place].   Supine to sit with Modified [place].  Stand pivot transfers with Supervision.  Toilet transfer to bedside commode with Supervision.  Pt. To be able to recall 3/3 spinal precautions  Pt. To be able to perform HEP for BUE to improve endurance with S   pt. To participate in dynamic standing task x 5 minutes with S  Pt. To participate in leisure activities 2x/week                      Plan:  Patient to be seen 5 x/week to address the above listed problems via self-care/home management, therapeutic activities, therapeutic exercises  Plan of Care expires: 11/30/19  Plan of Care reviewed with: patient    MIKEL Summers  11/08/2019

## 2019-11-09 PROCEDURE — 11000004 HC SNF PRIVATE

## 2019-11-09 PROCEDURE — 63600175 PHARM REV CODE 636 W HCPCS: Performed by: STUDENT IN AN ORGANIZED HEALTH CARE EDUCATION/TRAINING PROGRAM

## 2019-11-09 PROCEDURE — 25000003 PHARM REV CODE 250: Performed by: NURSE PRACTITIONER

## 2019-11-09 PROCEDURE — 25000003 PHARM REV CODE 250: Performed by: INTERNAL MEDICINE

## 2019-11-09 PROCEDURE — 25000003 PHARM REV CODE 250: Performed by: STUDENT IN AN ORGANIZED HEALTH CARE EDUCATION/TRAINING PROGRAM

## 2019-11-09 PROCEDURE — 63600175 PHARM REV CODE 636 W HCPCS: Performed by: NURSE PRACTITIONER

## 2019-11-09 RX ADMIN — POTASSIUM CHLORIDE 20 MEQ: 1500 TABLET, EXTENDED RELEASE ORAL at 05:11

## 2019-11-09 RX ADMIN — TRAZODONE HYDROCHLORIDE 25 MG: 50 TABLET ORAL at 09:11

## 2019-11-09 RX ADMIN — POTASSIUM CHLORIDE 20 MEQ: 1500 TABLET, EXTENDED RELEASE ORAL at 09:11

## 2019-11-09 RX ADMIN — ACETAMINOPHEN 650 MG: 325 TABLET ORAL at 09:11

## 2019-11-09 RX ADMIN — CIPROFLOXACIN HYDROCHLORIDE 250 MG: 250 TABLET, FILM COATED ORAL at 09:11

## 2019-11-09 RX ADMIN — PSYLLIUM HUSK 1 PACKET: 3.4 POWDER ORAL at 09:11

## 2019-11-09 RX ADMIN — PANTOPRAZOLE SODIUM 40 MG: 40 TABLET, DELAYED RELEASE ORAL at 09:11

## 2019-11-09 RX ADMIN — VENLAFAXINE 75 MG: 37.5 TABLET ORAL at 03:11

## 2019-11-09 RX ADMIN — ACETAMINOPHEN 650 MG: 325 TABLET ORAL at 03:11

## 2019-11-09 RX ADMIN — GABAPENTIN 200 MG: 100 CAPSULE ORAL at 09:11

## 2019-11-09 RX ADMIN — METOPROLOL TARTRATE 100 MG: 50 TABLET ORAL at 09:11

## 2019-11-09 RX ADMIN — AMLODIPINE BESYLATE 10 MG: 10 TABLET ORAL at 09:11

## 2019-11-09 RX ADMIN — OXACILLIN SODIUM 12 G: 1 INJECTION, POWDER, FOR SOLUTION INTRAMUSCULAR; INTRAVENOUS at 03:11

## 2019-11-09 RX ADMIN — LOPERAMIDE HYDROCHLORIDE 4 MG: 2 CAPSULE ORAL at 03:11

## 2019-11-09 RX ADMIN — LOPERAMIDE HYDROCHLORIDE 4 MG: 2 CAPSULE ORAL at 09:11

## 2019-11-09 RX ADMIN — ENOXAPARIN SODIUM 40 MG: 100 INJECTION SUBCUTANEOUS at 05:11

## 2019-11-09 RX ADMIN — Medication 1 CAPSULE: at 09:11

## 2019-11-09 RX ADMIN — MICONAZOLE NITRATE: 20 OINTMENT TOPICAL at 09:11

## 2019-11-09 RX ADMIN — LOSARTAN POTASSIUM 25 MG: 25 TABLET, FILM COATED ORAL at 09:11

## 2019-11-09 RX ADMIN — GABAPENTIN 200 MG: 100 CAPSULE ORAL at 03:11

## 2019-11-09 RX ADMIN — ATORVASTATIN CALCIUM 40 MG: 20 TABLET, FILM COATED ORAL at 09:11

## 2019-11-09 RX ADMIN — VENLAFAXINE 75 MG: 37.5 TABLET ORAL at 09:11

## 2019-11-09 RX ADMIN — FOLIC ACID 1 MG: 1 TABLET ORAL at 09:11

## 2019-11-09 RX ADMIN — FUROSEMIDE 40 MG: 40 TABLET ORAL at 09:11

## 2019-11-09 NOTE — PROGRESS NOTES
"[facility] - Skilled Nursing Care  Adult Nutrition  Progress Note    SUMMARY   Recommendations    Recommendation/Intervention: Continue regular diet, add banana to breakfast, continue beneprotien and increase boost plus from BID to TID  Goals: PO to meet 50% of EEN and EPN needs stable weight > 110# by next RD visit  Nutrition Goal Status: progressing towards goal  Reason for Assessment    Reason For Assessment: RD follow-up  Diagnosis: (respiratory distress)  Relevant Medical History: s/p acute bacterial endocarditis, septic PE, spinal epidural abscess, severe malnutrition, COPD, depression, anxiety, spinal stenosis, chronic pain(sp [place])  Interdisciplinary Rounds: attended  General Information Comments: pt still has diarhea, neg for C diff, remains on Abx, will try banana in am  Nutrition Discharge Planning: DC on regular diet    Nutrition/Diet History    Typical Food/Fluid Intake: has been letting her  cook, small meals,   Spiritual, Cultural Beliefs, Taoist Practices, Values that Affect Care: no  Food Allergies: NKFA(cannot tolerate Zatarains seasoning on foods)  Factors Affecting Nutritional Intake: decreased appetite    Anthropometrics    Temp: 98.1 °F (36.7 °C)  Height Method: Stated  Height: 5' 4" (162.6 cm)  Height (inches): 64 in  Weight Method: Bed Scale  Weight: 50.3 kg (110 lb 14.3 oz)  Weight (lb): 110.89 lb  Ideal Body Weight (IBW), Female: 120 lb  % Ideal Body Weight, Female (lb): 95.72 lb  BMI (Calculated): 19  BMI Grade: 18.5-24.9 - normal  Usual Body Weight (UBW), k.18 kg  % Usual Body Weight: 98.17  % Weight Change From Usual Weight: -2.03 %       Lab/Procedures/Meds    Pertinent Labs Reviewed: reviewed  Pertinent Labs Comments: PO4 4.8  Pertinent Medications Reviewed: reviewed  Pertinent Medications Comments: lovenox, Fe, folic acid, lactobacillus GG, psyllium husk, lasix    Estimated/Assessed Needs    Weight Used For Calorie Calculations: 52.1 kg (114 lb 13.8 oz)  Energy Calorie " Requirements (kcal): 5481-3622  Energy Need Method: Kcal/kg(30-35 kcal/kg )  Protein Requirements: 67-78g  Weight Used For Protein Calculations: 52.1 kg (114 lb 13.8 oz)(1.3-1.5g/kg)  Fluid Requirements (mL): 1 ml per Kcal or per MD  Estimated Fluid Requirement Method: RDA Method  RDA Method (mL): 1563  CHO Requirement: -      Nutrition Prescription Ordered    Current Diet Order: Regular  Nutrition Order Comments: PO %  Current Nutrition Support Frequency Ordered: beneprotein TID, boost plus BID,   Oral Nutrition Supplement: beneprotein TID, Boost plus vanilla TID    Evaluation of Received Nutrient/Fluid Intake    Energy Calories Required: not meeting needs  Protein Required: not meeting needs  Fluid Required: meeting needs  Comments: PO improved from 25% to 50-75%  Tolerance: tolerating  % Intake of Estimated Energy Needs: 75 - 100 %  % Meal Intake: 75 - 100 %    Nutrition Risk    Level of Risk/Frequency of Follow-up: low     Assessment and Plan  Severe malnutrition  Malnutrition in the context of Acute Illness/Injury     Related to (etiology):  Inability to eat po (N/V) poor appetite, picky eater, SOB     Signs and Symptoms (as evidenced by):  Energy Intake: <50% of estimated energy requirement for > 7 days  Body Fat Depletion: moderate depletion of orbitals, triceps and thoracic and lumbar region   Muscle Mass Depletion: severe depletion of temples, clavicle region and interosseous muscle     Interventions/Recommendations (treatment strategy):  General diet  Commercial beverage- calories and protein- boost plus vanilla TID  Commercial food- protein- beneprotein to fluids TID  Nutrition education- nutrient needs, malnutrition     Nutrition Diagnosis Status:  Ongoing    Monitor and Evaluation    Food and Nutrient Intake: energy intake  Food and Nutrient Adminstration: diet order  Knowledge/Beliefs/Attitudes: food and nutrition knowledge/skill  Physical Activity and Function: nutrition-related ADLs and  IADLs  Anthropometric Measurements: weight change  Biochemical Data, Medical Tests and Procedures: glucose/endocrine profile, inflammatory profile, electrolyte and renal panel, gastrointestinal profile  Nutrition-Focused Physical Findings: overall appearance     Malnutrition Assessment   10/31/19  Malnutrition Type: acute illness or injury  Energy Intake: severe energy intake  Skin (Micronutrient): wounds unhealed, pallor, dry  Hair/Scalp (Micronutrient): dry, dull  Eyes (Micronutrient): conjunctiva dull, conjunctiva dry  Neck/Chest (Micronutrient): bony prominence, muscle wasting, subcutaneous fat loss  Musculoskeletal/Lower Extremities: subcutaneous fat loss, muscle wasting       Energy Intake (Malnutrition): less than or equal to 50% for greater than or equal to 5 days   Orbital Region (Subcutaneous Fat Loss): moderate depletion  Upper Arm Region (Subcutaneous Fat Loss): moderate depletion  Thoracic and Lumbar Region: mild depletion   [place] Region (Muscle Loss): severe depletion  Clavicle Bone Region (Muscle Loss): severe depletion  Clavicle and Acromion Bone Region (Muscle Loss): severe depletion  Dorsal Hand (Muscle Loss): severe depletion  Patellar Region (Muscle Loss): moderate depletion  Anterior Thigh Region (Muscle Loss): moderate depletion  Posterior Calf Region (Muscle Loss): moderate depletion   Edema (Fluid Accumulation): 0-->no edema present             Nutrition Follow-Up    RD Follow-up?: Yes

## 2019-11-10 PROCEDURE — 97116 GAIT TRAINING THERAPY: CPT

## 2019-11-10 PROCEDURE — 11000004 HC SNF PRIVATE

## 2019-11-10 PROCEDURE — 97110 THERAPEUTIC EXERCISES: CPT

## 2019-11-10 PROCEDURE — 25000003 PHARM REV CODE 250: Performed by: NURSE PRACTITIONER

## 2019-11-10 PROCEDURE — 97530 THERAPEUTIC ACTIVITIES: CPT

## 2019-11-10 PROCEDURE — 97535 SELF CARE MNGMENT TRAINING: CPT

## 2019-11-10 PROCEDURE — 63600175 PHARM REV CODE 636 W HCPCS: Performed by: STUDENT IN AN ORGANIZED HEALTH CARE EDUCATION/TRAINING PROGRAM

## 2019-11-10 PROCEDURE — 25000003 PHARM REV CODE 250: Performed by: STUDENT IN AN ORGANIZED HEALTH CARE EDUCATION/TRAINING PROGRAM

## 2019-11-10 PROCEDURE — 63600175 PHARM REV CODE 636 W HCPCS: Performed by: NURSE PRACTITIONER

## 2019-11-10 PROCEDURE — 25000003 PHARM REV CODE 250: Performed by: INTERNAL MEDICINE

## 2019-11-10 RX ADMIN — LOPERAMIDE HYDROCHLORIDE 4 MG: 2 CAPSULE ORAL at 02:11

## 2019-11-10 RX ADMIN — PANTOPRAZOLE SODIUM 40 MG: 40 TABLET, DELAYED RELEASE ORAL at 09:11

## 2019-11-10 RX ADMIN — LOSARTAN POTASSIUM 25 MG: 25 TABLET, FILM COATED ORAL at 09:11

## 2019-11-10 RX ADMIN — LOPERAMIDE HYDROCHLORIDE 4 MG: 2 CAPSULE ORAL at 09:11

## 2019-11-10 RX ADMIN — AMLODIPINE BESYLATE 10 MG: 10 TABLET ORAL at 09:11

## 2019-11-10 RX ADMIN — GABAPENTIN 200 MG: 100 CAPSULE ORAL at 09:11

## 2019-11-10 RX ADMIN — FERROUS SULFATE TAB EC 325 MG (65 MG FE EQUIVALENT) 325 MG: 325 (65 FE) TABLET DELAYED RESPONSE at 09:11

## 2019-11-10 RX ADMIN — METOPROLOL TARTRATE 100 MG: 50 TABLET ORAL at 09:11

## 2019-11-10 RX ADMIN — Medication 1 CAPSULE: at 09:11

## 2019-11-10 RX ADMIN — VENLAFAXINE 75 MG: 37.5 TABLET ORAL at 09:11

## 2019-11-10 RX ADMIN — FOLIC ACID 1 MG: 1 TABLET ORAL at 09:11

## 2019-11-10 RX ADMIN — GABAPENTIN 200 MG: 100 CAPSULE ORAL at 02:11

## 2019-11-10 RX ADMIN — ATORVASTATIN CALCIUM 40 MG: 20 TABLET, FILM COATED ORAL at 09:11

## 2019-11-10 RX ADMIN — ACETAMINOPHEN 650 MG: 325 TABLET ORAL at 09:11

## 2019-11-10 RX ADMIN — FUROSEMIDE 40 MG: 40 TABLET ORAL at 09:11

## 2019-11-10 RX ADMIN — ACETAMINOPHEN 650 MG: 325 TABLET ORAL at 02:11

## 2019-11-10 RX ADMIN — CIPROFLOXACIN HYDROCHLORIDE 250 MG: 250 TABLET, FILM COATED ORAL at 09:11

## 2019-11-10 RX ADMIN — MICONAZOLE NITRATE: 20 OINTMENT TOPICAL at 09:11

## 2019-11-10 RX ADMIN — POTASSIUM CHLORIDE 20 MEQ: 1500 TABLET, EXTENDED RELEASE ORAL at 05:11

## 2019-11-10 RX ADMIN — OXACILLIN SODIUM 12 G: 1 INJECTION, POWDER, FOR SOLUTION INTRAMUSCULAR; INTRAVENOUS at 03:11

## 2019-11-10 RX ADMIN — VENLAFAXINE 75 MG: 37.5 TABLET ORAL at 02:11

## 2019-11-10 RX ADMIN — ENOXAPARIN SODIUM 40 MG: 100 INJECTION SUBCUTANEOUS at 05:11

## 2019-11-10 RX ADMIN — Medication 250 MG: at 09:11

## 2019-11-10 RX ADMIN — TRAZODONE HYDROCHLORIDE 25 MG: 50 TABLET ORAL at 09:11

## 2019-11-10 RX ADMIN — POTASSIUM CHLORIDE 20 MEQ: 1500 TABLET, EXTENDED RELEASE ORAL at 09:11

## 2019-11-10 NOTE — PT/OT/SLP PROGRESS
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  Pt agreed to work with therapy.     Pain/Comfort  Pain Rating 1: (Pt did not )  Location - Orientation 1: lower  Location 1: back  Pain Addressed 1: Pre-medicate for activity, Reposition, Distraction  Pain Rating Post-Intervention 1: (Pt did not rate. )    Objective:  Patient found supine in bed with IV.        AM-PAC 6 CLICK MOBILITY  Total Score:13    Bed Mobility:  Sit>Supine:not performed  Supine>Sit: on bed w/ Min-Mod A vcs for log roll    Transfers:  Sit<>Stand: to/from EOB, to/from w/c x5 trials with RW and Min A  Stand Pivot Transfer: EOB to w/c no AD w/ Min-Mod A    Gait:  Amb  x5 trials (10ft., 20ft.,16ft.,18ft.,and 28ft) w/ RW and Min A. Seated rest break between trials.     Therex:  BLE therex 2x10 reps:   -AP   -LAQ   -Hip Flexion    -GS    Additional Treatment:  -UBE x12 min    Patient left up in chair with all lines intact, call button in reach, nursing notified and family present.    Assessment:  Mesha Mckenzie is a 64 y.o. 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  Pt tolerated treatment well, and will continue to benefit from PT services at this time. Continue with PT POC as indicated.    Rehab identified problem list/impairments: weakness, impaired endurance, impaired self care skills, impaired functional mobilty, gait instability, impaired balance, decreased upper extremity function,  decreased lower extremity function, pain    Rehab potential is good.    Activity tolerance: Fair    Discharge recommendations: home health PT     Barriers to discharge: None    Equipment recommendations: wheelchair, walker, rolling, bedside commode, shower chair     GOALS:   Multidisciplinary Problems     Physical Therapy Goals        Problem: Physical Therapy Goal    Goal Priority Disciplines Outcome Goal Variances Interventions   Physical Therapy Goal     PT, PT/OT Ongoing, Progressing     Description:  Goals to be met by: 2019     Patient will increase functional independence with mobility by performin. Supine to sit with Contact Guard Assistance  2. Sit to supine with Contact Guard Assistance  3. Sit to stand transfer with Minimal Assistance  4. Bed to chair transfer with Minimal Assistance using Rolling Walker  5. Gait  x 20 feet with Minimal Assistance using Rolling Walker. = met 2019       N EW GOAL 2019 gait x 75 feet w/ RW and CGA = not met  6. Ascend/Descend 4 inch curb step with Minimal Assistance using Rolling Walker.  6a.  Ascend/Descend 4 inch curb step with use of RW via family assistance with 1 verbal cue or less for technique.  7. Stand for 3 minutes with Minimal Assistance using Rolling Walker  8. Increased functional strength to 4/5 for R quadriceps mm group.  9. Lower extremity exercise program x 20 reps per handout, with assistance as needed. Met (2019)                        PLAN:    Patient to be seen 5 x/week  to address the above listed problems via gait training, therapeutic activities, therapeutic exercises, neuromuscular re-education, wheelchair management/training  Plan of Care expires: 19  Plan of Care reviewed with: patient    Lauren Kaley, PTA  11/10/2019

## 2019-11-10 NOTE — PLAN OF CARE
Problem: Adult Inpatient Plan of Care  Goal: Plan of Care Review  Outcome: Ongoing, Progressing     Problem: Infection  Goal: Infection Symptom Resolution  Outcome: Ongoing, Progressing     Problem: Skin Injury Risk Increased  Goal: Skin Health and Integrity  Outcome: Ongoing, Progressing

## 2019-11-10 NOTE — PT/OT/SLP PROGRESS
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  Agreeable to therapy  Daughter stated, "She needs changed first."     Pain/Comfort  Pain Rating 1: (did not rate)  Location - Side 1: Bilateral  Location - Orientation 1: lower  Location 1: back  Pain Addressed 1: Pre-medicate for activity, Distraction, Cessation of Activity  Pain Rating Post-Intervention 1: (did not rate)    Objective:  Patient found with: peripheral IV supine with family members present.   Diaper heavily soiled with BM.    Occupational Performance:    Bed Mobility:    · Patient completed Supine to Sit with stand by assistance and contact guard assistance  · Patient completed Sit to Supine with stand by assistance and contact guard assistance     Functional Mobility/Transfers:  · Patient completed Sit <> Stand Transfer with minimum assistance  with  rolling walker and grab bars(s)   · Patient completed Bed <> Chair Transfer using Stand Pivot technique with minimum assistance with no assistive device    Activities of Daily Living:  · Grooming: set up assistance seated in w/c washing face and applying deodorant  · Bathing: minimum assistance to wash abdiaziz area standing at grab bars  · Upper Body Dressing: minimum assistance oralia front gown d/t IV in RUE  · Lower Body Dressing: minimum assistance oralia pants over feet seated and CGA over hips standing at RW  · Toileting: minimum assistance for hygiene standing at grab bars    [facility] 6 Click:  [facility] Total Score: 13    OT Exercises: BUE AROM using 2 lb dowel x " 10 reps of chest press, forward/backward rowing, internal/external rotation, and horizontal abd/add nad x 20 reps of bicep curls to increase strength/endurance for t/fs and functional mobility.     Patient left supine with call button in reach and daughter  present    ASSESSMENT:  Mesha Mckenzie is a 64 y.o. 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 Patient tolerated session well. Continue OT POC.     Rehab identified problem list/impairments: impaired endurance, impaired self care skills, impaired functional mobilty, impaired cognition, decreased lower extremity function, pain, orthopedic precautions    Rehab potential is good    Activity tolerance: Good    Discharge recommendations: home health OT     Barriers to discharge: Decreased caregiver support     Equipment recommendations: walker, rolling, wheelchair, commode, shower chair     GOALS:   Multidisciplinary Problems     Occupational Therapy Goals        Problem: Occupational Therapy Goal    Goal Priority Disciplines Outcome Interventions   Occupational Therapy Goal     OT, PT/OT Ongoing, Progressing    Description:  Goals to be met by: 11-20-19     Patient will increase functional independence with ADLs by performing:    Feeding with Set-up Assistance.  UE Dressing with Set-up Assistance.  LE Dressing with Supervision.  Grooming while seated with Supervision.  Toileting from bedside commode with Supervision for hygiene and clothing management.   Bathing from  shower chair/bench with Stand-by Assistance.  Rolling to Bilateral with Modified Walsh.   Supine to sit with Modified Walsh.  Stand pivot transfers with Supervision.  Toilet transfer to bedside commode with Supervision.  Pt. To be able to recall 3/3 spinal precautions  Pt. To be able to perform HEP for BUE to improve endurance with S   pt. To participate in dynamic standing task x 5 minutes with S  Pt. To  participate in leisure activities 2x/week                      Plan:  Patient to be seen 5 x/week to address the above listed problems via self-care/home management, therapeutic activities, therapeutic exercises  Plan of Care expires: 11/30/19  Plan of Care reviewed with: patient, daughter   The YASMEENR and ALEAH have collaborated and discussed the patient's status, treatment plan and progress toward established goals.   LÁZARO Chapman 11/10/2019     LÁZARO Chapman  11/10/2019

## 2019-11-11 LAB
BACTERIA #/AREA URNS AUTO: ABNORMAL /HPF
BILIRUB UR QL STRIP: NEGATIVE
CLARITY UR REFRACT.AUTO: CLEAR
COLOR UR AUTO: ABNORMAL
GLUCOSE UR QL STRIP: NEGATIVE
HGB UR QL STRIP: ABNORMAL
HYALINE CASTS UR QL AUTO: 1 /LPF
KETONES UR QL STRIP: NEGATIVE
LEUKOCYTE ESTERASE UR QL STRIP: ABNORMAL
MICROSCOPIC COMMENT: ABNORMAL
NITRITE UR QL STRIP: NEGATIVE
PH UR STRIP: 6 [PH] (ref 5–8)
PROT UR QL STRIP: NEGATIVE
RBC #/AREA URNS AUTO: 9 /HPF (ref 0–4)
SP GR UR STRIP: 1.01 (ref 1–1.03)
URN SPEC COLLECT METH UR: ABNORMAL
WBC #/AREA URNS AUTO: 30 /HPF (ref 0–5)

## 2019-11-11 PROCEDURE — 92507 TX SP LANG VOICE COMM INDIV: CPT

## 2019-11-11 PROCEDURE — 97110 THERAPEUTIC EXERCISES: CPT

## 2019-11-11 PROCEDURE — 81001 URINALYSIS AUTO W/SCOPE: CPT

## 2019-11-11 PROCEDURE — 97535 SELF CARE MNGMENT TRAINING: CPT

## 2019-11-11 PROCEDURE — 97116 GAIT TRAINING THERAPY: CPT

## 2019-11-11 PROCEDURE — 87106 FUNGI IDENTIFICATION YEAST: CPT

## 2019-11-11 PROCEDURE — 25000003 PHARM REV CODE 250: Performed by: STUDENT IN AN ORGANIZED HEALTH CARE EDUCATION/TRAINING PROGRAM

## 2019-11-11 PROCEDURE — 25000003 PHARM REV CODE 250: Performed by: NURSE PRACTITIONER

## 2019-11-11 PROCEDURE — 25000003 PHARM REV CODE 250: Performed by: INTERNAL MEDICINE

## 2019-11-11 PROCEDURE — 97530 THERAPEUTIC ACTIVITIES: CPT

## 2019-11-11 PROCEDURE — 87088 URINE BACTERIA CULTURE: CPT

## 2019-11-11 PROCEDURE — 87086 URINE CULTURE/COLONY COUNT: CPT

## 2019-11-11 PROCEDURE — 63600175 PHARM REV CODE 636 W HCPCS: Performed by: NURSE PRACTITIONER

## 2019-11-11 PROCEDURE — 11000004 HC SNF PRIVATE

## 2019-11-11 PROCEDURE — 63600175 PHARM REV CODE 636 W HCPCS: Performed by: STUDENT IN AN ORGANIZED HEALTH CARE EDUCATION/TRAINING PROGRAM

## 2019-11-11 RX ORDER — HYDROCORTISONE 25 MG/G
CREAM TOPICAL 2 TIMES DAILY
Status: DISCONTINUED | OUTPATIENT
Start: 2019-11-11 | End: 2019-11-20 | Stop reason: HOSPADM

## 2019-11-11 RX ORDER — LOPERAMIDE HYDROCHLORIDE 2 MG/1
4 CAPSULE ORAL 3 TIMES DAILY PRN
Status: DISCONTINUED | OUTPATIENT
Start: 2019-11-11 | End: 2019-11-20 | Stop reason: HOSPADM

## 2019-11-11 RX ORDER — TRAZODONE HYDROCHLORIDE 50 MG/1
50 TABLET ORAL NIGHTLY
Status: DISCONTINUED | OUTPATIENT
Start: 2019-11-11 | End: 2019-11-13

## 2019-11-11 RX ORDER — FLUCONAZOLE 150 MG/1
150 TABLET ORAL ONCE
Status: COMPLETED | OUTPATIENT
Start: 2019-11-11 | End: 2019-11-11

## 2019-11-11 RX ORDER — DIPHENOXYLATE HYDROCHLORIDE AND ATROPINE SULFATE 2.5; .025 MG/1; MG/1
1 TABLET ORAL 4 TIMES DAILY
Status: COMPLETED | OUTPATIENT
Start: 2019-11-11 | End: 2019-11-14

## 2019-11-11 RX ADMIN — VENLAFAXINE 75 MG: 37.5 TABLET ORAL at 11:11

## 2019-11-11 RX ADMIN — Medication 1 CAPSULE: at 08:11

## 2019-11-11 RX ADMIN — Medication 10 ML: at 09:11

## 2019-11-11 RX ADMIN — FUROSEMIDE 40 MG: 40 TABLET ORAL at 08:11

## 2019-11-11 RX ADMIN — LOPERAMIDE HYDROCHLORIDE 4 MG: 2 CAPSULE ORAL at 08:11

## 2019-11-11 RX ADMIN — ENOXAPARIN SODIUM 40 MG: 100 INJECTION SUBCUTANEOUS at 05:11

## 2019-11-11 RX ADMIN — POTASSIUM CHLORIDE 20 MEQ: 1500 TABLET, EXTENDED RELEASE ORAL at 05:11

## 2019-11-11 RX ADMIN — FOLIC ACID 1 MG: 1 TABLET ORAL at 08:11

## 2019-11-11 RX ADMIN — MICONAZOLE NITRATE: 20 OINTMENT TOPICAL at 08:11

## 2019-11-11 RX ADMIN — FLUCONAZOLE 150 MG: 150 TABLET ORAL at 10:11

## 2019-11-11 RX ADMIN — LOSARTAN POTASSIUM 25 MG: 25 TABLET, FILM COATED ORAL at 08:11

## 2019-11-11 RX ADMIN — DIPHENOXYLATE HYDROCHLORIDE AND ATROPINE SULFATE 1 TABLET: 2.5; .025 TABLET ORAL at 05:11

## 2019-11-11 RX ADMIN — CIPROFLOXACIN HYDROCHLORIDE 250 MG: 250 TABLET, FILM COATED ORAL at 08:11

## 2019-11-11 RX ADMIN — GABAPENTIN 200 MG: 100 CAPSULE ORAL at 09:11

## 2019-11-11 RX ADMIN — TRAZODONE HYDROCHLORIDE 50 MG: 50 TABLET ORAL at 09:11

## 2019-11-11 RX ADMIN — OXACILLIN SODIUM 12 G: 1 INJECTION, POWDER, FOR SOLUTION INTRAMUSCULAR; INTRAVENOUS at 03:11

## 2019-11-11 RX ADMIN — ATORVASTATIN CALCIUM 40 MG: 20 TABLET, FILM COATED ORAL at 08:11

## 2019-11-11 RX ADMIN — METOPROLOL TARTRATE 100 MG: 50 TABLET ORAL at 09:11

## 2019-11-11 RX ADMIN — DIPHENOXYLATE HYDROCHLORIDE AND ATROPINE SULFATE 1 TABLET: 2.5; .025 TABLET ORAL at 09:11

## 2019-11-11 RX ADMIN — POTASSIUM CHLORIDE 20 MEQ: 1500 TABLET, EXTENDED RELEASE ORAL at 08:11

## 2019-11-11 RX ADMIN — ACETAMINOPHEN 650 MG: 325 TABLET ORAL at 03:11

## 2019-11-11 RX ADMIN — METOPROLOL TARTRATE 100 MG: 50 TABLET ORAL at 08:11

## 2019-11-11 RX ADMIN — Medication 1 CAPSULE: at 09:11

## 2019-11-11 RX ADMIN — GABAPENTIN 200 MG: 100 CAPSULE ORAL at 08:11

## 2019-11-11 RX ADMIN — PANTOPRAZOLE SODIUM 40 MG: 40 TABLET, DELAYED RELEASE ORAL at 08:11

## 2019-11-11 RX ADMIN — AMLODIPINE BESYLATE 10 MG: 10 TABLET ORAL at 08:11

## 2019-11-11 RX ADMIN — VENLAFAXINE 75 MG: 37.5 TABLET ORAL at 09:11

## 2019-11-11 RX ADMIN — GABAPENTIN 200 MG: 100 CAPSULE ORAL at 03:11

## 2019-11-11 RX ADMIN — IBUPROFEN 400 MG: 200 TABLET, FILM COATED ORAL at 09:11

## 2019-11-11 RX ADMIN — ACETAMINOPHEN 650 MG: 325 TABLET ORAL at 08:11

## 2019-11-11 RX ADMIN — MICONAZOLE NITRATE: 20 OINTMENT TOPICAL at 09:11

## 2019-11-11 NOTE — PT/OT/SLP PROGRESS
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    PT Received On: 11/11/19        Billable Minutes:  Gait Training 15, Therapeutic Activity 15 and Therapeutic Exercise 15    Treatment Type: Treatment  PT/PTA: PTA     PTA Visit Number: 3       General Precautions: Standard, fall  Orthopedic Precautions: spinal precautions   Braces: N/A    Spiritual, Cultural Beliefs, Restorationist Practices, Values that Affect Care: no    Subjective:  "I guess so" agreeable to therapy    Pain/Comfort  Pain Rating 1: 8/10  Location - Side 1: Bilateral  Location - Orientation 1: lower  Location 1: back  Pain Addressed 1: Pre-medicate for activity, Reposition, Distraction, Cessation of Activity  Pain Rating Post-Intervention 1: (inc with mobility but "better")    Objective:   Patient found with: peripheral IV     AM-PAC 6 CLICK MOBILITY  Total Score:16    Bed Mobility:  Supine>Sit: min A with trunk elev via log roll    Transfers:  Sit<>Stand: min A with RW  Stand Pivot Transfer: min A no AD holding me EOB>WC>BSchair    Gait:  Amb with RW min A ~ 19 ft x 2 trials, 32 ft and 25 ft seated rest break wc/IV in tow    Therex:  2x10 reps AP,GS,LAQ,hip flex,abd/add    Patient left up in chair with call button in reach and belongings in reach.    Assessment:  Mesha Mckenzie is a 64 y.o. 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  Pt tolerated well, mobility appears to be getting a little easier with less guarding, pt would continue to benefit from skilled PT services to improve overall functional mobility, strength and endurance.  .    Rehab identified problem list/impairments: weakness, impaired endurance, impaired self care skills, impaired functional mobilty, gait instability, impaired balance, decreased upper extremity function, decreased lower extremity function, pain    Rehab potential is good.    Activity tolerance: Fair    Discharge " recommendations: home health PT     Barriers to discharge: None    Equipment recommendations: wheelchair, walker, rolling, bedside commode, shower chair     GOALS:   Multidisciplinary Problems     Physical Therapy Goals        Problem: Physical Therapy Goal    Goal Priority Disciplines Outcome Goal Variances Interventions   Physical Therapy Goal     PT, PT/OT Ongoing, Progressing     Description:  Goals to be met by: 2019     Patient will increase functional independence with mobility by performin. Supine to sit with Contact Guard Assistance  2. Sit to supine with Contact Guard Assistance  3. Sit to stand transfer with Minimal Assistance  4. Bed to chair transfer with Minimal Assistance using Rolling Walker  5. Gait  x 20 feet with Minimal Assistance using Rolling Walker. = met 2019       N EW GOAL 2019 gait x 75 feet w/ RW and CGA = not met  6. Ascend/Descend 4 inch curb step with Minimal Assistance using Rolling Walker.  6a.  Ascend/Descend 4 inch curb step with use of RW via family assistance with 1 verbal cue or less for technique.  7. Stand for 3 minutes with Minimal Assistance using Rolling Walker  8. Increased functional strength to 4/5 for R quadriceps mm group.  9. Lower extremity exercise program x 20 reps per handout, with assistance as needed. Met (2019)                        PLAN:    Patient to be seen 5 x/week  to address the above listed problems via gait training, therapeutic activities, therapeutic exercises, neuromuscular re-education, wheelchair management/training  Plan of Care expires: 19  Plan of Care reviewed with: patient    Mesha Hernandez, PTA  2019

## 2019-11-11 NOTE — PLAN OF CARE
Problem: SLP Goal  Goal: SLP Goal  Description  Speech Language Pathology Goals  Goals expected to be met by 11/8 - goals remain appropriate - reassess on 11/15:  1. Pt will orient x 4 given mod cues.   2. Following a 3 minute delay, pt will recall 3/3 words or facts given mod cues.  3. Pt will complete moderate level problem solving tasks with 70% accuracy given moderate cues.   4. Pt will complete mental manipulation tasks with 70% accuracy given moderate cues to improve cognitive flexibility.   5. Pt will list 8 items in a category in one minute given min-mod cues.   6. Pt will participate in assessment of reading MET, writing MET, visual spatial MET, and functional math abilities.          Outcome: Ongoing, Progressing    Pt was seen for ST session.

## 2019-11-11 NOTE — PROGRESS NOTES
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  Patient initially presented to ED with N/V/D and weakness. Treated with broad-spectrum antibiotics for pneumonia, colitis and septic shock. CT on 9/25 and showed bilateral cavitary lesions likely secondary to septic emboli, small pericardial effusion and ileus. DHARMESH on 9/25 revealed  mitral and tricuspid valve endocarditis and positive Blood cultures with MSSA. ID treated with cefazolin, vancomycin and daptomycin. Echo on 9/28 revealed LVEF 35% and persistent vegatations. 9/30 MRI brain that revealed remote lacunar infarcts without acute intracranial abnormality. 10/1 MRI revealed spinal epidural abscess at L2-4 level. Thoracentesis was performed on 10/1. Patient transferred to [facility] for neurosurgery evaluation of epidural abscess. [facility] hospitalization complicated by encephalopathy and collapse lung. Chest tube placed & pleural fluid sent; gram stain with GPCs. I&D of shoulder abscesses 10/5/19, per gen surg. Head CT was repeated and showed new R basal ganglia infarcts. MRI of brain showed evidence of R thalamic infarct and ventriculitis. CTA of head completed 10/8 showing atherosclerotic plaquing of the distal vertebral arteries and concern for noncalcified plaquing and stenosis; no high-grade stenosis or proximal occlusion. Patient underwent laminectomy  on 10/9. Patient was deemed medically stable and transferred to [facility] to participate in acute inpatient therapy. Admission to SNF for secondary weakness and debility.    Interval history:  Patient is stil endorsing 3-4 episodes of diarrhea per day- C diff culture negative- patient states she does not want to take Metamucil.  Will adjust antidiarrheal regimen.  Patient is also reporting pain associated from hemorrhoids. On 11/4, patient complained of groin pain- + UA, with Ucx resulted with Klebsiella pneumonia- treated with Cipro 250 mg BID x5 days- today, patient is still reporting dysuria and groin pain.  Patient continues to endorse back and leg pain. Pain is chronic in nature, worse with movement, relieved with rest and medications.  Patient states that her pain control regimen is adequate enough to get her through.  Patient also endorses mouth sores- did not visualize any sores in patient's mouth- however she does display very poor dentition.  Patient continues to endorse insomnia despite being placed on trazodone 25 mg qHS.    Past Medical History: Patient has a past medical history of Anxiety, Carotid bruit, Chronic pain, Cystitis, Cystocele, unspecified (CODE), Depression, GI bleed, History of uterine fibroid, Shortness of breath on exertion, Spinal stenosis, and Urinary retention.    Past Surgical History: Patient has a past surgical history that includes Hysterectomy (1989); Anterior vaginal repair (8-); tubiligation; Cardiac catheterization (age 12); Cholecystectomy (2015); Colonoscopy (12/12/2018); Colonoscopy (N/A, 12/12/2018); and Laminectomy (N/A, 10/9/2019).    Social History: Patient reports that she has been smoking cigarettes. She has a 20.00 pack-year smoking history. She has never used smokeless tobacco. She reports that she does not drink alcohol or use drugs.    Family History: family history includes Alzheimer's disease in her mother; Hypertension in her  brother and sister; Osteoarthritis in her mother; Thyroid disease in her mother.    Allergies: Patient is allergic to pcn [penicillins].    ROS  Constitutional: Negative for fever. + fatigue, insomnia, mouth sores.   Eyes: Negative for blurred vision, double vision and discharge.   Respiratory: + for dry intermittent cough.  Negative for shortness of breath and wheezing.    Cardiovascular: Negative for chest pain, palpitations, and leg swelling.   Gastrointestinal: Negative for abdominal pain, constipation, nausea and vomiting. + diarrhea, hemorrhoid pain  Genitourinary:  Patient self caths or is incontinent; endorses groin pain  Musculoskeletal:  + generalized weakness. + for back and bilateral leg pain and myalgias  Skin: Negative for itching and rash.  Neurological: Negative for dizziness, speech change, and headaches.   Psychiatric/Behavioral: + for depression. The patient is not nervous/anxious.      PEx   Temp:  [98.2 °F (36.8 °C)]   Pulse:  [87]   Resp:  [18]   BP: (155)/(71)   SpO2:  [94 %]   Body mass index is 18.43 kg/m².     Constitutional: Patient appears ill.  No distress noted  HENT:   Head: Normocephalic and atraumatic.   Eyes: Pupils are equal, round, and reactive to light.   Neck: Normal range of motion. Neck supple.   Cardiovascular: Normal rate, regular rhythm + murmur  Pulmonary/Chest: Effort normal and breath sounds are clear  Abdominal: Soft. Bowel sounds are normal.   Musculoskeletal: Normal range of motion. + generalized weakness.  Neurological: Alert and oriented to person, place, and time.   Psychiatric: Normal mood and affect. Behavior is normal.   Wounds/Skin: Skin is warm and dry.    Wound location:   Midline lower back incision completely healed over and claudia, pink scar  Wound length: 10 cm   Following: This NP weekly- last observed on 11/6/2019        Assessment and Plan:       Problems addressed today      Diarrhea, worsening  - initiated diphenoxylate-atropine 2.5-0.025 mg QID x 3  days, Initiated Imodium 4 mg p.o. TID PRN, increased probiotic to BID, continue Metamucil although patient refuses.     Klebsiella pne UTI  - patient with positive UTI on 11/04 with Klebsiella pneumonia- patient was treated with 5 day course of Cipro 20 50 mg BID.  Patient continues to endorse dysuria and groin pain. Initiated UA with reflex today    Possible yeast infection  - initiated Diflucan 150 mg x1 dose    Stomatitis- new  - initiated [facility] solution - Benadryl, Mylanta, lidocaine, nystatin- 10 mg QID    Insomnia, worsening  - increased trazodone to 50 mg qHS    Hemorrhoids- new  - likely from the diarrhea, initiated hydrocortisone 2.5 % rectal cream BID    Severe malnutrition  - dietary following and appreciate recommendations, continue supplementation as instructed         Ongoing problems       Hypertension  -continue amlodipine 10 mg daily and Lopressor 100 mg b.i.d,  losartan 25 mg daily    Urinary retention  - Patient self-caths PRN at home; patient encouraged to continue self cathing here as well    Epidural Abcess  Abscess Upper Extremities  Septic Pulmonary and Brain Emboli   Acute Bacterial Endocarditis  -hx IVDA  -Continuing oxacillin 12 g continuous for MSSA endocarditis per ID recs with end date 11/20     Metabolic encephalopathy  Right thalamic stroke  -Encephalopathy likely multiple factorial etiology: metabolic, infectious, and due to stroke  -Brain MRI 9/30 with lacunar infarcts  -Repeat head CT(10/7) revealed new right basal ganglia infarcts  -MRI of brain revealed R thalamic infarct, ventriculitis, subactue embolic infarctions  -CTA of head 10/8: atherosclerotic plaquing of the distal vertebral arteries and concern for noncalcified plaquing and stenosis; no high-grade stenosis or proximal occlusion. No mycotic aneurysms  -Per vasc neuro, no systemic anticoagulation and asa due to high risk for hemorrhage from septic emboli  -continue atorvastatin 40 mg daily    Debility  -Continue with PT/OT  for gait training and strengthening and restoration of ADL's   -Encourage mobility, OOB in chair, and early ambulation as appropriate  -Fall precautions   -Monitor for bowel and bladder dysfunction  -Monitor for and prevent skin breakdown and pressure ulcers  -Continue DVT prophylaxis with lovenox     Acute on chronic diastolic congestive heart failure  -continue lasix 40 mg daily      Anemia  -continue ferrous sulfate and vitamin-C    Chronic back pain  -continue Tylenol, gabapentin, ibuprofen, methyl salicylate-menthol 15-10% cream    Depression/anxiety  -continue venlafaxine    Future Appointments   Date Time Provider Department Center   11/20/2019 10:00 AM INFECTIOUS DISEASE, [facility] INFECTD [facility]   12/9/2019  2:00 PM Martin Lewis DO [facility] NP       DOS 11/11/2019       Patient note was created using MModal Dictation.  Any errors in syntax or even information may not have been identified and edited on initial review prior to signing this note.

## 2019-11-11 NOTE — PT/OT/SLP PROGRESS
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 : Yes  Keep patient NPO?: No   General Precautions: Standard, fall  Current Respiratory Status: room air       Subjective:  Awake, pt appears to have increased alertness today. Spouse at bedside.      pt reports pain rating 9 on behind & back. Pt was able to participate in session.     Objective:    Pt cued to use board as visual aid for time. Pt oriented to place IND'ly. Pt read normal sized print paragraph using eyeglasses requiring min cueing to produce some words. For the most part, reading was fluent. Later in session, pt recalled facts from paraphar given min A. Given photo cards of problem scenarios, pt provided situation, possible outcomes & possible solutions given min A. Pt named an average of 8 items in a concrete category over 3 trials given min A to utilize strategies.       Assessment:  Mesha Mckenzie is a 64 y.o. 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 Pt will orient x 4 given mod cues.   2. Following a 3 minute delay, pt will recall 3/3 words or facts given mod cues.  3. Pt will complete  moderate level problem solving tasks with 70% accuracy given moderate cues.   4. Pt will complete mental manipulation tasks with 70% accuracy given moderate cues to improve cognitive flexibility.   5. Pt will list 8 items in a category in one minute given min-mod cues.   6. Pt will participate in assessment of reading MET, writing MET, visual spatial MET, and functional math abilities.                            Plan:   Patient to be seen Therapy Frequency: 5 x/week  Planned Interventions: Cognitive-Linguistic Therapy  Plan of Care expires: 12/01/19  Plan of Care reviewed with: patient, spouse  SLP Follow-up?: Yes              Arabella Matthews CCC-SLP  11/11/2019

## 2019-11-11 NOTE — PT/OT/SLP PROGRESS
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  I am doing well today    Pain/Comfort  Pain Rating 1: 9/10  Location 1: back  Pain Addressed 1: Pre-medicate for activity, Reposition, Distraction    Objective:   Pt. Supine on arrival with  present     Occupational Performance:    Bed Mobility:    · Patient completed Supine to Sit with minimum assistance for UB management   · Patient completed Sit to Supine with moderate assistance for BLE managment      Functional Mobility/Transfers:  · Patient completed Sit <> Stand Transfer with minimum assistance  with  rolling walker with cues for safety and hand placement   · Patient completed Bed <> Chair Transfer using Stand Pivot technique with minimum assistance with rolling walker      Activities of Daily Living:  · Grooming: supervision at sink level  · Lower Body Dressing: maximal assistance to oralia pants seated  and to manage over hips instace with RW for bal    AMPAC 6 Click:  AMPAC Total Score: 14    OT Exercises: UE Ergometer 10 with intermittent  breaks    Additional Treatment:  Pt. With 2# dowel activity with 2x10 reps with  shd flex, bicep curls horz adb/add and forward flex motion to increase BUE ROM and strength,.   Pt. With standing and therex performed to increase ROM, endurance selfcare task and fxl mobility for independence     Patient left supine with all lines intact and call button in reach    ASSESSMENT:  Mesha Mckenzie is a 64 y.o. 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  participated well with session on this day.Pt demos physical deficits with balance  functional mobility, UB strength, endurance  level of functional indep with daily tasks and activities and selfcare skills .Pt. Will continue to benefit from continued OT to progress towards goals      Rehab identified problem list/impairments: impaired endurance, impaired self care skills, impaired functional mobilty, impaired cognition, decreased lower extremity function, pain, orthopedic precautions    Rehab potential is fair    Activity tolerance: Fair    Discharge recommendations: home health OT     Barriers to discharge: Decreased caregiver support     Equipment recommendations: walker, rolling, wheelchair, commode, shower chair     GOALS:   Multidisciplinary Problems     Occupational Therapy Goals        Problem: Occupational Therapy Goal    Goal Priority Disciplines Outcome Interventions   Occupational Therapy Goal     OT, PT/OT Ongoing, Progressing    Description:  Goals to be met by: 11-20-19     Patient will increase functional independence with ADLs by performing:    Feeding with Set-up Assistance.  UE Dressing with Set-up Assistance.  LE Dressing with Supervision.  Grooming while seated with Supervision.  Toileting from bedside commode with Supervision for hygiene and clothing management.   Bathing from  shower chair/bench with Stand-by Assistance.  Rolling to Bilateral with Modified [place].   Supine to sit with Modified [place].  Stand pivot transfers with Supervision.  Toilet transfer to bedside commode with Supervision.  Pt. To be able to recall 3/3 spinal precautions  Pt. To be able to perform HEP for BUE to improve endurance with S   pt. To participate in dynamic standing task x 5 minutes with S  Pt. To participate in leisure activities 2x/week                    Plan:  Patient to be seen 5 x/week to address the above listed problems via self-care/home management, therapeutic activities, therapeutic  exercises  Plan of Care expires: 11/30/19  Plan of Care reviewed with: patient    ALEAH Summers/MAHESH  11/11/2019

## 2019-11-12 LAB
ANION GAP SERPL CALC-SCNC: 7 MMOL/L (ref 8–16)
ANISOCYTOSIS BLD QL SMEAR: SLIGHT
BASOPHILS NFR BLD: 1 % (ref 0–1.9)
BUN SERPL-MCNC: 33 MG/DL (ref 8–23)
CALCIUM SERPL-MCNC: 9.5 MG/DL (ref 8.7–10.5)
CHLORIDE SERPL-SCNC: 102 MMOL/L (ref 95–110)
CO2 SERPL-SCNC: 24 MMOL/L (ref 23–29)
CREAT SERPL-MCNC: 0.9 MG/DL (ref 0.5–1.4)
DIFFERENTIAL METHOD: ABNORMAL
EOSINOPHIL NFR BLD: 12 % (ref 0–8)
ERYTHROCYTE [DISTWIDTH] IN BLOOD BY AUTOMATED COUNT: 19.7 % (ref 11.5–14.5)
EST. GFR  (AFRICAN AMERICAN): >60 ML/MIN/1.73 M^2
EST. GFR  (NON AFRICAN AMERICAN): >60 ML/MIN/1.73 M^2
FUNGUS SPEC CULT: NORMAL
GLUCOSE SERPL-MCNC: 78 MG/DL (ref 70–110)
HCT VFR BLD AUTO: 24.2 % (ref 37–48.5)
HGB BLD-MCNC: 7.4 G/DL (ref 12–16)
HYPOCHROMIA BLD QL SMEAR: ABNORMAL
IMM GRANULOCYTES # BLD AUTO: ABNORMAL K/UL (ref 0–0.04)
IMM GRANULOCYTES NFR BLD AUTO: ABNORMAL % (ref 0–0.5)
LYMPHOCYTES NFR BLD: 10 % (ref 18–48)
MAGNESIUM SERPL-MCNC: 1.6 MG/DL (ref 1.6–2.6)
MCH RBC QN AUTO: 32.3 PG (ref 27–31)
MCHC RBC AUTO-ENTMCNC: 30.6 G/DL (ref 32–36)
MCV RBC AUTO: 106 FL (ref 82–98)
METAMYELOCYTES NFR BLD MANUAL: 2 %
MONOCYTES NFR BLD: 5 % (ref 4–15)
MYELOCYTES NFR BLD MANUAL: 1 %
NEUTROPHILS NFR BLD: 66 % (ref 38–73)
NEUTS BAND NFR BLD MANUAL: 3 %
NRBC BLD-RTO: 0 /100 WBC
OVALOCYTES BLD QL SMEAR: ABNORMAL
PHOSPHATE SERPL-MCNC: 6 MG/DL (ref 2.7–4.5)
PLATELET # BLD AUTO: 325 K/UL (ref 150–350)
PLATELET BLD QL SMEAR: ABNORMAL
PMV BLD AUTO: 9.6 FL (ref 9.2–12.9)
POIKILOCYTOSIS BLD QL SMEAR: SLIGHT
POLYCHROMASIA BLD QL SMEAR: ABNORMAL
POTASSIUM SERPL-SCNC: 4.6 MMOL/L (ref 3.5–5.1)
RBC # BLD AUTO: 2.29 M/UL (ref 4–5.4)
SODIUM SERPL-SCNC: 133 MMOL/L (ref 136–145)
WBC # BLD AUTO: 8.96 K/UL (ref 3.9–12.7)

## 2019-11-12 PROCEDURE — 63600175 PHARM REV CODE 636 W HCPCS: Performed by: NURSE PRACTITIONER

## 2019-11-12 PROCEDURE — 97530 THERAPEUTIC ACTIVITIES: CPT

## 2019-11-12 PROCEDURE — 63600175 PHARM REV CODE 636 W HCPCS: Performed by: STUDENT IN AN ORGANIZED HEALTH CARE EDUCATION/TRAINING PROGRAM

## 2019-11-12 PROCEDURE — 83735 ASSAY OF MAGNESIUM: CPT

## 2019-11-12 PROCEDURE — 85027 COMPLETE CBC AUTOMATED: CPT

## 2019-11-12 PROCEDURE — 97535 SELF CARE MNGMENT TRAINING: CPT

## 2019-11-12 PROCEDURE — 84100 ASSAY OF PHOSPHORUS: CPT

## 2019-11-12 PROCEDURE — 97110 THERAPEUTIC EXERCISES: CPT

## 2019-11-12 PROCEDURE — 25000003 PHARM REV CODE 250: Performed by: STUDENT IN AN ORGANIZED HEALTH CARE EDUCATION/TRAINING PROGRAM

## 2019-11-12 PROCEDURE — 97116 GAIT TRAINING THERAPY: CPT

## 2019-11-12 PROCEDURE — 85007 BL SMEAR W/DIFF WBC COUNT: CPT

## 2019-11-12 PROCEDURE — 25000003 PHARM REV CODE 250: Performed by: NURSE PRACTITIONER

## 2019-11-12 PROCEDURE — 80048 BASIC METABOLIC PNL TOTAL CA: CPT

## 2019-11-12 PROCEDURE — 11000004 HC SNF PRIVATE

## 2019-11-12 PROCEDURE — 25000003 PHARM REV CODE 250: Performed by: INTERNAL MEDICINE

## 2019-11-12 RX ORDER — LANOLIN ALCOHOL/MO/W.PET/CERES
400 CREAM (GRAM) TOPICAL 2 TIMES DAILY
Status: COMPLETED | OUTPATIENT
Start: 2019-11-12 | End: 2019-11-13

## 2019-11-12 RX ADMIN — POTASSIUM CHLORIDE 20 MEQ: 1500 TABLET, EXTENDED RELEASE ORAL at 04:11

## 2019-11-12 RX ADMIN — ACETAMINOPHEN 650 MG: 325 TABLET ORAL at 02:11

## 2019-11-12 RX ADMIN — ENOXAPARIN SODIUM 40 MG: 100 INJECTION SUBCUTANEOUS at 04:11

## 2019-11-12 RX ADMIN — VENLAFAXINE 75 MG: 37.5 TABLET ORAL at 09:11

## 2019-11-12 RX ADMIN — DIPHENOXYLATE HYDROCHLORIDE AND ATROPINE SULFATE 1 TABLET: 2.5; .025 TABLET ORAL at 09:11

## 2019-11-12 RX ADMIN — Medication 10 ML: at 12:11

## 2019-11-12 RX ADMIN — OXACILLIN SODIUM 12 G: 1 INJECTION, POWDER, FOR SOLUTION INTRAMUSCULAR; INTRAVENOUS at 04:11

## 2019-11-12 RX ADMIN — Medication 400 MG: at 09:11

## 2019-11-12 RX ADMIN — FOLIC ACID 1 MG: 1 TABLET ORAL at 09:11

## 2019-11-12 RX ADMIN — Medication 250 MG: at 09:11

## 2019-11-12 RX ADMIN — HYDROCORTISONE 2.5%: 25 CREAM TOPICAL at 10:11

## 2019-11-12 RX ADMIN — METOPROLOL TARTRATE 100 MG: 50 TABLET ORAL at 09:11

## 2019-11-12 RX ADMIN — Medication 1 CAPSULE: at 09:11

## 2019-11-12 RX ADMIN — POTASSIUM CHLORIDE 20 MEQ: 1500 TABLET, EXTENDED RELEASE ORAL at 09:11

## 2019-11-12 RX ADMIN — Medication 10 ML: at 09:11

## 2019-11-12 RX ADMIN — Medication 400 MG: at 12:11

## 2019-11-12 RX ADMIN — FERROUS SULFATE TAB EC 325 MG (65 MG FE EQUIVALENT) 325 MG: 325 (65 FE) TABLET DELAYED RESPONSE at 09:11

## 2019-11-12 RX ADMIN — MICONAZOLE NITRATE: 20 OINTMENT TOPICAL at 09:11

## 2019-11-12 RX ADMIN — DIPHENOXYLATE HYDROCHLORIDE AND ATROPINE SULFATE 1 TABLET: 2.5; .025 TABLET ORAL at 12:11

## 2019-11-12 RX ADMIN — FUROSEMIDE 40 MG: 40 TABLET ORAL at 09:11

## 2019-11-12 RX ADMIN — ACETAMINOPHEN 650 MG: 325 TABLET ORAL at 09:11

## 2019-11-12 RX ADMIN — MICONAZOLE NITRATE: 20 OINTMENT TOPICAL at 10:11

## 2019-11-12 RX ADMIN — AMLODIPINE BESYLATE 10 MG: 10 TABLET ORAL at 09:11

## 2019-11-12 RX ADMIN — TRAZODONE HYDROCHLORIDE 50 MG: 50 TABLET ORAL at 09:11

## 2019-11-12 RX ADMIN — GABAPENTIN 200 MG: 100 CAPSULE ORAL at 09:11

## 2019-11-12 RX ADMIN — GABAPENTIN 200 MG: 100 CAPSULE ORAL at 02:11

## 2019-11-12 RX ADMIN — LOSARTAN POTASSIUM 25 MG: 25 TABLET, FILM COATED ORAL at 09:11

## 2019-11-12 RX ADMIN — ATORVASTATIN CALCIUM 40 MG: 20 TABLET, FILM COATED ORAL at 09:11

## 2019-11-12 RX ADMIN — PANTOPRAZOLE SODIUM 40 MG: 40 TABLET, DELAYED RELEASE ORAL at 09:11

## 2019-11-12 RX ADMIN — DIPHENOXYLATE HYDROCHLORIDE AND ATROPINE SULFATE 1 TABLET: 2.5; .025 TABLET ORAL at 04:11

## 2019-11-12 NOTE — PT/OT/SLP PROGRESS
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    PT Received On: 11/12/19        Billable Minutes:  Gait Training 13, Therapeutic Activity 10 and Therapeutic Exercise 15    Treatment Type: Treatment  PT/PTA: PTA     PTA Visit Number: 4       General Precautions: Standard, fall  Orthopedic Precautions: spinal precautions   Braces: N/A    Spiritual, Cultural Beliefs, Latter day Practices, Values that Affect Care: no    Subjective:  "painful" chuckles    Pain/Comfort  Pain Rating 1: 8/10  Location - Side 1: Left  Location - Orientation 1: lower  Location 1: back  Pain Addressed 1: Pre-medicate for activity, Reposition, Distraction, Cessation of Activity, Nurse notified  Pain Rating Post-Intervention 1: (inc with mobility)    Objective:   Patient found with: peripheral IV     AM-PAC 6 CLICK MOBILITY  Total Score:16    Transfers:  Sit<>Stand: with RW min A    Gait:  Amb with RW min A/CGA ~38 ft and 48 ft seated rest break, wc/IV in tow    Therex:  2x10 reps AP,GS,LAQ,hip flex,abd/add    Balance:  Dyn standing activity with RW uni lat support tossing bean bag with RUE into small basket placed ~ 5 ft away x 1 trials for 1:02 minutes with (CGA)    Patient left up in chair with call button in reach and belongings in reach.    Assessment:  Mesha Mckenzie is a 64 y.o. 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  Pt tolerated well, pt would continue to benefit from skilled PT services to improve overall functional mobility, strength and endurance.  .    Rehab identified problem list/impairments: weakness, impaired endurance, impaired self care skills, impaired functional mobilty, gait instability, impaired balance, decreased upper extremity function, decreased lower extremity function, pain    Rehab potential is good.    Activity tolerance: Fair    Discharge recommendations: home health PT     Barriers to discharge: " None    Equipment recommendations: wheelchair, walker, rolling, bedside commode, shower chair     GOALS:   Multidisciplinary Problems     Physical Therapy Goals        Problem: Physical Therapy Goal    Goal Priority Disciplines Outcome Goal Variances Interventions   Physical Therapy Goal     PT, PT/OT Ongoing, Progressing     Description:  Goals to be met by: 2019     Patient will increase functional independence with mobility by performin. Supine to sit with Contact Guard Assistance  2. Sit to supine with Contact Guard Assistance  3. Sit to stand transfer with Minimal Assistance met  4. Bed to chair transfer with Minimal Assistance using Rolling Walker met  5. Gait  x 20 feet with Minimal Assistance using Rolling Walker. = met 2019       N EW GOAL 2019 gait x 75 feet w/ RW and CGA = not met  6. Ascend/Descend 4 inch curb step with Minimal Assistance using Rolling Walker.  6a.  Ascend/Descend 4 inch curb step with use of RW via family assistance with 1 verbal cue or less for technique.  7. Stand for 3 minutes with Minimal Assistance using Rolling Walker  8. Increased functional strength to 4/5 for R quadriceps mm group.  9. Lower extremity exercise program x 20 reps per handout, with assistance as needed. Met (2019)                        PLAN:    Patient to be seen 5 x/week  to address the above listed problems via gait training, therapeutic activities, therapeutic exercises, neuromuscular re-education, wheelchair management/training  Plan of Care expires: 19  Plan of Care reviewed with: patient    Mesha Hernandez, PTA  2019

## 2019-11-12 NOTE — PT/OT/SLP PROGRESS
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  Pt. Reported she had to go to the bathroom    Pain/Comfort  Pain Rating 1: 8/10  Location - Side 1: Bilateral  Location - Orientation 1: lower  Location 1: back  Pain Addressed 1: Reposition, Distraction  Pain Rating 2: 9/10    Objective:  Patient found with: PICC line(supine in bed grandson present )    Occupational Performance:    Bed Mobility:    · Patient completed Supine to Sit with minimum assistance     Functional Mobility/Transfers:  · Patient completed Sit <> Stand Transfer with minimum assistance  with  rolling walker   · Patient completed Bed <> Chair Transfer using Stand Pivot technique with minimum assistance with rolling walker  · Patient completed Toilet Transfer Stand Pivot technique with minimum assistance with  rolling walker  · Functional Mobility: not tested    Activities of Daily Living:  · Grooming: stand by assistance seated at sink to wash hands  · Toileting: min assistance to toilet on bedside commode    [facility] 6 Click:  [facility] Total Score: 18    OT Exercises: AROM with 1 # dowel for all major planes of maotion as tolerated x 1 set 10 reps  UE Ergometer x 10 minutes with min resistance   Red theraband to strengthen shoulders x 2 sets 10 reps     Additional Treatment:  Pt. Transferred to bedside chair with Min A; grandson present; Family educated pt. Must have S when up in chair and call for staff assist back to bed .  Family verbalized understanding    Patient left up in chair with call button in reach, nurse  notified and grandson present and BLE elevated      ASSESSMENT:  Mesha Mckenzie is a 64 y.o. 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 Pt. Tolerated session better on this date. Pain level does continue to be high.    Rehab identified problem list/impairments: impaired endurance, impaired self care skills, impaired functional mobilty, impaired cognition, decreased lower extremity function, pain, orthopedic precautions    Rehab potential is good    Activity tolerance: Fair    Discharge recommendations: home health OT     Barriers to discharge: Decreased caregiver support     Equipment recommendations: walker, rolling, wheelchair, commode, shower chair     GOALS:   Multidisciplinary Problems     Occupational Therapy Goals        Problem: Occupational Therapy Goal    Goal Priority Disciplines Outcome Interventions   Occupational Therapy Goal     OT, PT/OT Ongoing, Progressing    Description:  Goals to be met by: 11-20-19     Patient will increase functional independence with ADLs by performing:    Feeding with Set-up Assistance.  UE Dressing with Set-up Assistance.  LE Dressing with Supervision.  Grooming while seated with Supervision.  Toileting from bedside commode with Supervision for hygiene and clothing management.   Bathing from  shower chair/bench with Stand-by Assistance.  Rolling to Bilateral with Modified [place].   Supine to sit with Modified [place].  Stand pivot transfers with Supervision.  Toilet transfer to bedside commode with Supervision.  Pt. To be able to recall 3/3 spinal precautions  Pt. To be able to perform HEP for BUE to improve endurance with S   pt. To participate in dynamic standing task x 5 minutes with S  Pt. To participate in leisure activities 2x/week                      Plan:  Patient to be seen 5 x/week to address the above listed  problems via self-care/home management, therapeutic activities, therapeutic exercises  Plan of Care expires: 11/30/19  Plan of Care reviewed with: patient    MARIA ISABEL Adams  11/12/2019

## 2019-11-12 NOTE — PLAN OF CARE
Problem: Adult Inpatient Plan of Care  Goal: Plan of Care Review  Outcome: Ongoing, Progressing  Flowsheets (Taken 11/12/2019 0632)  Plan of Care Reviewed With: patient     Problem: Infection  Goal: Infection Symptom Resolution  Outcome: Ongoing, Progressing     Problem: Skin Injury Risk Increased  Goal: Skin Health and Integrity  Outcome: Ongoing, Progressing     Problem: Fall Injury Risk  Goal: Absence of Fall and Fall-Related Injury  Outcome: Ongoing, Progressing

## 2019-11-12 NOTE — PLAN OF CARE
Repositions minimal assist, no new skin breakdowns noted. Afebrile. Oxacillin IVABX infusing at 20.8cc/hr, ciprofloxacin and fluconazole PO scheduled. Monitored for pain and safety. Safety maintained. Denies pain

## 2019-11-12 NOTE — PT/OT/SLP PROGRESS
Speech Language Pathology      Mesha Mckenzie  MRN: [id number]    Patient not seen today secondary to Unavailable (Comment)(attempted x 2; pt soiled and waiting to be cleaned on 1st attempt; pt getting cleaned on 2nd attempt;  3rd attempt would have interefered with PT's scheduled time. ). Will follow-up 11/13.    LEROY Madrid, CCC-SLP     LEROY Madrid, CCC-SLP  Speech Language Pathologist  [phone]  11/12/2019

## 2019-11-13 LAB — BACTERIA UR CULT: ABNORMAL

## 2019-11-13 PROCEDURE — 97530 THERAPEUTIC ACTIVITIES: CPT

## 2019-11-13 PROCEDURE — 11000004 HC SNF PRIVATE

## 2019-11-13 PROCEDURE — 97110 THERAPEUTIC EXERCISES: CPT

## 2019-11-13 PROCEDURE — 25000003 PHARM REV CODE 250: Performed by: INTERNAL MEDICINE

## 2019-11-13 PROCEDURE — 25000003 PHARM REV CODE 250: Performed by: NURSE PRACTITIONER

## 2019-11-13 PROCEDURE — 97535 SELF CARE MNGMENT TRAINING: CPT

## 2019-11-13 PROCEDURE — 92507 TX SP LANG VOICE COMM INDIV: CPT

## 2019-11-13 PROCEDURE — 63600175 PHARM REV CODE 636 W HCPCS: Performed by: NURSE PRACTITIONER

## 2019-11-13 PROCEDURE — 25000003 PHARM REV CODE 250: Performed by: STUDENT IN AN ORGANIZED HEALTH CARE EDUCATION/TRAINING PROGRAM

## 2019-11-13 PROCEDURE — 97116 GAIT TRAINING THERAPY: CPT

## 2019-11-13 PROCEDURE — 63600175 PHARM REV CODE 636 W HCPCS: Performed by: STUDENT IN AN ORGANIZED HEALTH CARE EDUCATION/TRAINING PROGRAM

## 2019-11-13 RX ORDER — DICYCLOMINE HYDROCHLORIDE 10 MG/1
10 CAPSULE ORAL 4 TIMES DAILY
Status: DISCONTINUED | OUTPATIENT
Start: 2019-11-13 | End: 2019-11-20 | Stop reason: HOSPADM

## 2019-11-13 RX ADMIN — Medication 1 CAPSULE: at 08:11

## 2019-11-13 RX ADMIN — VENLAFAXINE 75 MG: 37.5 TABLET ORAL at 09:11

## 2019-11-13 RX ADMIN — POTASSIUM CHLORIDE 20 MEQ: 1500 TABLET, EXTENDED RELEASE ORAL at 09:11

## 2019-11-13 RX ADMIN — DIPHENOXYLATE HYDROCHLORIDE AND ATROPINE SULFATE 1 TABLET: 2.5; .025 TABLET ORAL at 01:11

## 2019-11-13 RX ADMIN — LOSARTAN POTASSIUM 25 MG: 25 TABLET, FILM COATED ORAL at 09:11

## 2019-11-13 RX ADMIN — ENOXAPARIN SODIUM 40 MG: 100 INJECTION SUBCUTANEOUS at 06:11

## 2019-11-13 RX ADMIN — GABAPENTIN 200 MG: 100 CAPSULE ORAL at 09:11

## 2019-11-13 RX ADMIN — Medication 1 CAPSULE: at 09:11

## 2019-11-13 RX ADMIN — TRAZODONE HYDROCHLORIDE 75 MG: 50 TABLET ORAL at 08:11

## 2019-11-13 RX ADMIN — GABAPENTIN 200 MG: 100 CAPSULE ORAL at 02:11

## 2019-11-13 RX ADMIN — GABAPENTIN 200 MG: 100 CAPSULE ORAL at 08:11

## 2019-11-13 RX ADMIN — OXACILLIN SODIUM 12 G: 1 INJECTION, POWDER, FOR SOLUTION INTRAMUSCULAR; INTRAVENOUS at 04:11

## 2019-11-13 RX ADMIN — METOPROLOL TARTRATE 100 MG: 50 TABLET ORAL at 09:11

## 2019-11-13 RX ADMIN — PANTOPRAZOLE SODIUM 40 MG: 40 TABLET, DELAYED RELEASE ORAL at 09:11

## 2019-11-13 RX ADMIN — ACETAMINOPHEN 650 MG: 325 TABLET ORAL at 08:11

## 2019-11-13 RX ADMIN — AMLODIPINE BESYLATE 10 MG: 10 TABLET ORAL at 09:11

## 2019-11-13 RX ADMIN — MICONAZOLE NITRATE: 20 OINTMENT TOPICAL at 09:11

## 2019-11-13 RX ADMIN — POTASSIUM CHLORIDE 20 MEQ: 1500 TABLET, EXTENDED RELEASE ORAL at 06:11

## 2019-11-13 RX ADMIN — MICONAZOLE NITRATE: 20 OINTMENT TOPICAL at 08:11

## 2019-11-13 RX ADMIN — ACETAMINOPHEN 650 MG: 325 TABLET ORAL at 09:11

## 2019-11-13 RX ADMIN — DIPHENOXYLATE HYDROCHLORIDE AND ATROPINE SULFATE 1 TABLET: 2.5; .025 TABLET ORAL at 06:11

## 2019-11-13 RX ADMIN — ACETAMINOPHEN 650 MG: 325 TABLET ORAL at 02:11

## 2019-11-13 RX ADMIN — Medication 10 ML: at 02:11

## 2019-11-13 RX ADMIN — DICYCLOMINE HYDROCHLORIDE 10 MG: 10 CAPSULE ORAL at 10:11

## 2019-11-13 RX ADMIN — Medication 400 MG: at 08:11

## 2019-11-13 RX ADMIN — METOPROLOL TARTRATE 100 MG: 50 TABLET ORAL at 08:11

## 2019-11-13 RX ADMIN — DIPHENOXYLATE HYDROCHLORIDE AND ATROPINE SULFATE 1 TABLET: 2.5; .025 TABLET ORAL at 09:11

## 2019-11-13 RX ADMIN — FUROSEMIDE 40 MG: 40 TABLET ORAL at 09:11

## 2019-11-13 RX ADMIN — IBUPROFEN 400 MG: 200 TABLET, FILM COATED ORAL at 06:11

## 2019-11-13 RX ADMIN — VENLAFAXINE 75 MG: 37.5 TABLET ORAL at 08:11

## 2019-11-13 RX ADMIN — FOLIC ACID 1 MG: 1 TABLET ORAL at 09:11

## 2019-11-13 RX ADMIN — HYDROCORTISONE 2.5%: 25 CREAM TOPICAL at 09:11

## 2019-11-13 RX ADMIN — Medication 400 MG: at 09:11

## 2019-11-13 RX ADMIN — DIPHENOXYLATE HYDROCHLORIDE AND ATROPINE SULFATE 1 TABLET: 2.5; .025 TABLET ORAL at 08:11

## 2019-11-13 RX ADMIN — HYDROCORTISONE 2.5%: 25 CREAM TOPICAL at 08:11

## 2019-11-13 RX ADMIN — ATORVASTATIN CALCIUM 40 MG: 20 TABLET, FILM COATED ORAL at 09:11

## 2019-11-13 NOTE — PT/OT/SLP PROGRESS
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  I am doing well I guess but I still hurt     Pain/Comfort  Pain Rating 1: 4/10  Location - Side 1: Bilateral  Location - Orientation 1: lower  Location 1: back  Pain Addressed 1: Pre-medicate for activity, Reposition, Distraction    Objective:   Pt. Supine on arrival with UEIV    Occupational Performance:    Bed Mobility:    · Patient completed Rolling/Turning to Right with stand by assistance  · Patient completed Supine to Sit with minimum assistance and with side rail for UB management    Functional Mobility/Transfers:  · Patient completed Sit <> Stand Transfer with minimum assistance  with  no assistive device with cues for safety and hand placement   · Patient completed Bed <> Chair Transfer using Stand Pivot technique with minimum assistance with no assistive device      Activities of Daily Living:  · Grooming: supervision at sink level   · Upper Body Dressing: minimum assistance to manage gown around back and UEIV line  · Lower Body Dressing: moderate assistance to manage pants over hips from EOB level  · Toileting: maximal assistance with cleaning Pt. incot with urine in diaper    AMPA 6 Click:  [facility] Total Score: 18    OT Exercises: UE Ergometer 10 with intermittent breaks    Additional Treatment:  Pt. With 1# dowel activity with 2x10 reps with  shd flex, bicep curls horz adb/add and forward flex motion to increase BUE ROM and strength,.   Pt. With therex performed to increase ROM, endurance selfcare task and fxl mobility for  independence     Patient left up in chair with all lines intact, call button in reach and G-son present present    ASSESSMENT:  Mesha Mckenzie is a 64 y.o. 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 participated well with session on this day.Pt demos physical deficits with balance  functional mobility, UB strength, endurance  level of functional indep with daily tasks and activities and selfcare skills .Pt. Will continue to benefit from continued OT to progress towards goals      Rehab identified problem list/impairments: impaired endurance, impaired self care skills, impaired functional mobilty, impaired cognition, decreased lower extremity function, pain, orthopedic precautions    Rehab potential is fair    Activity tolerance: Fair    Discharge recommendations: home health OT     Barriers to discharge: Decreased caregiver support     Equipment recommendations: walker, rolling, wheelchair, commode, shower chair     GOALS:   Multidisciplinary Problems     Occupational Therapy Goals        Problem: Occupational Therapy Goal    Goal Priority Disciplines Outcome Interventions   Occupational Therapy Goal     OT, PT/OT Ongoing, Progressing    Description:  Goals to be met by: 11-20-19     Patient will increase functional independence with ADLs by performing:    Feeding with Set-up Assistance.  UE Dressing with Set-up Assistance.  LE Dressing with Supervision.  Grooming while seated with Supervision.  Toileting from bedside commode with Supervision for hygiene and clothing management.   Bathing from  shower chair/bench with Stand-by Assistance.  Rolling to Bilateral with Modified [place].   Supine to sit with Modified [place].  Stand pivot transfers with Supervision.  Toilet transfer to bedside commode with Supervision.  Pt. To be able to recall 3/3 spinal precautions  Pt. To be able to perform HEP for BUE to improve endurance with S   pt. To participate in dynamic standing  task x 5 minutes with S  Pt. To participate in leisure activities 2x/week                    Plan:  Patient to be seen 5 x/week to address the above listed problems via self-care/home management, therapeutic activities, therapeutic exercises  Plan of Care expires: 11/30/19  Plan of Care reviewed with: patient    MIKEL Summers  11/13/2019

## 2019-11-13 NOTE — PT/OT/SLP PROGRESS
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      Objective:   Patient found with: PICC line    Pt seen at bedside for continued cognitive-linguistic therapy.  Pt was oriented to month, place, and situation ind'ly and to year given supervision.  Pt completed a short term memory task recalling functional information from a zeeshan with 80% accuracy ind'ly/100% given cues and from an appointment card with 67% accuracy ind'ly/78% given cues.  Pt recalled facts from short paragraphs read aloud by SLP with 67% accuracy ind'ly/100% given cues.  Pt listed 3 methods for accomplishing given tasks with 91% accuracy ind'ly/100% given cues.  Elapsed time figurations were solved with 100% accuracy given supervision.  Education provided to pt regarding functional compensatory memory strategies, functional math activities, cognitive flexibility, progress towards meeting tx goals, and SLP POC. Pt expressed understanding.     Assessment:  Mesha Mckenzie is a 64 y.o. 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     Discharge recommendations: Discharge Facility/Level of Care Needs: (tbd)     Goals:   Multidisciplinary Problems     SLP Goals        Problem: SLP Goal    Goal Priority Disciplines Outcome   SLP Goal "     SLP Ongoing, Progressing   Description:  Speech Language Pathology Goals  Goals expected to be met by 11/8 - goals remain appropriate - reassess on 11/15:  1. Pt will orient x 4 given mod cues.   2. Following a 3 minute delay, pt will recall 3/3 words or facts given mod cues.  3. Pt will complete moderate level problem solving tasks with 70% accuracy given moderate cues.   4. Pt will complete mental manipulation tasks with 70% accuracy given moderate cues to improve cognitive flexibility.   5. Pt will list 8 items in a category in one minute given min-mod cues.   6. Pt will participate in assessment of reading MET, writing MET, visual spatial MET, and functional math abilities.                            Plan:   Patient to be seen Therapy Frequency: 5 x/week  Planned Interventions: Cognitive-Linguistic Therapy  Plan of Care expires: 12/01/19  Plan of Care reviewed with: patient  SLP Follow-up?: Yes              LEROY Madrid, CCC-SLP  11/13/2019     LEROY Madrid, CCC-SLP  Speech Language Pathologist  [phone]  11/13/2019

## 2019-11-13 NOTE — PLAN OF CARE
Problem: SLP Goal  Goal: SLP Goal  Description  Speech Language Pathology Goals  Goals expected to be met by 11/8 - goals remain appropriate - reassess on 11/15:  1. Pt will orient x 4 given mod cues.   2. Following a 3 minute delay, pt will recall 3/3 words or facts given mod cues.  3. Pt will complete moderate level problem solving tasks with 70% accuracy given moderate cues.   4. Pt will complete mental manipulation tasks with 70% accuracy given moderate cues to improve cognitive flexibility.   5. Pt will list 8 items in a category in one minute given min-mod cues.   6. Pt will participate in assessment of reading MET, writing MET, visual spatial MET, and functional math abilities.          Outcome: Ongoing, Progressing  Cont POC.   LEROY Madrid, CCC-SLP  Speech Language Pathologist  [phone]  11/13/2019

## 2019-11-13 NOTE — TREATMENT PLAN
"Rehab Services' DME recommendations    Mesha Mckenzei  MRN: [id number]    [x] Walker Adult (5'4"-6"6")    Accessories N/A    Wheels Yes     [x] Wheelchair  Number of hours up in a wheelchair per day 8 hours/day       Style Light weight        Justification for light weight w/c: patient cannot propel in a standard wheelchair, patient can and does self-propel in a lightweight wheelchair, patient has impaired ability to participate in MRADLs, mobility limitations cannot be sifficiently resolved with a cane/walker, the home provides adequate access between rooms for a wheelchair, a wheelchair will significantly improve the ability to participate in MRADLs and will be used in the home on a regular basis, the patient is willing to use a wheelchair in the home and the patient has a caregiver who is available, willing, and able to provide assistance with the wheelchair    Seat Width 18 (Standard adult)    Seat Depth 18    Back Height Standard    Leg Support Standard and Swing Away    Arm Height Full and Swing Away    Lap Belt none    Accessories Front Brakes and Anti-tippers    Cushion Basic    Justification for Cushion to prevent skin breakdown    Justification for wheelchair order: (Please select all that apply) Caregiver is capable and willing to operate wheelchair safely, Patient's upper body strength is sufficient for propulsion and Patient mobility limitations cannot be sufficiently resolved by the use of other ambulatory therapies      [x] 3 in 1 commode Standard    [x] Shower Chair With back    [x] Hip Kit Standard/Short    [x] Home health PT and OT, [facility] Overton, PT 11/13/2019     LEROY Madrid, CCC-SLP  Speech Language Pathologist  [phone]  11/15/2019          "

## 2019-11-13 NOTE — PT/OT/SLP PROGRESS
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  Pt agreeable to session.     Pain/Comfort  Pain Rating 1: 9/10  Location - Side 1: Bilateral  Location - Orientation 1: lower  Location 1: back  Pain Addressed 1: Pre-medicate for activity  Pain Rating Post-Intervention 1: 9/10    Objective:  Patient found supine in bed. Patient found with: PICC line     AM-PAC 6 CLICK MOBILITY  Total Score:16    Bed Mobility:  Sit>Supine:on bed w/ SPV  Supine>Sit: on bed w/ Juliette at trunk, cues for logroll technique; HOB elevated and w/ side rail, inc'd time required    Transfers:  Sit<>Stand: to/from w/c (4 trials) w/ RW and CGA  Stand Pivot Transfer: EOB<>w/c w/ RW and CGA  Cues for hand placement    Gait:  Amb 2 trials (48ft and 50ft) w/ RW and CGA for safety  Cues for posture  Limited in distance by fatigue and fatigue     Therex:  Seated BLE therex 2x10 reps (HF, LAQ, AP)  Cues for form and isometric hold at full AROM    Balance:  Standing beach ball tap 2 trials (55s and 60s) w/ RW and CGA for safety  Limited in duration by fatigue  Mild instability noted but no LOB    Additional Treatment:  Seated LBE x8min to inc overall strength/endurance    PT/pt discussed D/C planning including DME recs (RW, w/c, BSC, shower chair, and hip kit). Family training card was also given w/ contact info for her family to schedule prior to her D/C home. Pt verb understanding and in agreement w/ plan.     Patient left supine with all lines  intact, call button in reach and nurse present.    Assessment:  Mesha Mckenzie is a 64 y.o. 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  Pt erickson session well w/ good participation. She remains at a CGA level for transfers and short distance ambulation. She is progressing but remains limited by pain and decreased endurance. She will continue to benefit from skilled PT.    Rehab identified problem list/impairments: weakness, impaired endurance, impaired self care skills, impaired functional mobilty, gait instability, impaired balance, decreased upper extremity function, decreased lower extremity function, pain    Rehab potential is good.    Activity tolerance: Good    Discharge recommendations: home with home health     Barriers to discharge: None    Equipment recommendations: bedside commode, hip kit, shower chair, walker, rolling, wheelchair     GOALS:   Multidisciplinary Problems     Physical Therapy Goals        Problem: Physical Therapy Goal    Goal Priority Disciplines Outcome Goal Variances Interventions   Physical Therapy Goal     PT, PT/OT Ongoing, Progressing     Description:  Goals to be met by: 19    Patient will increase functional independence with mobility by performin. Supine to sit with Contact Guard Assistance  2. Sit to supine with Contact Guard Assistance  3. Sit to stand transfer with Minimal Assistance- Met 2019  4. Bed to chair transfer with Minimal Assistance using Rolling Walker- Met 2019  5. Gait  x 20 feet with Minimal Assistance using Rolling Walker. = met 2019       N EW GOAL 2019 gait x 75 feet w/ RW and CGA = not met  6. Ascend/Descend 4 inch curb step with Minimal Assistance using Rolling Walker.  6a.  Ascend/Descend 4 inch curb step with use of RW via family assistance with 1 verbal cue or less for technique.  7. Stand for 3 minutes with Minimal Assistance using Rolling Walker  8. Increased functional strength to 4/5 for  R quadriceps mm group.- Met 11/13/2019  9. Lower extremity exercise program x 20 reps per handout, with assistance as needed. Met (11/5/2019)                          PLAN:    Patient to be seen 5 x/week  to address the above listed problems via gait training, therapeutic activities, therapeutic exercises, neuromuscular re-education, wheelchair management/training  Plan of Care expires: 11/30/19  Plan of Care reviewed with: patient    Ciatlin JAYRO Overton, PT  11/13/2019

## 2019-11-13 NOTE — PLAN OF CARE
LTGs remain appropriate. Pt will continue PT POC.  Caitlin Overton, PT  2019    Problem: Physical Therapy Goal  Goal: Physical Therapy Goal  Description  Goals to be met by: 19    Patient will increase functional independence with mobility by performin. Supine to sit with Contact Guard Assistance  2. Sit to supine with Contact Guard Assistance  3. Sit to stand transfer with Minimal Assistance- Met 2019  4. Bed to chair transfer with Minimal Assistance using Rolling Walker- Met 2019  5. Gait  x 20 feet with Minimal Assistance using Rolling Walker. = met 2019       N EW GOAL 2019 gait x 75 feet w/ RW and CGA = not met  6. Ascend/Descend 4 inch curb step with Minimal Assistance using Rolling Walker.  6a.  Ascend/Descend 4 inch curb step with use of RW via family assistance with 1 verbal cue or less for technique.  7. Stand for 3 minutes with Minimal Assistance using Rolling Walker  8. Increased functional strength to 4/5 for R quadriceps mm group.- Met 2019  9. Lower extremity exercise program x 20 reps per handout, with assistance as needed. Met (2019)         Outcome: Ongoing, Progressing

## 2019-11-14 PROCEDURE — 92507 TX SP LANG VOICE COMM INDIV: CPT

## 2019-11-14 PROCEDURE — 97110 THERAPEUTIC EXERCISES: CPT

## 2019-11-14 PROCEDURE — 97535 SELF CARE MNGMENT TRAINING: CPT

## 2019-11-14 PROCEDURE — 25000003 PHARM REV CODE 250: Performed by: NURSE PRACTITIONER

## 2019-11-14 PROCEDURE — 63600175 PHARM REV CODE 636 W HCPCS: Performed by: STUDENT IN AN ORGANIZED HEALTH CARE EDUCATION/TRAINING PROGRAM

## 2019-11-14 PROCEDURE — 97530 THERAPEUTIC ACTIVITIES: CPT

## 2019-11-14 PROCEDURE — 97116 GAIT TRAINING THERAPY: CPT

## 2019-11-14 PROCEDURE — 11000004 HC SNF PRIVATE

## 2019-11-14 PROCEDURE — 63600175 PHARM REV CODE 636 W HCPCS: Performed by: NURSE PRACTITIONER

## 2019-11-14 PROCEDURE — 25000003 PHARM REV CODE 250: Performed by: STUDENT IN AN ORGANIZED HEALTH CARE EDUCATION/TRAINING PROGRAM

## 2019-11-14 PROCEDURE — 25000003 PHARM REV CODE 250: Performed by: INTERNAL MEDICINE

## 2019-11-14 RX ADMIN — FERROUS SULFATE TAB EC 325 MG (65 MG FE EQUIVALENT) 325 MG: 325 (65 FE) TABLET DELAYED RESPONSE at 08:11

## 2019-11-14 RX ADMIN — DIPHENOXYLATE HYDROCHLORIDE AND ATROPINE SULFATE 1 TABLET: 2.5; .025 TABLET ORAL at 12:11

## 2019-11-14 RX ADMIN — METOPROLOL TARTRATE 100 MG: 50 TABLET ORAL at 09:11

## 2019-11-14 RX ADMIN — PSYLLIUM HUSK 1 PACKET: 3.4 POWDER ORAL at 09:11

## 2019-11-14 RX ADMIN — VENLAFAXINE 75 MG: 37.5 TABLET ORAL at 09:11

## 2019-11-14 RX ADMIN — Medication 1 CAPSULE: at 09:11

## 2019-11-14 RX ADMIN — VENLAFAXINE 75 MG: 37.5 TABLET ORAL at 08:11

## 2019-11-14 RX ADMIN — POTASSIUM CHLORIDE 20 MEQ: 1500 TABLET, EXTENDED RELEASE ORAL at 09:11

## 2019-11-14 RX ADMIN — GABAPENTIN 200 MG: 100 CAPSULE ORAL at 02:11

## 2019-11-14 RX ADMIN — Medication 1 CAPSULE: at 08:11

## 2019-11-14 RX ADMIN — GABAPENTIN 200 MG: 100 CAPSULE ORAL at 09:11

## 2019-11-14 RX ADMIN — MICONAZOLE NITRATE: 20 OINTMENT TOPICAL at 08:11

## 2019-11-14 RX ADMIN — METOPROLOL TARTRATE 100 MG: 50 TABLET ORAL at 08:11

## 2019-11-14 RX ADMIN — DICYCLOMINE HYDROCHLORIDE 10 MG: 10 CAPSULE ORAL at 08:11

## 2019-11-14 RX ADMIN — HYDROCORTISONE 2.5%: 25 CREAM TOPICAL at 09:11

## 2019-11-14 RX ADMIN — OXACILLIN SODIUM 12 G: 1 INJECTION, POWDER, FOR SOLUTION INTRAMUSCULAR; INTRAVENOUS at 05:11

## 2019-11-14 RX ADMIN — ACETAMINOPHEN 650 MG: 325 TABLET ORAL at 09:11

## 2019-11-14 RX ADMIN — TRAZODONE HYDROCHLORIDE 75 MG: 50 TABLET ORAL at 08:11

## 2019-11-14 RX ADMIN — FUROSEMIDE 40 MG: 40 TABLET ORAL at 09:11

## 2019-11-14 RX ADMIN — DICYCLOMINE HYDROCHLORIDE 10 MG: 10 CAPSULE ORAL at 12:11

## 2019-11-14 RX ADMIN — GABAPENTIN 200 MG: 100 CAPSULE ORAL at 08:11

## 2019-11-14 RX ADMIN — PANTOPRAZOLE SODIUM 40 MG: 40 TABLET, DELAYED RELEASE ORAL at 09:11

## 2019-11-14 RX ADMIN — Medication 250 MG: at 08:11

## 2019-11-14 RX ADMIN — ACETAMINOPHEN 650 MG: 325 TABLET ORAL at 08:11

## 2019-11-14 RX ADMIN — ACETAMINOPHEN 650 MG: 325 TABLET ORAL at 02:11

## 2019-11-14 RX ADMIN — ENOXAPARIN SODIUM 40 MG: 100 INJECTION SUBCUTANEOUS at 04:11

## 2019-11-14 RX ADMIN — AMLODIPINE BESYLATE 10 MG: 10 TABLET ORAL at 09:11

## 2019-11-14 RX ADMIN — LOSARTAN POTASSIUM 25 MG: 25 TABLET, FILM COATED ORAL at 09:11

## 2019-11-14 RX ADMIN — DICYCLOMINE HYDROCHLORIDE 10 MG: 10 CAPSULE ORAL at 04:11

## 2019-11-14 RX ADMIN — ATORVASTATIN CALCIUM 40 MG: 20 TABLET, FILM COATED ORAL at 09:11

## 2019-11-14 RX ADMIN — DIPHENOXYLATE HYDROCHLORIDE AND ATROPINE SULFATE 1 TABLET: 2.5; .025 TABLET ORAL at 09:11

## 2019-11-14 RX ADMIN — DICYCLOMINE HYDROCHLORIDE 10 MG: 10 CAPSULE ORAL at 09:11

## 2019-11-14 RX ADMIN — MICONAZOLE NITRATE: 20 OINTMENT TOPICAL at 09:11

## 2019-11-14 RX ADMIN — FOLIC ACID 1 MG: 1 TABLET ORAL at 09:11

## 2019-11-14 RX ADMIN — POTASSIUM CHLORIDE 20 MEQ: 1500 TABLET, EXTENDED RELEASE ORAL at 04:11

## 2019-11-14 RX ADMIN — PSYLLIUM HUSK 1 PACKET: 3.4 POWDER ORAL at 08:11

## 2019-11-14 NOTE — PT/OT/SLP PROGRESS
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    PT Received On: 11/14/19        Billable Minutes:  Gait Training 13, Therapeutic Activity 10 and Therapeutic Exercise 15    Treatment Type: Treatment  PT/PTA: PTA     PTA Visit Number: 1       General Precautions: Standard, fall  Orthopedic Precautions: spinal precautions   Braces: N/A    Spiritual, Cultural Beliefs, Roman Catholic Practices, Values that Affect Care: no    Subjective:  "lets get it done"    Pain/Comfort  Pain Rating 1: 8/10  Location - Side 1: Left  Location - Orientation 1: lower  Location 1: back  Pain Addressed 1: Pre-medicate for activity, Reposition, Distraction, Cessation of Activity  Pain Rating Post-Intervention 1: (inc with mobility)    Objective:   Patient found with: peripheral IV     AM-PAC 6 CLICK MOBILITY  Total Score:16    Bed Mobility: ed to ensure log roll, pt verbalized undestanding    Transfers:  Sit<>Stand: with RW CGA    Gait:  Amb with RW CGA ~ 60 ft x 2 trials 1 turn each ,seated rest, wc/IV in tow     Therex:  2x10 reps AP,GS,LAQ,hip flex,abd/add    Balance:  Dyn standing bal act at table with uni lat support CGA using LUE to clip clothes pins on tree x 2 trials 1:07 and 1:10    Patient left up in chair with all lines intact, call button in reach and belongings in reach.    Assessment:  Mesha Mckenzie is a 64 y.o. 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  Pt tolerated well, pt would continue to benefit from skilled PT services to improve overall functional mobility, strength and endurance.  .    Rehab identified problem list/impairments: weakness, impaired endurance, impaired self care skills, impaired functional mobilty, gait instability, impaired balance, decreased upper extremity function, decreased lower extremity function, pain    Rehab potential is good.    Activity tolerance: Fair    Discharge recommendations: home " with home health     Barriers to discharge: None    Equipment recommendations: bedside commode, hip kit, shower chair, walker, rolling, wheelchair     GOALS:   Multidisciplinary Problems     Physical Therapy Goals        Problem: Physical Therapy Goal    Goal Priority Disciplines Outcome Goal Variances Interventions   Physical Therapy Goal     PT, PT/OT Ongoing, Progressing     Description:  Goals to be met by: 19    Patient will increase functional independence with mobility by performin. Supine to sit with Contact Guard Assistance  2. Sit to supine with Contact Guard Assistance  3. Sit to stand transfer with Minimal Assistance- Met 2019  4. Bed to chair transfer with Minimal Assistance using Rolling Walker- Met 2019  5. Gait  x 20 feet with Minimal Assistance using Rolling Walker. = met 2019       N EW GOAL 2019 gait x 75 feet w/ RW and CGA = not met  6. Ascend/Descend 4 inch curb step with Minimal Assistance using Rolling Walker.  6a.  Ascend/Descend 4 inch curb step with use of RW via family assistance with 1 verbal cue or less for technique.  7. Stand for 3 minutes with Minimal Assistance using Rolling Walker  8. Increased functional strength to 4/5 for R quadriceps mm group.- Met 2019  9. Lower extremity exercise program x 20 reps per handout, with assistance as needed. Met (2019)                          PLAN:    Patient to be seen 5 x/week  to address the above listed problems via gait training, therapeutic activities, therapeutic exercises, neuromuscular re-education, wheelchair management/training  Plan of Care expires: 19  Plan of Care reviewed with: patient    Mesha Hernandez, PTA  2019

## 2019-11-14 NOTE — PLAN OF CARE
Problem: SLP Goal  Goal: SLP Goal  Description  Speech Language Pathology Goals  Goals expected to be met by 11/8 - goals remain appropriate - reassess on 11/15:  1. Pt will orient x 4 given mod cues.   2. Following a 3 minute delay, pt will recall 3/3 words or facts given mod cues.  3. Pt will complete moderate level problem solving tasks with 70% accuracy given moderate cues.   4. Pt will complete mental manipulation tasks with 70% accuracy given moderate cues to improve cognitive flexibility.   5. Pt will list 8 items in a category in one minute given min-mod cues.   6. Pt will participate in assessment of reading MET, writing MET, visual spatial MET, and functional math abilities.          Outcome: Ongoing, Progressing    Pt making progress towards meeting goals.  Pt appropriate to decrease tx frequency to 3x/wk.   LEROY Madrid, CCC-SLP  Speech Language Pathologist  [phone]  11/14/2019

## 2019-11-14 NOTE — PT/OT/SLP PROGRESS
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 I'm making decisions." pt referring to improvements in cognition.      Objective:      Pt seen at bedside for continued cognitive-linguistic tx.  Pt oriented month, place, and situation.  Cues required to orient to month. Pt was also not oriented to current city.  During word fluency trials, pt listed 8 fruits given min cues, 9 drinks ind'ly, and 15 states ind'ly (given one minute per category). Pt listed 2-3 uses for given objects with 100% accuracy.  Pt made inferences regarding paragraphs read aloud by SLP with 100% acc.  Education provided to pt regarding progress towards meeting goals, improved cognition, and recommendations to decrease tx frequency to 3x/wk due to improved cognition. SLP also discussed recommendations for assistance at home upon d/c. Pt expressed understanding and agreement.     Assessment:  Mesha Mckenzie is a 64 y.o. 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     Discharge recommendations: Discharge Facility/Level of Care Needs: (assistance/supervision at home)     Goals:   Multidisciplinary Problems     SLP Goals        Problem: SLP Goal    Goal Priority Disciplines Outcome   SLP Goal     SLP Ongoing, Progressing   Description:  Speech Language Pathology Goals  Goals expected to be met by 11/8 - goals remain appropriate - " reassess on 11/15:  1. Pt will orient x 4 given mod cues.   2. Following a 3 minute delay, pt will recall 3/3 words or facts given mod cues.  3. Pt will complete moderate level problem solving tasks with 70% accuracy given moderate cues.   4. Pt will complete mental manipulation tasks with 70% accuracy given moderate cues to improve cognitive flexibility.   5. Pt will list 8 items in a category in one minute given min-mod cues.   6. Pt will participate in assessment of reading MET, writing MET, visual spatial MET, and functional math abilities.                            Plan:   Patient to be seen Therapy Frequency: 3 x/week  Planned Interventions: Cognitive-Linguistic Therapy  Plan of Care expires: 12/01/19  Plan of Care reviewed with: patient  SLP Follow-up?: Yes              LEROY Madrid, CCC-SLP  11/14/2019   LEROY Madrid, CCC-SLP  Speech Language Pathologist  [phone];  11/14/2019

## 2019-11-14 NOTE — PT/OT/SLP PROGRESS
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  Agreeable to therapy   Patient able to recall 1/3 spinal precautions.     Pain/Comfort  Pain Rating 1: 4/10  Location - Side 1: Bilateral  Location - Orientation 1: lower  Location 1: back  Pain Addressed 1: Pre-medicate for activity, Distraction, Reposition  Pain Rating Post-Intervention 1: 4/10    Objective:  Patient found with: PICC line supine with HOB slightly elevated.     Occupational Performance:    Bed Mobility:    · Patient completed Rolling/Turning to Left with  stand by assistance  · Patient completed Supine to Sit with stand by assistance  · Patient completed Sit to Supine with stand by assistance     Functional Mobility/Transfers:  · Patient completed Sit <> Stand Transfer with contact guard assistance and minimum assistance  with  rolling walker   · Patient completed Bed <> Chair Transfer using Stand Pivot technique with minimum assistance with no assistive device  · Functional Mobility: Patient propelled self in w/c ~15 feet.     Activities of Daily Living:  · Lower Body Dressing: minimum assistance don pants over feet seated EOB and CGA when standing to don pants over hips; min(A) don slippers     AMPA 6 Click:  [facility] Total Score: 18    OT Exercises: UE Ergometer 10 minutes with minimum resistance and rest rbeaks x 2 to increase activity tolerance for ADLs and functional mobility.     Additional Treatment:  Patient stood at counter level with RW with CGA <1 minute and ~1 minute with a seated rest break  "between performing overhead reaching task in prep of self care and home management. Patient reported that she "had a BM on herself" and wanted PCT to clean her up. Patient returned to room and PCT called.     Patient left supine with call button in reach    ASSESSMENT:  Mesha Mckenzie is a 64 y.o. 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 Patient will benefit from continued OT services to increase safety and independence with ADLs, t/fs, and functional mobility.     Rehab identified problem list/impairments: impaired endurance, impaired self care skills, impaired functional mobilty, impaired cognition, decreased lower extremity function, pain, orthopedic precautions    Rehab potential is good    Activity tolerance: Fair    Discharge recommendations: home health OT(with assistance )     Barriers to discharge: Decreased caregiver support     Equipment recommendations: bedside commode, hip kit, shower chair, walker, rolling, wheelchair     GOALS:   Multidisciplinary Problems     Occupational Therapy Goals        Problem: Occupational Therapy Goal    Goal Priority Disciplines Outcome Interventions   Occupational Therapy Goal     OT, PT/OT Ongoing, Progressing    Description:  Goals to be met by: 11-20-19     Patient will increase functional independence with ADLs by performing:    Feeding with Set-up Assistance.  UE Dressing with Set-up Assistance.  LE Dressing with Supervision.  Grooming while seated with Supervision.  Toileting from bedside commode with Supervision for hygiene and clothing management.   Bathing from  shower chair/bench with Stand-by Assistance.  Rolling to Bilateral with Modified [place].   Supine to sit with Modified [place].  Stand pivot transfers with Supervision.  Toilet transfer to bedside commode with Supervision.  Pt. To be able to recall 3/3 spinal precautions  Pt. To be able to perform HEP for BUE to improve " endurance with S   pt. To participate in dynamic standing task x 5 minutes with S  Pt. To participate in leisure activities 2x/week                      Plan:  Patient to be seen 5 x/week to address the above listed problems via self-care/home management, therapeutic activities, therapeutic exercises  Plan of Care expires: 11/30/19  Plan of Care reviewed with: patient    Amaris SalasLÁZARO  11/14/2019

## 2019-11-14 NOTE — PLAN OF CARE
Repositioned minimal assist, no new skin breakdowns noted. Afebrile. Oxicillin IVABX infusing at 20.8cc/hr. Monitored for pain and safety. Safety maintained. Pain meds effective

## 2019-11-14 NOTE — PROGRESS NOTES
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  Patient initially presented to ED with N/V/D and weakness. Treated with broad-spectrum antibiotics for pneumonia, colitis and septic shock. CT on 9/25 and showed bilateral cavitary lesions likely secondary to septic emboli, small pericardial effusion and ileus. DHARMESH on 9/25 revealed  mitral and tricuspid valve endocarditis and positive Blood cultures with MSSA. ID treated with cefazolin, vancomycin and daptomycin. Echo on 9/28 revealed LVEF 35% and persistent vegatations. 9/30 MRI brain that revealed remote lacunar infarcts without acute intracranial abnormality. 10/1 MRI revealed spinal epidural abscess at L2-4 level. Thoracentesis was performed on 10/1. Patient transferred to [facility] for neurosurgery evaluation of epidural abscess. [facility] hospitalization complicated by encephalopathy and collapse lung. Chest tube placed & pleural fluid sent; gram stain with GPCs. I&D of shoulder abscesses 10/5/19, per gen surg. Head CT was repeated and showed new R basal ganglia infarcts. MRI of brain showed evidence of R thalamic infarct and ventriculitis. CTA of head completed 10/8 showing atherosclerotic plaquing of the distal vertebral arteries and concern for noncalcified plaquing and stenosis; no high-grade stenosis or proximal occlusion. Patient underwent laminectomy on 10/9. Patient was deemed medically  stable and transferred to [facility] to participate in acute inpatient therapy. Admission to SNF for secondary weakness and debility.    Interval history:  Patient continues to report 4-5 episodes of diarrhea per day even with scheduling Lomotil.  Patient is still refusing to take Metamucil- after further discussion with her patient agrees to begin taking this daily starting tomorrow.  UA evaluated and shows 3+ leukocytes, 30 WBCs with occasional bacteria- still awaiting finalized urine culture prior to initiating antibiotics- patient now denies dysuria after 1 dose of Diflucan.  Patient utilizing duke's solution for mouth sores and states they continue to improve.  Patient states that her increased dose of trazodone is no longer working.  Patient states she is very lethargic from not getting enough sleep at night.  Patient progessing with PT/OT. Continuing to follow and treat all acute and chronic conditions.    Past Medical History: Patient has a past medical history of Anxiety, Carotid bruit, Chronic pain, Cystitis, Cystocele, unspecified (CODE), Depression, GI bleed, History of uterine fibroid, Shortness of breath on exertion, Spinal stenosis, and Urinary retention.    Past Surgical History: Patient has a past surgical history that includes Hysterectomy (1989); Anterior vaginal repair (8-); tubiligation; Cardiac catheterization (age 12); Cholecystectomy (2015); Colonoscopy (12/12/2018); Colonoscopy (N/A, 12/12/2018); and Laminectomy (N/A, 10/9/2019).    Social History: Patient reports that she has been smoking cigarettes. She has a 20.00 pack-year smoking history. She has never used smokeless tobacco. She reports that she does not drink alcohol or use drugs.    Family History: family history includes Alzheimer's disease in her mother; Hypertension in her brother and sister; Osteoarthritis in her mother; Thyroid disease in her mother.    Allergies: Patient is allergic to pcn  [penicillins].    ROS  Constitutional: Negative for fever. + fatigue, insomnia, mouth sores.   Eyes: Negative for blurred vision, double vision and discharge.   Respiratory: + for dry intermittent cough.  Negative for shortness of breath and wheezing.    Cardiovascular: Negative for chest pain, palpitations, and leg swelling.   Gastrointestinal: Negative for abdominal pain, constipation, nausea and vomiting. + diarrhea, hemorrhoid pain  Genitourinary:  Patient self caths or is incontinent; endorses groin pain  Musculoskeletal:  + generalized weakness. + for back and bilateral leg pain and myalgias  Skin: Negative for itching and rash.  Neurological: Negative for dizziness, speech change, and headaches.   Psychiatric/Behavioral: + for depression. The patient is not nervous/anxious.      PEx   Temp:  [98.2 °F (36.8 °C)]   Pulse:  [87]   Resp:  [18]   BP: (155)/(71)   SpO2:  [94 %]   Body mass index is 18.43 kg/m².     Constitutional: Patient appears ill.  No distress noted  HENT:   Head: Normocephalic and atraumatic.   Eyes: Pupils are equal, round, and reactive to light.   Neck: Normal range of motion. Neck supple.   Cardiovascular: Normal rate, regular rhythm + murmur  Pulmonary/Chest: Effort normal and breath sounds are clear  Abdominal: Soft. Bowel sounds are normal.   Musculoskeletal: Normal range of motion. + generalized weakness.  Neurological: Alert and oriented to person, place, and time.   Psychiatric: Normal mood and affect. Behavior is normal.   Wounds/Skin: Skin is warm and dry.    Wound location:   Midline lower back incision completely healed over and claudia, pink scar  Wound length: 10 cm   Following: This NP weekly- last observed on 11/13/2019    No results for input(s): GLUCOSE, NA, K, CL, CO2, BUN, CREATININE, MG in the last 24 hours.    Invalid input(s):  CALCIUM  No results for input(s): WBC, RBC, HGB, HCT, PLT, MCV, MCH, MCHC in the last 24 hours.      Assessment and Plan:       Problems addressed  today      Diarrhea, worsening  - initiated Bentyl 10 mg QID-   continue diphenoxylate-atropine 2.5-0.025 mg QID x 3 days, Imodium 4 mg p.o. TID PRN, probiotic BID, continue Metamucil although patient refuses.     Insomnia  - initiated increased to trazodone to 75 mg qHS    Klebsiella pne UTI  - patient with positive UTI on 11/04 with Klebsiella pneumonia- patient was treated with 5 day course of Cipro 20 50 mg BID.  Patient continues to endorse dysuria and groin pain. Initiated UA with reflex today  - 11/13 UA with 3+ leukocytes, 30 WBC and occasional bacteria awaiting final culture result    Possible yeast infection  - completed Diflucan 150 mg x1 dose  - 11/13 denies dysuria today    Stomatitis  - improving, continue [facility] solution - Benadryl, Mylanta, lidocaine, nystatin- 10 mg QID    Hemorrhoids- new  - likely from the diarrhea, continue hydrocortisone 2.5 % rectal cream BID         Ongoing problems     Severe malnutrition  - dietary following and appreciate recommendations, continue supplementation as instructed    Hypertension  -continue amlodipine 10 mg daily and Lopressor 100 mg b.i.d,  losartan 25 mg daily    Urinary retention  - Patient self-caths PRN at home; patient encouraged to continue self cathing here as well    Epidural Abcess  Abscess Upper Extremities  Septic Pulmonary and Brain Emboli   Acute Bacterial Endocarditis  -hx IVDA  -Continuing oxacillin 12 g continuous for MSSA endocarditis per ID recs with end date 11/20     Metabolic encephalopathy  Right thalamic stroke  -Encephalopathy likely multiple factorial etiology: metabolic, infectious, and due to stroke  -Brain MRI 9/30 with lacunar infarcts  -Repeat head CT(10/7) revealed new right basal ganglia infarcts  -MRI of brain revealed R thalamic infarct, ventriculitis, subactue embolic infarctions  -CTA of head 10/8: atherosclerotic plaquing of the distal vertebral arteries and concern for noncalcified plaquing and stenosis; no high-grade  stenosis or proximal occlusion. No mycotic aneurysms  -Per vasc neuro, no systemic anticoagulation and asa due to high risk for hemorrhage from septic emboli  -continue atorvastatin 40 mg daily    Debility  -Continue with PT/OT for gait training and strengthening and restoration of ADL's   -Encourage mobility, OOB in chair, and early ambulation as appropriate  -Fall precautions   -Monitor for bowel and bladder dysfunction  -Monitor for and prevent skin breakdown and pressure ulcers  -Continue DVT prophylaxis with lovenox     Acute on chronic diastolic congestive heart failure  -continue lasix 40 mg daily      Anemia  -continue ferrous sulfate and vitamin-C    Chronic back pain  -continue Tylenol, gabapentin, ibuprofen, methyl salicylate-menthol 15-10% cream    Depression/anxiety  -continue venlafaxine    Future Appointments   Date Time Provider Department Center   11/20/2019 10:00 AM INFECTIOUS DISEASE, [facility] INFECTD [facility]   12/9/2019  2:00 PM Martin Lewis DO [facility] DAVID [facility] [place]         Tabitha Alarcon NP       DOS 11/13/2019       Patient note was created using MModal Dictation.  Any errors in syntax or even information may not have been identified and edited on initial review prior to signing this note.

## 2019-11-15 LAB
ANION GAP SERPL CALC-SCNC: 11 MMOL/L (ref 8–16)
ANISOCYTOSIS BLD QL SMEAR: ABNORMAL
BASOPHILS # BLD AUTO: ABNORMAL K/UL (ref 0–0.2)
BASOPHILS NFR BLD: 0 % (ref 0–1.9)
BUN SERPL-MCNC: 25 MG/DL (ref 8–23)
CALCIUM SERPL-MCNC: 9.7 MG/DL (ref 8.7–10.5)
CHLORIDE SERPL-SCNC: 99 MMOL/L (ref 95–110)
CO2 SERPL-SCNC: 23 MMOL/L (ref 23–29)
CREAT SERPL-MCNC: 0.8 MG/DL (ref 0.5–1.4)
DIFFERENTIAL METHOD: ABNORMAL
EOSINOPHIL # BLD AUTO: ABNORMAL K/UL (ref 0–0.5)
EOSINOPHIL NFR BLD: 6 % (ref 0–8)
ERYTHROCYTE [DISTWIDTH] IN BLOOD BY AUTOMATED COUNT: 19.4 % (ref 11.5–14.5)
EST. GFR  (AFRICAN AMERICAN): >60 ML/MIN/1.73 M^2
EST. GFR  (NON AFRICAN AMERICAN): >60 ML/MIN/1.73 M^2
GLUCOSE SERPL-MCNC: 89 MG/DL (ref 70–110)
HCT VFR BLD AUTO: 26.9 % (ref 37–48.5)
HGB BLD-MCNC: 8.3 G/DL (ref 12–16)
IMM GRANULOCYTES # BLD AUTO: ABNORMAL K/UL (ref 0–0.04)
IMM GRANULOCYTES NFR BLD AUTO: ABNORMAL % (ref 0–0.5)
LYMPHOCYTES # BLD AUTO: ABNORMAL K/UL (ref 1–4.8)
LYMPHOCYTES NFR BLD: 19 % (ref 18–48)
MAGNESIUM SERPL-MCNC: 1.8 MG/DL (ref 1.6–2.6)
MCH RBC QN AUTO: 31.8 PG (ref 27–31)
MCHC RBC AUTO-ENTMCNC: 30.9 G/DL (ref 32–36)
MCV RBC AUTO: 103 FL (ref 82–98)
METAMYELOCYTES NFR BLD MANUAL: 1 %
MONOCYTES # BLD AUTO: ABNORMAL K/UL (ref 0.3–1)
MONOCYTES NFR BLD: 5 % (ref 4–15)
NEUTROPHILS NFR BLD: 69 % (ref 38–73)
NRBC BLD-RTO: 0 /100 WBC
PHOSPHATE SERPL-MCNC: 5.1 MG/DL (ref 2.7–4.5)
PLATELET # BLD AUTO: 340 K/UL (ref 150–350)
PLATELET BLD QL SMEAR: ABNORMAL
PMV BLD AUTO: 9.6 FL (ref 9.2–12.9)
POLYCHROMASIA BLD QL SMEAR: ABNORMAL
POTASSIUM SERPL-SCNC: 5.1 MMOL/L (ref 3.5–5.1)
RBC # BLD AUTO: 2.61 M/UL (ref 4–5.4)
SODIUM SERPL-SCNC: 133 MMOL/L (ref 136–145)
WBC # BLD AUTO: 9.34 K/UL (ref 3.9–12.7)

## 2019-11-15 PROCEDURE — 25000003 PHARM REV CODE 250: Performed by: STUDENT IN AN ORGANIZED HEALTH CARE EDUCATION/TRAINING PROGRAM

## 2019-11-15 PROCEDURE — 97110 THERAPEUTIC EXERCISES: CPT

## 2019-11-15 PROCEDURE — 97803 MED NUTRITION INDIV SUBSEQ: CPT

## 2019-11-15 PROCEDURE — 80048 BASIC METABOLIC PNL TOTAL CA: CPT

## 2019-11-15 PROCEDURE — 25000003 PHARM REV CODE 250: Performed by: NURSE PRACTITIONER

## 2019-11-15 PROCEDURE — 97535 SELF CARE MNGMENT TRAINING: CPT

## 2019-11-15 PROCEDURE — 84100 ASSAY OF PHOSPHORUS: CPT

## 2019-11-15 PROCEDURE — 85007 BL SMEAR W/DIFF WBC COUNT: CPT

## 2019-11-15 PROCEDURE — 11000004 HC SNF PRIVATE

## 2019-11-15 PROCEDURE — 85027 COMPLETE CBC AUTOMATED: CPT

## 2019-11-15 PROCEDURE — 25000003 PHARM REV CODE 250: Performed by: INTERNAL MEDICINE

## 2019-11-15 PROCEDURE — 63600175 PHARM REV CODE 636 W HCPCS: Performed by: STUDENT IN AN ORGANIZED HEALTH CARE EDUCATION/TRAINING PROGRAM

## 2019-11-15 PROCEDURE — 63600175 PHARM REV CODE 636 W HCPCS: Performed by: NURSE PRACTITIONER

## 2019-11-15 PROCEDURE — 97530 THERAPEUTIC ACTIVITIES: CPT

## 2019-11-15 PROCEDURE — 97116 GAIT TRAINING THERAPY: CPT

## 2019-11-15 PROCEDURE — 83735 ASSAY OF MAGNESIUM: CPT

## 2019-11-15 RX ORDER — METOPROLOL TARTRATE 50 MG/1
150 TABLET ORAL 2 TIMES DAILY
Status: DISCONTINUED | OUTPATIENT
Start: 2019-11-15 | End: 2019-11-20 | Stop reason: HOSPADM

## 2019-11-15 RX ORDER — FUROSEMIDE 20 MG/1
20 TABLET ORAL DAILY
Status: DISCONTINUED | OUTPATIENT
Start: 2019-11-16 | End: 2019-11-20 | Stop reason: HOSPADM

## 2019-11-15 RX ORDER — CETIRIZINE HYDROCHLORIDE 5 MG/1
10 TABLET ORAL NIGHTLY
Status: DISCONTINUED | OUTPATIENT
Start: 2019-11-15 | End: 2019-11-20 | Stop reason: HOSPADM

## 2019-11-15 RX ADMIN — POTASSIUM CHLORIDE 20 MEQ: 1500 TABLET, EXTENDED RELEASE ORAL at 08:11

## 2019-11-15 RX ADMIN — GABAPENTIN 200 MG: 100 CAPSULE ORAL at 08:11

## 2019-11-15 RX ADMIN — FOLIC ACID 1 MG: 1 TABLET ORAL at 08:11

## 2019-11-15 RX ADMIN — PANTOPRAZOLE SODIUM 40 MG: 40 TABLET, DELAYED RELEASE ORAL at 08:11

## 2019-11-15 RX ADMIN — DICYCLOMINE HYDROCHLORIDE 10 MG: 10 CAPSULE ORAL at 08:11

## 2019-11-15 RX ADMIN — DICYCLOMINE HYDROCHLORIDE 10 MG: 10 CAPSULE ORAL at 04:11

## 2019-11-15 RX ADMIN — ACETAMINOPHEN 650 MG: 325 TABLET ORAL at 08:11

## 2019-11-15 RX ADMIN — FUROSEMIDE 40 MG: 40 TABLET ORAL at 08:11

## 2019-11-15 RX ADMIN — HYDROCORTISONE 2.5%: 25 CREAM TOPICAL at 08:11

## 2019-11-15 RX ADMIN — GABAPENTIN 200 MG: 100 CAPSULE ORAL at 02:11

## 2019-11-15 RX ADMIN — ACETAMINOPHEN 650 MG: 325 TABLET ORAL at 02:11

## 2019-11-15 RX ADMIN — VENLAFAXINE 75 MG: 37.5 TABLET ORAL at 08:11

## 2019-11-15 RX ADMIN — ENOXAPARIN SODIUM 40 MG: 100 INJECTION SUBCUTANEOUS at 04:11

## 2019-11-15 RX ADMIN — LOSARTAN POTASSIUM 25 MG: 25 TABLET, FILM COATED ORAL at 08:11

## 2019-11-15 RX ADMIN — TRAZODONE HYDROCHLORIDE 75 MG: 50 TABLET ORAL at 08:11

## 2019-11-15 RX ADMIN — CETIRIZINE HYDROCHLORIDE 10 MG: 5 TABLET, FILM COATED ORAL at 08:11

## 2019-11-15 RX ADMIN — MICONAZOLE NITRATE: 20 OINTMENT TOPICAL at 08:11

## 2019-11-15 RX ADMIN — METOPROLOL TARTRATE 150 MG: 50 TABLET ORAL at 08:11

## 2019-11-15 RX ADMIN — AMLODIPINE BESYLATE 10 MG: 10 TABLET ORAL at 08:11

## 2019-11-15 RX ADMIN — ATORVASTATIN CALCIUM 40 MG: 20 TABLET, FILM COATED ORAL at 08:11

## 2019-11-15 RX ADMIN — PSYLLIUM HUSK 1 PACKET: 3.4 POWDER ORAL at 08:11

## 2019-11-15 RX ADMIN — OXACILLIN SODIUM 12 G: 1 INJECTION, POWDER, FOR SOLUTION INTRAMUSCULAR; INTRAVENOUS at 04:11

## 2019-11-15 RX ADMIN — Medication 1 CAPSULE: at 08:11

## 2019-11-15 RX ADMIN — DICYCLOMINE HYDROCHLORIDE 10 MG: 10 CAPSULE ORAL at 01:11

## 2019-11-15 RX ADMIN — METOPROLOL TARTRATE 100 MG: 50 TABLET ORAL at 08:11

## 2019-11-15 NOTE — PT/OT/SLP PROGRESS
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  I am doing well today    Pain/Comfort  Pain Rating 1: 6/10  Location - Side 1: Left  Location - Orientation 1: lower  Location 1: back  Pain Addressed 1: Pre-medicate for activity, Reposition, Distraction    Objective:   Pt. Supine on arrival with UE IV    Occupational Performance:    Bed Mobility:    · Patient completed Supine to Sit with with side rail     Functional Mobility/Transfers:  · Patient completed Sit <> Stand Transfer with contact guard assistance and minimum assistance  with  rolling walker with cues for safety and hand placement   · Patient completed Bed <> Chair Transfer using Stand Pivot technique with contact guard assistance and minimum assistance with rolling walker  · Patient completed Toilet Transfer Stand Pivot technique with contact guard assistance and minimum assistance with  bedside commode  ·     Activities of Daily Living:  · Grooming: supervision at sink level  · Upper Body Dressing: moderate assistance to mange gown around back due to UEiV  · Lower Body Dressing: moderate assistance to oralia pants seated and to mange over hips instance with RW  and diaper application  · Toileting: moderate assistance with cleaning and clothing management from 3n1 level    [facility] 6 Click:  [facility] Total Score: 18    OT Exercises: UE Ergometer 10 min    Additional Treatment:  Pt. With 1# dowel activity with x10 reps with  shd flex, bicep curls  and forward flex motion to increase BUE ROM and strength,.   Pt. With standing and therex  performed to increase ROM, endurance selfcare task and fxl mobility for independence   Patient left up in chair  Recliner chair with all lines intact and call button in reach    ASSESSMENT:  Mesha Mckenzie is a 64 y.o. 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 participated well with session on this day.Pt demos physical deficits with balance  functional mobility, UB strength, endurance  level of functional indep with daily tasks and activities and selfcare skills .Pt. Will continue to benefit from continued OT to progress towards goals      Rehab identified problem list/impairments: impaired endurance, impaired self care skills, impaired functional mobilty, impaired cognition, decreased lower extremity function, pain, orthopedic precautions    Rehab potential is fair    Activity tolerance: Fair    Discharge recommendations: home health OT(with assistance )     Barriers to discharge: Decreased caregiver support     Equipment recommendations: bedside commode, hip kit, shower chair, walker, rolling, wheelchair     GOALS:   Multidisciplinary Problems     Occupational Therapy Goals        Problem: Occupational Therapy Goal    Goal Priority Disciplines Outcome Interventions   Occupational Therapy Goal     OT, PT/OT Ongoing, Progressing    Description:  Goals to be met by: 11-20-19     Patient will increase functional independence with ADLs by performing:    Feeding with Set-up Assistance.  UE Dressing with Set-up Assistance.  LE Dressing with Supervision.  Grooming while seated with Supervision.  Toileting from bedside commode with Supervision for hygiene and clothing management.   Bathing from  shower chair/bench with Stand-by Assistance.  Rolling to Bilateral with Modified [place].   Supine to sit with Modified [place].  Stand pivot transfers with Supervision.  Toilet transfer to bedside commode with Supervision.  Pt. To be able to recall 3/3 spinal precautions  Pt. To be  able to perform HEP for BUE to improve endurance with S   pt. To participate in dynamic standing task x 5 minutes with S  Pt. To participate in leisure activities 2x/week                  Plan:  Patient to be seen 5 x/week to address the above listed problems via self-care/home management, therapeutic activities, therapeutic exercises  Plan of Care expires: 11/30/19  Plan of Care reviewed with: patient    MIKEL Summers  11/15/2019

## 2019-11-15 NOTE — PLAN OF CARE
Repositions minimal assist, no new skin breakdowns noted. Afebrile. Oxicillin IVABX infusing at 20.8cc/hr PO ABX ordered. monitored for pain and safety. Safety maintained. Pain meds effective

## 2019-11-15 NOTE — PT/OT/SLP PROGRESS
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    PT Received On: 11/15/19        Billable Minutes:  Gait Training 10, Therapeutic Activity 8 and Therapeutic Exercise 22    Treatment Type: Treatment  PT/PTA: PTA     PTA Visit Number: 2       General Precautions: Standard, fall  Orthopedic Precautions: spinal precautions   Braces: N/A    Spiritual, Cultural Beliefs, Orthodox Practices, Values that Affect Care: no    Subjective:  "its hurting today" ed pt on the importance of spinal prec, observed pt bending forward to pick kleenex off floor, pt verbalized understanding just forgets sometimes    Pain/Comfort  Pain Rating 1: 9/10  Location - Side 1: Left  Location - Orientation 1: lower  Location 1: back  Pain Addressed 1: Pre-medicate for activity, Reposition, Distraction, Cessation of Activity  Pain Rating Post-Intervention 1: 9/10    Objective:   Patient found with: peripheral IV     AM-PAC 6 CLICK MOBILITY  Total Score:16    Bed Mobility:  Sit>Supine:S HOB flat no rail vcs for log roll pt moves very quickly into bed    Transfers:  Sit<>Stand: with RW CGA  Stand Pivot Transfer: with RW CGA ~ 4 ft in room WC>EOB    Gait:  Amb with RW CGA ~ 18 ft and 36 ft seated rest break     Advanced Gait:  Curb Step: asc/joy 4" curb with RW min/CGA vcs for safety/tech    Therex:  2x10 reps AP,GS,LAQ,hip flex,abd/add    Additional Treatment:  Mini elliptical x 10 min 1 rest break    Patient left supine with all lines intact, call button in reach and belongings in reach.    Assessment:  Mesha Mckenzie is a 64 y.o. 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  Pt tolerated fairly well, remains limited by pain however slowly progressing with gait distance/tolerance, pt would continue to benefit from skilled PT services to improve overall functional mobility, strength and endurance.  .    Rehab identified problem list/impairments: " weakness, impaired endurance, impaired self care skills, impaired functional mobilty, gait instability, impaired balance, decreased upper extremity function, decreased lower extremity function, pain    Rehab potential is good.    Activity tolerance: Fair    Discharge recommendations: home with home health     Barriers to discharge: None    Equipment recommendations: bedside commode, hip kit, shower chair, walker, rolling, wheelchair     GOALS:   Multidisciplinary Problems     Physical Therapy Goals        Problem: Physical Therapy Goal    Goal Priority Disciplines Outcome Goal Variances Interventions   Physical Therapy Goal     PT, PT/OT Ongoing, Progressing     Description:  Goals to be met by: 19    Patient will increase functional independence with mobility by performin. Supine to sit with Contact Guard Assistance  2. Sit to supine with Contact Guard Assistance  3. Sit to stand transfer with Minimal Assistance- Met 2019  4. Bed to chair transfer with Minimal Assistance using Rolling Walker- Met 2019  5. Gait  x 20 feet with Minimal Assistance using Rolling Walker. = met 2019       N EW GOAL 2019 gait x 75 feet w/ RW and CGA = not met  6. Ascend/Descend 4 inch curb step with Minimal Assistance using Rolling Walker.  6a.  Ascend/Descend 4 inch curb step with use of RW via family assistance with 1 verbal cue or less for technique.  7. Stand for 3 minutes with Minimal Assistance using Rolling Walker  8. Increased functional strength to 4/5 for R quadriceps mm group.- Met 2019  9. Lower extremity exercise program x 20 reps per handout, with assistance as needed. Met (2019)                          PLAN:    Patient to be seen 5 x/week  to address the above listed problems via gait training, therapeutic activities, therapeutic exercises, neuromuscular re-education, wheelchair management/training  Plan of Care expires: 19  Plan of Care reviewed with: patient    Mesha  David, PTA  11/15/2019

## 2019-11-15 NOTE — PROGRESS NOTES
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  Patient initially presented to ED with N/V/D and weakness. Treated with broad-spectrum antibiotics for pneumonia, colitis and septic shock. CT on 9/25 and showed bilateral cavitary lesions likely secondary to septic emboli, small pericardial effusion and ileus. DHARMESH on 9/25 revealed  mitral and tricuspid valve endocarditis and positive Blood cultures with MSSA. ID treated with cefazolin, vancomycin and daptomycin. Echo on 9/28 revealed LVEF 35% and persistent vegatations. 9/30 MRI brain that revealed remote lacunar infarcts without acute intracranial abnormality. 10/1 MRI revealed spinal epidural abscess at L2-4 level. Thoracentesis was performed on 10/1. Patient transferred to [facility] for neurosurgery evaluation of epidural abscess. [facility] hospitalization complicated by encephalopathy and collapse lung. Chest tube placed & pleural fluid sent; gram stain with GPCs. I&D of shoulder abscesses 10/5/19, per gen surg. Head CT was repeated and showed new R basal ganglia infarcts. MRI of brain showed evidence of R thalamic infarct and ventriculitis. CTA of head completed 10/8 showing atherosclerotic plaquing of the distal vertebral arteries and concern for noncalcified plaquing and stenosis; no high-grade stenosis or proximal occlusion. Patient underwent laminectomy on 10/9. Patient was  deemed medically stable and transferred to [facility] to participate in acute inpatient therapy. Admission to SNF for secondary weakness and debility.    Interval history:  All of today's labs reviewed and are listed below.  Na low at 133-asymptomatic, phosphorus remains elevated at 5.1 but decreased from 6.0.  No leukocytosis, H&H improved to 8.3/26.  24 hr vital sign ranges listed below.  24 hr blood pressure range is 106/58 to 120/59.  24 hr heart rate 101-112.  Patient also noted to have a low-grade temp of 99°.  Tachycardia and low-grade temp is possibly from dehydration related to continued diarrhea.  Patient is reporting nasal congestion, she states this is worse in the mornings. Patient states that the increased dose of trazodone is slightly helpful with her insomnia.  Patient continues to report 4-5 episodes of diarrhea per day- patient is on Bentyl, Lomotil, PRN Imodium, probiotics BID, and Metamucil.  She still refused to take Metamucil- continuing education provided that patient will likely benefit from a stool bulking agent, advised given to patient on methods of administration to reduce GI upset.  Urine culture resulted with Candida- patient already treated with Diflucan 150 x 1 dose; patient denies any further urinary discomfort. Patient utilizing duke's solution for mouth sores and states they continue to improve.   Patient progessing with PT/OT. Continuing to follow and treat all acute and chronic conditions.    Past Medical History: Patient has a past medical history of Anxiety, Carotid bruit, Chronic pain, Cystitis, Cystocele, unspecified (CODE), Depression, GI bleed, History of uterine fibroid, Shortness of breath on exertion, Spinal stenosis, and Urinary retention.    Past Surgical History: Patient has a past surgical history that includes Hysterectomy (1989); Anterior vaginal repair (8-); tubiligation; Cardiac catheterization (age 12); Cholecystectomy (2015);  Colonoscopy (12/12/2018); Colonoscopy (N/A, 12/12/2018); and Laminectomy (N/A, 10/9/2019).    Social History: Patient reports that she has been smoking cigarettes. She has a 20.00 pack-year smoking history. She has never used smokeless tobacco. She reports that she does not drink alcohol or use drugs.    Family History: family history includes Alzheimer's disease in her mother; Hypertension in her brother and sister; Osteoarthritis in her mother; Thyroid disease in her mother.    Allergies: Patient is allergic to pcn [penicillins].    ROS  Constitutional: Negative for fever. + fatigue, insomnia, mouth sores.   Eyes: Negative for blurred vision, double vision and discharge.   Respiratory: + for dry intermittent cough.  Negative for shortness of breath and wheezing.    Cardiovascular: Negative for chest pain, palpitations, and leg swelling.   Gastrointestinal: Negative for abdominal pain, constipation, nausea and vomiting. + diarrhea, hemorrhoid pain  Genitourinary:  Patient self caths or is incontinent; endorses groin pain  Musculoskeletal:  + generalized weakness. + for back and bilateral leg pain and myalgias  Skin: Negative for itching and rash.  Neurological: Negative for dizziness, speech change, and headaches.   Psychiatric/Behavioral: + for depression. The patient is not nervous/anxious.      PEx   Temp:  [98.2 °F (36.8 °C)]   Pulse:  [87]   Resp:  [18]   BP: (155)/(71)   SpO2:  [94 %]   Body mass index is 18.43 kg/m².     Constitutional: Patient appears ill.  No distress noted  HENT:   Head: Normocephalic and atraumatic.   Eyes: Pupils are equal, round, and reactive to light.   Neck: Normal range of motion. Neck supple.   Cardiovascular: Normal rate, regular rhythm + murmur  Pulmonary/Chest: Effort normal and breath sounds are clear  Abdominal: Soft. Bowel sounds are normal.   Musculoskeletal: Normal range of motion. + generalized weakness.  Neurological: Alert and oriented to person, place, and time.    Psychiatric: Normal mood and affect. Behavior is normal.   Wounds/Skin: Skin is warm and dry.    Wound location:   Midline lower back incision completely healed over and claudia, pink scar  Wound length: 10 cm   Following: This NP weekly- last observed on 11/13/2019    Recent Labs   Lab 11/15/19  0526   *   K 5.1   CL 99   CO2 23   BUN 25*   CREATININE 0.8   MG 1.8     Recent Labs   Lab 11/15/19  0526   WBC 9.34   RBC 2.61*   HGB 8.3*   HCT 26.9*      *   MCH 31.8*   MCHC 30.9*         Assessment and Plan:       Problems addressed today      Nasal congestion  - initiating cetirizine 10 mg qHS    Hypotension  Tachycardia  Hx of Hypertension  - discontinued amlodipine 10 mg daily and  losartan 25 mg daily; increased metoprolol to 150 mg BID    Acute on chronic diastolic congestive heart failure  - reduced Lasix 20 mg daily from 40 mg daily in case this is contributing to her dehydration.     Diarrhea, worsening  - continue Bentyl 10 mg QID-   continue diphenoxylate-atropine 2.5-0.025 mg QID x 3 days, Imodium 4 mg p.o. TID PRN, probiotic BID, continue Metamucil although patient refuses.     Insomnia  - continue increased to trazodone to 75 mg qHS    Urinary yeast infection  - urine culture positive for Candida; completed Diflucan 150 mg x1 dose  - 11/15 denies dysuria today    Stomatitis  - improving, continue Duke solution - Benadryl, Mylanta, lidocaine, nystatin- 10 mg QID    Hemorrhoids  - likely from the diarrhea, continue hydrocortisone 2.5 % rectal cream BID         Ongoing problems       Severe malnutrition  - dietary following and appreciate recommendations, continue supplementation as instructed    Urinary retention  - Patient self-caths PRN at home; patient encouraged to continue self cathing here as well    Epidural Abcess  Abscess Upper Extremities  Septic Pulmonary and Brain Emboli   Acute Bacterial Endocarditis  -hx IVDA  -Continuing oxacillin 12 g continuous for MSSA endocarditis per ID  recs with end date 11/20     Metabolic encephalopathy  Right thalamic stroke  -Encephalopathy likely multiple factorial etiology: metabolic, infectious, and due to stroke  -Brain MRI 9/30 with lacunar infarcts  -Repeat head CT(10/7) revealed new right basal ganglia infarcts  -MRI of brain revealed R thalamic infarct, ventriculitis, subactue embolic infarctions  -CTA of head 10/8: atherosclerotic plaquing of the distal vertebral arteries and concern for noncalcified plaquing and stenosis; no high-grade stenosis or proximal occlusion. No mycotic aneurysms  -Per vasc neuro, no systemic anticoagulation and asa due to high risk for hemorrhage from septic emboli  -continue atorvastatin 40 mg daily    Debility  -Continue with PT/OT for gait training and strengthening and restoration of ADL's   -Encourage mobility, OOB in chair, and early ambulation as appropriate  -Fall precautions   -Monitor for bowel and bladder dysfunction  -Monitor for and prevent skin breakdown and pressure ulcers  -Continue DVT prophylaxis with lovenox    Anemia  -continue ferrous sulfate and vitamin-C    Chronic back pain  -continue Tylenol, gabapentin, ibuprofen, methyl salicylate-menthol 15-10% cream    Depression/anxiety  -continue venlafaxine    Future Appointments   Date Time Provider Department Center   11/20/2019 10:00 AM INFECTIOUS DISEASE, [facility]   12/9/2019  2:00 PM Martin Lewis DO [facility] NEUSUR [facility]       Total time of the visit 66 minutes [id number]  Non physical exam/ non charting time: 44 minutes  Description of non physical exam/non charting time: counseling patient on clinical conditions and therapies provided regarding antidiarrheal methods; discussion regarding attempts to change ID appointment.  Lengthy conversation held with patient's daughter via telephone to provide complete care update.       Tabitha Alarcon NP       DOS 11/15/2019       Patient note was created using MModal Dictation.  Any  errors in syntax or even information may not have been identified and edited on initial review prior to signing this note.

## 2019-11-15 NOTE — NURSING
"Pt refused metamucil. Instructed pt on importance of medication r/t loose stools, pt stated she had "4 loose stools yesterday anyway, it doesn't work"  "

## 2019-11-16 PROCEDURE — 63600175 PHARM REV CODE 636 W HCPCS: Performed by: NURSE PRACTITIONER

## 2019-11-16 PROCEDURE — 25000003 PHARM REV CODE 250: Performed by: NURSE PRACTITIONER

## 2019-11-16 PROCEDURE — 25000003 PHARM REV CODE 250: Performed by: INTERNAL MEDICINE

## 2019-11-16 PROCEDURE — 97116 GAIT TRAINING THERAPY: CPT

## 2019-11-16 PROCEDURE — 11000004 HC SNF PRIVATE

## 2019-11-16 PROCEDURE — 63600175 PHARM REV CODE 636 W HCPCS: Performed by: STUDENT IN AN ORGANIZED HEALTH CARE EDUCATION/TRAINING PROGRAM

## 2019-11-16 PROCEDURE — 25000003 PHARM REV CODE 250: Performed by: STUDENT IN AN ORGANIZED HEALTH CARE EDUCATION/TRAINING PROGRAM

## 2019-11-16 RX ADMIN — ACETAMINOPHEN 650 MG: 325 TABLET ORAL at 10:11

## 2019-11-16 RX ADMIN — DICYCLOMINE HYDROCHLORIDE 10 MG: 10 CAPSULE ORAL at 10:11

## 2019-11-16 RX ADMIN — Medication 250 MG: at 08:11

## 2019-11-16 RX ADMIN — TRAZODONE HYDROCHLORIDE 75 MG: 50 TABLET ORAL at 08:11

## 2019-11-16 RX ADMIN — DICYCLOMINE HYDROCHLORIDE 10 MG: 10 CAPSULE ORAL at 08:11

## 2019-11-16 RX ADMIN — GABAPENTIN 200 MG: 100 CAPSULE ORAL at 10:11

## 2019-11-16 RX ADMIN — FERROUS SULFATE TAB EC 325 MG (65 MG FE EQUIVALENT) 325 MG: 325 (65 FE) TABLET DELAYED RESPONSE at 08:11

## 2019-11-16 RX ADMIN — ACETAMINOPHEN 650 MG: 325 TABLET ORAL at 08:11

## 2019-11-16 RX ADMIN — GABAPENTIN 200 MG: 100 CAPSULE ORAL at 02:11

## 2019-11-16 RX ADMIN — DICYCLOMINE HYDROCHLORIDE 10 MG: 10 CAPSULE ORAL at 06:11

## 2019-11-16 RX ADMIN — METOPROLOL TARTRATE 150 MG: 50 TABLET ORAL at 02:11

## 2019-11-16 RX ADMIN — MICONAZOLE NITRATE: 20 OINTMENT TOPICAL at 08:11

## 2019-11-16 RX ADMIN — ENOXAPARIN SODIUM 40 MG: 100 INJECTION SUBCUTANEOUS at 06:11

## 2019-11-16 RX ADMIN — CETIRIZINE HYDROCHLORIDE 10 MG: 5 TABLET, FILM COATED ORAL at 08:11

## 2019-11-16 RX ADMIN — OXACILLIN SODIUM 12 G: 1 INJECTION, POWDER, FOR SOLUTION INTRAMUSCULAR; INTRAVENOUS at 04:11

## 2019-11-16 RX ADMIN — MICONAZOLE NITRATE: 20 OINTMENT TOPICAL at 10:11

## 2019-11-16 RX ADMIN — FUROSEMIDE 20 MG: 20 TABLET ORAL at 10:11

## 2019-11-16 RX ADMIN — ATORVASTATIN CALCIUM 40 MG: 20 TABLET, FILM COATED ORAL at 10:11

## 2019-11-16 RX ADMIN — DICYCLOMINE HYDROCHLORIDE 10 MG: 10 CAPSULE ORAL at 01:11

## 2019-11-16 RX ADMIN — GABAPENTIN 200 MG: 100 CAPSULE ORAL at 08:11

## 2019-11-16 RX ADMIN — PANTOPRAZOLE SODIUM 40 MG: 40 TABLET, DELAYED RELEASE ORAL at 10:11

## 2019-11-16 RX ADMIN — METOPROLOL TARTRATE 150 MG: 50 TABLET ORAL at 08:11

## 2019-11-16 RX ADMIN — Medication 1 CAPSULE: at 10:11

## 2019-11-16 RX ADMIN — FOLIC ACID 1 MG: 1 TABLET ORAL at 10:11

## 2019-11-16 RX ADMIN — ACETAMINOPHEN 650 MG: 325 TABLET ORAL at 02:11

## 2019-11-16 RX ADMIN — Medication 1 CAPSULE: at 08:11

## 2019-11-16 RX ADMIN — VENLAFAXINE 75 MG: 37.5 TABLET ORAL at 10:11

## 2019-11-16 RX ADMIN — VENLAFAXINE 75 MG: 37.5 TABLET ORAL at 08:11

## 2019-11-16 NOTE — PT/OT/SLP PROGRESS
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    PT Received On: 11/16/19          Billable Minutes:  Gait Training 15    Treatment Type: Treatment  PT/PTA: PTA     PTA Visit Number: 3       General Precautions: Standard, fall  Orthopedic Precautions: spinal precautions   Braces: N/A    Spiritual, Cultural Beliefs, Jehovah's witness Practices, Values that Affect Care: no    Subjective:  "I'm good" Pt agreebale to therapy         Objective:  Gait: 2 trials 38' each RW CGA, slow armen decreased step length, sit<>stand CGA t/f wc RW    PLAN:    Patient to be seen 5 x/week  to address the above listed problems via gait training, therapeutic activities, therapeutic exercises, neuromuscular re-education, wheelchair management/training  Plan of Care expires: 11/30/19  Plan of Care reviewed with: patient    Karly Russo, PTA  11/16/2019  "

## 2019-11-16 NOTE — PROGRESS NOTES
"[facility] - Skilled Nursing Care  Adult Nutrition  Progress Note    SUMMARY   Recommendations  Patient was provided with list of protein sources and protein goals, also tips in writing how to increase protein in foods, He family member once again repeats that patient is the pickiest eater. Suggested she try hard boiled eggs when ordering menu  Recommendation/Intervention: Continue low phosphorus diet,  2000 ml fluid restriction, dc boost plus and order Optisource TID to reduce PO4 intake  Goals: PO to meet 50% of EEN and EPN needs stable weight > 110# by next RD visit  Nutrition Goal Status: not met  Communication of RD Recs: reviewed with physician    Reason for Assessment    Reason For Assessment: RD follow-up  Diagnosis: (respiratory distress)  Relevant Medical History: s/p acute bacterial endocarditis, septic PE, spinal epidural abscess, severe malnutrition, COPD, depression, anxiety, spinal stenosis, chronic pain(sp Lami)  Interdisciplinary Rounds: attended  General Information Comments: passed on dinner tonight , also did not want her family to go get her any food, she drank the boost plus only  Nutrition Discharge Planning: DC on regular diet  Nutrition/Diet History    Typical Food/Fluid Intake: has been letting her  cook, small meals,   Spiritual, Cultural Beliefs, Sabianist Practices, Values that Affect Care: no  Food Allergies: NKFA(cannot tolerate Zatarains seasoning on foods)  Factors Affecting Nutritional Intake: decreased appetite    Anthropometrics    Temp: 98.2 °F (36.8 °C)  Height Method: Stated  Height: 5' 4" (162.6 cm)  Height (inches): 64 in  Weight Method: Bed Scale  Weight: 48.6 kg (107 lb 2.3 oz)  Weight (lb): 107.14 lb  Ideal Body Weight (IBW), Female: 120 lb  % Ideal Body Weight, Female (lb): 95.72 lb  BMI (Calculated): 18.4  BMI Grade: 18.5-24.9 - normal  Usual Body Weight (UBW), k.18 kg  % Usual Body Weight: 98.17  % Weight Change From Usual Weight: -2.03 %   "     Lab/Procedures/Meds    Pertinent Labs Reviewed: reviewed  Pertinent Labs Comments: PO4 4.8  Pertinent Medications Reviewed: reviewed  Pertinent Medications Comments: lasix        Estimated/Assessed Needs    Weight Used For Calorie Calculations: 52.1 kg (114 lb 13.8 oz)  Energy Calorie Requirements (kcal): 7870-9991  Energy Need Method: Kcal/kg(30-35 kcal/kg )  Protein Requirements: 67-78g  Weight Used For Protein Calculations: 52.1 kg (114 lb 13.8 oz)(1.3-1.5g/kg)  Fluid Requirements (mL): 1 ml per Kcal or per MD  Estimated Fluid Requirement Method: RDA Method  RDA Method (mL): 1563  CHO Requirement: -      Nutrition Prescription Ordered    Current Diet Order: low phosphorus, 2000 ml fluid restriciton  Nutrition Order Comments: staff reports sometimes family may eat her food  Current Nutrition Support Frequency Ordered: beneprotein TID, boost plus BID,   Oral Nutrition Supplement: boost plus TID    Evaluation of Received Nutrient/Fluid Intake    Energy Calories Required: not meeting needs  Protein Required: not meeting needs  Fluid Required: meeting needs  Comments: pt likely will not take Optisource as it does not come in vanilla only strawberry  Tolerance: tolerating  % Intake of Estimated Energy Needs: 50 - 75 %  % Meal Intake: 75 - 100 %    Nutrition Risk    Level of Risk/Frequency of Follow-up: low     Assessment and Plan  Severe malnutrition  Malnutrition in the context of Acute Illness/Injury     Related to (etiology):  Inability to eat po (N/V) poor appetite, picky eater, SOB     Signs and Symptoms (as evidenced by):  Energy Intake: <50% of estimated energy requirement for > 7 days  Body Fat Depletion: moderate depletion of orbitals, triceps and thoracic and lumbar region   Muscle Mass Depletion: severe depletion of temples, clavicle region and interosseous muscle     Interventions/Recommendations (treatment strategy):  General diet  Commercial beverage- calories and protein- boost plus  vanilla TID  Commercial food- protein- beneprotein to fluids TID  Nutrition education- nutrient needs, malnutrition  Ongoing    Monitor and Evaluation    Food and Nutrient Intake: energy intake  Food and Nutrient Adminstration: diet order  Knowledge/Beliefs/Attitudes: food and nutrition knowledge/skill  Physical Activity and Function: nutrition-related ADLs and IADLs  Anthropometric Measurements: weight change  Biochemical Data, Medical Tests and Procedures: glucose/endocrine profile, inflammatory profile, electrolyte and renal panel, gastrointestinal profile  Nutrition-Focused Physical Findings: overall appearance     Malnutrition Assessment 10/31/19  Malnutrition Type: acute illness or injury  Energy Intake: severe energy intake  Skin (Micronutrient): wounds unhealed, pallor, dry  Hair/Scalp (Micronutrient): dry, dull  Eyes (Micronutrient): conjunctiva dull, conjunctiva dry  Neck/Chest (Micronutrient): bony prominence, muscle wasting, subcutaneous fat loss  Musculoskeletal/Lower Extremities: subcutaneous fat loss, muscle wasting       Energy Intake (Malnutrition): less than or equal to 50% for greater than or equal to 5 days   Orbital Region (Subcutaneous Fat Loss): moderate depletion  Upper Arm Region (Subcutaneous Fat Loss): moderate depletion  Thoracic and Lumbar Region: mild depletion   Buddhist Region (Muscle Loss): severe depletion  Clavicle Bone Region (Muscle Loss): severe depletion  Clavicle and Acromion Bone Region (Muscle Loss): severe depletion  Dorsal Hand (Muscle Loss): severe depletion  Patellar Region (Muscle Loss): moderate depletion  Anterior Thigh Region (Muscle Loss): moderate depletion  Posterior Calf Region (Muscle Loss): moderate depletion   Edema (Fluid Accumulation): 0-->no edema present             Nutrition Follow-Up    RD Follow-up?: Yes

## 2019-11-17 PROCEDURE — 25000003 PHARM REV CODE 250: Performed by: NURSE PRACTITIONER

## 2019-11-17 PROCEDURE — 63600175 PHARM REV CODE 636 W HCPCS: Performed by: NURSE PRACTITIONER

## 2019-11-17 PROCEDURE — 25000003 PHARM REV CODE 250: Performed by: INTERNAL MEDICINE

## 2019-11-17 PROCEDURE — 63600175 PHARM REV CODE 636 W HCPCS: Performed by: STUDENT IN AN ORGANIZED HEALTH CARE EDUCATION/TRAINING PROGRAM

## 2019-11-17 PROCEDURE — 25000003 PHARM REV CODE 250: Performed by: STUDENT IN AN ORGANIZED HEALTH CARE EDUCATION/TRAINING PROGRAM

## 2019-11-17 PROCEDURE — 97530 THERAPEUTIC ACTIVITIES: CPT

## 2019-11-17 PROCEDURE — 11000004 HC SNF PRIVATE

## 2019-11-17 RX ADMIN — GABAPENTIN 200 MG: 100 CAPSULE ORAL at 02:11

## 2019-11-17 RX ADMIN — METOPROLOL TARTRATE 150 MG: 50 TABLET ORAL at 10:11

## 2019-11-17 RX ADMIN — HYDROCORTISONE 2.5%: 25 CREAM TOPICAL at 10:11

## 2019-11-17 RX ADMIN — ENOXAPARIN SODIUM 40 MG: 100 INJECTION SUBCUTANEOUS at 05:11

## 2019-11-17 RX ADMIN — FOLIC ACID 1 MG: 1 TABLET ORAL at 10:11

## 2019-11-17 RX ADMIN — GABAPENTIN 200 MG: 100 CAPSULE ORAL at 08:11

## 2019-11-17 RX ADMIN — MICONAZOLE NITRATE: 20 OINTMENT TOPICAL at 08:11

## 2019-11-17 RX ADMIN — ATORVASTATIN CALCIUM 40 MG: 20 TABLET, FILM COATED ORAL at 10:11

## 2019-11-17 RX ADMIN — GABAPENTIN 200 MG: 100 CAPSULE ORAL at 10:11

## 2019-11-17 RX ADMIN — Medication 1 CAPSULE: at 08:11

## 2019-11-17 RX ADMIN — ACETAMINOPHEN 650 MG: 325 TABLET ORAL at 10:11

## 2019-11-17 RX ADMIN — CETIRIZINE HYDROCHLORIDE 10 MG: 5 TABLET, FILM COATED ORAL at 08:11

## 2019-11-17 RX ADMIN — VENLAFAXINE 75 MG: 37.5 TABLET ORAL at 08:11

## 2019-11-17 RX ADMIN — Medication 1 CAPSULE: at 10:11

## 2019-11-17 RX ADMIN — FUROSEMIDE 20 MG: 20 TABLET ORAL at 10:11

## 2019-11-17 RX ADMIN — METOPROLOL TARTRATE 150 MG: 50 TABLET ORAL at 08:11

## 2019-11-17 RX ADMIN — DICYCLOMINE HYDROCHLORIDE 10 MG: 10 CAPSULE ORAL at 08:11

## 2019-11-17 RX ADMIN — DICYCLOMINE HYDROCHLORIDE 10 MG: 10 CAPSULE ORAL at 10:11

## 2019-11-17 RX ADMIN — OXACILLIN SODIUM 12 G: 1 INJECTION, POWDER, FOR SOLUTION INTRAMUSCULAR; INTRAVENOUS at 04:11

## 2019-11-17 RX ADMIN — VENLAFAXINE 75 MG: 37.5 TABLET ORAL at 10:11

## 2019-11-17 RX ADMIN — MICONAZOLE NITRATE: 20 OINTMENT TOPICAL at 10:11

## 2019-11-17 RX ADMIN — ACETAMINOPHEN 650 MG: 325 TABLET ORAL at 08:11

## 2019-11-17 RX ADMIN — ACETAMINOPHEN 650 MG: 325 TABLET ORAL at 02:11

## 2019-11-17 RX ADMIN — TRAZODONE HYDROCHLORIDE 75 MG: 50 TABLET ORAL at 08:11

## 2019-11-17 RX ADMIN — PANTOPRAZOLE SODIUM 40 MG: 40 TABLET, DELAYED RELEASE ORAL at 10:11

## 2019-11-17 RX ADMIN — DICYCLOMINE HYDROCHLORIDE 10 MG: 10 CAPSULE ORAL at 05:11

## 2019-11-17 RX ADMIN — DICYCLOMINE HYDROCHLORIDE 10 MG: 10 CAPSULE ORAL at 01:11

## 2019-11-17 RX ADMIN — PSYLLIUM HUSK 1 PACKET: 3.4 POWDER ORAL at 10:11

## 2019-11-17 NOTE — PLAN OF CARE
Acute Pain: Spoke to patient about pain medication regimen. Encouraged patient to call for pain medication prior to pain becoming too intense. Patient verbalized understanding. Will maintain safety precautions and follow up as needed.

## 2019-11-17 NOTE — PT/OT/SLP PROGRESS
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  I am doing well today  Pain/Comfort  Pain Rating 1: 6/10  Location - Side 1: Left  Location - Orientation 1: lower  Location 1: back  Pain Addressed 1: Pre-medicate for activity, Reposition    Objective:   Pt. Supine on arrival with daughter present     Occupational Performance:    Bed Mobility:    · Patient completed Scooting/Bridging with stand by assistance  · Patient completed Supine to Sit with stand by assistance     Functional Mobility/Transfers:  · Patient completed Sit <> Stand Transfer with contact guard assistance  with  rolling walker    · Patient completed Bed <> Chair Transfer using Stand Pivot technique with contact guard assistance with rolling walker  ·     Activities of Daily Living:  · Upper Body Dressing: moderate assistance to oralia gown around back and UEIV management   · Lower Body Dressing: moderate assistance to oralia pants seated and to mange over hips instance with RW    AMPA 6 Click:  AMPA Total Score: 18    OT Exercises: UE Ergometer 10 min    Additional Treatment:  Daughter Present during session on this day reviewed DME needs for d/c    Pt. With standing act on this day with task. Pt. With CGA for balance aspects with task with  AD at raised counter Pt with visual perception task with discrimination of various shapes and sizes x 3 min and then 2 min with standing bal and min cues through out with weight shifting and use of BUE's incorporated and crossing mid line and facilitation with posture in prep for home management .     Pt. With standing  performed to increase ROM, endurance selfcare task and fxl mobility for independence     Patient left supine with all lines intact, call button in reach and daghter present    ASSESSMENT:  Mesha Mckenzie is a 64 y.o. 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 participated well with session on this day.Pt demos physical deficits with balance  functional mobility, UB strength, endurance  level of functional indep with daily tasks and activities and selfcare skills .Pt. Will continue to benefit from continued OT to progress towards goals      Rehab identified problem list/impairments: impaired endurance, impaired self care skills, impaired functional mobilty, impaired cognition, decreased lower extremity function, pain, orthopedic precautions    Rehab potential is fair    Activity tolerance: Fair    Discharge recommendations: home health OT(with assistance )     Barriers to discharge: Decreased caregiver support     Equipment recommendations: bedside commode, hip kit, shower chair, walker, rolling, wheelchair     GOALS:   Multidisciplinary Problems     Occupational Therapy Goals        Problem: Occupational Therapy Goal    Goal Priority Disciplines Outcome Interventions   Occupational Therapy Goal     OT, PT/OT Ongoing, Progressing    Description:  Goals to be met by: 11-20-19     Patient will increase functional independence with ADLs by performing:    Feeding with Set-up Assistance.  UE Dressing with Set-up Assistance.  LE Dressing with Supervision.  Grooming while seated with Supervision.  Toileting from bedside commode with Supervision for hygiene and clothing management.   Bathing from  shower chair/bench with Stand-by Assistance.  Rolling to Bilateral with Modified [place].   Supine to sit with Modified [place].  Stand pivot transfers with Supervision.  Toilet transfer to bedside commode with Supervision.  Pt. To be able to recall 3/3 spinal precautions  Pt. To be able  to perform HEP for BUE to improve endurance with S   pt. To participate in dynamic standing task x 5 minutes with S  Pt. To participate in leisure activities 2x/week                  Plan:  Patient to be seen 5 x/week to address the above listed problems via self-care/home management, therapeutic activities, therapeutic exercises  Plan of Care expires: 11/30/19  Plan of Care reviewed with: patient    MIKEL Summers  11/17/2019

## 2019-11-18 PROCEDURE — 25000003 PHARM REV CODE 250: Performed by: INTERNAL MEDICINE

## 2019-11-18 PROCEDURE — 97116 GAIT TRAINING THERAPY: CPT

## 2019-11-18 PROCEDURE — 11000004 HC SNF PRIVATE

## 2019-11-18 PROCEDURE — 25000003 PHARM REV CODE 250: Performed by: NURSE PRACTITIONER

## 2019-11-18 PROCEDURE — 25000003 PHARM REV CODE 250: Performed by: STUDENT IN AN ORGANIZED HEALTH CARE EDUCATION/TRAINING PROGRAM

## 2019-11-18 PROCEDURE — 97535 SELF CARE MNGMENT TRAINING: CPT

## 2019-11-18 PROCEDURE — 63600175 PHARM REV CODE 636 W HCPCS: Performed by: STUDENT IN AN ORGANIZED HEALTH CARE EDUCATION/TRAINING PROGRAM

## 2019-11-18 PROCEDURE — 97530 THERAPEUTIC ACTIVITIES: CPT

## 2019-11-18 PROCEDURE — 63600175 PHARM REV CODE 636 W HCPCS: Performed by: NURSE PRACTITIONER

## 2019-11-18 PROCEDURE — 97110 THERAPEUTIC EXERCISES: CPT

## 2019-11-18 RX ORDER — OXYMETAZOLINE HCL 0.05 %
2 SPRAY, NON-AEROSOL (ML) NASAL 2 TIMES DAILY
Status: DISCONTINUED | OUTPATIENT
Start: 2019-11-18 | End: 2019-11-20 | Stop reason: HOSPADM

## 2019-11-18 RX ADMIN — HYDROCORTISONE 2.5%: 25 CREAM TOPICAL at 09:11

## 2019-11-18 RX ADMIN — TRAZODONE HYDROCHLORIDE 75 MG: 50 TABLET ORAL at 09:11

## 2019-11-18 RX ADMIN — GABAPENTIN 200 MG: 100 CAPSULE ORAL at 08:11

## 2019-11-18 RX ADMIN — DICYCLOMINE HYDROCHLORIDE 10 MG: 10 CAPSULE ORAL at 08:11

## 2019-11-18 RX ADMIN — FERROUS SULFATE TAB EC 325 MG (65 MG FE EQUIVALENT) 325 MG: 325 (65 FE) TABLET DELAYED RESPONSE at 09:11

## 2019-11-18 RX ADMIN — METOPROLOL TARTRATE 150 MG: 50 TABLET ORAL at 09:11

## 2019-11-18 RX ADMIN — VENLAFAXINE 75 MG: 37.5 TABLET ORAL at 08:11

## 2019-11-18 RX ADMIN — METOPROLOL TARTRATE 150 MG: 50 TABLET ORAL at 08:11

## 2019-11-18 RX ADMIN — FUROSEMIDE 20 MG: 20 TABLET ORAL at 08:11

## 2019-11-18 RX ADMIN — OXACILLIN SODIUM 12 G: 1 INJECTION, POWDER, FOR SOLUTION INTRAMUSCULAR; INTRAVENOUS at 04:11

## 2019-11-18 RX ADMIN — HYDROCORTISONE 2.5%: 25 CREAM TOPICAL at 08:11

## 2019-11-18 RX ADMIN — ACETAMINOPHEN 650 MG: 325 TABLET ORAL at 08:11

## 2019-11-18 RX ADMIN — FOLIC ACID 1 MG: 1 TABLET ORAL at 08:11

## 2019-11-18 RX ADMIN — DICYCLOMINE HYDROCHLORIDE 10 MG: 10 CAPSULE ORAL at 05:11

## 2019-11-18 RX ADMIN — CETIRIZINE HYDROCHLORIDE 10 MG: 5 TABLET, FILM COATED ORAL at 09:11

## 2019-11-18 RX ADMIN — PANTOPRAZOLE SODIUM 40 MG: 40 TABLET, DELAYED RELEASE ORAL at 08:11

## 2019-11-18 RX ADMIN — ACETAMINOPHEN 650 MG: 325 TABLET ORAL at 02:11

## 2019-11-18 RX ADMIN — ONDANSETRON 4 MG: 4 TABLET, ORALLY DISINTEGRATING ORAL at 02:11

## 2019-11-18 RX ADMIN — Medication 1 CAPSULE: at 09:11

## 2019-11-18 RX ADMIN — MICONAZOLE NITRATE: 20 OINTMENT TOPICAL at 09:11

## 2019-11-18 RX ADMIN — IBUPROFEN 400 MG: 200 TABLET, FILM COATED ORAL at 09:11

## 2019-11-18 RX ADMIN — Medication 2 SPRAY: at 10:11

## 2019-11-18 RX ADMIN — VENLAFAXINE 75 MG: 37.5 TABLET ORAL at 09:11

## 2019-11-18 RX ADMIN — ONDANSETRON 4 MG: 4 TABLET, ORALLY DISINTEGRATING ORAL at 10:11

## 2019-11-18 RX ADMIN — GABAPENTIN 200 MG: 100 CAPSULE ORAL at 09:11

## 2019-11-18 RX ADMIN — Medication 1 CAPSULE: at 08:11

## 2019-11-18 RX ADMIN — DICYCLOMINE HYDROCHLORIDE 10 MG: 10 CAPSULE ORAL at 09:11

## 2019-11-18 RX ADMIN — Medication 250 MG: at 09:11

## 2019-11-18 RX ADMIN — DICYCLOMINE HYDROCHLORIDE 10 MG: 10 CAPSULE ORAL at 02:11

## 2019-11-18 RX ADMIN — ACETAMINOPHEN 650 MG: 325 TABLET ORAL at 09:11

## 2019-11-18 RX ADMIN — ATORVASTATIN CALCIUM 40 MG: 20 TABLET, FILM COATED ORAL at 08:11

## 2019-11-18 RX ADMIN — ENOXAPARIN SODIUM 40 MG: 100 INJECTION SUBCUTANEOUS at 05:11

## 2019-11-18 RX ADMIN — GABAPENTIN 200 MG: 100 CAPSULE ORAL at 02:11

## 2019-11-18 RX ADMIN — MICONAZOLE NITRATE: 20 OINTMENT TOPICAL at 08:11

## 2019-11-18 RX ADMIN — PSYLLIUM HUSK 1 PACKET: 3.4 POWDER ORAL at 08:11

## 2019-11-18 NOTE — PT/OT/SLP PROGRESS
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  Pt. And family agreeable to training on this date    Pain/Comfort  Pain Rating 1: 0/10(reported)  Pain Rating Post-Intervention 1: 0/10(reported)    Objective:  Patient found with: peripheral IV(family present)    Occupational Performance:    Bed Mobility:    · Patient completed Sit to Supine with contact guard assistance  With vc's for proper technique to maintain spinal precautions    Functional Mobility/Transfers:  · Patient completed Sit <> Stand Transfer with contact guard assistance  with  rolling walker   · Functional Mobility: not performed    Activities of Daily Living:  · Bathing: reviewed task with family on this date and recommended HH to review in home environment for safety prior to pt. actually showering with family recommend shower seat as welll as longhandle sponge and light assist for task  · Upper Body Dressing: SBA  To doff gown on this date  · Lower Body Dressing: contact guard assistance to don/doff socks and don doff pants with education on technique and use of figure 4 technique to avoid bending.   · Toileting:  Reviewed task with pt. Family and use of BSC positioned over toilet.     [facility] 6 Click:  [facility] Total Score: 19    OT Exercises: AROM with 1 # dowel for all major planes of BUE motion x 2 sets 10 reps; recommended use of umbrella to complete on d/c    Additional Treatment:  Reviewed spinal precautions with pt. And pt. Family  HEP issued for BUE to pt.     Patient left supine with all lines intact, call button in  reach and family present    ASSESSMENT:  Mesha Mckenzie is a 64 y.o. 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 Pt. Family demonstrated good understanding of amount of assist pt. Requires for mobility and ADL tasks. Pt. Tolerated session well on this date and appears to be very happy about return home.     Rehab identified problem list/impairments: impaired endurance, impaired self care skills, impaired functional mobilty, impaired cognition, decreased lower extremity function, pain, orthopedic precautions    Rehab potential is good    Activity tolerance: Good    Discharge recommendations: home health OT(with assistance )     Barriers to discharge: Decreased caregiver support     Equipment recommendations: bedside commode, hip kit, shower chair, walker, rolling, wheelchair     GOALS:   Multidisciplinary Problems     Occupational Therapy Goals        Problem: Occupational Therapy Goal    Goal Priority Disciplines Outcome Interventions   Occupational Therapy Goal     OT, PT/OT Ongoing, Progressing    Description:  Goals to be met by: 11-20-19     Patient will increase functional independence with ADLs by performing:    Feeding with Set-up Assistance.  MET  UE Dressing with Set-up Assistance.MET  LE Dressing with Supervision.NOT MET  Grooming while seated with Supervision. MET  Toileting from bedside commode with Supervision for hygiene and clothing management. NOT MET  Bathing from  shower chair/bench with Stand-by Assistance. NOT MET  Rolling to Bilateral with Modified [place]. MET  Supine to sit with Modified [place]. NOT MET  Stand pivot transfers with Supervision. NOT MET  Toilet transfer to bedside commode with Supervision. NOT MET  Pt. To be able to recall 3/3 spinal precautions NOT MET   Pt. To be able to perform HEP for BUE to improve endurance with S MET   pt. To participate in dynamic  standing task x 5 minutes with S NOT MET  Pt. To participate in leisure activities 2x/week Pt. Refused NOT MET                       Plan:  Patient to be seen 5 x/week to address the above listed problems via self-care/home management, therapeutic activities, therapeutic exercises  Plan of Care expires: 11/30/19  Plan of Care reviewed with: patient    MARIA ISABEL Adams  11/18/2019

## 2019-11-18 NOTE — PLAN OF CARE
Repositions minimal assist, no new skin breakdowns noted. Afebrile.  Oxacillin IVABX infusing at 20.8cc/hr. Monitored for pain and safety. Safety maintained. Pain meds effective

## 2019-11-18 NOTE — PT/OT/SLP PROGRESS
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 Pt consented to today's therapy session.    Pain/Comfort  Pain Rating 1: 0/10  Pain Rating Post-Intervention 1: 0/10    Objective:  Patient found in supine w/ HOB elevated with family present. Patient found with: peripheral IV     [facility] 6 CLICK MOBILITY  Total Score:16    Bed Mobility:  Sit>Supine: From EOM w/ Charles  Supine>Sit: From bed w/ SBA, from mat w/ SBA   - Proper assistance technique demo'd to ,  verb understanding    Transfers:  Sit<>Stand: to/from w/c w/ RW and SBA (multiple trials), to/from EOM w/ RW and CGA   -  able to demo proper technique and safety  Stand Pivot Transfer: from EOB>w/c w/ RW and CGA, to/from w/c, recumbent cross  (simulated car transfer) w/ RW and CGA, to/from w/c, EOM w/ RW and CGA   - Proper tech demo'd to family,  able to perform transfer w/ proper technique and safety    Gait:  Amb x1 trial (60 ft) w/ RW and CGA by family member    Advanced Gait:  Curb Step: asc/descend 4" curb step w/ RW and CGA/Juliette (x2 trials)   - SPT demo'd to  in 1st trial,  able to demo understanding.    Therex:  Seated exercises: AP, LAQ, HF, GS  2x10    Balance:  Pt demo'd good static standing balance while performing balloon taps (2 minutes) w/ RW and SBA    Additional Treatment:  Family training and education on proper technique when assisting patient during ambulation, " bed mobility, and transfers.    Patient left up in chair with OT present.    Assessment:  Mesha Mckenzie is a 64 y.o. 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  Pt erickson today's session well and family members were able to demo correct technique and safety t/o family training. During today's therapy session pt still required frequent cueing for proper sequencing when negotiating curb step and for proper hand placement when transitioning to/from w/c. Pt continues to progress towards goals for d/c and will benefit from continued therapy. Pt will continue PT POC.    Rehab identified problem list/impairments: weakness, impaired endurance, impaired self care skills, impaired functional mobilty, gait instability, impaired balance, decreased upper extremity function, decreased lower extremity function, pain    Rehab potential is good.    Activity tolerance: Good    Discharge recommendations: home with home health     Barriers to discharge: None    Equipment recommendations: bedside commode, hip kit, shower chair, walker, rolling, wheelchair     GOALS:   Multidisciplinary Problems     Physical Therapy Goals        Problem: Physical Therapy Goal    Goal Priority Disciplines Outcome Goal Variances Interventions   Physical Therapy Goal     PT, PT/OT Ongoing, Progressing     Description:  Goals to be met by: 19    Patient will increase functional independence with mobility by performin. Supine to sit with Contact Guard Assistance - Met 2019  2. Sit to supine with Contact Guard Assistance -Met 2019  3. Sit to stand transfer with Minimal Assistance- Met 2019  4. Bed to chair transfer with Minimal Assistance using Rolling Walker- Met 2019  5. Gait  x 20 feet with Minimal Assistance using Rolling Walker. = met 2019       N EW GOAL 2019 gait x 75 feet w/ RW and CGA = not met  6. Ascend/Descend 4 inch curb step with Minimal Assistance using Rolling Walker.  - Met 11/18/2019  6a.  Ascend/Descend 4 inch curb step with use of RW via family assistance with 1 verbal cue or less for technique. - Met 11/18/2019  7. Stand for 3 minutes with Minimal Assistance using Rolling Walker  8. Increased functional strength to 4/5 for R quadriceps mm group.- Met 11/13/2019  9. Lower extremity exercise program x 20 reps per handout, with assistance as needed. Met (11/5/2019)                           PLAN:    Patient to be seen 5 x/week  to address the above listed problems via gait training, therapeutic activities, therapeutic exercises, neuromuscular re-education, wheelchair management/training  Plan of Care expires: 11/30/19  Plan of Care reviewed with: patient    Jerry Egan, SPT  11/18/2019

## 2019-11-19 LAB
ANION GAP SERPL CALC-SCNC: 9 MMOL/L (ref 8–16)
ANISOCYTOSIS BLD QL SMEAR: SLIGHT
BASO STIPL BLD QL SMEAR: ABNORMAL
BASOPHILS NFR BLD: 2 % (ref 0–1.9)
BUN SERPL-MCNC: 26 MG/DL (ref 8–23)
CALCIUM SERPL-MCNC: 9.1 MG/DL (ref 8.7–10.5)
CHLORIDE SERPL-SCNC: 103 MMOL/L (ref 95–110)
CO2 SERPL-SCNC: 23 MMOL/L (ref 23–29)
CREAT SERPL-MCNC: 0.7 MG/DL (ref 0.5–1.4)
DIFFERENTIAL METHOD: ABNORMAL
EOSINOPHIL NFR BLD: 12 % (ref 0–8)
ERYTHROCYTE [DISTWIDTH] IN BLOOD BY AUTOMATED COUNT: 18.4 % (ref 11.5–14.5)
EST. GFR  (AFRICAN AMERICAN): >60 ML/MIN/1.73 M^2
EST. GFR  (NON AFRICAN AMERICAN): >60 ML/MIN/1.73 M^2
GIANT PLATELETS BLD QL SMEAR: PRESENT
GLUCOSE SERPL-MCNC: 82 MG/DL (ref 70–110)
HCT VFR BLD AUTO: 23.7 % (ref 37–48.5)
HGB BLD-MCNC: 7.4 G/DL (ref 12–16)
IMM GRANULOCYTES # BLD AUTO: ABNORMAL K/UL (ref 0–0.04)
IMM GRANULOCYTES NFR BLD AUTO: ABNORMAL % (ref 0–0.5)
LYMPHOCYTES NFR BLD: 19 % (ref 18–48)
MAGNESIUM SERPL-MCNC: 1.8 MG/DL (ref 1.6–2.6)
MCH RBC QN AUTO: 32.2 PG (ref 27–31)
MCHC RBC AUTO-ENTMCNC: 31.2 G/DL (ref 32–36)
MCV RBC AUTO: 103 FL (ref 82–98)
MONOCYTES NFR BLD: 10 % (ref 4–15)
NEUTROPHILS NFR BLD: 57 % (ref 38–73)
NRBC BLD-RTO: 0 /100 WBC
OVALOCYTES BLD QL SMEAR: ABNORMAL
PHOSPHATE SERPL-MCNC: 5.3 MG/DL (ref 2.7–4.5)
PLATELET # BLD AUTO: 308 K/UL (ref 150–350)
PLATELET BLD QL SMEAR: ABNORMAL
PMV BLD AUTO: 9.5 FL (ref 9.2–12.9)
POIKILOCYTOSIS BLD QL SMEAR: SLIGHT
POLYCHROMASIA BLD QL SMEAR: ABNORMAL
POTASSIUM SERPL-SCNC: 3.3 MMOL/L (ref 3.5–5.1)
RBC # BLD AUTO: 2.3 M/UL (ref 4–5.4)
SODIUM SERPL-SCNC: 135 MMOL/L (ref 136–145)
WBC # BLD AUTO: 8.61 K/UL (ref 3.9–12.7)

## 2019-11-19 PROCEDURE — 97116 GAIT TRAINING THERAPY: CPT

## 2019-11-19 PROCEDURE — 25000003 PHARM REV CODE 250: Performed by: NURSE PRACTITIONER

## 2019-11-19 PROCEDURE — 85007 BL SMEAR W/DIFF WBC COUNT: CPT

## 2019-11-19 PROCEDURE — 85027 COMPLETE CBC AUTOMATED: CPT

## 2019-11-19 PROCEDURE — 63600175 PHARM REV CODE 636 W HCPCS: Performed by: STUDENT IN AN ORGANIZED HEALTH CARE EDUCATION/TRAINING PROGRAM

## 2019-11-19 PROCEDURE — 25000003 PHARM REV CODE 250: Performed by: STUDENT IN AN ORGANIZED HEALTH CARE EDUCATION/TRAINING PROGRAM

## 2019-11-19 PROCEDURE — 92507 TX SP LANG VOICE COMM INDIV: CPT

## 2019-11-19 PROCEDURE — 83735 ASSAY OF MAGNESIUM: CPT

## 2019-11-19 PROCEDURE — 97535 SELF CARE MNGMENT TRAINING: CPT

## 2019-11-19 PROCEDURE — 63600175 PHARM REV CODE 636 W HCPCS: Performed by: NURSE PRACTITIONER

## 2019-11-19 PROCEDURE — 97110 THERAPEUTIC EXERCISES: CPT

## 2019-11-19 PROCEDURE — 25000003 PHARM REV CODE 250: Performed by: INTERNAL MEDICINE

## 2019-11-19 PROCEDURE — 11000004 HC SNF PRIVATE

## 2019-11-19 PROCEDURE — 84100 ASSAY OF PHOSPHORUS: CPT

## 2019-11-19 PROCEDURE — 80048 BASIC METABOLIC PNL TOTAL CA: CPT

## 2019-11-19 PROCEDURE — 97530 THERAPEUTIC ACTIVITIES: CPT

## 2019-11-19 RX ORDER — POTASSIUM CHLORIDE 750 MG/1
30 CAPSULE, EXTENDED RELEASE ORAL EVERY 4 HOURS
Status: COMPLETED | OUTPATIENT
Start: 2019-11-19 | End: 2019-11-19

## 2019-11-19 RX ORDER — ATORVASTATIN CALCIUM 40 MG/1
40 TABLET, FILM COATED ORAL DAILY
Qty: 90 TABLET | Refills: 3 | Status: SHIPPED | OUTPATIENT
Start: 2019-11-20 | End: 2019-11-20

## 2019-11-19 RX ORDER — METOPROLOL TARTRATE 75 MG/1
150 TABLET, FILM COATED ORAL 2 TIMES DAILY
Qty: 30 TABLET | Refills: 2 | Status: SHIPPED | OUTPATIENT
Start: 2019-11-19 | End: 2019-11-20

## 2019-11-19 RX ORDER — AMLODIPINE BESYLATE 10 MG/1
10 TABLET ORAL DAILY
Qty: 30 TABLET | Refills: 11 | Status: ON HOLD
Start: 2019-11-19 | End: 2020-02-06 | Stop reason: HOSPADM

## 2019-11-19 RX ORDER — LOPERAMIDE HYDROCHLORIDE 2 MG/1
4 CAPSULE ORAL 3 TIMES DAILY PRN
Qty: 30 CAPSULE | Refills: 0 | Status: SHIPPED | OUTPATIENT
Start: 2019-11-19 | End: 2019-11-20

## 2019-11-19 RX ORDER — GABAPENTIN 100 MG/1
200 CAPSULE ORAL 3 TIMES DAILY
Qty: 180 CAPSULE | Refills: 2 | Status: SHIPPED | OUTPATIENT
Start: 2019-11-19 | End: 2019-11-20

## 2019-11-19 RX ORDER — POTASSIUM CHLORIDE 750 MG/1
30 CAPSULE, EXTENDED RELEASE ORAL EVERY 4 HOURS
Status: DISCONTINUED | OUTPATIENT
Start: 2019-11-19 | End: 2019-11-19

## 2019-11-19 RX ORDER — FUROSEMIDE 20 MG/1
20 TABLET ORAL DAILY
Qty: 30 TABLET | Refills: 2 | Status: SHIPPED | OUTPATIENT
Start: 2019-11-20 | End: 2019-11-20

## 2019-11-19 RX ORDER — VENLAFAXINE 75 MG/1
75 TABLET ORAL 2 TIMES DAILY
Qty: 60 TABLET | Refills: 3 | Status: SHIPPED | OUTPATIENT
Start: 2019-11-19 | End: 2019-11-20

## 2019-11-19 RX ORDER — FERROUS SULFATE 325(65) MG
325 TABLET, DELAYED RELEASE (ENTERIC COATED) ORAL
Refills: 0 | COMMUNITY
Start: 2019-11-20 | End: 2019-12-20

## 2019-11-19 RX ADMIN — Medication 1 CAPSULE: at 09:11

## 2019-11-19 RX ADMIN — ATORVASTATIN CALCIUM 40 MG: 20 TABLET, FILM COATED ORAL at 09:11

## 2019-11-19 RX ADMIN — DICYCLOMINE HYDROCHLORIDE 10 MG: 10 CAPSULE ORAL at 09:11

## 2019-11-19 RX ADMIN — HYDROCORTISONE 2.5%: 25 CREAM TOPICAL at 09:11

## 2019-11-19 RX ADMIN — METOPROLOL TARTRATE 150 MG: 50 TABLET ORAL at 09:11

## 2019-11-19 RX ADMIN — ACETAMINOPHEN 650 MG: 325 TABLET ORAL at 02:11

## 2019-11-19 RX ADMIN — GABAPENTIN 200 MG: 100 CAPSULE ORAL at 09:11

## 2019-11-19 RX ADMIN — ACETAMINOPHEN 650 MG: 325 TABLET ORAL at 09:11

## 2019-11-19 RX ADMIN — FOLIC ACID 1 MG: 1 TABLET ORAL at 09:11

## 2019-11-19 RX ADMIN — TRAZODONE HYDROCHLORIDE 75 MG: 50 TABLET ORAL at 09:11

## 2019-11-19 RX ADMIN — DOXYLAMINE SUCCINATE: 25 TABLET ORAL at 09:11

## 2019-11-19 RX ADMIN — GABAPENTIN 200 MG: 100 CAPSULE ORAL at 02:11

## 2019-11-19 RX ADMIN — ENOXAPARIN SODIUM 40 MG: 100 INJECTION SUBCUTANEOUS at 04:11

## 2019-11-19 RX ADMIN — PSYLLIUM HUSK 1 PACKET: 3.4 POWDER ORAL at 09:11

## 2019-11-19 RX ADMIN — POTASSIUM CHLORIDE 30 MEQ: 750 CAPSULE, EXTENDED RELEASE ORAL at 12:11

## 2019-11-19 RX ADMIN — DICYCLOMINE HYDROCHLORIDE 10 MG: 10 CAPSULE ORAL at 04:11

## 2019-11-19 RX ADMIN — POTASSIUM CHLORIDE 30 MEQ: 750 CAPSULE, EXTENDED RELEASE ORAL at 04:11

## 2019-11-19 RX ADMIN — CETIRIZINE HYDROCHLORIDE 10 MG: 5 TABLET, FILM COATED ORAL at 09:11

## 2019-11-19 RX ADMIN — OXACILLIN SODIUM 12 G: 1 INJECTION, POWDER, FOR SOLUTION INTRAMUSCULAR; INTRAVENOUS at 05:11

## 2019-11-19 RX ADMIN — PANTOPRAZOLE SODIUM 40 MG: 40 TABLET, DELAYED RELEASE ORAL at 09:11

## 2019-11-19 RX ADMIN — Medication 2 SPRAY: at 09:11

## 2019-11-19 RX ADMIN — VENLAFAXINE 75 MG: 37.5 TABLET ORAL at 09:11

## 2019-11-19 RX ADMIN — MICONAZOLE NITRATE: 20 OINTMENT TOPICAL at 09:11

## 2019-11-19 RX ADMIN — ONDANSETRON 4 MG: 4 TABLET, ORALLY DISINTEGRATING ORAL at 12:11

## 2019-11-19 RX ADMIN — FUROSEMIDE 20 MG: 20 TABLET ORAL at 09:11

## 2019-11-19 RX ADMIN — DICYCLOMINE HYDROCHLORIDE 10 MG: 10 CAPSULE ORAL at 12:11

## 2019-11-19 NOTE — PLAN OF CARE
Problem: SLP Goal  Goal: SLP Goal  Description  Speech Language Pathology Goals  Goals expected to be met by 11/8 - goals remain appropriate - reassess on 11/15:  1. Pt will orient x 4 given mod cues.   2. Following a 3 minute delay, pt will recall 3/3 words or facts given mod cues.  3. Pt will complete moderate level problem solving tasks with 70% accuracy given moderate cues.   4. Pt will complete mental manipulation tasks with 70% accuracy given moderate cues to improve cognitive flexibility.   5. Pt will list 8 items in a category in one minute given min-mod cues.   6. Pt will participate in assessment of reading MET, writing MET, visual spatial MET, and functional math abilities.          Outcome: Ongoing, Progressing  Pt discharging to home with family tomorrow.   SLP rordigueal recommended to determine if pt may benefit from continued cognitive therapy in home setting.   LEROY Madrid, CCC-SLP  Speech Language Pathologist  [phone]  11/19/2019

## 2019-11-19 NOTE — PT/OT/SLP PROGRESS
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      Objective:   Patient found with: peripheral IV    Pt seen for ongoing cognitive-linguistic therapy at bedside this afternoon.  Pt in good spirits and excited to be discharging home tomorrow.  Pt was O x 4 and recalled correct area of city.  Following a >3 minute delay, pt recalled 3/3 unrelated words.  Pt compared and contrasted 2 given items with 80% accuracy ind'ly/100% given cues. Pt provided solutions to hypothetical medical and safety situations with 100% acc.  Advantages and disadvantages to hypothetical situations were stated with 100% accuracy as well.  Education provided to pt regarding reason for decreased in tx frequency to 3x, cognitive improvements over course at SNF, and recommendations for  SLP cognitive evaluation in home setting to determine if pt's cognition is at/near baseline and pt is appropriate for discharge from SLP services. Pt expressed understanding and agreement.     Assessment:  Mesha Mckenzie is a 64 y.o. 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     Discharge recommendations: Discharge Facility/Level of Care Needs: home health speech therapy     Goals:   Multidisciplinary Problems     SLP Goals  "       Problem: SLP Goal    Goal Priority Disciplines Outcome   SLP Goal     SLP Ongoing, Progressing   Description:  Speech Language Pathology Goals  Goals expected to be met by 11/8 - goals remain appropriate - reassess on 11/15:  1. Pt will orient x 4 given mod cues.   2. Following a 3 minute delay, pt will recall 3/3 words or facts given mod cues.  3. Pt will complete moderate level problem solving tasks with 70% accuracy given moderate cues.   4. Pt will complete mental manipulation tasks with 70% accuracy given moderate cues to improve cognitive flexibility.   5. Pt will list 8 items in a category in one minute given min-mod cues.   6. Pt will participate in assessment of reading MET, writing MET, visual spatial MET, and functional math abilities.                            Plan:   Patient to be seen Therapy Frequency: 3 x/week  Planned Interventions: Cognitive-Linguistic Therapy  Plan of Care expires: 12/01/19  Plan of Care reviewed with: patient  SLP Follow-up?: Yes              LEROY Madrid, TIANA-SLP  11/19/2019     LEROY Madrid, CCC-SLP  Speech Language Pathologist  [phone]  11/19/2019

## 2019-11-19 NOTE — PT/OT/SLP PROGRESS
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  I am doing well today    Pain/Comfort  Pain Rating 1: 4/10  Location - Side 1: Left  Location - Orientation 1: lower  Location 1: back  Pain Addressed 1: Pre-medicate for activity, Reposition, Distraction    Objective:   Pt. Supine on arrival with UEIV    Occupational Performance:    Bed Mobility:    · Patient completed Rolling/Turning to Left with  stand by assistance  · Patient completed Rolling/Turning to Right with stand by assistance  · Patient completed Supine to Sit with stand by assistance  · Patient completed Sit to Supine with stand by assistance     Functional Mobility/Transfers:  · Patient completed Sit <> Stand Transfer with contact guard assistance  with  rolling walker  to w/c>< EOB      Activities of Daily Living:  · Grooming: supervision at sink level  · Bathing: minimum assistance for post abdiaziz area and BLE's  · Upper Body Dressing: moderate assistance to manage gown and UEIV  · Lower Body Dressing: moderate assistance to oralia pants seated and to manage over hips instance  · Toileting: total assistance Pt. incot of BM and required total care for cleaning     [facility] 6 Click:  [facility] Total Score: 19     Patient left supine with all lines intact and call button in reach    ASSESSMENT:  Mesha Mckenzie is a 64 y.o. 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 participated well with session on this day.Pt demos physical deficits with balance  functional mobility, UB strength, endurance  level of functional indep with daily tasks and  activities and selfcare skills .Pt. Will continue to benefit from continued OT to progress towards goals  .    Rehab identified problem list/impairments: impaired endurance, impaired self care skills, impaired functional mobilty, impaired cognition, decreased lower extremity function, pain, orthopedic precautions    Rehab potential is fair    Activity tolerance: Fair    Discharge recommendations: home health OT(with assistance )     Barriers to discharge: Decreased caregiver support     Equipment recommendations: bedside commode, hip kit, shower chair, walker, rolling, wheelchair     GOALS:   Multidisciplinary Problems     Occupational Therapy Goals        Problem: Occupational Therapy Goal    Goal Priority Disciplines Outcome Interventions   Occupational Therapy Goal     OT, PT/OT Ongoing, Progressing    Description:  Goals to be met by: 11-20-19     Patient will increase functional independence with ADLs by performing:    Feeding with Set-up Assistance.  MET  UE Dressing with Set-up Assistance.MET  LE Dressing with Supervision.NOT MET  Grooming while seated with Supervision. MET  Toileting from bedside commode with Supervision for hygiene and clothing management. NOT MET  Bathing from  shower chair/bench with Stand-by Assistance. NOT MET  Rolling to Bilateral with Modified [place]. MET  Supine to sit with Modified [place]. NOT MET  Stand pivot transfers with Supervision. NOT MET  Toilet transfer to bedside commode with Supervision. NOT MET  Pt. To be able to recall 3/3 spinal precautions NOT MET   Pt. To be able to perform HEP for BUE to improve endurance with S MET   pt. To participate in dynamic standing task x 5 minutes with S NOT MET  Pt. To participate in leisure activities 2x/week Pt. Refused NOT MET                   Plan:  Patient to be seen 5 x/week to address the above listed problems via self-care/home management, therapeutic activities, therapeutic exercises  Plan of Care expires:  11/30/19  Plan of Care reviewed with: patient    ALEAH Summers/MAHESH  11/19/2019

## 2019-11-19 NOTE — PLAN OF CARE
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 [I33.0, B95.8]  Yes    Septic encephalopathy [G93.41]  Yes    Anemia of infection and chronic disease [B99.9, D63.8]  Yes    Opioid abuse [F11.10]  Yes    Chronic prescription benzodiazepine use [Z79.899]  Not Applicable    Urinary retention [R33.9]  Yes    Alteration in skin integrity [R23.9]  Yes    HTN (hypertension) [I10]  Yes    Debility [R53.81]  Yes    Staphylococcus aureus bacteremia with sepsis [A41.01]  Yes    Right thalamic stroke [I63.9]  Yes    Abscess of upper extremity [L02.419]  Yes    Epidural abscess [G06.2]  Yes    Mitral valve vegetation [I33.0]  Yes    Acute septic pulmonary embolism [I26.90]  Yes    Tricuspid valve vegetation [I33.0]  Yes    Severe malnutrition [E43]  Yes    Acute on chronic diastolic congestive heart failure [I50.33]  Yes    Encephalopathy, metabolic [G93.41]  Yes    Pulmonary hypertension [I27.20]  Yes    Acute hypoxemic respiratory failure [J96.01]  Yes    Hypokalemia [E87.6]  Yes    Spinal stenosis [M48.00]  Yes      Resolved Hospital Problems   No resolved problems to display.       Future Appointments   Date Time Provider Department Center   11/20/2019 10:00 AM INFECTIOUS DISEASE, [facility]   12/9/2019  2:00 PM Martin Lewis,  [facility]       I have seen and examined this patient face to face today. My clinical findings that support the need for the home health skilled services and home bound status are the following:  Weakness/numbness causing balance and gait disturbance due to Infection  making it taxing to leave home.    Allergies:  Review of patient's allergies indicates:   Allergen Reactions    Pcn [penicillins]        Diet: regular diet low phos     Activities: activity as tolerated    Nursing:   SN to complete comprehensive assessment including routine vital signs. Instruct on disease process and s/s of complications to report to MD. Review/verify medication list sent home with the patient at time of discharge  and instruct patient/caregiver as needed. Frequency may be adjusted depending on start of care date.    Notify MD if SBP > 160 or < 90; DBP > 90 or < 50; HR > 120 or < 50; Temp > 101      CONSULTS:    Physical Therapy to evaluate and treat. Evaluate for home safety and equipment needs; Establish/upgrade home exercise program. Perform / instruct on therapeutic exercises, gait training, transfer training, and Range of Motion.  Occupational Therapy to evaluate and treat. Evaluate home environment for safety and equipment needs. Perform/Instruct on transfers, ADL training, ROM, and therapeutic exercises.  Speech Therapy  to evaluate and treat for  Language, Swallowing and Cognition.   to evaluate for community resources/long-range planning.  Aide to provide assistance with personal care, ADLs, and vital signs.    Medications: Review discharge medications with patient and family and provide education.      I certify that this patient is confined to her home and needs intermittent skilled nursing care, physical therapy, speech therapy and occupational therapy.

## 2019-11-19 NOTE — PT/OT/SLP PROGRESS
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    PT Received On: 11/19/19        Billable Minutes:  Gait Training 10, Therapeutic Activity 13 and Therapeutic Exercise 15    Treatment Type: Treatment  PT/PTA: PTA     PTA Visit Number: 1       General Precautions: Standard, fall  Orthopedic Precautions: spinal precautions   Braces: N/A    Spiritual, Cultural Beliefs, Taoist Practices, Values that Affect Care: no    Subjective:  "Hanging in there"    Pain/Comfort  Pain Rating 1: 8/10  Location - Side 1: Left  Location - Orientation 1: lower  Location 1: back  Pain Addressed 1: Pre-medicate for activity, Reposition, Distraction, Cessation of Activity  Pain Rating Post-Intervention 1: (inc with mobility)    Objective:   Patient found with: peripheral IV     AM-PAC 6 CLICK MOBILITY  Total Score:16    Transfers:  Sit<>Stand: with RW SBA vcs for hand placement    Gait:  Amb with RW CGA ~ 75 ft to include 1 turn     Therex:  2x10 reps AP,GS,LAQ,hip flex,abd/add    Balance:  Static standing with RW CG/SBA ~ 2 min    Patient left up in chair with call button in reach and belongings in reach.    Assessment:  Mesha Mckenzie is a 64 y.o. 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  Pt tolerated well, pt would continue to benefit from skilled PT services to improve overall functional mobility, strength and endurance.  Pt tolerated fairly well, continues to require some encouragement/ed on the importance of PT services increasing walking and standing tolerance, pt would continue to benefit from skilled PT services to improve overall functional mobility, strength and endurance.  .    Rehab identified problem list/impairments: weakness, impaired endurance, impaired self care skills, impaired functional mobilty, gait instability, impaired balance, decreased upper extremity function, decreased lower extremity function, " pain    Rehab potential is good.    Activity tolerance: Fair    Discharge recommendations: home with home health     Barriers to discharge: None    Equipment recommendations: bedside commode, hip kit, shower chair, walker, rolling, wheelchair     GOALS:   Multidisciplinary Problems     Physical Therapy Goals        Problem: Physical Therapy Goal    Goal Priority Disciplines Outcome Goal Variances Interventions   Physical Therapy Goal     PT, PT/OT Ongoing, Progressing     Description:  Goals to be met by: 19    Patient will increase functional independence with mobility by performin. Supine to sit with Contact Guard Assistance - Met 2019  2. Sit to supine with Contact Guard Assistance -Met 2019  3. Sit to stand transfer with Minimal Assistance- Met 2019  4. Bed to chair transfer with Minimal Assistance using Rolling Walker- Met 2019  5. Gait  x 20 feet with Minimal Assistance using Rolling Walker. = met 2019       N EW GOAL 2019 gait x 75 feet w/ RW and CGA =  met  6. Ascend/Descend 4 inch curb step with Minimal Assistance using Rolling Walker. - Met 2019  6a.  Ascend/Descend 4 inch curb step with use of RW via family assistance with 1 verbal cue or less for technique. - Met 2019  7. Stand for 3 minutes with Minimal Assistance using Rolling Walker  8. Increased functional strength to 4/5 for R quadriceps mm group.- Met 2019  9. Lower extremity exercise program x 20 reps per handout, with assistance as needed. Met (2019)                            PLAN:    Patient to be seen 5 x/week  to address the above listed problems via gait training, therapeutic activities, therapeutic exercises, neuromuscular re-education, wheelchair management/training  Plan of Care expires: 19  Plan of Care reviewed with: patient    Mesha Hernandez, PTA  2019

## 2019-11-20 ENCOUNTER — OFFICE VISIT (OUTPATIENT)
Dept: INFECTIOUS DISEASES | Facility: CLINIC | Age: 64
End: 2019-11-20
Attending: OPHTHALMOLOGY
Payer: MEDICARE

## 2019-11-20 VITALS
DIASTOLIC BLOOD PRESSURE: 67 MMHG | HEIGHT: 64 IN | BODY MASS INDEX: 18.16 KG/M2 | OXYGEN SATURATION: 95 % | SYSTOLIC BLOOD PRESSURE: 114 MMHG | HEART RATE: 77 BPM

## 2019-11-20 VITALS
DIASTOLIC BLOOD PRESSURE: 62 MMHG | BODY MASS INDEX: 18.06 KG/M2 | HEIGHT: 64 IN | SYSTOLIC BLOOD PRESSURE: 130 MMHG | OXYGEN SATURATION: 95 % | RESPIRATION RATE: 18 BRPM | TEMPERATURE: 98 F | HEART RATE: 92 BPM | WEIGHT: 105.81 LBS

## 2019-11-20 DIAGNOSIS — F11.10 OPIOID ABUSE: ICD-10-CM

## 2019-11-20 DIAGNOSIS — I33.0 ENDOCARDITIS DUE TO STAPHYLOCOCCUS: Primary | ICD-10-CM

## 2019-11-20 DIAGNOSIS — I50.33 ACUTE ON CHRONIC DIASTOLIC CONGESTIVE HEART FAILURE: ICD-10-CM

## 2019-11-20 DIAGNOSIS — I33.0 TRICUSPID VALVE VEGETATION: ICD-10-CM

## 2019-11-20 DIAGNOSIS — F19.90 IV DRUG USER: ICD-10-CM

## 2019-11-20 DIAGNOSIS — I33.0 MITRAL VALVE VEGETATION: ICD-10-CM

## 2019-11-20 DIAGNOSIS — B95.8 ENDOCARDITIS DUE TO STAPHYLOCOCCUS: Primary | ICD-10-CM

## 2019-11-20 PROBLEM — I26.90 ACUTE SEPTIC PULMONARY EMBOLISM: Status: RESOLVED | Noted: 2019-09-27 | Resolved: 2019-11-20

## 2019-11-20 LAB
CRP SERPL-MCNC: 15.3 MG/L (ref 0–8.2)
ERYTHROCYTE [SEDIMENTATION RATE] IN BLOOD BY WESTERGREN METHOD: 44 MM/HR (ref 0–36)

## 2019-11-20 PROCEDURE — 1126F PR PAIN SEVERITY QUANTIFIED, NO PAIN PRESENT: ICD-10-PCS | Mod: S$GLB,,, | Performed by: INTERNAL MEDICINE

## 2019-11-20 PROCEDURE — 63600175 PHARM REV CODE 636 W HCPCS: Performed by: STUDENT IN AN ORGANIZED HEALTH CARE EDUCATION/TRAINING PROGRAM

## 2019-11-20 PROCEDURE — 85652 RBC SED RATE AUTOMATED: CPT

## 2019-11-20 PROCEDURE — 99999 PR PBB SHADOW E&M-EST. PATIENT-LVL III: CPT | Mod: PBBFAC,,,

## 2019-11-20 PROCEDURE — 1101F PT FALLS ASSESS-DOCD LE1/YR: CPT | Mod: CPTII,S$GLB,, | Performed by: INTERNAL MEDICINE

## 2019-11-20 PROCEDURE — 25000003 PHARM REV CODE 250: Performed by: NURSE PRACTITIONER

## 2019-11-20 PROCEDURE — 25000003 PHARM REV CODE 250: Performed by: STUDENT IN AN ORGANIZED HEALTH CARE EDUCATION/TRAINING PROGRAM

## 2019-11-20 PROCEDURE — 1159F PR MEDICATION LIST DOCUMENTED IN MEDICAL RECORD: ICD-10-PCS | Mod: S$GLB,,, | Performed by: INTERNAL MEDICINE

## 2019-11-20 PROCEDURE — 86140 C-REACTIVE PROTEIN: CPT

## 2019-11-20 PROCEDURE — 1159F MED LIST DOCD IN RCRD: CPT | Mod: S$GLB,,, | Performed by: INTERNAL MEDICINE

## 2019-11-20 PROCEDURE — 63600175 PHARM REV CODE 636 W HCPCS: Performed by: NURSE PRACTITIONER

## 2019-11-20 PROCEDURE — 99214 PR OFFICE/OUTPT VISIT, EST, LEVL IV, 30-39 MIN: ICD-10-PCS | Mod: S$GLB,,, | Performed by: INTERNAL MEDICINE

## 2019-11-20 PROCEDURE — 99999 PR PBB SHADOW E&M-EST. PATIENT-LVL III: ICD-10-PCS | Mod: PBBFAC,,,

## 2019-11-20 PROCEDURE — 1126F AMNT PAIN NOTED NONE PRSNT: CPT | Mod: S$GLB,,, | Performed by: INTERNAL MEDICINE

## 2019-11-20 PROCEDURE — 25000003 PHARM REV CODE 250: Performed by: INTERNAL MEDICINE

## 2019-11-20 PROCEDURE — 1101F PR PT FALLS ASSESS DOC 0-1 FALLS W/OUT INJ PAST YR: ICD-10-PCS | Mod: CPTII,S$GLB,, | Performed by: INTERNAL MEDICINE

## 2019-11-20 PROCEDURE — 99214 OFFICE O/P EST MOD 30 MIN: CPT | Mod: S$GLB,,, | Performed by: INTERNAL MEDICINE

## 2019-11-20 RX ORDER — FUROSEMIDE 20 MG/1
20 TABLET ORAL DAILY
Qty: 30 TABLET | Refills: 2 | Status: ON HOLD | OUTPATIENT
Start: 2019-11-20 | End: 2020-02-06 | Stop reason: HOSPADM

## 2019-11-20 RX ORDER — METOPROLOL TARTRATE 100 MG/1
100 TABLET ORAL 2 TIMES DAILY
Qty: 60 TABLET | Refills: 3 | Status: ON HOLD | OUTPATIENT
Start: 2019-11-20 | End: 2020-02-06 | Stop reason: HOSPADM

## 2019-11-20 RX ORDER — ATORVASTATIN CALCIUM 40 MG/1
40 TABLET, FILM COATED ORAL DAILY
Qty: 90 TABLET | Refills: 3 | Status: SHIPPED | OUTPATIENT
Start: 2019-11-20 | End: 2020-11-19

## 2019-11-20 RX ORDER — LOPERAMIDE HYDROCHLORIDE 2 MG/1
4 CAPSULE ORAL 3 TIMES DAILY PRN
Qty: 30 CAPSULE | Refills: 0 | Status: SHIPPED | OUTPATIENT
Start: 2019-11-20 | End: 2019-11-30

## 2019-11-20 RX ORDER — CEFADROXIL 500 MG/1
500 CAPSULE ORAL EVERY 12 HOURS
Qty: 30 CAPSULE | Refills: 3 | Status: SHIPPED | OUTPATIENT
Start: 2019-11-20 | End: 2019-11-20 | Stop reason: SDUPTHER

## 2019-11-20 RX ORDER — CEFADROXIL 500 MG/1
500 CAPSULE ORAL EVERY 12 HOURS
Qty: 30 CAPSULE | Refills: 3 | Status: ON HOLD | OUTPATIENT
Start: 2019-11-20 | End: 2020-01-10 | Stop reason: HOSPADM

## 2019-11-20 RX ORDER — METOPROLOL TARTRATE 75 MG/1
150 TABLET, FILM COATED ORAL 2 TIMES DAILY
Qty: 30 TABLET | Refills: 2 | Status: SHIPPED | OUTPATIENT
Start: 2019-11-20 | End: 2019-11-20 | Stop reason: HOSPADM

## 2019-11-20 RX ORDER — VENLAFAXINE 75 MG/1
75 TABLET ORAL 2 TIMES DAILY
Qty: 60 TABLET | Refills: 3 | Status: SHIPPED | OUTPATIENT
Start: 2019-11-20 | End: 2020-11-19

## 2019-11-20 RX ORDER — GABAPENTIN 100 MG/1
200 CAPSULE ORAL 3 TIMES DAILY
Qty: 180 CAPSULE | Refills: 2 | Status: SHIPPED | OUTPATIENT
Start: 2019-11-20 | End: 2020-11-19

## 2019-11-20 RX ADMIN — Medication 1 CAPSULE: at 08:11

## 2019-11-20 RX ADMIN — PSYLLIUM HUSK 1 PACKET: 3.4 POWDER ORAL at 08:11

## 2019-11-20 RX ADMIN — ACETAMINOPHEN 650 MG: 325 TABLET ORAL at 08:11

## 2019-11-20 RX ADMIN — HYDROCORTISONE 2.5%: 25 CREAM TOPICAL at 08:11

## 2019-11-20 RX ADMIN — DICYCLOMINE HYDROCHLORIDE 10 MG: 10 CAPSULE ORAL at 08:11

## 2019-11-20 RX ADMIN — METOPROLOL TARTRATE 150 MG: 50 TABLET ORAL at 08:11

## 2019-11-20 RX ADMIN — VENLAFAXINE 75 MG: 37.5 TABLET ORAL at 08:11

## 2019-11-20 RX ADMIN — MICONAZOLE NITRATE: 20 OINTMENT TOPICAL at 08:11

## 2019-11-20 RX ADMIN — FOLIC ACID 1 MG: 1 TABLET ORAL at 08:11

## 2019-11-20 RX ADMIN — ATORVASTATIN CALCIUM 40 MG: 20 TABLET, FILM COATED ORAL at 08:11

## 2019-11-20 RX ADMIN — PANTOPRAZOLE SODIUM 40 MG: 40 TABLET, DELAYED RELEASE ORAL at 08:11

## 2019-11-20 RX ADMIN — GABAPENTIN 200 MG: 100 CAPSULE ORAL at 08:11

## 2019-11-20 RX ADMIN — OXACILLIN SODIUM 12 G: 1 INJECTION, POWDER, FOR SOLUTION INTRAMUSCULAR; INTRAVENOUS at 04:11

## 2019-11-20 RX ADMIN — DICYCLOMINE HYDROCHLORIDE 10 MG: 10 CAPSULE ORAL at 12:11

## 2019-11-20 RX ADMIN — FUROSEMIDE 20 MG: 20 TABLET ORAL at 08:11

## 2019-11-20 NOTE — NURSING
Back from appt. Copy of ID orders given to OSEAS Crawford. AIDEN notified of need for lab draw then d/c PICC.

## 2019-11-20 NOTE — HPI
Ms Mckenzie is a 64 yo female with sig pmhx of Depression, Anemia, Anxiety GI bleed, Urinary retention, and IVDA who presents to SNF s/p hospitalization for Acute Bacterial Endocarditis, embolic stroke, epidural abscess, and acute on chronic CHF.  Patient initially presented to ED with N/V/D and weakness. Treated with broad-spectrum antibiotics for pneumonia, colitis and septic shock. CT on 9/25 and showed bilateral cavitary lesions likely secondary to septic emboli, small pericardial effusion and ileus. DHARMESH on 9/25 revealed  mitral and tricuspid valve endocarditis and positive Blood cultures with MSSA. ID treated with cefazolin, vancomycin and daptomycin. Echo on 9/28 revealed LVEF 35% and persistent vegatations. 9/30 MRI brain that revealed remote lacunar infarcts without acute intracranial abnormality. 10/1 MRI revealed spinal epidural abscess at L2-4 level. Thoracentesis was performed on 10/1. Cardiology deferred surgical intervention/valve replacement at this time due to comorbidities. Patient transferred to [facility] for neurosurgery evaluation of epidural abscess. [facility] hospitalization complicated by encephalopathy and collapse lung. Chest tube placed & pleural fluid sent; gram stain with GPCs. I&D of shoulder abscesses 10/5/19, per gen surg. Head CT was repeated and showed new R basal ganglia infarcts. MRI of brain showed evidence of R thalamic infarct and ventriculitis. CTA of head completed 10/8 showing atherosclerotic plaquing of the distal vertebral arteries and concern for noncalcified plaquing and stenosis; no high-grade stenosis or proximal occlusion. Patient underwent laminectomy on 10/9. Patient was deemed medically stable and transferred to [facility] to participate in acute inpatient therapy. Admission to SNF for secondary weakness and debility.

## 2019-11-20 NOTE — PLAN OF CARE
Repositions minimal assist, no new skin breakdowns noted. Afebrile. Oxacillin IVABX scheduled. Pt at ID clinic appt now. Monitored for pain and safety. Safety maintained. Pain meds effective

## 2019-11-20 NOTE — NURSING
FLOYD Jordan at bedside instructing on discharge preparations. Pt up in w/c, PICC line flushed and clamped. Pt transported via hospital w/c van to [facility] for ID appt

## 2019-11-20 NOTE — PROGRESS NOTES
INFECTIOUS DISEASE CLINIC  11/20/2019 8:54 AM    Subjective:      Chief Complaint: hospital discharge follow up    History of Present Illness:    Patient Mesha Mckenzie is a 64 y.o. female who presents today for hospital discharge follow up. pmhx IVDU recently admitted with MSSA bacteremia, epidural abscess, MV/TV endocarditis, empyema, Shoulder abscess. s/p laminectomy on 10/9. Was seen by ID at [facility] [place] [place] and recommended OXACILLIN 12G IV continuous  for 6 weeks, estimated end of therapy date 11/20. Patient was discharged to [facility], where she still is.     Pt denies complaints. Reports h/o ivdu heroin prior to admission. Reports she wants to get sober and denies current cravings. Has plans to go to therapy close to home. Denies complaints today. Reports back pain improved and denies pain in area of recent laminectomy. Denies shoulder pain. Ambulating better; working with pt. Uses in-out caths. Denies issues with picc line. Denies fever/chills. Denies sob/chest pain. Did have some edema recently; resolved with lasix.     Last CRP 10/2    C-reactive protein    Ref Range & Units 1mo ago  (10/2/19) 1mo ago  (9/28/19) 2mo ago  (9/20/19)   CRP 0.0 - 8.2 mg/L 94.2High   90.8High   182.8High             Sedimentation rate    Ref Range & Units 1mo ago   Sed Rate 0 - 20 mm/Hr 93High               Echo 10/29- No obvious evidence of endocarditis on this study    Echo 9/28-   TV Vegetation ~ 1x1.8 cm seen along septal leaflet  MV  0.8x0.6 round vegetation On atrial side post leaflet still same size     Last positive culture from R shoulder from 10/5  Last positive blood culture from 9/26    Reviewed op note- 10/9   L3 full laminectomy and medial facetectomy for evacuation of epidural phlegmon.  No mention of hardware placement      Review of Symptoms:  Constitutional: Denies fevers, chills, or weakness.  ENT: Denies dysphagia, nasal discharge, ear pain or discharge.  Cardiovascular: Denies chest pain, palpitations, orthopnea,  or claudication.  Respiratory: Denies shortness of breath, cough, hemoptysis, or wheezing.  GI: Denies nausea/vomitting, hematochezia, melena, abd pain, or changes in appetite.  : Denies dysuria, in-out cath  Musculoskeletal: Denies joint pain or myalgias.ambulates with assitance  Skin/breast: Denies rashes, lumps, lesions, or discharge.  Neurologic: Denies headache, dizziness, vertigo, or paresthesias.    Past Medical History:   Diagnosis Date    Anxiety     Carotid bruit     Chronic pain     Cystitis     Cystocele, unspecified (CODE)     Depression     GI bleed     History of uterine fibroid     Shortness of breath on exertion     Spinal stenosis     Urinary retention        Past Surgical History:   Procedure Laterality Date    ANTERIOR VAGINAL REPAIR  10-    CARDIAC CATHETERIZATION  age 12    CHOLECYSTECTOMY  2015    COLONOSCOPY  12/12/2018    aborted due to poor colon prep    COLONOSCOPY N/A 12/12/2018    Procedure: COLONOSCOPY;  Surgeon: Renard Gonsalves MD;  Location: [facility];  Service: Endoscopy;  Laterality: N/A;    HYSTERECTOMY  1989    AMANDA    LAMINECTOMY N/A 10/9/2019    Procedure: LAMINECTOMY, SPINE, L3, Open;  Surgeon: Martin Lewis DO;  Location: [facility] OR [address] [place] [address];  Service: Neurosurgery;  Laterality: N/A;    tubiligation         Family History   Problem Relation Age of Onset    Alzheimer's disease Mother     Thyroid disease Mother     Osteoarthritis Mother     Hypertension Brother     Hypertension Sister     Breast cancer Neg Hx     Colon cancer Neg Hx     Ovarian cancer Neg Hx        Social History     Socioeconomic History    Marital status:      Spouse name: Not on file    Number of children: Not on file    Years of education: Not on file    Highest education level: Not on file   Occupational History    Not on file   Social Needs    Financial resource strain: Not on file    Food insecurity:     Worry: Not on file     Inability: Not on file     "Transportation needs:     Medical: Not on file     Non-medical: Not on file   Tobacco Use    Smoking status: Current Every Day Smoker     Packs/day: 0.50     Years: 40.00     Pack years: 20.00     Types: Cigarettes    Smokeless tobacco: Never Used   Substance and Sexual Activity    Alcohol use: No    Drug use: No    Sexual activity: Yes     Partners: Male   Lifestyle    Physical activity:     Days per week: Not on file     Minutes per session: Not on file    Stress: Not on file   Relationships    Social connections:     Talks on phone: Not on file     Gets together: Not on file     Attends [employer] service: Not on file     Active member of club or organization: Not on file     Attends meetings of clubs or organizations: Not on file     Relationship status: Not on file   Other Topics Concern    Not on file   Social History Narrative    Not on file       Review of patient's allergies indicates:   Allergen Reactions    Pcn [penicillins]          Objective:   /67   Pulse 77   Ht 5' 4" (1.626 m)   SpO2 95%   BMI 18.16 kg/m²     General: Afebrile, alert, comfortable, no acute distress.   HEENT: LIZY. EOMI, no scleral icterus. No sinus tenderness. MMM.  Pulmonary: Non labored,clear to auscultation A/P/L. No wheezing, crackles, or rhonchi.  Cardiac: normal S1 & S2 w/o rubs/murmurs/gallops.   Abdominal: Non-tender, non-distended.  Extremities: Moves all extremities x 4. No peripheral edema. 2+ pulses. No shoudler tenderness  Skin: No jaundice, rashes, or visible lesions. picc c/d/i. Surgical scar on back, no tenderness erythema or warmth  Neurological:  Alert and oriented x 4.     Labs:  Glucose   Date Value Ref Range Status   11/19/2019 82 70 - 110 mg/dL Final   11/15/2019 89 70 - 110 mg/dL Final   11/12/2019 78 70 - 110 mg/dL Final     Calcium   Date Value Ref Range Status   11/19/2019 9.1 8.7 - 10.5 mg/dL Final   11/15/2019 9.7 8.7 - 10.5 mg/dL Final   11/12/2019 9.5 8.7 - 10.5 mg/dL Final "     Albumin   Date Value Ref Range Status   10/31/2019 1.8 (L) 3.5 - 5.2 g/dL Final   10/30/2019 1.7 (L) 3.5 - 5.2 g/dL Final   10/29/2019 1.6 (L) 3.5 - 5.2 g/dL Final     Total Protein   Date Value Ref Range Status   10/31/2019 6.3 6.0 - 8.4 g/dL Final   10/30/2019 6.1 6.0 - 8.4 g/dL Final   10/29/2019 5.7 (L) 6.0 - 8.4 g/dL Final     Sodium   Date Value Ref Range Status   11/19/2019 135 (L) 136 - 145 mmol/L Final   11/15/2019 133 (L) 136 - 145 mmol/L Final   11/12/2019 133 (L) 136 - 145 mmol/L Final     Potassium   Date Value Ref Range Status   11/19/2019 3.3 (L) 3.5 - 5.1 mmol/L Final   11/15/2019 5.1 3.5 - 5.1 mmol/L Final   11/12/2019 4.6 3.5 - 5.1 mmol/L Final     CO2   Date Value Ref Range Status   11/19/2019 23 23 - 29 mmol/L Final   11/15/2019 23 23 - 29 mmol/L Final   11/12/2019 24 23 - 29 mmol/L Final     Chloride   Date Value Ref Range Status   11/19/2019 103 95 - 110 mmol/L Final   11/15/2019 99 95 - 110 mmol/L Final   11/12/2019 102 95 - 110 mmol/L Final     BUN, Bld   Date Value Ref Range Status   11/19/2019 26 (H) 8 - 23 mg/dL Final   11/15/2019 25 (H) 8 - 23 mg/dL Final   11/12/2019 33 (H) 8 - 23 mg/dL Final     Creatinine   Date Value Ref Range Status   11/19/2019 0.7 0.5 - 1.4 mg/dL Final   11/15/2019 0.8 0.5 - 1.4 mg/dL Final   11/12/2019 0.9 0.5 - 1.4 mg/dL Final     Alkaline Phosphatase   Date Value Ref Range Status   10/31/2019 134 55 - 135 U/L Final   10/30/2019 135 55 - 135 U/L Final   10/29/2019 134 55 - 135 U/L Final     ALT   Date Value Ref Range Status   10/31/2019 6 (L) 10 - 44 U/L Final   10/30/2019 7 (L) 10 - 44 U/L Final   10/29/2019 6 (L) 10 - 44 U/L Final     AST   Date Value Ref Range Status   10/31/2019 13 10 - 40 U/L Final   10/30/2019 11 10 - 40 U/L Final   10/29/2019 8 (L) 10 - 40 U/L Final     Total Bilirubin   Date Value Ref Range Status   10/31/2019 0.1 0.1 - 1.0 mg/dL Final     Comment:     For infants and newborns, interpretation of results should be based  on gestational  age, weight and in agreement with clinical  observations.  Premature Infant recommended reference ranges:  Up to 24 hours.............<8.0 mg/dL  Up to 48 hours............<12.0 mg/dL  3-5 days..................<15.0 mg/dL  6-29 days.................<15.0 mg/dL     10/30/2019 0.2 0.1 - 1.0 mg/dL Final     Comment:     For infants and newborns, interpretation of results should be based  on gestational age, weight and in agreement with clinical  observations.  Premature Infant recommended reference ranges:  Up to 24 hours.............<8.0 mg/dL  Up to 48 hours............<12.0 mg/dL  3-5 days..................<15.0 mg/dL  6-29 days.................<15.0 mg/dL     10/29/2019 0.1 0.1 - 1.0 mg/dL Final     Comment:     For infants and newborns, interpretation of results should be based  on gestational age, weight and in agreement with clinical  observations.  Premature Infant recommended reference ranges:  Up to 24 hours.............<8.0 mg/dL  Up to 48 hours............<12.0 mg/dL  3-5 days..................<15.0 mg/dL  6-29 days.................<15.0 mg/dL       WBC   Date Value Ref Range Status   11/19/2019 8.61 3.90 - 12.70 K/uL Final   11/15/2019 9.34 3.90 - 12.70 K/uL Final   11/12/2019 8.96 3.90 - 12.70 K/uL Final     Hemoglobin   Date Value Ref Range Status   11/19/2019 7.4 (L) 12.0 - 16.0 g/dL Final   11/15/2019 8.3 (L) 12.0 - 16.0 g/dL Final   11/12/2019 7.4 (L) 12.0 - 16.0 g/dL Final     POC Hematocrit   Date Value Ref Range Status   09/19/2019 32 (L) 36 - 54 %PCV Final     Hematocrit   Date Value Ref Range Status   11/19/2019 23.7 (L) 37.0 - 48.5 % Final   11/15/2019 26.9 (L) 37.0 - 48.5 % Final   11/12/2019 24.2 (L) 37.0 - 48.5 % Final     Mean Corpuscular Volume   Date Value Ref Range Status   11/19/2019 103 (H) 82 - 98 fL Final   11/15/2019 103 (H) 82 - 98 fL Final   11/12/2019 106 (H) 82 - 98 fL Final     Platelets   Date Value Ref Range Status   11/19/2019 308 150 - 350 K/uL Final   11/15/2019 340 150 -  350 K/uL Final   11/12/2019 325 150 - 350 K/uL Final     Lab Results   Component Value Date    CHOL 137 04/23/2019     Lab Results   Component Value Date    HDL 37 (L) 04/23/2019     Lab Results   Component Value Date    LDLCALC 80 04/23/2019     Lab Results   Component Value Date    TRIG 101 04/23/2019     Lab Results   Component Value Date    CHOLHDL 27.0 04/23/2019     No results found for: RPR  No results found for: QUANTIFERON    Medications:  Current Facility-Administered Medications on File Prior to Visit   Medication Dose Route Frequency Provider Last Rate Last Dose    acetaminophen tablet 650 mg  650 mg Oral Q6H PRN Darling Mccullough MD   650 mg at 11/02/19 1323    acetaminophen tablet 650 mg  650 mg Oral TID Mary Grace Weiss MD   650 mg at 11/20/19 0824    ascorbic acid (vitamin C) tablet 250 mg  250 mg Oral Q48H Mary Grace Weiss MD   250 mg at 11/18/19 2157    atorvastatin tablet 40 mg  40 mg Oral Daily Messi Espinoza NP   40 mg at 11/20/19 0825    calcium carbonate 200 mg calcium (500 mg) chewable tablet 500 mg  500 mg Oral BID PRN Darling Mccullough MD        cetirizine tablet 10 mg  10 mg Oral QHS Tabitha Alarcon NP   10 mg at 11/19/19 2128    cloNIDine tablet 0.1 mg  0.1 mg Oral Q4H PRN Mary Grace Weiss MD        dextromethorphan-guaifenesin  mg per 12 hr tablet 1 tablet  1 tablet Oral BID PRN Tabitha Alarcon NP        dicyclomine capsule 10 mg  10 mg Oral QID Tabitha Alarcon NP   10 mg at 11/20/19 0825    enoxaparin injection 40 mg  40 mg Subcutaneous Daily Darling Mccullough MD   40 mg at 11/19/19 1631    ferrous sulfate EC tablet 325 mg  325 mg Oral Q48H Mary Grace Weiss MD   325 mg at 11/18/19 2157    folic acid tablet 1 mg  1 mg Oral Daily Darling Mccullough MD   1 mg at 11/20/19 0824    furosemide tablet 20 mg  20 mg Oral Daily Tabitha Alarcon NP   20 mg at 11/20/19 0825    gabapentin capsule 200 mg  200 mg Oral TID Mary Grace Weiss MD   200 mg at 11/20/19 0825     hydrocortisone 2.5 % rectal cream   Rectal BID Tabitha Alarcon NP        ibuprofen tablet 400 mg  400 mg Oral Q6H PRN Mary Grace Weiss MD   400 mg at 11/18/19 2156    Lactobacillus rhamnosus GG capsule 1 capsule  1 capsule Oral BID Tabitha Alarcon NP   1 capsule at 11/20/19 0825    levalbuterol nebulizer solution 0.63 mg  0.63 mg Nebulization Q6H PRN Darling Mccullough MD        loperamide capsule 4 mg  4 mg Oral TID PRN Tabitha Alarcon NP        methyl salicylate-menthol 15-10% cream   Topical (Top) TID Mary Grace Weiss MD        metoprolol tartrate (LOPRESSOR) tablet 150 mg  150 mg Oral BID Tabitha Alarcon NP   150 mg at 11/20/19 0824    miconazole nitrate 2% ointment   Topical (Top) BID Mary Grace Weiss MD        ondansetron disintegrating tablet 4 mg  4 mg Oral Q6H PRN Matty Nelson NP   4 mg at 11/19/19 1240    oxacillin 12 g in  mL CONTINUOUS INFUSION  12 g Intravenous Q24H Tabitha Alarcon NP 20.8 mL/hr at 11/20/19 0447 12 g at 11/20/19 0447    oxymetazoline 0.05 % nasal spray 2 spray  2 spray Each Nostril BID Tabitha Alarcon NP   2 spray at 11/19/19 2130    pantoprazole EC tablet 40 mg  40 mg Oral Daily Mary Grace Weiss MD   40 mg at 11/20/19 0824    [COMPLETED] potassium chloride CR capsule 30 mEq  30 mEq Oral Q4H Tabitha Alarcon NP   30 mEq at 11/19/19 1632    psyllium husk (aspartame) 3.4 gram packet 1 packet  1 packet Oral BID Darling Mccullough MD   1 packet at 11/20/19 0825    sodium chloride 0.65 % nasal spray 1 spray  1 spray Each Nostril PRN Tabitha Alarcon NP        sodium chloride 0.9% flush 10 mL  10 mL Intravenous PRN Darling Mccullough MD   10 mL at 11/02/19 1131    sodium chloride 0.9% flush 3 mL  3 mL Intravenous PRN Renard Gonsalves MD        trazodone split tablet 75 mg  75 mg Oral QHS Tabitha Alarcon NP   75 mg at 11/19/19 2127    venlafaxine tablet 75 mg  75 mg Oral BID Mary Grace Weiss MD   75 mg at 11/20/19 0825    [DISCONTINUED] duke's soln (benadryl 30  mL, mylanta 30 mL, lidocaine 30 mL, nystatin 30 mL) 120 mL  10 mL Oral QID Tabitha Alarcon NP   10 mL at 11/13/19 1400    [DISCONTINUED] potassium chloride CR capsule 30 mEq  30 mEq Oral Q4H Tabitha Alarcon NP         Current Outpatient Medications on File Prior to Visit   Medication Sig Dispense Refill    amLODIPine (NORVASC) 10 MG tablet Take 1 tablet (10 mg total) by mouth once daily. HOLD until follow-up with primary care provider 30 tablet 11    atorvastatin (LIPITOR) 40 MG tablet Take 1 tablet (40 mg total) by mouth once daily. 90 tablet 3    ferrous sulfate 325 (65 FE) MG EC tablet Take 1 tablet (325 mg total) by mouth every 48 hours.  0    folic acid (FOLVITE) 1 MG tablet Take 1 tablet (1 mg total) by mouth once daily.  0    furosemide (LASIX) 20 MG tablet Take 1 tablet (20 mg total) by mouth once daily. 30 tablet 2    gabapentin (NEURONTIN) 100 MG capsule Take 2 capsules (200 mg total) by mouth 3 (three) times daily. 180 capsule 2    Lactobacillus rhamnosus GG (CULTURELLE) 10 billion cell capsule Take 1 capsule by mouth 2 (two) times daily.      loperamide (IMODIUM) 2 mg capsule Take 2 capsules (4 mg total) by mouth 3 (three) times daily as needed for Diarrhea. 30 capsule 0    metoprolol tartrate 75 mg Tab Take 150 mg by mouth 2 (two) times daily. 30 tablet 2    psyllium husk, aspartame, (METAMUCIL) 3.4 gram PwPk packet Take 1 packet by mouth 2 (two) times daily.      venlafaxine (EFFEXOR) 75 MG tablet Take 1 tablet (75 mg total) by mouth 2 (two) times daily. 60 tablet 3       Antibiotics:   Antibiotics (From admission, onward)    None          HIV: No components found for: HIV 1/2 AG/AB  Hepatitis C IgG: No components found for: HEPATITIS C  Syphilis: No results found for: RPR    Hepatitis A IgG: No components found for: HEPATITIS A IGG  Hepatitis Bc IgG: No components found for: HEPATITIS B CORE IGG  Hepatitis Bs IgG:  Quantiferon: No results found for: QUANTIFERON  VZV IgG: No components  found for: VARICELLA IGG    No components found for: SEDIMENTATION RATE  No components found for: C-REACTIVE PROTEIN      Microbiology x 7d:   Microbiology Results (last 7 days)     ** No results found for the last 168 hours. **          Immunization History   Administered Date(s) Administered    Influenza - High Dose - PF (65 years and older) 10/25/2019    Pneumococcal Conjugate - 13 Valent 10/25/2019         Assessment:     MSSA bacteremia (last positive blood culture 9/26/19)  epidural abscess s/p L3 full laminectomy and medial facetectomy for evacuation of epidural phlegmon on 10/9  MV/TV endocarditis (TV Vegetation ~ 1x1.8 cm seen along septal leaflet; MV  0.8x0.6 round vegetation On atrial side post leaflet still same size. Resolved on f/u echo 10/29)  shoulder abscess s/p I&D on 10/5      Plan:     S/p just over 6 weeks of IV antibiotics (oxacillin) from time of last positive culture. symptoms resolved. No indwelling hardware. Unfortunately ESR/CRP weren't trended during treatment. Agree with stopping iv antibiotics. Check ESR/CRP.  Start cefadroxil 500 bid. F/u with ID at [facility] in 6 weeks with repeat labs prior (ESR, CRP). Discussed increased recurrence risk of endocarditis. If invasive dental procedures in future, will need abx ppx. Needs complete drug cessation; would make sure she gets set up with rehab program. Seems like she would be a good candidate for naltrexone. Would consider this on discharge. Also needs f/u with cardiology for management of heart failure.      rtc 6 weeks      Renata Knox MD, MPH  Infectious Disease

## 2019-11-20 NOTE — HOSPITAL COURSE
Patient progressed well with PT and OT. During their stay at SNF- patient suffered with mouth sores that resolved with Duke solution, two nose bleeds, constant diarrhea not responsive to Bentyl, Lomotil or Imodium- patient refused to take Metamucil, likely caused from continues antibiotic infusion, C diff negative; treated for UTI, treated for vaginal yeast infection.  Patient discharged with continued diarrhea- antibiotics stopped on day of discharge- instructed patient to try different supplemental fiber sources and follow up with PCP if diarrhea continues after discontinuation of antibiotics.  Home health was set up. DME was ordered if needed. Follow up appointment to be made by patient within one week. All prescriptions and discharge instructions were ordered to be given to the patient prior to discharge.     PEx  Constitutional: Patient appears ill.  No distress noted  HENT:   Head: Normocephalic and atraumatic.   Eyes: Pupils are equal, round, and reactive to light.   Neck: Normal range of motion. Neck supple.   Cardiovascular: Normal rate, regular rhythm + murmur  Pulmonary/Chest: Effort normal and breath sounds are clear  Abdominal: Soft. Bowel sounds are normal.   Musculoskeletal: Normal range of motion. + generalized weakness.  Neurological: Alert and oriented to person, place, and time.   Psychiatric: Normal mood and affect. Behavior is normal.   Wounds/Skin: Skin is warm and dry.  Midline lower back incision completely healed over and claudia, pink scar  Wound length: 10 cm

## 2019-11-20 NOTE — NURSING
Pt transported via w/c accompanied by PCT and daughter to front entrance for discharge home with [facility]

## 2019-11-20 NOTE — PROGRESS NOTES
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  Patient initially presented to ED with N/V/D and weakness. Treated with broad-spectrum antibiotics for pneumonia, colitis and septic shock. CT on 9/25 and showed bilateral cavitary lesions likely secondary to septic emboli, small pericardial effusion and ileus. DHARMESH on 9/25 revealed  mitral and tricuspid valve endocarditis and positive Blood cultures with MSSA. ID treated with cefazolin, vancomycin and daptomycin. Echo on 9/28 revealed LVEF 35% and persistent vegatations. 9/30 MRI brain that revealed remote lacunar infarcts without acute intracranial abnormality. 10/1 MRI revealed spinal epidural abscess at L2-4 level. Thoracentesis was performed on 10/1. Patient transferred to [facility] for neurosurgery evaluation of epidural abscess. [facility] hospitalization complicated by encephalopathy and collapse lung. Chest tube placed & pleural fluid sent; gram stain with GPCs. I&D of shoulder abscesses 10/5/19, per gen surg. Head CT was repeated and showed new R basal ganglia infarcts. MRI of brain showed evidence of R thalamic infarct and ventriculitis. CTA of head completed 10/8 showing atherosclerotic plaquing of the distal vertebral arteries and concern for noncalcified plaquing and stenosis; no high-grade stenosis or proximal occlusion. Patient underwent laminectomy on 10/9. Patient was  deemed medically stable and transferred to [facility] to participate in acute inpatient therapy. Admission to SNF for secondary weakness and debility.    Interval history:  All of today's labs reviewed.  Na improved to 135 from 01/30 , K 3.3, , phosphorus remains elevated at 5.3, BUN/creatinine 26/0.7.  No leukocytosis, H&H improved to 7.4/23 from 8.3/26, platelets 308.  24 hr vital sign ranges listed below.  24 hr blood pressure range is 126/59 to 147/67.  24 hr heart rate improved to 85 to 102.  No further episodes of low-grade temperatures.  Patient continues to suffer with diarrhea, she is having a few less episodes with 2-3 per day.  Nasal congestion has improved.  Patient is a longer suffering for mouth sores.  Patient longer suffering from hemorrhoidal pain.  Patient progessing with PT/OT. Continuing to follow and treat all acute and chronic conditions.    Past Medical History: Patient has a past medical history of Anxiety, Carotid bruit, Chronic pain, Cystitis, Cystocele, unspecified (CODE), Depression, GI bleed, History of uterine fibroid, Shortness of breath on exertion, Spinal stenosis, and Urinary retention.    Past Surgical History: Patient has a past surgical history that includes Hysterectomy (1989); Anterior vaginal repair (8-); tubiligation; Cardiac catheterization (age 12); Cholecystectomy (2015); Colonoscopy (12/12/2018); Colonoscopy (N/A, 12/12/2018); and Laminectomy (N/A, 10/9/2019).    Social History: Patient reports that she has been smoking cigarettes. She has a 20.00 pack-year smoking history. She has never used smokeless tobacco. She reports that she does not drink alcohol or use drugs.    Family History: family history includes Alzheimer's disease in her mother; Hypertension in her brother and sister; Osteoarthritis in her mother; Thyroid disease in her mother.    Allergies: Patient is allergic to pcn [penicillins].    ROS  Constitutional: Negative for fever.  + fatigue, insomnia.   Eyes: Negative for blurred vision, double vision and discharge.   Respiratory:  Negative for cough, shortness of breath and wheezing.    Cardiovascular: Negative for chest pain, palpitations, and leg swelling.   Gastrointestinal: Negative for abdominal pain, constipation, nausea and vomiting. + diarrhea, slight improvement  Genitourinary:  Patient self caths or is incontinent; endorses groin pain  Musculoskeletal:  + generalized weakness. + for back and bilateral leg pain and myalgias  Skin: Negative for itching and rash.  Neurological: Negative for dizziness, speech change, and headaches.   Psychiatric/Behavioral: + for depression. The patient is not nervous/anxious.      PEx   Temp:  [98.2 °F (36.8 °C)]   Pulse:  [87]   Resp:  [18]   BP: (155)/(71)   SpO2:  [94 %]   Body mass index is 18.43 kg/m².     Constitutional: Patient appears ill.  No distress noted  HENT:   Head: Normocephalic and atraumatic.   Eyes: Pupils are equal, round, and reactive to light.   Neck: Normal range of motion. Neck supple.   Cardiovascular: Normal rate, regular rhythm + murmur  Pulmonary/Chest: Effort normal and breath sounds are clear  Abdominal: Soft. Bowel sounds are normal.   Musculoskeletal: Normal range of motion. + generalized weakness.  Neurological: Alert and oriented to person, place, and time.   Psychiatric: Normal mood and affect. Behavior is normal.   Wounds/Skin: Skin is warm and dry.    Wound location:   Midline lower back incision completely healed over and claudia, pink scar  Wound length: 10 cm   Following: This NP weekly- last observed on 11/19/2019        Assessment and Plan:       Problems addressed today      Nasal congestion  - improving, continue cetirizine 10 mg qHS    Hypotension  Tachycardia  Hx of Hypertension  - continue to hold amlodipine 10 mg daily and  losartan 25 mg daily; continue metoprolol to 150 mg BID    Acute on chronic diastolic congestive heart failure  - continue Lasix 20 mg  daily     Diarrhea, worsening  - continue Bentyl 10 mg QID-   continue diphenoxylate-atropine 2.5-0.025 mg QID x 3 days, Imodium 4 mg p.o. TID PRN, probiotic BID, continue Metamucil although patient refuses.  Anticipate diarrhea to subside once continuous antibiotics are finished on 11/20    Insomnia  - continue increased to trazodone to 75 mg qHS    Stomatitis  - improving, continue Duke solution - Benadryl, Mylanta, lidocaine, nystatin- 10 mg QID  - 11/19 resolved         Ongoing problems       Severe malnutrition  - dietary following and appreciate recommendations, continue supplementation as instructed    Urinary retention  - Patient self-caths PRN at home; patient encouraged to continue self cathing here as well    Epidural Abcess  Abscess Upper Extremities  Septic Pulmonary and Brain Emboli   Acute Bacterial Endocarditis  -hx IVDA  -Continuing oxacillin 12 g continuous for MSSA endocarditis per ID recs with end date 11/20     Metabolic encephalopathy  Right thalamic stroke  -Encephalopathy likely multiple factorial etiology: metabolic, infectious, and due to stroke  -Brain MRI 9/30 with lacunar infarcts  -Repeat head CT(10/7) revealed new right basal ganglia infarcts  -MRI of brain revealed R thalamic infarct, ventriculitis, subactue embolic infarctions  -CTA of head 10/8: atherosclerotic plaquing of the distal vertebral arteries and concern for noncalcified plaquing and stenosis; no high-grade stenosis or proximal occlusion. No mycotic aneurysms  -Per vasc neuro, no systemic anticoagulation and asa due to high risk for hemorrhage from septic emboli  -continue atorvastatin 40 mg daily    Debility  -Continue with PT/OT for gait training and strengthening and restoration of ADL's   -Encourage mobility, OOB in chair, and early ambulation as appropriate  -Fall precautions   -Monitor for bowel and bladder dysfunction  -Monitor for and prevent skin breakdown and pressure ulcers  -Continue DVT prophylaxis with  lovenox    Anemia  -continue ferrous sulfate and vitamin-C    Chronic back pain  -continue Tylenol, gabapentin, ibuprofen, methyl salicylate-menthol 15-10% cream    Depression/anxiety  -continue venlafaxine    Future Appointments   Date Time Provider Department Center   12/9/2019  2:00 PM Martin Lewis DO [facility] DAVID Alarcon NP       [facility] 11/19/2019       Patient note was created using MModal Dictation.  Any errors in syntax or even information may not have been identified and edited on initial review prior to signing this note.

## 2019-11-20 NOTE — PLAN OF CARE
Problem: Adult Inpatient Plan of Care  Goal: Plan of Care Review  Outcome: Ongoing, Progressing  Flowsheets (Taken 11/20/2019 0100)  Plan of Care Reviewed With: patient     Problem: Infection  Goal: Infection Symptom Resolution  Outcome: Ongoing, Progressing     Problem: Skin Injury Risk Increased  Goal: Skin Health and Integrity  Outcome: Ongoing, Progressing     Problem: Fall Injury Risk  Goal: Absence of Fall and Fall-Related Injury  Outcome: Ongoing, Progressing     Afebrile. Denies pain. No new skin wounds. No falls. Plan of care reviewed with patient. Will continue to monitor for pain and safety.

## 2019-11-20 NOTE — DISCHARGE SUMMARY
[facility] [place] [facility] Medicine  Discharge Summary      Patient Name: Mesha Mckenzie  MRN: [id number]  Admission Date: 10/30/2019  Hospital Length of Stay: 21 days  Discharge Date and Time:  11/20/2019 4:29 PM  Attending Physician: No att. providers found   Discharging Provider: Tabitha Alarcon NP  Primary Care Provider: Varun Shea MD PhD      HPI:   Ms Mckenzie is a 64 yo female with sig pmhx of Depression, Anemia, Anxiety GI bleed, Urinary retention, and IVDA who presents to SNF s/p hospitalization for Acute Bacterial Endocarditis, embolic stroke, epidural abscess, and acute on chronic CHF.  Patient initially presented to ED with N/V/D and weakness. Treated with broad-spectrum antibiotics for pneumonia, colitis and septic shock. CT on 9/25 and showed bilateral cavitary lesions likely secondary to septic emboli, small pericardial effusion and ileus. DHARMESH on 9/25 revealed  mitral and tricuspid valve endocarditis and positive Blood cultures with MSSA. ID treated with cefazolin, vancomycin and daptomycin. Echo on 9/28 revealed LVEF 35% and persistent vegatations. 9/30 MRI brain that revealed remote lacunar infarcts without acute intracranial abnormality. 10/1 MRI revealed spinal epidural abscess at L2-4 level. Thoracentesis was performed on 10/1. Cardiology deferred surgical intervention/valve replacement at this time due to comorbidities. [facility] [place] [facility]. [facility] [place] [facility] complicated by encephalopathy and collapse lung. Chest tube placed & pleural fluid sent; gram stain with GPCs. I&D of shoulder abscesses 10/5/19, per gen surg. Head CT was repeated and showed new R basal ganglia infarcts. MRI of brain showed evidence of R thalamic infarct and ventriculitis. CTA of head completed 10/8 showing atherosclerotic plaquing of the distal vertebral arteries and concern for noncalcified plaquing and stenosis; no high-grade stenosis or  proximal occlusion. Patient underwent laminectomy on 10/9. Patient was deemed medically stable and transferred to [facility] to participate in acute inpatient therapy. Admission to SNF for secondary weakness and debility.    * No surgery found *      Hospital Course:   Patient progressed well with PT and OT. During their stay at SNF- patient suffered with mouth sores that resolved with Duke solution, two nose bleeds, constant diarrhea not responsive to Bentyl, Lomotil or Imodium- patient refused to take Metamucil, likely caused from continues antibiotic infusion, C diff negative; treated for UTI, treated for vaginal yeast infection.  Patient discharged with continued diarrhea- antibiotics stopped on day of discharge- instructed patient to try different supplemental fiber sources and follow up with PCP if diarrhea continues after discontinuation of antibiotics.  Home health was set up. DME was ordered if needed. Follow up appointment to be made by patient within one week. All prescriptions and discharge instructions were ordered to be given to the patient prior to discharge.     PEx  Constitutional: Patient appears ill.  No distress noted  HENT:   Head: Normocephalic and atraumatic.   Eyes: Pupils are equal, round, and reactive to light.   Neck: Normal range of motion. Neck supple.   Cardiovascular: Normal rate, regular rhythm + murmur  Pulmonary/Chest: Effort normal and breath sounds are clear  Abdominal: Soft. Bowel sounds are normal.   Musculoskeletal: Normal range of motion. + generalized weakness.  Neurological: Alert and oriented to person, place, and time.   Psychiatric: Normal mood and affect. Behavior is normal.   Wounds/Skin: Skin is warm and dry.  Midline lower back incision completely healed over and claudia, pink scar  Wound length: 10 cm          Consults:   Consults (From admission, onward)        Status Ordering Provider     Inpatient consult to Registered Dietitian/Nutritionist   Once     Provider:  (Not yet assigned)    Completed BERTHA CORRAL     Inpatient consult to [facility]  Once     Provider:  (Not yet assigned)    Completed BERTHA CORRAL          No new Assessment & Plan notes have been filed under this hospital service since the last note was generated.  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 [I33.0, B95.8] 11/01/2019 Yes    Septic encephalopathy [G93.41] 11/01/2019 Yes    Anemia of infection and chronic disease [B99.9, D63.8] 11/01/2019 Yes    Opioid abuse [F11.10] 11/01/2019 Yes    Chronic prescription benzodiazepine use [Z79.899] 11/01/2019 Not Applicable    Urinary retention [R33.9] 10/28/2019 Yes    Alteration in skin integrity [R23.9] 10/22/2019 Yes    HTN (hypertension) [I10] 10/16/2019 Yes    Debility [R53.81] 10/14/2019 Yes    Right thalamic stroke [I63.9] 10/07/2019 Yes    Abscess of upper extremity [L02.419] 10/05/2019 Yes    Epidural abscess [G06.2] 10/02/2019 Yes    Mitral valve vegetation [I33.0] 09/27/2019 Yes    Tricuspid valve vegetation [I33.0] 09/27/2019 Yes    Severe malnutrition [E43] 09/23/2019 Yes    Acute on chronic diastolic congestive heart failure [I50.33] 09/23/2019 Yes    Encephalopathy, metabolic [G93.41] 09/23/2019 Yes    Pulmonary hypertension [I27.20] 09/23/2019 Yes    Acute hypoxemic respiratory failure [J96.01] 09/20/2019 Yes    Hypokalemia [E87.6] 09/19/2019 Yes    Spinal stenosis [M48.00] 05/22/2013 Yes      Problems Resolved During this Admission:    Diagnosis Date Noted Date Resolved POA    Staphylococcus aureus bacteremia with sepsis [A41.01]  11/20/2019 Yes    Acute septic pulmonary embolism [I26.90] 09/27/2019 11/20/2019 Yes       Discharged Condition: good    Disposition: Home-Health Care Svc    Follow Up:  Follow-up Information     Schedule an appointment as soon as possible for a visit with Varun Shea  "MD PhD.    Specialties:  Internal Medicine, General Practice  Why:  WITHIN 1-2 WEEKS  Contact information:  [address] [address] [phone]                 Patient Instructions:      WALKER FOR HOME USE     Order Specific Question Answer Comments   Type of Walker: Adult (5'4"-6'6")    With wheels? Yes    Height: 5' 4" (1.626 m)    Weight: 48.3 kg (106 lb 7.7 oz)    Length of need (1-99 months): 99    Does patient have medical equipment at home? none    Please check all that apply: Patient's condition impairs ambulation.    Please check all that apply: Walker will be used for gait training.    Please check all that apply: Patient is unable to safely ambulate without equipment.    Vendor: Other (use comments)    Expected Date of Delivery: 11/20/2019      WHEELCHAIR FOR HOME USE     Order Specific Question Answer Comments   Hours in W/C per day: 8    Type of Wheelchair: Lightweight    Patient unable to propel in Standard wheelchair? Yes    Size(Width): 18"(STD adult)    Leg Support: STD footrests    Leg Support: Swing Away    Arm Height: Full length    Arm Height: Swing away    Lap Belt: Velcro    Accessories: Front brakes    Accessories: Anti-tippers    Cushion: Basic    Justification for cushion: Prevent pressure ulcers    Height: 5' 4" (1.626 m)    Weight: 48.3 kg (106 lb 7.7 oz)    Does patient have medical equipment at home? none    Length of need (1-99 months): 99    Please check all that apply: Caregiver is capable and willing to operate wheelchair safely.    Please check all that apply: The patient requires the use of a w/c for activities of daily living within the Home.    Please check all that apply: Patient mobility limitations cannot be sufficiently resolved by the use of other ambulatory therapies.      3 IN 1 COMMODE FOR HOME USE     Order Specific Question Answer Comments   Type: Standard    Height: 5' 4" (1.626 m)    Weight: 48.3 kg (106 lb 7.7 oz)    Does patient have medical equipment " "at home? none    Length of need (1-99 months): 99      BATH/SHOWER CHAIR FOR HOME USE     Order Specific Question Answer Comments   Height: 5' 4" (1.626 m)    Weight: 48.3 kg (106 lb 7.7 oz)    Does patient have medical equipment at home? none    Length of need (1-99 months): 99    Type: With back    Vendor: Other (use comments)    Expected Date of Delivery: 11/20/2019      HIP KIT FOR HOME USE     Order Specific Question Answer Comments   Height: 5' 4" (1.626 m)    Weight: 48.3 kg (106 lb 7.7 oz)    Does patient have medical equipment at home? none    Length of need (1-99 months): 99    Type: [place] Hip Kit    Vendor: Other (use comments)    Expected Date of Delivery: 11/20/2019      No driving until:   Order Comments: Cleared by PCP     Notify your health care provider if you experience any of the following:  temperature >100.4     Notify your health care provider if you experience any of the following:  persistent nausea and vomiting or diarrhea     Notify your health care provider if you experience any of the following:  severe uncontrolled pain     Notify your health care provider if you experience any of the following:  redness, tenderness, or signs of infection (pain, swelling, redness, odor or green/yellow discharge around incision site)     Notify your health care provider if you experience any of the following:  difficulty breathing or increased cough     Notify your health care provider if you experience any of the following:  persistent dizziness, light-headedness, or visual disturbances     Notify your health care provider if you experience any of the following:  increased confusion or weakness     Activity as tolerated       Significant Diagnostic Studies: Labs:   BMP:   Recent Labs   Lab 11/19/19 0517   GLU 82   *   K 3.3*      CO2 23   BUN 26*   CREATININE 0.7   CALCIUM 9.1   MG 1.8    and CBC   Recent Labs   Lab 11/19/19 0517   WBC 8.61   HGB 7.4*   HCT 23.7*    "       Pending Diagnostic Studies:     Procedure Component Value Units Date/Time    Basic Metabolic Panel (BMP) [[id number]] Collected:  11/07/19 0620    Order Status:  Sent Lab Status:  In process Updated:  11/07/19 0621    Specimen:  Blood     CBC auto differential [[id number]] Collected:  11/07/19 0620    Order Status:  Sent Lab Status:  In process Updated:  11/07/19 0621    Specimen:  Blood     Magnesium [[id number]] Collected:  11/07/19 0620    Order Status:  Sent Lab Status:  In process Updated:  11/07/19 0621    Specimen:  Blood     Phosphorus [[id number]] Collected:  11/07/19 0620    Order Status:  Sent Lab Status:  In process Updated:  11/07/19 0621    Specimen:  Blood          Medications:  Reconciled Home Medications:      Medication List      START taking these medications    atorvastatin 40 MG tablet  Commonly known as:  LIPITOR  Take 1 tablet (40 mg total) by mouth once daily.     cefadroxil 500 MG Cap  Commonly known as:  DURICEF  Take 1 capsule (500 mg total) by mouth every 12 (twelve) hours.     furosemide 20 MG tablet  Commonly known as:  LASIX  Take 1 tablet (20 mg total) by mouth once daily.     gabapentin 100 MG capsule  Commonly known as:  NEURONTIN  Take 2 capsules (200 mg total) by mouth 3 (three) times daily.     venlafaxine 75 MG tablet  Commonly known as:  EFFEXOR  Take 1 tablet (75 mg total) by mouth 2 (two) times daily.        CHANGE how you take these medications    amLODIPine 10 MG tablet  Commonly known as:  NORVASC  Take 1 tablet (10 mg total) by mouth once daily. HOLD until follow-up with primary care provider  What changed:  additional instructions     ferrous sulfate 325 (65 FE) MG EC tablet  Take 1 tablet (325 mg total) by mouth every 48 hours.  What changed:  when to take this     Lactobacillus rhamnosus GG 10 billion cell capsule  Commonly known as:  CULTURELLE  Take 1 capsule by mouth 2 (two) times daily.  What changed:  when to take this     loperamide 2 mg capsule  Commonly known  as:  IMODIUM  Take 2 capsules (4 mg total) by mouth 3 (three) times daily as needed for Diarrhea.  What changed:    · how much to take  · when to take this  · reasons to take this        CONTINUE taking these medications    folic acid 1 MG tablet  Commonly known as:  FOLVITE  Take 1 tablet (1 mg total) by mouth once daily.     metoprolol tartrate 100 MG tablet  Commonly known as:  LOPRESSOR  Take 1 tablet (100 mg total) by mouth 2 (two) times daily.     psyllium husk (aspartame) 3.4 gram Pwpk packet  Commonly known as:  METAMUCIL  Take 1 packet by mouth 2 (two) times daily.        STOP taking these medications    levalbuterol 0.63 mg/3 mL nebulizer solution  Commonly known as:  XOPENEX     sodium chloride 0.9% SolP 500 mL with oxacillin 1 gram SolR 12 g            Indwelling Lines/Drains at time of discharge:   Lines/Drains/Airways     None                 Time spent on the discharge of patient: 36 minutes  Patient was seen and examined on the date of discharge and determined to be suitable for discharge.         Tabitha Alarcon NP  Department of Hospital Medicine  [facility]

## 2019-11-21 PROCEDURE — G0180 PR HOME HEALTH MD CERTIFICATION: ICD-10-PCS | Mod: ,,, | Performed by: INTERNAL MEDICINE

## 2019-11-21 PROCEDURE — G0180 MD CERTIFICATION HHA PATIENT: HCPCS | Mod: ,,, | Performed by: INTERNAL MEDICINE

## 2019-12-11 LAB
ACID FAST MOD KINY STN SPEC: NORMAL
MYCOBACTERIUM SPEC QL CULT: NORMAL

## 2019-12-18 ENCOUNTER — EXTERNAL HOME HEALTH (OUTPATIENT)
Dept: HOME HEALTH SERVICES | Facility: HOSPITAL | Age: 64
End: 2019-12-18
Payer: MEDICARE

## 2019-12-24 ENCOUNTER — TELEPHONE (OUTPATIENT)
Dept: NEUROSURGERY | Facility: CLINIC | Age: 64
End: 2019-12-24

## 2019-12-24 NOTE — TELEPHONE ENCOUNTER
----- Message from Ann-Marie Pool sent at 12/24/2019 10:30 AM CST -----  Contact: pt@ [phone]  calling regarding rescheduling Post OP Appointment missed with Dr. Lewis-please call.

## 2020-01-07 RX ORDER — LOPERAMIDE HYDROCHLORIDE 2 MG/1
4 CAPSULE ORAL 3 TIMES DAILY PRN
Qty: 30 CAPSULE | Refills: 0 | OUTPATIENT
Start: 2020-01-07 | End: 2020-01-17

## 2020-01-09 ENCOUNTER — CLINICAL SUPPORT (OUTPATIENT)
Dept: SMOKING CESSATION | Facility: CLINIC | Age: 65
End: 2020-01-09
Payer: COMMERCIAL

## 2020-01-09 ENCOUNTER — HOSPITAL ENCOUNTER (INPATIENT)
Facility: HOSPITAL | Age: 65
LOS: 1 days | Discharge: HOME HEALTH | DRG: 871 | End: 2020-01-10
Attending: HOSPITALIST | Admitting: HOSPITALIST
Payer: MEDICARE

## 2020-01-09 DIAGNOSIS — F17.210 CIGARETTE SMOKER: Primary | ICD-10-CM

## 2020-01-09 DIAGNOSIS — A49.8 CLOSTRIDIUM DIFFICILE INFECTION: ICD-10-CM

## 2020-01-09 DIAGNOSIS — R07.9 CHEST PAIN: ICD-10-CM

## 2020-01-09 DIAGNOSIS — A41.9 SEPSIS: ICD-10-CM

## 2020-01-09 DIAGNOSIS — F17.200 TOBACCO USE DISORDER: ICD-10-CM

## 2020-01-09 DIAGNOSIS — I50.32 CHRONIC DIASTOLIC CONGESTIVE HEART FAILURE: Primary | Chronic | ICD-10-CM

## 2020-01-09 PROBLEM — F32.A DEPRESSION: Status: ACTIVE | Noted: 2020-01-09

## 2020-01-09 PROBLEM — D63.8 ANEMIA OF INFECTION AND CHRONIC DISEASE: Chronic | Status: ACTIVE | Noted: 2019-11-01

## 2020-01-09 PROBLEM — Z86.79 HISTORY OF BACTERIAL ENDOCARDITIS: Chronic | Status: ACTIVE | Noted: 2019-11-01

## 2020-01-09 PROBLEM — B99.9 ANEMIA OF INFECTION AND CHRONIC DISEASE: Chronic | Status: ACTIVE | Noted: 2019-11-01

## 2020-01-09 PROBLEM — I10 ESSENTIAL HYPERTENSION: Chronic | Status: ACTIVE | Noted: 2019-10-16

## 2020-01-09 PROBLEM — Z86.73 HISTORY OF STROKE: Chronic | Status: ACTIVE | Noted: 2019-10-07

## 2020-01-09 PROBLEM — D72.829 LEUCOCYTOSIS: Status: ACTIVE | Noted: 2020-01-09

## 2020-01-09 PROBLEM — G93.41 ENCEPHALOPATHY, METABOLIC: Status: RESOLVED | Noted: 2019-09-23 | Resolved: 2020-01-09

## 2020-01-09 PROBLEM — A09 INFECTIOUS DIARRHEA: Status: ACTIVE | Noted: 2020-01-09

## 2020-01-09 LAB
ABO GROUP BLD: NORMAL
ALBUMIN SERPL BCP-MCNC: 2.1 G/DL (ref 3.5–5.2)
ALP SERPL-CCNC: 138 U/L (ref 55–135)
ALT SERPL W/O P-5'-P-CCNC: 9 U/L (ref 10–44)
ANION GAP SERPL CALC-SCNC: 12 MMOL/L (ref 8–16)
AST SERPL-CCNC: 20 U/L (ref 10–40)
BASOPHILS NFR BLD: 0 % (ref 0–1.9)
BILIRUB SERPL-MCNC: 0.1 MG/DL (ref 0.1–1)
BLD GP AB SCN CELLS X3 SERPL QL: NORMAL
BUN SERPL-MCNC: 26 MG/DL (ref 8–23)
CALCIUM SERPL-MCNC: 8 MG/DL (ref 8.7–10.5)
CHLORIDE SERPL-SCNC: 104 MMOL/L (ref 95–110)
CO2 SERPL-SCNC: 14 MMOL/L (ref 23–29)
CREAT SERPL-MCNC: 1 MG/DL (ref 0.5–1.4)
DIFFERENTIAL METHOD: ABNORMAL
EOSINOPHIL NFR BLD: 1.5 % (ref 0–8)
ERYTHROCYTE [DISTWIDTH] IN BLOOD BY AUTOMATED COUNT: 14.4 % (ref 11.5–14.5)
EST. GFR  (AFRICAN AMERICAN): >60 ML/MIN/1.73 M^2
EST. GFR  (NON AFRICAN AMERICAN): 59 ML/MIN/1.73 M^2
GLUCOSE SERPL-MCNC: 103 MG/DL (ref 70–110)
HCT VFR BLD AUTO: 22.7 % (ref 37–48.5)
HGB BLD-MCNC: 7.6 G/DL (ref 12–16)
HGB BLD-MCNC: 9.1 G/DL (ref 12–16)
LACTATE SERPL-SCNC: 0.9 MMOL/L (ref 0.5–2.2)
LYMPHOCYTES NFR BLD: 8 % (ref 18–48)
MAGNESIUM SERPL-MCNC: 2 MG/DL (ref 1.6–2.6)
MCH RBC QN AUTO: 31.9 PG (ref 27–31)
MCHC RBC AUTO-ENTMCNC: 33.5 G/DL (ref 32–36)
MCV RBC AUTO: 95 FL (ref 82–98)
MONOCYTES NFR BLD: 2 % (ref 4–15)
MYELOCYTES NFR BLD MANUAL: 0.5 %
NEUTROPHILS NFR BLD: 84.5 % (ref 38–73)
NEUTS BAND NFR BLD MANUAL: 3.5 %
PLATELET # BLD AUTO: 464 K/UL (ref 150–350)
PMV BLD AUTO: 9.1 FL (ref 9.2–12.9)
POLYCHROMASIA BLD QL SMEAR: ABNORMAL
POTASSIUM SERPL-SCNC: 3.4 MMOL/L (ref 3.5–5.1)
PROCALCITONIN SERPL IA-MCNC: 5.25 NG/ML
PROT SERPL-MCNC: 6 G/DL (ref 6–8.4)
RBC # BLD AUTO: 2.38 M/UL (ref 4–5.4)
RH BLD: NORMAL
SODIUM SERPL-SCNC: 130 MMOL/L (ref 136–145)
WBC # BLD AUTO: 24.97 K/UL (ref 3.9–12.7)

## 2020-01-09 PROCEDURE — 99407 PR TOBACCO USE CESSATION INTENSIVE >10 MINUTES: ICD-10-PCS | Mod: S$GLB,,,

## 2020-01-09 PROCEDURE — 25000003 PHARM REV CODE 250: Performed by: PHYSICIAN ASSISTANT

## 2020-01-09 PROCEDURE — 99999 PR PBB SHADOW E&M-EST. PATIENT-LVL II: CPT | Mod: PBBFAC,,,

## 2020-01-09 PROCEDURE — 83735 ASSAY OF MAGNESIUM: CPT

## 2020-01-09 PROCEDURE — 80053 COMPREHEN METABOLIC PANEL: CPT

## 2020-01-09 PROCEDURE — 99999 PR PBB SHADOW E&M-EST. PATIENT-LVL II: ICD-10-PCS | Mod: PBBFAC,,,

## 2020-01-09 PROCEDURE — 36415 COLL VENOUS BLD VENIPUNCTURE: CPT

## 2020-01-09 PROCEDURE — 63600175 PHARM REV CODE 636 W HCPCS: Performed by: NURSE PRACTITIONER

## 2020-01-09 PROCEDURE — 86850 RBC ANTIBODY SCREEN: CPT

## 2020-01-09 PROCEDURE — 99407 BEHAV CHNG SMOKING > 10 MIN: CPT | Mod: S$GLB,,,

## 2020-01-09 PROCEDURE — 85025 COMPLETE CBC W/AUTO DIFF WBC: CPT

## 2020-01-09 PROCEDURE — S4991 NICOTINE PATCH NONLEGEND: HCPCS | Performed by: PHYSICIAN ASSISTANT

## 2020-01-09 PROCEDURE — 84145 PROCALCITONIN (PCT): CPT

## 2020-01-09 PROCEDURE — 87040 BLOOD CULTURE FOR BACTERIA: CPT

## 2020-01-09 PROCEDURE — 63600175 PHARM REV CODE 636 W HCPCS: Performed by: PHYSICIAN ASSISTANT

## 2020-01-09 PROCEDURE — 85018 HEMOGLOBIN: CPT

## 2020-01-09 PROCEDURE — 83605 ASSAY OF LACTIC ACID: CPT

## 2020-01-09 PROCEDURE — 86901 BLOOD TYPING SEROLOGIC RH(D): CPT

## 2020-01-09 PROCEDURE — 25000003 PHARM REV CODE 250: Performed by: NURSE PRACTITIONER

## 2020-01-09 PROCEDURE — 97802 MEDICAL NUTRITION INDIV IN: CPT

## 2020-01-09 PROCEDURE — 21400001 HC TELEMETRY ROOM

## 2020-01-09 RX ORDER — L. ACIDOPHILUS/L.BULGARICUS 100MM CELL
1 GRANULES IN PACKET (EA) ORAL 2 TIMES DAILY
Status: DISCONTINUED | OUTPATIENT
Start: 2020-01-09 | End: 2020-01-10 | Stop reason: HOSPADM

## 2020-01-09 RX ORDER — FOLIC ACID 1 MG/1
1 TABLET ORAL DAILY
Status: DISCONTINUED | OUTPATIENT
Start: 2020-01-09 | End: 2020-01-10 | Stop reason: HOSPADM

## 2020-01-09 RX ORDER — ONDANSETRON 2 MG/ML
4 INJECTION INTRAMUSCULAR; INTRAVENOUS EVERY 8 HOURS PRN
Status: DISCONTINUED | OUTPATIENT
Start: 2020-01-09 | End: 2020-01-10 | Stop reason: HOSPADM

## 2020-01-09 RX ORDER — GABAPENTIN 100 MG/1
200 CAPSULE ORAL 3 TIMES DAILY
Status: DISCONTINUED | OUTPATIENT
Start: 2020-01-09 | End: 2020-01-10 | Stop reason: HOSPADM

## 2020-01-09 RX ORDER — SODIUM CHLORIDE AND POTASSIUM CHLORIDE 300; 900 MG/100ML; MG/100ML
INJECTION, SOLUTION INTRAVENOUS CONTINUOUS
Status: DISCONTINUED | OUTPATIENT
Start: 2020-01-09 | End: 2020-01-10

## 2020-01-09 RX ORDER — SODIUM CHLORIDE 0.9 % (FLUSH) 0.9 %
10 SYRINGE (ML) INJECTION
Status: DISCONTINUED | OUTPATIENT
Start: 2020-01-09 | End: 2020-01-10 | Stop reason: HOSPADM

## 2020-01-09 RX ORDER — ACETAMINOPHEN 325 MG/1
650 TABLET ORAL EVERY 4 HOURS PRN
Status: DISCONTINUED | OUTPATIENT
Start: 2020-01-09 | End: 2020-01-10 | Stop reason: HOSPADM

## 2020-01-09 RX ORDER — VENLAFAXINE 37.5 MG/1
75 TABLET ORAL 2 TIMES DAILY
Status: DISCONTINUED | OUTPATIENT
Start: 2020-01-09 | End: 2020-01-10 | Stop reason: HOSPADM

## 2020-01-09 RX ORDER — IBUPROFEN 200 MG
1 TABLET ORAL DAILY
Status: DISCONTINUED | OUTPATIENT
Start: 2020-01-09 | End: 2020-01-10 | Stop reason: HOSPADM

## 2020-01-09 RX ORDER — ATORVASTATIN CALCIUM 40 MG/1
40 TABLET, FILM COATED ORAL DAILY
Status: DISCONTINUED | OUTPATIENT
Start: 2020-01-09 | End: 2020-01-10 | Stop reason: HOSPADM

## 2020-01-09 RX ADMIN — GABAPENTIN 200 MG: 100 CAPSULE ORAL at 10:01

## 2020-01-09 RX ADMIN — GABAPENTIN 200 MG: 100 CAPSULE ORAL at 02:01

## 2020-01-09 RX ADMIN — LACTOBACILLUS ACIDOPHILUS / LACTOBACILLUS BULGARICUS 1 EACH: 100 MILLION CFU STRENGTH GRANULES at 10:01

## 2020-01-09 RX ADMIN — Medication 125 MG: at 12:01

## 2020-01-09 RX ADMIN — LACTOBACILLUS ACIDOPHILUS / LACTOBACILLUS BULGARICUS 1 EACH: 100 MILLION CFU STRENGTH GRANULES at 12:01

## 2020-01-09 RX ADMIN — Medication 1 PATCH: at 12:01

## 2020-01-09 RX ADMIN — SODIUM CHLORIDE AND POTASSIUM CHLORIDE 100 ML/HR: 9; 2.98 INJECTION, SOLUTION INTRAVENOUS at 05:01

## 2020-01-09 RX ADMIN — VENLAFAXINE 75 MG: 37.5 TABLET ORAL at 12:01

## 2020-01-09 RX ADMIN — ACETAMINOPHEN 650 MG: 325 TABLET ORAL at 06:01

## 2020-01-09 RX ADMIN — SODIUM CHLORIDE AND POTASSIUM CHLORIDE 100 ML/HR: 9; 2.98 INJECTION, SOLUTION INTRAVENOUS at 03:01

## 2020-01-09 RX ADMIN — Medication 125 MG: at 03:01

## 2020-01-09 RX ADMIN — FOLIC ACID 1 MG: 1 TABLET ORAL at 12:01

## 2020-01-09 RX ADMIN — ATORVASTATIN CALCIUM 40 MG: 40 TABLET, FILM COATED ORAL at 12:01

## 2020-01-09 RX ADMIN — Medication 125 MG: at 05:01

## 2020-01-09 RX ADMIN — VENLAFAXINE 75 MG: 37.5 TABLET ORAL at 10:01

## 2020-01-09 NOTE — HPI
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 She presented to [facility] yesterday with complaint of dark, watery diarrhea x 4 days, abdominal cramping, nausea, profound generalized weakness, decreased appetite and feeling like she was going to pass out. Patient completed oxacillin IV x 6 weeks on 11/20/19 and then started taking cefadroxil 500 mg bid per ID. She was doing otherwise well up until last Saturday when the diarrhea began. Patient states she took "a whole box of lomotil and it didn't help." She denies fever, chills, chest pain, SOB, vomiting, bloody stools, sick contacts, or recent travel. Patient currently smokes 2-3 cigarettes daily. Last IVDU was in September 2019. Her PCP is Dr. Varun Shea.    Initial ED workup revealed WBC 32.4, hgb 10.8, plts 406, , CRP 22.3, Na 118, K 2.6, Cl 85, CO2 17, AG 16, BUN/Cr 43/1.4, Ca 8.1, lactate 2.1, procalcitonin 5.25. BP 88/52. Blood cultures and C. Diff were collected. She was given 2 L NS bolus, metronidazole 500 mg IV, KCl 40 mEq, magnesium sulfate 1g IV, and calcium gluconate 500 mg IV. [place] did not have PO vancomycin on formulary so she was transferred to [facility] and admitted to [facility] for continued treatment.   "

## 2020-01-09 NOTE — SUBJECTIVE & OBJECTIVE
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    GI bleed     History of uterine fibroid     Hypertension     Right thalamic stroke 10/7/2019    Shortness of breath on exertion     Spinal stenosis     Stroke     mini stroke in 9/2019    Urinary retention        Past Surgical History:   Procedure Laterality Date    ANTERIOR VAGINAL REPAIR  10-    CARDIAC CATHETERIZATION  age 12    CHOLECYSTECTOMY  2015    COLONOSCOPY  12/12/2018    aborted due to poor colon prep    COLONOSCOPY N/A 12/12/2018    Procedure: COLONOSCOPY;  Surgeon: Renard Gonsalves MD;  Location: [facility] ENDO;  Service: Endoscopy;  Laterality: N/A;    HYSTERECTOMY  1989    AMANDA    LAMINECTOMY N/A 10/9/2019    Procedure: LAMINECTOMY, SPINE, L3, Open;  Surgeon: Martin Lewis DO;  Location: [facility] OR [address] [place] [address];  Service: Neurosurgery;  Laterality: N/A;    tubiligation         Review of patient's allergies indicates:   Allergen Reactions    Pcn [penicillins]        Current Facility-Administered Medications on File Prior to Encounter   Medication    [COMPLETED] 0.9 % NaCl with KCl 20 mEq infusion    [COMPLETED] calcium gluconate 500 mg in dextrose 5 % 100 mL IVPB    [COMPLETED] magnesium sulfate in dextrose IVPB (premix) 1 g    [COMPLETED] metronidazole IVPB 500 mg    [COMPLETED] potassium chloride SA CR tablet 40 mEq    [COMPLETED] sodium chloride 0.9% bolus 1,000 mL    [COMPLETED] sodium chloride 0.9% bolus 1,000 mL    sodium chloride 0.9% flush 3 mL     Current Outpatient Medications on File Prior to Encounter   Medication Sig    amLODIPine (NORVASC) 10 MG tablet Take 1 tablet (10 mg total) by mouth once daily. HOLD until follow-up with primary care provider    atorvastatin (LIPITOR) 40 MG  tablet Take 1 tablet (40 mg total) by mouth once daily.    cefadroxil (DURICEF) 500 MG Cap Take 1 capsule (500 mg total) by mouth every 12 (twelve) hours.    folic acid (FOLVITE) 1 MG tablet Take 1 tablet (1 mg total) by mouth once daily.    furosemide (LASIX) 20 MG tablet Take 1 tablet (20 mg total) by mouth once daily.    gabapentin (NEURONTIN) 100 MG capsule Take 2 capsules (200 mg total) by mouth 3 (three) times daily.    metoprolol tartrate (LOPRESSOR) 100 MG tablet Take 1 tablet (100 mg total) by mouth 2 (two) times daily.    venlafaxine (EFFEXOR) 75 MG tablet Take 1 tablet (75 mg total) by mouth 2 (two) times daily.    Lactobacillus rhamnosus GG (CULTURELLE) 10 billion cell capsule Take 1 capsule by mouth 2 (two) times daily.    psyllium husk, aspartame, (METAMUCIL) 3.4 gram PwPk packet Take 1 packet by mouth 2 (two) times daily.     Family History     Problem Relation (Age of Onset)    Alzheimer's disease Mother    Hypertension Brother, Sister    Osteoarthritis Mother    Thyroid disease Mother        Tobacco Use    Smoking status: Current Every Day Smoker     Packs/day: 0.25     Years: 53.00     Pack years: 13.25     Types: Cigarettes     Start date: 1967    Smokeless tobacco: Never Used    Tobacco comment: 2-3 cigarettes per day  Pt is currently enrolled in the [employer].  Ambulatory referral to Smoking Cessation program.   Substance and Sexual Activity    Alcohol use: No    Drug use: No    Sexual activity: Yes     Partners: Male     Review of Systems   Constitutional: Positive for appetite change and fatigue. Negative for chills and fever.   HENT: Negative for congestion, ear pain, rhinorrhea and sore throat.    Eyes: Negative for pain and visual disturbance.   Respiratory: Negative for cough, chest tightness and shortness of breath.    Cardiovascular: Negative for chest pain, palpitations and leg swelling.   Gastrointestinal: Positive for abdominal pain, diarrhea and nausea. Negative for  blood in stool and vomiting.   Endocrine: Negative for cold intolerance, heat intolerance, polydipsia, polyphagia and polyuria.   Genitourinary: Negative for difficulty urinating, dysuria and hematuria.   Musculoskeletal: Negative for arthralgias, back pain and myalgias.   Skin: Positive for pallor. Negative for color change, rash and wound.   Neurological: Positive for weakness (generalized). Negative for dizziness, numbness and headaches.   Hematological: Negative for adenopathy. Does not bruise/bleed easily.   Psychiatric/Behavioral: Negative for behavioral problems, confusion and sleep disturbance. The patient is not nervous/anxious.      Objective:     Vital Signs (Most Recent):  Temp: 96.5 °F (35.8 °C) (01/09/20 0806)  Pulse: 79 (01/09/20 0806)  Resp: 17 (01/09/20 0806)  BP: (!) 108/56 (01/09/20 0806) Vital Signs (24h Range):  Temp:  [96.5 °F (35.8 °C)-97.9 °F (36.6 °C)] 96.5 °F (35.8 °C)  Pulse:  [] 79  Resp:  [16-23] 17  SpO2:  [95 %-100 %] 97 %  BP: ()/(41-86) 108/56     Weight: 48.4 kg (106 lb 11.2 oz)  Body mass index is 20.16 kg/m².    Physical Exam   Constitutional: She is oriented to person, place, and time. She appears well-developed. She has a sickly appearance. No distress.   Frail   HENT:   Head: Normocephalic and atraumatic.   Right Ear: External ear normal.   Left Ear: External ear normal.   Nose: Nose normal.   Mouth/Throat: No oropharyngeal exudate.   Eyes: Pupils are equal, round, and reactive to light. Conjunctivae are normal. No scleral icterus.   Neck: Normal range of motion. Neck supple. No JVD present. No tracheal deviation present.   Cardiovascular: Normal rate, regular rhythm, normal heart sounds and intact distal pulses.   Pulmonary/Chest: Effort normal and breath sounds normal. No respiratory distress. She has no wheezes. She has no rales.   Abdominal: Soft. Normal appearance and bowel sounds are normal. She exhibits no distension. There is tenderness in the right lower  quadrant, periumbilical area and left lower quadrant. There is no rebound and no guarding. No hernia.   Musculoskeletal: She exhibits no edema or tenderness.   Neurological: She is alert and oriented to person, place, and time. GCS eye subscore is 4. GCS verbal subscore is 5. GCS motor subscore is 6.   Decreased strength and ROM b/l LE   Skin: Skin is warm and dry. Capillary refill takes less than 2 seconds. She is not diaphoretic. There is pallor.   Psychiatric: She has a normal mood and affect. Her behavior is normal. Judgment and thought content normal.   Nursing note and vitals reviewed.        CRANIAL NERVES     CN III, IV, VI   Pupils are equal, round, and reactive to light.       Significant Labs:   Blood Culture: No results for input(s): LABBLOO in the last 48 hours.  CBC:   Recent Labs   Lab 01/08/20 1712 01/08/20 2107 01/09/20  0630   WBC 32.40* 28.00* 24.97*   HGB 10.8* 10.5* 7.6*   HCT 31.9* 31.8* 22.7*   * 401* 464*     CMP:   Recent Labs   Lab 01/08/20 1712 01/08/20 2107 01/09/20  0630   * 120* 130*   K 2.6* 2.0* 3.4*   CL 85* 91* 104   CO2 17* 16* 14*   * 140* 103   BUN 43* 36* 26*   CREATININE 1.4 1.3 1.0   CALCIUM 8.1* 8.1* 8.0*   PROT 6.8 6.2 6.0   ALBUMIN 3.0* 2.7* 2.1*   BILITOT 0.4 0.4 0.1   ALKPHOS 139* 128* 138*   AST 25 25 20   ALT 10* 11* 9*   ANIONGAP 16 13 12   EGFRNONAA 39.2* 42.9* 59*     Lactic Acid:   Recent Labs   Lab 01/08/20 2107 01/09/20  0630   LACTATE 2.1 0.9     All pertinent labs within the past 24 hours have been reviewed.    Significant Imaging: I have reviewed all pertinent imaging results/findings within the past 24 hours.

## 2020-01-09 NOTE — ASSESSMENT & PLAN NOTE
--108  -Patient takes amlodipine, lasix and metoprolol at home  -Hold in the setting of hypotension  -Monitor closely

## 2020-01-09 NOTE — ASSESSMENT & PLAN NOTE
-No acute neurologic changes  -Not on antiplatelet/anticoag therapy 2/2 high risk of hemorrhage  -Continue statin

## 2020-01-09 NOTE — ASSESSMENT & PLAN NOTE
-Baseline hgb appears to be near 7-8  -Hgb 7.6 today  -Check FOB  -Type and screen  -Transfuse to maintain hgb >7  -Resume daily folic acid  -Monitor daily CBC

## 2020-01-09 NOTE — PLAN OF CARE
TN met with pt for discharge planning. Pt lives with  and adult daughter. Pt requires assistance with ADLs. Pt has RW,WC,SC, and BSC. Pt is current with home health but she does not remember the name of the HH company. Pt prefers afternoon appointments. Pt's  to provide transportation at discharge.    Discharge brochure and blue discharge folder given to pt. TN updated contact information on [employer].       01/09/20 1330   Discharge Assessment   Assessment Type Discharge Planning Assessment   Confirmed/corrected address and phone number on facesheet? Yes   Assessment information obtained from? Patient   Expected Length of Stay (days) 3   Communicated expected length of stay with patient/caregiver yes   Prior to hospitilization cognitive status: Alert/Oriented   Prior to hospitalization functional status: Assistive Equipment   Current cognitive status: Alert/Oriented   Current Functional Status: Assistive Equipment   Facility Arrived From: [facility]   Lives With spouse;child(rita), adult   Able to Return to Prior Arrangements yes   Is patient able to care for self after discharge? Yes   Who are your caregiver(s) and their phone number(s)? Ari Chaudhary (spouse) [phone]   Patient's perception of discharge disposition home health   Readmission Within the Last 30 Days no previous admission in last 30 days   Patient currently being followed by outpatient case management? No   Patient currently receives any other outside agency services? No   Equipment Currently Used at Home walker, rolling;wheelchair;shower chair;bedside commode   Do you have any problems affording any of your prescribed medications? No   Is the patient taking medications as prescribed? yes   Does the patient have transportation home? Yes   Transportation Anticipated family or friend will provide   Does the patient receive services at the [facility]? No   Discharge Plan A Home;Home with family;Home Health    Discharge Plan B Home with family;Home Health   DME Needed Upon Discharge  none   Patient/Family in Agreement with Plan yes   Levy Duarte RN-BC  Transitional Navigator  [phone]

## 2020-01-09 NOTE — PLAN OF CARE
(Physician in Lead of Transfers)   Outside Transfer Acceptance Note / Regional Referral Center      Transferring Physician: Dr. Cardenas    Accepting Physician: Jeremiah Dolan MD    Date of Acceptance: 01/08/2020    Transferring Facility: [facility]    Reason for Transfer: May need ID; no PO vanc on formulary    Report from Transferring Physician/Hospital course: 63 y/o female with history of IVDU on fairly recent infusion of oxacillin towards the end of last year. Since November has been having issues with diarrhea and has not gotten any relief with imodium. She presented today as she felt like she was going to pass out. Initial SBP in the high 90s and several lab abnormalities noted including a Na level of 118, ABIMBOLA, bicarb of 17, and an anion gap. No abdominal tenderness but ER MD reporting stool smells like c-diff. C-diff pending at this time. Leukocytosis noted at 32 K. She did receive some NS and repeat labs and a lactic acid are pending at this time. BP has improved to a systolic of 102. Mental status WNL. [facility] does not have PO vanc on formulary so she did get a dose of po metronidazole. Patient will be transferred to [place] for possible c-diff sepsis and possible ID consult if needed.       Labs & Radiographs: see EPIC      To Do List:   1) Vitals q15 mins during the 1st hr of arrival then q30 mins during the 2nd hr   2) Telemetry  3) Start PO vanc and 250 mg PO q6h  4) Contact isolation  5) Follow repeat labs      Jeremiah Dolan MD  Hospital Medicine Staff

## 2020-01-09 NOTE — H&P
"[facility] Medicine  History & Physical    Patient Name: Mesha Mckenzie  MRN: [id number]  Admission Date: 1/9/2020  Attending Physician: Matty Guillen MD   Primary Care Provider: Varun Shea MD PhD    Patient information was obtained from patient, past medical records and ER records.     Subjective:     Principal Problem:Infectious diarrhea    Chief Complaint: No chief complaint on file.       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 She presented to [facility] yesterday with complaint of dark, watery diarrhea x 4 days, abdominal cramping, nausea, profound generalized weakness, decreased appetite and feeling like she was going to pass out. Patient completed oxacillin IV x 6 weeks on 11/20/19 and then started taking cefadroxil 500 mg bid per ID. She was doing otherwise well up until last Saturday when the diarrhea began. Patient states she took "a whole box of lomotil and it didn't help." She denies fever, chills, chest pain, SOB, vomiting, bloody stools, sick contacts, or recent travel. Patient currently smokes 2-3 cigarettes daily. Last IVDU was in September 2019. Her PCP is Dr. Varun Shea.    Initial ED workup revealed WBC 32.4, hgb 10.8, plts 406, , CRP 22.3, Na 118, K 2.6, Cl 85, CO2 17, AG 16, BUN/Cr 43/1.4, Ca 8.1, lactate 2.1, procalcitonin 5.25. BP 88/52. Blood cultures and C. Diff were collected. She was given 2 L NS bolus, metronidazole 500 mg IV, KCl 40 mEq, magnesium sulfate 1g IV, and calcium gluconate 500 mg IV. [place] [facility] did not have PO vancomycin on formulary so she was transferred to [facility] and admitted to [facility] for continued treatment.     Past Medical " History:   Diagnosis Date    Anxiety     Carotid bruit     Chronic diastolic congestive heart failure     Chronic pain     Cystitis     Cystocele, unspecified (CODE)     Depression     Encounter for blood transfusion     Endocarditis due to Staphylococcus 11/1/2019    GI bleed     History of uterine fibroid     Hypertension     Right thalamic stroke 10/7/2019    Shortness of breath on exertion     Spinal stenosis     Stroke     mini stroke in 9/2019    Urinary retention        Past Surgical History:   Procedure Laterality Date    ANTERIOR VAGINAL REPAIR  10-    CARDIAC CATHETERIZATION  age 12    CHOLECYSTECTOMY  2015    COLONOSCOPY  12/12/2018    aborted due to poor colon prep    COLONOSCOPY N/A 12/12/2018    Procedure: COLONOSCOPY;  Surgeon: Renard Gonsalves MD;  Location: [facility];  Service: Endoscopy;  Laterality: N/A;    HYSTERECTOMY  1989    AMANDA    LAMINECTOMY N/A 10/9/2019    Procedure: LAMINECTOMY, SPINE, L3, Open;  Surgeon: Martin Lewis DO;  Location: [facility] OR [address] [place] [address];  Service: Neurosurgery;  Laterality: N/A;    tubiligation         Review of patient's allergies indicates:   Allergen Reactions    Pcn [penicillins]        Current Facility-Administered Medications on File Prior to Encounter   Medication    [COMPLETED] 0.9 % NaCl with KCl 20 mEq infusion    [COMPLETED] calcium gluconate 500 mg in dextrose 5 % 100 mL IVPB    [COMPLETED] magnesium sulfate in dextrose IVPB (premix) 1 g    [COMPLETED] metronidazole IVPB 500 mg    [COMPLETED] potassium chloride SA CR tablet 40 mEq    [COMPLETED] sodium chloride 0.9% bolus 1,000 mL    [COMPLETED] sodium chloride 0.9% bolus 1,000 mL    sodium chloride 0.9% flush 3 mL     Current Outpatient Medications on File Prior to Encounter   Medication Sig    amLODIPine (NORVASC) 10 MG tablet Take 1 tablet (10 mg total) by mouth once daily. HOLD until follow-up with primary care provider    atorvastatin (LIPITOR) 40 MG tablet Take 1  tablet (40 mg total) by mouth once daily.    cefadroxil (DURICEF) 500 MG Cap Take 1 capsule (500 mg total) by mouth every 12 (twelve) hours.    folic acid (FOLVITE) 1 MG tablet Take 1 tablet (1 mg total) by mouth once daily.    furosemide (LASIX) 20 MG tablet Take 1 tablet (20 mg total) by mouth once daily.    gabapentin (NEURONTIN) 100 MG capsule Take 2 capsules (200 mg total) by mouth 3 (three) times daily.    metoprolol tartrate (LOPRESSOR) 100 MG tablet Take 1 tablet (100 mg total) by mouth 2 (two) times daily.    venlafaxine (EFFEXOR) 75 MG tablet Take 1 tablet (75 mg total) by mouth 2 (two) times daily.    Lactobacillus rhamnosus GG (CULTURELLE) 10 billion cell capsule Take 1 capsule by mouth 2 (two) times daily.    psyllium husk, aspartame, (METAMUCIL) 3.4 gram PwPk packet Take 1 packet by mouth 2 (two) times daily.     Family History     Problem Relation (Age of Onset)    Alzheimer's disease Mother    Hypertension Brother, Sister    Osteoarthritis Mother    Thyroid disease Mother        Tobacco Use    Smoking status: Current Every Day Smoker     Packs/day: 0.25     Years: 53.00     Pack years: 13.25     Types: Cigarettes     Start date: 1967    Smokeless tobacco: Never Used    Tobacco comment: 2-3 cigarettes per day  Pt is currently enrolled in the [employer].  Ambulatory referral to Smoking Cessation program.   Substance and Sexual Activity    Alcohol use: No    Drug use: No    Sexual activity: Yes     Partners: Male     Review of Systems   Constitutional: Positive for appetite change and fatigue. Negative for chills and fever.   HENT: Negative for congestion, ear pain, rhinorrhea and sore throat.    Eyes: Negative for pain and visual disturbance.   Respiratory: Negative for cough, chest tightness and shortness of breath.    Cardiovascular: Negative for chest pain, palpitations and leg swelling.   Gastrointestinal: Positive for abdominal pain, diarrhea and nausea. Negative for blood in  stool and vomiting.   Endocrine: Negative for cold intolerance, heat intolerance, polydipsia, polyphagia and polyuria.   Genitourinary: Negative for difficulty urinating, dysuria and hematuria.   Musculoskeletal: Negative for arthralgias, back pain and myalgias.   Skin: Positive for pallor. Negative for color change, rash and wound.   Neurological: Positive for weakness (generalized). Negative for dizziness, numbness and headaches.   Hematological: Negative for adenopathy. Does not bruise/bleed easily.   Psychiatric/Behavioral: Negative for behavioral problems, confusion and sleep disturbance. The patient is not nervous/anxious.      Objective:     Vital Signs (Most Recent):  Temp: 96.5 °F (35.8 °C) (01/09/20 0806)  Pulse: 79 (01/09/20 0806)  Resp: 17 (01/09/20 0806)  BP: (!) 108/56 (01/09/20 0806) Vital Signs (24h Range):  Temp:  [96.5 °F (35.8 °C)-97.9 °F (36.6 °C)] 96.5 °F (35.8 °C)  Pulse:  [] 79  Resp:  [16-23] 17  SpO2:  [95 %-100 %] 97 %  BP: ()/(41-86) 108/56     Weight: 48.4 kg (106 lb 11.2 oz)  Body mass index is 20.16 kg/m².    Physical Exam   Constitutional: She is oriented to person, place, and time. She appears well-developed. She has a sickly appearance. No distress.   Frail   HENT:   Head: Normocephalic and atraumatic.   Right Ear: External ear normal.   Left Ear: External ear normal.   Nose: Nose normal.   Mouth/Throat: No oropharyngeal exudate.   Eyes: Pupils are equal, round, and reactive to light. Conjunctivae are normal. No scleral icterus.   Neck: Normal range of motion. Neck supple. No JVD present. No tracheal deviation present.   Cardiovascular: Normal rate, regular rhythm, normal heart sounds and intact distal pulses.   Pulmonary/Chest: Effort normal and breath sounds normal. No respiratory distress. She has no wheezes. She has no rales.   Abdominal: Soft. Normal appearance and bowel sounds are normal. She exhibits no distension. There is tenderness in the right lower quadrant,  periumbilical area and left lower quadrant. There is no rebound and no guarding. No hernia.   Musculoskeletal: She exhibits no edema or tenderness.   Neurological: She is alert and oriented to person, place, and time. GCS eye subscore is 4. GCS verbal subscore is 5. GCS motor subscore is 6.   Decreased strength and ROM b/l LE   Skin: Skin is warm and dry. Capillary refill takes less than 2 seconds. She is not diaphoretic. There is pallor.   Psychiatric: She has a normal mood and affect. Her behavior is normal. Judgment and thought content normal.   Nursing note and vitals reviewed.        CRANIAL NERVES     CN III, IV, VI   Pupils are equal, round, and reactive to light.       Significant Labs:   Blood Culture: No results for input(s): LABBLOO in the last 48 hours.  CBC:   Recent Labs   Lab 01/08/20 1712 01/08/20 2107 01/09/20  0630   WBC 32.40* 28.00* 24.97*   HGB 10.8* 10.5* 7.6*   HCT 31.9* 31.8* 22.7*   * 401* 464*     CMP:   Recent Labs   Lab 01/08/20 1712 01/08/20 2107 01/09/20  0630   * 120* 130*   K 2.6* 2.0* 3.4*   CL 85* 91* 104   CO2 17* 16* 14*   * 140* 103   BUN 43* 36* 26*   CREATININE 1.4 1.3 1.0   CALCIUM 8.1* 8.1* 8.0*   PROT 6.8 6.2 6.0   ALBUMIN 3.0* 2.7* 2.1*   BILITOT 0.4 0.4 0.1   ALKPHOS 139* 128* 138*   AST 25 25 20   ALT 10* 11* 9*   ANIONGAP 16 13 12   EGFRNONAA 39.2* 42.9* 59*     Lactic Acid:   Recent Labs   Lab 01/08/20 2107 01/09/20  0630   LACTATE 2.1 0.9     All pertinent labs within the past 24 hours have been reviewed.    Significant Imaging: I have reviewed all pertinent imaging results/findings within the past 24 hours.    Assessment/Plan:     * Infectious diarrhea  Sepsis  Leukocytosis  -WBC trending down to 24.97, diarrhea decreasing today, afebrile, BP stable  -Vancomycin 250 mg PO q6h  -Monitor and replete electrolytes  -Continuous IVFs  -Await c. diff results  -Follow blood cultures  -Contact precautions  -Consider ID consult if worsening or no  improvement    Depression  -Stable on venlafaxine, continue      Tobacco use disorder  Dangers of cigarette smoking were reviewed with patient in detail for 3 minutes and patient was encouraged to quit. Nicotine replacement options were discussed with the patient and they decided to accept a nicotine patch during admission. Will refer for tobacco cessation program.      Anemia of infection and chronic disease  -Baseline hgb appears to be near 7-8  -Hgb 7.6 today  -Check FOB  -Type and screen  -Transfuse to maintain hgb >7  -Resume daily folic acid  -Monitor daily CBC      History of bacterial endocarditis  -Completed oxacillin IV x 6 weeks on 11/20/19 then cefadroxil PO x 1 month  -Followed by ID      Essential hypertension  --108  -Patient takes amlodipine, lasix and metoprolol at home  -Hold in the setting of hypotension  -Monitor closely      History of right thalamic stroke  Brain MRI 9/30 with lacunar infarcts  Brain MRI 10/7 with R thalamic infarct, ventriculitis, subacute embolic infarctions    -No acute neurologic changes  -Not on antiplatelet/anticoag therapy 2/2 high risk of hemorrhage  -Continue statin      IV drug user  -Per patient, last IVDU (heroin) prior to hospital admission in 09/2019  -Continue to reinforce sobriety and offer support      Severe malnutrition  -Encourage PO intake  -Consult dietician      Chronic diastolic congestive heart failure  -No evidence of ADHF on exam, appears dry  -Holding lasix and BB in the setting of hypotension and dehydration  -Monitor fluid status closely with IVFs      Hypokalemia  -K+ 3.4 today  -Continue IVFs with KCl 40 mEq  -Monitor daily      Hyponatremia  -Na 118 > 120 > 130  -2/2 diarrhea and severe dehydration  -Continuous IVFs with NS at 100 ml/hr  -Avoid rapid overcorrection  -Monitor closely        VTE Risk Mitigation (From admission, onward)         Ordered     Place sequential compression device  Until discontinued      01/09/20 0214     IP VTE  HIGH RISK PATIENT  Once      01/09/20 0214                Syl Duncan PA-C  Department of Hospital Medicine   [facility]

## 2020-01-09 NOTE — NURSING
Informed OSEAS Piper that patient is a hard stick and about patient complaining about IV sites hurting when fluids are infusing. Order was given to try for another IV site.

## 2020-01-09 NOTE — PLAN OF CARE
Per Chart review, pt current with [facility]  [employer].

## 2020-01-09 NOTE — PROGRESS NOTES
Individual Follow-Up Form    1/9/2020    Quit Date: To be determined    Clinical Status of Patient: Inpatient    Length of Service: 30 minutes    Comments: Smoking cessation education provided. Pt states that she smokes 2-3 cigarettes per day and she denies nicotine withdrawal symptoms. She is currently enrolled in the [employer].     Diagnosis: F17.210    Next Visit:  Ambulatory referral to Smoking Cessation program following hospital discharge.

## 2020-01-09 NOTE — ASSESSMENT & PLAN NOTE
-Na 118 > 120 > 130  -2/2 diarrhea and severe dehydration  -Continuous IVFs with NS at 100 ml/hr  -Avoid rapid overcorrection  -Monitor closely

## 2020-01-09 NOTE — PLAN OF CARE
VN cued into patient's room for introduction.  VN informed patient and spouse that VN would be working closely along side bedside nurse, PCT, and the rest of the care team.  Patient verbalized understanding.  First admit questions done.  VN will continue to monitor.

## 2020-01-09 NOTE — PLAN OF CARE
Please place on special contact isolation for R/O C diff.  Call Infection Control @ [phone] for questions.

## 2020-01-09 NOTE — ASSESSMENT & PLAN NOTE
Sepsis  Leukocytosis  -WBC trending down to 24.97, diarrhea decreasing today, afebrile, BP stable  -Vancomycin 250 mg PO q6h  -Monitor and replete electrolytes  -Continuous IVFs  -Await c. diff results  -Follow blood cultures  -Contact precautions  -Consider ID consult if worsening or no improvement

## 2020-01-09 NOTE — ASSESSMENT & PLAN NOTE
-Per patient, last IVDU (heroin) prior to hospital admission in 09/2019  -Continue to reinforce sobriety and offer support

## 2020-01-09 NOTE — ASSESSMENT & PLAN NOTE
Dangers of cigarette smoking were reviewed with patient in detail for 3 minutes and patient was encouraged to quit. Nicotine replacement options were discussed with the patient and they decided to accept a nicotine patch during admission. Will refer for tobacco cessation program.

## 2020-01-09 NOTE — ASSESSMENT & PLAN NOTE
-No evidence of ADHF on exam, appears dry  -Holding lasix and BB in the setting of hypotension and dehydration  -Monitor fluid status closely with IVFs

## 2020-01-09 NOTE — NURSING
Waiting for stool sample for patient, On contact precaution. Bowel movement was noted; however, it was mixed with stool. Explained to patient that a stool sample is needed and to call when needed.

## 2020-01-09 NOTE — PLAN OF CARE
Problem: Adult Inpatient Plan of Care  Goal: Plan of Care Review  Outcome: Ongoing, Progressing  Mrs. Mckenzie is resting and medications were given per orders. No complaints of N/V/PAIN.  at bedside. IV Fluids infusing, complaints of burning at IV sites- trying for new IV. Cardiac monitoring in place. OSEAS Piper was informed. Safety maintained.

## 2020-01-10 VITALS
TEMPERATURE: 98 F | WEIGHT: 112.19 LBS | DIASTOLIC BLOOD PRESSURE: 72 MMHG | BODY MASS INDEX: 21.18 KG/M2 | HEIGHT: 61 IN | HEART RATE: 113 BPM | RESPIRATION RATE: 19 BRPM | SYSTOLIC BLOOD PRESSURE: 147 MMHG

## 2020-01-10 PROBLEM — E87.6 HYPOKALEMIA: Status: RESOLVED | Noted: 2019-09-19 | Resolved: 2020-01-10

## 2020-01-10 PROBLEM — A41.9 SEPSIS: Status: RESOLVED | Noted: 2020-01-09 | Resolved: 2020-01-10

## 2020-01-10 PROBLEM — E87.1 HYPONATREMIA: Status: RESOLVED | Noted: 2019-09-19 | Resolved: 2020-01-10

## 2020-01-10 LAB
ALBUMIN SERPL BCP-MCNC: 2.2 G/DL (ref 3.5–5.2)
ALP SERPL-CCNC: 127 U/L (ref 55–135)
ALT SERPL W/O P-5'-P-CCNC: 10 U/L (ref 10–44)
ANION GAP SERPL CALC-SCNC: 9 MMOL/L (ref 8–16)
AST SERPL-CCNC: 16 U/L (ref 10–40)
BASOPHILS NFR BLD: 0 % (ref 0–1.9)
BILIRUB SERPL-MCNC: 0.1 MG/DL (ref 0.1–1)
BUN SERPL-MCNC: 10 MG/DL (ref 8–23)
CALCIUM SERPL-MCNC: 8.1 MG/DL (ref 8.7–10.5)
CHLORIDE SERPL-SCNC: 112 MMOL/L (ref 95–110)
CO2 SERPL-SCNC: 14 MMOL/L (ref 23–29)
CREAT SERPL-MCNC: 0.7 MG/DL (ref 0.5–1.4)
DIFFERENTIAL METHOD: ABNORMAL
EOSINOPHIL NFR BLD: 1 % (ref 0–8)
ERYTHROCYTE [DISTWIDTH] IN BLOOD BY AUTOMATED COUNT: 14.6 % (ref 11.5–14.5)
EST. GFR  (AFRICAN AMERICAN): >60 ML/MIN/1.73 M^2
EST. GFR  (NON AFRICAN AMERICAN): >60 ML/MIN/1.73 M^2
GLUCOSE SERPL-MCNC: 89 MG/DL (ref 70–110)
HCT VFR BLD AUTO: 29.2 % (ref 37–48.5)
HGB BLD-MCNC: 9.5 G/DL (ref 12–16)
LYMPHOCYTES NFR BLD: 8 % (ref 18–48)
MCH RBC QN AUTO: 31.9 PG (ref 27–31)
MCHC RBC AUTO-ENTMCNC: 32.5 G/DL (ref 32–36)
MCV RBC AUTO: 98 FL (ref 82–98)
METAMYELOCYTES NFR BLD MANUAL: 1 %
MONOCYTES NFR BLD: 2 % (ref 4–15)
MYELOCYTES NFR BLD MANUAL: 1 %
NEUTROPHILS NFR BLD: 86 % (ref 38–73)
NEUTS BAND NFR BLD MANUAL: 1 %
PLATELET # BLD AUTO: 386 K/UL (ref 150–350)
PLATELET BLD QL SMEAR: ABNORMAL
PMV BLD AUTO: 9 FL (ref 9.2–12.9)
POLYCHROMASIA BLD QL SMEAR: ABNORMAL
POTASSIUM SERPL-SCNC: 3.7 MMOL/L (ref 3.5–5.1)
PROT SERPL-MCNC: 5.8 G/DL (ref 6–8.4)
RBC # BLD AUTO: 2.98 M/UL (ref 4–5.4)
SODIUM SERPL-SCNC: 135 MMOL/L (ref 136–145)
WBC # BLD AUTO: 14.41 K/UL (ref 3.9–12.7)

## 2020-01-10 PROCEDURE — 80053 COMPREHEN METABOLIC PANEL: CPT

## 2020-01-10 PROCEDURE — S4991 NICOTINE PATCH NONLEGEND: HCPCS | Performed by: PHYSICIAN ASSISTANT

## 2020-01-10 PROCEDURE — 85007 BL SMEAR W/DIFF WBC COUNT: CPT

## 2020-01-10 PROCEDURE — 85027 COMPLETE CBC AUTOMATED: CPT

## 2020-01-10 PROCEDURE — 63600175 PHARM REV CODE 636 W HCPCS: Performed by: PHYSICIAN ASSISTANT

## 2020-01-10 PROCEDURE — 25000003 PHARM REV CODE 250: Performed by: PHYSICIAN ASSISTANT

## 2020-01-10 PROCEDURE — 36415 COLL VENOUS BLD VENIPUNCTURE: CPT

## 2020-01-10 RX ORDER — IBUPROFEN 200 MG
1 TABLET ORAL DAILY
Qty: 28 PATCH | Refills: 0 | Status: SHIPPED | OUTPATIENT
Start: 2020-01-11

## 2020-01-10 RX ORDER — MULTIVITAMIN
1 TABLET ORAL DAILY
COMMUNITY
Start: 2020-01-10

## 2020-01-10 RX ORDER — VANCOMYCIN HYDROCHLORIDE 125 MG/1
125 CAPSULE ORAL EVERY 6 HOURS
Qty: 36 CAPSULE | Refills: 0 | Status: SHIPPED | OUTPATIENT
Start: 2020-01-10 | End: 2020-01-19

## 2020-01-10 RX ADMIN — VENLAFAXINE 75 MG: 37.5 TABLET ORAL at 09:01

## 2020-01-10 RX ADMIN — Medication 1 PATCH: at 09:01

## 2020-01-10 RX ADMIN — GABAPENTIN 200 MG: 100 CAPSULE ORAL at 09:01

## 2020-01-10 RX ADMIN — LACTOBACILLUS ACIDOPHILUS / LACTOBACILLUS BULGARICUS 1 EACH: 100 MILLION CFU STRENGTH GRANULES at 09:01

## 2020-01-10 RX ADMIN — ACETAMINOPHEN 650 MG: 325 TABLET ORAL at 09:01

## 2020-01-10 RX ADMIN — Medication 125 MG: at 12:01

## 2020-01-10 RX ADMIN — SODIUM CHLORIDE AND POTASSIUM CHLORIDE 100 ML/HR: 9; 2.98 INJECTION, SOLUTION INTRAVENOUS at 04:01

## 2020-01-10 RX ADMIN — FOLIC ACID 1 MG: 1 TABLET ORAL at 09:01

## 2020-01-10 RX ADMIN — Medication 125 MG: at 05:01

## 2020-01-10 RX ADMIN — ATORVASTATIN CALCIUM 40 MG: 40 TABLET, FILM COATED ORAL at 09:01

## 2020-01-10 RX ADMIN — THERA TABS 1 TABLET: TAB at 09:01

## 2020-01-10 NOTE — NURSING
Discussed discharge summary with patient and spouse.  IV removed, no irritation noted.  Patient left building with  via home wheelchair.

## 2020-01-10 NOTE — CONSULTS
"  [facility]  Adult Nutrition  Consult Note    SUMMARY     Recommendations    Recommendation:   1. Encourage intake at meals as tolerated.   2. Add Boost Plus tid.   3. Add MVI    Goals:   Pt will consume at least 50-75% intake at meals  Nutrition Goal Status: new  Communication of RD Recs: reviewed with RN    Reason for Assessment    Reason For Assessment: consult(severe malnutrition)  Diagnosis: (c-diff infection)  Relevant Medical History: spinal stenosis, HTN, stroke, CHF, GI bleed, vaginal repair,, cholecystectomy, laminectomy  General Information Comments: Pt on Cardiac diet. Reports poor intake 2/2 poor appetite. Requesting Boost. Reports #. NFPE completed today -pt with mild & moderate fat & muscle depletion.   Nutrition Discharge Planning: pt to d/c on Cardiac diet    Nutrition Risk Screen  Nutrition Risk Screen: (decreased appetite)    Nutrition/Diet History  Food Preferences: no Adventism or culutral food prefs identified  Spiritual, Cultural Beliefs, Caodaism Practices, Values that Affect Care: yes(Latter day)  Factors Affecting Nutritional Intake: decreased appetite, diarrhea    Anthropometrics  Temp: 97.7 °F (36.5 °C)  Height Method: Stated  Height: 5' 1" (154.9 cm)  Height (inches): 61 in  Weight Method: Bed Scale  Weight: 48.4 kg (106 lb 11.2 oz)  Weight (lb): 106.7 lb  Ideal Body Weight (IBW), Female: 105 lb  % Ideal Body Weight, Female (lb): 101.62 %  BMI (Calculated): 20.2  BMI Grade: 18.5-24.9 - normal  Usual Body Weight (UBW), k.2 kg  % Usual Body Weight: 92.91  % Weight Change From Usual Weight: -7.28 %     Lab/Procedures/Meds  Pertinent Labs Reviewed: reviewed  Pertinent Labs Comments: Na 130L, K 3.4L, BUN 26H, Ca 8,0L, Alb 2,1L  Pertinent Medications Reviewed: reviewed  Pertinent Medications Comments: lactobacillus, folic acid    Estimated/Assessed Needs  Weight Used For Calorie Calculations: 48.4 kg (106 lb 11.2 oz)  Energy Calorie Requirements (kcal): " 5789-6970 (35-40 kcal/kg)  Energy Need Method: Kcal/kg  Protein Requirements: 63g (1.3g/kg)  Weight Used For Protein Calculations: 48.4 kg (106 lb 11.2 oz)  Estimated Fluid Requirement Method: RDA Method  RDA Method (mL): 1694     Nutrition Prescription Ordered  Current Diet Order: Cardiac    Evaluation of Received Nutrient/Fluid Intake  I/O: 187/725  Energy Calories Required: not meeting needs  Protein Required: not meeting needs  Fluid Required: not meeting needs  Comments: LBM 1/9  % Intake of Estimated Energy Needs: 0 - 25 %  % Meal Intake: 0 - 25 %    Nutrition Risk  Level of Risk/Frequency of Follow-up: (2xweekly)     Assessment and Plan  Severe malnutrition  Nutrition Problem:  (Moderate/Severe) Protein-Calorie Malnutrition  Malnutrition in the context of Acute Illness/Injury    Related to (etiology):  c-diff    Signs and Symptoms (as evidenced by):  Energy Intake: <50% of estimated energy requirement for 1 week  Body Fat Depletion: moderate depletion of orbitals, triceps and thoracic and lumbar region   Muscle Mass Depletion: mild depletion of temples, clavicle region, scapular region, interosseous muscle and lower extremities   Weight Loss: 7% x 2 weeks    Interventions(treatment strategy):  Commercial beverage    Nutrition Diagnosis Status:  New    Monitor and Evaluation  Food and Nutrient Intake: food and beverage intake  Food and Nutrient Adminstration: diet order  Physical Activity and Function: nutrition-related ADLs and IADLs  Anthropometric Measurements: weight  Biochemical Data, Medical Tests and Procedures: electrolyte and renal panel  Nutrition-Focused Physical Findings: overall appearance     Malnutrition Assessment  Weight Loss (Malnutrition): greater than 2% in 1 week  Energy Intake (Malnutrition): less than or equal to 50% for greater than or equal to 5 days   Orbital Region (Subcutaneous Fat Loss): mild depletion  Upper Arm Region (Subcutaneous Fat Loss): moderate depletion   Rastafarian Region  (Muscle Loss): mild depletion  Clavicle Bone Region (Muscle Loss): moderate depletion  Anterior Thigh Region (Muscle Loss): mild depletion  Posterior Calf Region (Muscle Loss): mild depletion   Subcutaneous Fat Loss (Final Summary): mild protein-calorie malnutrition  Muscle Loss Evaluation (Final Summary): mild protein-calorie malnutrition    Severe Weight Loss (Malnutrition): greater than 2% in 1 week    Nutrition Follow-Up  RD Follow-up?: Yes

## 2020-01-10 NOTE — PLAN OF CARE
Pts safety maintained, special contact precautions continued. Meds given per MAR, fluids infusing. Unable to obtain stool sample during night. No N/V, SOB, complains of pain, distres. Vitals stable through the night.

## 2020-01-10 NOTE — PLAN OF CARE
Discharge order noted, pt to resume home health with [facility]. No DME noted. Clinicals faxed to pt's PCP's office .    Discharge rounds on patient. Discussed followup appointments, blue discharge folder, discharge nurse will go over home medications and reasons for medications and final discharge instructions. All patient/caregiver questions answered. Patient verbalized understanding.    Follow-up With  Details  Why  Contact Info   Varun Shea MD PhD  On 1/13/2020  time:12:45pm Hospital follow up  [address] [facility]  [place] [address]  [phone]   [facility]  [address]  [facility] [address]  [phone]          01/10/20 1307   Final Note   Assessment Type Final Discharge Note   Anticipated Discharge Disposition Home-Health  ([facility])   What phone number can be called within the next 1-3 days to see how you are doing after discharge? [phone]   Hospital Follow Up  Appt(s) scheduled? Yes   Discharge plans and expectations educations in teach back method with documentation complete? Yes   Right Care Referral Info   Post Acute Recommendation Home-care   Facility Name [facility]   Levy Duarte RN-BC  Transitional Navigator  [phone]

## 2020-01-10 NOTE — PLAN OF CARE
HH orders emailed to CARLY Hanson at [facility] to resume care with [facility]       01/10/20 1117   Post-Acute Status   Post-Acute Authorization Home Health/Hospice   Home Health/Hospice Status Referrals Sent

## 2020-01-10 NOTE — PLAN OF CARE
[facility] HOME HEALTH ORDERS  FACE TO FACE ENCOUNTER    Patient Name: Mesha Mckenzie  YOB: 1955    PCP: Varun Shea MD PhD   PCP Address: [address] [place] [address] CM / [id number]  PCP Phone Number: [phone]  PCP Fax: [phone]    Encounter Date: 01/10/2020    Admit to Home Health    Diagnoses:  Active Hospital Problems    Diagnosis  POA    *Clostridium difficile infection [A49.8]  Yes    Tobacco use disorder [F17.200]  Yes    Leukocytosis [D72.829]  Yes    Depression [F32.9]  Yes    History of bacterial endocarditis [Z86.79]  Not Applicable     Chronic    Anemia of infection and chronic disease [B99.9, D63.8]  Yes     Chronic    Essential hypertension [I10]  Yes     Chronic    History of right thalamic stroke [Z86.73]  Not Applicable     Chronic     Brain MRI 9/30 with lacunar infarcts  Brain MRI 10/7 with R thalamic infarct, ventriculitis, subacute embolic infarctions      IV drug user [F19.90]  Yes    Chronic diastolic congestive heart failure [I50.32]  Yes     Chronic    Severe malnutrition [E43]  Yes      Resolved Hospital Problems    Diagnosis Date Resolved POA    Sepsis [A41.9] 01/10/2020 Yes    Hypokalemia [E87.6] 01/10/2020 Yes    Hyponatremia [E87.1] 01/10/2020 Yes       No future appointments.  Follow-up Information     Varun Shea MD PhD On 1/13/2020.    Specialties:  Internal Medicine, General Practice  Why:  time:12:45pm Hospital follow up  Contact information:  [address] [place] [address] [place] [address]  [phone]                   I have seen and examined this patient face to face today. My clinical findings that support the need for the home health skilled services and home bound status are the following:  Requiring assistive device to leave home due to unsteady gait caused by  Weakness/Debility.  Medical restrictions requiring assistance of another human to leave home due to  Unstable ambulation and Frequent Falls.    Allergies:  Review of  patient's allergies indicates:   Allergen Reactions    Pcn [penicillins]        Diet: cardiac diet    Activities: activity as tolerated    Nursing:   SN to complete comprehensive assessment including routine vital signs. Instruct on disease process and s/s of complications to report to MD. Review/verify medication list sent home with the patient at time of discharge  and instruct patient/caregiver as needed. Frequency may be adjusted depending on start of care date.    Notify MD if SBP > 160 or < 90; DBP > 90 or < 50; HR > 120 or < 50; Temp > 101      CONSULTS:    Physical Therapy to evaluate and treat. Evaluate for home safety and equipment needs; Establish/upgrade home exercise program. Perform / instruct on therapeutic exercises, gait training, transfer training, and Range of Motion.  Occupational Therapy to evaluate and treat. Evaluate home environment for safety and equipment needs. Perform/Instruct on transfers, ADL training, ROM, and therapeutic exercises.  Aide to provide assistance with personal care, ADLs, and vital signs.    MISCELLANEOUS CARE:  N/A    WOUND CARE ORDERS  n/a      Medications: Review discharge medications with patient and family and provide education.      Current Discharge Medication List      START taking these medications    Details   multivitamin (THERAGRAN) per tablet Take 1 tablet by mouth once daily.      nicotine (NICODERM CQ) 14 mg/24 hr Place 1 patch onto the skin once daily.  Qty: 28 patch, Refills: 0      vancomycin 25 mg/mL solution Take 5 mLs (125 mg total) by mouth every 6 (six) hours. for 9 days  Qty: 180 mL, Refills: 0      vancomycin (VANCOCIN) 125 MG capsule Take 1 capsule (125 mg total) by mouth every 6 (six) hours. for 9 days  Qty: 36 capsule, Refills: 0         CONTINUE these medications which have NOT CHANGED    Details   amLODIPine (NORVASC) 10 MG tablet Take 1 tablet (10 mg total) by mouth once daily. HOLD until follow-up with primary care provider  Qty: 30  tablet, Refills: 11      atorvastatin (LIPITOR) 40 MG tablet Take 1 tablet (40 mg total) by mouth once daily.  Qty: 90 tablet, Refills: 3      folic acid (FOLVITE) 1 MG tablet Take 1 tablet (1 mg total) by mouth once daily.  Refills: 0      furosemide (LASIX) 20 MG tablet Take 1 tablet (20 mg total) by mouth once daily.  Qty: 30 tablet, Refills: 2      gabapentin (NEURONTIN) 100 MG capsule Take 2 capsules (200 mg total) by mouth 3 (three) times daily.  Qty: 180 capsule, Refills: 2      metoprolol tartrate (LOPRESSOR) 100 MG tablet Take 1 tablet (100 mg total) by mouth 2 (two) times daily.  Qty: 60 tablet, Refills: 3      venlafaxine (EFFEXOR) 75 MG tablet Take 1 tablet (75 mg total) by mouth 2 (two) times daily.  Qty: 60 tablet, Refills: 3      Lactobacillus rhamnosus GG (CULTURELLE) 10 billion cell capsule Take 1 capsule by mouth 2 (two) times daily.      psyllium husk, aspartame, (METAMUCIL) 3.4 gram PwPk packet Take 1 packet by mouth 2 (two) times daily.         STOP taking these medications       cefadroxil (DURICEF) 500 MG Cap Comments:   Reason for Stopping: C. Diff infection              I certify that this patient is confined to her home and needs intermittent skilled nursing care, physical therapy and occupational therapy.

## 2020-01-10 NOTE — PLAN OF CARE
Patient continue to have small watery stools.  Medicated per order.  Still need to collect occult blood sample.  Patient aware to lab order needed.  Supplies at bedside.

## 2020-01-10 NOTE — PLAN OF CARE
Recommendation:   1. Encourage intake at meals as tolerated.   2. Add Boost Plus tid.   3. Add MVI    Goals:   Pt will consume at least 50-75% intake at meals  Nutrition Goal Status: new

## 2020-01-10 NOTE — ASSESSMENT & PLAN NOTE
Nutrition Problem:  (Moderate/Severe) Protein-Calorie Malnutrition  Malnutrition in the context of Acute Illness/Injury    Related to (etiology):  c-diff    Signs and Symptoms (as evidenced by):  Energy Intake: <50% of estimated energy requirement for 1 week  Body Fat Depletion: moderate depletion of orbitals, triceps and thoracic and lumbar region   Muscle Mass Depletion: mild depletion of temples, clavicle region, scapular region, interosseous muscle and lower extremities   Weight Loss: 7% x 2 weeks    Interventions(treatment strategy):  Commercial beverage    Nutrition Diagnosis Status:  New

## 2020-01-13 ENCOUNTER — PATIENT OUTREACH (OUTPATIENT)
Dept: ADMINISTRATIVE | Facility: CLINIC | Age: 65
End: 2020-01-13

## 2020-01-13 NOTE — DISCHARGE SUMMARY
"[facility] Hospital Medicine  Discharge Summary      Patient Name: Mesha Mckenzie  MRN: [id number]  Admission Date: 1/9/2020  Hospital Length of Stay: 1 days  Discharge Date and Time: 1/10/2020  2:11 PM  Attending Physician: Matty Guillen MD   Discharging Provider: LEONID ManriqueC  Primary Care Provider: Varun Shea MD PhD      HPI:   Mesha Mckenzie is a 63 y/o female  with PMH of IVDU, chronic diastolic CHF, HTN, anemia, depression, endocarditis due to MSSA (10/19), right thalamic stroke (09/19), epidural abscess, septic PE, empyema, shoulder abscess s/p laminectomy, pulmonary HTN, GI bleeding, BOOP, tobacco use, and malnutrition. She presented to [facility] yesterday with complaint of dark, watery diarrhea x 4 days, abdominal cramping, nausea, profound generalized weakness, decreased appetite and feeling like she was going to pass out. Patient completed oxacillin IV x 6 weeks on 11/20/19 and then started taking cefadroxil 500 mg bid per ID. She was doing otherwise well up until last Saturday when the diarrhea began. Patient states she took "a whole box of lomotil and it didn't help." She denies fever, chills, chest pain, SOB, vomiting, bloody stools, sick contacts, or recent travel. Patient currently smokes 2-3 cigarettes daily. Last IVDU was in September 2019. Her PCP is Dr. Varun Shea.    Initial ED workup revealed WBC 32.4, hgb 10.8, plts 406, , CRP 22.3, Na 118, K 2.6, Cl 85, CO2 17, AG 16, BUN/Cr 43/1.4, Ca 8.1, lactate 2.1, procalcitonin 5.25. BP 88/52. Blood cultures and C. Diff were collected. She was given 2 L NS bolus, metronidazole 500 mg IV, KCl 40 mEq, magnesium sulfate 1g IV, and calcium gluconate 500 mg IV. [facility] did not have PO vancomycin on formulary so she was transferred to [facility] and admitted to [facility] for continued treatment.     * No surgery found *      Hospital Course:   Clostridium difficile EIA and antigen " were positive. Patient was treated with IV fluids and oral vancomycin q6h. WBC count trended down and frequency of diarrhea decreased. Blood cultures showed no growth. Patient remained afebrile, hemodynamically stable, and tolerated PO without difficulty. She was discharged to home in good condition with oral vancomycin 250 mg PO q6h to complete a course of 10 days. She will follow up with her PCP on 1/13/20.     Consults:   Consults (From admission, onward)        Status Ordering Provider     Inpatient consult to Registered Dietitian/Nutritionist  Once     Provider:  (Not yet assigned)    KATHIA Scott new Assessment & Plan notes have been filed under this hospital service since the last note was generated.  Service: Hospital Medicine    Final Active Diagnoses:    Diagnosis Date Noted POA    PRINCIPAL PROBLEM:  Clostridium difficile infection [A49.8] 01/09/2020 Yes    Tobacco use disorder [F17.200] 01/09/2020 Yes    Leukocytosis [D72.829] 01/09/2020 Yes    Depression [F32.9] 01/09/2020 Yes    History of bacterial endocarditis [Z86.79] 11/01/2019 Not Applicable     Chronic    Anemia of infection and chronic disease [B99.9, D63.8] 11/01/2019 Yes     Chronic    Essential hypertension [I10] 10/16/2019 Yes     Chronic    History of right thalamic stroke [Z86.73] 10/07/2019 Not Applicable     Chronic    IV drug user [F19.90] 09/25/2019 Yes    Chronic diastolic congestive heart failure [I50.32] 09/23/2019 Yes     Chronic    Severe malnutrition [E43] 09/23/2019 Yes      Problems Resolved During this Admission:    Diagnosis Date Noted Date Resolved POA    Sepsis [A41.9] 01/09/2020 01/10/2020 Yes    Hypokalemia [E87.6] 09/19/2019 01/10/2020 Yes    Hyponatremia [E87.1] 09/19/2019 01/10/2020 Yes       Discharged Condition: good    Disposition: Home or Self Care    Follow Up:  Follow-up Information     Varun Shea MD PhD On 1/13/2020.    Specialties:  Internal Medicine, General  Practice  Why:  time:12:45pm Hospital follow up  Contact information:  [address] [place] [address]  [phone]             [facility].    Specialty:  Home Health Services  Why:  home health services  Contact information:  [address] [place] [address]  [phone]                 Patient Instructions:      Ambulatory referral to Smoking Cessation Program   Referral Priority: Routine Referral Type: Consultation   Referral Reason: Specialty Services Required   Requested Specialty: CTTS   Number of Visits Requested: 1     Diet Cardiac     Notify your health care provider if you experience any of the following:  temperature >100.4     Notify your health care provider if you experience any of the following:  persistent nausea and vomiting or diarrhea     Notify your health care provider if you experience any of the following:  severe uncontrolled pain     Notify your health care provider if you experience any of the following:  increased confusion or weakness     Activity as tolerated       Significant Diagnostic Studies: Labs: All labs within the past 24 hours have been reviewed    Pending Diagnostic Studies:     None         Medications:  Reconciled Home Medications:      Medication List      START taking these medications    multivitamin per tablet  Commonly known as:  THERAGRAN  Take 1 tablet by mouth once daily.     nicotine 14 mg/24 hr  Commonly known as:  NICODERM CQ  Place 1 patch onto the skin once daily.     vancomycin 125 MG capsule  Commonly known as:  VANCOCIN  Take 1 capsule (125 mg total) by mouth every 6 (six) hours. for 9 days        CONTINUE taking these medications    amLODIPine 10 MG tablet  Commonly known as:  NORVASC  Take 1 tablet (10 mg total) by mouth once daily. HOLD until follow-up with primary care provider     atorvastatin 40 MG tablet  Commonly known as:  LIPITOR  Take 1 tablet (40 mg total) by mouth once daily.     folic acid 1 MG tablet  Commonly known as:   FOLVITE  Take 1 tablet (1 mg total) by mouth once daily.     furosemide 20 MG tablet  Commonly known as:  LASIX  Take 1 tablet (20 mg total) by mouth once daily.     gabapentin 100 MG capsule  Commonly known as:  NEURONTIN  Take 2 capsules (200 mg total) by mouth 3 (three) times daily.     Lactobacillus rhamnosus GG 10 billion cell capsule  Commonly known as:  CULTURELLE  Take 1 capsule by mouth 2 (two) times daily.     metoprolol tartrate 100 MG tablet  Commonly known as:  LOPRESSOR  Take 1 tablet (100 mg total) by mouth 2 (two) times daily.     psyllium husk (aspartame) 3.4 gram Pwpk packet  Commonly known as:  METAMUCIL  Take 1 packet by mouth 2 (two) times daily.     venlafaxine 75 MG tablet  Commonly known as:  EFFEXOR  Take 1 tablet (75 mg total) by mouth 2 (two) times daily.        STOP taking these medications    cefadroxil 500 MG Cap  Commonly known as:  DURICEF        ASK your doctor about these medications    vancomycin 25 mg/mL solution  Take 5 mLs (125 mg total) by mouth every 6 (six) hours. for 9 days            Indwelling Lines/Drains at time of discharge:   Lines/Drains/Airways     None                 Time spent on the discharge of patient: 35 minutes  Patient was seen and examined on the date of discharge and determined to be suitable for discharge.      JEANE Manrique MD  [facility]  MD Matty Balir MD Ijeoma Innocent-Ituah, MD Fadi Hawawini, DO Njeri Dixon, NP Brittany Chatman, NP Kristin Stein, PA-C Arekeva Selmon, NP  [address] [place] [address]  Office Phone: [phone]  Office Fax: [phone]

## 2020-01-13 NOTE — PATIENT INSTRUCTIONS
"  Bacterial Diarrhea (6Yr to Adult)    Gastroenteritis is another name for an infection in the intestinal tract. It is sometimes called the "stomach flu" but it has no connection to influenza. Although most diarrhea is caused by a virus, you have a bacterial infection. It causes diarrhea. Diarrhea is the passing of loose watery stools 3 or more times a day.  Antibiotics are often used to treat this type of infection. Sometimes it is necessary to wait until a stool culture is complete before an antibiotic can be chosen. This may take about 2 days. Certain types of gastroenteritis should not be treated with antibiotics. Such treatment can lead to other problems. Because of this, do not take antibiotics without your healthcare provider's recommendation.   Along with diarrhea, you may have the following symptoms:  · Abdominal pain and cramping  · Nausea and vomiting  · Loss of bowel control  · Fever and chills  · Bloody stools  The main danger of diarrhea is dehydration. Dehydration is the loss of too much water and other fluids from the body without taking in enough fluids to replace it. When this occurs, body fluids must be replaced with oral rehydration solutions. These solutions are available at drug stores and most grocery stores without a prescription.   Home care  Medicine  · If antibiotics were prescribed, be sure you take them as directed  until they are finished.  · You may use acetaminophen, or NSAIDs such as ibuprofen  to control fever unless another medicine was prescribed. If you have chronic liver or kidney disease or ever had a stomach ulcer or gastrointestinal bleeding, talk with your healthcare provider before using these medicines. Aspirin should never be used in anyone under 18 years of age who is ill with a fever. It may cause severe liver damage. Don't increase your use of NSAID medicines if you are already taking the medicine for another condition (such as arthritis). Don't use NSAIDs if you are " on daily aspirin therapy (such as for heart disease or after stroke).  · Do not take over-the-counter anti-diarrheal medicines, unless advised by your doctor. They  can make your bacterial diarrhea worse.  Prevent spread of the illness  · If symptoms are severe, rest at home for the next 24 hours or until you are feeling better.  · Wash your hands with soap and water or use alcohol-based  after touching anyone who is sick, after using the toilet, and before meals.  · Clean the toilet after each use.  · Do not prepare or serve food to others.  Diet  · Water and clear liquids, soft drinks without caffeine, gingerale, mineral water, decaff tea and coffee are important to prevent dehydration. Drink small amounts at a time, do not guzzle it. Sports drinks are not a good choice because they have too much sugar and not enough electrolytes. Commercially available oral rehydration solutions are best.  · If you eat, avoid fatty, greasy, spicy, or fried foods.  · Avoid dairy products if having diarrhea, as they can make diarrhea worse.  · Caffeine, tobacco, and alcohol can make the diarrhea, cramping, and pain worse. Remember, caffeine is in coffee, chocolate, nelson, some energy drinks, and teas.  · Do not force yourself  to eat, especially if having cramping, vomiting, or diarrhea. Do not eat large amounts at a time, even if you are hungry  During the first 24 Hours (the first full day) follow the diet below  · Beverages: Water, clear liquids, soft drinks without caffeine; gingerale, mineral water (plain or flavored), decaffeinated tea and coffee. Avoid sports drinks.  · Soups: Clear broth, consommé and bouillon  · Desserts: Plain gelatin, popsicles and fruit juice bars.  During the next 24 hours (the second day) you may add the following to the above if you have improved  · Hot cereal, plain toast, bread, rolls, crackers  · Plain noodles, rice, mashed potatoes, chicken noodle or rice soup  · Unsweetened canned fruit  (avoid pineapple), bananas  · Limit fat intake to less than 15 grams per day by avoiding margarine, butter, oils, mayonnaise, sauces, gravies, fried foods, peanut butter, meat, poultry and fish.  · Limit dairy.  · Limit fiber; avoid raw or cooked vegetables, fresh fruits (except bananas) and bran cereals.  · Limit caffeine and chocolate. No spices or seasonings except salt.  During the next 24 hours  · Gradually resume a normal diet, as you feel better and your symptoms lessen.  · If at any time your symptoms get worse, go back to clear liquids until you feel better.  Follow-up care  Follow up with your healthcare provider as advised. Call if you are not improving within 24 hours or if the diarrhea lasts more than one week on antibiotics. If a stool (diarrhea) sample was taken, you may call in 2 days (or as directed) for the results.  When to seek medical care  Get prompt medical attention if any of the following occur:  · Increasing abdominal pain or constant lower right abdominal pain  · Continued vomiting (unable to keep liquids down)  · Frequent diarrhea (more than 5 times a day)  · Blood in vomit or stool (black or red color)  · Reduced oral intake  · Dark urine, reduced urine output  · Weakness, dizziness  · Drowsiness  · Fever over 100.4º F (38º C) for more than 3 days  · New rash  Call 911  Call emergency services if any of the following occur:  · Trouble breathing  · Confused  · Severe drowsiness or trouble awakening  · Fainting or loss of consciousness  · Rapid heart rate  · Seizure  · Stiff neck  Date Last Reviewed: 11/16/2015 © 2000-2017 [employer]. [address] [place] [address], [place] [address]. All rights reserved. This information is not intended as a substitute for professional medical care. Always follow your healthcare professional's instructions.

## 2020-01-13 NOTE — PROGRESS NOTES
C3 nurse attempted to contact patient. No answer. The following message was left for the patient to return the call:  Good morning  I am a nurse calling on behalf of [facility] from the [facility].  This is a Transitional Care Call for Mesha Mckenzie. When you have a moment please contact us at [phone] or [phone] Monday through Friday, between the hours of 8 am to 4 pm. We look forward to speaking with you. On behalf of [facility] have a nice day.    The patient has a scheduled hospitals appointment with  Varun Shea MD PhD on 1/13/2020 AT 12:45pm

## 2020-01-13 NOTE — HOSPITAL COURSE
Clostridium difficile EIA and antigen were positive. Patient was treated with IV fluids and oral vancomycin q6h. WBC count trended down and frequency of diarrhea decreased. Blood cultures showed no growth. Patient remained afebrile, hemodynamically stable, and tolerated PO without difficulty. She was discharged to home in good condition with oral vancomycin 250 mg PO q6h to complete a course of 10 days. She will follow up with her PCP on 1/13/20.

## 2020-01-14 LAB
BACTERIA BLD CULT: NORMAL
BACTERIA BLD CULT: NORMAL

## 2020-01-14 NOTE — PHYSICIAN QUERY
PT Name: Mesha Mckenzie  MR #: [id number]     Physician Query Form - Diagnosis Clarification      CDS/: ROXANNE Haro, RN, CDS               Contact information:[email or web address]    This form is a permanent document in the medical record.     Query Date: January 14, 2020    By submitting this query, we are merely seeking further clarification of documentation.  Please utilize your independent clinical judgment when addressing the question(s) below.     The medical record contains the following:      Findings Supporting Clinical Information Location in Medical Record   Sepsis  Transferred to [facility] for possible c-diff sepsis       Infectious diarrhea, sepsis, leukocytosis    Initial ED workup revealed WBC 32.4, , CRP 22.3, Na 118, K 2.6, Cl 85, CO2 17, AG 16, BUN/Cr 43/1.4, Ca 8.1, lactate 2.1, procalcitonin 5.25.       BP 88/52.  She was given 2 L NS bolus    She was given metronidazole 500 mg IV, KCl 40 mEq, magnesium sulfate 1g IV, and calcium gluconate 500 mg IV.     [facility] did not have PO vancomycin on formulary so she was transferred and admitted to [facility]     WBC trending down to 24.97, diarrhea decreasing today, afebrile, BP stable    Vancomycin 250 mg PO q6h        Clostridium difficile EIA and antigen were positive.     Patient was treated with IV fluids and oral vancomycin q6h.       WBC count trended down and frequency of diarrhea decreased.     Blood cultures showed no growth.     Patient remained afebrile, hemodynamically stable, and tolerated PO without difficulty.     She was discharged to home in good condition      Plan of care, Dr. Dolan, 1/8      H&P, EDMOND Duncan PA-C/Dr. Guillen, 1/9                                        DCS, EDMOND Duncan PA-C/Dr. Guillen, 1/10     Please clarify if the Sepsis diagnosis has been:    [x] Ruled In - heart rate greater than 90 and leukocytosis   [  ] Ruled Out   [  ] Other/Clarification of findings (please specify):     [  ]  Clinically undetermined     Please document in your progress notes daily for the duration of treatment, until resolved, and include in your discharge summary.

## 2020-02-01 ENCOUNTER — HOSPITAL ENCOUNTER (INPATIENT)
Facility: HOSPITAL | Age: 65
LOS: 5 days | Discharge: HOME HEALTH | DRG: 871 | End: 2020-02-06
Attending: EMERGENCY MEDICINE | Admitting: INTERNAL MEDICINE
Payer: MEDICARE

## 2020-02-01 DIAGNOSIS — A41.9 SEPTIC SHOCK DUE TO UNDETERMINED ORGANISM: Primary | ICD-10-CM

## 2020-02-01 DIAGNOSIS — I95.9 HYPOTENSION: ICD-10-CM

## 2020-02-01 DIAGNOSIS — R00.0 TACHYCARDIA: ICD-10-CM

## 2020-02-01 DIAGNOSIS — E87.1 HYPONATREMIA: ICD-10-CM

## 2020-02-01 DIAGNOSIS — N17.9 AKI (ACUTE KIDNEY INJURY): ICD-10-CM

## 2020-02-01 DIAGNOSIS — I38 ENDOCARDITIS: ICD-10-CM

## 2020-02-01 DIAGNOSIS — A49.8 CLOSTRIDIUM DIFFICILE INFECTION: ICD-10-CM

## 2020-02-01 DIAGNOSIS — N17.9 ACUTE KIDNEY INJURY: ICD-10-CM

## 2020-02-01 DIAGNOSIS — R65.21 SEPTIC SHOCK DUE TO UNDETERMINED ORGANISM: Primary | ICD-10-CM

## 2020-02-01 LAB
ALBUMIN SERPL BCP-MCNC: 2.5 G/DL (ref 3.5–5.2)
ALP SERPL-CCNC: 125 U/L (ref 55–135)
ALT SERPL W/O P-5'-P-CCNC: 15 U/L (ref 10–44)
ANION GAP SERPL CALC-SCNC: 15 MMOL/L (ref 8–16)
AST SERPL-CCNC: 15 U/L (ref 10–40)
BASOPHILS # BLD AUTO: ABNORMAL K/UL (ref 0–0.2)
BASOPHILS NFR BLD: 0 % (ref 0–1.9)
BILIRUB SERPL-MCNC: 0.2 MG/DL (ref 0.1–1)
BUN SERPL-MCNC: 22 MG/DL (ref 6–30)
BUN SERPL-MCNC: 22 MG/DL (ref 8–23)
CALCIUM SERPL-MCNC: 8.5 MG/DL (ref 8.7–10.5)
CHLORIDE SERPL-SCNC: 92 MMOL/L (ref 95–110)
CHLORIDE SERPL-SCNC: 93 MMOL/L (ref 95–110)
CO2 SERPL-SCNC: 17 MMOL/L (ref 23–29)
CREAT SERPL-MCNC: 1.6 MG/DL (ref 0.5–1.4)
CREAT SERPL-MCNC: 1.7 MG/DL (ref 0.5–1.4)
DIFFERENTIAL METHOD: ABNORMAL
EOSINOPHIL # BLD AUTO: ABNORMAL K/UL (ref 0–0.5)
EOSINOPHIL NFR BLD: 1 % (ref 0–8)
ERYTHROCYTE [DISTWIDTH] IN BLOOD BY AUTOMATED COUNT: 14.7 % (ref 11.5–14.5)
EST. GFR  (AFRICAN AMERICAN): 35.7 ML/MIN/1.73 M^2
EST. GFR  (NON AFRICAN AMERICAN): 31 ML/MIN/1.73 M^2
GLUCOSE SERPL-MCNC: 136 MG/DL (ref 70–110)
GLUCOSE SERPL-MCNC: 144 MG/DL (ref 70–110)
HCT VFR BLD AUTO: 31 % (ref 37–48.5)
HCT VFR BLD CALC: 30 %PCV (ref 36–54)
HGB BLD-MCNC: 9.7 G/DL (ref 12–16)
IMM GRANULOCYTES # BLD AUTO: ABNORMAL K/UL (ref 0–0.04)
IMM GRANULOCYTES NFR BLD AUTO: ABNORMAL % (ref 0–0.5)
LACTATE SERPL-SCNC: 3.2 MMOL/L (ref 0.5–2.2)
LYMPHOCYTES # BLD AUTO: ABNORMAL K/UL (ref 1–4.8)
LYMPHOCYTES NFR BLD: 7 % (ref 18–48)
MCH RBC QN AUTO: 32.3 PG (ref 27–31)
MCHC RBC AUTO-ENTMCNC: 31.3 G/DL (ref 32–36)
MCV RBC AUTO: 103 FL (ref 82–98)
METAMYELOCYTES NFR BLD MANUAL: 1 %
MONOCYTES # BLD AUTO: ABNORMAL K/UL (ref 0.3–1)
MONOCYTES NFR BLD: 12 % (ref 4–15)
MYELOCYTES NFR BLD MANUAL: 1 %
NEUTROPHILS NFR BLD: 76 % (ref 38–73)
NEUTS BAND NFR BLD MANUAL: 2 %
NRBC BLD-RTO: 0 /100 WBC
PLATELET # BLD AUTO: 398 K/UL (ref 150–350)
PMV BLD AUTO: 9.9 FL (ref 9.2–12.9)
POC IONIZED CALCIUM: 1.03 MMOL/L (ref 1.06–1.42)
POC TCO2 (MEASURED): 18 MMOL/L (ref 23–29)
POTASSIUM BLD-SCNC: 3.6 MMOL/L (ref 3.5–5.1)
POTASSIUM SERPL-SCNC: 3.8 MMOL/L (ref 3.5–5.1)
PROT SERPL-MCNC: 6.5 G/DL (ref 6–8.4)
RBC # BLD AUTO: 3 M/UL (ref 4–5.4)
SAMPLE: ABNORMAL
SODIUM BLD-SCNC: 122 MMOL/L (ref 136–145)
SODIUM SERPL-SCNC: 125 MMOL/L (ref 136–145)
WBC # BLD AUTO: 32.68 K/UL (ref 3.9–12.7)

## 2020-02-01 PROCEDURE — 96361 HYDRATE IV INFUSION ADD-ON: CPT

## 2020-02-01 PROCEDURE — 99291 CRITICAL CARE FIRST HOUR: CPT | Mod: ,,, | Performed by: EMERGENCY MEDICINE

## 2020-02-01 PROCEDURE — 83605 ASSAY OF LACTIC ACID: CPT | Mod: 91

## 2020-02-01 PROCEDURE — 85007 BL SMEAR W/DIFF WBC COUNT: CPT

## 2020-02-01 PROCEDURE — 96375 TX/PRO/DX INJ NEW DRUG ADDON: CPT

## 2020-02-01 PROCEDURE — 80047 BASIC METABLC PNL IONIZED CA: CPT

## 2020-02-01 PROCEDURE — 85027 COMPLETE CBC AUTOMATED: CPT

## 2020-02-01 PROCEDURE — 81001 URINALYSIS AUTO W/SCOPE: CPT

## 2020-02-01 PROCEDURE — 96365 THER/PROPH/DIAG IV INF INIT: CPT

## 2020-02-01 PROCEDURE — 80053 COMPREHEN METABOLIC PANEL: CPT

## 2020-02-01 PROCEDURE — 93005 ELECTROCARDIOGRAM TRACING: CPT

## 2020-02-01 PROCEDURE — 93010 EKG 12-LEAD: ICD-10-PCS | Mod: ,,, | Performed by: INTERNAL MEDICINE

## 2020-02-01 PROCEDURE — 96366 THER/PROPH/DIAG IV INF ADDON: CPT

## 2020-02-01 PROCEDURE — 12000002 HC ACUTE/MED SURGE SEMI-PRIVATE ROOM

## 2020-02-01 PROCEDURE — 87040 BLOOD CULTURE FOR BACTERIA: CPT

## 2020-02-01 PROCEDURE — 25000003 PHARM REV CODE 250: Performed by: EMERGENCY MEDICINE

## 2020-02-01 PROCEDURE — 93010 ELECTROCARDIOGRAM REPORT: CPT | Mod: ,,, | Performed by: INTERNAL MEDICINE

## 2020-02-01 PROCEDURE — 99291 PR CRITICAL CARE, E/M 30-74 MINUTES: ICD-10-PCS | Mod: ,,, | Performed by: EMERGENCY MEDICINE

## 2020-02-01 PROCEDURE — 99285 EMERGENCY DEPT VISIT HI MDM: CPT | Mod: 25

## 2020-02-01 PROCEDURE — 63600175 PHARM REV CODE 636 W HCPCS: Performed by: EMERGENCY MEDICINE

## 2020-02-01 RX ORDER — VANCOMYCIN HCL IN 5 % DEXTROSE 1G/250ML
20 PLASTIC BAG, INJECTION (ML) INTRAVENOUS
Status: COMPLETED | OUTPATIENT
Start: 2020-02-01 | End: 2020-02-01

## 2020-02-01 RX ORDER — CEFEPIME HYDROCHLORIDE 2 G/1
2 INJECTION, POWDER, FOR SOLUTION INTRAVENOUS
Status: COMPLETED | OUTPATIENT
Start: 2020-02-01 | End: 2020-02-01

## 2020-02-01 RX ADMIN — VANCOMYCIN HYDROCHLORIDE 1000 MG: 1 INJECTION, POWDER, LYOPHILIZED, FOR SOLUTION INTRAVENOUS at 08:02

## 2020-02-01 RX ADMIN — SODIUM CHLORIDE 1000 ML: 0.9 INJECTION, SOLUTION INTRAVENOUS at 09:02

## 2020-02-01 RX ADMIN — Medication 125 MG: at 09:02

## 2020-02-01 RX ADMIN — SODIUM CHLORIDE 1524 ML: 0.9 INJECTION, SOLUTION INTRAVENOUS at 08:02

## 2020-02-01 RX ADMIN — CEFEPIME 2 G: 2 INJECTION, POWDER, FOR SOLUTION INTRAVENOUS at 08:02

## 2020-02-02 PROBLEM — A41.9 SEPTIC SHOCK DUE TO UNDETERMINED ORGANISM: Status: ACTIVE | Noted: 2020-02-02

## 2020-02-02 PROBLEM — R65.21 SEPTIC SHOCK DUE TO UNDETERMINED ORGANISM: Status: ACTIVE | Noted: 2020-02-02

## 2020-02-02 LAB
ALBUMIN SERPL BCP-MCNC: 2.2 G/DL (ref 3.5–5.2)
ALP SERPL-CCNC: 120 U/L (ref 55–135)
ALT SERPL W/O P-5'-P-CCNC: 12 U/L (ref 10–44)
ANION GAP SERPL CALC-SCNC: 11 MMOL/L (ref 8–16)
ANION GAP SERPL CALC-SCNC: 12 MMOL/L (ref 8–16)
ANISOCYTOSIS BLD QL SMEAR: SLIGHT
ASCENDING AORTA: 2.49 CM
AST SERPL-CCNC: 14 U/L (ref 10–40)
AV INDEX (PROSTH): 0.62
AV MEAN GRADIENT: 5 MMHG
AV PEAK GRADIENT: 9 MMHG
AV VALVE AREA: 2.34 CM2
AV VELOCITY RATIO: 0.62
BACTERIA #/AREA URNS AUTO: NORMAL /HPF
BASOPHILS # BLD AUTO: ABNORMAL K/UL (ref 0–0.2)
BASOPHILS NFR BLD: 1 % (ref 0–1.9)
BILIRUB SERPL-MCNC: 0.2 MG/DL (ref 0.1–1)
BILIRUB UR QL STRIP: NEGATIVE
BSA FOR ECHO PROCEDURE: 1.47 M2
BUN SERPL-MCNC: 10 MG/DL (ref 8–23)
BUN SERPL-MCNC: 16 MG/DL (ref 8–23)
BURR CELLS BLD QL SMEAR: ABNORMAL
C DIFF GDH STL QL: POSITIVE
C DIFF TOX A+B STL QL IA: POSITIVE
CA-I BLDV-SCNC: 1.17 MMOL/L (ref 1.06–1.42)
CALCIUM SERPL-MCNC: 7.8 MG/DL (ref 8.7–10.5)
CALCIUM SERPL-MCNC: 8.1 MG/DL (ref 8.7–10.5)
CHLORIDE SERPL-SCNC: 106 MMOL/L (ref 95–110)
CHLORIDE SERPL-SCNC: 108 MMOL/L (ref 95–110)
CLARITY UR REFRACT.AUTO: CLEAR
CO2 SERPL-SCNC: 15 MMOL/L (ref 23–29)
CO2 SERPL-SCNC: 16 MMOL/L (ref 23–29)
COLOR UR AUTO: YELLOW
CREAT SERPL-MCNC: 1.1 MG/DL (ref 0.5–1.4)
CREAT SERPL-MCNC: 1.1 MG/DL (ref 0.5–1.4)
CV ECHO LV RWT: 0.29 CM
DIFFERENTIAL METHOD: ABNORMAL
DOP CALC AO PEAK VEL: 1.48 M/S
DOP CALC AO VTI: 18.29 CM
DOP CALC LVOT AREA: 3.8 CM2
DOP CALC LVOT DIAMETER: 2.19 CM
DOP CALC LVOT PEAK VEL: 0.92 M/S
DOP CALC LVOT STROKE VOLUME: 42.77 CM3
DOP CALCLVOT PEAK VEL VTI: 11.36 CM
E WAVE DECELERATION TIME: 138.3 MSEC
E/A RATIO: 0.84
E/E' RATIO: 7.33 M/S
ECHO LV POSTERIOR WALL: 0.68 CM (ref 0.6–1.1)
EOSINOPHIL # BLD AUTO: ABNORMAL K/UL (ref 0–0.5)
EOSINOPHIL NFR BLD: 2 % (ref 0–8)
ERYTHROCYTE [DISTWIDTH] IN BLOOD BY AUTOMATED COUNT: 14.6 % (ref 11.5–14.5)
EST. GFR  (AFRICAN AMERICAN): >60 ML/MIN/1.73 M^2
EST. GFR  (AFRICAN AMERICAN): >60 ML/MIN/1.73 M^2
EST. GFR  (NON AFRICAN AMERICAN): 52.4 ML/MIN/1.73 M^2
EST. GFR  (NON AFRICAN AMERICAN): 52.4 ML/MIN/1.73 M^2
FRACTIONAL SHORTENING: 28 % (ref 28–44)
GIANT PLATELETS BLD QL SMEAR: PRESENT
GLUCOSE SERPL-MCNC: 122 MG/DL (ref 70–110)
GLUCOSE SERPL-MCNC: 216 MG/DL (ref 70–110)
GLUCOSE UR QL STRIP: NEGATIVE
HCT VFR BLD AUTO: 30.3 % (ref 37–48.5)
HGB BLD-MCNC: 9.4 G/DL (ref 12–16)
HGB UR QL STRIP: ABNORMAL
HYALINE CASTS UR QL AUTO: 1 /LPF
HYPOCHROMIA BLD QL SMEAR: ABNORMAL
IMM GRANULOCYTES # BLD AUTO: ABNORMAL K/UL (ref 0–0.04)
IMM GRANULOCYTES NFR BLD AUTO: ABNORMAL % (ref 0–0.5)
INTERVENTRICULAR SEPTUM: 0.86 CM (ref 0.6–1.1)
IVRT: 0.07 MSEC
KETONES UR QL STRIP: NEGATIVE
LA MAJOR: 5.02 CM
LA MINOR: 5.01 CM
LA WIDTH: 3.9 CM
LACTATE SERPL-SCNC: 1.1 MMOL/L (ref 0.5–2.2)
LACTATE SERPL-SCNC: 3.9 MMOL/L (ref 0.5–2.2)
LEFT ATRIUM SIZE: 3.39 CM
LEFT ATRIUM VOLUME INDEX: 38.3 ML/M2
LEFT ATRIUM VOLUME: 56.36 CM3
LEFT INTERNAL DIMENSION IN SYSTOLE: 3.44 CM (ref 2.1–4)
LEFT VENTRICLE DIASTOLIC VOLUME INDEX: 72.08 ML/M2
LEFT VENTRICLE DIASTOLIC VOLUME: 106.02 ML
LEFT VENTRICLE MASS INDEX: 81 G/M2
LEFT VENTRICLE SYSTOLIC VOLUME INDEX: 33.2 ML/M2
LEFT VENTRICLE SYSTOLIC VOLUME: 48.82 ML
LEFT VENTRICULAR INTERNAL DIMENSION IN DIASTOLE: 4.77 CM (ref 3.5–6)
LEFT VENTRICULAR MASS: 119.33 G
LEUKOCYTE ESTERASE UR QL STRIP: NEGATIVE
LV LATERAL E/E' RATIO: 6.19 M/S
LV SEPTAL E/E' RATIO: 9 M/S
LYMPHOCYTES # BLD AUTO: ABNORMAL K/UL (ref 1–4.8)
LYMPHOCYTES NFR BLD: 9 % (ref 18–48)
MAGNESIUM SERPL-MCNC: 1.4 MG/DL (ref 1.6–2.6)
MCH RBC QN AUTO: 32.1 PG (ref 27–31)
MCHC RBC AUTO-ENTMCNC: 31 G/DL (ref 32–36)
MCV RBC AUTO: 103 FL (ref 82–98)
METAMYELOCYTES NFR BLD MANUAL: 2 %
MICROSCOPIC COMMENT: NORMAL
MONOCYTES # BLD AUTO: ABNORMAL K/UL (ref 0.3–1)
MONOCYTES NFR BLD: 7 % (ref 4–15)
MV PEAK A VEL: 1.18 M/S
MV PEAK E VEL: 0.99 M/S
MYELOCYTES NFR BLD MANUAL: 4 %
NEUTROPHILS NFR BLD: 67 % (ref 38–73)
NEUTS BAND NFR BLD MANUAL: 8 %
NITRITE UR QL STRIP: NEGATIVE
NRBC BLD-RTO: 0 /100 WBC
OVALOCYTES BLD QL SMEAR: ABNORMAL
PH UR STRIP: 6 [PH] (ref 5–8)
PHOSPHATE SERPL-MCNC: 2.7 MG/DL (ref 2.7–4.5)
PISA TR MAX VEL: 2.86 M/S
PLATELET # BLD AUTO: 326 K/UL (ref 150–350)
PLATELET BLD QL SMEAR: ABNORMAL
PMV BLD AUTO: 9.6 FL (ref 9.2–12.9)
POIKILOCYTOSIS BLD QL SMEAR: SLIGHT
POLYCHROMASIA BLD QL SMEAR: ABNORMAL
POTASSIUM SERPL-SCNC: 3.2 MMOL/L (ref 3.5–5.1)
POTASSIUM SERPL-SCNC: 4 MMOL/L (ref 3.5–5.1)
PROT SERPL-MCNC: 5.9 G/DL (ref 6–8.4)
PROT UR QL STRIP: NEGATIVE
RA MAJOR: 3.91 CM
RA WIDTH: 2.95 CM
RBC # BLD AUTO: 2.93 M/UL (ref 4–5.4)
RBC #/AREA URNS AUTO: 1 /HPF (ref 0–4)
RIGHT VENTRICULAR END-DIASTOLIC DIMENSION: 2.73 CM
RV TISSUE DOPPLER FREE WALL SYSTOLIC VELOCITY 1 (APICAL 4 CHAMBER VIEW): 19.97 CM/S
SINUS: 3.07 CM
SODIUM SERPL-SCNC: 133 MMOL/L (ref 136–145)
SODIUM SERPL-SCNC: 135 MMOL/L (ref 136–145)
SP GR UR STRIP: 1 (ref 1–1.03)
SQUAMOUS #/AREA URNS AUTO: 0 /HPF
STJ: 2.75 CM
TDI LATERAL: 0.16 M/S
TDI SEPTAL: 0.11 M/S
TDI: 0.14 M/S
TR MAX PG: 33 MMHG
TRICUSPID ANNULAR PLANE SYSTOLIC EXCURSION: 1.82 CM
URN SPEC COLLECT METH UR: ABNORMAL
VANCOMYCIN SERPL-MCNC: 10.7 UG/ML
WBC # BLD AUTO: 20.45 K/UL (ref 3.9–12.7)
WBC #/AREA URNS AUTO: 0 /HPF (ref 0–5)

## 2020-02-02 PROCEDURE — 63600175 PHARM REV CODE 636 W HCPCS: Performed by: EMERGENCY MEDICINE

## 2020-02-02 PROCEDURE — 87324 CLOSTRIDIUM AG IA: CPT

## 2020-02-02 PROCEDURE — S0030 INJECTION, METRONIDAZOLE: HCPCS | Performed by: NURSE PRACTITIONER

## 2020-02-02 PROCEDURE — 25000003 PHARM REV CODE 250: Performed by: STUDENT IN AN ORGANIZED HEALTH CARE EDUCATION/TRAINING PROGRAM

## 2020-02-02 PROCEDURE — 80053 COMPREHEN METABOLIC PANEL: CPT

## 2020-02-02 PROCEDURE — 99292 CRITICAL CARE ADDL 30 MIN: CPT | Mod: 25,GC,, | Performed by: INTERNAL MEDICINE

## 2020-02-02 PROCEDURE — 99292 PR CRITICAL CARE, ADDL 30 MIN: ICD-10-PCS | Mod: 25,GC,, | Performed by: INTERNAL MEDICINE

## 2020-02-02 PROCEDURE — 36556 PR INSERT NON-TUNNEL CV CATH 5+ YRS OLD: ICD-10-PCS | Mod: GC,,, | Performed by: INTERNAL MEDICINE

## 2020-02-02 PROCEDURE — 99291 PR CRITICAL CARE, E/M 30-74 MINUTES: ICD-10-PCS | Mod: 25,GC,, | Performed by: INTERNAL MEDICINE

## 2020-02-02 PROCEDURE — 36556 INSERT NON-TUNNEL CV CATH: CPT | Mod: GC,,, | Performed by: INTERNAL MEDICINE

## 2020-02-02 PROCEDURE — 83605 ASSAY OF LACTIC ACID: CPT

## 2020-02-02 PROCEDURE — 83735 ASSAY OF MAGNESIUM: CPT

## 2020-02-02 PROCEDURE — 99291 CRITICAL CARE FIRST HOUR: CPT | Mod: 25,GC,, | Performed by: INTERNAL MEDICINE

## 2020-02-02 PROCEDURE — 25000003 PHARM REV CODE 250: Performed by: EMERGENCY MEDICINE

## 2020-02-02 PROCEDURE — 93010 ELECTROCARDIOGRAM REPORT: CPT | Mod: ,,, | Performed by: INTERNAL MEDICINE

## 2020-02-02 PROCEDURE — 93005 ELECTROCARDIOGRAM TRACING: CPT

## 2020-02-02 PROCEDURE — 82330 ASSAY OF CALCIUM: CPT

## 2020-02-02 PROCEDURE — 63600175 PHARM REV CODE 636 W HCPCS: Performed by: NURSE PRACTITIONER

## 2020-02-02 PROCEDURE — 63600175 PHARM REV CODE 636 W HCPCS: Performed by: STUDENT IN AN ORGANIZED HEALTH CARE EDUCATION/TRAINING PROGRAM

## 2020-02-02 PROCEDURE — 80048 BASIC METABOLIC PNL TOTAL CA: CPT

## 2020-02-02 PROCEDURE — 25000003 PHARM REV CODE 250: Performed by: INTERNAL MEDICINE

## 2020-02-02 PROCEDURE — 85027 COMPLETE CBC AUTOMATED: CPT

## 2020-02-02 PROCEDURE — 20000000 HC ICU ROOM

## 2020-02-02 PROCEDURE — 84100 ASSAY OF PHOSPHORUS: CPT

## 2020-02-02 PROCEDURE — 80202 ASSAY OF VANCOMYCIN: CPT

## 2020-02-02 PROCEDURE — 85007 BL SMEAR W/DIFF WBC COUNT: CPT

## 2020-02-02 PROCEDURE — 93010 EKG 12-LEAD: ICD-10-PCS | Mod: ,,, | Performed by: INTERNAL MEDICINE

## 2020-02-02 PROCEDURE — 87449 NOS EACH ORGANISM AG IA: CPT

## 2020-02-02 PROCEDURE — 63600175 PHARM REV CODE 636 W HCPCS: Performed by: INTERNAL MEDICINE

## 2020-02-02 PROCEDURE — 25000003 PHARM REV CODE 250: Performed by: NURSE PRACTITIONER

## 2020-02-02 PROCEDURE — 94761 N-INVAS EAR/PLS OXIMETRY MLT: CPT

## 2020-02-02 RX ORDER — SODIUM,POTASSIUM PHOSPHATES 280-250MG
2 POWDER IN PACKET (EA) ORAL
Status: DISCONTINUED | OUTPATIENT
Start: 2020-02-02 | End: 2020-02-06

## 2020-02-02 RX ORDER — METRONIDAZOLE 500 MG/100ML
500 INJECTION, SOLUTION INTRAVENOUS
Status: DISCONTINUED | OUTPATIENT
Start: 2020-02-02 | End: 2020-02-04

## 2020-02-02 RX ORDER — ONDANSETRON 2 MG/ML
4 INJECTION INTRAMUSCULAR; INTRAVENOUS ONCE
Status: COMPLETED | OUTPATIENT
Start: 2020-02-02 | End: 2020-02-02

## 2020-02-02 RX ORDER — ONDANSETRON 4 MG/1
8 TABLET, FILM COATED ORAL ONCE
Status: COMPLETED | OUTPATIENT
Start: 2020-02-02 | End: 2020-02-02

## 2020-02-02 RX ORDER — POTASSIUM CHLORIDE 20 MEQ/15ML
40 SOLUTION ORAL
Status: DISCONTINUED | OUTPATIENT
Start: 2020-02-02 | End: 2020-02-06

## 2020-02-02 RX ORDER — ONDANSETRON 2 MG/ML
8 INJECTION INTRAMUSCULAR; INTRAVENOUS EVERY 8 HOURS PRN
Status: DISCONTINUED | OUTPATIENT
Start: 2020-02-02 | End: 2020-02-03

## 2020-02-02 RX ORDER — ACETAMINOPHEN 325 MG/1
650 TABLET ORAL
Status: COMPLETED | OUTPATIENT
Start: 2020-02-02 | End: 2020-02-02

## 2020-02-02 RX ORDER — ONDANSETRON 4 MG/1
8 TABLET, FILM COATED ORAL EVERY 8 HOURS PRN
Status: DISCONTINUED | OUTPATIENT
Start: 2020-02-02 | End: 2020-02-06 | Stop reason: HOSPADM

## 2020-02-02 RX ORDER — POTASSIUM CHLORIDE 20 MEQ/15ML
60 SOLUTION ORAL
Status: DISCONTINUED | OUTPATIENT
Start: 2020-02-02 | End: 2020-02-06

## 2020-02-02 RX ORDER — ATORVASTATIN CALCIUM 20 MG/1
40 TABLET, FILM COATED ORAL DAILY
Status: DISCONTINUED | OUTPATIENT
Start: 2020-02-02 | End: 2020-02-06 | Stop reason: HOSPADM

## 2020-02-02 RX ORDER — VENLAFAXINE 25 MG/1
75 TABLET ORAL 2 TIMES DAILY
Status: DISCONTINUED | OUTPATIENT
Start: 2020-02-02 | End: 2020-02-06 | Stop reason: HOSPADM

## 2020-02-02 RX ORDER — LANOLIN ALCOHOL/MO/W.PET/CERES
800 CREAM (GRAM) TOPICAL
Status: DISCONTINUED | OUTPATIENT
Start: 2020-02-02 | End: 2020-02-06 | Stop reason: HOSPADM

## 2020-02-02 RX ORDER — ACETAMINOPHEN 325 MG/1
650 TABLET ORAL ONCE
Status: COMPLETED | OUTPATIENT
Start: 2020-02-02 | End: 2020-02-02

## 2020-02-02 RX ORDER — SODIUM CHLORIDE 0.9 % (FLUSH) 0.9 %
10 SYRINGE (ML) INJECTION
Status: DISCONTINUED | OUTPATIENT
Start: 2020-02-02 | End: 2020-02-06 | Stop reason: HOSPADM

## 2020-02-02 RX ORDER — ENOXAPARIN SODIUM 100 MG/ML
30 INJECTION SUBCUTANEOUS EVERY 24 HOURS
Status: DISCONTINUED | OUTPATIENT
Start: 2020-02-02 | End: 2020-02-03

## 2020-02-02 RX ORDER — PHENYLEPHRINE HCL IN 0.9% NACL 1 MG/10 ML
SYRINGE (ML) INTRAVENOUS
Status: DISCONTINUED
Start: 2020-02-02 | End: 2020-02-02 | Stop reason: WASHOUT

## 2020-02-02 RX ORDER — NOREPINEPHRINE BITARTRATE/D5W 4MG/250ML
0.05 PLASTIC BAG, INJECTION (ML) INTRAVENOUS CONTINUOUS
Status: DISCONTINUED | OUTPATIENT
Start: 2020-02-02 | End: 2020-02-03

## 2020-02-02 RX ORDER — FOLIC ACID 1 MG/1
1 TABLET ORAL DAILY
Status: DISCONTINUED | OUTPATIENT
Start: 2020-02-02 | End: 2020-02-06 | Stop reason: HOSPADM

## 2020-02-02 RX ORDER — CEFEPIME HYDROCHLORIDE 1 G/1
1 INJECTION, POWDER, FOR SOLUTION INTRAMUSCULAR; INTRAVENOUS
Status: DISCONTINUED | OUTPATIENT
Start: 2020-02-02 | End: 2020-02-02

## 2020-02-02 RX ORDER — CEFEPIME HYDROCHLORIDE 1 G/1
1 INJECTION, POWDER, FOR SOLUTION INTRAMUSCULAR; INTRAVENOUS
Status: DISCONTINUED | OUTPATIENT
Start: 2020-02-02 | End: 2020-02-03

## 2020-02-02 RX ADMIN — Medication 800 MG: at 07:02

## 2020-02-02 RX ADMIN — SODIUM CHLORIDE 1000 ML: 0.9 INJECTION, SOLUTION INTRAVENOUS at 12:02

## 2020-02-02 RX ADMIN — Medication 1 CAPSULE: at 09:02

## 2020-02-02 RX ADMIN — CEFEPIME 1 G: 1 INJECTION, POWDER, FOR SOLUTION INTRAMUSCULAR; INTRAVENOUS at 10:02

## 2020-02-02 RX ADMIN — ONDANSETRON 8 MG: 2 INJECTION INTRAMUSCULAR; INTRAVENOUS at 10:02

## 2020-02-02 RX ADMIN — METRONIDAZOLE 500 MG: 500 INJECTION, SOLUTION INTRAVENOUS at 02:02

## 2020-02-02 RX ADMIN — Medication 800 MG: at 11:02

## 2020-02-02 RX ADMIN — ATORVASTATIN CALCIUM 40 MG: 20 TABLET, FILM COATED ORAL at 08:02

## 2020-02-02 RX ADMIN — Medication 0.1 MCG/KG/MIN: at 10:02

## 2020-02-02 RX ADMIN — POTASSIUM CHLORIDE 40 MEQ: 20 SOLUTION ORAL at 09:02

## 2020-02-02 RX ADMIN — VENLAFAXINE 75 MG: 37.5 TABLET ORAL at 08:02

## 2020-02-02 RX ADMIN — ONDANSETRON HYDROCHLORIDE 8 MG: 4 TABLET, FILM COATED ORAL at 03:02

## 2020-02-02 RX ADMIN — CEFEPIME 1 G: 1 INJECTION, POWDER, FOR SOLUTION INTRAMUSCULAR; INTRAVENOUS at 11:02

## 2020-02-02 RX ADMIN — POTASSIUM CHLORIDE 40 MEQ: 20 SOLUTION ORAL at 07:02

## 2020-02-02 RX ADMIN — Medication 1 CAPSULE: at 08:02

## 2020-02-02 RX ADMIN — VANCOMYCIN HYDROCHLORIDE 750 MG: 750 INJECTION, POWDER, LYOPHILIZED, FOR SOLUTION INTRAVENOUS at 01:02

## 2020-02-02 RX ADMIN — VENLAFAXINE 75 MG: 37.5 TABLET ORAL at 09:02

## 2020-02-02 RX ADMIN — METRONIDAZOLE 500 MG: 500 INJECTION, SOLUTION INTRAVENOUS at 09:02

## 2020-02-02 RX ADMIN — Medication 500 MG: at 11:02

## 2020-02-02 RX ADMIN — Medication 0.05 MCG/KG/MIN: at 01:02

## 2020-02-02 RX ADMIN — ONDANSETRON 4 MG: 2 INJECTION INTRAMUSCULAR; INTRAVENOUS at 06:02

## 2020-02-02 RX ADMIN — ENOXAPARIN SODIUM 30 MG: 100 INJECTION SUBCUTANEOUS at 04:02

## 2020-02-02 RX ADMIN — ACETAMINOPHEN 650 MG: 325 TABLET ORAL at 01:02

## 2020-02-02 RX ADMIN — ACETAMINOPHEN 650 MG: 325 TABLET ORAL at 06:02

## 2020-02-02 RX ADMIN — FOLIC ACID 1 MG: 1 TABLET ORAL at 08:02

## 2020-02-02 RX ADMIN — Medication 125 MG: at 01:02

## 2020-02-02 RX ADMIN — Medication 500 MG: at 04:02

## 2020-02-02 RX ADMIN — Medication 800 MG: at 04:02

## 2020-02-02 RX ADMIN — Medication 125 MG: at 08:02

## 2020-02-02 NOTE — SUBJECTIVE & OBJECTIVE
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    GI bleed     History of uterine fibroid     Hypertension     Right thalamic stroke 10/7/2019    Shortness of breath on exertion     Spinal stenosis     Stroke     mini stroke in 9/2019    Urinary retention        Past Surgical History:   Procedure Laterality Date    ANTERIOR VAGINAL REPAIR  10-    CARDIAC CATHETERIZATION  age 12    CHOLECYSTECTOMY  2015    COLONOSCOPY  12/12/2018    aborted due to poor colon prep    COLONOSCOPY N/A 12/12/2018    Procedure: COLONOSCOPY;  Surgeon: Renard Gonsalves MD;  Location: [facility] ENDO;  Service: Endoscopy;  Laterality: N/A;    HYSTERECTOMY  1989    AMANDA    LAMINECTOMY N/A 10/9/2019    Procedure: LAMINECTOMY, SPINE, L3, Open;  Surgeon: Martin Lewis DO;  Location: [facility] OR [address] [place] [address];  Service: Neurosurgery;  Laterality: N/A;    tubiligation         Review of patient's allergies indicates:   Allergen Reactions    Pcn [penicillins]        Family History     Problem Relation (Age of Onset)    Alzheimer's disease Mother    Hypertension Brother, Sister    Osteoarthritis Mother    Thyroid disease Mother        Tobacco Use    Smoking status: Current Every Day Smoker     Packs/day: 0.25     Years: 53.00     Pack years: 13.25     Types: Cigarettes     Start date: 1967    Smokeless tobacco: Never Used    Tobacco comment: 2-3 cigarettes per day  Pt is currently enrolled in the [employer].  Ambulatory referral to Smoking Cessation program.   Substance and Sexual Activity    Alcohol use: No    Drug use: No    Sexual activity: Yes     Partners: Male      Review of Systems   Constitutional: Positive for fatigue. Negative for chills and fever.   HENT: Negative for ear pain and sore  throat.    Eyes: Negative for photophobia and visual disturbance.   Respiratory: Negative for chest tightness, shortness of breath and wheezing.    Cardiovascular: Negative for chest pain and palpitations.   Gastrointestinal: Positive for abdominal distention, abdominal pain and diarrhea. Negative for constipation, nausea and vomiting.   Genitourinary: Positive for difficulty urinating. Negative for dysuria, hematuria and urgency.   Musculoskeletal: Negative for arthralgias, back pain and myalgias.   Skin: Negative for rash and wound.   Neurological: Positive for light-headedness. Negative for dizziness, syncope and weakness.   Psychiatric/Behavioral: Negative for agitation, behavioral problems and confusion.     Objective:     Vital Signs (Most Recent):  Temp: 98.1 °F (36.7 °C) (02/01/20 2114)  Pulse: (!) 112 (02/01/20 2255)  Resp: 16 (02/01/20 1952)  BP: 127/71 (02/01/20 2331)  SpO2: 100 % (02/01/20 2331) Vital Signs (24h Range):  Temp:  [97.8 °F (36.6 °C)-98.1 °F (36.7 °C)] 98.1 °F (36.7 °C)  Pulse:  [] 112  Resp:  [16] 16  SpO2:  [96 %-100 %] 100 %  BP: ()/(38-71) 127/71   Weight: 50.8 kg (112 lb)  Body mass index is 21.16 kg/m².      Intake/Output Summary (Last 24 hours) at 2/2/2020 0015  Last data filed at 2/1/2020 2327  Gross per 24 hour   Intake 1524 ml   Output 1300 ml   Net 224 ml       Physical Exam   Constitutional: She is oriented to person, place, and time. She appears well-developed and well-nourished.   HENT:   Head: Normocephalic and atraumatic.   Mouth/Throat: No oropharyngeal exudate.   Eyes: Conjunctivae and EOM are normal. No scleral icterus.   Neck: Normal range of motion. Neck supple.   Cardiovascular: Normal rate, regular rhythm, normal heart sounds and intact distal pulses.   Pulmonary/Chest: Effort normal and breath sounds normal.   Abdominal: Soft. Bowel sounds are normal. There is no tenderness.   Musculoskeletal: Normal range of motion.   Neurological: She is alert and  oriented to person, place, and time.   Skin: Skin is warm and dry.   Psychiatric: She has a normal mood and affect. Her behavior is normal. Judgment and thought content normal.   Vitals reviewed.      Vents:     Lines/Drains/Airways     Peripheral Intravenous Line                 Peripheral IV - Single Lumen 02/01/20 18 G Left Antecubital 1 day         Peripheral IV - Single Lumen 02/01/20 2000 22 G Right Forearm less than 1 day              Significant Labs:    CBC/Anemia Profile:  Recent Labs   Lab 02/01/20 2013 02/01/20 2020   WBC 32.68*  --    HGB 9.7*  --    HCT 31.0* 30*   *  --    *  --    RDW 14.7*  --         Chemistries:  Recent Labs   Lab 02/01/20 2013   *   K 3.8   CL 93*   CO2 17*   BUN 22   CREATININE 1.7*   CALCIUM 8.5*   ALBUMIN 2.5*   PROT 6.5   BILITOT 0.2   ALKPHOS 125   ALT 15   AST 15       Blood Culture: No results for input(s): LABBLOO in the last 48 hours.  Lactic Acid:   Recent Labs   Lab 02/01/20 2013 02/01/20  2325   LACTATE 3.2* 3.9*     Urine Culture: No results for input(s): LABURIN in the last 48 hours.  Urine Studies:   Recent Labs   Lab 02/01/20  2325   COLORU Yellow   APPEARANCEUA Clear   PHUR 6.0   SPECGRAV 1.005   PROTEINUA Negative   GLUCUA Negative   KETONESU Negative   BILIRUBINUA Negative   OCCULTUA 2+*   NITRITE Negative   LEUKOCYTESUR Negative   RBCUA 1   WBCUA 0   BACTERIA Occasional   SQUAMEPITHEL 0   HYALINECASTS 1     All pertinent labs within the past 24 hours have been reviewed.    Significant Imaging: I have reviewed all pertinent imaging results/findings within the past 24 hours.

## 2020-02-02 NOTE — PLAN OF CARE
History of C. Diff colitis with recurrent symptoms.  Here with abdominal complaints.  Will recheck lactic acid if has not trended down will consider CT of abdomen and pelvis with contrast.   1.  ABIMBOLA resolving.  Patient with a history of urinary retention, in and out cath at home.  2.  Hypokalemia, replace  3.  Sepsis with likely colitis monitor.  Titrate norepinephrine.  4.  RUQ pain.  TTP, no rebound or guarding  5.  H/o c. Diff colitis-completed treatment.

## 2020-02-02 NOTE — ASSESSMENT & PLAN NOTE
64 y.o. F IVDU w/ Hx of endocarditis, R thalamic stroke, epidural abscess, septic PE, empyema, shoulder abscess and recent Hx C. Diff colitis presenting with frequent stools, abdominal pain, lightheadedness, and fatigue. Patient [place] [facility] [place] who reported SBP in the 60s. Her initial BP in the ED was 70/41. Her initial ED labs were remarkable with a WBC of 32.68, Lactic Acid of 3.2, and an ABIMBOLA w/ a Cr of 1.7. Vitals and labs concerning for Sepsis; possibly 2/2 c diff colitis, bacteremia, or UTI. In the ED she was fluids resuscitated with 2.5L and given PO vanc, IV vanc, and cefepime. Repeat Lactic Acid worsened following fluids and increased to 3.9. Patient was given another 1L NS bolus. CXR unremarkable.     - c/w PO vanc   - c/w broad spectrum antibiotics   - obtain C.diff studies   - f/u blood cultures   - f/u urine studies   - will consider consulting ID pending Micro results  - will consider TTE pending Blood Cxs   - hold antihypertensive BP meds while sepsis is resolving   - Admit to the MICU

## 2020-02-02 NOTE — PROGRESS NOTES
Pharmacokinetic Initial Assessment: IV Vancomycin    Assessment/Plan:    Continue intravenous vancomycin, after loading dose of 1000 mg given before consult, with pulse dosing for patient with ABIMBOLA  Desired empiric serum trough concentration is 15 to 20 mcg/mL  Draw vancomycin random level on 2/02 at 2030.  Pharmacy will continue to follow and monitor vancomycin.      Please contact pharmacy at extension [phone] with any questions regarding this assessment.     Thank you for the consult,   Varun Peterseney       Patient brief summary:  Mesha Mckenzie is a 64 y.o. female initiated on antimicrobial therapy with IV Vancomycin for treatment of suspected bacteremia    Drug Allergies:   Review of patient's allergies indicates:   Allergen Reactions    Pcn [penicillins]        Actual Body Weight:   50.8kg    Renal Function:   Estimated Creatinine Clearance: 24.6 mL/min (A) (based on SCr of 1.7 mg/dL (H)).,     Dialysis Method (if applicable):  N/A    CBC (last 72 hours):  Recent Labs   Lab Result Units 02/01/20 2013   WBC K/uL 32.68*   Hemoglobin g/dL 9.7*   Hematocrit % 31.0*   Platelets K/uL 398*   Gran% % 76.0*   Lymph% % 7.0*   Mono% % 12.0   Eosinophil% % 1.0   Basophil% % 0.0   Differential Method  Manual       Metabolic Panel (last 72 hours):  Recent Labs   Lab Result Units 02/01/20 2013 02/01/20  2325   Sodium mmol/L 125*  --    Potassium mmol/L 3.8  --    Chloride mmol/L 93*  --    CO2 mmol/L 17*  --    Glucose mg/dL 136*  --    Glucose, UA   --  Negative   BUN, Bld mg/dL 22  --    Creatinine mg/dL 1.7*  --    Albumin g/dL 2.5*  --    Total Bilirubin mg/dL 0.2  --    Alkaline Phosphatase U/L 125  --    AST U/L 15  --    ALT U/L 15  --        Drug levels (last 3 results):  No results for input(s): VANCOMYCINRA, VANCOMYCINPE, VANCOMYCINTR in the last 72 hours.    Microbiologic Results:  Microbiology Results (last 7 days)     Procedure Component Value Units Date/Time    Clostridium difficile EIA [[id number]]      Order Status:  No result Specimen:  Stool     Blood culture x two cultures. Draw prior to antibiotics. [[id number]] Collected:  02/01/20 2021    Order Status:  Sent Specimen:  Blood from Peripheral, Hand, Right Updated:  02/01/20 2035    Blood culture x two cultures. Draw prior to antibiotics. [[id number]] Collected:  02/01/20 2014    Order Status:  Sent Specimen:  Blood from Peripheral, Forearm, Right Updated:  02/01/20 2034

## 2020-02-02 NOTE — PLAN OF CARE
CMICU DAILY GOALS       A: Awake    RASS: Goal - RASS Goal: 0-->alert and calm  Actual - RASS (Joyner Agitation-Sedation Scale): 0-->alert and calm   Restraint necessity:    B: Breath   SBT: Not intubated   C: Coordinate A & B, analgesics/sedatives   Pain: managed    SAT: Not intubated  D: Delirium   CAM-ICU: Overall CAM-ICU: Negative  E: Early Mobility   MOVE Screen: Pass   Activity: Activity Management: activity adjusted per tolerance, activity clustered for rest period  FAS: Feeding/Nutrition   Diet order: Diet/Nutrition Received: regular,   Fluid restriction:    T: Thrombus   DVT prophylaxis: VTE Required Core Measure: (SCDs) Sequential compression device initiated/maintained  H: HOB Elevation   Head of Bed (HOB): HOB at 30-45 degrees  U: Ulcer Prophylaxis   GI: no  G: Glucose control   managed    S: Skin   Bundle compliance: yes   Bathing/Skin Care: bath, chlorhexidine, bath, partial, dressed/undressed, incontinence care, linen changed Date: 2/2/2020, Night Shift  B: Bowel Function   diarrhea   I: Indwelling Catheters   Henriquez necessity:     CVC necessity: Yes   IPAD offered: Not appropriate  D: De-escalation Antibx   No  Plan for the day   Continue no monitor labs, maintain BP goal.  Family/Goals of care/Code Status   Code Status: Full Code     No acute events throughout day, VS and assessment per flow sheet, patient progressing towards goals as tolerated, plan of care reviewed with Mesha Mckenzie and family, all concerns addressed, will continue to monitor.

## 2020-02-02 NOTE — CONSULTS
Patient evaluated by the MICU and will be admitted to the ICU for sepsis     Thank you for your consult. I will follow-up with patient. Please contact us if you have any additional questions.     Mathieu Mckeon MD/MS  [facility]   Internal Medicine, PGY-2

## 2020-02-02 NOTE — HPI
64 y.o. F IVDU w/ HTN, HFpEF,pHTN, BOOP, depression, urinary retention, Hx of endocarditis, R thalamic stroke, epidural abscess, septic PE, empyema, shoulder abscess and recent Hx C. Diff colitis presenting with frequent stools, abdominal pain, lightheadedness, and fatigue. Patient denies fevers, chills, chest pain, palpitation, SOB, syncope, LOC, HA , or confusion. Patient was transported to [facility] ED via EMS who reported SBP in the 60s. Her initial BP in the ED was 70/41. Her initial ED labs were remarkable with a WBC of 32.68, Lactic Acid of 3.2, and an ABIMBOLA w/ a Cr of 1.7. Vitals and labs concerning for Sepsis; possibly 2/2 c diff colitis, bacteremia, or UTI. In the ED Blood Cx and urine studies were obtained. She was fluids resuscitated with 2.5L and given PO vanc, IV vanc, and cefepime. At the time of her initial evaluation by the MICU the patient's only complaint was abdominal distension that she attributed to a full bladder due to difficulty urinating. The patient was A&Ox3, answering all questions appropriately, her MAPs were > 65, and her abdomen was mildly tender. Her exam was otherwise unremarkable. Her CXR was unremarkable.Her repeat Lactic Acid worsened following fluids and increased to 3.9. The patient was given another 1L NS bolus.

## 2020-02-02 NOTE — PROGRESS NOTES
Pharmacokinetic Assessment Follow Up: IV Vancomycin    Vancomycin Regimen Assessment/Plan:    - Vancomycin random level resulted as 10.7 mg/dl.  Level was drawn approximately 13 hours after the loading dose.   - Patient admitted with ABIMBOLA, SCr improving 1.7 --> 1.1 mg/dl, still in shock requiring vasopressors.   - Initiate vancomycin 750 mg every 24 hours.    - A vancomycin trough level is ordered prior to the 3rd dose on 2/4 at 11:30.  Goal 10-20 mg/dl.     Drug levels (last 3 results):  Recent Labs   Lab Result Units 02/02/20  1015   Vancomycin, Random ug/mL 10.7       Pharmacy will continue to follow and monitor vancomycin.    Please contact pharmacy at extension [phone] for questions regarding this assessment.    Thank you for the consult,   Karly Richard, PharmD, BCCCP       Patient brief summary:  Mesha Mckenzie is a 64 y.o. female initiated on antimicrobial therapy with IV Vancomycin for treatment of bacteremia    Drug Allergies:   Review of patient's allergies indicates:   Allergen Reactions    Pcn [penicillins]        Actual Body Weight:   50.8 kg    Renal Function:   Estimated Creatinine Clearance: 38 mL/min (based on SCr of 1.1 mg/dL).,     Dialysis Method (if applicable):  NA    CBC (last 72 hours):  Recent Labs   Lab Result Units 02/01/20 2013 02/02/20  0306   WBC K/uL 32.68* 20.45*   Hemoglobin g/dL 9.7* 9.4*   Hematocrit % 31.0* 30.3*   Platelets K/uL 398* 326   Gran% % 76.0* 67.0   Lymph% % 7.0* 9.0*   Mono% % 12.0 7.0   Eosinophil% % 1.0 2.0   Basophil% % 0.0 1.0   Differential Method  Manual Manual       Metabolic Panel (last 72 hours):  Recent Labs   Lab Result Units 02/01/20 2013 02/01/20  2325 02/02/20  0306 02/02/20  1015   Sodium mmol/L 125*  --  133* 135*   Potassium mmol/L 3.8  --  3.2* 4.0   Chloride mmol/L 93*  --  106 108   CO2 mmol/L 17*  --  16* 15*   Glucose mg/dL 136*  --  122* 216*   Glucose, UA   --  Negative  --   --    BUN, Bld mg/dL 22  --  16 10   Creatinine mg/dL 1.7*   --  1.1 1.1   Albumin g/dL 2.5*  --  2.2*  --    Total Bilirubin mg/dL 0.2  --  0.2  --    Alkaline Phosphatase U/L 125  --  120  --    AST U/L 15  --  14  --    ALT U/L 15  --  12  --    Magnesium mg/dL  --   --  1.4*  --    Phosphorus mg/dL  --   --  2.7  --        Vancomycin Administrations:  vancomycin given in the last 96 hours                   vancomycin 250mg / 10ml oral suspension 125 mg (mg) 125 mg Given 02/02/20 0841     125 mg Given  0118    vancomycin 250mg / 10ml oral suspension 125 mg (mg) 125 mg Given 02/01/20 2127    vancomycin in dextrose 5 % 1 gram/250 mL IVPB 1,000 mg (mg) 1,000 mg New Bag 02/01/20 2037                Microbiologic Results:  Microbiology Results (last 7 days)     Procedure Component Value Units Date/Time    Respiratory Infection Panel, Nasopharyngeal [[id number]]     Order Status:  No result Specimen:  Nasopharyngeal Swab     Blood culture x two cultures. Draw prior to antibiotics. [[id number]] Collected:  02/01/20 2021    Order Status:  Completed Specimen:  Blood from Peripheral, Hand, Right Updated:  02/02/20 0515     Blood Culture, Routine No Growth to date    Narrative:       Aerobic and anaerobic    Blood culture x two cultures. Draw prior to antibiotics. [[id number]] Collected:  02/01/20 2014    Order Status:  Completed Specimen:  Blood from Peripheral, Forearm, Right Updated:  02/02/20 0515     Blood Culture, Routine No Growth to date    Narrative:       Aerobic and anaerobic    Clostridium difficile EIA [[id number]] Collected:  02/02/20 0358    Order Status:  Sent Specimen:  Stool Updated:  02/02/20 3126

## 2020-02-02 NOTE — ED NOTES
Pt is sitting up in bed resting comfortably, updated on POC, connected to cardiac monitoring, pulse ox and bp cuff. MD bedside and aware of bp. Pt aaxo4

## 2020-02-02 NOTE — ASSESSMENT & PLAN NOTE
Hx of MSSA Bacterial Endocarditis s/p Oxacillin x 6 weeks and Cefadroxil x 4 weeks. Bacteremia a possible source of sepsis.     - f/u Blood Cxs   - Repeat TTE pending Blood Cxs

## 2020-02-02 NOTE — ED PROVIDER NOTES
Encounter Date: 2/1/2020       History     Chief Complaint   Patient presents with    Diarrhea     Pt with complaints of diarrhea with abdominal cramping.  Pt was discharged from this hospital approximatelyt one week ago for treatment of C.diff.     HPI     This is a 64-year-old woman with a history of IV drug use, multiple prior systemic infections including sepsis, septic arthritis, endocarditis and most recently hospitalized for C diff colitis for which she has completed a course of p.o. vancomycin, called EMS today with lightheadedness, increase in stool frequency, greater than 8 times today, and was found to be hypotensive with EMS, systolic in the 60s.  She denies fevers or chills, but does feel very fatigued today.  History is limited due to patient acuity.  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    GI bleed     History of uterine fibroid     Hypertension     Right thalamic stroke 10/7/2019    Shortness of breath on exertion     Spinal stenosis     Stroke     mini stroke in 9/2019    Urinary retention      Past Surgical History:   Procedure Laterality Date    ANTERIOR VAGINAL REPAIR  10-    CARDIAC CATHETERIZATION  age 12    CHOLECYSTECTOMY  2015    COLONOSCOPY  12/12/2018    aborted due to poor colon prep    COLONOSCOPY N/A 12/12/2018    Procedure: COLONOSCOPY;  Surgeon: Renard Gonsalves MD;  Location: [facility];  Service: Endoscopy;  Laterality: N/A;    HYSTERECTOMY  1989    AMANDA    LAMINECTOMY N/A 10/9/2019    Procedure: LAMINECTOMY, SPINE, L3, Open;  Surgeon: Martin Lewis DO;  Location: [address];  Service: Neurosurgery;  Laterality: N/A;     tubiligation       Family History   Problem Relation Age of Onset    Alzheimer's disease Mother     Thyroid disease Mother     Osteoarthritis Mother     Hypertension Brother     Hypertension Sister     Breast cancer Neg Hx     Colon cancer Neg Hx     Ovarian cancer Neg Hx      Social History     Tobacco Use    Smoking status: Current Every Day Smoker     Packs/day: 0.25     Years: 53.00     Pack years: 13.25     Types: Cigarettes     Start date: 1967    Smokeless tobacco: Never Used    Tobacco comment: 2-3 cigarettes per day  Pt is currently enrolled in the [employer].  Ambulatory referral to Smoking Cessation program.   Substance Use Topics    Alcohol use: No    Drug use: No     Review of Systems   Constitutional: Positive for activity change, appetite change, chills and fatigue. Negative for diaphoresis and fever.   HENT: Negative for congestion, rhinorrhea and sore throat.    Eyes: Negative for visual disturbance.   Respiratory: Negative for cough, chest tightness and shortness of breath.    Cardiovascular: Negative for chest pain.   Gastrointestinal: Positive for abdominal pain and diarrhea. Negative for blood in stool, constipation and vomiting.   Genitourinary: Negative for dysuria, hematuria and urgency.   Musculoskeletal: Negative for back pain.   Skin: Negative for rash.   Neurological: Negative for seizures and syncope.   Hematological: Does not bruise/bleed easily.   Psychiatric/Behavioral: Negative for agitation and hallucinations.       Physical Exam     Initial Vitals [02/01/20 1952]   BP Pulse Resp Temp SpO2   (!) 70/41 98 16 97.8 °F (36.6 °C) 97 %      MAP       --         Physical Exam  Gen: AxOx3, NAD, well nourished  Eye: sclera anicteric, EOMI, no conjunctivitis, no periorbital edema  ENT: NCAT, OP clear, neck supple with FROM, no stridor, mucous membranes dry  CVS: RRR, no m/r/g, distal pulses intact/symmetric  Pulm: CTAB, no wheezes, rales or rhonchi, no increased work of  breathing  Abd: soft, diffusely tender to palpation without any focal guarding or rebound, nondistended, no organomegaly, no CVAT  Ext: no edema, lesions, rashes, or deformity  Neuro: GCS15, moving all extremities, gait intact  Psych: normal affect, cooperative  ED Course   Procedures  Labs Reviewed   CBC W/ AUTO DIFFERENTIAL - Abnormal; Notable for the following components:       Result Value    WBC 32.68 (*)     RBC 3.00 (*)     Hemoglobin 9.7 (*)     Hematocrit 31.0 (*)     Mean Corpuscular Volume 103 (*)     Mean Corpuscular Hemoglobin 32.3 (*)     Mean Corpuscular Hemoglobin Conc 31.3 (*)     RDW 14.7 (*)     Platelets 398 (*)     Gran% 76.0 (*)     Lymph% 7.0 (*)     All other components within normal limits   COMPREHENSIVE METABOLIC PANEL - Abnormal; Notable for the following components:    Sodium 125 (*)     Chloride 93 (*)     CO2 17 (*)     Glucose 136 (*)     Creatinine 1.7 (*)     Calcium 8.5 (*)     Albumin 2.5 (*)     eGFR if  35.7 (*)     eGFR if non  31.0 (*)     All other components within normal limits   LACTIC ACID, PLASMA - Abnormal; Notable for the following components:    Lactate (Lactic Acid) 3.2 (*)     All other components within normal limits   URINALYSIS, REFLEX TO URINE CULTURE - Abnormal; Notable for the following components:    Occult Blood UA 2+ (*)     All other components within normal limits    Narrative:     Preferred Collection Type->Urine, Clean Catch   LACTIC ACID, PLASMA - Abnormal; Notable for the following components:    Lactate (Lactic Acid) 3.9 (*)     All other components within normal limits   ISTAT PROCEDURE - Abnormal; Notable for the following components:    POC Glucose 144 (*)     POC Creatinine 1.6 (*)     POC Sodium 122 (*)     POC Chloride 92 (*)     POC TCO2 (MEASURED) 18 (*)     POC Ionized Calcium 1.03 (*)     POC Hematocrit 30 (*)     All other components within normal limits   CULTURE, BLOOD   CULTURE, BLOOD   CLOSTRIDIUM  DIFFICILE   URINALYSIS MICROSCOPIC    Narrative:     Preferred Collection Type->Urine, Clean Catch   LACTIC ACID, PLASMA   ISTAT CHEM8     EKG Readings: (Independently Interpreted)   Normal sinus rhythm at a rate of 91, no obvious ST segment changes in comparison to prior.  Intervals are within normal limits.       Imaging Results          X-Ray Chest AP Portable (Final result)  Result time 02/01/20 20:58:18    Final result by Raphael Toth MD (02/01/20 20:58:18)                 Impression:      No acute radiographic abnormality.      Electronically signed by: Raphael Toth  Date:    02/01/2020  Time:    20:58             Narrative:    EXAMINATION:  XR CHEST AP PORTABLE    CLINICAL HISTORY:  Sepsis;    TECHNIQUE:  Single frontal view of the chest was performed.    COMPARISON:  10/28/2019    FINDINGS:  The lungs are clear, with normal appearance of pulmonary vasculature and no pleural effusion or pneumothorax.    The cardiac silhouette is normal in size. The hilar and mediastinal contours are unremarkable.    Bones are intact.    Interval resolution of the bilateral infiltrates on the prior study.    Mild probable left upper lobe scarring.                                 Medical Decision Making:   Initial Assessment:   This is a 64-year-old woman with a history of recent multiple infections, complicated by C diff colitis who presents with hypotension, tachycardia and abdominal pain in the setting of recurrent large volume diarrhea, and I am very concerned about recurrent C diff versus sepsis from another etiology.  Plan for broad spectrum antibiotics after blood cultures, labs, 30 cc/kilos IV fluid bolus, serial lactate, and close monitoring of her blood pressure.  If she does not improve, we will initiate pressor support.  Clinical Tests:   Lab Tests: Ordered and Reviewed  Radiological Study: Ordered and Reviewed  Sepsis Perfusion Assessment: I attest, a sepsis perfusion exam was performed within 6 hours of  Septic Shock presentation, following fluid resuscitation.  ED Management:  Chest x-ray by my independent interpretation does not show an obvious source for her hypotension.  Patient's labs are notable for profoundly elevated white blood cell count, consistent with C diff versus severe sepsis.  She also has hyponatremia, acute kidney injury, and an elevated lactate.  She continue to be hypotensive despite fluid resuscitation, so I have discussed the patient with the intensive care unit team, who has accepted her for admission.  I have initiated pressors in addition to continuing IV fluids.  She has been given broad-spectrum antibiotics in the form of IV and p.o. vancomycin and IV cefepime.  Patient was admitted in critical condition.  Other:   I have discussed this case with another health care provider.              Attending Attestation:         Attending Critical Care:   Critical Care Times:   Direct Patient Care (initial evaluation, reassessments, and time considering the case)................................................................20 minutes.   Additional History from reviewing old medical records or taking additional history from the family, EMS, PCP, etc.......................5 minutes.   Ordering, Reviewing, and Interpreting Diagnostic Studies...............................................................................................................5 minutes.   Documentation..................................................................................................................................................................................5 minutes.   Consultation with other Physicians. .................................................................................................................................................5 minutes.   ==============================================================  · Total Critical Care Time - exclusive of procedural time: 40  minutes.  ==============================================================  Critical care was necessary to treat or prevent imminent or life-threatening deterioration of the following conditions: sepsis.   The following critical care procedures were done by me (see procedure notes): pulse oximetry.   Critical care was time spent personally by me on the following activities: obtaining history from patient or relative, examination of patient, review of old charts, ordering lab, x-rays, and/or EKG, development of treatment plan with patient or relative, ordering and performing treatments and interventions, evaluation of patient's response to treatment, discussion with consultants, interpretation of cardiac measurements and re-evaluation of patient's conition.   Critical Care Condition: critical                             Clinical Impression:       ICD-10-CM ICD-9-CM   1. Septic shock due to undetermined organism A41.9 038.9    R65.21 785.52     995.92   2. Hypotension I95.9 458.9   3. Acute kidney injury N17.9 584.9   4. Hyponatremia E87.1 276.1                             Carlie Valdes MD  02/02/20 0350

## 2020-02-02 NOTE — ASSESSMENT & PLAN NOTE
Patient w/ ABIMBOLA on admission w/ Cr of 1.7 when baseline is < 1.0. Likely etiologies include prerenal azotemia 2/2 GI loss & hypoperfusion 2/2 sepsis, septic ATN, and/or obstructive uropathy.    - daily CMP   - avoid nephrotoxic agents  - renally dose medication when appropriate  - will continue to treat possible etiologies of sepsis    - Maintain MAP > 65   - bladder scan Q6H   - strict I&O's

## 2020-02-02 NOTE — ED TRIAGE NOTES
64 year old female presents to ED with complaints of abdominal cramping and 8 episodes of ,diarrhea on today. Pt also reports dx of c diff 2 weeks ago and receiving vancomycin. [email or web address]

## 2020-02-03 PROBLEM — R57.9 SHOCK: Status: ACTIVE | Noted: 2020-02-03

## 2020-02-03 PROBLEM — N17.9 AKI (ACUTE KIDNEY INJURY): Status: RESOLVED | Noted: 2019-09-19 | Resolved: 2020-02-03

## 2020-02-03 LAB
ADENOVIRUS: NOT DETECTED
ALBUMIN SERPL BCP-MCNC: 2.1 G/DL (ref 3.5–5.2)
ALP SERPL-CCNC: 175 U/L (ref 55–135)
ALT SERPL W/O P-5'-P-CCNC: 14 U/L (ref 10–44)
ANION GAP SERPL CALC-SCNC: 10 MMOL/L (ref 8–16)
AST SERPL-CCNC: 24 U/L (ref 10–40)
BASOPHILS # BLD AUTO: 0.2 K/UL (ref 0–0.2)
BASOPHILS NFR BLD: 0.5 % (ref 0–1.9)
BILIRUB SERPL-MCNC: 0.2 MG/DL (ref 0.1–1)
BORDETELLA PARAPERTUSSIS (IS1001): NOT DETECTED
BORDETELLA PERTUSSIS (PTXP): NOT DETECTED
BUN SERPL-MCNC: 7 MG/DL (ref 8–23)
CA-I BLDV-SCNC: 1.16 MMOL/L (ref 1.06–1.42)
CALCIUM SERPL-MCNC: 8.1 MG/DL (ref 8.7–10.5)
CHLAMYDIA PNEUMONIAE: NOT DETECTED
CHLORIDE SERPL-SCNC: 103 MMOL/L (ref 95–110)
CO2 SERPL-SCNC: 17 MMOL/L (ref 23–29)
CORONAVIRUS 229E, COMMON COLD VIRUS: NOT DETECTED
CORONAVIRUS HKU1, COMMON COLD VIRUS: NOT DETECTED
CORONAVIRUS NL63, COMMON COLD VIRUS: NOT DETECTED
CORONAVIRUS OC43, COMMON COLD VIRUS: NOT DETECTED
CREAT SERPL-MCNC: 0.8 MG/DL (ref 0.5–1.4)
DIFFERENTIAL METHOD: ABNORMAL
EOSINOPHIL # BLD AUTO: 0.1 K/UL (ref 0–0.5)
EOSINOPHIL NFR BLD: 0.2 % (ref 0–8)
ERYTHROCYTE [DISTWIDTH] IN BLOOD BY AUTOMATED COUNT: 14.5 % (ref 11.5–14.5)
EST. GFR  (AFRICAN AMERICAN): >60 ML/MIN/1.73 M^2
EST. GFR  (NON AFRICAN AMERICAN): >60 ML/MIN/1.73 M^2
FLUBV RNA NPH QL NAA+NON-PROBE: NOT DETECTED
GLUCOSE SERPL-MCNC: 165 MG/DL (ref 70–110)
HCT VFR BLD AUTO: 31.9 % (ref 37–48.5)
HGB BLD-MCNC: 10.5 G/DL (ref 12–16)
HPIV1 RNA NPH QL NAA+NON-PROBE: NOT DETECTED
HPIV2 RNA NPH QL NAA+NON-PROBE: NOT DETECTED
HPIV3 RNA NPH QL NAA+NON-PROBE: NOT DETECTED
HPIV4 RNA NPH QL NAA+NON-PROBE: NOT DETECTED
HUMAN METAPNEUMOVIRUS: NOT DETECTED
IMM GRANULOCYTES # BLD AUTO: 1.7 K/UL (ref 0–0.04)
IMM GRANULOCYTES NFR BLD AUTO: 4.6 % (ref 0–0.5)
INFLUENZA A (SUBTYPES H1,H1-2009,H3): NOT DETECTED
INR PPP: 1.1 (ref 0.8–1.2)
LACTATE SERPL-SCNC: 1.2 MMOL/L (ref 0.5–2.2)
LYMPHOCYTES # BLD AUTO: 1.6 K/UL (ref 1–4.8)
LYMPHOCYTES NFR BLD: 4.4 % (ref 18–48)
MAGNESIUM SERPL-MCNC: 1.9 MG/DL (ref 1.6–2.6)
MCH RBC QN AUTO: 32.7 PG (ref 27–31)
MCHC RBC AUTO-ENTMCNC: 32.9 G/DL (ref 32–36)
MCV RBC AUTO: 99 FL (ref 82–98)
MONOCYTES # BLD AUTO: 1.9 K/UL (ref 0.3–1)
MONOCYTES NFR BLD: 5 % (ref 4–15)
MYCOPLASMA PNEUMONIAE: NOT DETECTED
NEUTROPHILS # BLD AUTO: 31.3 K/UL (ref 1.8–7.7)
NEUTROPHILS NFR BLD: 85.3 % (ref 38–73)
NRBC BLD-RTO: 0 /100 WBC
PHOSPHATE SERPL-MCNC: 2 MG/DL (ref 2.7–4.5)
PHOSPHATE SERPL-MCNC: 2.3 MG/DL (ref 2.7–4.5)
PLATELET # BLD AUTO: 404 K/UL (ref 150–350)
PLATELET BLD QL SMEAR: ABNORMAL
PMV BLD AUTO: 9.7 FL (ref 9.2–12.9)
POTASSIUM SERPL-SCNC: 3.5 MMOL/L (ref 3.5–5.1)
PROT SERPL-MCNC: 6 G/DL (ref 6–8.4)
PROTHROMBIN TIME: 11.1 SEC (ref 9–12.5)
RBC # BLD AUTO: 3.21 M/UL (ref 4–5.4)
RESPIRATORY INFECTION PANEL SOURCE: NORMAL
RSV RNA NPH QL NAA+NON-PROBE: NOT DETECTED
RV+EV RNA NPH QL NAA+NON-PROBE: NOT DETECTED
SODIUM SERPL-SCNC: 130 MMOL/L (ref 136–145)
WBC # BLD AUTO: 36.77 K/UL (ref 3.9–12.7)

## 2020-02-03 PROCEDURE — 63600175 PHARM REV CODE 636 W HCPCS: Performed by: STUDENT IN AN ORGANIZED HEALTH CARE EDUCATION/TRAINING PROGRAM

## 2020-02-03 PROCEDURE — 80053 COMPREHEN METABOLIC PANEL: CPT

## 2020-02-03 PROCEDURE — 25000003 PHARM REV CODE 250: Performed by: STUDENT IN AN ORGANIZED HEALTH CARE EDUCATION/TRAINING PROGRAM

## 2020-02-03 PROCEDURE — 63600175 PHARM REV CODE 636 W HCPCS: Performed by: NURSE PRACTITIONER

## 2020-02-03 PROCEDURE — 87040 BLOOD CULTURE FOR BACTERIA: CPT

## 2020-02-03 PROCEDURE — 25000003 PHARM REV CODE 250: Performed by: NURSE PRACTITIONER

## 2020-02-03 PROCEDURE — 87798 DETECT AGENT NOS DNA AMP: CPT

## 2020-02-03 PROCEDURE — 11000001 HC ACUTE MED/SURG PRIVATE ROOM

## 2020-02-03 PROCEDURE — 99223 1ST HOSP IP/OBS HIGH 75: CPT | Mod: ,,, | Performed by: INTERNAL MEDICINE

## 2020-02-03 PROCEDURE — 85610 PROTHROMBIN TIME: CPT

## 2020-02-03 PROCEDURE — 99223 PR INITIAL HOSPITAL CARE,LEVL III: ICD-10-PCS | Mod: ,,, | Performed by: INTERNAL MEDICINE

## 2020-02-03 PROCEDURE — 83605 ASSAY OF LACTIC ACID: CPT

## 2020-02-03 PROCEDURE — 63600175 PHARM REV CODE 636 W HCPCS: Performed by: INTERNAL MEDICINE

## 2020-02-03 PROCEDURE — S0030 INJECTION, METRONIDAZOLE: HCPCS | Performed by: NURSE PRACTITIONER

## 2020-02-03 PROCEDURE — 99291 CRITICAL CARE FIRST HOUR: CPT | Mod: ,,, | Performed by: NURSE PRACTITIONER

## 2020-02-03 PROCEDURE — 84100 ASSAY OF PHOSPHORUS: CPT | Mod: 91

## 2020-02-03 PROCEDURE — 25500020 PHARM REV CODE 255: Performed by: INTERNAL MEDICINE

## 2020-02-03 PROCEDURE — 83735 ASSAY OF MAGNESIUM: CPT

## 2020-02-03 PROCEDURE — 84100 ASSAY OF PHOSPHORUS: CPT

## 2020-02-03 PROCEDURE — 85025 COMPLETE CBC W/AUTO DIFF WBC: CPT

## 2020-02-03 PROCEDURE — 82330 ASSAY OF CALCIUM: CPT

## 2020-02-03 PROCEDURE — 99291 PR CRITICAL CARE, E/M 30-74 MINUTES: ICD-10-PCS | Mod: ,,, | Performed by: NURSE PRACTITIONER

## 2020-02-03 RX ORDER — ONDANSETRON 2 MG/ML
4 INJECTION INTRAMUSCULAR; INTRAVENOUS EVERY 8 HOURS PRN
Status: DISCONTINUED | OUTPATIENT
Start: 2020-02-03 | End: 2020-02-06 | Stop reason: HOSPADM

## 2020-02-03 RX ORDER — ACETAMINOPHEN 500 MG
1000 TABLET ORAL ONCE
Status: COMPLETED | OUTPATIENT
Start: 2020-02-03 | End: 2020-02-03

## 2020-02-03 RX ORDER — ENOXAPARIN SODIUM 100 MG/ML
40 INJECTION SUBCUTANEOUS EVERY 24 HOURS
Status: DISCONTINUED | OUTPATIENT
Start: 2020-02-03 | End: 2020-02-06 | Stop reason: HOSPADM

## 2020-02-03 RX ORDER — ACETAMINOPHEN 325 MG/1
650 TABLET ORAL EVERY 6 HOURS PRN
Status: DISCONTINUED | OUTPATIENT
Start: 2020-02-03 | End: 2020-02-06 | Stop reason: HOSPADM

## 2020-02-03 RX ADMIN — Medication 1 CAPSULE: at 08:02

## 2020-02-03 RX ADMIN — POTASSIUM CHLORIDE 40 MEQ: 20 SOLUTION ORAL at 05:02

## 2020-02-03 RX ADMIN — METRONIDAZOLE 500 MG: 500 INJECTION, SOLUTION INTRAVENOUS at 11:02

## 2020-02-03 RX ADMIN — ACETAMINOPHEN 1000 MG: 500 TABLET ORAL at 05:02

## 2020-02-03 RX ADMIN — ONDANSETRON 4 MG: 2 INJECTION INTRAMUSCULAR; INTRAVENOUS at 01:02

## 2020-02-03 RX ADMIN — POTASSIUM & SODIUM PHOSPHATES POWDER PACK 280-160-250 MG 2 PACKET: 280-160-250 PACK at 01:02

## 2020-02-03 RX ADMIN — METRONIDAZOLE 500 MG: 500 INJECTION, SOLUTION INTRAVENOUS at 06:02

## 2020-02-03 RX ADMIN — VENLAFAXINE 75 MG: 37.5 TABLET ORAL at 08:02

## 2020-02-03 RX ADMIN — METRONIDAZOLE 500 MG: 500 INJECTION, SOLUTION INTRAVENOUS at 05:02

## 2020-02-03 RX ADMIN — Medication 500 MG: at 06:02

## 2020-02-03 RX ADMIN — SODIUM CHLORIDE 1000 ML: 0.9 INJECTION, SOLUTION INTRAVENOUS at 06:02

## 2020-02-03 RX ADMIN — CEFEPIME 1 G: 1 INJECTION, POWDER, FOR SOLUTION INTRAMUSCULAR; INTRAVENOUS at 11:02

## 2020-02-03 RX ADMIN — FOLIC ACID 1 MG: 1 TABLET ORAL at 08:02

## 2020-02-03 RX ADMIN — ACETAMINOPHEN 650 MG: 325 TABLET ORAL at 08:02

## 2020-02-03 RX ADMIN — IOHEXOL 75 ML: 350 INJECTION, SOLUTION INTRAVENOUS at 12:02

## 2020-02-03 RX ADMIN — ATORVASTATIN CALCIUM 40 MG: 20 TABLET, FILM COATED ORAL at 08:02

## 2020-02-03 RX ADMIN — ENOXAPARIN SODIUM 40 MG: 100 INJECTION SUBCUTANEOUS at 06:02

## 2020-02-03 RX ADMIN — Medication 500 MG: at 11:02

## 2020-02-03 RX ADMIN — VANCOMYCIN HYDROCHLORIDE 750 MG: 750 INJECTION, POWDER, LYOPHILIZED, FOR SOLUTION INTRAVENOUS at 11:02

## 2020-02-03 RX ADMIN — ACETAMINOPHEN 650 MG: 325 TABLET ORAL at 01:02

## 2020-02-03 NOTE — HPI
A 64-year-old woman with IVDU, diastolic CHF, HTN, MDD, right thalamic stroke, MSSA MV and TV endocarditis with associated bacteremia, empyema, shoulder abscess, plus epidural abscess, s/p laminectomy, s/p 6 weeks of oxacillin, and recent C difficile colitis who developed worsening diarrhea with associated abdominal pain, lightheadedness and fatigue. She was evaluated by EMS and found to be hypotensive with SBP in 60's range. On evaluation in [facility] ED she was hypotensive with Bp of 70/41, with leukocytosis, lactic acidosis and increase creatinine consistent with AKIN. She was admitted to ICU for management of septic shock suspected to be due to colitis. She was started on broad spectrum antibiotics with cefepime, flagyl and IV vancomycin. After C difficile testing became positive oral vancomycin was ordered.     Mrs. Mckenzie does not have penicillin allergy as she tolerated oxacillin without adverse effect. She has pet dogs and cats. She denies active toxic habits. No recent travel or known sick contacts.     Infectious Diseases consulted for management recommendations.

## 2020-02-03 NOTE — CONSULTS
Therapy with vancomycin complete and consult discontinued by provider.  Pharmacy will sign off, please re-consult as needed.    Kay Franklin, PharmD, BCCCP  [id number]

## 2020-02-03 NOTE — CONSULTS
[facility]  Infectious Disease  Consult Note    Patient Name: Mesha Mckenzie  MRN: [id number]  Admission Date: 2/1/2020  Hospital Length of Stay: 1 days  Attending Physician: Anton Jimeenz*  Primary Care Provider: Varun Shea MD PhD     Isolation Status: Special Contact    Patient information was obtained from patient, past medical records and ER records.      Inpatient consult to Infectious Diseases  Consult performed by: Veronique Orellana MD  Consult ordered by: Ramirez Padilla NP        Assessment/Plan:     * Sepsis  Secondary to severe C difficile colitis (episode #2)  · See below.     Clostridium difficile infection  Unclear if patient adherent to therapy on discharge. Currently with watery stools.   · Discontinue cefepime which will only exacerbate C difficile.   · Can continue metronidazole for 24 more hours however in the absence of toxic megacolon would discontinue.   · Continue oral vancomycin every 6 hours. Avoid giving binding agents with it.   · Discontinue IV vancomycin.   · Discussed with Dr. Jimenez.     History of bacterial endocarditis  Treatment completed.   · Will follow up cultures.       Thank you for your consult. I will follow-up with patient. Please contact us if you have any additional questions.    Veronique Orellana MD  Infectious Disease  [facility]    Subjective:     Principal Problem: Sepsis    HPI: A 64-year-old woman with IVDU, diastolic CHF, HTN, MDD, right thalamic stroke, MSSA MV and TV endocarditis with associated bacteremia, empyema, shoulder abscess, plus epidural abscess, s/p laminectomy, s/p 6 weeks of oxacillin, and recent C difficile colitis who developed worsening diarrhea with associated abdominal pain, lightheadedness and fatigue. She was evaluated by EMS and found to be hypotensive with SBP in 60's range. On evaluation in [facility] [place] she was hypotensive with Bp of 70/41, with leukocytosis, lactic acidosis and  increase creatinine consistent with AKIN. She was admitted to ICU for management of septic shock suspected to be due to colitis. She was started on broad spectrum antibiotics with cefepime, flagyl and IV vancomycin. After C difficile testing became positive oral vancomycin was ordered.     Mrs. Mckenzie does not have penicillin allergy as she tolerated oxacillin without adverse effect. She has pet dogs and cats. She denies active toxic habits. No recent travel or known sick contacts.     Infectious Diseases consulted for management recommendations.             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    GI bleed     History of uterine fibroid     Hypertension     Right thalamic stroke 10/7/2019    Shortness of breath on exertion     Spinal stenosis     Stroke     mini stroke in 9/2019    Urinary retention        Past Surgical History:   Procedure Laterality Date    ANTERIOR VAGINAL REPAIR  10-    CARDIAC CATHETERIZATION  age 12    CHOLECYSTECTOMY  2015    COLONOSCOPY  12/12/2018    aborted due to poor colon prep    COLONOSCOPY N/A 12/12/2018    Procedure: COLONOSCOPY;  Surgeon: Renard Gonsalves MD;  Location: [facility];  Service: Endoscopy;  Laterality: N/A;    HYSTERECTOMY  1989    AMANDA    LAMINECTOMY N/A 10/9/2019    Procedure: LAMINECTOMY, SPINE, L3, Open;  Surgeon: Martin Lewis DO;  Location: [facility] OR [address] [place] [address];  Service: Neurosurgery;  Laterality: N/A;    tubiligation         Review of patient's allergies indicates:   Allergen Reactions    Pcn [penicillins]        Medications:  Medications Prior to Admission   Medication Sig    amLODIPine (NORVASC) 10 MG tablet Take 1 tablet (10 mg total) by mouth once daily. HOLD until follow-up with primary  care provider    atorvastatin (LIPITOR) 40 MG tablet Take 1 tablet (40 mg total) by mouth once daily.    folic acid (FOLVITE) 1 MG tablet Take 1 tablet (1 mg total) by mouth once daily.    gabapentin (NEURONTIN) 100 MG capsule Take 2 capsules (200 mg total) by mouth 3 (three) times daily.    Lactobacillus rhamnosus GG (CULTURELLE) 10 billion cell capsule Take 1 capsule by mouth 2 (two) times daily.    metoprolol tartrate (LOPRESSOR) 100 MG tablet Take 1 tablet (100 mg total) by mouth 2 (two) times daily.    venlafaxine (EFFEXOR) 75 MG tablet Take 1 tablet (75 mg total) by mouth 2 (two) times daily.    furosemide (LASIX) 20 MG tablet Take 1 tablet (20 mg total) by mouth once daily. (Patient not taking: Reported on 1/13/2020)    metoprolol tartrate (LOPRESSOR) 100 MG tablet Take 1 tablet (100 mg total) by mouth 2 (two) times daily.    multivitamin (THERAGRAN) per tablet Take 1 tablet by mouth once daily.    nicotine (NICODERM CQ) 14 mg/24 hr Place 1 patch onto the skin once daily.    psyllium husk, aspartame, (METAMUCIL) 3.4 gram PwPk packet Take 1 packet by mouth 2 (two) times daily.     Antibiotics (From admission, onward)    Start     Stop Route Frequency Ordered    02/02/20 1500  vancomycin 250mg / 10ml oral suspension 500 mg  (C. difficile Infection (CDI) Treatment Order Panel)      -- Oral Every 6 hours 02/02/20 1346    02/02/20 1400  metronidazole IVPB 500 mg      -- IV Every 8 hours (non-standard times) 02/02/20 1345    02/02/20 1200  vancomycin 750 mg in dextrose 5 % 250 mL IVPB (ready to mix system)      -- IV Every 24 hours (non-standard times) 02/02/20 1108    02/02/20 1100  ceFEPIme injection 1 g      -- IV Every 12 hours (non-standard times) 02/02/20 0957        Antifungals (From admission, onward)    None        Antivirals (From admission, onward)    None           Immunization History   Administered Date(s) Administered    Influenza - High Dose - PF (65 years and older) 10/25/2019     Pneumococcal Conjugate - 13 Valent 10/25/2019       Family History     Problem Relation (Age of Onset)    Alzheimer's disease Mother    Hypertension Brother, Sister    Osteoarthritis Mother    Thyroid disease Mother        Social History     Socioeconomic History    Marital status:      Spouse name: Not on file    Number of children: Not on file    Years of education: Not on file    Highest education level: Not on file   Occupational History    Not on file   Social Needs    Financial resource strain: Not on file    Food insecurity:     Worry: Not on file     Inability: Not on file    Transportation needs:     Medical: Not on file     Non-medical: Not on file   Tobacco Use    Smoking status: Current Every Day Smoker     Packs/day: 0.25     Years: 53.00     Pack years: 13.25     Types: Cigarettes     Start date: 1967    Smokeless tobacco: Never Used    Tobacco comment: 2-3 cigarettes per day  Pt is currently enrolled in the [employer].  Ambulatory referral to Smoking Cessation program.   Substance and Sexual Activity    Alcohol use: No    Drug use: No    Sexual activity: Yes     Partners: Male   Lifestyle    Physical activity:     Days per week: Not on file     Minutes per session: Not on file    Stress: Not on file   Relationships    Social connections:     Talks on phone: Not on file     Gets together: Not on file     Attends [employer]ze service: Not on file     Active member of club or organization: Not on file     Attends meetings of clubs or organizations: Not on file     Relationship status: Not on file   Other Topics Concern    Not on file   Social History Narrative    Not on file     Review of Systems   Constitutional: Positive for fatigue. Negative for chills, fever and unexpected weight change.   HENT: Negative for ear pain, facial swelling, hearing loss, mouth sores, nosebleeds, rhinorrhea, sinus pressure, sore throat, tinnitus, trouble swallowing and voice change.    Eyes:  Negative for photophobia, pain, redness and visual disturbance.   Respiratory: Negative for cough, chest tightness, shortness of breath and wheezing.    Cardiovascular: Negative for chest pain, palpitations and leg swelling.   Gastrointestinal: Positive for abdominal pain and diarrhea. Negative for blood in stool, constipation, nausea and vomiting.   Endocrine: Negative for cold intolerance, heat intolerance, polydipsia, polyphagia and polyuria.   Genitourinary: Negative for decreased urine volume, dysuria, flank pain, frequency, hematuria, menstrual problem, urgency, vaginal bleeding, vaginal discharge and vaginal pain.   Musculoskeletal: Negative for arthralgias, back pain, joint swelling, myalgias and neck pain.   Skin: Negative for rash.   Allergic/Immunologic: Negative for environmental allergies, food allergies and immunocompromised state.   Neurological: Negative for dizziness, seizures, syncope, weakness, light-headedness, numbness and headaches.   Hematological: Negative for adenopathy. Does not bruise/bleed easily.   Psychiatric/Behavioral: Positive for decreased concentration. Negative for confusion, hallucinations, self-injury, sleep disturbance and suicidal ideas. The patient is not nervous/anxious.      Objective:     Vital Signs (Most Recent):  Temp: 98.3 °F (36.8 °C) (02/03/20 1100)  Pulse: 106 (02/03/20 1100)  Resp: (!) 24 (02/03/20 1100)  BP: 115/67 (02/03/20 1100)  SpO2: 98 % (02/03/20 1100) Vital Signs (24h Range):  Temp:  [98 °F (36.7 °C)-98.4 °F (36.9 °C)] 98.3 °F (36.8 °C)  Pulse:  [] 106  Resp:  [18-38] 24  SpO2:  [94 %-100 %] 98 %  BP: ()/(52-75) 115/67     Weight: 50.3 kg (111 lb)  Body mass index is 20.97 kg/m².    Estimated Creatinine Clearance: 52.2 mL/min (based on SCr of 0.8 mg/dL).    Physical Exam   Constitutional: She is oriented to person, place, and time. She appears well-developed and well-nourished. She is cooperative. She is easily aroused.  Non-toxic appearance. No  distress.   HENT:   Head: Normocephalic and atraumatic. Head is without right periorbital erythema and without left periorbital erythema.   Right Ear: Hearing, tympanic membrane, external ear and ear canal normal. No swelling.   Left Ear: Hearing, tympanic membrane, external ear and ear canal normal. No swelling.   Nose: No nasal deformity.   Mouth/Throat: Uvula is midline and mucous membranes are normal. No oropharyngeal exudate.   Eyes: Conjunctivae and lids are normal. Right eye exhibits no discharge and no exudate. Left eye exhibits no discharge and no exudate. Right conjunctiva is not injected. Left conjunctiva is not injected. No scleral icterus.   Neck: Normal range of motion. Neck supple. No tracheal deviation present. No thyromegaly present.   Cardiovascular: Normal rate, regular rhythm, S1 normal, S2 normal, normal heart sounds and intact distal pulses. Exam reveals no gallop and no friction rub.   No murmur heard.  Pulmonary/Chest: Effort normal and breath sounds normal. No accessory muscle usage or stridor. No tachypnea. No respiratory distress. She has no wheezes. She has no rales. She exhibits no tenderness.   Abdominal: Soft. Normal appearance and bowel sounds are normal. She exhibits no distension. There is no tenderness. There is no rebound and no guarding.   Musculoskeletal: Normal range of motion. She exhibits no edema or tenderness.   Neurological: She is alert, oriented to person, place, and time and easily aroused. No cranial nerve deficit. Coordination normal.   Skin: Skin is warm and dry. No lesion and no rash noted. She is not diaphoretic. No cyanosis or erythema. No pallor. Nails show no clubbing.   Psychiatric: She has a normal mood and affect. Her speech is normal and behavior is normal. Judgment and thought content normal.   Nursing note and vitals reviewed.      Significant Labs:   Blood Culture:   Recent Labs   Lab 10/11/19  1701 10/28/19  0024 01/09/20  0629 02/01/20 2014  02/01/20 2021   LABBLOO No growth after 5 days.  No growth after 5 days. No growth after 5 days.  No growth after 5 days. No growth after 5 days.  No growth after 5 days. No Growth to date  No Growth to date No Growth to date  No Growth to date     BMP:   Recent Labs   Lab 02/03/20  0300   *   *   K 3.5      CO2 17*   BUN 7*   CREATININE 0.8   CALCIUM 8.1*   MG 1.9     CBC:   Recent Labs   Lab 02/01/20 2013 02/01/20 2020 02/02/20 0306 02/03/20  0300   WBC 32.68*  --  20.45* 36.77*   HGB 9.7*  --  9.4* 10.5*   HCT 31.0* 30* 30.3* 31.9*   *  --  326 404*     Procalcitonin: No results for input(s): PROCAL in the last 48 hours.  Wound Culture:   Recent Labs   Lab 10/03/19  0549 10/05/19  2110 10/05/19  2111 10/09/19  1131 10/09/19  1223   LABAERO No growth No growth  No growth STAPHYLOCOCCUS AUREUS  Rare  No other significant isolate  * No growth  No growth No growth       Significant Imaging: I have reviewed all pertinent imaging results/findings within the past 24 hours.

## 2020-02-03 NOTE — PLAN OF CARE
CM met with patient at the bedside to discuss D/C POC needs. Patient AAO x's 3 and able to verify demographics in the chart are correct. CM name and contact number listed on the patient's white board.  CM provided explanation of discharge plan process. CM left blue folder at the bedside with explanation of qualification for placement and facility resources. Patient expressed understanding. CM remains available for any further patient needs or concerns.     Varun Shea MD PhD  [address] / [place] [address]      [facility] [place] - [address]  [address] [place] [address]  Phone: [phone] Fax: [phone]    [id number] [place] - [address]  [address]  [place] [address]  Phone: [phone] Fax: [phone]      Extended Emergency Contact Information  Primary Emergency Contact: Leeanna HAYWOOD  Mobile Phone: [phone]  Relation: Daughter   needed? No  Secondary Emergency Contact: Ari Chaudhary  Address: [address] [place] [address] .           [facility] [place] [address] [place] [facility]  Home Phone: [phone]  Mobile Phone: [phone]  Relation: Spouse    No future appointments.       02/03/20 1415   Discharge Assessment   Assessment Type Discharge Planning Assessment   Confirmed/corrected address and phone number on facesheet? Yes   Assessment information obtained from? Patient   Prior to hospitilization cognitive status: Alert/Oriented   Prior to hospitalization functional status: Needs Assistance;Assistive Equipment   Current cognitive status: Alert/Oriented   Current Functional Status: Needs Assistance;Partially Dependent   Facility Arrived From: ED   Lives With spouse   Able to Return to Prior Arrangements other (see comments)  (TBD)   Is patient able to care for self after discharge? Unable to determine at this time (comments)   Who are your caregiver(s) and their phone number(s)? Ari Mckenzie, , [phone]   Patient  currently being followed by outpatient case management? No   Patient currently receives any other outside agency services? Yes   Name and contact number of agency or person providing outside services [facility]   Equipment Currently Used at Home wheelchair;walker, rolling;bedside commode;shower chair   Do you have any problems affording any of your prescribed medications? No   Is the patient taking medications as prescribed? yes   Does the patient have transportation home? Yes   Transportation Anticipated family or friend will provide   Does the patient receive services at the [facility]? No   Discharge Plan A Home Health       Dyllan Pascual RN MSN  Critical Care-   Ext. [phone]

## 2020-02-03 NOTE — HOSPITAL COURSE
Patient admitted to CMICU for further mgmt/evaluation. Currently + for C diff only. Treated with PO vanc. KUB negative. Lactate now 1.2. Off of vasopressors since 0500 2/3. Plan to get CT a/p to evaluate for further infectious process.

## 2020-02-03 NOTE — RESIDENT HANDOFF
ICU Transfer of Care Note  Critical Care Medicine    Admit Date: 2/1/2020  LOS: 1    CC: Sepsis    Code Status: Full Code        HPI and Hospital Course:     HPI:  64 y.o. F IVDU w/ HTN, HFpEF,pHTN, BOOP, depression, urinary retention, Hx of endocarditis, R thalamic stroke, epidural abscess, septic PE, empyema, shoulder abscess and recent Hx C. Diff colitis presenting with frequent stools, abdominal pain, lightheadedness, and fatigue. Patient denies fevers, chills, chest pain, palpitation, SOB, syncope, LOC, HA , or confusion. [place] [facility] [place] who reported SBP in the 60s. Her initial BP in the ED was 70/41. Her initial ED labs were remarkable with a WBC of 32.68, Lactic Acid of 3.2, and an ABIMBOLA w/ a Cr of 1.7. Vitals and labs concerning for Sepsis; possibly 2/2 c diff colitis, bacteremia, or UTI. In the ED Blood Cx and urine studies were obtained. She was fluids resuscitated with 2.5L and given PO vanc, IV vanc, and cefepime. At the time of her initial evaluation by the MICU the patient's only complaint was abdominal distension that she attributed to a full bladder due to difficulty urinating. The patient was A&Ox3, answering all questions appropriately, her MAPs were > 65, and her abdomen was mildly tender. Her exam was otherwise unremarkable. Her CXR was unremarkable.Her repeat Lactic Acid worsened following fluids and increased to 3.9. The patient was given another 1L NS bolus.      Hospital/ICU Course:  Patient admitted to CMICU for further mgmt/evaluation. Currently + for C diff only. Treated with PO vanc. KUB negative. Lactate now 1.2. Off of vasopressors since 0500 2/3. Plan to get CT a/p to evaluate for further infectious process.      To Follow Up:   --cont IV PO abxs for c diff; de-escalate as necessary  --CT a/p + colitis  --I/o caths; strict I/o's  --f/up all cx for further infectious w/up  --f/up RIP    Discharge Plan:   --d/c home with tx for c diff when clinically  appropriate    Call with questions.

## 2020-02-03 NOTE — PLAN OF CARE
IMB to assume care when she arrives on the floor.    64 y.o. F IVDU w/ HTN, HFpEF,pHTN, BOOP, depression, urinary retention, Hx of endocarditis, R thalamic stroke, epidural abscess, septic PE, empyema, shoulder abscess and recent Hx C. Diff colitis presenting with frequent stools, abdominal pain, lightheadedness, and fatigue. Patient denies fevers, chills, chest pain, palpitation, SOB, syncope, LOC, HA , or confusion. Patient was transported [facility] ED via EMS who reported SBP in the 60s. Her initial BP in the ED was 70/41. Her initial ED labs were remarkable with a WBC of 32.68, Lactic Acid of 3.2, and an ABIMBOLA w/ a Cr of 1.7. Vitals and labs concerning for Sepsis; possibly 2/2 c diff colitis, bacteremia, or UTI. In the ED Blood Cx and urine studies were obtained. She was fluids resuscitated with 2.5L and given PO vanc, IV vanc, and cefepime. At the time of her initial evaluation by the MICU the patient's only complaint was abdominal distension that she attributed to a full bladder due to difficulty urinating. The patient was A&Ox3, answering all questions appropriately, her MAPs were > 65, and her abdomen was mildly tender. Her exam was otherwise unremarkable. Her CXR was unremarkable.Her repeat Lactic Acid worsened following fluids and increased to 3.9. The patient was given another 1L NS bolus.        Hospital/ICU Course:  Patient admitted to CMICU for further mgmt/evaluation. Currently + for C diff only. Treated with PO vanc. KUB negative. Lactate now 1.2. Off of vasopressors since 0500 2/3. Plan to get CT a/p to evaluate for further infectious process.     Interval History/Significant Events: No acute overnight events. Off of Levophed since 0500. 1 L IVF given. Continue course

## 2020-02-03 NOTE — SUBJECTIVE & OBJECTIVE
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    GI bleed     History of uterine fibroid     Hypertension     Right thalamic stroke 10/7/2019    Shortness of breath on exertion     Spinal stenosis     Stroke     mini stroke in 9/2019    Urinary retention        Past Surgical History:   Procedure Laterality Date    ANTERIOR VAGINAL REPAIR  10-    CARDIAC CATHETERIZATION  age 12    CHOLECYSTECTOMY  2015    COLONOSCOPY  12/12/2018    aborted due to poor colon prep    COLONOSCOPY N/A 12/12/2018    Procedure: COLONOSCOPY;  Surgeon: Renard Gonsalves MD;  Location: [facility] ENDO;  Service: Endoscopy;  Laterality: N/A;    HYSTERECTOMY  1989    AMANDA    LAMINECTOMY N/A 10/9/2019    Procedure: LAMINECTOMY, SPINE, L3, Open;  Surgeon: Martin Lewis DO;  Location: [facility] OR [address] [place] [address];  Service: Neurosurgery;  Laterality: N/A;    tubiligation         Review of patient's allergies indicates:   Allergen Reactions    Pcn [penicillins]        Medications:  Medications Prior to Admission   Medication Sig    amLODIPine (NORVASC) 10 MG tablet Take 1 tablet (10 mg total) by mouth once daily. HOLD until follow-up with primary care provider    atorvastatin (LIPITOR) 40 MG tablet Take 1 tablet (40 mg total) by mouth once daily.    folic acid (FOLVITE) 1 MG tablet Take 1 tablet (1 mg total) by mouth once daily.    gabapentin (NEURONTIN) 100 MG capsule Take 2 capsules (200 mg total) by mouth 3 (three) times daily.    Lactobacillus rhamnosus GG (CULTURELLE) 10 billion cell capsule Take 1 capsule by mouth 2 (two) times daily.    metoprolol tartrate (LOPRESSOR) 100 MG tablet Take 1 tablet (100 mg total) by mouth 2 (two) times daily.    venlafaxine (EFFEXOR) 75 MG tablet Take 1 tablet  (75 mg total) by mouth 2 (two) times daily.    furosemide (LASIX) 20 MG tablet Take 1 tablet (20 mg total) by mouth once daily. (Patient not taking: Reported on 1/13/2020)    metoprolol tartrate (LOPRESSOR) 100 MG tablet Take 1 tablet (100 mg total) by mouth 2 (two) times daily.    multivitamin (THERAGRAN) per tablet Take 1 tablet by mouth once daily.    nicotine (NICODERM CQ) 14 mg/24 hr Place 1 patch onto the skin once daily.    psyllium husk, aspartame, (METAMUCIL) 3.4 gram PwPk packet Take 1 packet by mouth 2 (two) times daily.     Antibiotics (From admission, onward)    Start     Stop Route Frequency Ordered    02/02/20 1500  vancomycin 250mg / 10ml oral suspension 500 mg  (C. difficile Infection (CDI) Treatment Order Panel)      -- Oral Every 6 hours 02/02/20 1346    02/02/20 1400  metronidazole IVPB 500 mg      -- IV Every 8 hours (non-standard times) 02/02/20 1345    02/02/20 1200  vancomycin 750 mg in dextrose 5 % 250 mL IVPB (ready to mix system)      -- IV Every 24 hours (non-standard times) 02/02/20 1108    02/02/20 1100  ceFEPIme injection 1 g      -- IV Every 12 hours (non-standard times) 02/02/20 0957        Antifungals (From admission, onward)    None        Antivirals (From admission, onward)    None           Immunization History   Administered Date(s) Administered    Influenza - High Dose - PF (65 years and older) 10/25/2019    Pneumococcal Conjugate - 13 Valent 10/25/2019       Family History     Problem Relation (Age of Onset)    Alzheimer's disease Mother    Hypertension Brother, Sister    Osteoarthritis Mother    Thyroid disease Mother        Social History     Socioeconomic History    Marital status:      Spouse name: Not on file    Number of children: Not on file    Years of education: Not on file    Highest education level: Not on file   Occupational History    Not on file   Social Needs    Financial resource strain: Not on file    Food insecurity:     Worry: Not on  file     Inability: Not on file    Transportation needs:     Medical: Not on file     Non-medical: Not on file   Tobacco Use    Smoking status: Current Every Day Smoker     Packs/day: 0.25     Years: 53.00     Pack years: 13.25     Types: Cigarettes     Start date: 1967    Smokeless tobacco: Never Used    Tobacco comment: 2-3 cigarettes per day  Pt is currently enrolled in the [employer].  Ambulatory referral to Smoking Cessation program.   Substance and Sexual Activity    Alcohol use: No    Drug use: No    Sexual activity: Yes     Partners: Male   Lifestyle    Physical activity:     Days per week: Not on file     Minutes per session: Not on file    Stress: Not on file   Relationships    Social connections:     Talks on phone: Not on file     Gets together: Not on file     Attends Sabianism service: Not on file     Active member of club or organization: Not on file     Attends meetings of clubs or organizations: Not on file     Relationship status: Not on file   Other Topics Concern    Not on file   Social History Narrative    Not on file     Review of Systems   Constitutional: Positive for fatigue. Negative for chills, fever and unexpected weight change.   HENT: Negative for ear pain, facial swelling, hearing loss, mouth sores, nosebleeds, rhinorrhea, sinus pressure, sore throat, tinnitus, trouble swallowing and voice change.    Eyes: Negative for photophobia, pain, redness and visual disturbance.   Respiratory: Negative for cough, chest tightness, shortness of breath and wheezing.    Cardiovascular: Negative for chest pain, palpitations and leg swelling.   Gastrointestinal: Positive for abdominal pain and diarrhea. Negative for blood in stool, constipation, nausea and vomiting.   Endocrine: Negative for cold intolerance, heat intolerance, polydipsia, polyphagia and polyuria.   Genitourinary: Negative for decreased urine volume, dysuria, flank pain, frequency, hematuria, menstrual problem, urgency,  vaginal bleeding, vaginal discharge and vaginal pain.   Musculoskeletal: Negative for arthralgias, back pain, joint swelling, myalgias and neck pain.   Skin: Negative for rash.   Allergic/Immunologic: Negative for environmental allergies, food allergies and immunocompromised state.   Neurological: Negative for dizziness, seizures, syncope, weakness, light-headedness, numbness and headaches.   Hematological: Negative for adenopathy. Does not bruise/bleed easily.   Psychiatric/Behavioral: Positive for decreased concentration. Negative for confusion, hallucinations, self-injury, sleep disturbance and suicidal ideas. The patient is not nervous/anxious.      Objective:     Vital Signs (Most Recent):  Temp: 98.3 °F (36.8 °C) (02/03/20 1100)  Pulse: 106 (02/03/20 1100)  Resp: (!) 24 (02/03/20 1100)  BP: 115/67 (02/03/20 1100)  SpO2: 98 % (02/03/20 1100) Vital Signs (24h Range):  Temp:  [98 °F (36.7 °C)-98.4 °F (36.9 °C)] 98.3 °F (36.8 °C)  Pulse:  [] 106  Resp:  [18-38] 24  SpO2:  [94 %-100 %] 98 %  BP: ()/(52-75) 115/67     Weight: 50.3 kg (111 lb)  Body mass index is 20.97 kg/m².    Estimated Creatinine Clearance: 52.2 mL/min (based on SCr of 0.8 mg/dL).    Physical Exam   Constitutional: She is oriented to person, place, and time. She appears well-developed and well-nourished. She is cooperative. She is easily aroused.  Non-toxic appearance. No distress.   HENT:   Head: Normocephalic and atraumatic. Head is without right periorbital erythema and without left periorbital erythema.   Right Ear: Hearing, tympanic membrane, external ear and ear canal normal. No swelling.   Left Ear: Hearing, tympanic membrane, external ear and ear canal normal. No swelling.   Nose: No nasal deformity.   Mouth/Throat: Uvula is midline and mucous membranes are normal. No oropharyngeal exudate.   Eyes: Conjunctivae and lids are normal. Right eye exhibits no discharge and no exudate. Left eye exhibits no discharge and no exudate.  Right conjunctiva is not injected. Left conjunctiva is not injected. No scleral icterus.   Neck: Normal range of motion. Neck supple. No tracheal deviation present. No thyromegaly present.   Cardiovascular: Normal rate, regular rhythm, S1 normal, S2 normal, normal heart sounds and intact distal pulses. Exam reveals no gallop and no friction rub.   No murmur heard.  Pulmonary/Chest: Effort normal and breath sounds normal. No accessory muscle usage or stridor. No tachypnea. No respiratory distress. She has no wheezes. She has no rales. She exhibits no tenderness.   Abdominal: Soft. Normal appearance and bowel sounds are normal. She exhibits no distension. There is no tenderness. There is no rebound and no guarding.   Musculoskeletal: Normal range of motion. She exhibits no edema or tenderness.   Neurological: She is alert, oriented to person, place, and time and easily aroused. No cranial nerve deficit. Coordination normal.   Skin: Skin is warm and dry. No lesion and no rash noted. She is not diaphoretic. No cyanosis or erythema. No pallor. Nails show no clubbing.   Psychiatric: She has a normal mood and affect. Her speech is normal and behavior is normal. Judgment and thought content normal.   Nursing note and vitals reviewed.      Significant Labs:   Blood Culture:   Recent Labs   Lab 10/11/19  1701 10/28/19  0024 01/09/20  0629 02/01/20 2014 02/01/20 2021   LABBLOO No growth after 5 days.  No growth after 5 days. No growth after 5 days.  No growth after 5 days. No growth after 5 days.  No growth after 5 days. No Growth to date  No Growth to date No Growth to date  No Growth to date     BMP:   Recent Labs   Lab 02/03/20  0300   *   *   K 3.5      CO2 17*   BUN 7*   CREATININE 0.8   CALCIUM 8.1*   MG 1.9     CBC:   Recent Labs   Lab 02/01/20 2013 02/01/20  2020 02/02/20  0306 02/03/20  0300   WBC 32.68*  --  20.45* 36.77*   HGB 9.7*  --  9.4* 10.5*   HCT 31.0* 30* 30.3* 31.9*   *  --   326 404*     Procalcitonin: No results for input(s): PROCAL in the last 48 hours.  Wound Culture:   Recent Labs   Lab 10/03/19  0549 10/05/19  2110 10/05/19  2111 10/09/19  1131 10/09/19  1223   LABAERO No growth No growth  No growth STAPHYLOCOCCUS AUREUS  Rare  No other significant isolate  * No growth  No growth No growth       Significant Imaging: I have reviewed all pertinent imaging results/findings within the past 24 hours.

## 2020-02-03 NOTE — ASSESSMENT & PLAN NOTE
64 y.o. F IVDU w/ Hx of endocarditis, R thalamic stroke, epidural abscess, septic PE, empyema, shoulder abscess and recent Hx C. Diff colitis presenting with frequent stools, abdominal pain, lightheadedness, and fatigue. Patient was transported to [facility] ED via EMS who reported SBP in the 60s. Her initial BP in the ED was 70/41. Her initial ED labs were remarkable with a WBC of 32.68, Lactic Acid of 3.2, and an ABIMBOLA w/ a Cr of 1.7. Vitals and labs concerning for Sepsis; possibly 2/2 c diff colitis, bacteremia, or UTI. In the ED she was fluids resuscitated with 2.5L and given PO vanc, IV vanc, and cefepime. Repeat Lactic Acid worsened following fluids and increased to 3.9. Patient was given another 1L NS bolus. CXR unremarkable.     2/2 C. Diff vs other infectious source  --off of vasopressors 2/3 at 0500.  --fluid resuscitated ~3.5 L IVF  --blood cx NGTD  --sputum cx NGTD  --RIP pending  --C. Diff +  --treating with PO/IV vanc, IV flagyl  --ID consulted  --echo showing no vegetations; EF 55%  --hold antihypertensive BP meds while shock is resolving  --plan to obtain CT a/p for further investigation of infectious sources

## 2020-02-03 NOTE — PLAN OF CARE
CMICU DAILY GOALS       A: Awake    RASS: Goal - RASS Goal: 0-->alert and calm  Actual - RASS (Joyner Agitation-Sedation Scale): 0-->alert and calm   Restraint necessity:    B: Breath   SBT: Not intubated   C: Coordinate A & B, analgesics/sedatives   Pain: C/O abdominal cramps and headache.  PRN pain medication ordered.     SAT: Not intubated  D: Delirium   CAM-ICU: Overall CAM-ICU: Negative  E: Early Mobility   MOVE Screen: Levophed infusing    Activity: Activity Management: activity adjusted per tolerance, activity clustered for rest period  FAS: Feeding/Nutrition   Diet order: Diet/Nutrition Received: regular,   Fluid restriction:    T: Thrombus   DVT prophylaxis: VTE Required Core Measure: (SCDs) Sequential compression device initiated/maintained  H: HOB Elevation   Head of Bed (HOB): HOB at 20-30 degrees  U: Ulcer Prophylaxis   GI: yes  G: Glucose control   No hx of DM     S: Skin   Bundle compliance: yes   Bathing/Skin Care: dressed/undressed, incontinence care, linen changed Date: [unfilled]  B: Bowel Function   Diarrhea; rule out c-diff; stool sample sent; contact isolation maintained   I: Indwelling Catheters   Henriquez necessity:     CVC necessity: Yes   IPAD offered: No  D: De-escalation Antibx   No  Plan for the day   Monitor HR and BP; wean levo.  Continue oral and iv abx.  Monitor and replace electrolytes.    Family/Goals of care/Code Status   Code Status: Full Code     No acute events throughout day, VS and assessment per flow sheet, patient progressing towards goals as tolerated, plan of care reviewed with Mesha Mckenzie and family, all concerns addressed, will continue to monitor.

## 2020-02-03 NOTE — PROGRESS NOTES
[facility]  Critical Care Medicine  Progress Note    Patient Name: Mesha Mckenzie  MRN: [id number]  Admission Date: 2/1/2020  Hospital Length of Stay: 1 days  Code Status: Full Code  Attending Provider: Anton Jimenez*  Primary Care Provider: Varun Shea MD PhD   Principal Problem: Sepsis    Subjective:     HPI:  64 y.o. F IVDU w/ HTN, HFpEF,pHTN, BOOP, depression, urinary retention, Hx of endocarditis, R thalamic stroke, epidural abscess, septic PE, empyema, shoulder abscess and recent Hx C. Diff colitis presenting with frequent stools, abdominal pain, lightheadedness, and fatigue. Patient denies fevers, chills, chest pain, palpitation, SOB, syncope, LOC, HA , or confusion. Patient was transported to [facility] ED via EMS who reported SBP in the 60s. Her initial BP in the ED was 70/41. Her initial ED labs were remarkable with a WBC of 32.68, Lactic Acid of 3.2, and an ABIMBOLA w/ a Cr of 1.7. Vitals and labs concerning for Sepsis; possibly 2/2 c diff colitis, bacteremia, or UTI. In the ED Blood Cx and urine studies were obtained. She was fluids resuscitated with 2.5L and given PO vanc, IV vanc, and cefepime. At the time of her initial evaluation by the MICU the patient's only complaint was abdominal distension that she attributed to a full bladder due to difficulty urinating. The patient was A&Ox3, answering all questions appropriately, her MAPs were > 65, and her abdomen was mildly tender. Her exam was otherwise unremarkable. Her CXR was unremarkable.Her repeat Lactic Acid worsened following fluids and increased to 3.9. The patient was given another 1L NS bolus.      Hospital/ICU Course:  Patient admitted to CMICU for further mgmt/evaluation. Currently + for C diff only. Treated with PO vanc. KUB negative. Lactate now 1.2. Off of vasopressors since 0500 2/3. Plan to get CT a/p to evaluate for further infectious process.    Interval History/Significant Events: No acute overnight events. Off of  Levophed since 0500. 1 L IVF given. Continue course    Review of Systems   Constitutional: Positive for fatigue. Negative for chills and fever.   HENT: Negative for ear pain and sore throat.    Eyes: Negative for photophobia and visual disturbance.   Respiratory: Negative for chest tightness, shortness of breath and wheezing.    Cardiovascular: Negative for chest pain and palpitations.   Gastrointestinal: Positive for diarrhea. Negative for constipation, nausea and vomiting.   Genitourinary: Positive for difficulty urinating (baseline). Negative for dysuria, hematuria and urgency.   Musculoskeletal: Negative for arthralgias, back pain and myalgias.   Skin: Negative for rash and wound.   Neurological: Positive for light-headedness. Negative for dizziness, syncope and weakness.   Psychiatric/Behavioral: Negative for agitation, behavioral problems and confusion.     Objective:     Vital Signs (Most Recent):  Temp: 98.3 °F (36.8 °C) (02/03/20 0300)  Pulse: (!) 119 (02/03/20 0600)  Resp: (!) 26 (02/03/20 0600)  BP: (!) 104/57 (02/03/20 0600)  SpO2: (!) 94 % (02/03/20 0600) Vital Signs (24h Range):  Temp:  [98 °F (36.7 °C)-98.4 °F (36.9 °C)] 98.3 °F (36.8 °C)  Pulse:  [] 119  Resp:  [18-38] 26  SpO2:  [94 %-100 %] 94 %  BP: ()/(45-75) 104/57   Weight: 50.3 kg (111 lb)  Body mass index is 20.97 kg/m².      Intake/Output Summary (Last 24 hours) at 2/3/2020 0847  Last data filed at 2/3/2020 0625  Gross per 24 hour   Intake 1670.4 ml   Output 2959 ml   Net -1288.6 ml       Physical Exam   Constitutional: She is oriented to person, place, and time. She appears well-developed and well-nourished.   HENT:   Head: Normocephalic and atraumatic.   Mouth/Throat: No oropharyngeal exudate.   Eyes: Pupils are equal, round, and reactive to light. Conjunctivae and EOM are normal. No scleral icterus.   Neck: Normal range of motion. Neck supple.   Cardiovascular: Normal heart sounds and intact distal pulses. Tachycardia present.    Pulmonary/Chest: Effort normal and breath sounds normal.   Abdominal: Soft. Bowel sounds are increased. There is no tenderness.   Musculoskeletal: Normal range of motion.   Neurological: She is alert and oriented to person, place, and time.   Skin: Skin is warm and dry.   Psychiatric: She has a normal mood and affect. Her behavior is normal. Judgment and thought content normal.   Vitals reviewed.      Vents:     Lines/Drains/Airways     Central Venous Catheter Line                 Percutaneous Central Line Insertion/Assessment - triple lumen  02/02/20 0624 right internal jugular 1 day          Peripheral Intravenous Line                 Peripheral IV - Single Lumen 02/01/20 18 G Left Antecubital 2 days         Peripheral IV - Single Lumen 02/01/20 2000 22 G Right Forearm 1 day         Peripheral IV - Single Lumen 02/02/20 0300 22 G Left;Posterior Hand 1 day              Significant Labs:    CBC/Anemia Profile:  Recent Labs   Lab 02/01/20 2013 02/01/20 2020 02/02/20  0306 02/03/20  0300   WBC 32.68*  --  20.45* 36.77*   HGB 9.7*  --  9.4* 10.5*   HCT 31.0* 30* 30.3* 31.9*   *  --  326 404*   *  --  103* 99*   RDW 14.7*  --  14.6* 14.5        Chemistries:  Recent Labs   Lab 02/01/20 2013 02/02/20 0306 02/02/20  1015 02/03/20  0300 02/03/20  0719   * 133* 135* 130*  --    K 3.8 3.2* 4.0 3.5  --    CL 93* 106 108 103  --    CO2 17* 16* 15* 17*  --    BUN 22 16 10 7*  --    CREATININE 1.7* 1.1 1.1 0.8  --    CALCIUM 8.5* 7.8* 8.1* 8.1*  --    ALBUMIN 2.5* 2.2*  --  2.1*  --    PROT 6.5 5.9*  --  6.0  --    BILITOT 0.2 0.2  --  0.2  --    ALKPHOS 125 120  --  175*  --    ALT 15 12  --  14  --    AST 15 14  --  24  --    MG  --  1.4*  --  1.9  --    PHOS  --  2.7  --   --  2.3*       All pertinent labs within the past 24 hours have been reviewed.    Significant Imaging:  I have reviewed all pertinent imaging results/findings within the past 24 hours.      ABG  No results for input(s): PH, PO2,  PCO2, HCO3, BE in the last 168 hours.  Assessment/Plan:     Psychiatric  Depression  --continue home dose of Venlafaxine     Cardiac/Vascular  History of bacterial endocarditis  Hx of MSSA Bacterial Endocarditis s/p Oxacillin x 6 weeks and Cefadroxil x 4 weeks. Bacteremia a possible source of sepsis.     --blood cx NGTD   - echo - for vegetations  --EF 55%; L atrial enlg, mitral regurg    Essential hypertension  --will hold regimen of Amlodipine, Metoprolol, and lasix in the setting of sepsis   --restart when clinically appropriate    Renal/  Urinary retention  - bladder scan q6H   - strict I&O's     ID  * Sepsis  64 y.o. F IVDU w/ Hx of endocarditis, R thalamic stroke, epidural abscess, septic PE, empyema, shoulder abscess and recent Hx C. Diff colitis presenting with frequent stools, abdominal pain, lightheadedness, and fatigue. Patient was transported to [facility] ED via EMS who reported SBP in the 60s. Her initial BP in the ED was 70/41. Her initial ED labs were remarkable with a WBC of 32.68, Lactic Acid of 3.2, and an ABIMBOLA w/ a Cr of 1.7. Vitals and labs concerning for Sepsis; possibly 2/2 c diff colitis, bacteremia, or UTI. In the ED she was fluids resuscitated with 2.5L and given PO vanc, IV vanc, and cefepime. Repeat Lactic Acid worsened following fluids and increased to 3.9. Patient was given another 1L NS bolus. CXR unremarkable.     - c/w PO vanc   - c/w broad spectrum antibiotics   - obtain C.diff studies   - f/u blood cultures   - f/u urine studies   - will consider consulting ID pending Micro results  - will consider TTE pending Blood Cxs   - hold antihypertensive BP meds while sepsis is resolving   - Admit to the MICU     Clostridium difficile infection  Patient completed treatment for C. Diff sans 1 day (PO vanc) at [facility]. Continued having abdominal pain and diarrhea.  --C diff +  --currently treating with IV flagyl, IV vanc, and PO vanc.  --all other cultures negative    Anemia of infection and chronic  disease  - c/w Folic Acid     Other  Shock  64 y.o. F IVDU w/ Hx of endocarditis, R thalamic stroke, epidural abscess, septic PE, empyema, shoulder abscess and recent Hx C. Diff colitis presenting with frequent stools, abdominal pain, lightheadedness, and fatigue. Patient was transported to [facility] [place] [place] ED via EMS who reported SBP in the 60s. Her initial BP in the ED was 70/41. Her initial ED labs were remarkable with a WBC of 32.68, Lactic Acid of 3.2, and an ABIMBOLA w/ a Cr of 1.7. Vitals and labs concerning for Sepsis; possibly 2/2 c diff colitis, bacteremia, or UTI. In the ED she was fluids resuscitated with 2.5L and given PO vanc, IV vanc, and cefepime. Repeat Lactic Acid worsened following fluids and increased to 3.9. Patient was given another 1L NS bolus. CXR unremarkable.     2/2 C. Diff vs other infectious source  --off of vasopressors 2/3 at 0500.  --fluid resuscitated ~3.5 L IVF  --blood cx NGTD  --sputum cx NGTD  --RIP pending  --C. Diff +  --treating with PO/IV vanc, IV flagyl  --ID consulted  --echo showing no vegetations; EF 55%  --hold antihypertensive BP meds while shock is resolving  --plan to obtain CT a/p for further investigation of infectious sources    I have spent 35 min with this patient, with over 50% of this time spent coordinating care and speaking with the family     Ramirez Padilla NP  Critical Care Medicine  [facility]

## 2020-02-03 NOTE — PROCEDURES
"Mesha Mckenzie is a 64 y.o. female patient.    Temp: 97.6 °F (36.4 °C) (02/03/20 1500)  Pulse: (!) 111 (02/03/20 1500)  Resp: (!) 29 (02/03/20 1500)  BP: 117/65 (02/03/20 1500)  SpO2: 96 % (02/03/20 1500)  Weight: 50.3 kg (111 lb) (02/02/20 1115)  Height: 5' 1" (154.9 cm) (02/02/20 1115)       Central Line  Date/Time: 2/2/2020 6:00 AM  Location procedure was performed: [facility] CRITICAL CARE MEDICINE  Performed by: Mathieu Mckeon MD  Pre-operative Diagnosis: Sepsis  Post-operative diagnosis: Sepsis  Consent Done: Yes  Site marked: the operative site was marked  Time out: Immediately prior to procedure a "time out" was called to verify the correct patient, procedure, equipment, support staff and site/side marked as required.  Indications: med administration  Anesthesia: local infiltration    Anesthesia:  Local anesthesia used: yes  Local Anesthetic: lidocaine 1% with epinephrine  Anesthetic total: 2 mL  Preparation: skin prepped with ChloraPrep  Location details: right internal jugular  Catheter type: triple lumen  Ultrasound guidance: yes  Vessel Caliber: medium, compressibility normal  Vascular Doppler: not done  Needle advanced into vessel with real time Ultrasound guidance.  Guidewire confirmed in vessel.  Manometry: Yes  Number of attempts: 1  Assessment: placement verified by x-ray,  no pneumothorax on x-ray and successful placement  Complications: none  Estimated blood loss (mL): 10  Specimens: No  Implants: No  Post-procedure: line sutured,  chlorhexidine patch,  sterile dressing applied and blood return through all ports  Complications: No          Mathieu Mckeon I was present for the procedure.     Cathy Vasquez MD  "

## 2020-02-03 NOTE — ASSESSMENT & PLAN NOTE
Patient completed treatment for C. Diff sans 1 day (PO vanc) at [facility]. Continued having abdominal pain and diarrhea.  --C diff +  --currently treating with IV flagyl, IV vanc, and PO vanc.  --all other cultures negative

## 2020-02-03 NOTE — SUBJECTIVE & OBJECTIVE
Interval History/Significant Events: No acute overnight events. Off of Levophed since 0500. 1 L IVF given. Continue course    Review of Systems   Constitutional: Positive for fatigue. Negative for chills and fever.   HENT: Negative for ear pain and sore throat.    Eyes: Negative for photophobia and visual disturbance.   Respiratory: Negative for chest tightness, shortness of breath and wheezing.    Cardiovascular: Negative for chest pain and palpitations.   Gastrointestinal: Positive for diarrhea. Negative for constipation, nausea and vomiting.   Genitourinary: Positive for difficulty urinating (baseline). Negative for dysuria, hematuria and urgency.   Musculoskeletal: Negative for arthralgias, back pain and myalgias.   Skin: Negative for rash and wound.   Neurological: Positive for light-headedness. Negative for dizziness, syncope and weakness.   Psychiatric/Behavioral: Negative for agitation, behavioral problems and confusion.     Objective:     Vital Signs (Most Recent):  Temp: 98.3 °F (36.8 °C) (02/03/20 0300)  Pulse: (!) 119 (02/03/20 0600)  Resp: (!) 26 (02/03/20 0600)  BP: (!) 104/57 (02/03/20 0600)  SpO2: (!) 94 % (02/03/20 0600) Vital Signs (24h Range):  Temp:  [98 °F (36.7 °C)-98.4 °F (36.9 °C)] 98.3 °F (36.8 °C)  Pulse:  [] 119  Resp:  [18-38] 26  SpO2:  [94 %-100 %] 94 %  BP: ()/(45-75) 104/57   Weight: 50.3 kg (111 lb)  Body mass index is 20.97 kg/m².      Intake/Output Summary (Last 24 hours) at 2/3/2020 0815  Last data filed at 2/3/2020 0625  Gross per 24 hour   Intake 1670.4 ml   Output 2959 ml   Net -1288.6 ml       Physical Exam   Constitutional: She is oriented to person, place, and time. She appears well-developed and well-nourished.   HENT:   Head: Normocephalic and atraumatic.   Mouth/Throat: No oropharyngeal exudate.   Eyes: Pupils are equal, round, and reactive to light. Conjunctivae and EOM are normal. No scleral icterus.   Neck: Normal range of motion. Neck supple.    Cardiovascular: Normal heart sounds and intact distal pulses. Tachycardia present.   Pulmonary/Chest: Effort normal and breath sounds normal.   Abdominal: Soft. Bowel sounds are increased. There is no tenderness.   Musculoskeletal: Normal range of motion.   Neurological: She is alert and oriented to person, place, and time.   Skin: Skin is warm and dry.   Psychiatric: She has a normal mood and affect. Her behavior is normal. Judgment and thought content normal.   Vitals reviewed.      Vents:     Lines/Drains/Airways     Central Venous Catheter Line                 Percutaneous Central Line Insertion/Assessment - triple lumen  02/02/20 0624 right internal jugular 1 day          Peripheral Intravenous Line                 Peripheral IV - Single Lumen 02/01/20 18 G Left Antecubital 2 days         Peripheral IV - Single Lumen 02/01/20 2000 22 G Right Forearm 1 day         Peripheral IV - Single Lumen 02/02/20 0300 22 G Left;Posterior Hand 1 day              Significant Labs:    CBC/Anemia Profile:  Recent Labs   Lab 02/01/20 2013 02/01/20 2020 02/02/20  0306 02/03/20  0300   WBC 32.68*  --  20.45* 36.77*   HGB 9.7*  --  9.4* 10.5*   HCT 31.0* 30* 30.3* 31.9*   *  --  326 404*   *  --  103* 99*   RDW 14.7*  --  14.6* 14.5        Chemistries:  Recent Labs   Lab 02/01/20 2013 02/02/20 0306 02/02/20  1015 02/03/20  0300 02/03/20  0719   * 133* 135* 130*  --    K 3.8 3.2* 4.0 3.5  --    CL 93* 106 108 103  --    CO2 17* 16* 15* 17*  --    BUN 22 16 10 7*  --    CREATININE 1.7* 1.1 1.1 0.8  --    CALCIUM 8.5* 7.8* 8.1* 8.1*  --    ALBUMIN 2.5* 2.2*  --  2.1*  --    PROT 6.5 5.9*  --  6.0  --    BILITOT 0.2 0.2  --  0.2  --    ALKPHOS 125 120  --  175*  --    ALT 15 12  --  14  --    AST 15 14  --  24  --    MG  --  1.4*  --  1.9  --    PHOS  --  2.7  --   --  2.3*       All pertinent labs within the past 24 hours have been reviewed.    Significant Imaging:  I have reviewed all pertinent imaging  results/findings within the past 24 hours.

## 2020-02-03 NOTE — ASSESSMENT & PLAN NOTE
64 y.o. F IVDU w/ Hx of endocarditis, R thalamic stroke, epidural abscess, septic PE, empyema, shoulder abscess and recent Hx C. Diff colitis presenting with frequent stools, abdominal pain, lightheadedness, and fatigue. Patient was transported to [facility] ED via EMS who reported SBP in the 60s. Her initial BP in the ED was 70/41. Her initial ED labs were remarkable with a WBC of 32.68, Lactic Acid of 3.2, and an ABIMBOLA w/ a Cr of 1.7. Vitals and labs concerning for Sepsis; possibly 2/2 c diff colitis, bacteremia, or UTI. In the ED she was fluids resuscitated with 2.5L and given PO vanc, IV vanc, and cefepime. Repeat Lactic Acid worsened following fluids and increased to 3.9. Patient was given another 1L NS bolus. CXR unremarkable.     - c/w PO vanc   - c/w broad spectrum antibiotics   - obtain C.diff studies   - f/u blood cultures   - f/u urine studies   - will consider consulting ID pending Micro results  - will consider TTE pending Blood Cxs   - hold antihypertensive BP meds while sepsis is resolving   - Admit to the MICU

## 2020-02-03 NOTE — ASSESSMENT & PLAN NOTE
--will hold regimen of Amlodipine, Metoprolol, and lasix in the setting of sepsis   --restart when clinically appropriate

## 2020-02-03 NOTE — ASSESSMENT & PLAN NOTE
Unclear if patient adherent to therapy on discharge. Currently with watery stools.   · Discontinue cefepime which will only exacerbate C difficile.   · Can continue metronidazole for 24 more hours however in the absence of toxic megacolon would discontinue.   · Continue oral vancomycin every 6 hours. Avoid giving binding agents with it.   · Discontinue IV vancomycin.   · Discussed with Dr. Jimenez.

## 2020-02-03 NOTE — ASSESSMENT & PLAN NOTE
Hx of MSSA Bacterial Endocarditis s/p Oxacillin x 6 weeks and Cefadroxil x 4 weeks. Bacteremia a possible source of sepsis.     --blood cx NGTD   - echo - for vegetations  --EF 55%; L atrial enlg, mitral regurg

## 2020-02-03 NOTE — PLAN OF CARE
CMICU DAILY GOALS       A: Awake    RASS: Goal - RASS Goal: 0-->alert and calm  Actual - RASS (Joyner Agitation-Sedation Scale): 0-->alert and calm   Restraint necessity:    B: Breath   SBT: Not intubated   C: Coordinate A & B, analgesics/sedatives   Pain: managed    SAT: Not intubated  D: Delirium   CAM-ICU: Overall CAM-ICU: Negative  E: Early Mobility   MOVE Screen: Pass   Activity: Activity Management: activity adjusted per tolerance, activity clustered for rest period  FAS: Feeding/Nutrition   Diet order: Diet/Nutrition Received: regular,   Fluid restriction:    T: Thrombus   DVT prophylaxis: VTE Required Core Measure: (SCDs) Sequential compression device initiated/maintained  H: HOB Elevation   Head of Bed (HOB): HOB at 30-45 degrees  U: Ulcer Prophylaxis   GI: yes  G: Glucose control   managed    S: Skin   Bundle compliance: yes   Bathing/Skin Care: back care, bath, chlorhexidine, dressed/undressed, incontinence care, linen changed Date: 2/2/2020, Night Shift  B: Bowel Function   diarrhea   I: Indwelling Catheters   Henriquez necessity:     CVC necessity: Yes   IPAD offered: Yes  D: De-escalation Antibx   No  Plan for the day   Continue abx, maintain Map Goal.  Family/Goals of care/Code Status   Code Status: Full Code     No acute events throughout day, VS and assessment per flow sheet, patient progressing towards goals as tolerated, plan of care reviewed with Mesha Mckenzie and family, all concerns addressed, will continue to monitor.

## 2020-02-04 LAB
ALBUMIN SERPL BCP-MCNC: 2.2 G/DL (ref 3.5–5.2)
ALP SERPL-CCNC: 135 U/L (ref 55–135)
ALT SERPL W/O P-5'-P-CCNC: 12 U/L (ref 10–44)
ANION GAP SERPL CALC-SCNC: 10 MMOL/L (ref 8–16)
ANISOCYTOSIS BLD QL SMEAR: SLIGHT
AST SERPL-CCNC: 15 U/L (ref 10–40)
BASOPHILS # BLD AUTO: 0.14 K/UL (ref 0–0.2)
BASOPHILS NFR BLD: 0.5 % (ref 0–1.9)
BILIRUB SERPL-MCNC: 0.1 MG/DL (ref 0.1–1)
BUN SERPL-MCNC: 6 MG/DL (ref 8–23)
CALCIUM SERPL-MCNC: 7.9 MG/DL (ref 8.7–10.5)
CHLORIDE SERPL-SCNC: 104 MMOL/L (ref 95–110)
CO2 SERPL-SCNC: 17 MMOL/L (ref 23–29)
CREAT SERPL-MCNC: 0.7 MG/DL (ref 0.5–1.4)
DIFFERENTIAL METHOD: ABNORMAL
EOSINOPHIL # BLD AUTO: 0.3 K/UL (ref 0–0.5)
EOSINOPHIL NFR BLD: 0.9 % (ref 0–8)
ERYTHROCYTE [DISTWIDTH] IN BLOOD BY AUTOMATED COUNT: 14.6 % (ref 11.5–14.5)
EST. GFR  (AFRICAN AMERICAN): >60 ML/MIN/1.73 M^2
EST. GFR  (NON AFRICAN AMERICAN): >60 ML/MIN/1.73 M^2
GLUCOSE SERPL-MCNC: 93 MG/DL (ref 70–110)
HCT VFR BLD AUTO: 30.8 % (ref 37–48.5)
HGB BLD-MCNC: 9.8 G/DL (ref 12–16)
HYPOCHROMIA BLD QL SMEAR: ABNORMAL
IMM GRANULOCYTES # BLD AUTO: 1.46 K/UL (ref 0–0.04)
IMM GRANULOCYTES NFR BLD AUTO: 4.7 % (ref 0–0.5)
LYMPHOCYTES # BLD AUTO: 2 K/UL (ref 1–4.8)
LYMPHOCYTES NFR BLD: 6.6 % (ref 18–48)
MAGNESIUM SERPL-MCNC: 1.8 MG/DL (ref 1.6–2.6)
MCH RBC QN AUTO: 31.7 PG (ref 27–31)
MCHC RBC AUTO-ENTMCNC: 31.8 G/DL (ref 32–36)
MCV RBC AUTO: 100 FL (ref 82–98)
MONOCYTES # BLD AUTO: 1 K/UL (ref 0.3–1)
MONOCYTES NFR BLD: 3.3 % (ref 4–15)
NEUTROPHILS # BLD AUTO: 25.8 K/UL (ref 1.8–7.7)
NEUTROPHILS NFR BLD: 84 % (ref 38–73)
NRBC BLD-RTO: 0 /100 WBC
OVALOCYTES BLD QL SMEAR: ABNORMAL
PHOSPHATE SERPL-MCNC: 2.7 MG/DL (ref 2.7–4.5)
PLATELET # BLD AUTO: 385 K/UL (ref 150–350)
PMV BLD AUTO: 9.7 FL (ref 9.2–12.9)
POIKILOCYTOSIS BLD QL SMEAR: SLIGHT
POLYCHROMASIA BLD QL SMEAR: ABNORMAL
POTASSIUM SERPL-SCNC: 3 MMOL/L (ref 3.5–5.1)
PROT SERPL-MCNC: 5.9 G/DL (ref 6–8.4)
RBC # BLD AUTO: 3.09 M/UL (ref 4–5.4)
SODIUM SERPL-SCNC: 131 MMOL/L (ref 136–145)
WBC # BLD AUTO: 30.76 K/UL (ref 3.9–12.7)

## 2020-02-04 PROCEDURE — 80053 COMPREHEN METABOLIC PANEL: CPT

## 2020-02-04 PROCEDURE — 99232 PR SUBSEQUENT HOSPITAL CARE,LEVL II: ICD-10-PCS | Mod: GC,,, | Performed by: INTERNAL MEDICINE

## 2020-02-04 PROCEDURE — 36415 COLL VENOUS BLD VENIPUNCTURE: CPT

## 2020-02-04 PROCEDURE — 25000003 PHARM REV CODE 250: Performed by: STUDENT IN AN ORGANIZED HEALTH CARE EDUCATION/TRAINING PROGRAM

## 2020-02-04 PROCEDURE — S0030 INJECTION, METRONIDAZOLE: HCPCS | Performed by: NURSE PRACTITIONER

## 2020-02-04 PROCEDURE — 99232 PR SUBSEQUENT HOSPITAL CARE,LEVL II: ICD-10-PCS | Mod: ,,, | Performed by: INTERNAL MEDICINE

## 2020-02-04 PROCEDURE — 25000003 PHARM REV CODE 250: Performed by: NURSE PRACTITIONER

## 2020-02-04 PROCEDURE — 11000001 HC ACUTE MED/SURG PRIVATE ROOM

## 2020-02-04 PROCEDURE — 84100 ASSAY OF PHOSPHORUS: CPT

## 2020-02-04 PROCEDURE — 63600175 PHARM REV CODE 636 W HCPCS: Performed by: NURSE PRACTITIONER

## 2020-02-04 PROCEDURE — 99232 SBSQ HOSP IP/OBS MODERATE 35: CPT | Mod: GC,,, | Performed by: INTERNAL MEDICINE

## 2020-02-04 PROCEDURE — 99232 SBSQ HOSP IP/OBS MODERATE 35: CPT | Mod: ,,, | Performed by: INTERNAL MEDICINE

## 2020-02-04 PROCEDURE — 85025 COMPLETE CBC W/AUTO DIFF WBC: CPT

## 2020-02-04 PROCEDURE — 83735 ASSAY OF MAGNESIUM: CPT

## 2020-02-04 RX ADMIN — ONDANSETRON 4 MG: 2 INJECTION INTRAMUSCULAR; INTRAVENOUS at 10:02

## 2020-02-04 RX ADMIN — Medication 500 MG: at 08:02

## 2020-02-04 RX ADMIN — Medication 500 MG: at 02:02

## 2020-02-04 RX ADMIN — ONDANSETRON HYDROCHLORIDE 8 MG: 4 TABLET, FILM COATED ORAL at 02:02

## 2020-02-04 RX ADMIN — THERA TABS 1 TABLET: TAB at 10:02

## 2020-02-04 RX ADMIN — ENOXAPARIN SODIUM 40 MG: 100 INJECTION SUBCUTANEOUS at 05:02

## 2020-02-04 RX ADMIN — Medication 500 MG: at 10:02

## 2020-02-04 RX ADMIN — Medication 500 MG: at 05:02

## 2020-02-04 RX ADMIN — METRONIDAZOLE 500 MG: 500 INJECTION, SOLUTION INTRAVENOUS at 06:02

## 2020-02-04 RX ADMIN — FOLIC ACID 1 MG: 1 TABLET ORAL at 10:02

## 2020-02-04 RX ADMIN — ATORVASTATIN CALCIUM 40 MG: 20 TABLET, FILM COATED ORAL at 10:02

## 2020-02-04 NOTE — PROGRESS NOTES
[facility]  Infectious Disease  Progress Note    Patient Name: Mesha Mckenzie  MRN: [id number]  Admission Date: 2/1/2020  Length of Stay: 2 days  Attending Physician: Darrian Fierro MD  Primary Care Provider: Varun Shea MD PhD    Isolation Status: Special Contact  Assessment/Plan:      Clostridium difficile infection  Unclear if patient adherent to therapy on discharge. Currently with watery stools.   · Discontinue cefepime which will only exacerbate C difficile.   · In the absence of toxic megacolon would discontinue IV metronidazole  · Continue oral vancomycin every 6 hours. Avoid giving binding agents with it.  · Would recommend GI evaluation of pancolitis before consideration of long taper vancomycin    History of bacterial endocarditis  Treatment completed.   · Will follow up cultures.         Anticipated Disposition: per primary    Thank you for your consult. Patient case and plan of care discussed with Dr. Orellana. I will follow-up with patient. Please contact us if you have any additional questions.    Conrad Ramachandran MD  Infectious Disease  [facility]    Subjective:     Principal Problem:Sepsis    HPI: A 64-year-old woman with IVDU, diastolic CHF, HTN, MDD, right thalamic stroke, MSSA MV and TV endocarditis with associated bacteremia, empyema, shoulder abscess, plus epidural abscess, s/p laminectomy, s/p 6 weeks of oxacillin, and recent C difficile colitis who developed worsening diarrhea with associated abdominal pain, lightheadedness and fatigue. She was evaluated by EMS and found to be hypotensive with SBP in 60's range. On evaluation in [facility] [place] she was hypotensive with Bp of 70/41, with leukocytosis, lactic acidosis and increase creatinine consistent with AKIN. She was admitted to ICU for management of septic shock suspected to be due to colitis. She was started on broad spectrum antibiotics with cefepime, flagyl and IV vancomycin. After C difficile  testing became positive oral vancomycin was ordered.     Mrs. Mckenzie does not have penicillin allergy as she tolerated oxacillin without adverse effect. She has pet dogs and cats. She denies active toxic habits. No recent travel or known sick contacts.     Infectious Diseases consulted for management recommendations.           Interval History: patient with 6-10 watery bowel movements overnight, appears to be improving, but not per patient.     Review of Systems   Constitutional: Positive for appetite change. Negative for chills, diaphoresis, fatigue and fever.   Respiratory: Negative for cough and shortness of breath.    Cardiovascular: Negative for chest pain and leg swelling.   Gastrointestinal: Positive for diarrhea. Negative for abdominal distention, abdominal pain, blood in stool, constipation and nausea.   Genitourinary: Negative.    Musculoskeletal: Negative.      Objective:     Vital Signs (Most Recent):  Temp: 97 °F (36.1 °C) (02/04/20 0801)  Pulse: 100 (02/04/20 1058)  Resp: 19 (02/04/20 0801)  BP: 109/65 (02/04/20 0801)  SpO2: 97 % (02/04/20 0801) Vital Signs (24h Range):  Temp:  [96.5 °F (35.8 °C)-98 °F (36.7 °C)] 97 °F (36.1 °C)  Pulse:  [] 100  Resp:  [18-33] 19  SpO2:  [92 %-98 %] 97 %  BP: (109-137)/(57-69) 109/65     Weight: 50.3 kg (111 lb)  Body mass index is 20.97 kg/m².    Estimated Creatinine Clearance: 59.7 mL/min (based on SCr of 0.7 mg/dL).    Physical Exam   Constitutional: She is oriented to person, place, and time. She appears well-developed and well-nourished. No distress.   HENT:   Head: Normocephalic and atraumatic.   Eyes: Pupils are equal, round, and reactive to light.   Neck:   RIJ CVC c/d/i   Cardiovascular: Normal rate and regular rhythm.   Pulmonary/Chest: Effort normal. No respiratory distress.   Abdominal: Soft. She exhibits no distension. There is no tenderness. There is no guarding.   Increased bowel sounds   Musculoskeletal: She exhibits no edema or tenderness.    Neurological: She is alert and oriented to person, place, and time.   Skin: She is not diaphoretic.   Nursing note and vitals reviewed.      Significant Labs: All pertinent labs within the past 24 hours have been reviewed.    Significant Imaging: I have reviewed all pertinent imaging results/findings within the past 24 hours.

## 2020-02-04 NOTE — ASSESSMENT & PLAN NOTE
Unclear if patient adherent to therapy on discharge. Currently with watery stools.   · Discontinue cefepime which will only exacerbate C difficile.   · In the absence of toxic megacolon would discontinue IV metronidazole  · Continue oral vancomycin every 6 hours. Avoid giving binding agents with it.  · Would recommend GI evaluation of pancolitis before consideration of long taper vancomycin

## 2020-02-04 NOTE — NURSING TRANSFER
Nursing Transfer Note      2/3/2020 2015H    Transfer To: [id number]    Transfer via wheelchair    Transfer with cardiac monitoring    Transported by RN    Medicines sent: No    Chart send with patient: Yes    Notified: spouse with her    Upon arrival to floor: patient oriented to room, call bell in reach and bed in lowest position. RN receiving to bedside

## 2020-02-04 NOTE — PLAN OF CARE
02/04/20 1025   Post-Acute Status   Post-Acute Authorization Home Health/Hospice   Home Health/Hospice Status Awaiting Internal Medical Clearance

## 2020-02-04 NOTE — NURSING
578ml noted per bladder scan. 500 ml urine output per in & out cath clear yellow urine output noted. Will continue to monitor. Stable.

## 2020-02-04 NOTE — SUBJECTIVE & OBJECTIVE
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    GI bleed     History of uterine fibroid     Hypertension     Right thalamic stroke 10/7/2019    Shortness of breath on exertion     Spinal stenosis     Stroke     mini stroke in 9/2019    Urinary retention        Past Surgical History:   Procedure Laterality Date    ANTERIOR VAGINAL REPAIR  10-    CARDIAC CATHETERIZATION  age 12    CHOLECYSTECTOMY  2015    COLONOSCOPY  12/12/2018    aborted due to poor colon prep    COLONOSCOPY N/A 12/12/2018    Procedure: COLONOSCOPY;  Surgeon: Renard Gonsalves MD;  Location: [facility] ENDO;  Service: Endoscopy;  Laterality: N/A;    HYSTERECTOMY  1989    AMANDA    LAMINECTOMY N/A 10/9/2019    Procedure: LAMINECTOMY, SPINE, L3, Open;  Surgeon: Martin Lewis DO;  Location: [facility] OR [address] [place] [address];  Service: Neurosurgery;  Laterality: N/A;    tubiligation         Review of patient's allergies indicates:   Allergen Reactions    Pcn [penicillins]        Medications:  Medications Prior to Admission   Medication Sig    amLODIPine (NORVASC) 10 MG tablet Take 1 tablet (10 mg total) by mouth once daily. HOLD until follow-up with primary care provider    atorvastatin (LIPITOR) 40 MG tablet Take 1 tablet (40 mg total) by mouth once daily.    folic acid (FOLVITE) 1 MG tablet Take 1 tablet (1 mg total) by mouth once daily.    gabapentin (NEURONTIN) 100 MG capsule Take 2 capsules (200 mg total) by mouth 3 (three) times daily.    Lactobacillus rhamnosus GG (CULTURELLE) 10 billion cell capsule Take 1 capsule by mouth 2 (two) times daily.    metoprolol tartrate (LOPRESSOR) 100 MG tablet Take 1 tablet (100 mg total) by mouth 2 (two) times daily.    venlafaxine (EFFEXOR) 75 MG tablet Take 1 tablet  (75 mg total) by mouth 2 (two) times daily.    furosemide (LASIX) 20 MG tablet Take 1 tablet (20 mg total) by mouth once daily. (Patient not taking: Reported on 1/13/2020)    metoprolol tartrate (LOPRESSOR) 100 MG tablet Take 1 tablet (100 mg total) by mouth 2 (two) times daily.    multivitamin (THERAGRAN) per tablet Take 1 tablet by mouth once daily.    nicotine (NICODERM CQ) 14 mg/24 hr Place 1 patch onto the skin once daily.    psyllium husk, aspartame, (METAMUCIL) 3.4 gram PwPk packet Take 1 packet by mouth 2 (two) times daily.     Antibiotics (From admission, onward)    Start     Stop Route Frequency Ordered    02/02/20 1500  vancomycin 250mg / 10ml oral suspension 500 mg  (C. difficile Infection (CDI) Treatment Order Panel)      -- Oral Every 6 hours 02/02/20 1346        Antifungals (From admission, onward)    None        Antivirals (From admission, onward)    None           Immunization History   Administered Date(s) Administered    Influenza - High Dose - PF (65 years and older) 10/25/2019    Pneumococcal Conjugate - 13 Valent 10/25/2019       Family History     Problem Relation (Age of Onset)    Alzheimer's disease Mother    Hypertension Brother, Sister    Osteoarthritis Mother    Thyroid disease Mother        Social History     Socioeconomic History    Marital status:      Spouse name: Not on file    Number of children: Not on file    Years of education: Not on file    Highest education level: Not on file   Occupational History    Not on file   Social Needs    Financial resource strain: Not on file    Food insecurity:     Worry: Not on file     Inability: Not on file    Transportation needs:     Medical: Not on file     Non-medical: Not on file   Tobacco Use    Smoking status: Current Every Day Smoker     Packs/day: 0.25     Years: 53.00     Pack years: 13.25     Types: Cigarettes     Start date: 1967    Smokeless tobacco: Never Used    Tobacco comment: 2-3 cigarettes per day  Pt  is currently enrolled in the [employer].  Ambulatory referral to Smoking Cessation program.   Substance and Sexual Activity    Alcohol use: No    Drug use: No    Sexual activity: Yes     Partners: Male   Lifestyle    Physical activity:     Days per week: Not on file     Minutes per session: Not on file    Stress: Not on file   Relationships    Social connections:     Talks on phone: Not on file     Gets together: Not on file     Attends Latter day service: Not on file     Active member of club or organization: Not on file     Attends meetings of clubs or organizations: Not on file     Relationship status: Not on file   Other Topics Concern    Not on file   Social History Narrative    Not on file     Review of Systems   Constitutional: Positive for appetite change. Negative for chills, diaphoresis, fatigue and fever.   Respiratory: Negative for cough and shortness of breath.    Cardiovascular: Negative for chest pain and leg swelling.   Gastrointestinal: Positive for diarrhea. Negative for abdominal distention, abdominal pain, blood in stool, constipation and nausea.   Genitourinary: Negative.    Musculoskeletal: Negative.      Objective:     Vital Signs (Most Recent):  Temp: 97 °F (36.1 °C) (02/04/20 0801)  Pulse: 100 (02/04/20 1058)  Resp: 19 (02/04/20 0801)  BP: 109/65 (02/04/20 0801)  SpO2: 97 % (02/04/20 0801) Vital Signs (24h Range):  Temp:  [96.5 °F (35.8 °C)-98 °F (36.7 °C)] 97 °F (36.1 °C)  Pulse:  [] 100  Resp:  [18-33] 19  SpO2:  [92 %-98 %] 97 %  BP: (109-137)/(57-69) 109/65     Weight: 50.3 kg (111 lb)  Body mass index is 20.97 kg/m².    Estimated Creatinine Clearance: 59.7 mL/min (based on SCr of 0.7 mg/dL).    Physical Exam   Constitutional: She is oriented to person, place, and time. She appears well-developed and well-nourished. No distress.   HENT:   Head: Normocephalic and atraumatic.   Eyes: Pupils are equal, round, and reactive to light.   Neck:   RIJ CVC c/d/i   Cardiovascular:  Normal rate and regular rhythm.   Pulmonary/Chest: Effort normal. No respiratory distress.   Abdominal: Soft. She exhibits no distension. There is no tenderness. There is no guarding.   Increased bowel sounds   Musculoskeletal: She exhibits no edema or tenderness.   Neurological: She is alert and oriented to person, place, and time.   Skin: She is not diaphoretic.   Nursing note and vitals reviewed.      Significant Labs: All pertinent labs within the past 24 hours have been reviewed.    Significant Imaging: I have reviewed all pertinent imaging results/findings within the past 24 hours.

## 2020-02-04 NOTE — PROGRESS NOTES
Hospital Medicine  Progress note    Team: [facility] HOSP MED B Darrian Fierro MD  Admit Date: 2/1/2020  JAMAL 2/5/2020  Length of Stay:  LOS: 2 days   Code status: Full Code    Principal Problem:  Sepsis    HPI / Hospital Course     Interval hx:  2/04 WBC remains high on oral vanc, CT scan with pan colitis. Flagyl stopped    ROS     Respiratory: neg for cough neg for shortness of breath  Cardiovascular: neg for chest pain neg for palpitations  Gastrointestinal: neg for nausea neg for vomiting, neg for abdominal pain neg for diarrhea neg for constipation   Behavioral/Psych: neg for depression neg for anxiety    PEx  Temp:  [96.5 °F (35.8 °C)-98.3 °F (36.8 °C)]   Pulse:  []   Resp:  [18-33]   BP: (102-137)/(57-69)   SpO2:  [92 %-100 %]     Intake/Output Summary (Last 24 hours) at 2/4/2020 1051  Last data filed at 2/3/2020 1830  Gross per 24 hour   Intake 600 ml   Output 1000 ml   Net -400 ml       General Appearance: no acute distress   Heart: regular rate and rhythm  Respiratory: Normal respiratory effort, no crackles   Abdomen: Soft, non-tender; bowel sounds active  Skin: intact.Skin intact  Neurologic:  No focal numbness or weakness  Mental status: Alert, oriented x 4, affect appropriate     Recent Labs   Lab 02/02/20  0306 02/03/20  0300 02/04/20  0355   WBC 20.45* 36.77* 30.76*   HGB 9.4* 10.5* 9.8*   HCT 30.3* 31.9* 30.8*    404* 385*     Recent Labs   Lab 02/02/20  0306 02/02/20  1015 02/03/20  0300 02/03/20  0719 02/04/20  0355   * 135* 130*  --  131*   K 3.2* 4.0 3.5  --  3.0*    108 103  --  104   CO2 16* 15* 17*  --  17*   BUN 16 10 7*  --  6*   CREATININE 1.1 1.1 0.8  --  0.7   * 216* 165*  --  93   CALCIUM 7.8* 8.1* 8.1*  --  7.9*   MG 1.4*  --  1.9  --  1.8   PHOS 2.7  --  2.0* 2.3* 2.7     Recent Labs   Lab 02/02/20  0306 02/03/20  0300 02/04/20  0355   ALKPHOS 120 175* 135   ALT 12 14 12   AST 14 24 15   ALBUMIN 2.2* 2.1* 2.2*   PROT 5.9* 6.0 5.9*   BILITOT 0.2 0.2 0.1   INR   --  1.1  --         No results for input(s): POCTGLUCOSE in the last 168 hours.    Scheduled Meds:   atorvastatin  40 mg Oral Daily    enoxaparin  40 mg Subcutaneous Daily    folic acid  1 mg Oral Daily    multivitamin  1 tablet Oral Daily    vancomycin  500 mg Oral Q6H    venlafaxine  75 mg Oral BID     Continuous Infusions:  As Needed:  acetaminophen, magnesium oxide, magnesium oxide, ondansetron, ondansetron, potassium chloride 10%, potassium chloride 10%, potassium chloride 10%, potassium, sodium phosphates, potassium, sodium phosphates, potassium, sodium phosphates, sodium chloride 0.9%    ** update problem list    Active Hospital Problems    Diagnosis  POA    *Sepsis [A41.9]  Yes    Shock [R57.9]  Unknown    Septic shock due to undetermined organism [A41.9, R65.21]  Yes    Depression [F32.9]  Yes    Clostridium difficile infection [A49.8]  Yes    Anemia of infection and chronic disease [B99.9, D63.8]  Yes     Chronic    History of bacterial endocarditis [Z86.79]  Not Applicable     Chronic    Urinary retention [R33.9]  Yes    Essential hypertension [I10]  Yes     Chronic      Resolved Hospital Problems    Diagnosis Date Resolved POA    ABIMBOLA (acute kidney injury) [N17.9] 02/03/2020 Yes       Assessment and Plan  / Problems managed today    Sepsis  64 y.o. F IVDU w/ Hx of endocarditis, R thalamic stroke, epidural abscess, septic PE, empyema, shoulder abscess and recent Hx C. Diff colitis presenting with frequent stools, abdominal pain, lightheadedness, and fatigue. Patient was transported to [facility] [place] [facility] ED via EMS who reported SBP in the 60s. Her initial BP in the ED was 70/41. Her initial ED labs were remarkable with a WBC of 32.68, Lactic Acid of 3.2, and an ABIMBOLA w/ a Cr of 1.7. Vitals and labs concerning for Sepsis; possibly 2/2 c diff colitis, bacteremia, or UTI. In the ED she was fluids resuscitated with 2.5L and given PO vanc, IV vanc, and cefepime. Repeat Lactic Acid worsened following fluids and  increased to 3.9. Patient was given another 1L NS bolus. CXR unremarkable.      - c/w PO vanc   - c/w broad spectrum antibiotics   - obtain C.diff studies   - f/u blood cultures   - f/u urine studies   - will consider consulting ID pending Micro results  - will consider TTE pending Blood Cxs   - hold antihypertensive BP meds while sepsis is resolving   - Admit to the MICU      Clostridium difficile infection  Patient completed treatment for C. Diff sans 1 day (PO vanc) at [facility]. Continued having abdominal pain and diarrhea.  --C diff +  --currently treating with IV flagyl, IV vanc, and PO vanc.  --all other cultures negative     Anemia of infection and chronic disease  - c/w Folic Acid      Other  Shock  64 y.o. F IVDU w/ Hx of endocarditis, R thalamic stroke, epidural abscess, septic PE, empyema, shoulder abscess and recent Hx C. Diff colitis presenting with frequent stools, abdominal pain, lightheadedness, and fatigue. Patient was transported to [facility] ED via EMS who reported SBP in the 60s. Her initial BP in the ED was 70/41. Her initial ED labs were remarkable with a WBC of 32.68, Lactic Acid of 3.2, and an ABIMBOLA w/ a Cr of 1.7. Vitals and labs concerning for Sepsis; possibly 2/2 c diff colitis, bacteremia, or UTI. In the ED she was fluids resuscitated with 2.5L and given PO vanc, IV vanc, and cefepime. Repeat Lactic Acid worsened following fluids and increased to 3.9. Patient was given another 1L NS bolus. CXR unremarkable.      2/2 C. Diff vs other infectious source  --off of vasopressors 2/3 at 0500.  --fluid resuscitated ~3.5 L IVF  --blood cx NGTD  --sputum cx NGTD  --RIP pending  --C. Diff +  --treating with PO/IV vanc  --ID consulted  --echo showing no vegetations; EF 55%  --hold antihypertensive BP meds while shock is resolving  --plan to obtain CT a/p for further investigation of infectious sources               Goals of Care:  Return to prior functional status     Discharge plan:    Time (minutes) spent in  care of the patient (Greater than 1/2 spent in direct face-to-face contact)  35 minutes    Darrian Fierro MD

## 2020-02-05 LAB
ALBUMIN SERPL BCP-MCNC: 2.2 G/DL (ref 3.5–5.2)
ALP SERPL-CCNC: 107 U/L (ref 55–135)
ALT SERPL W/O P-5'-P-CCNC: 10 U/L (ref 10–44)
ANION GAP SERPL CALC-SCNC: 10 MMOL/L (ref 8–16)
AST SERPL-CCNC: 10 U/L (ref 10–40)
BASOPHILS NFR BLD: 0 % (ref 0–1.9)
BILIRUB SERPL-MCNC: <0.1 MG/DL (ref 0.1–1)
BUN SERPL-MCNC: 9 MG/DL (ref 8–23)
CALCIUM SERPL-MCNC: 7.8 MG/DL (ref 8.7–10.5)
CHLORIDE SERPL-SCNC: 108 MMOL/L (ref 95–110)
CO2 SERPL-SCNC: 16 MMOL/L (ref 23–29)
CREAT SERPL-MCNC: 0.7 MG/DL (ref 0.5–1.4)
DIFFERENTIAL METHOD: ABNORMAL
EOSINOPHIL NFR BLD: 2 % (ref 0–8)
ERYTHROCYTE [DISTWIDTH] IN BLOOD BY AUTOMATED COUNT: 14.6 % (ref 11.5–14.5)
EST. GFR  (AFRICAN AMERICAN): >60 ML/MIN/1.73 M^2
EST. GFR  (NON AFRICAN AMERICAN): >60 ML/MIN/1.73 M^2
GLUCOSE SERPL-MCNC: 119 MG/DL (ref 70–110)
HCT VFR BLD AUTO: 31.9 % (ref 37–48.5)
HGB BLD-MCNC: 9.7 G/DL (ref 12–16)
IMM GRANULOCYTES # BLD AUTO: ABNORMAL K/UL (ref 0–0.04)
IMM GRANULOCYTES NFR BLD AUTO: ABNORMAL % (ref 0–0.5)
LYMPHOCYTES NFR BLD: 15 % (ref 18–48)
MAGNESIUM SERPL-MCNC: 1.8 MG/DL (ref 1.6–2.6)
MCH RBC QN AUTO: 30.6 PG (ref 27–31)
MCHC RBC AUTO-ENTMCNC: 30.4 G/DL (ref 32–36)
MCV RBC AUTO: 101 FL (ref 82–98)
METAMYELOCYTES NFR BLD MANUAL: 3 %
MONOCYTES NFR BLD: 3 % (ref 4–15)
MYELOCYTES NFR BLD MANUAL: 2 %
NEUTROPHILS NFR BLD: 75 % (ref 38–73)
NRBC BLD-RTO: 0 /100 WBC
PHOSPHATE SERPL-MCNC: 3.5 MG/DL (ref 2.7–4.5)
PLATELET # BLD AUTO: 350 K/UL (ref 150–350)
PMV BLD AUTO: 9.8 FL (ref 9.2–12.9)
POTASSIUM SERPL-SCNC: 2.8 MMOL/L (ref 3.5–5.1)
PROT SERPL-MCNC: 5.6 G/DL (ref 6–8.4)
RBC # BLD AUTO: 3.17 M/UL (ref 4–5.4)
SODIUM SERPL-SCNC: 134 MMOL/L (ref 136–145)
WBC # BLD AUTO: 12.03 K/UL (ref 3.9–12.7)

## 2020-02-05 PROCEDURE — 25000003 PHARM REV CODE 250: Performed by: NURSE PRACTITIONER

## 2020-02-05 PROCEDURE — 85007 BL SMEAR W/DIFF WBC COUNT: CPT

## 2020-02-05 PROCEDURE — 36415 COLL VENOUS BLD VENIPUNCTURE: CPT

## 2020-02-05 PROCEDURE — 80053 COMPREHEN METABOLIC PANEL: CPT

## 2020-02-05 PROCEDURE — 99222 PR INITIAL HOSPITAL CARE,LEVL II: ICD-10-PCS | Mod: ,,, | Performed by: INTERNAL MEDICINE

## 2020-02-05 PROCEDURE — 11000001 HC ACUTE MED/SURG PRIVATE ROOM

## 2020-02-05 PROCEDURE — 99232 SBSQ HOSP IP/OBS MODERATE 35: CPT | Mod: GC,,, | Performed by: INTERNAL MEDICINE

## 2020-02-05 PROCEDURE — 85027 COMPLETE CBC AUTOMATED: CPT

## 2020-02-05 PROCEDURE — 25000003 PHARM REV CODE 250: Performed by: STUDENT IN AN ORGANIZED HEALTH CARE EDUCATION/TRAINING PROGRAM

## 2020-02-05 PROCEDURE — 63600175 PHARM REV CODE 636 W HCPCS: Performed by: NURSE PRACTITIONER

## 2020-02-05 PROCEDURE — 83735 ASSAY OF MAGNESIUM: CPT

## 2020-02-05 PROCEDURE — 99222 1ST HOSP IP/OBS MODERATE 55: CPT | Mod: ,,, | Performed by: INTERNAL MEDICINE

## 2020-02-05 PROCEDURE — 99232 PR SUBSEQUENT HOSPITAL CARE,LEVL II: ICD-10-PCS | Mod: ,,, | Performed by: INTERNAL MEDICINE

## 2020-02-05 PROCEDURE — 99232 PR SUBSEQUENT HOSPITAL CARE,LEVL II: ICD-10-PCS | Mod: GC,,, | Performed by: INTERNAL MEDICINE

## 2020-02-05 PROCEDURE — 99232 SBSQ HOSP IP/OBS MODERATE 35: CPT | Mod: ,,, | Performed by: INTERNAL MEDICINE

## 2020-02-05 PROCEDURE — 84100 ASSAY OF PHOSPHORUS: CPT

## 2020-02-05 RX ADMIN — ACETAMINOPHEN 650 MG: 325 TABLET ORAL at 09:02

## 2020-02-05 RX ADMIN — ATORVASTATIN CALCIUM 40 MG: 20 TABLET, FILM COATED ORAL at 09:02

## 2020-02-05 RX ADMIN — Medication 500 MG: at 06:02

## 2020-02-05 RX ADMIN — Medication 500 MG: at 01:02

## 2020-02-05 RX ADMIN — ENOXAPARIN SODIUM 40 MG: 100 INJECTION SUBCUTANEOUS at 05:02

## 2020-02-05 RX ADMIN — ONDANSETRON HYDROCHLORIDE 8 MG: 4 TABLET, FILM COATED ORAL at 02:02

## 2020-02-05 RX ADMIN — Medication 500 MG: at 05:02

## 2020-02-05 RX ADMIN — VENLAFAXINE 75 MG: 37.5 TABLET ORAL at 09:02

## 2020-02-05 RX ADMIN — THERA TABS 1 TABLET: TAB at 09:02

## 2020-02-05 RX ADMIN — FOLIC ACID 1 MG: 1 TABLET ORAL at 09:02

## 2020-02-05 RX ADMIN — Medication 500 MG: at 12:02

## 2020-02-05 RX ADMIN — ONDANSETRON 4 MG: 2 INJECTION INTRAMUSCULAR; INTRAVENOUS at 09:02

## 2020-02-05 NOTE — PROGRESS NOTES
[facility]  Infectious Disease  Progress Note    Patient Name: Mesha Mckenzie  MRN: [id number]  Admission Date: 2/1/2020  Length of Stay: 3 days  Attending Physician: Darrian Fierro MD  Primary Care Provider: Varun Shea MD PhD    Isolation Status: Special Contact  Assessment/Plan:      Clostridium difficile infection  65 y/o F PMhx IVDU, MSSA MV and TV endocarditis c/b empyema, shoulder abscess, epidural abscess s/p laminectomy s/p 6 weeks of oxacillin, and recent C difficile colitis s/p 10 day course PO vanc who was admitted for septic shock 2/2 C DIff who is currently improving on PO vancomycin.    Recommendations  Per GI no role for intervention/fecal transplant at this time, f/u outpatient  Continue oral vancomycin every 6 hours for now based on vancomycin taper (see below). Avoid giving binding agents with it.  Vancomycin taper: Vancomycin 125mg q6 x 10 days followed by 125mg q 12 x 7 days followed by 125mg daily x 7 days followed by 125mg every 3 days x 14 days (can be ordered through the order set)      Thank you for your consult. I will follow-up with patient. Please contact us if you have any additional questions.    Jacky Stevens MD  Infectious Disease  [facility]    Subjective:     Principal Problem:Sepsis    HPI: A 64-year-old woman with IVDU, diastolic CHF, HTN, MDD, right thalamic stroke, MSSA MV and TV endocarditis with associated bacteremia, empyema, shoulder abscess, plus epidural abscess, s/p laminectomy, s/p 6 weeks of oxacillin, and recent C difficile colitis who developed worsening diarrhea with associated abdominal pain, lightheadedness and fatigue. She was evaluated by EMS and found to be hypotensive with SBP in 60's range. On evaluation in [facility] ED she was hypotensive with Bp of 70/41, with leukocytosis, lactic acidosis and increase creatinine consistent with AKIN. She was admitted to ICU for management of septic shock suspected to be due to  colitis. She was started on broad spectrum antibiotics with cefepime, flagyl and IV vancomycin. After C difficile testing became positive oral vancomycin was ordered.     Mrs. Mckenzie does not have penicillin allergy as she tolerated oxacillin without adverse effect. She has pet dogs and cats. She denies active toxic habits. No recent travel or known sick contacts.     Infectious Diseases consulted for management recommendations.           Interval History: Afebrile overnight. Decreased frequency of bowel movements. GI consulted.    Review of Systems   Constitutional: Positive for appetite change. Negative for fatigue and fever.   Respiratory: Negative for shortness of breath.    Cardiovascular: Negative for chest pain and leg swelling.   Gastrointestinal: Positive for diarrhea. Negative for abdominal distention, abdominal pain, constipation and nausea.   Skin: Negative for rash.   Psychiatric/Behavioral: Negative for agitation and confusion.     Objective:     Vital Signs (Most Recent):  Temp: 96.7 °F (35.9 °C) (02/05/20 0831)  Pulse: 95 (02/05/20 0831)  Resp: 16 (02/05/20 0831)  BP: (!) 109/58 (02/05/20 0831)  SpO2: 96 % (02/05/20 0831) Vital Signs (24h Range):  Temp:  [96 °F (35.6 °C)-98.1 °F (36.7 °C)] 96.7 °F (35.9 °C)  Pulse:  [] 95  Resp:  [16-19] 16  SpO2:  [94 %-98 %] 96 %  BP: ()/(55-58) 109/58     Weight: 50.3 kg (111 lb)  Body mass index is 20.97 kg/m².    Estimated Creatinine Clearance: 59.7 mL/min (based on SCr of 0.7 mg/dL).    Physical Exam   Constitutional: She appears well-developed. No distress.   HENT:   Head: Normocephalic and atraumatic.   Eyes: Right eye exhibits no discharge. Left eye exhibits no discharge. No scleral icterus.   Neck:   R IJ CVC in place w/o surrounding erythema   Cardiovascular: Normal rate, regular rhythm and intact distal pulses.   Pulmonary/Chest: Effort normal. No stridor. No respiratory distress. She has no wheezes. She has no rales.   Abdominal: Soft. She  exhibits no distension. There is no tenderness. There is no guarding.   Musculoskeletal: She exhibits no edema or tenderness.   Lymphadenopathy:     She has no cervical adenopathy.   Neurological: She is alert.   Skin: Skin is warm. No rash noted. She is not diaphoretic. No erythema.       Significant Labs:   Blood Culture:   Recent Labs   Lab 01/09/20 0629 02/01/20 2014 02/01/20 2021 02/03/20 0452 02/03/20 0453   LABBLOO No growth after 5 days.  No growth after 5 days. No Growth to date  No Growth to date  No Growth to date  No Growth to date No Growth to date  No Growth to date  No Growth to date  No Growth to date No Growth to date  No Growth to date  No Growth to date No Growth to date  No Growth to date  No Growth to date     CBC:   Recent Labs   Lab 02/04/20 0355 02/05/20 0441   WBC 30.76* 12.03   HGB 9.8* 9.7*   HCT 30.8* 31.9*   * 350     CMP:   Recent Labs   Lab 02/04/20 0355 02/05/20 0441   * 134*   K 3.0* 2.8*    108   CO2 17* 16*   GLU 93 119*   BUN 6* 9   CREATININE 0.7 0.7   CALCIUM 7.9* 7.8*   PROT 5.9* 5.6*   ALBUMIN 2.2* 2.2*   BILITOT 0.1 <0.1*   ALKPHOS 135 107   AST 15 10   ALT 12 10   ANIONGAP 10 10   EGFRNONAA >60.0 >60.0     Microbiology Results (last 7 days)     Procedure Component Value Units Date/Time    Blood culture [[id number]] Collected:  02/03/20 0452    Order Status:  Completed Specimen:  Blood from Peripheral, Hand, Right Updated:  02/05/20 1012     Blood Culture, Routine No Growth to date      No Growth to date      No Growth to date    Blood culture [[id number]] Collected:  02/03/20 0453    Order Status:  Completed Specimen:  Blood from Peripheral, Hand, Left Updated:  02/05/20 1012     Blood Culture, Routine No Growth to date      No Growth to date      No Growth to date    Blood culture x two cultures. Draw prior to antibiotics. [[id number]] Collected:  02/01/20 2021    Order Status:  Completed Specimen:  Blood from Peripheral, Hand, Right  Updated:  02/04/20 2222     Blood Culture, Routine No Growth to date      No Growth to date      No Growth to date      No Growth to date    Narrative:       Aerobic and anaerobic    Blood culture x two cultures. Draw prior to antibiotics. [[id number]] Collected:  02/01/20 2014    Order Status:  Completed Specimen:  Blood from Peripheral, Forearm, Right Updated:  02/04/20 2222     Blood Culture, Routine No Growth to date      No Growth to date      No Growth to date      No Growth to date    Narrative:       Aerobic and anaerobic    Respiratory Infection Panel, Nasopharyngeal [[id number]] Collected:  02/03/20 0807    Order Status:  Completed Specimen:  Nasopharyngeal Swab Updated:  02/03/20 1402     Respiratory Infection Panel Source NP Swab     Adenovirus Not Detected     Coronavirus 229E Not Detected     Coronavirus HKU1 Not Detected     Coronavirus NL63 Not Detected     Coronavirus OC43 Not Detected     Human Metapneumovirus Not Detected     Human Rhinovirus/Enterovirus Not Detected     Influenza A (subtypes H1, H1-2009,H3) Not Detected     Influenza B Not Detected     Parainfluenza Virus 1 Not Detected     Parainfluenza Virus 2 Not Detected     Parainfluenza Virus 3 Not Detected     Parainfluenza Virus 4 Not Detected     Respiratory Syncytial Virus Not Detected     Bordetella Parapertussis (PY8255) Not Detected     Bordetella pertussis (ptxP) Not Detected     Chlamydia pneumoniae Not Detected     Mycoplasma pneumoniae Not Detected     Comment: Respiratory Infection Panel testing performed by Multiplex PCR.       Narrative:       For all other respiratory sources order [id number] Respiratory  Viral Panel by PCR (RVPCR)    Clostridium difficile EIA [[id number]]  (Abnormal) Collected:  02/02/20 0358    Order Status:  Completed Specimen:  Stool Updated:  02/02/20 1333     C. diff Antigen Positive     C difficile Toxins A+B, EIA Positive     Comment: Testing not recommended for children <24 months old.              Significant Imaging: I have reviewed all pertinent imaging results/findings within the past 24 hours.

## 2020-02-05 NOTE — PLAN OF CARE
02/05/20 0920   Post-Acute Status   Post-Acute Authorization Home Health/Hospice   Home Health/Hospice Status Awaiting Internal Medical Clearance

## 2020-02-05 NOTE — MEDICAL/APP STUDENT
"Ms Mesha Mckenzie is a 64-year-old woman with IVDU, diastolic CHF, HTN, MDD, right thalamic stroke, MSSA MV and TV endocarditis with associated bacteremia, empyema, shoulder abscess, plus epidural abscess, s/p laminectomy, s/p 6 weeks of oxacillin, and recent C difficile colitis who developed worsening diarrhea with associated abdominal pain, lightheadedness and fatigue.     Per ID, she is receiving oral vancomycin Q6H started 2/1.  She was on IV metronidazole 2/2- 2/4 and it was d/c'ed in the absence of toxic megacolon.    CT chest/abdomen on 2/3 showed "diffuse colonic wall thickening with surrounding inflammatory changes and fat stranding concerning for an underlying infectious or inflammatory pancolitis, including pseudomembranous colitis.  No abscess formation."    2/5  Patient reports no diarrhea today, just 1 BM in the morning. She did have diarrhea yesterday and has been having 10+ stools per day. She is feeling tired. She is not experiencing any abdominal pain, nausea or vomiting. She has no complaints otherwise.    She was recently admitted 1/9 - 1/13 for watery diarrhea and positive C. Difficile EIA and antigen. She was treated with oral vancomycin 250mg Q6H x 10 days. She reports that she was initially improving after discharge, but never completely recovered and came back to the hospital after her symptoms worsened. She may not have adhered to her medications after discharge.    The importance of medication adherence was discussed. She agreed to take her medications as directed and to follow up with GI clinic outpatient for a possible colonoscopy after recovery.       "

## 2020-02-05 NOTE — PROGRESS NOTES
Hospital Medicine  Progress note    Team: [facility] HOSP MED B Darrian Fierro MD  Admit Date: 2/1/2020  JAMAL 2/5/2020  Length of Stay:  LOS: 3 days   Code status: Full Code    Principal Problem:  Sepsis    HPI / Hospital Course     Interval hx:  2/04 WBC remains high on oral vanc, CT scan with pan colitis. Flagyl stopped  2/05 WBC is down today, less stool output. Will consult GI.    ROS     Respiratory: neg for cough neg for shortness of breath  Cardiovascular: neg for chest pain neg for palpitations  Gastrointestinal: neg for nausea neg for vomiting, neg for abdominal pain neg for diarrhea neg for constipation   Behavioral/Psych: neg for depression neg for anxiety    PEx  Temp:  [96 °F (35.6 °C)-98.1 °F (36.7 °C)]   Pulse:  []   Resp:  [16-19]   BP: ()/(55-58)   SpO2:  [94 %-98 %]     Intake/Output Summary (Last 24 hours) at 2/5/2020 1051  Last data filed at 2/4/2020 1800  Gross per 24 hour   Intake --   Output 600 ml   Net -600 ml       General Appearance: no acute distress   Heart: regular rate and rhythm  Respiratory: Normal respiratory effort, no crackles   Abdomen: Soft, non-tender; bowel sounds active  Skin: intact.Skin intact  Neurologic:  No focal numbness or weakness  Mental status: Alert, oriented x 4, affect appropriate     Recent Labs   Lab 02/03/20  0300 02/04/20  0355 02/05/20  0441   WBC 36.77* 30.76* 12.03   HGB 10.5* 9.8* 9.7*   HCT 31.9* 30.8* 31.9*   * 385* 350     Recent Labs   Lab 02/03/20  0300 02/03/20  0719 02/04/20  0355 02/05/20  0441   *  --  131* 134*   K 3.5  --  3.0* 2.8*     --  104 108   CO2 17*  --  17* 16*   BUN 7*  --  6* 9   CREATININE 0.8  --  0.7 0.7   *  --  93 119*   CALCIUM 8.1*  --  7.9* 7.8*   MG 1.9  --  1.8 1.8   PHOS 2.0* 2.3* 2.7 3.5     Recent Labs   Lab 02/03/20  0300 02/04/20  0355 02/05/20  0441   ALKPHOS 175* 135 107   ALT 14 12 10   AST 24 15 10   ALBUMIN 2.1* 2.2* 2.2*   PROT 6.0 5.9* 5.6*   BILITOT 0.2 0.1 <0.1*   INR 1.1  --    --         No results for input(s): POCTGLUCOSE in the last 168 hours.    Scheduled Meds:   atorvastatin  40 mg Oral Daily    enoxaparin  40 mg Subcutaneous Daily    folic acid  1 mg Oral Daily    multivitamin  1 tablet Oral Daily    vancomycin  500 mg Oral Q6H    venlafaxine  75 mg Oral BID     Continuous Infusions:  As Needed:  acetaminophen, magnesium oxide, magnesium oxide, ondansetron, ondansetron, potassium chloride 10%, potassium chloride 10%, potassium chloride 10%, potassium, sodium phosphates, potassium, sodium phosphates, potassium, sodium phosphates, sodium chloride 0.9%    ** update problem list    Active Hospital Problems    Diagnosis  POA    *Sepsis [A41.9]  Yes    Shock [R57.9]  Unknown    Septic shock due to undetermined organism [A41.9, R65.21]  Yes    Depression [F32.9]  Yes    Clostridium difficile infection [A49.8]  Yes    Anemia of infection and chronic disease [B99.9, D63.8]  Yes     Chronic    History of bacterial endocarditis [Z86.79]  Not Applicable     Chronic    Urinary retention [R33.9]  Yes    Essential hypertension [I10]  Yes     Chronic      Resolved Hospital Problems    Diagnosis Date Resolved POA    ABIMBOLA (acute kidney injury) [N17.9] 02/03/2020 Yes       Assessment and Plan  / Problems managed today    Sepsis  64 y.o. F IVDU w/ Hx of endocarditis, R thalamic stroke, epidural abscess, septic PE, empyema, shoulder abscess and recent Hx C. Diff colitis presenting with frequent stools, abdominal pain, lightheadedness, and fatigue. Patient was transported to [facility] ED via EMS who reported SBP in the 60s. Her initial BP in the ED was 70/41. Her initial ED labs were remarkable with a WBC of 32.68, Lactic Acid of 3.2, and an ABIMBOLA w/ a Cr of 1.7. Vitals and labs concerning for Sepsis; possibly 2/2 c diff colitis, bacteremia, or UTI. In the ED she was fluids resuscitated with 2.5L and given PO vanc, IV vanc, and cefepime. Repeat Lactic Acid worsened following fluids and increased  to 3.9. Patient was given another 1L NS bolus. CXR unremarkable.      - c/w PO vanc   - c/w broad spectrum antibiotics   - obtain C.diff studies   - f/u blood cultures   - f/u urine studies   - will consider consulting ID pending Micro results  - will consider TTE pending Blood Cxs   - hold antihypertensive BP meds while sepsis is resolving   - Admit to the MICU      Clostridium difficile infection  Patient completed treatment for C. Diff sans 1 day (PO vanc) at [facility]. Continued having abdominal pain and diarrhea.  --C diff +  --currently treating with IV flagyl, IV vanc, and PO vanc.  --all other cultures negative     Anemia of infection and chronic disease  - c/w Folic Acid      Other  Shock  64 y.o. F IVDU w/ Hx of endocarditis, R thalamic stroke, epidural abscess, septic PE, empyema, shoulder abscess and recent Hx C. Diff colitis presenting with frequent stools, abdominal pain, lightheadedness, and fatigue. Patient was transported to [place] ED via EMS who reported SBP in the 60s. Her initial BP in the ED was 70/41. Her initial ED labs were remarkable with a WBC of 32.68, Lactic Acid of 3.2, and an ABIMBOLA w/ a Cr of 1.7. Vitals and labs concerning for Sepsis; possibly 2/2 c diff colitis, bacteremia, or UTI. In the ED she was fluids resuscitated with 2.5L and given PO vanc, IV vanc, and cefepime. Repeat Lactic Acid worsened following fluids and increased to 3.9. Patient was given another 1L NS bolus. CXR unremarkable.      2/2 C. Diff vs other infectious source  --off of vasopressors 2/3 at 0500.  --fluid resuscitated ~3.5 L IVF  --blood cx NGTD  --sputum cx NGTD  --RIP pending  --C. Diff +  --treating with PO/IV vanc  --ID consulted  --echo showing no vegetations; EF 55%  --hold antihypertensive BP meds while shock is resolving  --plan to obtain CT a/p for further investigation of infectious sources               Goals of Care:  Return to prior functional status     Discharge plan:    Time (minutes) spent in care of the  patient (Greater than 1/2 spent in direct face-to-face contact)  35 minutes    Darrian Fierro MD

## 2020-02-05 NOTE — ASSESSMENT & PLAN NOTE
63 y/o F PMhx IVDU, MSSA MV and TV endocarditis c/b empyema, shoulder abscess, epidural abscess s/p laminectomy s/p 6 weeks of oxacillin, and recent C difficile colitis s/p 10 day course PO vanc who was admitted for septic shock 2/2 C DIff who is currently improving on PO vancomycin.    Recommendations  Per GI no role for intervention/fecal transplant at this time, f/u outpatient  Continue oral vancomycin every 6 hours for now based on vancomycin taper (see below). Avoid giving binding agents with it.  Vancomycin taper: Vancomycin 125mg q6 x 10 days followed by 125mg q 12 x 7 days followed by 125mg daily x 7 days followed by 125mg every 3 days x 14 days (can be ordered through the order set)

## 2020-02-05 NOTE — SUBJECTIVE & OBJECTIVE
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    GI bleed     History of uterine fibroid     Hypertension     Right thalamic stroke 10/7/2019    Shortness of breath on exertion     Spinal stenosis     Stroke     mini stroke in 9/2019    Urinary retention        Past Surgical History:   Procedure Laterality Date    ANTERIOR VAGINAL REPAIR  10-    CARDIAC CATHETERIZATION  age 12    CHOLECYSTECTOMY  2015    COLONOSCOPY  12/12/2018    aborted due to poor colon prep    COLONOSCOPY N/A 12/12/2018    Procedure: COLONOSCOPY;  Surgeon: Renard Gonsalves MD;  Location: [facility];  Service: Endoscopy;  Laterality: N/A;    HYSTERECTOMY  1989    AMANDA    LAMINECTOMY N/A 10/9/2019    Procedure: LAMINECTOMY, SPINE, L3, Open;  Surgeon: Martin Lewis DO;  Location: [facility] OR [address] [place] [address];  Service: Neurosurgery;  Laterality: N/A;    tubiligation         Review of patient's allergies indicates:   Allergen Reactions    Pcn [penicillins]      Family History     Problem Relation (Age of Onset)    Alzheimer's disease Mother    Hypertension Brother, Sister    Osteoarthritis Mother    Thyroid disease Mother        Tobacco Use    Smoking status: Current Every Day Smoker     Packs/day: 0.25     Years: 53.00     Pack years: 13.25     Types: Cigarettes     Start date: 1967    Smokeless tobacco: Never Used    Tobacco comment: 2-3 cigarettes per day  Pt is currently enrolled in the [employer].  Ambulatory referral to Smoking Cessation program.   Substance and Sexual Activity    Alcohol use: No    Drug use: No    Sexual activity: Yes     Partners: Male     Review of Systems   Constitutional: Positive for appetite change. Negative for chills, diaphoresis, fatigue and fever.    Respiratory: Negative for cough and shortness of breath.    Cardiovascular: Negative for chest pain and leg swelling.   Gastrointestinal: Positive for diarrhea. Negative for abdominal distention, abdominal pain, blood in stool, constipation and nausea.   Genitourinary: Negative.    Musculoskeletal: Negative.      Objective:     Vital Signs (Most Recent):  Temp: 96.7 °F (35.9 °C) (02/05/20 0831)  Pulse: 95 (02/05/20 0831)  Resp: 16 (02/05/20 0831)  BP: (!) 109/58 (02/05/20 0831)  SpO2: 96 % (02/05/20 0831) Vital Signs (24h Range):  Temp:  [96 °F (35.6 °C)-98.1 °F (36.7 °C)] 96.7 °F (35.9 °C)  Pulse:  [] 95  Resp:  [16-19] 16  SpO2:  [94 %-98 %] 96 %  BP: ()/(55-58) 109/58     Weight: 50.3 kg (111 lb) (02/02/20 1115)  Body mass index is 20.97 kg/m².      Intake/Output Summary (Last 24 hours) at 2/5/2020 1121  Last data filed at 2/4/2020 1800  Gross per 24 hour   Intake --   Output 600 ml   Net -600 ml       Lines/Drains/Airways     Central Venous Catheter Line                 Percutaneous Central Line Insertion/Assessment - triple lumen  02/02/20 0624 right internal jugular 3 days                Physical Exam   Constitutional: She is oriented to person, place, and time. She appears well-developed and well-nourished. No distress.   HENT:   Head: Normocephalic and atraumatic.   Eyes: Pupils are equal, round, and reactive to light.   Neck:   RIJ CVC c/d/i   Cardiovascular: Normal rate and regular rhythm.   Pulmonary/Chest: Effort normal. No respiratory distress.   Abdominal: Soft. She exhibits no distension. There is no tenderness. There is no guarding.   Musculoskeletal: She exhibits no edema or tenderness.   Neurological: She is alert and oriented to person, place, and time.   Skin: She is not diaphoretic.   Nursing note and vitals reviewed.      Significant Labs:  CBC:   Recent Labs   Lab 02/04/20  0355 02/05/20  0441   WBC 30.76* 12.03   HGB 9.8* 9.7*   HCT 30.8* 31.9*   * 350       Significant  Imaging:  Imaging results within the past 24 hours have been reviewed.

## 2020-02-05 NOTE — CONSULTS
[facility]  Gastroenterology  Consult Note    Patient Name: Mesha Mckenzie  MRN: [id number]  Admission Date: 2/1/2020  Hospital Length of Stay: 3 days  Code Status: Full Code   Attending Provider: Darrian Fierro MD   Consulting Provider: Jose D Jackson MD  Primary Care Physician: Varun Shea MD PhD  Principal Problem:Sepsis    Inpatient consult to Gastroenterology  Consult performed by: Jose D Jackson MD  Consult ordered by: Darrian Fierro MD  Reason for consult: Evaluation of Cdiff         Subjective:     HPI:  64-year-old woman with IVDU, diastolic CHF, HTN, MDD, right thalamic stroke, MSSA MV and TV endocarditis with associated bacteremia, empyema, shoulder abscess, plus epidural abscess, s/p laminectomy, s/p 6 weeks of oxacillin, and recent C difficile colitis who developed worsening diarrhea with associated abdominal pain, lightheadedness and fatigue.  Patient was admitted to the hospital in mid January for c diff. She received adequate treatment with vancomycin and said she did get better. However, she then started getting worse and having diarrhea again. Patient was first admitted to the ICU with septic shock. The shock resolved and patient was stable enough to be stepped down to the floor. ID involved in the case and is managing the antibiotics. CT scan showed pancolitis. 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    GI bleed     History of uterine fibroid     Hypertension     Right thalamic stroke 10/7/2019    Shortness of breath on exertion     Spinal stenosis     Stroke     mini stroke in 9/2019    Urinary retention         Past Surgical History:   Procedure Laterality Date    ANTERIOR VAGINAL REPAIR  10-    CARDIAC CATHETERIZATION  age 12    CHOLECYSTECTOMY  2015    COLONOSCOPY  12/12/2018    aborted due to poor colon prep    COLONOSCOPY N/A 12/12/2018    Procedure: COLONOSCOPY;  Surgeon: Renard Gonsalves MD;  [facility]: [facility];  Service: Endoscopy;  Laterality: N/A;    HYSTERECTOMY  1989    AMANDA    LAMINECTOMY N/A 10/9/2019    Procedure: LAMINECTOMY, SPINE, L3, Open;  Surgeon: Martin Lewis DO;  Location: [facility] OR [address] [place] [address];  Service: Neurosurgery;  Laterality: N/A;    tubiligation         Review of patient's allergies indicates:   Allergen Reactions    Pcn [penicillins]      Family History     Problem Relation (Age of Onset)    Alzheimer's disease Mother    Hypertension Brother, Sister    Osteoarthritis Mother    Thyroid disease Mother        Tobacco Use    Smoking status: Current Every Day Smoker     Packs/day: 0.25     Years: 53.00     Pack years: 13.25     Types: Cigarettes     Start date: 1967    Smokeless tobacco: Never Used    Tobacco comment: 2-3 cigarettes per day  Pt is currently enrolled in the [employer].  Ambulatory referral to Smoking Cessation program.   Substance and Sexual Activity    Alcohol use: No    Drug use: No    Sexual activity: Yes     Partners: Male     Review of Systems   Constitutional: Positive for appetite change. Negative for chills, diaphoresis, fatigue and fever.   Respiratory: Negative for cough and shortness of breath.    Cardiovascular: Negative for chest pain and leg swelling.   Gastrointestinal: Positive for diarrhea. Negative for abdominal distention, abdominal pain, blood in stool, constipation and nausea.   Genitourinary: Negative.    Musculoskeletal: Negative.      Objective:     Vital Signs (Most Recent):  Temp: 96.7 °F (35.9 °C) (02/05/20 0831)  Pulse: 95 (02/05/20 0831)  Resp: 16 (02/05/20 0831)  BP: (!) 109/58 (02/05/20 0831)  SpO2: 96 % (02/05/20 0831) Vital  Signs (24h Range):  Temp:  [96 °F (35.6 °C)-98.1 °F (36.7 °C)] 96.7 °F (35.9 °C)  Pulse:  [] 95  Resp:  [16-19] 16  SpO2:  [94 %-98 %] 96 %  BP: ()/(55-58) 109/58     Weight: 50.3 kg (111 lb) (02/02/20 1115)  Body mass index is 20.97 kg/m².      Intake/Output Summary (Last 24 hours) at 2/5/2020 1121  Last data filed at 2/4/2020 1800  Gross per 24 hour   Intake --   Output 600 ml   Net -600 ml       Lines/Drains/Airways     Central Venous Catheter Line                 Percutaneous Central Line Insertion/Assessment - triple lumen  02/02/20 0624 right internal jugular 3 days                Physical Exam   Constitutional: She is oriented to person, place, and time. She appears well-developed and well-nourished. No distress.   HENT:   Head: Normocephalic and atraumatic.   Eyes: Pupils are equal, round, and reactive to light.   Neck:   RIJ CVC c/d/i   Cardiovascular: Normal rate and regular rhythm.   Pulmonary/Chest: Effort normal. No respiratory distress.   Abdominal: Soft. She exhibits no distension. There is no tenderness. There is no guarding.   Musculoskeletal: She exhibits no edema or tenderness.   Neurological: She is alert and oriented to person, place, and time.   Skin: She is not diaphoretic.   Nursing note and vitals reviewed.      Significant Labs:  CBC:   Recent Labs   Lab 02/04/20  0355 02/05/20  0441   WBC 30.76* 12.03   HGB 9.8* 9.7*   HCT 30.8* 31.9*   * 350       Significant Imaging:  Imaging results within the past 24 hours have been reviewed.    Assessment/Plan:     Clostridium difficile infection  64-year-old woman with IVDU, diastolic CHF, HTN, MDD, right thalamic stroke, MSSA MV and TV endocarditis with associated bacteremia, empyema, shoulder abscess, plus epidural abscess, s/p laminectomy, s/p 6 weeks of oxacillin, and recent C difficile colitis is admitted for reoccurrence of cdiff. Patient was first diagnosed with cdiff in mid January and received adequate treatment. Patient  got better but then got worse. We will call this her first reoccurrence of cdiff. Patient is getting better on current oral vancomyocin and is stable. There is no need for a colonoscopy at this time. Agree with ID about an oral vanc taper. Patient can follow up in GI clinic and schedule colonoscopy after cdiff resolves. If patient has another reoccurrence of cdiff will discuss the possibility of a fecal transplant.        Plan  -No need for a scope right now  -Agree with Vanc taper per ID  -can follow up in GI clinic outpatient to setup a future colonoscopy   -If patient has another reoccurrence will consider fecal transplant at that time.         Thank you for your consult. I will sign off. Please contact us if you have any additional questions.    Jose D Jackson MD  Gastroenterology  [facility]

## 2020-02-05 NOTE — PLAN OF CARE
Problem: Adult Inpatient Plan of Care  Goal: Plan of Care Review  Outcome: Ongoing, Progressing  Goal: Patient-Specific Goal (Individualization)  Outcome: Ongoing, Progressing  Goal: Absence of Hospital-Acquired Illness or Injury  Outcome: Ongoing, Progressing  Goal: Optimal Comfort and Wellbeing  Outcome: Ongoing, Progressing  Goal: Readiness for Transition of Care  Outcome: Ongoing, Progressing  Goal: Rounds/Family Conference  Outcome: Ongoing, Progressing   Patient is AAOx4. Ambulates to bathroom with supervision. Patient is on oral vancomycin. C-diff precautions maintained. No c/o pain. Zofran administered for c/o nausea and effective. Safety measures maintained. Call light in reach.

## 2020-02-05 NOTE — HPI
64-year-old woman with IVDU, diastolic CHF, HTN, MDD, right thalamic stroke, MSSA MV and TV endocarditis with associated bacteremia, empyema, shoulder abscess, plus epidural abscess, s/p laminectomy, s/p 6 weeks of oxacillin, and recent C difficile colitis who developed worsening diarrhea with associated abdominal pain, lightheadedness and fatigue. Patient was admitted to the hospital in mid January for c diff. She received adequate treatment with vancomycin and said she did get better. However, she then started getting worse and having diarrhea again. Patient was first admitted to the ICU with septic shock. The shock resolved and patient was stable enough to be stepped down to the floor. ID involved in the case and is managing the antibiotics. CT scan showed pancolitis. GI consulted to determine if this is a reoccurrence, need for colonoscopy, and vancomyocin taper

## 2020-02-06 VITALS
TEMPERATURE: 97 F | SYSTOLIC BLOOD PRESSURE: 129 MMHG | DIASTOLIC BLOOD PRESSURE: 61 MMHG | BODY MASS INDEX: 20.96 KG/M2 | RESPIRATION RATE: 16 BRPM | WEIGHT: 111 LBS | HEART RATE: 99 BPM | OXYGEN SATURATION: 98 % | HEIGHT: 61 IN

## 2020-02-06 PROBLEM — E87.6 HYPOKALEMIA: Status: RESOLVED | Noted: 2020-02-06 | Resolved: 2020-02-06

## 2020-02-06 PROBLEM — E87.1 HYPONATREMIA: Status: ACTIVE | Noted: 2020-02-06

## 2020-02-06 PROBLEM — R33.9 URINARY RETENTION: Status: RESOLVED | Noted: 2019-10-28 | Resolved: 2020-02-06

## 2020-02-06 PROBLEM — E87.6 HYPOKALEMIA: Status: ACTIVE | Noted: 2020-02-06

## 2020-02-06 PROBLEM — R65.21 SEPTIC SHOCK DUE TO UNDETERMINED ORGANISM: Status: RESOLVED | Noted: 2020-02-02 | Resolved: 2020-02-06

## 2020-02-06 PROBLEM — A41.9 SEPSIS: Status: RESOLVED | Noted: 2020-01-09 | Resolved: 2020-02-06

## 2020-02-06 PROBLEM — A41.9 SEPTIC SHOCK DUE TO UNDETERMINED ORGANISM: Status: RESOLVED | Noted: 2020-02-02 | Resolved: 2020-02-06

## 2020-02-06 PROBLEM — R57.9 SHOCK: Status: RESOLVED | Noted: 2020-02-03 | Resolved: 2020-02-06

## 2020-02-06 LAB
ALBUMIN SERPL BCP-MCNC: 2.4 G/DL (ref 3.5–5.2)
ALBUMIN SERPL BCP-MCNC: 2.5 G/DL (ref 3.5–5.2)
ALP SERPL-CCNC: 95 U/L (ref 55–135)
ALT SERPL W/O P-5'-P-CCNC: 11 U/L (ref 10–44)
ANION GAP SERPL CALC-SCNC: 6 MMOL/L (ref 8–16)
ANION GAP SERPL CALC-SCNC: 9 MMOL/L (ref 8–16)
ANISOCYTOSIS BLD QL SMEAR: SLIGHT
AST SERPL-CCNC: 16 U/L (ref 10–40)
BACTERIA BLD CULT: NORMAL
BACTERIA BLD CULT: NORMAL
BASOPHILS # BLD AUTO: ABNORMAL K/UL (ref 0–0.2)
BASOPHILS NFR BLD: 0 % (ref 0–1.9)
BILIRUB SERPL-MCNC: 0.2 MG/DL (ref 0.1–1)
BUN SERPL-MCNC: 8 MG/DL (ref 8–23)
BUN SERPL-MCNC: 9 MG/DL (ref 8–23)
CALCIUM SERPL-MCNC: 7.9 MG/DL (ref 8.7–10.5)
CALCIUM SERPL-MCNC: 8 MG/DL (ref 8.7–10.5)
CHLORIDE SERPL-SCNC: 112 MMOL/L (ref 95–110)
CHLORIDE SERPL-SCNC: 112 MMOL/L (ref 95–110)
CO2 SERPL-SCNC: 18 MMOL/L (ref 23–29)
CO2 SERPL-SCNC: 20 MMOL/L (ref 23–29)
CREAT SERPL-MCNC: 0.7 MG/DL (ref 0.5–1.4)
CREAT SERPL-MCNC: 0.8 MG/DL (ref 0.5–1.4)
DIFFERENTIAL METHOD: ABNORMAL
EOSINOPHIL # BLD AUTO: ABNORMAL K/UL (ref 0–0.5)
EOSINOPHIL NFR BLD: 7 % (ref 0–8)
ERYTHROCYTE [DISTWIDTH] IN BLOOD BY AUTOMATED COUNT: 14.6 % (ref 11.5–14.5)
EST. GFR  (AFRICAN AMERICAN): >60 ML/MIN/1.73 M^2
EST. GFR  (AFRICAN AMERICAN): >60 ML/MIN/1.73 M^2
EST. GFR  (NON AFRICAN AMERICAN): >60 ML/MIN/1.73 M^2
EST. GFR  (NON AFRICAN AMERICAN): >60 ML/MIN/1.73 M^2
FOLATE SERPL-MCNC: 17.3 NG/ML (ref 4–24)
GLUCOSE SERPL-MCNC: 111 MG/DL (ref 70–110)
GLUCOSE SERPL-MCNC: 95 MG/DL (ref 70–110)
HCT VFR BLD AUTO: 33 % (ref 37–48.5)
HGB BLD-MCNC: 10.2 G/DL (ref 12–16)
HYPOCHROMIA BLD QL SMEAR: ABNORMAL
IMM GRANULOCYTES # BLD AUTO: ABNORMAL K/UL (ref 0–0.04)
IMM GRANULOCYTES NFR BLD AUTO: ABNORMAL % (ref 0–0.5)
LYMPHOCYTES # BLD AUTO: ABNORMAL K/UL (ref 1–4.8)
LYMPHOCYTES NFR BLD: 33 % (ref 18–48)
MAGNESIUM SERPL-MCNC: 2 MG/DL (ref 1.6–2.6)
MCH RBC QN AUTO: 31.3 PG (ref 27–31)
MCHC RBC AUTO-ENTMCNC: 30.9 G/DL (ref 32–36)
MCV RBC AUTO: 101 FL (ref 82–98)
METAMYELOCYTES NFR BLD MANUAL: 3 %
MONOCYTES # BLD AUTO: ABNORMAL K/UL (ref 0.3–1)
MONOCYTES NFR BLD: 5 % (ref 4–15)
MYELOCYTES NFR BLD MANUAL: 1 %
NEUTROPHILS NFR BLD: 51 % (ref 38–73)
NRBC BLD-RTO: 0 /100 WBC
PHOSPHATE SERPL-MCNC: 3.1 MG/DL (ref 2.7–4.5)
PHOSPHATE SERPL-MCNC: 4.1 MG/DL (ref 2.7–4.5)
PLATELET # BLD AUTO: 308 K/UL (ref 150–350)
PLATELET BLD QL SMEAR: ABNORMAL
PMV BLD AUTO: 9.9 FL (ref 9.2–12.9)
POIKILOCYTOSIS BLD QL SMEAR: SLIGHT
POLYCHROMASIA BLD QL SMEAR: ABNORMAL
POTASSIUM SERPL-SCNC: 3 MMOL/L (ref 3.5–5.1)
POTASSIUM SERPL-SCNC: 3.6 MMOL/L (ref 3.5–5.1)
PROT SERPL-MCNC: 5.6 G/DL (ref 6–8.4)
RBC # BLD AUTO: 3.26 M/UL (ref 4–5.4)
SODIUM SERPL-SCNC: 138 MMOL/L (ref 136–145)
SODIUM SERPL-SCNC: 139 MMOL/L (ref 136–145)
VIT B12 SERPL-MCNC: 969 PG/ML (ref 210–950)
WBC # BLD AUTO: 8.61 K/UL (ref 3.9–12.7)

## 2020-02-06 PROCEDURE — 80053 COMPREHEN METABOLIC PANEL: CPT

## 2020-02-06 PROCEDURE — 82607 VITAMIN B-12: CPT

## 2020-02-06 PROCEDURE — 83735 ASSAY OF MAGNESIUM: CPT

## 2020-02-06 PROCEDURE — 25000003 PHARM REV CODE 250: Performed by: HOSPITALIST

## 2020-02-06 PROCEDURE — 82746 ASSAY OF FOLIC ACID SERUM: CPT

## 2020-02-06 PROCEDURE — 84100 ASSAY OF PHOSPHORUS: CPT

## 2020-02-06 PROCEDURE — 85007 BL SMEAR W/DIFF WBC COUNT: CPT

## 2020-02-06 PROCEDURE — 25000003 PHARM REV CODE 250: Performed by: STUDENT IN AN ORGANIZED HEALTH CARE EDUCATION/TRAINING PROGRAM

## 2020-02-06 PROCEDURE — 99239 HOSP IP/OBS DSCHRG MGMT >30: CPT | Mod: ,,, | Performed by: HOSPITALIST

## 2020-02-06 PROCEDURE — 99239 PR HOSPITAL DISCHARGE DAY,>30 MIN: ICD-10-PCS | Mod: ,,, | Performed by: HOSPITALIST

## 2020-02-06 PROCEDURE — 80069 RENAL FUNCTION PANEL: CPT

## 2020-02-06 PROCEDURE — 36415 COLL VENOUS BLD VENIPUNCTURE: CPT

## 2020-02-06 PROCEDURE — 85027 COMPLETE CBC AUTOMATED: CPT

## 2020-02-06 PROCEDURE — 25000003 PHARM REV CODE 250: Performed by: NURSE PRACTITIONER

## 2020-02-06 RX ORDER — POTASSIUM CHLORIDE 750 MG/1
30 CAPSULE, EXTENDED RELEASE ORAL ONCE
Status: COMPLETED | OUTPATIENT
Start: 2020-02-06 | End: 2020-02-06

## 2020-02-06 RX ORDER — POTASSIUM CHLORIDE 20 MEQ/1
40 TABLET, EXTENDED RELEASE ORAL ONCE
Status: COMPLETED | OUTPATIENT
Start: 2020-02-06 | End: 2020-02-06

## 2020-02-06 RX ORDER — POTASSIUM CHLORIDE 20 MEQ/1
20 TABLET, EXTENDED RELEASE ORAL ONCE
Status: DISCONTINUED | OUTPATIENT
Start: 2020-02-06 | End: 2020-02-06 | Stop reason: HOSPADM

## 2020-02-06 RX ADMIN — POTASSIUM CHLORIDE 30 MEQ: 750 CAPSULE, EXTENDED RELEASE ORAL at 12:02

## 2020-02-06 RX ADMIN — Medication 500 MG: at 08:02

## 2020-02-06 RX ADMIN — VENLAFAXINE 75 MG: 37.5 TABLET ORAL at 09:02

## 2020-02-06 RX ADMIN — Medication 500 MG: at 12:02

## 2020-02-06 RX ADMIN — Medication 500 MG: at 01:02

## 2020-02-06 RX ADMIN — Medication 500 MG: at 06:02

## 2020-02-06 RX ADMIN — ATORVASTATIN CALCIUM 40 MG: 20 TABLET, FILM COATED ORAL at 09:02

## 2020-02-06 RX ADMIN — FOLIC ACID 1 MG: 1 TABLET ORAL at 09:02

## 2020-02-06 RX ADMIN — POTASSIUM CHLORIDE 40 MEQ: 1500 TABLET, EXTENDED RELEASE ORAL at 11:02

## 2020-02-06 RX ADMIN — ACETAMINOPHEN 650 MG: 325 TABLET ORAL at 01:02

## 2020-02-06 NOTE — PLAN OF CARE
02/06/20 1659   Post-Acute Status   Post-Acute Authorization Home Health/Hospice   Home Health/Hospice Status Referrals Sent     SW sent referral via RC to PHN.    Lauren Hassan LMSW  [facility]  [id number]

## 2020-02-06 NOTE — PLAN OF CARE
02/06/20 0829   Post-Acute Status   Post-Acute Authorization Home Health/Hospice   Home Health/Hospice Status Awaiting Internal Medical Clearance

## 2020-02-06 NOTE — NURSING
Patient alert and oriented X4.  POC reviewed at bedside, pt verbalize understanding.  Assessment and ordered interventions completed see flow sheet.  No falls or injuries to report.

## 2020-02-06 NOTE — PLAN OF CARE
Problem: Fall Injury Risk  Goal: Absence of Fall and Fall-Related Injury  2/6/2020 0410 by Brinda Munoz RN  Outcome: Ongoing, Progressing  2/6/2020 0410 by Brinda Munoz RN  Outcome: Ongoing, Progressing     Problem: Adult Inpatient Plan of Care  Goal: Plan of Care Review  2/6/2020 0410 by Brinda Munoz RN  Outcome: Ongoing, Progressing  2/6/2020 0410 by Brinda Munoz RN  Outcome: Ongoing, Progressing  Goal: Patient-Specific Goal (Individualization)  2/6/2020 0410 by Brinda Munoz RN  Outcome: Ongoing, Progressing  2/6/2020 0410 by Brinda Munoz RN  Outcome: Ongoing, Progressing  Goal: Absence of Hospital-Acquired Illness or Injury  2/6/2020 0410 by Brinda Munoz RN  Outcome: Ongoing, Progressing  2/6/2020 0410 by Brinda Munoz RN  Outcome: Ongoing, Progressing  Goal: Optimal Comfort and Wellbeing  2/6/2020 0410 by Brinda Munoz RN  Outcome: Ongoing, Progressing  2/6/2020 0410 by Brinda Munoz RN  Outcome: Ongoing, Progressing  Goal: Readiness for Transition of Care  2/6/2020 0410 by Brinda Munoz RN  Outcome: Ongoing, Progressing  2/6/2020 0410 by Brinda Munoz RN  Outcome: Ongoing, Progressing  Goal: Rounds/Family Conference  2/6/2020 0410 by Brinda Munoz RN  Outcome: Ongoing, Progressing  2/6/2020 0410 by Brinda Munoz RN  Outcome: Ongoing, Progressing     Problem: Infection  Goal: Infection Symptom Resolution  2/6/2020 0410 by Brinda Munoz RN  Outcome: Ongoing, Progressing  2/6/2020 0410 by Brinda Munoz RN  Outcome: Ongoing, Progressing     Problem: Skin Injury Risk Increased  Goal: Skin Health and Integrity  2/6/2020 0410 by Brinda Munoz RN  Outcome: Ongoing, Progressing  2/6/2020 0410 by Brinda Munoz RN  Outcome: Ongoing, Progressing

## 2020-02-06 NOTE — DISCHARGE SUMMARY
[facility] Hospital Medicine  Discharge Summary      Patient Name: Mesha Mckenzie  MRN: [id number]  Admission Date: 2/1/2020  Hospital Length of Stay: 4 days  Discharge Date and Time:  02/06/2020 2:26 PM  Attending Physician: Talat Jackson MD   Discharging Provider: Talat Jackson MD  Primary Care Provider: Varun Shea MD PhD    Hospital Medicine Team: [facility] B Talat Jackson MD    HPI: 64 y.o. F IVDU w/ HTN, HFpEF,pHTN, BOOP, depression, urinary retention, Hx of endocarditis, R thalamic stroke, epidural abscess, septic PE, empyema, shoulder abscess and recent Hx C. Diff colitis presenting with frequent stools, abdominal pain, lightheadedness, and fatigue. Patient denies fevers, chills, chest pain, palpitation, SOB, syncope, LOC, HA , or confusion. Patient was transported [place] [facility] [place] via EMS who reported SBP in the 60s. Her initial BP in the ED was 70/41. Her initial ED labs were remarkable with a WBC of 32.68, Lactic Acid of 3.2, and an ABIMBOLA w/ a Cr of 1.7. Vitals and labs concerning for Sepsis; possibly 2/2 c diff colitis, bacteremia, or UTI. In the ED Blood Cx and urine studies were obtained. She was fluids resuscitated with 2.5L and given PO vanc, IV vanc, and cefepime. At the time of her initial evaluation by the MICU the patient's only complaint was abdominal distension that she attributed to a full bladder due to difficulty urinating. The patient was A&Ox3, answering all questions appropriately, her MAPs were > 65, and her abdomen was mildly tender. Her exam was otherwise unremarkable. Her CXR was unremarkable.Her repeat Lactic Acid worsened following fluids and increased to 3.9. The patient was given another 1L NS bolus.        Hospital/ICU Course:  Patient admitted to CMICU for further mgmt/evaluation. Currently + for C diff only. Treated with PO vanc. KUB negative. Lactate now 1.2. Off of vasopressors since 0500 2/3. Plan to get CT a/p to evaluate for further  infectious process.     Interval History  2/3  No acute overnight events. Off of Levophed since 0500. 1 L IVF given. Continue course    2/04 WBC remains high on oral vanc, CT scan with pan colitis. Flagyl stopped  2/05 WBC is down today, less stool output. Will consult GI.   2/6 status post gastroenterology evaluation- first reoccurrence of cdiff colitis.  no need for a colonoscopy at this time.  Continue with oral vanc taper per nfectious disease. follow up in GI clinic and schedule colonoscopy after cdiff resolves. Hypokalemia resolved.  has once episode of lose BM today.     * No surgery found *      Hospital Course:     Active Problems:               Active Hospital Problems     Diagnosis   POA    *Sepsis [A41.9]transported to [facility] ED via EMS who reported SBP in the 60s. Her initial BP in the ED was 70/41. Her initial ED labs were remarkable with a WBC of 32.68, Lactic Acid of 3.2, and an ABIMBOLA w/ a Cr of 1.7. Vitals and labs concerning for Sepsis; possibly 2/2 c diff colitis, bacteremia, or UTI. In the ED she was fluids resuscitated with 2.5L and given PO vanc, IV vanc, and cefepime. Repeat Lactic Acid worsened following fluids and increased to 3.9. Patient was given another 1L NS bolus. CXR unremarkable.      - c/w PO vanc   --blood cx NGTD  --sputum cx NGTD  --RIP negative  --C. Diff +  --treating with PO/IV vanc, IV flagyl  --ID consulted  --echo showing no vegetations; EF 55%     2/6 status post Infectious Disease evaluation- Continue oral vancomycin every 6 hours for now based on vancomycin taper (see below). Avoid giving binding agents with it.  Vancomycin taper: Vancomycin 125mg q6 x 10 days followed by 125mg q 12 x 7 days followed by 125mg daily x 7 days followed by 125mg every 3 days x 14 days   Yes    Hypokalemia [E87.6]resolved    Unknown    Hyponatremia [E87.1]134.  Monitor   Unknown    Shock [R57.9] likely from  C. Diff colitis vs other infectious source  --off of vasopressors 2/3 at  0500.  --fluid resuscitated ~3.5 L IVF  5%  --hold antihypertensive BP meds while shock is resolving      Unknown    Septic shock due to undetermined organism [A41.9, R65.21] likely secondary to Clostridium difficile colitis as above     Leukocytosis- secondary to septic shock improved from 32 to 12   Yes    Depression [F32.9] continue venlafaxine   Yes    Clostridium difficile infection [A49.8] as above   Yes    Anemia of infection and chronic disease [B99.9, D63.8] hemoglobin at 9.7 macrocytosis with MCV of 101, obtain B12 and folic acid         Yes       Chronic    History of bacterial endocarditis [Z86.79] history of MSSA Bacterial Endocarditis s/p Oxacillin x 6 weeks and Cefadroxil x 4 weeks. Bacteremia a possible source of sepsis.      --blood cx NGTD   - echo - for vegetations  --EF 55%; L atrial enlg, mitral regurg   Not Applicable       Chronic    Urinary retention [R33.9] continue bladder scans q.6 hours hourly   Yes    Essential hypertension [I10]hold regimen of Amlodipine, Metoprolol, and lasix in the setting of sepsis   --restart when clinically appropriate   Yes       Chronic      patient being discharged home with home health     Consults:   Consults (From admission, onward)        Status Ordering Provider     Inpatient consult to Critical Care Medicine  Once     Provider:  (Not yet assigned)    Completed JOSE G WYNNE     Inpatient consult to Gastroenterology  Once     Provider:  (Not yet assigned)    Completed KAITLYN HO     Inpatient consult to Infectious Diseases  Once     Provider:  (Not yet assigned)    Completed ROSIBEL ABDALLA     Pharmacy to dose Vancomycin consult  Once     Provider:  (Not yet assigned)    JOSE LUIS Roa          Final Active Diagnoses:    Diagnosis Date Noted POA    Hyponatremia [E87.1] 02/06/2020 Unknown    Depression [F32.9] 01/09/2020 Yes    Clostridium difficile infection [A49.8] 01/09/2020 Yes    Anemia of infection and chronic  disease [B99.9, D63.8] 11/01/2019 Yes     Chronic    History of bacterial endocarditis [Z86.79] 11/01/2019 Not Applicable     Chronic    Essential hypertension [I10] 10/16/2019 Yes     Chronic      Problems Resolved During this Admission:    Diagnosis Date Noted Date Resolved POA    PRINCIPAL PROBLEM:  Sepsis [A41.9] 01/09/2020 02/06/2020 Yes    Hypokalemia [E87.6] 02/06/2020 02/06/2020 Unknown    Shock [R57.9] 02/03/2020 02/06/2020 Unknown    Septic shock due to undetermined organism [A41.9, R65.21] 02/02/2020 02/06/2020 Yes    Urinary retention [R33.9] 10/28/2019 02/06/2020 Yes    ABIMBOLA (acute kidney injury) [N17.9] 09/19/2019 02/03/2020 Yes      Discharged Condition: fair    Disposition: Home or Self Care    Follow Up:    Patient Instructions:   No discharge procedures on file.  Medications:  Reconciled Home Medications:      Medication List      Start taking these medications    vancomycin 250mg / 10ml Susp  Take 5 mLs (125 mg total) by mouth every 6 (six) hours for 6 days, THEN 5 mLs (125 mg total) every 12 (twelve) hours for 7 days, THEN 5 mLs (125 mg total) once daily for 7 days, THEN 5 mLs (125 mg total) Every 3 (three) days for 14 days.  Start taking on:  February 6, 2020        Continue taking these medications    atorvastatin 40 MG tablet  Commonly known as:  LIPITOR  Take 1 tablet (40 mg total) by mouth once daily.     folic acid 1 MG tablet  Commonly known as:  FOLVITE  Take 1 tablet (1 mg total) by mouth once daily.     gabapentin 100 MG capsule  Commonly known as:  NEURONTIN  Take 2 capsules (200 mg total) by mouth 3 (three) times daily.     Lactobacillus rhamnosus GG 10 billion cell capsule  Commonly known as:  CULTURELLE  Take 1 capsule by mouth 2 (two) times daily.     multivitamin per tablet  Commonly known as:  THERAGRAN  Take 1 tablet by mouth once daily.     nicotine 14 mg/24 hr  Commonly known as:  NICODERM CQ  Place 1 patch onto the skin once daily.     venlafaxine 75 MG  tablet  Commonly known as:  EFFEXOR  Take 1 tablet (75 mg total) by mouth 2 (two) times daily.        Stop taking these medications    amLODIPine 10 MG tablet  Commonly known as:  NORVASC     furosemide 20 MG tablet  Commonly known as:  LASIX     metoprolol tartrate 100 MG tablet  Commonly known as:  LOPRESSOR     psyllium husk (aspartame) 3.4 gram Pwpk packet  Commonly known as:  METAMUCIL            Significant Diagnostic Studies: Labs:   BMP:   Recent Labs   Lab 02/05/20 0441 02/06/20  0501 02/06/20  1454   * 95 111*   * 139 138   K 2.8* 3.0* 3.6    112* 112*   CO2 16* 18* 20*   BUN 9 8 9   CREATININE 0.7 0.7 0.8   CALCIUM 7.8* 8.0* 7.9*   MG 1.8 2.0  --    , CMP   Recent Labs   Lab 02/05/20 0441 02/06/20  0501 02/06/20  1454   * 139 138   K 2.8* 3.0* 3.6    112* 112*   CO2 16* 18* 20*   * 95 111*   BUN 9 8 9   CREATININE 0.7 0.7 0.8   CALCIUM 7.8* 8.0* 7.9*   PROT 5.6* 5.6*  --    ALBUMIN 2.2* 2.4* 2.5*   BILITOT <0.1* 0.2  --    ALKPHOS 107 95  --    AST 10 16  --    ALT 10 11  --    ANIONGAP 10 9 6*   ESTGFRAFRICA >60.0 >60.0 >60.0   EGFRNONAA >60.0 >60.0 >60.0   , CBC   Recent Labs   Lab 02/05/20 0441 02/06/20  0501   WBC 12.03 8.61   HGB 9.7* 10.2*   HCT 31.9* 33.0*    308   , INR   Lab Results   Component Value Date    INR 1.1 02/03/2020    INR 1.0 10/27/2019    INR 1.4 (H) 10/08/2019   , Lipid Panel   Lab Results   Component Value Date    CHOL 137 04/23/2019    HDL 37 (L) 04/23/2019    LDLCALC 80 04/23/2019    TRIG 101 04/23/2019    CHOLHDL 27.0 04/23/2019   , Troponin No results for input(s): TROPONINI in the last 168 hours., A1C: No results for input(s): HGBA1C in the last 4320 hours. and All labs within the past 24 hours have been reviewed  Microbiology:   Blood Culture   Lab Results   Component Value Date    LABBLOO No Growth to date 02/03/2020    LABBLOO No Growth to date 02/03/2020    LABBLOO No Growth to date 02/03/2020    LABBLOO No Growth to date  02/03/2020   , Sputum Culture No results found for: GSRESP, RESPIRATORYC and Urine Culture    Lab Results   Component Value Date    LABURIN (A) 11/11/2019     CANDIDA ALBICANS  10,000 - 49,999 cfu/ml  Treatment of asymptomatic candiduria is not recommended (except for   specific populations). Candida isolated in the urine typically   represents colonization. If an indwelling urinary catheter is present  it should be removed or replaced.       Radiology:   Imaging Results          X-Ray Chest AP Portable (Final result)  Result time 02/01/20 20:58:18    Final result by Raphael Toth MD (02/01/20 20:58:18)             Impression:      No acute radiographic abnormality.      Electronically signed by: Raphael Toth  Date:    02/01/2020  Time:    20:58           Narrative:    EXAMINATION:  XR CHEST AP PORTABLE    CLINICAL HISTORY:  Sepsis;    TECHNIQUE:  Single frontal view of the chest was performed.    COMPARISON:  10/28/2019    FINDINGS:  The lungs are clear, with normal appearance of pulmonary vasculature and no pleural effusion or pneumothorax.    The cardiac silhouette is normal in size. The hilar and mediastinal contours are unremarkable.    Bones are intact.    Interval resolution of the bilateral infiltrates on the prior study.    Mild probable left upper lobe scarring.                            Cardiac Graphics: Sinus tachycardia with occasional Premature ventricular complexes  Nonspecific ST and/or T wave abnormalities  Abnormal ECG    Pending Diagnostic Studies:     Procedure Component Value Units Date/Time    Amylase [[id number]] Collected:  02/03/20 1131    Order Status:  Sent Lab Status:  In process Updated:  02/03/20 1131    Specimen:  Blood     Lipase [[id number]] Collected:  02/03/20 1131    Order Status:  Sent Lab Status:  In process Updated:  02/03/20 1131    Specimen:  Blood           Talat Jackson MD  Department of Hospital Medicine  [facility]

## 2020-02-06 NOTE — PLAN OF CARE
[facility]    HOME HEALTH ORDERS  FACE TO FACE ENCOUNTER    Patient Name: Mesha Mckenzie  YOB: 1955    PCP: Varun Shea MD PhD   PCP Address: [address] [place] [address] / [id number]  PCP Phone Number: [phone]  PCP Fax: [phone]    Encounter Date: 02/06/2020    Admit to Home Health    Diagnoses:  Active Hospital Problems    Diagnosis  POA    Hyponatremia [E87.1]  Unknown    Depression [F32.9]  Yes    Clostridium difficile infection [A49.8]  Yes    Anemia of infection and chronic disease [B99.9, D63.8]  Yes     Chronic    History of bacterial endocarditis [Z86.79]  Not Applicable     Chronic    Essential hypertension [I10]  Yes     Chronic      Resolved Hospital Problems    Diagnosis Date Resolved POA    *Sepsis [A41.9] 02/06/2020 Yes    Hypokalemia [E87.6] 02/06/2020 Unknown    Shock [R57.9] 02/06/2020 Unknown    Septic shock due to undetermined organism [A41.9, R65.21] 02/06/2020 Yes    Urinary retention [R33.9] 02/06/2020 Yes    ABIMBOLA (acute kidney injury) [N17.9] 02/03/2020 Yes       No future appointments.        I have seen and examined this patient face to face today. My clinical findings that support the need for the home health skilled services and home bound status are the following:  Weakness/numbness causing balance and gait disturbance due to Weakness/Debility making it taxing to leave home.    Allergies:  Review of patient's allergies indicates:   Allergen Reactions    Pcn [penicillins]        Diet: cardiac diet    Activities: activity as tolerated    Nursing:   SN to complete comprehensive assessment including routine vital signs. Instruct on disease process and s/s of complications to report to MD. Review/verify medication list sent home with the patient at time of discharge  and instruct patient/caregiver as needed. Frequency may be adjusted depending on start of care date.    Notify MD if SBP > 160 or < 90; DBP > 90 or < 50; HR > 120 or < 50;  Temp > 101;       CONSULTS:    Physical Therapy to evaluate and treat. Evaluate for home safety and equipment needs; Establish/upgrade home exercise program. Perform / instruct on therapeutic exercises, gait training, transfer training, and Range of Motion.  Occupational Therapy to evaluate and treat. Evaluate home environment for safety and equipment needs. Perform/Instruct on transfers, ADL training, ROM, and therapeutic exercises.  Aide to provide assistance with personal care, ADLs, and vital signs.    MISCELLANEOUS CARE:  Routine Skin for Bedridden Patients: Instruct patient/caregiver to apply moisture barrier cream to all skin folds and wet areas in perineal area daily and after baths and all bowel movements.    WOUND CARE ORDERS  n/a      Medications: Review discharge medications with patient and family and provide education.       Mesha Mckenzie   Home Medication Instructions WILBER:[id number]    Printed on:02/06/20 0561   Medication Information                      atorvastatin (LIPITOR) 40 MG tablet  Take 1 tablet (40 mg total) by mouth once daily.             folic acid (FOLVITE) 1 MG tablet  Take 1 tablet (1 mg total) by mouth once daily.             gabapentin (NEURONTIN) 100 MG capsule  Take 2 capsules (200 mg total) by mouth 3 (three) times daily.             Lactobacillus rhamnosus GG (CULTURELLE) 10 billion cell capsule  Take 1 capsule by mouth 2 (two) times daily.             multivitamin (THERAGRAN) per tablet  Take 1 tablet by mouth once daily.             nicotine (NICODERM CQ) 14 mg/24 hr  Place 1 patch onto the skin once daily.             vancomycin 250mg / 10ml Susp  Take 5 mLs (125 mg total) by mouth every 6 (six) hours for 6 days, THEN 5 mLs (125 mg total) every 12 (twelve) hours for 7 days, THEN 5 mLs (125 mg total) once daily for 7 days, THEN 5 mLs (125 mg total) Every 3 (three) days for 14 days.             venlafaxine (EFFEXOR) 75 MG tablet  Take 1 tablet (75 mg total) by mouth 2  (two) times daily.                 I certify that this patient is confined to her home and needs intermittent skilled nursing care, physical therapy and occupational therapy.

## 2020-02-07 NOTE — NURSING
Patient off unit by wheelchair, accompanied by her .  Respirations even and unlabored on room air.  No apparent signs of distress noted.  Patient states she feels fine, she is ready to go home.  Patient and her  understand to go get her vancomycin prescription in the morning from [facility] in [facility].

## 2020-02-07 NOTE — NURSING
Spoke with patient's pharmacy ([facility] in [facility]), per pharmacist there has not been any prescription for vancomycin called in for this patient.

## 2020-02-07 NOTE — NURSING
Spoke with dr. Jackson, confirmed that patient's medication has now been sent to the [facility] in [facility].

## 2020-02-08 LAB
BACTERIA BLD CULT: NORMAL
BACTERIA BLD CULT: NORMAL

## 2020-02-10 ENCOUNTER — PATIENT OUTREACH (OUTPATIENT)
Dept: ADMINISTRATIVE | Facility: CLINIC | Age: 65
End: 2020-02-10

## 2020-02-10 RX ORDER — VANCOMYCIN HYDROCHLORIDE 125 MG/1
125 CAPSULE ORAL 4 TIMES DAILY
COMMUNITY

## 2020-02-10 NOTE — PROGRESS NOTES
C3 nurse attempted to contact patient. No answer. The following message was left for the patient to return the call:  Good morning I am a nurse calling on behalf of [facility] from the [facility].  This is a Transitional Care Call for Mesha Mckenzie . When you have a moment please contact us at [phone] or [phone] Monday through Friday, between the hours of 8 am to 4 pm. We look forward to speaking with you. On behalf of [facility] have a nice day.    The patient does not have a scheduled HOSFU appointment within 7-14 days post hospital discharge date 02/06/20. [facility] PCP

## 2020-02-10 NOTE — PATIENT INSTRUCTIONS
Sepsis     To treat sepsis, antibiotics and fluids may by given through an intravenous (IV) line.     Sepsis happens when your body responds with widespread inflammation to a bad infection or bacteremia--the presence of bacteria in your bloodstream. Sepsis can be deadly. Blood pressure may drop and the lungs and kidneys may start to fail. Emergency care for sepsis is crucial.  Risk factors  Those most at risk for sepsis are:  · Infants or older adults  · People who have an illness that weakens their immune system, such as cancer, AIDS, or diabetes  · People being treated with chemotherapy medicines or radiation, which weakens the immune system  · People who have had a transplant  · People with a very severe infection such as pneumonia, meningitis, or a urinary tract infection  When to go to the emergency department (ED)  Sepsis is an emergency. Go to the nearest ED if you have a fever with any of these symptoms:  · Chills and shaking  · Rapid heartbeat and breathing  · Trouble breathing  · Severe nausea or uncontrolled vomiting  · Confusion, disorientation, drowsiness, or dizziness  · Decreased urination  · Severe pain, including in the back or joints   What to expect in the ED  · Blood and urine tests are done to look for bacteria. They also check for organ failure.  · Blood, urine, or sputum cultures may be taken. The samples are sent to a lab. They are placed in a special container. Any bacteria should grow in 24 hours.  · X-rays or other imaging tests may be done.  A person with sepsis will be admitted to the hospital and treated with antibiotics. Treatment may also include oxygen and intravenous fluids.    © 9739-9254 [employer]. [address] [place] [address], [place] [address]. All rights reserved. This information is not intended as a substitute for professional medical care. Always follow your healthcare professional's instructions.

## 2020-02-11 ENCOUNTER — TELEPHONE (OUTPATIENT)
Dept: GASTROENTEROLOGY | Facility: CLINIC | Age: 65
End: 2020-02-11

## 2020-02-11 NOTE — TELEPHONE ENCOUNTER
----- Message from Morenita Estes MD sent at 2/5/2020  3:19 PM CST -----  Patient need GI clinic follow up in 2-3 months. She is still in the hospital. Please arrange follow up for her after she gets discharged.    Thanks!  WT

## 2020-02-12 RX ORDER — GABAPENTIN 100 MG/1
200 CAPSULE ORAL 3 TIMES DAILY
Qty: 180 CAPSULE | Refills: 2 | OUTPATIENT
Start: 2020-02-12 | End: 2021-02-11

## 2020-02-12 RX ORDER — VENLAFAXINE 75 MG/1
TABLET ORAL
Qty: 60 TABLET | Refills: 3 | OUTPATIENT
Start: 2020-02-12

## 2020-03-20 ENCOUNTER — TELEPHONE (OUTPATIENT)
Dept: GASTROENTEROLOGY | Facility: CLINIC | Age: 65
End: 2020-03-20

## 2020-03-24 ENCOUNTER — PATIENT MESSAGE (OUTPATIENT)
Dept: GASTROENTEROLOGY | Facility: CLINIC | Age: 65
End: 2020-03-24

## 2020-04-13 ENCOUNTER — PATIENT MESSAGE (OUTPATIENT)
Dept: GASTROENTEROLOGY | Facility: CLINIC | Age: 65
End: 2020-04-13

## 2020-06-04 ENCOUNTER — TELEPHONE (OUTPATIENT)
Dept: GASTROENTEROLOGY | Facility: CLINIC | Age: 65
End: 2020-06-04

## 2020-06-04 NOTE — TELEPHONE ENCOUNTER
----- Message from Saji Hwang MA sent at 6/4/2020 10:07 AM CDT -----  Pt needs C. Diff  f/u with Dr. Estes for July. Thanks!

## 2020-11-20 DIAGNOSIS — M81.8 OTHER OSTEOPOROSIS WITHOUT CURRENT PATHOLOGICAL FRACTURE: ICD-10-CM

## 2020-11-20 DIAGNOSIS — Z12.31 ENCOUNTER FOR SCREENING MAMMOGRAM FOR MALIGNANT NEOPLASM OF BREAST: Primary | ICD-10-CM

## 2021-04-16 ENCOUNTER — PATIENT MESSAGE (OUTPATIENT)
Dept: RESEARCH | Facility: HOSPITAL | Age: 66
End: 2021-04-16

## 2021-04-19 NOTE — SUBJECTIVE & OBJECTIVE
Interval History: Afebrile overnight. Decreased frequency of bowel movements. GI consulted.    Review of Systems   Constitutional: Positive for appetite change. Negative for fatigue and fever.   Respiratory: Negative for shortness of breath.    Cardiovascular: Negative for chest pain and leg swelling.   Gastrointestinal: Positive for diarrhea. Negative for abdominal distention, abdominal pain, constipation and nausea.   Skin: Negative for rash.   Psychiatric/Behavioral: Negative for agitation and confusion.     Objective:     Vital Signs (Most Recent):  Temp: 96.7 °F (35.9 °C) (02/05/20 0831)  Pulse: 95 (02/05/20 0831)  Resp: 16 (02/05/20 0831)  BP: (!) 109/58 (02/05/20 0831)  SpO2: 96 % (02/05/20 0831) Vital Signs (24h Range):  Temp:  [96 °F (35.6 °C)-98.1 °F (36.7 °C)] 96.7 °F (35.9 °C)  Pulse:  [] 95  Resp:  [16-19] 16  SpO2:  [94 %-98 %] 96 %  BP: ()/(55-58) 109/58     Weight: 50.3 kg (111 lb)  Body mass index is 20.97 kg/m².    Estimated Creatinine Clearance: 59.7 mL/min (based on SCr of 0.7 mg/dL).    Physical Exam   Constitutional: She appears well-developed. No distress.   HENT:   Head: Normocephalic and atraumatic.   Eyes: Right eye exhibits no discharge. Left eye exhibits no discharge. No scleral icterus.   Neck:   R IJ CVC in place w/o surrounding erythema   Cardiovascular: Normal rate, regular rhythm and intact distal pulses.   Pulmonary/Chest: Effort normal. No stridor. No respiratory distress. She has no wheezes. She has no rales.   Abdominal: Soft. She exhibits no distension. There is no tenderness. There is no guarding.   Musculoskeletal: She exhibits no edema or tenderness.   Lymphadenopathy:     She has no cervical adenopathy.   Neurological: She is alert.   Skin: Skin is warm. No rash noted. She is not diaphoretic. No erythema.       Significant Labs:   Blood Culture:   Recent Labs   Lab 01/09/20 0629 02/01/20 2014 02/01/20 2021 02/03/20  0452 02/03/20  0453   LABAYO No growth  Pt informed of results and recommendations.    after 5 days.  No growth after 5 days. No Growth to date  No Growth to date  No Growth to date  No Growth to date No Growth to date  No Growth to date  No Growth to date  No Growth to date No Growth to date  No Growth to date  No Growth to date No Growth to date  No Growth to date  No Growth to date     CBC:   Recent Labs   Lab 02/04/20 0355 02/05/20 0441   WBC 30.76* 12.03   HGB 9.8* 9.7*   HCT 30.8* 31.9*   * 350     CMP:   Recent Labs   Lab 02/04/20 0355 02/05/20 0441   * 134*   K 3.0* 2.8*    108   CO2 17* 16*   GLU 93 119*   BUN 6* 9   CREATININE 0.7 0.7   CALCIUM 7.9* 7.8*   PROT 5.9* 5.6*   ALBUMIN 2.2* 2.2*   BILITOT 0.1 <0.1*   ALKPHOS 135 107   AST 15 10   ALT 12 10   ANIONGAP 10 10   EGFRNONAA >60.0 >60.0     Microbiology Results (last 7 days)     Procedure Component Value Units Date/Time    Blood culture [[id number]] Collected:  02/03/20 0452    Order Status:  Completed Specimen:  Blood from Peripheral, Hand, Right Updated:  02/05/20 1012     Blood Culture, Routine No Growth to date      No Growth to date      No Growth to date    Blood culture [[id number]] Collected:  02/03/20 0453    Order Status:  Completed Specimen:  Blood from Peripheral, Hand, Left Updated:  02/05/20 1012     Blood Culture, Routine No Growth to date      No Growth to date      No Growth to date    Blood culture x two cultures. Draw prior to antibiotics. [[id number]] Collected:  02/01/20 2021    Order Status:  Completed Specimen:  Blood from Peripheral, Hand, Right Updated:  02/04/20 2222     Blood Culture, Routine No Growth to date      No Growth to date      No Growth to date      No Growth to date    Narrative:       Aerobic and anaerobic    Blood culture x two cultures. Draw prior to antibiotics. [[id number]] Collected:  02/01/20 2014    Order Status:  Completed Specimen:  Blood from Peripheral, Forearm, Right Updated:  02/04/20 2222     Blood Culture, Routine No Growth to date      No Growth to  date      No Growth to date      No Growth to date    Narrative:       Aerobic and anaerobic    Respiratory Infection Panel, Nasopharyngeal [[id number]] Collected:  02/03/20 0807    Order Status:  Completed Specimen:  Nasopharyngeal Swab Updated:  02/03/20 1402     Respiratory Infection Panel Source NP Swab     Adenovirus Not Detected     Coronavirus 229E Not Detected     Coronavirus HKU1 Not Detected     Coronavirus NL63 Not Detected     Coronavirus OC43 Not Detected     Human Metapneumovirus Not Detected     Human Rhinovirus/Enterovirus Not Detected     Influenza A (subtypes H1, H1-2009,H3) Not Detected     Influenza B Not Detected     Parainfluenza Virus 1 Not Detected     Parainfluenza Virus 2 Not Detected     Parainfluenza Virus 3 Not Detected     Parainfluenza Virus 4 Not Detected     Respiratory Syncytial Virus Not Detected     Bordetella Parapertussis (JS7466) Not Detected     Bordetella pertussis (ptxP) Not Detected     Chlamydia pneumoniae Not Detected     Mycoplasma pneumoniae Not Detected     Comment: Respiratory Infection Panel testing performed by Multiplex PCR.       Narrative:       For all other respiratory sources order [id number] Respiratory  Viral Panel by PCR (RVPCR)    Clostridium difficile EIA [[id number]]  (Abnormal) Collected:  02/02/20 0358    Order Status:  Completed Specimen:  Stool Updated:  02/02/20 1333     C. diff Antigen Positive     C difficile Toxins A+B, EIA Positive     Comment: Testing not recommended for children <24 months old.             Significant Imaging: I have reviewed all pertinent imaging results/findings within the past 24 hours.

## 2021-06-11 NOTE — PROGRESS NOTES
[facility]  Critical Care Medicine  Progress Note    Patient Name: Mesha Mckenzie  MRN: [id number]  Admission Date: 10/2/2019  Hospital Length of Stay: 7 days  Code Status: Full Code  Attending Provider: Ok Rehman MD  Primary Care Provider: Varun Shea MD PhD   Principal Problem: <principal problem not specified>    Subjective:     HPI:  Patient is a 63 y.o. female with significant past medical history of depression, anxiety and IVDA presented to [facility] ED on 9/19 with c/o N/V/D and weakness x ~5 days. The patient was found to be in septic shock with pneumonia and colitis and was transferred to [facility] ICU for further management. Pt was admitted to the intensive care unit and was treated with broad-spectrum antibiotics. 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 CT was repeated on 9/25 and showed bilateral cavitary lesions likely secondary to septic emboli, small pericardial effusion and ileus. DHARMESH was performed on 9/25 which revealed  mitral and tricuspid valve endocarditis. Blood cultures (9/19 - 9/26) have grown methicillin sensitive Staph aureus. Blood cultures as of 9/29 have been NGTD. ID was following the patient for antibiotic management with most recent regimen being cefazolin, vancomycin and daptomycin. Repeat Echo on 9/28 showed LVEF 35% and persistent vegatations. On 9/30 the patient had an MRI brain that showed remote lacunar infarcts without acute intracranial abnormality. On 10/1 MRI revealed spinal epidural abscess at L2-4 level. Thoracentesis was performed on 10/1. The patient was followed by Cardiology team who considered valve replacement surgery but due to patient's other medical issues, Cardiovascular surgery intervention was deferred. The patient was transferred to [facility] for neurosurgery evaluation of epidural abscess.         Hospital/ICU Course:  Upon arrival to [facility], the patient was  encephalopathic, but able to follow commands. She was hemodynamically stable and satting well on 2L NC. Shortly after arrival, the patient became acutely hypertensive and hypoxemic necessitating intubation and mechanical ventilation. She was extremely difficult to oxygenate (P:F ratio 52). Bronch was performed which was unremarkable. The patient became hypotensive and was started on vasopressors. She was paralyzed with nimbex due to tachypnea and vent dyssynchrony. Bedside echo was performed which showed reduced LVEF, hyperdynamic LV; no significant mitral regurg was noted. The patient went for CTA without evidence of pulmonary embolism. Imaging revealed left lung collaspse and repeat bronch was performed with sputum cx sent. Oxygenation improved quickly and paralytic was discontinued. Right chest tube placed & pleural fluid sent; gram stain with GPCs. Neurosurgery consulted for epidural abscess, plans for OR potentially Monday. Cardiology consult for endocarditis; currently not a surgical candidate. Repeat TTE performed with improved EF and without mention of previously noted vegetations. ID and allergy consulted; oxacillin densensitization in process given pcn allergy.         Consulted general surgery for bilateral shoulder swelling; ultrasound concerning for phlegmon vs developing abscess without organized fluid collection. S/p I&D of shoulder abscesses 10/5/19. Extubated on 10/5/19 and doing well on RA. Ongoing neurosurgery evaluation for drainage of epidural abscess; neurosurgery team verbalized possible OR procedure on Wednesday (10/9) with deferred consult to IR to possibly drain the abscesses on 10/8.    Repeat CXR done for tachypnea/increased WOB. CXR unremarkable. Head CT was repeated and showed new R basal ganglia infarcts. MRI of brain showed evidence of R thalamic infarct and ventriculitis. CTA of head completed 10/8 showing atherosclerotic plaquing of the distal vertebral arteries and concern for  noncalcified plaquing and stenosis; no high-grade stenosis or proximal occlusion.Patient underwent laminectomy on 10/9.    No new subjective & objective note has been filed under this hospital service since the last note was generated.      AB  Recent Labs   Lab 10/09/19  0811   PH 7.377   PO2 46   PCO2 28.6*   HCO3 16.8*   BE -8     Assessment/Plan:     Neuro  Encephalopathy, metabolic  --likely secondary to sepsis/metabolic processes  --Brain MRI 9/30 with lacunar infarcts  --repeat head CT (10/7) revealed new right basal ganglia infarcts  --MRI of brain revealed R thalamic infarct, ventriculitis, subactue embolic infarctions  --plan for CTA head (10/8)  --continue ABx regimen (oxacillin and flagyl)  --CTA of head completed 10/8 showing atherosclerotic plaquing of the distal vertebral arteries and concern for noncalcified plaquing and stenosis; no high-grade stenosis or proximal occlusion      Pulmonary  Acute hypoxemic respiratory failure  Multifactorial secondary to complete left lung collapse, septic emboli, pleural effusions, suspected pna. Intubated shortly after arrival for hypoxemia. s/p bronch x2 with significant clearance of secretions from left mainstem & improved aeration on f/u xray. s/p thora 10/1; transudative based on lights criteria, though concern for empyema based on OSH pulm note. right chest tube placed 10/3, 345 mL output after TPA administration; pleural fluid with GPC on gram stain. CTA neg for PE.    -- will continue to follow culture results  -- Extubated 10/5/19; now on RA  --continue oxacillin (per ID recs)  --R Chest tube removed    Pleural effusion  --Empyema with GPC on gram stain   --R chest tube removed    Cardiac/Vascular  Acute bacterial endocarditis  --See staph bacteremia       Acute on chronic diastolic congestive heart failure  --last echo (OSH) with EF 35%, LV concentric remodeling, indeterminate diastolic function  --bedside echo with depressed EF, RV not significantly  dilated  --repeat TTE with improved EF and without mention of previously noted vegetations    Renal/  ABIMBOLA (acute kidney injury)  --baseline creatinine ~ 0.7; was 2.9 on initial presentation to OSH, improved with hydration over hospital course  --FeNa/FeUrea consistent with pre-renal etiology; possible component of ATN given hypotension   --non anion gap metabolic acidosis on admit, briefly on sodium bicarb drip   --now improving; will continue to monitor    ID  Abscess of upper extremity  Ultrasound concerning for developing abscess in left and right shoulder abscesses. S/p I&D by general surgery.  - will f/u on culture and stain    Septic shock with acute organ dysfunction due to methicillin susceptible Staphylococcus aureus (MSSA)  2/2 IVDA, patient reported injecting IV heroin to ID MD at OSH. Blood cultures (9/19 - 9/26) have grown methicillin sensitive Staph aureus. Blood cultures as of 9/29 have been NGTD. mitral and tricuspid valve vegetations seen on DHARMESH 9/25; unchanged on TTE 9/28. OSH abx regimen vanc, ancef and daptomycin. Repeat BCx positive for MSSA. OSH PICC line d/c'd.    --as of 9/29, blood cultures are NGTD  --formal 2D echo results show EF 53%; normal L ventricular function; mild mitral sclerosis; small circumferential pericardial effusion. cardiology consulted; no surgical intervention at this point   --followed by neurosurgery; OR laminectomy complete; OR note to follow  --bilateral shoulder abscess, s/p I&D; R shoulder abscess + staph aureus   --oxacillin started after passed penicillin challenge; ID recs to continue oxacillin x 6 weeks      Epidural abscess  --MRI concerning for epidural abscesses at L2-4 region  --neurosurgery consulted and following  --laminectomy completed on 10/9; note pending    Hematology  Acute septic pulmonary embolism  --secondary to MV/TV endocarditis    Endocrine  Severe malnutrition  --consider TPN vs tube feedings (via NG tube) after patient has undergone  possible procedure for epidural abscesses (expected Wed, 10/9)  --currently on D10 gtt to maintain blood glucose    GI  Ileus  --Possible ileus on CT abd 9/25  --Was receiving TPN at OSH  --KUB with dilated loops of bowel  -- resolved w/ bowel rest    Pancolitis  --seen on CT 9/19 with improvement noted on f/u CT 9/25  --concern for ileus on 9/25 CT; rectal tube in place with liquid stool output  --stool 9/27 neg for c diff; stool cx neg  --remains NPO  --flagyl course completed (7/7 days)       Critical Care Daily Checklist:    A: Awake: RASS Goal/Actual Goal: RASS Goal: 0-->alert and calm  Actual: Joyner Agitation Sedation Scale (RASS): Drowsy   B: Spontaneous Breathing Trial Performed? Spon. Breathing Trial Initiated?: Initiated (10/05/19 1510)   C: SAT & SBT Coordinated?  n/a                      D: Delirium: CAM-ICU     E: Early Mobility Performed? No   F: Feeding Goal: Goals: Patient to receive nutrition by RD follow-up  Status: Nutrition Goal Status: goal not met   Current Diet Order   Procedures    Diet NPO      AS: Analgesia/Sedation n/a   T: Thromboembolic Prophylaxis Lovenox held for surgery; will continue   H: HOB > 300 Yes   U: Stress Ulcer Prophylaxis (if needed) famotidine   G: Glucose Control accuchecks q2h   B: Bowel Function     I: Indwelling Catheter (Lines & Henriquez) Necessity Henriquez, cvc necessary   D: De-escalation of Antimicrobials/Pharmacotherapies done    Plan for the day/ETD Plan for OR    Code Status:  Family/Goals of Care: Full Code  Cont course     Critical Care Time: 70 minutes  Critical secondary to Patient has a condition that poses threat to life and bodily function.     Critical care was time spent personally by me on the following activities: development of treatment plan with patient or surrogate and bedside caregivers, discussions with consultants, evaluation of patient's response to treatment, examination of patient, ordering and performing treatments and interventions, ordering  and review of laboratory studies, ordering and review of radiographic studies, pulse oximetry, re-evaluation of patient's condition. This critical care time did not overlap with that of any other provider or involve time for any procedures.     Ramirez Padilla NP  Critical Care Medicine  [facility]   Attending with

## 2022-05-08 NOTE — CONSULTS
Discussed plan of care with family at bedside; she thinks nasal abrasion is from c-pap mask.  Spoke with Dr Anna : new orders.  Approximately 2 cm linear abrasion on bridge of nose with some bruising. Photo taken.   Male

## 2022-06-06 NOTE — H&P
[facility]  Critical Care Medicine  History & Physical    Patient Name: Mesha Mckenzie  MRN: [id number]  Admission Date: 2/1/2020  Hospital Length of Stay: 0 days  Code Status: Full Code  Attending Physician: Jaziel Suárez MD   Primary Care Provider: Varun Shea MD PhD   Principal Problem: <principal problem not specified>    Subjective:     HPI:  64 y.o. F IVDU w/ HTN, HFpEF,pHTN, BOOP, depression, urinary retention, Hx of endocarditis, R thalamic stroke, epidural abscess, septic PE, empyema, shoulder abscess and recent Hx C. Diff colitis presenting with frequent stools, abdominal pain, lightheadedness, and fatigue. Patient denies fevers, chills, chest pain, palpitation, SOB, syncope, LOC, HA , or confusion. Patient was transported to [facility] ED via EMS who reported SBP in the 60s. Her initial BP in the ED was 70/41. Her initial ED labs were remarkable with a WBC of 32.68, Lactic Acid of 3.2, and an ABIMBOLA w/ a Cr of 1.7. Vitals and labs concerning for Sepsis; possibly 2/2 c diff colitis, bacteremia, or UTI. In the ED Blood Cx and urine studies were obtained. She was fluids resuscitated with 2.5L and given PO vanc, IV vanc, and cefepime. At the time of her initial evaluation by the MICU the patient's only complaint was abdominal distension that she attributed to a full bladder due to difficulty urinating. The patient was A&Ox3, answering all questions appropriately, her MAPs were > 65, and her abdomen was mildly tender. Her exam was otherwise unremarkable. Her CXR was unremarkable.Her repeat Lactic Acid worsened following fluids and increased to 3.9. The patient was given another 1L NS bolus.  Mesha Mckenzie is a       Hospital/ICU Course:  No notes on file     Past Medical History:   Diagnosis Date    Anxiety     Carotid bruit     Chronic diastolic congestive heart failure     Chronic pain     Cystitis     Cystocele, unspecified (CODE)     Depression     Encounter for blood  Pt lost balance and fell getting on stool at home.  Fell on back, denies hitting head.  Taking blood thinner for chronic afib.  C-collar in place from EMS. Pt's wife states he also takes lasix.  Pt experiencing 10/10 pain, back spasms, MD notified. Denies N/T, loss of bowel or bladder control. Diaphoretic.     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;  Service: Endoscopy;  Laterality: N/A;    HYSTERECTOMY  1989    AMANDA    LAMINECTOMY N/A 10/9/2019    Procedure: LAMINECTOMY, SPINE, L3, Open;  Surgeon: Martin Lewis DO;  Location: [facility] OR [address] [place] [address];  Service: Neurosurgery;  Laterality: N/A;    tubiligation         Review of patient's allergies indicates:   Allergen Reactions    Pcn [penicillins]        Family History     Problem Relation (Age of Onset)    Alzheimer's disease Mother    Hypertension Brother, Sister    Osteoarthritis Mother    Thyroid disease Mother        Tobacco Use    Smoking status: Current Every Day Smoker     Packs/day: 0.25     Years: 53.00     Pack years: 13.25     Types: Cigarettes     Start date: 1967    Smokeless tobacco: Never Used    Tobacco comment: 2-3 cigarettes per day  Pt is currently enrolled in the [employer].  Ambulatory referral to Smoking Cessation program.   Substance and Sexual Activity    Alcohol use: No    Drug use: No    Sexual activity: Yes     Partners: Male      Review of Systems   Constitutional: Positive for fatigue. Negative for chills and fever.   HENT: Negative for ear pain and sore throat.    Eyes: Negative for photophobia and visual disturbance.   Respiratory: Negative for chest tightness, shortness of breath and wheezing.    Cardiovascular: Negative for chest pain and palpitations.   Gastrointestinal:  Positive for abdominal distention, abdominal pain and diarrhea. Negative for constipation, nausea and vomiting.   Genitourinary: Positive for difficulty urinating. Negative for dysuria, hematuria and urgency.   Musculoskeletal: Negative for arthralgias, back pain and myalgias.   Skin: Negative for rash and wound.   Neurological: Positive for light-headedness. Negative for dizziness, syncope and weakness.   Psychiatric/Behavioral: Negative for agitation, behavioral problems and confusion.     Objective:     Vital Signs (Most Recent):  Temp: 98.1 °F (36.7 °C) (02/01/20 2114)  Pulse: (!) 112 (02/01/20 2255)  Resp: 16 (02/01/20 1952)  BP: 127/71 (02/01/20 2331)  SpO2: 100 % (02/01/20 2331) Vital Signs (24h Range):  Temp:  [97.8 °F (36.6 °C)-98.1 °F (36.7 °C)] 98.1 °F (36.7 °C)  Pulse:  [] 112  Resp:  [16] 16  SpO2:  [96 %-100 %] 100 %  BP: ()/(38-71) 127/71   Weight: 50.8 kg (112 lb)  Body mass index is 21.16 kg/m².      Intake/Output Summary (Last 24 hours) at 2/2/2020 0015  Last data filed at 2/1/2020 2327  Gross per 24 hour   Intake 1524 ml   Output 1300 ml   Net 224 ml       Physical Exam   Constitutional: She is oriented to person, place, and time. She appears well-developed and well-nourished.   HENT:   Head: Normocephalic and atraumatic.   Mouth/Throat: No oropharyngeal exudate.   Eyes: Conjunctivae and EOM are normal. No scleral icterus.   Neck: Normal range of motion. Neck supple.   Cardiovascular: Normal rate, regular rhythm, normal heart sounds and intact distal pulses.   Pulmonary/Chest: Effort normal and breath sounds normal.   Abdominal: Soft. Bowel sounds are normal. There is no tenderness.   Musculoskeletal: Normal range of motion.   Neurological: She is alert and oriented to person, place, and time.   Skin: Skin is warm and dry.   Psychiatric: She has a normal mood and affect. Her behavior is normal. Judgment and thought content normal.   Vitals reviewed.      Vents:     Lines/Drains/Airways      Peripheral Intravenous Line                 Peripheral IV - Single Lumen 02/01/20 18 G Left Antecubital 1 day         Peripheral IV - Single Lumen 02/01/20 2000 22 G Right Forearm less than 1 day              Significant Labs:    CBC/Anemia Profile:  Recent Labs   Lab 02/01/20 2013 02/01/20 2020   WBC 32.68*  --    HGB 9.7*  --    HCT 31.0* 30*   *  --    *  --    RDW 14.7*  --         Chemistries:  Recent Labs   Lab 02/01/20 2013   *   K 3.8   CL 93*   CO2 17*   BUN 22   CREATININE 1.7*   CALCIUM 8.5*   ALBUMIN 2.5*   PROT 6.5   BILITOT 0.2   ALKPHOS 125   ALT 15   AST 15       Blood Culture: No results for input(s): LABBLOO in the last 48 hours.  Lactic Acid:   Recent Labs   Lab 02/01/20 2013 02/01/20  2325   LACTATE 3.2* 3.9*     Urine Culture: No results for input(s): LABURIN in the last 48 hours.  Urine Studies:   Recent Labs   Lab 02/01/20  2325   COLORU Yellow   APPEARANCEUA Clear   PHUR 6.0   SPECGRAV 1.005   PROTEINUA Negative   GLUCUA Negative   KETONESU Negative   BILIRUBINUA Negative   OCCULTUA 2+*   NITRITE Negative   LEUKOCYTESUR Negative   RBCUA 1   WBCUA 0   BACTERIA Occasional   SQUAMEPITHEL 0   HYALINECASTS 1     All pertinent labs within the past 24 hours have been reviewed.    Significant Imaging: I have reviewed all pertinent imaging results/findings within the past 24 hours.    Assessment/Plan:     Psychiatric  Depression  - c/w Venlafaxine     Cardiac/Vascular  History of bacterial endocarditis  Hx of MSSA Bacterial Endocarditis s/p Oxacillin x 6 weeks and Cefadroxil x 4 weeks. Bacteremia a possible source of sepsis.     - f/u Blood Cxs   - Repeat TTE pending Blood Cxs    Essential hypertension  - will hold regimen of Amlodipine, Metoprolol, and lasix in the setting of sepsis     Renal/  Urinary retention  - bladder scan q6H   - strict I&O's     ABIMBOLA (acute kidney injury)  Patient w/ ABIMBOLA on admission w/ Cr of 1.7 when baseline is < 1.0. Likely etiologies include  prerenal azotemia 2/2 GI loss & hypoperfusion 2/2 sepsis, septic ATN, and/or obstructive uropathy.    - daily CMP   - avoid nephrotoxic agents  - renally dose medication when appropriate  - will continue to treat possible etiologies of sepsis    - Maintain MAP > 65   - bladder scan Q6H   - strict I&O's     ID  Sepsis  64 y.o. F IVDU w/ Hx of endocarditis, R thalamic stroke, epidural abscess, septic PE, empyema, shoulder abscess and recent Hx C. Diff colitis presenting with frequent stools, abdominal pain, lightheadedness, and fatigue. Patient was transported to [facility] ED via EMS who reported SBP in the 60s. Her initial BP in the ED was 70/41. Her initial ED labs were remarkable with a WBC of 32.68, Lactic Acid of 3.2, and an ABIMBOLA w/ a Cr of 1.7. Vitals and labs concerning for Sepsis; possibly 2/2 c diff colitis, bacteremia, or UTI. In the ED she was fluids resuscitated with 2.5L and given PO vanc, IV vanc, and cefepime. Repeat Lactic Acid worsened following fluids and increased to 3.9. Patient was given another 1L NS bolus. CXR unremarkable.     - c/w PO vanc   - c/w broad spectrum antibiotics   - obtain C.diff studies   - f/u blood cultures   - f/u urine studies   - will consider consulting ID pending Micro results  - will consider TTE pending Blood Cxs   - hold antihypertensive BP meds while sepsis is resolving   - Admit to the MICU     Anemia of infection and chronic disease  - c/w Folic Acid         Critical Care Daily Checklist:    A: Awake: RASS Goal/Actual Goal:    Actual:     B: Spontaneous Breathing Trial Performed?     C: SAT & SBT Coordinated?  n/a                      D: Delirium: CAM-ICU     E: Early Mobility Performed? Yes   F: Feeding Goal:    Status:     Current Diet Order   Procedures    Diet Adult Regular (IDDSI Level 7)      AS: Analgesia/Sedation No   T: Thromboembolic Prophylaxis Yes   H: HOB > 300 Yes   U: Stress Ulcer Prophylaxis (if needed) No   G: Glucose Control N/A   B: Bowel Function     I:  Indwelling Catheter (Lines & Henriquez) Necessity No   D: De-escalation of Antimicrobials/Pharmacotherapies No    Plan for the day/ETD Admit to MICU    Code Status:  Family/Goals of Care: Full Code         Critical secondary to Patient has a condition that poses threat to life and bodily function: Sepsis      Critical care was time spent personally by me on the following activities: development of treatment plan with patient or surrogate and bedside caregivers, discussions with consultants, evaluation of patient's response to treatment, examination of patient, ordering and performing treatments and interventions, ordering and review of laboratory studies, ordering and review of radiographic studies, pulse oximetry, re-evaluation of patient's condition. This critical care time did not overlap with that of any other provider or involve time for any procedures.     Mathieu Mckeon MD  Critical Care Medicine  [facility]

## 2022-08-16 NOTE — ASSESSMENT & PLAN NOTE
--last echo with EF 35%, LV concentric remodeling, indeterminate diastolic function  --previously on vasotec, metoprolol and milrinone at OSH  --bedside echo with depressed EF, RV not significantly dilated  --formal 2D echo ordered    Pt is 75 yo female with PMH of HTN, T2DM, obesity, presents to the ED for DUNCAN. Pt reports 1 to 2 weeks of orthopnea, associated over the last 2 days to DUNCAN, wet cough and LE edema. Pt states normal breathing at rest/sitting position. Admits palpitations 2 nights ago that resolved, and non associated to other symptoms. At the moment of the evaluation pt reports improvement of LE edema after IV med.  Denies dizziness, lightheadedness, fever, chest pain, focal weakness or numbness, abdominal pain.   Denies previous h/o any arrhythmia, Afib, CHF.   As per pt, last PCP visit months ago, EKG and labs done "normal". She is not seeing cardiologist.    ED course:  - Vitals:  /71, HR 82, RR 16, temp 97.6, O2 Sat 96 % at RA  - Labs: WBC 6.71, Hb 11.7 (MCV 78), .  K 3.2, HCO3 36. pBNP 660.  Cr 0.6.  Troponin normal.  Negative RVP and Covid.    - CXR:  pending official reading.   - EKG: afib, normal rate 82 bpm    - s/p Lasix 20mg IVP x1, Lovenox 100mg SC qd, K 40 meq PO.

## 2023-01-05 NOTE — HPI
history of depression, anxiety and IVDA presented to [facility] ED on 9/19 with c/o N/V/D and weakness x ~5 days. The patient was found to be in septic shock with pneumonia and colitis and was transferred to [facility] ICU for further management. Pt was admitted to the intensive care unit and was treated with broad-spectrum antibiotics. 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 CT was repeated on 9/25 and showed bilateral cavitary lesions likely secondary to septic emboli, small pericardial effusion and ileus. DHARMESH was performed on 9/25 which revealed  mitral and tricuspid valve endocarditis. Blood cultures (9/19 - 9/26) have grown methicillin sensitive Staph aureus. Blood cultures as of 9/29 have been NGTD. ID was following the patient for antibiotic management with most recent regimen being cefazolin, vancomycin and daptomycin. Repeat Echo on 9/28 showed LVEF 35% and persistent vegatations. On 9/30 the patient had an MRI brain that showed remote lacunar infarcts without acute intracranial abnormality. On 10/1 MRI revealed spinal epidural abscess at L2-4 level. Thoracentesis was performed on 10/1. The patient was followed by Cardiology team who considered valve replacement surgery but due to patient's other medical issues, Cardiovascular surgery intervention was deferred. The patient was transferred to [facility] for neurosurgery evaluation of epidural abscess.       Car

## 2023-01-08 NOTE — ASSESSMENT & PLAN NOTE
Problem: Ventilation  Goal: Ability to achieve and maintain unassisted ventilation or tolerate decreased levels of ventilator support  Description: Target End Date:  4 days     Document on Vent flowsheet    1.  Support and monitor invasive and noninvasive mechanical ventilation  2.  Monitor ventilator weaning response  3.  Perform ventilator associated pneumonia prevention interventions  4.  Manage ventilation therapy by monitoring diagnostic test results  Note:    Ventilator Daily Summary    Vent Day #24  Tracheostomy Day #7  %/8+/40%    Ventilator settings changed this shift: none    Weaning trials:T-piece for 12 hours progress to 24 tmrw     Respiratory Procedures: none    Plan: Continue current ventilator settings and wean mechanical ventilation as tolerated per physician orders.         64-year-old woman with IVDU, diastolic CHF, HTN, MDD, right thalamic stroke, MSSA MV and TV endocarditis with associated bacteremia, empyema, shoulder abscess, plus epidural abscess, s/p laminectomy, s/p 6 weeks of oxacillin, and recent C difficile colitis is admitted for reoccurrence of cdiff. Patient was first diagnosed with cdiff in mid January and received adequate treatment. Patient got better but then got worse. We will call this her first reoccurrence of cdiff. Patient is getting better on current oral vancomyocin and is stable. There is no need for a colonoscopy at this time. Agree with ID about an oral vanc taper. Patient can follow up in GI clinic and schedule colonoscopy after cdiff resolves. If patient has another reoccurrence of cdiff will discuss the possibility of a fecal transplant.        Plan  -No need for a scope right now  -Agree with Vanc taper per ID  -can follow up in GI clinic outpatient to setup a future colonoscopy   -If patient has another reoccurrence will consider fecal transplant at that time.

## 2023-12-14 NOTE — PLAN OF CARE
Patient free of falls and injures this shift. Awake and follows some simple commands.Bilateral wrist restraints with good distal pulse and circulation noted. Remains on bipap last night with brief breaks, placed on matthieu mask. Answers some questions but mumbles words. Talking much clearer when daughter at bedside. Sinus tach on monitor.    no abdominal pain, no bloating, no constipation, no diarrhea, +nausea and no vomiting. Well appearing, awake, alert, oriented to person, place, time/situation and in no apparent distress. normal...

## 2024-08-19 NOTE — PT/OT/SLP PROGRESS
Speech Language Pathology      Mesha Mckenzie  MRN: [id number]    Patient not seen today secondary to Unavailable (Comment)(pt soiled and waiting to be cleaned/changed.  Due to family training with PT/OT scheduled, SLP unable to return later to make up time.  ). Will follow-up 11/19.    LEROY Madrid, CCC-SLP     LEROY Madrid, CCC-SLP  Speech Language Pathologist  [phone]  11/18/2019       García

## 2024-11-11 NOTE — PROGRESS NOTES
"[facility] Medicine  Progress Note    Patient Name: Mesha Mckenzie  MRN: [id number]  Patient Class: IP- Inpatient   Admission Date: 9/19/2019  Length of Stay: 4 days  Attending Physician: Eli Anna MD  Primary Care Provider: Varun Shea MD PhD        Subjective:     Principal Problem:Staphylococcus aureus bacteremia with sepsis        HPI:  Pt is a 62 yo female who was transferred from [facility] for septic shock, pancolitis and bilateral pna. Pt reports that she fell out of bed on Sunday morning and has been feeling progressively worse throughout the week. C/o vomiting since Sunday, ~3 episodes per day. Also reports diarrhea, ~4 stools per day, watery in nature. Pt denies any hematemesis, melena or hematochezia. Pt did take zofran which helped "some:" c/o severe abdominal pain since Sunday which is constant. Pain is a 10 on a 0-10 pain scale.  Has not been able to eat due to the nausea and pain. Questioned pt in regards to use of pain medication, states "I didn't have any." pt was scheduled for colonoscopy tomorrow. Hx of GI bleed in December 2018. Pt states she had passed some blood in her stool. Reviewed colonoscopy report, no bleeding in the colon was found however the prep was poor. No recent travel, no sick contacts at home. Questioned pt in regards to cough: pt states she has felt SOB due to pain but reports only occasional coughing. Denies any sputum production.  Social: smokes 10 cigarettes per day, 20 pack year hx. No ETOH.     Overview/Hospital Course:  No notes on file    Interval History:  The patient will open her eyes and follows simple commands.  She appears very uncomfortable but cannot tell me where she is having pain    Review of Systems   Unable to perform ROS: Mental status change     Objective:     Vital Signs (Most Recent):  Temp: 97.9 °F (36.6 °C) (09/23/19 1145)  Pulse: (!) 123 (09/23/19 1231)  Resp: (!) 36 (09/23/19 1231)  BP: (!) 143/68 (09/23/19 " 1145)  SpO2: 100 % (09/23/19 1231) Vital Signs (24h Range):  Temp:  [97.8 °F (36.6 °C)-99.9 °F (37.7 °C)] 97.9 °F (36.6 °C)  Pulse:  [104-138] 123  Resp:  [24-47] 36  SpO2:  [91 %-100 %] 100 %  BP: (107-163)/(53-89) 143/68     Weight: 47.4 kg (104 lb 8 oz)  Body mass index is 19.11 kg/m².    Intake/Output Summary (Last 24 hours) at 9/23/2019 1312  Last data filed at 9/23/2019 1145  Gross per 24 hour   Intake 3647.91 ml   Output 2570 ml   Net 1077.91 ml      Physical Exam   Cardiovascular: Regular rhythm, normal heart sounds and intact distal pulses.   Tachycardic   Pulmonary/Chest: No respiratory distress. She has no wheezes. She has rales.   Tachypneic   Abdominal: Soft. Bowel sounds are normal. She exhibits no distension. There is no tenderness.   Musculoskeletal: She exhibits tenderness. She exhibits no edema.   Both knees and ankles are tender to palpation   Neurological: She is alert. No cranial nerve deficit. Coordination normal.   The patient is awake and alert but extremely anxious and in pain   Skin: Skin is warm and dry. Capillary refill takes less than 2 seconds. No rash noted.   Nursing note and vitals reviewed.      Significant Labs:   CBC:   Recent Labs   Lab 09/22/19  0332 09/23/19  0316   WBC 10.57 7.72   HGB 8.8* 7.6*   HCT 24.1* 21.4*   PLT 90* 63*     CMP:   Recent Labs   Lab 09/22/19  0332  09/23/19  0006 09/23/19  0316 09/23/19  0748   *  135*   < > 138  138 138  138  138 139  139   K 3.9  3.9   < > 3.6  3.6 3.5  3.5  3.5 3.0*  3.0*     107   < > 107  107 106  106  106 106  106   CO2 19*  19*   < > 21*  21* 22*  22*  22* 22*  22*     108   < > 103  103 105  105  105 109  109   BUN 34*  34*   < > 27*  27* 26*  26*  26* 27*  27*   CREATININE 0.8  0.8   < > 0.8  0.8 0.8  0.8  0.8 0.8  0.8   CALCIUM 8.0*  8.0*   < > 7.3*  7.3* 7.4*  7.4*  7.4* 7.2*  7.2*   PROT 4.8*  --   --  4.1*  --    ALBUMIN 1.5*  --   --  1.4*  --    BILITOT 0.7  --    --  0.5  --    ALKPHOS 56  --   --  52*  --    AST 17  --   --  15  --    ALT 12  --   --  9*  --    ANIONGAP 9  9   < > 10  10 10  10  10 11  11   EGFRNONAA >60  >60   < > >60  >60 >60  >60  >60 >60  >60    < > = values in this interval not displayed.       Significant Imaging: I have reviewed all pertinent imaging results/findings within the past 24 hours.      Assessment/Plan:      * Staphylococcus aureus bacteremia with sepsis  The patient is on Ancef for MSSA.  Blood cultures from the 22nd are not showing any growth today.  Transthoracic echocardiogram did not show evidence of vegetations.  She will have a transesophageal echocardiogram done when she is able to cooperate.      Acute hypoxemic respiratory failure  This is secondary to pneumonia which is probably secondary to septic emboli.  She is currently on nasal cannula.    Encephalopathy, metabolic  The patient's mental status is improved today according to the family and the nursing staff.  She is still far off her baseline      Pancolitis  As per findings on CT scan.  The patient is on IV Flagyl for this.  She has not had diarrhea since admission.        Pleural effusion  Parapneumonic effusion?  Patient will not undergo thoracentesis at this point secondary to elevated PT, PTT and decreased platelet count.      ABIMBOLA (acute kidney injury)  Renal function is stable.  Will replace her potassium IV today.      Metabolic acidosis  Improved. Continue to monitor        Coagulopathy  Secondary to DIC?  Hematology will see the patient today      Thrombocytopenia  H/H steadily decreasing  Heme consulted    Normocytic anemia  Secondary to this acute illness?  There is no evidence of blood loss or hemolysis.        VTE Risk Mitigation (From admission, onward)         Ordered     Place sequential compression device  Until discontinued      09/19/19 2234     IP VTE HIGH RISK PATIENT  Once      09/19/19 2217                Critical care time spent on the  evaluation and treatment of severe organ dysfunction, review of pertinent labs and imaging studies, discussions with consulting providers and discussions with patient/family: 40 minutes.      Eli Anna MD  Department of Hospital Medicine   [facility]   Detail Level: Zone Note Text (......Xxx Chief Complaint.): This diagnosis correlates with the Render Risk Assessment In Note?: no Other (Free Text): Was found on 10/20/2021- sternum

## (undated) DEVICE — TUBE FRAZIER 5MM 2FT SOFT TIP

## (undated) DEVICE — DRESSING SURGICAL 1/2X1/2

## (undated) DEVICE — GAUZE SPONGE 4X4 12PLY

## (undated) DEVICE — CATH SUCTION 10FR

## (undated) DEVICE — DRAPE STERI-DRAPE 1000 17X11IN

## (undated) DEVICE — SUT VICRYL PLUS 3-0 SH 18IN

## (undated) DEVICE — DRAPE C ARM 42 X 120 10/BX

## (undated) DEVICE — NDL HYPO REG 25G X 1 1/2

## (undated) DEVICE — DIFFUSER

## (undated) DEVICE — SEE MEDLINE ITEM 157194

## (undated) DEVICE — SUT MCRYL PLUS 4-0 PS2 27IN

## (undated) DEVICE — BLADE ELECTRO EDGE INSULATED

## (undated) DEVICE — DRAPE OPMI STERILE

## (undated) DEVICE — DRESSING AQUACEL FOAM 5 X 5

## (undated) DEVICE — KIT SPINAL PATIENT CARE JACK

## (undated) DEVICE — CHLORAPREP W TINT 26ML APPL

## (undated) DEVICE — NDL SPINAL 18GX3.5 SPINOCAN

## (undated) DEVICE — ELECTRODE REM PLYHSV RETURN 9

## (undated) DEVICE — BUR BONE CUT MICRO TPS 3X3.8MM

## (undated) DEVICE — SYR 30CC LUER LOCK

## (undated) DEVICE — DRESSING AQUACEL SACRAL 9 X 9

## (undated) DEVICE — DRESSING MEPILEX BORDER 4 X 4

## (undated) DEVICE — SUT 0 VICRYL / UR6 (J603)

## (undated) DEVICE — NDL 18GA X1 1/2 REG BEVEL

## (undated) DEVICE — SEE MEDLINE ITEM 156905

## (undated) DEVICE — DRAPE ABDOMINAL TIBURON 14X11

## (undated) DEVICE — TRAY FOLEY 16FR INFECTION CONT

## (undated) DEVICE — DRESSING AQUACEL AG ADV 3.5X6

## (undated) DEVICE — ADHESIVE DERMABOND ADVANCED

## (undated) DEVICE — CORD BIPOLAR 12 FOOT

## (undated) DEVICE — CARTRIDGE OIL

## (undated) DEVICE — SPONGE PATTY SURGICAL .5X3IN

## (undated) DEVICE — DRAPE C-ARMOR EQUIPMENT COVER

## (undated) DEVICE — DRAPE STERI INSTRUMENT 1018

## (undated) DEVICE — DRESSING ABSRBNT ISLAND 3.6X8

## (undated) DEVICE — DRESSING AQUACEL FOAM 4X4IN

## (undated) DEVICE — DRAPE INCISE IOBAN 2 23X17IN

## (undated) DEVICE — SEE MEDLINE ITEM 157150

## (undated) DEVICE — SUT CTD VICRYL 2-0 CR/CT-2

## (undated) DEVICE — NDL SPINAL SPINOCAN 22GX3.5

## (undated) DEVICE — KIT SURGIFLO HEMOSTATIC MATRIX

## (undated) DEVICE — KIT POSITIONING WILSON FRAME

## (undated) DEVICE — MARKER SKIN STND TIP BLUE BARR

## (undated) DEVICE — SEE MEDLINE ITEM 146347